# Patient Record
Sex: FEMALE | Race: WHITE | NOT HISPANIC OR LATINO | Employment: OTHER | URBAN - METROPOLITAN AREA
[De-identification: names, ages, dates, MRNs, and addresses within clinical notes are randomized per-mention and may not be internally consistent; named-entity substitution may affect disease eponyms.]

---

## 2017-06-27 ENCOUNTER — TRANSFERRED RECORDS (OUTPATIENT)
Dept: HEALTH INFORMATION MANAGEMENT | Facility: CLINIC | Age: 42
End: 2017-06-27

## 2017-08-04 ENCOUNTER — TRANSFERRED RECORDS (OUTPATIENT)
Dept: HEALTH INFORMATION MANAGEMENT | Facility: CLINIC | Age: 42
End: 2017-08-04

## 2017-08-05 ENCOUNTER — TRANSFERRED RECORDS (OUTPATIENT)
Dept: HEALTH INFORMATION MANAGEMENT | Facility: CLINIC | Age: 42
End: 2017-08-05

## 2017-08-10 ENCOUNTER — TRANSFERRED RECORDS (OUTPATIENT)
Dept: HEALTH INFORMATION MANAGEMENT | Facility: CLINIC | Age: 42
End: 2017-08-10

## 2017-08-30 ENCOUNTER — TRANSFERRED RECORDS (OUTPATIENT)
Dept: HEALTH INFORMATION MANAGEMENT | Facility: CLINIC | Age: 42
End: 2017-08-30

## 2019-02-21 ENCOUNTER — HOSPITAL ENCOUNTER (INPATIENT)
Facility: CLINIC | Age: 44
LOS: 21 days | Discharge: HOME | DRG: 885 | End: 2019-03-14
Attending: EMERGENCY MEDICINE | Admitting: PSYCHIATRY & NEUROLOGY
Payer: MEDICARE

## 2019-02-21 ENCOUNTER — TELEPHONE (OUTPATIENT)
Dept: BEHAVIORAL HEALTH | Facility: CLINIC | Age: 44
End: 2019-02-21

## 2019-02-21 DIAGNOSIS — K21.00 GASTROESOPHAGEAL REFLUX DISEASE WITH ESOPHAGITIS: ICD-10-CM

## 2019-02-21 DIAGNOSIS — R74.01 TRANSAMINITIS: ICD-10-CM

## 2019-02-21 DIAGNOSIS — E11.65 UNCONTROLLED TYPE 2 DIABETES MELLITUS WITH HYPERGLYCEMIA (H): ICD-10-CM

## 2019-02-21 DIAGNOSIS — F33.1 MODERATE EPISODE OF RECURRENT MAJOR DEPRESSIVE DISORDER (H): Primary | ICD-10-CM

## 2019-02-21 DIAGNOSIS — R45.851 SUICIDE IDEATION: ICD-10-CM

## 2019-02-21 DIAGNOSIS — F41.9 ANXIETY: ICD-10-CM

## 2019-02-21 DIAGNOSIS — I10 BENIGN ESSENTIAL HYPERTENSION: ICD-10-CM

## 2019-02-21 DIAGNOSIS — R73.9 HYPERGLYCEMIA: ICD-10-CM

## 2019-02-21 PROBLEM — R45.89 SUICIDAL BEHAVIOR: Status: ACTIVE | Noted: 2019-02-21

## 2019-02-21 LAB
ALBUMIN SERPL-MCNC: 4 G/DL (ref 3.4–5)
ALP SERPL-CCNC: 119 U/L (ref 40–150)
ALT SERPL W P-5'-P-CCNC: 194 U/L (ref 0–50)
AMPHETAMINES UR QL SCN: NEGATIVE
ANION GAP SERPL CALCULATED.3IONS-SCNC: 9 MMOL/L (ref 3–14)
AST SERPL W P-5'-P-CCNC: 96 U/L (ref 0–45)
BARBITURATES UR QL: NEGATIVE
BASOPHILS # BLD AUTO: 0 10E9/L (ref 0–0.2)
BASOPHILS NFR BLD AUTO: 0.2 %
BENZODIAZ UR QL: POSITIVE
BILIRUB SERPL-MCNC: 0.4 MG/DL (ref 0.2–1.3)
BUN SERPL-MCNC: 12 MG/DL (ref 7–30)
CALCIUM SERPL-MCNC: 10.4 MG/DL (ref 8.5–10.1)
CANNABINOIDS UR QL SCN: POSITIVE
CHLORIDE SERPL-SCNC: 101 MMOL/L (ref 94–109)
CO2 SERPL-SCNC: 26 MMOL/L (ref 20–32)
COCAINE UR QL: NEGATIVE
CREAT SERPL-MCNC: 0.66 MG/DL (ref 0.52–1.04)
DIFFERENTIAL METHOD BLD: NORMAL
EOSINOPHIL # BLD AUTO: 0.1 10E9/L (ref 0–0.7)
EOSINOPHIL NFR BLD AUTO: 1.2 %
ERYTHROCYTE [DISTWIDTH] IN BLOOD BY AUTOMATED COUNT: 14 % (ref 10–15)
ETHANOL UR QL SCN: NEGATIVE
GFR SERPL CREATININE-BSD FRML MDRD: >90 ML/MIN/{1.73_M2}
GLUCOSE SERPL-MCNC: 216 MG/DL (ref 70–99)
HCG UR QL: NEGATIVE
HCT VFR BLD AUTO: 42.4 % (ref 35–47)
HGB BLD-MCNC: 14.3 G/DL (ref 11.7–15.7)
IMM GRANULOCYTES # BLD: 0.1 10E9/L (ref 0–0.4)
IMM GRANULOCYTES NFR BLD: 0.7 %
LYMPHOCYTES # BLD AUTO: 2.9 10E9/L (ref 0.8–5.3)
LYMPHOCYTES NFR BLD AUTO: 30.4 %
MCH RBC QN AUTO: 29.9 PG (ref 26.5–33)
MCHC RBC AUTO-ENTMCNC: 33.7 G/DL (ref 31.5–36.5)
MCV RBC AUTO: 89 FL (ref 78–100)
MONOCYTES # BLD AUTO: 0.5 10E9/L (ref 0–1.3)
MONOCYTES NFR BLD AUTO: 4.8 %
NEUTROPHILS # BLD AUTO: 6 10E9/L (ref 1.6–8.3)
NEUTROPHILS NFR BLD AUTO: 62.7 %
NRBC # BLD AUTO: 0 10*3/UL
NRBC BLD AUTO-RTO: 0 /100
OPIATES UR QL SCN: NEGATIVE
PLATELET # BLD AUTO: 251 10E9/L (ref 150–450)
POTASSIUM SERPL-SCNC: 4.5 MMOL/L (ref 3.4–5.3)
PROT SERPL-MCNC: 9.1 G/DL (ref 6.8–8.8)
RBC # BLD AUTO: 4.78 10E12/L (ref 3.8–5.2)
SODIUM SERPL-SCNC: 136 MMOL/L (ref 133–144)
TSH SERPL DL<=0.005 MIU/L-ACNC: 0.82 MU/L (ref 0.4–4)
WBC # BLD AUTO: 9.6 10E9/L (ref 4–11)

## 2019-02-21 PROCEDURE — 80307 DRUG TEST PRSMV CHEM ANLYZR: CPT | Performed by: EMERGENCY MEDICINE

## 2019-02-21 PROCEDURE — 84443 ASSAY THYROID STIM HORMONE: CPT | Performed by: EMERGENCY MEDICINE

## 2019-02-21 PROCEDURE — 99285 EMERGENCY DEPT VISIT HI MDM: CPT | Mod: Z6 | Performed by: EMERGENCY MEDICINE

## 2019-02-21 PROCEDURE — 25000132 ZZH RX MED GY IP 250 OP 250 PS 637: Mod: GY | Performed by: NURSE PRACTITIONER

## 2019-02-21 PROCEDURE — 99285 EMERGENCY DEPT VISIT HI MDM: CPT | Mod: 25 | Performed by: EMERGENCY MEDICINE

## 2019-02-21 PROCEDURE — 12400001 ZZH R&B MH UMMC

## 2019-02-21 PROCEDURE — 80320 DRUG SCREEN QUANTALCOHOLS: CPT | Performed by: EMERGENCY MEDICINE

## 2019-02-21 PROCEDURE — HZ2ZZZZ DETOXIFICATION SERVICES FOR SUBSTANCE ABUSE TREATMENT: ICD-10-PCS | Performed by: PSYCHIATRY & NEUROLOGY

## 2019-02-21 PROCEDURE — 80053 COMPREHEN METABOLIC PANEL: CPT | Performed by: EMERGENCY MEDICINE

## 2019-02-21 PROCEDURE — 81025 URINE PREGNANCY TEST: CPT | Performed by: EMERGENCY MEDICINE

## 2019-02-21 PROCEDURE — 85025 COMPLETE CBC W/AUTO DIFF WBC: CPT | Performed by: EMERGENCY MEDICINE

## 2019-02-21 PROCEDURE — A9270 NON-COVERED ITEM OR SERVICE: HCPCS | Mod: GY | Performed by: NURSE PRACTITIONER

## 2019-02-21 PROCEDURE — 90791 PSYCH DIAGNOSTIC EVALUATION: CPT

## 2019-02-21 RX ORDER — ALUMINA, MAGNESIA, AND SIMETHICONE 2400; 2400; 240 MG/30ML; MG/30ML; MG/30ML
30 SUSPENSION ORAL EVERY 4 HOURS PRN
Status: DISCONTINUED | OUTPATIENT
Start: 2019-02-21 | End: 2019-03-14 | Stop reason: HOSPADM

## 2019-02-21 RX ORDER — BISACODYL 10 MG
10 SUPPOSITORY, RECTAL RECTAL DAILY PRN
Status: DISCONTINUED | OUTPATIENT
Start: 2019-02-21 | End: 2019-03-14 | Stop reason: HOSPADM

## 2019-02-21 RX ORDER — PHENOBARBITAL 32.4 MG/1
64.8 TABLET ORAL 4 TIMES DAILY
Status: DISCONTINUED | OUTPATIENT
Start: 2019-02-21 | End: 2019-02-26

## 2019-02-21 RX ORDER — METHADONE HYDROCHLORIDE 10 MG/5ML
130 SOLUTION ORAL DAILY
COMMUNITY
End: 2023-10-01

## 2019-02-21 RX ORDER — ACETAMINOPHEN 325 MG/1
650 TABLET ORAL EVERY 4 HOURS PRN
Status: DISCONTINUED | OUTPATIENT
Start: 2019-02-21 | End: 2019-03-14 | Stop reason: HOSPADM

## 2019-02-21 RX ORDER — TRAZODONE HYDROCHLORIDE 50 MG/1
50 TABLET, FILM COATED ORAL
Status: DISCONTINUED | OUTPATIENT
Start: 2019-02-21 | End: 2019-03-14 | Stop reason: HOSPADM

## 2019-02-21 RX ORDER — HYDROXYZINE HYDROCHLORIDE 25 MG/1
25 TABLET, FILM COATED ORAL EVERY 4 HOURS PRN
Status: DISCONTINUED | OUTPATIENT
Start: 2019-02-21 | End: 2019-02-28

## 2019-02-21 RX ADMIN — HYDROXYZINE HYDROCHLORIDE 25 MG: 25 TABLET, FILM COATED ORAL at 21:17

## 2019-02-21 RX ADMIN — PHENOBARBITAL 64.8 MG: 32.4 TABLET ORAL at 21:14

## 2019-02-21 RX ADMIN — TRAZODONE HYDROCHLORIDE 50 MG: 50 TABLET ORAL at 21:17

## 2019-02-21 SDOH — HEALTH STABILITY: MENTAL HEALTH: HOW OFTEN DO YOU HAVE A DRINK CONTAINING ALCOHOL?: NEVER

## 2019-02-21 ASSESSMENT — ENCOUNTER SYMPTOMS
EYE REDNESS: 0
CONFUSION: 0
SHORTNESS OF BREATH: 0
ABDOMINAL PAIN: 0
HEADACHES: 0
SLEEP DISTURBANCE: 1
ARTHRALGIAS: 0
FEVER: 0
NECK STIFFNESS: 0
DIFFICULTY URINATING: 0
DYSPHORIC MOOD: 1
COLOR CHANGE: 0

## 2019-02-21 ASSESSMENT — ACTIVITIES OF DAILY LIVING (ADL)
TOILETING: 0-->INDEPENDENT
ORAL_HYGIENE: INDEPENDENT
SWALLOWING: 0-->SWALLOWS FOODS/LIQUIDS WITHOUT DIFFICULTY
HYGIENE/GROOMING: INDEPENDENT
RETIRED_EATING: 0-->INDEPENDENT
TRANSFERRING: 0-->INDEPENDENT
AMBULATION: 0-->INDEPENDENT
DRESS: 0-->INDEPENDENT
COGNITION: 0 - NO COGNITION ISSUES REPORTED
FALL_HISTORY_WITHIN_LAST_SIX_MONTHS: NO
RETIRED_COMMUNICATION: 0-->UNDERSTANDS/COMMUNICATES WITHOUT DIFFICULTY
LAUNDRY: WITH SUPERVISION
DRESS: INDEPENDENT
BATHING: 0-->INDEPENDENT

## 2019-02-21 ASSESSMENT — MIFFLIN-ST. JEOR
SCORE: 1727.29
SCORE: 1786.26

## 2019-02-21 NOTE — ED NOTES
Pt updated on her admission status on [facility], pt was informed that she will have a roommate , unit is lock unit. Pt had no questions about her admission afterwards

## 2019-02-21 NOTE — ED NOTES
I have performed an in person assessment of the patient. Based on this assessment the patient no longer requires a one on one attendant at this point in time.    ANDERSON BAZZI MD, MD  11:20 AM  February 21, 2019         Anderson Bazzi MD  02/21/19 1129
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
## 2019-02-21 NOTE — ED PROVIDER NOTES
History     Chief Complaint   Patient presents with     Suicidal     due to recent traumatic events pt feels emotional well being is a burden to others. feels life is not worth living anymore.      Panic Attack     witnessed and revived nephew who attempted to hang himself. also witnessed same nephew attempt murder last year     HPI  Yun Coyle is a 43 year old female with a history of depression, anxiety and substance abuse, who presents to the Emergency Department for evaluation of suicdal ideations. Patient complains of worsening depression over the last couple of months along with worsening suicidal ideations for the past three months. Patient reports that she was prompted to come to the ED today after she contemplated overdosing on over the counter medications. Patient states that she currently lives with her adult daughter and her granddaughter, and reports feeling like a burden to her family as she is not contributing anything and is not currently working. Her current triggers include recent traumatic event in which she witnessed the near successful suicide of her nephew and was required to perform CPR on him. She also notes witnessing the same nephew reportedly attempt to murder his brother a month later. She endorses an addiction to benzodiazapines and is currently on treatment with methadone. She reports that she was recently caught purchasing Xanax from patient's as they were leaving the methadone clinic and was subsequently hospitalized last month following an intentional overdose where she reportedly took 30 Xanax. Patient reports that she had another attempt earlier this month. She notes that Sunday, four days ago, was her last day on methadone as she reportedly ran out. She also notes that last use of Xanax was last night.       I have reviewed the Medications, Allergies, Past Medical and Surgical History, and Social History in the [employer] system.    Past Medical History:   Diagnosis Date      "Anxiety      Depressive disorder      Diabetes (H)      Gastroesophageal reflux disease      Hypertension      Uncomplicated asthma        Past Surgical History:   Procedure Laterality Date     BACK SURGERY       CHOLECYSTECTOMY       GYN SURGERY       ORTHOPEDIC SURGERY         No family history on file.    Social History     Tobacco Use     Smoking status: Never Smoker     Smokeless tobacco: Never Used   Substance Use Topics     Alcohol use: No     Frequency: Never     No current facility-administered medications for this encounter.      Current Outpatient Medications   Medication     methadone HCl 10 MG/5ML SOLN      Not on File    Review of Systems   Constitutional: Negative for fever.   HENT: Negative for congestion.    Eyes: Negative for redness.   Respiratory: Negative for shortness of breath.    Cardiovascular: Negative for chest pain.   Gastrointestinal: Negative for abdominal pain.   Genitourinary: Negative for difficulty urinating.   Musculoskeletal: Negative for arthralgias and neck stiffness.   Skin: Negative for color change.   Neurological: Negative for headaches.   Psychiatric/Behavioral: Positive for dysphoric mood, sleep disturbance and suicidal ideas. Negative for confusion.   All other systems reviewed and are negative.      Physical Exam   BP: (!) 182/108  Pulse: 107  Heart Rate: 77  Temp: 98.3  F (36.8  C)  Resp: 20  Height: 175.3 cm (5' 9\")  Weight: 99.8 kg (220 lb)  SpO2: 97 %      Physical Exam   Constitutional: She appears well-developed and well-nourished. No distress.   HENT:   Head: Normocephalic and atraumatic.   Mouth/Throat: No oropharyngeal exudate.   Eyes: Pupils are equal, round, and reactive to light. No scleral icterus.   Neck: Normal range of motion.   Cardiovascular: Normal rate, regular rhythm, normal heart sounds and intact distal pulses.   Pulmonary/Chest: Effort normal and breath sounds normal. No respiratory distress.   Abdominal: Soft. Bowel sounds are normal. There is no " tenderness.   Musculoskeletal: Normal range of motion. She exhibits no edema or tenderness.   Neurological: She is alert. She has normal strength. Coordination normal.   Skin: Skin is warm. No rash noted. She is not diaphoretic.   Psychiatric: She exhibits a depressed mood. She expresses suicidal ideation.   Nursing note and vitals reviewed.      ED Course        Procedures            Critical Care time:    Results for orders placed or performed during the hospital encounter of 02/21/19   Drug abuse screen 6 urine (tox)   Result Value Ref Range    Amphetamine Qual Urine Negative NEG^Negative    Barbiturates Qual Urine Negative NEG^Negative    Benzodiazepine Qual Urine Positive (A) NEG^Negative    Cannabinoids Qual Urine Positive (A) NEG^Negative    Cocaine Qual Urine Negative NEG^Negative    Ethanol Qual Urine Negative NEG^Negative    Opiates Qualitative Urine Negative NEG^Negative   HCG qualitative urine   Result Value Ref Range    HCG Qual Urine Negative NEG^Negative   CBC with platelets differential   Result Value Ref Range    WBC 9.6 4.0 - 11.0 10e9/L    RBC Count 4.78 3.8 - 5.2 10e12/L    Hemoglobin 14.3 11.7 - 15.7 g/dL    Hematocrit 42.4 35.0 - 47.0 %    MCV 89 78 - 100 fl    MCH 29.9 26.5 - 33.0 pg    MCHC 33.7 31.5 - 36.5 g/dL    RDW 14.0 10.0 - 15.0 %    Platelet Count 251 150 - 450 10e9/L    Diff Method Automated Method     % Neutrophils 62.7 %    % Lymphocytes 30.4 %    % Monocytes 4.8 %    % Eosinophils 1.2 %    % Basophils 0.2 %    % Immature Granulocytes 0.7 %    Nucleated RBCs 0 0 /100    Absolute Neutrophil 6.0 1.6 - 8.3 10e9/L    Absolute Lymphocytes 2.9 0.8 - 5.3 10e9/L    Absolute Monocytes 0.5 0.0 - 1.3 10e9/L    Absolute Eosinophils 0.1 0.0 - 0.7 10e9/L    Absolute Basophils 0.0 0.0 - 0.2 10e9/L    Abs Immature Granulocytes 0.1 0 - 0.4 10e9/L    Absolute Nucleated RBC 0.0    Comprehensive metabolic panel   Result Value Ref Range    Sodium 136 133 - 144 mmol/L    Potassium 4.5 3.4 - 5.3 mmol/L     Chloride 101 94 - 109 mmol/L    Carbon Dioxide 26 20 - 32 mmol/L    Anion Gap 9 3 - 14 mmol/L    Glucose 216 (H) 70 - 99 mg/dL    Urea Nitrogen 12 7 - 30 mg/dL    Creatinine 0.66 0.52 - 1.04 mg/dL    GFR Estimate >90 >60 mL/min/[1.73_m2]    GFR Estimate If Black >90 >60 mL/min/[1.73_m2]    Calcium 10.4 (H) 8.5 - 10.1 mg/dL    Bilirubin Total 0.4 0.2 - 1.3 mg/dL    Albumin 4.0 3.4 - 5.0 g/dL    Protein Total 9.1 (H) 6.8 - 8.8 g/dL    Alkaline Phosphatase 119 40 - 150 U/L     (H) 0 - 50 U/L    AST 96 (H) 0 - 45 U/L   TSH with free T4 reflex   Result Value Ref Range    TSH 0.82 0.40 - 4.00 mU/L         Release of information sent to [facility] to confirm methadone dosing    Assessments & Plan (with Medical Decision Making)   43 year old female to the emergency department for evaluation of depression and suicidal ideation.  She has been attempting to stockpile Xanax for overdose as a means of suicide.  Patient denies daily benzodiazepine use and denies a history of withdrawal when she does not use Xanax.  The patient is also in opiate withdrawal here in the emergency department as she has not had her methadone dose in the last 4 days.  The patient's admission labs are remarkable for hyperglycemia as well as transaminitis.  She has had a degree of both in the past according to review of care everywhere.  She may benefit from medicine consultation during her psychiatric admission.  The patient does not have any lab abnormality that would preclude her from mental health unit admission. The patient denies medication use other than methadone.  She also denies over-the-counter analgesic use including ibuprofen and acetaminophen. The patient appears medically stable for psychiatric admission.    I have reviewed the nursing notes.    I have reviewed the findings, diagnosis, plan and need for follow up with the patient.       Medication List      There are no discharge medications for this visit.         Final  diagnoses:   Moderate episode of recurrent major depressive disorder (H)   Suicide ideation   Hyperglycemia   Transaminitis     IVeena, am serving as a trained medical scribe to document services personally performed by Anderson Blas MD, based on the provider psychiatric admission.  Statements to me.   Anderson PAIGE MD, was physically present and have reviewed and verified the accuracy of this note documented by Veena Stallworth.    2/21/2019   [facility], [place], EMERGENCY DEPARTMENT     Anderson Blas MD  02/21/19 [id number]

## 2019-02-21 NOTE — PHARMACY-ADMISSION MEDICATION HISTORY
Admission medication history for the February 21, 2019 admission has been completed by pharmacy.     Interview sources:  [facility] in [place] ([phone]). ENE signed.   Medication compliance: Last dose 2/17/19. Goes daily M-F for dose, with take outs for Sat and Sun. Last dose Sun 2/17. Patient stated she couldn't get to the clinic this week for her doses. Confirmed her no shows with the clinic staff.     Prior to Admission Medication List:  Prior to Admission medications    Medication Sig Last Dose Taking? Auth Provider   methadone HCl 10 MG/5ML SOLN Take 110 mg by mouth daily 2/17/2019 Yes        Time spent: 15 minutes    Medication history completed by:   Fide Veras, PharmD, [facility]  [facility]  Available daily from 1-9 PM: phone [phone], ascom *[phone], pager [phone]

## 2019-02-21 NOTE — PROGRESS NOTES
02/21/19 1700   Patient Belongings   Patient Belongings locker   Patient Belongings Put in Hospital Secure Location (Security or Locker, etc.) cell phone/electronics;shoes   Belongings Search Yes   Clothing Search Yes   Second Staff Kath WilkinsN      Locker #[id number]: grey boots, black phone, black coat, twins scarf, black bag, grey coat, phone ; glucometer    Security: None     A               Admission:  I am responsible for any personal items that are not sent to the safe or pharmacy.  [facility] is not responsible for loss, theft or damage of any property in my possession.    Signature:  _________________________________ Date: _______  Time: _____                                              Staff Signature:  ____________________________ Date: ________  Time: _____      2nd Staff person, if patient is unable/unwilling to sign:    Signature: ________________________________ Date: ________  Time: _____     Discharge:  [facility] has returned all of my personal belongings:    Signature: _________________________________ Date: ________  Time: _____                                          Staff Signature:  ____________________________ Date: ________  Time: _____

## 2019-02-21 NOTE — TELEPHONE ENCOUNTER
S: MARSHA Potts , calling regarding this 43 year old female in [facility].    B: Pt having SI on and off for past couple of months. This morning she was having strong SI with thoughts to overdose on OTC medications. Pt did try overdosing on Xanax she bought off the street on Jan 15th and again Feb 4th. Pt is not eating, or sleeping and is isolating more. Depression keeps getting worse. Feels like she's a burden to her family. Pt typically goes in M-F for her methadone dose but hasn't received her dose since Sunday due to depression. Pt is also afraid to leave the house because she will have more means to find ways to overdose (knows where to buy Xanax near her Methadone clinic). Pt receives Methadone at [facility] in [place]. Just wants to go to sleep and not wake up. Back in October 2018, she witnessed her nephew attempt suicide and had to resuscitate him. Back then started using Xanax to cope with her anxiety, [facility] found out and she had to go to tx at [facility] in November and December. Pt also smokes weed daily. Cooperative, pleasant, tearful. Ambulatory, no medical concerns. UDS positive for cannabinoids and benzos (states she took one Xanax pill that should found yesterday). HCG negative.     A: Voluntary.     R: [facility] under Dr. Altman  Unit notified of pending admission at 12:26pm.  Charge ED RN paged with disposition at 12:27pm.

## 2019-02-22 LAB
CHOLEST SERPL-MCNC: 173 MG/DL
HBA1C MFR BLD: 8.5 % (ref 0–5.6)
HDLC SERPL-MCNC: 57 MG/DL
LDLC SERPL CALC-MCNC: 99 MG/DL
NONHDLC SERPL-MCNC: 116 MG/DL
TRIGL SERPL-MCNC: 84 MG/DL

## 2019-02-22 PROCEDURE — 36415 COLL VENOUS BLD VENIPUNCTURE: CPT | Performed by: NURSE PRACTITIONER

## 2019-02-22 PROCEDURE — 25000132 ZZH RX MED GY IP 250 OP 250 PS 637: Performed by: NURSE PRACTITIONER

## 2019-02-22 PROCEDURE — 25000131 ZZH RX MED GY IP 250 OP 636 PS 637: Performed by: NURSE PRACTITIONER

## 2019-02-22 PROCEDURE — A9270 NON-COVERED ITEM OR SERVICE: HCPCS | Mod: GY | Performed by: PSYCHIATRY & NEUROLOGY

## 2019-02-22 PROCEDURE — A9270 NON-COVERED ITEM OR SERVICE: HCPCS | Mod: GY | Performed by: NURSE PRACTITIONER

## 2019-02-22 PROCEDURE — 80061 LIPID PANEL: CPT | Performed by: NURSE PRACTITIONER

## 2019-02-22 PROCEDURE — 99221 1ST HOSP IP/OBS SF/LOW 40: CPT | Mod: AI | Performed by: PSYCHIATRY & NEUROLOGY

## 2019-02-22 PROCEDURE — 25000132 ZZH RX MED GY IP 250 OP 250 PS 637: Mod: GY | Performed by: PSYCHIATRY & NEUROLOGY

## 2019-02-22 PROCEDURE — 83036 HEMOGLOBIN GLYCOSYLATED A1C: CPT | Performed by: NURSE PRACTITIONER

## 2019-02-22 PROCEDURE — 12400001 ZZH R&B MH UMMC

## 2019-02-22 RX ORDER — METHADONE HYDROCHLORIDE 5 MG/5ML
110 SOLUTION ORAL DAILY
Status: DISCONTINUED | OUTPATIENT
Start: 2019-02-23 | End: 2019-03-14 | Stop reason: HOSPADM

## 2019-02-22 RX ORDER — DULOXETIN HYDROCHLORIDE 20 MG/1
20 CAPSULE, DELAYED RELEASE ORAL DAILY
Status: DISCONTINUED | OUTPATIENT
Start: 2019-02-23 | End: 2019-02-25

## 2019-02-22 RX ORDER — ONDANSETRON 4 MG/1
4 TABLET, ORALLY DISINTEGRATING ORAL EVERY 6 HOURS PRN
Status: DISCONTINUED | OUTPATIENT
Start: 2019-02-22 | End: 2019-03-02

## 2019-02-22 RX ORDER — NALOXONE HYDROCHLORIDE 0.4 MG/ML
.1-.4 INJECTION, SOLUTION INTRAMUSCULAR; INTRAVENOUS; SUBCUTANEOUS
Status: DISCONTINUED | OUTPATIENT
Start: 2019-02-22 | End: 2019-03-14 | Stop reason: HOSPADM

## 2019-02-22 RX ORDER — METHADONE HYDROCHLORIDE 5 MG/5ML
77 SOLUTION ORAL ONCE
Status: COMPLETED | OUTPATIENT
Start: 2019-02-22 | End: 2019-02-22

## 2019-02-22 RX ADMIN — ALUMINUM HYDROXIDE, MAGNESIUM HYDROXIDE, AND DIMETHICONE 30 ML: 400; 400; 40 SUSPENSION ORAL at 13:42

## 2019-02-22 RX ADMIN — TRAZODONE HYDROCHLORIDE 50 MG: 50 TABLET ORAL at 20:06

## 2019-02-22 RX ADMIN — PHENOBARBITAL 64.8 MG: 32.4 TABLET ORAL at 20:06

## 2019-02-22 RX ADMIN — TRAZODONE HYDROCHLORIDE 50 MG: 50 TABLET ORAL at 02:16

## 2019-02-22 RX ADMIN — METHADONE HYDROCHLORIDE 77 MG: 5 SOLUTION ORAL at 16:17

## 2019-02-22 RX ADMIN — ONDANSETRON 4 MG: 4 TABLET, ORALLY DISINTEGRATING ORAL at 04:45

## 2019-02-22 RX ADMIN — PHENOBARBITAL 64.8 MG: 32.4 TABLET ORAL at 14:53

## 2019-02-22 RX ADMIN — HYDROXYZINE HYDROCHLORIDE 25 MG: 25 TABLET, FILM COATED ORAL at 02:16

## 2019-02-22 RX ADMIN — PHENOBARBITAL 64.8 MG: 32.4 TABLET ORAL at 17:47

## 2019-02-22 RX ADMIN — ONDANSETRON 4 MG: 4 TABLET, ORALLY DISINTEGRATING ORAL at 09:08

## 2019-02-22 RX ADMIN — PHENOBARBITAL 64.8 MG: 32.4 TABLET ORAL at 08:54

## 2019-02-22 RX ADMIN — ONDANSETRON 4 MG: 4 TABLET, ORALLY DISINTEGRATING ORAL at 15:51

## 2019-02-22 ASSESSMENT — ACTIVITIES OF DAILY LIVING (ADL)
DRESS: INDEPENDENT
LAUNDRY: WITH SUPERVISION
HYGIENE/GROOMING: INDEPENDENT
ORAL_HYGIENE: INDEPENDENT
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
## 2019-02-22 NOTE — PROGRESS NOTES
[facility] called stating that patient had been absent 4 days. There protocol calls for a patient to be given 70% of there methadone dose 4-6 days after returning in her case it would be 77 mg and then going forward back to 100% of her dose which is 110 mg.

## 2019-02-22 NOTE — PROGRESS NOTES
"Pt was in the milieu off/on during the shift.  Pt was waiting all shift for her methadone.  Complained of feeling \"very bad.\"  Pt declined to check in with staff each time staff attempted to check in.  "
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
## 2019-02-22 NOTE — H&P
"[facility]   Psychiatric History and Physical  Admission date: 2/21/2019        Chief Complaint:   \"I'm going through withdrawal.\"         HPI:     The patient is a 42yo female who was admitted after endorsing SI with a plan. Says that she has been stockpiling Xanax and then taking them in large amounts as suicide attempts. Took as many as 20mg in a day. Still endorsing SI but does feel safe here in the hospital. Denies HI. Denies AH or VH. Feels that her SI is due to \"past traumas I haven't dealt with.\" Not sleeping or eating well. Has been off her Methadone since Sunday. Is open to CD treatment. Has been on \"multiple things\" for depression in the past. Never tried on Cymbalta.          Past Psychiatric History:     Overdose attempt on 1/15/19. Reports 5-6 suicide attempts.   First hospitalization as an adult. Was psychiatrically hospitalized as an adolescent.   Works with Dr. Will for psychiatry.         Substance Use and History:     Benzodiazepine abuse.   Opiate use disorder on Methadone.   Cannabis use disorder.   Denies alcohol use disorder.   Nicotine use disorder        Past Medical History:   PAST MEDICAL HISTORY:   Past Medical History:   Diagnosis Date     Anxiety      Depressive disorder      Diabetes (H)      Gastroesophageal reflux disease      Hypertension      Uncomplicated asthma        PAST SURGICAL HISTORY:   Past Surgical History:   Procedure Laterality Date     BACK SURGERY       CHOLECYSTECTOMY       GYN SURGERY       ORTHOPEDIC SURGERY               Family History:   FAMILY HISTORY: No family history on file.        Social History:   Please see the full psychosocial profile from the clinical treatment coordinator.   SOCIAL HISTORY:   Social History     Tobacco Use     Smoking status: Never Smoker     Smokeless tobacco: Never Used   Substance Use Topics     Alcohol use: No     Frequency: Never            Physical ROS:   The patient endorsed fatigue. The " remainder of 10-point review of systems was negative except as noted in HPI.         PTA Medications:     Medications Prior to Admission   Medication Sig Dispense Refill Last Dose     methadone HCl 10 MG/5ML SOLN Take 110 mg by mouth daily   Past Week at Unknown time          Allergies:   No Known Allergies       Labs:     Recent Results (from the past 48 hour(s))   Drug abuse screen 6 urine (tox)    Collection Time: 02/21/19 10:53 AM   Result Value Ref Range    Amphetamine Qual Urine Negative NEG^Negative    Barbiturates Qual Urine Negative NEG^Negative    Benzodiazepine Qual Urine Positive (A) NEG^Negative    Cannabinoids Qual Urine Positive (A) NEG^Negative    Cocaine Qual Urine Negative NEG^Negative    Ethanol Qual Urine Negative NEG^Negative    Opiates Qualitative Urine Negative NEG^Negative   HCG qualitative urine    Collection Time: 02/21/19 10:53 AM   Result Value Ref Range    HCG Qual Urine Negative NEG^Negative   CBC with platelets differential    Collection Time: 02/21/19  1:54 PM   Result Value Ref Range    WBC 9.6 4.0 - 11.0 10e9/L    RBC Count 4.78 3.8 - 5.2 10e12/L    Hemoglobin 14.3 11.7 - 15.7 g/dL    Hematocrit 42.4 35.0 - 47.0 %    MCV 89 78 - 100 fl    MCH 29.9 26.5 - 33.0 pg    MCHC 33.7 31.5 - 36.5 g/dL    RDW 14.0 10.0 - 15.0 %    Platelet Count 251 150 - 450 10e9/L    Diff Method Automated Method     % Neutrophils 62.7 %    % Lymphocytes 30.4 %    % Monocytes 4.8 %    % Eosinophils 1.2 %    % Basophils 0.2 %    % Immature Granulocytes 0.7 %    Nucleated RBCs 0 0 /100    Absolute Neutrophil 6.0 1.6 - 8.3 10e9/L    Absolute Lymphocytes 2.9 0.8 - 5.3 10e9/L    Absolute Monocytes 0.5 0.0 - 1.3 10e9/L    Absolute Eosinophils 0.1 0.0 - 0.7 10e9/L    Absolute Basophils 0.0 0.0 - 0.2 10e9/L    Abs Immature Granulocytes 0.1 0 - 0.4 10e9/L    Absolute Nucleated RBC 0.0    Comprehensive metabolic panel    Collection Time: 02/21/19  1:54 PM   Result Value Ref Range    Sodium 136 133 - 144 mmol/L     "Potassium 4.5 3.4 - 5.3 mmol/L    Chloride 101 94 - 109 mmol/L    Carbon Dioxide 26 20 - 32 mmol/L    Anion Gap 9 3 - 14 mmol/L    Glucose 216 (H) 70 - 99 mg/dL    Urea Nitrogen 12 7 - 30 mg/dL    Creatinine 0.66 0.52 - 1.04 mg/dL    GFR Estimate >90 >60 mL/min/[1.73_m2]    GFR Estimate If Black >90 >60 mL/min/[1.73_m2]    Calcium 10.4 (H) 8.5 - 10.1 mg/dL    Bilirubin Total 0.4 0.2 - 1.3 mg/dL    Albumin 4.0 3.4 - 5.0 g/dL    Protein Total 9.1 (H) 6.8 - 8.8 g/dL    Alkaline Phosphatase 119 40 - 150 U/L     (H) 0 - 50 U/L    AST 96 (H) 0 - 45 U/L   TSH with free T4 reflex    Collection Time: 02/21/19  1:54 PM   Result Value Ref Range    TSH 0.82 0.40 - 4.00 mU/L   Lipid panel    Collection Time: 02/22/19  7:30 AM   Result Value Ref Range    Cholesterol 173 <200 mg/dL    Triglycerides 84 <150 mg/dL    HDL Cholesterol 57 >49 mg/dL    LDL Cholesterol Calculated 99 <100 mg/dL    Non HDL Cholesterol 116 <130 mg/dL   Hemoglobin A1c    Collection Time: 02/22/19  7:30 AM   Result Value Ref Range    Hemoglobin A1C 8.5 (H) 0 - 5.6 %          Physical and Psychiatric Examination:     BP (!) 161/116   Pulse 76   Temp 98.1  F (36.7  C) (Oral)   Resp 16   Ht 1.753 m (5' 9\")   Wt 105.7 kg (233 lb)   LMP 02/14/2019   SpO2 97%   Breastfeeding? No   BMI 34.41 kg/m    Weight is 233 lbs 0 oz  Body mass index is 34.41 kg/m .    Physical Exam:  I have reviewed the physical exam as documented by by the medical team and agree with findings and assessment and have no additional findings to add at this time.    Mental Status Exam:  Appearance: awake, alert and adequately groomed  Attitude:  cooperative  Eye Contact:  good  Mood:  depressed  Affect:  mood congruent  Speech:  clear, coherent  Language: fluent and intact in English  Psychomotor, Gait, Musculoskeletal:  no evidence of tardive dyskinesia, dystonia, or tics  Thought Process:  goal oriented  Associations:  no loose associations  Thought Content:  passive suicidal " ideation present  Insight:  fair  Judgement:  fair  Oriented to:  time, person, and place  Attention Span and Concentration:  intact  Recent and Remote Memory:  fair  Fund of Knowledge:  appropriate         Admission Diagnoses:      Moderate episode of recurrent major depressive disorder (H)  Suicide ideation  Hyperglycemia  Transaminitis  Uncontrolled type 2 diabetes mellitus with hyperglycemia (H)         Assessment & Plan:     1) Continue phenobarbital 64.8mg QID. Will start taper after the weekend.   2) Start Cymbalta 20mg Qday tomorrow.   3) Will confirm dosing with [facility] and restart her Methadone. They are requiring another ENE on the patient before providing recommendations. They did say restart at 77mg today and increase to previous dose of 110mg Qday tomorrow.     Disposition Plan   Reason for ongoing admission: poses an imminent risk to self  Discharge location: Chemical dependency treatment facility  Discharge Medications: not ordered  Follow-up Appointments: not scheduled  Legal Status: voluntary  Entered by: Rahul Altman on 2/22/2019 at 10:21 AM

## 2019-02-22 NOTE — PLAN OF CARE
BEHAVIORAL TEAM DISCUSSION    Participants:   Jess Steele RN, Jahaira MONTANA, Dr. Altman    Progress:   This 43 year old female presented to the ER with suicidal ideation and panic attacks with thoughts to overdosing on  medications.She has been attempting to stockpile Xanax for overdose as a means of suicide.    Pt is on methadone through [facility] and has not dosed since Sunday.    Continued Stay Criteria/Rationale:   The pt will be discharged when she is no longer a danger to herself.       Medical/Physical:   labs are remarkable for hyperglycemia as well as transaminitis.  She has had a degree of both in the past according to review of care everywhere.  She may benefit from medicine consultation during her psychiatric admission.     Precautions:   Behavioral Orders   Procedures     Code 1 - Restrict to Unit     Routine Programming     As clinically indicated     Status 15     Every 15 minutes.     Suicide precautions     Patients on Suicide Precautions should have a Combination Diet ordered that includes a Diet selection(s) AND a Behavioral Tray selection for Safe Tray - with utensils, or Safe Tray - NO utensils       Withdrawal precautions     Plan:   Multidisciplinary evaluation  Medication Review and adjustment  Call [facility] to coordinate methadone dosing  Encourage group programming participation     Rationale for change in precautions or plan:   Initial review

## 2019-02-23 ENCOUNTER — TRANSFERRED RECORDS (OUTPATIENT)
Dept: HEALTH INFORMATION MANAGEMENT | Facility: CLINIC | Age: 44
End: 2019-02-23

## 2019-02-23 PROCEDURE — A9270 NON-COVERED ITEM OR SERVICE: HCPCS | Mod: GY | Performed by: NURSE PRACTITIONER

## 2019-02-23 PROCEDURE — 12400001 ZZH R&B MH UMMC

## 2019-02-23 PROCEDURE — 25000132 ZZH RX MED GY IP 250 OP 250 PS 637: Mod: GY | Performed by: NURSE PRACTITIONER

## 2019-02-23 PROCEDURE — 25000132 ZZH RX MED GY IP 250 OP 250 PS 637: Mod: GY | Performed by: PSYCHIATRY & NEUROLOGY

## 2019-02-23 PROCEDURE — A9270 NON-COVERED ITEM OR SERVICE: HCPCS | Mod: GY | Performed by: PSYCHIATRY & NEUROLOGY

## 2019-02-23 RX ADMIN — PHENOBARBITAL 64.8 MG: 32.4 TABLET ORAL at 12:41

## 2019-02-23 RX ADMIN — PHENOBARBITAL 64.8 MG: 32.4 TABLET ORAL at 16:47

## 2019-02-23 RX ADMIN — PHENOBARBITAL 64.8 MG: 32.4 TABLET ORAL at 08:34

## 2019-02-23 RX ADMIN — DULOXETINE HYDROCHLORIDE 20 MG: 20 CAPSULE, DELAYED RELEASE ORAL at 08:34

## 2019-02-23 RX ADMIN — METHADONE HYDROCHLORIDE 110 MG: 5 SOLUTION ORAL at 08:34

## 2019-02-23 RX ADMIN — PHENOBARBITAL 64.8 MG: 32.4 TABLET ORAL at 22:20

## 2019-02-23 ASSESSMENT — ACTIVITIES OF DAILY LIVING (ADL)
DRESS: SCRUBS (BEHAVIORAL HEALTH);INDEPENDENT
HYGIENE/GROOMING: HANDWASHING;INDEPENDENT
ORAL_HYGIENE: INDEPENDENT
DRESS: SCRUBS (BEHAVIORAL HEALTH);INDEPENDENT
HYGIENE/GROOMING: INDEPENDENT
ORAL_HYGIENE: INDEPENDENT

## 2019-02-23 NOTE — PROGRESS NOTES
Re: Rule 25 ([facility])    Writer met with patient to complete Rule 25 assessment. Patient is assessed at a residential level. Programs discussed, she voiced interest in [facility] or [place]. Writer faxed copy of assessment and paperwork to [facility] for funding review. Assessment faxed to [facility] [place] Team. Original paperwork located on [facility] desk. [facility] to follow up with [facility] and treatment facility to coordinate admission.    [facility] Review Team  (P) [phone]  (F) [phone]    [facility]  ( [facility])  [address] [place] [address]  Local: [phone] or [phone]  Fax: [phone]      Arcelia Sauceda, JURGENC, [facility]

## 2019-02-23 NOTE — PROGRESS NOTES
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 environment/neighborhood? Ask enough follow up questions to determine safety, criminal activity, availability of alcohol and drugs, supportive or antagonistic to the person making changes.      No major concerns    9. Criminal justice history: Gather current/recent history and any significant history related to substance use--Arrests? Convictions? Circumstances? Alcohol or drug involvement? Sentences? Still on probation or parole? Expectations of the court? Current court order? Any sex offenses - lifetime? What level? (DSM)    Prescription forgery : Spent two months in retirement and 18 months in drug court program. She is not on probation.    10. What obstacles exist to participating in treatment? (Time off work, childcare, funding, transportation, pending retirement time, living situation)     None    Dimension VI Ratings   Recovery environment - The placing authority must use the criteria in Dimension VI to determine a client s recovery environment.   RISK DESCRIPTIONS - Severity ratin Client has (A) Chronically antagonistic significant other, living environment, family, peer group or long-term criminal justice involvement that is harmful to recovery or treatment progress, or (B) Client has an actively antagonistic  significant other, family, work, or living environment with immediate threat to the client's safety and well-being.    REASONS SEVERITY WAS ASSIGNED - (What support does the person have for making changes? What structure/stability does the person have in his or her daily life that will increase the likelihood that changes can be sustained? What problems exist in the person s environment that will jeopardize getting/staying clean and sober?)     Patient reports that they lacks a daily structure and meaningful activities.Patient reports that they are unemployed. Pt lacks a sober support network. Patient reports that they have legal involvement for chemical use.      Client Choice/Exceptions   Would you like services specific to language, age, gender, culture, Religion preference, race, ethnicity, sexual orientation or disability?  No    What particular treatment choices and options would you like to have? NA    Do you have a preference for a particular treatment program? NA    Criteria for Diagnosis     Criteria for Diagnosis  DSM-5 Criteria for Substance Use Disorder  Instructions: Determine whether the client currently meets the criteria for Substance Use Disorder using the diagnostic criteria in the DSM-V pp.[phone]. 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    Rule 25 Assessment Summary and Plan   's Recommendation    1)  Complete a dual diagnosis residential based program ([facility] or [facility])  2)  Take all prescribed medications as instructed by outpatient providers.  3)  Abstain from all mood-altering chemicals unless prescribed by a licensed provider.   4)  Follow all the recommendations of your treatment/medical providers.    Collateral Contacts     [facility]   Psychiatric History and Physical  Admission date: 2/21/2019         Chief Complaint:   \"I'm going through withdrawal.\"          HPI:      The patient is a 44yo female who was admitted after endorsing SI with a plan. Says that she has been stockpiling Xanax and then taking them in large amounts as suicide attempts. Took as many as 20mg in a day. Still endorsing SI but does feel safe here in the hospital. Denies HI. Denies AH or VH. Feels that her SI is due to \"past traumas I haven't dealt with.\" Not sleeping or eating well. Has been off her Methadone since Sunday. Is open to CD treatment. Has been on \"multiple things\" for depression in the past. Never tried on Cymbalta.           Past Psychiatric History:      Overdose attempt on 1/15/19. Reports 5-6 suicide attempts.   First hospitalization as an adult. Was psychiatrically hospitalized as an adolescent.   Works with Dr. Will for psychiatry.          Substance Use and History:      Benzodiazepine abuse.   Opiate use disorder on Methadone.   Cannabis use disorder.   Denies alcohol use disorder.   Nicotine use disorder         Past Medical History:   PAST MEDICAL HISTORY:         Past Medical History:   Diagnosis Date     Anxiety       Depressive disorder       Diabetes (H)       Gastroesophageal reflux disease       Hypertension       Uncomplicated asthma   "         PAST SURGICAL HISTORY:   Past Surgical History:   Procedure Laterality Date     BACK SURGERY         CHOLECYSTECTOMY         GYN SURGERY         ORTHOPEDIC SURGERY                    Family History:   FAMILY HISTORY: No family history on file.         Social History:   Please see the full psychosocial profile from the clinical treatment coordinator.   SOCIAL HISTORY:   Social History            Tobacco Use     Smoking status: Never Smoker     Smokeless tobacco: Never Used   Substance Use Topics     Alcohol use: No       Frequency: Never              Physical ROS:   The patient endorsed fatigue. The remainder of 10-point review of systems was negative except as noted in HPI.          PTA Medications:              Medications Prior to Admission   Medication Sig Dispense Refill Last Dose     methadone HCl 10 MG/5ML SOLN Take 110 mg by mouth daily     Past Week at Unknown time           Allergies:   No Known Allergies       Labs:            Recent Results (from the past 48 hour(s))   Drug abuse screen 6 urine (tox)     Collection Time: 02/21/19 10:53 AM   Result Value Ref Range     Amphetamine Qual Urine Negative NEG^Negative     Barbiturates Qual Urine Negative NEG^Negative     Benzodiazepine Qual Urine Positive (A) NEG^Negative     Cannabinoids Qual Urine Positive (A) NEG^Negative     Cocaine Qual Urine Negative NEG^Negative     Ethanol Qual Urine Negative NEG^Negative     Opiates Qualitative Urine Negative NEG^Negative   HCG qualitative urine     Collection Time: 02/21/19 10:53 AM   Result Value Ref Range     HCG Qual Urine Negative NEG^Negative   CBC with platelets differential     Collection Time: 02/21/19  1:54 PM   Result Value Ref Range     WBC 9.6 4.0 - 11.0 10e9/L     RBC Count 4.78 3.8 - 5.2 10e12/L     Hemoglobin 14.3 11.7 - 15.7 g/dL     Hematocrit 42.4 35.0 - 47.0 %     MCV 89 78 - 100 fl     MCH 29.9 26.5 - 33.0 pg     MCHC 33.7 31.5 - 36.5 g/dL     RDW 14.0 10.0 - 15.0 %     Platelet Count 251  "150 - 450 10e9/L     Diff Method Automated Method       % Neutrophils 62.7 %     % Lymphocytes 30.4 %     % Monocytes 4.8 %     % Eosinophils 1.2 %     % Basophils 0.2 %     % Immature Granulocytes 0.7 %     Nucleated RBCs 0 0 /100     Absolute Neutrophil 6.0 1.6 - 8.3 10e9/L     Absolute Lymphocytes 2.9 0.8 - 5.3 10e9/L     Absolute Monocytes 0.5 0.0 - 1.3 10e9/L     Absolute Eosinophils 0.1 0.0 - 0.7 10e9/L     Absolute Basophils 0.0 0.0 - 0.2 10e9/L     Abs Immature Granulocytes 0.1 0 - 0.4 10e9/L     Absolute Nucleated RBC 0.0     Comprehensive metabolic panel     Collection Time: 02/21/19  1:54 PM   Result Value Ref Range     Sodium 136 133 - 144 mmol/L     Potassium 4.5 3.4 - 5.3 mmol/L     Chloride 101 94 - 109 mmol/L     Carbon Dioxide 26 20 - 32 mmol/L     Anion Gap 9 3 - 14 mmol/L     Glucose 216 (H) 70 - 99 mg/dL     Urea Nitrogen 12 7 - 30 mg/dL     Creatinine 0.66 0.52 - 1.04 mg/dL     GFR Estimate >90 >60 mL/min/[1.73_m2]     GFR Estimate If Black >90 >60 mL/min/[1.73_m2]     Calcium 10.4 (H) 8.5 - 10.1 mg/dL     Bilirubin Total 0.4 0.2 - 1.3 mg/dL     Albumin 4.0 3.4 - 5.0 g/dL     Protein Total 9.1 (H) 6.8 - 8.8 g/dL     Alkaline Phosphatase 119 40 - 150 U/L      (H) 0 - 50 U/L     AST 96 (H) 0 - 45 U/L   TSH with free T4 reflex     Collection Time: 02/21/19  1:54 PM   Result Value Ref Range     TSH 0.82 0.40 - 4.00 mU/L   Lipid panel     Collection Time: 02/22/19  7:30 AM   Result Value Ref Range     Cholesterol 173 <200 mg/dL     Triglycerides 84 <150 mg/dL     HDL Cholesterol 57 >49 mg/dL     LDL Cholesterol Calculated 99 <100 mg/dL     Non HDL Cholesterol 116 <130 mg/dL   Hemoglobin A1c     Collection Time: 02/22/19  7:30 AM   Result Value Ref Range     Hemoglobin A1C 8.5 (H) 0 - 5.6 %           Physical and Psychiatric Examination:      BP (!) 161/116   Pulse 76   Temp 98.1  F (36.7  C) (Oral)   Resp 16   Ht 1.753 m (5' 9\")   Wt 105.7 kg (233 lb)   LMP 02/14/2019   SpO2 97%   " Breastfeeding? No   BMI 34.41 kg/m    Weight is 233 lbs 0 oz  Body mass index is 34.41 kg/m .     Physical Exam:  I have reviewed the physical exam as documented by by the medical team and agree with findings and assessment and have no additional findings to add at this time.     Mental Status Exam:  Appearance: awake, alert and adequately groomed  Attitude:  cooperative  Eye Contact:  good  Mood:  depressed  Affect:  mood congruent  Speech:  clear, coherent  Language: fluent and intact in English  Psychomotor, Gait, Musculoskeletal:  no evidence of tardive dyskinesia, dystonia, or tics  Thought Process:  goal oriented  Associations:  no loose associations  Thought Content:  passive suicidal ideation present  Insight:  fair  Judgement:  fair  Oriented to:  time, person, and place  Attention Span and Concentration:  intact  Recent and Remote Memory:  fair  Fund of Knowledge:  appropriate          Admission Diagnoses:      Moderate episode of recurrent major depressive disorder (H)  Suicide ideation  Hyperglycemia  Transaminitis  Uncontrolled type 2 diabetes mellitus with hyperglycemia (H)          Assessment & Plan:      1) Continue phenobarbital 64.8mg QID. Will start taper after the weekend.   2) Start Cymbalta 20mg Qday tomorrow.   3) Will confirm dosing with [facility] and restart her Methadone. They are requiring another ENE on the patient before providing recommendations. They did say restart at 77mg today and increase to previous dose of 110mg Qday tomorrow.      Disposition Plan   Reason for ongoing admission: poses an imminent risk to self  Discharge location: Chemical dependency treatment facility  Discharge Medications: not ordered  Follow-up Appointments: not scheduled  Legal Status: voluntary  Entered by: Rahul Altman on 2/22/2019 at 10:21          A problematic pattern of alcohol/drug use leading to clinically significant impairment or distress, as manifested by at least two of the following,  occurring within a 12-month period:    Alcohol/drug is often taken in larger amounts or over a longer period than was intended.  There is a persistent desire or unsuccessful efforts to cut down or control alcohol/drug use  A great deal of time is spent in activities necessary to obtain alcohol, use alcohol, or recover from its effects.  Craving, or a strong desire or urge to use alcohol/drug  Recurrent alcohol/drug use resulting in a failure to fulfill major role obligations at work, school or home.  Continued alcohol use despite having persistent or recurrent social or interpersonal problems caused or exacerbated by the effects of alcohol/drug.  Important social, occupational, or recreational activities are given up or reduced because of alcohol/drug use.  Recurrent alcohol/drug use in situations in which it is physically hazardous.  Alcohol/drug use is continued despite knowledge of having a persistent or recurrent physical or psychological problem that is likely to have been caused or exacerbated by alcohol.  Tolerance, as defined by either of the following: A need for markedly increased amounts of alcohol/drug to achieve intoxication or desired effect.  Withdrawal, as manifested by either of the following: The characteristic withdrawal syndrome for alcohol/drug (refer to Criteria A and B of the criteria set for alcohol/drug withdrawal).      Specify if: In early remission:  After full criteria for alcohol/drug use disorder were previously met, none of the criteria for alcohol/drug use disorder have been met for at least 3 months but for less than 12 months (with the exception that Criterion A4,  Craving or a strong desire or urge to use alcohol/drug  may be met).     In sustained remission:   After full criteria for alcohol use disorder were previously met, non of the criteria for alcohol/drug use disorder have been met at any time during a period of 12 months or longer (with the exception that Criterion A4,   Craving or strong desire or urge to use alcohol/drug  may be met).   Specify if:   This additional specifier is used if the individual is in an environment where access to alcohol is restricted.    Mild: Presence of 2-3 symptoms    Moderate: Presence of 4-5 symptoms    Severe: Presence of 6 or more symptoms

## 2019-02-23 NOTE — PLAN OF CARE
"Patient started off the shift feeling very ill, she was very nauseated. She hadn't dosed her Methadone for five days. Had taken multiple showers during the day shift. Per patient 'It's my coping skill\" Patient initially given Zofran for the nausea, and methadone finally obtained through pharmacy. Yun continued to feel nauseated . She took another shower and stated \"I'm starting tofeel better.\" Patient ate supper, she ate very slowly but was able to keep her food down. She watched some television and took her hs medications and ate some for snack, and went to bed. She stated \"I'm feeling much better now. She hopes to sleep well tonight as she hasn't slept well for quite some time. She has a flat, blunted affect. She denies SI/SIB/AH/VH at this time. Plan is to continue to provide safety. 15 min checks, continue to monitor and assess Mood, thoughts and behavior.  "

## 2019-02-23 NOTE — PROGRESS NOTES
Pt states that she had a good day so far. She experienced withdrawal symptoms yesterday so she is thankful that those physical feelings have passed. She rates her anxiety at a 8 out of 10 and is hoping to find a medication that works well for her to cope with the anxiety. She denies any SI, SIB, or hallucinations. Her goal was to be present and active in the milieu. She does not have a discharge plan yet.     02/23/19 1500   Behavioral Health   Hallucinations denies / not responding to hallucinations   Thinking intact   Orientation time: oriented;date: oriented;place: oriented;person: oriented   Memory baseline memory   Insight admits / accepts   Judgement impaired   Eye Contact at examiner   Affect tense   Mood mood is calm;anxious   Physical Appearance/Attire attire appropriate to age and situation;appears stated age   Hygiene neglected grooming - unclean body, hair, teeth   Suicidality other (see comments)  (denies)   1. Wish to be Dead No   2. Non-Specific Active Suicidal Thoughts  No   Self Injury other (see comment)  (denies)   Elopement (none)   Activity isolative;withdrawn   Speech coherent;clear   Medication Sensitivity no observed side effects;no stated side effects   Psychomotor / Gait balanced;steady   Activities of Daily Living   Hygiene/Grooming independent   Oral Hygiene independent   Dress scrubs (behavioral health);independent   Room Organization independent

## 2019-02-24 PROCEDURE — A9270 NON-COVERED ITEM OR SERVICE: HCPCS | Mod: GY | Performed by: NURSE PRACTITIONER

## 2019-02-24 PROCEDURE — A9270 NON-COVERED ITEM OR SERVICE: HCPCS | Mod: GY | Performed by: PSYCHIATRY & NEUROLOGY

## 2019-02-24 PROCEDURE — 25000132 ZZH RX MED GY IP 250 OP 250 PS 637: Mod: GY | Performed by: PSYCHIATRY & NEUROLOGY

## 2019-02-24 PROCEDURE — 25000132 ZZH RX MED GY IP 250 OP 250 PS 637: Mod: GY | Performed by: NURSE PRACTITIONER

## 2019-02-24 PROCEDURE — 12400001 ZZH R&B MH UMMC

## 2019-02-24 RX ADMIN — METHADONE HYDROCHLORIDE 110 MG: 5 SOLUTION ORAL at 08:50

## 2019-02-24 RX ADMIN — DULOXETINE HYDROCHLORIDE 20 MG: 20 CAPSULE, DELAYED RELEASE ORAL at 08:50

## 2019-02-24 RX ADMIN — TRAZODONE HYDROCHLORIDE 50 MG: 50 TABLET ORAL at 02:36

## 2019-02-24 RX ADMIN — PHENOBARBITAL 64.8 MG: 32.4 TABLET ORAL at 08:50

## 2019-02-24 RX ADMIN — PHENOBARBITAL 64.8 MG: 32.4 TABLET ORAL at 17:54

## 2019-02-24 RX ADMIN — TRAZODONE HYDROCHLORIDE 50 MG: 50 TABLET ORAL at 21:24

## 2019-02-24 RX ADMIN — PHENOBARBITAL 64.8 MG: 32.4 TABLET ORAL at 12:14

## 2019-02-24 RX ADMIN — PHENOBARBITAL 64.8 MG: 32.4 TABLET ORAL at 21:23

## 2019-02-24 ASSESSMENT — MIFFLIN-ST. JEOR: SCORE: 1792.61

## 2019-02-24 ASSESSMENT — ACTIVITIES OF DAILY LIVING (ADL)
DRESS: INDEPENDENT
HYGIENE/GROOMING: INDEPENDENT
LAUNDRY: WITH SUPERVISION
ORAL_HYGIENE: INDEPENDENT

## 2019-02-24 NOTE — PROGRESS NOTES
patient spent most of the shift in the lounge with peers. She reports a fantastic visit with her daughter, who normally functions as a solid base for her support system. She reports feeling much better today, largely due to seeing her daughter.       02/24/19 8056   Behavioral Health   Hallucinations denies / not responding to hallucinations   Thinking intact   Orientation person: oriented;place: oriented;date: oriented;time: oriented   Memory baseline memory   Insight insight appropriate to situation;insight appropriate to events   Judgement intact   Eye Contact at examiner   Affect full range affect   Mood mood is calm   Physical Appearance/Attire appears stated age;attire appropriate to age and situation   Hygiene well groomed   1. Wish to be Dead No   2. Non-Specific Active Suicidal Thoughts  No   Activity other (see comment)  (Social with peers)   Speech clear;coherent   Medication Sensitivity no stated side effects;no observed side effects   Psychomotor / Gait steady;balanced   Psycho Education   Type of Intervention 1:1 intervention   Response participates, initiates socially appropriate   Hours 0.5   Activities of Daily Living   Hygiene/Grooming independent   Oral Hygiene independent   Dress independent   Laundry with supervision   Room Organization independent

## 2019-02-24 NOTE — PROGRESS NOTES
"   02/23/19 2100   General Information   Art Directive other (see comments)   Task Orientation    Task orientation skills calm and focused;follows instruction   Social Interaction   Social interaction skills maintains boundaries;shares appropriately   Product/Content   Product/Content image reflects current feelings;image reflects positive aspects   AT directive was to create an inside/outside mask. Goals of directive are: emotional expression, exploring aspects of self, trauma containment.     Pt was a positive participant, focused on task for the full duration of group. Pt finished the outside portion of mask and briefly shared with group. Pt nando stars and planets on her mask and red features to represent \"wanting love in my life\" Pt was able to express feelings and emotions through artwork.  Pts mood was calm.  "

## 2019-02-24 NOTE — PROGRESS NOTES
"Yun spent the majority of the evening in the lounge either watching television or actively engaging in conversation with peers.  She attended and participated in both Community Meeting and Art Therapy groups.  She reports that her anxiety level continues to remain elevated, depression was reported as moderate but also adds that she's feeling more hopeful overall because she's feeling better today.  She's hopeful that her Rule 25 funding will be approved along with the information that's already been faxed over to [place] per her CTC.  It was also mentioned that she's interested in [facility] or [facility] [place] for CD Tx.  She currently denies SI and SIB urges/thoughts. Conversation was linear, thought processing appears intact, insight appears to be improving as she's accepting of CD Tx.      We did not have a chance to discuss in detail whether she's currently working with a therapist or has worked with one in the past. She had reported \"Past traumas\" but it's unclear as to whether she's worked with a therapist to resolve those concerns.        02/23/19 2209   Sleep/Rest/Relaxation   Day/Evening Time Hours up all shift   Behavioral Health   Hallucinations denies / not responding to hallucinations   Thinking intact   Orientation person: oriented;place: oriented   Memory baseline memory   Insight admits / accepts   Judgement impaired   Eye Contact at examiner   Affect tense   Mood depressed;anxious;mood is calm   Physical Appearance/Attire appears stated age;attire appropriate to age and situation;neat   Hygiene other (see comment)  (Hygiene appears adequate. )   Suicidality other (see comments)  (Currently denies active SI thoughts.)   1. Wish to be Dead No   2. Non-Specific Active Suicidal Thoughts  No   Self Injury other (see comment)  (Currntly denies SIB urges/thoughts. )   Elopement (No concernable statements or behaviors observed. )   Activity other (see comment)  (Moderately social with select peers. ) "   Speech clear;coherent   Medication Sensitivity no stated side effects;no observed side effects   Psychomotor / Gait balanced;steady   Substance Withdrawal Interventions   Substance Withdrawal Interventions interventions implemented as appropriate;monitor substance withdrawal process   Social and Therapeutic Interventions (Substance Withdrawal) encourage socialization with peers;encourage effective boundaries with peers;encourage participation in therapeutic groups and milieu activities   Coping/Psychosocial   Verbalized Emotional State acceptance;anxiety;depression;hopefulness;sadness   Plan of Care Reviewed With patient   Patient Agreement with Plan of Care agrees   Trust Relationship/Rapport care explained;choices provided;questions answered;questions encouraged;reassurance provided;thoughts/feelings acknowledged   Coping/Psychosocial Interventions   Supportive Measures active listening utilized;counseling provided;goal setting facilitated;verbalization of feelings encouraged   Psycho Education   Type of Intervention 1:1 intervention   Response participates, initiates socially appropriate   Hours 0.5   Treatment Detail Current MH Status.   Group Therapy Session   Group Attendance attended group session   Group Type community   Group Topic Covered structured socialization   Patient Participation/Contribution cooperative with task;discussed personal experience with topic;listened actively;organized   Activities of Daily Living   Hygiene/Grooming handwashing;independent   Oral Hygiene independent   Dress scrubs (behavioral health);independent   Room Organization independent   Activity   Activity Assistance Provided independent   Hygiene Care Assistance   Hygiene Assistance independent   Groups   Details Community Meeting & Art Therapy Group  (Attended both with active participation. )

## 2019-02-25 PROCEDURE — 99232 SBSQ HOSP IP/OBS MODERATE 35: CPT | Performed by: PSYCHIATRY & NEUROLOGY

## 2019-02-25 PROCEDURE — A9270 NON-COVERED ITEM OR SERVICE: HCPCS | Mod: GY | Performed by: NURSE PRACTITIONER

## 2019-02-25 PROCEDURE — A9270 NON-COVERED ITEM OR SERVICE: HCPCS | Mod: GY | Performed by: PSYCHIATRY & NEUROLOGY

## 2019-02-25 PROCEDURE — 25000132 ZZH RX MED GY IP 250 OP 250 PS 637: Mod: GY | Performed by: PSYCHIATRY & NEUROLOGY

## 2019-02-25 PROCEDURE — 12400001 ZZH R&B MH UMMC

## 2019-02-25 PROCEDURE — G0177 OPPS/PHP; TRAIN & EDUC SERV: HCPCS

## 2019-02-25 PROCEDURE — 25000132 ZZH RX MED GY IP 250 OP 250 PS 637: Mod: GY | Performed by: NURSE PRACTITIONER

## 2019-02-25 RX ORDER — DULOXETIN HYDROCHLORIDE 20 MG/1
40 CAPSULE, DELAYED RELEASE ORAL DAILY
Status: DISCONTINUED | OUTPATIENT
Start: 2019-02-26 | End: 2019-02-28

## 2019-02-25 RX ADMIN — METHADONE HYDROCHLORIDE 110 MG: 5 SOLUTION ORAL at 08:48

## 2019-02-25 RX ADMIN — PHENOBARBITAL 64.8 MG: 32.4 TABLET ORAL at 13:01

## 2019-02-25 RX ADMIN — PHENOBARBITAL 64.8 MG: 32.4 TABLET ORAL at 17:11

## 2019-02-25 RX ADMIN — TRAZODONE HYDROCHLORIDE 50 MG: 50 TABLET ORAL at 21:12

## 2019-02-25 RX ADMIN — HYDROXYZINE HYDROCHLORIDE 25 MG: 25 TABLET, FILM COATED ORAL at 21:12

## 2019-02-25 RX ADMIN — PHENOBARBITAL 64.8 MG: 32.4 TABLET ORAL at 08:48

## 2019-02-25 RX ADMIN — HYDROXYZINE HYDROCHLORIDE 25 MG: 25 TABLET, FILM COATED ORAL at 11:57

## 2019-02-25 RX ADMIN — DULOXETINE HYDROCHLORIDE 20 MG: 20 CAPSULE, DELAYED RELEASE ORAL at 08:48

## 2019-02-25 RX ADMIN — PHENOBARBITAL 64.8 MG: 32.4 TABLET ORAL at 21:12

## 2019-02-25 ASSESSMENT — ACTIVITIES OF DAILY LIVING (ADL)
ORAL_HYGIENE: INDEPENDENT
HYGIENE/GROOMING: INDEPENDENT
DRESS: SCRUBS (BEHAVIORAL HEALTH);INDEPENDENT

## 2019-02-25 NOTE — PLAN OF CARE
Pt reports symptoms of depression as improved since admission. C/O passive thoughts of suicide without any plans. Yun has developed a very physically close relationship with a male peer. Legs touching previously this evening. Both patients have been Instructed to be 3 feet between them. Both patients were able to agree to this redirection. Yun expresses hopefulness for the future. Denies AH and VH. Desires to discharge to chemical dependency treatment.   Status 15. Suicide precautions. PRN Vistaril available for anxiety.

## 2019-02-25 NOTE — PROGRESS NOTES
Pt was observed touching another male Pts thigh underneath the dinning room table this evening and was redirected to stop, it took more than one time to redirect for the Pt to understand that it was inappropriate.

## 2019-02-25 NOTE — PROGRESS NOTES
"[facility], [place]   Psychiatric Progress Note        Interim History:   The patient's care was discussed with the treatment team during the daily team meeting and/or staff's chart notes were reviewed.  Staff report: Observed touching another male patient's thigh underneath the dining room table.    Depression severity scale 0-10 (10=most severe):  Today:8  Continues to report depressed and anxious mood.  She attributes this to psychosocial stressors involving family related conflicts.    Tolerating medications without side effects.  Sleeping well, eating adequately, energy is low, concentration is limited.  Suicidal thoughts are passive, no active plan or intent.  She denied homicidal ideation.  No psychotic symptoms reported.    She expressed her desire for sobriety and mental health stability.  She is open to residential treatment options.           Medications:       DULoxetine  20 mg Oral Daily     methadone  110 mg Oral Daily     PHENobarbital  64.8 mg Oral 4x Daily          Allergies:   No Known Allergies       Labs:   No results found for this or any previous visit (from the past 24 hour(s)).       Psychiatric Examination:     BP (!) 148/101 (BP Location: Left arm)   Pulse 115   Temp 97.9  F (36.6  C) (Oral)   Resp 16   Ht 1.753 m (5' 9\")   Wt 106.3 kg (234 lb 6.4 oz)   LMP 02/14/2019   SpO2 100%   Breastfeeding? No   BMI 34.61 kg/m    Weight is 234 lbs 6.4 oz  Body mass index is 34.61 kg/m .  Orthostatic Vitals       Most Recent      Sitting Orthostatic /96 02/25 0854    Sitting Orthostatic Pulse (bpm) 90 02/25 0854    Standing Orthostatic /98 02/25 0854    Standing Orthostatic Pulse (bpm) 83 02/25 0854            Appearance: awake, alert  Attitude:  cooperative  Eye Contact:  fair  Mood:  anxious and depressed  Affect:  mood congruent  Speech:  clear, coherent  Psychomotor Behavior:  no evidence of tardive dyskinesia, dystonia, or tics  Throught Process:  " linear  Associations:  no loose associations  Thought Content:  no evidence of suicidal ideation or homicidal ideation and no evidence of psychotic thought  Insight:  fair  Judgement:  intact  Oriented to:  time, person, and place  Attention Span and Concentration:  intact  Recent and Remote Memory:  intact    Clinical Global Impressions  First:  Considering your total clinical experience with this particular patient population, how severe are the patient's symptoms at this time?: 7 (02/22/19 0512)  Compared to the patient's condition at the START of treatment, this patient's condition is:: 4 (02/22/19 0512)  Most recent:  Considering your total clinical experience with this particular patient population, how severe are the patient's symptoms at this time?: 7 (02/22/19 0512)  Compared to the patient's condition at the START of treatment, this patient's condition is:: 4 (02/22/19 0512)         Precautions:     Behavioral Orders   Procedures     Code 1 - Restrict to Unit     Routine Programming     As clinically indicated     Sexual precautions     Status 15     Every 15 minutes.     Suicide precautions     Patients on Suicide Precautions should have a Combination Diet ordered that includes a Diet selection(s) AND a Behavioral Tray selection for Safe Tray - with utensils, or Safe Tray - NO utensils       Withdrawal precautions          DIagnoses:     Major depressive disorder-recurrent, moderate  Generalized anxiety disorder  Opiate use disorder, severe  Sedative hypnotic use disorder, severe         Plan:     Increase Cymbalta to better address mood and anxiety symptoms.  Begin to reduce the dose of phenobarbital tomorrow morning.  Continue methadone for maintenance treatment of opiate dependence.    Psychosocial treatments to be addressed with social work consult and groups. Chemical health assessment was completed over the weekend.  Placed on sexual precautions given behaviors observed with another male  peer.    Legal status: Voluntary    Disposition: Exploring residential [place] CD treatment facilities

## 2019-02-25 NOTE — PLAN OF CARE
Patient attended approximately half of morning topic group pertaining to goal formation. She completed some of the worksheet but stopped and declined to share, working on a sudoku puzzle and ultimately left. She attended 1 OT group; health and coping with offered a structured activity to promote cognitive and social skills. She participated independently but with occasional cues from others that it was her turn, appearing somewhat distracted. She did well and appeared to enjoy the activity, affect brighter than this morning and engaging more with others around her. She kept a blanket around her shoulders all day today.

## 2019-02-25 NOTE — PROGRESS NOTES
Marina  @ [phone] from [facility] left me a vm saying that she has authorized [facility]. She reminds me that the pt does not have medical assistance.     Katerina called at 5:35 and asked some questions. When they heard she was on methadone they said she can't go to [facility] but would need to go to [facility]. I gave them Marina's phone number.

## 2019-02-26 PROCEDURE — 25000132 ZZH RX MED GY IP 250 OP 250 PS 637: Mod: GY | Performed by: PSYCHIATRY & NEUROLOGY

## 2019-02-26 PROCEDURE — 25000132 ZZH RX MED GY IP 250 OP 250 PS 637: Mod: GY | Performed by: NURSE PRACTITIONER

## 2019-02-26 PROCEDURE — A9270 NON-COVERED ITEM OR SERVICE: HCPCS | Mod: GY | Performed by: PSYCHIATRY & NEUROLOGY

## 2019-02-26 PROCEDURE — A9270 NON-COVERED ITEM OR SERVICE: HCPCS | Mod: GY | Performed by: NURSE PRACTITIONER

## 2019-02-26 PROCEDURE — 99232 SBSQ HOSP IP/OBS MODERATE 35: CPT | Performed by: PSYCHIATRY & NEUROLOGY

## 2019-02-26 PROCEDURE — G0177 OPPS/PHP; TRAIN & EDUC SERV: HCPCS

## 2019-02-26 PROCEDURE — H2032 ACTIVITY THERAPY, PER 15 MIN: HCPCS

## 2019-02-26 PROCEDURE — 12400001 ZZH R&B MH UMMC

## 2019-02-26 RX ORDER — PHENOBARBITAL 32.4 MG/1
64.8 TABLET ORAL 3 TIMES DAILY
Status: DISCONTINUED | OUTPATIENT
Start: 2019-02-26 | End: 2019-02-28

## 2019-02-26 RX ADMIN — PHENOBARBITAL 64.8 MG: 32.4 TABLET ORAL at 20:36

## 2019-02-26 RX ADMIN — HYDROXYZINE HYDROCHLORIDE 25 MG: 25 TABLET, FILM COATED ORAL at 20:37

## 2019-02-26 RX ADMIN — METHADONE HYDROCHLORIDE 110 MG: 5 SOLUTION ORAL at 08:02

## 2019-02-26 RX ADMIN — HYDROXYZINE HYDROCHLORIDE 25 MG: 25 TABLET, FILM COATED ORAL at 16:11

## 2019-02-26 RX ADMIN — DULOXETINE HYDROCHLORIDE 40 MG: 20 CAPSULE, DELAYED RELEASE ORAL at 08:01

## 2019-02-26 RX ADMIN — PHENOBARBITAL 64.8 MG: 32.4 TABLET ORAL at 16:01

## 2019-02-26 RX ADMIN — TRAZODONE HYDROCHLORIDE 50 MG: 50 TABLET ORAL at 20:37

## 2019-02-26 RX ADMIN — PHENOBARBITAL 64.8 MG: 32.4 TABLET ORAL at 08:04

## 2019-02-26 ASSESSMENT — ACTIVITIES OF DAILY LIVING (ADL)
ORAL_HYGIENE: INDEPENDENT
HYGIENE/GROOMING: INDEPENDENT
LAUNDRY: WITH SUPERVISION
DRESS: INDEPENDENT
ORAL_HYGIENE: INDEPENDENT
DRESS: INDEPENDENT
HYGIENE/GROOMING: INDEPENDENT

## 2019-02-26 NOTE — PROGRESS NOTES
patient spent most of the shift with another peer, playing games. patient reports depression as 8/10, but appears fairly positive in the lounge. patient hopes to continue progressing toward CD treatment following discharge.       02/26/19 1322   Behavioral Health   Hallucinations denies / not responding to hallucinations   Thinking poor concentration   Orientation person: oriented;place: oriented;date: oriented;time: oriented   Memory baseline memory   Insight poor   Judgement impaired   Eye Contact at examiner   Affect full range affect   Mood depressed;anxious   Physical Appearance/Attire appears stated age;attire appropriate to age and situation   Hygiene well groomed   1. Wish to be Dead No   2. Non-Specific Active Suicidal Thoughts  No   Activity other (see comment)  (Social with peers)   Speech clear;coherent   Medication Sensitivity no stated side effects;no observed side effects   Psychomotor / Gait balanced;steady   Psycho Education   Type of Intervention 1:1 intervention   Response participates, initiates socially appropriate   Hours 0.5   Activities of Daily Living   Hygiene/Grooming independent   Oral Hygiene independent   Dress independent   Laundry with supervision   Room Organization independent

## 2019-02-26 NOTE — PROGRESS NOTES
Nakia @[phone] from [place] called and asked for 3 days of progress notes. I sent these.

## 2019-02-26 NOTE — PLAN OF CARE
"48 Hour Nursing Assessment: Patient spends time out of her room playing a card game with peers, reading a book, and listening to music through headphones. She reports high anxiety at the start of the evening shift rating it at 8/10 and states that her anxiety has gotten better as the day has progressed. When she took her evening medication, she reported that her anxiety level was at a 6/10. She endorses feeling depressed and denies having suicidal ideation, plan or intent. Yun shares that she had a previous suicide attempt and states \"I could never do that again to my family.\" Additionally, she shares that her grand daughter is a protective factor.   Staff noticed that Yun was writing in a journal and handing it off to another patient on the unit. Both patients were separately addressed regarding boundaries. Nursing will continue to monitor patient's mood, thoughts, and behavior.  "

## 2019-02-26 NOTE — PROGRESS NOTES
"[facility], [place]   Psychiatric Progress Note        Interim History:   The patient's care was discussed with the treatment team during the daily team meeting and/or staff's chart notes were reviewed.  Staff report: Observed crossing legs with another male under the table.    Depression severity scale 0-10 (10=most severe):  Today:8  Continues to report depressed and anxious mood.  Continues to stem from psychosocial stressors which precipitated her admission.  This is contributing to heightened anxiety, particularly in the evening, in anticipation of nightmares reminding her of the traumatic event.    Tolerating medications without side effects.  Sleeping well, eating adequately, energy is low, concentration is limited.  Suicidal thoughts are passive, no active plan or intent.  She denied homicidal ideation.  No psychotic symptoms reported.    She expressed her desire for sobriety and mental health stability.  She is open to residential treatment options.           Medications:       DULoxetine  40 mg Oral Daily     methadone  110 mg Oral Daily     PHENobarbital  64.8 mg Oral 4x Daily          Allergies:   No Known Allergies       Labs:   No results found for this or any previous visit (from the past 24 hour(s)).       Psychiatric Examination:     BP (!) 135/97 (BP Location: Left arm)   Pulse 88   Temp 97.7  F (36.5  C) (Oral)   Resp 16   Ht 1.753 m (5' 9\")   Wt 106.3 kg (234 lb 6.4 oz)   LMP 02/14/2019   SpO2 100%   Breastfeeding? No   BMI 34.61 kg/m    Weight is 234 lbs 6.4 oz  Body mass index is 34.61 kg/m .  Orthostatic Vitals       Most Recent      Sitting Orthostatic /96 02/25 0854    Sitting Orthostatic Pulse (bpm) 90 02/25 0854    Standing Orthostatic /98 02/25 0854    Standing Orthostatic Pulse (bpm) 83 02/25 0854            Appearance: awake, alert  Attitude:  cooperative  Eye Contact:  fair  Mood:  anxious and depressed  Affect:  mood congruent  Speech: "  clear, coherent  Psychomotor Behavior:  no evidence of tardive dyskinesia, dystonia, or tics  Throught Process:  linear  Associations:  no loose associations  Thought Content:  no evidence of suicidal ideation or homicidal ideation and no evidence of psychotic thought  Insight:  fair  Judgement:  intact  Oriented to:  time, person, and place  Attention Span and Concentration:  intact  Recent and Remote Memory:  intact    Clinical Global Impressions  First:  Considering your total clinical experience with this particular patient population, how severe are the patient's symptoms at this time?: 7 (02/22/19 0512)  Compared to the patient's condition at the START of treatment, this patient's condition is:: 4 (02/22/19 0512)  Most recent:  Considering your total clinical experience with this particular patient population, how severe are the patient's symptoms at this time?: 7 (02/22/19 0512)  Compared to the patient's condition at the START of treatment, this patient's condition is:: 4 (02/22/19 0512)         Precautions:     Behavioral Orders   Procedures     Code 1 - Restrict to Unit     Routine Programming     As clinically indicated     Sexual precautions     Status 15     Every 15 minutes.     Suicide precautions     Patients on Suicide Precautions should have a Combination Diet ordered that includes a Diet selection(s) AND a Behavioral Tray selection for Safe Tray - with utensils, or Safe Tray - NO utensils       Withdrawal precautions          DIagnoses:     Major depressive disorder-recurrent, moderate  Generalized anxiety disorder  Opiate use disorder, severe  Sedative hypnotic use disorder, severe         Plan:     Continue the higher dose of Cymbalta to better address mood and anxiety symptoms.  Monitoring response and tolerability.  Add Zyprexa for augmentation of her antidepressant while aiming to reduce anxiety and insomnia.  Risks and benefits reviewed.  Consider prazosin if Zyprexa is not effective to  address targeted symptoms.  Begin to reduce the dose of phenobarbital today.  Continue methadone for maintenance treatment of opiate dependence.    Psychosocial treatments to be addressed with social work consult and groups.  Some boundary issues with another male however the other patient is being discharged today.    Legal status: Voluntary    Disposition: Exploring residential [facility] treatment facilities

## 2019-02-27 PROCEDURE — G0177 OPPS/PHP; TRAIN & EDUC SERV: HCPCS

## 2019-02-27 PROCEDURE — 25000132 ZZH RX MED GY IP 250 OP 250 PS 637: Mod: GY | Performed by: NURSE PRACTITIONER

## 2019-02-27 PROCEDURE — 12400001 ZZH R&B MH UMMC

## 2019-02-27 PROCEDURE — A9270 NON-COVERED ITEM OR SERVICE: HCPCS | Mod: GY | Performed by: PSYCHIATRY & NEUROLOGY

## 2019-02-27 PROCEDURE — 25000132 ZZH RX MED GY IP 250 OP 250 PS 637: Mod: GY | Performed by: PSYCHIATRY & NEUROLOGY

## 2019-02-27 PROCEDURE — 99232 SBSQ HOSP IP/OBS MODERATE 35: CPT | Performed by: PSYCHIATRY & NEUROLOGY

## 2019-02-27 PROCEDURE — A9270 NON-COVERED ITEM OR SERVICE: HCPCS | Mod: GY | Performed by: NURSE PRACTITIONER

## 2019-02-27 RX ORDER — OLANZAPINE 5 MG/1
5 TABLET ORAL AT BEDTIME
Status: DISCONTINUED | OUTPATIENT
Start: 2019-02-27 | End: 2019-02-28

## 2019-02-27 RX ADMIN — HYDROXYZINE HYDROCHLORIDE 25 MG: 25 TABLET, FILM COATED ORAL at 03:08

## 2019-02-27 RX ADMIN — DULOXETINE HYDROCHLORIDE 40 MG: 20 CAPSULE, DELAYED RELEASE ORAL at 08:39

## 2019-02-27 RX ADMIN — OLANZAPINE 5 MG: 5 TABLET, FILM COATED ORAL at 22:21

## 2019-02-27 RX ADMIN — METHADONE HYDROCHLORIDE 110 MG: 5 SOLUTION ORAL at 08:39

## 2019-02-27 RX ADMIN — PHENOBARBITAL 64.8 MG: 32.4 TABLET ORAL at 08:39

## 2019-02-27 RX ADMIN — PHENOBARBITAL 64.8 MG: 32.4 TABLET ORAL at 22:21

## 2019-02-27 RX ADMIN — HYDROXYZINE HYDROCHLORIDE 25 MG: 25 TABLET, FILM COATED ORAL at 22:21

## 2019-02-27 RX ADMIN — TRAZODONE HYDROCHLORIDE 50 MG: 50 TABLET ORAL at 03:08

## 2019-02-27 RX ADMIN — HYDROXYZINE HYDROCHLORIDE 25 MG: 25 TABLET, FILM COATED ORAL at 13:57

## 2019-02-27 RX ADMIN — HYDROXYZINE HYDROCHLORIDE 25 MG: 25 TABLET, FILM COATED ORAL at 08:43

## 2019-02-27 RX ADMIN — TRAZODONE HYDROCHLORIDE 50 MG: 50 TABLET ORAL at 22:21

## 2019-02-27 RX ADMIN — PHENOBARBITAL 64.8 MG: 32.4 TABLET ORAL at 13:57

## 2019-02-27 ASSESSMENT — ACTIVITIES OF DAILY LIVING (ADL)
HYGIENE/GROOMING: INDEPENDENT
ORAL_HYGIENE: INDEPENDENT
DRESS: INDEPENDENT;SCRUBS (BEHAVIORAL HEALTH)
LAUNDRY: WITH SUPERVISION

## 2019-02-27 NOTE — PROGRESS NOTES
Behavioral Health  Note   Behavioral Health  Spirituality Group Note     Unit 30    Name: Yun Coyle    YOB: 1975   MRN: [id number]    Age: 43 year old     Patient attended -led group, which included discussion of spirituality, coping with illness and building resilience.   Patient attended group for 1.0 hrs.   patient demonstrated an appreciation of topic's application for their personal circumstances.     [facility]  Staff    Page [phone]

## 2019-02-27 NOTE — PROGRESS NOTES
Participated in Music Therapy group with focus on mood elevation, validation and decreasing anxiety and improved group cohesiveness. Engaged and cooperative in music listening interventions but quietly singing along and supportive of peer's poetry and song selection.   Showed progress in session goals.

## 2019-02-27 NOTE — PLAN OF CARE
She attended groups and spent time out in the lounge. Endorsed feeling anxious and utilized hydroxyzine with relief. She endorsed feeling depressed, and rated it at 8/10. She denies having any physical pain. Cooperative with staff direction.

## 2019-02-27 NOTE — PROGRESS NOTES
1. What PRN did patient receive? Atarax/Vistaril - given at 1611, rated anxiety level at 9/10.    2. What was the patient doing that led to the PRN medication? Anxiety    3. Did they require R/S? NO    4. Side effects to PRN medication? None    5. After 1 Hour, patient appeared: Calm - rated anxiety level at 4/10, reported that medication was helpful

## 2019-02-27 NOTE — PROGRESS NOTES
"[facility], [place]   Psychiatric Progress Note        Interim History:   The patient's care was discussed with the treatment team during the daily team meeting and/or staff's chart notes were reviewed.  Staff report: Observed crossing legs with another male under the table.    Depression severity scale 0-10 (10=most severe):  Today:8  Continues to report depressed and anxious mood.  Continues to stem from psychosocial stressors which precipitated her admission.  This is contributing to heightened anxiety, particularly in the evening, in anticipation of nightmares reminding her of the traumatic event.    Tolerating medications without side effects.  Sleeping well, eating adequately, energy is low, concentration is limited.  Suicidal thoughts are passive, no active plan or intent.  She denied homicidal ideation.  No psychotic symptoms reported.    She expressed her desire for sobriety and mental health stability.  She is open to residential treatment options.           Medications:       DULoxetine  40 mg Oral Daily     methadone  110 mg Oral Daily     OLANZapine  5 mg Oral At Bedtime     PHENobarbital  64.8 mg Oral TID          Allergies:   No Known Allergies       Labs:   No results found for this or any previous visit (from the past 24 hour(s)).       Psychiatric Examination:     /81 (BP Location: Left arm)   Pulse 91   Temp 98.4  F (36.9  C) (Tympanic)   Resp 16   Ht 1.753 m (5' 9\")   Wt 106.3 kg (234 lb 6.4 oz)   LMP 02/14/2019   SpO2 98%   Breastfeeding? No   BMI 34.61 kg/m    Weight is 234 lbs 6.4 oz  Body mass index is 34.61 kg/m .  Orthostatic Vitals       Most Recent      Sitting Orthostatic /96 02/25 0854    Sitting Orthostatic Pulse (bpm) 90 02/25 0854    Standing Orthostatic /98 02/25 0854    Standing Orthostatic Pulse (bpm) 83 02/25 0854            Appearance: awake, alert  Attitude:  cooperative  Eye Contact:  fair  Mood:  anxious and " depressed  Affect:  mood congruent  Speech:  clear, coherent  Psychomotor Behavior:  no evidence of tardive dyskinesia, dystonia, or tics  Throught Process:  linear  Associations:  no loose associations  Thought Content:  no evidence of suicidal ideation or homicidal ideation and no evidence of psychotic thought  Insight:  fair  Judgement:  intact  Oriented to:  time, person, and place  Attention Span and Concentration:  intact  Recent and Remote Memory:  intact    Clinical Global Impressions  First:  Considering your total clinical experience with this particular patient population, how severe are the patient's symptoms at this time?: 7 (02/22/19 0512)  Compared to the patient's condition at the START of treatment, this patient's condition is:: 4 (02/22/19 0512)  Most recent:  Considering your total clinical experience with this particular patient population, how severe are the patient's symptoms at this time?: 7 (02/22/19 0512)  Compared to the patient's condition at the START of treatment, this patient's condition is:: 4 (02/22/19 0512)         Precautions:     Behavioral Orders   Procedures     Code 1 - Restrict to Unit     Routine Programming     As clinically indicated     Status 15     Every 15 minutes.     Suicide precautions     Patients on Suicide Precautions should have a Combination Diet ordered that includes a Diet selection(s) AND a Behavioral Tray selection for Safe Tray - with utensils, or Safe Tray - NO utensils       Withdrawal precautions          DIagnoses:     Major depressive disorder-recurrent, moderate  Generalized anxiety disorder  Opiate use disorder, severe  Sedative hypnotic use disorder, severe         Plan:     Continue the higher dose of Cymbalta to better address mood and anxiety symptoms.  Monitoring response and tolerability.  Add Zyprexa for augmentation of her antidepressant while aiming to reduce anxiety and insomnia.  Risks and benefits reviewed.  Consider prazosin if Zyprexa is  not effective to address targeted symptoms.  Reduce the dose of phenobarbital again tomorrow.   Continue methadone for maintenance treatment of opiate dependence.    Psychosocial treatments to be addressed with social work consult and groups.  Some boundary issues with another male however the other patient is being discharged today.    Legal status: Voluntary    Disposition: Exploring residential [facility] treatment facilities

## 2019-02-27 NOTE — PROGRESS NOTES
Pt appears to have slept 6.5 hrs this shift.  Pt's MSSA at 0300 was 2.  Pt received MR Trazodone and PRN Hydroxyzine at 0308.  Pt remained awake in her bed for a half hour after PRN's and has appeared asleep since.  Will continue to monitor and support plan of care.

## 2019-02-28 PROCEDURE — 12400001 ZZH R&B MH UMMC

## 2019-02-28 PROCEDURE — 99232 SBSQ HOSP IP/OBS MODERATE 35: CPT | Performed by: PSYCHIATRY & NEUROLOGY

## 2019-02-28 PROCEDURE — G0177 OPPS/PHP; TRAIN & EDUC SERV: HCPCS

## 2019-02-28 PROCEDURE — 25000132 ZZH RX MED GY IP 250 OP 250 PS 637: Mod: GY | Performed by: PSYCHIATRY & NEUROLOGY

## 2019-02-28 PROCEDURE — A9270 NON-COVERED ITEM OR SERVICE: HCPCS | Mod: GY | Performed by: PSYCHIATRY & NEUROLOGY

## 2019-02-28 PROCEDURE — H2032 ACTIVITY THERAPY, PER 15 MIN: HCPCS

## 2019-02-28 PROCEDURE — A9270 NON-COVERED ITEM OR SERVICE: HCPCS | Mod: GY | Performed by: NURSE PRACTITIONER

## 2019-02-28 PROCEDURE — 25000132 ZZH RX MED GY IP 250 OP 250 PS 637: Mod: GY | Performed by: NURSE PRACTITIONER

## 2019-02-28 RX ORDER — DULOXETIN HYDROCHLORIDE 60 MG/1
60 CAPSULE, DELAYED RELEASE ORAL DAILY
Status: DISCONTINUED | OUTPATIENT
Start: 2019-03-01 | End: 2019-03-14 | Stop reason: HOSPADM

## 2019-02-28 RX ORDER — OLANZAPINE 5 MG/1
5 TABLET ORAL AT BEDTIME
Status: DISCONTINUED | OUTPATIENT
Start: 2019-02-28 | End: 2019-03-14 | Stop reason: HOSPADM

## 2019-02-28 RX ORDER — PHENOBARBITAL 32.4 MG/1
32.4 TABLET ORAL 3 TIMES DAILY
Status: COMPLETED | OUTPATIENT
Start: 2019-03-01 | End: 2019-03-01

## 2019-02-28 RX ORDER — PHENOBARBITAL 32.4 MG/1
32.4 TABLET ORAL 4 TIMES DAILY
Status: COMPLETED | OUTPATIENT
Start: 2019-02-28 | End: 2019-02-28

## 2019-02-28 RX ORDER — HYDROXYZINE HYDROCHLORIDE 25 MG/1
25-50 TABLET, FILM COATED ORAL EVERY 4 HOURS PRN
Status: DISCONTINUED | OUTPATIENT
Start: 2019-02-28 | End: 2019-03-14 | Stop reason: HOSPADM

## 2019-02-28 RX ORDER — PHENOBARBITAL 16.2 MG/1
16.2 TABLET ORAL 3 TIMES DAILY
Status: COMPLETED | OUTPATIENT
Start: 2019-03-02 | End: 2019-03-03

## 2019-02-28 RX ADMIN — PHENOBARBITAL 32.4 MG: 32.4 TABLET ORAL at 21:00

## 2019-02-28 RX ADMIN — TRAZODONE HYDROCHLORIDE 50 MG: 50 TABLET ORAL at 20:59

## 2019-02-28 RX ADMIN — OLANZAPINE 5 MG: 5 TABLET, FILM COATED ORAL at 21:00

## 2019-02-28 RX ADMIN — HYDROXYZINE HYDROCHLORIDE 25 MG: 25 TABLET, FILM COATED ORAL at 10:19

## 2019-02-28 RX ADMIN — HYDROXYZINE HYDROCHLORIDE 25 MG: 25 TABLET, FILM COATED ORAL at 16:50

## 2019-02-28 RX ADMIN — PHENOBARBITAL 32.4 MG: 32.4 TABLET ORAL at 17:01

## 2019-02-28 RX ADMIN — METHADONE HYDROCHLORIDE 110 MG: 5 SOLUTION ORAL at 08:52

## 2019-02-28 RX ADMIN — DULOXETINE HYDROCHLORIDE 40 MG: 20 CAPSULE, DELAYED RELEASE ORAL at 08:52

## 2019-02-28 RX ADMIN — HYDROXYZINE HYDROCHLORIDE 50 MG: 25 TABLET ORAL at 20:59

## 2019-02-28 RX ADMIN — PHENOBARBITAL 32.4 MG: 32.4 TABLET ORAL at 08:52

## 2019-02-28 RX ADMIN — PHENOBARBITAL 32.4 MG: 32.4 TABLET ORAL at 12:13

## 2019-02-28 ASSESSMENT — MIFFLIN-ST. JEOR: SCORE: 1781.73

## 2019-02-28 ASSESSMENT — ACTIVITIES OF DAILY LIVING (ADL)
ORAL_HYGIENE: INDEPENDENT
DRESS: INDEPENDENT
LAUNDRY: WITH SUPERVISION
HYGIENE/GROOMING: INDEPENDENT

## 2019-02-28 NOTE — PROGRESS NOTES
02/28/19 1438   Behavioral Health   Hallucinations denies / not responding to hallucinations   Thinking poor concentration   Orientation time: oriented;date: oriented;place: oriented;person: oriented   Memory baseline memory   Insight insight appropriate to events;insight appropriate to situation;admits / accepts   Judgement impaired   Eye Contact at examiner   Affect sad;full range affect   Mood depressed;mood is calm   Physical Appearance/Attire neat;attire appropriate to age and situation;appears stated age   Hygiene well groomed   Suicidality thoughts only   1. Wish to be Dead No   2. Non-Specific Active Suicidal Thoughts  Yes   Self Injury other (see comment)  (None)   Activity other (see comment)  (coloring, headphones)   Speech clear;coherent   Medication Sensitivity no stated side effects;no observed side effects   Psychomotor / Gait steady;balanced     Pt. plans to discharge to [place]. Pt. tearful at times during check in. Pt. full-range affect at times. Pt. SI thoughts. Pt. Coloring in the lounge and listens to music at times.

## 2019-02-28 NOTE — PLAN OF CARE
Date of groups: 2/27    Patient participated in OT clinic. She was organized and listened to headphones. She worked with sustained focus, attention to detail, and care. She was polite with others but did not engage socially. She required a direct prompt to clean up but received it politely and complied.     Date of groups: 2/26    Patient participated in 2 OT groups. OT clinic she worked with sustained focus on a complex task. She demonstrated attention to detail and careful effort. She was friendly and pleasant with others but maintained focus on her task. She participated in health and coping group with a structured group activity used to promote interaction and communication skills. She was friendly and social with others, fluid in conversation and used discretion in sharing memories. She listened and commented on what others had to share and overall contributed a pleasant attitude and presence to group.

## 2019-02-28 NOTE — PROGRESS NOTES
02/27/19 2200   Behavioral Health   Hallucinations denies / not responding to hallucinations   Thinking distractable   Orientation time: oriented;date: oriented;place: oriented;person: oriented   Memory baseline memory   Insight insight appropriate to situation   Judgement impaired   Eye Contact at examiner   Affect full range affect   Mood mood is calm;depressed   Physical Appearance/Attire neat;attire appropriate to age and situation;appears stated age   Hygiene well groomed   Suicidality thoughts only   1. Wish to be Dead No   2. Non-Specific Active Suicidal Thoughts  Yes   Self Injury other (see comment)  (None)   Activity other (see comment)  (social with others, check in )   Speech clear;coherent   Medication Sensitivity no stated side effects;no observed side effects   Psychomotor / Gait steady;balanced     Pt. Hopeful about medication options presented to her. Pt. Shared has SI thoughts and feels depressed, but feels safe here in the hospital. Pt. Had no discharge planning to share with staff at this time.

## 2019-02-28 NOTE — PROGRESS NOTES
I placed a voice mail to [employer] to see if they can check on the MA secondary that seems to be stuck in the [facility] system.  They called back and said they sent in a [employer] Pop[employer] application on Monday and they will follow up.     I called [facility] : [phone] and they reported that the pt was approved for [facility]. They transferred me to [facility] in intake at [facility]. I left a vm.  Fax:  [phone]    I met with pt and informed her of the above. She is accepting of this plan and says she wants to follow recommendations and get better and stop isolating.

## 2019-02-28 NOTE — PLAN OF CARE
BEHAVIORAL TEAM DISCUSSION    Participants:   Dr. Morelos, Jahaira Donald [facility], Hardy Tesfaye RN      Progress:   This is day 7 of this admission.  The pt continues to complain of anxiety and depression. There is little improvement in her anxiety.  Pt does not have suicidal ideation.    Continued Stay Criteria/Rationale:   The pt will be transferred to a substance use placement.    Medical/Physical:   No active issues noted    Precautions:   Behavioral Orders   Procedures     Code 1 - Restrict to Unit     Routine Programming     As clinically indicated     Status 15     Every 15 minutes.     Suicide precautions     Patients on Suicide Precautions should have a Combination Diet ordered that includes a Diet selection(s) AND a Behavioral Tray selection for Safe Tray - with utensils, or Safe Tray - NO utensils       Withdrawal precautions     Plan:   Phenobarbitol  taper  Discontinue MSSA and COWS  Zyprexa added  Cymbalta increased  Waiting for confirmation pt is accepted into substance use treatment      Rationale for change in precautions or plan:   No changes

## 2019-02-28 NOTE — PROGRESS NOTES
"[facility], [place]   Psychiatric Progress Note        Interim History:   The patient's care was discussed with the treatment team during the daily team meeting and/or staff's chart notes were reviewed.  Staff report: Observed crossing legs with another male under the table.    Depression severity scale 0-10 (10=most severe):  Today:7  Her mood and anxiety are slightly better today.    Tolerating medications without side effects.  Sleeping well, eating adequately, energy is low, concentration is limited.  Suicidal thoughts are passive, no active plan or intent.  She denied homicidal ideation.  No psychotic symptoms reported.    She expressed her desire for sobriety and mental health stability.  She is open to residential treatment options.           Medications:       DULoxetine  40 mg Oral Daily     methadone  110 mg Oral Daily     OLANZapine  5 mg Oral At Bedtime     [START ON 3/2/2019] PHENobarbital  16.2 mg Oral TID     PHENobarbital  32.4 mg Oral 4x Daily     [START ON 3/1/2019] PHENobarbital  32.4 mg Oral TID          Allergies:   No Known Allergies       Labs:   No results found for this or any previous visit (from the past 24 hour(s)).       Psychiatric Examination:     BP (!) 141/99   Pulse 87   Temp 97.1  F (36.2  C) (Oral)   Resp 16   Ht 1.753 m (5' 9\")   Wt 105.2 kg (232 lb)   LMP 02/14/2019   SpO2 98%   Breastfeeding? No   BMI 34.26 kg/m    Weight is 232 lbs 0 oz  Body mass index is 34.26 kg/m .  Orthostatic Vitals       Most Recent      Sitting Orthostatic /96 02/25 0854    Sitting Orthostatic Pulse (bpm) 90 02/25 0854    Standing Orthostatic /98 02/25 0854    Standing Orthostatic Pulse (bpm) 83 02/25 0854            Appearance: awake, alert  Attitude:  cooperative  Eye Contact:  fair  Mood:  anxious and depressed  Affect:  mood congruent  Speech:  clear, coherent  Psychomotor Behavior:  no evidence of tardive dyskinesia, dystonia, or tics  Throught " Process:  linear  Associations:  no loose associations  Thought Content:  no evidence of suicidal ideation or homicidal ideation and no evidence of psychotic thought  Insight:  fair  Judgement:  intact  Oriented to:  time, person, and place  Attention Span and Concentration:  intact  Recent and Remote Memory:  intact    Clinical Global Impressions  First:  Considering your total clinical experience with this particular patient population, how severe are the patient's symptoms at this time?: 7 (02/22/19 0512)  Compared to the patient's condition at the START of treatment, this patient's condition is:: 4 (02/22/19 0512)  Most recent:  Considering your total clinical experience with this particular patient population, how severe are the patient's symptoms at this time?: 7 (02/22/19 0512)  Compared to the patient's condition at the START of treatment, this patient's condition is:: 4 (02/22/19 0512)         Precautions:     Behavioral Orders   Procedures     Code 1 - Restrict to Unit     Routine Programming     As clinically indicated     Status 15     Every 15 minutes.     Suicide precautions     Patients on Suicide Precautions should have a Combination Diet ordered that includes a Diet selection(s) AND a Behavioral Tray selection for Safe Tray - with utensils, or Safe Tray - NO utensils       Withdrawal precautions          DIagnoses:     Major depressive disorder-recurrent, moderate  Generalized anxiety disorder  Opiate use disorder, severe  Sedative hypnotic use disorder, severe         Plan:     Increase Cymbalta to 60 mg daily.  Continue Zyprexa for augmentation of her antidepressant while aiming to reduce anxiety and insomnia.  Risks and benefits reviewed.  Consider prazosin if Zyprexa is not effective to address targeted symptoms.  Continue titrating off of phenobarbital as scheduled.  Continue methadone for maintenance treatment of opiate dependence.    Psychosocial treatments to be addressed with social work  consult and groups.  Some boundary issues with another male however the other patient is being discharged today.    Legal status: Voluntary    Disposition: Exploring residential [facility] treatment facilities

## 2019-02-28 NOTE — PROGRESS NOTES
Pt actively participated in afternoon group that focused on coping with stress.  Pt was the most engaged in the group.  She appeared to provide honest answers about her negative/unhealthy coping strategies.  She was receptive to feedback from facilitator, checked in with her peers and provided great feedback and support to her peers.      02/28/19 1500   Psycho Education   Type of Intervention structured groups   Response participates, initiates socially appropriate   Hours 1   Treatment Detail IMR 8 Coping with Stress

## 2019-03-01 PROCEDURE — A9270 NON-COVERED ITEM OR SERVICE: HCPCS | Mod: GY | Performed by: PSYCHIATRY & NEUROLOGY

## 2019-03-01 PROCEDURE — 12400001 ZZH R&B MH UMMC

## 2019-03-01 PROCEDURE — 25000132 ZZH RX MED GY IP 250 OP 250 PS 637: Mod: GY | Performed by: PSYCHIATRY & NEUROLOGY

## 2019-03-01 PROCEDURE — 25000132 ZZH RX MED GY IP 250 OP 250 PS 637: Mod: GY | Performed by: NURSE PRACTITIONER

## 2019-03-01 PROCEDURE — A9270 NON-COVERED ITEM OR SERVICE: HCPCS | Mod: GY | Performed by: NURSE PRACTITIONER

## 2019-03-01 PROCEDURE — G0177 OPPS/PHP; TRAIN & EDUC SERV: HCPCS

## 2019-03-01 PROCEDURE — H2032 ACTIVITY THERAPY, PER 15 MIN: HCPCS

## 2019-03-01 RX ADMIN — PHENOBARBITAL 32.4 MG: 32.4 TABLET ORAL at 14:27

## 2019-03-01 RX ADMIN — HYDROXYZINE HYDROCHLORIDE 50 MG: 25 TABLET ORAL at 12:54

## 2019-03-01 RX ADMIN — PHENOBARBITAL 32.4 MG: 32.4 TABLET ORAL at 08:53

## 2019-03-01 RX ADMIN — OLANZAPINE 5 MG: 5 TABLET, FILM COATED ORAL at 21:18

## 2019-03-01 RX ADMIN — DULOXETINE HYDROCHLORIDE 60 MG: 60 CAPSULE, DELAYED RELEASE ORAL at 08:53

## 2019-03-01 RX ADMIN — HYDROXYZINE HYDROCHLORIDE 50 MG: 25 TABLET ORAL at 08:54

## 2019-03-01 RX ADMIN — PHENOBARBITAL 32.4 MG: 32.4 TABLET ORAL at 21:19

## 2019-03-01 RX ADMIN — TRAZODONE HYDROCHLORIDE 50 MG: 50 TABLET ORAL at 21:18

## 2019-03-01 RX ADMIN — HYDROXYZINE HYDROCHLORIDE 50 MG: 25 TABLET ORAL at 21:18

## 2019-03-01 RX ADMIN — METHADONE HYDROCHLORIDE 110 MG: 5 SOLUTION ORAL at 08:54

## 2019-03-01 ASSESSMENT — ACTIVITIES OF DAILY LIVING (ADL)
ORAL_HYGIENE: INDEPENDENT
LAUNDRY: WITH SUPERVISION
HYGIENE/GROOMING: HANDWASHING;SHOWER;INDEPENDENT
DRESS: STREET CLOTHES;INDEPENDENT

## 2019-03-01 NOTE — PLAN OF CARE
Pt out in common areas most of shift. Pt reports continued to report anxiety, negative rumination, and some passive suicidal thoughts. Pt attended most groups and participated. Pt denies any active suicidal thoughts and stated is feeling safe in hospital.

## 2019-03-01 NOTE — PROGRESS NOTES
Participated in Music Therapy group with focus on mood elevation, validation and decreasing anxiety and improved group cohesiveness. Engaged and cooperative in music listening interventions.   Showed progress in session goals.  Showed using emotional regulation skills in being in group with peer who was speaking frequently.  Engaged well with the music.

## 2019-03-01 NOTE — PROGRESS NOTES
Pt spend all evening in the milieu social with peers. She sometimes would walk around with headphone on; talking or singing . Pt said that music calm her down when she is anxious. She said that she has no support and use mariguana as a relieve for her depression. She did mention that her sleep has improved since being here.

## 2019-03-02 LAB
FLUAV+FLUBV RNA SPEC QL NAA+PROBE: NEGATIVE
FLUAV+FLUBV RNA SPEC QL NAA+PROBE: NEGATIVE
RSV RNA SPEC NAA+PROBE: NEGATIVE
SPECIMEN SOURCE: NORMAL

## 2019-03-02 PROCEDURE — A9270 NON-COVERED ITEM OR SERVICE: HCPCS | Mod: GY | Performed by: NURSE PRACTITIONER

## 2019-03-02 PROCEDURE — 87631 RESP VIRUS 3-5 TARGETS: CPT | Performed by: PSYCHIATRY & NEUROLOGY

## 2019-03-02 PROCEDURE — A9270 NON-COVERED ITEM OR SERVICE: HCPCS | Mod: GY | Performed by: PSYCHIATRY & NEUROLOGY

## 2019-03-02 PROCEDURE — 25000132 ZZH RX MED GY IP 250 OP 250 PS 637: Mod: GY | Performed by: NURSE PRACTITIONER

## 2019-03-02 PROCEDURE — 25000132 ZZH RX MED GY IP 250 OP 250 PS 637: Mod: GY | Performed by: PSYCHIATRY & NEUROLOGY

## 2019-03-02 PROCEDURE — 12400001 ZZH R&B MH UMMC

## 2019-03-02 PROCEDURE — 25000131 ZZH RX MED GY IP 250 OP 636 PS 637: Mod: GY | Performed by: NURSE PRACTITIONER

## 2019-03-02 PROCEDURE — 25000131 ZZH RX MED GY IP 250 OP 636 PS 637: Mod: GY | Performed by: PSYCHIATRY & NEUROLOGY

## 2019-03-02 RX ORDER — ONDANSETRON 4 MG/1
4-8 TABLET, ORALLY DISINTEGRATING ORAL EVERY 6 HOURS PRN
Status: DISCONTINUED | OUTPATIENT
Start: 2019-03-02 | End: 2019-03-14 | Stop reason: HOSPADM

## 2019-03-02 RX ADMIN — PHENOBARBITAL 16.2 MG: 16.2 TABLET ORAL at 19:43

## 2019-03-02 RX ADMIN — ONDANSETRON 4 MG: 4 TABLET, ORALLY DISINTEGRATING ORAL at 13:46

## 2019-03-02 RX ADMIN — ONDANSETRON 8 MG: 4 TABLET, ORALLY DISINTEGRATING ORAL at 18:52

## 2019-03-02 RX ADMIN — Medication 10 MG: at 23:02

## 2019-03-02 RX ADMIN — OLANZAPINE 5 MG: 5 TABLET, FILM COATED ORAL at 19:43

## 2019-03-02 RX ADMIN — HYDROXYZINE HYDROCHLORIDE 50 MG: 25 TABLET ORAL at 19:43

## 2019-03-02 RX ADMIN — PHENOBARBITAL 16.2 MG: 16.2 TABLET ORAL at 14:45

## 2019-03-02 RX ADMIN — ALUMINUM HYDROXIDE, MAGNESIUM HYDROXIDE, AND DIMETHICONE 30 ML: 400; 400; 40 SUSPENSION ORAL at 11:05

## 2019-03-02 RX ADMIN — ONDANSETRON 4 MG: 4 TABLET, ORALLY DISINTEGRATING ORAL at 07:54

## 2019-03-02 RX ADMIN — PHENOBARBITAL 16.2 MG: 16.2 TABLET ORAL at 08:55

## 2019-03-02 RX ADMIN — RANITIDINE 150 MG: 150 TABLET, FILM COATED ORAL at 18:16

## 2019-03-02 RX ADMIN — TRAZODONE HYDROCHLORIDE 50 MG: 50 TABLET ORAL at 19:43

## 2019-03-02 RX ADMIN — METHADONE HYDROCHLORIDE 110 MG: 5 SOLUTION ORAL at 08:18

## 2019-03-02 RX ADMIN — DULOXETINE HYDROCHLORIDE 60 MG: 60 CAPSULE, DELAYED RELEASE ORAL at 08:18

## 2019-03-02 ASSESSMENT — ACTIVITIES OF DAILY LIVING (ADL)
HYGIENE/GROOMING: INDEPENDENT;SHOWER
ORAL_HYGIENE: INDEPENDENT
DRESS: INDEPENDENT

## 2019-03-02 NOTE — PLAN OF CARE
Patient using Zofran x2 today for nausea. Observed patient retching off and on for the first few hours before noon. Patient unable to eat, states that she is hot and cold, feels like she wants to crawl out of her skin, afebrile. Sent a influenza culture. Patient asking to sit in shower as this is helpful to her. Had some sherbet at lunch.

## 2019-03-02 NOTE — PROGRESS NOTES
Pt moved with strong instincts, organization and fluidity.  She was clearly listening to her body needs and gently relaxing in mind and spirit at the same time.  Pt movements appeared to release tension and increase calm.  Pt became increasingly engaged throughout the session.     03/01/19 1015   Dance Movement Therapy   Type of Intervention structured groups   Response participates, initiates socially appropriate   Hours 1

## 2019-03-02 NOTE — PROGRESS NOTES
Pt denies SI SIB, was visible in the milieu, and attended programing. Pt appears to be accepting of her situation and states her mood is a 7/10 and she has not had thoughts of dying in the past few hours. Pt states she did have some fleeing thoughts earlier today but that was only because she feels like a burden to friends and family.      03/01/19 9187   Behavioral Health   Hallucinations denies / not responding to hallucinations   Orientation date: oriented;place: oriented;time: oriented;person: oriented   Memory baseline memory   Insight insight appropriate to situation   Judgement impaired   Eye Contact at examiner   Affect blunted, flat   Mood mood is calm   Physical Appearance/Attire appears stated age   Hygiene well groomed   Suicidality other (see comments)  (denies)   1. Wish to be Dead No   2. Non-Specific Active Suicidal Thoughts  No

## 2019-03-03 PROCEDURE — 12400001 ZZH R&B MH UMMC

## 2019-03-03 PROCEDURE — A9270 NON-COVERED ITEM OR SERVICE: HCPCS | Mod: GY | Performed by: NURSE PRACTITIONER

## 2019-03-03 PROCEDURE — A9270 NON-COVERED ITEM OR SERVICE: HCPCS | Mod: GY | Performed by: PSYCHIATRY & NEUROLOGY

## 2019-03-03 PROCEDURE — H2032 ACTIVITY THERAPY, PER 15 MIN: HCPCS

## 2019-03-03 PROCEDURE — 25000132 ZZH RX MED GY IP 250 OP 250 PS 637: Mod: GY | Performed by: NURSE PRACTITIONER

## 2019-03-03 PROCEDURE — 25000132 ZZH RX MED GY IP 250 OP 250 PS 637: Mod: GY | Performed by: PSYCHIATRY & NEUROLOGY

## 2019-03-03 PROCEDURE — 25000131 ZZH RX MED GY IP 250 OP 636 PS 637: Mod: GY | Performed by: PSYCHIATRY & NEUROLOGY

## 2019-03-03 RX ADMIN — PHENOBARBITAL 16.2 MG: 16.2 TABLET ORAL at 12:44

## 2019-03-03 RX ADMIN — RANITIDINE 150 MG: 150 TABLET, FILM COATED ORAL at 20:33

## 2019-03-03 RX ADMIN — METHADONE HYDROCHLORIDE 110 MG: 5 SOLUTION ORAL at 07:00

## 2019-03-03 RX ADMIN — ONDANSETRON 8 MG: 4 TABLET, ORALLY DISINTEGRATING ORAL at 01:59

## 2019-03-03 RX ADMIN — OLANZAPINE 5 MG: 5 TABLET, FILM COATED ORAL at 21:34

## 2019-03-03 RX ADMIN — TRAZODONE HYDROCHLORIDE 50 MG: 50 TABLET ORAL at 21:34

## 2019-03-03 RX ADMIN — HYDROXYZINE HYDROCHLORIDE 50 MG: 25 TABLET ORAL at 21:34

## 2019-03-03 RX ADMIN — RANITIDINE 150 MG: 150 TABLET, FILM COATED ORAL at 06:25

## 2019-03-03 RX ADMIN — DULOXETINE HYDROCHLORIDE 60 MG: 60 CAPSULE, DELAYED RELEASE ORAL at 12:44

## 2019-03-03 RX ADMIN — HYDROXYZINE HYDROCHLORIDE 50 MG: 25 TABLET ORAL at 16:10

## 2019-03-03 NOTE — PLAN OF CARE
Patient slept until lunch today. States that she feels much better then yesterday. Appetite has improved and feels physically better then yesterday. Visited by her daughter. Displays full range of affect and denies any suicidal ideation.

## 2019-03-03 NOTE — PROGRESS NOTES
Pt reports no emesis since increased dose of zofran. Reports zantac also relieved heartburn. Continues to feel achey and chilled. Has taken multiple showers this evening for relief of symptoms.

## 2019-03-03 NOTE — PROGRESS NOTES
Patient has continued to have an upset stomach during the overnight shift. She has been retching and has taken two showers to relieve symptoms. She requested and received zofran 8mg at 0200, which she reported was helpful.

## 2019-03-03 NOTE — PLAN OF CARE
Pt has spent the evening either in her room or in the shower due to nausea/vomiting.   Encouraged fluids and self care. Given PRNs for symptom management.   She endorses depression and anxiety, but is mostly feeling frustrated with ongoing nausea.   Patient denies SI/SIB. No A/V hallucinations and no paranoid or delusional thoughts.   Vital signs:  Temp: 98  F (36.7  C)   BP: 177/87 Pulse: 101   Resp: 16 SpO2: 100 %       Length of stay: 9     Assessment of mood, thought process, response to medications and potential side effects continues.     Patient encouraged to participate in therapeutic groups and develop self-care skills.     Appropriate precautions maintained.

## 2019-03-03 NOTE — PROGRESS NOTES
Pt continues to feel nauseated and has had several episodes of vomiting. Emesis is brown and watery. Requests zantac. MD informed of continued nausea and vomiting. Orders received.   MD also informed of negative influenza results.  Patient reports multiple ER visits for similar episodes in the past. Denies ever receiving a diagnosis.   Patient sipping on ginger ale and ice chips as able. Declined dinner.   Will continue to monitor and provide PRNs for symptom mgmt.

## 2019-03-04 LAB
ALBUMIN SERPL-MCNC: 4.3 G/DL (ref 3.4–5)
ALP SERPL-CCNC: 118 U/L (ref 40–150)
ALT SERPL W P-5'-P-CCNC: 140 U/L (ref 0–50)
ANION GAP SERPL CALCULATED.3IONS-SCNC: 10 MMOL/L (ref 3–14)
AST SERPL W P-5'-P-CCNC: 70 U/L (ref 0–45)
BILIRUB SERPL-MCNC: 0.5 MG/DL (ref 0.2–1.3)
BUN SERPL-MCNC: 17 MG/DL (ref 7–30)
CALCIUM SERPL-MCNC: 9.4 MG/DL (ref 8.5–10.1)
CHLORIDE SERPL-SCNC: 97 MMOL/L (ref 94–109)
CO2 SERPL-SCNC: 27 MMOL/L (ref 20–32)
CREAT SERPL-MCNC: 0.83 MG/DL (ref 0.52–1.04)
GFR SERPL CREATININE-BSD FRML MDRD: 88 ML/MIN/{1.73_M2}
GLUCOSE BLDC GLUCOMTR-MCNC: 244 MG/DL (ref 70–99)
GLUCOSE BLDC GLUCOMTR-MCNC: 275 MG/DL (ref 70–99)
GLUCOSE BLDC GLUCOMTR-MCNC: 281 MG/DL (ref 70–99)
GLUCOSE BLDC GLUCOMTR-MCNC: 386 MG/DL (ref 70–99)
GLUCOSE SERPL-MCNC: 349 MG/DL (ref 70–99)
MAGNESIUM SERPL-MCNC: 2.2 MG/DL (ref 1.6–2.3)
POTASSIUM SERPL-SCNC: 4 MMOL/L (ref 3.4–5.3)
PROT SERPL-MCNC: 9.3 G/DL (ref 6.8–8.8)
SODIUM SERPL-SCNC: 134 MMOL/L (ref 133–144)

## 2019-03-04 PROCEDURE — 25000132 ZZH RX MED GY IP 250 OP 250 PS 637: Mod: GY | Performed by: NURSE PRACTITIONER

## 2019-03-04 PROCEDURE — 99222 1ST HOSP IP/OBS MODERATE 55: CPT | Performed by: NURSE PRACTITIONER

## 2019-03-04 PROCEDURE — 00000146 ZZHCL STATISTIC GLUCOSE BY METER IP

## 2019-03-04 PROCEDURE — 25000131 ZZH RX MED GY IP 250 OP 636 PS 637: Mod: GY | Performed by: NURSE PRACTITIONER

## 2019-03-04 PROCEDURE — 25000132 ZZH RX MED GY IP 250 OP 250 PS 637: Mod: GY | Performed by: PSYCHIATRY & NEUROLOGY

## 2019-03-04 PROCEDURE — 83735 ASSAY OF MAGNESIUM: CPT | Performed by: NURSE PRACTITIONER

## 2019-03-04 PROCEDURE — 36415 COLL VENOUS BLD VENIPUNCTURE: CPT | Performed by: NURSE PRACTITIONER

## 2019-03-04 PROCEDURE — 99232 SBSQ HOSP IP/OBS MODERATE 35: CPT | Performed by: PSYCHIATRY & NEUROLOGY

## 2019-03-04 PROCEDURE — 25000131 ZZH RX MED GY IP 250 OP 636 PS 637: Mod: GY | Performed by: PSYCHIATRY & NEUROLOGY

## 2019-03-04 PROCEDURE — 12400001 ZZH R&B MH UMMC

## 2019-03-04 PROCEDURE — A9270 NON-COVERED ITEM OR SERVICE: HCPCS | Mod: GY | Performed by: PSYCHIATRY & NEUROLOGY

## 2019-03-04 PROCEDURE — 99207 ZZC CONSULT E&M CHANGED TO INITIAL LEVEL: CPT | Performed by: NURSE PRACTITIONER

## 2019-03-04 PROCEDURE — 80053 COMPREHEN METABOLIC PANEL: CPT | Performed by: NURSE PRACTITIONER

## 2019-03-04 PROCEDURE — A9270 NON-COVERED ITEM OR SERVICE: HCPCS | Mod: GY | Performed by: NURSE PRACTITIONER

## 2019-03-04 RX ORDER — MIRTAZAPINE 15 MG/1
15 TABLET, FILM COATED ORAL AT BEDTIME
Status: DISCONTINUED | OUTPATIENT
Start: 2019-03-04 | End: 2019-03-11

## 2019-03-04 RX ORDER — NICOTINE POLACRILEX 4 MG
15-30 LOZENGE BUCCAL
Status: DISCONTINUED | OUTPATIENT
Start: 2019-03-04 | End: 2019-03-14 | Stop reason: HOSPADM

## 2019-03-04 RX ORDER — MIRTAZAPINE 15 MG/1
15 TABLET, FILM COATED ORAL AT BEDTIME
Status: DISCONTINUED | OUTPATIENT
Start: 2019-03-04 | End: 2019-03-04

## 2019-03-04 RX ORDER — DEXTROSE MONOHYDRATE 25 G/50ML
25-50 INJECTION, SOLUTION INTRAVENOUS
Status: DISCONTINUED | OUTPATIENT
Start: 2019-03-04 | End: 2019-03-14 | Stop reason: HOSPADM

## 2019-03-04 RX ORDER — LISINOPRIL 5 MG/1
5 TABLET ORAL DAILY
Status: DISCONTINUED | OUTPATIENT
Start: 2019-03-05 | End: 2019-03-08

## 2019-03-04 RX ADMIN — ONDANSETRON 4 MG: 4 TABLET, ORALLY DISINTEGRATING ORAL at 17:40

## 2019-03-04 RX ADMIN — INSULIN ASPART 3 UNITS: 100 INJECTION, SOLUTION INTRAVENOUS; SUBCUTANEOUS at 12:46

## 2019-03-04 RX ADMIN — INSULIN ASPART 5 UNITS: 100 INJECTION, SOLUTION INTRAVENOUS; SUBCUTANEOUS at 09:50

## 2019-03-04 RX ADMIN — ONDANSETRON 4 MG: 4 TABLET, ORALLY DISINTEGRATING ORAL at 14:59

## 2019-03-04 RX ADMIN — RANITIDINE 150 MG: 150 TABLET, FILM COATED ORAL at 19:56

## 2019-03-04 RX ADMIN — ONDANSETRON 4 MG: 4 TABLET, ORALLY DISINTEGRATING ORAL at 12:05

## 2019-03-04 RX ADMIN — RANITIDINE 150 MG: 150 TABLET, FILM COATED ORAL at 14:59

## 2019-03-04 RX ADMIN — ONDANSETRON 4 MG: 4 TABLET, ORALLY DISINTEGRATING ORAL at 21:07

## 2019-03-04 RX ADMIN — ONDANSETRON 4 MG: 4 TABLET, ORALLY DISINTEGRATING ORAL at 19:58

## 2019-03-04 RX ADMIN — ONDANSETRON 8 MG: 4 TABLET, ORALLY DISINTEGRATING ORAL at 06:02

## 2019-03-04 RX ADMIN — Medication 10 MG: at 21:59

## 2019-03-04 RX ADMIN — RANITIDINE 150 MG: 150 TABLET, FILM COATED ORAL at 06:54

## 2019-03-04 RX ADMIN — MIRTAZAPINE 15 MG: 15 TABLET, FILM COATED ORAL at 22:40

## 2019-03-04 RX ADMIN — OLANZAPINE 5 MG: 5 TABLET, FILM COATED ORAL at 19:55

## 2019-03-04 RX ADMIN — METHADONE HYDROCHLORIDE 110 MG: 5 SOLUTION ORAL at 08:19

## 2019-03-04 RX ADMIN — DULOXETINE HYDROCHLORIDE 60 MG: 60 CAPSULE, DELAYED RELEASE ORAL at 17:46

## 2019-03-04 NOTE — PROGRESS NOTES
I left a 2nd voice mail for Katlyn the  at [facility].    [id number] [place] [address]  Central Intake: Phone: [phone]  Clinic Contact: [phone]  Katlyn: [phone]

## 2019-03-04 NOTE — PROGRESS NOTES
03/04/19 1500   Behavioral Health   Hallucinations denies / not responding to hallucinations   Thinking poor concentration   Orientation person: oriented;place: oriented;date: oriented;time: oriented   Memory baseline memory   Eye Contact at examiner   Affect blunted, flat   Mood mood is calm   Physical Appearance/Attire appears stated age   Hygiene well groomed   Suicidality (Denies suicidal ideation.)   1. Wish to be Dead No   2. Non-Specific Active Suicidal Thoughts  No   Self Injury other (see comment)  (Denies.)   Elopement (Does not appear to be a risk.)   Activity isolative;other (see comment)  (in lounge at times. Vomiting on several occasions.)   Speech clear;coherent   Medication Sensitivity no stated side effects;no observed side effects   Psychomotor / Gait slow

## 2019-03-04 NOTE — PROGRESS NOTES
[facility], [place]   Psychiatric Progress Note        Interim History:   The patient's care was discussed with the treatment team during the daily team meeting and/or staff's chart notes were reviewed.  Staff report: Vomiting throughout the weekend.    Depression severity scale 0-10 (10=most severe):  Today:6  Her mood and anxiety are slightly better today.    Tolerating medications without side effects.  Sleeping well, eating adequately, energy is low, concentration is limited.  Suicidal thoughts are passive, no active plan or intent.  She denied homicidal ideation.  No psychotic symptoms reported.    She explains that over the past few years she has had bouts of nausea and vomiting which have remained unexplained after extensive medical workups, which have involved imaging such as endoscopy.  Symptoms related to this have reduced moderately today.           Medications:       DULoxetine  60 mg Oral Daily     insulin aspart  1-7 Units Subcutaneous TID AC     insulin aspart  1-5 Units Subcutaneous At Bedtime     insulin glargine  8 Units Subcutaneous Daily with supper     [START ON 3/5/2019] lisinopril  5 mg Oral Daily     methadone  110 mg Oral Daily     mirtazapine  15 mg Oral At Bedtime     OLANZapine  5 mg Oral At Bedtime     ranitidine  150 mg Oral BID          Allergies:   No Known Allergies       Labs:     Recent Results (from the past 24 hour(s))   Glucose by meter    Collection Time: 03/04/19  8:21 AM   Result Value Ref Range    Glucose 386 (H) 70 - 99 mg/dL   Comprehensive metabolic panel    Collection Time: 03/04/19 11:32 AM   Result Value Ref Range    Sodium 134 133 - 144 mmol/L    Potassium 4.0 3.4 - 5.3 mmol/L    Chloride 97 94 - 109 mmol/L    Carbon Dioxide 27 20 - 32 mmol/L    Anion Gap 10 3 - 14 mmol/L    Glucose 349 (H) 70 - 99 mg/dL    Urea Nitrogen 17 7 - 30 mg/dL    Creatinine 0.83 0.52 - 1.04 mg/dL    GFR Estimate 88 >60 mL/min/[1.73_m2]    GFR Estimate If Black  ">90 >60 mL/min/[1.73_m2]    Calcium 9.4 8.5 - 10.1 mg/dL    Bilirubin Total 0.5 0.2 - 1.3 mg/dL    Albumin 4.3 3.4 - 5.0 g/dL    Protein Total 9.3 (H) 6.8 - 8.8 g/dL    Alkaline Phosphatase 118 40 - 150 U/L     (H) 0 - 50 U/L    AST 70 (H) 0 - 45 U/L   Magnesium    Collection Time: 03/04/19 11:32 AM   Result Value Ref Range    Magnesium 2.2 1.6 - 2.3 mg/dL   Glucose by meter    Collection Time: 03/04/19 12:45 PM   Result Value Ref Range    Glucose 281 (H) 70 - 99 mg/dL   Glucose by meter    Collection Time: 03/04/19  5:39 PM   Result Value Ref Range    Glucose 244 (H) 70 - 99 mg/dL          Psychiatric Examination:     /90   Pulse 98   Temp 97.4  F (36.3  C) (Oral)   Resp 16   Ht 1.753 m (5' 9\")   Wt 105.2 kg (232 lb)   LMP 02/14/2019   SpO2 100%   Breastfeeding? No   BMI 34.26 kg/m    Weight is 232 lbs 0 oz  Body mass index is 34.26 kg/m .  Orthostatic Vitals       Most Recent      Sitting Orthostatic /96 02/25 0854    Sitting Orthostatic Pulse (bpm) 90 02/25 0854    Standing Orthostatic /98 02/25 0854    Standing Orthostatic Pulse (bpm) 83 02/25 0854            Appearance: awake, alert  Attitude:  cooperative  Eye Contact:  fair  Mood:  anxious and depressed  Affect:  mood congruent  Speech:  clear, coherent  Psychomotor Behavior:  no evidence of tardive dyskinesia, dystonia, or tics  Throught Process:  linear  Associations:  no loose associations  Thought Content:  no evidence of suicidal ideation or homicidal ideation and no evidence of psychotic thought  Insight:  fair  Judgement:  intact  Oriented to:  time, person, and place  Attention Span and Concentration:  intact  Recent and Remote Memory:  intact    Clinical Global Impressions  First:  Considering your total clinical experience with this particular patient population, how severe are the patient's symptoms at this time?: 7 (02/22/19 0512)  Compared to the patient's condition at the START of treatment, this patient's " condition is:: 4 (02/22/19 0512)  Most recent:  Considering your total clinical experience with this particular patient population, how severe are the patient's symptoms at this time?: 7 (02/22/19 0512)  Compared to the patient's condition at the START of treatment, this patient's condition is:: 4 (02/22/19 0512)         Precautions:     Behavioral Orders   Procedures     Code 1 - Restrict to Unit     Routine Programming     As clinically indicated     Status 15     Every 15 minutes.     Suicide precautions     Patients on Suicide Precautions should have a Combination Diet ordered that includes a Diet selection(s) AND a Behavioral Tray selection for Safe Tray - with utensils, or Safe Tray - NO utensils       Withdrawal precautions          DIagnoses:     Major depressive disorder-recurrent, moderate  Generalized anxiety disorder  Opiate use disorder, severe  Sedative hypnotic use disorder, severe         Plan:     Continue Cymbalta to 60 mg daily.  Continue Zyprexa for augmentation of her antidepressant while aiming to reduce anxiety and insomnia.    She has completed tapering off of phenobarbital.  Vitals remained stable.  Nausea: Internal medicine consultation requested.  The dose of Zofran has been increased.  Mirtazapine will be added this evening to further augment her antidepressant while also gaining benefit from antiemetic effects.  Continue methadone for maintenance treatment of opiate dependence.    Psychosocial treatments to be addressed with social work consult and groups.  Some boundary issues with another male however the other patient is being discharged today.    Legal status: Voluntary    Disposition: Exploring residential [facility] treatment facilities

## 2019-03-04 NOTE — PROGRESS NOTES
"Pt was visible in the milieu, engaged with peers and denies SI SIB. While watching a movie a peer began talking to her uninvited,  pt stated she did not want to talk. Pt continued to talk and pt responded firmly with a harsh tone stating,\" I told you, I don't want to talk to you and you wont stop talking\"! Peer responded loudly and staff intervened at the point and the peer left the lounge.      03/03/19 2204   Behavioral Health   Hallucinations denies / not responding to hallucinations   Thinking poor concentration   Orientation person: oriented;place: oriented;date: oriented;time: oriented   Memory baseline memory   Insight poor   Judgement impaired   Eye Contact at floor   Affect blunted, flat;sad   Mood mood is calm   Physical Appearance/Attire appears stated age   Hygiene well groomed   Suicidality other (see comments)  (denies)   1. Wish to be Dead No   2. Non-Specific Active Suicidal Thoughts  No      "

## 2019-03-04 NOTE — PROGRESS NOTES
Pt presented with emesis and dry heaving overnight. Labs reviewed and showed admit labs of elevated A1c and blood sugar. Due to elevated labs 11 days ago, a hx of DMII, and days of emesis, I took her blood sugar which was 386. MD Andish and medical both notified, and patient was started on insulin.

## 2019-03-04 NOTE — CONSULTS
Internal Medicine Consult - Initial Visit       Yun Coyle MRN# [id number]   YOB: 1975 Age: 43 year old   Date of Admission: 2/21/2019  PCP: No primary care provider on file.  Date of Service: 3/4/2019    Referring Provider: Jimmy Morelos MD  Reason for Consult: Vomiting, hyperglycemia          Assessment and Recommendations:   Yun Coyle is a 43 year old female with a history of HTN, type 2 DM, polysubstance abuse, and depression admitted to station [facility] for suicidal ideation.      # Type 2 DM - Hgb A1c 8.5% this admission.   this AM.  Initially diagnosed ~ 10 years ago and started on insulin.  Reports stopping insulin regimen after losing weight, about 4-5 years ago.  Not previously on Metformin due to persistently high LFTs.  Not on any medications at time of admission.   - Start Lantus 8 units w/ evening meal   - MSSI   - BG checks ac/hs   - Hypoglycemia protocol in place   - Will need Endocrine/DM follow up as outpatient     # HTN - BPs elevated last 24 hr, with SBPs ranging 170-180s, DBP ranging 80-100s.  Reports historical dx of HTN but not currently on antihypertensive medications.  - Start Lisinopril 5mg daily given hx of type 2 DM   - Hydralazine PRN for SBPs > 160 or DBPs greater than > 100   - Monitor and record BPs per unit routine     # NAFLD  # Elevated LFTs   , AST 96 on 2/21/19.  AP, tbili wnl.  ALT and AST persistently elevated since 2009 with extensive workup and evaluation by GI/Hepatology.  Eunice to be 2/2 NAFLD.  No hx HBV or HCV.  Last seen by GI/Hepatology at [facility] in 1/2016.  Last US abdomen on 6/27/18 with no acute findings.    - Recheck CMPs this AM   - Avoid/minimize hepatotoxic agents     # Nausea, vomiting - Intermittently occurring since ~ 2015.  Possibly 2/2 hyperglycemia in setting of uncontrolled type 2 DM vs NAFLD vs cannabis hyperemesis syndrome. Given chronic nature of symptoms and known uncontrolled DM, question if possible  "component of gastroparesis.  Reports multiple episodes of bilious emesis over the last 24-36 hr.  Denies recent sick contacts.  Emely wnl.   - Zantac 150mg BID   - Start Reglan 10mg Q6H PRN   - Monitor, please notify IM if    # Hx polysubstance abuse - Reports smoking marijuana, but no tobacco use.  Utox this admission positive for cannabinoids and benzos.    - Recommend cessation of marijuana use given ongoing issues w/ nausea and vomiting     Medicine will follow along for assistance with managing hyperglycemia and BPs.  Thank you for this consult.       Niharika Shepard CNP  Hospitalist Service   Pager: [phone]             History of Present Illness:   History is obtained from the patient and medical record.     This patient is a 43 year old female with a history of HTN, type 2 DM, polysubstance abuse, and depression admitted for suicidal ideation.     Internal Medicine service was asked to see patient for vomiting and hyperglycemia.  Yun is resting in bed.  She reports ongoing issues with nausea and vomiting, reports that it happens when she \"sleeps for more than 6 hours\" and doesn't wake up to eat.  She denies constipation and reports last BM yesterday was normal.  Feels chilled today, but denies fevers, chest pain, vomiting, and dysuria.  Reports mild fatigue.      She reports being diagnosed with type 2 DM in [id number], and was started on Lantus and Novolog at that time.  She reports that she stopped her insulin regimen \"about 4 or 5 years ago\" after improvement in hemoglobin a1c with weight loss.  She reports going to ICD tx at [facility] last year (Oct-Dec 2018) and feels that she gained \"about 30 lbs\" while there. Feels that her blood glucoses have been high with gaining weight back but admits that she doesn't regularly check them.  Previously followed with DM/Endocrine, but has been lost to follow up since stopping her insulin.            Review of Systems:   A 10 point ROS was performed and negative " unless otherwise noted in HPI.           Past Medical History:   Reviewed and updated in Epic.  Past Medical History:   Diagnosis Date     Anxiety      Depressive disorder      Gastroesophageal reflux disease      Hypertension      Type 2 diabetes mellitus (H)      Uncomplicated asthma              Past Surgical History:   Reviewed and updated in Epic.  Past Surgical History:   Procedure Laterality Date     BACK SURGERY      x2     CHOLECYSTECTOMY       ORTHOPEDIC SURGERY       SALPINGO OOPHORECTOMY,R/L/ELDA Right              Social History:   Reviewed and updated in [facility].  Social History     Socioeconomic History     Marital status: Single     Spouse name: Not on file     Number of children: Not on file     Years of education: Not on file     Highest education level: Not on file   Occupational History     Not on file   Social Needs     Financial resource strain: Not on file     Food insecurity:     Worry: Not on file     Inability: Not on file     Transportation needs:     Medical: Not on file     Non-medical: Not on file   Tobacco Use     Smoking status: Never Smoker     Smokeless tobacco: Never Used   Substance and Sexual Activity     Alcohol use: No     Frequency: Never     Drug use: Yes     Types: Opiates, Benzodiazepines     Sexual activity: Not on file   Lifestyle     Physical activity:     Days per week: Not on file     Minutes per session: Not on file     Stress: Not on file   Relationships     Social connections:     Talks on phone: Not on file     Gets together: Not on file     Attends Congregation service: Not on file     Active member of club or organization: Not on file     Attends meetings of clubs or organizations: Not on file     Relationship status: Not on file     Intimate partner violence:     Fear of current or ex partner: Not on file     Emotionally abused: Not on file     Physically abused: Not on file     Forced sexual activity: Not on file   Other Topics Concern     Not on file   Social History  "Narrative     Not on file              Family History:   Reviewed and updated in Epic.  Family History   Problem Relation Age of Onset     Diabetes Mother      Cancer Mother      Cancer Maternal Grandmother      Diabetes Maternal Grandmother              Allergies:   No Known Allergies          Medications:     Current Facility-Administered Medications   Medication     acetaminophen (TYLENOL) tablet 650 mg     alum & mag hydroxide-simethicone (MYLANTA ES/MAALOX  ES) suspension 30 mL     bisacodyl (DULCOLAX) Suppository 10 mg     glucose gel 15-30 g    Or     dextrose 50 % injection 25-50 mL    Or     glucagon injection 1 mg     DULoxetine (CYMBALTA) EC capsule 60 mg     hydrOXYzine (ATARAX) tablet 25-50 mg     insulin aspart (NovoLOG) inj (RAPID ACTING)     insulin aspart (NovoLOG) inj (RAPID ACTING)     magnesium hydroxide (MILK OF MAGNESIA) suspension 30 mL     methadone (DOLPHINE) solution 110 mg     naloxone (NARCAN) injection 0.1-0.4 mg     OLANZapine (zyPREXA) tablet 5 mg     OLANZapine zydis (zyPREXA) ODT half-tab 5-10 mg     ondansetron (ZOFRAN-ODT) ODT tab 4-8 mg     ranitidine (ZANTAC) tablet 150 mg     traZODone (DESYREL) tablet 50 mg            Physical Exam:   Blood pressure 141/90, pulse 98, temperature 97.9  F (36.6  C), temperature source Oral, resp. rate 16, height 1.753 m (5' 9\"), weight 105.2 kg (232 lb), last menstrual period 02/14/2019, SpO2 100 %, not currently breastfeeding.  Body mass index is 34.26 kg/m .    GENERAL: Alert and oriented x 3. Well nourished, well developed.  Large body habitus. No acute distress.    HEENT: Normocephalic, atraumatic. Anicteric sclera. Mucous membranes moist.   CV: RRR. S1, S2. No murmurs appreciated.   RESPIRATORY: Effort normal on room air. Lungs CTAB with no wheezing, rales, or rhonchi.   GI: Abdomen soft and non distended, bowel sounds present x all 4 quadrants. No tenderness, rebound, or guarding.   NEUROLOGICAL: No focal deficits. Follows commands.  " Strength equal in upper and lower extremities.   MUSCULOSKELETAL: No joint swelling or tenderness. Moves all extremities.   EXTREMITIES: No gross deformities. No peripheral edema. Intact bilateral pedal pulses.   SKIN: Grossly warm, dry, and intact. No jaundice. No rashes.             Data:   I personally reviewed the following studies:    ROUTINE IP LABS (Last four results)  CMP   Recent Labs   Lab 03/04/19  1132      POTASSIUM 4.0   CHLORIDE 97   CO2 27   ANIONGAP 10   *   BUN 17   CR 0.83   DA 9.4   MAG 2.2   PROTTOTAL 9.3*   ALBUMIN 4.3   BILITOTAL 0.5   ALKPHOS 118   AST 70*   *     CBC No lab results found in last 7 days.  INR No lab results found in last 7 days.          Unresulted Labs Ordered in the Past 30 Days of this Admission     No orders found from 12/23/2018 to 2/22/2019.

## 2019-03-04 NOTE — PROGRESS NOTES
"Yun struggeled most of the day with emesis. She reports that it is not a \"sickness thing, more of a GI thing.\" She stated that it has happened to her in the past. Its estimated that she has thrown up 6-7x today. She did not eat breakfast or lunch but consumed moderate amounts of fluids. She had a few hour period in the afternoon where she was feeling better and was out in the lounge, smiling social. She tried to eat some food, specifically pudding, which did not agree with her and she threw it up about 30 minutes after eating it. She has requested to hold her morning Cymbalta until she can keep food in her stomach.     Of note, her blood pressure was fairly elevated when taken around 1pm. It was taken again about an hour later using a larger cuff (maroon) and it was WNL. Will continue to monitor BP  "

## 2019-03-05 LAB
GLUCOSE BLDC GLUCOMTR-MCNC: 178 MG/DL (ref 70–99)
GLUCOSE BLDC GLUCOMTR-MCNC: 347 MG/DL (ref 70–99)

## 2019-03-05 PROCEDURE — 25000132 ZZH RX MED GY IP 250 OP 250 PS 637: Mod: GY | Performed by: PSYCHIATRY & NEUROLOGY

## 2019-03-05 PROCEDURE — A9270 NON-COVERED ITEM OR SERVICE: HCPCS | Mod: GY | Performed by: PSYCHIATRY & NEUROLOGY

## 2019-03-05 PROCEDURE — 25000131 ZZH RX MED GY IP 250 OP 636 PS 637: Mod: GY | Performed by: PSYCHIATRY & NEUROLOGY

## 2019-03-05 PROCEDURE — 12400001 ZZH R&B MH UMMC

## 2019-03-05 PROCEDURE — 99232 SBSQ HOSP IP/OBS MODERATE 35: CPT | Performed by: PSYCHIATRY & NEUROLOGY

## 2019-03-05 PROCEDURE — 25000131 ZZH RX MED GY IP 250 OP 636 PS 637: Mod: GY | Performed by: NURSE PRACTITIONER

## 2019-03-05 PROCEDURE — 25000132 ZZH RX MED GY IP 250 OP 250 PS 637: Mod: GY | Performed by: NURSE PRACTITIONER

## 2019-03-05 PROCEDURE — 00000146 ZZHCL STATISTIC GLUCOSE BY METER IP

## 2019-03-05 PROCEDURE — A9270 NON-COVERED ITEM OR SERVICE: HCPCS | Mod: GY | Performed by: NURSE PRACTITIONER

## 2019-03-05 RX ADMIN — Medication 10 MG: at 09:52

## 2019-03-05 RX ADMIN — OLANZAPINE 5 MG: 5 TABLET, FILM COATED ORAL at 20:57

## 2019-03-05 RX ADMIN — ONDANSETRON 8 MG: 4 TABLET, ORALLY DISINTEGRATING ORAL at 03:49

## 2019-03-05 RX ADMIN — Medication 10 MG: at 02:17

## 2019-03-05 RX ADMIN — MIRTAZAPINE 15 MG: 15 TABLET, FILM COATED ORAL at 20:57

## 2019-03-05 RX ADMIN — DULOXETINE HYDROCHLORIDE 60 MG: 60 CAPSULE, DELAYED RELEASE ORAL at 08:22

## 2019-03-05 RX ADMIN — RANITIDINE 150 MG: 150 TABLET, FILM COATED ORAL at 08:22

## 2019-03-05 RX ADMIN — HYDROXYZINE HYDROCHLORIDE 50 MG: 25 TABLET ORAL at 09:52

## 2019-03-05 RX ADMIN — INSULIN ASPART 5 UNITS: 100 INJECTION, SOLUTION INTRAVENOUS; SUBCUTANEOUS at 08:23

## 2019-03-05 RX ADMIN — METHADONE HYDROCHLORIDE 110 MG: 5 SOLUTION ORAL at 07:06

## 2019-03-05 RX ADMIN — LISINOPRIL 5 MG: 5 TABLET ORAL at 08:22

## 2019-03-05 RX ADMIN — RANITIDINE 150 MG: 150 TABLET, FILM COATED ORAL at 20:57

## 2019-03-05 RX ADMIN — INSULIN GLARGINE 12 UNITS: 100 INJECTION, SOLUTION SUBCUTANEOUS at 18:20

## 2019-03-05 RX ADMIN — INSULIN ASPART 1 UNITS: 100 INJECTION, SOLUTION INTRAVENOUS; SUBCUTANEOUS at 13:27

## 2019-03-05 ASSESSMENT — ACTIVITIES OF DAILY LIVING (ADL)
ORAL_HYGIENE: INDEPENDENT
DRESS: STREET CLOTHES;INDEPENDENT
DRESS: STREET CLOTHES;INDEPENDENT
ORAL_HYGIENE: INDEPENDENT
HYGIENE/GROOMING: INDEPENDENT
LAUNDRY: UNABLE TO COMPLETE
HYGIENE/GROOMING: INDEPENDENT

## 2019-03-05 ASSESSMENT — MIFFLIN-ST. JEOR: SCORE: 1731.83

## 2019-03-05 NOTE — PROGRESS NOTES
Brief Internal Medicine Note, 3/5/19:    IM following for assistance w/ T2DM management.      Last 24 hr blood glucose:     Recent Labs   Lab 03/05/19  0821 03/04/19  2052 03/04/19  1739 03/04/19  1245 03/04/19  1132 03/04/19  0821   GLC  --   --   --   --  349*  --    * 275* 244* 281*  --  386*     Patient w/ variable PO intake yesterday due to cyclical nausea and vomiting.  Not on oral agents or insulin regimen prior to admission.     A/P:     # Type 2 DM - Increase Lantus to 12 units at supper.  Add CHO coverage 1u: 15g CHO.  Continue MSSI for now. Please check 0200 glucose.  Continue to monitor. Will need DM education prior to discharge.     Medicine will follow along for continued assistance with type 2 DM management.          Niharika Shepard, [facility]  Hospitalist Service   Pager: [phone]

## 2019-03-05 NOTE — PROGRESS NOTES
"[facility], [place]   Psychiatric Progress Note        Interim History:   The patient's care was discussed with the treatment team during the daily team meeting and/or staff's chart notes were reviewed.  Staff report: Vomiting throughout the weekend.    Depression severity scale 0-10 (10=most severe):  Today: 6  Her mood and anxiety are slightly better today.    Tolerating medications without side effects.  Sleeping well, eating adequately, energy is improving, concentration is improving.  She denied suicidal ideation.  She denied homicidal ideation.  No psychotic symptoms reported.    Nausea and vomiting have significantly improved today.         Medications:       DULoxetine  60 mg Oral Daily     insulin aspart   Subcutaneous TID AC     insulin aspart  1-7 Units Subcutaneous TID AC     insulin aspart  1-5 Units Subcutaneous At Bedtime     insulin glargine  12 Units Subcutaneous Daily with supper     lisinopril  5 mg Oral Daily     methadone  110 mg Oral Daily     mirtazapine  15 mg Oral At Bedtime     OLANZapine  5 mg Oral At Bedtime     ranitidine  150 mg Oral BID          Allergies:   No Known Allergies       Labs:     Recent Results (from the past 24 hour(s))   Glucose by meter    Collection Time: 03/04/19  5:39 PM   Result Value Ref Range    Glucose 244 (H) 70 - 99 mg/dL   Glucose by meter    Collection Time: 03/04/19  8:52 PM   Result Value Ref Range    Glucose 275 (H) 70 - 99 mg/dL   Glucose by meter    Collection Time: 03/05/19  8:21 AM   Result Value Ref Range    Glucose 347 (H) 70 - 99 mg/dL   Glucose by meter    Collection Time: 03/05/19  1:02 PM   Result Value Ref Range    Glucose 178 (H) 70 - 99 mg/dL          Psychiatric Examination:     /90   Pulse 98   Temp 97.3  F (36.3  C)   Resp 16   Ht 1.753 m (5' 9\")   Wt 100.2 kg (221 lb)   LMP 02/14/2019   SpO2 100%   Breastfeeding? No   BMI 32.64 kg/m    Weight is 221 lbs 0 oz  Body mass index is 32.64 " kg/m .  Orthostatic Vitals       Most Recent      Sitting Orthostatic /96 02/25 0854    Sitting Orthostatic Pulse (bpm) 90 02/25 0854    Standing Orthostatic /98 02/25 0854    Standing Orthostatic Pulse (bpm) 83 02/25 0854            Appearance: awake, alert  Attitude:  cooperative  Eye Contact:  fair  Mood:  anxious and depressed  Affect:  mood congruent  Speech:  clear, coherent  Psychomotor Behavior:  no evidence of tardive dyskinesia, dystonia, or tics  Throught Process:  linear  Associations:  no loose associations  Thought Content:  no evidence of suicidal ideation or homicidal ideation and no evidence of psychotic thought  Insight:  fair  Judgement:  intact  Oriented to:  time, person, and place  Attention Span and Concentration:  intact  Recent and Remote Memory:  intact    Clinical Global Impressions  First:  Considering your total clinical experience with this particular patient population, how severe are the patient's symptoms at this time?: 7 (02/22/19 0512)  Compared to the patient's condition at the START of treatment, this patient's condition is:: 4 (02/22/19 0512)  Most recent:  Considering your total clinical experience with this particular patient population, how severe are the patient's symptoms at this time?: 7 (02/22/19 0512)  Compared to the patient's condition at the START of treatment, this patient's condition is:: 4 (02/22/19 0512)         Precautions:     Behavioral Orders   Procedures     Code 1 - Restrict to Unit     Routine Programming     As clinically indicated     Status 15     Every 15 minutes.     Suicide precautions     Patients on Suicide Precautions should have a Combination Diet ordered that includes a Diet selection(s) AND a Behavioral Tray selection for Safe Tray - with utensils, or Safe Tray - NO utensils       Withdrawal precautions          DIagnoses:     Major depressive disorder-recurrent, moderate  Generalized anxiety disorder  Opiate use disorder,  severe  Sedative hypnotic use disorder, severe         Plan:     Continue Cymbalta to 60 mg daily.  Continue Zyprexa for augmentation.  Continue mirtazapine which was recently added and tolerated well.  Continue methadone for maintenance treatment of opiate dependence.    Psychosocial treatments to be addressed with social work consult and groups.    Legal status: Voluntary    Disposition: Exploring residential [facility] treatment facilities

## 2019-03-05 NOTE — PROGRESS NOTES
Patient slept for the first few hours of the shift. 0200 BY=562. Around this time, pt reported ongoing nausea and requested zofran. It was too early to give more zofran so zyprexa was offered and pt accepted. Zyprexa 10mg ODT given at 2:19. Shortly thereafter, pt reported having vomitted in her room and it getting on her legs. Pt took a shower and was noted to be retching. Pt had a cup with her and vomitted a small amount of green vomit while in the shower room. After the shower, pt requested and received zofran 8mg for ongoing nausea and vomitting. Pt is currently resting in bed and appears more comfortable.

## 2019-03-05 NOTE — PLAN OF CARE
"   Yun had multiple needs and issues tonight. When her BS was checked at 5:00 pm, she informed this writer she was nauseous, and did not plan to eat dinner. BS was 246. She then said; \" would you see if the doctor ordered compazine, he said he would. If he didn't, he said to go ahead and call the on call doctor to get some ordered\"    This writer saw nothing in the notes about compazine, but zofran was increased. Doctor Morelos was contacted, and he indicated there must be some kind of misunderstanding, as he had indicated to Yun they would increase zofran tonight and they would see how that worked. Then tomorrow, if it isn't helping, they would consider complazine. Yun was not pleased to hear this, but did take another 4 mg of zofran that was available.     Dr. Morelos was also asked about the insulin, since Yun did not plan to eat. He recommended calling internal med. Internal med stated we should hold the novolog, but go ahead and give her the lantus 8 units. Unfortunately, it took until 9:30 for the lantus to come up. Yun's blood glucose was re-tested, and it was 276. At 2:00 am nights will do a follow-up on BS.    Yun again was asking for the compazine. When I brought the insulin in,she stated,\" did you call the doctor about the compazine?\" I remineded her I had already told her I had called the doctor, and he wanted to try the increase in zofran first. \" What am I suppose to do then, suffer?\" Yun stated. I offered to bring in another dose of zofran, which she accepted.     Within about half an hour, Yun came up and wanted seroquel and trazadone. Trazadone was not ordered for her, and this was explained. However, she did have remeron ordered as well as trazadone. It was explained the only equvailent med was zyprexa, and she had just had a dose with her HS meds. Yun then asked to take a shower. It should be noted she was walking around naked with a large bathcoat covering her " "all night.     After her shower, Yun came to the window and asked for \" the rest of the zyprexa\". It was clarified that what is available was a prn. Yun then took an additional zypreza 10 mg.   "

## 2019-03-05 NOTE — PROGRESS NOTES
I did receive a call from Katlyn at [facility] @ [phone]. She says that the pt is on their list. I left her a return vm.

## 2019-03-05 NOTE — PROGRESS NOTES
"t was in the milieu.  Attended select groups.  Pt denies SI/SIB.  Confirms anx/dep 7 of 10 over being in the hospital + \"my life situation.\"  Pt is waiting to go hear back from [facility] or [facility].  Pt said, \"I'm trying my best to keep a positive attitude.\"  "

## 2019-03-06 LAB
ALBUMIN SERPL-MCNC: 4.3 G/DL (ref 3.4–5)
ALP SERPL-CCNC: 117 U/L (ref 40–150)
ALT SERPL W P-5'-P-CCNC: 131 U/L (ref 0–50)
ANION GAP SERPL CALCULATED.3IONS-SCNC: 9 MMOL/L (ref 3–14)
AST SERPL W P-5'-P-CCNC: 53 U/L (ref 0–45)
BILIRUB SERPL-MCNC: 0.4 MG/DL (ref 0.2–1.3)
BUN SERPL-MCNC: 19 MG/DL (ref 7–30)
CALCIUM SERPL-MCNC: 9.3 MG/DL (ref 8.5–10.1)
CHLORIDE SERPL-SCNC: 97 MMOL/L (ref 94–109)
CO2 SERPL-SCNC: 28 MMOL/L (ref 20–32)
CREAT SERPL-MCNC: 0.93 MG/DL (ref 0.52–1.04)
CRP SERPL-MCNC: <2.9 MG/L (ref 0–8)
ERYTHROCYTE [DISTWIDTH] IN BLOOD BY AUTOMATED COUNT: 14.1 % (ref 10–15)
GFR SERPL CREATININE-BSD FRML MDRD: 76 ML/MIN/{1.73_M2}
GLUCOSE BLDC GLUCOMTR-MCNC: 113 MG/DL (ref 70–99)
GLUCOSE BLDC GLUCOMTR-MCNC: 163 MG/DL (ref 70–99)
GLUCOSE BLDC GLUCOMTR-MCNC: 312 MG/DL (ref 70–99)
GLUCOSE BLDC GLUCOMTR-MCNC: 381 MG/DL (ref 70–99)
GLUCOSE SERPL-MCNC: 296 MG/DL (ref 70–99)
HCT VFR BLD AUTO: 43.9 % (ref 35–47)
HGB BLD-MCNC: 14.7 G/DL (ref 11.7–15.7)
LACTATE BLD-SCNC: 2.5 MMOL/L (ref 0.7–2)
MAGNESIUM SERPL-MCNC: 2.3 MG/DL (ref 1.6–2.3)
MCH RBC QN AUTO: 29.5 PG (ref 26.5–33)
MCHC RBC AUTO-ENTMCNC: 33.5 G/DL (ref 31.5–36.5)
MCV RBC AUTO: 88 FL (ref 78–100)
PLATELET # BLD AUTO: 277 10E9/L (ref 150–450)
POTASSIUM SERPL-SCNC: 4.4 MMOL/L (ref 3.4–5.3)
PROT SERPL-MCNC: 9.5 G/DL (ref 6.8–8.8)
RBC # BLD AUTO: 4.99 10E12/L (ref 3.8–5.2)
SODIUM SERPL-SCNC: 134 MMOL/L (ref 133–144)
WBC # BLD AUTO: 9.3 10E9/L (ref 4–11)

## 2019-03-06 PROCEDURE — 12400001 ZZH R&B MH UMMC

## 2019-03-06 PROCEDURE — 25000131 ZZH RX MED GY IP 250 OP 636 PS 637: Mod: GY | Performed by: PSYCHIATRY & NEUROLOGY

## 2019-03-06 PROCEDURE — 36415 COLL VENOUS BLD VENIPUNCTURE: CPT | Performed by: NURSE PRACTITIONER

## 2019-03-06 PROCEDURE — A9270 NON-COVERED ITEM OR SERVICE: HCPCS | Mod: GY | Performed by: PSYCHIATRY & NEUROLOGY

## 2019-03-06 PROCEDURE — 99232 SBSQ HOSP IP/OBS MODERATE 35: CPT | Performed by: PSYCHIATRY & NEUROLOGY

## 2019-03-06 PROCEDURE — G0177 OPPS/PHP; TRAIN & EDUC SERV: HCPCS

## 2019-03-06 PROCEDURE — 83735 ASSAY OF MAGNESIUM: CPT | Performed by: NURSE PRACTITIONER

## 2019-03-06 PROCEDURE — 00000146 ZZHCL STATISTIC GLUCOSE BY METER IP

## 2019-03-06 PROCEDURE — 25000132 ZZH RX MED GY IP 250 OP 250 PS 637: Mod: GY | Performed by: PSYCHIATRY & NEUROLOGY

## 2019-03-06 PROCEDURE — 80053 COMPREHEN METABOLIC PANEL: CPT | Performed by: NURSE PRACTITIONER

## 2019-03-06 PROCEDURE — 85027 COMPLETE CBC AUTOMATED: CPT | Performed by: NURSE PRACTITIONER

## 2019-03-06 PROCEDURE — 83605 ASSAY OF LACTIC ACID: CPT | Performed by: NURSE PRACTITIONER

## 2019-03-06 PROCEDURE — 86140 C-REACTIVE PROTEIN: CPT | Performed by: NURSE PRACTITIONER

## 2019-03-06 PROCEDURE — 25000132 ZZH RX MED GY IP 250 OP 250 PS 637: Mod: GY | Performed by: NURSE PRACTITIONER

## 2019-03-06 PROCEDURE — A9270 NON-COVERED ITEM OR SERVICE: HCPCS | Mod: GY | Performed by: NURSE PRACTITIONER

## 2019-03-06 RX ADMIN — MIRTAZAPINE 15 MG: 15 TABLET, FILM COATED ORAL at 22:08

## 2019-03-06 RX ADMIN — OLANZAPINE 5 MG: 5 TABLET, FILM COATED ORAL at 22:08

## 2019-03-06 RX ADMIN — INSULIN ASPART 5 UNITS: 100 INJECTION, SOLUTION INTRAVENOUS; SUBCUTANEOUS at 08:38

## 2019-03-06 RX ADMIN — HYDROXYZINE HYDROCHLORIDE 50 MG: 25 TABLET ORAL at 08:36

## 2019-03-06 RX ADMIN — METHADONE HYDROCHLORIDE 110 MG: 5 SOLUTION ORAL at 07:19

## 2019-03-06 RX ADMIN — ONDANSETRON 4 MG: 4 TABLET, ORALLY DISINTEGRATING ORAL at 07:19

## 2019-03-06 RX ADMIN — RANITIDINE 150 MG: 150 TABLET, FILM COATED ORAL at 08:17

## 2019-03-06 RX ADMIN — LISINOPRIL 5 MG: 5 TABLET ORAL at 08:34

## 2019-03-06 RX ADMIN — INSULIN ASPART 4 UNITS: 100 INJECTION, SOLUTION INTRAVENOUS; SUBCUTANEOUS at 12:12

## 2019-03-06 RX ADMIN — Medication 12.5 MG: at 08:36

## 2019-03-06 RX ADMIN — RANITIDINE 150 MG: 150 TABLET, FILM COATED ORAL at 22:08

## 2019-03-06 RX ADMIN — DULOXETINE HYDROCHLORIDE 60 MG: 60 CAPSULE, DELAYED RELEASE ORAL at 10:30

## 2019-03-06 ASSESSMENT — ACTIVITIES OF DAILY LIVING (ADL)
ORAL_HYGIENE: INDEPENDENT
LAUNDRY: UNABLE TO COMPLETE
DRESS: INDEPENDENT;SCRUBS (BEHAVIORAL HEALTH)
HYGIENE/GROOMING: HANDWASHING;SHOWER;INDEPENDENT
HYGIENE/GROOMING: INDEPENDENT
DRESS: STREET CLOTHES;SCRUBS (BEHAVIORAL HEALTH);INDEPENDENT

## 2019-03-06 NOTE — PLAN OF CARE
Pt reporting acute epigastric discomfort this morning. B/P manual sitting 170/118 P= 127 standing 158/118 P=  131. Pt is in acute distress.  Given Maalox 30 ml. Apresoline 12.5 mg with her scheduled lisinopril. Writer also gave her vistaril 50 mg for anxiety. Pt declined Cymbalta due to her nausea. Pt had small emesis in room at 0850. At 0910 pt reported GI discomfort improved but still feeling nausea. Bg this morning = 381. Pt given sliding scale dose.  Pt declining breakfast, so held carb coverage. Will reassess vs and epigastric discomfort.

## 2019-03-06 NOTE — PROGRESS NOTES
Pt reports being in a good mood this evening. She spent the first half of the evening resting in bed and the second half out in the dining room/lounge socializing and playing games with peers. She states that she was feeling much better today compared to the last few days, and that she thinks the medications she started have helped contribute to her improved physical state. She ate about half of her dinner. She denies any mental health concerns. Her goal for the evening was to keep feeling well, eat some food, and to stay positive, which she did.     03/05/19 2200   Behavioral Health   Hallucinations denies / not responding to hallucinations   Thinking intact   Orientation time: oriented;date: oriented;place: oriented;person: oriented   Memory baseline memory   Insight insight appropriate to situation;insight appropriate to events   Judgement intact   Eye Contact at examiner   Affect full range affect   Mood mood is calm   Physical Appearance/Attire attire appropriate to age and situation;appears stated age   Hygiene other (see comment)  (adequate)   Suicidality other (see comments)  (denies)   1. Wish to be Dead No   2. Non-Specific Active Suicidal Thoughts  No   Self Injury other (see comment)  (denies)   Elopement (none)   Activity other (see comment)  (visible in milieu, social with peers)   Speech clear;coherent   Medication Sensitivity no stated side effects;no observed side effects   Psychomotor / Gait balanced;steady   Activities of Daily Living   Hygiene/Grooming independent   Oral Hygiene independent   Dress street clothes;independent   Room Organization independent

## 2019-03-06 NOTE — PROGRESS NOTES
Pt attended the group on Self Compassion. She actively participated in the group exercises, read out from the handout and took much away from the video.  Pt stated she felt like this was a topic that she needed to work on in her life and she has not had prior exposure to this.       03/06/19 1223   Psycho Education   Type of Intervention structured groups   Response participates, initiates socially appropriate   Hours 1   Treatment Detail Self Compassion

## 2019-03-06 NOTE — PLAN OF CARE
Pt was acute epigastric discomfort resolved by late morning.  Pt tolerated lunch  (ate 3/4 ) B/P went down to 134/89 at lunch. Pt reporting feeling much better this afternoon. Writer reviewed new diabetic orders. Pt reports having passive suicide thoughts with increased physical discomfort, bu states has no plan or intent of any kind. Feeling frustrated with ongoing sx's. Pt took 2 showers this morning to help cope with physical discomfort. Pt attended only afternoon group due to physical discomfort.

## 2019-03-07 LAB
ALBUMIN SERPL-MCNC: 4.2 G/DL (ref 3.4–5)
ALP SERPL-CCNC: 116 U/L (ref 40–150)
ALT SERPL W P-5'-P-CCNC: 109 U/L (ref 0–50)
ANION GAP SERPL CALCULATED.3IONS-SCNC: 11 MMOL/L (ref 3–14)
AST SERPL W P-5'-P-CCNC: 46 U/L (ref 0–45)
BILIRUB SERPL-MCNC: 0.4 MG/DL (ref 0.2–1.3)
BUN SERPL-MCNC: 16 MG/DL (ref 7–30)
CALCIUM SERPL-MCNC: 9.2 MG/DL (ref 8.5–10.1)
CHLORIDE SERPL-SCNC: 99 MMOL/L (ref 94–109)
CO2 SERPL-SCNC: 26 MMOL/L (ref 20–32)
CREAT SERPL-MCNC: 0.79 MG/DL (ref 0.52–1.04)
GFR SERPL CREATININE-BSD FRML MDRD: >90 ML/MIN/{1.73_M2}
GLUCOSE BLDC GLUCOMTR-MCNC: 108 MG/DL (ref 70–99)
GLUCOSE BLDC GLUCOMTR-MCNC: 201 MG/DL (ref 70–99)
GLUCOSE BLDC GLUCOMTR-MCNC: 212 MG/DL (ref 70–99)
GLUCOSE BLDC GLUCOMTR-MCNC: 214 MG/DL (ref 70–99)
GLUCOSE BLDC GLUCOMTR-MCNC: 332 MG/DL (ref 70–99)
GLUCOSE SERPL-MCNC: 347 MG/DL (ref 70–99)
LACTATE BLD-SCNC: 1.9 MMOL/L (ref 0.7–2)
POTASSIUM SERPL-SCNC: 4.4 MMOL/L (ref 3.4–5.3)
PROT SERPL-MCNC: 8.9 G/DL (ref 6.8–8.8)
SODIUM SERPL-SCNC: 136 MMOL/L (ref 133–144)

## 2019-03-07 PROCEDURE — 25000131 ZZH RX MED GY IP 250 OP 636 PS 637: Mod: GY | Performed by: PSYCHIATRY & NEUROLOGY

## 2019-03-07 PROCEDURE — 00000146 ZZHCL STATISTIC GLUCOSE BY METER IP

## 2019-03-07 PROCEDURE — A9270 NON-COVERED ITEM OR SERVICE: HCPCS | Mod: GY | Performed by: PSYCHIATRY & NEUROLOGY

## 2019-03-07 PROCEDURE — 25000132 ZZH RX MED GY IP 250 OP 250 PS 637: Mod: GY | Performed by: NURSE PRACTITIONER

## 2019-03-07 PROCEDURE — G0177 OPPS/PHP; TRAIN & EDUC SERV: HCPCS

## 2019-03-07 PROCEDURE — 83605 ASSAY OF LACTIC ACID: CPT | Performed by: PSYCHIATRY & NEUROLOGY

## 2019-03-07 PROCEDURE — 80053 COMPREHEN METABOLIC PANEL: CPT | Performed by: NURSE PRACTITIONER

## 2019-03-07 PROCEDURE — 12400001 ZZH R&B MH UMMC

## 2019-03-07 PROCEDURE — 25000132 ZZH RX MED GY IP 250 OP 250 PS 637: Mod: GY | Performed by: PSYCHIATRY & NEUROLOGY

## 2019-03-07 PROCEDURE — 99232 SBSQ HOSP IP/OBS MODERATE 35: CPT | Performed by: PSYCHIATRY & NEUROLOGY

## 2019-03-07 PROCEDURE — 36415 COLL VENOUS BLD VENIPUNCTURE: CPT | Performed by: PSYCHIATRY & NEUROLOGY

## 2019-03-07 PROCEDURE — A9270 NON-COVERED ITEM OR SERVICE: HCPCS | Mod: GY | Performed by: NURSE PRACTITIONER

## 2019-03-07 RX ADMIN — OLANZAPINE 5 MG: 5 TABLET, FILM COATED ORAL at 20:41

## 2019-03-07 RX ADMIN — HYDROXYZINE HYDROCHLORIDE 50 MG: 25 TABLET ORAL at 20:56

## 2019-03-07 RX ADMIN — LISINOPRIL 5 MG: 5 TABLET ORAL at 08:52

## 2019-03-07 RX ADMIN — MIRTAZAPINE 15 MG: 15 TABLET, FILM COATED ORAL at 20:42

## 2019-03-07 RX ADMIN — RANITIDINE 150 MG: 150 TABLET, FILM COATED ORAL at 20:41

## 2019-03-07 RX ADMIN — Medication 12.5 MG: at 09:00

## 2019-03-07 RX ADMIN — HYDROXYZINE HYDROCHLORIDE 50 MG: 25 TABLET ORAL at 09:58

## 2019-03-07 RX ADMIN — METHADONE HYDROCHLORIDE 110 MG: 5 SOLUTION ORAL at 07:03

## 2019-03-07 RX ADMIN — DULOXETINE HYDROCHLORIDE 60 MG: 60 CAPSULE, DELAYED RELEASE ORAL at 08:52

## 2019-03-07 RX ADMIN — ONDANSETRON 4 MG: 4 TABLET, ORALLY DISINTEGRATING ORAL at 08:43

## 2019-03-07 RX ADMIN — RANITIDINE 150 MG: 150 TABLET, FILM COATED ORAL at 08:52

## 2019-03-07 RX ADMIN — ONDANSETRON 4 MG: 4 TABLET, ORALLY DISINTEGRATING ORAL at 18:34

## 2019-03-07 RX ADMIN — ONDANSETRON 4 MG: 4 TABLET, ORALLY DISINTEGRATING ORAL at 05:37

## 2019-03-07 RX ADMIN — HYDROXYZINE HYDROCHLORIDE 50 MG: 25 TABLET ORAL at 16:18

## 2019-03-07 ASSESSMENT — ACTIVITIES OF DAILY LIVING (ADL)
DRESS: INDEPENDENT
HYGIENE/GROOMING: INDEPENDENT
ORAL_HYGIENE: INDEPENDENT
LAUNDRY: UNABLE TO COMPLETE

## 2019-03-07 ASSESSMENT — MIFFLIN-ST. JEOR: SCORE: 1777.19

## 2019-03-07 NOTE — PROGRESS NOTES
0530-Patient approached this writer to ask for Zofran for nausea, requested only 4mg and said she would request more later if her nausea persisted. Pt began to pace halls with headphones.   0555-Pt crying in hallway and asked this writer to use the shower, noted to be making retching noises in the shower.    Loida Cao RN on 3/7/2019 at 6:05 AM

## 2019-03-07 NOTE — PROGRESS NOTES
[facility], [place]   Psychiatric Progress Note        Interim History:   The patient's care was discussed with the treatment team during the daily team meeting and/or staff's chart notes were reviewed.  Staff report: Bout of nausea and vomiting this morning.    Depression severity scale 0-10 (10=most severe):  Today: 5  Her mood and anxiety are slightly better today.    Tolerating medications without side effects.  Sleeping well, eating adequately, energy is improving, concentration is improving.  She denied suicidal ideation.  She denied homicidal ideation.  No psychotic symptoms reported.    Nausea and vomiting have significantly improved today.         Medications:       sodium chloride 0.9%  1,000 mL Intravenous Once     DULoxetine  60 mg Oral Daily     insulin aspart  1-10 Units Subcutaneous TID AC     insulin aspart  1-7 Units Subcutaneous At Bedtime     insulin aspart   Subcutaneous TID AC     insulin glargine  15 Units Subcutaneous Daily with supper     lisinopril  5 mg Oral Daily     methadone  110 mg Oral Daily     mirtazapine  15 mg Oral At Bedtime     OLANZapine  5 mg Oral At Bedtime     ranitidine  150 mg Oral BID          Allergies:   No Known Allergies       Labs:     Recent Results (from the past 24 hour(s))   Glucose by meter    Collection Time: 03/06/19  5:42 PM   Result Value Ref Range    Glucose 163 (H) 70 - 99 mg/dL   Glucose by meter    Collection Time: 03/06/19  8:32 PM   Result Value Ref Range    Glucose 113 (H) 70 - 99 mg/dL   Glucose by meter    Collection Time: 03/07/19  2:13 AM   Result Value Ref Range    Glucose 212 (H) 70 - 99 mg/dL   Comprehensive metabolic panel    Collection Time: 03/07/19  8:48 AM   Result Value Ref Range    Sodium 136 133 - 144 mmol/L    Potassium 4.4 3.4 - 5.3 mmol/L    Chloride 99 94 - 109 mmol/L    Carbon Dioxide 26 20 - 32 mmol/L    Anion Gap 11 3 - 14 mmol/L    Glucose 347 (H) 70 - 99 mg/dL    Urea Nitrogen 16 7 - 30 mg/dL     "Creatinine 0.79 0.52 - 1.04 mg/dL    GFR Estimate >90 >60 mL/min/[1.73_m2]    GFR Estimate If Black >90 >60 mL/min/[1.73_m2]    Calcium 9.2 8.5 - 10.1 mg/dL    Bilirubin Total 0.4 0.2 - 1.3 mg/dL    Albumin 4.2 3.4 - 5.0 g/dL    Protein Total 8.9 (H) 6.8 - 8.8 g/dL    Alkaline Phosphatase 116 40 - 150 U/L     (H) 0 - 50 U/L    AST 46 (H) 0 - 45 U/L   Glucose by meter    Collection Time: 03/07/19  8:50 AM   Result Value Ref Range    Glucose 332 (H) 70 - 99 mg/dL   Lactic acid whole blood    Collection Time: 03/07/19 10:56 AM   Result Value Ref Range    Lactic Acid 1.9 0.7 - 2.0 mmol/L   Glucose by meter    Collection Time: 03/07/19 11:57 AM   Result Value Ref Range    Glucose 201 (H) 70 - 99 mg/dL          Psychiatric Examination:     BP (!) 151/104 (BP Location: Right arm)   Pulse 104   Temp 97.8  F (36.6  C) (Tympanic)   Resp 16   Ht 1.753 m (5' 9\")   Wt 104.8 kg (231 lb)   LMP 02/14/2019   SpO2 100%   Breastfeeding? No   BMI 34.11 kg/m    Weight is 231 lbs 0 oz  Body mass index is 34.11 kg/m .  Orthostatic Vitals       Most Recent      Sitting Orthostatic /96 02/25 0854    Sitting Orthostatic Pulse (bpm) 90 02/25 0854    Standing Orthostatic /98 02/25 0854    Standing Orthostatic Pulse (bpm) 83 02/25 0854            Appearance: awake, alert  Attitude:  cooperative  Eye Contact:  fair  Mood:  anxious and depressed  Affect:  mood congruent  Speech:  clear, coherent  Psychomotor Behavior:  no evidence of tardive dyskinesia, dystonia, or tics  Throught Process:  linear  Associations:  no loose associations  Thought Content:  no evidence of suicidal ideation or homicidal ideation and no evidence of psychotic thought  Insight:  fair  Judgement:  intact  Oriented to:  time, person, and place  Attention Span and Concentration:  intact  Recent and Remote Memory:  intact    Clinical Global Impressions  First:  Considering your total clinical experience with this particular patient population, how " severe are the patient's symptoms at this time?: 7 (02/22/19 0512)  Compared to the patient's condition at the START of treatment, this patient's condition is:: 4 (02/22/19 0512)  Most recent:  Considering your total clinical experience with this particular patient population, how severe are the patient's symptoms at this time?: 7 (02/22/19 0512)  Compared to the patient's condition at the START of treatment, this patient's condition is:: 4 (02/22/19 0512)         Precautions:     Behavioral Orders   Procedures     Code 1 - Restrict to Unit     Routine Programming     As clinically indicated     Status 15     Every 15 minutes.     Suicide precautions     Patients on Suicide Precautions should have a Combination Diet ordered that includes a Diet selection(s) AND a Behavioral Tray selection for Safe Tray - with utensils, or Safe Tray - NO utensils       Withdrawal precautions          DIagnoses:     Major depressive disorder-recurrent, moderate  Generalized anxiety disorder  Opiate use disorder, severe  Sedative hypnotic use disorder, severe         Plan:     Continue Cymbalta to 60 mg daily.  Continue Zyprexa for augmentation.  Continue mirtazapine which was recently added and tolerated well.  Continue methadone for maintenance treatment of opiate dependence.    Psychosocial treatments to be addressed with social work consult and groups.    Legal status: Voluntary    Disposition: Exploring residential [facility] treatment facilities

## 2019-03-07 NOTE — PROGRESS NOTES
Pt given hydroxyzine 50 mg 4:21 PM  For reports of anxiety she rates a 6-7/ 10; 10 being the worst;  Pt denies anything new or significant; reports it is helpful.  Pt encouraged to drink plenty of fluids and to follow up with self-soothing skills; pt reports plan to rest; will monitor closely.

## 2019-03-07 NOTE — PROGRESS NOTES
Brief Internal Medicine Note, 3/7/19:    IM following for assistance w/ T2DM management.      Last 24 hr blood glucose:     Recent Labs   Lab 03/07/19  1157 03/07/19  0850 03/07/19  0848 03/07/19  0213 03/06/19  2032 03/06/19  1742 03/06/19  1322 03/06/19  1211  03/04/19  1132   GLC  --   --  347*  --   --   --  296*  --   --  349*   * 332*  --  212* 113* 163*  --  312*   < >  --     < > = values in this interval not displayed.     Patient w/ variable PO intake yesterday due to cyclical nausea and vomiting.  Not on oral agents or insulin regimen prior to admission.     A/P:     # Type 2 DM - Increase Lantus to 19 units at supper.  Increase CHO coverage 1u: 10g CHO.  Continue HSSI for now. Please check 0200 glucose.  Continue to monitor. Will need DM education prior to discharge.     Medicine will follow along for continued assistance with type 2 DM management.          Niharika Shepard, [facility]  Hospitalist Service   Pager: [phone]

## 2019-03-07 NOTE — PROGRESS NOTES
03/06/19 2050   Behavioral Health   Hallucinations denies / not responding to hallucinations   Thinking intact   Orientation time: oriented;date: oriented;place: oriented;person: oriented   Memory baseline memory   Insight admits / accepts   Judgement intact   Eye Contact at examiner   Affect full range affect   Mood mood is calm   Physical Appearance/Attire disheveled   Hygiene neglected grooming - unclean body, hair, teeth;body odor   Suicidality thoughts only   1. Wish to be Dead No   2. Non-Specific Active Suicidal Thoughts  Yes   Self Injury other (see comment)  (None)   Activity other (see comment)  (games with peers, visible )   Speech clear;coherent   Medication Sensitivity no stated side effects;no observed side effects   Psychomotor / Gait steady;balanced     Pt. Shared feeling depressed today. Pt. Shared SI thoughts. Pt. Has a wound on hand from IV. Pt. Has poor grooming this evening. Pt. Denies hallucinations. Pt. Very softly checking in and difficult to understand at times.

## 2019-03-07 NOTE — PROGRESS NOTES
[facility], [place]   Psychiatric Progress Note        Interim History:   The patient's care was discussed with the treatment team during the daily team meeting and/or staff's chart notes were reviewed.  Staff report: Bout of nausea and vomiting this morning.    Depression severity scale 0-10 (10=most severe):  Today: 5  Her mood and anxiety are slightly better today.    Tolerating medications without side effects.  Sleeping well, eating adequately, energy is improving, concentration is improving.  She denied suicidal ideation.  She denied homicidal ideation.  No psychotic symptoms reported.    Nausea and vomiting have significantly improved today.         Medications:       sodium chloride 0.9%  1,000 mL Intravenous Once     DULoxetine  60 mg Oral Daily     insulin aspart  1-10 Units Subcutaneous TID AC     insulin aspart  1-7 Units Subcutaneous At Bedtime     insulin aspart   Subcutaneous TID AC     insulin glargine  15 Units Subcutaneous Daily with supper     lisinopril  5 mg Oral Daily     methadone  110 mg Oral Daily     mirtazapine  15 mg Oral At Bedtime     OLANZapine  5 mg Oral At Bedtime     ranitidine  150 mg Oral BID          Allergies:   No Known Allergies       Labs:     Recent Results (from the past 24 hour(s))   Glucose by meter    Collection Time: 03/06/19  8:24 AM   Result Value Ref Range    Glucose 381 (H) 70 - 99 mg/dL   Glucose by meter    Collection Time: 03/06/19 12:11 PM   Result Value Ref Range    Glucose 312 (H) 70 - 99 mg/dL   Comprehensive metabolic panel    Collection Time: 03/06/19  1:22 PM   Result Value Ref Range    Sodium 134 133 - 144 mmol/L    Potassium 4.4 3.4 - 5.3 mmol/L    Chloride 97 94 - 109 mmol/L    Carbon Dioxide 28 20 - 32 mmol/L    Anion Gap 9 3 - 14 mmol/L    Glucose 296 (H) 70 - 99 mg/dL    Urea Nitrogen 19 7 - 30 mg/dL    Creatinine 0.93 0.52 - 1.04 mg/dL    GFR Estimate 76 >60 mL/min/[1.73_m2]    GFR Estimate If Black 88 >60  "mL/min/[1.73_m2]    Calcium 9.3 8.5 - 10.1 mg/dL    Bilirubin Total 0.4 0.2 - 1.3 mg/dL    Albumin 4.3 3.4 - 5.0 g/dL    Protein Total 9.5 (H) 6.8 - 8.8 g/dL    Alkaline Phosphatase 117 40 - 150 U/L     (H) 0 - 50 U/L    AST 53 (H) 0 - 45 U/L   CBC with platelets    Collection Time: 03/06/19  1:22 PM   Result Value Ref Range    WBC 9.3 4.0 - 11.0 10e9/L    RBC Count 4.99 3.8 - 5.2 10e12/L    Hemoglobin 14.7 11.7 - 15.7 g/dL    Hematocrit 43.9 35.0 - 47.0 %    MCV 88 78 - 100 fl    MCH 29.5 26.5 - 33.0 pg    MCHC 33.5 31.5 - 36.5 g/dL    RDW 14.1 10.0 - 15.0 %    Platelet Count 277 150 - 450 10e9/L   CRP inflammation    Collection Time: 03/06/19  1:22 PM   Result Value Ref Range    CRP Inflammation <2.9 0.0 - 8.0 mg/L   Magnesium    Collection Time: 03/06/19  1:22 PM   Result Value Ref Range    Magnesium 2.3 1.6 - 2.3 mg/dL   Lactic acid whole blood    Collection Time: 03/06/19  1:22 PM   Result Value Ref Range    Lactic Acid 2.5 (H) 0.7 - 2.0 mmol/L          Psychiatric Examination:     BP (!) 151/104 (BP Location: Right arm)   Pulse 104   Temp 97.4  F (36.3  C) (Tympanic)   Resp 16   Ht 1.753 m (5' 9\")   Wt 100.2 kg (221 lb)   LMP 02/14/2019   SpO2 100%   Breastfeeding? No   BMI 32.64 kg/m    Weight is 221 lbs 0 oz  Body mass index is 32.64 kg/m .  Orthostatic Vitals       Most Recent      Sitting Orthostatic /96 02/25 0854    Sitting Orthostatic Pulse (bpm) 90 02/25 0854    Standing Orthostatic /98 02/25 0854    Standing Orthostatic Pulse (bpm) 83 02/25 0854            Appearance: awake, alert  Attitude:  cooperative  Eye Contact:  fair  Mood:  anxious and depressed  Affect:  mood congruent  Speech:  clear, coherent  Psychomotor Behavior:  no evidence of tardive dyskinesia, dystonia, or tics  Throught Process:  linear  Associations:  no loose associations  Thought Content:  no evidence of suicidal ideation or homicidal ideation and no evidence of psychotic thought  Insight:  " fair  Judgement:  intact  Oriented to:  time, person, and place  Attention Span and Concentration:  intact  Recent and Remote Memory:  intact    Clinical Global Impressions  First:  Considering your total clinical experience with this particular patient population, how severe are the patient's symptoms at this time?: 7 (02/22/19 0512)  Compared to the patient's condition at the START of treatment, this patient's condition is:: 4 (02/22/19 0512)  Most recent:  Considering your total clinical experience with this particular patient population, how severe are the patient's symptoms at this time?: 7 (02/22/19 0512)  Compared to the patient's condition at the START of treatment, this patient's condition is:: 4 (02/22/19 0512)         Precautions:     Behavioral Orders   Procedures     Code 1 - Restrict to Unit     Routine Programming     As clinically indicated     Status 15     Every 15 minutes.     Suicide precautions     Patients on Suicide Precautions should have a Combination Diet ordered that includes a Diet selection(s) AND a Behavioral Tray selection for Safe Tray - with utensils, or Safe Tray - NO utensils       Withdrawal precautions          DIagnoses:     Major depressive disorder-recurrent, moderate  Generalized anxiety disorder  Opiate use disorder, severe  Sedative hypnotic use disorder, severe         Plan:     Continue Cymbalta to 60 mg daily.  Continue Zyprexa for augmentation.  Continue mirtazapine which was recently added and tolerated well.  Continue methadone for maintenance treatment of opiate dependence.    Psychosocial treatments to be addressed with social work consult and groups.    Legal status: Voluntary    Disposition: Exploring residential [facility] treatment facilities

## 2019-03-07 NOTE — PLAN OF CARE
BEHAVIORAL TEAM DISCUSSION    Participants:   [facility], Hardy Tesfaye RN    Progress:   This is day 14 of this admission.  Pt is no longer in withdrawal.  Pt is going to some groups but attendance has fallen over all.Pt is participating effectively.  Pt still takes a lot of showers to help with physical symptoms.  Pt is expressing depression and suicidal ideation.      Continued Stay Criteria/Rationale:   The pt is ready to transfer to [facility] substance use treatment [place].    Medical/Physical:   Pt has much emesis.  Pt  was given IV fluids.  Pt has been running high blood sugars in the high 300's all week.  Pt was seen by internal medicine for high blood pressure, diabetes, elevated LFT's and nausea.vomiting.  Pt is able to manage sliding fee scale independently.      Precautions:   Behavioral Orders   Procedures    Code 1 - Restrict to Unit    Routine Programming     As clinically indicated    Status 15     Every 15 minutes.    Suicide precautions     Patients on Suicide Precautions should have a Combination Diet ordered that includes a Diet selection(s) AND a Behavioral Tray selection for Safe Tray - with utensils, or Safe Tray - NO utensils      Withdrawal precautions     Plan:   Pt will see diabetic educator.  Waiting for bed at [facility].  Treat physical health symptoms.      Rationale for change in precautions or plan:   No change

## 2019-03-07 NOTE — PLAN OF CARE
Pt reporting epigastric discomfort this morning with nausea and vomiting.  Pt reported had had 5 small emesis during early hours of morning.  Reporting acute anxiety. B/P 169/105 BG = 332. Pt received Zofran 4 mg and prn vistaril for anxiety . Sliding scale for BG. Pt declined breakfast due to nausea pt received apresoline prn for elevated B/P. Pt reported feeling much better by 10 am. Pt in room most of morning and ate lunch in her room.

## 2019-03-08 LAB
GLUCOSE BLDC GLUCOMTR-MCNC: 130 MG/DL (ref 70–99)
GLUCOSE BLDC GLUCOMTR-MCNC: 161 MG/DL (ref 70–99)
GLUCOSE BLDC GLUCOMTR-MCNC: 217 MG/DL (ref 70–99)
GLUCOSE BLDC GLUCOMTR-MCNC: 259 MG/DL (ref 70–99)
GLUCOSE BLDC GLUCOMTR-MCNC: 274 MG/DL (ref 70–99)

## 2019-03-08 PROCEDURE — 25000132 ZZH RX MED GY IP 250 OP 250 PS 637: Mod: GY | Performed by: NURSE PRACTITIONER

## 2019-03-08 PROCEDURE — A9270 NON-COVERED ITEM OR SERVICE: HCPCS | Mod: GY | Performed by: NURSE PRACTITIONER

## 2019-03-08 PROCEDURE — G0177 OPPS/PHP; TRAIN & EDUC SERV: HCPCS

## 2019-03-08 PROCEDURE — 00000146 ZZHCL STATISTIC GLUCOSE BY METER IP

## 2019-03-08 PROCEDURE — 25000131 ZZH RX MED GY IP 250 OP 636 PS 637: Mod: GY | Performed by: PSYCHIATRY & NEUROLOGY

## 2019-03-08 PROCEDURE — A9270 NON-COVERED ITEM OR SERVICE: HCPCS | Mod: GY | Performed by: PSYCHIATRY & NEUROLOGY

## 2019-03-08 PROCEDURE — 25000132 ZZH RX MED GY IP 250 OP 250 PS 637: Mod: GY | Performed by: PSYCHIATRY & NEUROLOGY

## 2019-03-08 PROCEDURE — 93005 ELECTROCARDIOGRAM TRACING: CPT

## 2019-03-08 PROCEDURE — 12400001 ZZH R&B MH UMMC

## 2019-03-08 PROCEDURE — 93010 ELECTROCARDIOGRAM REPORT: CPT | Performed by: INTERNAL MEDICINE

## 2019-03-08 RX ORDER — LISINOPRIL 5 MG/1
10 TABLET ORAL DAILY
Status: DISCONTINUED | OUTPATIENT
Start: 2019-03-09 | End: 2019-03-10

## 2019-03-08 RX ORDER — PIOGLITAZONEHYDROCHLORIDE 15 MG/1
15 TABLET ORAL DAILY
Status: DISCONTINUED | OUTPATIENT
Start: 2019-03-08 | End: 2019-03-10

## 2019-03-08 RX ADMIN — ONDANSETRON 4 MG: 4 TABLET, ORALLY DISINTEGRATING ORAL at 05:14

## 2019-03-08 RX ADMIN — OLANZAPINE 5 MG: 5 TABLET, FILM COATED ORAL at 20:53

## 2019-03-08 RX ADMIN — DULOXETINE HYDROCHLORIDE 60 MG: 60 CAPSULE, DELAYED RELEASE ORAL at 09:54

## 2019-03-08 RX ADMIN — ONDANSETRON 4 MG: 4 TABLET, ORALLY DISINTEGRATING ORAL at 10:45

## 2019-03-08 RX ADMIN — RANITIDINE 150 MG: 150 TABLET, FILM COATED ORAL at 09:55

## 2019-03-08 RX ADMIN — PIOGLITAZONE 15 MG: 15 TABLET ORAL at 17:48

## 2019-03-08 RX ADMIN — ALUMINUM HYDROXIDE, MAGNESIUM HYDROXIDE, AND DIMETHICONE 30 ML: 400; 400; 40 SUSPENSION ORAL at 05:17

## 2019-03-08 RX ADMIN — HYDROXYZINE HYDROCHLORIDE 50 MG: 25 TABLET ORAL at 20:53

## 2019-03-08 RX ADMIN — ONDANSETRON 4 MG: 4 TABLET, ORALLY DISINTEGRATING ORAL at 17:49

## 2019-03-08 RX ADMIN — ALUMINUM HYDROXIDE, MAGNESIUM HYDROXIDE, AND DIMETHICONE 30 ML: 400; 400; 40 SUSPENSION ORAL at 10:44

## 2019-03-08 RX ADMIN — HYDROXYZINE HYDROCHLORIDE 50 MG: 25 TABLET ORAL at 05:17

## 2019-03-08 RX ADMIN — Medication 12.5 MG: at 09:53

## 2019-03-08 RX ADMIN — METHADONE HYDROCHLORIDE 110 MG: 5 SOLUTION ORAL at 08:01

## 2019-03-08 RX ADMIN — RANITIDINE 150 MG: 150 TABLET, FILM COATED ORAL at 20:53

## 2019-03-08 RX ADMIN — LISINOPRIL 5 MG: 5 TABLET ORAL at 09:54

## 2019-03-08 ASSESSMENT — ACTIVITIES OF DAILY LIVING (ADL)
LAUNDRY: UNABLE TO COMPLETE
HYGIENE/GROOMING: INDEPENDENT;HANDWASHING
HYGIENE/GROOMING: INDEPENDENT
DRESS: STREET CLOTHES;INDEPENDENT
ORAL_HYGIENE: INDEPENDENT
DRESS: STREET CLOTHES;INDEPENDENT
LAUNDRY: WITH SUPERVISION
ORAL_HYGIENE: INDEPENDENT

## 2019-03-08 NOTE — PLAN OF CARE
Yun has been out of her room for about 50% of the shift. Was resting/napping prior to dinner. She felt a little nauseated prior to dinner, but did eat dinner. She watched television with peers and retired to bed early. Pt reports symptoms of depression as improved since admission. Denies SI or thoughts of self-harm. C/o anxiety. Expresses hopefulness for the future. Denies AH and VH.       Status 15. Suicide precautions. PRN Vistaril available for anxiety.

## 2019-03-08 NOTE — PLAN OF CARE
Yun was to get 8 unit of lantus at dinner time for 80 grams but yun refused 8 units but would take 4. 4 units given per patient request. Dinner also arrived to unit very late this evening (1830)

## 2019-03-08 NOTE — PROGRESS NOTES
"Pt was in the milieu.  She attended groups.  Pt is very focussed on her medical issues.  \"I'm waiting to hear back from [facility] to see of they will accept me but I don't want to go + end up being re hospitalized because of my medical issues.  Pt has passing SI thought because of feeling physically so bad.  Pt confirms anx/dep 7 of 10 also relating to her medical concerns.  \"I need to rest today + take care of myself.\"  "

## 2019-03-08 NOTE — PROVIDER NOTIFICATION
03/08/19 0954   Vital Signs   Temp 98.9  F (37.2  C)   Temp src Oral   Resp 16   Pulse 103   BP (!) 177/115     Pt had emesis this morning.  Pt took a shower and reports feeling much better.  Pt did not eat breakfast.  No insulin given pt declined to take the sliding scale.  BS this morning was 274.  Pt's blood pressure elevated, prn hydralazine given.  Pt is currently in group in the OT room.  Will continue to assess pt's condition.

## 2019-03-08 NOTE — PROGRESS NOTES
I called [facility] and was told that they can admit her on Thursday, March 14.  They will call [facility] to coordinate the methadone services.    I informed pt. She was glad to hear this but said she was worried about going there if she was still throwing up. She feels the culprit is her blood sugar swinging. I gave her feedback that the note said she didn't take all her insulin. I didn't understand her response to this.

## 2019-03-08 NOTE — PROGRESS NOTES
Brief Internal Medicine Note, 3/8/19:    IM following for assistance w/ T2DM management.      Last 24 hr blood glucose:     Recent Labs   Lab 03/08/19  1226 03/08/19  0804 03/08/19  0210 03/07/19  2059 03/07/19  1836 03/07/19  1157  03/07/19  0848  03/06/19  1322  03/04/19  1132   GLC  --   --   --   --   --   --   --  347*  --  296*  --  349*   * 274* 161* 214* 108* 201*   < >  --    < >  --    < >  --     < > = values in this interval not displayed.     Patient w/ variable PO intake yesterday due to cyclical nausea and vomiting.  Not on oral agents or insulin regimen prior to admission.     A/P:     # Type 2 DM - Continue Lantus to 19 units at supper. Given inconsistent dosing of carb coverage due to pt refusal and carbs w/ snacks and supplements inconsistently covered, very difficult to estimate short acting insulin needs.  Discussed with Endocrine/DM PA regarding initiation of oral diabetic agent.  Will start Actos at 15mg daily given hx of suspected NAFLD (not a candidate for Metformin due to persistently elevated LFTs).  Continue HSSI for now. Please continue to check 0200 glucose.  Continue to monitor. Will need DM education prior to discharge, orders placed.     Medicine will follow along for continued assistance with type 2 DM management.          Niharika Shepard, [facility]  Hospitalist Service   Pager: [phone]

## 2019-03-09 LAB
GLUCOSE BLDC GLUCOMTR-MCNC: 120 MG/DL (ref 70–99)
GLUCOSE BLDC GLUCOMTR-MCNC: 216 MG/DL (ref 70–99)
GLUCOSE BLDC GLUCOMTR-MCNC: 217 MG/DL (ref 70–99)
GLUCOSE BLDC GLUCOMTR-MCNC: 264 MG/DL (ref 70–99)
GLUCOSE BLDC GLUCOMTR-MCNC: 401 MG/DL (ref 70–99)

## 2019-03-09 PROCEDURE — 25000132 ZZH RX MED GY IP 250 OP 250 PS 637: Mod: GY | Performed by: PSYCHIATRY & NEUROLOGY

## 2019-03-09 PROCEDURE — A9270 NON-COVERED ITEM OR SERVICE: HCPCS | Mod: GY | Performed by: PSYCHIATRY & NEUROLOGY

## 2019-03-09 PROCEDURE — A9270 NON-COVERED ITEM OR SERVICE: HCPCS | Mod: GY | Performed by: NURSE PRACTITIONER

## 2019-03-09 PROCEDURE — 12400001 ZZH R&B MH UMMC

## 2019-03-09 PROCEDURE — 25000132 ZZH RX MED GY IP 250 OP 250 PS 637: Mod: GY | Performed by: NURSE PRACTITIONER

## 2019-03-09 PROCEDURE — 25000131 ZZH RX MED GY IP 250 OP 636 PS 637: Mod: GY | Performed by: PSYCHIATRY & NEUROLOGY

## 2019-03-09 PROCEDURE — 00000146 ZZHCL STATISTIC GLUCOSE BY METER IP

## 2019-03-09 RX ADMIN — RANITIDINE 150 MG: 150 TABLET, FILM COATED ORAL at 08:31

## 2019-03-09 RX ADMIN — Medication 12.5 MG: at 05:25

## 2019-03-09 RX ADMIN — RANITIDINE 150 MG: 150 TABLET, FILM COATED ORAL at 20:49

## 2019-03-09 RX ADMIN — LISINOPRIL 10 MG: 5 TABLET ORAL at 08:31

## 2019-03-09 RX ADMIN — HYDROXYZINE HYDROCHLORIDE 50 MG: 25 TABLET ORAL at 08:35

## 2019-03-09 RX ADMIN — PIOGLITAZONE 15 MG: 15 TABLET ORAL at 08:31

## 2019-03-09 RX ADMIN — HYDROXYZINE HYDROCHLORIDE 50 MG: 25 TABLET ORAL at 20:49

## 2019-03-09 RX ADMIN — OLANZAPINE 5 MG: 5 TABLET, FILM COATED ORAL at 20:49

## 2019-03-09 RX ADMIN — MIRTAZAPINE 15 MG: 15 TABLET, FILM COATED ORAL at 20:49

## 2019-03-09 RX ADMIN — DULOXETINE HYDROCHLORIDE 60 MG: 60 CAPSULE, DELAYED RELEASE ORAL at 08:31

## 2019-03-09 RX ADMIN — ONDANSETRON 4 MG: 4 TABLET, ORALLY DISINTEGRATING ORAL at 04:57

## 2019-03-09 RX ADMIN — METHADONE HYDROCHLORIDE 110 MG: 5 SOLUTION ORAL at 07:04

## 2019-03-09 RX ADMIN — HYDROXYZINE HYDROCHLORIDE 50 MG: 25 TABLET ORAL at 13:20

## 2019-03-09 ASSESSMENT — ACTIVITIES OF DAILY LIVING (ADL)
DRESS: INDEPENDENT;STREET CLOTHES
ORAL_HYGIENE: INDEPENDENT
HYGIENE/GROOMING: INDEPENDENT

## 2019-03-09 NOTE — PROGRESS NOTES
Yun had a pleasant evening. She was calm, social with her peers, sleeping/napping at times in her room. She was visible in the milieu for snacks and dinner, playing card games, and watching TV. ADL's are independent, no nutrition concerns. She reported that her stomach was feeling much better and that she had no thrown up since day shift. No visitors. She currently denies SIB and hallucinations, however endorses thoughts only of SI. Yun contracted for safety. She continues to reports some mild depression/anxiety however both are getting better.        03/08/19 2117   Behavioral Health   Hallucinations denies / not responding to hallucinations   Thinking intact   Orientation time: oriented;date: oriented;place: oriented;person: oriented   Memory baseline memory   Insight insight appropriate to situation;insight appropriate to events   Judgement impaired   Eye Contact at examiner   Affect full range affect   Mood mood is calm   Physical Appearance/Attire neat;attire appropriate to age and situation;appears stated age   Hygiene well groomed   Suicidality thoughts only   1. Wish to be Dead No   2. Non-Specific Active Suicidal Thoughts  No   Self Injury other (see comment)  (Patient denies)   Elopement (Patient does not appear to be a risk / no indicators)   Activity other (see comment)  (Visible in milieu, playing cards)   Speech clear;coherent   Medication Sensitivity no stated side effects;no observed side effects   Psychomotor / Gait balanced;steady   Psycho Education   Type of Intervention 1:1 intervention   Response participates, initiates socially appropriate   Hours 0.5   Treatment Detail Check-In   Activities of Daily Living   Hygiene/Grooming independent;handwashing   Oral Hygiene independent   Dress street clothes;independent   Laundry with supervision   Room Organization independent

## 2019-03-10 LAB
GLUCOSE BLDC GLUCOMTR-MCNC: 136 MG/DL (ref 70–99)
GLUCOSE BLDC GLUCOMTR-MCNC: 242 MG/DL (ref 70–99)
GLUCOSE BLDC GLUCOMTR-MCNC: 272 MG/DL (ref 70–99)
GLUCOSE BLDC GLUCOMTR-MCNC: 275 MG/DL (ref 70–99)
GLUCOSE BLDC GLUCOMTR-MCNC: 310 MG/DL (ref 70–99)

## 2019-03-10 PROCEDURE — A9270 NON-COVERED ITEM OR SERVICE: HCPCS | Mod: GY | Performed by: PSYCHIATRY & NEUROLOGY

## 2019-03-10 PROCEDURE — 25000132 ZZH RX MED GY IP 250 OP 250 PS 637: Mod: GY | Performed by: NURSE PRACTITIONER

## 2019-03-10 PROCEDURE — 25000131 ZZH RX MED GY IP 250 OP 636 PS 637: Mod: GY | Performed by: PSYCHIATRY & NEUROLOGY

## 2019-03-10 PROCEDURE — 25000132 ZZH RX MED GY IP 250 OP 250 PS 637: Mod: GY | Performed by: PSYCHIATRY & NEUROLOGY

## 2019-03-10 PROCEDURE — A9270 NON-COVERED ITEM OR SERVICE: HCPCS | Mod: GY | Performed by: NURSE PRACTITIONER

## 2019-03-10 PROCEDURE — 12400001 ZZH R&B MH UMMC

## 2019-03-10 PROCEDURE — 00000146 ZZHCL STATISTIC GLUCOSE BY METER IP

## 2019-03-10 PROCEDURE — H2032 ACTIVITY THERAPY, PER 15 MIN: HCPCS

## 2019-03-10 RX ORDER — LISINOPRIL 20 MG/1
20 TABLET ORAL DAILY
Status: DISCONTINUED | OUTPATIENT
Start: 2019-03-11 | End: 2019-03-12

## 2019-03-10 RX ORDER — PIOGLITAZONEHYDROCHLORIDE 15 MG/1
30 TABLET ORAL DAILY
Status: DISCONTINUED | OUTPATIENT
Start: 2019-03-11 | End: 2019-03-14 | Stop reason: HOSPADM

## 2019-03-10 RX ADMIN — RANITIDINE 150 MG: 150 TABLET, FILM COATED ORAL at 20:03

## 2019-03-10 RX ADMIN — HYDROXYZINE HYDROCHLORIDE 50 MG: 25 TABLET ORAL at 14:06

## 2019-03-10 RX ADMIN — METHADONE HYDROCHLORIDE 110 MG: 5 SOLUTION ORAL at 08:14

## 2019-03-10 RX ADMIN — MIRTAZAPINE 15 MG: 15 TABLET, FILM COATED ORAL at 20:04

## 2019-03-10 RX ADMIN — ONDANSETRON 4 MG: 4 TABLET, ORALLY DISINTEGRATING ORAL at 07:24

## 2019-03-10 RX ADMIN — RANITIDINE 150 MG: 150 TABLET, FILM COATED ORAL at 08:14

## 2019-03-10 RX ADMIN — HYDROXYZINE HYDROCHLORIDE 50 MG: 25 TABLET ORAL at 01:16

## 2019-03-10 RX ADMIN — DULOXETINE HYDROCHLORIDE 60 MG: 60 CAPSULE, DELAYED RELEASE ORAL at 08:14

## 2019-03-10 RX ADMIN — OLANZAPINE 5 MG: 5 TABLET, FILM COATED ORAL at 20:03

## 2019-03-10 RX ADMIN — HYDROXYZINE HYDROCHLORIDE 50 MG: 25 TABLET ORAL at 08:14

## 2019-03-10 RX ADMIN — LISINOPRIL 10 MG: 5 TABLET ORAL at 08:14

## 2019-03-10 RX ADMIN — ONDANSETRON 4 MG: 4 TABLET, ORALLY DISINTEGRATING ORAL at 01:16

## 2019-03-10 RX ADMIN — PIOGLITAZONE 15 MG: 15 TABLET ORAL at 08:14

## 2019-03-10 ASSESSMENT — ACTIVITIES OF DAILY LIVING (ADL)
ORAL_HYGIENE: INDEPENDENT
DRESS: STREET CLOTHES;INDEPENDENT
HYGIENE/GROOMING: HANDWASHING;INDEPENDENT
LAUNDRY: WITH SUPERVISION

## 2019-03-10 ASSESSMENT — MIFFLIN-ST. JEOR: SCORE: 1768.12

## 2019-03-10 NOTE — PLAN OF CARE
"RN48/   Pt sitting gin [facility] area this evening, affect blunt, continues to endorse depression she rates a 7-8/10 and anxiety 7-8/10, \"I am sick and tired of being sick and tired\" she went on describing how her \"racing thoughts and feeling sick every morning has to come to an end\" pt misses her daughter and hopes to celebrate her birthday next month; states her mood is all over the place and is pretty frustrating, \"I feel like I could just choke myself sometimes\" she rambles on for about 20 minutes, then states she would never do that, denies any SI/SIB or hallucinations, but again is overwhelmed with emotions at times, \"afraid to go to sleep because I will wake up sick again\"; pt also shares some recent trauma she experienced; pt napping during day to make up for lost time at night and believes medications are helping; no aggression or behavior requiring BCS.    VS reviewed: /81   Pulse 75   Temp 97.6  F (36.4  C) (Oral)   Resp 16   Ht 1.753 m (5' 9\")   Wt 104.8 kg (231 lb)   LMP 02/14/2019   SpO2 100%   Breastfeeding? No   BMI 34.11 kg/m   . Patient denies  Pain.    Future oriented, hopeful, mood labile and/or PTSD, using coping skills this evening and reports her shower, ice pack and other distress tolerance skills she uses in morning is very helpful; will continue to monitor closely; medicine following and cooperative with staff and interventions to manage blood sugars;    Length of stay: 16    Refer to daily team meeting notes for individualized plan of care. Nursing will continue to assess.    * Scale is offered as scale of 1 to 10 with 10 being the worst.             "

## 2019-03-11 LAB
GLUCOSE BLDC GLUCOMTR-MCNC: 223 MG/DL (ref 70–99)
GLUCOSE BLDC GLUCOMTR-MCNC: 259 MG/DL (ref 70–99)
GLUCOSE BLDC GLUCOMTR-MCNC: 273 MG/DL (ref 70–99)
GLUCOSE BLDC GLUCOMTR-MCNC: 294 MG/DL (ref 70–99)
GLUCOSE BLDC GLUCOMTR-MCNC: 300 MG/DL (ref 70–99)
INTERPRETATION ECG - MUSE: NORMAL

## 2019-03-11 PROCEDURE — 25000132 ZZH RX MED GY IP 250 OP 250 PS 637: Mod: GY | Performed by: NURSE PRACTITIONER

## 2019-03-11 PROCEDURE — 99232 SBSQ HOSP IP/OBS MODERATE 35: CPT | Performed by: PSYCHIATRY & NEUROLOGY

## 2019-03-11 PROCEDURE — 25000132 ZZH RX MED GY IP 250 OP 250 PS 637: Mod: GY | Performed by: PSYCHIATRY & NEUROLOGY

## 2019-03-11 PROCEDURE — A9270 NON-COVERED ITEM OR SERVICE: HCPCS | Mod: GY | Performed by: PSYCHIATRY & NEUROLOGY

## 2019-03-11 PROCEDURE — 12400001 ZZH R&B MH UMMC

## 2019-03-11 PROCEDURE — G0177 OPPS/PHP; TRAIN & EDUC SERV: HCPCS

## 2019-03-11 PROCEDURE — A9270 NON-COVERED ITEM OR SERVICE: HCPCS | Mod: GY | Performed by: NURSE PRACTITIONER

## 2019-03-11 PROCEDURE — 25000131 ZZH RX MED GY IP 250 OP 636 PS 637: Mod: GY | Performed by: PSYCHIATRY & NEUROLOGY

## 2019-03-11 PROCEDURE — 00000146 ZZHCL STATISTIC GLUCOSE BY METER IP

## 2019-03-11 RX ORDER — MIRTAZAPINE 30 MG/1
30 TABLET, FILM COATED ORAL AT BEDTIME
Status: DISCONTINUED | OUTPATIENT
Start: 2019-03-11 | End: 2019-03-14 | Stop reason: HOSPADM

## 2019-03-11 RX ADMIN — Medication 12.5 MG: at 17:20

## 2019-03-11 RX ADMIN — DULOXETINE HYDROCHLORIDE 60 MG: 60 CAPSULE, DELAYED RELEASE ORAL at 08:41

## 2019-03-11 RX ADMIN — HYDROXYZINE HYDROCHLORIDE 50 MG: 25 TABLET ORAL at 08:41

## 2019-03-11 RX ADMIN — LISINOPRIL 20 MG: 20 TABLET ORAL at 08:41

## 2019-03-11 RX ADMIN — HYDROXYZINE HYDROCHLORIDE 50 MG: 25 TABLET ORAL at 04:31

## 2019-03-11 RX ADMIN — RANITIDINE 150 MG: 150 TABLET, FILM COATED ORAL at 21:55

## 2019-03-11 RX ADMIN — ONDANSETRON 4 MG: 4 TABLET, ORALLY DISINTEGRATING ORAL at 04:31

## 2019-03-11 RX ADMIN — HYDROXYZINE HYDROCHLORIDE 50 MG: 25 TABLET ORAL at 14:12

## 2019-03-11 RX ADMIN — MIRTAZAPINE 30 MG: 30 TABLET, FILM COATED ORAL at 21:55

## 2019-03-11 RX ADMIN — RANITIDINE 150 MG: 150 TABLET, FILM COATED ORAL at 08:41

## 2019-03-11 RX ADMIN — OLANZAPINE 5 MG: 5 TABLET, FILM COATED ORAL at 21:55

## 2019-03-11 RX ADMIN — METHADONE HYDROCHLORIDE 110 MG: 5 SOLUTION ORAL at 08:41

## 2019-03-11 RX ADMIN — PIOGLITAZONE 30 MG: 15 TABLET ORAL at 08:41

## 2019-03-11 RX ADMIN — HYDROXYZINE HYDROCHLORIDE 50 MG: 25 TABLET ORAL at 21:56

## 2019-03-11 RX ADMIN — HYDROXYZINE HYDROCHLORIDE 50 MG: 25 TABLET ORAL at 18:16

## 2019-03-11 ASSESSMENT — ACTIVITIES OF DAILY LIVING (ADL)
HYGIENE/GROOMING: HANDWASHING;SHOWER;INDEPENDENT
HYGIENE/GROOMING: INDEPENDENT
ORAL_HYGIENE: INDEPENDENT
ORAL_HYGIENE: INDEPENDENT
DRESS: STREET CLOTHES;INDEPENDENT;SCRUBS (BEHAVIORAL HEALTH)
DRESS: INDEPENDENT
LAUNDRY: WITH SUPERVISION

## 2019-03-11 NOTE — PLAN OF CARE
Pt out in common areas most of shift today.  Pt attended and participated in groups. Pt reports nausea has improved.  Pt reports eating small snack in the night has helped with the nausea. Pt reports feeling hopeful about going to [place] this Thursday. Denies any suicidal thoughts. pt states feeling more comfortable with male pt now on 1:1.

## 2019-03-11 NOTE — PROGRESS NOTES
Yun spent the beginning of the evening shift in her room resting/sleeping in bed, but later came out to join her peers for conversation just prior to attending the Art Therapy group.    She initially appeared blunted in affect, but brightens when socializing with a small group of about 5 other female patients.  She did become irritated and upset with an inappropriate male patient's behaviors/comments but didn't escalate it to the point of argument.  She remains moderately anxious and depressed, but seems to appear more relaxed when socializing with other females.  She anticipates discharge to [facility] on Thursday, March 14th.  She reports some passive SI thoughts, but states she feels safe and will seek staff if this changes.       03/10/19 2200   Sleep/Rest/Relaxation   Day/Evening Time Hours napping;resting in bed   Number of hours napping 1 hours   Number of hours resting in bed 2 hours   Behavioral Health   Hallucinations denies / not responding to hallucinations   Thinking intact   Orientation person: oriented;place: oriented   Memory baseline memory   Insight admits / accepts   Judgement impaired   Eye Contact at examiner   Affect full range affect   Mood mood is calm;depressed;anxious   Physical Appearance/Attire appears stated age;attire appropriate to age and situation;neat   Hygiene well groomed   Suicidality thoughts only   1. Wish to be Dead No   2. Non-Specific Active Suicidal Thoughts  No   Self Injury other (see comment)  (Currently denies active SIB urges/thoughts. )   Elopement (No concernable statements or behaviors observed. )   Activity isolative;withdrawn;other (see comment)  (More social as the evening progressed. )   Speech clear;coherent   Medication Sensitivity no stated side effects;no observed side effects   Psychomotor / Gait balanced;steady   Substance Withdrawal Interventions   Social and Therapeutic Interventions (Substance Withdrawal) encourage socialization with peers    Safety   Suicidality Status 15   Activities of Daily Living   Hygiene/Grooming handwashing;independent   Oral Hygiene independent   Dress street clothes;independent   Laundry with supervision   Room Organization independent   Activity   Activity Assistance Provided independent   Hygiene Care Assistance   Hygiene Assistance independent   Groups   Details Art Therapy  (Attended with participation. )

## 2019-03-11 NOTE — PLAN OF CARE
"Patient attended 2 OT groups today. Topic group emphasized goal formation with a worksheet to provide increased structure to thought processes and conversation. She was independent in task and insightful in thought. She was engaged and offered suggestions to peers. She is supportive of others and open to sharing her experience.  Clinic provided hands on, goal-directed tasks to promote engagement on occupational performance and offer therapeutic outlet for creative expression. She worked on goal-directed task with organization and focus. She reports interest in creative expression saying it is \"so therapeutic for me.\"     Date of groups 3/8    Patient participated in OT clinic only today. She demonstrated organization and focus on her task.  "

## 2019-03-11 NOTE — PROGRESS NOTES
[facility], [place]   Psychiatric Progress Note        Interim History:   The patient's care was discussed with the treatment team during the daily team meeting and/or staff's chart notes were reviewed.  Staff report: No episodes of vomiting over the weekend.    Depression severity scale 0-10 (10=most severe):  Today: 5  She notes ongoing anxiety and requested interventions.    Tolerating medications without side effects.  Sleeping well, eating adequately, energy is improving, concentration is improving.  She denied suicidal ideation.  She denied homicidal ideation.  No psychotic symptoms reported.    Nausea and vomiting have significantly improved today.         Medications:       DULoxetine  60 mg Oral Daily     insulin aspart  1-10 Units Subcutaneous TID AC     insulin aspart  1-7 Units Subcutaneous At Bedtime     insulin glargine  19 Units Subcutaneous Daily with supper     lisinopril  20 mg Oral Daily     methadone  110 mg Oral Daily     mirtazapine  30 mg Oral At Bedtime     OLANZapine  5 mg Oral At Bedtime     pioglitazone  30 mg Oral Daily     ranitidine  150 mg Oral BID          Allergies:   No Known Allergies       Labs:     Recent Results (from the past 24 hour(s))   Glucose by meter    Collection Time: 03/10/19  6:14 PM   Result Value Ref Range    Glucose 136 (H) 70 - 99 mg/dL   Glucose by meter    Collection Time: 03/10/19  8:10 PM   Result Value Ref Range    Glucose 275 (H) 70 - 99 mg/dL   Glucose by meter    Collection Time: 03/11/19  2:19 AM   Result Value Ref Range    Glucose 300 (H) 70 - 99 mg/dL   Glucose by meter    Collection Time: 03/11/19  8:35 AM   Result Value Ref Range    Glucose 259 (H) 70 - 99 mg/dL   Glucose by meter    Collection Time: 03/11/19 12:40 PM   Result Value Ref Range    Glucose 294 (H) 70 - 99 mg/dL   Glucose by meter    Collection Time: 03/11/19  5:03 PM   Result Value Ref Range    Glucose 273 (H) 70 - 99 mg/dL          Psychiatric Examination:  "    BP (!) 134/99 (BP Location: Left arm)   Pulse 98   Temp 98.3  F (36.8  C) (Oral)   Resp 16   Ht 1.753 m (5' 9\")   Wt 103.9 kg (229 lb)   LMP 02/14/2019   SpO2 99%   Breastfeeding? No   BMI 33.82 kg/m    Weight is 229 lbs 0 oz  Body mass index is 33.82 kg/m .  Orthostatic Vitals       Most Recent      Sitting Orthostatic /96 02/25 0854    Sitting Orthostatic Pulse (bpm) 90 02/25 0854    Standing Orthostatic /98 02/25 0854    Standing Orthostatic Pulse (bpm) 83 02/25 0854            Appearance: awake, alert  Attitude:  cooperative  Eye Contact:  fair  Mood:  anxious and depressed  Affect:  mood congruent  Speech:  clear, coherent  Psychomotor Behavior:  no evidence of tardive dyskinesia, dystonia, or tics  Throught Process:  linear  Associations:  no loose associations  Thought Content:  no evidence of suicidal ideation or homicidal ideation and no evidence of psychotic thought  Insight:  fair  Judgement:  intact  Oriented to:  time, person, and place  Attention Span and Concentration:  intact  Recent and Remote Memory:  intact    Clinical Global Impressions  First:  Considering your total clinical experience with this particular patient population, how severe are the patient's symptoms at this time?: 7 (02/22/19 0512)  Compared to the patient's condition at the START of treatment, this patient's condition is:: 4 (02/22/19 0512)  Most recent:  Considering your total clinical experience with this particular patient population, how severe are the patient's symptoms at this time?: 7 (02/22/19 0512)  Compared to the patient's condition at the START of treatment, this patient's condition is:: 4 (02/22/19 0512)         Precautions:     Behavioral Orders   Procedures     Code 1 - Restrict to Unit     Routine Programming     As clinically indicated     Status 15     Every 15 minutes.     Suicide precautions     Patients on Suicide Precautions should have a Combination Diet ordered that includes a Diet " selection(s) AND a Behavioral Tray selection for Safe Tray - with utensils, or Safe Tray - NO utensils       Withdrawal precautions          DIagnoses:     Major depressive disorder-recurrent, moderate  Generalized anxiety disorder  Opiate use disorder, severe  Sedative hypnotic use disorder, severe         Plan:     Continue Cymbalta to 60 mg daily.  Continue Zyprexa for augmentation.  Increase mirtazapine to better address anxiety.  Continue methadone for maintenance treatment of opiate dependence.    Psychosocial treatments to be addressed with social work consult and groups.    Legal status: Voluntary    Disposition: Exploring residential [facility] treatment facilities

## 2019-03-12 LAB
GLUCOSE BLDC GLUCOMTR-MCNC: 230 MG/DL (ref 70–99)
GLUCOSE BLDC GLUCOMTR-MCNC: 269 MG/DL (ref 70–99)
GLUCOSE BLDC GLUCOMTR-MCNC: 289 MG/DL (ref 70–99)
GLUCOSE BLDC GLUCOMTR-MCNC: 308 MG/DL (ref 70–99)

## 2019-03-12 PROCEDURE — 25000132 ZZH RX MED GY IP 250 OP 250 PS 637: Mod: GY | Performed by: PSYCHIATRY & NEUROLOGY

## 2019-03-12 PROCEDURE — 99232 SBSQ HOSP IP/OBS MODERATE 35: CPT | Performed by: PSYCHIATRY & NEUROLOGY

## 2019-03-12 PROCEDURE — 25000132 ZZH RX MED GY IP 250 OP 250 PS 637: Mod: GY | Performed by: PHYSICIAN ASSISTANT

## 2019-03-12 PROCEDURE — A9270 NON-COVERED ITEM OR SERVICE: HCPCS | Mod: GY | Performed by: NURSE PRACTITIONER

## 2019-03-12 PROCEDURE — 25000132 ZZH RX MED GY IP 250 OP 250 PS 637: Mod: GY | Performed by: NURSE PRACTITIONER

## 2019-03-12 PROCEDURE — A9270 NON-COVERED ITEM OR SERVICE: HCPCS | Mod: GY | Performed by: PSYCHIATRY & NEUROLOGY

## 2019-03-12 PROCEDURE — 00000146 ZZHCL STATISTIC GLUCOSE BY METER IP

## 2019-03-12 PROCEDURE — 12400001 ZZH R&B MH UMMC

## 2019-03-12 PROCEDURE — 25000131 ZZH RX MED GY IP 250 OP 636 PS 637: Mod: GY | Performed by: PSYCHIATRY & NEUROLOGY

## 2019-03-12 PROCEDURE — A9270 NON-COVERED ITEM OR SERVICE: HCPCS | Mod: GY | Performed by: PHYSICIAN ASSISTANT

## 2019-03-12 PROCEDURE — G0177 OPPS/PHP; TRAIN & EDUC SERV: HCPCS

## 2019-03-12 RX ORDER — LISINOPRIL 5 MG/1
10 TABLET ORAL ONCE
Status: COMPLETED | OUTPATIENT
Start: 2019-03-12 | End: 2019-03-12

## 2019-03-12 RX ADMIN — HYDROXYZINE HYDROCHLORIDE 50 MG: 25 TABLET ORAL at 13:17

## 2019-03-12 RX ADMIN — RANITIDINE 150 MG: 150 TABLET, FILM COATED ORAL at 07:50

## 2019-03-12 RX ADMIN — LISINOPRIL 10 MG: 5 TABLET ORAL at 15:29

## 2019-03-12 RX ADMIN — HYDROXYZINE HYDROCHLORIDE 50 MG: 25 TABLET ORAL at 18:23

## 2019-03-12 RX ADMIN — RANITIDINE 150 MG: 150 TABLET, FILM COATED ORAL at 21:03

## 2019-03-12 RX ADMIN — METHADONE HYDROCHLORIDE 110 MG: 5 SOLUTION ORAL at 08:17

## 2019-03-12 RX ADMIN — ONDANSETRON 8 MG: 4 TABLET, ORALLY DISINTEGRATING ORAL at 06:47

## 2019-03-12 RX ADMIN — LISINOPRIL 20 MG: 20 TABLET ORAL at 07:50

## 2019-03-12 RX ADMIN — DULOXETINE HYDROCHLORIDE 60 MG: 60 CAPSULE, DELAYED RELEASE ORAL at 07:50

## 2019-03-12 RX ADMIN — MIRTAZAPINE 30 MG: 30 TABLET, FILM COATED ORAL at 21:03

## 2019-03-12 RX ADMIN — PIOGLITAZONE 30 MG: 15 TABLET ORAL at 07:50

## 2019-03-12 RX ADMIN — HYDROXYZINE HYDROCHLORIDE 50 MG: 25 TABLET ORAL at 08:21

## 2019-03-12 RX ADMIN — OLANZAPINE 5 MG: 5 TABLET, FILM COATED ORAL at 21:03

## 2019-03-12 RX ADMIN — TRAZODONE HYDROCHLORIDE 50 MG: 50 TABLET ORAL at 21:03

## 2019-03-12 ASSESSMENT — ACTIVITIES OF DAILY LIVING (ADL)
DRESS: INDEPENDENT
HYGIENE/GROOMING: INDEPENDENT
LAUNDRY: UNABLE TO COMPLETE
HYGIENE/GROOMING: INDEPENDENT
ORAL_HYGIENE: INDEPENDENT
DRESS: INDEPENDENT
ORAL_HYGIENE: INDEPENDENT

## 2019-03-12 ASSESSMENT — MIFFLIN-ST. JEOR: SCORE: 1777.19

## 2019-03-12 NOTE — CONSULTS
Diabetes Education  Received consult request to see this 43 year old female for diabetes education.  Patient admitted  Due to suicidal ideation with a plan.  Her A1c was 8.5% on admission, with glucose of 386 mg/dL.      Met with patient.  She was diagnosed with type 2 diabetes about 10 years ago.  Initially treated with insulin, but this was discontinued after losing weight (about 4-5 years ago).  Patient states had not been monitoring blood glucoses prior to admission.  States had been in  treatment fall 2018, and gained weight.    Patient states she used insulin pens in the past, states she does not need to review insulin pen technique.  Discussed her current insulin regimen.  Discussed Actos, and its method of action.    Provided with, and instructed on, use of a blood glucose monitor ([employer]ping[employer] Contour Next). She did a return demonstration without difficulty.    Monitor kit placed in storage locker until patient discharges.    Fide Hess MS, APRN, CNS, CDE, CDTC  [phone]

## 2019-03-12 NOTE — PROGRESS NOTES
Brief Medicine Note  March 12, 2019    Medicine following peripherally for DM II and HTN.     # Type 2 DM, uncontrolled. Currently on lantus 19U with supper, actos 30mg daily, and high sliding scale insulin. Not a candidate for metformin d/t LFTs. BG continues to be persistently >200, upto 308 over past 24h.   - Increase lantus to 24U with supper  - Cont actos 30mg daily  - Cont high sliding scale insulin TID ac and at bedtime  - Cont to monitor BG QID       #HTN. BPs have been uncontrolled since admission. Started on lisinipril 5mg daily on 3/4 with dose uptitrated since, most recently to 20mg daily on 3/11. BPs have improve but SBP >150 and DBP >100.  - Increase lisinopril to 30mg daily   - Check BMP tomorrow to ensure Cr and lytes stable with new addition of ACE-I    Lou Sales PA-C

## 2019-03-12 NOTE — PHARMACY
Consulted by diabetes management team for test claims on blood glucose meter and testing supplies. Per the patient's outpatient prescription insurance, there are no preferred meter or supplies--any brand is covered.    Patient does have Medicare, which means there are a few restrictions on what would qualify for payment by insurance:    1. Patient must have an active diagnosis of diabetes  2. Prescribing physician must have a note denoting they've assessed patient's diabetes  3. Limitations on how many times patient can test per day:    Not on Insulin: Up to 1 time daily    On Insulin: Up to 3 times daily  *Testing needs greater than these require chart notes explaining need for higher utilization.*    Feel free to contact me with any other test claims, prior authorizations, or insurance questions regarding outpatient medications.     Thanks!    Bekah Zepeda  [facility] Liaison     [facility]  [facility]  [address] [place] [address] [address] [address]   [email or web address]  [email or web address]   Phone: [phone]  Pager: [phone]  Fax: [phone]

## 2019-03-12 NOTE — PROGRESS NOTES
BG for shift: 308; 355. Pt checked lunch time glucose with diabetic educator, utilizing glucometer that pt will use at home. This glucometer was placed in pt locker. Insulin given per protocol.

## 2019-03-12 NOTE — PROGRESS NOTES
I will need to coordinate pt's medical and psychiatric care. A review of Nyla shows that she has used ED's and phone calls with the last actual visit in January 2018 for primary care and last psychiatry visit in 2015.     I approached pt but she is in community meeting.    I approached pt again and she was decorating her room. We did meet and discuss her follow up care. She said that she wants her care here at [facility] as her daughter lives nearby and she wants to relocate to this neighborhood.    I made a primary care appointment at [facility].  I have a call in to Nakia Thakkar for therapy and psychiatry services in the outpatient clinic.

## 2019-03-12 NOTE — PROGRESS NOTES
Pt was visible in the milieu all shift, social with peers, attended unit programming. Pt reports feeling anxious (6/10) and depressed (5/10), but her mood as 'good'. Pt stated that 'her head seems foggy, I think it may be from my medication being increased'. Pt looks forward to discharge on Thursday.       03/12/19 [facility]   Behavioral Health   Hallucinations denies / not responding to hallucinations   Thinking intact   Orientation person: oriented;place: oriented;date: oriented;time: oriented   Memory baseline memory   Insight admits / accepts   Judgement intact   Eye Contact at examiner   Affect full range affect   Mood mood is calm;anxious;depressed   Physical Appearance/Attire attire appropriate to age and situation   Hygiene well groomed   Suicidality other (see comments)  (denies)   1. Wish to be Dead No   2. Non-Specific Active Suicidal Thoughts  No   Self Injury other (see comment)  (denies)   Elopement (none )   Activity other (see comment)  (social, visible)   Speech clear;coherent   Psychomotor / Gait balanced;steady   Psycho Education   Type of Intervention 1:1 intervention   Response participates, initiates socially appropriate   Hours 0.5   Treatment Detail check in   Activities of Daily Living   Hygiene/Grooming independent   Oral Hygiene independent   Dress independent   Laundry unable to complete   Room Organization independent

## 2019-03-12 NOTE — PLAN OF CARE
Patient attended all OT groups. Mindfulness group used vandana chi to promote relaxation techniques and a journaling activity to encourage positive identification of personal traits. She did not participate in vandana chi exercises but returned to group for journaling exercise. She followed verbal instructions with repeats. She declined to share her entry but shared the list of traits she values in others.   Clinic group offered hands  on, goal-directed tasks to promote therapeutic engagement through creative expression. She worked with sustained focus, organization and goal-direction. She embellishes creatively and problem solves. She is responsive to clean up cues.   Topic group focus on building insight and self-awareness through the use of journaling and journaling prompts. She was engaged and answered variety of journal prompts. She was open and willing to share. She colored while she waited to occupy her time.

## 2019-03-13 ENCOUNTER — TELEPHONE (OUTPATIENT)
Dept: PSYCHIATRY | Facility: CLINIC | Age: 44
End: 2019-03-13

## 2019-03-13 VITALS
DIASTOLIC BLOOD PRESSURE: 86 MMHG | OXYGEN SATURATION: 99 % | WEIGHT: 231 LBS | SYSTOLIC BLOOD PRESSURE: 120 MMHG | TEMPERATURE: 97.7 F | HEIGHT: 69 IN | HEART RATE: 82 BPM | RESPIRATION RATE: 16 BRPM | BODY MASS INDEX: 34.21 KG/M2

## 2019-03-13 LAB
ANION GAP SERPL CALCULATED.3IONS-SCNC: 9 MMOL/L (ref 3–14)
BUN SERPL-MCNC: 21 MG/DL (ref 7–30)
CALCIUM SERPL-MCNC: 8.7 MG/DL (ref 8.5–10.1)
CHLORIDE SERPL-SCNC: 100 MMOL/L (ref 94–109)
CO2 SERPL-SCNC: 26 MMOL/L (ref 20–32)
CREAT SERPL-MCNC: 0.67 MG/DL (ref 0.52–1.04)
GFR SERPL CREATININE-BSD FRML MDRD: >90 ML/MIN/{1.73_M2}
GLUCOSE BLDC GLUCOMTR-MCNC: 245 MG/DL (ref 70–99)
GLUCOSE BLDC GLUCOMTR-MCNC: 272 MG/DL (ref 70–99)
GLUCOSE BLDC GLUCOMTR-MCNC: 303 MG/DL (ref 70–99)
GLUCOSE BLDC GLUCOMTR-MCNC: 317 MG/DL (ref 70–99)
GLUCOSE BLDC GLUCOMTR-MCNC: 322 MG/DL (ref 70–99)
GLUCOSE BLDC GLUCOMTR-MCNC: 363 MG/DL (ref 70–99)
GLUCOSE SERPL-MCNC: 299 MG/DL (ref 70–99)
POTASSIUM SERPL-SCNC: 4.7 MMOL/L (ref 3.4–5.3)
SODIUM SERPL-SCNC: 135 MMOL/L (ref 133–144)

## 2019-03-13 PROCEDURE — A9270 NON-COVERED ITEM OR SERVICE: HCPCS | Mod: GY | Performed by: NURSE PRACTITIONER

## 2019-03-13 PROCEDURE — 25000132 ZZH RX MED GY IP 250 OP 250 PS 637: Mod: GY | Performed by: PSYCHIATRY & NEUROLOGY

## 2019-03-13 PROCEDURE — A9270 NON-COVERED ITEM OR SERVICE: HCPCS | Mod: GY | Performed by: PSYCHIATRY & NEUROLOGY

## 2019-03-13 PROCEDURE — 25000132 ZZH RX MED GY IP 250 OP 250 PS 637: Mod: GY | Performed by: NURSE PRACTITIONER

## 2019-03-13 PROCEDURE — 00000146 ZZHCL STATISTIC GLUCOSE BY METER IP

## 2019-03-13 PROCEDURE — 12400001 ZZH R&B MH UMMC

## 2019-03-13 PROCEDURE — 25000131 ZZH RX MED GY IP 250 OP 636 PS 637: Mod: GY | Performed by: PSYCHIATRY & NEUROLOGY

## 2019-03-13 PROCEDURE — 36415 COLL VENOUS BLD VENIPUNCTURE: CPT | Performed by: PHYSICIAN ASSISTANT

## 2019-03-13 PROCEDURE — 99232 SBSQ HOSP IP/OBS MODERATE 35: CPT | Performed by: PSYCHIATRY & NEUROLOGY

## 2019-03-13 PROCEDURE — G0177 OPPS/PHP; TRAIN & EDUC SERV: HCPCS

## 2019-03-13 PROCEDURE — 80048 BASIC METABOLIC PNL TOTAL CA: CPT | Performed by: PHYSICIAN ASSISTANT

## 2019-03-13 PROCEDURE — A9270 NON-COVERED ITEM OR SERVICE: HCPCS | Mod: GY | Performed by: PHYSICIAN ASSISTANT

## 2019-03-13 PROCEDURE — 25000132 ZZH RX MED GY IP 250 OP 250 PS 637: Mod: GY | Performed by: PHYSICIAN ASSISTANT

## 2019-03-13 RX ORDER — OLANZAPINE 5 MG/1
5 TABLET ORAL AT BEDTIME
Qty: 30 TABLET | Refills: 0 | Status: SHIPPED | OUTPATIENT
Start: 2019-03-13 | End: 2019-04-16

## 2019-03-13 RX ORDER — DULOXETIN HYDROCHLORIDE 60 MG/1
60 CAPSULE, DELAYED RELEASE ORAL DAILY
Qty: 30 CAPSULE | Refills: 0 | Status: SHIPPED | OUTPATIENT
Start: 2019-03-14 | End: 2019-04-16

## 2019-03-13 RX ORDER — ONDANSETRON 4 MG/1
4-8 TABLET, ORALLY DISINTEGRATING ORAL EVERY 6 HOURS PRN
Qty: 20 TABLET | Refills: 0 | Status: SHIPPED | OUTPATIENT
Start: 2019-03-13 | End: 2019-05-15

## 2019-03-13 RX ORDER — LISINOPRIL 30 MG/1
30 TABLET ORAL DAILY
Qty: 30 TABLET | Refills: 0 | Status: SHIPPED | OUTPATIENT
Start: 2019-03-14 | End: 2019-05-15

## 2019-03-13 RX ORDER — PIOGLITAZONEHYDROCHLORIDE 30 MG/1
30 TABLET ORAL DAILY
Qty: 30 TABLET | Refills: 0 | Status: SHIPPED | OUTPATIENT
Start: 2019-03-14 | End: 2019-04-16

## 2019-03-13 RX ORDER — MIRTAZAPINE 30 MG/1
30 TABLET, FILM COATED ORAL AT BEDTIME
Qty: 30 TABLET | Refills: 0 | Status: SHIPPED | OUTPATIENT
Start: 2019-03-13 | End: 2019-04-16

## 2019-03-13 RX ORDER — HYDROXYZINE HYDROCHLORIDE 50 MG/1
50 TABLET, FILM COATED ORAL 3 TIMES DAILY PRN
Qty: 90 TABLET | Refills: 0 | Status: SHIPPED | OUTPATIENT
Start: 2019-03-13 | End: 2019-05-15

## 2019-03-13 RX ADMIN — OLANZAPINE 5 MG: 5 TABLET, FILM COATED ORAL at 21:36

## 2019-03-13 RX ADMIN — DULOXETINE HYDROCHLORIDE 60 MG: 60 CAPSULE, DELAYED RELEASE ORAL at 08:13

## 2019-03-13 RX ADMIN — Medication 12.5 MG: at 08:13

## 2019-03-13 RX ADMIN — LISINOPRIL 30 MG: 20 TABLET ORAL at 08:12

## 2019-03-13 RX ADMIN — TRAZODONE HYDROCHLORIDE 50 MG: 50 TABLET ORAL at 21:36

## 2019-03-13 RX ADMIN — METHADONE HYDROCHLORIDE 110 MG: 5 SOLUTION ORAL at 08:13

## 2019-03-13 RX ADMIN — RANITIDINE 150 MG: 150 TABLET, FILM COATED ORAL at 08:13

## 2019-03-13 RX ADMIN — PIOGLITAZONE 30 MG: 15 TABLET ORAL at 08:12

## 2019-03-13 RX ADMIN — HYDROXYZINE HYDROCHLORIDE 50 MG: 25 TABLET ORAL at 21:36

## 2019-03-13 RX ADMIN — ONDANSETRON 4 MG: 4 TABLET, ORALLY DISINTEGRATING ORAL at 08:13

## 2019-03-13 RX ADMIN — RANITIDINE 150 MG: 150 TABLET, FILM COATED ORAL at 21:35

## 2019-03-13 RX ADMIN — HYDROXYZINE HYDROCHLORIDE 50 MG: 25 TABLET ORAL at 08:13

## 2019-03-13 RX ADMIN — HYDROXYZINE HYDROCHLORIDE 50 MG: 25 TABLET ORAL at 16:30

## 2019-03-13 RX ADMIN — MIRTAZAPINE 30 MG: 30 TABLET, FILM COATED ORAL at 21:35

## 2019-03-13 ASSESSMENT — ACTIVITIES OF DAILY LIVING (ADL)
ORAL_HYGIENE: INDEPENDENT
LAUNDRY: UNABLE TO COMPLETE
DRESS: STREET CLOTHES;INDEPENDENT
ORAL_HYGIENE: INDEPENDENT
HYGIENE/GROOMING: HANDWASHING;SHOWER;INDEPENDENT
HYGIENE/GROOMING: INDEPENDENT
DRESS: STREET CLOTHES
LAUNDRY: WITH SUPERVISION

## 2019-03-13 NOTE — PLAN OF CARE
Pt out in common areas most of shift. Pt attended and participated in most groups. Pt reports feeling both excited and anxious about discharge tomorrow. Pt reports anxiety is improving .  Pt reports nausea is improving. Pt feels hopeful and grateful for care received in the hospital. Pt denies any suicidal thoughts.  Full range of affect.

## 2019-03-13 NOTE — TELEPHONE ENCOUNTER
"Per staff message:    Jahaira from [facility] called because she does not want this pt to be seen for a CDE, she said that the patient only wants to address her depression with a med provider. She said something along the lines of having her do a CD eval would be \"bad service.\" I couldn't find any messages with scheduling instructions, so I told her I'd ask you to give her a call.    Appointment on April 10th at 8am is cancelled per Jahaira.     Scheduled the next open AGE (adult general eval) for 5/31/19 at 10am with Angel Denny.     Nakia Thakkar  "

## 2019-03-13 NOTE — PROGRESS NOTES
Brief Medicine Note  March 13, 2019    Medicine following peripherally for DM II and HTN.      # Type 2 DM, uncontrolled. Currently on lantus 24U with supper, actos 30mg daily, and high sliding scale insulin. Not a candidate for metformin d/t LFTs. BG continue to be persistently elevated.  - Increase lantus to 30U with supper  - Cont actos 30mg daily  - Cont high sliding scale insulin TID ac and at bedtime  - Cont to monitor BG QID         #HTN. BPs have been uncontrolled since admission. Started on lisinipril 5mg daily on 3/4 with dose uptitrated since, most recently to 30mg daily on 3/13. Repeat BMP today shows lytes/Cr stable. BPs improved since increasing ACE-I.  - Cont lisinopril 30mg daily    Lou Sales PA-C

## 2019-03-13 NOTE — PLAN OF CARE
"Patient attended all OT groups. Mindfulness group used seated yoga exercises to promote increased relaxation. A mindfulness meditative exercise was used to explore and apply concepts of mindfulness and exand indidivual awareness of presence of mind. Positive psychology journaling exercise was used to facilitate indvidiaul idenfitication of times in the past when they felt they possessed and emulated positive strength traits. Round table sharing was used to promote discussion and facilitate positive patient feedback. Patient was attentive and engaged in chair yoga and meditative exercise. She reported feeling \"relaxed\" after meditative practice and said it felt easy and \"I could keep going.\" She completed writing exercise and was open and willing to share. She received positive feedback and accepted compliments from others. She expressed gratitude at positive feedback. She was attentive when others were sharing and provided positive feedback to others.  Clinic group provides hands-on, structured tasks to promote creative expression and therapeutic engagement. She was organized, linear, and worked with sustained focus. She completed her project in the allotted time frame, exercising attention to detail and creative embellishment.   "

## 2019-03-13 NOTE — PROGRESS NOTES
03/12/19 2152   Behavioral Health   Hallucinations denies / not responding to hallucinations   Thinking intact   Orientation time: oriented;date: oriented;place: oriented;person: oriented   Memory baseline memory   Insight admits / accepts   Judgement intact   Eye Contact at examiner   Affect full range affect   Mood mood is calm;irritable;anxious   Physical Appearance/Attire neat;attire appropriate to age and situation;appears stated age   Hygiene well groomed   Suicidality other (see comments)  (Denies)   1. Wish to be Dead No   2. Non-Specific Active Suicidal Thoughts  No   Self Injury other (see comment)  (Denies)   Activity other (see comment)  (visible, in room, meals, check in )   Speech clear;coherent   Medication Sensitivity no stated side effects;no observed side effects   Psychomotor / Gait steady;balanced     Pt. Plans to discharge this week. Pt. Shared feeling anxious, irritable, and agitated with discharge approaching. Pt. Mostly in room this evening. Pt. Walked the halls with staff to check in.

## 2019-03-13 NOTE — DISCHARGE INSTRUCTIONS
"     Behavioral Discharge Planning and Instructions      Summary:  You were admitted on 2/21/2019  due to suicidal ideation with thoughts of overdosing on medications.  You were treated by Dr. Jimmy Morelos MD.  You had physical health problems while you were her with emesis, blood pressure and blood sugar levels. You had a substance use evaluation. You were discharged on 3/14/2019 [facility] at [facility].      Principal Diagnosis:   Major depressive disorder-recurrent, moderate  Generalized anxiety disorder  Opiate use disorder, severe  Sedative hypnotic use disorder, severe      Health Care Follow-up Appointments:     You came in with Medicare only. You signed \"The Important Message from Medicare.\"  The [facility] helped you apply for medical assistance on February 25, 2019.      You had a substance use evaluation on February 23, 2019 by Arcelia Sauceda here at [facility].  You were referred to:  [facility]  [address] [place] [address]  Central Intake: Phone: [phone]  Clinic Contact: [phone]  [place]: [phone]  The Health Unit Coordinator has faxed these discharge instructions to fax: [phone]  Fax: [phone]      You receive your methadone through [facility]. This will be coordinated with [facility] [place].  [address] [address]  Phone: [phone]  The [facility] Unit Coordinator has faxed these discharge instructions to fax [phone]    Attend this appointment on Friday, April 12, 2019 at 1PM to establish care with a primary care provider.  Apr 12, 2019  1:00 PM CDT  Office Visit with BENNY Diaz CNP  [facility] [address] [place] [id number] [phone]         You wanted to seek psychiatric medication management services here on this campus. This appointment is " rather far out but you can get your medications filled with primary care before this. You can also call and see if there is any cancellations that have resulted in a sooner appointment.  Friday, May 31, 2019 10:00 AM CDT  Adult General Eval with Angel Denny MD  Psychiatry Clinic ([facility]) [address]  [address] [place] [phone]       Attend all scheduled appointments with your outpatient providers. Call at least 24 hours in advance if you need to reschedule an appointment to ensure continued access to your outpatient providers.   Major Treatments, Procedures and Findings:  You were provided with: a psychiatric assessment, medication evaluation and/or management, group therapy, CD evaluation/assessment and milieu management    Your blood sugar was monitored multiple time a day.      Your liver functions were elevated but starting coming down throughout your stay:   Abnormally high   H  0 - 50 U/L Final 03/07/2019  8:48 AM 13   AST 46 Abnormally high   H  0 - 45 U/L Final 03/07/2019  8:48 AM        Initial lab   Abnormally high   H  0 - 50 U/L Final 02/21/2019  1:54 PM 13   AST 96 Abnormally high   H  0 - 45 U/L Final 02/21/2019  1:54 PM 13         Your drug screen was positive for:  Benzodiazepine Qual Urine Positive Abnormal   A  NEG^Negative Final 02/21/2019 10:53 AM 13   Cannabinoids Qual Urine Positive Abnormal   A  NEG^Negative Final 02/21/2019 10:53 AM 13           Symptoms to Report: mood getting worse or thoughts of suicide    Early warning signs can include: increased depression or anxiety    Safety and Wellness:  Take all medicines as directed.  Make no changes unless your doctor suggests them.      Follow treatment recommendations.  Refrain from alcohol and non-prescribed drugs.  If there is a concern for safety, call 911.    Resources:   [facility] Crisis (COPE) Response - Adult [phone]    1.  [facility]  Crisis Stabilization Program  [address] [place] [address]  Phone:[phone]    2.  [facility]  [address] [place] [address]  Phone: [phone]  This is a crisis residence where you can stay for a short time if you feel like you need to stabilize but do not need to go to the hospital.    3.  [facility]  [address] [place] [address]  Phone: [phone]      [place] [facility] ([facility])  [facility] connects people seeking recovery to resources that help foster and sustain long-term recovery.  Whether you are seeking resources for treatment, transportation, housing, job training, education, health care or other pathways to recovery, [facility] is a great place to start.  [phone]. [email or web address]        The treatment team has appreciated the opportunity to work with you.     If you have any questions or concerns our unit number is [phone]._________________

## 2019-03-13 NOTE — PROGRESS NOTES
"[facility], [place]   Psychiatric Progress Note        Interim History:   The patient's care was discussed with the treatment team during the daily team meeting and/or staff's chart notes were reviewed.  Staff report: Third day of no vomiting and minimal nausea..  Blood sugars in the low 300s today.    Depression severity scale 0-10 (10=most severe):  Today: 5  She reports her mood is better today, less anxious, and overall more optimistic.    Tolerating medications without side effects.  Sleeping well, eating adequately, energy is improving, concentration is improving.  She denied suicidal ideation.  She denied homicidal ideation.  No psychotic symptoms reported.    Nausea and vomiting have significantly improved today.         Medications:       DULoxetine  60 mg Oral Daily     insulin aspart  1-10 Units Subcutaneous TID AC     insulin aspart  1-7 Units Subcutaneous At Bedtime     insulin glargine  24 Units Subcutaneous Daily with supper     [START ON 3/13/2019] lisinopril  30 mg Oral Daily     methadone  110 mg Oral Daily     mirtazapine  30 mg Oral At Bedtime     OLANZapine  5 mg Oral At Bedtime     pioglitazone  30 mg Oral Daily     ranitidine  150 mg Oral BID          Allergies:   No Known Allergies       Labs:     Recent Results (from the past 24 hour(s))   Glucose by meter    Collection Time: 03/12/19  2:58 AM   Result Value Ref Range    Glucose 230 (H) 70 - 99 mg/dL   Glucose by meter    Collection Time: 03/12/19  7:46 AM   Result Value Ref Range    Glucose 308 (H) 70 - 99 mg/dL   Glucose by meter    Collection Time: 03/12/19  5:11 PM   Result Value Ref Range    Glucose 289 (H) 70 - 99 mg/dL   Glucose by meter    Collection Time: 03/12/19  9:01 PM   Result Value Ref Range    Glucose 269 (H) 70 - 99 mg/dL          Psychiatric Examination:     /84   Pulse 98   Temp 98.1  F (36.7  C) (Tympanic)   Resp 16   Ht 1.753 m (5' 9\")   Wt 104.8 kg (231 lb)   LMP 02/14/2019   " SpO2 99%   Breastfeeding? No   BMI 34.11 kg/m    Weight is 231 lbs 0 oz  Body mass index is 34.11 kg/m .  Orthostatic Vitals       Most Recent      Sitting Orthostatic /96 02/25 0854    Sitting Orthostatic Pulse (bpm) 90 02/25 0854    Standing Orthostatic /98 02/25 0854    Standing Orthostatic Pulse (bpm) 83 02/25 0854            Appearance: awake, alert  Attitude:  cooperative  Eye Contact:  fair  Mood:  better  Affect:  mood congruent  Speech:  clear, coherent  Psychomotor Behavior:  no evidence of tardive dyskinesia, dystonia, or tics  Throught Process:  linear  Associations:  no loose associations  Thought Content:  no evidence of suicidal ideation or homicidal ideation and no evidence of psychotic thought  Insight:  fair  Judgement:  intact  Oriented to:  time, person, and place  Attention Span and Concentration:  intact  Recent and Remote Memory:  intact    Clinical Global Impressions  First:  Considering your total clinical experience with this particular patient population, how severe are the patient's symptoms at this time?: 7 (02/22/19 0512)  Compared to the patient's condition at the START of treatment, this patient's condition is:: 4 (02/22/19 0512)  Most recent:  Considering your total clinical experience with this particular patient population, how severe are the patient's symptoms at this time?: 7 (02/22/19 0512)  Compared to the patient's condition at the START of treatment, this patient's condition is:: 4 (02/22/19 0512)         Precautions:     Behavioral Orders   Procedures     Code 1 - Restrict to Unit     Routine Programming     As clinically indicated     Status 15     Every 15 minutes.     Suicide precautions     Patients on Suicide Precautions should have a Combination Diet ordered that includes a Diet selection(s) AND a Behavioral Tray selection for Safe Tray - with utensils, or Safe Tray - NO utensils       Withdrawal precautions          DIagnoses:     Major depressive  disorder-recurrent, moderate  Generalized anxiety disorder  Opiate use disorder, severe  Sedative hypnotic use disorder, severe         Plan:     Continue Cymbalta to 60 mg daily.  Continue Zyprexa for augmentation.  Increase mirtazapine to better address anxiety.  Continue methadone for maintenance treatment of opiate dependence.  Internal medicine consultation requested to review and finalize diabetic management regime as we prepare for discharge Thursday morning.    Psychosocial treatments to be addressed with social work consult and groups.    Legal status: Voluntary    Disposition: Exploring residential [facility] treatment facilities; anticipate discharge directly into treatment on Thursday.

## 2019-03-13 NOTE — PROGRESS NOTES
"  Pt came to the IMR group on \"Getting Your Needs Met in the Mental Health System.\" PT looked disinterested, looked out the window, and left early. There was no charge.       03/13/19 3724   Psycho Education   Type of Intervention structured groups   Response observes from a distance   Hours 0.5   Treatment Detail IMR #10: Getting Your Needs Met in the Mental Health System     "

## 2019-03-13 NOTE — PROGRESS NOTES
[facility], [place]   Psychiatric Progress Note        Interim History:   The patient's care was discussed with the treatment team during the daily team meeting and/or staff's chart notes were reviewed.  Staff report: fourth day of no vomiting and minimal nausea.  No acute issues.  Attending groups.  Sleeping and eating adequately.    Depression severity scale 0-10 (10=most severe):  Today: 5  Mood remains stable and overall improved.  Anxiety is manageable and overall improved.    Tolerating medications without side effects.  Sleeping well, eating adequately, energy is improving, concentration is improving.  She denied suicidal ideation.  She denied homicidal ideation.  No psychotic symptoms reported.         Medications:       DULoxetine  60 mg Oral Daily     insulin aspart  1-10 Units Subcutaneous TID AC     insulin aspart  1-7 Units Subcutaneous At Bedtime     insulin glargine  30 Units Subcutaneous Daily with supper     lisinopril  30 mg Oral Daily     methadone  110 mg Oral Daily     mirtazapine  30 mg Oral At Bedtime     OLANZapine  5 mg Oral At Bedtime     pioglitazone  30 mg Oral Daily     ranitidine  150 mg Oral BID          Allergies:   No Known Allergies       Labs:     Recent Results (from the past 24 hour(s))   Glucose by meter    Collection Time: 03/12/19  5:11 PM   Result Value Ref Range    Glucose 289 (H) 70 - 99 mg/dL   Glucose by meter    Collection Time: 03/12/19  9:01 PM   Result Value Ref Range    Glucose 269 (H) 70 - 99 mg/dL   Glucose by meter    Collection Time: 03/13/19  2:04 AM   Result Value Ref Range    Glucose 245 (H) 70 - 99 mg/dL   Basic metabolic panel    Collection Time: 03/13/19  7:47 AM   Result Value Ref Range    Sodium 135 133 - 144 mmol/L    Potassium 4.7 3.4 - 5.3 mmol/L    Chloride 100 94 - 109 mmol/L    Carbon Dioxide 26 20 - 32 mmol/L    Anion Gap 9 3 - 14 mmol/L    Glucose 299 (H) 70 - 99 mg/dL    Urea Nitrogen 21 7 - 30 mg/dL    Creatinine  "0.67 0.52 - 1.04 mg/dL    GFR Estimate >90 >60 mL/min/[1.73_m2]    GFR Estimate If Black >90 >60 mL/min/[1.73_m2]    Calcium 8.7 8.5 - 10.1 mg/dL   Glucose by meter    Collection Time: 03/13/19  8:01 AM   Result Value Ref Range    Glucose 317 (H) 70 - 99 mg/dL   Glucose by meter    Collection Time: 03/13/19  1:09 PM   Result Value Ref Range    Glucose 322 (H) 70 - 99 mg/dL          Psychiatric Examination:     /84   Pulse 95   Temp 97.9  F (36.6  C) (Tympanic)   Resp 16   Ht 1.753 m (5' 9\")   Wt 104.8 kg (231 lb)   LMP 02/14/2019   SpO2 99%   Breastfeeding? No   BMI 34.11 kg/m    Weight is 231 lbs 0 oz  Body mass index is 34.11 kg/m .  Orthostatic Vitals       Most Recent      Sitting Orthostatic /96 02/25 0854    Sitting Orthostatic Pulse (bpm) 90 02/25 0854    Standing Orthostatic /98 02/25 0854    Standing Orthostatic Pulse (bpm) 83 02/25 0854            Appearance: awake, alert  Attitude:  cooperative  Eye Contact:  fair  Mood:  better  Affect:  mood congruent  Speech:  clear, coherent  Psychomotor Behavior:  no evidence of tardive dyskinesia, dystonia, or tics  Throught Process:  linear  Associations:  no loose associations  Thought Content:  no evidence of suicidal ideation or homicidal ideation and no evidence of psychotic thought  Insight:  fair  Judgement:  intact  Oriented to:  time, person, and place  Attention Span and Concentration:  intact  Recent and Remote Memory:  intact    Clinical Global Impressions  First:  Considering your total clinical experience with this particular patient population, how severe are the patient's symptoms at this time?: 7 (02/22/19 0512)  Compared to the patient's condition at the START of treatment, this patient's condition is:: 4 (02/22/19 0512)  Most recent:  Considering your total clinical experience with this particular patient population, how severe are the patient's symptoms at this time?: 7 (02/22/19 0512)  Compared to the patient's " condition at the START of treatment, this patient's condition is:: 4 (02/22/19 0512)         Precautions:     Behavioral Orders   Procedures     Code 1 - Restrict to Unit     Routine Programming     As clinically indicated     Status 15     Every 15 minutes.     Suicide precautions     Patients on Suicide Precautions should have a Combination Diet ordered that includes a Diet selection(s) AND a Behavioral Tray selection for Safe Tray - with utensils, or Safe Tray - NO utensils       Withdrawal precautions          DIagnoses:     Major depressive disorder-recurrent, moderate  Generalized anxiety disorder  Opiate use disorder, severe  Sedative hypnotic use disorder, severe  Diabetes Mellitus type 2   HTN  GERD         Plan:     Continue Cymbalta to 60 mg daily.  Continue Zyprexa for augmentation.  Increase mirtazapine to better address anxiety.  Continue methadone for maintenance treatment of opiate dependence.  Internal medicine consultation requested to review and finalize diabetic management regime as we prepare for discharge Thursday morning.    Diabetes mellitus:  -Diabetic diet  -Continue Actos at the current dose  -Continue sliding-scale insulin  -Lantus is being increased  -Appreciate internal medicine's input and review of medications with recent adjustments as we prepare for discharge tomorrow.  -Diabetes educator consult complete and appreciated.    Psychosocial treatments to be addressed with social work consult and groups.    Legal status: Voluntary    Disposition: anticipate discharge to a residential treatment program tomorrow

## 2019-03-13 NOTE — TELEPHONE ENCOUNTER
PSYCHIATRY CLINIC PHONE INTAKE     SERVICES REQUESTED / INTERESTED IN          Med Management    Presenting Problem and Brief History                              What would you like to be seen for? (brief description):  Patient is currently inpatient and the CTC, Jahaira, is calling to get her outpatient services. Patient is discharging to [place] in the next couple of days. Patient gets methadone through [facility]. She also has depression.     Have you received a mental health diagnosis? Yes   Which one (s): Depression  Is there any history of developmental delay?  No   Are you currently seeing a mental health provider?  No            Who / month last seen:    Do you have mental health records elsewhere?  Yes  Will you sign a release so we can obtain them?      Have you ever been hospitalized for psychiatric reasons?  Yes  Describe:  At [facility] for SI with plan    Do you have current thoughts of self-harm?  No    Do you currently have thoughts of harming others?  No       Substance Use History     Do you have any history of alcohol / illicit drug use?  Yes  Describe:  She has a history of opiate use disorder, cannabis use disorder, and benzodiazapine abuse  Have you ever received treatment for this?  Yes    Describe:  Is currently at [facility] and is discharging to [facility]     Social History     Does the patient have a guardian?  No    Name / number:   Have you had an ACT team in last 12 months?  No  Describe:      Medical/ Surgical History                                   Patient Active Problem List   Diagnosis     Suicidal behavior       Diabetes     Medications             No current outpatient medications on file.     Methadone- let Jahaira know that we do not prescribe methadone.     DISPOSITION      Scheduled with [facility]. Asked CTC if pt should schedule with a provider who can work with her mental health and substance use concerns and was told yes. Schedule for a CDE with Dr Reny Guadarrama on April 10th  at 8am.    Nakia Thakkar

## 2019-03-14 LAB
GLUCOSE BLDC GLUCOMTR-MCNC: 269 MG/DL (ref 70–99)
GLUCOSE BLDC GLUCOMTR-MCNC: 271 MG/DL (ref 70–99)
GLUCOSE BLDC GLUCOMTR-MCNC: 384 MG/DL (ref 70–99)

## 2019-03-14 PROCEDURE — A9270 NON-COVERED ITEM OR SERVICE: HCPCS | Mod: GY | Performed by: PHYSICIAN ASSISTANT

## 2019-03-14 PROCEDURE — A9270 NON-COVERED ITEM OR SERVICE: HCPCS | Mod: GY | Performed by: PSYCHIATRY & NEUROLOGY

## 2019-03-14 PROCEDURE — 25000132 ZZH RX MED GY IP 250 OP 250 PS 637: Mod: GY | Performed by: PHYSICIAN ASSISTANT

## 2019-03-14 PROCEDURE — 00000146 ZZHCL STATISTIC GLUCOSE BY METER IP

## 2019-03-14 PROCEDURE — A9270 NON-COVERED ITEM OR SERVICE: HCPCS | Mod: GY | Performed by: NURSE PRACTITIONER

## 2019-03-14 PROCEDURE — 25000132 ZZH RX MED GY IP 250 OP 250 PS 637: Mod: GY | Performed by: PSYCHIATRY & NEUROLOGY

## 2019-03-14 PROCEDURE — 99239 HOSP IP/OBS DSCHRG MGMT >30: CPT | Performed by: PSYCHIATRY & NEUROLOGY

## 2019-03-14 PROCEDURE — 25000132 ZZH RX MED GY IP 250 OP 250 PS 637: Mod: GY | Performed by: NURSE PRACTITIONER

## 2019-03-14 RX ADMIN — HYDROXYZINE HYDROCHLORIDE 50 MG: 25 TABLET ORAL at 08:16

## 2019-03-14 RX ADMIN — METHADONE HYDROCHLORIDE 110 MG: 5 SOLUTION ORAL at 08:17

## 2019-03-14 RX ADMIN — DULOXETINE HYDROCHLORIDE 60 MG: 60 CAPSULE, DELAYED RELEASE ORAL at 08:17

## 2019-03-14 RX ADMIN — LISINOPRIL 30 MG: 20 TABLET ORAL at 08:16

## 2019-03-14 RX ADMIN — RANITIDINE 150 MG: 150 TABLET, FILM COATED ORAL at 08:17

## 2019-03-14 RX ADMIN — PIOGLITAZONE 30 MG: 15 TABLET ORAL at 08:16

## 2019-03-14 ASSESSMENT — ACTIVITIES OF DAILY LIVING (ADL)
LAUNDRY: UNABLE TO COMPLETE
DRESS: STREET CLOTHES;INDEPENDENT
ORAL_HYGIENE: INDEPENDENT
HYGIENE/GROOMING: HANDWASHING;SHOWER;INDEPENDENT

## 2019-03-14 NOTE — PROGRESS NOTES
Patient awake and visible throughout the evening. Pleasant and cooperative. Full range affect. Compliant with BG checks and medications. Denies SI/SIB. Looking forward to discharging to [facility] treatment in [place] tomorrow. Per discharge pharmacy, her medications will be delivered to the unit in the morning. They are not able to fill until the day of discharge per her insurance.  Patient QID BG. Dinner check =303, and HS check=363 (stated she had just had a snack). Writer administered sliding scale insulin per scale and rechecked BG was 272.

## 2019-03-14 NOTE — PROGRESS NOTES
"       Behavioral Discharge Planning and Instructions      Summary:  You were admitted on 2/21/2019  due to suicidal ideation with thoughts of overdosing on medications.  You were treated by Dr. Jimmy Morelos MD.  You had physical health problems while you were her with emesis, blood pressure and blood sugar levels. You had a substance use evaluation. You were discharged on 3/14/2019 from [facility] to [facility] Treatment Program at [place].      Principal Diagnosis:   Major depressive disorder-recurrent, moderate  Generalized anxiety disorder  Opiate use disorder, severe  Sedative hypnotic use disorder, severe      Health Care Follow-up Appointments:     You came in with Medicare only. You signed \"The Important Message from Medicare.\"  The [employer] helped you apply for medical assistance on February 25, 2019.      You had a substance use evaluation on February 23, 2019 by Arcelia Sauceda here at [facility].  You were referred to:  [facility]  [address] [place] [address]  Central Intake: Phone: [phone]  Clinic Contact: [phone]  Katlyn: [phone]  The Health Unit Coordinator has faxed these discharge instructions to fax: [phone]  Fax: [phone]      You receive your methadone through [facility]. This will be coordinated with [facility].  [address] [address]  Phone: [phone]  The [facility] Unit Coordinator has faxed these discharge instructions to fax [phone]    Attend this appointment on Friday, April 12, 2019 at 1PM to establish care with a primary care provider.  Apr 12, 2019  1:00 PM CDT  Office Visit with BENNY Diaz CNP  [facility] ([facility]) [address] [place] [id number] [phone]         You wanted to seek psychiatric medication management services here on this campus. This appointment " is rather far out but you can get your medications filled with primary care before this. You can also call and see if there is any cancellations that have resulted in a sooner appointment.  Friday, May 31, 2019 10:00 AM CDT  Adult General Eval with Angel Denny MD  Psychiatry Clinic ([facility]) [address]  [address] [place] [phone]       Attend all scheduled appointments with your outpatient providers. Call at least 24 hours in advance if you need to reschedule an appointment to ensure continued access to your outpatient providers.   Major Treatments, Procedures and Findings:  You were provided with: a psychiatric assessment, medication evaluation and/or management, group therapy, CD evaluation/assessment and milieu management    Your blood sugar was monitored multiple time a day.      Your liver functions were elevated but starting coming down throughout your stay:   Abnormally high   H  0 - 50 U/L Final 03/07/2019  8:48 AM 13   AST 46 Abnormally high   H  0 - 45 U/L Final 03/07/2019  8:48 AM        Initial lab   Abnormally high   H  0 - 50 U/L Final 02/21/2019  1:54 PM 13   AST 96 Abnormally high   H  0 - 45 U/L Final 02/21/2019  1:54 PM 13         Your drug screen was positive for:  Benzodiazepine Qual Urine Positive Abnormal   A  NEG^Negative Final 02/21/2019 10:53 AM 13   Cannabinoids Qual Urine Positive Abnormal   A  NEG^Negative Final 02/21/2019 10:53 AM 13           Symptoms to Report: mood getting worse or thoughts of suicide    Early warning signs can include: increased depression or anxiety    Safety and Wellness:  Take all medicines as directed.  Make no changes unless your doctor suggests them.      Follow treatment recommendations.  Refrain from alcohol and non-prescribed drugs.  If there is a concern for safety, call 911.    Resources:   [facility] Crisis (COPE) Response - Adult [phone]    1.  [facility]  [address] [place] [address]  Phone:[phone]    2.  [facility]  [address] [place] [address]  Phone: [phone]  This is a crisis residence where you can stay for a short time if you feel like you need to stabilize but do not need to go to the hospital.    3.  [facility]  [address] [place] [address]  Phone: [phone]      [place] [facility] ([facility])  [facility] connects people seeking recovery to resources that help foster and sustain long-term recovery.  Whether you are seeking resources for treatment, transportation, housing, job training, education, health care or other pathways to recovery, [facility] is a great place to start.  [phone]. [email or web address]        The treatment team has appreciated the opportunity to work with you.     If you have any questions or concerns our unit number is [phone]._________________

## 2019-03-14 NOTE — PROGRESS NOTES
Brief Medicine Note  March 14, 2019    Medicine following peripherally for DM II and HTN.      # Type 2 DM, uncontrolled. Currently on lantus 30U with supper, actos 30mg daily, and high sliding scale insulin. Not a candidate for metformin d/t LFTs. BG continue to be persistently elevated, despite recent increase in lantus.  - Increase lantus to 35U with supper - can discharge on this dose  - Cont actos 30mg daily  - Cont high sliding scale insulin TID ac and at bedtime  - Cont to monitor BG QID   - Scripts were sent for diabetes supplies, diabetes education completed 3/13        #HTN. BPs have been uncontrolled since admission. Started on lisinipril 5mg daily on 3/4 with dose uptitrated since, most recently to 30mg daily on 3/13. Repeat BMP showed stable lytes/Cr 3/13. BPs stable on current regimen.  - Cont lisinopril 30mg daily  - Follow up with PCP for outpatient monitoring      Lou Sales PA-C

## 2019-03-14 NOTE — PLAN OF CARE
Pt discharged today @ 1210 with 30 day supply of medications and all belongings. Pt denies any suicidal thoughts. Pt states is very excited,  but anxious about going to [facility], but feels safe for discharge. Pt reports absence of nausea today.  States had good sleep last night. Pt escorted to meet her [employer] program transportation. Writer reviewed all discharge instructions and medication instructions.

## 2019-03-15 ENCOUNTER — APPOINTMENT (OUTPATIENT)
Dept: FAMILY MEDICINE | Facility: CLINIC | Age: 44
End: 2019-03-15

## 2019-03-15 PROCEDURE — 99499 UNLISTED E&M SERVICE: CPT | Performed by: NURSE PRACTITIONER

## 2019-03-28 NOTE — DISCHARGE SUMMARY
[facility]  Department of Psychiatry    DATE OF ADMISSION:  2/21/2019    DATE OF DISCHARGE:  3/14/2019    DISCHARGE DIAGNOSES:   Major depressive disorder-recurrent, moderate  Generalized anxiety disorder  Opiate use disorder, severe  Sedative hypnotic use disorder, severe  Diabetes Mellitus type 2   HTN  GERD    HOSPITAL COURSE: (Refer to H&P, progress notes, and consult notes for details)    The patient was admitted to our Behavioral Health Unit under voluntary status for depressed mood and suicidal ideation.  She was initially evaluated by Dr. Altman who noted benzodiazepine abuse for which a phenobarbital taper was initiated.  Cymbalta was started targeting depressive symptoms.  Methadone was continued for maintenance treatment of opiate dependence.  The patient's care was later transferred to me.  Zyprexa was added to further augment Cymbalta noting significant symptoms of anxiety and insomnia which were secondarily targeted with the medication.  Mirtazapine was also added to reduce insomnia and further augment Cymbalta.  Targeted symptoms gradually improved and the patient no longer reported experiencing suicidal ideation.  She worked with our  on obtaining a chemical health assessment and initiating referrals for residential MI CD treatment.  Once a bed became available to her at a residential treatment facility, her care was transitioned directly there.    Other interventions received during his hospitalization included:   Psychosocial treatments were addressed with groups, social work consult, and supportive milieu provided by staff.  Internal medicine consultation was obtained for evaluation of intermittent episodes of nausea and vomiting.  Additional guidance was obtained from the endocrinology service to assist in managing her diabetes care and insulin regime.    CONDITION AT DISCHARGE:  Improved.  The patients acute suicide risk is low due to the  following factors:  improved mood/anxiety symptoms.  Denies suicidal ideations. Denies psychotic symptoms.  Not actively intoxicated and plans to abstain from illicit substances and alcohol.  Denies access to guns.  Denies feeling hopeless or helpless. At the time of discharge Yun Coyle was determined to not be an immediate danger to herself or others. The patient's acute risk will be higher if noncompliant with treatment plan, medications, follow-up or using illicit substances or alcohol.  These findings along with the risks of noncompliance with medications and treatment plan, which could potentially cause decompensation and increase the risk for suicide, were discussed with the patient.  The patients chronic suicide risk is moderate given the following factors: past suicide attempts; diagnosis of MDD, unemployed; history of chemical dependency; Denied a family history of suicide.  Preventative factors include: social supports     MENTAL STATUS EXAMINATION AT TIME OF DISCHARGE:  The patient is 43 year old White female who appears their stated age and is appropriately dressed with good hygiene.  Calm and cooperative with the interview questions.  No psychomotor abnormalities are noted. Eye contact is appropriate. Speech has normal rate, tone, latency and volume and is not pushed or pressured. Mood is euthymic and affect is full and appropriate.  The patient does not seem overtly depressed, anxious, manic or irritable.  Thought process is linear, logical and future oriented.  Thought process is not tangential, circumstantial or disorganized.  Thought content is not significant for apperant paranoia, delusions, ideas of reference or grandiosity.  The patient denies suicidal and homicidal ideations as well as auditory and visual hallucinations.  Insight and judgment are fair.  Cognition appears intact to interviewing including orientation, recent and remote memory, fund of knowledge, use of language,  attention span and concentration.  Muscle strength, tone and gait appear normal on visual inspection.      DISPOSITION:  The patient is discharged to the [facility] for residential MI CD treatment.    FOLLOWUP APPOINTMENTS:  ( per social workers notes and after visit summary)  You receive your methadone through [facility]. This will be coordinated with [facility].  Felicia Holt  [address] [place] [address]  Phone: [phone]    The Health Unit Coordinator has faxed these discharge instructions to fax [phone]       Attend this appointment on Friday, April 12, 2019 at 1PM to establish care with a primary care provider.  Apr 12, 2019  1:00 PM CDT  Office Visit with BENNY Diaz [facility] [place] [place] [address] [place] [id number] [phone]            You wanted to seek psychiatric medication management services here on this campus. This appointment is rather far out but you can get your medications filled with primary care before this. You can also call and see if there is any cancellations that have resulted in a sooner appointment.  Friday, May 31, 2019 10:00 AM CDT  Adult General Eval with Angel Denny MD  Psychiatry Clinic ([facility])         DISCHARGE MEDICATIONS:   Discharge Medication List as of 3/14/2019 11:17 AM      START taking these medications    Details   blood glucose (NO BRAND SPECIFIED) lancets standard Use to test blood sugar 4 times daily or as directed.Disp-100 each, [id number]      blood glucose (NO BRAND SPECIFIED) test strip Use to test blood sugar 4 times daily or as directed., Disp-100 each, R-0, E-Prescribe      !! blood glucose monitoring (NO BRAND SPECIFIED) meter device kit Use to test blood sugar 4 times daily or as directed.Disp-1 kit, [id number] dispense a meter with supplies to last at least 30 days.      !! blood glucose monitoring (NO  BRAND SPECIFIED) meter device kit Use to test blood sugar 4 times daily or as directed.Disp-1 kit, [id number]      DULoxetine (CYMBALTA) 60 MG capsule Take 1 capsule (60 mg) by mouth daily, Disp-30 capsule, R-0, E-Prescribe      hydrOXYzine (ATARAX) 50 MG tablet Take 1 tablet (50 mg) by mouth 3 times daily as needed for anxiety, Disp-90 tablet, R-0, E-Prescribe      !! insulin aspart (NOVOLOG PEN) 100 UNIT/ML pen Inject 1-7 Units Subcutaneous At Bedtime HIGH INSULIN RESISTANCE DOSING    Do Not give Bedtime Correction Insulin if BG less than 200.   For  - 224 give 1 units.   For  - 249 give 2 units.   For  - 274 give 3 units.   For  - 299 g pili 4 units.   For  - 324 give 5 units.   For  - 349 give 6 units.   For BG greater than or equal to 350 give 7 units., Disp-6 mL, R-0, Local Print      !! insulin aspart (NOVOLOG PEN) 100 UNIT/ML pen Inject 1-10 Units Subcutaneous 3 times daily (before meals) Pre-Meal  - 164 give 1 unit.   Pre-Meal  - 189 give 2 units.   Pre-Meal  - 214 give 3 units.   Pre-Meal  - 239 give 4 units.   Pre-Meal  - 264 give 5 units.   Pre-M eal  - 289 give 6 units.   Pre-Meal  - 314 give 7 units.   Pre-Meal  - 339 give 8 units.   Pre-Meal  - 364 give 9 units.  Pre-Meal BG > or equal to 365 give 10 units, Disp-12 mL, R-0, Local Print      lisinopril (PRINIVIL/ZESTRIL) 30 MG tablet Take 1 tablet (30 mg) by mouth daily, Disp-30 tablet, R-0, E-Prescribe      mirtazapine (REMERON) 30 MG tablet Take 1 tablet (30 mg) by mouth At Bedtime, Disp-30 tablet, R-0, E-Prescribe      OLANZapine (ZYPREXA) 5 MG tablet Take 1 tablet (5 mg) by mouth At Bedtime, Disp-30 tablet, R-0, E-Prescribe      ondansetron (ZOFRAN-ODT) 4 MG ODT tab Take 1-2 tablets (4-8 mg) by mouth every 6 hours as needed for nausea or vomiting, Disp-20 tablet, R-0, E-Prescribe      pioglitazone (ACTOS) 30 MG tablet Take 1 tablet (30 mg) by mouth daily,  Disp-30 tablet, R-0, E-Prescribe      ranitidine (ZANTAC) 150 MG tablet Take 1 tablet (150 mg) by mouth 2 times daily, Disp-60 tablet, R-0, E-Prescribe       !! - Potential duplicate medications found. Please discuss with provider.      CONTINUE these medications which have CHANGED    Details   insulin glargine (LANTUS SOLOSTAR PEN) 100 UNIT/ML pen Inject 35 Units Subcutaneous daily (with dinner), Disp-12 mL, R-0, E-Prescribe         CONTINUE these medications which have NOT CHANGED    Details   methadone HCl 10 MG/5ML SOLN Take 110 mg by mouth daily, Historical              LABORATORY RESULTS: (past 14 days)  Recent Results (from the past 336 hour(s))   Glucose by meter    Collection Time: 03/14/19  8:07 AM   Result Value Ref Range    Glucose 269 (H) 70 - 99 mg/dL   Glucose by meter    Collection Time: 03/14/19 12:00 PM   Result Value Ref Range    Glucose 384 (H) 70 - 99 mg/dL       >30 minutes was spent on this discharge to allow for reviewing the patient's response to treatment, reviewing plan of care, education on medications and diagnosis, and conducting a risk assessment.

## 2019-04-12 PROBLEM — R73.03 BORDERLINE DIABETES MELLITUS: Status: ACTIVE | Noted: 2017-06-28

## 2019-04-12 PROBLEM — E11.9 TYPE 2 DIABETES MELLITUS WITHOUT COMPLICATION (H): Status: ACTIVE | Noted: 2017-06-29

## 2019-04-12 PROBLEM — F13.20 SEVERE BENZODIAZEPINE USE DISORDER (H): Status: ACTIVE | Noted: 2019-01-15

## 2019-04-12 PROBLEM — N83.201 CYST OF RIGHT OVARY: Status: ACTIVE | Noted: 2017-11-16

## 2019-04-12 PROBLEM — K85.90 ACUTE PANCREATITIS: Status: ACTIVE | Noted: 2017-06-29

## 2019-04-12 PROBLEM — F13.129: Status: ACTIVE | Noted: 2019-01-15

## 2019-04-12 RX ORDER — GABAPENTIN 300 MG/1
CAPSULE ORAL
Refills: 99 | COMMUNITY
Start: 2018-12-15 | End: 2019-04-16

## 2019-04-12 RX ORDER — SUCRALFATE 1 G/1
1 TABLET ORAL
COMMUNITY
Start: 2017-08-30 | End: 2019-04-16

## 2019-04-12 RX ORDER — HYDROXYZINE HYDROCHLORIDE 25 MG/1
TABLET, FILM COATED ORAL
Refills: 3 | COMMUNITY
Start: 2019-03-20 | End: 2019-04-16

## 2019-04-12 RX ORDER — PRAZOSIN HYDROCHLORIDE 1 MG/1
CAPSULE ORAL
Refills: 99 | COMMUNITY
Start: 2018-12-15 | End: 2019-05-15

## 2019-04-12 RX ORDER — PROPRANOLOL HYDROCHLORIDE 60 MG/1
TABLET ORAL
Refills: 3 | COMMUNITY
Start: 2018-11-18 | End: 2019-04-16

## 2019-04-12 RX ORDER — METOCLOPRAMIDE 5 MG/1
5 TABLET ORAL
COMMUNITY
Start: 2018-06-27 | End: 2019-05-15

## 2019-04-12 RX ORDER — QUETIAPINE FUMARATE 25 MG/1
TABLET, FILM COATED ORAL
Refills: 99 | COMMUNITY
Start: 2018-12-16 | End: 2019-04-16

## 2019-04-12 RX ORDER — BUPROPION HYDROCHLORIDE 150 MG/1
TABLET, EXTENDED RELEASE ORAL
Refills: 0 | COMMUNITY
Start: 2018-10-31 | End: 2019-04-16

## 2019-04-12 NOTE — PROGRESS NOTES
"SUBJECTIVE:  Yun Coyle, a 43 year old female, here today for an appointment to establish care and to discuss the following issues:    1. ***  2. ***  3. ***    HPI:    Where was your previous physician's office?: ***  Physician's Name: ***  Last physical: ***  Pt permission to access these records? (Care Everywhere or ENE): ***     If patient has been seen at a [facility] before, please disregard the question below.  {ABSTRACT COMPLETE HM:[id number]}     Past Medical History:   Diagnosis Date     Anxiety      Depressive disorder      Gastroesophageal reflux disease      Hypertension      Type 2 diabetes mellitus (H)      Uncomplicated asthma        Past Surgical History:   Procedure Laterality Date     BACK SURGERY      x2     CHOLECYSTECTOMY       ORTHOPEDIC SURGERY       SALPINGO OOPHORECTOMY,R/L/ELDA Right        Family History   Problem Relation Age of Onset     Diabetes Mother      Cancer Mother      Cancer Maternal Grandmother      Diabetes Maternal Grandmother        Social History     Tobacco Use     Smoking status: Never Smoker     Smokeless tobacco: Never Used   Substance Use Topics     Alcohol use: No     Frequency: Never       ROS:  {ROS NORMAL:182044::\"C: NEGATIVE for fever, chills\",\"E: NEGATIVE for vision changes \",\"R: NEGATIVE for significant cough or SOB\",\"M: NEGATIVE for significant arthralgias or myalgia\",\"N: NEGATIVE for weakness, dizziness or paresthesias or headache\",\"CV: NEGATIVE for chest pain, palpitations \",\"GI: NEGATIVE for nausea, abdominal pain, heartburn, or change in bowel habits\",\": NEGATIVE for frequency, dysuria, or hematuria\"}    OBJECTIVE:    There were no vitals taken for this visit.    EXAM:  {EXAM NORMAL:919675::\"GENERAL APPEARANCE: healthy, alert and no distress\",\"EYES: EOMI,  PERRL\",\"HENT: ear canals and TM's normal and nose and mouth without ulcers or lesions\",\"RESP: lungs clear to auscultation - no rales, rhonchi or wheezes\",\"CV: regular rates and rhythm, normal S1 S2, " "no S3 or S4 and no murmur, click or rub -\",\"ABDOMEN:  soft, nontender, no HSM or masses and bowel sounds normal\"}    ASSESSMENT/PLAN:    No diagnosis found.    "

## 2019-04-16 ENCOUNTER — TELEPHONE (OUTPATIENT)
Dept: FAMILY MEDICINE | Facility: CLINIC | Age: 44
End: 2019-04-16

## 2019-04-16 ENCOUNTER — OFFICE VISIT (OUTPATIENT)
Dept: FAMILY MEDICINE | Facility: CLINIC | Age: 44
End: 2019-04-16
Payer: MEDICARE

## 2019-04-16 VITALS
OXYGEN SATURATION: 98 % | DIASTOLIC BLOOD PRESSURE: 67 MMHG | BODY MASS INDEX: 37.75 KG/M2 | HEIGHT: 69 IN | RESPIRATION RATE: 18 BRPM | WEIGHT: 254.9 LBS | HEART RATE: 88 BPM | TEMPERATURE: 98.8 F | SYSTOLIC BLOOD PRESSURE: 104 MMHG

## 2019-04-16 DIAGNOSIS — E11.65 UNCONTROLLED TYPE 2 DIABETES MELLITUS WITH HYPERGLYCEMIA (H): ICD-10-CM

## 2019-04-16 DIAGNOSIS — E66.01 MORBID OBESITY (H): ICD-10-CM

## 2019-04-16 DIAGNOSIS — E11.9 TYPE 2 DIABETES MELLITUS WITHOUT COMPLICATION, WITH LONG-TERM CURRENT USE OF INSULIN (H): Primary | ICD-10-CM

## 2019-04-16 DIAGNOSIS — F41.1 GAD (GENERALIZED ANXIETY DISORDER): ICD-10-CM

## 2019-04-16 DIAGNOSIS — F33.1 MODERATE EPISODE OF RECURRENT MAJOR DEPRESSIVE DISORDER (H): ICD-10-CM

## 2019-04-16 DIAGNOSIS — Z79.4 TYPE 2 DIABETES MELLITUS WITHOUT COMPLICATION, WITH LONG-TERM CURRENT USE OF INSULIN (H): Primary | ICD-10-CM

## 2019-04-16 DIAGNOSIS — E11.65 UNCONTROLLED TYPE 2 DIABETES MELLITUS WITH HYPERGLYCEMIA (H): Primary | ICD-10-CM

## 2019-04-16 PROCEDURE — 99205 OFFICE O/P NEW HI 60 MIN: CPT | Performed by: NURSE PRACTITIONER

## 2019-04-16 RX ORDER — MIRTAZAPINE 30 MG/1
30 TABLET, FILM COATED ORAL AT BEDTIME
Qty: 30 TABLET | Refills: 0 | Status: SHIPPED | OUTPATIENT
Start: 2019-04-16 | End: 2019-05-15

## 2019-04-16 RX ORDER — PIOGLITAZONEHYDROCHLORIDE 30 MG/1
30 TABLET ORAL DAILY
Qty: 30 TABLET | Refills: 0 | Status: SHIPPED | OUTPATIENT
Start: 2019-04-16 | End: 2019-04-16

## 2019-04-16 RX ORDER — INSULIN GLARGINE 100 [IU]/ML
35 INJECTION, SOLUTION SUBCUTANEOUS DAILY
Qty: 12 ML | Refills: 0 | Status: SHIPPED | OUTPATIENT
Start: 2019-04-16 | End: 2019-05-15

## 2019-04-16 RX ORDER — OLANZAPINE 5 MG/1
5 TABLET ORAL AT BEDTIME
Qty: 30 TABLET | Refills: 0 | Status: SHIPPED | OUTPATIENT
Start: 2019-04-16 | End: 2019-04-16

## 2019-04-16 RX ORDER — PIOGLITAZONEHYDROCHLORIDE 30 MG/1
30 TABLET ORAL DAILY
Qty: 30 TABLET | Refills: 0 | Status: SHIPPED | OUTPATIENT
Start: 2019-04-16 | End: 2019-05-15

## 2019-04-16 RX ORDER — GABAPENTIN 300 MG/1
CAPSULE ORAL
Qty: 90 CAPSULE | Refills: 1 | Status: SHIPPED | OUTPATIENT
Start: 2019-04-16 | End: 2019-05-15

## 2019-04-16 RX ORDER — HYDROXYZINE HYDROCHLORIDE 25 MG/1
TABLET, FILM COATED ORAL
Qty: 180 TABLET | Refills: 1 | Status: SHIPPED | OUTPATIENT
Start: 2019-04-16 | End: 2019-05-15

## 2019-04-16 RX ORDER — HYDROXYZINE HYDROCHLORIDE 25 MG/1
TABLET, FILM COATED ORAL
Qty: 180 TABLET | Refills: 1 | Status: SHIPPED | OUTPATIENT
Start: 2019-04-16 | End: 2019-04-16

## 2019-04-16 RX ORDER — OLANZAPINE 5 MG/1
5 TABLET ORAL AT BEDTIME
Qty: 30 TABLET | Refills: 0 | Status: SHIPPED | OUTPATIENT
Start: 2019-04-16 | End: 2019-05-15

## 2019-04-16 RX ORDER — GABAPENTIN 300 MG/1
CAPSULE ORAL
Qty: 90 CAPSULE | Refills: 1 | Status: SHIPPED | OUTPATIENT
Start: 2019-04-16 | End: 2019-04-16

## 2019-04-16 ASSESSMENT — MIFFLIN-ST. JEOR: SCORE: 1885.6

## 2019-04-16 ASSESSMENT — ANXIETY QUESTIONNAIRES
5. BEING SO RESTLESS THAT IT IS HARD TO SIT STILL: NOT AT ALL
GAD7 TOTAL SCORE: 16
1. FEELING NERVOUS, ANXIOUS, OR ON EDGE: NEARLY EVERY DAY
3. WORRYING TOO MUCH ABOUT DIFFERENT THINGS: NEARLY EVERY DAY
7. FEELING AFRAID AS IF SOMETHING AWFUL MIGHT HAPPEN: NEARLY EVERY DAY
2. NOT BEING ABLE TO STOP OR CONTROL WORRYING: NEARLY EVERY DAY
6. BECOMING EASILY ANNOYED OR IRRITABLE: SEVERAL DAYS

## 2019-04-16 ASSESSMENT — PATIENT HEALTH QUESTIONNAIRE - PHQ9
5. POOR APPETITE OR OVEREATING: NEARLY EVERY DAY
SUM OF ALL RESPONSES TO PHQ QUESTIONS 1-9: 23

## 2019-04-16 NOTE — LETTER
My Depression Action Plan  Name: Yun Coyle   Date of Birth 1975  Date: 4/16/2019    My doctor: No Ref-Primary, Physician   My clinic: [address] [address] [phone]          GREEN    ZONE   Good Control    What it looks like:     Things are going generally well. You have normal up s and down s. You may even feel depressed from time to time, but bad moods usually last less than a day.   What you need to do:  1. Continue to care for yourself (see self care plan)  2. Check your depression survival kit and update it as needed  3. Follow your physician s recommendations including any medication.  4. Do not stop taking medication unless you consult with your physician first.           YELLOW         ZONE Getting Worse    What it looks like:     Depression is starting to interfere with your life.     It may be hard to get out of bed; you may be starting to isolate yourself from others.    Symptoms of depression are starting to last most all day and this has happened for several days.     You may have suicidal thoughts but they are not constant.   What you need to do:     1. Call your care team, your response to treatment will improve if you keep your care team informed of your progress. Yellow periods are signs an adjustment may need to be made.     2. Continue your self-care, even if you have to fake it!    3. Talk to someone in your support network    4. Open up your depression survival kit           RED    ZONE Medical Alert - Get Help    What it looks like:     Depression is seriously interfering with your life.     You may experience these or other symptoms: You can t get out of bed most days, can t work or engage in other necessary activities, you have trouble taking care of basic hygiene, or basic responsibilities, thoughts of suicide or death that will not go away, self-injurious behavior.     What you need to  do:  1. Call your care team and request a same-day appointment. If they are not available (weekends or after hours) call your local crisis line, emergency room or 911.            Depression Self Care Plan / Survival Kit    Self-Care for Depression  Here s the deal. Your body and mind are really not as separate as most people think.  What you do and think affects how you feel and how you feel influences what you do and think. This means if you do things that people who feel good do, it will help you feel better.  Sometimes this is all it takes.  There is also a place for medication and therapy depending on how severe your depression is, so be sure to consult with your medical provider and/ or Behavioral Health Consultant if your symptoms are worsening or not improving.     In order to better manage my stress, I will:    Exercise  Get some form of exercise, every day. This will help reduce pain and release endorphins, the  feel good  chemicals in your brain. This is almost as good as taking antidepressants!  This is not the same as joining a gym and then never going! (they count on that by the way ) It can be as simple as just going for a walk or doing some gardening, anything that will get you moving.      Hygiene   Maintain good hygiene (Get out of bed in the morning, Make your bed, Brush your teeth, Take a shower, and Get dressed like you were going to work, even if you are unemployed).  If your clothes don't fit try to get ones that do.    Diet  I will strive to eat foods that are good for me, drink plenty of water, and avoid excessive sugar, caffeine, alcohol, and other mood-altering substances.  Some foods that are helpful in depression are: complex carbohydrates, B vitamins, flaxseed, fish or fish oil, fresh fruits and vegetables.    Psychotherapy  I agree to participate in Individual Therapy (if recommended).    Medication  If prescribed medications, I agree to take them.  Missing doses can result in serious  side effects.  I understand that drinking alcohol, or other illicit drug use, may cause potential side effects.  I will not stop my medication abruptly without first discussing it with my provider.    Staying Connected With Others  I will stay in touch with my friends, family members, and my primary care provider/team.    Use your imagination  Be creative.  We all have a creative side; it doesn t matter if it s oil painting, sand castles, or mud pies! This will also kick up the endorphins.    Witness Beauty  (AKA stop and smell the roses) Take a look outside, even in mid-winter. Notice colors, textures. Watch the squirrels and birds.     Service to others  Be of service to others.  There is always someone else in need.  By helping others we can  get out of ourselves  and remember the really important things.  This also provides opportunities for practicing all the other parts of the program.    Humor  Laugh and be silly!  Adjust your TV habits for less news and crime-drama and more comedy.    Control your stress  Try breathing deep, massage therapy, biofeedback, and meditation. Find time to relax each day.     My support system    Clinic Contact:  Phone number:    Contact 1:  Phone number:    Contact 2:  Phone number:    Yazidi/:  Phone number:    Therapist:  Phone number:    Local crisis center:    Phone number:    Other community support:  Phone number:

## 2019-04-16 NOTE — TELEPHONE ENCOUNTER
Lantus medication is not covered.  Basaglar is covered.  Pt has been waiting downstairs at pharmacy for a long time.

## 2019-04-16 NOTE — TELEPHONE ENCOUNTER
PCP out of clinic for [place] with provider, Dina Daigle, who gave verbal okay to switch to Basaglar.    Called pharmacy to notify. They verbalized understanding and stated that they received the prescription.    Mari De RN  [facility]

## 2019-04-16 NOTE — TELEPHONE ENCOUNTER
spoke with Rich, they did not get clear E scripts, for lantus, zyprexa, actos, gabapentin and hydroxyzine, printed off the orders and walked them down to the pharmacy  They did get the novolog and the mirtazapine    Sugar Sellers RN   [facility] [place][note 2]

## 2019-04-16 NOTE — TELEPHONE ENCOUNTER
Reason for call:  Other   Patient called regarding (reason for call): call back  Additional comments: Rich from the [facility] pharmacy called and stated that Keke Montes had sent down to them. He stated that some of them are all jumbled and don't make any sense. He is wondering if the ones that are jumbled can be resent so they make sense. He would like a call back to discuss this.     Phone number to reach patient:  Other phone number: [phone]    Best Time: Any    Can we leave a detailed message on this number?  YES

## 2019-04-16 NOTE — PROGRESS NOTES
SUBJECTIVE:   Yun Coyle is a 43 year old female who presents to clinic today for the following   health issues:    Depression and Anxiety Follow-Up    Status since last visit: No change    Other associated symptoms:None    Complicating factors:     Significant life event: No, home from hospital 3/14/19    Current substance abuse: Marijuana    PHQ 4/16/2019   PHQ-9 Total Score 23   Q9: Thoughts of better off dead/self-harm past 2 weeks More than half the days     SANDIE-7 SCORE 4/16/2019   Total Score 16     In the past two weeks have you had thoughts of suicide or self-harm?  Yes  In the past two weeks have you thought of a plan or intent to harm yourself? No.  Do you have concerns about your personal safety or the safety of others?   No  PHQ-9  English  PHQ-9   Any Language  SANDIE-7  Suicide Assessment Five-step Evaluation and Treatment (SAFE-T)    Amount of exercise or physical activity: 2-3 days/week for an average of 30-45 minutes    Problems taking medications regularly: Quit taking Cymbalta. Helped in the hospital in the controlled environment. Amplified any stimulation when she came home    Medication side effects: none    Diet: diabetic  History of opiate use, cannabis, benzo, Methadone through [place], depression, SI plan and discharged to [facility]     Left her treatment center  Looking for outpatient treatment   [facility] if isn't open will do other inpatient program  Benzo abuse most recently  Dr Morelos here prescribed these  cymbalta helped in controlled environment     May 31st will see [facility] here     Diabetic, was overdue for care    Liver enzymes were up inpatient     Major depressive disorder-recurrent, moderate  Generalized anxiety disorder  Opiate use disorder, severe  Sedative hypnotic use disorder, severe  Diabetes Mellitus type 2   HTN  GERD        Additional history: as documented    Reviewed  and updated as needed this visit by clinical staff  Allergies  Meds          Reviewed and updated as needed this visit by Provider         Patient Active Problem List   Diagnosis     Suicidal behavior     Acute pancreatitis     Benign essential HTN     Benzodiazepine intoxication     Borderline diabetes mellitus     Cannabis abuse     Cauda equina syndrome (H)     Chest pain on breathing     Cyclic vomiting syndrome     Cyst of right ovary     Elevated LFTs     Hyperlipidemia with target low density lipoprotein (LDL) cholesterol less than 100 mg/dL     Insomnia     Low back pain     Major depressive disorder, recurrent episode, severe (H)     Opioid type dependence, in remission     Mild intermittent asthma     Obesity     Panic disorder with agoraphobia     OCD (obsessive compulsive disorder)     Severe benzodiazepine use disorder (H)     S/P lumbar microdiscectomy     S/P laparoscopic cholecystectomy     Personality disorder (H)     Type 2 diabetes mellitus without complication (H)     Uncontrolled insulin dependent diabetes mellitus (H)     Obesity (BMI 35.0-39.9) with comorbidity (H)     Past Surgical History:   Procedure Laterality Date     BACK SURGERY      x2     CHOLECYSTECTOMY       ORTHOPEDIC SURGERY       SALPINGO OOPHORECTOMY,R/L/ELDA Right        Social History     Tobacco Use     Smoking status: Never Smoker     Smokeless tobacco: Never Used   Substance Use Topics     Alcohol use: No     Frequency: Never     Family History   Problem Relation Age of Onset     Diabetes Mother      Cancer Mother      Cancer Maternal Grandmother      Diabetes Maternal Grandmother          Current Outpatient Medications   Medication Sig Dispense Refill     blood glucose (NO BRAND SPECIFIED) lancets standard Use to test blood sugar 4 times daily or as directed. 100 each 0     blood glucose (NO BRAND SPECIFIED) test strip Use to test blood sugar 4 times daily or as directed. 100 each 0     blood glucose monitoring (NO BRAND SPECIFIED) meter device kit Use to test blood sugar 4 times daily or as  directed. 1 kit 0     blood glucose monitoring (NO BRAND SPECIFIED) meter device kit Use to test blood sugar 4 times daily or as directed. 1 kit 0     gabapentin (NEURONTIN) 300 MG capsule TAKE 1 CAP BY MOUTH ONCE DAILY AS NEEDED FOR ANXIETY ; TAKE 1-2 CAPS (300MG-600M G) BY MOUTH AT BEDTIME AS NEEDED FOR INSOMNIA 90 capsule 1     hydrOXYzine (ATARAX) 25 MG tablet TAKE 1-2 TABLETS BY MOUTH THREE TIMES DAILY AS NEEDED 180 tablet 1     hydrOXYzine (ATARAX) 50 MG tablet Take 1 tablet (50 mg) by mouth 3 times daily as needed for anxiety 90 tablet 0     insulin aspart (NOVOLOG PEN) 100 UNIT/ML pen Inject 1-10 Units Subcutaneous 3 times daily (before meals) Pre-Meal  - 164 give 1 unit.   Pre-Meal  - 189 give 2 units.   Pre-Meal  - 214 give 3 units.   Pre-Meal  - 239 give 4 units.   Pre-Meal  - 264 give 5 units.   Pre-Meal  - 289 give 6 units.   Pre-Meal  - 314 give 7 units.   Pre-Meal  - 339 give 8 units.   Pre-Meal  - 364 give 9 units.  Pre-Meal BG > or equal to 365 give 10 units 12 mL 0     insulin glargine (LANTUS SOLOSTAR PEN) 100 UNIT/ML pen Inject 35 Units Subcutaneous daily (with dinner) 12 mL 0     lisinopril (PRINIVIL/ZESTRIL) 30 MG tablet Take 1 tablet (30 mg) by mouth daily 30 tablet 0     methadone HCl 10 MG/5ML SOLN Take 110 mg by mouth daily       metoclopramide (REGLAN) 5 MG tablet Take 5 mg by mouth       mirtazapine (REMERON) 30 MG tablet Take 1 tablet (30 mg) by mouth At Bedtime 30 tablet 0     OLANZapine (ZYPREXA) 5 MG tablet Take 1 tablet (5 mg) by mouth At Bedtime 30 tablet 0     ondansetron (ZOFRAN-ODT) 4 MG ODT tab Take 1-2 tablets (4-8 mg) by mouth every 6 hours as needed for nausea or vomiting 20 tablet 0     pioglitazone (ACTOS) 30 MG tablet Take 1 tablet (30 mg) by mouth daily 30 tablet 0     prazosin (MINIPRESS) 1 MG capsule TAKE 1-3 CAPS (1MG-3MG) BY MOUTH AT BEDTIME AS NEEDED FOR INSOMNIA NIGHTMARES  99     ranitidine (ZANTAC) 150 MG  "tablet Take 1 tablet (150 mg) by mouth 2 times daily 60 tablet 0     insulin glargine (BASAGLAR KWIKPEN) 100 UNIT/ML pen Inject 35 Units Subcutaneous daily 12 mL 0     No Known Allergies  Recent Labs   Lab Test 03/13/19  0747 03/07/19  0848 03/06/19  1322 03/04/19  1132 02/22/19  0730 02/21/19  1354   A1C  --   --   --   --  8.5*  --    LDL  --   --   --   --  99  --    HDL  --   --   --   --  57  --    TRIG  --   --   --   --  84  --    ALT  --  109* 131* 140*  --  194*   CR 0.67 0.79 0.93 0.83  --  0.66   GFRESTIMATED >90 >90 76 88  --  >90   GFRESTBLACK >90 >90 88 >90  --  >90   POTASSIUM 4.7 4.4 4.4 4.0  --  4.5   TSH  --   --   --   --   --  0.82      BP Readings from Last 3 Encounters:   04/16/19 104/67   03/13/19 120/86    Wt Readings from Last 3 Encounters:   04/16/19 115.6 kg (254 lb 14.4 oz)   03/12/19 104.8 kg (231 lb)                  Labs reviewed in EPIC    ROS:  Constitutional, HEENT, cardiovascular, pulmonary, GI, , musculoskeletal, neuro, skin, endocrine and psych systems are negative, except as otherwise noted.    OBJECTIVE:     /67   Pulse 88   Temp 98.8  F (37.1  C) (Oral)   Resp 18   Ht 1.753 m (5' 9\")   Wt 115.6 kg (254 lb 14.4 oz)   SpO2 98%   BMI 37.64 kg/m    Body mass index is 37.64 kg/m .  GENERAL: healthy, alert and no distress  EYES: Eyes grossly normal to inspection, PERRL and conjunctivae and sclerae normal  NECK: no adenopathy, no asymmetry, masses, or scars and thyroid normal to palpation  RESP: lungs clear to auscultation - no rales, rhonchi or wheezes  CV: regular rate and rhythm, normal S1 S2, no S3 or S4, no murmur, click or rub, no peripheral edema and peripheral pulses strong  ABDOMEN: soft, nontender, no hepatosplenomegaly, no masses and bowel sounds normal exam limited due to obesity   MS: no gross musculoskeletal defects noted, no edema  SKIN: no suspicious lesions or rashes  NEURO: Normal strength and tone, mentation intact and speech normal  PSYCH: mentation " appears normal, tangential, affect normal/bright, anxious, judgement and insight intact and appearance well groomed, SI and contracts for safety    Diagnostic Test Results:  none     ASSESSMENT/PLAN:         ICD-10-CM    1. Type 2 diabetes mellitus without complication, with long-term current use of insulin (H) E11.9     Z79.4    2. Uncontrolled type 2 diabetes mellitus with hyperglycemia (H) E11.65 insulin aspart (NOVOLOG PEN) 100 UNIT/ML pen     pioglitazone (ACTOS) 30 MG tablet     insulin glargine (LANTUS SOLOSTAR PEN) 100 UNIT/ML pen     DISCONTINUED: pioglitazone (ACTOS) 30 MG tablet     DISCONTINUED: insulin glargine (LANTUS SOLOSTAR PEN) 100 UNIT/ML pen   3. Moderate episode of recurrent major depressive disorder (H) F33.1 mirtazapine (REMERON) 30 MG tablet     OLANZapine (ZYPREXA) 5 MG tablet     DISCONTINUED: OLANZapine (ZYPREXA) 5 MG tablet   4. Morbid obesity (H) E66.01    5. SANDIE (generalized anxiety disorder) F41.1 gabapentin (NEURONTIN) 300 MG capsule     hydrOXYzine (ATARAX) 25 MG tablet     DISCONTINUED: hydrOXYzine (ATARAX) 25 MG tablet     DISCONTINUED: gabapentin (NEURONTIN) 300 MG capsule   New pt with complex psych and medical needs, SI she does contract for safety, I do not think she needs to be hospitalized at this time, she has hotline numbers and knows ER if any concerns. She says this is nothing new for her and denies any plan.   This is her first visit to establish care and discussed can take a couple visits to gather all info  Pt recently hospitalized and now left her treatment program, wanting outpatient program, she has a team of people noted above and would consider Care Coordination here and is a candidate for IPC, can do another visit with me to determine best Primary Care Provider plan    needing thorough DM visit with fasting lab work, consider MTM for med review, carb counting vs sliding scale, need to clean up med list.   Pt very anxious about running out of diabetes and psych  meds that is her top priority today, I will do short term bridge, has appointment to establish with psych and discussed I will not do any further refills as she does need Psych oversight, went off past recommendations from discharge note from 3/14/19  Elevated liver enzymes among other issues needing follow up appointments, will recheck lab work, schedule within 1 week      There are no Patient Instructions on file for this visit.    BENNY Diaz [facility]

## 2019-04-17 ASSESSMENT — ANXIETY QUESTIONNAIRES: GAD7 TOTAL SCORE: 16

## 2019-05-15 ENCOUNTER — OFFICE VISIT (OUTPATIENT)
Dept: FAMILY MEDICINE | Facility: CLINIC | Age: 44
End: 2019-05-15
Payer: MEDICARE

## 2019-05-15 ENCOUNTER — TELEPHONE (OUTPATIENT)
Dept: FAMILY MEDICINE | Facility: CLINIC | Age: 44
End: 2019-05-15

## 2019-05-15 VITALS
HEART RATE: 84 BPM | TEMPERATURE: 97.6 F | WEIGHT: 244.6 LBS | RESPIRATION RATE: 18 BRPM | BODY MASS INDEX: 36.12 KG/M2 | SYSTOLIC BLOOD PRESSURE: 118 MMHG | OXYGEN SATURATION: 100 % | DIASTOLIC BLOOD PRESSURE: 87 MMHG

## 2019-05-15 DIAGNOSIS — E11.65 UNCONTROLLED TYPE 2 DIABETES MELLITUS WITH HYPERGLYCEMIA (H): ICD-10-CM

## 2019-05-15 DIAGNOSIS — F41.1 GAD (GENERALIZED ANXIETY DISORDER): Primary | ICD-10-CM

## 2019-05-15 DIAGNOSIS — K21.00 GASTROESOPHAGEAL REFLUX DISEASE WITH ESOPHAGITIS: ICD-10-CM

## 2019-05-15 DIAGNOSIS — F33.1 MODERATE EPISODE OF RECURRENT MAJOR DEPRESSIVE DISORDER (H): ICD-10-CM

## 2019-05-15 DIAGNOSIS — F19.10 SUBSTANCE ABUSE (H): ICD-10-CM

## 2019-05-15 DIAGNOSIS — I10 BENIGN ESSENTIAL HYPERTENSION: ICD-10-CM

## 2019-05-15 PROCEDURE — 99214 OFFICE O/P EST MOD 30 MIN: CPT | Performed by: NURSE PRACTITIONER

## 2019-05-15 RX ORDER — INSULIN GLARGINE 100 [IU]/ML
35 INJECTION, SOLUTION SUBCUTANEOUS DAILY
Qty: 12 ML | Refills: 0 | Status: ON HOLD | OUTPATIENT
Start: 2019-05-15 | End: 2019-09-07

## 2019-05-15 RX ORDER — MIRTAZAPINE 30 MG/1
30 TABLET, FILM COATED ORAL AT BEDTIME
Qty: 30 TABLET | Refills: 0 | Status: SHIPPED | OUTPATIENT
Start: 2019-05-15 | End: 2019-05-31

## 2019-05-15 RX ORDER — ONDANSETRON 4 MG/1
4-8 TABLET, ORALLY DISINTEGRATING ORAL EVERY 6 HOURS PRN
Qty: 20 TABLET | Refills: 0 | Status: SHIPPED | OUTPATIENT
Start: 2019-05-15 | End: 2020-12-03

## 2019-05-15 RX ORDER — OLANZAPINE 5 MG/1
5 TABLET ORAL AT BEDTIME
Qty: 30 TABLET | Refills: 0 | Status: SHIPPED | OUTPATIENT
Start: 2019-05-15 | End: 2019-05-31

## 2019-05-15 RX ORDER — GABAPENTIN 300 MG/1
CAPSULE ORAL
Qty: 90 CAPSULE | Refills: 1 | Status: SHIPPED | OUTPATIENT
Start: 2019-05-15 | End: 2019-05-31

## 2019-05-15 RX ORDER — HYDROXYZINE HYDROCHLORIDE 25 MG/1
TABLET, FILM COATED ORAL
Qty: 180 TABLET | Refills: 1 | Status: SHIPPED | OUTPATIENT
Start: 2019-05-15 | End: 2019-05-31

## 2019-05-15 RX ORDER — LISINOPRIL 30 MG/1
30 TABLET ORAL DAILY
Qty: 30 TABLET | Refills: 0 | Status: SHIPPED | OUTPATIENT
Start: 2019-05-15 | End: 2019-06-18

## 2019-05-15 RX ORDER — PIOGLITAZONEHYDROCHLORIDE 30 MG/1
30 TABLET ORAL DAILY
Qty: 30 TABLET | Refills: 0 | Status: ON HOLD | OUTPATIENT
Start: 2019-05-15 | End: 2019-09-07

## 2019-05-15 NOTE — TELEPHONE ENCOUNTER
Keke    See message from pharmacy  Please add to pt OV note from todayreason for 4xday  She has medicare part B    Thanks   Sugar Sellers RN   [facility]

## 2019-05-15 NOTE — PROGRESS NOTES
SUBJECTIVE:   Yun Coyle is a 43 year old female who presents to clinic today for the following   health issues:    Depression and Anxiety Follow-Up    Status since last visit: Up and down    Other associated symptoms: Trouble sleeping, stomach issues.     Complicating factors:     Significant life event: No     Current substance abuse: None    PHQ 4/16/2019   PHQ-9 Total Score 23   Q9: Thoughts of better off dead/self-harm past 2 weeks More than half the days     SANDIE-7 SCORE 4/16/2019   Total Score 16     In the past two weeks have you had thoughts of suicide or self-harm?  Yes  In the past two weeks have you thought of a plan or intent to harm yourself? No.  Do you have concerns about your personal safety or the safety of others?   No  PHQ-9  English  PHQ-9   Any Language  SANDIE-7  Suicide Assessment Five-step Evaluation and Treatment (SAFE-T)    Amount of exercise or physical activity: 2-3 days/week for an average of 30-45 minutes    Problems taking medications regularly: No    Medication side effects: none    Diet: diabetic      Sleep taking meds, not sure what makes it worse   Passive SI nothing new seems better  On waiting list for treatment center, see last note for details     stomach issues when anxious mostly  GERD zantac helps  [facility]st [facility] Psych appointment, needs refills for one more month until then,     Needing refill of DM medications, doing sliding scale novolog when sugars are 160-200 she brings them down and works well for her at this time. basaglar tolerated  No lows or concerns      Additional history: as documented    Reviewed  and updated as needed this visit by clinical staff  Tobacco  Allergies  Meds         Reviewed and updated as needed this visit by Provider         Patient Active Problem List   Diagnosis     Suicidal behavior     Acute pancreatitis     Benign essential HTN     Benzodiazepine intoxication     Borderline diabetes mellitus     Cannabis abuse     Cauda equina syndrome  (H)     Chest pain on breathing     Cyclic vomiting syndrome     Cyst of right ovary     Elevated LFTs     Hyperlipidemia with target low density lipoprotein (LDL) cholesterol less than 100 mg/dL     Insomnia     Low back pain     Major depressive disorder, recurrent episode, severe (H)     Opioid type dependence, in remission     Mild intermittent asthma     Obesity     Panic disorder with agoraphobia     OCD (obsessive compulsive disorder)     Severe benzodiazepine use disorder (H)     S/P lumbar microdiscectomy     S/P laparoscopic cholecystectomy     Personality disorder (H)     Type 2 diabetes mellitus without complication (H)     Uncontrolled insulin dependent diabetes mellitus (H)     Obesity (BMI 35.0-39.9) with comorbidity (H)     Past Surgical History:   Procedure Laterality Date     BACK SURGERY      x2     CHOLECYSTECTOMY       ORTHOPEDIC SURGERY       SALPINGO OOPHORECTOMY,R/L/ELDA Right        Social History     Tobacco Use     Smoking status: Never Smoker     Smokeless tobacco: Never Used   Substance Use Topics     Alcohol use: No     Frequency: Never     Family History   Problem Relation Age of Onset     Diabetes Mother      Cancer Mother      Cancer Maternal Grandmother      Diabetes Maternal Grandmother          Current Outpatient Medications   Medication Sig Dispense Refill     blood glucose (NO BRAND SPECIFIED) lancets standard Use to test blood sugar 4 times daily or as directed. 100 each 0     blood glucose (NO BRAND SPECIFIED) test strip Use to test blood sugar 4 times daily or as directed. 100 each 0     blood glucose monitoring (NO BRAND SPECIFIED) meter device kit Use to test blood sugar 4 times daily or as directed. 1 kit 0     blood glucose monitoring (NO BRAND SPECIFIED) meter device kit Use to test blood sugar 4 times daily or as directed. 1 kit 0     gabapentin (NEURONTIN) 300 MG capsule TAKE 1 CAP BY MOUTH ONCE DAILY AS NEEDED FOR ANXIETY ; TAKE 1-2 CAPS (300MG-600M G) BY MOUTH AT  BEDTIME AS NEEDED FOR INSOMNIA 90 capsule 1     hydrOXYzine (ATARAX) 25 MG tablet TAKE 1-2 TABLETS BY MOUTH THREE TIMES DAILY AS NEEDED 180 tablet 1     insulin aspart (NOVOLOG PEN) 100 UNIT/ML pen Inject 1-10 Units Subcutaneous 3 times daily (before meals) Pre-Meal  - 164 give 1 unit.   Pre-Meal  - 189 give 2 units.   Pre-Meal  - 214 give 3 units.   Pre-Meal  - 239 give 4 units.   Pre-Meal  - 264 give 5 units.   Pre-Meal  - 289 give 6 units.   Pre-Meal  - 314 give 7 units.   Pre-Meal  - 339 give 8 units.   Pre-Meal  - 364 give 9 units.  Pre-Meal BG > or equal to 365 give 10 units 12 mL 0     insulin glargine (BASAGLAR KWIKPEN) 100 UNIT/ML pen Inject 35 Units Subcutaneous daily 12 mL 0     insulin glargine (LANTUS SOLOSTAR PEN) 100 UNIT/ML pen Inject 35 Units Subcutaneous daily (with dinner) 12 mL 0     lisinopril (PRINIVIL/ZESTRIL) 30 MG tablet Take 1 tablet (30 mg) by mouth daily 30 tablet 0     methadone HCl 10 MG/5ML SOLN Take 110 mg by mouth daily       mirtazapine (REMERON) 30 MG tablet Take 1 tablet (30 mg) by mouth At Bedtime 30 tablet 0     OLANZapine (ZYPREXA) 5 MG tablet Take 1 tablet (5 mg) by mouth At Bedtime 30 tablet 0     ondansetron (ZOFRAN-ODT) 4 MG ODT tab Take 1-2 tablets (4-8 mg) by mouth every 6 hours as needed for nausea or vomiting 20 tablet 0     pioglitazone (ACTOS) 30 MG tablet Take 1 tablet (30 mg) by mouth daily 30 tablet 0     ranitidine (ZANTAC) 150 MG tablet Take 1 tablet (150 mg) by mouth 2 times daily 60 tablet 0     No Known Allergies  Recent Labs   Lab Test 03/13/19  0747 03/07/19  0848 03/06/19  1322 03/04/19  1132 02/22/19  0730 02/21/19  1354   A1C  --   --   --   --  8.5*  --    LDL  --   --   --   --  99  --    HDL  --   --   --   --  57  --    TRIG  --   --   --   --  84  --    ALT  --  109* 131* 140*  --  194*   CR 0.67 0.79 0.93 0.83  --  0.66   GFRESTIMATED >90 >90 76 88  --  >90   GFRESTBLACK >90 >90 88 >90  --  >90    POTASSIUM 4.7 4.4 4.4 4.0  --  4.5   TSH  --   --   --   --   --  0.82      BP Readings from Last 3 Encounters:   05/15/19 118/87   04/16/19 104/67   03/13/19 120/86    Wt Readings from Last 3 Encounters:   05/15/19 110.9 kg (244 lb 9.6 oz)   04/16/19 115.6 kg (254 lb 14.4 oz)   03/12/19 104.8 kg (231 lb)                  Labs reviewed in EPIC    ROS:  Constitutional, HEENT, cardiovascular, pulmonary, GI, , musculoskeletal, neuro, skin, endocrine and psych systems are negative, except as otherwise noted.    OBJECTIVE:     /87   Pulse 84   Temp 97.6  F (36.4  C) (Oral)   Resp 18   Wt 110.9 kg (244 lb 9.6 oz)   SpO2 100%   BMI 36.12 kg/m    Body mass index is 36.12 kg/m .  GENERAL: healthy, obese, alert and no distress  RESP: lungs clear to auscultation - no rales, rhonchi or wheezes  CV: regular rate and rhythm, normal S1 S2, no S3 or S4, no murmur, click or rub, no peripheral edema and peripheral pulses strong  ABDOMEN: soft, nontender, no hepatosplenomegaly, no masses and bowel sounds normal  MS: no gross musculoskeletal defects noted, no edema  SKIN: no suspicious lesions or rashes  NEURO: Normal strength and tone, mentation intact and speech normal  PSYCH: MENTAL STATUS EXAM  Appearance: appropriate, smells of cigarettes  Attitude: cooperative  Behavior: normal  Eye Contact: normal  Speech: normal  Orientation: oriented to person , place, time and situation  Mood: states his mood has been stable/ good   Affect: anxious and pleasant, agreeable   Thought Process: clear     Diagnostic Test Results:  none     ASSESSMENT/PLAN:         ICD-10-CM    1. SANDIE (generalized anxiety disorder) F41.1 gabapentin (NEURONTIN) 300 MG capsule     hydrOXYzine (ATARAX) 25 MG tablet   2. Substance abuse (H) F19.10    3. Moderate episode of recurrent major depressive disorder (H) F33.1 mirtazapine (REMERON) 30 MG tablet     OLANZapine (ZYPREXA) 5 MG tablet   4. Uncontrolled type 2 diabetes mellitus with hyperglycemia (H)  E11.65 insulin glargine (BASAGLAR KWIKPEN) 100 UNIT/ML pen     insulin aspart (NOVOLOG PEN) 100 UNIT/ML pen     pioglitazone (ACTOS) 30 MG tablet     blood glucose (NO BRAND SPECIFIED) test strip   5. Benign essential hypertension I10 lisinopril (PRINIVIL/ZESTRIL) 30 MG tablet   6. Gastroesophageal reflux disease with esophagitis K21.0 ondansetron (ZOFRAN-ODT) 4 MG ODT tab     ranitidine (ZANTAC) 150 MG tablet   here for follow up and waiting to get into different treatment program, has psych appointment set up, refilled meds to bridge until then  Sleep ongoing issue, substance abuse likely contributing, working on better self care, trial of hydroxyzine  mg at night for sleep, follow up with Psych for plan  DM will need work, pt reports control is improving addressed very briefly today and did refils for next month, would consider MTM, pt declines at this time. Schedule DM visit with me within the next month  also needs health maintenance exam with pap, recommended she schedule and she agrees to do once she knows the plan for treatment     Patient Instructions   Work on relaxation, the Soft belly meditation to relax all your stomach muscles and work on belly breathing when feeling anxious    The medications were all refilled, please return within a month for a complete physical with pap and Diabetes       BENNY Diaz [facility]

## 2019-05-15 NOTE — TELEPHONE ENCOUNTER
[facility] is requesting clarification on test strip, pt has medicare partB, will need change in notes or reason for testing 4 times a day    Please call [phone]

## 2019-05-15 NOTE — PATIENT INSTRUCTIONS
Work on relaxation, the Soft belly meditation to relax all your stomach muscles and work on belly breathing when feeling anxious    The medications were all refilled, please return within a month for a complete physical with pap and Diabetes

## 2019-05-16 ASSESSMENT — ASTHMA QUESTIONNAIRES: ACT_TOTALSCORE: 25

## 2019-05-31 ENCOUNTER — OFFICE VISIT (OUTPATIENT)
Dept: PSYCHIATRY | Facility: CLINIC | Age: 44
End: 2019-05-31
Attending: PSYCHIATRY & NEUROLOGY
Payer: MEDICARE

## 2019-05-31 VITALS
SYSTOLIC BLOOD PRESSURE: 116 MMHG | DIASTOLIC BLOOD PRESSURE: 75 MMHG | BODY MASS INDEX: 36.77 KG/M2 | HEART RATE: 75 BPM | WEIGHT: 249 LBS

## 2019-05-31 DIAGNOSIS — F33.1 MODERATE EPISODE OF RECURRENT MAJOR DEPRESSIVE DISORDER (H): ICD-10-CM

## 2019-05-31 DIAGNOSIS — F41.1 GAD (GENERALIZED ANXIETY DISORDER): Primary | ICD-10-CM

## 2019-05-31 PROCEDURE — G0463 HOSPITAL OUTPT CLINIC VISIT: HCPCS | Mod: ZF

## 2019-05-31 RX ORDER — OLANZAPINE 5 MG/1
TABLET ORAL
Qty: 60 TABLET | Refills: 1 | Status: SHIPPED | OUTPATIENT
Start: 2019-05-31 | End: 2019-08-14

## 2019-05-31 RX ORDER — GABAPENTIN 300 MG/1
CAPSULE ORAL
Qty: 90 CAPSULE | Refills: 1 | Status: SHIPPED | OUTPATIENT
Start: 2019-05-31 | End: 2019-08-14

## 2019-05-31 RX ORDER — HYDROXYZINE HYDROCHLORIDE 25 MG/1
TABLET, FILM COATED ORAL
Qty: 180 TABLET | Refills: 1 | Status: SHIPPED | OUTPATIENT
Start: 2019-05-31 | End: 2019-08-14

## 2019-05-31 RX ORDER — MIRTAZAPINE 30 MG/1
30 TABLET, FILM COATED ORAL AT BEDTIME
Qty: 30 TABLET | Refills: 1 | Status: SHIPPED | OUTPATIENT
Start: 2019-05-31 | End: 2019-08-14

## 2019-05-31 RX ORDER — ESCITALOPRAM OXALATE 10 MG/1
TABLET ORAL
Qty: 30 TABLET | Refills: 1 | Status: SHIPPED | OUTPATIENT
Start: 2019-05-31 | End: 2019-07-29

## 2019-05-31 ASSESSMENT — PAIN SCALES - GENERAL: PAINLEVEL: NO PAIN (0)
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
## 2019-05-31 NOTE — PATIENT INSTRUCTIONS
Lexapro (escitalopram) - take half tab daily x 4 days, then take one tab daily    Zyprexa - take 1.5 tabs daily at bedtime, and also may take half daily as needed for anxiety    Kathryn Jay is the  who will be in touch    Please contact [facility] for an appointment.      Thank you for coming to the PSYCHIATRY CLINIC.    Lab Testing:  If you had lab testing today and your results are reassuring or normal they will be mailed to you or sent through [employer] within 7 days.   If the lab tests need quick action we will call you with the results.  The phone number we will call with results is # [phone] (home) . If this is not the best number please call our clinic and change the number.    Medication Refills:  If you need any refills please call your pharmacy and they will contact us. Our fax number for refills is [phone]. Please allow three business for refill processing.   If you need to  your refill at a new pharmacy, please contact the new pharmacy directly. The new pharmacy will help you get your medications transferred.     Scheduling:  If you have any concerns about today's visit or wish to schedule another appointment please call our office during normal business hours [phone] (8-5:00 M-F)    Contact Us:  Please call [phone] during business hours (8-5:00 M-F).  If after clinic hours, or on the weekend, please call  [phone].    Financial Assistance [phone]  [facility] Billing [phone]  [facility] Billing [phone]  Medical Records [phone]      MENTAL HEALTH CRISIS NUMBERS:  [facility]:   [facility] - [phone]   Crisis Residence [facility] [place] - [facility] - [phone]   Walk-In Counseling [facility] - [phone]   [facility]E [facility] [place] Mobile Team for Adults - [[phone]]; Child - [[phone]]        [facility]:   [facility] - [phone]   Walk-in counseling [facility]  [place] - [phone]   Walk-in counseling [facility] - [phone]   Crisis Residence [facility] - [phone]   Urgent Care Adult Mental Health:   --Drop-in, 24/7 crisis line, and [facility] Mobile Team [[phone]]    CRISIS TEXT LINE: Text [phone] from anywhere, anytime, any crisis 24/7;    OR SEE [email or web address]     Poison Control Center - [phone]    CHILD: [facility] needs assessment team - [phone]     [facility] - [phone]; or [employer] [facility] - [phone]    If you have a medical emergency please call 911or go to the nearest ER.                    _____________________________________________    Again thank you for choosing PSYCHIATRY CLINIC and please let us know how we can best partner with you to improve you and your family's health.  You may be receiving a survey in the mail regarding this appointment. We would love to have your feedback, both positive and negative, so please fill out the survey and return it using the provided envelope. The survey is done by an external company, so your answers are anonymous.

## 2019-06-03 ENCOUNTER — TELEPHONE (OUTPATIENT)
Dept: PSYCHIATRY | Facility: CLINIC | Age: 44
End: 2019-06-03

## 2019-06-03 NOTE — TELEPHONE ENCOUNTER
Social Work   Incoming/Outgoing Call  [facility]    Outgoing Call To: Yun Leonides    Reason for Call:  Referral from Dr. Denny for housing resources.    Response/Plan:  Patient open to resource ideas. She is currently staying at her daughters, which is a safe place for her. Prior to this, she stayed with her brother which was not as good for her needs. She receives social security and needs to find an apartment that is subsidized.    SW provided information on Housing Link ([employer]), including information that 3 HRAs in the areas will have open lotteries for voucher waitlists June 12-18. SW also reviewed how to look for subsidized buildings with waitlists via the website.    SW also provided information on [employer] and [employer]. Patient has access to a computer and feels she can start process. SW provided direct contact information if patient encounters difficulties in housing search.      Will route to patient's current psychiatric provider(s) as an FYI.   Please call or EPIC message with any questions or concerns.    YANG Boyle, [facility]  [phone]          ----- Message from Angel Denny MD sent at 5/31/2019 11:50 AM CDT -----  This patient was recently inpatient and just had intake at outpatient clinic.  She is homeless and staying with her adult daughter.  Can you help her with resources for living arrangements?    Thank you.    Angel Denny MD

## 2019-06-03 NOTE — TELEPHONE ENCOUNTER
On 6/3/2019, 58 pages of Records recvd from [facility]. The original copies were sent to scanning and copies were put in dr. Denny's folder for review.Patricia Murry LPN

## 2019-06-04 ASSESSMENT — PATIENT HEALTH QUESTIONNAIRE - PHQ9: SUM OF ALL RESPONSES TO PHQ QUESTIONS 1-9: 25

## 2019-06-06 ENCOUNTER — TELEPHONE (OUTPATIENT)
Dept: PSYCHIATRY | Facility: CLINIC | Age: 44
End: 2019-06-06

## 2019-06-18 ENCOUNTER — OFFICE VISIT (OUTPATIENT)
Dept: FAMILY MEDICINE | Facility: CLINIC | Age: 44
End: 2019-06-18
Payer: MEDICARE

## 2019-06-18 VITALS
RESPIRATION RATE: 14 BRPM | TEMPERATURE: 97.6 F | OXYGEN SATURATION: 96 % | BODY MASS INDEX: 37.24 KG/M2 | DIASTOLIC BLOOD PRESSURE: 82 MMHG | HEART RATE: 118 BPM | WEIGHT: 252.2 LBS | SYSTOLIC BLOOD PRESSURE: 122 MMHG

## 2019-06-18 DIAGNOSIS — Z12.4 SCREENING FOR MALIGNANT NEOPLASM OF CERVIX: ICD-10-CM

## 2019-06-18 DIAGNOSIS — F11.21 OPIOID TYPE DEPENDENCE IN REMISSION (H): ICD-10-CM

## 2019-06-18 DIAGNOSIS — F33.2 SEVERE EPISODE OF RECURRENT MAJOR DEPRESSIVE DISORDER, WITHOUT PSYCHOTIC FEATURES (H): ICD-10-CM

## 2019-06-18 DIAGNOSIS — Z00.00 ROUTINE GENERAL MEDICAL EXAMINATION AT A HEALTH CARE FACILITY: Primary | ICD-10-CM

## 2019-06-18 DIAGNOSIS — E11.65 UNCONTROLLED TYPE 2 DIABETES MELLITUS WITH HYPERGLYCEMIA (H): ICD-10-CM

## 2019-06-18 DIAGNOSIS — I10 BENIGN ESSENTIAL HYPERTENSION: ICD-10-CM

## 2019-06-18 DIAGNOSIS — E66.01 MORBID OBESITY (H): ICD-10-CM

## 2019-06-18 DIAGNOSIS — J45.20 MILD INTERMITTENT ASTHMA WITHOUT COMPLICATION: ICD-10-CM

## 2019-06-18 LAB — HBA1C MFR BLD: 10.4 % (ref 0–5.6)

## 2019-06-18 PROCEDURE — G0145 SCR C/V CYTO,THINLAYER,RESCR: HCPCS | Performed by: NURSE PRACTITIONER

## 2019-06-18 PROCEDURE — 82043 UR ALBUMIN QUANTITATIVE: CPT | Performed by: NURSE PRACTITIONER

## 2019-06-18 PROCEDURE — 99396 PREV VISIT EST AGE 40-64: CPT | Performed by: NURSE PRACTITIONER

## 2019-06-18 PROCEDURE — 87624 HPV HI-RISK TYP POOLED RSLT: CPT | Performed by: NURSE PRACTITIONER

## 2019-06-18 PROCEDURE — 83036 HEMOGLOBIN GLYCOSYLATED A1C: CPT | Performed by: NURSE PRACTITIONER

## 2019-06-18 PROCEDURE — 80053 COMPREHEN METABOLIC PANEL: CPT | Performed by: NURSE PRACTITIONER

## 2019-06-18 PROCEDURE — 36415 COLL VENOUS BLD VENIPUNCTURE: CPT | Performed by: NURSE PRACTITIONER

## 2019-06-18 PROCEDURE — 99214 OFFICE O/P EST MOD 30 MIN: CPT | Mod: 25 | Performed by: NURSE PRACTITIONER

## 2019-06-18 RX ORDER — LISINOPRIL 30 MG/1
30 TABLET ORAL DAILY
Qty: 90 TABLET | Refills: 0 | Status: SHIPPED | OUTPATIENT
Start: 2019-06-18 | End: 2019-09-11

## 2019-06-18 NOTE — LETTER
June 26, 2019    Yun Coyle  [address] [address]    Dear ,  This letter is regarding your recent Pap smear (cervical cancer screening) and Human Papillomavirus (HPV) test.  We are happy to inform you that your Pap smear result is normal. Cervical cancer is closely linked with certain types of HPV. Your results showed no evidence of high-risk HPV.  We recommend you have your next PAP smear and HPV test in 5 years.  You will still need to return to the clinic every year for an annual exam and other preventive tests.  If you have additional questions regarding this result, please call our registered nurse, Roxanne at [phone].  Sincerely,    BENNY Diaz CNP /esh

## 2019-06-18 NOTE — PROGRESS NOTES
SUBJECTIVE:   CC: Yun Coyle is an 43 year old woman who presents for preventive health visit.     Healthy Habits:    Do you get at least three servings of calcium containing foods daily (dairy, green leafy vegetables, etc.)? yes    Amount of exercise or daily activities, outside of work: 3 day(s) per week    Problems taking medications regularly No    Medication side effects: No    Have you had an eye exam in the past two years? no    Do you see a dentist twice per year? no    Do you have sleep apnea, excessive snoring or daytime drowsiness?no    Diabetes Follow-up      How often are you checking your blood sugar? Two times daily    What time of day are you checking your blood sugars (select all that apply)?  Before meals    Have you had any blood sugars above 200?  Yes     Have you had any blood sugars below 70?  No    What symptoms do you notice when your blood sugar is low?  None    What concerns do you have today about your diabetes? None     Do you have any of these symptoms? (Select all that apply)  Blurry vision     Have you had a diabetic eye exam in the last 12 months? No  Has been on sliding scale but was long overdue for care, now in treatment, adjusts her sliding scale based on her own methods she had been doing but feels most comfortable with this    History of severe mental health history noted below and opioid abuse, obesity, asthma and recently started getting back to caring for self and starting to feel better, reports no side effects or issues taking her medications    BP Readings from Last 2 Encounters:   06/18/19 122/82   05/15/19 118/87     Hemoglobin A1C (%)   Date Value   02/22/2019 8.5 (H)     LDL Cholesterol Calculated (mg/dL)   Date Value   02/22/2019 99       Diabetes Management Resources    Today's PHQ-2 Score:   PHQ-2 ( 1999 Pf[employer]) 6/18/2019   Q1: Little interest or pleasure in doing things 3   Q2: Feeling down, depressed or hopeless 3   PHQ-2 Score 6       Abuse: Current or  Past(Physical, Sexual or Emotional)- No  Do you feel safe in your environment? Yes    Social History     Tobacco Use     Smoking status: Never Smoker     Smokeless tobacco: Never Used   Substance Use Topics     Alcohol use: No     Frequency: Never     If you drink alcohol do you typically have >3 drinks per day or >7 drinks per week? No                     Reviewed orders with patient.  Reviewed health maintenance and updated orders accordingly - Yes  Lab work is in process  Labs reviewed in EPIC             Patient Active Problem List   Diagnosis     Suicidal behavior     Acute pancreatitis     Benign essential HTN     Benzodiazepine intoxication     Borderline diabetes mellitus     Cannabis abuse     Cauda equina syndrome (H)     Chest pain on breathing     Cyclic vomiting syndrome     Cyst of right ovary     Elevated LFTs     Hyperlipidemia with target low density lipoprotein (LDL) cholesterol less than 100 mg/dL     Insomnia     Low back pain     Major depressive disorder, recurrent episode, severe (H)     Opioid type dependence, in remission     Mild intermittent asthma     Obesity     Panic disorder with agoraphobia     OCD (obsessive compulsive disorder)     Severe benzodiazepine use disorder (H)     S/P lumbar microdiscectomy     S/P laparoscopic cholecystectomy     Personality disorder (H)     Type 2 diabetes mellitus without complication (H)     Uncontrolled insulin dependent diabetes mellitus (H)     Obesity (BMI 35.0-39.9) with comorbidity (H)     Past Surgical History:   Procedure Laterality Date     BACK SURGERY      x2     CHOLECYSTECTOMY       ORTHOPEDIC SURGERY       SALPINGO OOPHORECTOMY,R/L/ELDA Right        Social History     Tobacco Use     Smoking status: Never Smoker     Smokeless tobacco: Never Used   Substance Use Topics     Alcohol use: No     Frequency: Never     Family History   Problem Relation Age of Onset     Diabetes Mother      Cancer Mother      Cancer Maternal Grandmother       Diabetes Maternal Grandmother          Current Outpatient Medications   Medication Sig Dispense Refill     blood glucose (NO BRAND SPECIFIED) lancets standard Use to test blood sugar 4 times daily or as directed. 100 each 0     blood glucose (NO BRAND SPECIFIED) test strip Use to test blood sugar 3 times daily or as directed. 100 each 0     blood glucose monitoring (NO BRAND SPECIFIED) meter device kit Use to test blood sugar 4 times daily or as directed. 1 kit 0     blood glucose monitoring (NO BRAND SPECIFIED) meter device kit Use to test blood sugar 4 times daily or as directed. 1 kit 0     escitalopram (LEXAPRO) 10 MG tablet Half tab po daily x 4 days then 1 tab po daily 30 tablet 1     gabapentin (NEURONTIN) 300 MG capsule TAKE 1 CAP BY MOUTH ONCE DAILY AS NEEDED FOR ANXIETY ; TAKE 1-2 CAPS (300MG-600M G) BY MOUTH AT BEDTIME AS NEEDED FOR INSOMNIA 90 capsule 1     hydrOXYzine (ATARAX) 25 MG tablet TAKE 1-2 TABLETS BY MOUTH THREE TIMES DAILY AS NEEDED 180 tablet 1     insulin aspart (NOVOLOG PEN) 100 UNIT/ML pen Inject 1-10 Units Subcutaneous 3 times daily (before meals) Pre-Meal  - 164 give 1 unit.   Pre-Meal  - 189 give 2 units.   Pre-Meal  - 214 give 3 units.   Pre-Meal  - 239 give 4 units.   Pre-Meal  - 264 give 5 units.   Pre-Meal  - 289 give 6 units.   Pre-Meal  - 314 give 7 units.   Pre-Meal  - 339 give 8 units.   Pre-Meal  - 364 give 9 units.  Pre-Meal BG > or equal to 365 give 10 units 12 mL 0     insulin glargine (BASAGLAR KWIKPEN) 100 UNIT/ML pen Inject 35 Units Subcutaneous daily 12 mL 0     insulin pen needle (30G X 8 MM) 30G X 8 MM miscellaneous Use 3 pen needles daily or as directed. 100 each 1     lisinopril (PRINIVIL/ZESTRIL) 30 MG tablet Take 1 tablet (30 mg) by mouth daily 90 tablet 0     metFORMIN (GLUCOPHAGE) 500 MG tablet Take 1 tablet (500 mg) by mouth daily (with dinner) After a week increase to twice a day with meals 90 tablet 0      methadone HCl 10 MG/5ML SOLN Take 110 mg by mouth daily       mirtazapine (REMERON) 30 MG tablet Take 1 tablet (30 mg) by mouth At Bedtime 30 tablet 1     OLANZapine (ZYPREXA) 5 MG tablet 1.5 tabs scheduled po at bedtime and half tab po daily prn anxiety 60 tablet 1     ondansetron (ZOFRAN-ODT) 4 MG ODT tab Take 1-2 tablets (4-8 mg) by mouth every 6 hours as needed for nausea or vomiting 20 tablet 0     pioglitazone (ACTOS) 30 MG tablet Take 1 tablet (30 mg) by mouth daily 30 tablet 0     ranitidine (ZANTAC) 150 MG tablet Take 1 tablet (150 mg) by mouth 2 times daily 60 tablet 0     No Known Allergies  Recent Labs   Lab Test 06/18/19  1354 03/13/19  0747 03/07/19  0848 03/06/19  1322  02/22/19  0730 02/21/19  1354   A1C 10.4*  --   --   --   --  8.5*  --    LDL  --   --   --   --   --  99  --    HDL  --   --   --   --   --  57  --    TRIG  --   --   --   --   --  84  --    *  --  109* 131*   < >  --  194*   CR 0.94 0.67 0.79 0.93   < >  --  0.66   GFRESTIMATED 75 >90 >90 76   < >  --  >90   GFRESTBLACK 87 >90 >90 88   < >  --  >90   POTASSIUM 4.7 4.7 4.4 4.4   < >  --  4.5   TSH  --   --   --   --   --   --  0.82    < > = values in this interval not displayed.        Mammo discussed, not appropriate for or declined by this patient.    Pertinent mammograms are reviewed under the imaging tab.  History of abnormal Pap smear: NO - age 30-65 PAP every 5 years with negative HPV co-testing recommended   .pap  Reviewed and updated as needed this visit by clinical staff  Tobacco  Allergies  Meds         Reviewed and updated as needed this visit by Provider        Past Medical History:   Diagnosis Date     Anxiety      Depressive disorder      Gastroesophageal reflux disease      Hypertension      Type 2 diabetes mellitus (H)      Uncomplicated asthma       Past Surgical History:   Procedure Laterality Date     BACK SURGERY      x2     CHOLECYSTECTOMY       ORTHOPEDIC SURGERY       SALPINGO OOPHORECTOMY,R/L/ELDA  Right      OB History   No data available       ROS:  Constitutional, eye, ENT, skin, respiratory, cardiac, GI, , MSK, neuro, Psych, and allergy are normal except as otherwise noted.     OBJECTIVE:   /82   Pulse 118   Temp 97.6  F (36.4  C) (Oral)   Resp 14   Wt 114.4 kg (252 lb 3.2 oz)   SpO2 96%   BMI 37.24 kg/m    EXAM:  GENERAL: healthy, obese, alert and no distress  EYES: Eyes grossly normal to inspection, PERRL and conjunctivae and sclerae normal  HENT: ear canals and TM's normal, nose and mouth without ulcers or lesions  NECK: no adenopathy, no asymmetry, masses, or scars and thyroid normal to palpation  RESP: lungs clear to auscultation - no rales, rhonchi or wheezes  BREAST: normal without masses, tenderness or nipple discharge and no palpable axillary masses or adenopathy  CV: regular rate and rhythm, normal S1 S2, no S3 or S4, no murmur, click or rub, no peripheral edema and peripheral pulses strong  ABDOMEN: soft, nontender, no hepatosplenomegaly, no masses and bowel sounds normal limited due to obesity   (female): normal female external genitalia, normal urethral meatus, vaginal mucosa pink, moist, well rugated, and normal cervix/adnexa/uterus without masses or discharge, pap and HPV collected   MS: no gross musculoskeletal defects noted, no edema  SKIN: no suspicious lesions or rashes  NEURO: Normal strength and tone, mentation intact and speech normal  PSYCH: mentation appears normal, affect flat/agreeable and polite, well groomed     Diagnostic Test Results:  Labs reviewed in Epic  Results for orders placed or performed in visit on 06/18/19   Albumin Random Urine Quantitative with Creat Ratio   Result Value Ref Range    Creatinine Urine 68 mg/dL    Albumin Urine mg/L <5 mg/L    Albumin Urine mg/g Cr Unable to calculate due to low value 0 - 25 mg/g Cr   Hemoglobin A1c   Result Value Ref Range    Hemoglobin A1C 10.4 (H) 0 - 5.6 %   Pap imaged thin layer screen with HPV - recommended age  30 - 65 years (select HPV order below)   Result Value Ref Range    PAP NIL     Copath Report         Patient Name: MELINDA PAYNE  MR#: [id number]  Specimen #: [id number]  Collected: 6/18/2019  Received: 6/19/2019  Reported: 6/20/2019 09:53  Ordering Phy(s): ROLY VAZ    For improved result formatting, select 'View Enhanced Report Format' under   Linked Documents section.    SPECIMEN/STAIN PROCESS:  Pap imaged thin layer prep screening (Surepath, FocalPoint with guided   screening)       Pap-Cyto x 1, HPV ordered x 1    SOURCE: Cervical, endocervical  ----------------------------------------------------------------   Pap imaged thin layer prep screening (Surepath, FocalPoint with guided   screening)  SPECIMEN ADEQUACY:  Satisfactory for evaluation.  -Transformation zone component present.    CYTOLOGIC INTERPRETATION:    Negative for intraepithelial lesion or malignancy    Electronically signed out by:  SUHA James (ASCP)    CLINICAL HISTORY:    Papanicolaou Test Limitations:  Cervical cytology is a screening test with   limited sensitivity; regular  screening is criti shraddha for cancer prevention; Pap tests are primarily   effective for the diagnosis/prevention of  squamous cell carcinoma, not adenocarcinomas or other cancers.    COLLECTION SITE:  Client:  [facility]  Location: [facility] ()    The technical component of this testing was completed at the [facility], with the professional component performed   at the [facility], [address] [place] [phone] ([phone])       HPV High Risk Types DNA Cervical   Result Value Ref Range    HPV Source SurePath     HPV 16 DNA Negative NEG^Negative    HPV 18 DNA Negative NEG^Negative    Other HR HPV Negative NEG^Negative    Final Diagnosis This patient's sample is negative for HPV DNA.      Specimen Description Cervical Cells    Comprehensive metabolic panel   Result Value Ref Range    Sodium 135 133 - 144 mmol/L    Potassium 4.7 3.4 - 5.3 mmol/L    Chloride 101 94 - 109 mmol/L    Carbon Dioxide 25 20 - 32 mmol/L    Anion Gap 9 3 - 14 mmol/L    Glucose 465 (H) 70 - 99 mg/dL    Urea Nitrogen 14 7 - 30 mg/dL    Creatinine 0.94 0.52 - 1.04 mg/dL    GFR Estimate 75 >60 mL/min/[1.73_m2]    GFR Estimate If Black 87 >60 mL/min/[1.73_m2]    Calcium 8.9 8.5 - 10.1 mg/dL    Bilirubin Total 0.3 0.2 - 1.3 mg/dL    Albumin 3.9 3.4 - 5.0 g/dL    Protein Total 8.2 6.8 - 8.8 g/dL    Alkaline Phosphatase 119 40 - 150 U/L     (H) 0 - 50 U/L     (H) 0 - 45 U/L       ASSESSMENT/PLAN:       ICD-10-CM    1. Routine general medical examination at a health care facility Z00.00 Albumin Random Urine Quantitative with Creat Ratio   2. Screening for malignant neoplasm of cervix Z12.4 Pap imaged thin layer screen with HPV - recommended age 30 - 65 years (select HPV order below)     HPV High Risk Types DNA Cervical   3. Uncontrolled type 2 diabetes mellitus with hyperglycemia (H) E11.65 OPHTHALMOLOGY ADULT REFERRAL     blood glucose (NO BRAND SPECIFIED) test strip     metFORMIN (GLUCOPHAGE) 500 MG tablet     Comprehensive metabolic panel     insulin pen needle (30G X 8 MM) 30G X 8 MM miscellaneous     ENDOCRINOLOGY ADULT REFERRAL     atorvastatin (LIPITOR) 20 MG tablet     OFFICE/OUTPT VISIT,EST,LEVL IV   4. Benign essential hypertension I10 lisinopril (PRINIVIL/ZESTRIL) 30 MG tablet     Comprehensive metabolic panel     OFFICE/OUTPT VISIT,EST,LEVL IV   5. Obesity (BMI 35.0-39.9) with comorbidity (H) E66.01 OFFICE/OUTPT VISIT,EST,LEVL IV   6. Mild intermittent asthma without complication J45.20 OFFICE/OUTPT VISIT,EST,LEVL IV   7. Opioid type dependence, in remission F11.21 OFFICE/OUTPT VISIT,EST,LEVL IV   8. Severe episode of recurrent major depressive disorder, without psychotic features (H) F33.2 OFFICE/OUTPT  "VISIT,EST,LEVL IV   health maintenance exam today with complex history noted above, in treatment for opioid abuse and was overdue for care.     DM needing work, refilled supplies, will refer to specialist, recommended DM Ed RN and she is not sure she can get there due to living in treatment center, a bit unclear how much help she has there but has been good at making it to her appointments with me. Also referred for MTM to help follow for polypharmacy and DM visits in between, of note, they were not able to reach her via phone so will refer to Endocrinology and have our RNs continue to reach out and update any contact info if able, also noted not on a statin, will start and monitor for any side effects     Psych-complex mental health and substance abuse in new remission, reports is doing well, keep up the good work in treatment. Follow up with Psych, referral already in place. Good self care and exercise discussed    Asthma visit every 6 months, stable at this time but due for focused visit we did not do today    COUNSELING:   Reviewed preventive health counseling, as reflected in patient instructions       Regular exercise       Healthy diet/nutrition       Vision screening       Osteoporosis Prevention/Bone Health       Safe sex practices/STD prevention    Estimated body mass index is 37.24 kg/m  as calculated from the following:    Height as of 4/16/19: 1.753 m (5' 9\").    Weight as of this encounter: 114.4 kg (252 lb 3.2 oz).    Weight management plan: Patient referred to endocrine and/or weight management specialty     reports that she has never smoked. She has never used smokeless tobacco.      Counseling Resources:  ATP IV Guidelines  Pooled Cohorts Equation Calculator  Breast Cancer Risk Calculator  FRAX Risk Assessment  ICSI Preventive Guidelines  Dietary Guidelines for Americans, 2010  USDA's MyPlate  ASA Prophylaxis  Lung CA Screening    BENNY Diaz CNP  [facility] [place]  "

## 2019-06-18 NOTE — LETTER
June 27, 2019      Yun Payne  [address] [facility] [address]        Dear ,    We are writing to inform you of your test results. Keke Montes has reviewed your lab results. She states your glucose was indeed elevated, to a critical high level. The sliding scale is not working well enough. You need to come in to see Keke or [facility] pharmacist ASAP to adjust your insulin.  Your liver enzymes are also very high, so she would like to recheck these within the next couple weeks. The clinic number and MTM are listed below. Please contact the clinic as soon as you are able to follow up on these results.     - [facility] Pharmacist: [phone]  - [facility] - [phone]  Resulted Orders   Albumin Random Urine Quantitative with Creat Ratio   Result Value Ref Range    Creatinine Urine 68 mg/dL    Albumin Urine mg/L <5 mg/L    Albumin Urine mg/g Cr Unable to calculate due to low value 0 - 25 mg/g Cr   Hemoglobin A1c   Result Value Ref Range    Hemoglobin A1C 10.4 (H) 0 - 5.6 %      Comment:      Normal <5.7% Prediabetes 5.7-6.4%  Diabetes 6.5% or higher - adopted from ADA   consensus guidelines.     Pap imaged thin layer screen with HPV - recommended age 30 - 65 years (select HPV order below)   Result Value Ref Range    PAP NIL     Copath Report         Patient Name: YUN PAYNE  MR#: [id number]  Specimen #: [id number]  Collected: 6/18/2019  Received: 6/19/2019  Reported: 6/20/2019 09:53  Ordering Phy(s): KEKE MONTES    For improved result formatting, select 'View Enhanced Report Format' under   Linked Documents section.    SPECIMEN/STAIN PROCESS:  Pap imaged thin layer prep screening (Surepath, FocalPoint with guided   screening)       Pap-Cyto x 1, HPV ordered x 1    SOURCE: Cervical, endocervical  ----------------------------------------------------------------   Pap imaged thin layer prep screening (Surepath, FocalPoint with guided   screening)  SPECIMEN ADEQUACY:  Satisfactory  for evaluation.  -Transformation zone component present.    CYTOLOGIC INTERPRETATION:    Negative for intraepithelial lesion or malignancy    Electronically signed out by:  SUHA James (ASCP)    CLINICAL HISTORY:    Papanicolaou Test Limitations:  Cervical cytology is a screening test with   limited sensitivity; regular  screening is criti shraddha for cancer prevention; Pap tests are primarily   effective for the diagnosis/prevention of  squamous cell carcinoma, not adenocarcinomas or other cancers.    COLLECTION SITE:  Client:  [facility]  Location: [facility] ([facility])    The technical component of this testing was completed at the [facility], with the professional component performed   at the [facility], [address] [place] [phone] ([phone])       HPV High Risk Types DNA Cervical   Result Value Ref Range    HPV Source SurePath     HPV 16 DNA Negative NEG^Negative    HPV 18 DNA Negative NEG^Negative    Other HR HPV Negative NEG^Negative    Final Diagnosis This patient's sample is negative for HPV DNA.       Comment:      This test was developed and its performance characteristics determined by the   [facility]. It   has not been cleared or approved by the FDA. The laboratory is regulated under   CLIA as qualified to perform high-complexity testing. This test is used for   clinical purposes. It should not be regarded as investigational or for   research.  (Note)  METHODOLOGY:  The Roche joel 4800 system uses automated extraction,   simultaneous amplification of HPV (L1 region) and beta-globin,    followed by  real time detection of fluorescent labeled HPV and beta   globin using specific oligonucleotide probes . The test specifically   identifies types HPV 16 DNA and HPV 18 DNA while  concurrently   detecting the rest of the high risk types (31, 33, 35, 39, 45, 51,   52, 56, 58, 59, 66 or 68).  COMMENTS:  This test is not intended for use as a screening device   for women under age 30 with normal cervical cytology.  Results should   be correl  ated with cytologic and histologic findings. Close clinical   followup is recommended.      Specimen Description Cervical Cells       Comment:      [id number]   Comprehensive metabolic panel   Result Value Ref Range    Sodium 135 133 - 144 mmol/L    Potassium 4.7 3.4 - 5.3 mmol/L    Chloride 101 94 - 109 mmol/L    Carbon Dioxide 25 20 - 32 mmol/L    Anion Gap 9 3 - 14 mmol/L    Glucose 465 (H) 70 - 99 mg/dL      Comment:      Results confirmed by repeat test  Critical Value called to and read back by  [facility] ON 06.19.19 BY KM 0914      Urea Nitrogen 14 7 - 30 mg/dL    Creatinine 0.94 0.52 - 1.04 mg/dL    GFR Estimate 75 >60 mL/min/[1.73_m2]      Comment:      Non  GFR Calc  Starting 12/18/2018, serum creatinine based estimated GFR (eGFR) will be   calculated using the Chronic Kidney Disease Epidemiology Collaboration   (CKD-EPI) equation.      GFR Estimate If Black 87 >60 mL/min/[1.73_m2]      Comment:       GFR Calc  Starting 12/18/2018, serum creatinine based estimated GFR (eGFR) will be   calculated using the Chronic Kidney Disease Epidemiology Collaboration   (CKD-EPI) equation.      Calcium 8.9 8.5 - 10.1 mg/dL    Bilirubin Total 0.3 0.2 - 1.3 mg/dL    Albumin 3.9 3.4 - 5.0 g/dL    Protein Total 8.2 6.8 - 8.8 g/dL    Alkaline Phosphatase 119 40 - 150 U/L     (H) 0 - 50 U/L     (H) 0 - 45 U/L       If you have any questions or concerns, please call the clinic at the number listed above.       Sincerely,  BENNY Diaz CNP

## 2019-06-19 LAB
ALBUMIN SERPL-MCNC: 3.9 G/DL (ref 3.4–5)
ALP SERPL-CCNC: 119 U/L (ref 40–150)
ALT SERPL W P-5'-P-CCNC: 207 U/L (ref 0–50)
ANION GAP SERPL CALCULATED.3IONS-SCNC: 9 MMOL/L (ref 3–14)
AST SERPL W P-5'-P-CCNC: 133 U/L (ref 0–45)
BILIRUB SERPL-MCNC: 0.3 MG/DL (ref 0.2–1.3)
BUN SERPL-MCNC: 14 MG/DL (ref 7–30)
CALCIUM SERPL-MCNC: 8.9 MG/DL (ref 8.5–10.1)
CHLORIDE SERPL-SCNC: 101 MMOL/L (ref 94–109)
CO2 SERPL-SCNC: 25 MMOL/L (ref 20–32)
CREAT SERPL-MCNC: 0.94 MG/DL (ref 0.52–1.04)
CREAT UR-MCNC: 68 MG/DL
GFR SERPL CREATININE-BSD FRML MDRD: 75 ML/MIN/{1.73_M2}
GLUCOSE SERPL-MCNC: 465 MG/DL (ref 70–99)
MICROALBUMIN UR-MCNC: <5 MG/L
MICROALBUMIN/CREAT UR: NORMAL MG/G CR (ref 0–25)
POTASSIUM SERPL-SCNC: 4.7 MMOL/L (ref 3.4–5.3)
PROT SERPL-MCNC: 8.2 G/DL (ref 6.8–8.8)
SODIUM SERPL-SCNC: 135 MMOL/L (ref 133–144)

## 2019-06-20 ENCOUNTER — TELEPHONE (OUTPATIENT)
Dept: FAMILY MEDICINE | Facility: CLINIC | Age: 44
End: 2019-06-20

## 2019-06-20 LAB
COPATH REPORT: NORMAL
PAP: NORMAL

## 2019-06-20 NOTE — TELEPHONE ENCOUNTER
MTM referral from: [facility] visit (referral by provider)    MTM referral outreach attempt #2 on June 20, 2019 at 4:31 PM      Outcome: Patient not reachable after several attempts, will route to MTM Pharmacist/Provider as an FYI. Thank you for the referral.    Dana Edmondson, MTM Coordinator

## 2019-06-21 LAB
FINAL DIAGNOSIS: NORMAL
HPV HR 12 DNA CVX QL NAA+PROBE: NEGATIVE
HPV16 DNA SPEC QL NAA+PROBE: NEGATIVE
HPV18 DNA SPEC QL NAA+PROBE: NEGATIVE
SPECIMEN DESCRIPTION: NORMAL
SPECIMEN SOURCE CVX/VAG CYTO: NORMAL

## 2019-06-27 NOTE — TELEPHONE ENCOUNTER
Olanzapine 5mg    PRECERT REQUIRED [phone]  MAXIMUM DAILY DOSE OF 1.0000      
Please forward to staff working with Angel Denny at [facility]    thanks  
Writer called [facility] to explore status of pt's olanzapine. Rachel reported that it was transferred to [place]. [phone] (José Luis Ashton).    Marybel at [facility] identified that on 6/6 they filled olanzapine 2.5 mg (1 daily) and 7.5 mg (1 at bedtime) with no PA needed for Medicare Part D.    Routed to provider.    No further action needed.    
Parent(s)

## 2019-07-03 ENCOUNTER — OFFICE VISIT (OUTPATIENT)
Dept: PSYCHIATRY | Facility: CLINIC | Age: 44
End: 2019-07-03
Attending: PSYCHIATRY & NEUROLOGY
Payer: MEDICARE

## 2019-07-03 VITALS
BODY MASS INDEX: 37.21 KG/M2 | HEART RATE: 65 BPM | SYSTOLIC BLOOD PRESSURE: 130 MMHG | WEIGHT: 252 LBS | DIASTOLIC BLOOD PRESSURE: 92 MMHG

## 2019-07-03 DIAGNOSIS — F33.1 MODERATE EPISODE OF RECURRENT MAJOR DEPRESSIVE DISORDER (H): Primary | ICD-10-CM

## 2019-07-03 PROCEDURE — 93010 ELECTROCARDIOGRAM REPORT: CPT | Performed by: INTERNAL MEDICINE

## 2019-07-03 PROCEDURE — G0463 HOSPITAL OUTPT CLINIC VISIT: HCPCS | Mod: ZF

## 2019-07-03 ASSESSMENT — PAIN SCALES - GENERAL: PAINLEVEL: NO PAIN (0)

## 2019-07-03 NOTE — NURSING NOTE
Chief Complaint   Patient presents with     Recheck Medication     OCD (obsessive compulsive disorder)
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
## 2019-07-03 NOTE — PATIENT INSTRUCTIONS
Thank you for coming to the PSYCHIATRY CLINIC.    Lab Testing:  If you had lab testing today and your results are reassuring or normal they will be mailed to you or sent through [facility] within 7 days.   If the lab tests need quick action we will call you with the results.  The phone number we will call with results is # [phone] (home) . If this is not the best number please call our clinic and change the number.    Medication Refills:  If you need any refills please call your pharmacy and they will contact us. Our fax number for refills is [phone]. Please allow three business for refill processing.   If you need to  your refill at a new pharmacy, please contact the new pharmacy directly. The new pharmacy will help you get your medications transferred.     Scheduling:  If you have any concerns about today's visit or wish to schedule another appointment please call our office during normal business hours [phone] (8-5:00 M-F)    Contact Us:  Please call [phone] during business hours (8-5:00 M-F).  If after clinic hours, or on the weekend, please call  [phone].    Financial Assistance [phone]  [facility] Billing [phone]  Central Billing Office, [facility]: [phone]  [facility] Billing [phone]  Medical Records [phone]      MENTAL HEALTH CRISIS NUMBERS:  [facility]:   [facility] - [phone]   Crisis Residence [facility] - [phone]   Walk-In Counseling [facility] [phone]   [facility] 24/7 [place] Mobile Team for Adults - [[phone]]; Child - [[phone]]        [facility]:   [facility] - [phone]   Walk-in counseling [facility] - [phone]   Walk-in counseling [facility] - [phone]   Crisis Residence [facility] - [phone]   Urgent Care Adult Mental Health:   --Drop-in, 24/7 crisis line, and [place] Mobile Team  [[phone]]    CRISIS TEXT LINE: Text [phone] from anywhere, anytime, any crisis 24/7;    OR SEE [email or web address]     Poison Control Center - [phone]    CHILD: [facility] needs assessment team - [phone]     [facility] - [phone]; or [facility] - [phone]    If you have a medical emergency please call 911or go to the nearest ER.                    _____________________________________________    Again thank you for choosing PSYCHIATRY CLINIC and please let us know how we can best partner with you to improve you and your family's health.  You may be receiving a survey in the mail regarding this appointment. We would love to have your feedback, both positive and negative, so please fill out the survey and return it using the provided envelope. The survey is done by an external company, so your answers are anonymous.

## 2019-07-05 ASSESSMENT — PATIENT HEALTH QUESTIONNAIRE - PHQ9: SUM OF ALL RESPONSES TO PHQ QUESTIONS 1-9: 20

## 2019-07-06 RX ORDER — ATORVASTATIN CALCIUM 20 MG/1
20 TABLET, FILM COATED ORAL DAILY
Qty: 90 TABLET | Refills: 1 | Status: ON HOLD | OUTPATIENT
Start: 2019-07-06 | End: 2022-02-14

## 2019-07-10 DIAGNOSIS — F33.1 MODERATE EPISODE OF RECURRENT MAJOR DEPRESSIVE DISORDER (H): ICD-10-CM

## 2019-07-12 ENCOUNTER — TELEPHONE (OUTPATIENT)
Dept: FAMILY MEDICINE | Facility: CLINIC | Age: 44
End: 2019-07-12

## 2019-07-12 RX ORDER — ESCITALOPRAM OXALATE 10 MG/1
TABLET ORAL
Qty: 30 TABLET | Refills: 1 | OUTPATIENT
Start: 2019-07-12

## 2019-07-12 NOTE — TELEPHONE ENCOUNTER
Panel Management Review      Patient has the following on her problem list:     Depression / Dysthymia review    Measure:  Needs PHQ-9 score of 4 or less during index window.  Administer PHQ-9 and if score is 5 or more, send encounter to provider for next steps.    5 - 7 month window range: No    PHQ-9 SCORE 4/16/2019   PHQ-9 Total Score 23   Some encounter information is confidential and restricted. Go to Review Flowsheets activity to see all data.       If PHQ-9 recheck is 5 or more, route to provider for next steps.    Patient is due for:  None    Asthma review     ACT Total Scores 5/15/2019   ACT TOTAL SCORE (Goal Greater than or Equal to 20) 25   In the past 12 months, how many times did you visit the emergency room for your asthma without being admitted to the hospital? 0   In the past 12 months, how many times were you hospitalized overnight because of your asthma? 0      1. Is Asthma diagnosis on the Problem List? Yes    2. Is Asthma listed on Health Maintenance? Yes    3. Patient is due for:  AAP    Diabetes    ASA: Not Required     Last A1C  Lab Results   Component Value Date    A1C 10.4 06/18/2019    A1C 8.5 02/22/2019     A1C tested: FAILED    Last LDL:    Lab Results   Component Value Date    CHOL 173 02/22/2019     Lab Results   Component Value Date    HDL 57 02/22/2019     Lab Results   Component Value Date    LDL 99 02/22/2019     Lab Results   Component Value Date    TRIG 84 02/22/2019     No results found for: CHOLHDLRATIO  Lab Results   Component Value Date    NHDL 116 02/22/2019       Is the patient on a Statin? YES             Is the patient on Aspirin? NO    Medications     HMG CoA Reductase Inhibitors     atorvastatin (LIPITOR) 20 MG tablet             Last three blood pressure readings:  BP Readings from Last 3 Encounters:   06/18/19 122/82   05/15/19 118/87   04/16/19 104/67       Date of last diabetes office visit: 06/18/2019     Tobacco History:     History   Smoking Status     Never  Smoker   Smokeless Tobacco     Never Used           Composite cancer screening  Chart review shows that this patient is due/due soon for the following None  Summary:    Patient is due/failing the following:   EYE EXAM and AAP    Action needed:   Patient has order for Eye exam. Following up to see if she has had I done or need to schedule.    Type of outreach:    Phone, left message for patient to call back.     Questions for provider review:    None                                                                                                                                    Matthew Bonilla CMA.       Chart routed to miguel.

## 2019-07-29 DIAGNOSIS — F33.1 MODERATE EPISODE OF RECURRENT MAJOR DEPRESSIVE DISORDER (H): ICD-10-CM

## 2019-07-30 RX ORDER — OLANZAPINE 7.5 MG/1
7.5 TABLET, FILM COATED ORAL AT BEDTIME
Qty: 30 TABLET | Refills: 0 | Status: SHIPPED | OUTPATIENT
Start: 2019-07-30 | End: 2019-08-14

## 2019-07-30 RX ORDER — ESCITALOPRAM OXALATE 10 MG/1
10 TABLET ORAL DAILY
Qty: 30 TABLET | Refills: 0 | Status: SHIPPED | OUTPATIENT
Start: 2019-07-30 | End: 2019-08-14

## 2019-07-30 NOTE — TELEPHONE ENCOUNTER
Medication requested:   LEXAPRO 10 MG TAB  ZYPREXA 7.5 MG TAB  Last refilled: 7/5/19  Qty: 30      Last seen: 7/3/19  RTC: 5-6 WEEKS  Cancel: 0  No-show: 0  Next appt: 8/14/19    Refill decision:   Refilled for 30 days per protocol.

## 2019-08-14 ENCOUNTER — OFFICE VISIT (OUTPATIENT)
Dept: PSYCHIATRY | Facility: CLINIC | Age: 44
End: 2019-08-14
Attending: PSYCHIATRY & NEUROLOGY
Payer: MEDICARE

## 2019-08-14 VITALS
HEART RATE: 90 BPM | WEIGHT: 255 LBS | SYSTOLIC BLOOD PRESSURE: 119 MMHG | BODY MASS INDEX: 37.66 KG/M2 | DIASTOLIC BLOOD PRESSURE: 88 MMHG

## 2019-08-14 DIAGNOSIS — F41.1 GAD (GENERALIZED ANXIETY DISORDER): ICD-10-CM

## 2019-08-14 DIAGNOSIS — F33.1 MODERATE EPISODE OF RECURRENT MAJOR DEPRESSIVE DISORDER (H): Primary | ICD-10-CM

## 2019-08-14 PROCEDURE — G0463 HOSPITAL OUTPT CLINIC VISIT: HCPCS | Mod: ZF

## 2019-08-14 RX ORDER — ESCITALOPRAM OXALATE 20 MG/1
TABLET ORAL
Qty: 30 TABLET | Refills: 1 | Status: ON HOLD | OUTPATIENT
Start: 2019-08-14 | End: 2022-02-14

## 2019-08-14 RX ORDER — HYDROXYZINE HYDROCHLORIDE 25 MG/1
TABLET, FILM COATED ORAL
Qty: 180 TABLET | Refills: 1 | Status: ON HOLD | OUTPATIENT
Start: 2019-08-14 | End: 2022-02-14

## 2019-08-14 RX ORDER — GABAPENTIN 300 MG/1
CAPSULE ORAL
Qty: 90 CAPSULE | Refills: 1 | Status: ON HOLD | OUTPATIENT
Start: 2019-08-14 | End: 2022-02-14

## 2019-08-14 RX ORDER — OLANZAPINE 5 MG/1
TABLET ORAL
Qty: 45 TABLET | Refills: 1 | Status: ON HOLD | OUTPATIENT
Start: 2019-08-14 | End: 2022-02-14

## 2019-08-14 RX ORDER — MIRTAZAPINE 45 MG/1
TABLET, FILM COATED ORAL
Qty: 30 TABLET | Refills: 1 | Status: ON HOLD | OUTPATIENT
Start: 2019-08-14 | End: 2022-02-14

## 2019-08-14 ASSESSMENT — PAIN SCALES - GENERAL: PAINLEVEL: NO PAIN (0)
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
## 2019-09-04 ENCOUNTER — HOSPITAL ENCOUNTER (INPATIENT)
Facility: CLINIC | Age: 44
LOS: 3 days | Discharge: HOME | DRG: 637 | End: 2019-09-07
Attending: INTERNAL MEDICINE | Admitting: INTERNAL MEDICINE
Payer: MEDICARE

## 2019-09-04 DIAGNOSIS — R79.89 ELEVATED BLOOD KETONE BODY LEVEL: ICD-10-CM

## 2019-09-04 DIAGNOSIS — R73.9 HYPERGLYCEMIA: ICD-10-CM

## 2019-09-04 DIAGNOSIS — R11.2 NAUSEA AND VOMITING: ICD-10-CM

## 2019-09-04 DIAGNOSIS — E11.65 UNCONTROLLED TYPE 2 DIABETES MELLITUS WITH HYPERGLYCEMIA (H): ICD-10-CM

## 2019-09-04 DIAGNOSIS — R74.8 ELEVATED LIVER ENZYMES: ICD-10-CM

## 2019-09-04 DIAGNOSIS — K21.00 GASTROESOPHAGEAL REFLUX DISEASE WITH ESOPHAGITIS: ICD-10-CM

## 2019-09-04 DIAGNOSIS — K29.50 CHRONIC GASTRITIS WITHOUT BLEEDING, UNSPECIFIED GASTRITIS TYPE: Primary | ICD-10-CM

## 2019-09-04 DIAGNOSIS — E86.0 DEHYDRATION: ICD-10-CM

## 2019-09-04 LAB
ALBUMIN SERPL-MCNC: 3.8 G/DL (ref 3.4–5)
ALBUMIN UR-MCNC: NEGATIVE MG/DL
ALP SERPL-CCNC: 243 U/L (ref 40–150)
ALT SERPL W P-5'-P-CCNC: 132 U/L (ref 0–50)
ANION GAP SERPL CALCULATED.3IONS-SCNC: 14 MMOL/L (ref 3–14)
ANION GAP SERPL CALCULATED.3IONS-SCNC: 15 MMOL/L (ref 3–14)
APPEARANCE UR: CLEAR
AST SERPL W P-5'-P-CCNC: 68 U/L (ref 0–45)
BASOPHILS # BLD AUTO: 0 10E9/L (ref 0–0.2)
BASOPHILS NFR BLD AUTO: 0.1 %
BILIRUB SERPL-MCNC: 0.6 MG/DL (ref 0.2–1.3)
BILIRUB UR QL STRIP: NEGATIVE
BUN SERPL-MCNC: 48 MG/DL (ref 7–30)
BUN SERPL-MCNC: 53 MG/DL (ref 7–30)
CALCIUM SERPL-MCNC: 10.1 MG/DL (ref 8.5–10.1)
CALCIUM SERPL-MCNC: 9.8 MG/DL (ref 8.5–10.1)
CHLORIDE SERPL-SCNC: 108 MMOL/L (ref 94–109)
CHLORIDE SERPL-SCNC: 99 MMOL/L (ref 94–109)
CO2 BLDCOV-SCNC: 24 MMOL/L (ref 21–28)
CO2 SERPL-SCNC: 21 MMOL/L (ref 20–32)
CO2 SERPL-SCNC: 22 MMOL/L (ref 20–32)
COLOR UR AUTO: ABNORMAL
CREAT SERPL-MCNC: 0.93 MG/DL (ref 0.52–1.04)
CREAT SERPL-MCNC: 1.03 MG/DL (ref 0.52–1.04)
DIFFERENTIAL METHOD BLD: ABNORMAL
EOSINOPHIL # BLD AUTO: 0 10E9/L (ref 0–0.7)
EOSINOPHIL NFR BLD AUTO: 0.1 %
ERYTHROCYTE [DISTWIDTH] IN BLOOD BY AUTOMATED COUNT: 14 % (ref 10–15)
GFR SERPL CREATININE-BSD FRML MDRD: 67 ML/MIN/{1.73_M2}
GFR SERPL CREATININE-BSD FRML MDRD: 76 ML/MIN/{1.73_M2}
GLUCOSE BLDC GLUCOMTR-MCNC: 340 MG/DL (ref 70–99)
GLUCOSE BLDC GLUCOMTR-MCNC: 477 MG/DL (ref 70–99)
GLUCOSE BLDC GLUCOMTR-MCNC: 481 MG/DL (ref 70–99)
GLUCOSE BLDC GLUCOMTR-MCNC: 514 MG/DL (ref 70–99)
GLUCOSE BLDC GLUCOMTR-MCNC: 521 MG/DL (ref 70–99)
GLUCOSE BLDC GLUCOMTR-MCNC: 535 MG/DL (ref 70–99)
GLUCOSE SERPL-MCNC: 609 MG/DL (ref 70–99)
GLUCOSE SERPL-MCNC: 870 MG/DL (ref 70–99)
GLUCOSE UR STRIP-MCNC: >1000 MG/DL
HBA1C MFR BLD: 11.9 % (ref 0–5.6)
HCG UR QL: NEGATIVE
HCT VFR BLD AUTO: 45.9 % (ref 35–47)
HGB BLD-MCNC: 15.1 G/DL (ref 11.7–15.7)
HGB UR QL STRIP: NEGATIVE
IMM GRANULOCYTES # BLD: 0.2 10E9/L (ref 0–0.4)
IMM GRANULOCYTES NFR BLD: 0.9 %
INTERPRETATION ECG - MUSE: NORMAL
KETONES BLD-SCNC: 5.5 MMOL/L (ref 0–0.6)
KETONES UR STRIP-MCNC: 40 MG/DL
LACTATE BLD-SCNC: 2.7 MMOL/L (ref 0.7–2.1)
LEUKOCYTE ESTERASE UR QL STRIP: NEGATIVE
LIPASE SERPL-CCNC: 235 U/L (ref 73–393)
LYMPHOCYTES # BLD AUTO: 2.8 10E9/L (ref 0.8–5.3)
LYMPHOCYTES NFR BLD AUTO: 14.9 %
MCH RBC QN AUTO: 28.9 PG (ref 26.5–33)
MCHC RBC AUTO-ENTMCNC: 32.9 G/DL (ref 31.5–36.5)
MCV RBC AUTO: 88 FL (ref 78–100)
MONOCYTES # BLD AUTO: 1.1 10E9/L (ref 0–1.3)
MONOCYTES NFR BLD AUTO: 5.7 %
MUCOUS THREADS #/AREA URNS LPF: PRESENT /LPF
NEUTROPHILS # BLD AUTO: 14.8 10E9/L (ref 1.6–8.3)
NEUTROPHILS NFR BLD AUTO: 78.3 %
NITRATE UR QL: NEGATIVE
NRBC # BLD AUTO: 0 10*3/UL
NRBC BLD AUTO-RTO: 0 /100
PCO2 BLDV: 42 MM HG (ref 40–50)
PH BLDV: 7.36 PH (ref 7.32–7.43)
PH UR STRIP: 5 PH (ref 5–7)
PLATELET # BLD AUTO: 300 10E9/L (ref 150–450)
PLATELET # BLD AUTO: 329 10E9/L (ref 150–450)
PO2 BLDV: 41 MM HG (ref 25–47)
POTASSIUM SERPL-SCNC: 5.1 MMOL/L (ref 3.4–5.3)
POTASSIUM SERPL-SCNC: 5.5 MMOL/L (ref 3.4–5.3)
PROT SERPL-MCNC: 9.4 G/DL (ref 6.8–8.8)
RBC # BLD AUTO: 5.22 10E12/L (ref 3.8–5.2)
RBC #/AREA URNS AUTO: 0 /HPF (ref 0–2)
SAO2 % BLDV FROM PO2: 74 %
SODIUM SERPL-SCNC: 135 MMOL/L (ref 133–144)
SODIUM SERPL-SCNC: 144 MMOL/L (ref 133–144)
SOURCE: ABNORMAL
SP GR UR STRIP: 1 (ref 1–1.03)
SQUAMOUS #/AREA URNS AUTO: <1 /HPF (ref 0–1)
UROBILINOGEN UR STRIP-MCNC: NORMAL MG/DL (ref 0–2)
WBC # BLD AUTO: 18.9 10E9/L (ref 4–11)
WBC #/AREA URNS AUTO: 1 /HPF (ref 0–5)

## 2019-09-04 PROCEDURE — 25000131 ZZH RX MED GY IP 250 OP 636 PS 637: Mod: GY | Performed by: INTERNAL MEDICINE

## 2019-09-04 PROCEDURE — 83036 HEMOGLOBIN GLYCOSYLATED A1C: CPT | Performed by: INTERNAL MEDICINE

## 2019-09-04 PROCEDURE — 25800029 ZZH RX IP 258 OP 250: Performed by: INTERNAL MEDICINE

## 2019-09-04 PROCEDURE — 25800030 ZZH RX IP 258 OP 636: Performed by: PHYSICIAN ASSISTANT

## 2019-09-04 PROCEDURE — 93005 ELECTROCARDIOGRAM TRACING: CPT

## 2019-09-04 PROCEDURE — 96361 HYDRATE IV INFUSION ADD-ON: CPT

## 2019-09-04 PROCEDURE — 81025 URINE PREGNANCY TEST: CPT | Performed by: PHYSICIAN ASSISTANT

## 2019-09-04 PROCEDURE — 82010 KETONE BODYS QUAN: CPT | Performed by: PHYSICIAN ASSISTANT

## 2019-09-04 PROCEDURE — 83690 ASSAY OF LIPASE: CPT | Performed by: PHYSICIAN ASSISTANT

## 2019-09-04 PROCEDURE — 99285 EMERGENCY DEPT VISIT HI MDM: CPT | Mod: 25

## 2019-09-04 PROCEDURE — 25800030 ZZH RX IP 258 OP 636: Performed by: INTERNAL MEDICINE

## 2019-09-04 PROCEDURE — 96365 THER/PROPH/DIAG IV INF INIT: CPT

## 2019-09-04 PROCEDURE — 25000132 ZZH RX MED GY IP 250 OP 250 PS 637: Mod: GY | Performed by: PHYSICIAN ASSISTANT

## 2019-09-04 PROCEDURE — 25000125 ZZHC RX 250: Performed by: PHYSICIAN ASSISTANT

## 2019-09-04 PROCEDURE — 25000128 H RX IP 250 OP 636: Performed by: INTERNAL MEDICINE

## 2019-09-04 PROCEDURE — 83605 ASSAY OF LACTIC ACID: CPT

## 2019-09-04 PROCEDURE — 80048 BASIC METABOLIC PNL TOTAL CA: CPT | Performed by: INTERNAL MEDICINE

## 2019-09-04 PROCEDURE — 36415 COLL VENOUS BLD VENIPUNCTURE: CPT | Performed by: INTERNAL MEDICINE

## 2019-09-04 PROCEDURE — 99223 1ST HOSP IP/OBS HIGH 75: CPT | Mod: AI | Performed by: INTERNAL MEDICINE

## 2019-09-04 PROCEDURE — 25000132 ZZH RX MED GY IP 250 OP 250 PS 637: Mod: GY | Performed by: INTERNAL MEDICINE

## 2019-09-04 PROCEDURE — 93010 ELECTROCARDIOGRAM REPORT: CPT | Performed by: INTERNAL MEDICINE

## 2019-09-04 PROCEDURE — 12000000 ZZH R&B MED SURG/OB

## 2019-09-04 PROCEDURE — 82803 BLOOD GASES ANY COMBINATION: CPT

## 2019-09-04 PROCEDURE — 85049 AUTOMATED PLATELET COUNT: CPT | Performed by: INTERNAL MEDICINE

## 2019-09-04 PROCEDURE — 00000146 ZZHCL STATISTIC GLUCOSE BY METER IP

## 2019-09-04 PROCEDURE — 25000128 H RX IP 250 OP 636: Performed by: PHYSICIAN ASSISTANT

## 2019-09-04 PROCEDURE — 80053 COMPREHEN METABOLIC PANEL: CPT | Performed by: PHYSICIAN ASSISTANT

## 2019-09-04 PROCEDURE — 25000131 ZZH RX MED GY IP 250 OP 636 PS 637: Mod: GY | Performed by: PHYSICIAN ASSISTANT

## 2019-09-04 PROCEDURE — 85025 COMPLETE CBC W/AUTO DIFF WBC: CPT | Performed by: PHYSICIAN ASSISTANT

## 2019-09-04 PROCEDURE — 81001 URINALYSIS AUTO W/SCOPE: CPT | Performed by: PHYSICIAN ASSISTANT

## 2019-09-04 RX ORDER — GABAPENTIN 600 MG/1
600 TABLET ORAL EVERY EVENING
Status: DISCONTINUED | OUTPATIENT
Start: 2019-09-04 | End: 2019-09-07 | Stop reason: HOSPADM

## 2019-09-04 RX ORDER — METHADONE HYDROCHLORIDE 10 MG/ML
110 CONCENTRATE ORAL EVERY EVENING
Status: DISCONTINUED | OUTPATIENT
Start: 2019-09-04 | End: 2019-09-07 | Stop reason: HOSPADM

## 2019-09-04 RX ORDER — LABETALOL HYDROCHLORIDE 5 MG/ML
10 INJECTION, SOLUTION INTRAVENOUS
Status: DISCONTINUED | OUTPATIENT
Start: 2019-09-04 | End: 2019-09-05

## 2019-09-04 RX ORDER — NALOXONE HYDROCHLORIDE 0.4 MG/ML
.1-.4 INJECTION, SOLUTION INTRAMUSCULAR; INTRAVENOUS; SUBCUTANEOUS
Status: DISCONTINUED | OUTPATIENT
Start: 2019-09-04 | End: 2019-09-07 | Stop reason: HOSPADM

## 2019-09-04 RX ORDER — GABAPENTIN 300 MG/1
300 CAPSULE ORAL
Status: DISCONTINUED | OUTPATIENT
Start: 2019-09-05 | End: 2019-09-07 | Stop reason: HOSPADM

## 2019-09-04 RX ORDER — MIRTAZAPINE 15 MG/1
45 TABLET, FILM COATED ORAL AT BEDTIME
Status: DISCONTINUED | OUTPATIENT
Start: 2019-09-04 | End: 2019-09-07 | Stop reason: HOSPADM

## 2019-09-04 RX ORDER — SODIUM CHLORIDE 450 MG/100ML
1000 INJECTION, SOLUTION INTRAVENOUS CONTINUOUS
Status: DISCONTINUED | OUTPATIENT
Start: 2019-09-04 | End: 2019-09-04

## 2019-09-04 RX ORDER — ATORVASTATIN CALCIUM 10 MG/1
20 TABLET, FILM COATED ORAL DAILY
Status: DISCONTINUED | OUTPATIENT
Start: 2019-09-04 | End: 2019-09-07 | Stop reason: HOSPADM

## 2019-09-04 RX ORDER — ONDANSETRON 2 MG/ML
4 INJECTION INTRAMUSCULAR; INTRAVENOUS EVERY 30 MIN PRN
Status: DISCONTINUED | OUTPATIENT
Start: 2019-09-04 | End: 2019-09-04

## 2019-09-04 RX ORDER — ESCITALOPRAM OXALATE 10 MG/1
20 TABLET ORAL DAILY
Status: DISCONTINUED | OUTPATIENT
Start: 2019-09-04 | End: 2019-09-07 | Stop reason: HOSPADM

## 2019-09-04 RX ORDER — OLANZAPINE 5 MG/1
5 TABLET ORAL AT BEDTIME
Status: DISCONTINUED | OUTPATIENT
Start: 2019-09-04 | End: 2019-09-07 | Stop reason: HOSPADM

## 2019-09-04 RX ORDER — LIDOCAINE 40 MG/G
CREAM TOPICAL
Status: DISCONTINUED | OUTPATIENT
Start: 2019-09-04 | End: 2019-09-07 | Stop reason: HOSPADM

## 2019-09-04 RX ORDER — HYDROXYZINE HYDROCHLORIDE 25 MG/1
25 TABLET, FILM COATED ORAL EVERY 6 HOURS PRN
Status: DISCONTINUED | OUTPATIENT
Start: 2019-09-04 | End: 2019-09-07 | Stop reason: HOSPADM

## 2019-09-04 RX ORDER — DEXTROSE MONOHYDRATE 25 G/50ML
25-50 INJECTION, SOLUTION INTRAVENOUS
Status: DISCONTINUED | OUTPATIENT
Start: 2019-09-04 | End: 2019-09-04

## 2019-09-04 RX ORDER — NICOTINE POLACRILEX 4 MG
15-30 LOZENGE BUCCAL
Status: DISCONTINUED | OUTPATIENT
Start: 2019-09-04 | End: 2019-09-07 | Stop reason: HOSPADM

## 2019-09-04 RX ORDER — DEXTROSE MONOHYDRATE 25 G/50ML
25-50 INJECTION, SOLUTION INTRAVENOUS
Status: DISCONTINUED | OUTPATIENT
Start: 2019-09-04 | End: 2019-09-07 | Stop reason: HOSPADM

## 2019-09-04 RX ORDER — GABAPENTIN 300 MG/1
300 CAPSULE ORAL SEE ADMIN INSTRUCTIONS
Status: DISCONTINUED | OUTPATIENT
Start: 2019-09-04 | End: 2019-09-04

## 2019-09-04 RX ORDER — SODIUM CHLORIDE 450 MG/100ML
INJECTION, SOLUTION INTRAVENOUS CONTINUOUS
Status: DISCONTINUED | OUTPATIENT
Start: 2019-09-04 | End: 2019-09-06

## 2019-09-04 RX ADMIN — ESCITALOPRAM OXALATE 20 MG: 10 TABLET ORAL at 18:49

## 2019-09-04 RX ADMIN — SODIUM CHLORIDE 1000 ML: 9 INJECTION, SOLUTION INTRAVENOUS at 18:47

## 2019-09-04 RX ADMIN — LIDOCAINE HYDROCHLORIDE 30 ML: 20 SOLUTION ORAL; TOPICAL at 16:47

## 2019-09-04 RX ADMIN — SODIUM CHLORIDE: 4.5 INJECTION, SOLUTION INTRAVENOUS at 19:49

## 2019-09-04 RX ADMIN — OLANZAPINE 5 MG: 5 TABLET, FILM COATED ORAL at 21:22

## 2019-09-04 RX ADMIN — SODIUM CHLORIDE: 9 INJECTION, SOLUTION INTRAVENOUS at 23:17

## 2019-09-04 RX ADMIN — MIRTAZAPINE 45 MG: 15 TABLET, FILM COATED ORAL at 21:17

## 2019-09-04 RX ADMIN — SODIUM CHLORIDE 1000 ML: 9 INJECTION, SOLUTION INTRAVENOUS at 16:49

## 2019-09-04 RX ADMIN — SODIUM CHLORIDE: 9 INJECTION, SOLUTION INTRAVENOUS at 21:16

## 2019-09-04 RX ADMIN — SODIUM CHLORIDE 1000 ML: 9 INJECTION, SOLUTION INTRAVENOUS at 15:38

## 2019-09-04 RX ADMIN — ATORVASTATIN CALCIUM 20 MG: 10 TABLET, FILM COATED ORAL at 18:49

## 2019-09-04 RX ADMIN — ENOXAPARIN SODIUM 40 MG: 40 INJECTION SUBCUTANEOUS at 19:16

## 2019-09-04 RX ADMIN — METHADONE HYDROCHLORIDE 110 MG: 10 CONCENTRATE ORAL at 19:15

## 2019-09-04 RX ADMIN — SODIUM CHLORIDE 5.5 UNITS/HR: 9 INJECTION, SOLUTION INTRAVENOUS at 16:47

## 2019-09-04 RX ADMIN — SODIUM CHLORIDE 1000 ML: 9 INJECTION, SOLUTION INTRAVENOUS at 18:02

## 2019-09-04 RX ADMIN — GABAPENTIN 600 MG: 600 TABLET, FILM COATED ORAL at 21:17

## 2019-09-04 ASSESSMENT — ENCOUNTER SYMPTOMS
FEVER: 0
NAUSEA: 1
BLOOD IN STOOL: 0
DIARRHEA: 0
VOMITING: 1
CONSTIPATION: 0

## 2019-09-04 ASSESSMENT — ACTIVITIES OF DAILY LIVING (ADL): ADLS_ACUITY_SCORE: 10

## 2019-09-04 ASSESSMENT — MIFFLIN-ST. JEOR: SCORE: 1914.62

## 2019-09-04 NOTE — H&P
[facility]    History and Physical - Hospitalist Service       Date of Admission:  9/4/2019    Assessment & Plan   Yun Coyle is a 43 year old female with a history of non-compliant DM type II, opioid dependence on Methadone, HTN and GERD who presented to the ED on 9/4/2019 with N/V and diffuse abdominal pain and found to be profoundly hyperglycemic     Hyperglycemia  DM type II, non-compliant  On Friday the patient stopped taking her Metformin and Insulin as she stated she was in the process of moving and not taking care of herself.  She then developed N/V and diffuse abdominal pain.  On arrival here the patient was found to be profoundly hyperglycemic at 870 with a slight gap of 15 but normal bicarb and pH.    Last HgbA1c from 6/18/19 was 10.4.  Patient is on Metformin, Actos, Basaglar and SSI normally   - Admission to medical bed on IMC status   - Insulin drip and protocol in place  - Continue IVF per protocol   - Q6h BMP   - Diabetes educator ordered     H/o Opioid dependence on Methadone   - PTA Methadone   - PTA Neurontin     HTN   - Holding PTA Lisinopril for now     GERD   No issues currently  - PTA Zantac        Diet: Moderate CHO diet   DVT Prophylaxis: Enoxaparin (Lovenox) SQ  Blanc Catheter: not present  Code Status: DNR/DNI     Disposition Plan   Expected discharge: 2 - 3 days, recommended to prior living arrangement once adequate pain management/ tolerating PO medications and blood sugars controlled.  Entered: Riccrado Worley DO 09/04/2019, 5:08 PM     The patient's care was discussed with the Patient and ED provider.    Riccardo Worley DO  [facility]    ______________________________________________________________________    Chief Complaint   Abdominal pain, N/V    History is obtained from the patient    History of Present Illness   Yun Coyle is a 43 year old female with a history of DM type II who presented to the ED for evaluation of N/V and  "abdominal pain.  Patient states she first began to have nausea on Friday followed by 2-3 episodes of vomiting daily.  She then began to have diffuse abdominal pain that she describes as an achy pain.  On arrival to the ED the patient was profoundly hyperglycemic in the 800s and she admits to not taking her insulin or diabetes medications since Friday as she was in the process of moving and wasn't \"focusing on herself.\"  She also reports chills recently but no fevers.  She denies any chest pain, SOB, diarrhea, constipation, bloody stools, difficulties with urination, leg pain, leg swelling or headaches.    Review of Systems    The 10 point Review of Systems is negative other than noted in the HPI    Past Medical History    I have reviewed this patient's medical history and updated it with pertinent information if needed.   Past Medical History:   Diagnosis Date     Anxiety      Depressive disorder      Gastroesophageal reflux disease      Hypertension      Type 2 diabetes mellitus (H)      Uncomplicated asthma        Past Surgical History   I have reviewed this patient's surgical history and updated it with pertinent information if needed.  Past Surgical History:   Procedure Laterality Date     BACK SURGERY      x2     CHOLECYSTECTOMY       ORTHOPEDIC SURGERY       SALPINGO OOPHORECTOMY,R/L/ELDA Right        Social History   I have reviewed this patient's social history and updated it with pertinent information if needed.  Social History     Tobacco Use     Smoking status: Never Smoker     Smokeless tobacco: Never Used   Substance Use Topics     Alcohol use: No     Frequency: Never     Drug use: Yes     Types: Marijuana       Family History   I have reviewed this patient's family history and updated it with pertinent information if needed.   Family History   Problem Relation Age of Onset     Diabetes Mother      Cancer Mother      Cancer Maternal Grandmother      Diabetes Maternal Grandmother      Prior to Admission " Medications   Prior to Admission Medications   Prescriptions Last Dose Informant Patient Reported? Taking?   OLANZapine (ZYPREXA) 5 MG tablet   No No   Si tab scheduled po at bedtime and half tab po daily prn anxiety   atorvastatin (LIPITOR) 20 MG tablet   No No   Sig: Take 1 tablet (20 mg) by mouth daily   Patient not taking: Reported on 2019   blood glucose (NO BRAND SPECIFIED) lancets standard   No No   Sig: Use to test blood sugar 4 times daily or as directed.   blood glucose (NO BRAND SPECIFIED) test strip   No No   Sig: Use to test blood sugar 3 times daily or as directed.   blood glucose monitoring (NO BRAND SPECIFIED) meter device kit   No No   Sig: Use to test blood sugar 4 times daily or as directed.   blood glucose monitoring (NO BRAND SPECIFIED) meter device kit   No No   Sig: Use to test blood sugar 4 times daily or as directed.   escitalopram (LEXAPRO) 20 MG tablet   No No   Si tab po daily   gabapentin (NEURONTIN) 300 MG capsule   No No   Sig: TAKE 1 CAP BY MOUTH ONCE DAILY AS NEEDED FOR ANXIETY ; TAKE 1-2 CAPS (300MG-600M G) BY MOUTH AT BEDTIME AS NEEDED FOR INSOMNIA   hydrOXYzine (ATARAX) 25 MG tablet   No No   Sig: TAKE 1-2 TABLETS BY MOUTH THREE TIMES DAILY AS NEEDED   insulin aspart (NOVOLOG PEN) 100 UNIT/ML pen   No No   Sig: Inject 1-10 Units Subcutaneous 3 times daily (before meals) Pre-Meal  - 164 give 1 unit.   Pre-Meal  - 189 give 2 units.   Pre-Meal  - 214 give 3 units.   Pre-Meal  - 239 give 4 units.   Pre-Meal  - 264 give 5 units.   Pre-Meal  - 289 give 6 units.   Pre-Meal  - 314 give 7 units.   Pre-Meal  - 339 give 8 units.   Pre-Meal  - 364 give 9 units.  Pre-Meal BG > or equal to 365 give 10 units   insulin glargine (BASAGLAR KWIKPEN) 100 UNIT/ML pen   No No   Sig: Inject 35 Units Subcutaneous daily   insulin pen needle (30G X 8 MM) 30G X 8 MM miscellaneous   No No   Sig: Use 3 pen needles daily or as directed.    lisinopril (PRINIVIL/ZESTRIL) 30 MG tablet   No No   Sig: Take 1 tablet (30 mg) by mouth daily   metFORMIN (GLUCOPHAGE) 500 MG tablet   No No   Sig: Take 1 tablet (500 mg) by mouth daily (with dinner) After a week increase to twice a day with meals   methadone HCl 10 MG/5ML SOLN  Other Yes No   Sig: Take 110 mg by mouth daily   mirtazapine (REMERON) 45 MG tablet   No No   Si tab po qHS   ondansetron (ZOFRAN-ODT) 4 MG ODT tab   No No   Sig: Take 1-2 tablets (4-8 mg) by mouth every 6 hours as needed for nausea or vomiting   pioglitazone (ACTOS) 30 MG tablet   No No   Sig: Take 1 tablet (30 mg) by mouth daily   Patient not taking: Reported on 2019   ranitidine (ZANTAC) 150 MG tablet   No No   Sig: Take 1 tablet (150 mg) by mouth 2 times daily      Facility-Administered Medications: None     Allergies   No Known Allergies    Physical Exam   Vital Signs: Temp: 98.7  F (37.1  C) Temp src: Oral BP: (!) 142/100 Pulse: 122   Resp: 16 SpO2: 99 % O2 Device: None (Room air)    Weight: 255 lbs 0 oz    General Appearance: Resting comfortably.  NAD   Eyes: EOMI.  Normal conjunctiva  HEENT: NC/AT.  Dry mucous membranes  Respiratory: Clear to auscultation.  No respiratory distress on RA   Cardiovascular: Tachycardia.  No murmurs  GI: Bowel sounds present.  Non-tender  Skin: No rashes.  No cyanosis  Musculoskeletal: No edema.  No calf tenderness  Neurologic: No focal deficits.  CN intact  Psychiatric: Pleasant.  Normal affect.  Alert and oriented     Data   Data reviewed today: I reviewed all medications, new labs and imaging results over the last 24 hours. I personally reviewed no images or EKG's today.    Recent Labs   Lab 19  1536   WBC 18.9*   HGB 15.1   MCV 88         POTASSIUM 5.5*   CHLORIDE 99   CO2 21   BUN 53*   CR 1.03   ANIONGAP 15*   DA 10.1   *   ALBUMIN 3.8   PROTTOTAL 9.4*   BILITOTAL 0.6   ALKPHOS 243*   *   AST 68*   LIPASE 235

## 2019-09-04 NOTE — PHARMACY-ADMISSION MEDICATION HISTORY
Admission medication history interview status for the 9/4/2019  admission is complete. See EPIC admission navigator for prior to admission medications     Medication history source reliability:Good    Actions taken by pharmacist (provider contacted, etc): Called  [facility] to verify methadone dose.     Additional medication history information not noted on PTA med list : Takes Gabapentin scheduled.    Medication reconciliation/reorder completed by provider prior to medication history? No    Time spent in this activity: 20 min    Prior to Admission medications    Medication Sig Last Dose Taking? Auth Provider   atorvastatin (LIPITOR) 20 MG tablet Take 1 tablet (20 mg) by mouth daily 9/3/2019 at Unknown time Yes Keke Montes APRN CNP   escitalopram (LEXAPRO) 20 MG tablet 1 tab po daily 9/3/2019 at Unknown time Yes Andra Saxena MD   gabapentin (NEURONTIN) 300 MG capsule TAKE 1 CAP BY MOUTH ONCE DAILY AS NEEDED FOR ANXIETY ; TAKE 1-2 CAPS (300MG-600M G) BY MOUTH AT BEDTIME AS NEEDED FOR INSOMNIA  Patient taking differently: Take 300 mg by mouth See Admin Instructions Has been taking 300mg in the afternoon and 600mg at bedtime scheduled 9/3/2019 at Unknown time Yes Andra Saxena MD   hydrOXYzine (ATARAX) 25 MG tablet TAKE 1-2 TABLETS BY MOUTH THREE TIMES DAILY AS NEEDED  Yes Andra Saxena MD   insulin aspart (NOVOLOG PEN) 100 UNIT/ML pen Inject 1-10 Units Subcutaneous 3 times daily (before meals) Pre-Meal  - 164 give 1 unit.   Pre-Meal  - 189 give 2 units.   Pre-Meal  - 214 give 3 units.   Pre-Meal  - 239 give 4 units.   Pre-Meal  - 264 give 5 units.   Pre-Meal  - 289 give 6 units.   Pre-Meal  - 314 give 7 units.   Pre-Meal  - 339 give 8 units.   Pre-Meal  - 364 give 9 units.  Pre-Meal BG > or equal to 365 give 10 units  Yes Keke Montes APRN CNP   insulin glargine (BASAGLAR KWIKPEN) 100 UNIT/ML pen Inject 35 Units Subcutaneous daily  9/3/2019 at am Yes Keke Montes APRN CNP   lisinopril (PRINIVIL/ZESTRIL) 30 MG tablet Take 1 tablet (30 mg) by mouth daily 9/3/2019 at Unknown time Yes Keke Montes APRN CNP   metFORMIN (GLUCOPHAGE) 500 MG tablet Take 1 tablet (500 mg) by mouth daily (with dinner) After a week increase to twice a day with meals  Patient taking differently: Take 500 mg by mouth 2 times daily (with meals)  9/3/2019 at Unknown time Yes Keke Montes APRN CNP   methadone HCl 10 MG/5ML SOLN Take 110 mg by mouth daily [phone] 9/2/2019 at am Yes Reported, Patient   mirtazapine (REMERON) 45 MG tablet 1 tab po qHS 9/3/2019 at Unknown time Yes Andra Saxena MD   OLANZapine (ZYPREXA) 5 MG tablet 1 tab scheduled po at bedtime and half tab po daily prn anxiety 9/3/2019 at Unknown time Yes Andra Saxena MD   ondansetron (ZOFRAN-ODT) 4 MG ODT tab Take 1-2 tablets (4-8 mg) by mouth every 6 hours as needed for nausea or vomiting  Yes Keke Montes APRN CNP   pioglitazone (ACTOS) 30 MG tablet Take 1 tablet (30 mg) by mouth daily 9/3/2019 at Unknown time Yes Keke Montes APRN CNP   ranitidine (ZANTAC) 150 MG tablet Take 1 tablet (150 mg) by mouth 2 times daily 9/3/2019 at Unknown time Yes Keke Montes APRN CNP   blood glucose (NO BRAND SPECIFIED) lancets standard Use to test blood sugar 4 times daily or as directed.   Lou Sales PA-C   blood glucose (NO BRAND SPECIFIED) test strip Use to test blood sugar 3 times daily or as directed.   Keke Montes APRN CNP   blood glucose monitoring (NO BRAND SPECIFIED) meter device kit Use to test blood sugar 4 times daily or as directed.   Jimmy Morelos MD   blood glucose monitoring (NO BRAND SPECIFIED) meter device kit Use to test blood sugar 4 times daily or as directed.   Lou Sales PA-C   insulin pen needle (30G X 8 MM) 30G X 8 MM miscellaneous Use 3 pen needles daily or as directed.    Keke Montes APRN CNP

## 2019-09-04 NOTE — ED PROVIDER NOTES
"Emergency Department Attending Supervision Note  9/4/2019  4:05 PM    I evaluated this patient in conjunction with Anat Hill PA-C    Briefly, the patient, a type II diabetic, presented with four days of decreased appetite, nausea, vomiting, and difficulty sleeping that resolved yesterday and was followed by abdominal pain typical of her reflux, frequent urination, and new onset weakness today. She notes that he had been able to keep milk and sprite down, though nothing beyond this over the last five days. The patient has not taken her prescribed medications, including insulin, metformin, and methadone, since onset of symptoms. She endorses occasional ibuprofen use but denies any alcohol use. She also denies diarrhea. Of note, the patient states she is out of her ranitidine.     On my exam:  BP (!) 147/104   Pulse 125   Temp 98.9  F (37.2  C) (Oral)   Resp 16   Ht 1.798 m (5' 10.8\")   Wt 115.7 kg (255 lb)   SpO2 97%   BMI 35.77 kg/m    General: Alert and cooperative with exam. Patient in mild distress. Normal mentation.  Head:  Scalp is NC/AT  Eyes:  No scleral icterus, PERRL  ENT:  The external nose and ears are normal. The oropharynx is normal and without erythema; mucus membranes are dry. Uvula midline, no evidence of deep space infection.  Neck:  Normal range of motion without rigidity.  CV:  Tachycardic rate, regular rhythm  Resp:  Breath sounds are clear bilaterally    Non-labored, no retractions or accessory muscle use  GI:  Abdomen is soft, no distension, mild epigastric tenderness. No peritoneal signs  MS:  No lower extremity edema   Skin:  Warm and dry, No rash or lesions noted.  Neuro: Oriented x 3. No gross motor deficits.    Results:    ECG:  ECG taken at 1635, ECG read at 1637  Sinus tachycardia  Nonspecific ST abnormality  Abnormal ECG  Rate 117 bpm. WV interval 128 ms. QRS duration 82 ms. QT/QTc 364/507 ms. P-R-T axes 67 -23 20.    Labs Ordered and Resulted from Time of ED Arrival Up " to the Time of Departure from the ED   CBC WITH PLATELETS DIFFERENTIAL - Abnormal; Notable for the following components:       Result Value    WBC 18.9 (*)     RBC Count 5.22 (*)     Absolute Neutrophil 14.8 (*)     All other components within normal limits   COMPREHENSIVE METABOLIC PANEL - Abnormal; Notable for the following components:    Potassium 5.5 (*)     Anion Gap 15 (*)     Glucose 870 (*)     Urea Nitrogen 53 (*)     Protein Total 9.4 (*)     Alkaline Phosphatase 243 (*)      (*)     AST 68 (*)     All other components within normal limits   KETONE BETA-HYDROXYBUTYRATE QUANTITATIVE - Abnormal; Notable for the following components:    Ketone Quantitative 5.5 (*)     All other components within normal limits   ROUTINE UA WITH MICROSCOPIC - Abnormal; Notable for the following components:    Glucose Urine >1000 (*)     Ketones Urine 40 (*)     Mucous Urine Present (*)     All other components within normal limits   ISTAT  GASES LACTATE ELVIS POCT - Abnormal; Notable for the following components:    Lactic Acid 2.7 (*)     All other components within normal limits   LIPASE   HCG QUALITATIVE URINE   GLUCOSE MONITOR NURSING POCT   PERIPHERAL IV CATHETER   CARDIAC CONTINUOUS MONITORING   NOTIFY PHYSICIAN   PERIPHERAL IV CATHETER   IV ACCESS   ISTAT  GASES LACTATE ELVIS POCT   GLUCOSE BY METER POCT   GLUCOSE BY METER POCT     My impression:  Patient is a 43-year-old female presents with 4-day history of vomiting (now resolved); type II diabetic, insulin-dependent and medication noncompliant for the last 4 days.  Patient's medical history and records were reviewed.  On evaluation patient was noted to be very dry and tachycardic.  She did note mild abdominal pain and mild epigastric tenderness to palpation; similar to previous GERD; improved with GI cocktail.  Labs notable for elevated white count (18.9; likely demargination reaction from vomiting), hyperkalemia (5.5), significantly elevated glucose (870), mild  elevations of LFTs as noted above, elevated ketones (5.5), and UA demonstrating ketones and glucose but no evidence of infection.  EKG shows sinus tachycardia without other significant findings.  Patient was provided multiple liters of IV fluid for significant dehydration secondary to hyperglycemia.  VBG shows no evidence of acidosis though lactic acid elevated; likely secondary to dehydration.  Patient was initiated on insulin infusion and will be admitted to [facility] with the hospitalist service for further evaluation and care.  Patient was mentating normally at time of admission.  Presentation consistent with severe hyperglycemia secondary to medication noncompliance with associated significant dehydration.    Critical Care Time for this patient, exclusive of procedures, is 35 minutes.     Diagnosis    ICD-10-CM    1. Hyperglycemia R73.9 Basic metabolic panel     Hemoglobin A1c     Platelet count     Basic metabolic panel     Glucose by meter     Glucose by meter     Glucose by meter     Glucose by meter     Glucose by meter     Glucose by meter     Glucose by meter     Glucose by meter     Glucose by meter     Glucose by meter     Glucose by meter     Glucose by meter     Basic metabolic panel     Glucose by meter     Glucose by meter     Glucose by meter     Glucose by meter     Glucose by meter     Glucose by meter     Glucose by meter     Glucose by meter     Glucose by meter     Glucose by meter     Glucose by meter     Glucose by meter     Glucose by meter     Glucose by meter     Glucose by meter     Glucose by meter     Glucose by meter     Glucose by meter     Glucose by meter     Glucose by meter     Glucose by meter     Glucose by meter     Lactic acid level STAT for sepsis protocol     Glucose by meter     Glucose by meter     Glucose by meter     Glucose by meter     CANCELED: Creatinine     CANCELED: Basic metabolic panel   2. Elevated blood ketone body level R79.89    3. Nausea and vomiting R11.2    4.  Elevated liver enzymes R74.8    5. Dehydration E86.0      Scribe Disclosure:  I, Rosalinda Mónica, am serving as a scribe at 4:05 PM on 9/4/2019 to document services personally performed by Asif Loving DO based on my observations and the provider's statements to me.           Asif Loving DO  09/05/19 1141

## 2019-09-04 NOTE — ED NOTES
Bed: ED23  Expected date:   Expected time:   Means of arrival:   Comments:  [facility] [place] - [id number] F vomiting eta [id number]

## 2019-09-04 NOTE — ED PROVIDER NOTES
"  History     Chief Complaint:  Vomiting    HPI   Yun Coyle is a 43 year old diabetic female who presents with nausea and vomiting beginning five days ago. She didn't vomit today or yesterday but she is \"afraid to eat\" because of the persistent nausea. She hasn't eaten much since her symptoms started. Patient was weak and dropping things today and became scared and called EMS. Zofran improved her symptoms on ambulance. Glucose was over 500 on the ambulance.    She has not taken her medications including insulin, last taken four days ago, and methadone, last taken two days ago, because of the nausea. She was also \"not focused on herself\" this weekend because she was getting ready to move. Patient reports she recently has used marijuana. She denies recent international travel or swimming in lakes. Patient denies fever, loose stools, constipation and blood in stools or emesis.     Allergies:  No known drug allergies.    Medications:    Actos  Insulin aspart  Insulin glargine  Lexapro  Lipitor   Lisinopril   Metformin   Methadone   Remeron   Zantac  Zyprexa     Past Medical History:    Anxiety   Cauda equina  Cyclic vomiting  Depression  DM type II  GERD  HLD  HTN  Insomnia  Opioid type dependence  Pancreatitis   Personality disorder    Past Surgical History:    Back surgery x 2  Cholecystectomy   Orthopedic surgery   Salpingo oophorectomy     Family History:    Cancer   Diabetes    Social History:  Presents to the ED via EMS.   Tobacco Use: Never  Alcohol Use: No  Marijuana user  PCP: Keke Montes  Marital Status:  Single [1]     Review of Systems   Constitutional: Negative for fever.   Gastrointestinal: Positive for nausea and vomiting. Negative for blood in stool, constipation and diarrhea.   All other systems reviewed and are negative.    Physical Exam     Patient Vitals for the past 24 hrs:   BP Temp Temp src Pulse Resp SpO2 Height Weight   09/04/19 1754 -- 98.9  F (37.2  C) Oral -- -- -- -- -- " "  09/04/19 1735 (!) 147/104 -- -- 125 -- -- -- --   09/04/19 1600 (!) 116/110 -- -- 120 -- -- -- --   09/04/19 1500 (!) 138/117 -- -- 124 -- 97 % -- --   09/04/19 1459 (!) 142/100 98.7  F (37.1  C) Oral 122 16 99 % 1.798 m (5' 10.8\") 115.7 kg (255 lb)     Physical Exam  General: Resting comfortably on the bed.  Skin: Good turgor, no rash, no unusual bruising or prominent lesions.  HEENT: Head: Normocephalic, atraumatic, no visible masses.   Eyes: Conjunctiva clear.  Ears: EACs clear, TMs translucent.   Nose: No external lesions, mucosa non-inflamed, septum and turbinates normal.   Throat/pharynx: Dry mucous membranes.  Neck: Supple, without lymphadenopathy.  Cardiac: Normal rate and regular rhythm, no murmur or gallop.   Lungs: Clear to auscultation.  Abdomen: Mild epigastric tenderness with palpation. No rebound tenderness of guarding.  No CVA tenderness bilaterally.  Musculoskeletal: Normal gait and station. No calf tenderness or swelling.   Neurologic: Oriented x 3. GCS: 15.  Psychiatric: Intact recent and remote memory, judgment and insight.    Emergency Department Course   ECG:  @ 1635  Indication: Hyperkalemia   Vent. Rate 117 bpm. UT interval 128 ms. QRS duration 82 ms. QT/QTc 364/507 ms. P-R-T axis 67 -23 20.   Sinus tachycardia. Nonspecific ST abnormality. Abnormal ECG.   Read @ 1637 by Dr. Lovnig.    Laboratory:  ISTAT gases lactate steven POCT: pH 7.36, pCO2 42, pO2 41, HCO3 24, Lactate 2.7 (H)  CBC:  WBC 18.9 (H), HGB 15.1,   CMP: Glucose 870 (HH), potassium 5.5 (H), anion gap 15 (H), BUN 53 (H), protein 9.4 (H), alkphos 243 (H),  (H), AST 68 (H), otherwise WNL (Creatinine 1.03)  Lipase: 235  Ketones: 5.5 (HH)  UA: Clear light yellow urine, glucose > 1000, ketones 40, mucous present, otherwise WNL  HCG urine: Negative    Interventions:  1538: Normal Saline, 1 liter, IV bolus   1647: Novolin- DKA algorithm, 5.5 units/hr, IV injection  1647: GI cocktail, 30 mL, oral   1649: Normal Saline, 1 " liter, IV bolus   Ordered: Zofran, 4 mg, IV injection   Ordered: Additional Normal Saline, 1 liter, IV bolus     Emergency Department Course:  The patient arrived in triage where vitals were measured and recorded.   The patient was then escorted back to the emergency department.   The patient's medical records were reviewed.  Nursing notes and vitals were reviewed.  1515: I performed an exam of the patient as documented above.  The above workup was undertaken.  1552: I spoke with lab regarding a critical value. Ketones 5.5.   Shared service with Dr. Loving.   1611: I spoke with lab regarding a critical value. Glucose 870.    Findings and plan explained to the patient who consents to admission.   1652: I discussed the patient with Dr. Worley of the hospitalist service, who will admit the patient to an [facility] bed for further monitoring, evaluation, and treatment.    1702: I rechecked the patient and discussed results.     Impression & Plan      CMS Diagnoses: The Lactic acid level is elevated due to diabetic ketoacidosis, at this time there is no sign of severe sepsis or septic shock.    Medical Decision Making:  Yun Coyle is a 43 year old female who presented the ED today for evaluation of persistent nausea and vomiting and elevated sugars.  Details the patient's history can be noted in the HPI.  Upon my exam, patient appeared dry and volume depleted.  Laboratory analysis as noted above was completed.  This returned showing multiple critical labs including elevated lactate at 2.7, hyperkalemia at 5.5, leukocytosis at 18.5, glucose 870.  Patient was not acidotic, normal pH and bicarb but there was mild anion gap of 15.  Interventions here included 3 L of fluids and initiation of a insulin drip.  With the hyperkalemia, EKG completed.  This returned showing tachycardia but no other signs of arrhythmia.  Due to her significant hyperglycemia, elevated lactate, etc. she needs hospitalization for further IV fluid  and insulin management.  I discussed the patient's case with Dr. Worley who agreed to admit the patient the hospital for further management and care to an [facility] bed. She is feeling significant improvement with her symptoms here in the ED.  GI cocktail also given for reflux symptoms, she has not been taking her ranitidine.  All questions answered.  She is in agreement with admission.  She remained tachycardic, but stable prior to admission.    Critical care time: 10 minutes excluding procedures.     Diagnosis:    ICD-10-CM    1. Hyperglycemia R73.9    2. Elevated blood ketone body level R79.89    3. Nausea and vomiting R11.2    4. Elevated liver enzymes R74.8        Disposition:  Admitted to an [facility] bed.       I, Mely Mcgowan, am serving as a scribe on 9/4/2019 at 3:15 PM to personally document services performed by Anat Hill PA-C, based on my observations and the provider's statements to me.    EMERGENCY DEPARTMENT      This was created at least in part with a voice recognition software. Mistakes/typos may be present.        Anat Hill PA  09/04/19 1804

## 2019-09-04 NOTE — PHARMACY-PHARMACOTHERAPY NOTE
Methadone Clinic Information Note    Clinic Name: Dose verified with [facility]    Clinic Location ([facility]): [place], [place]  Phone Number: ([phone]).  Prescriber's Name: Dr. Raya.

## 2019-09-05 LAB
ANION GAP SERPL CALCULATED.3IONS-SCNC: 3 MMOL/L (ref 3–14)
ANION GAP SERPL CALCULATED.3IONS-SCNC: 4 MMOL/L (ref 3–14)
BUN SERPL-MCNC: 33 MG/DL (ref 7–30)
BUN SERPL-MCNC: 38 MG/DL (ref 7–30)
CALCIUM SERPL-MCNC: 8.5 MG/DL (ref 8.5–10.1)
CALCIUM SERPL-MCNC: 8.7 MG/DL (ref 8.5–10.1)
CHLORIDE SERPL-SCNC: 111 MMOL/L (ref 94–109)
CHLORIDE SERPL-SCNC: 113 MMOL/L (ref 94–109)
CO2 SERPL-SCNC: 27 MMOL/L (ref 20–32)
CO2 SERPL-SCNC: 30 MMOL/L (ref 20–32)
CREAT SERPL-MCNC: 0.95 MG/DL (ref 0.52–1.04)
CREAT SERPL-MCNC: 1.03 MG/DL (ref 0.52–1.04)
GFR SERPL CREATININE-BSD FRML MDRD: 67 ML/MIN/{1.73_M2}
GFR SERPL CREATININE-BSD FRML MDRD: 74 ML/MIN/{1.73_M2}
GLUCOSE BLDC GLUCOMTR-MCNC: 126 MG/DL (ref 70–99)
GLUCOSE BLDC GLUCOMTR-MCNC: 128 MG/DL (ref 70–99)
GLUCOSE BLDC GLUCOMTR-MCNC: 129 MG/DL (ref 70–99)
GLUCOSE BLDC GLUCOMTR-MCNC: 133 MG/DL (ref 70–99)
GLUCOSE BLDC GLUCOMTR-MCNC: 145 MG/DL (ref 70–99)
GLUCOSE BLDC GLUCOMTR-MCNC: 147 MG/DL (ref 70–99)
GLUCOSE BLDC GLUCOMTR-MCNC: 160 MG/DL (ref 70–99)
GLUCOSE BLDC GLUCOMTR-MCNC: 168 MG/DL (ref 70–99)
GLUCOSE BLDC GLUCOMTR-MCNC: 168 MG/DL (ref 70–99)
GLUCOSE BLDC GLUCOMTR-MCNC: 171 MG/DL (ref 70–99)
GLUCOSE BLDC GLUCOMTR-MCNC: 177 MG/DL (ref 70–99)
GLUCOSE BLDC GLUCOMTR-MCNC: 180 MG/DL (ref 70–99)
GLUCOSE BLDC GLUCOMTR-MCNC: 181 MG/DL (ref 70–99)
GLUCOSE BLDC GLUCOMTR-MCNC: 186 MG/DL (ref 70–99)
GLUCOSE BLDC GLUCOMTR-MCNC: 199 MG/DL (ref 70–99)
GLUCOSE BLDC GLUCOMTR-MCNC: 205 MG/DL (ref 70–99)
GLUCOSE BLDC GLUCOMTR-MCNC: 205 MG/DL (ref 70–99)
GLUCOSE BLDC GLUCOMTR-MCNC: 212 MG/DL (ref 70–99)
GLUCOSE BLDC GLUCOMTR-MCNC: 232 MG/DL (ref 70–99)
GLUCOSE BLDC GLUCOMTR-MCNC: 236 MG/DL (ref 70–99)
GLUCOSE BLDC GLUCOMTR-MCNC: 259 MG/DL (ref 70–99)
GLUCOSE BLDC GLUCOMTR-MCNC: 271 MG/DL (ref 70–99)
GLUCOSE BLDC GLUCOMTR-MCNC: 289 MG/DL (ref 70–99)
GLUCOSE BLDC GLUCOMTR-MCNC: 328 MG/DL (ref 70–99)
GLUCOSE SERPL-MCNC: 169 MG/DL (ref 70–99)
GLUCOSE SERPL-MCNC: 187 MG/DL (ref 70–99)
INTERPRETATION ECG - MUSE: NORMAL
LACTATE BLD-SCNC: 1.9 MMOL/L (ref 0.7–2)
POTASSIUM SERPL-SCNC: 4.3 MMOL/L (ref 3.4–5.3)
POTASSIUM SERPL-SCNC: 4.8 MMOL/L (ref 3.4–5.3)
SODIUM SERPL-SCNC: 142 MMOL/L (ref 133–144)
SODIUM SERPL-SCNC: 146 MMOL/L (ref 133–144)

## 2019-09-05 PROCEDURE — 25000131 ZZH RX MED GY IP 250 OP 636 PS 637: Mod: GY | Performed by: INTERNAL MEDICINE

## 2019-09-05 PROCEDURE — 00000146 ZZHCL STATISTIC GLUCOSE BY METER IP

## 2019-09-05 PROCEDURE — 25000128 H RX IP 250 OP 636: Performed by: INTERNAL MEDICINE

## 2019-09-05 PROCEDURE — 36415 COLL VENOUS BLD VENIPUNCTURE: CPT | Performed by: INTERNAL MEDICINE

## 2019-09-05 PROCEDURE — 83605 ASSAY OF LACTIC ACID: CPT | Performed by: INTERNAL MEDICINE

## 2019-09-05 PROCEDURE — 25000132 ZZH RX MED GY IP 250 OP 250 PS 637: Mod: GY | Performed by: INTERNAL MEDICINE

## 2019-09-05 PROCEDURE — 99233 SBSQ HOSP IP/OBS HIGH 50: CPT | Performed by: INTERNAL MEDICINE

## 2019-09-05 PROCEDURE — 25800030 ZZH RX IP 258 OP 636: Performed by: INTERNAL MEDICINE

## 2019-09-05 PROCEDURE — 80048 BASIC METABOLIC PNL TOTAL CA: CPT | Performed by: INTERNAL MEDICINE

## 2019-09-05 PROCEDURE — 99207 ZZC CDG-MDM COMPONENT: MEETS MODERATE - UP CODED: CPT | Performed by: INTERNAL MEDICINE

## 2019-09-05 PROCEDURE — 25800029 ZZH RX IP 258 OP 250: Performed by: INTERNAL MEDICINE

## 2019-09-05 PROCEDURE — 12000000 ZZH R&B MED SURG/OB

## 2019-09-05 PROCEDURE — 25800025 ZZH RX 258: Performed by: INTERNAL MEDICINE

## 2019-09-05 RX ORDER — LABETALOL HYDROCHLORIDE 5 MG/ML
10 INJECTION, SOLUTION INTRAVENOUS
Status: DISCONTINUED | OUTPATIENT
Start: 2019-09-05 | End: 2019-09-07 | Stop reason: HOSPADM

## 2019-09-05 RX ORDER — ONDANSETRON 4 MG/1
4 TABLET, ORALLY DISINTEGRATING ORAL EVERY 6 HOURS PRN
Status: DISCONTINUED | OUTPATIENT
Start: 2019-09-05 | End: 2019-09-07 | Stop reason: HOSPADM

## 2019-09-05 RX ORDER — CALCIUM CARBONATE 500 MG/1
1000 TABLET, CHEWABLE ORAL EVERY 4 HOURS PRN
Status: DISCONTINUED | OUTPATIENT
Start: 2019-09-05 | End: 2019-09-07 | Stop reason: HOSPADM

## 2019-09-05 RX ORDER — PROCHLORPERAZINE MALEATE 5 MG
10 TABLET ORAL EVERY 6 HOURS PRN
Status: DISCONTINUED | OUTPATIENT
Start: 2019-09-05 | End: 2019-09-07 | Stop reason: HOSPADM

## 2019-09-05 RX ORDER — ONDANSETRON 2 MG/ML
4 INJECTION INTRAMUSCULAR; INTRAVENOUS EVERY 6 HOURS PRN
Status: DISCONTINUED | OUTPATIENT
Start: 2019-09-05 | End: 2019-09-07 | Stop reason: HOSPADM

## 2019-09-05 RX ORDER — PROCHLORPERAZINE 25 MG
25 SUPPOSITORY, RECTAL RECTAL EVERY 12 HOURS PRN
Status: DISCONTINUED | OUTPATIENT
Start: 2019-09-05 | End: 2019-09-07 | Stop reason: HOSPADM

## 2019-09-05 RX ADMIN — DEXTROSE AND SODIUM CHLORIDE: 5; 450 INJECTION, SOLUTION INTRAVENOUS at 00:17

## 2019-09-05 RX ADMIN — GABAPENTIN 600 MG: 600 TABLET, FILM COATED ORAL at 20:22

## 2019-09-05 RX ADMIN — ENOXAPARIN SODIUM 40 MG: 40 INJECTION SUBCUTANEOUS at 20:22

## 2019-09-05 RX ADMIN — ONDANSETRON 4 MG: 4 TABLET, ORALLY DISINTEGRATING ORAL at 19:13

## 2019-09-05 RX ADMIN — SODIUM CHLORIDE: 9 INJECTION, SOLUTION INTRAVENOUS at 15:16

## 2019-09-05 RX ADMIN — DEXTROSE AND SODIUM CHLORIDE: 5; 450 INJECTION, SOLUTION INTRAVENOUS at 15:18

## 2019-09-05 RX ADMIN — SODIUM CHLORIDE: 9 INJECTION, SOLUTION INTRAVENOUS at 08:20

## 2019-09-05 RX ADMIN — SODIUM CHLORIDE 14 UNITS/HR: 9 INJECTION, SOLUTION INTRAVENOUS at 21:43

## 2019-09-05 RX ADMIN — METHADONE HYDROCHLORIDE 110 MG: 10 CONCENTRATE ORAL at 20:28

## 2019-09-05 RX ADMIN — MIRTAZAPINE 45 MG: 15 TABLET, FILM COATED ORAL at 22:21

## 2019-09-05 RX ADMIN — SODIUM CHLORIDE 8 UNITS/HR: 9 INJECTION, SOLUTION INTRAVENOUS at 17:51

## 2019-09-05 RX ADMIN — CALCIUM CARBONATE (ANTACID) CHEW TAB 500 MG 1000 MG: 500 CHEW TAB at 22:21

## 2019-09-05 RX ADMIN — ESCITALOPRAM OXALATE 20 MG: 10 TABLET ORAL at 08:25

## 2019-09-05 RX ADMIN — OLANZAPINE 5 MG: 5 TABLET, FILM COATED ORAL at 22:21

## 2019-09-05 RX ADMIN — SODIUM CHLORIDE: 9 INJECTION, SOLUTION INTRAVENOUS at 10:24

## 2019-09-05 RX ADMIN — ATORVASTATIN CALCIUM 20 MG: 10 TABLET, FILM COATED ORAL at 08:25

## 2019-09-05 RX ADMIN — DEXTROSE AND SODIUM CHLORIDE: 5; 450 INJECTION, SOLUTION INTRAVENOUS at 07:03

## 2019-09-05 RX ADMIN — GABAPENTIN 300 MG: 300 CAPSULE ORAL at 12:06

## 2019-09-05 RX ADMIN — SODIUM CHLORIDE: 9 INJECTION, SOLUTION INTRAVENOUS at 03:53

## 2019-09-05 RX ADMIN — DEXTROSE AND SODIUM CHLORIDE: 5; 450 INJECTION, SOLUTION INTRAVENOUS at 22:35

## 2019-09-05 ASSESSMENT — ACTIVITIES OF DAILY LIVING (ADL)
ADLS_ACUITY_SCORE: 10
ADLS_ACUITY_SCORE: 12
ADLS_ACUITY_SCORE: 10
ADLS_ACUITY_SCORE: 12

## 2019-09-05 ASSESSMENT — MIFFLIN-ST. JEOR: SCORE: 1816.95

## 2019-09-05 NOTE — CONSULTS
"CLINICAL NUTRITION SERVICES  -  ASSESSMENT NOTE      Recommendations Ordered by Registered Dietitian (RD): Boost Glucose Control BID between meals    Malnutrition:   % Weight Loss:  > 2% in 1 week (severe malnutrition)  % Intake:  </= 50% for >/= 5 days (severe malnutrition)  Subcutaneous Fat Loss:  None observed  Muscle Loss:  None observed  Fluid Retention:  None noted    Malnutrition Diagnosis: Severe malnutrition  In Context of:  Acute illness or injury  Environmental or social circumstances        REASON FOR ASSESSMENT  Yun Coyle is a 43 year old female seen by Registered Dietitian for Admission Nutrition Risk Screen for unintentional loss of 10# or more in the past two months, reduced oral intake over the last month and new/uncontrolled diabetes      NUTRITION HISTORY  - Information obtained from patient although somewhat abbreviated as I woke her up from a nap.    - She has issues with on and off nausea and vomiting and has not really been able to keep anything down since 8/31.  \"I'm having more of an issue with solids\".  Patient reports very recent weight loss of 20# with this illness and states that she has been using any oral nutritional supplements at home (Boost/Ensure).    - Noted that she has recently been moving and has neglected her health as a results (and not taking her DM meds).      CURRENT NUTRITION ORDERS  Diet Order:     Moderate Consistent Carbohydrate     Current Intake/Tolerance:  Patient took bites of lunch - c/o nausea and poor appetite  This morning was really only able to take a bowl of Cream of Wheat       NUTRITION FOCUSED PHYSICAL ASSESSMENT FOR DIAGNOSING MALNUTRITION)  Completed:  Yes Visual assessment only         Observed:    No nutrition-related physical findings observed (however may be masked by obesity)    Obtained from Chart/Interdisciplinary Team:  None     ANTHROPOMETRICS  Height: 5'11\"  Weight: 105.9 kg (233#)(9/5)  Body mass index is 32.75 kg/m   Weight Status:  " Obesity Grade I BMI 30-34.9  IBW: 70.5 kg   % IBW: 150%  Weight History: Usual weight is 252# and patient reports that she has lost this 20# over the last week (8%)    LABS  Hgb A1C 11.9 (H) - Noted patient has been non-compliant with medications     MEDICATIONS  Medications reviewed      ASSESSED NUTRITION NEEDS PER APPROVED PRACTICE GUIDELINES:    Dosing Weight 79 kg (adjusted)  Estimated Energy Needs: 1354-2495 kcals (25-30 Kcal/Kg)  Justification: obese  Estimated Protein Needs:  grams protein (1.2-1.5 g pro/Kg)  Justification: hypercatabolism with acute illness  Estimated Fluid Needs: 8556-4964 mL (1 mL/Kcal)  Justification: maintenance    MALNUTRITION:  % Weight Loss:  > 2% in 1 week (severe malnutrition)  % Intake:  </= 50% for >/= 5 days (severe malnutrition)  Subcutaneous Fat Loss:  None observed  Muscle Loss:  None observed  Fluid Retention:  None noted    Malnutrition Diagnosis: Severe malnutrition  In Context of:  Acute illness or injury  Environmental or social circumstances    NUTRITION DIAGNOSIS:  Inadequate oral intake related to ongoing nausea, vomiting, and poor appetite as evidenced by took bites of lunch, hot cereal only for breakfast       NUTRITION INTERVENTIONS  Recommendations / Nutrition Prescription  Continue Moderate CHO diet as tolerated   Boost Glucose Control BID between meals     Implementation  Nutrition education: Per Provider order if indicated (issues seem to be more medication related)  Medical Food Supplement:  Ordered as above     Nutrition Goals  Patient will consume 25-50% of meals TID and 50-75% of supplements BID     MONITORING AND EVALUATION:  Progress towards goals will be monitored and evaluated per protocol and Practice Guidelines      Zulema Chery RD, LD, CNSC   Clinical Dietitian - [facility]

## 2019-09-05 NOTE — PROGRESS NOTES
[facility]    Medicine Progress Note - Hospitalist Service       Date of Admission:  9/4/2019  Assessment & Plan      Yun Coyle is a 43 year old female with a history of non-compliant DM type II, opioid dependence on Methadone, HTN and GERD who presented to the ED on 9/4/2019 with N/V and diffuse abdominal pain and found to be profoundly hyperglycemic     Hyperglycemia  DM type II, non-compliant  On Friday the patient stopped taking her Metformin and Insulin as she stated she was in the process of moving and not taking care of herself.  She then developed N/V and diffuse abdominal pain.  On arrival here the patient was found to be profoundly hyperglycemic at 870 with a slight gap of 15 but normal bicarb and pH.    Last HgbA1c from 6/18/19 was 10.4.  Patient is on Metformin, Actos, Basaglar and SSI normally.    Blood sugars down to the 300s today   - Insulin drip and protocol in place.  Continue for now   - Continue IVF per protocol  - Decreased BMP timing from q6 to daily   - Diabetes educator ordered     H/o Opioid dependence on Methadone   - PTA Methadone   - PTA Neurontin     HTN   - Holding PTA Lisinopril for now     GERD   No issues currently  - PTA Zantac        Diet: Moderate Consistent CHO Diet    DVT Prophylaxis: Enoxaparin (Lovenox) SQ  Blanc Catheter: not present  Code Status: DNR/DNI    Disposition Plan   Expected discharge: 1-2 days, recommended to prior living arrangement once blood sugars controlled.  Entered: Riccardo Worley DO 09/05/2019, 1:40 PM       The patient's care was discussed with the Patient.    Riccardo Worley DO  Hospitalist Service  [facility]    ______________________________________________________________________    Interval History   Patient seen and examined.  No acute events over night.  No fevers or chills.  No pain at this time.  Nausea and vomiting has resolved.      Data reviewed today: I reviewed all medications, new labs and  imaging results over the last 24 hours. I personally reviewed no images or EKG's today.    Physical Exam   Vital Signs: Temp: 98.6  F (37  C) Temp src: Oral BP: 112/66 Pulse: 85 Heart Rate: 91 Resp: 15 SpO2: 99 % O2 Device: None (Room air)    Weight: 233 lbs 7.47 oz  General Appearance: Sleeping but arousable.  NAD  Respiratory: Clear to auscultation.  No respiratory distress  Cardiovascular: RRR.  No murmurs  GI: Bowel sounds present.  Non-tender  Skin: No rashes.  No cyanosis  Other: No edema.  No calf tenderness      Data   Recent Labs   Lab 09/05/19  0552 09/05/19  0045 09/04/19  1805 09/04/19  1536   WBC  --   --   --  18.9*   HGB  --   --   --  15.1   MCV  --   --   --  88   PLT  --   --  300 329    146* 144 135   POTASSIUM 4.8 4.3 5.1 5.5*   CHLORIDE 111* 113* 108 99   CO2 27 30 22 21   BUN 33* 38* 48* 53*   CR 0.95 1.03 0.93 1.03   ANIONGAP 4 3 14 15*   DA 8.5 8.7 9.8 10.1   * 187* 609* 870*   ALBUMIN  --   --   --  3.8   PROTTOTAL  --   --   --  9.4*   BILITOTAL  --   --   --  0.6   ALKPHOS  --   --   --  243*   ALT  --   --   --  132*   AST  --   --   --  68*   LIPASE  --   --   --  235     No results found for this or any previous visit (from the past 24 hour(s)).

## 2019-09-05 NOTE — PROVIDER NOTIFICATION
1835:Hospitalist paged for patients hypertension (173/113) only PRN available for SBP>180  Pt also c/o of nausea.    1840: Dr. Irizarry updating labetalol parameters and placing antiemetic orders.

## 2019-09-05 NOTE — PLAN OF CARE
A&O x4, elevated BP at the change of shift last evening, asymptomatic, rechecked BP and subsequent monitoring showed improvement. Did not give Labetalol, available for SBP>180. Tele SR. Continuous iv insulin per algorithm/protocol. BGM ranged from 514 down to 145, now 168. Ambulate to bathroom with SBA. +flatus, no BM yet, last BM on 8/31, reported unable to keep food down since 8/31. Denies N/V. Will continue to monitor.

## 2019-09-05 NOTE — PLAN OF CARE
Pt arrived from ED at 1745. Alert and oriented x4. Vital signs stable, except HTN and tachycardia, on room air. New orders for PRN labetalol received - given by oncoming RN. Standby assist to manage equipment. Tolerating mod carb diet. Lung sounds clear. Bowel sounds active, + flatus, no BM since Saturday 9/31. Adequate urine output. Skin WDL. Denies pain and nausea. Tele sinus tachycardic. Insulin drip infusing on algorithm 1.

## 2019-09-05 NOTE — PROVIDER NOTIFICATION
MANDY Worley @ 18:43 on 9/4/19--Okay to change Gabapentin PTA med to 300 mg at 12:00 noon and 600 mg at 20:00 daily as patient states she takes at home.  Nikia Thibodeaux [facility]  .

## 2019-09-06 LAB
ANION GAP SERPL CALCULATED.3IONS-SCNC: 5 MMOL/L (ref 3–14)
BUN SERPL-MCNC: 18 MG/DL (ref 7–30)
CALCIUM SERPL-MCNC: 8 MG/DL (ref 8.5–10.1)
CHLORIDE SERPL-SCNC: 107 MMOL/L (ref 94–109)
CO2 SERPL-SCNC: 29 MMOL/L (ref 20–32)
CREAT SERPL-MCNC: 0.74 MG/DL (ref 0.52–1.04)
GFR SERPL CREATININE-BSD FRML MDRD: >90 ML/MIN/{1.73_M2}
GLUCOSE BLDC GLUCOMTR-MCNC: 137 MG/DL (ref 70–99)
GLUCOSE BLDC GLUCOMTR-MCNC: 144 MG/DL (ref 70–99)
GLUCOSE BLDC GLUCOMTR-MCNC: 152 MG/DL (ref 70–99)
GLUCOSE BLDC GLUCOMTR-MCNC: 156 MG/DL (ref 70–99)
GLUCOSE BLDC GLUCOMTR-MCNC: 157 MG/DL (ref 70–99)
GLUCOSE BLDC GLUCOMTR-MCNC: 165 MG/DL (ref 70–99)
GLUCOSE BLDC GLUCOMTR-MCNC: 170 MG/DL (ref 70–99)
GLUCOSE BLDC GLUCOMTR-MCNC: 171 MG/DL (ref 70–99)
GLUCOSE BLDC GLUCOMTR-MCNC: 208 MG/DL (ref 70–99)
GLUCOSE BLDC GLUCOMTR-MCNC: 237 MG/DL (ref 70–99)
GLUCOSE BLDC GLUCOMTR-MCNC: 258 MG/DL (ref 70–99)
GLUCOSE BLDC GLUCOMTR-MCNC: 273 MG/DL (ref 70–99)
GLUCOSE BLDC GLUCOMTR-MCNC: 276 MG/DL (ref 70–99)
GLUCOSE BLDC GLUCOMTR-MCNC: 279 MG/DL (ref 70–99)
GLUCOSE BLDC GLUCOMTR-MCNC: 312 MG/DL (ref 70–99)
GLUCOSE BLDC GLUCOMTR-MCNC: 335 MG/DL (ref 70–99)
GLUCOSE BLDC GLUCOMTR-MCNC: 348 MG/DL (ref 70–99)
GLUCOSE SERPL-MCNC: 153 MG/DL (ref 70–99)
POTASSIUM SERPL-SCNC: 3.6 MMOL/L (ref 3.4–5.3)
SODIUM SERPL-SCNC: 141 MMOL/L (ref 133–144)

## 2019-09-06 PROCEDURE — 25800030 ZZH RX IP 258 OP 636: Performed by: INTERNAL MEDICINE

## 2019-09-06 PROCEDURE — 25000132 ZZH RX MED GY IP 250 OP 250 PS 637: Mod: GY | Performed by: INTERNAL MEDICINE

## 2019-09-06 PROCEDURE — 36415 COLL VENOUS BLD VENIPUNCTURE: CPT | Performed by: INTERNAL MEDICINE

## 2019-09-06 PROCEDURE — 25000131 ZZH RX MED GY IP 250 OP 636 PS 637: Mod: GY | Performed by: INTERNAL MEDICINE

## 2019-09-06 PROCEDURE — 12000000 ZZH R&B MED SURG/OB

## 2019-09-06 PROCEDURE — 25800025 ZZH RX 258: Performed by: INTERNAL MEDICINE

## 2019-09-06 PROCEDURE — 99233 SBSQ HOSP IP/OBS HIGH 50: CPT | Performed by: INTERNAL MEDICINE

## 2019-09-06 PROCEDURE — 00000146 ZZHCL STATISTIC GLUCOSE BY METER IP

## 2019-09-06 PROCEDURE — 80048 BASIC METABOLIC PNL TOTAL CA: CPT | Performed by: INTERNAL MEDICINE

## 2019-09-06 PROCEDURE — 25000128 H RX IP 250 OP 636: Performed by: INTERNAL MEDICINE

## 2019-09-06 PROCEDURE — 99207 ZZC MOONLIGHTING INDICATOR: CPT | Performed by: INTERNAL MEDICINE

## 2019-09-06 PROCEDURE — 99207 ZZC CDG-MDM COMPONENT: MEETS MODERATE - UP CODED: CPT | Performed by: INTERNAL MEDICINE

## 2019-09-06 RX ORDER — NICOTINE POLACRILEX 4 MG
15-30 LOZENGE BUCCAL
Status: DISCONTINUED | OUTPATIENT
Start: 2019-09-06 | End: 2019-09-07 | Stop reason: HOSPADM

## 2019-09-06 RX ORDER — PANTOPRAZOLE SODIUM 40 MG/1
40 TABLET, DELAYED RELEASE ORAL
Status: DISCONTINUED | OUTPATIENT
Start: 2019-09-06 | End: 2019-09-07 | Stop reason: HOSPADM

## 2019-09-06 RX ORDER — DEXTROSE MONOHYDRATE 25 G/50ML
25-50 INJECTION, SOLUTION INTRAVENOUS
Status: DISCONTINUED | OUTPATIENT
Start: 2019-09-06 | End: 2019-09-07 | Stop reason: HOSPADM

## 2019-09-06 RX ADMIN — INSULIN GLARGINE 40 UNITS: 100 INJECTION, SOLUTION SUBCUTANEOUS at 12:08

## 2019-09-06 RX ADMIN — SODIUM CHLORIDE: 9 INJECTION, SOLUTION INTRAVENOUS at 05:41

## 2019-09-06 RX ADMIN — GABAPENTIN 600 MG: 600 TABLET, FILM COATED ORAL at 21:51

## 2019-09-06 RX ADMIN — ESCITALOPRAM OXALATE 20 MG: 10 TABLET ORAL at 08:10

## 2019-09-06 RX ADMIN — LISINOPRIL 30 MG: 20 TABLET ORAL at 16:56

## 2019-09-06 RX ADMIN — MIRTAZAPINE 45 MG: 15 TABLET, FILM COATED ORAL at 21:51

## 2019-09-06 RX ADMIN — INSULIN ASPART 5 UNITS: 100 INJECTION, SOLUTION INTRAVENOUS; SUBCUTANEOUS at 17:54

## 2019-09-06 RX ADMIN — METHADONE HYDROCHLORIDE 110 MG: 10 CONCENTRATE ORAL at 21:51

## 2019-09-06 RX ADMIN — DEXTROSE AND SODIUM CHLORIDE: 5; 450 INJECTION, SOLUTION INTRAVENOUS at 06:46

## 2019-09-06 RX ADMIN — OLANZAPINE 5 MG: 5 TABLET, FILM COATED ORAL at 21:51

## 2019-09-06 RX ADMIN — ATORVASTATIN CALCIUM 20 MG: 10 TABLET, FILM COATED ORAL at 08:10

## 2019-09-06 RX ADMIN — ENOXAPARIN SODIUM 40 MG: 40 INJECTION SUBCUTANEOUS at 21:51

## 2019-09-06 RX ADMIN — SODIUM CHLORIDE: 9 INJECTION, SOLUTION INTRAVENOUS at 09:50

## 2019-09-06 RX ADMIN — GABAPENTIN 300 MG: 300 CAPSULE ORAL at 11:27

## 2019-09-06 RX ADMIN — SODIUM CHLORIDE: 9 INJECTION, SOLUTION INTRAVENOUS at 01:34

## 2019-09-06 RX ADMIN — INSULIN ASPART 6 UNITS: 100 INJECTION, SOLUTION INTRAVENOUS; SUBCUTANEOUS at 12:06

## 2019-09-06 RX ADMIN — PANTOPRAZOLE SODIUM 40 MG: 40 TABLET, DELAYED RELEASE ORAL at 11:27

## 2019-09-06 ASSESSMENT — ACTIVITIES OF DAILY LIVING (ADL)
ADLS_ACUITY_SCORE: 10

## 2019-09-06 NOTE — PROGRESS NOTES
[facility]  Hospitalist Progress Note for 9/6/2019:          Assessment and Plan:   Yun Coyle is a 43 year old female with a history of non-compliant DM type II, opioid dependence on Methadone, HTN and GERD who presented to the ED on 9/4/2019 with N/V and diffuse abdominal pain and found to be profoundly hyperglycemic      Hyperglycemia  DM type II, non-compliant  On Friday the patient stopped taking her Metformin and Insulin as she stated she was in the process of moving and not taking care of herself.  She then developed N/V and diffuse abdominal pain.  On arrival here the patient was found to be profoundly hyperglycemic at 870 with a slight gap of 15 but normal bicarb and pH.    Last HgbA1c from 6/18/19 was 10.4.  Patient is on Metformin, Actos, Basaglar and SSI normally.    Blood sugars down to the 300s today   - has been on Insulin drip and protocol   - this morning tolerating po diet, BS improved  - resume PTA Lantus & once off Insulin drip start basal Novolg 10 U AC & continue PTA ISS    - discontinue IVF when off IV insulin  - Diabetes educator ordered      H/o Opioid dependence on Methadone   - continue PTA Methadone & PTA Neurontin      HTN   - Holding PTA Lisinopril for now on admission  - BP creeping up, resume PTA Lisinopril & stop IVF when off IV insulin.     GERD   Continued symptoms while on Zantac for yrs  - start protonix, encouraged appr diet /restrictions to help with reflux  - consider f/u with PCP /consider EGD if not better on PPI x 8-12 weeks     Diet: Moderate Consistent CHO Diet    DVT Prophylaxis: Enoxaparin (Lovenox) SQ  Blanc Catheter: not present  Code Status: DNR/DNI   disposition: discontinue IMC status when off IV Insulin. Possible discharge home in AM if continues to tolerate a po diet.                Interval History:    c/o HA & continue epigastric pain she has had in the past- intermittent - worse after eating certain foods. Ate all her breakfast this  "morning- no N/V.              Medications:       atorvastatin  20 mg Oral Daily     enoxaparin  40 mg Subcutaneous Q24H     escitalopram  20 mg Oral Daily     gabapentin  300 mg Oral Daily with lunch     gabapentin  600 mg Oral QPM     insulin aspart  1-10 Units Subcutaneous TID AC     insulin aspart  1-7 Units Subcutaneous At Bedtime     insulin glargine  40 Units Subcutaneous QAM AC     methadone  110 mg Oral QPM     mirtazapine  45 mg Oral At Bedtime     OLANZapine  5 mg Oral At Bedtime     pantoprazole  40 mg Oral QAM AC     sodium chloride (PF)  3 mL Intracatheter Q8H     calcium carbonate, glucose **OR** dextrose **OR** glucagon, glucose **OR** dextrose **OR** glucagon, hydrOXYzine, labetalol, lidocaine 4%, lidocaine (buffered or not buffered), naloxone, ondansetron **OR** ondansetron, prochlorperazine **OR** prochlorperazine **OR** prochlorperazine, sodium chloride (PF)               Physical Exam:   Blood pressure (!) 150/88, pulse 90, temperature 98  F (36.7  C), temperature source Oral, resp. rate 13, height 1.798 m (5' 10.8\"), weight 105.9 kg (233 lb 7.5 oz), SpO2 96 %, not currently breastfeeding.  Wt Readings from Last 4 Encounters:   19 105.9 kg (233 lb 7.5 oz)   19 115.7 kg (255 lb)   19 114.3 kg (252 lb)   19 114.4 kg (252 lb 3.2 oz)         Vital Sign Ranges  Temperature Temp  Av.5  F (36.9  C)  Min: 97.8  F (36.6  C)  Max: 99.6  F (37.6  C)   Blood pressure Systolic (24hrs), Av , Min:116 , Max:195        Diastolic (24hrs), Av, Min:62, Max:139      Pulse Pulse  Av.7  Min: 65  Max: 99   Respirations Resp  Av.1  Min: 0  Max: 21   Pulse oximetry SpO2  Av %  Min: 94 %  Max: 97 %         Intake/Output Summary (Last 24 hours) at 2019 1530  Last data filed at 2019 1319  Gross per 24 hour   Intake 4016.33 ml   Output --   Net 4016.33 ml       Constitutional: Obese, awake, alert, cooperative, no apparent distress   Lungs: Clear to auscultation " bilaterally, no crackles or wheezing   Cardiovascular: Regular rate and rhythm, normal S1 and S2, and no murmur noted   Abdomen:  soft, obese, +ve epigastric tenderness w/o guarding/rebound. Normal bowel sounds,   Skin: No rashes, no cyanosis, no edema   Neuro:                Data:   All laboratory data reviewed

## 2019-09-06 NOTE — PROVIDER NOTIFICATION
Text page to hospitalist:    Orders for ISTAT gasses lactate that do not look correct, ordered by JOSE ALFREDO Hill. Pt is lab collect. Do you want a stat lactic or some other lab? Please clarify.

## 2019-09-06 NOTE — PROVIDER NOTIFICATION
"Txt pg hospitalist, Dr. Metzger: \"Lantus and rapid given @1204. Confirming that you want insulin drip discontinued @1404, in addition to IMC being d/c? No official order in.\"    Result: Dr. Metzger placed discontinuation orders of IMC, insulin gtt.  "

## 2019-09-06 NOTE — PLAN OF CARE
IMC status discontinued, along w/ insulin gtt @ 1300. Elevated BP. A/O x 4. Lungs: clear throughout, RA. BS present, passing flatus. Void: WDL but w/ urine occurrences x 3. Activity: Up ind. Denies pain.     Plan: Pt now transitioned to sub-q insulin w/ sliding scale, BS checks for meals/2200/0200.

## 2019-09-06 NOTE — PLAN OF CARE
[id number]: VSS ex hypertensive- order parameters for prn labetolol adjusted by MD. Insulin gtt infusing on algorithm 4, rate adjusted per protocol. Pt complained of nausea, PO zofran effective.

## 2019-09-06 NOTE — PLAN OF CARE
Patient is alert and orientated X 4, up with stand by assist, vital signs stable except BP elevated. Insulin gtt infusing,  algorithm 4 BG at  06:30 170. Tele: SR.

## 2019-09-07 VITALS
TEMPERATURE: 99 F | HEIGHT: 71 IN | HEART RATE: 92 BPM | RESPIRATION RATE: 16 BRPM | SYSTOLIC BLOOD PRESSURE: 135 MMHG | OXYGEN SATURATION: 96 % | WEIGHT: 233.47 LBS | DIASTOLIC BLOOD PRESSURE: 88 MMHG | BODY MASS INDEX: 32.69 KG/M2

## 2019-09-07 LAB
ANION GAP SERPL CALCULATED.3IONS-SCNC: 1 MMOL/L (ref 3–14)
BUN SERPL-MCNC: 16 MG/DL (ref 7–30)
CALCIUM SERPL-MCNC: 8.7 MG/DL (ref 8.5–10.1)
CHLORIDE SERPL-SCNC: 107 MMOL/L (ref 94–109)
CO2 SERPL-SCNC: 32 MMOL/L (ref 20–32)
CREAT SERPL-MCNC: 0.63 MG/DL (ref 0.52–1.04)
GFR SERPL CREATININE-BSD FRML MDRD: >90 ML/MIN/{1.73_M2}
GLUCOSE BLDC GLUCOMTR-MCNC: 249 MG/DL (ref 70–99)
GLUCOSE BLDC GLUCOMTR-MCNC: 260 MG/DL (ref 70–99)
GLUCOSE BLDC GLUCOMTR-MCNC: 327 MG/DL (ref 70–99)
GLUCOSE SERPL-MCNC: 286 MG/DL (ref 70–99)
PLATELET # BLD AUTO: 188 10E9/L (ref 150–450)
POTASSIUM SERPL-SCNC: 4.3 MMOL/L (ref 3.4–5.3)
SODIUM SERPL-SCNC: 140 MMOL/L (ref 133–144)

## 2019-09-07 PROCEDURE — 99207 ZZC MOONLIGHTING INDICATOR: CPT | Performed by: INTERNAL MEDICINE

## 2019-09-07 PROCEDURE — 00000146 ZZHCL STATISTIC GLUCOSE BY METER IP

## 2019-09-07 PROCEDURE — 85049 AUTOMATED PLATELET COUNT: CPT | Performed by: INTERNAL MEDICINE

## 2019-09-07 PROCEDURE — 36415 COLL VENOUS BLD VENIPUNCTURE: CPT | Performed by: INTERNAL MEDICINE

## 2019-09-07 PROCEDURE — 25000132 ZZH RX MED GY IP 250 OP 250 PS 637: Mod: GY | Performed by: INTERNAL MEDICINE

## 2019-09-07 PROCEDURE — 25000131 ZZH RX MED GY IP 250 OP 636 PS 637: Mod: GY | Performed by: INTERNAL MEDICINE

## 2019-09-07 PROCEDURE — 80048 BASIC METABOLIC PNL TOTAL CA: CPT | Performed by: INTERNAL MEDICINE

## 2019-09-07 PROCEDURE — 99239 HOSP IP/OBS DSCHRG MGMT >30: CPT | Performed by: INTERNAL MEDICINE

## 2019-09-07 RX ORDER — PANTOPRAZOLE SODIUM 40 MG/1
40 TABLET, DELAYED RELEASE ORAL
Qty: 30 TABLET | Refills: 0 | Status: ON HOLD | OUTPATIENT
Start: 2019-09-07 | End: 2022-02-14

## 2019-09-07 RX ORDER — INSULIN GLARGINE 100 [IU]/ML
45 INJECTION, SOLUTION SUBCUTANEOUS DAILY
Qty: 12 ML | Refills: 0 | Status: SHIPPED | OUTPATIENT
Start: 2019-09-07 | End: 2020-06-01

## 2019-09-07 RX ADMIN — PANTOPRAZOLE SODIUM 40 MG: 40 TABLET, DELAYED RELEASE ORAL at 06:13

## 2019-09-07 RX ADMIN — ESCITALOPRAM OXALATE 20 MG: 10 TABLET ORAL at 08:37

## 2019-09-07 RX ADMIN — GABAPENTIN 300 MG: 300 CAPSULE ORAL at 12:30

## 2019-09-07 RX ADMIN — INSULIN ASPART 5 UNITS: 100 INJECTION, SOLUTION INTRAVENOUS; SUBCUTANEOUS at 08:37

## 2019-09-07 RX ADMIN — INSULIN ASPART 8 UNITS: 100 INJECTION, SOLUTION INTRAVENOUS; SUBCUTANEOUS at 12:30

## 2019-09-07 RX ADMIN — ATORVASTATIN CALCIUM 20 MG: 10 TABLET, FILM COATED ORAL at 08:37

## 2019-09-07 RX ADMIN — LISINOPRIL 30 MG: 20 TABLET ORAL at 08:36

## 2019-09-07 RX ADMIN — INSULIN GLARGINE 45 UNITS: 100 INJECTION, SOLUTION SUBCUTANEOUS at 09:35

## 2019-09-07 ASSESSMENT — ACTIVITIES OF DAILY LIVING (ADL)
ADLS_ACUITY_SCORE: 10

## 2019-09-07 NOTE — CONSULTS
SW: Pt unable to affrod cab home and does not have alternative transport, discharge orders in. NORY arranged for [employer] pickup, Door 2 per RN request at 1300. Pt completed waiver, sent to patient relations. Complete assessment not necessary, no other needs, pt returning home independently. Consult completed.    YANG Carrasquillo, Cary Medical CenterSW  Daytime (8:00am-4:30pm): [phone]  After-Hours  Pager (4:30pm-11:30pm): [phone]

## 2019-09-07 NOTE — PROGRESS NOTES
Vital signs stable on RA. A/Ox 4. LS clear. BS active. passing gas. Tolerating mod cho diet, educated on PUD diet per MD. Denies n/v. Voiding. Denies pain, declines medication. Up independently.    BGs 260 and 327, informed MD, patient still approved to discharge. Patient self administered novolog doses with proper technique. Verbalized understanding of dose changes. AVS given, all questions answered. discharge medications as prescribed. Patient confirmed she has working accucheck machine at home. Gave refills of test strips, needles, lancets, novolog and basaglar as ordered. Patient packed belongings. Wheeled out by volunteer, SW arranged cab transport to home address.

## 2019-09-07 NOTE — PLAN OF CARE
A&O. BP trends are up 149/94, 142/84. Temp appears to be increasing slightly throughout the day with most recent at 99.8  LS clear, Bs+. Tolerating regular diet. Up independently.    Bg:  @1714: 258  @2050: 348 @2158: 335

## 2019-09-11 DIAGNOSIS — I10 BENIGN ESSENTIAL HYPERTENSION: ICD-10-CM

## 2019-09-11 DIAGNOSIS — E11.65 UNCONTROLLED TYPE 2 DIABETES MELLITUS WITH HYPERGLYCEMIA (H): ICD-10-CM

## 2019-09-11 RX ORDER — LISINOPRIL 30 MG/1
30 TABLET ORAL DAILY
Qty: 90 TABLET | Refills: 0 | Status: CANCELLED | OUTPATIENT
Start: 2019-09-11

## 2019-09-11 RX ORDER — PIOGLITAZONEHYDROCHLORIDE 30 MG/1
30 TABLET ORAL DAILY
Qty: 30 TABLET | Refills: 0 | Status: CANCELLED | OUTPATIENT
Start: 2019-09-11

## 2019-09-11 RX ORDER — LISINOPRIL 30 MG/1
30 TABLET ORAL DAILY
Qty: 90 TABLET | Refills: 1 | Status: ON HOLD | OUTPATIENT
Start: 2019-09-11 | End: 2022-02-14

## 2019-09-11 NOTE — TELEPHONE ENCOUNTER
"Requested Prescriptions   Pending Prescriptions Disp Refills     lisinopril (PRINIVIL/ZESTRIL) 30 MG tablet 90 tablet 0     Sig: Take 1 tablet (30 mg) by mouth daily  Last Written Prescription Date:  06/18/2019  Last Fill Quantity: 90,  # refills: 0   Last office visit: 6/18/2019 with prescribing provider:  06/18/2019   Future Office Visit:   Next 5 appointments (look out 90 days)    Sep 13, 2019  9:40 AM CDT  Office Visit with BENNY Diaz CNP  [facility] ([facility]) [address] [place] [id number] [phone]              ACE Inhibitors (Including Combos) Protocol Passed - 9/11/2019 11:52 AM        Passed - Blood pressure under 140/90 in past 12 months     BP Readings from Last 3 Encounters:   09/07/19 135/88   06/18/19 122/82   05/15/19 118/87                 Passed - Recent (12 mo) or future (30 days) visit within the authorizing provider's specialty     Patient had office visit in the last 12 months or has a visit in the next 30 days with authorizing provider or within the authorizing provider's specialty.  See \"Patient Info\" tab in inbasket, or \"Choose Columns\" in Meds & Orders section of the refill encounter.              Passed - Medication is active on med list        Passed - Patient is age 18 or older        Passed - No active pregnancy on record        Passed - Normal serum creatinine on file in past 12 months     Recent Labs   Lab Test 09/07/19  0836   CR 0.63             Passed - Normal serum potassium on file in past 12 months     Recent Labs   Lab Test 09/07/19  0836   POTASSIUM 4.3             Passed - No positive pregnancy test within past 12 months          pioglitazone (ACTOS) 30 MG tablet       Last Written Prescription Date:  05/15/2019-09/07/2019  Last Fill Quantity: 30,   # refills: 0  Last Office Visit: 06/18/2019  Future Office visit:    Next 5 appointments (look out 90 days)    Sep 13, 2019  9:40 AM CDT  Office Visit " with BENNY Diaz CNP  [facility] ([facility]) [address] [place] [id number] [phone]           Routing refill request to provider for review/approval because:  Drug not active on patient's medication list

## 2019-09-11 NOTE — TELEPHONE ENCOUNTER
Pt needs Hosp follow up visit with soonest avail Provider, [facility] can sometimes do phone visits for med rec after hospital changes, so if needed can refer her there.     Thanks  Keke ZAPATA CNP

## 2019-09-11 NOTE — TELEPHONE ENCOUNTER
Prescription approved per [facility] Refill Protocol.    Sugar Sellers RN   [facility] [place]

## 2019-09-11 NOTE — TELEPHONE ENCOUNTER
Keke    pioglitazone 30 mg tablets was listed as stopped at discharge  Pt IP 9/4 to 9/7    Sugar Sellers RN   [facility] [place][note 2]

## 2019-09-19 ENCOUNTER — TELEPHONE (OUTPATIENT)
Dept: FAMILY MEDICINE | Facility: CLINIC | Age: 44
End: 2019-09-19

## 2019-09-19 DIAGNOSIS — E11.65 UNCONTROLLED TYPE 2 DIABETES MELLITUS WITH HYPERGLYCEMIA (H): Primary | ICD-10-CM

## 2019-09-19 NOTE — TELEPHONE ENCOUNTER
"Statin therapy is recommended for patients 40-75 years of age with a diagnosis of diabetes and low-dose aspirin therapy is recommended in those with diabetes and a history of atherosclerotic cardiovascular disease.  Does patient have a diagnosis of diabetes? Yes - Patient has a diagnosis of diabetes  Is patient prescribed a statin? Yes - Patient is prescribed a statin  Statin Assessment: Statin Indicated  The 10-year ASCVD risk score (Reilly GAN Jr., et al., 2013) is: 1.2%    Values used to calculate the score:      Age: 43 years      Sex: Female      Is Non- : No      Diabetic: Yes      Tobacco smoker: No      Systolic Blood Pressure: 135 mmHg      Is BP treated: Yes      HDL Cholesterol: 57 mg/dL      Total Cholesterol: 173 mg/dL   Statin Recommendation: Continue statin - no change in therapy  Reason for Recommendation: Indicated and prescribed, however patient has not picked up from pharmacy after multiple attempts via phone call reminders.  Aspirin Assessment:  Aspirin intentionally NOT prescribed  Aspirin Recomendation: Add \"Aspirin intentionally not prescribed\" to medication list    Reason for Recommendation: Not indicated based on age  Is provider follow-up required? Yes - Provider follow-up is required  Is pharmacist follow-up required? Yes - Pharmacist follow-up is required  Follow-Up Plan: Provider to follow up with patient to discuss adherence/initiation of statin therapy. If patient still on target list, pharmacist will check back next month.  Patient outreach completed by:     Loida Lincoln, PharmD  PGY1 Medication Therapy Management Resident      "

## 2020-06-01 ENCOUNTER — HOSPITAL ENCOUNTER (EMERGENCY)
Facility: CLINIC | Age: 45
Discharge: HOME | End: 2020-06-01
Attending: EMERGENCY MEDICINE | Admitting: EMERGENCY MEDICINE
Payer: MEDICARE

## 2020-06-01 VITALS
OXYGEN SATURATION: 98 % | DIASTOLIC BLOOD PRESSURE: 89 MMHG | HEART RATE: 68 BPM | SYSTOLIC BLOOD PRESSURE: 145 MMHG | RESPIRATION RATE: 18 BRPM | TEMPERATURE: 98.1 F

## 2020-06-01 DIAGNOSIS — R74.01 TRANSAMINITIS: ICD-10-CM

## 2020-06-01 DIAGNOSIS — R82.4 KETONURIA: ICD-10-CM

## 2020-06-01 DIAGNOSIS — E11.65 UNCONTROLLED TYPE 2 DIABETES MELLITUS WITH HYPERGLYCEMIA (H): ICD-10-CM

## 2020-06-01 DIAGNOSIS — R73.9 HYPERGLYCEMIA: ICD-10-CM

## 2020-06-01 DIAGNOSIS — R81 GLUCOSURIA: ICD-10-CM

## 2020-06-01 LAB
ALBUMIN SERPL-MCNC: 3.3 G/DL (ref 3.4–5)
ALBUMIN UR-MCNC: NEGATIVE MG/DL
ALP SERPL-CCNC: 149 U/L (ref 40–150)
ALT SERPL W P-5'-P-CCNC: 240 U/L (ref 0–50)
ANION GAP SERPL CALCULATED.3IONS-SCNC: 7 MMOL/L (ref 3–14)
APPEARANCE UR: CLEAR
AST SERPL W P-5'-P-CCNC: 262 U/L (ref 0–45)
BASOPHILS # BLD AUTO: 0 10E9/L (ref 0–0.2)
BASOPHILS NFR BLD AUTO: 0.3 %
BILIRUB SERPL-MCNC: 0.4 MG/DL (ref 0.2–1.3)
BILIRUB UR QL STRIP: NEGATIVE
BUN SERPL-MCNC: 10 MG/DL (ref 7–30)
CALCIUM SERPL-MCNC: 8.7 MG/DL (ref 8.5–10.1)
CHLORIDE SERPL-SCNC: 99 MMOL/L (ref 94–109)
CO2 SERPL-SCNC: 25 MMOL/L (ref 20–32)
COLOR UR AUTO: ABNORMAL
CREAT SERPL-MCNC: 0.57 MG/DL (ref 0.52–1.04)
DIFFERENTIAL METHOD BLD: ABNORMAL
EOSINOPHIL # BLD AUTO: 0.1 10E9/L (ref 0–0.7)
EOSINOPHIL NFR BLD AUTO: 1.6 %
ERYTHROCYTE [DISTWIDTH] IN BLOOD BY AUTOMATED COUNT: 15.1 % (ref 10–15)
GFR SERPL CREATININE-BSD FRML MDRD: >90 ML/MIN/{1.73_M2}
GLUCOSE BLDC GLUCOMTR-MCNC: 343 MG/DL (ref 70–99)
GLUCOSE BLDC GLUCOMTR-MCNC: 508 MG/DL (ref 70–99)
GLUCOSE SERPL-MCNC: 488 MG/DL (ref 70–99)
GLUCOSE UR STRIP-MCNC: >1000 MG/DL
HCT VFR BLD AUTO: 38.5 % (ref 35–47)
HGB BLD-MCNC: 12.8 G/DL (ref 11.7–15.7)
HGB UR QL STRIP: NEGATIVE
IMM GRANULOCYTES # BLD: 0.1 10E9/L (ref 0–0.4)
IMM GRANULOCYTES NFR BLD: 1 %
KETONES BLD-SCNC: 0.2 MMOL/L (ref 0–0.6)
KETONES UR STRIP-MCNC: 10 MG/DL
LEUKOCYTE ESTERASE UR QL STRIP: NEGATIVE
LIPASE SERPL-CCNC: 108 U/L (ref 73–393)
LYMPHOCYTES # BLD AUTO: 1.3 10E9/L (ref 0.8–5.3)
LYMPHOCYTES NFR BLD AUTO: 19.4 %
MCH RBC QN AUTO: 28.4 PG (ref 26.5–33)
MCHC RBC AUTO-ENTMCNC: 33.2 G/DL (ref 31.5–36.5)
MCV RBC AUTO: 86 FL (ref 78–100)
MONOCYTES # BLD AUTO: 0.3 10E9/L (ref 0–1.3)
MONOCYTES NFR BLD AUTO: 4.1 %
NEUTROPHILS # BLD AUTO: 5 10E9/L (ref 1.6–8.3)
NEUTROPHILS NFR BLD AUTO: 73.6 %
NITRATE UR QL: NEGATIVE
NRBC # BLD AUTO: 0 10*3/UL
NRBC BLD AUTO-RTO: 0 /100
OSMOLALITY SERPL: 297 MMOL/KG (ref 275–295)
PH UR STRIP: 7 PH (ref 5–7)
PLATELET # BLD AUTO: 240 10E9/L (ref 150–450)
POTASSIUM SERPL-SCNC: 4.6 MMOL/L (ref 3.4–5.3)
PROT SERPL-MCNC: 7.8 G/DL (ref 6.8–8.8)
RBC # BLD AUTO: 4.5 10E12/L (ref 3.8–5.2)
SODIUM SERPL-SCNC: 131 MMOL/L (ref 133–144)
SOURCE: ABNORMAL
SP GR UR STRIP: 1.03 (ref 1–1.03)
UROBILINOGEN UR STRIP-MCNC: NORMAL MG/DL (ref 0–2)
WBC # BLD AUTO: 6.8 10E9/L (ref 4–11)

## 2020-06-01 PROCEDURE — 00000146 ZZHCL STATISTIC GLUCOSE BY METER IP

## 2020-06-01 PROCEDURE — 25000125 ZZHC RX 250: Performed by: EMERGENCY MEDICINE

## 2020-06-01 PROCEDURE — 83930 ASSAY OF BLOOD OSMOLALITY: CPT | Performed by: EMERGENCY MEDICINE

## 2020-06-01 PROCEDURE — 25800030 ZZH RX IP 258 OP 636: Performed by: EMERGENCY MEDICINE

## 2020-06-01 PROCEDURE — 96375 TX/PRO/DX INJ NEW DRUG ADDON: CPT | Performed by: EMERGENCY MEDICINE

## 2020-06-01 PROCEDURE — 81003 URINALYSIS AUTO W/O SCOPE: CPT | Performed by: EMERGENCY MEDICINE

## 2020-06-01 PROCEDURE — 25000128 H RX IP 250 OP 636: Performed by: EMERGENCY MEDICINE

## 2020-06-01 PROCEDURE — 96361 HYDRATE IV INFUSION ADD-ON: CPT | Performed by: EMERGENCY MEDICINE

## 2020-06-01 PROCEDURE — 80053 COMPREHEN METABOLIC PANEL: CPT | Performed by: EMERGENCY MEDICINE

## 2020-06-01 PROCEDURE — 25000132 ZZH RX MED GY IP 250 OP 250 PS 637: Mod: GY | Performed by: EMERGENCY MEDICINE

## 2020-06-01 PROCEDURE — 82010 KETONE BODYS QUAN: CPT | Performed by: EMERGENCY MEDICINE

## 2020-06-01 PROCEDURE — 99284 EMERGENCY DEPT VISIT MOD MDM: CPT | Mod: Z6 | Performed by: EMERGENCY MEDICINE

## 2020-06-01 PROCEDURE — 96376 TX/PRO/DX INJ SAME DRUG ADON: CPT | Performed by: EMERGENCY MEDICINE

## 2020-06-01 PROCEDURE — 96374 THER/PROPH/DIAG INJ IV PUSH: CPT | Performed by: EMERGENCY MEDICINE

## 2020-06-01 PROCEDURE — 85025 COMPLETE CBC W/AUTO DIFF WBC: CPT | Performed by: EMERGENCY MEDICINE

## 2020-06-01 PROCEDURE — 83690 ASSAY OF LIPASE: CPT | Performed by: EMERGENCY MEDICINE

## 2020-06-01 PROCEDURE — 99284 EMERGENCY DEPT VISIT MOD MDM: CPT | Mod: 25 | Performed by: EMERGENCY MEDICINE

## 2020-06-01 RX ORDER — INSULIN GLARGINE 100 [IU]/ML
45 INJECTION, SOLUTION SUBCUTANEOUS DAILY
Qty: 12 ML | Refills: 0 | Status: ON HOLD | OUTPATIENT
Start: 2020-06-01 | End: 2022-02-14

## 2020-06-01 RX ORDER — ONDANSETRON 4 MG/1
4 TABLET, FILM COATED ORAL EVERY 8 HOURS PRN
Qty: 15 TABLET | Refills: 0 | Status: ON HOLD | OUTPATIENT
Start: 2020-06-01 | End: 2022-02-14

## 2020-06-01 RX ORDER — METOCLOPRAMIDE HYDROCHLORIDE 5 MG/ML
10 INJECTION INTRAMUSCULAR; INTRAVENOUS EVERY 6 HOURS
Status: DISCONTINUED | OUTPATIENT
Start: 2020-06-01 | End: 2020-06-01 | Stop reason: HOSPADM

## 2020-06-01 RX ORDER — SODIUM CHLORIDE 9 MG/ML
1000 INJECTION, SOLUTION INTRAVENOUS CONTINUOUS
Status: DISCONTINUED | OUTPATIENT
Start: 2020-06-01 | End: 2020-06-01 | Stop reason: HOSPADM

## 2020-06-01 RX ORDER — DIPHENHYDRAMINE HYDROCHLORIDE 50 MG/ML
25 INJECTION INTRAMUSCULAR; INTRAVENOUS ONCE
Status: COMPLETED | OUTPATIENT
Start: 2020-06-01 | End: 2020-06-01

## 2020-06-01 RX ORDER — ONDANSETRON 2 MG/ML
4 INJECTION INTRAMUSCULAR; INTRAVENOUS EVERY 30 MIN PRN
Status: DISCONTINUED | OUTPATIENT
Start: 2020-06-01 | End: 2020-06-01 | Stop reason: HOSPADM

## 2020-06-01 RX ADMIN — LIDOCAINE HYDROCHLORIDE 30 ML: 20 SOLUTION ORAL; TOPICAL at 19:00

## 2020-06-01 RX ADMIN — SODIUM CHLORIDE 1000 ML: 9 INJECTION, SOLUTION INTRAVENOUS at 16:14

## 2020-06-01 RX ADMIN — ONDANSETRON 4 MG: 2 INJECTION INTRAMUSCULAR; INTRAVENOUS at 17:50

## 2020-06-01 RX ADMIN — SODIUM CHLORIDE 1000 ML: 9 INJECTION, SOLUTION INTRAVENOUS at 17:38

## 2020-06-01 RX ADMIN — ONDANSETRON 4 MG: 2 INJECTION INTRAMUSCULAR; INTRAVENOUS at 16:19

## 2020-06-01 RX ADMIN — SODIUM CHLORIDE 500 ML: 9 INJECTION, SOLUTION INTRAVENOUS at 17:51

## 2020-06-01 RX ADMIN — DIPHENHYDRAMINE HYDROCHLORIDE 25 MG: 50 INJECTION, SOLUTION INTRAMUSCULAR; INTRAVENOUS at 19:02

## 2020-06-01 RX ADMIN — METOCLOPRAMIDE 10 MG: 5 INJECTION, SOLUTION INTRAMUSCULAR; INTRAVENOUS at 19:02

## 2020-06-01 NOTE — DISCHARGE INSTRUCTIONS
TODAY'S VISIT:  You were seen today for high blood sugar  -   - If you had any labs or imaging/radiology tests performed today, you should also discuss these tests with your usual provider.     FOLLOW-UP:  Please make an appointment to follow up with:  - Your Primary Care Provider. If you do not have a PCP, please call the Primary Care Center (phone: [phone] for an appointment  - make sure to follow up with your PCP regarding your abnormal liver labs  -   Please make an appointment to follow up with GI Clinic (phone: [phone])       - Have your provider review the results from today's visit with you again to make sure no further follow-up or additional testing is needed based on those results.     PRESCRIPTIONS / MEDICATIONS:  - insulin     RETURN TO THE EMERGENCY DEPARTMENT  Return to the Emergency Department at any time for any new or worsening symptoms or any concerns.

## 2020-06-01 NOTE — ED AVS SNAPSHOT
[facility], [place], Emergency Department  [address]  [facility] [address]  Phone:  [phone]  Fax:  [phone]                                    Yun Coyle   MRN: [id number]    Department:  [facility], Emergency Department   Date of Visit:  6/1/2020           After Visit Summary Signature Page    I have received my discharge instructions, and my questions have been answered. I have discussed any challenges I see with this plan with the nurse or doctor.    ..........................................................................................................................................  Patient/Patient Representative Signature      ..........................................................................................................................................  Patient Representative Print Name and Relationship to Patient    ..................................................               ................................................  Date                                   Time    ..........................................................................................................................................  Reviewed by Signature/Title    ...................................................              ..............................................  Date                                               Time          22EPIC Rev 08/18

## 2020-06-01 NOTE — ED TRIAGE NOTES
Pt is out of her insulin for the past month and has been having N/V and know her BS is up.  Pt BS is 528 while en route and was given a 500cc bolus.

## 2020-06-01 NOTE — ED PROVIDER NOTES
History     Chief Complaint   Patient presents with     Hyperglycemia          HPI  Yun Coyle is a 44 year old female with a past medical history of anxiety, depression, GERD, hypertension, type 2 diabetes, asthma who presents to the emergency department with a chief complaint of hyperglycemiaglycemia.  Patient ran out of her insulin approximately 1 month ago and has been having associated nausea and vomiting.  She feels as if her blood sugar is elevated.  Blood sugar 528 for EMS.  Patient was given a 500 cc bolus prior to arrival.  The patient has a history of back surgery, cholecystectomy, salpingo-oophorectomy.  The patient states that she ran out of her insulin because she was afraid to go to the pharmacy because of the COVID-19 pandemic.  The patient has been taking Prilosec for chronic stomach problems. Has not seen GI in awhile.     I have reviewed the Medications, Allergies, Past Medical and Surgical History, and Social History in the [employer] Count system.    Past Medical History:   Diagnosis Date     Anxiety      Depressive disorder      Gastroesophageal reflux disease      Hypertension      Type 2 diabetes mellitus (H)      Uncomplicated asthma      Past Surgical History:   Procedure Laterality Date     BACK SURGERY      x2     CHOLECYSTECTOMY       ORTHOPEDIC SURGERY       SALPINGO OOPHORECTOMY,R/L/ELDA Right      No current facility-administered medications for this encounter.      Current Outpatient Medications   Medication     ASPIRIN NOT PRESCRIBED (INTENTIONAL)     atorvastatin (LIPITOR) 20 MG tablet     blood glucose (NO BRAND SPECIFIED) lancets standard     blood glucose (NO BRAND SPECIFIED) test strip     blood glucose monitoring (NO BRAND SPECIFIED) meter device kit     blood glucose monitoring (NO BRAND SPECIFIED) meter device kit     escitalopram (LEXAPRO) 20 MG tablet     gabapentin (NEURONTIN) 300 MG capsule     hydrOXYzine (ATARAX) 25 MG tablet     insulin aspart (NOVOLOG PEN) 100  UNIT/ML pen     insulin aspart (NOVOLOG PEN) 100 UNIT/ML pen     insulin glargine (BASAGLAR KWIKPEN) 100 UNIT/ML pen     insulin pen needle (30G X 8 MM) 30G X 8 MM miscellaneous     lisinopril (PRINIVIL/ZESTRIL) 30 MG tablet     metFORMIN (GLUCOPHAGE) 500 MG tablet     methadone HCl 10 MG/5ML SOLN     mirtazapine (REMERON) 45 MG tablet     OLANZapine (ZYPREXA) 5 MG tablet     ondansetron (ZOFRAN-ODT) 4 MG ODT tab     pantoprazole (PROTONIX) 40 MG EC tablet     ranitidine (ZANTAC) 150 MG tablet     No Known Allergies  Past medical history, past surgical history, medications, and allergies were reviewed with the patient. Additional pertinent items: None    Social History     Socioeconomic History     Marital status: Single     Spouse name: Not on file     Number of children: Not on file     Years of education: Not on file     Highest education level: Not on file   Occupational History     Not on file   Social Needs     Financial resource strain: Not on file     Food insecurity     Worry: Not on file     Inability: Not on file     Transportation needs     Medical: Not on file     Non-medical: Not on file   Tobacco Use     Smoking status: Never Smoker     Smokeless tobacco: Never Used   Substance and Sexual Activity     Alcohol use: No     Frequency: Never     Drug use: Yes     Types: Marijuana     Sexual activity: Not on file   Lifestyle     Physical activity     Days per week: Not on file     Minutes per session: Not on file     Stress: Not on file   Relationships     Social connections     Talks on phone: Not on file     Gets together: Not on file     Attends Mandaeism service: Not on file     Active member of club or organization: Not on file     Attends meetings of clubs or organizations: Not on file     Relationship status: Not on file     Intimate partner violence     Fear of current or ex partner: Not on file     Emotionally abused: Not on file     Physically abused: Not on file     Forced sexual activity: Not  on file   Other Topics Concern     Not on file   Social History Narrative     Not on file     Social history was reviewed with the patient. Additional pertinent items: None    Review of Systems  General: No fevers or chills  Skin: No rash or diaphoresis  Eyes: No eye redness or discharge  Ears/Nose/Throat: No rhinorrhea or nasal congestion  Respiratory: No cough or SOB  Cardiovascular: No chest pain or palpitations  Gastrointestinal: Positive for nausea and vomiting  Genitourinary: No urinary frequency, hematuria, or dysuria  Musculoskeletal: No arthralgias or myalgias  Neurologic: No numbness or weakness  Psychiatric: Positive for history of anxiety and depression  Hematologic/Lymphatic/Immunologic: No leg swelling, no easy bruising/bleeding  Endocrine: See HPI    A complete review of systems was performed with pertinent positives and negatives noted in the HPI, and all other systems negative.    Physical Exam   BP: (!) 162/79  Pulse: 73  Temp: 98.7  F (37.1  C)  SpO2: 96 %      General: Well nourished, well developed, NAD  HEENT: EOMI, anicteric. NCAT, MMM  Neck: no jugular venous distension, supple, nl ROM  Cardiac: Regular rate and rhythm. No murmurs, rubs, or gallops. Normal S1, S2.  Intact peripheral pulses  Pulm: CTAB, no stridor, wheezes, rales, rhonchi  Abd: Soft, diffusely tender to palpation without rebound or guarding, nondistended.  No masses palpated.    Skin: Warm and dry to the touch.  No rash  Extremities: No LE edema, no cyanosis, w/w/p  Neuro: A&Ox3, no gross focal deficits    ED Course        Procedures                           Labs Ordered and Resulted from Time of ED Arrival Up to the Time of Departure from the ED   GLUCOSE BY METER - Abnormal; Notable for the following components:       Result Value    Glucose 508 (*)     All other components within normal limits   CBC WITH PLATELETS DIFFERENTIAL - Abnormal; Notable for the following components:    RDW 15.1 (*)     All other components within  normal limits   COMPREHENSIVE METABOLIC PANEL - Abnormal; Notable for the following components:    Sodium 131 (*)     Glucose 488 (*)     Albumin 3.3 (*)      (*)      (*)     All other components within normal limits   UA MACROSCOPIC WITH REFLEX TO MICRO AND CULTURE - Abnormal; Notable for the following components:    Glucose Urine >1000 (*)     Ketones Urine 10 (*)     All other components within normal limits   OSMOLALITY - Abnormal; Notable for the following components:    Osmolality 297 (*)     All other components within normal limits   GLUCOSE BY METER - Abnormal; Notable for the following components:    Glucose 343 (*)     All other components within normal limits   LIPASE   KETONE BETA-HYDROXYBUTYRATE QUANTITATIVE   GLUCOSE MONITOR NURSING POCT   ENCOURAGE FLUIDS            Results for orders placed or performed during the hospital encounter of 06/01/20 (from the past 24 hour(s))   UA reflex to Microscopic and Culture    Specimen: Urine clean catch; Unspecified Urine   Result Value Ref Range    Color Urine Light Yellow     Appearance Urine Clear     Glucose Urine >1000 (A) NEG^Negative mg/dL    Bilirubin Urine Negative NEG^Negative    Ketones Urine 10 (A) NEG^Negative mg/dL    Specific Gravity Urine 1.033 1.003 - 1.035    Blood Urine Negative NEG^Negative    pH Urine 7.0 5.0 - 7.0 pH    Protein Albumin Urine Negative NEG^Negative mg/dL    Urobilinogen mg/dL Normal 0.0 - 2.0 mg/dL    Nitrite Urine Negative NEG^Negative    Leukocyte Esterase Urine Negative NEG^Negative    Source Unspecified Urine    Glucose by meter   Result Value Ref Range    Glucose 508 (HH) 70 - 99 mg/dL   CBC with platelets differential   Result Value Ref Range    WBC 6.8 4.0 - 11.0 10e9/L    RBC Count 4.50 3.8 - 5.2 10e12/L    Hemoglobin 12.8 11.7 - 15.7 g/dL    Hematocrit 38.5 35.0 - 47.0 %    MCV 86 78 - 100 fl    MCH 28.4 26.5 - 33.0 pg    MCHC 33.2 31.5 - 36.5 g/dL    RDW 15.1 (H) 10.0 - 15.0 %    Platelet Count 240 150  - 450 10e9/L    Diff Method Automated Method     % Neutrophils 73.6 %    % Lymphocytes 19.4 %    % Monocytes 4.1 %    % Eosinophils 1.6 %    % Basophils 0.3 %    % Immature Granulocytes 1.0 %    Nucleated RBCs 0 0 /100    Absolute Neutrophil 5.0 1.6 - 8.3 10e9/L    Absolute Lymphocytes 1.3 0.8 - 5.3 10e9/L    Absolute Monocytes 0.3 0.0 - 1.3 10e9/L    Absolute Eosinophils 0.1 0.0 - 0.7 10e9/L    Absolute Basophils 0.0 0.0 - 0.2 10e9/L    Abs Immature Granulocytes 0.1 0 - 0.4 10e9/L    Absolute Nucleated RBC 0.0    Comprehensive metabolic panel   Result Value Ref Range    Sodium 131 (L) 133 - 144 mmol/L    Potassium 4.6 3.4 - 5.3 mmol/L    Chloride 99 94 - 109 mmol/L    Carbon Dioxide 25 20 - 32 mmol/L    Anion Gap 7 3 - 14 mmol/L    Glucose 488 (H) 70 - 99 mg/dL    Urea Nitrogen 10 7 - 30 mg/dL    Creatinine 0.57 0.52 - 1.04 mg/dL    GFR Estimate >90 >60 mL/min/[1.73_m2]    GFR Estimate If Black >90 >60 mL/min/[1.73_m2]    Calcium 8.7 8.5 - 10.1 mg/dL    Bilirubin Total 0.4 0.2 - 1.3 mg/dL    Albumin 3.3 (L) 3.4 - 5.0 g/dL    Protein Total 7.8 6.8 - 8.8 g/dL    Alkaline Phosphatase 149 40 - 150 U/L     (H) 0 - 50 U/L     (H) 0 - 45 U/L   Lipase   Result Value Ref Range    Lipase 108 73 - 393 U/L   Ketone Beta-Hydroxybutyrate Quantitative   Result Value Ref Range    Ketone Quantitative 0.2 0.0 - 0.6 mmol/L   Osmolality   Result Value Ref Range    Osmolality 297 (H) 275 - 295 mmol/kg   Glucose by meter   Result Value Ref Range    Glucose 343 (H) 70 - 99 mg/dL       Labs, vital signs, and imaging studies were reviewed by me.    Medications   0.9% sodium chloride BOLUS (0 mLs Intravenous Stopped 6/1/20 9011)     Followed by   sodium chloride 0.9% infusion (1,000 mLs Intravenous New Bag 6/1/20 1738)   ondansetron (ZOFRAN) injection 4 mg (4 mg Intravenous Given 6/1/20 1750)   0.9% sodium chloride BOLUS (500 mLs Intravenous New Bag 6/1/20 1751)       Assessments & Plan (with Medical Decision Making)   Yun  YUSUF Coyle is a 44 year old female who presents with hyperglycemia, nausea, and vomiting.  Fluids given prior to arrival.  Labs ordered to rule out DKA, HH NK.  Vital signs are within normal limits on arrival to the emergency department.  Blood glucose 508 on arrival.    Laboratory work-up is remarkable for hyperglycemia, transaminitis.  Bilirubin is within normal limits.  The patient reports that she has had elevated AST/ALT in the past.  Patient was advised to follow-up with her PCP/GI regarding this.  Patient advised to continue taking Prilosec.    I have reviewed the nursing notes.    I have reviewed the findings, diagnosis, plan and need for follow up with the patient.    Patient was able to tolerate p.o. in the emergency department prior to discharge.    Patient to be discharged home. Advised to follow up with PCP within 1 week. To return to ER immediately with any new/worsening symptoms. Plan of care discussed with patient who expresses understanding and agrees with plan of care.    Prescription for insulin given.  Prescription was also given for Zofran for symptomatic relief at home.     New Prescriptions    ONDANSETRON (ZOFRAN) 4 MG TABLET    Take 1 tablet (4 mg) by mouth every 8 hours as needed       Final diagnoses:   Hyperglycemia   Glucosuria   Ketonuria   Transaminitis       6/1/2020   [facility], [place], EMERGENCY DEPARTMENT     Katlyn Gale MD  06/02/20 1843       Katlyn Gale MD  06/02/20 2016

## 2020-06-01 NOTE — ED NOTES
Pt reported an increas in abdominal discomfort after consuming approximately 4 oz of water, MD notified

## 2020-06-02 ENCOUNTER — HOSPITAL ENCOUNTER (EMERGENCY)
Facility: CLINIC | Age: 45
Discharge: HOME | End: 2020-06-02
Attending: EMERGENCY MEDICINE | Admitting: EMERGENCY MEDICINE
Payer: MEDICARE

## 2020-06-02 VITALS
SYSTOLIC BLOOD PRESSURE: 151 MMHG | HEART RATE: 71 BPM | DIASTOLIC BLOOD PRESSURE: 99 MMHG | BODY MASS INDEX: 31.14 KG/M2 | WEIGHT: 222 LBS | RESPIRATION RATE: 18 BRPM | OXYGEN SATURATION: 99 % | TEMPERATURE: 98.8 F

## 2020-06-02 DIAGNOSIS — R82.4 KETONURIA: ICD-10-CM

## 2020-06-02 DIAGNOSIS — R73.9 HYPERGLYCEMIA: ICD-10-CM

## 2020-06-02 DIAGNOSIS — R10.13 ABDOMINAL PAIN, EPIGASTRIC: ICD-10-CM

## 2020-06-02 DIAGNOSIS — R81 GLUCOSURIA: ICD-10-CM

## 2020-06-02 DIAGNOSIS — Z86.39 HISTORY OF DIABETES MELLITUS: ICD-10-CM

## 2020-06-02 DIAGNOSIS — R11.2 NAUSEA AND VOMITING, INTRACTABILITY OF VOMITING NOT SPECIFIED, UNSPECIFIED VOMITING TYPE: ICD-10-CM

## 2020-06-02 LAB
ALBUMIN SERPL-MCNC: 3.3 G/DL (ref 3.4–5)
ALBUMIN UR-MCNC: NEGATIVE MG/DL
ALP SERPL-CCNC: 146 U/L (ref 40–150)
ALT SERPL W P-5'-P-CCNC: 225 U/L (ref 0–50)
ANION GAP SERPL CALCULATED.3IONS-SCNC: 7 MMOL/L (ref 3–14)
APPEARANCE UR: CLEAR
AST SERPL W P-5'-P-CCNC: 189 U/L (ref 0–45)
BASOPHILS # BLD AUTO: 0 10E9/L (ref 0–0.2)
BASOPHILS NFR BLD AUTO: 0.2 %
BILIRUB SERPL-MCNC: 0.3 MG/DL (ref 0.2–1.3)
BILIRUB UR QL STRIP: NEGATIVE
BUN SERPL-MCNC: 6 MG/DL (ref 7–30)
CALCIUM SERPL-MCNC: 9 MG/DL (ref 8.5–10.1)
CHLORIDE SERPL-SCNC: 97 MMOL/L (ref 94–109)
CO2 SERPL-SCNC: 27 MMOL/L (ref 20–32)
COLOR UR AUTO: ABNORMAL
CREAT SERPL-MCNC: 0.55 MG/DL (ref 0.52–1.04)
DIFFERENTIAL METHOD BLD: NORMAL
EOSINOPHIL # BLD AUTO: 0.1 10E9/L (ref 0–0.7)
EOSINOPHIL NFR BLD AUTO: 0.6 %
ERYTHROCYTE [DISTWIDTH] IN BLOOD BY AUTOMATED COUNT: 14.8 % (ref 10–15)
GFR SERPL CREATININE-BSD FRML MDRD: >90 ML/MIN/{1.73_M2}
GLUCOSE SERPL-MCNC: 290 MG/DL (ref 70–99)
GLUCOSE UR STRIP-MCNC: >1000 MG/DL
HCT VFR BLD AUTO: 38.4 % (ref 35–47)
HGB BLD-MCNC: 13.3 G/DL (ref 11.7–15.7)
HGB UR QL STRIP: NEGATIVE
IMM GRANULOCYTES # BLD: 0.1 10E9/L (ref 0–0.4)
IMM GRANULOCYTES NFR BLD: 1.6 %
KETONES UR STRIP-MCNC: 40 MG/DL
LEUKOCYTE ESTERASE UR QL STRIP: ABNORMAL
LIPASE SERPL-CCNC: 107 U/L (ref 73–393)
LYMPHOCYTES # BLD AUTO: 2.6 10E9/L (ref 0.8–5.3)
LYMPHOCYTES NFR BLD AUTO: 30.4 %
MCH RBC QN AUTO: 29.3 PG (ref 26.5–33)
MCHC RBC AUTO-ENTMCNC: 34.6 G/DL (ref 31.5–36.5)
MCV RBC AUTO: 85 FL (ref 78–100)
MONOCYTES # BLD AUTO: 0.5 10E9/L (ref 0–1.3)
MONOCYTES NFR BLD AUTO: 5.3 %
MUCOUS THREADS #/AREA URNS LPF: PRESENT /LPF
NEUTROPHILS # BLD AUTO: 5.4 10E9/L (ref 1.6–8.3)
NEUTROPHILS NFR BLD AUTO: 61.9 %
NITRATE UR QL: NEGATIVE
NRBC # BLD AUTO: 0 10*3/UL
NRBC BLD AUTO-RTO: 0 /100
PH UR STRIP: 7 PH (ref 5–7)
PLATELET # BLD AUTO: 256 10E9/L (ref 150–450)
POTASSIUM SERPL-SCNC: 4.1 MMOL/L (ref 3.4–5.3)
PROT SERPL-MCNC: 8.3 G/DL (ref 6.8–8.8)
RBC # BLD AUTO: 4.54 10E12/L (ref 3.8–5.2)
RBC #/AREA URNS AUTO: 2 /HPF (ref 0–2)
SODIUM SERPL-SCNC: 131 MMOL/L (ref 133–144)
SOURCE: ABNORMAL
SP GR UR STRIP: 1.03 (ref 1–1.03)
SQUAMOUS #/AREA URNS AUTO: 2 /HPF (ref 0–1)
UROBILINOGEN UR STRIP-MCNC: NORMAL MG/DL (ref 0–2)
WBC # BLD AUTO: 8.7 10E9/L (ref 4–11)
WBC #/AREA URNS AUTO: 2 /HPF (ref 0–5)

## 2020-06-02 PROCEDURE — 83690 ASSAY OF LIPASE: CPT | Performed by: EMERGENCY MEDICINE

## 2020-06-02 PROCEDURE — 80053 COMPREHEN METABOLIC PANEL: CPT | Performed by: EMERGENCY MEDICINE

## 2020-06-02 PROCEDURE — 85025 COMPLETE CBC W/AUTO DIFF WBC: CPT | Performed by: EMERGENCY MEDICINE

## 2020-06-02 PROCEDURE — 99284 EMERGENCY DEPT VISIT MOD MDM: CPT | Mod: 25 | Performed by: EMERGENCY MEDICINE

## 2020-06-02 PROCEDURE — 96361 HYDRATE IV INFUSION ADD-ON: CPT | Performed by: EMERGENCY MEDICINE

## 2020-06-02 PROCEDURE — 25000125 ZZHC RX 250: Performed by: EMERGENCY MEDICINE

## 2020-06-02 PROCEDURE — 81001 URINALYSIS AUTO W/SCOPE: CPT | Performed by: EMERGENCY MEDICINE

## 2020-06-02 PROCEDURE — 25000132 ZZH RX MED GY IP 250 OP 250 PS 637: Mod: GY | Performed by: EMERGENCY MEDICINE

## 2020-06-02 PROCEDURE — 96375 TX/PRO/DX INJ NEW DRUG ADDON: CPT | Performed by: EMERGENCY MEDICINE

## 2020-06-02 PROCEDURE — 25800030 ZZH RX IP 258 OP 636: Performed by: EMERGENCY MEDICINE

## 2020-06-02 PROCEDURE — 96374 THER/PROPH/DIAG INJ IV PUSH: CPT | Performed by: EMERGENCY MEDICINE

## 2020-06-02 PROCEDURE — 99284 EMERGENCY DEPT VISIT MOD MDM: CPT | Mod: Z6 | Performed by: EMERGENCY MEDICINE

## 2020-06-02 PROCEDURE — 25000128 H RX IP 250 OP 636: Performed by: EMERGENCY MEDICINE

## 2020-06-02 RX ORDER — METOCLOPRAMIDE 10 MG/1
10 TABLET ORAL 4 TIMES DAILY PRN
Qty: 15 TABLET | Refills: 0 | Status: ON HOLD | OUTPATIENT
Start: 2020-06-02 | End: 2022-02-14

## 2020-06-02 RX ORDER — METOCLOPRAMIDE HYDROCHLORIDE 5 MG/ML
10 INJECTION INTRAMUSCULAR; INTRAVENOUS EVERY 6 HOURS
Status: DISCONTINUED | OUTPATIENT
Start: 2020-06-02 | End: 2020-06-03 | Stop reason: HOSPADM

## 2020-06-02 RX ORDER — DIPHENHYDRAMINE HYDROCHLORIDE 50 MG/ML
25 INJECTION INTRAMUSCULAR; INTRAVENOUS ONCE
Status: COMPLETED | OUTPATIENT
Start: 2020-06-02 | End: 2020-06-02

## 2020-06-02 RX ORDER — ONDANSETRON 2 MG/ML
4 INJECTION INTRAMUSCULAR; INTRAVENOUS EVERY 30 MIN PRN
Status: DISCONTINUED | OUTPATIENT
Start: 2020-06-02 | End: 2020-06-03 | Stop reason: HOSPADM

## 2020-06-02 RX ORDER — SODIUM CHLORIDE 9 MG/ML
1000 INJECTION, SOLUTION INTRAVENOUS CONTINUOUS
Status: DISCONTINUED | OUTPATIENT
Start: 2020-06-02 | End: 2020-06-03 | Stop reason: HOSPADM

## 2020-06-02 RX ORDER — SUCRALFATE ORAL 1 G/10ML
1 SUSPENSION ORAL 4 TIMES DAILY PRN
Qty: 200 ML | Refills: 0 | Status: ON HOLD | OUTPATIENT
Start: 2020-06-02 | End: 2022-02-14

## 2020-06-02 RX ADMIN — LIDOCAINE HYDROCHLORIDE 30 ML: 20 SOLUTION ORAL; TOPICAL at 20:47

## 2020-06-02 RX ADMIN — ONDANSETRON 4 MG: 2 INJECTION INTRAMUSCULAR; INTRAVENOUS at 19:39

## 2020-06-02 RX ADMIN — SODIUM CHLORIDE 1000 ML: 9 INJECTION, SOLUTION INTRAVENOUS at 19:37

## 2020-06-02 RX ADMIN — SODIUM CHLORIDE 1000 ML: 9 INJECTION, SOLUTION INTRAVENOUS at 20:51

## 2020-06-02 RX ADMIN — METOCLOPRAMIDE 10 MG: 5 INJECTION, SOLUTION INTRAMUSCULAR; INTRAVENOUS at 20:46

## 2020-06-02 RX ADMIN — DIPHENHYDRAMINE HYDROCHLORIDE 25 MG: 50 INJECTION, SOLUTION INTRAMUSCULAR; INTRAVENOUS at 20:50

## 2020-06-02 NOTE — DISCHARGE INSTRUCTIONS
TODAY'S VISIT:  You were seen today for nausea and vomiting   -   - If you had any labs or imaging/radiology tests performed today, you should also discuss these tests with your usual provider.     FOLLOW-UP:  Please make an appointment to follow up with:  - Your Primary Care Provider. If you do not have a PCP, please call the Primary Care Center (phone: [phone] for an appointment  - GI Clinic (phone: [phone]) as soon as possible     - Have your provider review the results from today's visit with you again to make sure no further follow-up or additional testing is needed based on those results.     PRESCRIPTIONS / MEDICATIONS:  - take your insulin as directed  - continue zofran    OTHER INSTRUCTIONS:  - make sure to drink plenty of fluids    RETURN TO THE EMERGENCY DEPARTMENT  Return to the Emergency Department at any time for any new or worsening symptoms or any concerns.

## 2020-06-02 NOTE — ED PROVIDER NOTES
History     Chief Complaint   Patient presents with     Nausea, Vomiting, & Diarrhea     Hyperglycemia     HPI  Yun Coyle is a 44 year old female with a past medical history of anxiety, depression, GERD, hypertension, type 2 diabetes, asthma who presents to the emergency department with a chief complaint of hyperglycemia.  It is associated with nausea, vomiting, and diarrhea.  The patient was seen in the emergency department yesterday for the same complaint.  At that time, she had reported that she ran out of her insulin 1 month ago (due to being afraid to go to the pharmacy due to the COVID-19 pandemic) and has been having nausea and vomiting recently.  Blood sugar was initially in the 500s, resolved to 300s prior to discharge after patient received IV fluids.  The patient was provided with a new prescription for insulin as well as a prescription for Zofran for symptomatic relief at that time.  The patient was directed to follow-up with her PCP within 1 week and GI as well for possible upper endoscopy to further evaluate her symptoms.  The patient has been taking Prilosec for her stomach discomfort and was directed to continue taking this.  The patient states that she has been doing so, but her symptoms have not improved.  Nausea is not controlled with Zofran.  No other changes to her symptoms.  The patient states she has not yet been able to start taking her insulin, but her daughter was able to fill this prescription for her at the pharmacy and so she does have it available to her at home now.    The patient has a history of back surgery, cholecystectomy, salpingo-oophorectomy.     I have reviewed the Medications, Allergies, Past Medical and Surgical History, and Social History in the [employer] system.    Past Medical History:   Diagnosis Date     Anxiety      Depressive disorder      Gastroesophageal reflux disease      Hypertension      Type 2 diabetes mellitus (H)      Uncomplicated asthma      Past  Surgical History:   Procedure Laterality Date     BACK SURGERY      x2     CHOLECYSTECTOMY       ORTHOPEDIC SURGERY       SALPINGO OOPHORECTOMY,R/L/ELDA Right      No current facility-administered medications for this encounter.      Current Outpatient Medications   Medication     ASPIRIN NOT PRESCRIBED (INTENTIONAL)     atorvastatin (LIPITOR) 20 MG tablet     blood glucose (NO BRAND SPECIFIED) lancets standard     blood glucose (NO BRAND SPECIFIED) test strip     blood glucose monitoring (NO BRAND SPECIFIED) meter device kit     blood glucose monitoring (NO BRAND SPECIFIED) meter device kit     escitalopram (LEXAPRO) 20 MG tablet     gabapentin (NEURONTIN) 300 MG capsule     hydrOXYzine (ATARAX) 25 MG tablet     insulin aspart (NOVOLOG PEN) 100 UNIT/ML pen     insulin aspart (NOVOLOG PEN) 100 UNIT/ML pen     insulin glargine (BASAGLAR KWIKPEN) 100 UNIT/ML pen     insulin pen needle (30G X 8 MM) 30G X 8 MM miscellaneous     lisinopril (PRINIVIL/ZESTRIL) 30 MG tablet     metFORMIN (GLUCOPHAGE) 500 MG tablet     methadone HCl 10 MG/5ML SOLN     mirtazapine (REMERON) 45 MG tablet     OLANZapine (ZYPREXA) 5 MG tablet     ondansetron (ZOFRAN) 4 MG tablet     ondansetron (ZOFRAN-ODT) 4 MG ODT tab     pantoprazole (PROTONIX) 40 MG EC tablet     ranitidine (ZANTAC) 150 MG tablet     No Known Allergies  Past medical history, past surgical history, medications, and allergies were reviewed with the patient. Additional pertinent items: None    Social History     Socioeconomic History     Marital status: Single     Spouse name: Not on file     Number of children: Not on file     Years of education: Not on file     Highest education level: Not on file   Occupational History     Not on file   Social Needs     Financial resource strain: Not on file     Food insecurity     Worry: Not on file     Inability: Not on file     Transportation needs     Medical: Not on file     Non-medical: Not on file   Tobacco Use     Smoking status:  Never Smoker     Smokeless tobacco: Never Used   Substance and Sexual Activity     Alcohol use: No     Frequency: Never     Drug use: Yes     Types: Marijuana     Sexual activity: Not on file   Lifestyle     Physical activity     Days per week: Not on file     Minutes per session: Not on file     Stress: Not on file   Relationships     Social connections     Talks on phone: Not on file     Gets together: Not on file     Attends Orthodoxy service: Not on file     Active member of club or organization: Not on file     Attends meetings of clubs or organizations: Not on file     Relationship status: Not on file     Intimate partner violence     Fear of current or ex partner: Not on file     Emotionally abused: Not on file     Physically abused: Not on file     Forced sexual activity: Not on file   Other Topics Concern     Not on file   Social History Narrative     Not on file     Social history was reviewed with the patient. Additional pertinent items: None    Review of Systems  General: No fevers or chills  Skin: No rash or diaphoresis  Eyes: No eye redness or discharge  Ears/Nose/Throat: No rhinorrhea or nasal congestion  Respiratory: No cough or SOB  Cardiovascular: No chest pain or palpitations  Gastrointestinal: Positive for nausea, vomiting, diarrhea  Genitourinary: No urinary frequency, hematuria, or dysuria  Musculoskeletal: No arthralgias or myalgias  Neurologic: No numbness or weakness  Hematologic/Lymphatic/Immunologic: No leg swelling, no easy bruising/bleeding  Endocrine: Positive for hyperglycemia    A complete review of systems was performed with pertinent positives and negatives noted in the HPI, and all other systems negative.    Physical Exam   BP: (!) 135/103  Pulse: 103  Temp: 98.8  F (37.1  C)  Resp: 14  Weight: 100.7 kg (222 lb)  SpO2: 100 %      General: Well nourished, well developed, NAD  HEENT: EOMI, anicteric. NCAT, MMM  Neck: no jugular venous distension, supple, nl ROM  Cardiac: Regular rate  and rhythm. No murmurs, rubs, or gallops. Normal S1, S2.  Intact peripheral pulses  Pulm: CTAB, no stridor, wheezes, rales, rhonchi  Abd: Soft, diffusely tender to palpation without rebound or guarding, obese, nondistended.  No masses palpated.    Skin: Warm and dry to the touch.  No rash  Extremities: No LE edema, no cyanosis, w/w/p  Neuro: A&Ox3, no gross focal deficits    ED Course        Procedures                           Labs Ordered and Resulted from Time of ED Arrival Up to the Time of Departure from the ED   COMPREHENSIVE METABOLIC PANEL - Abnormal; Notable for the following components:       Result Value    Sodium 131 (*)     Glucose 290 (*)     Urea Nitrogen 6 (*)     Albumin 3.3 (*)      (*)      (*)     All other components within normal limits   UA MACROSCOPIC WITH REFLEX TO MICRO AND CULTURE - Abnormal; Notable for the following components:    Glucose Urine >1000 (*)     Ketones Urine 40 (*)     Leukocyte Esterase Urine Small (*)     Squamous Epithelial /HPF Urine 2 (*)     Mucous Urine Present (*)     All other components within normal limits   CBC WITH PLATELETS DIFFERENTIAL   LIPASE   PATIENT CARE ORDER            Results for orders placed or performed during the hospital encounter of 06/02/20 (from the past 24 hour(s))   UA reflex to Microscopic and Culture    Specimen: Urine clean catch; Midstream Urine   Result Value Ref Range    Color Urine Light Yellow     Appearance Urine Clear     Glucose Urine >1000 (A) NEG^Negative mg/dL    Bilirubin Urine Negative NEG^Negative    Ketones Urine 40 (A) NEG^Negative mg/dL    Specific Gravity Urine 1.033 1.003 - 1.035    Blood Urine Negative NEG^Negative    pH Urine 7.0 5.0 - 7.0 pH    Protein Albumin Urine Negative NEG^Negative mg/dL    Urobilinogen mg/dL Normal 0.0 - 2.0 mg/dL    Nitrite Urine Negative NEG^Negative    Leukocyte Esterase Urine Small (A) NEG^Negative    Source Midstream Urine     RBC Urine 2 0 - 2 /HPF    WBC Urine 2 0 - 5 /HPF     Squamous Epithelial /HPF Urine 2 (H) 0 - 1 /HPF    Mucous Urine Present (A) NEG^Negative /LPF   CBC with platelets differential   Result Value Ref Range    WBC 8.7 4.0 - 11.0 10e9/L    RBC Count 4.54 3.8 - 5.2 10e12/L    Hemoglobin 13.3 11.7 - 15.7 g/dL    Hematocrit 38.4 35.0 - 47.0 %    MCV 85 78 - 100 fl    MCH 29.3 26.5 - 33.0 pg    MCHC 34.6 31.5 - 36.5 g/dL    RDW 14.8 10.0 - 15.0 %    Platelet Count 256 150 - 450 10e9/L    Diff Method Automated Method     % Neutrophils 61.9 %    % Lymphocytes 30.4 %    % Monocytes 5.3 %    % Eosinophils 0.6 %    % Basophils 0.2 %    % Immature Granulocytes 1.6 %    Nucleated RBCs 0 0 /100    Absolute Neutrophil 5.4 1.6 - 8.3 10e9/L    Absolute Lymphocytes 2.6 0.8 - 5.3 10e9/L    Absolute Monocytes 0.5 0.0 - 1.3 10e9/L    Absolute Eosinophils 0.1 0.0 - 0.7 10e9/L    Absolute Basophils 0.0 0.0 - 0.2 10e9/L    Abs Immature Granulocytes 0.1 0 - 0.4 10e9/L    Absolute Nucleated RBC 0.0    Comprehensive metabolic panel   Result Value Ref Range    Sodium 131 (L) 133 - 144 mmol/L    Potassium 4.1 3.4 - 5.3 mmol/L    Chloride 97 94 - 109 mmol/L    Carbon Dioxide 27 20 - 32 mmol/L    Anion Gap 7 3 - 14 mmol/L    Glucose 290 (H) 70 - 99 mg/dL    Urea Nitrogen 6 (L) 7 - 30 mg/dL    Creatinine 0.55 0.52 - 1.04 mg/dL    GFR Estimate >90 >60 mL/min/[1.73_m2]    GFR Estimate If Black >90 >60 mL/min/[1.73_m2]    Calcium 9.0 8.5 - 10.1 mg/dL    Bilirubin Total 0.3 0.2 - 1.3 mg/dL    Albumin 3.3 (L) 3.4 - 5.0 g/dL    Protein Total 8.3 6.8 - 8.8 g/dL    Alkaline Phosphatase 146 40 - 150 U/L     (H) 0 - 50 U/L     (H) 0 - 45 U/L   Lipase   Result Value Ref Range    Lipase 107 73 - 393 U/L       Labs, vital signs, and imaging studies were reviewed by me.    Medications   0.9% sodium chloride BOLUS (1,000 mLs Intravenous New Bag 6/2/20 1937)     Followed by   sodium chloride 0.9% infusion (has no administration in time range)   ondansetron (ZOFRAN) injection 4 mg (4 mg Intravenous  Given 6/2/20 1939)   metoclopramide (REGLAN) injection 10 mg (has no administration in time range)   diphenhydrAMINE (BENADRYL) injection 25 mg (has no administration in time range)   lidocaine (XYLOCAINE) 2 % 15 mL, alum & mag hydroxide-simethicone (MAALOX  ES) 15 mL GI Cocktail (has no administration in time range)       Assessments & Plan (with Medical Decision Making)   Yun Coyle is a 44 year old female who presents the emergency department with nausea, vomiting, diarrhea and hyperglycemia.  Labs ordered to further evaluate patient.  IV fluids and Zofran were given for symptomatic relief in the emergency department.    Laboratory work-up is remarkable for mildly improved AST/ALT. Glucose 290. Pt again has glucosuria, ketonuria (c/w dehydration). UA not c/w UTI. Lipase wnl. IVF and zofran given.     Patient states she has had minimal improvement with Zofran.  She states she did have significant improvement with GI cocktail given yesterday.  She also complains of ongoing nausea, Reglan and Benadryl ordered for nausea, GI cocktail ordered for abdominal pain.    I have reviewed the nursing notes.    I have reviewed the findings, diagnosis, plan and need for follow up with the patient.    2122 patient reports feeling much better after GI cocktail was given.  Patient divided with water and crackers for p.o. challenge in the emergency department.    2200 Patient was able to tolerate p.o. in the emergency department.    Patient to be discharged home. Advised to follow up with PCP within 1 week. To return to ER immediately with any new/worsening symptoms. Plan of care discussed with patient who expresses understanding and agrees with plan of care.  Prescriptions for Reglan and Carafate given for symptomatic relief at home.      New Prescriptions    METOCLOPRAMIDE (REGLAN) 10 MG TABLET    Take 1 tablet (10 mg) by mouth 4 times daily as needed (nausea)    SUCRALFATE (CARAFATE) 1 GM/10ML SUSPENSION    Take 10 mLs  (1 g) by mouth 4 times daily as needed for nausea (Abdominal pain)       Final diagnoses:   History of diabetes mellitus   Nausea and vomiting, intractability of vomiting not specified, unspecified vomiting type   Glucosuria   Ketonuria   Hyperglycemia   Abdominal pain, epigastric       6/2/2020   [facility], [place], EMERGENCY DEPARTMENT     Katlyn Gale MD  06/02/20 0640

## 2020-06-02 NOTE — ED AVS SNAPSHOT
[facility], [place], Emergency Department  [address]  [facility] [address]  Phone:  [phone]  Fax:  [phone]                                    Yun Coyle   MRN: [id number]    Department:  [facility], Emergency Department   Date of Visit:  6/2/2020           After Visit Summary Signature Page    I have received my discharge instructions, and my questions have been answered. I have discussed any challenges I see with this plan with the nurse or doctor.    ..........................................................................................................................................  Patient/Patient Representative Signature      ..........................................................................................................................................  Patient Representative Print Name and Relationship to Patient    ..................................................               ................................................  Date                                   Time    ..........................................................................................................................................  Reviewed by Signature/Title    ...................................................              ..............................................  Date                                               Time          22EPIC Rev 08/18

## 2020-06-03 NOTE — ED NOTES
Pt c/o 5 days of N/V and abdominal pain.  Pt states she is not currently nauseous but is afraid to eat due to severe abdominal pain when attempting to drink water yesterday.  Reports she has not eaten in 4-5 days.  Reports recently losing 25-30 lbs.

## 2020-06-03 NOTE — ED NOTES
Reports relief of abdominal pain.  Tolerating crackers and diet soda without issue.  VSS.  Respirations easy, regular, non-labored.  Skin pwd.

## 2020-06-04 ENCOUNTER — PATIENT OUTREACH (OUTPATIENT)
Dept: CARE COORDINATION | Facility: CLINIC | Age: 45
End: 2020-06-04

## 2020-06-04 NOTE — PROGRESS NOTES
Clinic Care Coordination Contact  [facility]       Clinical Data: Care Coordinator Outreach  Outreach attempted x 1.  Left message with pt's daughter with call back information and requested return call.  Plan: Care Coordinator will follow up again in 1-2 business days.

## 2020-06-05 NOTE — PROGRESS NOTES
Community Health Worker called and left a message for the patient.  If the patient is returning my call, please transfer the patient to [facility] at ext. [phone].   Patient has been mailed a unreachable letter and was provided with CHW contact information if they are interested in accessing Clinic Care Coordination.  Order for Care Management has been closed, no further outreach will be done at this time and patient can be re-referred.

## 2020-06-14 ENCOUNTER — TELEPHONE (OUTPATIENT)
Dept: FAMILY MEDICINE | Facility: CLINIC | Age: 45
End: 2020-06-14

## 2020-06-14 NOTE — TELEPHONE ENCOUNTER
Please have her schedule follow up appointment asap (will need to be with another provider since I'm out all week)  Should come in person so labs and BP can be checked.     Thanks  Keke ZAPATA CNP

## 2020-07-23 ENCOUNTER — HOSPITAL ENCOUNTER (EMERGENCY)
Facility: CLINIC | Age: 45
Discharge: HOME | End: 2020-07-24
Attending: EMERGENCY MEDICINE | Admitting: EMERGENCY MEDICINE
Payer: MEDICARE

## 2020-07-23 ENCOUNTER — APPOINTMENT (OUTPATIENT)
Dept: CT IMAGING | Facility: CLINIC | Age: 45
End: 2020-07-23
Attending: EMERGENCY MEDICINE
Payer: MEDICARE

## 2020-07-23 ENCOUNTER — APPOINTMENT (OUTPATIENT)
Dept: GENERAL RADIOLOGY | Facility: CLINIC | Age: 45
End: 2020-07-23
Attending: EMERGENCY MEDICINE
Payer: MEDICARE

## 2020-07-23 DIAGNOSIS — R11.2 NAUSEA WITH VOMITING: ICD-10-CM

## 2020-07-23 DIAGNOSIS — F12.10 CANNABIS ABUSE, CONTINUOUS: ICD-10-CM

## 2020-07-23 DIAGNOSIS — R11.2 CANNABINOID HYPEREMESIS SYNDROME: ICD-10-CM

## 2020-07-23 DIAGNOSIS — F12.90 CANNABINOID HYPEREMESIS SYNDROME: ICD-10-CM

## 2020-07-23 LAB
ALBUMIN SERPL-MCNC: 3.8 G/DL (ref 3.4–5)
ALBUMIN UR-MCNC: NEGATIVE MG/DL
ALP SERPL-CCNC: 110 U/L (ref 40–150)
ALT SERPL W P-5'-P-CCNC: 64 U/L (ref 0–50)
AMPHETAMINES UR QL SCN: NEGATIVE
ANION GAP SERPL CALCULATED.3IONS-SCNC: 6 MMOL/L (ref 3–14)
APPEARANCE UR: CLEAR
AST SERPL W P-5'-P-CCNC: 40 U/L (ref 0–45)
BARBITURATES UR QL: NEGATIVE
BASOPHILS # BLD AUTO: 0 10E9/L (ref 0–0.2)
BASOPHILS NFR BLD AUTO: 0.4 %
BENZODIAZ UR QL: NEGATIVE
BILIRUB SERPL-MCNC: 0.5 MG/DL (ref 0.2–1.3)
BILIRUB UR QL STRIP: NEGATIVE
BUN SERPL-MCNC: 16 MG/DL (ref 7–30)
CALCIUM SERPL-MCNC: 9 MG/DL (ref 8.5–10.1)
CANNABINOIDS UR QL SCN: POSITIVE
CHLORIDE SERPL-SCNC: 101 MMOL/L (ref 94–109)
CO2 SERPL-SCNC: 27 MMOL/L (ref 20–32)
COCAINE UR QL: NEGATIVE
COLOR UR AUTO: ABNORMAL
CREAT SERPL-MCNC: 0.72 MG/DL (ref 0.52–1.04)
DIFFERENTIAL METHOD BLD: NORMAL
EOSINOPHIL # BLD AUTO: 0.1 10E9/L (ref 0–0.7)
EOSINOPHIL NFR BLD AUTO: 0.6 %
ERYTHROCYTE [DISTWIDTH] IN BLOOD BY AUTOMATED COUNT: 14.8 % (ref 10–15)
ETHANOL UR QL SCN: NEGATIVE
GFR SERPL CREATININE-BSD FRML MDRD: >90 ML/MIN/{1.73_M2}
GLUCOSE BLDC GLUCOMTR-MCNC: 186 MG/DL (ref 70–99)
GLUCOSE SERPL-MCNC: 258 MG/DL (ref 70–99)
GLUCOSE UR STRIP-MCNC: >1000 MG/DL
HCG UR QL: NEGATIVE
HCT VFR BLD AUTO: 42.2 % (ref 35–47)
HGB BLD-MCNC: 14.4 G/DL (ref 11.7–15.7)
HGB UR QL STRIP: NEGATIVE
IMM GRANULOCYTES # BLD: 0.1 10E9/L (ref 0–0.4)
IMM GRANULOCYTES NFR BLD: 0.8 %
KETONES UR STRIP-MCNC: 10 MG/DL
LEUKOCYTE ESTERASE UR QL STRIP: NEGATIVE
LYMPHOCYTES # BLD AUTO: 2.3 10E9/L (ref 0.8–5.3)
LYMPHOCYTES NFR BLD AUTO: 23.3 %
MCH RBC QN AUTO: 28.8 PG (ref 26.5–33)
MCHC RBC AUTO-ENTMCNC: 34.1 G/DL (ref 31.5–36.5)
MCV RBC AUTO: 84 FL (ref 78–100)
MONOCYTES # BLD AUTO: 0.5 10E9/L (ref 0–1.3)
MONOCYTES NFR BLD AUTO: 5.1 %
NEUTROPHILS # BLD AUTO: 6.8 10E9/L (ref 1.6–8.3)
NEUTROPHILS NFR BLD AUTO: 69.8 %
NITRATE UR QL: NEGATIVE
NRBC # BLD AUTO: 0 10*3/UL
NRBC BLD AUTO-RTO: 0 /100
OPIATES UR QL SCN: NEGATIVE
PH UR STRIP: 8 PH (ref 5–7)
PLATELET # BLD AUTO: 204 10E9/L (ref 150–450)
POTASSIUM SERPL-SCNC: 3.8 MMOL/L (ref 3.4–5.3)
PROT SERPL-MCNC: 8.8 G/DL (ref 6.8–8.8)
RBC # BLD AUTO: 5 10E12/L (ref 3.8–5.2)
RBC #/AREA URNS AUTO: <1 /HPF (ref 0–2)
SODIUM SERPL-SCNC: 134 MMOL/L (ref 133–144)
SOURCE: ABNORMAL
SP GR UR STRIP: 1.02 (ref 1–1.03)
SQUAMOUS #/AREA URNS AUTO: 2 /HPF (ref 0–1)
UROBILINOGEN UR STRIP-MCNC: NORMAL MG/DL (ref 0–2)
WBC # BLD AUTO: 9.7 10E9/L (ref 4–11)
WBC #/AREA URNS AUTO: 2 /HPF (ref 0–5)

## 2020-07-23 PROCEDURE — 81025 URINE PREGNANCY TEST: CPT | Performed by: EMERGENCY MEDICINE

## 2020-07-23 PROCEDURE — 87086 URINE CULTURE/COLONY COUNT: CPT | Performed by: EMERGENCY MEDICINE

## 2020-07-23 PROCEDURE — 80307 DRUG TEST PRSMV CHEM ANLYZR: CPT | Performed by: EMERGENCY MEDICINE

## 2020-07-23 PROCEDURE — 25800030 ZZH RX IP 258 OP 636: Performed by: EMERGENCY MEDICINE

## 2020-07-23 PROCEDURE — 81001 URINALYSIS AUTO W/SCOPE: CPT | Mod: XU | Performed by: EMERGENCY MEDICINE

## 2020-07-23 PROCEDURE — 85025 COMPLETE CBC W/AUTO DIFF WBC: CPT | Performed by: EMERGENCY MEDICINE

## 2020-07-23 PROCEDURE — 80053 COMPREHEN METABOLIC PANEL: CPT | Performed by: EMERGENCY MEDICINE

## 2020-07-23 PROCEDURE — 25000128 H RX IP 250 OP 636: Performed by: EMERGENCY MEDICINE

## 2020-07-23 PROCEDURE — 99285 EMERGENCY DEPT VISIT HI MDM: CPT | Mod: Z6 | Performed by: EMERGENCY MEDICINE

## 2020-07-23 PROCEDURE — 96374 THER/PROPH/DIAG INJ IV PUSH: CPT | Mod: 59

## 2020-07-23 PROCEDURE — 25000132 ZZH RX MED GY IP 250 OP 250 PS 637: Mod: GY | Performed by: EMERGENCY MEDICINE

## 2020-07-23 PROCEDURE — 25000125 ZZHC RX 250: Performed by: EMERGENCY MEDICINE

## 2020-07-23 PROCEDURE — 00000146 ZZHCL STATISTIC GLUCOSE BY METER IP

## 2020-07-23 PROCEDURE — 74019 RADEX ABDOMEN 2 VIEWS: CPT

## 2020-07-23 PROCEDURE — 74177 CT ABD & PELVIS W/CONTRAST: CPT

## 2020-07-23 PROCEDURE — 80320 DRUG SCREEN QUANTALCOHOLS: CPT | Performed by: EMERGENCY MEDICINE

## 2020-07-23 PROCEDURE — 96361 HYDRATE IV INFUSION ADD-ON: CPT

## 2020-07-23 PROCEDURE — 99285 EMERGENCY DEPT VISIT HI MDM: CPT | Mod: 25

## 2020-07-23 RX ORDER — IOPAMIDOL 755 MG/ML
100 INJECTION, SOLUTION INTRAVASCULAR ONCE
Status: COMPLETED | OUTPATIENT
Start: 2020-07-23 | End: 2020-07-23

## 2020-07-23 RX ORDER — OLANZAPINE 5 MG/1
5 TABLET, ORALLY DISINTEGRATING ORAL ONCE
Status: COMPLETED | OUTPATIENT
Start: 2020-07-23 | End: 2020-07-23

## 2020-07-23 RX ORDER — KETOROLAC TROMETHAMINE 30 MG/ML
30 INJECTION, SOLUTION INTRAMUSCULAR; INTRAVENOUS ONCE
Status: COMPLETED | OUTPATIENT
Start: 2020-07-23 | End: 2020-07-23

## 2020-07-23 RX ORDER — CAPSAICIN 0.025 %
CREAM (GRAM) TOPICAL ONCE
Status: COMPLETED | OUTPATIENT
Start: 2020-07-23 | End: 2020-07-23

## 2020-07-23 RX ORDER — OLANZAPINE 10 MG/1
10 TABLET, ORALLY DISINTEGRATING ORAL ONCE
Status: COMPLETED | OUTPATIENT
Start: 2020-07-23 | End: 2020-07-23

## 2020-07-23 RX ADMIN — CAPSAICIN: 0.25 CREAM TOPICAL at 22:37

## 2020-07-23 RX ADMIN — KETOROLAC TROMETHAMINE 30 MG: 30 INJECTION, SOLUTION INTRAMUSCULAR at 19:56

## 2020-07-23 RX ADMIN — IOPAMIDOL 109 ML: 755 INJECTION, SOLUTION INTRAVENOUS at 23:33

## 2020-07-23 RX ADMIN — SODIUM CHLORIDE 67 ML: 9 INJECTION, SOLUTION INTRAVENOUS at 23:33

## 2020-07-23 RX ADMIN — OLANZAPINE 10 MG: 10 TABLET, ORALLY DISINTEGRATING ORAL at 22:36

## 2020-07-23 RX ADMIN — SODIUM CHLORIDE, POTASSIUM CHLORIDE, SODIUM LACTATE AND CALCIUM CHLORIDE 1000 ML: 600; 310; 30; 20 INJECTION, SOLUTION INTRAVENOUS at 17:09

## 2020-07-23 RX ADMIN — LIDOCAINE HYDROCHLORIDE 30 ML: 20 SOLUTION ORAL; TOPICAL at 19:13

## 2020-07-23 RX ADMIN — OLANZAPINE 5 MG: 5 TABLET, ORALLY DISINTEGRATING ORAL at 17:18

## 2020-07-23 NOTE — ED NOTES
Bed: ED05  Expected date: 7/23/20  Expected time: 3:45 PM  Means of arrival: Ambulance  Comments:  [facility] [place] [facility] 45yo female, nausea and vomiting

## 2020-07-23 NOTE — ED PROVIDER NOTES
[facility] [place]    7/23/20        History     Chief Complaint   Patient presents with     Nausea & Vomiting     Pt has had N/V for two days     The history is provided by the patient and medical records.     Yun Coyle is a 44 year old female with a past medical history significant for DM 2, HTN, asthma, GERD, anxiety, and depression who presents here to the Emergency Department via EMS due to nausea and vomiting.  Patient reports that she has had nausea and vomiting over the past 2 days. Pt states this is similar to her other similar N/V episodes that have brought her to the ED. Today she is also having left flank pain. She denies fevers, chest pain, shortness of breath, bloody stool and vomit.    I have reviewed the Medications, Allergies, Past Medical and Surgical History, and Social History in the [employer] system.  PAST MEDICAL HISTORY:   Past Medical History:   Diagnosis Date     Anxiety      Depressive disorder      Gastroesophageal reflux disease      Hypertension      Type 2 diabetes mellitus (H)      Uncomplicated asthma        PAST SURGICAL HISTORY:   Past Surgical History:   Procedure Laterality Date     BACK SURGERY      x2     CHOLECYSTECTOMY       ORTHOPEDIC SURGERY       SALPINGO OOPHORECTOMY,R/L/ELDA Right        Past medical history, past surgical history, medications, and allergies were reviewed with the patient. Additional pertinent items: None    FAMILY HISTORY:   Family History   Problem Relation Age of Onset     Diabetes Mother      Cancer Mother      Cancer Maternal Grandmother      Diabetes Maternal Grandmother        SOCIAL HISTORY:   Social History     Tobacco Use     Smoking status: Never Smoker     Smokeless tobacco: Never Used   Substance Use Topics     Alcohol use: No     Frequency: Never     Social history was reviewed with the patient. Additional pertinent items: None      Patient's Medications   New Prescriptions    No medications on file    Previous Medications    ASPIRIN NOT PRESCRIBED (INTENTIONAL)    Please choose reason not prescribed, below    ATORVASTATIN (LIPITOR) 20 MG TABLET    Take 1 tablet (20 mg) by mouth daily    BLOOD GLUCOSE (NO BRAND SPECIFIED) LANCETS STANDARD    Use to test blood sugar 4 times daily or as directed.    BLOOD GLUCOSE (NO BRAND SPECIFIED) TEST STRIP    Use to test blood sugar 3 times daily or as directed.    BLOOD GLUCOSE MONITORING (NO BRAND SPECIFIED) METER DEVICE KIT    Use to test blood sugar 4 times daily or as directed.    BLOOD GLUCOSE MONITORING (NO BRAND SPECIFIED) METER DEVICE KIT    Use to test blood sugar 4 times daily or as directed.    ESCITALOPRAM (LEXAPRO) 20 MG TABLET    1 tab po daily    GABAPENTIN (NEURONTIN) 300 MG CAPSULE    TAKE 1 CAP BY MOUTH ONCE DAILY AS NEEDED FOR ANXIETY ; TAKE 1-2 CAPS (300MG-600M G) BY MOUTH AT BEDTIME AS NEEDED FOR INSOMNIA    HYDROXYZINE (ATARAX) 25 MG TABLET    TAKE 1-2 TABLETS BY MOUTH THREE TIMES DAILY AS NEEDED    INSULIN ASPART (NOVOLOG PEN) 100 UNIT/ML PEN    Inject 1-10 Units Subcutaneous 3 times daily (before meals) Pre-Meal  - 164 give 1 unit.   Pre-Meal  - 189 give 2 units.   Pre-Meal  - 214 give 3 units.   Pre-Meal  - 239 give 4 units.   Pre-Meal  - 264 give 5 units.   Pre-Meal  - 289 give 6 units.   Pre-Meal  - 314 give 7 units.   Pre-Meal  - 339 give 8 units.   Pre-Meal  - 364 give 9 units.  Pre-Meal BG > or equal to 365 give 10 units    INSULIN ASPART (NOVOLOG PEN) 100 UNIT/ML PEN    Inject 15 Units Subcutaneous 3 times daily (with meals)    INSULIN GLARGINE (BASAGLAR KWIKPEN) 100 UNIT/ML PEN    Inject 45 Units Subcutaneous daily    INSULIN PEN NEEDLE (30G X 8 MM) 30G X 8 MM MISCELLANEOUS    Use 3 pen needles daily or as directed.    LISINOPRIL (PRINIVIL/ZESTRIL) 30 MG TABLET    Take 1 tablet (30 mg) by mouth daily    METFORMIN (GLUCOPHAGE) 500 MG TABLET    Take 1 tablet (500 mg) by mouth daily (with  dinner) After a week increase to twice a day with meals    METHADONE HCL 10 MG/5ML SOLN    Take 110 mg by mouth daily Dose Verified with [facility] [phone]    METOCLOPRAMIDE (REGLAN) 10 MG TABLET    Take 1 tablet (10 mg) by mouth 4 times daily as needed (nausea)    MIRTAZAPINE (REMERON) 45 MG TABLET    1 tab po qHS    OLANZAPINE (ZYPREXA) 5 MG TABLET    1 tab scheduled po at bedtime and half tab po daily prn anxiety    ONDANSETRON (ZOFRAN) 4 MG TABLET    Take 1 tablet (4 mg) by mouth every 8 hours as needed    ONDANSETRON (ZOFRAN-ODT) 4 MG ODT TAB    Take 1-2 tablets (4-8 mg) by mouth every 6 hours as needed for nausea or vomiting    PANTOPRAZOLE (PROTONIX) 40 MG EC TABLET    Take 1 tablet (40 mg) by mouth every morning (before breakfast)    RANITIDINE (ZANTAC) 150 MG TABLET    Take 1 tablet (150 mg) by mouth nightly as needed for heartburn    SUCRALFATE (CARAFATE) 1 GM/10ML SUSPENSION    Take 10 mLs (1 g) by mouth 4 times daily as needed for nausea (Abdominal pain)   Modified Medications    No medications on file   Discontinued Medications    No medications on file        No Known Allergies     Review of Systems  A complete review of systems was performed with pertinent positives and negatives noted in the HPI, and all other systems negative.    Physical Exam   Pulse: 75  Temp: 98.3  F (36.8  C)  SpO2: 100 %      Physical Exam  Vitals signs and nursing note reviewed.   Constitutional:       General: She is not in acute distress.     Appearance: She is well-developed. She is not ill-appearing, toxic-appearing or diaphoretic.   HENT:      Head: Normocephalic and atraumatic.      Mouth/Throat:      Pharynx: No oropharyngeal exudate.   Eyes:      General: No scleral icterus.     Pupils: Pupils are equal, round, and reactive to light.   Neck:      Musculoskeletal: Normal range of motion and neck supple.   Pulmonary:      Effort: Pulmonary effort is normal. No respiratory distress.   Abdominal:      Palpations:  Abdomen is soft.      Tenderness: There is abdominal tenderness in the epigastric area.   Musculoskeletal:         General: No tenderness.   Skin:     General: Skin is warm and dry.      Coloration: Skin is not jaundiced or pale.      Findings: No erythema or rash.   Neurological:      Mental Status: She is alert and oriented to person, place, and time.         ED Course        Procedures                           Results for orders placed or performed during the hospital encounter of 07/23/20 (from the past 24 hour(s))   CBC with platelets differential   Result Value Ref Range    WBC 9.7 4.0 - 11.0 10e9/L    RBC Count 5.00 3.8 - 5.2 10e12/L    Hemoglobin 14.4 11.7 - 15.7 g/dL    Hematocrit 42.2 35.0 - 47.0 %    MCV 84 78 - 100 fl    MCH 28.8 26.5 - 33.0 pg    MCHC 34.1 31.5 - 36.5 g/dL    RDW 14.8 10.0 - 15.0 %    Platelet Count 204 150 - 450 10e9/L    Diff Method Automated Method     % Neutrophils 69.8 %    % Lymphocytes 23.3 %    % Monocytes 5.1 %    % Eosinophils 0.6 %    % Basophils 0.4 %    % Immature Granulocytes 0.8 %    Nucleated RBCs 0 0 /100    Absolute Neutrophil 6.8 1.6 - 8.3 10e9/L    Absolute Lymphocytes 2.3 0.8 - 5.3 10e9/L    Absolute Monocytes 0.5 0.0 - 1.3 10e9/L    Absolute Eosinophils 0.1 0.0 - 0.7 10e9/L    Absolute Basophils 0.0 0.0 - 0.2 10e9/L    Abs Immature Granulocytes 0.1 0 - 0.4 10e9/L    Absolute Nucleated RBC 0.0    Comprehensive metabolic panel   Result Value Ref Range    Sodium 134 133 - 144 mmol/L    Potassium 3.8 3.4 - 5.3 mmol/L    Chloride 101 94 - 109 mmol/L    Carbon Dioxide 27 20 - 32 mmol/L    Anion Gap 6 3 - 14 mmol/L    Glucose 258 (H) 70 - 99 mg/dL    Urea Nitrogen 16 7 - 30 mg/dL    Creatinine 0.72 0.52 - 1.04 mg/dL    GFR Estimate >90 >60 mL/min/[1.73_m2]    GFR Estimate If Black >90 >60 mL/min/[1.73_m2]    Calcium 9.0 8.5 - 10.1 mg/dL    Bilirubin Total 0.5 0.2 - 1.3 mg/dL    Albumin 3.8 3.4 - 5.0 g/dL    Protein Total 8.8 6.8 - 8.8 g/dL    Alkaline Phosphatase 110 40  - 150 U/L    ALT 64 (H) 0 - 50 U/L    AST 40 0 - 45 U/L   UA with Microscopic   Result Value Ref Range    Color Urine Light Yellow     Appearance Urine Clear     Glucose Urine >1000 (A) NEG^Negative mg/dL    Bilirubin Urine Negative NEG^Negative    Ketones Urine 10 (A) NEG^Negative mg/dL    Specific Gravity Urine 1.022 1.003 - 1.035    Blood Urine Negative NEG^Negative    pH Urine 8.0 (H) 5.0 - 7.0 pH    Protein Albumin Urine Negative NEG^Negative mg/dL    Urobilinogen mg/dL Normal 0.0 - 2.0 mg/dL    Nitrite Urine Negative NEG^Negative    Leukocyte Esterase Urine Negative NEG^Negative    Source Midstream Urine     WBC Urine 2 0 - 5 /HPF    RBC Urine <1 0 - 2 /HPF    Squamous Epithelial /HPF Urine 2 (H) 0 - 1 /HPF   Urine Culture    Specimen: Urine clean catch; Midstream Urine   Result Value Ref Range    Specimen Description Midstream Urine     Special Requests Specimen received in preservative     Culture Micro PENDING    HCG qualitative urine (UPT)   Result Value Ref Range    HCG Qual Urine Negative NEG^Negative   Drug abuse screen 6 urine (tox)   Result Value Ref Range    Amphetamine Qual Urine Negative NEG^Negative    Barbiturates Qual Urine Negative NEG^Negative    Benzodiazepine Qual Urine Negative NEG^Negative    Cannabinoids Qual Urine Positive (A) NEG^Negative    Cocaine Qual Urine Negative NEG^Negative    Ethanol Qual Urine Negative NEG^Negative    Opiates Qualitative Urine Negative NEG^Negative   Abdomen XR, 2 vw, flat and upright    Narrative    ABDOMEN TWO VIEWS 7/23/2020 8:08 PM     HISTORY: Left lower quadrant pain.    COMPARISON: None.      Impression    IMPRESSION: Surgical clips in the right upper quadrant from  cholecystectomy. Nonobstructive gas pattern. No evidence of free air  or abnormal calculus. Normal stool volume.    DAMEON PERRY MD   Glucose by meter   Result Value Ref Range    Glucose 186 (H) 70 - 99 mg/dL   CT Abdomen Pelvis w Contrast    Narrative    EXAM: CT ABDOMEN AND PELVIS WITH  CONTRAST  LOCATION: [facility]  DATE/TIME: 7/23/2020 11:13 PM    INDICATION: Severe left lower quadrant pain.    COMPARISON: None.    TECHNIQUE: CT scan of the abdomen and pelvis was performed following injection of IV contrast. Multiplanar reformats were obtained. Dose reduction techniques were used.    CONTRAST: 109 mL Isovue-370.    FINDINGS:  LOWER CHEST: Unremarkable.    HEPATOBILIARY: Prior cholecystectomy.    SPLEEN: Unremarkable.    PANCREAS: Unremarkable.    ADRENAL GLANDS: Unremarkable.    KIDNEYS/BLADDER: Unremarkable.    BOWEL: No dilatation of the small or large bowel. Normal appendix. No visualized bowel wall thickening, pneumatosis or free intraperitoneal gas.    LYMPH NODES: Unremarkable.    VASCULATURE: Atherosclerotic calcification in the abdominal aorta.      Impression    IMPRESSION: No cause of acute pain identified in the abdomen or pelvis.       Medications   lactated ringers BOLUS 1,000 mL (0 mLs Intravenous Stopped 7/23/20 1809)   OLANZapine zydis (zyPREXA) ODT tab 5 mg (5 mg Oral Given 7/23/20 1718)   lidocaine (XYLOCAINE) 2 % 15 mL, alum & mag hydroxide-simethicone (MAALOX  ES) 15 mL GI Cocktail (30 mLs Oral Given 7/23/20 1913)   ketorolac (TORADOL) injection 30 mg (30 mg Intravenous Given 7/23/20 1956)   OLANZapine zydis (zyPREXA) ODT tab 10 mg (10 mg Oral Given 7/23/20 2236)   capsaicin (ZOSTRIX) cream ( Topical Given 7/23/20 2237)   iopamidol (ISOVUE-370) solution 100 mL (109 mLs Intravenous Given 7/23/20 2333)   sodium chloride 0.9 % bag 500mL for CT scan flush use (67 mLs Intravenous Given 7/23/20 2333)             Assessments & Plan (with Medical Decision Making)   This is a 45 y/o female coming to the ED with N/V x 2 days with left flank pain. Vitals and labs reviewed. Previous encounters are read. On exam the patient appears nauseous and uncomfortable. She is vitally stable and has diffuse abd pain. Labs are unremarkable. Imaging is also unremarkable. PT was provided  "zyprexa, toradol. GI cocktail, IVF and capsaicin cream with complete resolution of symptoms on reassessment.  AT this time she is tolerating PO and can be safely discharged with aftercare instructions.  PT has hx of similar symptoms in the past. She admits to smoke cannabis for several years and has several episodes previous to this. Today her urine is positive for cannabinoids. Her symptoms resolved as expected for cannabinoid hyperemesis.   Pt was discharged home/self-care.  PT was provided written discharge instructions. Additionally verbal instructions were given and discussed with patient.  PT was asked to return to the ED immediately for any new or concerning symptoms.  Pt was in agreement, endorsed understanding, and questions were answered.     I have reviewed the nursing notes.    I have reviewed the findings, diagnosis, plan and need for follow up with the patient.    New Prescriptions    No medications on file       Final diagnoses:   Cannabinoid hyperemesis syndrome     IJonnie, am serving as a trained medical scribe to document services personally performed by Wilfrid Corrigan MD, based on the provider's statements to me.   IWilfrid MD, was physically present and have reviewed and verified the accuracy of this note documented by Jonnie Ybarra.    \"This dictation was performed with the assistance of voice recognition software and may contain inadvertant transcription  errors,  omissions and/or  inadvertent word substitution.\" --Wilfrid Corrigan MD     7/23/2020   [facility] EMERGENCY DEPARTMENT     Wilfrid Corrigan MD  07/24/20 0027       Wilfrid Corrigan MD  07/24/20 0032    "

## 2020-07-23 NOTE — ED AVS SNAPSHOT
[facility], [place], Emergency Department  [address]  [facility] [address]  Phone:  [phone]  Fax:  [phone]                                    Yun Coyle   MRN: [id number]    Department:  [facility], Emergency Department   Date of Visit:  7/23/2020           After Visit Summary Signature Page    I have received my discharge instructions, and my questions have been answered. I have discussed any challenges I see with this plan with the nurse or doctor.    ..........................................................................................................................................  Patient/Patient Representative Signature      ..........................................................................................................................................  Patient Representative Print Name and Relationship to Patient    ..................................................               ................................................  Date                                   Time    ..........................................................................................................................................  Reviewed by Signature/Title    ...................................................              ..............................................  Date                                               Time          22EPIC Rev 08/18

## 2020-07-24 ENCOUNTER — PATIENT OUTREACH (OUTPATIENT)
Dept: CARE COORDINATION | Facility: CLINIC | Age: 45
End: 2020-07-24

## 2020-07-24 VITALS
RESPIRATION RATE: 18 BRPM | DIASTOLIC BLOOD PRESSURE: 97 MMHG | TEMPERATURE: 98.3 F | OXYGEN SATURATION: 100 % | SYSTOLIC BLOOD PRESSURE: 130 MMHG | HEART RATE: 80 BPM

## 2020-07-24 LAB
BACTERIA SPEC CULT: NO GROWTH
Lab: NORMAL
SPECIMEN SOURCE: NORMAL

## 2020-07-24 NOTE — DISCHARGE INSTRUCTIONS
What is cannabinoid hyperemesis syndrome?  Cannabinoid hyperemesis syndrome (CHS) is a condition that leads to repeated and severe bouts of vomiting. It is rare and only occurs in daily long-term users of marijuana.    Marijuana has several active substances. These include THC and related chemicals. These substances bind to molecules found in the brain. That causes the drug  high  and other effects that users feel.    Your digestive tract also has a number of molecules that bind to THC and related substances. So marijuana also affects the digestive tract. For example, the drug can change the time it takes the stomach to empty. It also affects the esophageal sphincter. That s the tight band of muscle that opens and closes to let food from the esophagus into the stomach. Long-term marijuana use can change the way the affected molecules respond and lead to the symptoms of CHS.    Marijuana is the most widely used illegal drug in the [place] Young adults are the most frequent users. A small number of these people develop CHS. It often only happens in people who have regularly used marijuana for several years. Often CHS affects those who use the drug at least once a day.    What causes cannabinoid hyperemesis syndrome?  Marijuana has very complex effects on the body. Experts are still trying to learn exactly how it causes CHS in some people.    In the brain, marijuana often has the opposite effect of CHS. It helps prevent nausea and vomiting. The drug is also good at stopping such symptoms in people having chemotherapy.    But in the digestive tract, marijuana seems to have the opposite effect. It actually makes you more likely to have nausea and vomiting. With the first use of marijuana, the signals from the brain may be more important. That may lead to anti-nausea effects at first. But with repeated use of marijuana, certain receptors in the brain may stop responding to the drug in the same way. That may cause the  repeated bouts of vomiting found in people with CHS.    It still isn t clear why some heavy marijuana users get the syndrome, but others don't.    What are the symptoms of cannabinoid hyperemesis syndrome?  People with CHS suffer from repeated bouts of vomiting. In between these episodes are times without any symptoms. Healthcare providers often divide these symptoms into 3 stages: the prodromal phase, the hyperemetic phase, and the recovery phase.    Prodromal phase. During this phase, the main symptoms are often early morning nausea and belly (abdominal) pain. Some people also develop a fear of vomiting. Most people keep normal eating patterns during this time. Some people use more marijuana because they think it will help stop the nausea. This phase may last for months or years.    Hyperemetic phase. Symptoms during this time may include:    Ongoing nausea  Repeated episodes of vomiting  Belly pain  Decreased food intake and weight loss  Symptoms of fluid loss (dehydration)  During this phase, vomiting is often intense and overwhelming. Many people take a lot of hot showers during the day. They find that doing so eases their nausea. (That may be because of how the hot temperature affects a part of the brain called the hypothalamus. This part of the brain effects both temperature regulation and vomiting.) People often first seek medical care during this phase.    The hyperemetic phase may continue until the person completely stops using marijuana. Then the recovery phrase starts.    Recovery phase. During this time, symptoms go away. Normal eating is possible again. This phase can last days or months. Symptoms often come back if the person tries marijuana again.    How is cannabinoid hyperemesis syndrome diagnosed?  Many health problems can cause repeated vomiting. To make a diagnosis, your healthcare provider will ask you about your symptoms and your past health. He or she will also do a physical exam, including  an exam of your belly.    Your healthcare provider may also need more tests to rule out other causes of the vomiting. That s especially the case for ones that may signal a health emergency. Based on your other symptoms, these tests might include:    Blood tests for anemia and infection  Tests for electrolytes  Tests for pancreas and liver enzymes, to check these organs  Pregnancy test  Urine analysis, to test for infection or other urinary causes  Drug screen, to test for drug-related causes of vomiting  X-rays of the belly, to check for things such as a blockage  Upper endoscopy, to view the stomach and esophagus for possible causes of vomiting  Head CT scan, if a nervous system cause of vomiting seems likely  Abdominal CT scan, to check for health problems that might need surgery  CHS was only recently discovered. So some healthcare providers may not know about it. As a result, they may not spot it for many years. They often confuse CHS with cyclical vomiting disorder. That is a health problem that causes similar symptoms. A specialist trained in diseases of the digestive tract (gastroenterologist) might make the diagnosis.    You may have CHS if you have all of these:    Long-term weekly and daily marijuana use    Belly pain  Severe, repeated nausea and vomiting    You feel better after taking a hot shower    There is no single test that confirms this diagnosis. Only improvement after quitting marijuana confirms the diagnosis.    How is cannabinoid hyperemesis syndrome treated?  If you have had severe vomiting, you might need to stay in the hospital for a short time. During the hyperemesis phase, you might need these treatments:    IV (intravenous) fluid replacement for dehydration  Medicines to help decrease vomiting  Pain medicine  Proton-pump inhibitors, to treat stomach inflammation  Frequent hot showers  In a small sample of people with CHS, rubbing capsaicin cream on the belly helped decrease pain and  nausea. The chemicals in the cream have the same effect as a hot shower  Symptoms often ease after a day or 2 unless marijuana is used before this time.    To fully get better, you need to stop using marijuana all together. Some people may get help from drug rehab programs to help them quit. Cognitive behavioral therapy or family therapy can also help. If you stop using marijuana, your symptoms should not come back.    What are possible complications of cannabinoid hyperemesis syndrome?  Very severe, prolonged vomiting may lead to dehydration. It may also lead to electrolyte problems in your blood. If untreated, these can cause rare complications such as:    Muscle spasms or weakness  Seizures  Kidney failure  Heart rhythm abnormalities  Shock  In very rare cases, brain swelling (cerebral edema)  Your healthcare team will quickly work to fix any dehydration or electrolyte problems. Doing so can help prevent these problems.    What can I do to prevent cannabinoid hyperemesis syndrome?  You can prevent CHS by not using marijuana in any form. You may not want to believe that marijuana may be the underlying cause of your symptoms. That may be because you have used it for many years without having any problems. The syndrome may take several years to develop. The drug may help prevent nausea in new users who don t use it often. But people with CHS need to completely stop using it. If they don t, their symptoms will likely come back.    Quitting marijuana may lead to other health benefits, such as:    Better lung function  Improved memory and thinking skills  Better sleep  Decreased risk for depression and anxiety  When should I call my healthcare provider?  Call your healthcare provider if you have had severe vomiting for a day or more.    Key points about cannabinoid hyperemesis syndrome  CHS is a condition that leads to repeated and severe bouts of vomiting. It results from long-term use of marijuana.  Most people  self-treat using hot showers to help reduce their symptoms.  Some people with CHS may not be diagnosed for several years. Admitting to your healthcare provider that you use marijuana daily can speed up the diagnosis.  You might need to stay in the hospital to treat dehydration from CHS.  Symptoms start to go away within a day or 2 after stopping marijuana use.  Symptoms almost always come back if you use marijuana again.  Next steps  Tips to help you get the most from a visit to your healthcare provider:    Know the reason for your visit and what you want to happen.  Before your visit, write down questions you want answered.  Bring someone with you to help you ask questions and remember what your provider tells you.  At the visit, write down the name of a new diagnosis, and any new medicines, treatments, or tests. Also write down any new instructions your provider gives you.  Know why a new medicine or treatment is prescribed, and how it will help you. Also know what the side effects are.  Ask if your condition can be treated in other ways.  Know why a test or procedure is recommended and what the results could mean.  Know what to expect if you do not take the medicine or have the test or procedure.  If you have a follow-up appointment, write down the date, time, and purpose for that visit.  Know how you can contact your provider if you have questions.

## 2020-07-24 NOTE — PROGRESS NOTES
Clinic Care Coordination Contact  [facility]/Voicemail    Clinical Data: Care Coordinator Outreach  Outreach attempted x 1.  Left message on patient's voicemail with call back information and requested return call.  Plan: Care Coordinator will try to reach patient again in 1-2 business days.

## 2020-07-24 NOTE — LETTER
July 28, 2020    Dear Yun,                                                                         You were recently referred to the [facility]'s Clinic Care Coordination service.  This is a service offered through your Primary Care Clinic which can help you access resources and services in regard to your health and well-being goals. The clinic Community Health Worker has placed two calls to you to discuss the nature of this service and to offer enrollment.  If you are interested in learning more about Clinic Care Coordination, please call your Primary Care Clinic's Community Health Worker, Anamaria, at [phone].                                                    Sincerely,                                                                              DAI Conrad                                                                                          Clinic Care Coordination                                                  [facility]

## 2020-07-28 NOTE — PROGRESS NOTES
Community Health Worker called and left a message for the patient.  If the patient is returning my call, please transfer the patient to [facility] at ext. [phone].   Patient has been mailed a unreachable letter and was provided with CHW contact information if they are interested in accessing Clinic Care Coordination.  Order for Care Management has been closed, no further outreach will be done at this time and patient can be re-referred.

## 2020-12-03 ENCOUNTER — HOSPITAL ENCOUNTER (EMERGENCY)
Facility: CLINIC | Age: 45
Discharge: HOME | End: 2020-12-03
Attending: EMERGENCY MEDICINE | Admitting: EMERGENCY MEDICINE
Payer: MEDICARE

## 2020-12-03 ENCOUNTER — APPOINTMENT (OUTPATIENT)
Dept: CT IMAGING | Facility: CLINIC | Age: 45
End: 2020-12-03
Attending: EMERGENCY MEDICINE
Payer: MEDICARE

## 2020-12-03 VITALS
SYSTOLIC BLOOD PRESSURE: 163 MMHG | OXYGEN SATURATION: 77 % | RESPIRATION RATE: 34 BRPM | BODY MASS INDEX: 30.86 KG/M2 | WEIGHT: 220 LBS | DIASTOLIC BLOOD PRESSURE: 92 MMHG | HEART RATE: 67 BPM | TEMPERATURE: 98.4 F

## 2020-12-03 DIAGNOSIS — R11.2 INTRACTABLE VOMITING WITH NAUSEA, UNSPECIFIED VOMITING TYPE: ICD-10-CM

## 2020-12-03 DIAGNOSIS — R10.84 DIFFUSE ABDOMINAL PAIN: ICD-10-CM

## 2020-12-03 LAB
ALBUMIN SERPL-MCNC: 3.6 G/DL (ref 3.4–5)
ALP SERPL-CCNC: 150 U/L (ref 40–150)
ALT SERPL W P-5'-P-CCNC: 273 U/L (ref 0–50)
ANION GAP SERPL CALCULATED.3IONS-SCNC: 6 MMOL/L (ref 3–14)
AST SERPL W P-5'-P-CCNC: 243 U/L (ref 0–45)
BASOPHILS # BLD AUTO: 0.1 10E9/L (ref 0–0.2)
BASOPHILS NFR BLD AUTO: 0.5 %
BILIRUB SERPL-MCNC: 0.3 MG/DL (ref 0.2–1.3)
BUN SERPL-MCNC: 11 MG/DL (ref 7–30)
CALCIUM SERPL-MCNC: 9.2 MG/DL (ref 8.5–10.1)
CHLORIDE SERPL-SCNC: 101 MMOL/L (ref 94–109)
CO2 SERPL-SCNC: 28 MMOL/L (ref 20–32)
CREAT SERPL-MCNC: 0.72 MG/DL (ref 0.52–1.04)
DIFFERENTIAL METHOD BLD: ABNORMAL
EOSINOPHIL # BLD AUTO: 0.7 10E9/L (ref 0–0.7)
EOSINOPHIL NFR BLD AUTO: 6.1 %
ERYTHROCYTE [DISTWIDTH] IN BLOOD BY AUTOMATED COUNT: 12.5 % (ref 10–15)
GFR SERPL CREATININE-BSD FRML MDRD: >90 ML/MIN/{1.73_M2}
GLUCOSE SERPL-MCNC: 254 MG/DL (ref 70–99)
HCG SERPL QL: NEGATIVE
HCT VFR BLD AUTO: 43.5 % (ref 35–47)
HGB BLD-MCNC: 14.7 G/DL (ref 11.7–15.7)
IMM GRANULOCYTES # BLD: 0.1 10E9/L (ref 0–0.4)
IMM GRANULOCYTES NFR BLD: 0.5 %
LIPASE SERPL-CCNC: 292 U/L (ref 73–393)
LYMPHOCYTES # BLD AUTO: 4.1 10E9/L (ref 0.8–5.3)
LYMPHOCYTES NFR BLD AUTO: 37 %
MCH RBC QN AUTO: 28.8 PG (ref 26.5–33)
MCHC RBC AUTO-ENTMCNC: 33.8 G/DL (ref 31.5–36.5)
MCV RBC AUTO: 85 FL (ref 78–100)
MONOCYTES # BLD AUTO: 0.6 10E9/L (ref 0–1.3)
MONOCYTES NFR BLD AUTO: 5.4 %
NEUTROPHILS # BLD AUTO: 5.6 10E9/L (ref 1.6–8.3)
NEUTROPHILS NFR BLD AUTO: 50.5 %
NRBC # BLD AUTO: 0 10*3/UL
NRBC BLD AUTO-RTO: 0 /100
PLATELET # BLD AUTO: 305 10E9/L (ref 150–450)
POTASSIUM SERPL-SCNC: 3.5 MMOL/L (ref 3.4–5.3)
PROT SERPL-MCNC: 8.7 G/DL (ref 6.8–8.8)
RBC # BLD AUTO: 5.1 10E12/L (ref 3.8–5.2)
SODIUM SERPL-SCNC: 135 MMOL/L (ref 133–144)
WBC # BLD AUTO: 11.1 10E9/L (ref 4–11)

## 2020-12-03 PROCEDURE — 99285 EMERGENCY DEPT VISIT HI MDM: CPT | Mod: 25 | Performed by: EMERGENCY MEDICINE

## 2020-12-03 PROCEDURE — 93005 ELECTROCARDIOGRAM TRACING: CPT | Performed by: EMERGENCY MEDICINE

## 2020-12-03 PROCEDURE — 96374 THER/PROPH/DIAG INJ IV PUSH: CPT | Performed by: EMERGENCY MEDICINE

## 2020-12-03 PROCEDURE — 96361 HYDRATE IV INFUSION ADD-ON: CPT | Performed by: EMERGENCY MEDICINE

## 2020-12-03 PROCEDURE — 84703 CHORIONIC GONADOTROPIN ASSAY: CPT | Performed by: EMERGENCY MEDICINE

## 2020-12-03 PROCEDURE — 85025 COMPLETE CBC W/AUTO DIFF WBC: CPT | Performed by: EMERGENCY MEDICINE

## 2020-12-03 PROCEDURE — 250N000011 HC RX IP 250 OP 636: Performed by: EMERGENCY MEDICINE

## 2020-12-03 PROCEDURE — 93010 ELECTROCARDIOGRAM REPORT: CPT | Performed by: EMERGENCY MEDICINE

## 2020-12-03 PROCEDURE — 258N000003 HC RX IP 258 OP 636: Performed by: EMERGENCY MEDICINE

## 2020-12-03 PROCEDURE — 80053 COMPREHEN METABOLIC PANEL: CPT | Performed by: EMERGENCY MEDICINE

## 2020-12-03 PROCEDURE — 96376 TX/PRO/DX INJ SAME DRUG ADON: CPT | Performed by: EMERGENCY MEDICINE

## 2020-12-03 PROCEDURE — 74177 CT ABD & PELVIS W/CONTRAST: CPT

## 2020-12-03 PROCEDURE — 83690 ASSAY OF LIPASE: CPT | Performed by: EMERGENCY MEDICINE

## 2020-12-03 PROCEDURE — 250N000009 HC RX 250: Performed by: EMERGENCY MEDICINE

## 2020-12-03 PROCEDURE — 96375 TX/PRO/DX INJ NEW DRUG ADDON: CPT | Performed by: EMERGENCY MEDICINE

## 2020-12-03 RX ORDER — DROPERIDOL 2.5 MG/ML
2.5 INJECTION, SOLUTION INTRAMUSCULAR; INTRAVENOUS ONCE
Status: COMPLETED | OUTPATIENT
Start: 2020-12-03 | End: 2020-12-03

## 2020-12-03 RX ORDER — ONDANSETRON 4 MG/1
4 TABLET, ORALLY DISINTEGRATING ORAL EVERY 6 HOURS PRN
Qty: 20 TABLET | Refills: 0 | Status: ON HOLD | OUTPATIENT
Start: 2020-12-03 | End: 2022-02-14

## 2020-12-03 RX ORDER — DIPHENHYDRAMINE HYDROCHLORIDE 50 MG/ML
25 INJECTION INTRAMUSCULAR; INTRAVENOUS ONCE
Status: COMPLETED | OUTPATIENT
Start: 2020-12-03 | End: 2020-12-03

## 2020-12-03 RX ORDER — ONDANSETRON 2 MG/ML
4 INJECTION INTRAMUSCULAR; INTRAVENOUS ONCE
Status: COMPLETED | OUTPATIENT
Start: 2020-12-03 | End: 2020-12-03

## 2020-12-03 RX ORDER — IOPAMIDOL 755 MG/ML
150 INJECTION, SOLUTION INTRAVASCULAR ONCE
Status: COMPLETED | OUTPATIENT
Start: 2020-12-03 | End: 2020-12-03

## 2020-12-03 RX ORDER — ONDANSETRON 2 MG/ML
4 INJECTION INTRAMUSCULAR; INTRAVENOUS
Status: COMPLETED | OUTPATIENT
Start: 2020-12-03 | End: 2020-12-03

## 2020-12-03 RX ADMIN — SODIUM CHLORIDE 1000 ML: 9 INJECTION, SOLUTION INTRAVENOUS at 19:07

## 2020-12-03 RX ADMIN — ONDANSETRON 4 MG: 2 INJECTION INTRAMUSCULAR; INTRAVENOUS at 22:34

## 2020-12-03 RX ADMIN — DIPHENHYDRAMINE HYDROCHLORIDE 25 MG: 50 INJECTION, SOLUTION INTRAMUSCULAR; INTRAVENOUS at 19:07

## 2020-12-03 RX ADMIN — IOPAMIDOL 108 ML: 755 INJECTION, SOLUTION INTRAVENOUS at 21:51

## 2020-12-03 RX ADMIN — SODIUM CHLORIDE 67 ML: 9 INJECTION, SOLUTION INTRAVENOUS at 21:52

## 2020-12-03 RX ADMIN — DROPERIDOL 2.5 MG: 2.5 INJECTION, SOLUTION INTRAMUSCULAR; INTRAVENOUS at 19:45

## 2020-12-03 RX ADMIN — ONDANSETRON 4 MG: 2 INJECTION INTRAMUSCULAR; INTRAVENOUS at 22:00

## 2020-12-03 NOTE — ED AVS SNAPSHOT
[facility] Emergency Department  [address]  [place] [address]  Phone: [phone]  Fax: [phone]                                    Yun Coyle   MRN: [id number]    Department: [facility] Emergency Department   Date of Visit: 12/3/2020           After Visit Summary Signature Page    I have received my discharge instructions, and my questions have been answered. I have discussed any challenges I see with this plan with the nurse or doctor.    ..........................................................................................................................................  Patient/Patient Representative Signature      ..........................................................................................................................................  Patient Representative Print Name and Relationship to Patient    ..................................................               ................................................  Date                                   Time    ..........................................................................................................................................  Reviewed by Signature/Title    ...................................................              ..............................................  Date                                               Time          22EPIC Rev 08/18

## 2020-12-04 LAB — INTERPRETATION ECG - MUSE: NORMAL

## 2020-12-04 NOTE — ED NOTES
Bed: ED05  Expected date: 12/3/20  Expected time: 6:32 PM  Means of arrival:   Comments:  Hen [address]  44F  Vomiting x 2 days

## 2020-12-04 NOTE — ED NOTES
Patient reviewed discharge.  Discussed printed discharge information.  Follow-up appts reviewed.   What s/s warrant return to the ER.  Prescriptions and how to take them.  Importance of social distancing, good hand hygiene, and wearing a mask when in public spaces.

## 2020-12-04 NOTE — ED PROVIDER NOTES
[facility] EMERGENCY DEPARTMENT ([facility])     December 3, 2020  History     Chief Complaint   Patient presents with     Nausea, Vomiting, & Diarrhea     HPI  Yun Coyle is a 44 year old female with PMH notable for pancreatitis, opiate use disorder with maintenance methadone, cyclic vomiting syndrome, borderline DM, and intermittent asthma who presents to the ED with vomiting and abdominal pain.  Patient reports symptom onset 5 days ago, has been worsening.  She states pain started before the vomiting.  Last marijuana use was 3 days ago.  Patient reports no missed doses of methadone.  No diarrhea.  Pain in the abdomen is primarily in the left upper quadrant, but it is somewhat diffuse.  Patient notes history of cholecystectomy.  No urinary symptoms.    Past Medical History  Past Medical History:   Diagnosis Date     Anxiety      Depressive disorder      Gastroesophageal reflux disease      Hypertension      Type 2 diabetes mellitus (H)      Uncomplicated asthma      Past Surgical History:   Procedure Laterality Date     BACK SURGERY      x2     CHOLECYSTECTOMY       ORTHOPEDIC SURGERY       SALPINGO OOPHORECTOMY,R/L/ELDA Right           ondansetron (ZOFRAN-ODT) 4 MG ODT tab       ASPIRIN NOT PRESCRIBED (INTENTIONAL)       atorvastatin (LIPITOR) 20 MG tablet       blood glucose (NO BRAND SPECIFIED) lancets standard       blood glucose (NO BRAND SPECIFIED) test strip       blood glucose monitoring (NO BRAND SPECIFIED) meter device kit       blood glucose monitoring (NO BRAND SPECIFIED) meter device kit       escitalopram (LEXAPRO) 20 MG tablet       gabapentin (NEURONTIN) 300 MG capsule       hydrOXYzine (ATARAX) 25 MG tablet       insulin aspart (NOVOLOG PEN) 100 UNIT/ML pen       insulin aspart (NOVOLOG PEN) 100 UNIT/ML pen       insulin glargine (BASAGLAR KWIKPEN) 100 UNIT/ML pen       insulin pen needle (30G X 8 MM) 30G X 8 MM miscellaneous       lisinopril (PRINIVIL/ZESTRIL) 30 MG tablet        metFORMIN (GLUCOPHAGE) 500 MG tablet       methadone HCl 10 MG/5ML SOLN       metoclopramide (REGLAN) 10 MG tablet       mirtazapine (REMERON) 45 MG tablet       OLANZapine (ZYPREXA) 5 MG tablet       ondansetron (ZOFRAN) 4 MG tablet       pantoprazole (PROTONIX) 40 MG EC tablet       ranitidine (ZANTAC) 150 MG tablet       sucralfate (CARAFATE) 1 GM/10ML suspension      No Known Allergies  Family History  Family History   Problem Relation Age of Onset     Diabetes Mother      Cancer Mother      Cancer Maternal Grandmother      Diabetes Maternal Grandmother      Social History   Social History     Tobacco Use     Smoking status: Never Smoker     Smokeless tobacco: Never Used   Substance Use Topics     Alcohol use: No     Frequency: Never     Drug use: Yes     Frequency: 7.0 times per week     Types: Marijuana     Comment: Pt last used yesterday      Past medical history, past surgical history, medications, allergies, family history, and social history were reviewed with the patient. No additional pertinent items.      Review of Systems  A complete review of systems was performed with pertinent positives and negatives noted in the HPI, and all other systems negative.    Physical Exam   BP: (!) 172/134  Pulse: 91  Temp: 98.4  F (36.9  C)  Resp: 18  Weight: 99.8 kg (220 lb)  SpO2: 100 %    Physical Exam  General: vomiting into bag. Uncomfortable appearing. Appears stated age.   HENT: dry MM, no oropharyngeal lesions  Eyes: PERRL, normal sclerae  Cardio: regular rate. Regular rhythm. Extremities well perfused  Resp: Normal work of breathing, normal respiratory rate  Chest/Back: no visual signs of trauma, no CVA tenderness  Abdomen: Diffuse abdominal pain. Diffuse tenderness with maximum tenderness in left upper quadrant, no peritoneal signs. no rebound, no guarding  Neuro: alert and fully oriented. CN II-XII grossly intact. Grossly normal strength and sensation in all extremities.   MSK: no deformities.    Integumentary/Skin: no rash visualized, normal color  Psych: normal affect, normal behavior    ED Course      Procedures             EKG Interpretation:      Interpreted by Feliz Cherry MD  Time reviewed: 1949  Symptoms at time of EKG: upper abdominal pain, evaluate QT    Rhythm: normal sinus   Rate: normal  Axis: normal  Ectopy: none  Conduction: no blocks. Qtc 475  ST Segments/ T Waves: No ST-T wave changes  Q Waves: none  Comparison to prior: normalized rate, otherwise unchanged compared to 9/4/2019    Clinical Impression: borderline Qtc, otherwise normal EKG           Labs Ordered and Resulted from Time of ED Arrival Up to the Time of Departure from the ED   CBC WITH PLATELETS DIFFERENTIAL - Abnormal; Notable for the following components:       Result Value    WBC 11.1 (*)     All other components within normal limits   COMPREHENSIVE METABOLIC PANEL - Abnormal; Notable for the following components:    Glucose 254 (*)      (*)      (*)     All other components within normal limits   LIPASE   HCG QUALITATIVE   PERIPHERAL IV CATHETER   CARDIAC CONTINUOUS MONITORING     CT Abdomen Pelvis w Contrast   Final Result   IMPRESSION:    1.  No etiology for symptoms evident. Nothing obstructive or inflammatory. Colon is mostly collapsed and empty suggesting diarrhea.   2.  Diffuse fatty infiltration of liver.             Assessments & Plan (with Medical Decision Making)   Patient presenting with abdominal pain that is maximal in left upper quadrant, vomiting in the context of prior cholecystectomy, prior history of cyclic vomiting syndrome opioid maintenance therapy with methadone. Vitals in the ED notable for elevated blood pressure 172/134, otherwise unremarkable. . Nursing notes reviewed. Initial differential diagnosis includes but not limited to cyclic vomiting syndrome, pancreatitis, small bowel obstruction, gastritis, gastroenteritis.     GB surgically absent. Chronic LFT elevations with  aminotransferases in 200s similar to previous. No lipase elevation to suggest pancreatitis. No peritoneal signs on exam to suggest peritonitis. CT demonstrated no visualized acute pathology, no evidence of SBO    In the ED, the patient's symptoms were managed with droperidol, with improvement in symptoms upon reassessment.     The complete clinical picture is most consistent with likely cyclic vomiting. After counseling on the diagnosis, work-up, and treatment plan, the patient was discharged to home. Ondansetron prescribed. The patient was advised to follow-up with primary care in a few days. The patient was advised to return to the ED if worsening symptoms, or if there are any urgent/life-threatening concerns.     Final diagnoses:   Intractable vomiting with nausea, unspecified vomiting type   Diffuse abdominal pain     Discharge Medication List as of 12/3/2020 10:43 PM      Gaston PAIGE, am serving as a trained medical scribe to document services personally performed by Feliz Cherry MD, based on the provider's statements to me.     Feliz PAIGE MD, was physically present and have reviewed and verified the accuracy of this note documented by Gaston Drew.    --  Feliz Cherry MD   Emergency Medicine   [facility] EMERGENCY DEPARTMENT  12/3/2020     Feliz Cherry MD  12/04/20 0202

## 2022-01-01 NOTE — DISCHARGE INSTRUCTIONS
Please make an appointment to follow up with Your Primary Care Provider in 2-3 days.     Return to the ED if you are having fever, worsening symptoms, or any urgent/life-threatening concerns.      No cyanosis, no pallor, no jaundice, no rash

## 2022-02-13 ENCOUNTER — HOSPITAL ENCOUNTER (INPATIENT)
Facility: CLINIC | Age: 47
LOS: 4 days | Discharge: PSYCHIATRIC OR SUBSTANCE USE FACILITY | DRG: 092 | End: 2022-02-18
Attending: FAMILY MEDICINE | Admitting: STUDENT IN AN ORGANIZED HEALTH CARE EDUCATION/TRAINING PROGRAM
Payer: COMMERCIAL

## 2022-02-13 ENCOUNTER — APPOINTMENT (OUTPATIENT)
Dept: CT IMAGING | Facility: CLINIC | Age: 47
DRG: 092 | End: 2022-02-13
Attending: FAMILY MEDICINE
Payer: COMMERCIAL

## 2022-02-13 ENCOUNTER — APPOINTMENT (OUTPATIENT)
Dept: MRI IMAGING | Facility: CLINIC | Age: 47
DRG: 092 | End: 2022-02-13
Attending: FAMILY MEDICINE
Payer: COMMERCIAL

## 2022-02-13 ENCOUNTER — APPOINTMENT (OUTPATIENT)
Dept: GENERAL RADIOLOGY | Facility: CLINIC | Age: 47
DRG: 092 | End: 2022-02-13
Attending: FAMILY MEDICINE
Payer: COMMERCIAL

## 2022-02-13 DIAGNOSIS — R29.6 FALLS FREQUENTLY: ICD-10-CM

## 2022-02-13 DIAGNOSIS — R42 VERTIGO: ICD-10-CM
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
## 2022-02-13 DIAGNOSIS — Z20.822 COVID-19 RULED OUT BY LABORATORY TESTING: ICD-10-CM

## 2022-02-13 LAB
ALBUMIN SERPL-MCNC: 3.4 G/DL (ref 3.4–5)
ALBUMIN UR-MCNC: NEGATIVE MG/DL
ALP SERPL-CCNC: 163 U/L (ref 40–150)
ALT SERPL W P-5'-P-CCNC: 143 U/L (ref 0–50)
AMPHETAMINES UR QL SCN: ABNORMAL
ANION GAP SERPL CALCULATED.3IONS-SCNC: 3 MMOL/L (ref 3–14)
APPEARANCE UR: CLEAR
AST SERPL W P-5'-P-CCNC: 85 U/L (ref 0–45)
BARBITURATES UR QL: ABNORMAL
BASOPHILS # BLD AUTO: 0 10E3/UL (ref 0–0.2)
BASOPHILS NFR BLD AUTO: 0 %
BENZODIAZ UR QL: ABNORMAL
BILIRUB SERPL-MCNC: 0.7 MG/DL (ref 0.2–1.3)
BILIRUB UR QL STRIP: NEGATIVE
BUN SERPL-MCNC: 10 MG/DL (ref 7–30)
CALCIUM SERPL-MCNC: 9.2 MG/DL (ref 8.5–10.1)
CANNABINOIDS UR QL SCN: ABNORMAL
CHLORIDE BLD-SCNC: 100 MMOL/L (ref 94–109)
CO2 SERPL-SCNC: 29 MMOL/L (ref 20–32)
COCAINE UR QL: ABNORMAL
COLOR UR AUTO: YELLOW
CREAT SERPL-MCNC: 0.66 MG/DL (ref 0.52–1.04)
EOSINOPHIL # BLD AUTO: 0.2 10E3/UL (ref 0–0.7)
EOSINOPHIL NFR BLD AUTO: 2 %
ERYTHROCYTE [DISTWIDTH] IN BLOOD BY AUTOMATED COUNT: 13.8 % (ref 10–15)
ETHANOL SERPL-MCNC: <0.01 G/DL
GFR SERPL CREATININE-BSD FRML MDRD: >90 ML/MIN/1.73M2
GLUCOSE BLD-MCNC: 230 MG/DL (ref 70–99)
GLUCOSE UR STRIP-MCNC: >=1000 MG/DL
HCT VFR BLD AUTO: 40.6 % (ref 35–47)
HGB BLD-MCNC: 13.7 G/DL (ref 11.7–15.7)
HGB UR QL STRIP: NEGATIVE
HOLD SPECIMEN: NORMAL
IMM GRANULOCYTES # BLD: 0 10E3/UL
IMM GRANULOCYTES NFR BLD: 0 %
INR PPP: 1.05 (ref 0.86–1.14)
KETONES UR STRIP-MCNC: NEGATIVE MG/DL
LEUKOCYTE ESTERASE UR QL STRIP: NEGATIVE
LYMPHOCYTES # BLD AUTO: 3.8 10E3/UL (ref 0.8–5.3)
LYMPHOCYTES NFR BLD AUTO: 41 %
MCH RBC QN AUTO: 30.2 PG (ref 26.5–33)
MCHC RBC AUTO-ENTMCNC: 33.7 G/DL (ref 31.5–36.5)
MCV RBC AUTO: 90 FL (ref 78–100)
MONOCYTES # BLD AUTO: 0.6 10E3/UL (ref 0–1.3)
MONOCYTES NFR BLD AUTO: 6 %
MUCOUS THREADS #/AREA URNS LPF: PRESENT /LPF
NEUTROPHILS # BLD AUTO: 4.6 10E3/UL (ref 1.6–8.3)
NEUTROPHILS NFR BLD AUTO: 51 %
NITRATE UR QL: NEGATIVE
NRBC # BLD AUTO: 0 10E3/UL
NRBC BLD AUTO-RTO: 0 /100
OPIATES UR QL SCN: ABNORMAL
PH UR STRIP: 7 [PH] (ref 5–7)
PLATELET # BLD AUTO: 249 10E3/UL (ref 150–450)
POTASSIUM BLD-SCNC: 4.2 MMOL/L (ref 3.4–5.3)
PROT SERPL-MCNC: 8.3 G/DL (ref 6.8–8.8)
RBC # BLD AUTO: 4.53 10E6/UL (ref 3.8–5.2)
RBC URINE: 1 /HPF
SODIUM SERPL-SCNC: 132 MMOL/L (ref 133–144)
SP GR UR STRIP: 1.03 (ref 1–1.03)
SQUAMOUS EPITHELIAL: 31 /HPF
UROBILINOGEN UR STRIP-MCNC: NORMAL MG/DL
WBC # BLD AUTO: 9.2 10E3/UL (ref 4–11)
WBC URINE: 2 /HPF

## 2022-02-13 PROCEDURE — 93010 ELECTROCARDIOGRAM REPORT: CPT | Performed by: FAMILY MEDICINE

## 2022-02-13 PROCEDURE — 82607 VITAMIN B-12: CPT | Performed by: STUDENT IN AN ORGANIZED HEALTH CARE EDUCATION/TRAINING PROGRAM

## 2022-02-13 PROCEDURE — 258N000003 HC RX IP 258 OP 636: Performed by: FAMILY MEDICINE

## 2022-02-13 PROCEDURE — 85610 PROTHROMBIN TIME: CPT | Performed by: FAMILY MEDICINE

## 2022-02-13 PROCEDURE — 80307 DRUG TEST PRSMV CHEM ANLYZR: CPT | Performed by: FAMILY MEDICINE

## 2022-02-13 PROCEDURE — 99285 EMERGENCY DEPT VISIT HI MDM: CPT | Mod: 25 | Performed by: FAMILY MEDICINE

## 2022-02-13 PROCEDURE — 82550 ASSAY OF CK (CPK): CPT | Performed by: HOSPITALIST

## 2022-02-13 PROCEDURE — 93005 ELECTROCARDIOGRAM TRACING: CPT | Performed by: FAMILY MEDICINE

## 2022-02-13 PROCEDURE — 255N000002 HC RX 255 OP 636: Performed by: FAMILY MEDICINE

## 2022-02-13 PROCEDURE — 80053 COMPREHEN METABOLIC PANEL: CPT | Performed by: FAMILY MEDICINE

## 2022-02-13 PROCEDURE — 70450 CT HEAD/BRAIN W/O DYE: CPT

## 2022-02-13 PROCEDURE — 82077 ASSAY SPEC XCP UR&BREATH IA: CPT | Performed by: FAMILY MEDICINE

## 2022-02-13 PROCEDURE — 85025 COMPLETE CBC W/AUTO DIFF WBC: CPT | Performed by: FAMILY MEDICINE

## 2022-02-13 PROCEDURE — 36415 COLL VENOUS BLD VENIPUNCTURE: CPT | Performed by: FAMILY MEDICINE

## 2022-02-13 PROCEDURE — 86780 TREPONEMA PALLIDUM: CPT | Performed by: STUDENT IN AN ORGANIZED HEALTH CARE EDUCATION/TRAINING PROGRAM

## 2022-02-13 PROCEDURE — 250N000011 HC RX IP 250 OP 636: Performed by: FAMILY MEDICINE

## 2022-02-13 PROCEDURE — 70549 MR ANGIOGRAPH NECK W/O&W/DYE: CPT

## 2022-02-13 PROCEDURE — 96361 HYDRATE IV INFUSION ADD-ON: CPT | Performed by: FAMILY MEDICINE

## 2022-02-13 PROCEDURE — 70553 MRI BRAIN STEM W/O & W/DYE: CPT

## 2022-02-13 PROCEDURE — 84443 ASSAY THYROID STIM HORMONE: CPT | Performed by: STUDENT IN AN ORGANIZED HEALTH CARE EDUCATION/TRAINING PROGRAM

## 2022-02-13 PROCEDURE — 71101 X-RAY EXAM UNILAT RIBS/CHEST: CPT | Mod: LT

## 2022-02-13 PROCEDURE — 82977 ASSAY OF GGT: CPT | Performed by: STUDENT IN AN ORGANIZED HEALTH CARE EDUCATION/TRAINING PROGRAM

## 2022-02-13 PROCEDURE — 83036 HEMOGLOBIN GLYCOSYLATED A1C: CPT

## 2022-02-13 PROCEDURE — 81001 URINALYSIS AUTO W/SCOPE: CPT | Performed by: FAMILY MEDICINE

## 2022-02-13 PROCEDURE — A9585 GADOBUTROL INJECTION: HCPCS | Performed by: FAMILY MEDICINE

## 2022-02-13 PROCEDURE — 70544 MR ANGIOGRAPHY HEAD W/O DYE: CPT

## 2022-02-13 PROCEDURE — 87389 HIV-1 AG W/HIV-1&-2 AB AG IA: CPT | Performed by: STUDENT IN AN ORGANIZED HEALTH CARE EDUCATION/TRAINING PROGRAM

## 2022-02-13 PROCEDURE — 96372 THER/PROPH/DIAG INJ SC/IM: CPT | Performed by: FAMILY MEDICINE

## 2022-02-13 RX ORDER — GADOBUTROL 604.72 MG/ML
10 INJECTION INTRAVENOUS ONCE
Status: COMPLETED | OUTPATIENT
Start: 2022-02-13 | End: 2022-02-13

## 2022-02-13 RX ORDER — SODIUM CHLORIDE 9 MG/ML
INJECTION, SOLUTION INTRAVENOUS CONTINUOUS
Status: DISCONTINUED | OUTPATIENT
Start: 2022-02-13 | End: 2022-02-14

## 2022-02-13 RX ORDER — KETOROLAC TROMETHAMINE 30 MG/ML
30 INJECTION, SOLUTION INTRAMUSCULAR; INTRAVENOUS ONCE
Status: COMPLETED | OUTPATIENT
Start: 2022-02-13 | End: 2022-02-13

## 2022-02-13 RX ADMIN — KETOROLAC TROMETHAMINE 30 MG: 30 INJECTION, SOLUTION INTRAMUSCULAR at 21:20

## 2022-02-13 RX ADMIN — SODIUM CHLORIDE 1000 ML: 9 INJECTION, SOLUTION INTRAVENOUS at 21:11

## 2022-02-13 RX ADMIN — GADOBUTROL 10 ML: 604.72 INJECTION INTRAVENOUS at 23:20

## 2022-02-14 ENCOUNTER — APPOINTMENT (OUTPATIENT)
Dept: CT IMAGING | Facility: CLINIC | Age: 47
DRG: 092 | End: 2022-02-14
Attending: STUDENT IN AN ORGANIZED HEALTH CARE EDUCATION/TRAINING PROGRAM
Payer: COMMERCIAL

## 2022-02-14 ENCOUNTER — APPOINTMENT (OUTPATIENT)
Dept: CARDIOLOGY | Facility: CLINIC | Age: 47
DRG: 092 | End: 2022-02-14
Payer: COMMERCIAL
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
## 2022-02-14 PROBLEM — Z20.822 COVID-19 RULED OUT BY LABORATORY TESTING: Status: ACTIVE | Noted: 2022-02-14

## 2022-02-14 PROBLEM — R42 VERTIGO: Status: ACTIVE | Noted: 2022-02-14

## 2022-02-14 PROBLEM — R29.6 FALLS FREQUENTLY: Status: ACTIVE | Noted: 2022-02-14

## 2022-02-14 LAB
ATRIAL RATE - MUSE: 76 BPM
CK SERPL-CCNC: 692 U/L (ref 30–225)
DIASTOLIC BLOOD PRESSURE - MUSE: NORMAL MMHG
GGT SERPL-CCNC: 331 U/L (ref 0–40)
GLUCOSE BLDC GLUCOMTR-MCNC: 180 MG/DL (ref 70–99)
GLUCOSE BLDC GLUCOMTR-MCNC: 205 MG/DL (ref 70–99)
HBA1C MFR BLD: 10.2 % (ref 0–5.6)
HIV 1+2 AB+HIV1 P24 AG SERPL QL IA: NONREACTIVE
INTERPRETATION ECG - MUSE: NORMAL
LVEF ECHO: NORMAL
P AXIS - MUSE: 72 DEGREES
PR INTERVAL - MUSE: 132 MS
QRS DURATION - MUSE: 84 MS
QT - MUSE: 412 MS
QTC - MUSE: 463 MS
R AXIS - MUSE: 11 DEGREES
SARS-COV-2 RNA RESP QL NAA+PROBE: NEGATIVE
SYSTOLIC BLOOD PRESSURE - MUSE: NORMAL MMHG
T AXIS - MUSE: 33 DEGREES
VENTRICULAR RATE- MUSE: 76 BPM

## 2022-02-14 PROCEDURE — C9803 HOPD COVID-19 SPEC COLLECT: HCPCS | Performed by: FAMILY MEDICINE

## 2022-02-14 PROCEDURE — 93306 TTE W/DOPPLER COMPLETE: CPT

## 2022-02-14 PROCEDURE — 36415 COLL VENOUS BLD VENIPUNCTURE: CPT | Performed by: STUDENT IN AN ORGANIZED HEALTH CARE EDUCATION/TRAINING PROGRAM

## 2022-02-14 PROCEDURE — 250N000011 HC RX IP 250 OP 636: Performed by: FAMILY MEDICINE

## 2022-02-14 PROCEDURE — 250N000013 HC RX MED GY IP 250 OP 250 PS 637

## 2022-02-14 PROCEDURE — 96374 THER/PROPH/DIAG INJ IV PUSH: CPT | Performed by: FAMILY MEDICINE

## 2022-02-14 PROCEDURE — 70496 CT ANGIOGRAPHY HEAD: CPT

## 2022-02-14 PROCEDURE — 120N000002 HC R&B MED SURG/OB UMMC

## 2022-02-14 PROCEDURE — 999N000128 HC STATISTIC PERIPHERAL IV START W/O US GUIDANCE

## 2022-02-14 PROCEDURE — G0378 HOSPITAL OBSERVATION PER HR: HCPCS

## 2022-02-14 PROCEDURE — 258N000003 HC RX IP 258 OP 636: Performed by: FAMILY MEDICINE

## 2022-02-14 PROCEDURE — 91305 HC RX IP 250 OP 636: CPT | Performed by: STUDENT IN AN ORGANIZED HEALTH CARE EDUCATION/TRAINING PROGRAM

## 2022-02-14 PROCEDURE — 250N000011 HC RX IP 250 OP 636: Performed by: HOSPITALIST

## 2022-02-14 PROCEDURE — 250N000011 HC RX IP 250 OP 636: Performed by: STUDENT IN AN ORGANIZED HEALTH CARE EDUCATION/TRAINING PROGRAM

## 2022-02-14 PROCEDURE — 999N000248 HC STATISTIC IV INSERT WITH US BY RN

## 2022-02-14 PROCEDURE — 70450 CT HEAD/BRAIN W/O DYE: CPT

## 2022-02-14 PROCEDURE — 82962 GLUCOSE BLOOD TEST: CPT

## 2022-02-14 PROCEDURE — 250N000012 HC RX MED GY IP 250 OP 636 PS 637

## 2022-02-14 PROCEDURE — 99222 1ST HOSP IP/OBS MODERATE 55: CPT | Mod: GC | Performed by: PSYCHIATRY & NEUROLOGY

## 2022-02-14 PROCEDURE — 70498 CT ANGIOGRAPHY NECK: CPT

## 2022-02-14 PROCEDURE — 99207 PR CDG-CODE CATEGORY CHANGED: CPT | Performed by: STUDENT IN AN ORGANIZED HEALTH CARE EDUCATION/TRAINING PROGRAM

## 2022-02-14 PROCEDURE — U0005 INFEC AGEN DETEC AMPLI PROBE: HCPCS | Performed by: FAMILY MEDICINE

## 2022-02-14 PROCEDURE — 250N000013 HC RX MED GY IP 250 OP 250 PS 637: Performed by: HOSPITALIST

## 2022-02-14 PROCEDURE — 70496 CT ANGIOGRAPHY HEAD: CPT | Mod: 26 | Performed by: RADIOLOGY

## 2022-02-14 PROCEDURE — 93306 TTE W/DOPPLER COMPLETE: CPT | Mod: 26 | Performed by: INTERNAL MEDICINE

## 2022-02-14 PROCEDURE — 99220 PR INITIAL OBSERVATION CARE,LEVEL III: CPT | Mod: GC | Performed by: STUDENT IN AN ORGANIZED HEALTH CARE EDUCATION/TRAINING PROGRAM

## 2022-02-14 PROCEDURE — 0051A HC ADMIN COVID VAC PFIZER TRS-SUCR, 1ST DOSE: CPT | Performed by: STUDENT IN AN ORGANIZED HEALTH CARE EDUCATION/TRAINING PROGRAM

## 2022-02-14 PROCEDURE — 250N000011 HC RX IP 250 OP 636

## 2022-02-14 PROCEDURE — 96372 THER/PROPH/DIAG INJ SC/IM: CPT

## 2022-02-14 PROCEDURE — 82525 ASSAY OF COPPER: CPT | Performed by: STUDENT IN AN ORGANIZED HEALTH CARE EDUCATION/TRAINING PROGRAM

## 2022-02-14 PROCEDURE — 70498 CT ANGIOGRAPHY NECK: CPT | Mod: 26 | Performed by: RADIOLOGY

## 2022-02-14 RX ORDER — NALOXONE HYDROCHLORIDE 0.4 MG/ML
0.4 INJECTION, SOLUTION INTRAMUSCULAR; INTRAVENOUS; SUBCUTANEOUS
Status: DISCONTINUED | OUTPATIENT
Start: 2022-02-14 | End: 2022-02-18 | Stop reason: HOSPADM

## 2022-02-14 RX ORDER — HYDROMORPHONE HCL IN WATER/PF 6 MG/30 ML
0.5 PATIENT CONTROLLED ANALGESIA SYRINGE INTRAVENOUS ONCE
Status: COMPLETED | OUTPATIENT
Start: 2022-02-14 | End: 2022-02-14

## 2022-02-14 RX ORDER — KETOROLAC TROMETHAMINE 15 MG/ML
15 INJECTION, SOLUTION INTRAMUSCULAR; INTRAVENOUS ONCE
Status: COMPLETED | OUTPATIENT
Start: 2022-02-14 | End: 2022-02-14

## 2022-02-14 RX ORDER — HYDROMORPHONE HCL IN WATER/PF 6 MG/30 ML
0.3 PATIENT CONTROLLED ANALGESIA SYRINGE INTRAVENOUS ONCE
Status: COMPLETED | OUTPATIENT
Start: 2022-02-14 | End: 2022-02-14

## 2022-02-14 RX ORDER — PANTOPRAZOLE SODIUM 40 MG/1
40 TABLET, DELAYED RELEASE ORAL
Status: DISCONTINUED | OUTPATIENT
Start: 2022-02-14 | End: 2022-02-18 | Stop reason: HOSPADM

## 2022-02-14 RX ORDER — NALOXONE HYDROCHLORIDE 0.4 MG/ML
0.2 INJECTION, SOLUTION INTRAMUSCULAR; INTRAVENOUS; SUBCUTANEOUS
Status: DISCONTINUED | OUTPATIENT
Start: 2022-02-14 | End: 2022-02-18 | Stop reason: HOSPADM

## 2022-02-14 RX ORDER — DIPHENHYDRAMINE HYDROCHLORIDE 50 MG/ML
50 INJECTION INTRAMUSCULAR; INTRAVENOUS
Status: DISCONTINUED | OUTPATIENT
Start: 2022-02-14 | End: 2022-02-18 | Stop reason: HOSPADM

## 2022-02-14 RX ORDER — ONDANSETRON 4 MG/1
4 TABLET, ORALLY DISINTEGRATING ORAL EVERY 6 HOURS PRN
Status: DISCONTINUED | OUTPATIENT
Start: 2022-02-14 | End: 2022-02-18 | Stop reason: HOSPADM

## 2022-02-14 RX ORDER — LIDOCAINE 4 G/G
2 PATCH TOPICAL
Status: DISCONTINUED | OUTPATIENT
Start: 2022-02-14 | End: 2022-02-18 | Stop reason: HOSPADM

## 2022-02-14 RX ORDER — LABETALOL HYDROCHLORIDE 5 MG/ML
5 INJECTION, SOLUTION INTRAVENOUS EVERY 6 HOURS PRN
Status: DISCONTINUED | OUTPATIENT
Start: 2022-02-14 | End: 2022-02-18 | Stop reason: HOSPADM

## 2022-02-14 RX ORDER — DEXTROSE MONOHYDRATE 25 G/50ML
25-50 INJECTION, SOLUTION INTRAVENOUS
Status: DISCONTINUED | OUTPATIENT
Start: 2022-02-14 | End: 2022-02-18 | Stop reason: HOSPADM

## 2022-02-14 RX ORDER — IOPAMIDOL 755 MG/ML
75 INJECTION, SOLUTION INTRAVASCULAR ONCE
Status: COMPLETED | OUTPATIENT
Start: 2022-02-14 | End: 2022-02-14

## 2022-02-14 RX ORDER — METHADONE HYDROCHLORIDE 10 MG/ML
130 CONCENTRATE ORAL DAILY
Status: DISCONTINUED | OUTPATIENT
Start: 2022-02-15 | End: 2022-02-18 | Stop reason: HOSPADM

## 2022-02-14 RX ORDER — DIPHENHYDRAMINE HCL 50 MG
50 CAPSULE ORAL
Status: DISCONTINUED | OUTPATIENT
Start: 2022-02-14 | End: 2022-02-18 | Stop reason: HOSPADM

## 2022-02-14 RX ORDER — ACETAMINOPHEN 325 MG/1
650 TABLET ORAL EVERY 8 HOURS PRN
Status: DISCONTINUED | OUTPATIENT
Start: 2022-02-14 | End: 2022-02-14

## 2022-02-14 RX ORDER — ACETAMINOPHEN 325 MG/1
975 TABLET ORAL EVERY 8 HOURS PRN
Status: DISCONTINUED | OUTPATIENT
Start: 2022-02-14 | End: 2022-02-18 | Stop reason: HOSPADM

## 2022-02-14 RX ORDER — NICOTINE POLACRILEX 4 MG
15-30 LOZENGE BUCCAL
Status: DISCONTINUED | OUTPATIENT
Start: 2022-02-14 | End: 2022-02-18 | Stop reason: HOSPADM

## 2022-02-14 RX ORDER — IBUPROFEN 800 MG/1
800 TABLET, FILM COATED ORAL EVERY 6 HOURS PRN
Status: DISCONTINUED | OUTPATIENT
Start: 2022-02-14 | End: 2022-02-18 | Stop reason: HOSPADM

## 2022-02-14 RX ADMIN — KETOROLAC TROMETHAMINE 15 MG: 15 INJECTION, SOLUTION INTRAMUSCULAR; INTRAVENOUS at 11:29

## 2022-02-14 RX ADMIN — ACETAMINOPHEN 650 MG: 325 TABLET, FILM COATED ORAL at 11:46

## 2022-02-14 RX ADMIN — IOPAMIDOL 75 ML: 755 INJECTION, SOLUTION INTRAVENOUS at 18:06

## 2022-02-14 RX ADMIN — INSULIN ASPART 1 UNITS: 100 INJECTION, SOLUTION INTRAVENOUS; SUBCUTANEOUS at 20:49

## 2022-02-14 RX ADMIN — LIDOCAINE 2 PATCH: 246 PATCH TOPICAL at 11:44

## 2022-02-14 RX ADMIN — SODIUM CHLORIDE: 9 INJECTION, SOLUTION INTRAVENOUS at 06:49

## 2022-02-14 RX ADMIN — IBUPROFEN 800 MG: 800 TABLET, FILM COATED ORAL at 17:42

## 2022-02-14 RX ADMIN — HYDROMORPHONE HYDROCHLORIDE 0.5 MG: 0.2 INJECTION, SOLUTION INTRAMUSCULAR; INTRAVENOUS; SUBCUTANEOUS at 01:55

## 2022-02-14 RX ADMIN — BNT162B2 30 MCG: 0.23 INJECTION, SUSPENSION INTRAMUSCULAR at 21:24

## 2022-02-14 RX ADMIN — HYDROMORPHONE HYDROCHLORIDE 0.3 MG: 0.2 INJECTION, SOLUTION INTRAMUSCULAR; INTRAVENOUS; SUBCUTANEOUS at 06:14

## 2022-02-14 RX ADMIN — PANTOPRAZOLE SODIUM 40 MG: 40 TABLET, DELAYED RELEASE ORAL at 06:35

## 2022-02-14 ASSESSMENT — ACTIVITIES OF DAILY LIVING (ADL)
ADLS_ACUITY_SCORE: 10
ADLS_ACUITY_SCORE: 10
ADLS_ACUITY_SCORE: 7
ADLS_ACUITY_SCORE: 7
DOING_ERRANDS_INDEPENDENTLY_DIFFICULTY: NO
WALKING_OR_CLIMBING_STAIRS_DIFFICULTY: YES
ADLS_ACUITY_SCORE: 10
DRESSING/BATHING: BATHING DIFFICULTY, ASSISTANCE 1 PERSON
ADLS_ACUITY_SCORE: 7
FALL_HISTORY_WITHIN_LAST_SIX_MONTHS: YES
ADLS_ACUITY_SCORE: 10
ADLS_ACUITY_SCORE: 7
TOILETING_ASSISTANCE: TOILETING DIFFICULTY, ASSISTANCE 1 PERSON
TOILETING_ISSUES: NO
ADLS_ACUITY_SCORE: 7
CONCENTRATING,_REMEMBERING_OR_MAKING_DECISIONS_DIFFICULTY: YES
WALKING_OR_CLIMBING_STAIRS: AMBULATION DIFFICULTY, REQUIRES EQUIPMENT
DIFFICULTY_COMMUNICATING: NO
ADLS_ACUITY_SCORE: 10
ADLS_ACUITY_SCORE: 10
DIFFICULTY_EATING/SWALLOWING: NO
ADLS_ACUITY_SCORE: 10
ADLS_ACUITY_SCORE: 10
DRESSING/BATHING_DIFFICULTY: YES
ADLS_ACUITY_SCORE: 10
ADLS_ACUITY_SCORE: 7
WHICH_OF_THE_ABOVE_FUNCTIONAL_RISKS_HAD_A_RECENT_ONSET_OR_CHANGE?: AMBULATION;TRANSFERRING;FALL HISTORY
WEAR_GLASSES_OR_BLIND: NO
ADLS_ACUITY_SCORE: 10
NUMBER_OF_TIMES_PATIENT_HAS_FALLEN_WITHIN_LAST_SIX_MONTHS: 10

## 2022-02-14 NOTE — PLAN OF CARE
Cared for pt from [id number].  Pt is following SBA restriction well, continue until lightheadness is resolved.  PO tyl and Ketoralac were effective relieving pt's pain in L chest.  Pain nearly brought her to tears early in the shift, she rated her pain 8/10.  Continue to give pain meds when available.  Ortho BP was negative, writer passed on to OCN that a second Ortho needed to be completed.  Pt has few complaints, calmly waiting on results from providers.  Continue to monitor.

## 2022-02-14 NOTE — PLAN OF CARE
A&Ox4, hypertensive, OVSS on RA. Pain 7/10 to left ribs (after fall yesterday per pt), one time dose of Dilaudid administered. Up with SBA and walker. Denies SOB and difficulty breathing, but reports breathing being painful due to rib pain. Denies issues with urination, last BM on Saturday per pt report, BS are normoactive. See skin note for skin issues. Pt reports that she has been getting dizzy and lightheaded with activity for the past month or so, and in the last week it has been getting much worse and she has been falling two or three times a day. She denies dizziness at this time. Pt was educated on call light use and oriented to room. Regular diet at this time, ordered breakfast. PIV to right hand with IVF at 125 mL/hr. Awaiting other orders. Continue POC.

## 2022-02-14 NOTE — UTILIZATION REVIEW
"[facility]    Admission Status; Secondary Review Determination     Admission Date: 2/13/2022  6:49 PM      Under the authority of the Utilization Management Committee, the utilization review process indicated a secondary review on the above patient.  The review outcome is based on review of the medical records, discussions with staff, and applying clinical experience noted on the date of the review.          (x) Observation Status Appropriate - This patient does not meet hospital inpatient criteria and is placed in observation status. If this patient's primary payer is Medicare and was admitted as an inpatient, Condition Code 44 should be used and patient status changed to \"observation\".     RATIONALE FOR DETERMINATION   46-year-old female with a history of diabetes, hypertension, psychiatric illness and substance use disorders presented with dizziness and falls.  She has mildly elevated liver tests and elevated CK enzyme.  There is concern for orthostasis or potentially neuropathy related to alcohol use.  MRI shows no evidence of stroke or other significant abnormality.  Psychiatry has been consulted for mental health purposes.  She is requiring some analgesics for generalized pain.  No significant injury was noted on imaging studies.  Neurology has been consulted as well for her falls dizziness and other unspecified neurological issues.  At the time of this review the patient does not meet medical necessity for inpatient hospitalization and observation status is recommended.    The severity of illness, intensity of service provided, expected LOS and risk for adverse outcome make the care appropriate for further observation; however, doesn't meet criteria for hospital inpatient admission.  Dr Jauregui was paged and notified of this determination.        The information on this document is developed by the utilization review team in order for the business office to ensure compliance.  This only denotes the appropriateness " of proper admission status and does not reflect the quality of care rendered.         The definitions of Inpatient Status and Observation Status used in making the determination above are those provided in the CMS Coverage Manual, Chapter 1 and Chapter 6, section 70.4.      Sincerely,     Mk Larson DO MPH   Physician Advisor  Utilization Review  [facility]

## 2022-02-14 NOTE — PHARMACY-ADMISSION MEDICATION HISTORY
Admission Medication History Completed by Pharmacy    See [facility] Admission Navigator for allergy information, preferred outpatient pharmacy, prior to admission medications and immunization status.     Medication History Sources:     Patient, Leonarda Lacey (phone [phone]), michael    Changes made to PTA medication list (reason):    Added: None    Deleted: atorvastatin, escitalopram, gabapentin, hydroxyzine, Novolog, Lantus, lisinopril, metformin, metoclopramide, mirtazapine, olanzapine, ondansetron, pantoprazole, ranitidine, sucralfate    Changed: Updated methadone dose to 130mg    Additional Information:    Per patient has not been taking any of her medications other than her methadone for the past 2 years.    Prior to Admission medications    Medication Sig Last Dose Taking? Auth Provider   methadone HCl 10 MG/5ML SOLN Take 130 mg by mouth daily Dose verified with Leonarda [facility] 2/14/22 (phone [phone]) 2/14/2022 at AM Yes Reported, Patient   ASPIRIN NOT PRESCRIBED (INTENTIONAL) Please choose reason not prescribed, below   Keke Montes APRN CNP   blood glucose (NO BRAND SPECIFIED) lancets standard Use to test blood sugar 4 times daily or as directed.   Cristina Metzger MD   blood glucose (NO BRAND SPECIFIED) test strip Use to test blood sugar 3 times daily or as directed.   Cristina Metzger MD   blood glucose monitoring (NO BRAND SPECIFIED) meter device kit Use to test blood sugar 4 times daily or as directed.   Jimmy Morelos MD   blood glucose monitoring (NO BRAND SPECIFIED) meter device kit Use to test blood sugar 4 times daily or as directed.   Lou Sales PA-C   insulin pen needle (30G X 8 MM) 30G X 8 MM miscellaneous Use 3 pen needles daily or as directed.   Katlyn Gale MD       Date completed: 02/14/22    Medication history completed by: Sam Bonilla [facility]

## 2022-02-14 NOTE — ED PROVIDER NOTES
"    [facility] EMERGENCY DEPARTMENT ([facility])       2/13/22  History     Chief Complaint   Patient presents with     Dizziness     Increasing dizziness over one month with frequent falls, \"I am not able to catch myself anymore,\" fell 2 days ago, hit side of bathtub, ongoing left rib pain     The history is provided by the patient and medical records.     Yun Coyle is a 46 year old female with a past medical history significant for type 2 diabetes, hypertension, benzodiazepine use disorder, PTSD, MDD, and SANDIE who presents to the Emergency Department for evaluation of dizziness. Patient was diagnosed with type 2 diabetes in 2008 but states she has not taken her insulin or any other medication in 2 years due to the Covid-19 pandemic. Patient also states she manages diabetes herself, but has not checked her blood glucose in the last 2 years and currently does not have a primary provider. She reports 1 month of dizziness with increased falls onto her bathtub, steps, and bar and states today she hit her left ribs on the edge of her bathtub. Patient also states she has no medications for PTSD, MDD, or SANDIE and no therapist.  Patient notes that she is having a difficult time taking care of herself because she continues to have multiple falls each day.      Past Medical History  Past Medical History:   Diagnosis Date     Anxiety      Depressive disorder      Gastroesophageal reflux disease      Hypertension      Type 2 diabetes mellitus (H)      Uncomplicated asthma      Past Surgical History:   Procedure Laterality Date     BACK SURGERY      x2     CHOLECYSTECTOMY       ORTHOPEDIC SURGERY       SALPINGO OOPHORECTOMY,R/L/ELDA Right      ASPIRIN NOT PRESCRIBED (INTENTIONAL)  atorvastatin (LIPITOR) 20 MG tablet  blood glucose (NO BRAND SPECIFIED) lancets standard  blood glucose (NO BRAND SPECIFIED) test strip  blood glucose monitoring (NO BRAND SPECIFIED) meter device kit  blood glucose monitoring (NO BRAND " SPECIFIED) meter device kit  escitalopram (LEXAPRO) 20 MG tablet  gabapentin (NEURONTIN) 300 MG capsule  hydrOXYzine (ATARAX) 25 MG tablet  insulin aspart (NOVOLOG PEN) 100 UNIT/ML pen  insulin aspart (NOVOLOG PEN) 100 UNIT/ML pen  insulin glargine (BASAGLAR KWIKPEN) 100 UNIT/ML pen  insulin pen needle (30G X 8 MM) 30G X 8 MM miscellaneous  lisinopril (PRINIVIL/ZESTRIL) 30 MG tablet  metFORMIN (GLUCOPHAGE) 500 MG tablet  methadone HCl 10 MG/5ML SOLN  metoclopramide (REGLAN) 10 MG tablet  mirtazapine (REMERON) 45 MG tablet  OLANZapine (ZYPREXA) 5 MG tablet  ondansetron (ZOFRAN) 4 MG tablet  ondansetron (ZOFRAN-ODT) 4 MG ODT tab  pantoprazole (PROTONIX) 40 MG EC tablet  ranitidine (ZANTAC) 150 MG tablet  sucralfate (CARAFATE) 1 GM/10ML suspension      No Known Allergies  Family History  Family History   Problem Relation Age of Onset     Diabetes Mother      Cancer Mother      Cancer Maternal Grandmother      Diabetes Maternal Grandmother      Social History   Social History     Tobacco Use     Smoking status: Never Smoker     Smokeless tobacco: Never Used   Substance Use Topics     Alcohol use: No     Drug use: Yes     Frequency: 7.0 times per week     Types: Marijuana     Comment: Pt last used yesterday      Past medical history, past surgical history, medications, allergies, family history, and social history were reviewed with the patient. No additional pertinent items.     I have reviewed the Medications, Allergies, Past Medical and Surgical History, and Social History in the Epic system.  Just let us know all that she has started up just let us know if if she is going to be boarding we just need to make sure the [facility] hospitalist knows about it thank you get back  So if if she ends up staying here if if there does not look like it is good to be about on the side and she has of staying here can you [facility]  Review of Systems  A complete review of systems was performed with  pertinent positives and negatives noted in the HPI, and all other systems negative.    Physical Exam   BP: (!) 158/97  Pulse: 96  Temp: 99  F (37.2  C)  Resp: 16  SpO2: 100 %      Physical Exam  Constitutional:       General: She is not in acute distress.     Appearance: She is not diaphoretic.   HENT:      Head: Atraumatic.      Mouth/Throat:      Pharynx: No oropharyngeal exudate.   Eyes:      General: No scleral icterus.     Pupils: Pupils are equal, round, and reactive to light.   Cardiovascular:      Heart sounds: Normal heart sounds.   Pulmonary:      Effort: No respiratory distress.      Breath sounds: Normal breath sounds.   Abdominal:      General: Bowel sounds are normal.      Palpations: Abdomen is soft.      Tenderness: There is no abdominal tenderness.   Musculoskeletal:         General: No tenderness.   Skin:     General: Skin is warm.      Findings: No rash.   Neurological:      Mental Status: She is oriented to person, place, and time.      Cranial Nerves: No cranial nerve deficit.      Sensory: No sensory deficit.      Coordination: Coordination abnormal.      Gait: Gait abnormal.      Comments: Patient with positive Romberg as well as ambulation listing to the left.         ED Course     At 6:58 PM the patient was seen and examined by Sean Christensen MD in Room ED04.        Procedures            EKG Interpretation:      Interpreted by Sean Christensen MD  Time reviewed: on arrival  Symptoms at time of EKG: dizzy   Rhythm: normal sinus   Rate: normal  Axis: normal  Ectopy: none  Conduction: normal  ST Segments/ T Waves: No ST-T wave changes  Q Waves: none  Comparison to prior: No old EKG available    Clinical Impression: normal EKG  ents/ T Waves: No ST-T wave changes  Q Waves: none  Comparison to prior: No old EKG available    Clinical Impression: normal EKG               Results for orders placed or performed during the hospital encounter of 02/13/22   CT Head w/o Contrast      Status: None    Narrative    EXAM: CT HEAD W/O CONTRAST  LOCATION: [facility]  DATE/TIME: 2/13/2022 8:07 PM    INDICATION: Dizziness, non-specific  COMPARISON: None available.  TECHNIQUE: Routine CT Head without IV contrast. Multiplanar reformats. Dose reduction techniques were used.    FINDINGS:  INTRACRANIAL CONTENTS: No intracranial hemorrhage, extraaxial collection, or mass effect.  No CT evidence of acute infarct. Normal parenchymal attenuation. Normal ventricles and sulci. The sella is unremarkable for technique. The cerebellar tonsils are   appropriately positioned.    VISUALIZED ORBITS/SINUSES/MASTOIDS: No intraorbital abnormality. No paranasal sinus mucosal disease. No middle ear or mastoid effusion.    BONES/SOFT TISSUES: No scalp hematoma. No skull fracture.      Impression    IMPRESSION:  1.  No evidence of an acute intracranial abnormality.   Ribs XR, unilat 3 views + PA chest,  left     Status: None    Narrative    EXAM: XR RIBS and CHEST LT 3VW  LOCATION: [facility]  DATE/TIME: 2/13/2022 9:32 PM    INDICATION: Fall, pain  COMPARISON: None.      Impression    IMPRESSION: The visualized heart and lungs are negative. No rib fractures.   MR Brain w/o & w Contrast     Status: None    Narrative    EXAM: MR BRAIN W/O and W CONTRAST, MRA BRAIN ([place] OF [place]EL) WO CONTRAST, MRA NECK (CAROTIDS) WO and W CONTRAST  LOCATION: [facility]  DATE/TIME: 2/13/2022 11:00 PM    INDICATION: Neuro deficit, acute, stroke suspected  COMPARISON: None.  CONTRAST: 10 mL Gadavist  TECHNIQUE:   1) Routine multiplanar multisequence head MRI without and with intravenous contrast.  2) 3D time-of-flight head MRA without intravenous contrast.  3) Neck MRA without and with IV contrast. Stenosis measurements made according to NASCET criteria unless otherwise specified.    FINDINGS:  HEAD  MRI:  INTRACRANIAL CONTENTS: No acute or subacute infarct. No mass, acute hemorrhage, or extra-axial fluid collections. Normal brain parenchymal signal. Normal ventricles and sulci. Normal position of the cerebellar tonsils. No pathologic contrast enhancement.    SELLA: No abnormality accounting for technique.    OSSEOUS STRUCTURES/SOFT TISSUES: Normal marrow signal. The major intracranial vascular flow voids are maintained.     ORBITS: No abnormality accounting for technique.     SINUSES/MASTOIDS: Mild mucosal thickening scattered about the paranasal sinuses. No middle ear or mastoid effusion.     HEAD MRA:   ANTERIOR CIRCULATION: No stenosis/occlusion, aneurysm, or high flow vascular malformation. Fetal origin of the right posterior cerebral artery from the anterior circulation.    POSTERIOR CIRCULATION: No stenosis/occlusion, aneurysm, or high flow vascular malformation. Congenitally small vertebrobasilar system is incompletely evaluated.     NECK MRA:   RIGHT CAROTID: No measurable stenosis or dissection.    LEFT CAROTID: No measurable stenosis or dissection.    VERTEBRAL ARTERIES: No focal stenosis or dissection. Dominant right and smaller left vertebral arteries.    AORTIC ARCH: Classic aortic arch anatomy with no significant stenosis at the origin of the great vessels.      Impression    IMPRESSION:  HEAD MRI:   1.  No acute findings.  2.  Unremarkable brain MRI.    HEAD MRA:   1.  No aneurysm, high flow AVM or significant stenosis identified.  2.  Variant [place] of [place] anatomy as above.    NECK MRA:  1.  No measurable stenosis or dissection.   MRA Brain ([place] of [place]) wo Contrast     Status: None    Narrative    EXAM: MR BRAIN W/O and W CONTRAST, MRA BRAIN ([place] OF PR[place]HAKAR) WO CONTRAST, MRA NECK (CAROTIDS) WO and W CONTRAST  LOCATION: [facility]  DATE/TIME: 2/13/2022 11:00 PM    INDICATION: Neuro deficit, acute, stroke suspected  COMPARISON:  None.  CONTRAST: 10 mL Gadavist  TECHNIQUE:   1) Routine multiplanar multisequence head MRI without and with intravenous contrast.  2) 3D time-of-flight head MRA without intravenous contrast.  3) Neck MRA without and with IV contrast. Stenosis measurements made according to NASCET criteria unless otherwise specified.    FINDINGS:  HEAD MRI:  INTRACRANIAL CONTENTS: No acute or subacute infarct. No mass, acute hemorrhage, or extra-axial fluid collections. Normal brain parenchymal signal. Normal ventricles and sulci. Normal position of the cerebellar tonsils. No pathologic contrast enhancement.    SELLA: No abnormality accounting for technique.    OSSEOUS STRUCTURES/SOFT TISSUES: Normal marrow signal. The major intracranial vascular flow voids are maintained.     ORBITS: No abnormality accounting for technique.     SINUSES/MASTOIDS: Mild mucosal thickening scattered about the paranasal sinuses. No middle ear or mastoid effusion.     HEAD MRA:   ANTERIOR CIRCULATION: No stenosis/occlusion, aneurysm, or high flow vascular malformation. Fetal origin of the right posterior cerebral artery from the anterior circulation.    POSTERIOR CIRCULATION: No stenosis/occlusion, aneurysm, or high flow vascular malformation. Congenitally small vertebrobasilar system is incompletely evaluated.     NECK MRA:   RIGHT CAROTID: No measurable stenosis or dissection.    LEFT CAROTID: No measurable stenosis or dissection.    VERTEBRAL ARTERIES: No focal stenosis or dissection. Dominant right and smaller left vertebral arteries.    AORTIC ARCH: Classic aortic arch anatomy with no significant stenosis at the origin of the great vessels.      Impression    IMPRESSION:  HEAD MRI:   1.  No acute findings.  2.  Unremarkable brain MRI.    HEAD MRA:   1.  No aneurysm, high flow AVM or significant stenosis identified.  2.  Variant [place] of [place] anatomy as above.    NECK MRA:  1.  No measurable stenosis or dissection.   MRA Neck (Carotids) wo & w  Contrast     Status: None    Narrative    EXAM: MR BRAIN W/O and W CONTRAST, MRA BRAIN ([place] OF ENGEL) WO CONTRAST, MRA NECK (CAROTIDS) WO and W CONTRAST  LOCATION: [facility]  DATE/TIME: 2/13/2022 11:00 PM    INDICATION: Neuro deficit, acute, stroke suspected  COMPARISON: None.  CONTRAST: 10 mL Gadavist  TECHNIQUE:   1) Routine multiplanar multisequence head MRI without and with intravenous contrast.  2) 3D time-of-flight head MRA without intravenous contrast.  3) Neck MRA without and with IV contrast. Stenosis measurements made according to NASCET criteria unless otherwise specified.    FINDINGS:  HEAD MRI:  INTRACRANIAL CONTENTS: No acute or subacute infarct. No mass, acute hemorrhage, or extra-axial fluid collections. Normal brain parenchymal signal. Normal ventricles and sulci. Normal position of the cerebellar tonsils. No pathologic contrast enhancement.    SELLA: No abnormality accounting for technique.    OSSEOUS STRUCTURES/SOFT TISSUES: Normal marrow signal. The major intracranial vascular flow voids are maintained.     ORBITS: No abnormality accounting for technique.     SINUSES/MASTOIDS: Mild mucosal thickening scattered about the paranasal sinuses. No middle ear or mastoid effusion.     HEAD MRA:   ANTERIOR CIRCULATION: No stenosis/occlusion, aneurysm, or high flow vascular malformation. Fetal origin of the right posterior cerebral artery from the anterior circulation.    POSTERIOR CIRCULATION: No stenosis/occlusion, aneurysm, or high flow vascular malformation. Congenitally small vertebrobasilar system is incompletely evaluated.     NECK MRA:   RIGHT CAROTID: No measurable stenosis or dissection.    LEFT CAROTID: No measurable stenosis or dissection.    VERTEBRAL ARTERIES: No focal stenosis or dissection. Dominant right and smaller left vertebral arteries.    AORTIC ARCH: Classic aortic arch anatomy with no significant stenosis at the origin of the great  vessels.      Impression    IMPRESSION:  HEAD MRI:   1.  No acute findings.  2.  Unremarkable brain MRI.    HEAD MRA:   1.  No aneurysm, high flow AVM or significant stenosis identified.  2.  Variant [place] of [place] anatomy as above.    NECK MRA:  1.  No measurable stenosis or dissection.   Extra Blue Top Tube     Status: None   Result Value Ref Range    Hold Specimen JIC    Extra Red Top Tube     Status: None   Result Value Ref Range    Hold Specimen JIC    Extra Green Top (Lithium Heparin) Tube     Status: None   Result Value Ref Range    Hold Specimen JIC    Comprehensive metabolic panel     Status: Abnormal   Result Value Ref Range    Sodium 132 (L) 133 - 144 mmol/L    Potassium 4.2 3.4 - 5.3 mmol/L    Chloride 100 94 - 109 mmol/L    Carbon Dioxide (CO2) 29 20 - 32 mmol/L    Anion Gap 3 3 - 14 mmol/L    Urea Nitrogen 10 7 - 30 mg/dL    Creatinine 0.66 0.52 - 1.04 mg/dL    Calcium 9.2 8.5 - 10.1 mg/dL    Glucose 230 (H) 70 - 99 mg/dL    Alkaline Phosphatase 163 (H) 40 - 150 U/L    AST 85 (H) 0 - 45 U/L     (H) 0 - 50 U/L    Protein Total 8.3 6.8 - 8.8 g/dL    Albumin 3.4 3.4 - 5.0 g/dL    Bilirubin Total 0.7 0.2 - 1.3 mg/dL    GFR Estimate >90 >60 mL/min/1.73m2   UA with Microscopic reflex to Culture     Status: Abnormal    Specimen: Urine, Midstream   Result Value Ref Range    Color Urine Yellow Colorless, Straw, Light Yellow, Yellow    Appearance Urine Clear Clear    Glucose Urine >=1000 (A) Negative mg/dL    Bilirubin Urine Negative Negative    Ketones Urine Negative Negative mg/dL    Specific Gravity Urine 1.032 1.003 - 1.035    Blood Urine Negative Negative    pH Urine 7.0 5.0 - 7.0    Protein Albumin Urine Negative Negative mg/dL    Urobilinogen Urine Normal Normal, 2.0 mg/dL    Nitrite Urine Negative Negative    Leukocyte Esterase Urine Negative Negative    Mucus Urine Present (A) None Seen /LPF    RBC Urine 1 <=2 /HPF    WBC Urine 2 <=5 /HPF    Squamous Epithelials Urine 31 (H) <=1 /HPF     Narrative    Urine Culture not indicated   INR     Status: Normal   Result Value Ref Range    INR 1.05 0.86 - 1.14   CBC with platelets and differential     Status: None   Result Value Ref Range    WBC Count 9.2 4.0 - 11.0 10e3/uL    RBC Count 4.53 3.80 - 5.20 10e6/uL    Hemoglobin 13.7 11.7 - 15.7 g/dL    Hematocrit 40.6 35.0 - 47.0 %    MCV 90 78 - 100 fL    MCH 30.2 26.5 - 33.0 pg    MCHC 33.7 31.5 - 36.5 g/dL    RDW 13.8 10.0 - 15.0 %    Platelet Count 249 150 - 450 10e3/uL    % Neutrophils 51 %    % Lymphocytes 41 %    % Monocytes 6 %    % Eosinophils 2 %    % Basophils 0 %    % Immature Granulocytes 0 %    NRBCs per 100 WBC 0 <1 /100    Absolute Neutrophils 4.6 1.6 - 8.3 10e3/uL    Absolute Lymphocytes 3.8 0.8 - 5.3 10e3/uL    Absolute Monocytes 0.6 0.0 - 1.3 10e3/uL    Absolute Eosinophils 0.2 0.0 - 0.7 10e3/uL    Absolute Basophils 0.0 0.0 - 0.2 10e3/uL    Absolute Immature Granulocytes 0.0 <=0.4 10e3/uL    Absolute NRBCs 0.0 10e3/uL   Ethyl Alcohol Level     Status: Normal   Result Value Ref Range    Alcohol ethyl <0.01 <=0.01 g/dL   Drug abuse screen 1 urine (ED)     Status: Abnormal   Result Value Ref Range    Amphetamines Urine Screen Negative Screen Negative    Barbiturates Urine Screen Negative Screen Negative    Benzodiazepines Urine Screen Negative Screen Negative    Cannabinoids Urine Screen Positive (A) Screen Negative    Cocaine Urine Screen Negative Screen Negative    Opiates Urine Screen Negative Screen Negative   EKG 12 lead     Status: None (Preliminary result)   Result Value Ref Range    Systolic Blood Pressure  mmHg    Diastolic Blood Pressure  mmHg    Ventricular Rate 76 BPM    Atrial Rate 76 BPM    [place] Interval 132 ms    QRS Duration 84 ms     ms    QTc 463 ms    P Axis 72 degrees    R AXIS 11 degrees    T Axis 33 degrees    Interpretation ECG Sinus rhythm  Normal ECG      [place] Draw     Status: None    Narrative    The following orders were created for panel order [place]  Draw.  Procedure                               Abnormality         Status                     ---------                               -----------         ------                     Extra Blue Top Tube[[id number]]                              Final result               Extra Red Top Tube[[id number]]                               Final result               Extra Green Top (Lithium...[[id number]]                      Final result               Extra Purple Top Tube[[id number]]                                                         Please view results for these tests on the individual orders.   CBC with platelets differential     Status: None    Narrative    The following orders were created for panel order CBC with platelets differential.  Procedure                               Abnormality         Status                     ---------                               -----------         ------                     CBC with platelets and d...[[id number]]                      Final result                 Please view results for these tests on the individual orders.   Urine Drugs of Abuse Screen     Status: Abnormal    Narrative    The following orders were created for panel order Urine Drugs of Abuse Screen.  Procedure                               Abnormality         Status                     ---------                               -----------         ------                     Drug abuse screen 1 urin...[[id number]]  Abnormal            Final result                 Please view results for these tests on the individual orders.     Medications   0.9% sodium chloride BOLUS (0 mLs Intravenous Stopped 2/13/22 2211)     Followed by   sodium chloride 0.9% infusion (has no administration in time range)   ketorolac (TORADOL) injection 30 mg (30 mg Intramuscular Given 2/13/22 2120)   gadobutrol (GADAVIST) injection 10 mL (10 mLs Intravenous Given 2/13/22 2320)   HYDROmorphone (DILAUDID) injection 0.5 mg (0.5 mg Intravenous Given 2/14/22  0155)             Assessments & Plan (with Medical Decision Making)       I have reviewed the nursing notes.    I have reviewed the findings, diagnosis, plan and need for follow up with the patient.    Patient with vertiginous episodes multiple falls at this time unclear as to the exact etiology I discussed briefly with neurology as well as medicine and imaging studies including MRI/MRA did not reveal any obvious abnormality I am however concerned about her difficulty with ambulation and her frequent falls I have discussed the possibility of there being component of alcohol abuse patient is also certainly not being treated adequately for her diabetes she is not currently taking any medications she will need stabilization evaluation with physical therapy occupational therapy and neurology consult.    Final diagnoses:   Vertigo   Fall, initial encounter       IStephanie am serving as a trained medical scribe to document services personally performed by Sean Christensen MD, based on the provider's statements to me.      I, Sean Christensen MD, was physically present and have reviewed and verified the accuracy of this note documented by Stephanie Mclain.     Sean Christensen MD  2/13/2022   [facility] EMERGENCY DEPARTMENT     Sean Christensen MD  02/14/22 0205

## 2022-02-14 NOTE — CONSULTS
"[facility]  Neurology Consultation    Patient Name:  Yun Coyle  MRN:  [id number]    :  1975  Date of Service:  2022  Primary care provider:  No Ref-Primary, Physician    History of Present Illness:   46 year old female h/o poorly controlled DM2, HTN, polysubstance use disorder (benzo, cannabis, opiates), PTSD, MDD, SANDIE who presents with multiple falls and dizziness x1 month.     Symptoms started ~1 month ago with primarily lightheadedness with standing, no syncope. Would happen intermittently but has progressively worsened and over past week has been unable to walk without support, has symptoms every time she stands up and also started having symptoms (though not the same feeling) when she moves her head/neck. Describes lightheadedness, rocking sensation, and feels like the ground isn't there when she stands up. Feels more of a brief \"brain fog\" when she moves her head/neck. Has had many falls over past few weeks, first hit the back of her head 2 weeks ago but no residual pain or injury per pt. Has significant L ribcage pain and bruises on bilateral elbows, knees, etc. No LOC, headaches, vision changes, nausea/vomiting, numbness/tingling. Feels L arm and leg are weak over last week d/t L sided pain with movement. Denies recent illness, medication changes.    Labs unremarkable. Brain MRI and brain and neck MRA negative for acute findings. Has smaller L vertebral artery without focal stenosis or dissection per radiology read.      ROS  A 10-point ROS was performed as per HPI.     PMH  Past Medical History:   Diagnosis Date     Anxiety      Depressive disorder      Gastroesophageal reflux disease      Hypertension      Type 2 diabetes mellitus (H)      Uncomplicated asthma      Past Surgical History:   Procedure Laterality Date     BACK SURGERY      x2     CHOLECYSTECTOMY       ORTHOPEDIC SURGERY       SALPINGO OOPHORECTOMY,R/L/ELDA Right  " "      Medications   I have personally reviewed the patient's medication list.     Allergies  I have personally reviewed the patient's allergy list.     Social History  I have reviewed this patient's social history.    Pt denied heavy alcohol use history but per ED provider there was concern of \"component of alcohol abuse\". Uses cannabis daily. On methadone, denies recent other drug use.    Family History    I have reviewed this patient's family history      Physical Examination   Vitals: BP (!) 159/101 (BP Location: Right arm)   Pulse 69   Temp 97.4  F (36.3  C) (Oral)   Resp 18   Wt 87.6 kg (193 lb 2 oz)   SpO2 100%   BMI 27.09 kg/m    General: Lying in bed, NAD  HEENT: NC/AT, no icterus, op pink and moist  Cardiac: RRR  Chest: non-labored on RA  Abdomen: ND  Extremities: Warm, no edema  Skin: No rash or lesion   Psych: Mood pleasant, affect congruent  Neuro:  Mental status: Awake, alert, attentive, oriented to self, time, place, and circumstance. Language is fluent and coherent with intact comprehension of complex commands, naming and repetition.  Cranial nerves: VFF, PERRL, conjugate gaze, EOMI, facial sensation intact, face symmetric, shoulder shrug strong, tongue/uvula midline (palate rises symmetrically, appears soft tissue portion of uvula deviates to R - likely congenital), no dysarthria.   Motor: Normal bulk and tone. No abnormal movements. 5/5 strength in RUE and RLE, 5-/5 strength in LUE and LLE (limited 2/2 pain on L trunk per pt). No BUE pronator drift.  Reflexes: 1-2+ reflexes and symmetric biceps, brachioradialis, triceps, patellae, and achilles. No clonus, toes down-going.  Sensory: Intact to light touch, pin, and proprioception. Vibration inconsistently reported (was 5 seconds at knees, >9 seconds at feet bilaterally, was reporting vibration when no vibrating stimulus was present)  Coordination: Romberg sign present while sitting on edge of bed with movement to L. FNF with ataxia and mild " dysmetria. Rapid alternating movements intact with eyes open but slowed with eyes closed  Gait: Pt very nervous and hesitant. Normal width, stride length, turn, and arm swing. Station normal. Tandem walk intact. Pt legs intermittently would bend but pt would catch herself and fully extend legs. Pt leaned to R at one point with gait.      Labs  BMP  Recent Labs   Lab 02/13/22 1919   *   POTASSIUM 4.2   CHLORIDE 100   CO2 29   BUN 10   CR 0.66   DA 9.2       CBC  Recent Labs   Lab 02/13/22 1919   WBC 9.2   HGB 13.7            Radiological Data  Recent Results (from the past 48 hour(s))   CT Head w/o Contrast    Narrative    EXAM: CT HEAD W/O CONTRAST  LOCATION: [facility]  DATE/TIME: 2/13/2022 8:07 PM    INDICATION: Dizziness, non-specific  COMPARISON: None available.  TECHNIQUE: Routine CT Head without IV contrast. Multiplanar reformats. Dose reduction techniques were used.    FINDINGS:  INTRACRANIAL CONTENTS: No intracranial hemorrhage, extraaxial collection, or mass effect.  No CT evidence of acute infarct. Normal parenchymal attenuation. Normal ventricles and sulci. The sella is unremarkable for technique. The cerebellar tonsils are   appropriately positioned.    VISUALIZED ORBITS/SINUSES/MASTOIDS: No intraorbital abnormality. No paranasal sinus mucosal disease. No middle ear or mastoid effusion.    BONES/SOFT TISSUES: No scalp hematoma. No skull fracture.      Impression    IMPRESSION:  1.  No evidence of an acute intracranial abnormality.   Ribs XR, unilat 3 views + PA chest,  left    Narrative    EXAM: XR RIBS and CHEST LT 3VW  LOCATION: [facility]  DATE/TIME: 2/13/2022 9:32 PM    INDICATION: Fall, pain  COMPARISON: None.      Impression    IMPRESSION: The visualized heart and lungs are negative. No rib fractures.   MR Brain w/o & w Contrast    Narrative    EXAM: MR BRAIN W/O and W CONTRAST, MRA BRAIN  ([place] OF [place]EL) WO CONTRAST, MRA NECK (CAROTIDS) WO and W CONTRAST  LOCATION: [facility]  DATE/TIME: 2/13/2022 11:00 PM    INDICATION: Neuro deficit, acute, stroke suspected  COMPARISON: None.  CONTRAST: 10 mL Gadavist  TECHNIQUE:   1) Routine multiplanar multisequence head MRI without and with intravenous contrast.  2) 3D time-of-flight head MRA without intravenous contrast.  3) Neck MRA without and with IV contrast. Stenosis measurements made according to NASCET criteria unless otherwise specified.    FINDINGS:  HEAD MRI:  INTRACRANIAL CONTENTS: No acute or subacute infarct. No mass, acute hemorrhage, or extra-axial fluid collections. Normal brain parenchymal signal. Normal ventricles and sulci. Normal position of the cerebellar tonsils. No pathologic contrast enhancement.    SELLA: No abnormality accounting for technique.    OSSEOUS STRUCTURES/SOFT TISSUES: Normal marrow signal. The major intracranial vascular flow voids are maintained.     ORBITS: No abnormality accounting for technique.     SINUSES/MASTOIDS: Mild mucosal thickening scattered about the paranasal sinuses. No middle ear or mastoid effusion.     HEAD MRA:   ANTERIOR CIRCULATION: No stenosis/occlusion, aneurysm, or high flow vascular malformation. Fetal origin of the right posterior cerebral artery from the anterior circulation.    POSTERIOR CIRCULATION: No stenosis/occlusion, aneurysm, or high flow vascular malformation. Congenitally small vertebrobasilar system is incompletely evaluated.     NECK MRA:   RIGHT CAROTID: No measurable stenosis or dissection.    LEFT CAROTID: No measurable stenosis or dissection.    VERTEBRAL ARTERIES: No focal stenosis or dissection. Dominant right and smaller left vertebral arteries.    AORTIC ARCH: Classic aortic arch anatomy with no significant stenosis at the origin of the great vessels.      Impression    IMPRESSION:  HEAD MRI:   1.  No acute findings.  2.   Unremarkable brain MRI.    HEAD MRA:   1.  No aneurysm, high flow AVM or significant stenosis identified.  2.  Variant [place] of Pr[place] anatomy as above.    NECK MRA:  1.  No measurable stenosis or dissection.   MRA Brain ([place] of Pr[place]) wo Contrast    Narrative    EXAM: MR BRAIN W/O and W CONTRAST, MRA BRAIN ([place] OF PRABHAKAR) WO CONTRAST, MRA NECK (CAROTIDS) WO and W CONTRAST  LOCATION: [facility]  DATE/TIME: 2/13/2022 11:00 PM    INDICATION: Neuro deficit, acute, stroke suspected  COMPARISON: None.  CONTRAST: 10 mL Gadavist  TECHNIQUE:   1) Routine multiplanar multisequence head MRI without and with intravenous contrast.  2) 3D time-of-flight head MRA without intravenous contrast.  3) Neck MRA without and with IV contrast. Stenosis measurements made according to NASCET criteria unless otherwise specified.    FINDINGS:  HEAD MRI:  INTRACRANIAL CONTENTS: No acute or subacute infarct. No mass, acute hemorrhage, or extra-axial fluid collections. Normal brain parenchymal signal. Normal ventricles and sulci. Normal position of the cerebellar tonsils. No pathologic contrast enhancement.    SELLA: No abnormality accounting for technique.    OSSEOUS STRUCTURES/SOFT TISSUES: Normal marrow signal. The major intracranial vascular flow voids are maintained.     ORBITS: No abnormality accounting for technique.     SINUSES/MASTOIDS: Mild mucosal thickening scattered about the paranasal sinuses. No middle ear or mastoid effusion.     HEAD MRA:   ANTERIOR CIRCULATION: No stenosis/occlusion, aneurysm, or high flow vascular malformation. Fetal origin of the right posterior cerebral artery from the anterior circulation.    POSTERIOR CIRCULATION: No stenosis/occlusion, aneurysm, or high flow vascular malformation. Congenitally small vertebrobasilar system is incompletely evaluated.     NECK MRA:   RIGHT CAROTID: No measurable stenosis or dissection.    LEFT CAROTID: No measurable  stenosis or dissection.    VERTEBRAL ARTERIES: No focal stenosis or dissection. Dominant right and smaller left vertebral arteries.    AORTIC ARCH: Classic aortic arch anatomy with no significant stenosis at the origin of the great vessels.      Impression    IMPRESSION:  HEAD MRI:   1.  No acute findings.  2.  Unremarkable brain MRI.    HEAD MRA:   1.  No aneurysm, high flow AVM or significant stenosis identified.  2.  Variant [place] of Prabhakar anatomy as above.    NECK MRA:  1.  No measurable stenosis or dissection.   MRA Neck (Carotids) wo & w Contrast    Narrative    EXAM: MR BRAIN W/O and W CONTRAST, MRA BRAIN ([place] OF PRABHAKAR) WO CONTRAST, MRA NECK (CAROTIDS) WO and W CONTRAST  LOCATION: [facility]  DATE/TIME: 2/13/2022 11:00 PM    INDICATION: Neuro deficit, acute, stroke suspected  COMPARISON: None.  CONTRAST: 10 mL Gadavist  TECHNIQUE:   1) Routine multiplanar multisequence head MRI without and with intravenous contrast.  2) 3D time-of-flight head MRA without intravenous contrast.  3) Neck MRA without and with IV contrast. Stenosis measurements made according to NASCET criteria unless otherwise specified.    FINDINGS:  HEAD MRI:  INTRACRANIAL CONTENTS: No acute or subacute infarct. No mass, acute hemorrhage, or extra-axial fluid collections. Normal brain parenchymal signal. Normal ventricles and sulci. Normal position of the cerebellar tonsils. No pathologic contrast enhancement.    SELLA: No abnormality accounting for technique.    OSSEOUS STRUCTURES/SOFT TISSUES: Normal marrow signal. The major intracranial vascular flow voids are maintained.     ORBITS: No abnormality accounting for technique.     SINUSES/MASTOIDS: Mild mucosal thickening scattered about the paranasal sinuses. No middle ear or mastoid effusion.     HEAD MRA:   ANTERIOR CIRCULATION: No stenosis/occlusion, aneurysm, or high flow vascular malformation. Fetal origin of the right posterior cerebral  artery from the anterior circulation.    POSTERIOR CIRCULATION: No stenosis/occlusion, aneurysm, or high flow vascular malformation. Congenitally small vertebrobasilar system is incompletely evaluated.     NECK MRA:   RIGHT CAROTID: No measurable stenosis or dissection.    LEFT CAROTID: No measurable stenosis or dissection.    VERTEBRAL ARTERIES: No focal stenosis or dissection. Dominant right and smaller left vertebral arteries.    AORTIC ARCH: Classic aortic arch anatomy with no significant stenosis at the origin of the great vessels.      Impression    IMPRESSION:  HEAD MRI:   1.  No acute findings.  2.  Unremarkable brain MRI.    HEAD MRA:   1.  No aneurysm, high flow AVM or significant stenosis identified.  2.  Variant [place] of [place] anatomy as above.    NECK MRA:  1.  No measurable stenosis or dissection.   Echo Complete   Result Value    LVEF  55-60%    Narrative    [id number]  [id number]  [id number]  [id number]^BRIEN^ARCHANA     [facility],[place]  Echocardiography Laboratory  [address] [place] [id number]     Name: MELINDA PAYNE  MRN: [id number]  : 1975  Study Date: 2022 10:35 AM  Age: 46 yrs  Gender: Female  Patient Location: [facility]  Reason For Study: Dizziness  Ordering Physician: ARCHANA TESFAYE  Performed By: Anastasiia Carty RDCS     BSA: 2.1 m2  Height: 70 in  Weight: 193 lb  ______________________________________________________________________________  Procedure  Complete Portable Echo Adult.  ______________________________________________________________________________  Interpretation Summary  Global and regional left ventricular function is normal with an EF of 55-60%.  Right ventricular function, chamber size, wall motion, and thickness are  normal.  Pulmonary artery systolic pressure cannot be assessed.  The inferior vena cava is normal.  No pericardial effusion is present.  There is no prior study for direct  comparison.  ______________________________________________________________________________  Left Ventricle  Global and regional left ventricular function is normal with an EF of 55-60%.  Left ventricular wall thickness is normal. Left ventricular size is normal.  Left ventricular diastolic function is normal. No regional wall motion  abnormalities are seen.     Right Ventricle  Right ventricular function, chamber size, wall motion, and thickness are  normal.     Atria  Both atria appear normal. The atrial septum is intact as assessed by color  Doppler .     Mitral Valve  The mitral valve is normal. Trace mitral insufficiency is present.     Aortic Valve  The valve leaflets are not well visualized. On Doppler interrogation, there is  no significant stenosis or regurgitation.     Tricuspid Valve  The tricuspid valve is normal. Trace tricuspid insufficiency is present.  Pulmonary artery systolic pressure cannot be assessed.     Pulmonic Valve  The pulmonic valve is normal.     Vessels  The thoracic aorta is normal. The pulmonary artery and bifurcation cannot be  assessed. The inferior vena cava is normal.     Pericardium  No pericardial effusion is present.     Compared to Previous Study  There is no prior study for direct comparison.  ______________________________________________________________________________  Report approved by: Belia Lo 02/14/2022 11:36 AM     ______________________________________________________________________________               Impression    Pt with one month of progressive dizziness that is worse with standing and intermittently positional with head movements. Exam c/w ataxia and difficult to assess for peripheral neuropathy but overall reassuring. Coordination and balance with gait were impaired but pt was also understandably hesitant with walking, she was able to tandem walk and catch herself when knees gave out showing adequate strength and coordination. Brain MRI and MRA head  and neck without acute pathology. MRA quality wasn't ideal for ruling out dissection or other pathology of narrowed L vertebral artery so CTA to further evaluate would be helpful though most likely this is congenital and not of clinical significance.     Ataxia and gait instability likely multifactorial and ddx includes polysubstance use, nutritional deficiencies in setting of recent weight loss, peripheral neuropathy 2/2 poor DM2 control, orthostatic hypotension, deconditioning, etc. Pt's anxiety also seems to be contributing to her hesitancy with walking. Imaging thus far reassuring and rules out stroke though MRA quality wasn't ideal for fully ruling out dissection or other pathology of narrowed L vertebral artery. CTA head/neck to further evaluate would be helpful (though most likely this is congenital and not of clinical significance). Ultimately, provided reassurance to pt and she could benefit from vestibular PT.    Recommendations  - CTA head and neck to better visualize vertebral arteries  - Consider checking B12, B6, copper  - May benefit from vestibular testing and potentially vestibular PT    Patient was seen and discussed with Dr. Velez.      Marlen Myrick MD  Medicine Resident PGY-2

## 2022-02-14 NOTE — PLAN OF CARE
Reason for transfer: Telemetry  Transferred from: 7C  Report received from: Stephy RN  2 RN skin assessment completed by: Eduardo RN and Arabella RN  Bed algorithm reevaluated: Yes  Was Pulsate ordered?: No  Flu shot ordered? (October-April only): Yes  Detailed Belongings: Clothing, cell phone, Shoes, purse, black handled bag.     Pt arrived to unit at 1715, alert and oriented, VS stable with some baseline HTN.  Pt oriented to unit and room.  Skin assessment completed.  Will continue to monitor and notify MD of any changes.

## 2022-02-14 NOTE — H&P
"[facility]    History and Physical - Hospitalist Service, GOLD TEAM        Date of Admission:  2/13/2022    Assessment & Plan      Yun Coyle is a 46 year old female with a history of insulin dependent type 2 diabetes, hypertension, complicated psychiatric profile (multi substance use disorder [benzodiazepenes, marijuana, opioids on maintenance methadone], PTSD, MDD, SANDIE, cyclic vomiting syndrome) who presented with dizziness and falls (over 20) in the past month, she was found to have positive Romberg, listing to left on ambulation, elevated liver enzymes with bland CT, MR, MRA. Differential includes orthostatic hypertension, nutritional deficiency, polysubstance use, peripheral sensory neuropathy secondary to uncontrolled diabetes, post infectious cerebellar ataxia, paroxysmal bradycardia, autoimmune encephalitis, neurosyphilis.    Changes Today  - repeat orthostatics  - hemoglobin A1C for potential diabetic neuropathy  - telemetry to monitor for cardiogenic presycope  - PT/OT  - TTE  - consult neuro  - psychiatry consult, has mental health issues that interfere with sleep (ambien helped in the past); also has suicidal ideation  - COVID vaccine  - DVT prophylaxis  - syphilis test pending  - TSH pending  - CTA head and neck     #Presyncope, Dizziness, and Falls  Increasing feelings of lightheadedness as well as sensation of instability starting a month ago. Acute worsening over the past week which led to over 20 falls.  -neuro consult placed   - CTA head, neck   - f/u copper, Vit B12, Vit B6  -repeat orthostatics  -telemetry workup for presyncope, falls  -PT/OT consults placed     #Depression  #Suicidal Thoughts  #Insomnia  #PTSD, SANDIE  She says that her mental health is \"not so good.\" Has a history of suicidal attempts. She feels depressed. Ambien worked in the past for sleep. Would like psych consult  - psych consult placed    #Rib pain 2/2 fall  Xray of " ribcage on admit revealed no fracture of rib. Rib pain attributed to falling and hitting bathtub.  - PRN acetaminophen   - PRN  Ibuprofen  - lidocaine path Q24 hours  - avoid opioids    #T2DM  Hx of T2DM, not currently taking any medications. a1c 10.2% on admission  - assist with PCP follow-up at discharge  - hypoglycemia protocol  - low sliding scale insulin     #HTN  Pt has hx of HTN. Has not seen a PCP in two years and has not taken medications in two years either.  - CTM  - will discuss follow-up with PCP or establishing care with PCP prior to discharge  - labetolol for SBP>180    #Hyponatremia  Na 132 on admit. Will monitor for now and complete workup if worsens  - repeat BMP tomorrow AM    #Exertional shakiness, nausea, and jitteriness  Patient reports that she has shakiness, nausea, and feel jittery when she exerts herself either through exercise or even carrying her groceries up her stairs. This sensation goes away when she rests for 5 min.  - Not actively having these symptoms in hospital today  - CTM  - PRN zofran for nausea     #Suspected NAFLD  #Chronically Elevated Liver Enzymes  CT Abdomen Pelvis w Contrast from 12/2030 ED note found fatty infiltration of liver and also noted pt has chronic LFT elevations with aminotransferases in the 200s.   - CTM LFT    #GERD  - Protonix 40 mg daily    #Opioid use disorder on maintence methadone  She said that the last time she used opioids was about 5-6 months ago. Tested negative for opioids on drug screen which is inconsistent with recent methadone use  - pharmacy consult for methadone  - cont methadone 130 mg daily         #RHM  Pt amenable to receiving covid  Vaccine  - Pfizer vaccine ordered   - flu vaccine ordered     Diet: Regular Diet Adult    DVT Prophylaxis: Low Risk/Ambulatory with no VTE prophylaxis indicated  Blanc Catheter: Not present  Fluids: None  Central Lines: None  Cardiac Monitoring: ACTIVE order. Indication: Syncope- low cardiac risk (24  hours)  Code Status:   Full code    Disposition Plan   Expected Discharge: 02/16/2022 3-4 days pending neuro workup.  [facility]       The patient's care was discussed with the Attending Physician, Dr. Ning Jauregui, Patient and Primary team.    Resident/Fellow Attestation   I, Alison Manrique, was present with the medical/CASSANDRA student who participated in the service and in the documentation of the note.  I have verified the history and personally performed the physical exam and medical decision making.  I agree with the assessment and plan of care as documented in the note.      Note is co-written, changes are incorporated    Alison Manrique MD  PGY1  Date of Service (when I saw the patient): 02/14/22    Anderson Roque, MS3    Anderson Roque  Medical Student  Hospitalist Service, [facility]  Securely message with the Vocera Web Console (learn more here)  Text page via [facility] Paging/Directory   Please see signed in provider for up to date coverage information    ______________________________________________________________________    Chief Complaint   Lightheadedness and instability    History is obtained from the patient    History of Present Illness   Yun Coyle is a 46 year old female with a history of type 2 diabetes with insulin dependence, hypertension, benzodiazepine use disorder, PTSD, MDD, and SANDIE, pancreatitis, opiate use disorder with maintenance methadone, marijuana use, cyclic vomiting syndrome, asthma who presented with dizziness and falls for one month she was found to have positive Romberg and listing to left on ambulation with bland CT, MR, MRA.     Pt reports that her feelings of lightheadedness and instability began about a month ago. The first time this happened, she was cleaning the floor and when she stood up she felt lightheaded for a few seconds. This was her only symptom at  "the time. Over the next few days, she continued to have these episodes but she started feeling like the world is rocking back and forth as if she were standing on a boat. Here feelings of rocking are worsened when she turns her body or turns her head. But does not worsen when she tilts her head. She notes that when she closes her eyes she feels like \"she can't feel anything\" and this makes her fall.     One week ago, there was a sudden drastic worsening because her body was no longer able to compensate and she started to have falls. Her first fall happened when she was standing by the bathroom and when she turned away from the mirror she fell backwards and hit her head on the floor without loss of consciousness or sustained pain, no bleeding. She does not feel like the world was spinning but rather \"rocking.\" As soon as she hit the ground she stopped feeling the rocking sensation. She rested for a bit, caught her breath, got back up and did not feel any unsteadiness or lightheadedness. At this point, the episodes were happening about 3-4 times a day and they would last for 10-15 seconds. In this past week she has fallen \"at least 20\" times. During these falls she has hit her head multiple times but never lost consciousness, no bruising or blood. Fall frequency has continued to increase.    Now, she has the sensation of instability every time she stands up. If she stays still for a minute or two after standing up, the rocking sensation completely resolves. But when she starts moving again the rocking sensation returns. She says that turning her head causes the rocking sensation to worsen. To compensate she walks along the walls for support to make sure she can stabilize herself. And when she is touching the wall the sensation of rocking is still there but slightly improved (8/10 rocking sensation compared to 10/10 if unsupported). She can sit upright without feeling the rocking sensation. She can also turn her head " "side to side will lying down without feeling the rocking sensation. This has never happened to her before.    She has a few years history of heart palpitations: feels like her heart sometimes \"skips a beat.\" In the past, this was primarily associated with episodes when she has gotten very sick. But now, whenever she exerts herself she can feel palpitations and this escalation occurred also within the past month. Half the time her palpations occur with anxiety which is not unusual for her, but what is new is that she is now having palpations even when she is not anxious.    She also reports that she gets jittery, shakey, and nauseous when she tries to exercise. These episodes come on about 10-15 minutes after she starts exercising. These sensations go away after 5 minutes of rest.    She has also experienced a 30 lb weight loss since her last visit to the ED in 12/2020 when she was 220 lbs. She has dieting more and exercising more, walking, playing with grand daughters and cleaning, uses a stretch exercise. With the weightloss she feels like she has more energy and goes out more.    She has a history of repeated ED visits for nausea vomiting 2/2 hyperglycemia from not taking insulin. During these hyperglycemic episodes, she experiences, nausea, abdominal pain so she stops eating and develops weakness.    Today she reports that her mood is \"depressed\" and also endorses suicidal thoughts. Last suicide attempt was approximately 2 years ago.    Review of Systems    Consitutional: NAD, feels like health is better this year compared to last, has not slept well for years (gets 2-6 hours a night)  HEENT: denies changes in vision, double vision, blurry vision, congestion, hearing  Skin: no rashes but has many bruises from falling  Cardiovascular: denies SOB, has chest pain on left side from fall, has heart palpitations feels like heart is skipping a beat which has gotten worse in this past month  Pulmonary: denies pain, no " dyspnea at rest or with exertion, no cough  Endocrine: has changes in appetite (a few weeks of not wanting to eat or drink because not hungry), heat/cold intolerance  GI: denies constipation, abdominal pain, no diarrhea  : no changes in urination frequency or dysuria, no urge incontinence  Musculoskeletal: no changes in muscle strength, no joint tenderness or swelling  Heme/Lymph: denies easy bruishing  Psychiatric: current mood is depressed. has suicidal ideation, has plans of suicide, has history of suicide attempts from overdose, last attempt two years ago.  Neurologic: denies changes in memory, sensation, headaches    Past Medical History    I have reviewed this patient's medical history and updated it with pertinent information if needed.   Past Medical History:   Diagnosis Date     Anxiety      Depressive disorder      Gastroesophageal reflux disease      Hypertension      Type 2 diabetes mellitus (H)      Uncomplicated asthma         Past Surgical History   I have reviewed this patient's surgical history and updated it with pertinent information if needed.  Past Surgical History:   Procedure Laterality Date     BACK SURGERY      x2     CHOLECYSTECTOMY       ORTHOPEDIC SURGERY       SALPINGO OOPHORECTOMY,R/L/ELDA Right         Social History   I have reviewed this patient's social history and updated it with pertinent information if needed. Yun Coyle  reports that she has never smoked. She has never used smokeless tobacco. She reports current drug use. Frequency: 7.00 times per week. Drug: Marijuana. She reports that she does not drink alcohol.    She is sexually active and in a monogamous relationship. Uses condoms. Lives with daughter and granddaughter in an an apartment and son visits. 5-6 months since last opioid use. Smokes marijuana before bed which helps her calm her thoughts. Does not work, stays home on disability.    Family History   I have reviewed this patient's family history and updated  it with pertinent information if needed.  Family History   Problem Relation Age of Onset     Diabetes Mother      Cancer Mother      Cancer Maternal Grandmother      Diabetes Maternal Grandmother        Prior to Admission Medications   Prior to Admission Medications   Prescriptions Last Dose Informant Patient Reported? Taking?   ASPIRIN NOT PRESCRIBED (INTENTIONAL)   Yes No   Sig: Please choose reason not prescribed, below   OLANZapine (ZYPREXA) 5 MG tablet  Self No No   Si tab scheduled po at bedtime and half tab po daily prn anxiety   atorvastatin (LIPITOR) 20 MG tablet  Self No No   Sig: Take 1 tablet (20 mg) by mouth daily   blood glucose (NO BRAND SPECIFIED) lancets standard   No No   Sig: Use to test blood sugar 4 times daily or as directed.   blood glucose (NO BRAND SPECIFIED) test strip   No No   Sig: Use to test blood sugar 3 times daily or as directed.   blood glucose monitoring (NO BRAND SPECIFIED) meter device kit  Self No No   Sig: Use to test blood sugar 4 times daily or as directed.   blood glucose monitoring (NO BRAND SPECIFIED) meter device kit  Self No No   Sig: Use to test blood sugar 4 times daily or as directed.   escitalopram (LEXAPRO) 20 MG tablet  Self No No   Si tab po daily   gabapentin (NEURONTIN) 300 MG capsule  Self No No   Sig: TAKE 1 CAP BY MOUTH ONCE DAILY AS NEEDED FOR ANXIETY ; TAKE 1-2 CAPS (300MG-600M G) BY MOUTH AT BEDTIME AS NEEDED FOR INSOMNIA   Patient taking differently: Take 300 mg by mouth See Admin Instructions Has been taking 300mg in the afternoon and 600mg at bedtime scheduled   hydrOXYzine (ATARAX) 25 MG tablet  Self No No   Sig: TAKE 1-2 TABLETS BY MOUTH THREE TIMES DAILY AS NEEDED   insulin aspart (NOVOLOG PEN) 100 UNIT/ML pen   No No   Sig: Inject 1-10 Units Subcutaneous 3 times daily (before meals) Pre-Meal  - 164 give 1 unit.   Pre-Meal  - 189 give 2 units.   Pre-Meal  - 214 give 3 units.   Pre-Meal  - 239 give 4 units.   Pre-Meal   - 264 give 5 units.   Pre-Meal  - 289 give 6 units.   Pre-Meal  - 314 give 7 units.   Pre-Meal  - 339 give 8 units.   Pre-Meal  - 364 give 9 units.  Pre-Meal BG > or equal to 365 give 10 units   insulin aspart (NOVOLOG PEN) 100 UNIT/ML pen   No No   Sig: Inject 15 Units Subcutaneous 3 times daily (with meals)   insulin glargine (BASAGLAR KWIKPEN) 100 UNIT/ML pen   No No   Sig: Inject 45 Units Subcutaneous daily   insulin pen needle (30G X 8 MM) 30G X 8 MM miscellaneous   No No   Sig: Use 3 pen needles daily or as directed.   lisinopril (PRINIVIL/ZESTRIL) 30 MG tablet   No No   Sig: Take 1 tablet (30 mg) by mouth daily   metFORMIN (GLUCOPHAGE) 500 MG tablet  Self No No   Sig: Take 1 tablet (500 mg) by mouth daily (with dinner) After a week increase to twice a day with meals   Patient taking differently: Take 500 mg by mouth 2 times daily (with meals)    methadone HCl 10 MG/5ML SOLN  Self Yes No   Sig: Take 110 mg by mouth daily Dose Verified with [facility] [phone]   metoclopramide (REGLAN) 10 MG tablet   No No   Sig: Take 1 tablet (10 mg) by mouth 4 times daily as needed (nausea)   mirtazapine (REMERON) 45 MG tablet  Self No No   Si tab po qHS   ondansetron (ZOFRAN) 4 MG tablet   No No   Sig: Take 1 tablet (4 mg) by mouth every 8 hours as needed   ondansetron (ZOFRAN-ODT) 4 MG ODT tab   No No   Sig: Take 1 tablet (4 mg) by mouth every 6 hours as needed for nausea or vomiting   pantoprazole (PROTONIX) 40 MG EC tablet   No No   Sig: Take 1 tablet (40 mg) by mouth every morning (before breakfast)   ranitidine (ZANTAC) 150 MG tablet   No No   Sig: Take 1 tablet (150 mg) by mouth nightly as needed for heartburn   sucralfate (CARAFATE) 1 GM/10ML suspension   No No   Sig: Take 10 mLs (1 g) by mouth 4 times daily as needed for nausea (Abdominal pain)      Facility-Administered Medications: None     Takes omeprazole and probiotic (primal harvest). Heal and sooth for joints.  Methadone. Has stopped all other medications.    Allergies   No Known Allergies    Physical Exam   Vital Signs: Temp: 97.4  F (36.3  C) Temp src: Oral BP: (!) 160/98 Pulse: 66   Resp: 18 SpO2: 100 % O2 Device: None (Room air)    Weight: 193 lbs 1.97 oz    General: In distressed, worried, anxious, resting in bed, conversational  HEENT: NCAT   Pulm/Resp: non-labored respirations on, lungs clear to ascultation  CV: normal rate and regular rhythm  Abdomen: nondistended  Extremities: WWP; no peripheral edema, multiple bruises (knees, elbows, arms, left ribs).    Neurological examination:    Mental status: Ms. Coyle is awake, alert, and appropriate, with fluent speech, no word finding difficulties and no difficulty in answering questions. Her memory for remote events is intact. Her memory for recent events is normal. Attention and concentration are intact.No apraxia is noted.  Cranial nerves: Pupils are dilated (she her pupil size is normal for her), equal, round and reactive to light bilaterally. Visual fields are full to confrontation with no visual extinction. Extraocular movements are intact. Facial sensation is intact to light touch. Facial strength is full bilaterally. Hearing is intact to finger rub bilaterally. The tongue protrudes in the midline with intact strength. There are no tongue fasciculations noted. The soft palate elevates symmetrically. Uvula deviation to the right. Sternocledomastoid and trapezius muscle strength is full bilaterally.  Motor examination: Bulk is normal throughout. Axial and upper and lower extremity tone is normal. No pronator drift is noted. Strength is diminished on left compared to right. No fasciculations are noted. New bilateral upper extremity tremor present.  Sensory examination: Sensation is intact to proprioception as well as temperature bilaterally.  Reflexes: Reflexes are symmetric and 2+ at biceps, triceps, and brachioradialis. Patellar reflexes are 2+ on left and 1+ on  right. Achilles reflexes are not obtainable bilaterally. Plantar response is flexor bilaterally.  Coordination: Finger-nose-finger shows some dysmetria that is worse on left side. Heel-knee-shin testing was deferred because of pain.  Rapid finger tapping was appropriate (faster on right, but patient is right handed) but pronation-supination was slowed.  Gait: She has a wide-based stance. Formal gait assessment was deferred because patient nearly fell while standing up. Later did observe patient stand up by herself, brace herself along the wall and go use the bathroom. Romberg's sign is present.    Data   Data reviewed today: I reviewed all medications, new labs and imaging results over the last 24 hours.    Recent Labs   Lab 02/13/22 1920 02/13/22  1919   WBC  --  9.2   HGB  --  13.7   MCV  --  90   PLT  --  249   INR 1.05  --    NA  --  132*   POTASSIUM  --  4.2   CHLORIDE  --  100   CO2  --  29   BUN  --  10   CR  --  0.66   ANIONGAP  --  3   DA  --  9.2   GLC  --  230*   ALBUMIN  --  3.4   PROTTOTAL  --  8.3   BILITOTAL  --  0.7   ALKPHOS  --  163*   ALT  --  143*   AST  --  85*     Recent Results (from the past 24 hour(s))   CT Head w/o Contrast    Narrative    EXAM: CT HEAD W/O CONTRAST  LOCATION: [facility]  DATE/TIME: 2/13/2022 8:07 PM    INDICATION: Dizziness, non-specific  COMPARISON: None available.  TECHNIQUE: Routine CT Head without IV contrast. Multiplanar reformats. Dose reduction techniques were used.    FINDINGS:  INTRACRANIAL CONTENTS: No intracranial hemorrhage, extraaxial collection, or mass effect.  No CT evidence of acute infarct. Normal parenchymal attenuation. Normal ventricles and sulci. The sella is unremarkable for technique. The cerebellar tonsils are   appropriately positioned.    VISUALIZED ORBITS/SINUSES/MASTOIDS: No intraorbital abnormality. No paranasal sinus mucosal disease. No middle ear or mastoid effusion.    BONES/SOFT TISSUES: No  scalp hematoma. No skull fracture.      Impression    IMPRESSION:  1.  No evidence of an acute intracranial abnormality.   Ribs XR, unilat 3 views + PA chest,  left    Narrative    EXAM: XR RIBS and CHEST LT 3VW  LOCATION: [facility]  DATE/TIME: 2/13/2022 9:32 PM    INDICATION: Fall, pain  COMPARISON: None.      Impression    IMPRESSION: The visualized heart and lungs are negative. No rib fractures.   MR Brain w/o & w Contrast    Narrative    EXAM: MR BRAIN W/O and W CONTRAST, MRA BRAIN ([place] OF [place]) WO CONTRAST, MRA NECK (CAROTIDS) WO and W CONTRAST  LOCATION: [facility]  DATE/TIME: 2/13/2022 11:00 PM    INDICATION: Neuro deficit, acute, stroke suspected  COMPARISON: None.  CONTRAST: 10 mL Gadavist  TECHNIQUE:   1) Routine multiplanar multisequence head MRI without and with intravenous contrast.  2) 3D time-of-flight head MRA without intravenous contrast.  3) Neck MRA without and with IV contrast. Stenosis measurements made according to NASCET criteria unless otherwise specified.    FINDINGS:  HEAD MRI:  INTRACRANIAL CONTENTS: No acute or subacute infarct. No mass, acute hemorrhage, or extra-axial fluid collections. Normal brain parenchymal signal. Normal ventricles and sulci. Normal position of the cerebellar tonsils. No pathologic contrast enhancement.    SELLA: No abnormality accounting for technique.    OSSEOUS STRUCTURES/SOFT TISSUES: Normal marrow signal. The major intracranial vascular flow voids are maintained.     ORBITS: No abnormality accounting for technique.     SINUSES/MASTOIDS: Mild mucosal thickening scattered about the paranasal sinuses. No middle ear or mastoid effusion.     HEAD MRA:   ANTERIOR CIRCULATION: No stenosis/occlusion, aneurysm, or high flow vascular malformation. Fetal origin of the right posterior cerebral artery from the anterior circulation.    POSTERIOR CIRCULATION: No  stenosis/occlusion, aneurysm, or high flow vascular malformation. Congenitally small vertebrobasilar system is incompletely evaluated.     NECK MRA:   RIGHT CAROTID: No measurable stenosis or dissection.    LEFT CAROTID: No measurable stenosis or dissection.    VERTEBRAL ARTERIES: No focal stenosis or dissection. Dominant right and smaller left vertebral arteries.    AORTIC ARCH: Classic aortic arch anatomy with no significant stenosis at the origin of the great vessels.      Impression    IMPRESSION:  HEAD MRI:   1.  No acute findings.  2.  Unremarkable brain MRI.    HEAD MRA:   1.  No aneurysm, high flow AVM or significant stenosis identified.  2.  Variant [place] of [place] anatomy as above.    NECK MRA:  1.  No measurable stenosis or dissection.   MRA Brain ([place] of [place]) wo Contrast    Narrative    EXAM: MR BRAIN W/O and W CONTRAST, MRA BRAIN ([place] OF [place]) WO CONTRAST, MRA NECK (CAROTIDS) WO and W CONTRAST  LOCATION: [facility]  DATE/TIME: 2/13/2022 11:00 PM    INDICATION: Neuro deficit, acute, stroke suspected  COMPARISON: None.  CONTRAST: 10 mL Gadavist  TECHNIQUE:   1) Routine multiplanar multisequence head MRI without and with intravenous contrast.  2) 3D time-of-flight head MRA without intravenous contrast.  3) Neck MRA without and with IV contrast. Stenosis measurements made according to NASCET criteria unless otherwise specified.    FINDINGS:  HEAD MRI:  INTRACRANIAL CONTENTS: No acute or subacute infarct. No mass, acute hemorrhage, or extra-axial fluid collections. Normal brain parenchymal signal. Normal ventricles and sulci. Normal position of the cerebellar tonsils. No pathologic contrast enhancement.    SELLA: No abnormality accounting for technique.    OSSEOUS STRUCTURES/SOFT TISSUES: Normal marrow signal. The major intracranial vascular flow voids are maintained.     ORBITS: No abnormality accounting for technique.     SINUSES/MASTOIDS: Mild  mucosal thickening scattered about the paranasal sinuses. No middle ear or mastoid effusion.     HEAD MRA:   ANTERIOR CIRCULATION: No stenosis/occlusion, aneurysm, or high flow vascular malformation. Fetal origin of the right posterior cerebral artery from the anterior circulation.    POSTERIOR CIRCULATION: No stenosis/occlusion, aneurysm, or high flow vascular malformation. Congenitally small vertebrobasilar system is incompletely evaluated.     NECK MRA:   RIGHT CAROTID: No measurable stenosis or dissection.    LEFT CAROTID: No measurable stenosis or dissection.    VERTEBRAL ARTERIES: No focal stenosis or dissection. Dominant right and smaller left vertebral arteries.    AORTIC ARCH: Classic aortic arch anatomy with no significant stenosis at the origin of the great vessels.      Impression    IMPRESSION:  HEAD MRI:   1.  No acute findings.  2.  Unremarkable brain MRI.    HEAD MRA:   1.  No aneurysm, high flow AVM or significant stenosis identified.  2.  Variant [place] of Prabhakar anatomy as above.    NECK MRA:  1.  No measurable stenosis or dissection.   MRA Neck (Carotids) wo & w Contrast    Narrative    EXAM: MR BRAIN W/O and W CONTRAST, MRA BRAIN ([place] OF PRABHAKAR) WO CONTRAST, MRA NECK (CAROTIDS) WO and W CONTRAST  LOCATION: [facility]  DATE/TIME: 2/13/2022 11:00 PM    INDICATION: Neuro deficit, acute, stroke suspected  COMPARISON: None.  CONTRAST: 10 mL Gadavist  TECHNIQUE:   1) Routine multiplanar multisequence head MRI without and with intravenous contrast.  2) 3D time-of-flight head MRA without intravenous contrast.  3) Neck MRA without and with IV contrast. Stenosis measurements made according to NASCET criteria unless otherwise specified.    FINDINGS:  HEAD MRI:  INTRACRANIAL CONTENTS: No acute or subacute infarct. No mass, acute hemorrhage, or extra-axial fluid collections. Normal brain parenchymal signal. Normal ventricles and sulci. Normal position of the  cerebellar tonsils. No pathologic contrast enhancement.    SELLA: No abnormality accounting for technique.    OSSEOUS STRUCTURES/SOFT TISSUES: Normal marrow signal. The major intracranial vascular flow voids are maintained.     ORBITS: No abnormality accounting for technique.     SINUSES/MASTOIDS: Mild mucosal thickening scattered about the paranasal sinuses. No middle ear or mastoid effusion.     HEAD MRA:   ANTERIOR CIRCULATION: No stenosis/occlusion, aneurysm, or high flow vascular malformation. Fetal origin of the right posterior cerebral artery from the anterior circulation.    POSTERIOR CIRCULATION: No stenosis/occlusion, aneurysm, or high flow vascular malformation. Congenitally small vertebrobasilar system is incompletely evaluated.     NECK MRA:   RIGHT CAROTID: No measurable stenosis or dissection.    LEFT CAROTID: No measurable stenosis or dissection.    VERTEBRAL ARTERIES: No focal stenosis or dissection. Dominant right and smaller left vertebral arteries.    AORTIC ARCH: Classic aortic arch anatomy with no significant stenosis at the origin of the great vessels.      Impression    IMPRESSION:  HEAD MRI:   1.  No acute findings.  2.  Unremarkable brain MRI.    HEAD MRA:   1.  No aneurysm, high flow AVM or significant stenosis identified.  2.  Variant [place] of Prabhakar anatomy as above.    NECK MRA:  1.  No measurable stenosis or dissection.

## 2022-02-14 NOTE — PROGRESS NOTES
Admitted/transferred from: [facility] ED at 0600.  2 RN full   skin assessment completed by Lacie Vieira RN and Joanna Lopez RN.  Skin assessment finding: issues found Brusing to left knee, left elbow, scattered bruising to BUE, scratches to RUE, PIV to right hand.   Interventions/actions: skin interventions standard Tito precautions     Will continue to monitor.

## 2022-02-14 NOTE — ED NOTES
"[facility]   ED Nurse to Floor Handoff     Yun Coyle is a 46 year old female who speaks English and lives with family members,  in a home  They arrived in the ED by public transportation from home    ED Chief Complaint: Dizziness (Increasing dizziness over one month with frequent falls, \"I am not able to catch myself anymore,\" fell 2 days ago, hit side of bathtub, ongoing left rib pain)    ED Dx;   Final diagnoses:   Vertigo   Fall, initial encounter         Needed?: No    Allergies: No Known Allergies.  Past Medical Hx:   Past Medical History:   Diagnosis Date     Anxiety      Depressive disorder      Gastroesophageal reflux disease      Hypertension      Type 2 diabetes mellitus (H)      Uncomplicated asthma       Baseline Mental status: WDL  Current Mental Status changes: at basesline    Infection present or suspected this encounter: no  Sepsis suspected: No  Isolation type: No active isolations  Patient tested for COVID 19 prior to admission: YES     Activity level - Baseline/Home:  Independent  Activity Level - Current:   Stand with Assist    Bariatric equipment needed?: No    In the ED these meds were given:   Medications   0.9% sodium chloride BOLUS (0 mLs Intravenous Stopped 2/13/22 2211)     Followed by   sodium chloride 0.9% infusion (has no administration in time range)   ketorolac (TORADOL) injection 30 mg (30 mg Intramuscular Given 2/13/22 2120)   gadobutrol (GADAVIST) injection 10 mL (10 mLs Intravenous Given 2/13/22 2320)   HYDROmorphone (DILAUDID) injection 0.5 mg (0.5 mg Intravenous Given 2/14/22 0155)       Drips running?  No    Home pump  No    Current LDAs  Peripheral IV 02/13/22 Right Wrist (Active)   Site Assessment WDL 02/13/22 1918   Number of days: 1       Labs results:   Labs Ordered and Resulted from Time of ED Arrival to Time of ED Departure   COMPREHENSIVE METABOLIC PANEL - Abnormal       Result Value    Sodium 132 (*)     " Potassium 4.2      Chloride 100      Carbon Dioxide (CO2) 29      Anion Gap 3      Urea Nitrogen 10      Creatinine 0.66      Calcium 9.2      Glucose 230 (*)     Alkaline Phosphatase 163 (*)     AST 85 (*)      (*)     Protein Total 8.3      Albumin 3.4      Bilirubin Total 0.7      GFR Estimate >90     ROUTINE UA WITH MICROSCOPIC REFLEX TO CULTURE - Abnormal    Color Urine Yellow      Appearance Urine Clear      Glucose Urine >=1000 (*)     Bilirubin Urine Negative      Ketones Urine Negative      Specific Gravity Urine 1.032      Blood Urine Negative      pH Urine 7.0      Protein Albumin Urine Negative      Urobilinogen Urine Normal      Nitrite Urine Negative      Leukocyte Esterase Urine Negative      Mucus Urine Present (*)     RBC Urine 1      WBC Urine 2      Squamous Epithelials Urine 31 (*)    DRUG ABUSE SCREEN 1 URINE (ED) - Abnormal    Amphetamines Urine Screen Negative      Barbiturates Urine Screen Negative      Benzodiazepines Urine Screen Negative      Cannabinoids Urine Screen Positive (*)     Cocaine Urine Screen Negative      Opiates Urine Screen Negative     INR - Normal    INR 1.05     ETHYL ALCOHOL LEVEL - Normal    Alcohol ethyl <0.01     CBC WITH PLATELETS AND DIFFERENTIAL    WBC Count 9.2      RBC Count 4.53      Hemoglobin 13.7      Hematocrit 40.6      MCV 90      MCH 30.2      MCHC 33.7      RDW 13.8      Platelet Count 249      % Neutrophils 51      % Lymphocytes 41      % Monocytes 6      % Eosinophils 2      % Basophils 0      % Immature Granulocytes 0      NRBCs per 100 WBC 0      Absolute Neutrophils 4.6      Absolute Lymphocytes 3.8      Absolute Monocytes 0.6      Absolute Eosinophils 0.2      Absolute Basophils 0.0      Absolute Immature Granulocytes 0.0      Absolute NRBCs 0.0     COVID-19 VIRUS (CORONAVIRUS) BY PCR       Imaging Studies:   Recent Results (from the past 24 hour(s))   CT Head w/o Contrast    Narrative    EXAM: CT HEAD W/O CONTRAST  LOCATION: [facility]  [facility]  DATE/TIME: 2/13/2022 8:07 PM    INDICATION: Dizziness, non-specific  COMPARISON: None available.  TECHNIQUE: Routine CT Head without IV contrast. Multiplanar reformats. Dose reduction techniques were used.    FINDINGS:  INTRACRANIAL CONTENTS: No intracranial hemorrhage, extraaxial collection, or mass effect.  No CT evidence of acute infarct. Normal parenchymal attenuation. Normal ventricles and sulci. The sella is unremarkable for technique. The cerebellar tonsils are   appropriately positioned.    VISUALIZED ORBITS/SINUSES/MASTOIDS: No intraorbital abnormality. No paranasal sinus mucosal disease. No middle ear or mastoid effusion.    BONES/SOFT TISSUES: No scalp hematoma. No skull fracture.      Impression    IMPRESSION:  1.  No evidence of an acute intracranial abnormality.   Ribs XR, unilat 3 views + PA chest,  left    Narrative    EXAM: XR RIBS and CHEST LT 3VW  LOCATION: [facility]  DATE/TIME: 2/13/2022 9:32 PM    INDICATION: Fall, pain  COMPARISON: None.      Impression    IMPRESSION: The visualized heart and lungs are negative. No rib fractures.   MR Brain w/o & w Contrast    Narrative    EXAM: MR BRAIN W/O and W CONTRAST, MRA BRAIN ([place] OF [place]) WO CONTRAST, MRA NECK (CAROTIDS) WO and W CONTRAST  LOCATION: [facility]  DATE/TIME: 2/13/2022 11:00 PM    INDICATION: Neuro deficit, acute, stroke suspected  COMPARISON: None.  CONTRAST: 10 mL Gadavist  TECHNIQUE:   1) Routine multiplanar multisequence head MRI without and with intravenous contrast.  2) 3D time-of-flight head MRA without intravenous contrast.  3) Neck MRA without and with IV contrast. Stenosis measurements made according to NASCET criteria unless otherwise specified.    FINDINGS:  HEAD MRI:  INTRACRANIAL CONTENTS: No acute or subacute infarct. No mass, acute hemorrhage, or extra-axial fluid collections.  Normal brain parenchymal signal. Normal ventricles and sulci. Normal position of the cerebellar tonsils. No pathologic contrast enhancement.    SELLA: No abnormality accounting for technique.    OSSEOUS STRUCTURES/SOFT TISSUES: Normal marrow signal. The major intracranial vascular flow voids are maintained.     ORBITS: No abnormality accounting for technique.     SINUSES/MASTOIDS: Mild mucosal thickening scattered about the paranasal sinuses. No middle ear or mastoid effusion.     HEAD MRA:   ANTERIOR CIRCULATION: No stenosis/occlusion, aneurysm, or high flow vascular malformation. Fetal origin of the right posterior cerebral artery from the anterior circulation.    POSTERIOR CIRCULATION: No stenosis/occlusion, aneurysm, or high flow vascular malformation. Congenitally small vertebrobasilar system is incompletely evaluated.     NECK MRA:   RIGHT CAROTID: No measurable stenosis or dissection.    LEFT CAROTID: No measurable stenosis or dissection.    VERTEBRAL ARTERIES: No focal stenosis or dissection. Dominant right and smaller left vertebral arteries.    AORTIC ARCH: Classic aortic arch anatomy with no significant stenosis at the origin of the great vessels.      Impression    IMPRESSION:  HEAD MRI:   1.  No acute findings.  2.  Unremarkable brain MRI.    HEAD MRA:   1.  No aneurysm, high flow AVM or significant stenosis identified.  2.  Variant [place] of Prabhakar anatomy as above.    NECK MRA:  1.  No measurable stenosis or dissection.   MRA Brain ([place] of Prabhakar) wo Contrast    Narrative    EXAM: MR BRAIN W/O and W CONTRAST, MRA BRAIN ([place] OF PRABHAKAR) WO CONTRAST, MRA NECK (CAROTIDS) WO and W CONTRAST  LOCATION: [facility]  DATE/TIME: 2/13/2022 11:00 PM    INDICATION: Neuro deficit, acute, stroke suspected  COMPARISON: None.  CONTRAST: 10 mL Gadavist  TECHNIQUE:   1) Routine multiplanar multisequence head MRI without and with intravenous contrast.  2) 3D  time-of-flight head MRA without intravenous contrast.  3) Neck MRA without and with IV contrast. Stenosis measurements made according to NASCET criteria unless otherwise specified.    FINDINGS:  HEAD MRI:  INTRACRANIAL CONTENTS: No acute or subacute infarct. No mass, acute hemorrhage, or extra-axial fluid collections. Normal brain parenchymal signal. Normal ventricles and sulci. Normal position of the cerebellar tonsils. No pathologic contrast enhancement.    SELLA: No abnormality accounting for technique.    OSSEOUS STRUCTURES/SOFT TISSUES: Normal marrow signal. The major intracranial vascular flow voids are maintained.     ORBITS: No abnormality accounting for technique.     SINUSES/MASTOIDS: Mild mucosal thickening scattered about the paranasal sinuses. No middle ear or mastoid effusion.     HEAD MRA:   ANTERIOR CIRCULATION: No stenosis/occlusion, aneurysm, or high flow vascular malformation. Fetal origin of the right posterior cerebral artery from the anterior circulation.    POSTERIOR CIRCULATION: No stenosis/occlusion, aneurysm, or high flow vascular malformation. Congenitally small vertebrobasilar system is incompletely evaluated.     NECK MRA:   RIGHT CAROTID: No measurable stenosis or dissection.    LEFT CAROTID: No measurable stenosis or dissection.    VERTEBRAL ARTERIES: No focal stenosis or dissection. Dominant right and smaller left vertebral arteries.    AORTIC ARCH: Classic aortic arch anatomy with no significant stenosis at the origin of the great vessels.      Impression    IMPRESSION:  HEAD MRI:   1.  No acute findings.  2.  Unremarkable brain MRI.    HEAD MRA:   1.  No aneurysm, high flow AVM or significant stenosis identified.  2.  Variant [place] of Prabhakar anatomy as above.    NECK MRA:  1.  No measurable stenosis or dissection.   MRA Neck (Carotids) wo & w Contrast    Narrative    EXAM: MR BRAIN W/O and W CONTRAST, MRA BRAIN ([place] OF PRABHAKAR) WO CONTRAST, MRA NECK (CAROTIDS) WO and W  CONTRAST  LOCATION: [facility]  DATE/TIME: 2/13/2022 11:00 PM    INDICATION: Neuro deficit, acute, stroke suspected  COMPARISON: None.  CONTRAST: 10 mL Gadavist  TECHNIQUE:   1) Routine multiplanar multisequence head MRI without and with intravenous contrast.  2) 3D time-of-flight head MRA without intravenous contrast.  3) Neck MRA without and with IV contrast. Stenosis measurements made according to NASCET criteria unless otherwise specified.    FINDINGS:  HEAD MRI:  INTRACRANIAL CONTENTS: No acute or subacute infarct. No mass, acute hemorrhage, or extra-axial fluid collections. Normal brain parenchymal signal. Normal ventricles and sulci. Normal position of the cerebellar tonsils. No pathologic contrast enhancement.    SELLA: No abnormality accounting for technique.    OSSEOUS STRUCTURES/SOFT TISSUES: Normal marrow signal. The major intracranial vascular flow voids are maintained.     ORBITS: No abnormality accounting for technique.     SINUSES/MASTOIDS: Mild mucosal thickening scattered about the paranasal sinuses. No middle ear or mastoid effusion.     HEAD MRA:   ANTERIOR CIRCULATION: No stenosis/occlusion, aneurysm, or high flow vascular malformation. Fetal origin of the right posterior cerebral artery from the anterior circulation.    POSTERIOR CIRCULATION: No stenosis/occlusion, aneurysm, or high flow vascular malformation. Congenitally small vertebrobasilar system is incompletely evaluated.     NECK MRA:   RIGHT CAROTID: No measurable stenosis or dissection.    LEFT CAROTID: No measurable stenosis or dissection.    VERTEBRAL ARTERIES: No focal stenosis or dissection. Dominant right and smaller left vertebral arteries.    AORTIC ARCH: Classic aortic arch anatomy with no significant stenosis at the origin of the great vessels.      Impression    IMPRESSION:  HEAD MRI:   1.  No acute findings.  2.  Unremarkable brain MRI.    HEAD MRA:   1.  No aneurysm, high flow  AVM or significant stenosis identified.  2.  Variant [place] of Prabhakar anatomy as above.    NECK MRA:  1.  No measurable stenosis or dissection.       Recent vital signs:   BP (!) 169/96   Pulse 67   Temp 98.3  F (36.8  C) (Oral)   Resp 20   SpO2 100%             Cardiac Rhythm: Normal Sinus  Pt needs tele? No  Skin/wound Issues: None (bruising)    Code Status: Full Code    Pain control: good    Nausea control: pt had none    Abnormal labs/tests/findings requiring intervention: see results    Family present during ED course? No   Family Comments/Social Situation comments:     Tasks needing completion: None    Carlota Montes, RN  [phone] [place] [facility]  [phone] [facility]

## 2022-02-14 NOTE — PHARMACY-PHARMACOTHERAPY NOTE
Methadone Clinic Information Note    Clinic Name: [facility]   Clinic Location ([facility]): [place]  Phone Number: [phone]  Prescriber's Name: Dr. Haven Jett    Receiving Methadone 130mg orally daily  Was given doses to be administered 2/11-2/16 when last at the clinic

## 2022-02-15 ENCOUNTER — APPOINTMENT (OUTPATIENT)
Dept: PHYSICAL THERAPY | Facility: CLINIC | Age: 47
DRG: 092 | End: 2022-02-15
Payer: COMMERCIAL

## 2022-02-15 LAB
ANION GAP SERPL CALCULATED.3IONS-SCNC: 5 MMOL/L (ref 3–14)
BUN SERPL-MCNC: 17 MG/DL (ref 7–30)
CALCIUM SERPL-MCNC: 9 MG/DL (ref 8.5–10.1)
CHLORIDE BLD-SCNC: 100 MMOL/L (ref 94–109)
CO2 SERPL-SCNC: 26 MMOL/L (ref 20–32)
CREAT SERPL-MCNC: 0.72 MG/DL (ref 0.52–1.04)
GFR SERPL CREATININE-BSD FRML MDRD: >90 ML/MIN/1.73M2
GLUCOSE BLD-MCNC: 282 MG/DL (ref 70–99)
GLUCOSE BLDC GLUCOMTR-MCNC: 128 MG/DL (ref 70–99)
GLUCOSE BLDC GLUCOMTR-MCNC: 154 MG/DL (ref 70–99)
GLUCOSE BLDC GLUCOMTR-MCNC: 172 MG/DL (ref 70–99)
GLUCOSE BLDC GLUCOMTR-MCNC: 179 MG/DL (ref 70–99)
GLUCOSE BLDC GLUCOMTR-MCNC: 282 MG/DL (ref 70–99)
POTASSIUM BLD-SCNC: 4.3 MMOL/L (ref 3.4–5.3)
SODIUM SERPL-SCNC: 131 MMOL/L (ref 133–144)
T PALLIDUM AB SER QL: NONREACTIVE
TSH SERPL DL<=0.005 MIU/L-ACNC: 0.7 MU/L (ref 0.4–4)
VIT B12 SERPL-MCNC: 1080 PG/ML (ref 193–986)

## 2022-02-15 PROCEDURE — 36415 COLL VENOUS BLD VENIPUNCTURE: CPT

## 2022-02-15 PROCEDURE — 99221 1ST HOSP IP/OBS SF/LOW 40: CPT | Performed by: PSYCHIATRY & NEUROLOGY

## 2022-02-15 PROCEDURE — 250N000011 HC RX IP 250 OP 636

## 2022-02-15 PROCEDURE — 97161 PT EVAL LOW COMPLEX 20 MIN: CPT | Mod: GP

## 2022-02-15 PROCEDURE — 250N000013 HC RX MED GY IP 250 OP 250 PS 637

## 2022-02-15 PROCEDURE — 97116 GAIT TRAINING THERAPY: CPT | Mod: GP

## 2022-02-15 PROCEDURE — G0378 HOSPITAL OBSERVATION PER HR: HCPCS

## 2022-02-15 PROCEDURE — 250N000013 HC RX MED GY IP 250 OP 250 PS 637: Performed by: STUDENT IN AN ORGANIZED HEALTH CARE EDUCATION/TRAINING PROGRAM

## 2022-02-15 PROCEDURE — 84207 ASSAY OF VITAMIN B-6: CPT | Performed by: STUDENT IN AN ORGANIZED HEALTH CARE EDUCATION/TRAINING PROGRAM

## 2022-02-15 PROCEDURE — 250N000013 HC RX MED GY IP 250 OP 250 PS 637: Performed by: HOSPITALIST

## 2022-02-15 PROCEDURE — 250N000011 HC RX IP 250 OP 636: Performed by: STUDENT IN AN ORGANIZED HEALTH CARE EDUCATION/TRAINING PROGRAM

## 2022-02-15 PROCEDURE — G0008 ADMIN INFLUENZA VIRUS VAC: HCPCS | Performed by: STUDENT IN AN ORGANIZED HEALTH CARE EDUCATION/TRAINING PROGRAM

## 2022-02-15 PROCEDURE — 99207 PR CDG-CODE CATEGORY CHANGED: CPT | Performed by: INTERNAL MEDICINE

## 2022-02-15 PROCEDURE — 82962 GLUCOSE BLOOD TEST: CPT

## 2022-02-15 PROCEDURE — 80048 BASIC METABOLIC PNL TOTAL CA: CPT

## 2022-02-15 PROCEDURE — 99226 PR SUBSEQUENT OBSERVATION CARE,LEVEL III: CPT | Mod: GC | Performed by: INTERNAL MEDICINE

## 2022-02-15 PROCEDURE — 90686 IIV4 VACC NO PRSV 0.5 ML IM: CPT | Performed by: STUDENT IN AN ORGANIZED HEALTH CARE EDUCATION/TRAINING PROGRAM

## 2022-02-15 PROCEDURE — 999N000111 HC STATISTIC OT IP EVAL DEFER

## 2022-02-15 PROCEDURE — 120N000002 HC R&B MED SURG/OB UMMC

## 2022-02-15 PROCEDURE — 97530 THERAPEUTIC ACTIVITIES: CPT | Mod: GP

## 2022-02-15 RX ORDER — HYDROXYZINE HYDROCHLORIDE 25 MG/1
25 TABLET, FILM COATED ORAL EVERY 6 HOURS PRN
Status: DISCONTINUED | OUTPATIENT
Start: 2022-02-15 | End: 2022-02-18 | Stop reason: HOSPADM

## 2022-02-15 RX ORDER — PRAZOSIN HYDROCHLORIDE 1 MG/1
1 CAPSULE ORAL AT BEDTIME
Status: DISCONTINUED | OUTPATIENT
Start: 2022-02-15 | End: 2022-02-16

## 2022-02-15 RX ORDER — LISINOPRIL 5 MG/1
5 TABLET ORAL DAILY
Status: DISCONTINUED | OUTPATIENT
Start: 2022-02-15 | End: 2022-02-18 | Stop reason: HOSPADM

## 2022-02-15 RX ORDER — MIRTAZAPINE 15 MG/1
15 TABLET, FILM COATED ORAL AT BEDTIME
Status: DISCONTINUED | OUTPATIENT
Start: 2022-02-15 | End: 2022-02-18 | Stop reason: HOSPADM

## 2022-02-15 RX ORDER — HYDROXYZINE HYDROCHLORIDE 25 MG/1
50 TABLET, FILM COATED ORAL EVERY 6 HOURS PRN
Status: DISCONTINUED | OUTPATIENT
Start: 2022-02-15 | End: 2022-02-18 | Stop reason: HOSPADM

## 2022-02-15 RX ADMIN — METFORMIN HYDROCHLORIDE 500 MG: 500 TABLET, FILM COATED ORAL at 17:28

## 2022-02-15 RX ADMIN — LIDOCAINE 2 PATCH: 246 PATCH TOPICAL at 09:32

## 2022-02-15 RX ADMIN — INFLUENZA A VIRUS A/VICTORIA/2570/2019 IVR-215 (H1N1) ANTIGEN (FORMALDEHYDE INACTIVATED), INFLUENZA A VIRUS A/TASMANIA/503/2020 IVR-221 (H3N2) ANTIGEN (FORMALDEHYDE INACTIVATED), INFLUENZA B VIRUS B/PHUKET/3073/2013 ANTIGEN (FORMALDEHYDE INACTIVATED), AND INFLUENZA B VIRUS B/WASHINGTON/02/2019 ANTIGEN (FORMALDEHYDE INACTIVATED) 0.5 ML: 15; 15; 15; 15 INJECTION, SUSPENSION INTRAMUSCULAR at 12:47

## 2022-02-15 RX ADMIN — METHADONE HYDROCHLORIDE 130 MG: 10 CONCENTRATE ORAL at 09:31

## 2022-02-15 RX ADMIN — INSULIN ASPART 1 UNITS: 100 INJECTION, SOLUTION INTRAVENOUS; SUBCUTANEOUS at 12:32

## 2022-02-15 RX ADMIN — ONDANSETRON 4 MG: 4 TABLET, ORALLY DISINTEGRATING ORAL at 05:30

## 2022-02-15 RX ADMIN — PRAZOSIN HYDROCHLORIDE 1 MG: 1 CAPSULE ORAL at 22:07

## 2022-02-15 RX ADMIN — HYDROXYZINE HYDROCHLORIDE 50 MG: 25 TABLET ORAL at 22:07

## 2022-02-15 RX ADMIN — INSULIN ASPART 1 UNITS: 100 INJECTION, SOLUTION INTRAVENOUS; SUBCUTANEOUS at 16:55

## 2022-02-15 RX ADMIN — ACETAMINOPHEN 975 MG: 325 TABLET, FILM COATED ORAL at 05:30

## 2022-02-15 RX ADMIN — INSULIN ASPART 2 UNITS: 100 INJECTION, SOLUTION INTRAVENOUS; SUBCUTANEOUS at 09:35

## 2022-02-15 RX ADMIN — METFORMIN HYDROCHLORIDE 500 MG: 500 TABLET, FILM COATED ORAL at 12:29

## 2022-02-15 RX ADMIN — IBUPROFEN 800 MG: 800 TABLET, FILM COATED ORAL at 22:07

## 2022-02-15 RX ADMIN — PANTOPRAZOLE SODIUM 40 MG: 40 TABLET, DELAYED RELEASE ORAL at 09:31

## 2022-02-15 RX ADMIN — MIRTAZAPINE 15 MG: 15 TABLET, FILM COATED ORAL at 22:07

## 2022-02-15 RX ADMIN — LISINOPRIL 5 MG: 5 TABLET ORAL at 14:15

## 2022-02-15 NOTE — PLAN OF CARE
VSS, except elevated BP, not orthostatic (last ortho BPs @1645). A&Ox4. Voids spontaneously, had BM x1. Pain managed with prn tylenol and ibuprofen, and lidocaine patch. Tolerating regular diet. Up with SBA with walker. PIV x2 in R forearm, saline locked. Plan to get a CT today. Continue plan of care.     @1710 Transferred to .

## 2022-02-15 NOTE — PLAN OF CARE
[facility]      OUTPATIENT PHYSICAL THERAPY EVALUATION  PLAN OF TREATMENT FOR OUTPATIENT REHABILITATION  (COMPLETE FOR INITIAL CLAIMS ONLY)  Patient's Last Name, First Name, M.I.  YOB: 1975  Yun Coyle                        Provider's Name  [facility] Medical Record No.  [id number]                               Onset Date:  02/13/22   Start of Care Date:  02/13/22      Type:     _X_PT   ___OT   ___SLP Medical Diagnosis:  impaired functional mobility                        PT Diagnosis:  impaired functiona mobility, vertigo   Visits from SOC:  1   _________________________________________________________________________________  Plan of Treatment/Functional Goals    Planned Interventions: bed mobility training,balance training,gait training,ROM (range of motion),stair training,strengthening,stretching,transfer training     Goals: See Physical Therapy Goals on Care Plan in [employer] electronic health record.    Therapy Frequency: 5x/week  Predicted Duration of Therapy Intervention: 2 weeks  _________________________________________________________________________________    I CERTIFY THE NEED FOR THESE SERVICES FURNISHED UNDER        THIS PLAN OF TREATMENT AND WHILE UNDER MY CARE     (Physician co-signature of this document indicates review and certification of the therapy plan).              Certification date from: 02/15/22, Certification date to: 02/25/22    Referring Physician: Brandon Gutierrez MD            Initial Assessment        See Physical Therapy evaluation dated 02/13/22 in Epic electronic health record.

## 2022-02-15 NOTE — CONSULTS
"          Initial Psychiatric Consult   Consult date: February 15, 2022         Reason for Consult, requesting source:    Depression and anxiety, PTSD   Requesting source: Edmundo Hylton    This note is being entered to supplement the psychiatry consultation note that was completed on February 15, 2022 by the licensed mental health professional [facility]. They have reviewed with me the pertinent clinical details related to their encounter. I am being consulted to offer additional guidance on psychiatric pharmacological interventions.         HPI:   From H and P:  \"Yun Coyle is a 46 year old female with a history of insulin dependent type 2 diabetes, hypertension, complicated psychiatric profile (multi substance use disorder [benzodiazepenes, marijuana, opioids on maintenance methadone], PTSD, MDD, SANDIE, cyclic vomiting syndrome) who presented with dizziness and falls (over 20) in the past month, she was found to have positive Romberg, listing to left on ambulation, elevated liver enzymes with bland CT, MR, MRA.\"    She has a long history of intermittent depression for which she has taken a variety of medications (see note from pharmacy) and reports in the past she has done well on Remeron along with prazosin for PTSD related nightmares and was using hydroxyzine as needed for anxiety with good effect.  She is also use trazodone during hospitalization and was quite helpful.  Recently she has had a depressed mood, loss of interest usual activities, poor appetite and sleep disturbances.  She does have nightmares almost every night related to an event when she saw someone try to kill himself.  There was a lot of blood involved and she has nightmares about this.  Also some flashbacks.  She denies being hopeless or suicidal.  She also has a long history of anxiety associated with agoraphobia.  She rarely uses alcohol but does use marijuana on a daily basis to help calm her racing thoughts.  Has been " on methadone for many years.  Was hospitalized in 2019 under the care of Dr. Morelos following an overdose of Xanax (which she obtained off the street).  She has also used Ambien for sleep.             Physical ROS:   The 10 point Review of Systems is negative other than noted in the HPI or here.           Medications:     Current Facility-Administered Medications   Medication     acetaminophen (TYLENOL) tablet 975 mg     COVID-19 mRNA Vac-Yusef (PFIZER) injection 30 mcg     glucose gel 15-30 g    Or     dextrose 50 % injection 25-50 mL    Or     glucagon injection 1 mg     diphenhydrAMINE (BENADRYL) capsule 50 mg    Or     diphenhydrAMINE (BENADRYL) injection 50 mg     EPINEPHrine (ADRENALIN) kit 0.3 mg     ibuprofen (ADVIL/MOTRIN) tablet 800 mg     influenza quadrivalent (PF) vacc (FLUZONE) injection 0.5 mL     insulin aspart (NovoLOG) injection (RAPID ACTING)     insulin aspart (NovoLOG) injection (RAPID ACTING)     labetalol (NORMODYNE/TRANDATE) injection 5 mg     Lidocaine (LIDOCARE) 4 % Patch 2 patch     lidocaine patch in PLACE     metFORMIN (GLUCOPHAGE) tablet 500 mg     methadone (DOLOPHINE-INTENSOL) 10 MG/ML (HIGH CONC) solution 130 mg     naloxone (NARCAN) injection 0.2 mg    Or     naloxone (NARCAN) injection 0.4 mg    Or     naloxone (NARCAN) injection 0.2 mg    Or     naloxone (NARCAN) injection 0.4 mg     ondansetron (ZOFRAN-ODT) ODT tab 4 mg     pantoprazole (PROTONIX) EC tablet 40 mg            Physical and Psychiatric Examination:     BP (!) 150/103 (BP Location: Right arm)   Pulse 87   Temp 97.4  F (36.3  C) (Oral)   Resp 16   Wt 91.9 kg (202 lb 9.6 oz)   SpO2 99%   BMI 28.42 kg/m    Weight is 202 lbs 9.64 oz  Body mass index is 28.42 kg/m .    Physical Exam:  I have reviewed the physical exam as documented by by the medical team and agree with findings and assessment and have no additional findings to add at this time.    Mental Status Exam:  Appearance: awake, alert  Attitude:   "cooperative  Eye Contact:  good  Mood:  depressed  Affect:  mood congruent  Speech:  clear, coherent  Psychomotor Behavior:  no evidence of tardive dyskinesia, dystonia, or tics  Throught Process:  logical  Associations:  no loose associations  Thought Content:  no evidence of suicidal ideation or homicidal ideation  Insight:  fair  Judgement:  fair  Oriented to:  time, person, and place  Attention Span and Concentration:  fair  Recent and Remote Memory:  fair  Language: able to name/identify objects without impairment  Fund of Knowledge: intact with awareness of current and past events           DSM-5 Diagnosis:   296.32 (F33.1) Major Depressive Disorder, Recurrent Episode, Moderate _  300.22 (F40.00) Agoraphobia  300.02 (F41.1) Generalized Anxiety Disorder  309.81 (F43.10) Posttraumatic Stress Disorder (includes Posttraumatic Stress Disorder for Children 6 Years and Younger)  Without dissociative symptoms   Opioid use disorder, on maintenance   Cannabis use d/o           Assessment:   In the past she has done well on Remeron to address her depression anxiety and sleep difficulties.  Was also using prazosin for PTSD related nightmares, and hydroxyzine was helpful for anxiety, agoraphobia symptoms.  She is medicating her sleep difficulties with cannabis.   Elevated LFTs of uncertain etiology. On methadone, but QTc is ok.           Summary of Recommendations:   I will start Remeron 15 mg at bedtime  Prazosin 1 mg at bedtime increasing to 2 mg as needed in a day or 2.  She may need a higher dose.  Hydroxyzine 25 to 50 mg every 6 hours as needed for anxiety.  Would avoid Ambien in her situation (she has used it before).  She will need mental health follow-up which [facility] will be setting up for her.  Also she will need a Primary care provider to help her manage her diabetes. A1C > 10.       \"This dictation was performed with voice recognition software and may contain errors,  omissions and inadvertent word " "substitution.\"             "

## 2022-02-15 NOTE — CONSULTS
"          Initial Psychiatric Consult   Consult date: February 15, 2022         Reason for Consult, requesting source:    Depression and anxiety, PTSD   Requesting source: Edmundo Hylton    This note is being entered to supplement the psychiatry consultation note that was completed on February 15, 2022 by the licensed mental health professional [facility]. They have reviewed with me the pertinent clinical details related to their encounter. I am being consulted to offer additional guidance on psychiatric pharmacological interventions.         HPI:   From H and P:  \"Yun Coyle is a 46 year old female with a history of insulin dependent type 2 diabetes, hypertension, complicated psychiatric profile (multi substance use disorder [benzodiazepenes, marijuana, opioids on maintenance methadone], PTSD, MDD, SANDIE, cyclic vomiting syndrome) who presented with dizziness and falls (over 20) in the past month, she was found to have positive Romberg, listing to left on ambulation, elevated liver enzymes with bland CT, MR, MRA.\"    She has a long history of intermittent depression for which she has taken a variety of medications (see note from pharmacy) and reports in the past she has done well on Remeron along with prazosin for PTSD related nightmares and was using hydroxyzine as needed for anxiety with good effect.  She is also use trazodone during hospitalization and was quite helpful.  Recently she has had a depressed mood, loss of interest usual activities, poor appetite and sleep disturbances.  She does have nightmares almost every night related to an event when she saw someone try to kill himself.  There was a lot of blood involved and she has nightmares about this.  Also some flashbacks.  She denies being hopeless or being activly suicidal, but would not feel safe outside the hospital. She has a long history of anxiety associated with agoraphobia.  She rarely uses alcohol but does use marijuana on a " daily basis to help calm her racing thoughts.  Has been on methadone for many years.  Was hospitalized in 2019 under the care of Dr. Morelos following an overdose of Xanax (which she obtained off the street).  She has also used Ambien for sleep.           Physical ROS:   The 10 point Review of Systems is negative other than noted in the HPI or here.           Medications:     Current Facility-Administered Medications   Medication     acetaminophen (TYLENOL) tablet 975 mg     COVID-19 mRNA Vac-Yusef (PFIZER) injection 30 mcg     glucose gel 15-30 g    Or     dextrose 50 % injection 25-50 mL    Or     glucagon injection 1 mg     diphenhydrAMINE (BENADRYL) capsule 50 mg    Or     diphenhydrAMINE (BENADRYL) injection 50 mg     EPINEPHrine (ADRENALIN) kit 0.3 mg     ibuprofen (ADVIL/MOTRIN) tablet 800 mg     influenza quadrivalent (PF) vacc (FLUZONE) injection 0.5 mL     insulin aspart (NovoLOG) injection (RAPID ACTING)     insulin aspart (NovoLOG) injection (RAPID ACTING)     labetalol (NORMODYNE/TRANDATE) injection 5 mg     Lidocaine (LIDOCARE) 4 % Patch 2 patch     lidocaine patch in PLACE     metFORMIN (GLUCOPHAGE) tablet 500 mg     methadone (DOLOPHINE-INTENSOL) 10 MG/ML (HIGH CONC) solution 130 mg     naloxone (NARCAN) injection 0.2 mg    Or     naloxone (NARCAN) injection 0.4 mg    Or     naloxone (NARCAN) injection 0.2 mg    Or     naloxone (NARCAN) injection 0.4 mg     ondansetron (ZOFRAN-ODT) ODT tab 4 mg     pantoprazole (PROTONIX) EC tablet 40 mg            Physical and Psychiatric Examination:     BP (!) 150/103 (BP Location: Right arm)   Pulse 87   Temp 97.4  F (36.3  C) (Oral)   Resp 16   Wt 91.9 kg (202 lb 9.6 oz)   SpO2 99%   BMI 28.42 kg/m    Weight is 202 lbs 9.64 oz  Body mass index is 28.42 kg/m .    Physical Exam:  I have reviewed the physical exam as documented by by the medical team and agree with findings and assessment and have no additional findings to add at this time.    Mental Status  Exam:  Appearance: awake, alert  Attitude:  cooperative  Eye Contact:  good  Mood:  depressed  Affect:  mood congruent  Speech:  clear, coherent  Psychomotor Behavior:  no evidence of tardive dyskinesia, dystonia, or tics  Throught Process:  logical  Associations:  no loose associations  Thought Content:  no evidence of suicidal ideation or homicidal ideation  Insight:  fair  Judgement:  fair  Oriented to:  time, person, and place  Attention Span and Concentration:  fair  Recent and Remote Memory:  fair  Language: able to name/identify objects without impairment  Fund of Knowledge: intact with awareness of current and past events           DSM-5 Diagnosis:   296.32 (F33.1) Major Depressive Disorder, Recurrent Episode, Moderate _  300.22 (F40.00) Agoraphobia  300.02 (F41.1) Generalized Anxiety Disorder  309.81 (F43.10) Posttraumatic Stress Disorder (includes Posttraumatic Stress Disorder for Children 6 Years and Younger)  Without dissociative symptoms   Opioid use disorder, on maintenance   Cannabis use d/o           Assessment:   In the past she has done well on Remeron to address her depression anxiety and sleep difficulties.  Was also using prazosin for PTSD related nightmares, and hydroxyzine was helpful for anxiety, agoraphobia symptoms.  She is medicating her sleep difficulties with cannabis; discussed.   Elevated LFTs of uncertain etiology. On methadone, but QTc is ok.   She does not need a sitter, but doesn't feel safe leaving the hospital at this time; hopefully after starting RXs she will be more confident about leaving. She does not quite meet criteria for IP psych admission, and I think her SI should clear by tomorrow (especially if she has psych follow up in place).           Summary of Recommendations:   Start Remeron 15 mg at bedtime  Prazosin 1 mg at bedtime increasing to 2 mg as needed in a day or 2.  She may need a higher dose.  Hydroxyzine 25 to 50 mg every 6 hours as needed for anxiety.  Would  "avoid Ambien in her situation (she has used it before).  She will need mental health follow-up which [facility]ust[facility] will be setting up for her.  Also she will need a Primary care provider to help her manage her diabetes. A1C > 10  Page me or re-consult psychiatry as needed.     Abraham Smith M.D.   [facility]   Contact information available via [facility] Paging/Directory.  If I am not available, then [facility] intake ([phone]) should know who   Is on call  .       \"This dictation was performed with voice recognition software and may contain errors,  omissions and inadvertent word substitution.\"             "

## 2022-02-15 NOTE — PROGRESS NOTES
Brief neurology note    Ms. Coyle is a 46 year old female with h/o poorly controlled DM2, HTN, polysubstance use disorder (benzo, cannabis, opiates), PTSD, MDD, SANDEI who presents with multiple falls and progressive positional dizziness, worse with standing.  Exam is unremarkable for focal neurologic finding. HINT exam negative for central etiology.  On exam was found to have hesitancy in walking due to fear of falling, but no actual deficit on gait.  Had brain MRI done with no acute intracranial abnormality that can potentially explain her symptoms.  Given the low quality MRA imaging, CTA was requested, which did not reveal any vascular abnormality either. Most likely her ataxia and gait instability is multifactorial secondary to polysubstance use, peripheral neuropathy, orthostatic hypotension and deconditioning.    -Vestibular PT  -Neurology will sign off    Patient was discussed with attending Dr. Velez.    Arnulfo Harrington MD  Neurology Resident

## 2022-02-15 NOTE — PROGRESS NOTES
"5B PT Eval    PT consulted for dizziness/vestibular eval. Patient reports ~1 month history of dizziness that is assocaited with falls. Patient notes that her dizziness feels like \"stepping in a hole\" and is less of a world spinning sensation. All vestibular testing within normal limits and unable to reproduce patient's symptoms at time of eval. Patient does present with noted static balance deficits that are present in absence of dizziness and also with significant L knee valgus positioning from old tib/fib fracture (noted weakness into L knee extension). At this time, strongly suspect that vertigo is largely psychogenic in nature and that ensuing falls stem from L knee buckling and baseline balance impairment. Discussed with patient who is largely in agreement with conclusion.     Blaise Cohen, PT, DPT  Pager #[phone]       02/15/22 1400   Quick Adds   Quick Adds Vestibular Eval;Certification   Type of Visit Initial PT Evaluation       Present no   Living Environment   People in home child(patti), adult;grandchild(patti)   Current Living Arrangements apartment   Home Accessibility stairs within home   Number of Stairs, Within Home, Primary greater than 10 stairs  (flight)   Stair Railings, Within Home, Primary railings safe and in good condition;railings on both sides of stairs   Living Environment Comments Lives with daughter and granddaughter in apt building. Patient states that her son recently moved in to help out.    Self-Care   Usual Activity Tolerance good   Current Activity Tolerance fair   Regular Exercise No   Equipment Currently Used at Home none   Activity/Exercise/Self-Care Comment Patient IND at baseline but reports that she has pain in L knee from surgery following tib/fib fracture. Patient endorses ~20 falls in past month since onset of dizziness.    Disability/Function   Hearing Difficulty or Deaf no   Wear Glasses or Blind yes   Vision Management glasses   Concentrating, " "Remembering or Making Decisions Difficulty no   Difficulty Communicating no   Difficulty Eating/Swallowing no   Walking or Climbing Stairs Difficulty no   Mobility Management IND baseline   Dressing/Bathing Difficulty no   Toileting issues no   Doing Errands Independently Difficulty (such as shopping) yes   Errands Management family assists   Fall history within last six months yes   Number of times patient has fallen within last six months 20  (approximately 20-30)   Change in Functional Status Since Onset of Current Illness/Injury yes   General Information   Onset of Illness/Injury or Date of Surgery 02/13/22   Referring Physician Brandon Gutierrez MD   Patient/Family Therapy Goals Statement (PT) To improve dizziness   Pertinent History of Current Problem (include personal factors and/or comorbidities that impact the POC) Per EMR \"46 year old female h/o poorly controlled DM2, HTN, polysubstance use disorder (benzo, cannabis, opiates), PTSD, MDD, SANDIE who presents with multiple falls and dizziness x1 month.\"   Existing Precautions/Restrictions fall   Cognition   Affect/Mental Status (Cognition) WFL;anxious   Follows Commands (Cognition) WFL   Cognitive Status Comments patient endorses anxiety and PTSD   Range of Motion (ROM)   ROM Quick Adds ROM WFL   Strength   Manual Muscle Testing Quick Adds Strength WFL   Strength Comments decreased L knee extension strength 2/2 pain    Bed Mobility   Bed Mobility sit-supine;supine-sit   Supine-Sit Cannon (Bed Mobility) independent   Sit-Supine Cannon (Bed Mobility) independent   Transfers   Transfers sit-stand transfer   Sit-Stand Transfer   Sit-Stand Cannon (Transfers) independent   Gait/Stairs (Locomotion)   Cannon Level (Gait) contact guard   Comment (Gait/Stairs) increased postural sway, L lateral lean in L stance, poor L knee stability   Balance   Balance Comments A/P postural sway in wide base stance, posterior LOB with static stance with eyes " closed   Sensory Examination   Sensory Perception WFL   Sensory Perception Comments SILT   Coordination   Coordination Comments LE grossly intact but with slow anternating tapping in UE and slightly tremulous finger/target   Oculomotor Exam   Smooth Pursuit Normal   Saccades Normal   VOR Normal   Rapid Head Thrust Normal   Convergence Testing Normal   Clinical Impression   Criteria for Skilled Therapeutic Intervention yes, treatment indicated   PT Diagnosis (PT) impaired functiona mobility, vertigo   Influenced by the following impairments impaired functional mobility    Functional limitations due to impairments gait, stairs   Clinical Presentation Stable/Uncomplicated   Clinical Presentation Rationale clinical judgement   Clinical Decision Making (Complexity) low complexity   Therapy Frequency (PT) 5x/week   Predicted Duration of Therapy Intervention (days/wks) 2 weeks   Planned Therapy Interventions (PT) bed mobility training;balance training;gait training;ROM (range of motion);stair training;strengthening;stretching;transfer training   Anticipated Equipment Needs at Discharge (PT) cane, straight   Risk & Benefits of therapy have been explained evaluation/treatment results reviewed;care plan/treatment goals reviewed;risks/benefits reviewed;participants voiced agreement with care plan;current/potential barriers reviewed;participants included;patient   PT Discharge Planning    PT Discharge Recommendation (DC Rec) home with outpatient physical therapy   PT Rationale for DC Rec Patient with impaired mobility but appropriate for return home from PT standpoint but will require SPC for safe ambulation. Patient will benefit from follow-up at OP PT for L knee pain and balance deficits.    Therapy Certification   Start of care date 02/13/22   Certification date from 02/15/22   Certification date to 02/25/22   Medical Diagnosis impaired functional mobility   Total Evaluation Time   Total Evaluation Time (Minutes) 15

## 2022-02-15 NOTE — PROGRESS NOTES
[facility]    Progress Note - Medicine Service, MAROON TEAM 3       Date of Admission:  2/13/2022    Assessment & Plan          Yun Coyle is a 46 year old female with a history of insulin dependent type 2 diabetes, hypertension, complicated psychiatric profile (multi substance use disorder [benzodiazepenes, marijuana, opioids on maintenance methadone], PTSD, MDD, SANDIE, cyclic vomiting syndrome) who presented with dizziness and falls (over 20) in the past month, she was found to have positive Romberg, listing to left on ambulation, elevated liver enzymes with bland CT, MR, MRA. Differential includes orthostatic hypertension, nutritional deficiency, polysubstance use, peripheral sensory neuropathy secondary to uncontrolled diabetes, post infectious cerebellar ataxia, paroxysmal bradycardia, autoimmune encephalitis, neurosyphilis.    Changes Today  - OT eval pending  - PT recommends discharging home with outpatient therapy  - TSH wnl  - start metformin 500mg 2x daily  - discussed with psych their recs   - start Remeron for mood, appetite   - start Prazosin for nightmares   - start Hydroxyzine for anxiety   - observe one more night  - start lisinopril 5 mg  - discharge tomorrow pending resolution of SI and PT/OT eval    #Presyncope, Dizziness, and Falls  Increasing feelings of lightheadedness as well as sensation of instability starting a month ago. Acute worsening over the past week which led to over 20 falls. Acute life-threatening conditions have been ruled out (stroke, cerebellar lesion, vertebral dissection, carotid stenosis, aortic stenosis, cardiogenic syncope). Most likely etiologies include vestibular dysfunction, peripheral neuropathy, polysubstance use, nutritional deficiencies, deconditioning, anxiety could be contributing as well.  -neuro consult placed              - CTA head, neck w/o abnormalities               - f/u copper, Vit B6. VitB12 slightly  "elevated, not pathologic   - vestibular testing and EMG as outpatient  - orthostatics (-)  - CTA (-) for dissection or congenital narrowing  -telemetry workup for presyncope, falls  -PT/OT consults placed  - Syphilis (-)  - HIV (-)     #Depression  #Suicidal Thoughts  #Insomnia  #PTSD, SANDIE  She says that her mental health is \"not so good.\" Has a history of suicidal attempts. She feels depressed. Psych reports that patient does not feel safe leaving the hospital at this time.  - psych consult placed, appreciate recs  - start Remeron 15 mg at bedtime  - start Prazosin 1 mg at bedtime increasing to 2 mg as needed in a day or 2.  She may need a higher dose.  - start Hydroxyzine 25 to 50 mg every 6 hours as needed for anxiety.  - discussed with psych whether transfer might be appropriate: recommend monitoring effectiveness of hydroxyzine, Remeron, and Prazosin for one night before discharging     #Rib pain 2/2 fall  Xray of ribcage on admit revealed no fracture of rib. Rib pain attributed to falling and hitting bathtub.  - PRN acetaminophen   - PRN  Ibuprofen  - lidocaine path Q24 hours  - avoid opioids     #T2DM  Hx of T2DM, not currently taking any medications. a1c 10.2% on admission  - assist with PCP follow-up at discharge  - hypoglycemia protocol  - low sliding scale insulin   - start metformin 500mg 2x daily     #HTN  Pt has hx of HTN. Has not seen a PCP in two years and has not taken medications in two years either.  - CTM  - will discuss follow-up with PCP or establishing care with PCP prior to discharge  - labetolol for SBP>180  - start lisinopril 5 mg     #Hyponatremia  Na 132 on admit. Will monitor for now and complete workup if worsens  - trend sodium     #Exertional shakiness, nausea, and jitteriness  Patient reports that she has shakiness, nausea, and feel jittery when she exerts herself either through exercise or even carrying her groceries up her stairs. This sensation goes away when she rests for 5 " min.  - Not actively having these symptoms in hospital today.   - CTM  - PRN zofran for nausea      #Suspected NAFLD  #Chronically Elevated Liver Enzymes  CT Abdomen Pelvis w Contrast from 12/2030 ED note found fatty infiltration of liver and also noted pt has chronic LFT elevations with aminotransferases in the 200s.   - CTM LFT     #GERD  - Protonix 40 mg daily     #Opioid use disorder on maintence methadone  She said that the last time she used opioids was about 5-6 months ago. Tested negative for opioids on drug screen which is inconsistent with recent methadone use  - pharmacy consult for methadone  - cont methadone 130 mg daily     #RHM  Pt amenable to receiving covid  Vaccine  - Pfizer vaccine ordered   - flu vaccine ordered      Diet: Regular Diet Adult    DVT Prophylaxis: Low Risk/Ambulatory with no VTE prophylaxis indicated  Blanc Catheter: Not present  Fluids: None  Central Lines: None  Cardiac Monitoring: ACTIVE order. Indication: Syncope- low cardiac risk (24 hours)  Code Status: Full Code      Disposition Plan   Expected Discharge: 02/16/2022     Anticipated discharge location:  Awaiting care coordination huddle  Delays:         The patient's care was discussed with the Attending Physician, Dr. Edmundo Hylton.    Anderson Roque, MS3    Resident/Fellow Attestation   I, Alison Manrique, was present with the medical/CASSANDRA student who participated in the service and in the documentation of the note.  I have verified the history and personally performed the physical exam and medical decision making.  I agree with the assessment and plan of care as documented in the note.      Shared decision making represented above    Alison Manrique MD  PGY1  Date of Service (when I saw the patient): 02/15/22    Anderson Roque  Medical Student  Medicine Service, [facility] TEAM 3  [facility]  Securely message with the Vocera Web Console (learn more here)  Text page via [employer]  Paging/Directory   Please see signed in provider for up to date coverage information      Clinically Significant Risk Factors Present on Admission               # Platelet Defect: home medication list includes an antiplatelet medication   # Diabetes, type II: last A1C 10.2 % (Ref range: 0.0 - 5.6 %)      ______________________________________________________________________    Interval History   NAEO.    This AM, pt says that she feels okay. Has not fallen since hsopitalization but feels unsteady when walking to bathroom    Data reviewed today: I reviewed all medications, new labs and imaging results over the last 24 hours.    Physical Exam   Vital Signs: Temp: 98.3  F (36.8  C) Temp src: Oral BP: (!) 152/92 Pulse: 58   Resp: 16 SpO2: 98 % O2 Device: None (Room air)    Weight: 202 lbs 9.64 oz    General: Depressed, worried, resting in bed, conversational  HEENT: NCAT   Pulm/Resp: non-labored respirations on, lungs clear to ascultation  CV: normal rate and regular rhythm  Abdomen: nondistended  Extremities: WWP; no peripheral edema,   Skin: multiple bruises (knees, elbows, arms, left ribs).  Neuro: follows commands      Data   Recent Labs   Lab 02/15/22  0527 02/14/22 2235 02/14/22  1903 02/13/22  1920 02/13/22  1919   WBC  --   --   --   --  9.2   HGB  --   --   --   --  13.7   MCV  --   --   --   --  90   PLT  --   --   --   --  249   INR  --   --   --  1.05  --    NA  --   --   --   --  132*   POTASSIUM  --   --   --   --  4.2   CHLORIDE  --   --   --   --  100   CO2  --   --   --   --  29   BUN  --   --   --   --  10   CR  --   --   --   --  0.66   ANIONGAP  --   --   --   --  3   DA  --   --   --   --  9.2   * 180* 205*  --  230*   ALBUMIN  --   --   --   --  3.4   PROTTOTAL  --   --   --   --  8.3   BILITOTAL  --   --   --   --  0.7   ALKPHOS  --   --   --   --  163*   ALT  --   --   --   --  143*   AST  --   --   --   --  85*     Recent Results (from the past 24 hour(s))   Echo Complete   Result Value     LVEF  55-60%    [facility]    [id number]  [id number]  [id number]  [id number]^BRIEN^ARCHANA     [facility],[place]  Echocardiography Laboratory  [address] [place] [id number]     Name: MELINDA PAYNE  MRN: [id number]  : 1975  Study Date: 2022 10:35 AM  Age: 46 yrs  Gender: Female  Patient Location: [facility] [place]  Reason For Study: Dizziness  Ordering Physician: ARCHANA TESFAYE  Performed By: Anastasiia Carty RDCS     BSA: 2.1 m2  Height: 70 in  Weight: 193 lb  ______________________________________________________________________________  Procedure  Complete Portable Echo Adult.  ______________________________________________________________________________  Interpretation Summary  Global and regional left ventricular function is normal with an EF of 55-60%.  Right ventricular function, chamber size, wall motion, and thickness are  normal.  Pulmonary artery systolic pressure cannot be assessed.  The inferior vena cava is normal.  No pericardial effusion is present.  There is no prior study for direct comparison.  ______________________________________________________________________________  Left Ventricle  Global and regional left ventricular function is normal with an EF of 55-60%.  Left ventricular wall thickness is normal. Left ventricular size is normal.  Left ventricular diastolic function is normal. No regional wall motion  abnormalities are seen.     Right Ventricle  Right ventricular function, chamber size, wall motion, and thickness are  normal.     Atria  Both atria appear normal. The atrial septum is intact as assessed by color  Doppler .     Mitral Valve  The mitral valve is normal. Trace mitral insufficiency is present.     Aortic Valve  The valve leaflets are not well visualized. On Doppler interrogation, there is  no significant stenosis or regurgitation.     Tricuspid Valve  The tricuspid valve is normal. Trace tricuspid insufficiency is  present.  Pulmonary artery systolic pressure cannot be assessed.     Pulmonic Valve  The pulmonic valve is normal.     Vessels  The thoracic aorta is normal. The pulmonary artery and bifurcation cannot be  assessed. The inferior vena cava is normal.     Pericardium  No pericardial effusion is present.     Compared to Previous Study  There is no prior study for direct comparison.  ______________________________________________________________________________  Report approved by: Belia Lo 02/14/2022 11:36 AM     ______________________________________________________________________________      CT Head w/o Contrast    Narrative    Exam: CT HEAD W/O CONTRAST  2/14/2022 6:20 PM    History: Dizziness, persistent/recurrent, cardiac or vascular cause  suspected.    Comparison:  MRI/MRA and CT from previous day.    Technique: Using multidetector thin collimation helical acquisition  technique, axial, coronal and sagittal CT images from the skull base  to the vertex were obtained without intravenous contrast.     Findings:    No definite acute intracranial hemorrhage, mass effect, or midline  shift. There are multiple punctate hyperattenuating cortical sulci are  favored to represent artifact of flash new, for example in the left  sylvian fissure (series 3 image 14) and along the right frontal  operculum (series 3 image 18). The ventricles are proportionate to the  cerebral sulci. The gray white differentiation of the cerebral  hemispheres is intact. The basal cisterns are patent.    The paranasal sinuses and the mastoid air cells are clear. The orbits  are unremarkable.      Impression    Impression: No acute intracranial pathology.    I have personally reviewed the examination and initial interpretation  and I agree with the findings.    JACOB TREJO MD         SYSTEM ID:  [id number]   CTA Head Neck with Contrast    Narrative    CTA  HEAD NECK WITH CONTRAST 2/14/2022 6:20 PM    CT angiogram of the neck   CT  angiogram of the base of the brain with contrast  Reconstruction by the Radiologist on the 3D workstation    Provided History:  Dizziness, persistent/recurrent, cardiac or  vascular cause suspected    Comparison: CT, MRI, and MRA from previous day.      Technique:  HEAD and NECK CTA: During rapid bolus intravenous injection of  nonionic contrast material, axial images were obtained using thin  collimation multidetector helical technique from the base of the upper  aortic arch through the [place] of [place]. This CT angiogram data was  reconstructed at thin intervals with mild overlap. Images were sent to  the [employer] workstation, and 3D reconstructions were obtained. The  axial source images, multiplanar reformations, 3D reconstructions in  both maximum intensity projection display and volume rendered models  were reviewed, with reconstructions performed by the technologist and  the radiologist.    Contrast: iopamidol (ISOVUE-370) solution 75 mL.    Findings:    Head CTA demonstrates no aneurysm or stenosis of the major  intracranial arteries. The anterior communicating artery is patent.  Diffusely decreased caliber of left vertebral artery with hypoplastic  V4 segment which appears to terminate in the left posterior inferior  cerebellar artery. Fetal origin of the right posterior communicating  artery.     Neck CTA demonstrates no stenosis of the major cervical arteries. The  origins of the great vessels from the aortic arch are patent. The  normal distal right internal carotid artery measures 5 mm. The normal  distal left internal carotid artery measures 5 mm.     No mass within the visualized portions of the cervical soft tissues or  lung apices. Numerous fractured teeth with periapical abscess of the  lateral left maxillary incisor. Periapical dental disease of a right  mandibular premolar.      Impression    Impression:   1. Head CTA demonstrates no aneurysm or stenosis of the major  intracranial arteries.   2.  Neck CTA demonstrates no stenosis of the major cervical arteries.  3. Periapical abscess of the lateral left maxillary incisor, with  other extensive dental caries.    I have personally reviewed the examination and initial interpretation  and I agree with the findings.    JACOB TREJO MD         SYSTEM ID:  [id number]

## 2022-02-15 NOTE — PLAN OF CARE
BP (!) 142/94 (BP Location: Right arm)   Pulse 67   Temp 98.7  F (37.1  C) (Oral)   Resp 16   Wt 91.9 kg (202 lb 9.6 oz)   SpO2 98%   BMI 28.42 kg/m      Care from: [id number]      VS & Pain: VSS ex hard BP. Patient endorsing pain but did not request any PRNs and stated that lidocaine patch was helping.    Neuro: A&Ox4.    Respiratory: Vitally stable on RA.    Cardiac: Tachycardic when ambulating.    GI/: No BM this shift. Voiding spontaneously and appropriately.    Nutrition: Regular diet. Poor appetite and intake.    Skin: No new skin concerns this shift.    Lines: R piv SL. L piv SL.    Activity: Stand by assist. Remind to call before getting up.    Events this shift: Call light within reach and able to make needs known. Flu shot administered without complication. Original plan was to discharge today but was recommended by psych to stay an additional night as patient has recently been experiencing a depressed mood and stated that she wound not feel safe outside the hospital.    Plan: Continue to follow plan of care and prepare for potential discharge tomorrow.

## 2022-02-15 NOTE — PLAN OF CARE
Reason for Admission: Vertigo    Status: Improving    RN assumed cares at 1715    Vitals: WDL with some baseline HTN  Pain: Pain in the left flank and back.   Neuro: WDL  Cardiac: WDL  Respiratory: WDL, pain with deep inhalation.   GI/: WDL  IV/Drains: PIV x2 saline locked.   Activity: Up with stand by assist 2/2 falls.   Skin: Diffuse bruising throughout, otherwise unremarkable.   Labs: WDL    Plan of Care:  RN assumed cares at 1715, Pt alert and oriented, VS stable this evening with some baseline HTN.  Pt reports pain in the left flank and back, relieved some with PRN Ibuprofen.  Pt had CT completed this evening.  Set up on telemetry per orders. Up with stand by assist for safety 2/2 falls at home.  PIVs saline locked.  Plan to discharge to prior living once vertigo resolved and pain well managed.  No acute incidents this shift.  Continue to monitor and notify MD of any changes.

## 2022-02-15 NOTE — PLAN OF CARE
OT/5B: Occupational Therapy: Orders received. Chart reviewed and discussed with care team (physical therapist) and patient.? Occupational Therapy not indicated due to SBA-Modified Independent with ADLs. Reports her adult children will be staying with her to assist with higher level ADL/IADL tasks (cooking, household chores, etc.) Patient denies ADL related concerns at this time. PT following to address balance deficits with recommendation of cane and OP PT. Patient reports agreement with this plan. Anticipate higher level ADL performance will improve with ongoing PT for balance (tolerance for community ADL, etc.).? Defer discharge recommendations to PT.? Will complete orders. Patient in agreement with this plan. Please re-consult if new concerns arise.

## 2022-02-15 NOTE — PLAN OF CARE
Neuro: A&Ox4. Calm and cooperative most of the shift.   Cardiac: Tele ST when ambulating   Respiratory: RA   GI/: DM Appetite Ok  Activity: Up independently.

## 2022-02-15 NOTE — CONSULTS
"Triage and Transition - Consult and Liaison     Yun Coyle  February 15, 2022    Session start: 10:30 am  Session end: 10:48 am  Session duration in minutes: 18 minutes  CPT utilized: 30863 - Brief diagnostic assessment (modifier 52)  Patient was seen virtually ([employer] cart or other teleconferencing device).    Reason for consult: Psychiatry consult was requested due to suicidal ideation and sleeping difficulty. Patient was seen by [facility] Consult & Liaison team.     Identifying information: Yun is 46 year old White  female   followed related to frequent falls. Patient is not  and lives alone in a home in [place]. Patient is not employed nor is she in school.     Presenting problem (including symptoms, strengths risks, resources used):  In individual therapeutic contact with patient today, patient presents with broad affect, euthymic mood.Patient reports feeling depressed and anxious. Patient reports this has been an issue for sometime. Patient states when COVID started, she quit going out and stopped all meds and doctors appointments. Patient reports appetite is not good, doesn't eat much at all. Reports continously losing weight. She isolates often. Anxious about leaving the home. Sleep is poor. She wakes up every 1-2 hours, can't go back to sleep. Best sleep she gets is 5-6 hours. She reports being on ambien and mirtazpine and it was helpful. Patient reports daily suicidal ideation, not all day long, comes and oes. She states if she keeps herself busy and mind occupied, she doesn't think about it. She reports if mind is not busy, she starts feeling worthless and hopeless. She reports she does think about acting on it \"a couple times a month\". She reports thoughts of overdose. She states she has history of overdose attempt about 1.5 years ago. Took 25 xanax, woke up next day and didn't get any help.Patient reports periods of marissa, day or two at a time, excessive spending, happens a couple times " a month. She reports she doesn't need sleep during those times.     Patient reports anxiety if staying at home, only when leaving the house. She has panic attacks if she does leave. She reports needing to find bathroom or quiet room to calm down. At baseline if at home, no anxiety. She reports relying on her adult children to get her groceries and other needs.     Patient reports trauma symptoms. She reports she witnessed her nephew try to kill himself and his brother around 2018. She states she often thinks about how much blood there was. Patient reports nightmares.  She states she is woken up by nightmares often. She also reports she has impending sense of doom.     Patient reports using marijuana daily, stating she uses at night to calm thoughts and sleep. She reports no vomiting, due to omprezole and probiotics. She goes to Methadone clinic once a week. She reports alcohol use 1-2 times a year. Patient receives methadone through [facility] in [place], 150mg orally daily. Not taking any psychiatric medications. Previously on escitalopram, gabapentin, hydroxyzine, mirtazapine, olanzapine.    Mental Status Exam   Affect: Appropriate  Appearance: Disheveled   Attention Span/Concentration: Attentive    Eye Contact: Engaged  Fund of Knowledge: Appropriate   Language /Speech Content: Fluent  Language /Speech Volume: Normal   Language /Speech Rate/Productions: Normal   Recent Memory: Intact  Remote Memory: Intact  Mood: Normal   Orientation:   Person: Yes   Place: Yes  Time of Day: Yes   Date: Yes   Situation (Do they understand why they are here?): Yes   Psychomotor Behavior: Normal   Thought Content: Clear  Thought Form: Intact    Current medications: Not currently taking any psych medications, is on Methadone.     Current Facility-Administered Medications   Medication     acetaminophen (TYLENOL) tablet 975 mg     COVID-19 mRNA Vac-Yusef (PFIZER) injection 30 mcg     glucose gel 15-30 g    Or     dextrose 50  % injection 25-50 mL    Or     glucagon injection 1 mg     diphenhydrAMINE (BENADRYL) capsule 50 mg    Or     diphenhydrAMINE (BENADRYL) injection 50 mg     EPINEPHrine (ADRENALIN) kit 0.3 mg     ibuprofen (ADVIL/MOTRIN) tablet 800 mg     influenza quadrivalent (PF) vacc (FLUZONE) injection 0.5 mL     insulin aspart (NovoLOG) injection (RAPID ACTING)     insulin aspart (NovoLOG) injection (RAPID ACTING)     labetalol (NORMODYNE/TRANDATE) injection 5 mg     Lidocaine (LIDOCARE) 4 % Patch 2 patch     lidocaine patch in PLACE     methadone (DOLOPHINE-INTENSOL) 10 MG/ML (HIGH CONC) solution 130 mg     naloxone (NARCAN) injection 0.2 mg    Or     naloxone (NARCAN) injection 0.4 mg    Or     naloxone (NARCAN) injection 0.2 mg    Or     naloxone (NARCAN) injection 0.4 mg     ondansetron (ZOFRAN-ODT) ODT tab 4 mg     pantoprazole (PROTONIX) EC tablet 40 mg     Relevant history: History of psychiatric hospitalizations, last admission was February 2019, 2 weeks for suicidal ideation at [place]. History of cannabinoid hyperemesis syndrome. History of polysubstance abuse, (benzodiazepenes, marijuana, opioids on maintenance methadone). History of medications- escitalopram, gabapentin, hydroxyzine, mirtazapine, olanzapine, and ambien.    Cultural Influences: None    Therapeutic intervention and progress:  Therapeutic intervention consisted of building therapeutic rapport, active listening and validation.     Collateral information:   Reviewed chart and coordinated with psychiatry to prescribe meds.     Diagnosis:   296.89 Bipolar II Disorder Depressed, rule out;   309.81 (F43.10) Posttraumatic Stress Disorder (includes Posttraumatic Stress Disorder for Children 6 Years and Younger)  Without dissociative symptoms, by history;   300.22 (F40.00) Agoraphobia, rule out;     Plan:     Sitter not required, at this time patient states she wishes to transfer to mental health unit upon medical clearance. I spoke with patient about  re-assessing within the next day or two to determine disposition.     Psychiatry to comment on medications.     Patient will not continue to be followed by this service. Please re-consult as needed.    Carito Agustin, [facility]  Triage and Transition - Consult and Liaison   [phone]

## 2022-02-15 NOTE — PLAN OF CARE
Problem: Adult Inpatient Plan of Care  Goal: Plan of Care Review  Outcome: No Change  Flowsheets (Taken 2/15/2022 0415)  Plan of Care Reviewed With: patient  Goal: Patient-Specific Goal (Individualized)  Outcome: No Change  Goal: Absence of Hospital-Acquired Illness or Injury  Outcome: No Change  Intervention: Identify and Manage Fall Risk  Recent Flowsheet Documentation  Taken 2/15/2022 0400 by Matt Jolly RN  Safety Promotion/Fall Prevention: activity supervised  Taken 2/14/2022 2000 by Matt Jolly RN  Safety Promotion/Fall Prevention: activity supervised  Goal: Optimal Comfort and Wellbeing  Outcome: No Change  Goal: Readiness for Transition of Care  Outcome: No Change     Problem: Pain Acute  Goal: Acceptable Pain Control and Functional Ability  Outcome: No Change     Problem: Fall Injury Risk  Goal: Absence of Fall and Fall-Related Injury  Outcome: No Change  Intervention: Promote Injury-Free Environment  Recent Flowsheet Documentation  Taken 2/15/2022 0400 by Matt Jolly RN  Safety Promotion/Fall Prevention: activity supervised  Taken 2/14/2022 2000 by Matt Jolly RN  Safety Promotion/Fall Prevention: activity supervised

## 2022-02-16 ENCOUNTER — TELEPHONE (OUTPATIENT)
Dept: BEHAVIORAL HEALTH | Facility: CLINIC | Age: 47
End: 2022-02-16
Payer: COMMERCIAL

## 2022-02-16 PROBLEM — F32.A DEPRESSION WITH SUICIDAL IDEATION: Status: ACTIVE | Noted: 2022-02-16

## 2022-02-16 PROBLEM — R45.851 DEPRESSION WITH SUICIDAL IDEATION: Status: ACTIVE | Noted: 2022-02-16

## 2022-02-16 LAB
ANION GAP SERPL CALCULATED.3IONS-SCNC: 5 MMOL/L (ref 3–14)
BUN SERPL-MCNC: 18 MG/DL (ref 7–30)
CALCIUM SERPL-MCNC: 9 MG/DL (ref 8.5–10.1)
CHLORIDE BLD-SCNC: 103 MMOL/L (ref 94–109)
CO2 SERPL-SCNC: 28 MMOL/L (ref 20–32)
CREAT SERPL-MCNC: 0.64 MG/DL (ref 0.52–1.04)
GFR SERPL CREATININE-BSD FRML MDRD: >90 ML/MIN/1.73M2
GLUCOSE BLD-MCNC: 111 MG/DL (ref 70–99)
GLUCOSE BLDC GLUCOMTR-MCNC: 104 MG/DL (ref 70–99)
GLUCOSE BLDC GLUCOMTR-MCNC: 122 MG/DL (ref 70–99)
GLUCOSE BLDC GLUCOMTR-MCNC: 141 MG/DL (ref 70–99)
GLUCOSE BLDC GLUCOMTR-MCNC: 155 MG/DL (ref 70–99)
HOLD SPECIMEN: NORMAL
POTASSIUM BLD-SCNC: 3.8 MMOL/L (ref 3.4–5.3)
SODIUM SERPL-SCNC: 136 MMOL/L (ref 133–144)

## 2022-02-16 PROCEDURE — 250N000013 HC RX MED GY IP 250 OP 250 PS 637

## 2022-02-16 PROCEDURE — G0378 HOSPITAL OBSERVATION PER HR: HCPCS

## 2022-02-16 PROCEDURE — 80048 BASIC METABOLIC PNL TOTAL CA: CPT

## 2022-02-16 PROCEDURE — 250N000013 HC RX MED GY IP 250 OP 250 PS 637: Performed by: STUDENT IN AN ORGANIZED HEALTH CARE EDUCATION/TRAINING PROGRAM

## 2022-02-16 PROCEDURE — 99232 SBSQ HOSP IP/OBS MODERATE 35: CPT | Mod: GC | Performed by: INTERNAL MEDICINE

## 2022-02-16 PROCEDURE — 84425 ASSAY OF VITAMIN B-1: CPT

## 2022-02-16 PROCEDURE — 250N000013 HC RX MED GY IP 250 OP 250 PS 637: Performed by: HOSPITALIST

## 2022-02-16 PROCEDURE — 120N000002 HC R&B MED SURG/OB UMMC

## 2022-02-16 PROCEDURE — 82962 GLUCOSE BLOOD TEST: CPT

## 2022-02-16 PROCEDURE — 36415 COLL VENOUS BLD VENIPUNCTURE: CPT

## 2022-02-16 RX ORDER — PRAZOSIN HYDROCHLORIDE 1 MG/1
1 CAPSULE ORAL AT BEDTIME
Status: COMPLETED | OUTPATIENT
Start: 2022-02-16 | End: 2022-02-16

## 2022-02-16 RX ORDER — PRAZOSIN HYDROCHLORIDE 2 MG/1
2 CAPSULE ORAL AT BEDTIME
Status: DISCONTINUED | OUTPATIENT
Start: 2022-02-17 | End: 2022-02-18 | Stop reason: HOSPADM

## 2022-02-16 RX ADMIN — METHADONE HYDROCHLORIDE 130 MG: 10 CONCENTRATE ORAL at 09:14

## 2022-02-16 RX ADMIN — LISINOPRIL 5 MG: 5 TABLET ORAL at 09:13

## 2022-02-16 RX ADMIN — METFORMIN HYDROCHLORIDE 500 MG: 500 TABLET, FILM COATED ORAL at 17:33

## 2022-02-16 RX ADMIN — PANTOPRAZOLE SODIUM 40 MG: 40 TABLET, DELAYED RELEASE ORAL at 09:13

## 2022-02-16 RX ADMIN — PRAZOSIN HYDROCHLORIDE 1 MG: 1 CAPSULE ORAL at 22:02

## 2022-02-16 RX ADMIN — HYDROXYZINE HYDROCHLORIDE 50 MG: 25 TABLET ORAL at 22:02

## 2022-02-16 RX ADMIN — IBUPROFEN 800 MG: 800 TABLET, FILM COATED ORAL at 09:13

## 2022-02-16 RX ADMIN — HYDROXYZINE HYDROCHLORIDE 50 MG: 25 TABLET ORAL at 09:13

## 2022-02-16 RX ADMIN — LIDOCAINE 2 PATCH: 246 PATCH TOPICAL at 09:13

## 2022-02-16 RX ADMIN — INSULIN ASPART 1 UNITS: 100 INJECTION, SOLUTION INTRAVENOUS; SUBCUTANEOUS at 13:12

## 2022-02-16 RX ADMIN — MIRTAZAPINE 15 MG: 15 TABLET, FILM COATED ORAL at 22:03

## 2022-02-16 RX ADMIN — METFORMIN HYDROCHLORIDE 500 MG: 500 TABLET, FILM COATED ORAL at 09:13

## 2022-02-16 RX ADMIN — IBUPROFEN 800 MG: 800 TABLET, FILM COATED ORAL at 22:03

## 2022-02-16 ASSESSMENT — ACTIVITIES OF DAILY LIVING (ADL)
ADLS_ACUITY_SCORE: 9

## 2022-02-16 NOTE — DOWNTIME EVENT NOTE
The EMR was down for 2 hours and 40 min  on 2/16/2022.    Matt Jolly  was responsible for completing the paper charting during this time period.     The following information was re-entered into the system by Matt Jolly RN: N/A    The following information will remain in the paper chart: N/A    Matt Jolly RN  2/16/2022

## 2022-02-16 NOTE — CONSULTS
"        Psychiatry Consultation; Follow up              Reason for Consult, requesting source:    Ongoing SI. She was initially seen yesterday, and is seen in collaboration with Carito Agustin [facility] again.    Requesting source: Edmundo Hylton    Labs and imaging reviewed,   This interview was conducted by telephone. Permission from the patient to conduct the exam by phone was obtained prior to proceeding.  She was also informed that insurance will be billed for this contact. Length of consultation was 15 min.                Interim history:    She continues to feel suicidal although she does feel safe in the hospital without a bedside attendant.  She is not think that she would be safe to return home; rather than being a burden to the people that she lives with she would plan to kill herself before going home.  However, her sleep was better with the Remeron and prazosin.  She is confident that the Remeron should help since it has worked in the past.  She is using hydroxyzine 50 mg as needed for anxiety and is helpful.         Current Medications:       COVID-19 mRNA vacc (PFIZER)  30 mcg Intramuscular Q21 Days     insulin aspart  1-3 Units Subcutaneous TID AC     insulin aspart  1-3 Units Subcutaneous At Bedtime     lidocaine  2 patch Transdermal Q24H     lidocaine   Transdermal Q8H     lisinopril  5 mg Oral Daily     metFORMIN  500 mg Oral BID w/meals     methadone  130 mg Oral Daily     mirtazapine  15 mg Oral At Bedtime     pantoprazole  40 mg Oral QAM AC     prazosin  1 mg Oral At Bedtime            MSE:   She is pleasant and cooperative. Speech fluent. Associations tight. Mood is \"depressed.\" Thought process logical, linear. Thought content positive for SI with to overdose, no delusions. Orientation, recent and remote memory, attention span and concentration are not formally assessed. Fund of knowledge, use of language appropriate. Insight and judgment fair.        Vital signs:  Temp: 97.5  F (36.4  C) " "Temp src: Oral BP: (!) 147/106 Pulse: 102   Resp: 16 SpO2: 100 % O2 Device: None (Room air)     Weight: 90.5 kg (199 lb 8 oz)  Estimated body mass index is 27.98 kg/m  as calculated from the following:    Height as of 9/4/19: 1.798 m (5' 10.8\").    Weight as of this encounter: 90.5 kg (199 lb 8 oz).            DSM-5 Diagnosis:   296.32 (F33.1) Major Depressive Disorder, Recurrent Episode, Moderate _  300.22 (F40.00) Agoraphobia  300.02 (F41.1) Generalized Anxiety Disorder  309.81 (F43.10) Posttraumatic Stress Disorder (includes Posttraumatic Stress Disorder for Children 6 Years and Younger)  Without dissociative symptoms   Opioid use disorder, on maintenance   Cannabis use d/o           Assessment:   The Remeron and trazodone were helpful, but it will obviously take some time for the Remeron to start working in terms of her depression.  She continues to feel suicidal and would not feel safe outside the hospital, therefore she will need to be admitted to inpatient psychiatry.  Unfortunately there is quite a wait to get in the inpatient psychiatry at this time; she will need to stay on the medical wallace for several days at least.  However, she does not require a sitter.  I think that 50 mg per dose of hydroxyzine may present a bit too much of a anticholinergic burden          Summary of Recommendations:   Continue Remeron 15 mg at bedtime, with a plan to increase to 30 mg after several more doses.  The prazosin can be increased to 2mg at 1 more dose of the 1 mg (this is for PTSD related nightmares).  Consider changing the as needed hydroxyzine to 25-50 mg PRN; she was encouraged to try the lower dose if she is not feeling terribly anxious.  When she is medically stable contact mental health intake ([phone]) to get her on the waiting list all for admission to inpatient psychiatry.  Page me or re-consult psychiatry as needed.     Abraham Smith M.D.   [facility]   Contact " "information available via [facility] Paging/Directory.  If I am not available, then [facility] intake ([phone]) should know who   Is on call      \"Much or all of the text in this note was generated through the use of Dragon Dictate voice to text software. Errors in spelling or words which appear to be out of contact are unintentional, may be present due having escaped editing\"           "

## 2022-02-16 NOTE — PLAN OF CARE
/89 (BP Location: Right arm)   Pulse 72   Temp 97.7  F (36.5  C) (Oral)   Resp 18   Wt 91.5 kg (201 lb 11.2 oz)   SpO2 99%   BMI 28.29 kg/m      Care from: [id number]      VS & Pain: VSS on room air, prn Ibuprofen x1 was given for L flank pain    Neuro: A&O x4, flat affect, sad, and prn Atarax x1 was given for anxiety    Respiratory: WDL    Cardiac: WDL    Peripheral neurovascular: WDL    GI/: adequate urine output, no BM this shift    Nutrition: fair appetite and oral intake    Skin: dry, flaky; bruised on L forearm, scratches on R upper arm    Musculoskeletal: WDL    Lines: R PIVs are saline locked    Activity: Up independently    Events this shift: BGs: 155, 122. Pt told MD that she does not have a suicidal plan in the hospital, but she has suicidal plan if she goes home, MD determined that an attendant is not needed and a psych consult was ordered and done. Call light within reach.    Plan: Continue to follow poc. Waiting for inpatient psych placement.

## 2022-02-16 NOTE — PROGRESS NOTES
[facility]    Progress Note - Medicine Service, MAROON TEAM 3       Date of Admission:  2/13/2022    Assessment & Plan          Yun Coyle is a 46 year old female with a history of insulin dependent type 2 diabetes, hypertension, complicated psychiatric profile (multi substance use disorder [benzodiazepenes, marijuana, opioids on maintenance methadone], PTSD, MDD, SANDIE, cyclic vomiting syndrome) who presented with dizziness and falls (over 20) in the past month, she was found to have positive Romberg, listing to left on ambulation, elevated liver enzymes with bland CT, MR, MRA. Differential includes orthostatic hypertension, nutritional deficiency, polysubstance use, peripheral sensory neuropathy secondary to uncontrolled diabetes, post infectious cerebellar ataxia, paroxysmal bradycardia, autoimmune encephalitis, neurosyphilis.    Changes Today  - psych recommends admission to inpatient psychiatry for SI  - pt has been placed on waiting list for [facility] inpatient psych  - tomorrow, increase Prazosin to 2 mg  - check Vit B1  - medically stable and ready for transfer   - stop daily lab checks   - stopped sliding scale insulin, kept hypoglycemia protocol     #Presyncope, Dizziness, and Falls  Increasing feelings of lightheadedness as well as sensation of instability starting a month ago. Acute worsening over the past week which led to over 20 falls. Acute life-threatening conditions have been ruled out (stroke, cerebellar lesion, vertebral dissection, carotid stenosis, aortic stenosis, cardiogenic syncope). Most likely etiologies include vestibular dysfunction, peripheral neuropathy, polysubstance use, nutritional deficiencies, deconditioning, anxiety could be contributing as well.  -neuro consult placed              - CTA head, neck w/o abnormalities               - f/u copper, Vit B6. VitB12 slightly elevated, not pathologic   - vestibular testing and EMG as  "outpatient  - orthostatics (-)  - CTA (-) for dissection or congenital narrowing  -telemetry workup for presyncope, falls  -PT/OT consults placed  - Syphilis (-)  - HIV (-)     #Depression  #Suicidal Thoughts  #Insomnia  #PTSD, SANDIE  She says that her mental health is \"not so good.\" Has a history of suicidal attempts. She feels depressed. Pt worries about being a burden on her family the way that she is. Pt says that rather than being a burden to the people that she lives with she would plan to kill herself before going home.  - psych consult placed, appreciate recs  - start Remeron 15 mg at bedtime  - start Prazosin 1 mg at bedtime increasing to 2 mg on 2/17.    - start Hydroxyzine 25 to 50 mg every 6 hours as needed for anxiety  - transfer to inpatient psych once medically stable     #Rib pain 2/2 fall, Stable  Xray of ribcage on admit revealed no fracture of rib. Rib pain attributed to falling and hitting bathtub.  - PRN acetaminophen   - PRN  Ibuprofen  - lidocaine path Q24 hours  - avoid opioids     #T2DM, Improved  Hx of T2DM, not currently taking any medications. a1c 10.2% on admission  - assist with PCP follow-up at discharge  - hypoglycemia protocol  - low sliding scale insulin   - start metformin 500mg 2x daily     #HTN  Pt has hx of HTN. Has not seen a PCP in two years and has not taken medications in two years either.  - CTM  - will discuss follow-up with PCP or establishing care with PCP prior to discharge  - labetolol for SBP>180  - continue lisinopril 5 mg     #Hyponatremia, Resolved  Na 132 on admit. Improved to 136 on 2/16. Will monitor for now and complete workup if worsens.     #Exertional shakiness, nausea, and jitteriness  Patient reports that she has shakiness, nausea, and feel jittery when she exerts herself either through exercise or even carrying her groceries up her stairs. This sensation goes away when she rests for 5 min.  - Not actively having these symptoms in hospital today.   - CTM  - " PRN zofran for nausea      #Suspected NAFLD  #Chronically Elevated Liver Enzymes  CT Abdomen Pelvis w Contrast from 12/2030 ED note found fatty infiltration of liver and also noted pt has chronic LFT elevations with aminotransferases in the 200s.   - CTM LFT     #GERD  - Protonix 40 mg daily     #Opioid use disorder on maintence methadone  She said that the last time she used opioids was about 5-6 months ago. Tested negative for opioids on drug screen which is inconsistent with recent methadone use  - pharmacy consult for methadone  - cont methadone 130 mg daily     #RHM  Pt amenable to receiving covid  Vaccine  - Pfizer COVID vaccine administered on 2/14, need second dose in two weeks.  - flu vaccine administered 2/15     Diet: Regular Diet Adult    DVT Prophylaxis: Low Risk/Ambulatory with no VTE prophylaxis indicated  Blanc Catheter: Not present  Fluids: None  Central Lines: None  Cardiac Monitoring: None  Code Status: Full Code      Disposition Plan   Expected Discharge: 02/16/2022     Anticipated discharge location:  Awaiting care coordination huddle  Delays:         The patient's care was discussed with the Attending Physician, Dr. Edmundo Hylton.    Anderson Roque, MS3    Resident/Fellow Attestation   I, Alison Manrique, was present with the medical/CASSANDRA student who participated in the service and in the documentation of the note.  I have verified the history and personally performed the physical exam and medical decision making.  I agree with the assessment and plan of care as documented in the note.      Shared decision making, findings incorporated     Alison Manrique MD  PGY1  Date of Service (when I saw the patient): 02/16/22    Anderson Roque  Medical Student  Medicine Service, [facility] TEAM [address] [place] [address]  Securely message with the Vocera Web Console (learn more here)  Text page via [facility] Paging/Directory   Please see signed in provider for up to date coverage  "information      Clinically Significant Risk Factors Present on Admission               # Platelet Defect: home medication list includes an antiplatelet medication   # Diabetes, type II: last A1C 10.2 % (Ref range: 0.0 - 5.6 %)      ______________________________________________________________________    Interval History   NAEO. Per nursing, pt reporting feeling depressed, voiced wishes to stay an additional night since she does not feel safe leaving hospital at this time. Tearful at a couple of safety checks, prn atarax administered x1 per pt request.    Pt reports that she did not have nightmares last night after starting Prazosin. Mood remains depressed and she continues to endorse suicidal ideation. She feels hopeless and worries that she will no longer be able to take care of her granddaughter because of her falls and that she will \"be a burden\" to her family now. She said that if she were to leave the hospital \"I wouldn't even go home\" - when asked to clarify, she said that she would commit suicide. She says that she does feel safe in the hospital. She also says that she thinks she needs psychiatric treatment and requests a transfer to psychiatry on the [facility].    Regarding her instability and falls. She says that things have not worsened but also have not improved. She has not fallen in the hospital and thinks that a cane is helpful but worries that she will remain impaired.    Data reviewed today: I reviewed all medications, new labs and imaging results over the last 24 hours.    Physical Exam   Vital Signs: Temp: 97  F (36.1  C) Temp src: Oral BP: 131/89 Pulse: 72   Resp: 16 SpO2: 99 % O2 Device: None (Room air)    Weight: 199 lbs 8 oz    General: Depressed, worried, resting in bed, conversational  HEENT: NCAT   Pulm/Resp: non-labored respirations on, lungs clear to ascultation  CV: normal rate and regular rhythm  Abdomen: nondistended  Extremities: WWP; no peripheral edema,   Skin: multiple bruises " (knees, elbows, arms, left ribs).  Neuro: follows commands      Data   Recent Labs   Lab 02/16/22  0614 02/16/22  0555 02/15/22  2210 02/15/22  0934 02/15/22  0849 02/14/22  1903 02/13/22  1920 02/13/22  1919   WBC  --   --   --   --   --   --   --  9.2   HGB  --   --   --   --   --   --   --  13.7   MCV  --   --   --   --   --   --   --  90   PLT  --   --   --   --   --   --   --  249   INR  --   --   --   --   --   --  1.05  --    NA  --  136  --   --  131*  --   --  132*   POTASSIUM  --  3.8  --   --  4.3  --   --  4.2   CHLORIDE  --  103  --   --  100  --   --  100   CO2  --  28  --   --  26  --   --  29   BUN  --  18  --   --  17  --   --  10   CR  --  0.64  --   --  0.72  --   --  0.66   ANIONGAP  --  5  --   --  5  --   --  3   DA  --  9.0  --   --  9.0  --   --  9.2   * 111* 128*   < > 282*   < >  --  230*   ALBUMIN  --   --   --   --   --   --   --  3.4   PROTTOTAL  --   --   --   --   --   --   --  8.3   BILITOTAL  --   --   --   --   --   --   --  0.7   ALKPHOS  --   --   --   --   --   --   --  163*   ALT  --   --   --   --   --   --   --  143*   AST  --   --   --   --   --   --   --  85*    < > = values in this interval not displayed.     No results found for this or any previous visit (from the past 24 hour(s)).

## 2022-02-16 NOTE — TELEPHONE ENCOUNTER
S: [address] [place] [address] calling to place a 46/F Pt on the [facility] waitlist. [phone]. Doc to Doc with Dr Hylton [phone] pager     B: Pt was admitted to  due to hx of dizziness and falls over the past month, Pt was found to have Romberg, a neurological issue. Pt is ambulatory on her own and the condition will be treated with OP PT. Pt stated to the Dr this morning that she would rather kill herself than return home and be a burden on her family. Pt thought content to overdose on medications in a SA. Pt hx of admission to [facility] in 2019, and has hx of suicide attempts. Pt denies current CD concerns, has hx of past polysubstance use. Pt dx Diabetes, agoraphobia, depression. Pt rx Metformin.     A: voluntary for admission, fully medically cleared, Pt is COVID negative. Utox positive for THC. Pt can ambulate mostly on her own, would use a walker on unit for ambulation. Pt prefers metro.     R: Patient cleared and ready for behavioral bed placement: Yes

## 2022-02-16 NOTE — PROGRESS NOTES
Care Management Follow Up    Length of Stay (days): 1    Expected Discharge Date: 02/16/2022     Concerns to be Addressed:     Dispo    Additional Information:  RNCC called mental health intake ([phone]). Spoke to intake and patient is on wait list. Possibly be a few days.      Carmela Campos, RN    Carmela Campos RN, BSN  5B RN Care Coordinator  [phone] phone  [phone] pager    Weekend or Holiday Care Coordinator [phone] pager  Job Code [id number]

## 2022-02-16 NOTE — PLAN OF CARE
Vitals: WNL  Pain: 0/10  Neuro: AAOX4  Cardiac:WNL  Respiratory: WNL  GI/: WNL, IV/Drains: PIV saline locked.   Activity: Up with one assist   Skin: No major skin issues noted.   Labs: WDL

## 2022-02-16 NOTE — CONSULTS
Triage and Transition - Consult and Liaison     Yun Coyle  February 16, 2022    Session start: 12:00 pm  Session end: 12:19 pm  Session duration in minutes: 19  [facility] utilized: 70233 - Psychotherapy (with patient) - 30 (16-37*) min  Patient was seen virtually ([employer] cart or other teleconferencing device).  Anticipated number of sessions or this episode of care: 1-4    Reason for consult: Yun is 46 year old White  female . Psychiatry consult was requested due to ongoing suicidal ideation. Patient was seen by [facility] Consult & Liaison team.     Presenting problem : In individual therapeutic contact with patient today, patient presents with flat affect, sad mood.. Safety concerns are present today and include ongoing thoughts of suicidal ideation. Current risk factors for suicide include history of suicide attempt(s), helplessness/hopelessness, living alone, poor interpersonal relationships, difficulty accessing medical/mental health care, perceived burden on family or others, refuses to or feels unable to engage in safety planning, chronic pain and/or chronic illness and no reason for living/no sense of purpose. Patient appears to have very little protective factors. She continues to state how much of a burden she is on her family. She indicates she does feel safe in the hospital and has no thoughts of harming herself on unit and agrees to remain safe while here. She also agrees to inform providers if this changes or if suicidal thoughts increase. Patient still feels like transfer to [facility] will be necessary at this time to keep her safe, she is aware of the wait.   Patient did report she slept a few hours consistently without waking up last night, stating it felt good to get rest. She also reports she did not have any nightmares. Patient appears to be somewhat optimistic about this.     Mental Status Exam   Affect: Flat  Appearance: Disheveled   Attention Span/Concentration: Attentive    Eye  Contact: Engaged  Fund of Knowledge: Appropriate   Language /Speech Content: Fluent  Language /Speech Volume: Normal   Language /Speech Rate/Productions: Normal   Recent Memory: Intact  Remote Memory: Intact  Mood: Depressed   Orientation:   Person: Yes   Place: Yes  Time of Day: Yes   Date: Yes   Situation (Do they understand why they are here?): Yes   Psychomotor Behavior: Normal   Thought Content: Clear  Thought Form: Intact    Current medications:   Current Facility-Administered Medications   Medication     acetaminophen (TYLENOL) tablet 975 mg     COVID-19 mRNA Vac-Yusef (PFIZER) injection 30 mcg     glucose gel 15-30 g    Or     dextrose 50 % injection 25-50 mL    Or     glucagon injection 1 mg     diphenhydrAMINE (BENADRYL) capsule 50 mg    Or     diphenhydrAMINE (BENADRYL) injection 50 mg     EPINEPHrine (ADRENALIN) kit 0.3 mg     hydrOXYzine (ATARAX) tablet 25 mg    Or     hydrOXYzine (ATARAX) tablet 50 mg     ibuprofen (ADVIL/MOTRIN) tablet 800 mg     insulin aspart (NovoLOG) injection (RAPID ACTING)     insulin aspart (NovoLOG) injection (RAPID ACTING)     labetalol (NORMODYNE/TRANDATE) injection 5 mg     Lidocaine (LIDOCARE) 4 % Patch 2 patch     lidocaine patch in PLACE     lisinopril (ZESTRIL) tablet 5 mg     metFORMIN (GLUCOPHAGE) tablet 500 mg     methadone (DOLOPHINE-INTENSOL) 10 MG/ML (HIGH CONC) solution 130 mg     mirtazapine (REMERON) tablet 15 mg     naloxone (NARCAN) injection 0.2 mg    Or     naloxone (NARCAN) injection 0.4 mg    Or     naloxone (NARCAN) injection 0.2 mg    Or     naloxone (NARCAN) injection 0.4 mg     ondansetron (ZOFRAN-ODT) ODT tab 4 mg     pantoprazole (PROTONIX) EC tablet 40 mg     prazosin (MINIPRESS) capsule 1 mg     sodium chloride (PF) 0.9% PF flush 3 mL       Therapeutic intervention and progress:  Therapeutic intervention consisted of building therapeutic rapport, active listening and validation.     Collateral information:   Reviewed chart and coordinated with   Adson    Diagnosis:   296.89 Bipolar II Disorder Depressed, rule out;   309.81 (F43.10) Posttraumatic Stress Disorder (includes Posttraumatic Stress Disorder for Children 6 Years and Younger)  Without dissociative symptoms, by history;   300.22 (F40.00) Agoraphobia, rule out;     Plan:     Patient reports ongoing suicidal thoughts with plan, feels like a burden to family. Agrees to be safe in hospital, no need for sitter or hold at this time. When patient is medically clear, charge nurse to call central intake at [phone] to give clinical and information for doc-to-doc.    Patient will not continue to be followed by this service. Please re-consult if needed.     Carito Agustin [facility]  Triage and Transition - Consult and Liaison   [phone]

## 2022-02-17 ENCOUNTER — APPOINTMENT (OUTPATIENT)
Dept: PHYSICAL THERAPY | Facility: CLINIC | Age: 47
DRG: 092 | End: 2022-02-17
Payer: COMMERCIAL

## 2022-02-17 LAB
COPPER SERPL-MCNC: 120.7 UG/DL
GLUCOSE BLDC GLUCOMTR-MCNC: 133 MG/DL (ref 70–99)
GLUCOSE BLDC GLUCOMTR-MCNC: 136 MG/DL (ref 70–99)
GLUCOSE BLDC GLUCOMTR-MCNC: 159 MG/DL (ref 70–99)
GLUCOSE BLDC GLUCOMTR-MCNC: 311 MG/DL (ref 70–99)

## 2022-02-17 PROCEDURE — 97530 THERAPEUTIC ACTIVITIES: CPT | Mod: GP

## 2022-02-17 PROCEDURE — 97116 GAIT TRAINING THERAPY: CPT | Mod: GP

## 2022-02-17 PROCEDURE — 250N000013 HC RX MED GY IP 250 OP 250 PS 637: Performed by: HOSPITALIST

## 2022-02-17 PROCEDURE — 120N000002 HC R&B MED SURG/OB UMMC

## 2022-02-17 PROCEDURE — 99232 SBSQ HOSP IP/OBS MODERATE 35: CPT | Mod: GC | Performed by: INTERNAL MEDICINE

## 2022-02-17 PROCEDURE — 250N000013 HC RX MED GY IP 250 OP 250 PS 637

## 2022-02-17 PROCEDURE — 250N000013 HC RX MED GY IP 250 OP 250 PS 637: Performed by: STUDENT IN AN ORGANIZED HEALTH CARE EDUCATION/TRAINING PROGRAM

## 2022-02-17 RX ADMIN — HYDROXYZINE HYDROCHLORIDE 50 MG: 25 TABLET ORAL at 09:05

## 2022-02-17 RX ADMIN — IBUPROFEN 800 MG: 800 TABLET, FILM COATED ORAL at 21:55

## 2022-02-17 RX ADMIN — LIDOCAINE 2 PATCH: 246 PATCH TOPICAL at 09:05

## 2022-02-17 RX ADMIN — METFORMIN HYDROCHLORIDE 500 MG: 500 TABLET, FILM COATED ORAL at 17:46

## 2022-02-17 RX ADMIN — PANTOPRAZOLE SODIUM 40 MG: 40 TABLET, DELAYED RELEASE ORAL at 09:05

## 2022-02-17 RX ADMIN — HYDROXYZINE HYDROCHLORIDE 50 MG: 25 TABLET ORAL at 21:55

## 2022-02-17 RX ADMIN — PRAZOSIN HYDROCHLORIDE 2 MG: 2 CAPSULE ORAL at 21:55

## 2022-02-17 RX ADMIN — IBUPROFEN 800 MG: 800 TABLET, FILM COATED ORAL at 09:05

## 2022-02-17 RX ADMIN — METFORMIN HYDROCHLORIDE 500 MG: 500 TABLET, FILM COATED ORAL at 09:05

## 2022-02-17 RX ADMIN — HYDROXYZINE HYDROCHLORIDE 50 MG: 25 TABLET ORAL at 14:36

## 2022-02-17 RX ADMIN — MIRTAZAPINE 15 MG: 15 TABLET, FILM COATED ORAL at 21:55

## 2022-02-17 RX ADMIN — METHADONE HYDROCHLORIDE 130 MG: 10 CONCENTRATE ORAL at 09:04

## 2022-02-17 RX ADMIN — LISINOPRIL 5 MG: 5 TABLET ORAL at 09:05

## 2022-02-17 RX ADMIN — ACETAMINOPHEN 975 MG: 325 TABLET, FILM COATED ORAL at 14:36

## 2022-02-17 ASSESSMENT — ACTIVITIES OF DAILY LIVING (ADL)
ADLS_ACUITY_SCORE: 9

## 2022-02-17 NOTE — PROGRESS NOTES
"[facility]    Progress Note - Medicine Service, SIERRA TEAM 3       Date of Admission:  2/13/2022    Assessment & Plan          Yun Coyle is a 46 year old female with a history of insulin dependent type 2 diabetes, hypertension, complicated psychiatric profile (multi substance use disorder [benzodiazepenes, marijuana, opioids on maintenance methadone], PTSD, MDD, SANDIE, cyclic vomiting syndrome) who presented with dizziness and falls (over 20) in the past month, she was found to have positive Romberg, listing to left on ambulation, elevated liver enzymes with bland CT, MR, MRA. Is it thought that her instability is primarily due to deconditioning, anxiety, and peripheral neuropathy 2/2 uncontrolled diabetes. Now medically stable and ready for transfer to inpatient psychiatry for SI.     Changes Today  - medically stable and ready for transfer   - pt on waiting list for [facility] inpatient psych  - increased Prazosin to 2 mg    #Suicidal Ideation, Plans  #Depression  #Insomnia, PTSD, SANDIE  She says that her mental health is \"not so good.\" Has a history of suicidal attempts. She feels depressed. Pt worries about being a burden on her family the way that she is. Pt says that rather than being a burden to the people that she lives with she would plan to kill herself before going home.  - psych consult placed, appreciate recs  - start Remeron 15 mg at bedtime  - increase Prazosin 2 mg at bedtime increasing to 2 mg on 2/17.    - start Hydroxyzine 25 to 50 mg every 6 hours as needed for anxiety  - medically stable - awaiting bed to become available on inpatient psychiatry    #Presyncope, Dizziness, and Falls, Stable  Increasing feelings of lightheadedness as well as sensation of instability starting a month ago. Acute worsening over the past week which led to over 20 falls. Acute life-threatening conditions have been ruled out (stroke, cerebellar lesion, vertebral " dissection, carotid stenosis, aortic stenosis, cardiogenic syncope). Most likely etiologies include vestibular dysfunction, peripheral neuropathy, polysubstance use, nutritional deficiencies, deconditioning, anxiety could be contributing as well.  -neuro consult placed              - CTA head, neck w/o abnormalities               - f/u copper, Vit B6. VitB12 slightly elevated, not pathologic   - vestibular testing and EMG as outpatient  - orthostatics (-)  - CTA (-) for dissection or congenital narrowing  -telemetry workup for presyncope, falls  -PT/OT consults placed  - Syphilis (-)  - HIV (-)     #Rib pain 2/2 fall, Stable  Xray of ribcage on admit revealed no fracture of rib. Rib pain attributed to falling and hitting bathtub.  - PRN acetaminophen   - PRN  Ibuprofen  - lidocaine path Q24 hours  - avoid opioids     #T2DM, Improved  Hx of T2DM, not currently taking any medications. a1c 10.2% on admission  - assist with PCP follow-up at discharge  - hypoglycemia protocol  - low sliding scale insulin   - start metformin 500mg 2x daily     #HTN  Pt has hx of HTN. Has not seen a PCP in two years and has not taken medications in two years either.  - CTM  - will discuss follow-up with PCP or establishing care with PCP prior to discharge  - labetolol for SBP>180  - continue lisinopril 5 mg     #Hyponatremia, Resolved  Na 132 on admit. Improved to 136 on 2/16.      #Exertional shakiness, nausea, and jitteriness  Patient reports that she has shakiness, nausea, and feel jittery when she exerts herself either through exercise or even carrying her groceries up her stairs. This sensation goes away when she rests for 5 min.  - Not actively having these symptoms in hospital today.   - CTM  - PRN zofran for nausea      #Suspected NAFLD  #Chronically Elevated Liver Enzymes  CT Abdomen Pelvis w Contrast from 12/2030 ED note found fatty infiltration of liver and also noted pt has chronic LFT elevations with aminotransferases in the  200s.   - CTM LFT     #GERD  - Protonix 40 mg daily     #Opioid use disorder on maintence methadone  She said that the last time she used opioids was about 5-6 months ago. Tested negative for opioids on drug screen which is inconsistent with recent methadone use  - pharmacy consult for methadone  - cont methadone 130 mg daily     #RHM  Pt amenable to receiving covid  Vaccine  - Pfizer COVID vaccine administered on 2/14, need second dose in two weeks.  - flu vaccine administered 2/15     Diet: Regular Diet Adult    DVT Prophylaxis: Low Risk/Ambulatory with no VTE prophylaxis indicated  Blanc Catheter: Not present  Fluids: None  Central Lines: None  Cardiac Monitoring: None  Code Status: Full Code      Disposition Plan   Expected Discharge: 02/18/2022     Anticipated discharge location:  Awaiting care coordination huddle  Delays:     Placement - Mental Health/Addiction/Geripsych       The patient's care was discussed with the Attending Physician, Dr. Edmundo Hylton.    Anderson Roque, MS3    Resident/Fellow Attestation   I, Alison Manrique, was present with the medical/CASSANDRA student who participated in the service and in the documentation of the note.  I have verified the history and personally performed the physical exam and medical decision making.  I agree with the assessment and plan of care as documented in the note.      Shared decision making, findings incorporated     Alison Manrique MD  PGY1  Date of Service (when I saw the patient): 02/16/22    Anderson Roque  Medical Student  Medicine Service, [facility] TEAM [address] [place] [address]  Securely message with the Vocera Web Console (learn more here)  Text page via [facility] Paging/Directory   Please see signed in provider for up to date coverage information      Clinically Significant Risk Factors Present on Admission              # Diabetes, type II: last A1C 10.2 % (Ref range: 0.0 - 5.6 %)       ______________________________________________________________________    Interval History   NAEO. Per nursing, 1x atarax given for anxiety.    Pt reported a second night free of nightmares. Balance remains an issue. Mood remains depressed and was tearful today. She says mental health is her main problem now and reinforced that she is worried she would be a burden on our family if she were to return home.    Data reviewed today: I reviewed all medications, new labs and imaging results over the last 24 hours.    Physical Exam   Vital Signs: Temp: 97.3  F (36.3  C) Temp src: Oral BP: 135/82 Pulse: 65   Resp: 17 SpO2: 99 % O2 Device: None (Room air)    Weight: 201 lbs 11.2 oz    General: Depressed, worried, resting in bed, conversational  HEENT: NCAT   Pulm/Resp: non-labored respirations on, lungs clear to ascultation  CV: normal rate and regular rhythm  Abdomen: nondistended  Extremities: WWP; no peripheral edema,   Skin: multiple bruises (knees, elbows, arms, left ribs).  Psych: depressed, tearful  Neuro: follows commands  Coordination: FNF ataxia present bilaterally and improved compared to two days ago. Heel-shin shows significant ataxia. Rapid alternating movements slightly slowed.  Gait: Rhomberg (+); gait width normal, trembling gait with collapse occasionally, heel-walking difficult      Data   Recent Labs   Lab 02/16/22  2144 02/16/22  1721 02/16/22  1309 02/16/22  0614 02/16/22  0555 02/15/22  0934 02/15/22  0849 02/14/22  1903 02/13/22  1920 02/13/22  1919   WBC  --   --   --   --   --   --   --   --   --  9.2   HGB  --   --   --   --   --   --   --   --   --  13.7   MCV  --   --   --   --   --   --   --   --   --  90   PLT  --   --   --   --   --   --   --   --   --  249   INR  --   --   --   --   --   --   --   --  1.05  --    NA  --   --   --   --  136  --  131*  --   --  132*   POTASSIUM  --   --   --   --  3.8  --  4.3  --   --  4.2   CHLORIDE  --   --   --   --  103  --  100  --   --  100   CO2  --    --   --   --  28  --  26  --   --  29   BUN  --   --   --   --  18  --  17  --   --  10   CR  --   --   --   --  0.64  --  0.72  --   --  0.66   ANIONGAP  --   --   --   --  5  --  5  --   --  3   DA  --   --   --   --  9.0  --  9.0  --   --  9.2   * 122* 155*   < > 111*   < > 282*   < >  --  230*   ALBUMIN  --   --   --   --   --   --   --   --   --  3.4   PROTTOTAL  --   --   --   --   --   --   --   --   --  8.3   BILITOTAL  --   --   --   --   --   --   --   --   --  0.7   ALKPHOS  --   --   --   --   --   --   --   --   --  163*   ALT  --   --   --   --   --   --   --   --   --  143*   AST  --   --   --   --   --   --   --   --   --  85*    < > = values in this interval not displayed.     No results found for this or any previous visit (from the past 24 hour(s)).

## 2022-02-17 NOTE — PLAN OF CARE
BP (!) 155/103   Pulse 65   Temp 97.3  F (36.3  C) (Oral)   Resp 17   Wt 92.6 kg (204 lb 3.2 oz)   SpO2 99%   BMI 28.64 kg/m      Care from: 4838-4843      VS & Pain: VSS on room air, prn Ibuprofen x1 and prn Tylenol x1 were given for L flank pain    Neuro: A&O x4, sad and anxious- prn Atarax x2 was given for anxiety    Respiratory: WDL    Cardiac: WDL    Peripheral neurovascular: WDL    GI/: adequate urine output, no BM this shift    Nutrition: good appetite and oral intake    Skin: dry, flaky; bruised on L forearm & knee, scratches on R upper arm    Musculoskeletal: WDL    Lines: R PIVs are saline locked    Activity: Up independently    Events this shift: BGs: 133, 311, 136. Call light within reach.    Plan: Continue to follow poc. Waiting for inpatient psych placement.

## 2022-02-17 NOTE — PLAN OF CARE
/82 (BP Location: Right arm)   Pulse 65   Temp 97.3  F (36.3  C) (Oral)   Resp 17   Wt 91.5 kg (201 lb 11.2 oz)   SpO2 99%   BMI 28.29 kg/m      Assumed Cares: 7098-2330  Neuro: A&O x 4; calm and cooperative; given atarax x 1 for anxiety; pt appearing more cheerful this shift  Respiratory: WDL  Cardiac: WDL  GI/: WDL  Skin: No significant skin issues  Lines: 2 R PIVs SL  Pain: Given ibuprofen for R sided pain with relief  Diet: Regular diet  Activity Level: Up independently  Events: No acute events noted  Plan: Continue with POC; notify provider with changes; pt awaiting inpatient psych bed

## 2022-02-18 ENCOUNTER — HOSPITAL ENCOUNTER (INPATIENT)
Facility: CLINIC | Age: 47
LOS: 6 days | Discharge: HOME | DRG: 885 | End: 2022-02-24
Attending: PSYCHIATRY & NEUROLOGY | Admitting: PSYCHIATRY & NEUROLOGY
Payer: COMMERCIAL

## 2022-02-18 VITALS
DIASTOLIC BLOOD PRESSURE: 78 MMHG | RESPIRATION RATE: 18 BRPM | SYSTOLIC BLOOD PRESSURE: 126 MMHG | TEMPERATURE: 98.5 F | OXYGEN SATURATION: 98 % | HEART RATE: 69 BPM | WEIGHT: 202.8 LBS | BODY MASS INDEX: 28.44 KG/M2

## 2022-02-18 DIAGNOSIS — I10 BENIGN ESSENTIAL HYPERTENSION: ICD-10-CM

## 2022-02-18 DIAGNOSIS — E11.9 TYPE 2 DIABETES MELLITUS WITHOUT COMPLICATION, WITHOUT LONG-TERM CURRENT USE OF INSULIN (H): ICD-10-CM

## 2022-02-18 DIAGNOSIS — F11.21 OPIOID TYPE DEPENDENCE IN REMISSION (H): ICD-10-CM

## 2022-02-18 DIAGNOSIS — R45.851 DEPRESSION WITH SUICIDAL IDEATION: Primary | ICD-10-CM

## 2022-02-18 DIAGNOSIS — R11.15 CYCLICAL VOMITING: ICD-10-CM

## 2022-02-18 DIAGNOSIS — F32.A DEPRESSION WITH SUICIDAL IDEATION: Primary | ICD-10-CM

## 2022-02-18 DIAGNOSIS — F43.10 PTSD (POST-TRAUMATIC STRESS DISORDER): ICD-10-CM

## 2022-02-18 LAB
GLUCOSE BLDC GLUCOMTR-MCNC: 164 MG/DL (ref 70–99)
GLUCOSE BLDC GLUCOMTR-MCNC: 205 MG/DL (ref 70–99)
GLUCOSE BLDC GLUCOMTR-MCNC: 207 MG/DL (ref 70–99)
PYRIDOXAL PHOS SERPL-SCNC: 90.7 NMOL/L

## 2022-02-18 PROCEDURE — 250N000013 HC RX MED GY IP 250 OP 250 PS 637: Performed by: STUDENT IN AN ORGANIZED HEALTH CARE EDUCATION/TRAINING PROGRAM

## 2022-02-18 PROCEDURE — 124N000002 HC R&B MH UMMC

## 2022-02-18 PROCEDURE — 250N000013 HC RX MED GY IP 250 OP 250 PS 637: Performed by: HOSPITALIST

## 2022-02-18 PROCEDURE — 250N000013 HC RX MED GY IP 250 OP 250 PS 637

## 2022-02-18 PROCEDURE — 99239 HOSP IP/OBS DSCHRG MGMT >30: CPT | Mod: GC | Performed by: INTERNAL MEDICINE

## 2022-02-18 RX ORDER — DEXTROSE MONOHYDRATE 25 G/50ML
25-50 INJECTION, SOLUTION INTRAVENOUS
Status: DISCONTINUED | OUTPATIENT
Start: 2022-02-18 | End: 2022-02-24 | Stop reason: HOSPADM

## 2022-02-18 RX ORDER — OLANZAPINE 10 MG/2ML
10 INJECTION, POWDER, FOR SOLUTION INTRAMUSCULAR 3 TIMES DAILY PRN
Status: DISCONTINUED | OUTPATIENT
Start: 2022-02-18 | End: 2022-02-24 | Stop reason: HOSPADM

## 2022-02-18 RX ORDER — MAGNESIUM HYDROXIDE/ALUMINUM HYDROXICE/SIMETHICONE 120; 1200; 1200 MG/30ML; MG/30ML; MG/30ML
30 SUSPENSION ORAL EVERY 4 HOURS PRN
Status: DISCONTINUED | OUTPATIENT
Start: 2022-02-18 | End: 2022-02-24 | Stop reason: HOSPADM

## 2022-02-18 RX ORDER — PRAZOSIN HYDROCHLORIDE 2 MG/1
2 CAPSULE ORAL AT BEDTIME
Status: DISCONTINUED | OUTPATIENT
Start: 2022-02-19 | End: 2022-02-19

## 2022-02-18 RX ORDER — METHADONE HYDROCHLORIDE 10 MG/ML
130 CONCENTRATE ORAL DAILY
Status: CANCELLED | OUTPATIENT
Start: 2022-02-19

## 2022-02-18 RX ORDER — TRAZODONE HYDROCHLORIDE 50 MG/1
50 TABLET, FILM COATED ORAL
Status: DISCONTINUED | OUTPATIENT
Start: 2022-02-18 | End: 2022-02-21

## 2022-02-18 RX ORDER — NICOTINE POLACRILEX 4 MG
15-30 LOZENGE BUCCAL
Status: DISCONTINUED | OUTPATIENT
Start: 2022-02-18 | End: 2022-02-24 | Stop reason: HOSPADM

## 2022-02-18 RX ORDER — LISINOPRIL 5 MG/1
5 TABLET ORAL DAILY
Status: DISCONTINUED | OUTPATIENT
Start: 2022-02-19 | End: 2022-02-24 | Stop reason: HOSPADM

## 2022-02-18 RX ORDER — PANTOPRAZOLE SODIUM 40 MG/1
40 TABLET, DELAYED RELEASE ORAL
Status: CANCELLED | OUTPATIENT
Start: 2022-02-19

## 2022-02-18 RX ORDER — OLANZAPINE 10 MG/1
10 TABLET ORAL 3 TIMES DAILY PRN
Status: DISCONTINUED | OUTPATIENT
Start: 2022-02-18 | End: 2022-02-19

## 2022-02-18 RX ORDER — METHADONE HYDROCHLORIDE 10 MG/5ML
130 SOLUTION ORAL DAILY
Status: DISCONTINUED | OUTPATIENT
Start: 2022-02-19 | End: 2022-02-19

## 2022-02-18 RX ORDER — METHADONE HYDROCHLORIDE 10 MG/ML
130 CONCENTRATE ORAL DAILY
Status: DISCONTINUED | OUTPATIENT
Start: 2022-02-19 | End: 2022-02-24 | Stop reason: HOSPADM

## 2022-02-18 RX ORDER — LISINOPRIL 5 MG/1
5 TABLET ORAL DAILY
Status: CANCELLED | OUTPATIENT
Start: 2022-02-19

## 2022-02-18 RX ORDER — MIRTAZAPINE 15 MG/1
15 TABLET, FILM COATED ORAL AT BEDTIME
Status: DISCONTINUED | OUTPATIENT
Start: 2022-02-19 | End: 2022-02-19

## 2022-02-18 RX ORDER — AMOXICILLIN 250 MG
1 CAPSULE ORAL 2 TIMES DAILY PRN
Status: DISCONTINUED | OUTPATIENT
Start: 2022-02-18 | End: 2022-02-24 | Stop reason: HOSPADM

## 2022-02-18 RX ORDER — MIRTAZAPINE 15 MG/1
15 TABLET, FILM COATED ORAL AT BEDTIME
Status: CANCELLED | OUTPATIENT
Start: 2022-02-18

## 2022-02-18 RX ORDER — PRAZOSIN HYDROCHLORIDE 2 MG/1
2 CAPSULE ORAL AT BEDTIME
Status: CANCELLED | OUTPATIENT
Start: 2022-02-18

## 2022-02-18 RX ORDER — PANTOPRAZOLE SODIUM 40 MG/1
40 TABLET, DELAYED RELEASE ORAL
Status: DISCONTINUED | OUTPATIENT
Start: 2022-02-19 | End: 2022-02-24 | Stop reason: HOSPADM

## 2022-02-18 RX ORDER — ACETAMINOPHEN 325 MG/1
650 TABLET ORAL EVERY 4 HOURS PRN
Status: DISCONTINUED | OUTPATIENT
Start: 2022-02-18 | End: 2022-02-24 | Stop reason: HOSPADM

## 2022-02-18 RX ORDER — HYDROXYZINE HYDROCHLORIDE 25 MG/1
25 TABLET, FILM COATED ORAL EVERY 4 HOURS PRN
Status: DISCONTINUED | OUTPATIENT
Start: 2022-02-18 | End: 2022-02-19

## 2022-02-18 RX ADMIN — PANTOPRAZOLE SODIUM 40 MG: 40 TABLET, DELAYED RELEASE ORAL at 08:35

## 2022-02-18 RX ADMIN — LIDOCAINE 2 PATCH: 246 PATCH TOPICAL at 08:35

## 2022-02-18 RX ADMIN — METFORMIN HYDROCHLORIDE 500 MG: 500 TABLET, FILM COATED ORAL at 20:22

## 2022-02-18 RX ADMIN — LISINOPRIL 5 MG: 5 TABLET ORAL at 08:35

## 2022-02-18 RX ADMIN — HYDROXYZINE HYDROCHLORIDE 50 MG: 25 TABLET ORAL at 20:22

## 2022-02-18 RX ADMIN — METFORMIN HYDROCHLORIDE 500 MG: 500 TABLET, FILM COATED ORAL at 08:35

## 2022-02-18 RX ADMIN — IBUPROFEN 800 MG: 800 TABLET, FILM COATED ORAL at 13:27

## 2022-02-18 RX ADMIN — ACETAMINOPHEN 975 MG: 325 TABLET, FILM COATED ORAL at 08:36

## 2022-02-18 RX ADMIN — HYDROXYZINE HYDROCHLORIDE 50 MG: 25 TABLET ORAL at 08:37

## 2022-02-18 RX ADMIN — METHADONE HYDROCHLORIDE 130 MG: 10 CONCENTRATE ORAL at 08:35

## 2022-02-18 ASSESSMENT — ACTIVITIES OF DAILY LIVING (ADL)
FALL_HISTORY_WITHIN_LAST_SIX_MONTHS: YES
TOILETING_ISSUES: NO
DOING_ERRANDS_INDEPENDENTLY_DIFFICULTY: YES
DRESSING/BATHING_DIFFICULTY: NO
ADLS_ACUITY_SCORE: 9
ADLS_ACUITY_SCORE: 9
WERE_AUXILIARY_AIDS_OFFERED?: NO
ADLS_ACUITY_SCORE: 9
DIFFICULTY_COMMUNICATING: NO
WALKING_OR_CLIMBING_STAIRS_DIFFICULTY: NO
DIFFICULTY_EATING/SWALLOWING: NO
ADLS_ACUITY_SCORE: 9
CONCENTRATING,_REMEMBERING_OR_MAKING_DECISIONS_DIFFICULTY: NO
CHANGE_IN_FUNCTIONAL_STATUS_SINCE_ONSET_OF_CURRENT_ILLNESS/INJURY: YES
ADLS_ACUITY_SCORE: 9
ADLS_ACUITY_SCORE: 9
HEARING_DIFFICULTY_OR_DEAF: NO
ADLS_ACUITY_SCORE: 9
WEAR_GLASSES_OR_BLIND: NO
ADLS_ACUITY_SCORE: 9
NUMBER_OF_TIMES_PATIENT_HAS_FALLEN_WITHIN_LAST_SIX_MONTHS: 30
ADLS_ACUITY_SCORE: 9

## 2022-02-18 NOTE — PLAN OF CARE
RN assumed cares at 0816-1834     Temp: 98  F (36.7  C) Temp src: Oral BP: 136/74 Pulse: 67   Resp: 16 SpO2: 99 % O2 Device: None (Room air)       Neuro: A&O x4, able to make needs known. Pt reporting feeling depressed today. Pt denied feeling dizzy at each assessment, however she leans slightly to the left while ambulating.   Cardiac: WDL  Respiratory: LS clear bilaterally, denying any SOB  GI/: Up to bathroom independently, denies N/V.   Skin: Bruising to forearms & L knee, scratches on R arm.   IV/Drains: R PIV SL.   Activity: Independent in room  Labs: BG checks w/ meals & bedtime.   Nutrition: Regular diet, fair appetite.   Pain: Pt reported mild pain to L flank.      Plan of Care: Pt able to rest comfortably overnight, no events. Pt medically stable & ready for discharge to inpatient psych when available. Continue to monitor & follow POC.     Goal Outcome Evaluation:    Plan of Care Reviewed With: patient

## 2022-02-18 NOTE — DISCHARGE SUMMARY
"[facility]  Discharge Summary - Medicine & Pediatrics       Date of Admission:  2/13/2022  Date of Discharge:  2/18/2022  Discharging Provider: Brenda   Discharge Service: Medicine ServiceSIERRA TEAM 3    Discharge Diagnoses   #Suicidal Ideation, Plans  #Depression  #Insomnia, PTSD, SANDIE    Follow-ups Needed After Discharge   Transferred to in psych     Discharge Disposition   Discharged to inpatient ps  Condition at discharge: Good    Hospital Course   Yun Coyle is a 46 year old female with a history of insulin dependent type 2 diabetes, hypertension, complicated psychiatric profile (multi substance use disorder [benzodiazepenes, marijuana, opioids on maintenance methadone], PTSD, MDD, SANDIE, cyclic vomiting syndrome) who presented with dizziness and falls (over 20) in the past month, she was found to have positive Romberg, listing to left on ambulation, elevated liver enzymes with bland CT, MR, MRA. Is it thought that her instability is primarily due to deconditioning, anxiety, and peripheral neuropathy 2/2 uncontrolled diabetes. Now medically stable transfered to inpatient psychiatry for SI.      #Suicidal Ideation, Plans  #Depression  #Insomnia, PTSD, SANDIE  She says that her mental health is \"not so good.\" Has a history of suicidal attempts. She feels depressed. Pt worries about being a burden on her family the way that she is. Pt says that rather than being a burden to the people that she lives with she would plan to kill herself before going home.  - psych consult placed, appreciate recs  - Remeron 15 mg at bedtime  - increase Prazosin 2 mg at bedtime increasing to 2 mg on 2/17.    - Transferred to in psyc     #Presyncope, Dizziness, and Falls, Stable  Increasing feelings of lightheadedness as well as sensation of instability starting a month ago. Acute worsening over the past week which led to over 20 falls. Acute life-threatening conditions have " been ruled out (stroke, cerebellar lesion, vertebral dissection, carotid stenosis, aortic stenosis, cardiogenic syncope). Most likely etiologies include vestibular dysfunction, peripheral neuropathy, polysubstance use, nutritional deficiencies, deconditioning, anxiety could be contributing as well.  -neuro consult placed              - CTA head, neck w/o abnormalities               - f/u copper, Vit B6. VitB12 slightly elevated, not pathologic              - vestibular testing and EMG as outpatient  - orthostatics (-)  - CTA (-) for dissection or congenital narrowing  -telemetry workup for presyncope, falls  -PT/OT consults placed  - Syphilis (-)  - HIV (-)     #Rib pain 2/2 fall, Stable  Xray of ribcage on admit revealed no fracture of rib. Rib pain attributed to falling and hitting bathtub.  - PRN acetaminophen   - PRN  Ibuprofen  - lidocaine path Q24 hours  - avoid opioids     #T2DM, Improved  Hx of T2DM, not currently taking any medications. a1c 10.2% on admission  - assist with PCP follow-up at discharge  - PTA metformin      #HTN  Pt has hx of HTN. Has not seen a PCP in two years and has not taken medications in two years either.  - continue PTA lisinopril 5 mg     #Hyponatremia, Resolved  Na 132 on admit. Improved to 136 on 2/16.       #Exertional shakiness, nausea, and jitteriness  Patient reports that she has shakiness, nausea, and feel jittery when she exerts herself either through exercise or even carrying her groceries up her stairs. This sensation goes away when she rests for 5 min.  - Not actively having these symptoms in hospital today.     #Suspected NAFLD  #Chronically Elevated Liver Enzymes  CT Abdomen Pelvis w Contrast from 12/2030 ED note found fatty infiltration of liver and also noted pt has chronic LFT elevations with aminotransferases in the 200s.      #GERD  - Protonix 40 mg daily     #Opioid use disorder on maintence methadone  She said that the last time she used opioids was about 5-6  months ago. Tested negative for opioids on drug screen which is inconsistent with recent methadone use  - pharmacy consult for methadone  - cont methadone 130 mg daily      #RHM  Pt amenable to receiving covid  Vaccine  - Pfizer COVID vaccine administered on 2/14, need second dose in two weeks.  - flu vaccine administered 2/15        Consultations This Hospital Stay   PHYSICAL THERAPY ADULT IP CONSULT  OCCUPATIONAL THERAPY ADULT IP CONSULT  VASCULAR ACCESS CARE ADULT IP CONSULT  NEUROLOGY GENERAL ADULT IP CONSULT  PSYCHIATRY IP CONSULT  VESTIBULAR PHYSICAL THERAPY IP CONSULT  VASCULAR ACCESS CARE ADULT IP CONSULT  PSYCHIATRY IP CONSULT  PSYCHIATRY IP CONSULT  DENTAL HYGIENE IP ADULT CONSULT - UU    Code Status   Full Code     The patient was discussed with Dr. Concetta Gutierrez MD  Internal Medicine Resident (PGY2)  [id number]    ______________________________________________________________________    Physical Exam   Vital Signs: Temp: 98.5  F (36.9  C) Temp src: Oral BP: 126/78 Pulse: 69   Resp: 18 SpO2: 98 % O2 Device: None (Room air)    Weight: 202 lbs 12.8 oz     Gen: NAD  CV: RRR, Ns1,S2. No JVD   Pulm: Clear B/l. No wheezing   Abd: Soft. Non-distended. Non-tender to palpation   Ext:  No Periferal edema   Neuro: Non-focal.       Primary Care Physician   Physician No Ref-Primary    Discharge Orders      PFIZER COVID-19 VACCINE 2ND DOSE APPT       Significant Results and Procedures       Discharge Medications   Current Discharge Medication List      CONTINUE these medications which have NOT CHANGED    Details   methadone HCl 10 MG/5ML SOLN Take 130 mg by mouth daily Dose verified with [facility] 2/14/22 (phone [phone])      ASPIRIN NOT PRESCRIBED (INTENTIONAL) Please choose reason not prescribed, below    Associated Diagnoses: Uncontrolled type 2 diabetes mellitus with hyperglycemia (H)      blood glucose (NO BRAND SPECIFIED) lancets standard Use to test blood sugar 4 times daily or as  directed.  Qty: 100 each, Refills: 0    Comments: accucheck machine  Associated Diagnoses: Uncontrolled type 2 diabetes mellitus with hyperglycemia (H)      blood glucose (NO BRAND SPECIFIED) test strip Use to test blood sugar 3 times daily or as directed.  Qty: 100 each, Refills: 0    Comments: accucheck machine  Associated Diagnoses: Uncontrolled type 2 diabetes mellitus with hyperglycemia (H)      !! blood glucose monitoring (NO BRAND SPECIFIED) meter device kit Use to test blood sugar 4 times daily or as directed.  Qty: 1 kit, Refills: 0    Comments: Please dispense a meter with supplies to last at least 30 days.  Associated Diagnoses: Uncontrolled type 2 diabetes mellitus with hyperglycemia (H)      !! blood glucose monitoring (NO BRAND SPECIFIED) meter device kit Use to test blood sugar 4 times daily or as directed.  Qty: 1 kit, Refills: 0    Associated Diagnoses: Uncontrolled type 2 diabetes mellitus with hyperglycemia (H)      insulin pen needle (30G X 8 MM) 30G X 8 MM miscellaneous Use 3 pen needles daily or as directed.  Qty: 100 each, Refills: 1    Associated Diagnoses: Uncontrolled type 2 diabetes mellitus with hyperglycemia (H)       !! - Potential duplicate medications found. Please discuss with provider.        Allergies   No Known Allergies

## 2022-02-18 NOTE — PROGRESS NOTES
No changes in status. States that she does not feel like harming herself here at hospital. Pain in left flank (2/2/ to prior fall), relief with tylenol, ibuprofen, lidocaine patch. VSS. Up independently. Eating well. Plan is for transfer to psych this evening. 10A to call for report when bed is available.

## 2022-02-18 NOTE — PROGRESS NOTES
Care Management Follow Up    Length of Stay (days): 4    Expected Discharge Date: 02/18/2022     Concerns to be Addressed:       Patient plan of care discussed at interdisciplinary rounds: Yes    Anticipated Discharge Disposition:  Inpatient psychiatry on [facility]      Anticipated Discharge Services:  Psychiatry  Anticipated Discharge DME:   None    Referrals Placed by CM/SW: None   Private pay costs discussed: Not applicable    Additional Information:  This writer called mental health intake ([phone]). Spoke to intake. They are reviewing and will call if bed becomes available.    Charge nurse was made aware.       Loida Leung, RN  Float RN Care Coordinator      For Weekend & Holiday on call RN Care Coordinator:  (Home discharge with needs including home care, Assisted living facility returns, Durable medical equip, IV antibiotics)   [place]    Pager [phone] or dial [phone] and enter job code [phone] at prompt  [facility] (Sunday Only) Pager [phone]    For weekend social work needs, contact information below:  (Transitional care unit, Long-term care unit, Hospice, Counseling, Domestic violence,Vulnerable adult, Health Care Directive)  4A, 4C, 4E, 5A, 5B  Pager [phone]  6A, 6B, 6C, 6D   Pager [phone]  7A, 7B, 7C, 7D, 5C  Pager [phone]  [facility] (Saturday Only) Pager [phone]    After hours for all units- (only  is available -[phone]/everyday)  Pager [phone]

## 2022-02-18 NOTE — PLAN OF CARE
Care assumed: [id number]   Vitals  Pain  BP (!) 147/100 (BP Location: Right arm)   Pulse 92   Temp 100.3  F (37.9  C)   Resp 18   Wt 92.6 kg (204 lb 3.2 oz)   SpO2 99%  RA  BMI 28.64 kg/m            Pain in upper left armpit ibuprofen given   Fever; Pt requested ibuprofen    Activity  Ind    Neuro/Mood  A&Ox 4     Denies numbness and tingling    Cardiac  No signs of acute distress    Respiratory  No signs of acute respiratory distress    GI/  Voids spontaneously      Diet/ Nutrition  Tolerating Reg diet      Skin, Wounds, LDAs Skin intact   R PIV     Plan of Care  Other notes Follow POC    Looking for inpatient psych     Fever decrease after ibuprofen

## 2022-02-19 LAB
ANION GAP SERPL CALCULATED.3IONS-SCNC: 12 MMOL/L (ref 3–14)
BUN SERPL-MCNC: 18 MG/DL (ref 7–30)
CALCIUM SERPL-MCNC: 9.8 MG/DL (ref 8.5–10.1)
CHLORIDE BLD-SCNC: 100 MMOL/L (ref 94–109)
CO2 SERPL-SCNC: 21 MMOL/L (ref 20–32)
CREAT SERPL-MCNC: 0.59 MG/DL (ref 0.52–1.04)
GFR SERPL CREATININE-BSD FRML MDRD: >90 ML/MIN/1.73M2
GLUCOSE BLD-MCNC: 329 MG/DL (ref 70–99)
GLUCOSE BLDC GLUCOMTR-MCNC: 128 MG/DL (ref 70–99)
GLUCOSE BLDC GLUCOMTR-MCNC: 189 MG/DL (ref 70–99)
GLUCOSE BLDC GLUCOMTR-MCNC: 230 MG/DL (ref 70–99)
GLUCOSE BLDC GLUCOMTR-MCNC: 254 MG/DL (ref 70–99)
GLUCOSE BLDC GLUCOMTR-MCNC: 289 MG/DL (ref 70–99)
MAGNESIUM SERPL-MCNC: 1.2 MG/DL (ref 1.6–2.3)
PHOSPHATE SERPL-MCNC: 3 MG/DL (ref 2.5–4.5)
POTASSIUM BLD-SCNC: 4 MMOL/L (ref 3.4–5.3)
SODIUM SERPL-SCNC: 133 MMOL/L (ref 133–144)

## 2022-02-19 PROCEDURE — 250N000012 HC RX MED GY IP 250 OP 636 PS 637: Performed by: PSYCHIATRY & NEUROLOGY

## 2022-02-19 PROCEDURE — 250N000011 HC RX IP 250 OP 636: Performed by: PSYCHIATRY & NEUROLOGY

## 2022-02-19 PROCEDURE — 258N000003 HC RX IP 258 OP 636: Performed by: NURSE PRACTITIONER

## 2022-02-19 PROCEDURE — 36415 COLL VENOUS BLD VENIPUNCTURE: CPT | Performed by: NURSE PRACTITIONER

## 2022-02-19 PROCEDURE — 250N000013 HC RX MED GY IP 250 OP 250 PS 637: Performed by: PSYCHIATRY & NEUROLOGY

## 2022-02-19 PROCEDURE — 83735 ASSAY OF MAGNESIUM: CPT | Performed by: NURSE PRACTITIONER

## 2022-02-19 PROCEDURE — 80048 BASIC METABOLIC PNL TOTAL CA: CPT | Performed by: NURSE PRACTITIONER

## 2022-02-19 PROCEDURE — 99223 1ST HOSP IP/OBS HIGH 75: CPT | Mod: AI | Performed by: PSYCHIATRY & NEUROLOGY

## 2022-02-19 PROCEDURE — 250N000013 HC RX MED GY IP 250 OP 250 PS 637: Performed by: NURSE PRACTITIONER

## 2022-02-19 PROCEDURE — 999N000127 HC STATISTIC PERIPHERAL IV START W US GUIDANCE

## 2022-02-19 PROCEDURE — 250N000011 HC RX IP 250 OP 636: Performed by: NURSE PRACTITIONER

## 2022-02-19 PROCEDURE — 84425 ASSAY OF VITAMIN B-1: CPT | Performed by: NURSE PRACTITIONER

## 2022-02-19 PROCEDURE — 84100 ASSAY OF PHOSPHORUS: CPT | Performed by: NURSE PRACTITIONER

## 2022-02-19 PROCEDURE — 250N000009 HC RX 250: Performed by: NURSE PRACTITIONER

## 2022-02-19 PROCEDURE — 124N000002 HC R&B MH UMMC

## 2022-02-19 RX ORDER — NALOXONE HYDROCHLORIDE 0.4 MG/ML
0.4 INJECTION, SOLUTION INTRAMUSCULAR; INTRAVENOUS; SUBCUTANEOUS
Status: DISCONTINUED | OUTPATIENT
Start: 2022-02-19 | End: 2022-02-24 | Stop reason: HOSPADM

## 2022-02-19 RX ORDER — OLANZAPINE 5 MG/1
5-10 TABLET, ORALLY DISINTEGRATING ORAL 3 TIMES DAILY PRN
Status: DISCONTINUED | OUTPATIENT
Start: 2022-02-19 | End: 2022-02-24 | Stop reason: HOSPADM

## 2022-02-19 RX ORDER — NALOXONE HYDROCHLORIDE 0.4 MG/ML
0.2 INJECTION, SOLUTION INTRAMUSCULAR; INTRAVENOUS; SUBCUTANEOUS
Status: DISCONTINUED | OUTPATIENT
Start: 2022-02-19 | End: 2022-02-24 | Stop reason: HOSPADM

## 2022-02-19 RX ORDER — MIRTAZAPINE 15 MG/1
15 TABLET, FILM COATED ORAL AT BEDTIME
Status: DISCONTINUED | OUTPATIENT
Start: 2022-02-19 | End: 2022-02-22

## 2022-02-19 RX ORDER — PROCHLORPERAZINE 25 MG
25 SUPPOSITORY, RECTAL RECTAL EVERY 12 HOURS PRN
Status: DISCONTINUED | OUTPATIENT
Start: 2022-02-19 | End: 2022-02-24 | Stop reason: HOSPADM

## 2022-02-19 RX ORDER — MAGNESIUM SULFATE HEPTAHYDRATE 40 MG/ML
4 INJECTION, SOLUTION INTRAVENOUS ONCE
Status: COMPLETED | OUTPATIENT
Start: 2022-02-19 | End: 2022-02-19

## 2022-02-19 RX ORDER — PROCHLORPERAZINE MALEATE 5 MG
5 TABLET ORAL EVERY 6 HOURS PRN
Status: DISCONTINUED | OUTPATIENT
Start: 2022-02-19 | End: 2022-02-24 | Stop reason: HOSPADM

## 2022-02-19 RX ORDER — HYDROXYZINE HYDROCHLORIDE 50 MG/1
50 TABLET, FILM COATED ORAL EVERY 4 HOURS PRN
Status: DISCONTINUED | OUTPATIENT
Start: 2022-02-19 | End: 2022-02-24 | Stop reason: HOSPADM

## 2022-02-19 RX ORDER — PRAZOSIN HYDROCHLORIDE 2 MG/1
2 CAPSULE ORAL AT BEDTIME
Status: DISCONTINUED | OUTPATIENT
Start: 2022-02-19 | End: 2022-02-24 | Stop reason: HOSPADM

## 2022-02-19 RX ORDER — ONDANSETRON 4 MG/1
4 TABLET, ORALLY DISINTEGRATING ORAL EVERY 6 HOURS PRN
Status: DISCONTINUED | OUTPATIENT
Start: 2022-02-19 | End: 2022-02-19

## 2022-02-19 RX ORDER — CLONIDINE HYDROCHLORIDE 0.1 MG/1
0.1 TABLET ORAL 2 TIMES DAILY PRN
Status: DISCONTINUED | OUTPATIENT
Start: 2022-02-19 | End: 2022-02-24 | Stop reason: HOSPADM

## 2022-02-19 RX ADMIN — LIDOCAINE HYDROCHLORIDE 30 ML: 20 SOLUTION ORAL; TOPICAL at 11:43

## 2022-02-19 RX ADMIN — SODIUM CHLORIDE, POTASSIUM CHLORIDE, SODIUM LACTATE AND CALCIUM CHLORIDE 1000 ML: 600; 310; 30; 20 INJECTION, SOLUTION INTRAVENOUS at 18:53

## 2022-02-19 RX ADMIN — MIRTAZAPINE 15 MG: 15 TABLET, FILM COATED ORAL at 00:27

## 2022-02-19 RX ADMIN — HYDROXYZINE HYDROCHLORIDE 50 MG: 50 TABLET ORAL at 21:39

## 2022-02-19 RX ADMIN — INSULIN ASPART 2 UNITS: 100 INJECTION, SOLUTION INTRAVENOUS; SUBCUTANEOUS at 17:55

## 2022-02-19 RX ADMIN — PANTOPRAZOLE SODIUM 40 MG: 40 TABLET, DELAYED RELEASE ORAL at 07:54

## 2022-02-19 RX ADMIN — INSULIN ASPART 2 UNITS: 100 INJECTION, SOLUTION INTRAVENOUS; SUBCUTANEOUS at 12:58

## 2022-02-19 RX ADMIN — PROCHLORPERAZINE EDISYLATE 5 MG: 5 INJECTION INTRAMUSCULAR; INTRAVENOUS at 13:26

## 2022-02-19 RX ADMIN — TRAZODONE HYDROCHLORIDE 50 MG: 50 TABLET ORAL at 01:38

## 2022-02-19 RX ADMIN — TRAZODONE HYDROCHLORIDE 50 MG: 50 TABLET ORAL at 21:31

## 2022-02-19 RX ADMIN — ACETAMINOPHEN 650 MG: 325 TABLET, FILM COATED ORAL at 21:31

## 2022-02-19 RX ADMIN — LISINOPRIL 5 MG: 5 TABLET ORAL at 07:55

## 2022-02-19 RX ADMIN — PRAZOSIN HYDROCHLORIDE 2 MG: 2 CAPSULE ORAL at 00:27

## 2022-02-19 RX ADMIN — MAGNESIUM SULFATE IN WATER 4 G: 40 INJECTION, SOLUTION INTRAVENOUS at 18:53

## 2022-02-19 RX ADMIN — ONDANSETRON 4 MG: 4 TABLET, ORALLY DISINTEGRATING ORAL at 08:59

## 2022-02-19 RX ADMIN — METHADONE HYDROCHLORIDE 130 MG: 10 CONCENTRATE ORAL at 08:04

## 2022-02-19 RX ADMIN — HYDROXYZINE HYDROCHLORIDE 50 MG: 50 TABLET ORAL at 07:55

## 2022-02-19 RX ADMIN — CLONIDINE HYDROCHLORIDE 0.1 MG: 0.1 TABLET ORAL at 13:16

## 2022-02-19 RX ADMIN — PROCHLORPERAZINE EDISYLATE 5 MG: 5 INJECTION INTRAMUSCULAR; INTRAVENOUS at 21:34

## 2022-02-19 RX ADMIN — MIRTAZAPINE 15 MG: 15 TABLET, FILM COATED ORAL at 21:31

## 2022-02-19 RX ADMIN — METFORMIN HYDROCHLORIDE 500 MG: 500 TABLET, FILM COATED ORAL at 09:00

## 2022-02-19 RX ADMIN — PRAZOSIN HYDROCHLORIDE 2 MG: 2 CAPSULE ORAL at 21:31

## 2022-02-19 ASSESSMENT — ACTIVITIES OF DAILY LIVING (ADL)
DRESS: INDEPENDENT
DRESS: INDEPENDENT
ORAL_HYGIENE: INDEPENDENT
LAUNDRY: WITH SUPERVISION
HYGIENE/GROOMING: INDEPENDENT
LAUNDRY: WITH SUPERVISION
LAUNDRY: WITH SUPERVISION
HYGIENE/GROOMING: INDEPENDENT
ORAL_HYGIENE: INDEPENDENT
HYGIENE/GROOMING: INDEPENDENT
DRESS: SCRUBS (BEHAVIORAL HEALTH);INDEPENDENT
ORAL_HYGIENE: INDEPENDENT

## 2022-02-19 NOTE — PLAN OF CARE
"Problem: Sleep Disturbance  Goal: Adequate Sleep/Rest  Outcome: Ongoing, Progressing  Note: Pt presented with some difficulty falling asleep, no additional concerns noted.   Intervention: Promote Sleep/Rest  Recent Flowsheet Documentation  Taken 2/19/2022 0000 by Rox Edwards RN  Sleep/Rest Enhancement: medication     NOC Shift Report    Pt awake at start of the shift, seen reading in room. Pt presented with difficulty falling asleep. Pt slept 4 hours.     Pt BP was elevated at 151/94. Pt denied sx of hypertension. Pt was given bedtime medications at the start of the shift, including their minipress. Pt presented with flat, blunted affect and calm mood. Pt was polite and cooperative with staff. When asked how pt is doing, pt responded \"I am okay.\" Pt was given walker for as-need use, PCO placed. Pt affirmed that their DM2 is managed at home with metformin, no insulin. Pt BG checked at 0030. BG was 189, no interventions required. Writer asked pt if they needed anything, pt declined. Pt encouraged to voice needs to staff, pt agreed.     Around 0130, pt requested something to help with sleep. PRN Trazodone 50 mg was given, PRN appeared to be effective. No additional pt complaints or concerns at this time. Will continue to monitor.     Precautions: Fall, Suicide            "

## 2022-02-19 NOTE — TELEPHONE ENCOUNTER
R:  8:39 PM Allison, unit 10 Charge called to update that transpor is delayed so admit probably won't be until 10:30 -11PM.    8:49 PM Updated [facility] #[phone] about delay and they already knew.

## 2022-02-19 NOTE — PROVIDER NOTIFICATION
"Pt had an emesis in the shower. Writer knocked on BR door to check on pt and pt said \" I'm ok.\"  Will continue to assess.   " Recommended observation.

## 2022-02-19 NOTE — CONSULTS
TELEPSYCHIATRY TELEPHONE ADMISSION NOTE    Discussed with nurse Ernandez.  46-year-old female with history of depression, opioid dependence on methadone and psychiatric hospitalizations, presented with suicidal ideation.  Patient has been medically cleared.  PMH significant for dizziness, falls, DM2, HTN, GERD.  NKDA.  No known serious substance withdrawal reactions.      Vital signs: AVSS. Lab findings: COVID negative. Urine drug screen: +cannabis. Alcohol level: unavailable. Pregnancy test: negative.     PLAN:    --Disposition: Admit to psychiatry under voluntary status.  Routine admission orders placed.  --Precautions: Suicide.  --Psychiatric medications: Resume confirmed psychiatric medications.    --Medical medications: Resume confirmed medical medications.  FSG/ISS.          Madhav Lynch MD  Psychiatrist

## 2022-02-19 NOTE — PLAN OF CARE
"  Problem: Behavioral Health Plan of Care  Goal: Plan of Care Review  Outcome: Ongoing, Not Progressing   Goal Outcome Evaluation:    Plan of Care Reviewed With: patient     Pt c/o nausea, had several dry heavings and emeses. Received prn Clonidine for elevated BP. Declines LR IV bolus despite prompting, stated \" later.\"  Pt denies SI/SI/AVH. Took several showers. Ate breakfast and snacks. Labs ordered. Plan: Status 15s; Build trust with pt. Continue to build on strengths. Encourage compliance and healthy coping.   Plan: Status 15s; Build trust with pt. Continue to build on strengths. Encourage compliance and healthy coping.                             "

## 2022-02-19 NOTE — CONSULTS
Patient Name: Yun Coyle MRN# [id number]   Age: 46 year old YOB: 1975     Date of Service: 2/19/2022    Circumstances of recent discharge and re-admittance noted. Please refer to recent medicine H&P in charting completed by Alison Manrique MD and dated 2/14/22, which was reviewed by this writer and is up to date.     In brief, Yun Coyle is a 46 year old female with a history of polysubstance use disorder (benzos, cannabis, opioid use disorder on methadone), PTSD, MDD, SANDIE, cyclic vomiting syndrome, HTN, IDDM, and GERD who was admitted to [facility] medicine service 2/14-2/18 for dizziness and falls x 20 over the last month, positive Romberg sign, and SI.  She was discharged from medicine and transferred to inpatient psych on 2/18 for ongoing management of SI.         Diagnoses:  # SI, MDD, PTSD  # Polysubstance use disorder  Reported ongoing SI at time of discharge.  Currently on Remeron 15mg HS and Prazosin 2mg HS.   - Management per Psychiatry     # Cyclical vomiting syndrome - Likely hyperemesis in setting of cannabis use.  Utox positive for cannabinoids on 2/13.  Continues to have vomiting and nausea on unit today.    - 1L LR bolus now   - Check BMP, Mag, Phos   - Compazine PRN   - GI cocktail TID PRN   - Can trial Zyprexa ODT 5-10mg for nausea   - Would limit use of Zofran d/t methadone and concern for long QTc     # Type 2 DM, insulin dependent - Hgb A1c 10.2% on admission 2/14.  No PTA medications but was started on Metformin during medicine admission.  Last 24 hour BGs as below:   Recent Labs   Lab 02/19/22  0741 02/19/22  0035 02/18/22  1729 02/18/22  1320 02/18/22  0914 02/17/22  2148   * 189* 164* 207* 205* 159*     - Continue Metformin 500mg BID  - LSSI for now, will increase pending trends   - Hypoglycemia protocol   - Will need follow up with PCP outpatient     # Presyncope, dizziness, falls - Presented to [facility] on 2/14 with 20+ falls over the last month and had  positive Romberg's on exam.  Inpatient work up (CTA head and neck, telemetry, MRI brain and neck) negative for stroke, cerebellar lesion, vertebral dissection, carotid stenosis, aortic stenosis, cardiogenic syncope.  Likely 2/2 vestibular dysfunction, peripheral neuropathy, polysubstance use, nutritional deficiencies, deconditioning, anxiety could be contributing as well.    - Check thiamine   - Check orthostatic vital signs every morning   - Could trial Meclizine PRN if symptoms persist        # HTN - Per chart review, documented hx of HTN but lost to clinic follow up.  Started on Lisinopril during medicine admission.  Continue Lisinopril 5mg daily w/ hold parameters, likely will need to be uptitrated.     # Suspected NAFLD  # Chronically elevated LFTs   CT Abdomen Pelvis w Contrast from 12/2030 ED note found fatty infiltration of liver and also noted pt has chronic LFT elevations with aminotransferases in the 200s.  Please contact IM if new or worsening abdominal pain, nausea, vomiting, jaundice or yellowing of eyes.          # GERD - Continue protonix 40mg daily.     # Opioid use disorder - Agree w/ continuing PTA methadone.  Please check EKG prior to adjusting psych meds or if needing Zofran if symptoms refractory to GI cocktail and/or Zyprexa.      # Routine health maintenance - Receive Pfizer COVID vaccine on 2/14, will need second dose in two weeks (around 3/1).  Flu vaccine given on 2/15.        Vitals and laboratory data were reviewed and are significant as discussed above.  Medicine will follow peripherally for assistance with lab results, monitoring blood pressure, and adjusting medications. Please call the on-call [facility] for any follow up medical concerns if they arise.     Niharika Shepard, CNP, APRN  Internal Medicine [facility]  Pager [phone]

## 2022-02-19 NOTE — PLAN OF CARE
"Pt admitted to [facility] due to suicidal ideation. Pt was previously at [address] after concerns of dizziness, previous falls (20 or more), and was found to have positive Romberg. Pt was brought to [facility] after expressing suicidal ideation with a plan of overdosing at home. Pt reports being inpatient  in 2019. Pt has a past medical history of Type 2 diabetes, hypertension, and polysubstance use.     Pt reports smoking marijuana two times daily. Denies all other substance use. UDS positive for THC.     Pt is a type 2 diabetic. Pt reports she does not take insulin at home and has been managing her diabetes with metformin. PTA medications reviewed with patient. She reports taking Remeron 15 mg at bedtime, Lisinopril 5 mg daily, metformin 500 mg BID with meals, minipress 2 mg at bedtime, methadone 130 mg daily, Protonix 40 mg every morning before breakfast, lidocaine patch 4% PRN, hydroxyzine 50 mg PRN, and Zofran 4 mg PRN. All PTA medications ordered except for lidocaine patch. To be reviewed with provider tomorrow. Due to pt's high A1C and BG levels before admission, on call provider ordered Novolog sliding scale insulin. Pt denied all allergies.     During the admission process pt was calm and cooperative. Pt stated the reason for her admission is due to \"feeling like a burden\" to her family and stated he would plan to kill herself at home. Pt reports living with her adult daughter and relies on her to help with care. She was able to contract for safety and denied any current thoughts of SI/SIB/HI. Denied hallucinations. Pt stated she feels steady on her feet. Denies dizziness. States she hasn't felt dizzy for two days. Patient care ordered for patient to have a walker. Pt monitored on fall and suicide precautions. Pt has a no roommate order due to use of walker. Will update receiving RN.                         "

## 2022-02-19 NOTE — PLAN OF CARE
Initial Psychosocial Assessment    I have reviewed the chart, met with the patient, and developed Care Plan.  Information for assessment was obtained from:     Chart review and patient.    Presenting Problem:  Patient is a 46 year old female with a history of depression, PTSD, SANDIE, chemical dependency (benzodiazpenes, marijuana, opiods), diabetes type 2, hypertension, and cyclic vomiting syndrome.   Patient was admitted and discharged from medical 2/13/22 - 2/18/22, and transferred to inpatient psych for suicidal ideation.  She states she is suicidal, but has no specific plan. Denies SIB.    Patient felt nauseous during the interview and was laying down. She had 3 showers before 12:00 p.m. and stated she wanted to shower again. Patient was cooperative during the interview, but found it difficult due to the nausea.      History of Mental Health and Chemical Dependency:  Patient reports history of chemical dependency and mental illness. Most recent admission to inpatient psych and CD treatment was in 2019. She denies chemical dependency is a current problem.    Family Description (Constellation, Family Psychiatric History):  Patient denies family history of mental illness. She lives with her daughter and grand children and stated they are supportive.    Significant Life Events (Illness, Abuse, Trauma, Death):  Chart review shows a history of PTSD, but patient reports none.    Living Situation:  Patient lives with her daughter and grand children.    Educational Background:  Patient states she finished some college.    Occupational History:  Currently patient is unemployed, prior she worked in childcare.    Financial Status:  Patient states she is on disability.    Legal Issues:  None reported    Ethnic/Cultural Issues:  None reported    Spiritual Orientation:  None reported     Service History:  None reported    Social Functioning (organization, interests):  When patient is feeling well, she enjoys spending  time with her family.    Current Treatment Providers are:  Patient states she receives methadone at [facility].  No prescribing psychiatrist.  No therapist.  No .    Social Service Assessment/Plan:  Patient to be observed, symptom reduction, stabilization, medication management, group therapy, milieu management, and CTC case management.

## 2022-02-19 NOTE — PROGRESS NOTES
Brief Medicine Progress Note   19 February 2022       Mag low at 1.2.  Ordered IV mag sulfate x 1 dose.  Repeat Lytes in AM.     Medicine will continue to follow along.       Niharika Shepard, CNP, APRN  Internal Medicine [facility]  Pager [phone]

## 2022-02-19 NOTE — PLAN OF CARE
"Pt has been in the shower for the majority of the shift due to severe nausea and vomiting. Pt has had at least 6 emeses this shift. Pt describes first 3 emesis as \"yellow and acidic\" and later emesis as \"thick and green\". Pt requested ginger ale and apple juice to try as she is having a hard time keeping anything down. IM paged at 1800. Provider advised to hold evening metformin dose which was done. Also received order for pt to receive ginger ale between meals. Pt given ginger ale and she has not had an emesis since she drank it. Pt eventually able to eat 75% of dinner tray. Pt still feeling good a few hours after eating dinner. Pt drinking apple juice slowly. Behavioral med flyer RN administered bolus of lactated ringers and magnesium sulfate via IV. IV left in place per provider request in case pt needs more IVF. PRN compazine 5 mg IV given at 2140 per pt request. Pt okay with keeping IV in place. Pt denies SI/SIB/HI/AVH. Pt contracts for safety. Pt continues to endorse feeling a bit down and frustrated about vomiting issues. Pt endorses low level anxiety. Pt given PRN tylenol at HS for left rib pain from PTA fall. Pt using hot packs throughout shift for pain as well. Pt given PRN trazodone for sleep and hydroxyzine for anxiety per her request. Pt now resting in bed reading. Affect has brightened since pt feeling better physically. Will continue to monitor pt.    **pt states that she believes her vomiting episodes are correlated with when she sleeps a long time and does not eat. Pt reports that at home she wakes up multiple times throughout the night and eats small snacks. We made a plan to have the night shift RN wake pt up once tonight to have her eat a snack. We are hoping this will help deter the vomiting tomorrow morning. Will pass this on to nights RN.      B at dinner, 2 units insulin given per order, BG at .     HTN: BP at start of shift was 167/96 (taken with adult regular cuff), however this " was taken when pt was experiencing a lot discomfort due to vomiting. Upon recheck when pt feeling better and using adult large cuff, BP is 141/91. Pt denies headache, vision changes, or other htn sx. Will continue to monitor. BP at HS was 126/84.

## 2022-02-19 NOTE — PHARMACY-ADMISSION MEDICATION HISTORY
Please see Admission Medication History and Methadone Clinic information notes completed on 2/14/22 under previous encounter at [facility] [address] [place] [address] for information regarding prior to admission medications.        Nicollette McMann, PharmD  [facility]: [phone]

## 2022-02-19 NOTE — PLAN OF CARE
02/18/22 7106   Patient Belongings   Did you bring any home meds/supplements to the hospital?  No   Patient Belongings remains with patient;locker   Patient Belongings Remaining with Patient clothing;ring   Patient Belongings Put in Hospital Secure Location (Security or Locker, etc.) cell phone/electronics;clothing;purse/wallet;plastic bag;shoes;tote;other (see comments)   Belongings Search Yes   Clothing Search Yes   Second Staff Kan REN (RN)   Goal Outcome Evaluation:                 Patient arrived to unit with the following belongings:  Clothing: tshirt, tank top, underwear, shorts, sweatpants w/ strings, zip hoodie w/ strings, shoes, socks, winter coat, cardigan, slippers, scarf, sports bra (with patient)  Electronic devices including cell phones: 2 cell phones  Jewelry: 3 gold rings (with patient)  Other: water cup, empty syringe, gait belt, toothbrush, tooth paste, [place] [facility] folder, 2 open boxes of candy, pringles, sour patch kids bag, muffin, purse, hardcase w/ cards, assorted papers, pens, flashlight, power bank, sanitary wipes, phone , [employer] insurance card      Admission:  I am responsible for any personal items that are not sent to the safe or pharmacy.  [facility] is not responsible for loss, theft or damage of any property in my possession.    Signature:  _________________________________ Date: _______  Time: _____                                              Staff Signature:  ____________________________ Date: ________  Time: _____      2nd Staff person, if patient is unable/unwilling to sign:    Signature: ________________________________ Date: ________  Time: _____     Discharge:  [facility] has returned all of my personal belongings:    Signature: _________________________________ Date: ________  Time: _____                                          Staff Signature:  ____________________________ Date: ________  Time: _____

## 2022-02-19 NOTE — H&P
Psychiatry History and Physical    Yun Coyle MRN# [id number]   Age: 46 year old YOB: 1975     Date of Admission:  2/18/2022          Assessment:   This patient is a 46 year old female with history of bipolar disorder, MDD, SANDIE, and polysubstance abuse was transferred from medical unit with suicidal ideation in context of worsening physical health. Patients symptoms of several months of depression, loss of interest, sadness, hopelessness, guilt, and suicidal thinking are consistent with historical diagnosis of MDD, however pt also reports possible hypomanic symptoms including decreased need for sleep, increased energy, recklessness, and feelings of euphoria, further assessment needed with consideration of bipolarity of mood state.      Unable to review patients family history of mental illness due to her actively vomiting during interview, but her physical health and polysubstance abuse are significant contributors to her current depressive state. She is also positive for benzodiazapines. No concerns for withdrawal at this time as patient has been monitored on medical for several days PTA.  Will plan to revisit CD concerns when patient physically feeling better.    Will continue home medications at this time. Due to patient's limited ability to participate in interview and her current uncontrolled vomiting, will defer any medication changes at this time. She has PRN medications available for symptom relief. Will address again on Monday.       Inpatient psychiatric hospitalization is warranted at this time for safety, stabilization, and possible adjustment in medications.         Diagnoses:   Unspecified depressive disorder (MDD recurrent vs bipolar affective type 2 depressed)  Suicidal ideation   Post traumatic Stress disorder  Generalized Anxiety Disorder  Agoraphobia per history   Opiate use disorder in sustained remission on maintenance assisted therapy with methadone  Cannabis use  disorder, severe  Benzodiazepine use disorder   Cyclical vomiting syndrome   Type 2 DM, insulin dependent   Presyncope/dizziness/falls  Hypertension  Suspected NAFLD, elevated LFTs  GERD         Plan:   Psychiatric treatment/inteventions:  Medications:   Continue Remeron 15 mg po for depression, consider increase to further target depressive symptoms as patients physical symptoms improve, monitoring closely for any emergence of mood instability/hypomania   Continue Prazosin 2 mg at HS for PTSD  Continue Methadone 130 mg for MAT of opiate use disorder  Will defer other medication changes at this time due to patients physical condition and uncontrolled vomiting.     PRN  Trazodone for insomnia  Hydroxyzine for anxiety  Olanzapine 5-10 mg PO ODT PRN agitation or vomiting and 10mg IM TID for agitation     Laboratory/Imaging: Pt had extensive lab and imaging work up during medical admission. Labs notable for transient hyponatremia now normalized, elevated liver enzymes, elevated glucoses, elevated A1c. Will monitor fluid status and electrolytes in context of cyclic vomiting syndrome. Defer to IM for management and appreciate assist.   Added UPT.   *Repeat K+ 4.0, mag 1.2. IM ordered IV mag and IVF though patient has declined IV placement.     Patient will be treated in therapeutic milieu with appropriate individual and group therapies as described.    Medical treatment/interventions:  Medical concerns: IM consulted and patient recently transferred from medical unit for falls. Please see note from 2-19-22 for full details. Appreciate assist.  Diagnoses:    1) Cyclical vomiting syndrome -   -Per IM likely secondary to cannabis use.   -GI Cocktail PRN  -Zyprexa zydis PRN  -Monitor fluids and electrolytes Repeat Mg 1.2, K+ 4, IVF and IV mag ordered 2-19-22  -Mgmt per IM  -Consult nutrition   2) Type 2 DM, insulin dependent   - Hgb A1c 10.2% on admission 2/14.    - Continue Metformin 500mg BID started during medical  addmission  - FSBG //0200  -sliding scale insulin   - Hypoglycemia protocol   - Will need follow up with PCP outpatient      3) Presyncope, dizziness, falls -   -Presented to [facility] on 2/14 with 20+ falls over the last month and had positive Romberg's on exam. Inpatient workup negative for life threatening/acute concerns  - Likely 2/2 vestibular dysfunction, peripheral neuropathy, polysubstance use, nutritional deficiencies, deconditioning, anxiety could be contributing as well.    - Check thiamine   - Check orthostatic vital signs every morning   - Could trial Meclizine PRN if symptoms persist      -Consult with nutrition      4) HTN -   -Continue Lisinopril 5mg daily w/ hold parameters, likely will need to be uptitrated.   -PRN clonidine     5) Suspected NAFLD  Chronically elevated LFTs   CT Abdomen Pelvis w Contrast from 12/2030 ED note found fatty infiltration of liver and also noted pt has chronic LFT elevations with aminotransferases in the 200s.  Please contact IM if new or worsening abdominal pain, nausea, vomiting, jaundice or yellowing of eyes.          # GERD -   -Continue protonix 40mg daily.      # Routine health maintenance -   -Receive Pfizer COVID vaccine on 2/14, will need second dose in two weeks (around 3/1).      Plan: As above, continue to monitor  Consults: IM, nutrition. Please see their notes for full details. Appreciate assistance.    Legal Status: Voluntary    Safety Assessment:   Behavioral Orders   Procedures     Code 1 - Restrict to Unit     Fall precautions     Routine Programming     As clinically indicated     Status 15     Every 15 minutes.     Suicide precautions     Patients on Suicide Precautions should have a Combination Diet ordered that includes a Diet selection(s) AND a Behavioral Tray selection for Safe Tray - with utensils, or Safe Tray - NO utensils         Pt has not required locked seclusion or restraints in the past 24 hours to maintain safety, please refer to RN  "documentation for further details.    The risks, benefits, alternatives and side effects have been discussed and are understood by the patient.    Disposition: Pending clinical stabilization. Will likely discharge to home with daughter when stable.    This note was created by undersigned using a Dragon dictation system. All typing errors or contextual distortion are unintentional and software inherent.     Patient has been seen and evaluated by me, Leticia Juarez DO along with NP Student Qiana Guevara.     Leticia Juarez DO  [facility] Psychiatry    I, Dr Juarez, was present with the student who participated in the service and in the documentation of the note.  I have verified the history and personally performed the evaluation and medical decision making. In this note, I have personally updated assessment, plan, interim history, and mental status exam.      Leticia Juarez DO           Chief Complaint:     \"I just don't want to be a burden to my family\"          History of Present Illness:     This patient is a 46 year old female with history of bipolar disorder, MDD, SANDIE, and polysubstance abuse was transferred from medical unit with suicidal ideation in context of worsening physical health.    Per transfer note from medical:   Yun Coyle is a 46 year old female with a history of insulin dependent type 2 diabetes, hypertension, complicated psychiatric profile (multi substance use disorder [benzodiazepenes, marijuana, opioids on maintenance methadone], PTSD, MDD, SANDIE, cyclic vomiting syndrome) who presented with dizziness and falls (over 20) in the past month, she was found to have positive Romberg, listing to left on ambulation, elevated liver enzymes with bland CT, MR, MRA. Is it thought that her instability is primarily due to deconditioning, anxiety, and peripheral neuropathy 2/2 uncontrolled diabetes. Now medically stable transfered to inpatient psychiatry for " "SI.      #Suicidal Ideation, Plans  #Depression  #Insomnia, PTSD, SANDIE  She says that her mental health is \"not so good.\" Has a history of suicidal attempts. She feels depressed. Pt worries about being a burden on her family the way that she is. Pt says that rather than being a burden to the people that she lives with she would plan to kill herself before going home.  - psych consult placed, appreciate recs  - Remeron 15 mg at bedtime  - increase Prazosin 2 mg at bedtime increasing to 2 mg on 2/17.    - Transferred to inpt psyc      Interview with patient was complicated by patient frequently showering to reduce active vomiting and active vomiting when out of shower.     She endorses long history of depression starting at age 14 when she had her first suicide attempt. She has had hypomanic episodes in the past, most recently several weeks ago that included increased spending on the Internet, euphoric mood and decreased need for sleep. She states the current episode of depression has been ongoing for months other than the few days she felt manic. She is unable to enjoy time with her granddaughter or daughter and feels she is a burden. Her last suicide attempt was 18 months ago ( she did not seek treatment) and she feels the current depression is equal to or worse than she felt prior to attempting overdose at that time.     Pt with hx of polysubstance abuse. U-tox positive for benzos, cannabis, and opiates (on methadone). Patient has history of tolerance, withdrawal, progressive use, loss of control, spending more time and more amount than intended. She denies current CD concerns but is using marijuana and has developed cyclic vomiting syndrome.     Patients goals of hospitalization are to reduce feelings of depression and worthlessness. She wants to be able to enjoy her family again.               Psychiatric Review of Systems:   Depression:   Reports: depressed mood, suicidal ideation, decreased interest, changes in " sleep, changes in appetite, guilt, hopelessness, helplessness, impaired concentration, decreased energy, irritability.   Denies:   Jessica: Reports hypomanic episode several weeks ago that last 2-3 days  Reports: sleeplessness, impulsiveness,increase in energy, euphoric mood  Denies:  increased goal-directed activities,   Psychosis:   Denies: visual hallucinations, auditory hallucinations, paranoia  Anxiety:   Reports: excessive worries that are difficult to control,   Denies:  panic attacks  PTSD:   Reports: re-experiencing past trauma, nightmares, increased arousal,   Denies:  avoidance of traumatic stimuli, impaired function.  OCD: .  Denies: obsessions, checking, symmetry, cleaning, skin picking.  ED:   Denies: restriction, binging, purging.           Medical Review of Systems:     10 point review of systems is otherwise negative unless noted above.            Psychiatric History:   Psychiatric Hospitalizations: Several, unsure of last hospitalization but first was at age 14 after SA  History of Psychosis: Denies  Prior ECT: Denies  Court Commitment: Denies  Suicide Attempts: Last 18 months ago, did not seek help. First was at age 14  Self-injurious Behavior: Denies  Violence toward others: Denies  Use of Psychotropics: Has tried wellbutrin and lexapro, both made her feel worse. Current medications helpful. Prior gabapentin and zyprexa helpful.          Substance Use History:   Alcohol: none  Cannabis: daily  Nicotine: daily  Cocaine: none  Methamphetamine: none  Opiates/Heroin: on methadone daily  Benzodiazepines: Utox positive for benzo's. Previous hx of buying off the streets. Use not reviewed at this time due to patient's inability to fully participate in interview given ongoing emesis  Hallucinogens: none  Inhalants: none      Prior Chemical Dependency treatment: none          Social History:   Upbringing: not reviewed due to vomitting  Educational History: some college  Relationships: no current  Children:  daughter  Current Living Situation:with granddaughter and daugter  Occupational History: not working   Financial Support: disability   Legal History:no current concerns   Abuse/Trauma History:PTSD from observing friends SA, denies other trauma          Family History:     H/o completed suicides in family: Unclear. Patient unable to participate in full interview at this time. Per chart review patient has reported undiagnosed mental health concerns with her mother.    Family History   Problem Relation Age of Onset     Diabetes Mother      Cancer Mother      Cancer Maternal Grandmother      Diabetes Maternal Grandmother             Past Medical History:     Past Medical History:   Diagnosis Date     Anxiety      Depressive disorder      Gastroesophageal reflux disease      Hypertension      Type 2 diabetes mellitus (H)      Uncomplicated asthma        History of seizures:Denies  History of Head Trauma and/or loss of consciousness: Denies but has had multiple falls.          Past Surgical History:     Past Surgical History:   Procedure Laterality Date     BACK SURGERY      x2     CHOLECYSTECTOMY       ORTHOPEDIC SURGERY       SALPINGO OOPHORECTOMY,R/L/ELDA Right               Allergies:    No Known Allergies           Medications:   I have reviewed this patient's current medications  Medications Prior to Admission   Medication Sig Dispense Refill Last Dose     ASPIRIN NOT PRESCRIBED (INTENTIONAL) Please choose reason not prescribed, below        blood glucose (NO BRAND SPECIFIED) lancets standard Use to test blood sugar 4 times daily or as directed. 100 each 0      blood glucose (NO BRAND SPECIFIED) test strip Use to test blood sugar 3 times daily or as directed. 100 each 0      blood glucose monitoring (NO BRAND SPECIFIED) meter device kit Use to test blood sugar 4 times daily or as directed. 1 kit 0      blood glucose monitoring (NO BRAND SPECIFIED) meter device kit Use to test blood sugar 4 times daily or as directed.  "1 kit 0      insulin pen needle (30G X 8 MM) 30G X 8 MM miscellaneous Use 3 pen needles daily or as directed. 100 each 1      methadone HCl 10 MG/5ML SOLN Take 130 mg by mouth daily Dose verified with [facility] 2/14/22 (phone [phone])                Labs:     Recent Results (from the past 24 hour(s))   Glucose by meter    Collection Time: 02/18/22  9:14 AM   Result Value Ref Range    GLUCOSE BY METER POCT 205 (H) 70 - 99 mg/dL   Glucose by meter    Collection Time: 02/18/22  1:20 PM   Result Value Ref Range    GLUCOSE BY METER POCT 207 (H) 70 - 99 mg/dL   Glucose by meter    Collection Time: 02/18/22  5:29 PM   Result Value Ref Range    GLUCOSE BY METER POCT 164 (H) 70 - 99 mg/dL   Glucose by meter    Collection Time: 02/19/22 12:35 AM   Result Value Ref Range    GLUCOSE BY METER POCT 189 (H) 70 - 99 mg/dL       BP (!) 151/94 (BP Location: Right arm, Patient Position: Sitting)   Pulse 67   Temp 98.7  F (37.1  C) (Oral)   Resp 16   Ht 1.753 m (5' 9\")   Wt 90.7 kg (200 lb)   SpO2 99%   BMI 29.53 kg/m    Weight is 200 lbs 0 oz  Body mass index is 29.53 kg/m .         Psychiatric Mental Status Examination:   Appearance: awake, alert, in distress from vomitting  Attitude: cooperative and pleasant  Eye Contact: good  Mood:  \"depressed\"  Affect: mood congruent and full range  Speech:  clear, coherent and normal prosody  Language: fluent in English  Psychomotor Behavior:  no evidence of tardive dyskinesia, dystonia, or tics  Gait/Station: normal  Thought Process:  linear, logical, goal oriented  Associations:  no loose associations  Thought Content:  Positive for passive SI, denies HI/AVH; no evidence of psychotic thinking  Insight:  fair  Judgement: fair  Oriented to:  time, person, and place  Attention Span and Concentration:  limited 2/2 feeling unwell  Recent and Remote Memory:  intact  Fund of Knowledge: appropriate    Clinical Global Impressions  First:  Considering your total clinical " experience with this particular patient population, how severe are the patient's symptoms at this time?: 7 (02/19/22 1731)  Compared to the patient's condition at the START of treatment, this patient's condition is: 5 (02/19/22 1731)  Most recent:  Considering your total clinical experience with this particular patient population, how severe are the patient's symptoms at this time?: 7 (02/19/22 1731)  Compared to the patient's condition at the START of treatment, this patient's condition is: 5 (02/19/22 1731)         Physical Exam:   Please refer to physical exam completed by hospitalist doctor SHAHRZAD Gutierrez, on 2-18-22. I agree with the findings and assessment and have no additional findings to add at this time, except patient has multiple bruises visible on arms and legs as well as complaints of L rib pain (under axilla) from fall PTA  Vital Signs: Temp: 98.5  F (36.9  C) Temp src: Oral BP: 126/78 Pulse: 69   Resp: 18 SpO2: 98 % O2 Device: None (Room air)    Weight: 202 lbs 12.8 oz      Gen: NAD  CV: RRR, Ns1,S2. No JVD   Pulm: Clear B/l. No wheezing   Abd: Soft. Non-distended. Non-tender to palpation   Ext:  No Periferal edema   Neuro: Non-focal.

## 2022-02-20 LAB
GLUCOSE BLDC GLUCOMTR-MCNC: 115 MG/DL (ref 70–99)
GLUCOSE BLDC GLUCOMTR-MCNC: 147 MG/DL (ref 70–99)
GLUCOSE BLDC GLUCOMTR-MCNC: 149 MG/DL (ref 70–99)
GLUCOSE BLDC GLUCOMTR-MCNC: 195 MG/DL (ref 70–99)
GLUCOSE BLDC GLUCOMTR-MCNC: 240 MG/DL (ref 70–99)

## 2022-02-20 PROCEDURE — H2032 ACTIVITY THERAPY, PER 15 MIN: HCPCS

## 2022-02-20 PROCEDURE — 124N000002 HC R&B MH UMMC

## 2022-02-20 PROCEDURE — 250N000013 HC RX MED GY IP 250 OP 250 PS 637: Performed by: PSYCHIATRY & NEUROLOGY

## 2022-02-20 RX ADMIN — METFORMIN HYDROCHLORIDE 500 MG: 500 TABLET, FILM COATED ORAL at 18:18

## 2022-02-20 RX ADMIN — TRAZODONE HYDROCHLORIDE 50 MG: 50 TABLET ORAL at 22:04

## 2022-02-20 RX ADMIN — INSULIN ASPART 1 UNITS: 100 INJECTION, SOLUTION INTRAVENOUS; SUBCUTANEOUS at 18:18

## 2022-02-20 RX ADMIN — PANTOPRAZOLE SODIUM 40 MG: 40 TABLET, DELAYED RELEASE ORAL at 08:26

## 2022-02-20 RX ADMIN — ACETAMINOPHEN 650 MG: 325 TABLET, FILM COATED ORAL at 18:21

## 2022-02-20 RX ADMIN — METHADONE HYDROCHLORIDE 130 MG: 10 CONCENTRATE ORAL at 09:01

## 2022-02-20 RX ADMIN — HYDROXYZINE HYDROCHLORIDE 50 MG: 50 TABLET ORAL at 08:36

## 2022-02-20 RX ADMIN — INSULIN ASPART 2 UNITS: 100 INJECTION, SOLUTION INTRAVENOUS; SUBCUTANEOUS at 08:36

## 2022-02-20 RX ADMIN — LISINOPRIL 5 MG: 5 TABLET ORAL at 08:36

## 2022-02-20 RX ADMIN — PRAZOSIN HYDROCHLORIDE 2 MG: 2 CAPSULE ORAL at 22:04

## 2022-02-20 RX ADMIN — METFORMIN HYDROCHLORIDE 500 MG: 500 TABLET, FILM COATED ORAL at 09:01

## 2022-02-20 RX ADMIN — HYDROXYZINE HYDROCHLORIDE 50 MG: 50 TABLET ORAL at 22:04

## 2022-02-20 RX ADMIN — INSULIN ASPART 1 UNITS: 100 INJECTION, SOLUTION INTRAVENOUS; SUBCUTANEOUS at 12:23

## 2022-02-20 RX ADMIN — MIRTAZAPINE 15 MG: 15 TABLET, FILM COATED ORAL at 22:03

## 2022-02-20 ASSESSMENT — ACTIVITIES OF DAILY LIVING (ADL)
HYGIENE/GROOMING: INDEPENDENT
DRESS: SCRUBS (BEHAVIORAL HEALTH);INDEPENDENT
HYGIENE/GROOMING: INDEPENDENT
DRESS: INDEPENDENT
LAUNDRY: WITH SUPERVISION
ORAL_HYGIENE: INDEPENDENT
HYGIENE/GROOMING: INDEPENDENT
DRESS: INDEPENDENT
LAUNDRY: WITH SUPERVISION
LAUNDRY: WITH SUPERVISION
ORAL_HYGIENE: INDEPENDENT
ORAL_HYGIENE: INDEPENDENT

## 2022-02-20 NOTE — PLAN OF CARE
Upon waking up, pt states she is still feeling good. She denies nausea. Pt was able to take her medications and eat breakfast and lunch without issue. Pt drinking fluids without issue. Pt continues to deny nausea and no emesis today. Pt states she thinks waking up to eat during the night was helpful. Pt denies SI/SIB/HI/AVH. Pt continues to be isolative to her room reading and reports feeling down/depressed. Pt requested PRN hydroxyzine for anxiety this morning which was given with relief. Pt also given a lavender patch.      at breakfast. 2 units of insulin given.  at lunch. 1 unit insulin given. BP this morning 167/105. Upon recheck, 156/109. Pt does not qualify for PRN clonidine. Pt denies headache, vision changes, other htn sx. Will continue to monitor. Appetite and fluid intake adequate. Pt endorses left rib pain but denies need for PRN medication or other intervention. No medication side effects reported or observed.

## 2022-02-21 LAB
ANION GAP SERPL CALCULATED.3IONS-SCNC: 9 MMOL/L (ref 3–14)
BUN SERPL-MCNC: 15 MG/DL (ref 7–30)
CALCIUM SERPL-MCNC: 9 MG/DL (ref 8.5–10.1)
CHLORIDE BLD-SCNC: 99 MMOL/L (ref 94–109)
CO2 SERPL-SCNC: 21 MMOL/L (ref 20–32)
CREAT SERPL-MCNC: 0.6 MG/DL (ref 0.52–1.04)
GFR SERPL CREATININE-BSD FRML MDRD: >90 ML/MIN/1.73M2
GLUCOSE BLD-MCNC: 388 MG/DL (ref 70–99)
GLUCOSE BLDC GLUCOMTR-MCNC: 103 MG/DL (ref 70–99)
GLUCOSE BLDC GLUCOMTR-MCNC: 120 MG/DL (ref 70–99)
GLUCOSE BLDC GLUCOMTR-MCNC: 143 MG/DL (ref 70–99)
GLUCOSE BLDC GLUCOMTR-MCNC: 306 MG/DL (ref 70–99)
GLUCOSE BLDC GLUCOMTR-MCNC: 88 MG/DL (ref 70–99)
HOLD SPECIMEN: NORMAL
MAGNESIUM SERPL-MCNC: 1.5 MG/DL (ref 1.6–2.3)
POTASSIUM BLD-SCNC: 3.9 MMOL/L (ref 3.4–5.3)
SODIUM SERPL-SCNC: 129 MMOL/L (ref 133–144)
VIT B1 PYROPHOSHATE BLD-SCNC: 116 NMOL/L

## 2022-02-21 PROCEDURE — 250N000013 HC RX MED GY IP 250 OP 250 PS 637: Performed by: NURSE PRACTITIONER

## 2022-02-21 PROCEDURE — 83735 ASSAY OF MAGNESIUM: CPT | Performed by: PHYSICIAN ASSISTANT

## 2022-02-21 PROCEDURE — 250N000013 HC RX MED GY IP 250 OP 250 PS 637: Performed by: PSYCHIATRY & NEUROLOGY

## 2022-02-21 PROCEDURE — 124N000002 HC R&B MH UMMC

## 2022-02-21 PROCEDURE — 80048 BASIC METABOLIC PNL TOTAL CA: CPT | Performed by: PHYSICIAN ASSISTANT

## 2022-02-21 PROCEDURE — 250N000011 HC RX IP 250 OP 636: Performed by: NURSE PRACTITIONER

## 2022-02-21 PROCEDURE — 99233 SBSQ HOSP IP/OBS HIGH 50: CPT | Performed by: PSYCHIATRY & NEUROLOGY

## 2022-02-21 PROCEDURE — 250N000011 HC RX IP 250 OP 636: Performed by: PHYSICIAN ASSISTANT

## 2022-02-21 PROCEDURE — 36415 COLL VENOUS BLD VENIPUNCTURE: CPT | Performed by: PHYSICIAN ASSISTANT

## 2022-02-21 PROCEDURE — 258N000003 HC RX IP 258 OP 636: Performed by: PHYSICIAN ASSISTANT

## 2022-02-21 RX ORDER — TRAZODONE HYDROCHLORIDE 100 MG/1
100 TABLET ORAL AT BEDTIME
Status: DISCONTINUED | OUTPATIENT
Start: 2022-02-21 | End: 2022-02-24 | Stop reason: HOSPADM

## 2022-02-21 RX ORDER — TRAZODONE HYDROCHLORIDE 50 MG/1
50 TABLET, FILM COATED ORAL
Status: DISCONTINUED | OUTPATIENT
Start: 2022-02-21 | End: 2022-02-24 | Stop reason: HOSPADM

## 2022-02-21 RX ORDER — MAGNESIUM SULFATE 1 G/100ML
1 INJECTION INTRAVENOUS ONCE
Status: COMPLETED | OUTPATIENT
Start: 2022-02-21 | End: 2022-02-21

## 2022-02-21 RX ADMIN — CLONIDINE HYDROCHLORIDE 0.1 MG: 0.1 TABLET ORAL at 08:51

## 2022-02-21 RX ADMIN — OLANZAPINE 10 MG: 5 TABLET, ORALLY DISINTEGRATING ORAL at 10:32

## 2022-02-21 RX ADMIN — METFORMIN HYDROCHLORIDE 500 MG: 500 TABLET, FILM COATED ORAL at 08:27

## 2022-02-21 RX ADMIN — MIRTAZAPINE 15 MG: 15 TABLET, FILM COATED ORAL at 21:09

## 2022-02-21 RX ADMIN — METHADONE HYDROCHLORIDE 130 MG: 10 CONCENTRATE ORAL at 08:33

## 2022-02-21 RX ADMIN — SODIUM CHLORIDE, POTASSIUM CHLORIDE, SODIUM LACTATE AND CALCIUM CHLORIDE 1000 ML: 600; 310; 30; 20 INJECTION, SOLUTION INTRAVENOUS at 12:02

## 2022-02-21 RX ADMIN — MAGNESIUM SULFATE HEPTAHYDRATE 1 G: 1 INJECTION, SOLUTION INTRAVENOUS at 17:01

## 2022-02-21 RX ADMIN — INSULIN ASPART 2 UNITS: 100 INJECTION, SOLUTION INTRAVENOUS; SUBCUTANEOUS at 12:39

## 2022-02-21 RX ADMIN — METFORMIN HYDROCHLORIDE 500 MG: 500 TABLET, FILM COATED ORAL at 18:10

## 2022-02-21 RX ADMIN — PROCHLORPERAZINE EDISYLATE 5 MG: 5 INJECTION INTRAMUSCULAR; INTRAVENOUS at 08:07

## 2022-02-21 RX ADMIN — HYDROXYZINE HYDROCHLORIDE 50 MG: 50 TABLET ORAL at 21:15

## 2022-02-21 RX ADMIN — PRAZOSIN HYDROCHLORIDE 2 MG: 2 CAPSULE ORAL at 21:09

## 2022-02-21 RX ADMIN — MAGNESIUM SULFATE HEPTAHYDRATE 1 G: 1 INJECTION, SOLUTION INTRAVENOUS at 11:33

## 2022-02-21 RX ADMIN — TRAZODONE HYDROCHLORIDE 100 MG: 100 TABLET ORAL at 21:09

## 2022-02-21 RX ADMIN — LISINOPRIL 5 MG: 5 TABLET ORAL at 08:27

## 2022-02-21 RX ADMIN — HYDROXYZINE HYDROCHLORIDE 50 MG: 50 TABLET ORAL at 08:28

## 2022-02-21 RX ADMIN — PANTOPRAZOLE SODIUM 40 MG: 40 TABLET, DELAYED RELEASE ORAL at 08:27

## 2022-02-21 ASSESSMENT — ACTIVITIES OF DAILY LIVING (ADL)
ORAL_HYGIENE: INDEPENDENT
HYGIENE/GROOMING: INDEPENDENT
LAUNDRY: WITH SUPERVISION
DRESS: SCRUBS (BEHAVIORAL HEALTH);INDEPENDENT

## 2022-02-21 NOTE — PROGRESS NOTES
CLINICAL NUTRITION SERVICES - ASSESSMENT NOTE     Nutrition Prescription    RECOMMENDATIONS FOR MDs/PROVIDERS TO ORDER:  -Pt has had elevated LFT's since 2019 and it is noted pt has suspected diagnosis of NAFLD - like;ly cause over nutritional factors     -Suggest medication compliance for DM2 and avoidance of abused substances for overall improved nutritional status     Malnutrition Status:    Patient does not likely meet two of the established criteria necessary for diagnosing malnutrition (no NFPA completed)     Recommendations already ordered by Registered Dietitian (RD):  Chocolate Glucerna BID at lunch and dinner meals    Future/Additional Recommendations:  Monitor meal intake and supplement acceptance   Monitor weight/lab trends      REASON FOR ASSESSMENT  Yun Coyle is a/an 46 year old female assessed by the dietitian for Provider Order - Cyclic vomiting syndrome, elevated A1c, possible malnutrition contributing to falls, and elevated liver enzymes    NUTRITION/MEDICAL HISTORY  Per chart review: Pt admits to facility in the setting of SI, bipolar disorder, MDD, SANDIE, polysubstance use disorder (benzos, cannabis, opioid use disorder on methadone), PTSD, cyclic vomiting syndrome, HTN, IDDM, and GERD    Per pt visit: RD called unit and spoke with pt over the phone, reviewed weight trends pt reports has been around 200 lbs for at least 3-6 months (no noted weight loss), pt reports having nausea/emesis this AM, but was better as the day went on, able to tolerate lunch meal. Reviewed prior RD assessment and discussed supplement options, pt agreeing to supplements as above. RD encouraged pt to continue to request medication as needed for GI symptoms and increase po intakes as tolerated, pt verbalized understanding.     CURRENT NUTRITION ORDERS  Diet: High Consistent Carbohydrate  Supplement: Please send ginger ale x 3 cans for severe hyperemesis  Intake/Tolerance: 0->50% per pt reports     LABS  Results for  "MELINDA PAYNE (MRN [id number]) as of 2/21/2022 14:27   2/13/2022 19:19 2/14/2022 16:17 2/15/2022 08:49 2/16/2022 05:55 2/16/2022 17:02 2/19/2022 14:13 2/21/2022 10:49   Sodium 132 (L)  131 (L) 136  133 129 (L)   Potassium 4.2  4.3 3.8  4.0 3.9   Urea Nitrogen 10  17 18  18 15   Creatinine 0.66  0.72 0.64  0.59 0.60   GFR Estimate >90  >90 >90  >90 >90   Magnesium      1.2 (L) 1.5 (L)   Phosphorus      3.0    Albumin 3.4         Protein Total 8.3         Bilirubin Total 0.7         Alkaline Phosphatase 163 (H)          (H)         AST 85 (H)         CK Total 692 (H)         Copper  120.7         (H)         Hemoglobin A1C 10.2 (H)         TSH 0.70         Vitamin B1 Whole Blood Level     116     Vitamin B12 1,080 (H)         Vitamin B6   90.7         MEDICATIONS  Methadone, PRN Maalox, Remeron, Low resistance correction scale insulin, Metformin, Lisinopril, Prazosin, PRN Compazine, IVF completed today (2/21), IV mag replacement given today (2/21), Protonix     ANTHROPOMETRICS  Height: 175.3 cm (5' 9\")  Most Recent Weight: 90.4 kg (199 lb 3.2 oz)    IBW: 65.9 kg  BMI: Overweight BMI 25-29.9  UBW: Pt reports has been around 200 lbs for 3-6 months - pt notes no significant weight loss   Weight History:   Wt Readings from Last 20 Encounters:   02/20/22 90.4 kg (199 lb 3.2 oz)   02/18/22 92 kg (202 lb 12.8 oz)   12/03/20 99.8 kg (220 lb)     Weight assessment: Current weight appears stable from pt reported UBW trend of around 200 lbs.     Dosing Weight: 72.3 kg - adjusted BW     ASSESSED NUTRITION NEEDS  Estimated Energy Needs: 6555-1229 kcals/day (25 - 30 kcals/kg)  Justification: Maintenance, overweight  Estimated Protein Needs: 60-70 grams protein/day (0.8 - 1 grams of pro/kg)  Justification: Maintenance  Estimated Fluid Needs: 8340-3955 mL/day (25 - 30 mL/kg)   Justification: Maintenance    MALNUTRITION  % Intake: < 75% for > 7 days (moderate)  % Weight Loss: None noted  Subcutaneous Fat Loss: Not " assessed   Muscle Loss: Not assessed   Fluid Accumulation/Edema: None noted  Malnutrition Diagnosis: Patient does not likely meet two of the established criteria necessary for diagnosing malnutrition (no NFPA completed)     NUTRITION DIAGNOSIS  Predicted inadequate nutrient intake related to altered GI function as evidenced by diagnosis (acute on chronic cyclical vomiting in the setting of substance abuse)       INTERVENTIONS  Implementation  Nutrition Education: Encouraged pt to increase oral intakes as tolerated for improved health status    Medical food supplement therapy - ordered as above      Goals  Patient to consume % of nutritionally adequate meal trays TID, or the equivalent with supplements/snacks.     Monitoring/Evaluation  Progress toward goals will be monitored and evaluated per protocol.    Ratna Newton RD, CNSC, LD  [facility] RD pager: [phone]

## 2022-02-21 NOTE — PROGRESS NOTES
[facility], [place]   Psychiatric Progress Note  Hospital Day: 3        Interim History:   The patient's care was discussed with the treatment team during the daily team meeting and/or staff's chart notes were reviewed.  Staff report patient had better day physically on Sunday with no nausea or vomiting. She attended group but otherwise tended to isolate. She continues to have passive SI, no intention on the unit. Woke with nausea and vomiting this morning. Given IV compazine and IVF. SIO when on IVF. Was unable to take morning medications including methadone because of vomiting.     Upon interview, the patient was in her room. Physically feeling better and tolerating PO fluids. Continues to feel suicidal and depressed. She agrees to spend less time in her room as her physical symptoms allow. Reports sleep in interrupted, she wakes frequently. Did take trazodone last night and found in somewhat helpful. Requests it be scheduled at 100 mg and this was done. Discussed previous medications she has been on, Cymbalta and Remeron. Will likely increase Remeron as she is better able to tolerate PO to further target depressed mood. Can consider adding Cymbalta as well as it has been previously effective.   States she did not complete treatment at [facility] in 2019, but has remained off benzodiazepines. She believes she would likely relapse on opiates if not taking methadone. She has been using Marijuana for sleep and depression. Will further  patient on abstaining from this as well when physical symptoms improve.          Medications:       insulin aspart  1-4 Units Subcutaneous TID AC     lisinopril  5 mg Oral Daily     metFORMIN  500 mg Oral BID w/meals     methadone  130 mg Oral Daily     mirtazapine  15 mg Oral At Bedtime     pantoprazole  40 mg Oral QAM AC     prazosin  2 mg Oral At Bedtime     sodium chloride (PF)  3 mL Intracatheter Q8H          Allergies:   No Known Allergies        Labs:     Recent Results (from the past 48 hour(s))   Glucose by meter    Collection Time: 02/19/22  7:41 AM   Result Value Ref Range    GLUCOSE BY METER POCT 128 (H) 70 - 99 mg/dL   Glucose by meter    Collection Time: 02/19/22 12:28 PM   Result Value Ref Range    GLUCOSE BY METER POCT 289 (H) 70 - 99 mg/dL   Basic metabolic panel    Collection Time: 02/19/22  2:13 PM   Result Value Ref Range    Sodium 133 133 - 144 mmol/L    Potassium 4.0 3.4 - 5.3 mmol/L    Chloride 100 94 - 109 mmol/L    Carbon Dioxide (CO2) 21 20 - 32 mmol/L    Anion Gap 12 3 - 14 mmol/L    Urea Nitrogen 18 7 - 30 mg/dL    Creatinine 0.59 0.52 - 1.04 mg/dL    Calcium 9.8 8.5 - 10.1 mg/dL    Glucose 329 (H) 70 - 99 mg/dL    GFR Estimate >90 >60 mL/min/1.73m2   Magnesium    Collection Time: 02/19/22  2:13 PM   Result Value Ref Range    Magnesium 1.2 (L) 1.6 - 2.3 mg/dL   Phosphorus    Collection Time: 02/19/22  2:13 PM   Result Value Ref Range    Phosphorus 3.0 2.5 - 4.5 mg/dL   Glucose by meter    Collection Time: 02/19/22  5:22 PM   Result Value Ref Range    GLUCOSE BY METER POCT 254 (H) 70 - 99 mg/dL   Glucose by meter    Collection Time: 02/19/22  9:24 PM   Result Value Ref Range    GLUCOSE BY METER POCT 230 (H) 70 - 99 mg/dL   Glucose by meter    Collection Time: 02/20/22  1:51 AM   Result Value Ref Range    GLUCOSE BY METER POCT 147 (H) 70 - 99 mg/dL   Glucose by meter    Collection Time: 02/20/22  8:18 AM   Result Value Ref Range    GLUCOSE BY METER POCT 240 (H) 70 - 99 mg/dL   Glucose by meter    Collection Time: 02/20/22 12:17 PM   Result Value Ref Range    GLUCOSE BY METER POCT 195 (H) 70 - 99 mg/dL   Glucose by meter    Collection Time: 02/20/22  5:08 PM   Result Value Ref Range    GLUCOSE BY METER POCT 149 (H) 70 - 99 mg/dL   Glucose by meter    Collection Time: 02/20/22  9:57 PM   Result Value Ref Range    GLUCOSE BY METER POCT 115 (H) 70 - 99 mg/dL   Glucose by meter    Collection Time: 02/21/22  1:52 AM   Result Value Ref Range  "   GLUCOSE BY METER POCT 120 (H) 70 - 99 mg/dL          Psychiatric Examination:     /78 (BP Location: Right arm, Patient Position: Sitting, Cuff Size: Adult Regular)   Pulse 65   Temp 98  F (36.7  C)   Resp 16   Ht 1.753 m (5' 9\")   Wt 90.4 kg (199 lb 3.2 oz)   SpO2 100%   BMI 29.42 kg/m    Weight is 199 lbs 3.2 oz  Body mass index is 29.42 kg/m .    Orthostatic Vitals  Report      Most Recent      Sitting Orthostatic /105 02/20 0828    Sitting Orthostatic Pulse (bpm) 105 02/20 0828    Standing Orthostatic /106 02/20 0828    Standing Orthostatic Pulse (bpm) 114 02/20 0828        Appearance: fatigued and mild distress from nausea  Attitude:  cooperative  Eye Contact:  good  Mood:  sad  and depressed  Affect:  mood congruent  Speech:  clear, coherent  Language: fluent and intact in English  Psychomotor, Gait, Musculoskeletal:  no evidence of tardive dyskinesia, dystonia, or tics  Thought Process:  logical, linear and goal oriented  Associations:  no loose associations  Thought Content:  passive suicidal ideation present, no auditory hallucinations present and no visual hallucinations present  Insight:  fair  Judgement:  fair  Oriented to:  time, person, and place  Attention Span and Concentration:  limited, reports difficulty reading/concentrating due to SI  Recent and Remote Memory:  intact  Fund of Knowledge:  appropriate    Clinical Global Impressions  First:  Considering your total clinical experience with this particular patient population, how severe are the patient's symptoms at this time?: 7 (02/19/22 1731)  Compared to the patient's condition at the START of treatment, this patient's condition is: 5 (02/19/22 1731)  Most recent:  Considering your total clinical experience with this particular patient population, how severe are the patient's symptoms at this time?: 7 (02/19/22 1731)  Compared to the patient's condition at the START of treatment, this patient's condition is: 5 " (02/19/22 6812)         Precautions:     Behavioral Orders   Procedures     Code 1 - Restrict to Unit     Fall precautions     Routine Programming     As clinically indicated     Status 15     Every 15 minutes.     Suicide precautions     Patients on Suicide Precautions should have a Combination Diet ordered that includes a Diet selection(s) AND a Behavioral Tray selection for Safe Tray - with utensils, or Safe Tray - NO utensils            Diagnoses:   Unspecified depressive disorder (MDD recurrent vs bipolar affective type 2 depressed)  Suicidal ideation   Post traumatic Stress disorder  Generalized Anxiety Disorder  Agoraphobia per history   Opiate use disorder in sustained remission on maintenance assisted therapy with methadone  Cannabis use disorder, severe  Benzodiazepine use disorder   Cyclical vomiting syndrome   Type 2 DM, insulin dependent   Presyncope/dizziness/falls  Hypertension  Suspected NAFLD, elevated LFTs  GERD         Assessment & Plan:   Assessment and hospital summary:  This patient is a 46 year old female with history of bipolar disorder, MDD, SANDIE, and polysubstance abuse was transferred from medical unit with suicidal ideation in context of worsening physical health. Patients symptoms of several months of depression, loss of interest, sadness, hopelessness, guilt, and suicidal thinking are consistent with historical diagnosis of MDD, however pt also reports possible hypomanic symptoms including decreased need for sleep, increased energy, recklessness, and feelings of euphoria, further assessment needed with consideration of bipolarity of mood state.      Pt also with hx of polysubstance abuse. U-tox positive for cannabis (noted to be negative of opiates and patient is on Methadone, reports good medication adherence).  Patient has history of tolerance, withdrawal, progressive use, loss of control, spending more time and more amount than intended. She denies current CD concerns but is using  marijuana and has developed cyclic vomiting syndrome.     Due to patient's cyclical vomiting, have deferred medications changes at this time other than adding scheduled trazodone. Will increase mirtazapine as patient is better able to tolerate orals and consider adding Cymbalta as this has been effective in the past.     Patients goals of hospitalization are to reduce feelings of depression and worthlessness. She wants to be able to enjoy her family again.     Psychiatric treatment/inteventions:  Medications:   Continue Remeron 15 mg po for depression, consider increase to further target depressive symptoms as patients physical symptoms improve, monitoring closely for any emergence of mood instability/hypomania   Continue Prazosin 2 mg at HS for PTSD  Continue Methadone 130 mg for MAT of opiate use disorder  Add Trazodone 100 mg HS with additional 50 mg PRN   Will defer other medication changes at this time due to patients physical condition and uncontrolled vomiting.      PRN  Trazodone for insomnia  Hydroxyzine for anxiety  Olanzapine 5-10 mg PO ODT PRN agitation or vomiting and 10mg IM TID for agitation      Patient will be treated in therapeutic milieu with appropriate individual and group therapies as described.      Laboratory/Imaging: . Will monitor fluid status and electrolytes in context of cyclic vomiting syndrome. Defer to IM for management and appreciate assist. Repeat CMP 2-21 with Na 129, Mg 1.5  . Repeat BMP 2-22-22  Added serum HCG since pt unable to provide urine sample yet.       Medical treatment/interventions:  Medical concerns: IM consulted and patient recently transferred from medical unit for falls. Please see note from 2-19-22 for full details. Appreciate assist. Nutrition has also been consulted to assess nutrition needs.     Diagnoses:  1) Cyclical vomiting syndrome -   -Per IM likely secondary to cannabis use.   -GI Cocktail PRN  -Zyprexa zydis PRN  -Monitor fluids and electrolytes  Repeat Mg 1.2, K+ 4, IVF and IV mag ordered 2-19-22, received 2nd L LR on 2-21 along with Mg 2 grams IV, labs 2-21: K 3.9. Na 129 Mg 1.5  -Mgmt per IM  -Consult nutrition     2) Type 2 DM, insulin dependent   - Hgb A1c 10.2% on admission 2/14.    - Continue Metformin 500mg BID started during medical addmission  - FSBG AC/HS/0200  -sliding scale insulin   - Hypoglycemia protocol   - Will need follow up with PCP outpatient   -Consistant CHO diet  -Consult Nutrition      3) Presyncope, dizziness, falls -   -Presented to [facility] on 2/14 with 20+ falls over the last month and had positive Romberg's on exam. Inpatient workup negative for life threatening/acute concerns  - Likely 2/2 vestibular dysfunction, peripheral neuropathy, polysubstance use, nutritional deficiencies, deconditioning, anxiety could be contributing as well.    - Check thiamine   - Check orthostatic vital signs every morning   - Could trial Meclizine PRN if symptoms persist      -Consult with nutrition      4) HTN -   -Continue Lisinopril 5mg daily w/ hold parameters, likely will need to be uptitrated.   -PRN clonidine     5) Suspected NAFLD  Chronically elevated LFTs   CT Abdomen Pelvis w Contrast from 12/2030 ED note found fatty infiltration of liver and also noted pt has chronic LFT elevations with aminotransferases in the 200s.  Please contact IM if new or worsening abdominal pain, nausea, vomiting, jaundice or yellowing of eyes.          6) GERD -   -Continue protonix 40mg daily.     7) Hyponatremia  -Na 132 on admit to medical. Improved to 136 on 2/16  - on 2-21-22. Mgmt per IM   -Recheck 2-22      8) Routine health maintenance -   -Receive Pfizer COVID vaccine on 2/14, will need second dose in two weeks (around 3/1).       Patient has not had behavior leading to a seclusion or restraint episode     This note was created by undersigned using a Dragon dictation system. All typing errors or contextual distortion are unintentional and software  inherent.     Disposition Plan   Reason for ongoing admission: poses an imminent risk to self  Discharge location: home with family  Discharge Medications: not ordered  Follow-up Appointments: not scheduled  Legal Status: voluntary     Patient has been seen and evaluated by me, Leticia Juarez DO along with NP Student Qiana Guevara.     I, Dr Juarez, was present with the student who participated in the service and in the documentation of the note.  I have verified the history and personally performed the evaluation and medical decision making. In this note, I have personally updated assessment, plan, interim history, and mental status exam.      Leticia Juarez DO      Entered by: Leticia Juarez on 2/21/2022 at 7:32 AM

## 2022-02-21 NOTE — PLAN OF CARE
02/21/22 1521   General Information   Has Not Attended OT as of: 02/21/22     Plan: OT staff will meet with pt to review the role of occupational therapy and explain the value of having them involved in their treatment plan including options to meet current needs/self-identified goals. As group attendance is established, Pt will be given self-assessment to inform OT initial assessment.

## 2022-02-21 NOTE — PROGRESS NOTES
Brief Internal Medicine Note    Contacted by nursing regarding recurrence of nausea and vomiting this AM. Patient has vomited 3 times this AM, and currently in the shower to help relieve symptoms. Please see intiail consult note on 2/19/2022 for details for cyclic vomiting syndrome. In afternoon, per nursing, patient nausea and vomiting improved after receiving zyprexa, now tolerate PO.     - LR Bolus 1000 mL x1  - IV Magnesium 2 grams    - Repeat BMP with continued hypomagnesemia Mg 1.5, and hyponatremia Na 129 in setting of hyperglycemia 388, if corrected normal Na 134. Will repeat BMP on 2/22.     Medicine will follow up on BMP on 2/22.  No further recommendations at this time. Please page the on-call CASSANDRA for any intercurrent medical issues which arise.     Sheridan Ulloa PA-C  Hospitalist Service  Contact information available via [facility] Paging/Directory

## 2022-02-21 NOTE — PLAN OF CARE
Lactated ringers bolus , Iv push compazine and prn zyprexa administered for recurrent of nausea. Pt reported three emesis which were not witnessed by staff. Skipped breakfast ,but was able to eat lunch and drink fluid. Bg 143 and 306 this shift. Denies pain. Morning vital signs improved after pt took scheduled and prn BP medication.   Has chronic passive SI thought. Contracts for safety. Denied HA/VH. Mood is depressed and affect is flat. Took multiple shower regarding feeling nauseated.

## 2022-02-21 NOTE — PLAN OF CARE
Assessment/Intervention/Current Symtoms and Care Coordination  Patient remains passively suicidal.  She has history of Romberg agoraphobia, diabetes, falls.   Uses a walker and currently being followed by medical. Patient  Lives with her daughter and grandchildren.  She goes to [facility] for her Methadone.    Discharge Plan or Goal  Patient will return home at discharge and resume care with outpatient providers.    Barriers to Discharge   Patient needs continue psychiatric medication management  and assessments.    Referral Status  [facility] for Methadone  No Psychiatrist yet    Legal Status   Voluntary

## 2022-02-21 NOTE — PLAN OF CARE
Pt was visible in the milieu this evening. She attended art therapy group. Affect remains flat but brightens upon approach. Pt is pleasant and cooperative. Pt states she is feeling much better this evening. Pt denies any nausea or vomiting. Pt endorses some generalized body soreness resulting from vomiting so much yesterday. Pt given PRN tylenol with relief. Pt continues to report feeling down/depressed and reports some passive SI thoughts but contracts for safety on the unit.  Pt denies SIB/HI/AVH. Pt endorses some anxiety and requested PRN hydroxyzine at HS which was given. Trazodone PRN also given per pt request for sleep.     BG at dinner 149, 1 unit insulin given per order. BG at .    Appetite and fluid intake adequate. VSS. No medication side effects reported or observed.    Pt still has IV access in left forearm at this time, currently saline locked. Saline flush given at 1700 per order without issue. IV site looks WDL and dressing is CDI. No signs of infiltration or phlebitis noted.

## 2022-02-21 NOTE — PLAN OF CARE
Shift Summary  Mental  Stayed in room for majority of evening shift. Came to milieu briefly for short period of time. Pleasant and cooperative. Mood is depressed and affect is flat.  Denies suicidal/homicidal ideations, self injury behavior, racing thought as well as auditory and visual hallucinations. Did not participate in any therapeutic group activity. No nausea this shift.    Prn: none  Physical  Pt is alert and oriented x 3.  Able to communicate needs.   No psychomotor/gait abnormalities are noted.   Took medication with no difficulites. No side effect reported by pt or noted by this writer.   Appetite adequate. Ate dinner.  and 88 this shift.   No problem with bowel and bladder per pt.   Denies pain, discomfort or nausea.   Magnesium low. Pt received IV mg with no difficulties via PIV.   Prn: none  Sitting /88 pulse 77  Continue to monitor pt's status Q 15 minutes and stabilize the patient's symptoms with the use of medications and therapeutic programming.

## 2022-02-21 NOTE — PLAN OF CARE
02/20/22 1800   Group Therapy Session   Group Attendance attended group session   Time Session Began 0400   Time Session Ended 0500   Total Time patient participated (minutes) 60   Total # Attendees 7-8   Group Type expressive therapy  (art therapy)   Group Topic Covered coping skills/lifestyle management;self-care activities  (strengths and goals)   Group Session Detail What do you want to hold on to (strengths) and what are you reaching for (goals)?    Patient Response/Contribution cooperative with task;did not discuss personal experience   Patient Response Detail Yun presented as calm and pleasant. She engaged in group discussion of strengths and goals, stating she wants to go back to college. She particpated in making art about what she wants to hold on to and what she is reaching for. She appeared calm and focused while drawing. She chose not to share her drawing with the group and appeared guarded. She interacted appropriately with peers.   Goal Outcome Evaluation:    Plan of Care Reviewed With: patient

## 2022-02-21 NOTE — PLAN OF CARE
Problem: Sleep Disturbance  Goal: Adequate Sleep/Rest  Outcome: Ongoing, Progressing  Note: Pt sleep appears to have improved from night prior. Pt appears to have slept more soundly.     NOC Shift Report     Pt in bed at beginning of shift, breathing quiet and unlabored. Pt sleep appears to be improving. Pt slept 6.5 hours.      Around 0200, writer went in to assess pt. Pt expressed they were feeling significantly better then the day prior. Pt presented with a flat, blunted affect and calm mood. Pt was given a yogurt, pt ate yogurt. Pt BG at this time was 120, no interventions required. No further pt complaints or concerns at this time.      Pt IV assessed, IV located in Left forearm. Pt denies any pain at site of IV. No redness, swelling or infiltration noted. IV dressing is clean, dry, intact. IV was flushed with 3 mL of NS and is currently saline locked.      No PRNs given. Will continue to monitor.      Precautions: Fall, Suicide

## 2022-02-22 LAB
ANION GAP SERPL CALCULATED.3IONS-SCNC: 7 MMOL/L (ref 3–14)
BUN SERPL-MCNC: 13 MG/DL (ref 7–30)
CALCIUM SERPL-MCNC: 9.2 MG/DL (ref 8.5–10.1)
CHLORIDE BLD-SCNC: 104 MMOL/L (ref 94–109)
CO2 SERPL-SCNC: 23 MMOL/L (ref 20–32)
CREAT SERPL-MCNC: 0.58 MG/DL (ref 0.52–1.04)
GFR SERPL CREATININE-BSD FRML MDRD: >90 ML/MIN/1.73M2
GLUCOSE BLD-MCNC: 191 MG/DL (ref 70–99)
GLUCOSE BLDC GLUCOMTR-MCNC: 123 MG/DL (ref 70–99)
GLUCOSE BLDC GLUCOMTR-MCNC: 198 MG/DL (ref 70–99)
GLUCOSE BLDC GLUCOMTR-MCNC: 271 MG/DL (ref 70–99)
GLUCOSE BLDC GLUCOMTR-MCNC: 85 MG/DL (ref 70–99)
HCG SERPL QL: NEGATIVE
MAGNESIUM SERPL-MCNC: 1.8 MG/DL (ref 1.6–2.3)
POTASSIUM BLD-SCNC: 3.9 MMOL/L (ref 3.4–5.3)
SODIUM SERPL-SCNC: 134 MMOL/L (ref 133–144)
VIT B1 SERPL-SCNC: 2 NMOL/L

## 2022-02-22 PROCEDURE — 83735 ASSAY OF MAGNESIUM: CPT | Performed by: PHYSICIAN ASSISTANT

## 2022-02-22 PROCEDURE — 124N000002 HC R&B MH UMMC

## 2022-02-22 PROCEDURE — 84703 CHORIONIC GONADOTROPIN ASSAY: CPT | Performed by: PSYCHIATRY & NEUROLOGY

## 2022-02-22 PROCEDURE — 36415 COLL VENOUS BLD VENIPUNCTURE: CPT | Performed by: PSYCHIATRY & NEUROLOGY

## 2022-02-22 PROCEDURE — 80048 BASIC METABOLIC PNL TOTAL CA: CPT | Performed by: PHYSICIAN ASSISTANT

## 2022-02-22 PROCEDURE — 99233 SBSQ HOSP IP/OBS HIGH 50: CPT | Performed by: PSYCHIATRY & NEUROLOGY

## 2022-02-22 PROCEDURE — 250N000013 HC RX MED GY IP 250 OP 250 PS 637: Performed by: PSYCHIATRY & NEUROLOGY

## 2022-02-22 PROCEDURE — 250N000011 HC RX IP 250 OP 636: Performed by: NURSE PRACTITIONER

## 2022-02-22 RX ORDER — DULOXETIN HYDROCHLORIDE 30 MG/1
30 CAPSULE, DELAYED RELEASE ORAL DAILY
Status: DISCONTINUED | OUTPATIENT
Start: 2022-02-22 | End: 2022-02-24 | Stop reason: HOSPADM

## 2022-02-22 RX ORDER — MIRTAZAPINE 30 MG/1
30 TABLET, FILM COATED ORAL AT BEDTIME
Status: DISCONTINUED | OUTPATIENT
Start: 2022-02-22 | End: 2022-02-24 | Stop reason: HOSPADM

## 2022-02-22 RX ORDER — OLANZAPINE 10 MG/1
10 TABLET, ORALLY DISINTEGRATING ORAL AT BEDTIME
Status: DISCONTINUED | OUTPATIENT
Start: 2022-02-22 | End: 2022-02-24 | Stop reason: HOSPADM

## 2022-02-22 RX ORDER — OLANZAPINE 5 MG/1
5 TABLET, ORALLY DISINTEGRATING ORAL DAILY
Status: DISCONTINUED | OUTPATIENT
Start: 2022-02-23 | End: 2022-02-24 | Stop reason: HOSPADM

## 2022-02-22 RX ADMIN — PROCHLORPERAZINE EDISYLATE 5 MG: 5 INJECTION INTRAMUSCULAR; INTRAVENOUS at 08:07

## 2022-02-22 RX ADMIN — SENNOSIDES AND DOCUSATE SODIUM 1 TABLET: 50; 8.6 TABLET ORAL at 20:27

## 2022-02-22 RX ADMIN — METHADONE HYDROCHLORIDE 130 MG: 10 CONCENTRATE ORAL at 09:17

## 2022-02-22 RX ADMIN — OLANZAPINE 10 MG: 5 TABLET, ORALLY DISINTEGRATING ORAL at 08:06

## 2022-02-22 RX ADMIN — TRAZODONE HYDROCHLORIDE 50 MG: 50 TABLET ORAL at 00:56

## 2022-02-22 RX ADMIN — PRAZOSIN HYDROCHLORIDE 2 MG: 2 CAPSULE ORAL at 22:25

## 2022-02-22 RX ADMIN — PROCHLORPERAZINE EDISYLATE 5 MG: 5 INJECTION INTRAMUSCULAR; INTRAVENOUS at 00:57

## 2022-02-22 RX ADMIN — PANTOPRAZOLE SODIUM 40 MG: 40 TABLET, DELAYED RELEASE ORAL at 09:17

## 2022-02-22 RX ADMIN — METFORMIN HYDROCHLORIDE 500 MG: 500 TABLET, FILM COATED ORAL at 09:17

## 2022-02-22 RX ADMIN — INSULIN ASPART 1 UNITS: 100 INJECTION, SOLUTION INTRAVENOUS; SUBCUTANEOUS at 09:16

## 2022-02-22 RX ADMIN — METFORMIN HYDROCHLORIDE 500 MG: 500 TABLET, FILM COATED ORAL at 18:37

## 2022-02-22 RX ADMIN — MIRTAZAPINE 30 MG: 30 TABLET, FILM COATED ORAL at 22:25

## 2022-02-22 RX ADMIN — ACETAMINOPHEN 650 MG: 325 TABLET, FILM COATED ORAL at 22:26

## 2022-02-22 RX ADMIN — TRAZODONE HYDROCHLORIDE 100 MG: 100 TABLET ORAL at 22:25

## 2022-02-22 RX ADMIN — HYDROXYZINE HYDROCHLORIDE 50 MG: 50 TABLET ORAL at 18:41

## 2022-02-22 RX ADMIN — LISINOPRIL 5 MG: 5 TABLET ORAL at 09:17

## 2022-02-22 RX ADMIN — DULOXETINE HYDROCHLORIDE 30 MG: 30 CAPSULE, DELAYED RELEASE ORAL at 16:23

## 2022-02-22 RX ADMIN — OLANZAPINE 10 MG: 10 TABLET, ORALLY DISINTEGRATING ORAL at 22:25

## 2022-02-22 RX ADMIN — INSULIN ASPART 2 UNITS: 100 INJECTION, SOLUTION INTRAVENOUS; SUBCUTANEOUS at 12:42

## 2022-02-22 ASSESSMENT — ACTIVITIES OF DAILY LIVING (ADL)
HYGIENE/GROOMING: INDEPENDENT
DRESS: SCRUBS (BEHAVIORAL HEALTH);INDEPENDENT
ORAL_HYGIENE: INDEPENDENT
DRESS: INDEPENDENT;SCRUBS (BEHAVIORAL HEALTH)
ORAL_HYGIENE: INDEPENDENT
LAUNDRY: WITH SUPERVISION
HYGIENE/GROOMING: INDEPENDENT

## 2022-02-22 NOTE — PROGRESS NOTES
[facility], [place]   Psychiatric Progress Note  Hospital Day: 4        Interim History:   The patient's care was discussed with the treatment team during the daily team meeting and/or staff's chart notes were reviewed.  Staff report patient was able to tolerate PO after lunch yesterday. Had difficulty falling asleep last night. She had complaints of nausea, given PRN compazine. Denied SI last evening. Medication adherent as able with on-going nausea/vomiting.     Upon interview, Patient was sitting in her room. She had nausea and vomiting this morning, but was feeling better by the time of interview and was able to take medications and tolerate PO. She did not feel ready to eat but planned to have some lunch. She reports anxiety was increased last night and made sleep worse even with increased dose of trazodone. Continues to feel depressed, passive SI but those thoughts are less frequent. Discussed med changes with patients including risks and benefits and patient in agreement. Will increase mirtazapine to 30 mg to target depression and anxiety. Will start Cymbalta at 30 mg daily. Adding scheduled Zyprexa Zydis 10 mg at HS and 5 mg in the morning to help reduce nausea and promote sleep. Discussed gabapentin, patient has been on this for the past for numbness/tinlging in hips/legs from back surgery but it was not helpful so this was not added. Can consider for anxiety if needed in the future. Patient is interested in a referral/appointment for psychiatry after discharge. She declined referral for therapy or day treatment options noting that there are therapy classes at her methadone clinic 3 times a week that she can attend.          Medications:       insulin aspart  1-4 Units Subcutaneous TID AC     lisinopril  5 mg Oral Daily     metFORMIN  500 mg Oral BID w/meals     methadone  130 mg Oral Daily     mirtazapine  15 mg Oral At Bedtime     pantoprazole  40 mg Oral QAM AC     prazosin   2 mg Oral At Bedtime     sodium chloride (PF)  3 mL Intracatheter Q8H     traZODone  100 mg Oral At Bedtime          Allergies:   No Known Allergies       Labs:     Recent Results (from the past 48 hour(s))   Glucose by meter    Collection Time: 02/20/22  8:18 AM   Result Value Ref Range    GLUCOSE BY METER POCT 240 (H) 70 - 99 mg/dL   Glucose by meter    Collection Time: 02/20/22 12:17 PM   Result Value Ref Range    GLUCOSE BY METER POCT 195 (H) 70 - 99 mg/dL   Glucose by meter    Collection Time: 02/20/22  5:08 PM   Result Value Ref Range    GLUCOSE BY METER POCT 149 (H) 70 - 99 mg/dL   Glucose by meter    Collection Time: 02/20/22  9:57 PM   Result Value Ref Range    GLUCOSE BY METER POCT 115 (H) 70 - 99 mg/dL   Glucose by meter    Collection Time: 02/21/22  1:52 AM   Result Value Ref Range    GLUCOSE BY METER POCT 120 (H) 70 - 99 mg/dL   Glucose by meter    Collection Time: 02/21/22  7:58 AM   Result Value Ref Range    GLUCOSE BY METER POCT 143 (H) 70 - 99 mg/dL   Basic metabolic panel    Collection Time: 02/21/22 10:49 AM   Result Value Ref Range    Sodium 129 (L) 133 - 144 mmol/L    Potassium 3.9 3.4 - 5.3 mmol/L    Chloride 99 94 - 109 mmol/L    Carbon Dioxide (CO2) 21 20 - 32 mmol/L    Anion Gap 9 3 - 14 mmol/L    Urea Nitrogen 15 7 - 30 mg/dL    Creatinine 0.60 0.52 - 1.04 mg/dL    Calcium 9.0 8.5 - 10.1 mg/dL    Glucose 388 (H) 70 - 99 mg/dL    GFR Estimate >90 >60 mL/min/1.73m2   Extra Purple Top Tube    Collection Time: 02/21/22 10:49 AM   Result Value Ref Range    Hold Specimen JIC    Magnesium    Collection Time: 02/21/22 10:49 AM   Result Value Ref Range    Magnesium 1.5 (L) 1.6 - 2.3 mg/dL   Glucose by meter    Collection Time: 02/21/22 11:38 AM   Result Value Ref Range    GLUCOSE BY METER POCT 306 (H) 70 - 99 mg/dL   Glucose by meter    Collection Time: 02/21/22  5:34 PM   Result Value Ref Range    GLUCOSE BY METER POCT 103 (H) 70 - 99 mg/dL   Glucose by meter    Collection Time: 02/21/22  9:12 PM  "  Result Value Ref Range    GLUCOSE BY METER POCT 88 70 - 99 mg/dL   Glucose by meter    Collection Time: 02/22/22  1:12 AM   Result Value Ref Range    GLUCOSE BY METER POCT 123 (H) 70 - 99 mg/dL          Psychiatric Examination:     /88 (BP Location: Right arm, Patient Position: Sitting, Cuff Size: Adult Regular)   Pulse 77   Temp 98.6  F (37  C) (Oral)   Resp 18   Ht 1.753 m (5' 9\")   Wt 90.4 kg (199 lb 3.2 oz)   SpO2 100%   BMI 29.42 kg/m    Weight is 199 lbs 3.2 oz  Body mass index is 29.42 kg/m .    Orthostatic Vitals  Report      Most Recent      Sitting Orthostatic /89 02/22 1133    Sitting Orthostatic Pulse (bpm) 85 02/22 1133    Standing Orthostatic /86 02/22 1133    Standing Orthostatic Pulse (bpm) 99 02/22 1133        Appearance: fatigued, no acute distress  Attitude:  cooperative   Eye Contact:  good  Mood:  sad  and depressed  Affect:  mood congruent  Speech:  clear, coherent  Language: fluent and intact in English  Psychomotor, Gait, Musculoskeletal:  no evidence of tardive dyskinesia, dystonia, or tics  Thought Process:  logical, linear and goal oriented  Associations:  no loose associations  Thought Content:  passive suicidal ideation present, no auditory hallucinations present and no visual hallucinations present  Insight:  fair  Judgement:  fair  Oriented to:  time, person, and place  Attention Span and Concentration:  limited, reports difficulty reading/concentrating due to SI  Recent and Remote Memory:  intact  Fund of Knowledge:  appropriate    Clinical Global Impressions  First:  Considering your total clinical experience with this particular patient population, how severe are the patient's symptoms at this time?: 7 (02/19/22 1731)  Compared to the patient's condition at the START of treatment, this patient's condition is: 5 (02/19/22 1731)  Most recent:  Considering your total clinical experience with this particular patient population, how severe are the patient's " symptoms at this time?: 7 (02/19/22 1731)  Compared to the patient's condition at the START of treatment, this patient's condition is: 5 (02/19/22 1731)         Precautions:     Behavioral Orders   Procedures     Code 1 - Restrict to Unit     Fall precautions     Routine Programming     As clinically indicated     Status 15     Every 15 minutes.     Status Individual Observation     Patient SIO status reviewed with team/RN.  Please also refer to RN/team documentation for add'l detail.    -SIO staff to monitor following which have contributed to patient being on SIO:  Pt with passive SI, requiring IV Fluids, SIO while fluids running   -Possible interventions SIO staff could use to support patient's treatment progress:  Assess for safety   -When following observed, team will review discontinuation of SIO:  Pt no longer reporting SI or no longer having IV fluids running     Order Specific Question:   CONTINUOUS 24 hours / day     Answer:   Other     Order Specific Question:   Specify distance     Answer:   10 feet     Order Specific Question:   Indications for SIO     Answer:   Medical equipment / ligature risk     Suicide precautions     Patients on Suicide Precautions should have a Combination Diet ordered that includes a Diet selection(s) AND a Behavioral Tray selection for Safe Tray - with utensils, or Safe Tray - NO utensils            Diagnoses:   Unspecified depressive disorder (MDD recurrent vs bipolar affective type 2 depressed)  Suicidal ideation   Post traumatic Stress disorder  Generalized Anxiety Disorder  Agoraphobia per history   Opiate use disorder in sustained remission on maintenance assisted therapy with methadone  Cannabis use disorder, severe  Benzodiazepine use disorder   Cyclical vomiting syndrome   Type 2 DM, insulin dependent   Presyncope/dizziness/falls  Hypertension  Suspected NAFLD, elevated LFTs  GERD         Assessment & Plan:   Assessment and hospital summary:  This patient is a 46 year  old female with history of bipolar disorder, MDD, SANDIE, and polysubstance abuse was transferred from medical unit with suicidal ideation in context of worsening physical health. Patients symptoms of several months of depression, loss of interest, sadness, hopelessness, guilt, and suicidal thinking are consistent with historical diagnosis of MDD, however pt also reports possible hypomanic symptoms including decreased need for sleep, increased energy, recklessness, and feelings of euphoria, further assessment needed with consideration of bipolarity of mood state.      Pt also with hx of polysubstance abuse. U-tox positive for cannabis (noted to be negative of opiates and patient is on Methadone, reports good medication adherence).  Patient has history of tolerance, withdrawal, progressive use, loss of control, spending more time and more amount than intended. She denies current CD concerns but is using marijuana and has developed cyclic vomiting syndrome.     Due to patient's cyclical vomiting, have deferred medications changes at this time other than adding scheduled trazodone. Will increase mirtazapine as patient is better able to tolerate orals and consider adding Cymbalta as this has been effective in the past.     -2-22-22: Mirtazapine increased to 30 mg at hs, Cymbalta 30 mg started. Added Zyprexa Zydis 10 mg at hs and 5 mg at 06 (with instructions not to wake patient) to target ongoing N/V    Patients goals of hospitalization are to reduce feelings of depression and worthlessness. She wants to be able to enjoy her family again.     Psychiatric treatment/inteventions:  Medications:   Increase Remeron to 30mg for depression/anxietywill monitor closely for any emergence of mood instability/hypomania   Continue Prazosin 2 mg at HS for PTSD  Continue Methadone 130 mg for MAT of opiate use disorder  Continue Trazodone 100 mg HS for sleep with additional 50 mg PRN   Start Cymbalta 30 mg daily for mood and pain  Start  Zyprexa zydis 5 mg in AM and 10 mg at HS for nausea and sleep      PRN  Trazodone for insomnia  Hydroxyzine for anxiety  Olanzapine 5-10 mg PO ODT PRN agitation or vomiting and 10mg IM TID for agitation      Patient will be treated in therapeutic milieu with appropriate individual and group therapies as described.      Laboratory/Imaging: . Will monitor fluid status and electrolytes in context of cyclic vomiting syndrome. Defer to IM for management and appreciate assist. Repeat BMP 2-22 with Na 134, Mg 1.8, K+ 3.9,  . Serum Hcg negative.         Medical treatment/interventions:  Medical concerns: IM consulted and patient recently transferred from medical unit for falls. Please see note from 2-19-22 for full details. Appreciate assist. Nutrition has also been consulted to assess nutrition needs.     Diagnoses:  1) Cyclical vomiting syndrome -   -Per IM likely secondary to cannabis use.   -GI Cocktail PRN  -Zyprexa zydis scheduled at 22 and 06, and PRN  -Monitor fluids and electrolytes Repeat Mg 1.2, K+ 4, IVF and IV mag ordered 2-19-22, received 2nd L LR on 2-21 along with Mg 2 grams IV, labs 2-21: K 3.9. Na 129 Mg 1.5  2-22 K 3.9, Mg, 1.8. Na 134   -Mgmt per IM  -Consult nutrition     2) Type 2 DM, insulin dependent   - Hgb A1c 10.2% on admission 2/14.    - Continue Metformin 500mg BID started during medical addmission  - FSBG AC/HS/0200  -sliding scale insulin   - Hypoglycemia protocol   - Will need follow up with PCP outpatient   -Consistant CHO diet  -Consult Nutrition      3) Presyncope, dizziness, falls -   -Presented to [facility] on 2/14 with 20+ falls over the last month and had positive Romberg's on exam. Inpatient workup negative for life threatening/acute concerns  - Likely 2/2 vestibular dysfunction, peripheral neuropathy, polysubstance use, nutritional deficiencies, deconditioning, anxiety could be contributing as well.    - Check thiamine   - Check orthostatic vital signs every morning   - Could trial  Meclizine PRN if symptoms persist      -Consult with nutrition      4) HTN -   -Continue Lisinopril 5mg daily w/ hold parameters, likely will need to be uptitrated.   -PRN clonidine     5) Suspected NAFLD  Chronically elevated LFTs   CT Abdomen Pelvis w Contrast from 12/2030 ED note found fatty infiltration of liver and also noted pt has chronic LFT elevations with aminotransferases in the 200s.  Please contact IM if new or worsening abdominal pain, nausea, vomiting, jaundice or yellowing of eyes.          6) GERD -   -Continue protonix 40mg daily.     7) Hyponatremia  -Na 132 on admit to medical. Improved to 136 on 2/16  - on 2-21-22. Mgmt per IM   -Recheck 2-22 134     8) Routine health maintenance -   -Receive Pfizer COVID vaccine on 2/14, will need second dose in two weeks (around 3/1).       Patient has not had behavior leading to a seclusion or restraint episode     This note was created by undersigned using a Dragon dictation system. All typing errors or contextual distortion are unintentional and software inherent.     Disposition Plan   Reason for ongoing admission: poses an imminent risk to self  Discharge location: home with family  Discharge Medications: not ordered  Follow-up Appointments: not scheduled  Legal Status: voluntary     Patient has been seen and evaluated by me, Leticia Juarez DO along with NP Student Qiana Guevara.     I, Dr Juarez, was present with the student who participated in the service and in the documentation of the note.  I have verified the history and personally performed the evaluation and medical decision making. In this note, I have personally updated assessment, plan, interim history, and mental status exam.      Leticia Juarez DO      Entered by: Leticia Juarez on 2/22/2022 at 6:42 AM

## 2022-02-22 NOTE — PLAN OF CARE
"Problem: Sleep Disturbance  Goal: Adequate Sleep/Rest  Outcome: Ongoing, Progressing  Note: Pt presented with some difficulty falling asleep.     NOC Shift Report    Pt awake in their room at the start of the shift. Pt had some difficulty falling asleep. Pt slept 5.75 hours.     Around 0100, pt came out of room requesting medication for sleep and nausea. PRN Trazodone 50 mg was given. Pt stated \"I feel a little nauseous. I am okay thought. I just want to stay on top of it.\" Compazine 5 mg was administered via IV. Pt was given a yofurt at this time. Pt verbalized the yogurt was consumed without difficulty or discomfort. Pt given hot packs. Pt BG was obtained around 0115. BG was 123, no interventions required at this time. Pt returned to their room to rest. PRN medications appear to have been effective. No additional pt complaints or concerns at this time. Will continue to monitor.     Pt IV assessed, IV located in Left forearm. Pt denies any pain at site of IV. No redness, swelling or infiltration noted. IV dressing is clean, dry, intact. IV was flushed with 3 mL of NS and is currently saline locked.     Precautions: Fall, Suicide     "

## 2022-02-22 NOTE — PROGRESS NOTES
Brief Internal Medicine Note    Medicine following up on repeat BMP and Magnesium which was unremarkable. Please see initial consult note on 2/19/2022 for details on cyclic vomiting. Per nursing patient improving and seems to have improvement of symptoms after Zyprexa. Please notify medicine if patient has return of symptoms with N/V, or inability to tolerate PO.     Medicine will sign off. No further recommendations at this time. Please page the on-call CASSANDRA for any intercurrent medical issues which arise.     Sheridan Ulloa PA-C  Hospitalist Service  Contact information available via [facility] Paging/Directory

## 2022-02-22 NOTE — PLAN OF CARE
Shift Summary  Mental  Visible in milieu ,but isolative and withdrawn to self. Mood is calm and affect is flat. Pleasant and cooperative.Denies suicidal/homicidal ideations, self injury behavior, racing thought as well as auditory and visual hallucinations. Insight and judgment are fair. Pt said feeling nauseated makes her both anxious and exhausted. Did not participate in any therapeutic group activity.   Prn: none  Physical  Pt is alert and oriented x 3.  Able to communicate needs.   Slept 5.75 hours last night.   Took medication with no difficulties. No side effect reported by pt or noted by this writer.   Appetite fair. Ate both breakfast and lunch after administering Compazine and Zyprexa per order.  & 271 . Still has nausea ,but it is getting less tense.   No problem with bowel and bladder per pt.   Denies pain, discomfort.   Mg 1.8 and sodium 134 today.   PIV was removed with no difficulties.   Prn: Compazine & Zyprexa  Sitting /89 pulse 85  Standing /86 pulse 99  Continue to monitor pt's status Q 15 minutes and stabilize the patient's symptoms with the use of medications and therapeutic programming.

## 2022-02-23 LAB
GLUCOSE BLDC GLUCOMTR-MCNC: 128 MG/DL (ref 70–99)
GLUCOSE BLDC GLUCOMTR-MCNC: 137 MG/DL (ref 70–99)
GLUCOSE BLDC GLUCOMTR-MCNC: 142 MG/DL (ref 70–99)
GLUCOSE BLDC GLUCOMTR-MCNC: 159 MG/DL (ref 70–99)
GLUCOSE BLDC GLUCOMTR-MCNC: 224 MG/DL (ref 70–99)
GLUCOSE BLDC GLUCOMTR-MCNC: 86 MG/DL (ref 70–99)
GLUCOSE BLDC GLUCOMTR-MCNC: 92 MG/DL (ref 70–99)

## 2022-02-23 PROCEDURE — 250N000013 HC RX MED GY IP 250 OP 250 PS 637: Performed by: PSYCHIATRY & NEUROLOGY

## 2022-02-23 PROCEDURE — 124N000002 HC R&B MH UMMC

## 2022-02-23 PROCEDURE — 250N000013 HC RX MED GY IP 250 OP 250 PS 637: Performed by: NURSE PRACTITIONER

## 2022-02-23 RX ADMIN — MIRTAZAPINE 30 MG: 30 TABLET, FILM COATED ORAL at 21:32

## 2022-02-23 RX ADMIN — HYDROXYZINE HYDROCHLORIDE 50 MG: 50 TABLET ORAL at 12:20

## 2022-02-23 RX ADMIN — HYDROXYZINE HYDROCHLORIDE 50 MG: 50 TABLET ORAL at 21:33

## 2022-02-23 RX ADMIN — INSULIN ASPART 1 UNITS: 100 INJECTION, SOLUTION INTRAVENOUS; SUBCUTANEOUS at 18:10

## 2022-02-23 RX ADMIN — TRAZODONE HYDROCHLORIDE 100 MG: 100 TABLET ORAL at 21:32

## 2022-02-23 RX ADMIN — OLANZAPINE 5 MG: 5 TABLET, ORALLY DISINTEGRATING ORAL at 07:36

## 2022-02-23 RX ADMIN — METFORMIN HYDROCHLORIDE 500 MG: 500 TABLET, FILM COATED ORAL at 09:17

## 2022-02-23 RX ADMIN — INSULIN ASPART 1 UNITS: 100 INJECTION, SOLUTION INTRAVENOUS; SUBCUTANEOUS at 12:20

## 2022-02-23 RX ADMIN — OLANZAPINE 10 MG: 10 TABLET, ORALLY DISINTEGRATING ORAL at 21:32

## 2022-02-23 RX ADMIN — PANTOPRAZOLE SODIUM 40 MG: 40 TABLET, DELAYED RELEASE ORAL at 09:17

## 2022-02-23 RX ADMIN — SENNOSIDES AND DOCUSATE SODIUM 1 TABLET: 50; 8.6 TABLET ORAL at 21:33

## 2022-02-23 RX ADMIN — METFORMIN HYDROCHLORIDE 500 MG: 500 TABLET, FILM COATED ORAL at 18:10

## 2022-02-23 RX ADMIN — DULOXETINE HYDROCHLORIDE 30 MG: 30 CAPSULE, DELAYED RELEASE ORAL at 09:17

## 2022-02-23 RX ADMIN — PRAZOSIN HYDROCHLORIDE 2 MG: 2 CAPSULE ORAL at 21:32

## 2022-02-23 RX ADMIN — LISINOPRIL 5 MG: 5 TABLET ORAL at 09:17

## 2022-02-23 RX ADMIN — PROCHLORPERAZINE MALEATE 5 MG: 5 TABLET ORAL at 07:53

## 2022-02-23 RX ADMIN — METHADONE HYDROCHLORIDE 130 MG: 10 CONCENTRATE ORAL at 07:55

## 2022-02-23 ASSESSMENT — ACTIVITIES OF DAILY LIVING (ADL)
HYGIENE/GROOMING: INDEPENDENT
ORAL_HYGIENE: INDEPENDENT
DRESS: INDEPENDENT
DRESS: SCRUBS (BEHAVIORAL HEALTH);INDEPENDENT
LAUNDRY: WITH SUPERVISION
ORAL_HYGIENE: INDEPENDENT
HYGIENE/GROOMING: INDEPENDENT
LAUNDRY: WITH SUPERVISION

## 2022-02-23 NOTE — PLAN OF CARE
Problem: Sleep Disturbance  Goal: Adequate Sleep/Rest  Outcome: Ongoing, Progressing  Note: Pt slept through majority of the night with no major concerns noted.     NOC Shift Report    Pt in bed at beginning of shift, breathing quiet and unlabored. Pt slept through majority of the shift. Pt slept 5.75 hours.     At 0200, pt BG was 86. Pt given snack and orange juice. Pt BG re-checked 15 min later, BG at 92. BG re-checked again 15 min later, BG at 142. No further concerns at this time.     No PRNs given. Pt verbalized no discomfort at site of IV removal. Scheduled Zyprexa at 0600 was not given per instructions to not wake pt for medication administration. Will pass on to incoming shift. Will continue to monitor.     Precautions: Fall, Suicide

## 2022-02-23 NOTE — PLAN OF CARE
Pt has been isolative and withdrawn to room most of the shift. Did come out for dinner and to watch a movie. Pt is very polite and keeps to self. Pt started Cymbalta this afternoon with no issue. IM signed off stating we can contact them if her medical condition changes. No nausea or vomiting this shift. Flat affect. Anxious mood. No goal for today. Rates depression and anxiety as 8/10 with 10 being high. PRN visteral given with some relief. SI with no plan and contracts for safety. States her appetite is good and pt ate her dinner in the dining room with peers. Denies pain. Denies hallucinations.  Pt requested prn senna for no bm for 3 days. No prune juice on the unit and encouraged pt to ask when the kitchen is open tomorrow.     Goal Outcome Evaluation: on going     Plan of Care Reviewed With: patient

## 2022-02-23 NOTE — PLAN OF CARE
Pt spent ~ three and half hours in shower after breakfast. Took Compazine and Zyprexa at the same time to prevent nausea and vomiting. However later pt reported three vomits while in shower which was not witnessed by any staffs. Pt ate both breakfast and lunch.  & 224 this shift. Morning BP high (see flow sheet). Unable to give prn clonidine or recheck BP because pt declined to come out from shower. BP was rechecked at 1200 and it is lower (see flow sheet). Pt denies any signs or symptoms of high BP.   Visible in milieu only for meals. Mood is depressed and affect is flat. Reported 7/10 anxiety. Prn hydroxyzine given per request. Continues with passive SI thought with no plan. Feels safe in hospital and contracts for safety. Denies AH and VH. Did not participate in any therapeutic group activity.

## 2022-02-23 NOTE — PLAN OF CARE
"Assessment/Intervention/Current Symtoms and Care Coordination  The patient is cooperative with care and medication regimen.  She has been unable to attend programming due to medical issues although she feels \"much better\" today. Discussed psychiatric referral.  She had been seeing Dr. Kris Will at [facility] but does not want to return there. This writer able to get her an appt at [facility] in [place].  Patient goes to the [facility] in [place] once a week on Thursdays to  a week's supply.  She lives just down the street at [facility] with her daughter. Patient plans to return to her daughter's home at discharge and will call an Uber for transport.    Discharge Plan or Goal  The patient will return to her daughter's home and resume care with her outpatient providers.    Barriers to Discharge   Patient needs to psychiatric assessments and medication management.    Referral Status  Methadone dosing at [facility] in [place], [place]  New psychiatric appt at [facility]    Legal Status  Voluntary             "

## 2022-02-23 NOTE — PLAN OF CARE
Pt continues to be isolative to her room resting in bed. Pt states she is feeling better this evening and was able to eat lunch this afternoon. Pt denies nausea and vomiting. Pt able to eat dinner without issue. Pt states she still has not had a BM in 4 days. Writer recommended pt to push fluid intake and try to move around as able. Writer called dietary and ordered some prune juice up to help with this. Pt agreeable to trying this. Pt also given PRN senna once this shift. Pt endorses continued anxiety and depression but states feeling slightly better today. Pt still endorses a few SI thoughts but states these have become lesser and lesser. Pt contracts for safety on the unit. Pt given PRN trazodone and hydroxyzine with HS medications per her request.     /93 this evening, will continue to monitor. /88 at HS.      before dinner, 1 unit Novolog administered per order. BG at

## 2022-02-24 VITALS
RESPIRATION RATE: 16 BRPM | OXYGEN SATURATION: 99 % | BODY MASS INDEX: 29.51 KG/M2 | WEIGHT: 199.2 LBS | HEART RATE: 125 BPM | HEIGHT: 69 IN | SYSTOLIC BLOOD PRESSURE: 168 MMHG | TEMPERATURE: 99.1 F | DIASTOLIC BLOOD PRESSURE: 114 MMHG

## 2022-02-24 LAB — GLUCOSE BLDC GLUCOMTR-MCNC: 119 MG/DL (ref 70–99)

## 2022-02-24 PROCEDURE — 99239 HOSP IP/OBS DSCHRG MGMT >30: CPT | Performed by: PSYCHIATRY & NEUROLOGY

## 2022-02-24 PROCEDURE — 250N000013 HC RX MED GY IP 250 OP 250 PS 637: Performed by: PSYCHIATRY & NEUROLOGY

## 2022-02-24 PROCEDURE — 250N000013 HC RX MED GY IP 250 OP 250 PS 637: Performed by: NURSE PRACTITIONER

## 2022-02-24 RX ORDER — PANTOPRAZOLE SODIUM 40 MG/1
40 TABLET, DELAYED RELEASE ORAL
Qty: 30 TABLET | Refills: 0 | Status: SHIPPED | OUTPATIENT
Start: 2022-02-25 | End: 2023-10-01

## 2022-02-24 RX ORDER — DULOXETIN HYDROCHLORIDE 30 MG/1
30 CAPSULE, DELAYED RELEASE ORAL DAILY
Qty: 30 CAPSULE | Refills: 0 | Status: SHIPPED | OUTPATIENT
Start: 2022-02-24 | End: 2023-10-01

## 2022-02-24 RX ORDER — AMOXICILLIN 250 MG
1 CAPSULE ORAL 2 TIMES DAILY PRN
Qty: 60 TABLET | Refills: 0 | Status: SHIPPED | OUTPATIENT
Start: 2022-02-24 | End: 2023-10-01

## 2022-02-24 RX ORDER — MIRTAZAPINE 30 MG/1
30 TABLET, FILM COATED ORAL AT BEDTIME
Qty: 30 TABLET | Refills: 0 | Status: SHIPPED | OUTPATIENT
Start: 2022-02-24 | End: 2023-10-01

## 2022-02-24 RX ORDER — PROCHLORPERAZINE MALEATE 5 MG
5 TABLET ORAL EVERY 6 HOURS PRN
Qty: 90 TABLET | Refills: 0 | Status: SHIPPED | OUTPATIENT
Start: 2022-02-24 | End: 2023-10-01

## 2022-02-24 RX ORDER — OLANZAPINE 5 MG/1
5-10 TABLET, ORALLY DISINTEGRATING ORAL 3 TIMES DAILY PRN
Qty: 90 TABLET | Refills: 0 | Status: SHIPPED | OUTPATIENT
Start: 2022-02-24 | End: 2023-10-01

## 2022-02-24 RX ORDER — TRAZODONE HYDROCHLORIDE 100 MG/1
100 TABLET ORAL AT BEDTIME
Qty: 30 TABLET | Refills: 0 | Status: SHIPPED | OUTPATIENT
Start: 2022-02-24 | End: 2023-10-01

## 2022-02-24 RX ORDER — PRAZOSIN HYDROCHLORIDE 2 MG/1
2 CAPSULE ORAL AT BEDTIME
Qty: 30 CAPSULE | Refills: 0 | Status: SHIPPED | OUTPATIENT
Start: 2022-02-24 | End: 2023-10-01

## 2022-02-24 RX ORDER — HYDROXYZINE HYDROCHLORIDE 50 MG/1
50 TABLET, FILM COATED ORAL EVERY 4 HOURS PRN
Qty: 60 TABLET | Refills: 0 | Status: SHIPPED | OUTPATIENT
Start: 2022-02-24 | End: 2023-10-01

## 2022-02-24 RX ORDER — LISINOPRIL 5 MG/1
5 TABLET ORAL DAILY
Qty: 30 TABLET | Refills: 0 | Status: SHIPPED | OUTPATIENT
Start: 2022-02-25 | End: 2023-10-01

## 2022-02-24 RX ADMIN — PANTOPRAZOLE SODIUM 40 MG: 40 TABLET, DELAYED RELEASE ORAL at 12:08

## 2022-02-24 RX ADMIN — OLANZAPINE 5 MG: 5 TABLET, ORALLY DISINTEGRATING ORAL at 06:27

## 2022-02-24 RX ADMIN — METHADONE HYDROCHLORIDE 130 MG: 10 CONCENTRATE ORAL at 11:18

## 2022-02-24 RX ADMIN — PROCHLORPERAZINE MALEATE 5 MG: 5 TABLET ORAL at 12:08

## 2022-02-24 RX ADMIN — CLONIDINE HYDROCHLORIDE 0.1 MG: 0.1 TABLET ORAL at 12:08

## 2022-02-24 RX ADMIN — DULOXETINE HYDROCHLORIDE 30 MG: 30 CAPSULE, DELAYED RELEASE ORAL at 12:08

## 2022-02-24 RX ADMIN — LISINOPRIL 5 MG: 5 TABLET ORAL at 12:08

## 2022-02-24 ASSESSMENT — ACTIVITIES OF DAILY LIVING (ADL)
HYGIENE/GROOMING: INDEPENDENT
DRESS: SCRUBS (BEHAVIORAL HEALTH);INDEPENDENT
LAUNDRY: WITH SUPERVISION
ORAL_HYGIENE: INDEPENDENT

## 2022-02-24 NOTE — PLAN OF CARE
Assessment/Intervention/Current Symtoms and Care Coordination  Patient still vomiting in the shower for hours each day.  She requested discharge feeling more comfortable  at home managing her symptoms.      Discharge Plan or Goal  Patient to discharge back to her daughter's apartment and resume her outpatient services.    Barriers to Discharge   Patient continues to need psychiatric assessments and medication management.    Referral Status  Done    Legal Status  Voluntary

## 2022-02-24 NOTE — PROGRESS NOTES
[facility], [place]   Psychiatric Progress Note  Hospital Day: 6        Interim History:   The patient's care was discussed with the treatment team during the daily team meeting and/or staff's chart notes were reviewed.  Staff report patient continues to have morning nausea and vomiting. She spent 3 1/2 hours in the shower yesterday morning but was able to eat lunch and dinner. States she has not had a BM in  4 days. PRNs given with no results yet. Medication adherent.     Upon interview, ***         Medications:       DULoxetine  30 mg Oral Daily     insulin aspart  1-4 Units Subcutaneous TID AC     lisinopril  5 mg Oral Daily     metFORMIN  500 mg Oral BID w/meals     methadone  130 mg Oral Daily     mirtazapine  30 mg Oral At Bedtime     OLANZapine zydis  10 mg Oral At Bedtime     OLANZapine zydis  5 mg Oral Daily     pantoprazole  40 mg Oral QAM AC     prazosin  2 mg Oral At Bedtime     traZODone  100 mg Oral At Bedtime          Allergies:   No Known Allergies       Labs:     Recent Results (from the past 48 hour(s))   Basic metabolic panel    Collection Time: 02/22/22  7:59 AM   Result Value Ref Range    Sodium 134 133 - 144 mmol/L    Potassium 3.9 3.4 - 5.3 mmol/L    Chloride 104 94 - 109 mmol/L    Carbon Dioxide (CO2) 23 20 - 32 mmol/L    Anion Gap 7 3 - 14 mmol/L    Urea Nitrogen 13 7 - 30 mg/dL    Creatinine 0.58 0.52 - 1.04 mg/dL    Calcium 9.2 8.5 - 10.1 mg/dL    Glucose 191 (H) 70 - 99 mg/dL    GFR Estimate >90 >60 mL/min/1.73m2   Magnesium    Collection Time: 02/22/22  7:59 AM   Result Value Ref Range    Magnesium 1.8 1.6 - 2.3 mg/dL   HCG qualitative Blood    Collection Time: 02/22/22  7:59 AM   Result Value Ref Range    hCG Serum Qualitative Negative Negative   Glucose by meter    Collection Time: 02/22/22  8:04 AM   Result Value Ref Range    GLUCOSE BY METER POCT 198 (H) 70 - 99 mg/dL   Glucose by meter    Collection Time: 02/22/22 12:07 PM   Result Value Ref Range     "GLUCOSE BY METER POCT 271 (H) 70 - 99 mg/dL   Glucose by meter    Collection Time: 02/22/22  5:21 PM   Result Value Ref Range    GLUCOSE BY METER POCT 85 70 - 99 mg/dL   Glucose by meter    Collection Time: 02/23/22  2:08 AM   Result Value Ref Range    GLUCOSE BY METER POCT 86 70 - 99 mg/dL   Glucose by meter    Collection Time: 02/23/22  2:38 AM   Result Value Ref Range    GLUCOSE BY METER POCT 92 70 - 99 mg/dL   Glucose by meter    Collection Time: 02/23/22  3:02 AM   Result Value Ref Range    GLUCOSE BY METER POCT 142 (H) 70 - 99 mg/dL   Glucose by meter    Collection Time: 02/23/22  7:24 AM   Result Value Ref Range    GLUCOSE BY METER POCT 128 (H) 70 - 99 mg/dL   Glucose by meter    Collection Time: 02/23/22 12:18 PM   Result Value Ref Range    GLUCOSE BY METER POCT 224 (H) 70 - 99 mg/dL   Glucose by meter    Collection Time: 02/23/22  5:43 PM   Result Value Ref Range    GLUCOSE BY METER POCT 159 (H) 70 - 99 mg/dL   Glucose by meter    Collection Time: 02/23/22 10:43 PM   Result Value Ref Range    GLUCOSE BY METER POCT 137 (H) 70 - 99 mg/dL   Glucose by meter    Collection Time: 02/24/22  2:12 AM   Result Value Ref Range    GLUCOSE BY METER POCT 119 (H) 70 - 99 mg/dL          Psychiatric Examination:     BP (!) 141/88   Pulse 75   Temp 98.3  F (36.8  C) (Oral)   Resp 16   Ht 1.753 m (5' 9\")   Wt 90.4 kg (199 lb 3.2 oz)   SpO2 99%   BMI 29.42 kg/m    Weight is 199 lbs 3.2 oz  Body mass index is 29.42 kg/m .    Orthostatic Vitals  Report      Most Recent      Sitting Orthostatic /89 02/22 1133    Sitting Orthostatic Pulse (bpm) 85 02/22 1133    Standing Orthostatic /86 02/22 1133    Standing Orthostatic Pulse (bpm) 99 02/22 1133        Appearance: fatigued, no acute distress  Attitude:  cooperative ***  Eye Contact:  good  Mood:  sad  and depressed  Affect:  mood congruent  Speech:  clear, coherent  Language: fluent and intact in English  Psychomotor, Gait, Musculoskeletal:  no evidence of " tardive dyskinesia, dystonia, or tics  Thought Process:  logical, linear and goal oriented  Associations:  no loose associations  Thought Content:  passive suicidal ideation present, no auditory hallucinations present and no visual hallucinations present  Insight:  fair  Judgement:  fair  Oriented to:  time, person, and place  Attention Span and Concentration:  limited, reports difficulty reading/concentrating due to SI  Recent and Remote Memory:  intact  Fund of Knowledge:  appropriate    Clinical Global Impressions  First:  Considering your total clinical experience with this particular patient population, how severe are the patient's symptoms at this time?: 7 (02/19/22 1731)  Compared to the patient's condition at the START of treatment, this patient's condition is: 5 (02/19/22 1731)  Most recent:  Considering your total clinical experience with this particular patient population, how severe are the patient's symptoms at this time?: 7 (02/19/22 1731)  Compared to the patient's condition at the START of treatment, this patient's condition is: 5 (02/19/22 1731)         Precautions:     Behavioral Orders   Procedures     Code 1 - Restrict to Unit     Fall precautions     Routine Programming     As clinically indicated     Status 15     Every 15 minutes.     Suicide precautions     Patients on Suicide Precautions should have a Combination Diet ordered that includes a Diet selection(s) AND a Behavioral Tray selection for Safe Tray - with utensils, or Safe Tray - NO utensils            Diagnoses:   Unspecified depressive disorder (MDD recurrent vs bipolar affective type 2 depressed)  Suicidal ideation   Post traumatic Stress disorder  Generalized Anxiety Disorder  Agoraphobia per history   Opiate use disorder in sustained remission on maintenance assisted therapy with methadone  Cannabis use disorder, severe  Benzodiazepine use disorder   Cyclical vomiting syndrome   Type 2 DM, insulin dependent    Presyncope/dizziness/falls  Hypertension  Suspected NAFLD, elevated LFTs  GERD         Assessment & Plan:   Assessment and hospital summary:  This patient is a 46 year old female with history of bipolar disorder, MDD, SANDIE, and polysubstance abuse was transferred from medical unit with suicidal ideation in context of worsening physical health. Patients symptoms of several months of depression, loss of interest, sadness, hopelessness, guilt, and suicidal thinking are consistent with historical diagnosis of MDD, however pt also reports possible hypomanic symptoms including decreased need for sleep, increased energy, recklessness, and feelings of euphoria, further assessment needed with consideration of bipolarity of mood state.      Pt also with hx of polysubstance abuse. U-tox positive for cannabis (noted to be negative of opiates and patient is on Methadone, reports good medication adherence).  Patient has history of tolerance, withdrawal, progressive use, loss of control, spending more time and more amount than intended. She denies current CD concerns but is using marijuana and has developed cyclic vomiting syndrome.     Due to patient's cyclical vomiting, have deferred medications changes at this time other than adding scheduled trazodone. Will increase mirtazapine as patient is better able to tolerate orals and consider adding Cymbalta as this has been effective in the past.     -2-22-22: Mirtazapine increased to 30 mg at hs, Cymbalta 30 mg started. Added Zyprexa Zydis 10 mg at hs and 5 mg at 06 (with instructions not to wake patient) to target ongoing N/V    Patients goals of hospitalization are to reduce feelings of depression and worthlessness. She wants to be able to enjoy her family again.     Psychiatric treatment/inteventions:  Medications:   Increase Remeron to 30mg*** for depression/anxietywill monitor closely for any emergence of mood instability/hypomania   Continue Prazosin 2 mg at HS for  PTSD  Continue Methadone 130 mg for MAT of opiate use disorder  Continue Trazodone 100 mg HS for sleep with additional 50 mg PRN   Start Cymbalta 30 mg daily for mood and pain  Start Zyprexa zydis 5 mg in AM and 10 mg at HS for nausea and sleep      PRN  Trazodone for insomnia  Hydroxyzine for anxiety  Olanzapine 5-10 mg PO ODT PRN agitation or vomiting and 10mg IM TID for agitation      Patient will be treated in therapeutic milieu with appropriate individual and group therapies as described.      Laboratory/Imaging: . Will monitor fluid status and electrolytes in context of cyclic vomiting syndrome. Defer to IM for management and appreciate assist. FSBG reviewed, last 24 hours have remained below 250. No other lab values.       Medical treatment/interventions:  Medical concerns: IM consulted and patient recently transferred from medical unit for falls. Please see note from 2-19-22 for full details. Appreciate assist. Nutrition has also been consulted to assess nutrition needs.     Diagnoses:  1) Cyclical vomiting syndrome -   -Per IM likely secondary to cannabis use.   -GI Cocktail PRN  -Zyprexa zydis scheduled at 22 and 06, and PRN  -Monitor fluids and electrolytes Repeat Mg 1.2, K+ 4, IVF and IV mag ordered 2-19-22, received 2nd L LR on 2-21 along with Mg 2 grams IV, labs 2-21: K 3.9. Na 129 Mg 1.5  2-22 K 3.9, Mg, 1.8. Na 134   -Mgmt per IM  -Consult nutrition     2) Type 2 DM, insulin dependent   - Hgb A1c 10.2% on admission 2/14.    - Continue Metformin 500mg BID started during medical addmission  - FSBG AC/HS/0200  -sliding scale insulin   - Hypoglycemia protocol   - Will need follow up with PCP outpatient   -Consistant CHO diet  -Consult Nutrition      3) Presyncope, dizziness, falls -   -Presented to [facility] on 2/14 with 20+ falls over the last month and had positive Romberg's on exam. Inpatient workup negative for life threatening/acute concerns  - Likely 2/2 vestibular dysfunction, peripheral neuropathy,  polysubstance use, nutritional deficiencies, deconditioning, anxiety could be contributing as well.    - Check thiamine   - Check orthostatic vital signs every morning   - Could trial Meclizine PRN if symptoms persist      -Consult with nutrition      4) HTN -   -Continue Lisinopril 5mg daily w/ hold parameters, likely will need to be uptitrated.   -PRN clonidine     5) Suspected NAFLD  Chronically elevated LFTs   CT Abdomen Pelvis w Contrast from 12/2030 ED note found fatty infiltration of liver and also noted pt has chronic LFT elevations with aminotransferases in the 200s.  Please contact IM if new or worsening abdominal pain, nausea, vomiting, jaundice or yellowing of eyes.          6) GERD -   -Continue protonix 40mg daily.     7) Hyponatremia  -Na 132 on admit to medical. Improved to 136 on 2/16  - on 2-21-22. Mgmt per IM   -Recheck 2-22 134     8) Routine health maintenance -   -Receive Pfizer COVID vaccine on 2/14, will need second dose in two weeks (around 3/1).       Patient has not had behavior leading to a seclusion or restraint episode     This note was created by undersigned using a Dragon dictation system. All typing errors or contextual distortion are unintentional and software inherent.     Disposition Plan   Reason for ongoing admission: poses an imminent risk to self  Discharge location: home with family***  Discharge Medications: not ordered  Follow-up Appointments: not scheduled  Legal Status: voluntary     Patient has been seen and evaluated by me, Leticia Juarez DO along with NP Student Qiana Guevara.     Entered by: Leticia Juarez on 2/24/2022 at 7:03 AM

## 2022-02-24 NOTE — DISCHARGE INSTRUCTIONS
Behavioral Discharge Planning and Instructions    Summary: You were admitted on 2/18/2022  due to Depression and Anxiety.  You were treated by Dr. Leticia Juarez and discharged on 2/23 from [facility] to your daughter's home located at: [address] [place] [address], [place] [address].  For transport, you plan to call [employer].    Main Diagnosis:   Major Depressive DIsorder, Recurrent, Severe v. Bipolar II  PTSD  Generalized Anxiety disorder  Agorphobia by Hx  Opiate Use Disorder in Remission  Benzodiazepine Use  DIsorder    Health Care Follow-up:   Psychiatric PA [facility] - NEW in-person appointment on Thursday, March 24th at 9:15am (paperwork) ane then see provider at 9:35am for  ongoing psychiatric medication management.   *If you prefer an online visit, please call the clinic to let them know and they will change it.   [facility] [place]  [address]  [phone]  FAX: [phone] ([facility]UC Please send AVS & Discharge Summary if available)    Methadone Dosing at [facility] in [place] every Thursday to  a one week supply.      Attend all scheduled appointments with your outpatient providers. Call at least 24 hours in advance if you need to reschedule an appointment to ensure continued access to your outpatient providers.     Major Treatments, Procedures and Findings:  You were provided with: a psychiatric assessment, assessed for medical stability, medication evaluation and/or management and group therapy    Symptoms to Report: losing more sleep, mood getting worse or thoughts of suicide    Early warning signs can include: increased depression or anxiety sleep disturbances increased thoughts or behaviors of suicide or self-harm     Safety and Wellness:  Take all medicines as directed.  Make no changes unless your doctor suggests them.      Follow treatment recommendations.  Refrain from alcohol and non-prescribed drugs.  If there is a concern for safety, call 911.    Resources:    Crisis Intervention: 231.160.5676 or 015-802-9530 (TTY: 456.934.6855).  Call anytime for help.  National [place] on Mental Illness (www.[place].org): 507.853.1534 or 504-773-9498.    General Medication Instructions:   See your medication sheet(s) for instructions.   Take all medicines as directed.  Make no changes unless your doctor suggests them.   Go to all your doctor visits.  Be sure to have all your required lab tests. This way, your medicines can be refilled on time.  Do not use any drugs not prescribed by your doctor.  Avoid alcohol.    Advance Directives:   Scanned document on file with [employer]? No scanned doc  Is document scanned? Pt states no documents  Honoring Choices Your Rights Handout: Informed and given  Was more information offered? Pt declined    The Treatment team has appreciated the opportunity to work with you. If you have any questions or concerns about your recent admission, you can contact the unit which can receive your call 24 hours a day, 7 days a week. They will be able to get in touch with a Provider if needed. The unit number is [phone] .

## 2022-02-24 NOTE — PLAN OF CARE
"Pt has been refusing nursing care since morning after being reminded at least three times. At 0740 this writer went to pt's room and told pt that Bg and Bp needs to be check in few minutes and she needs to take her Methadone. Pt agreed ,but when this writer went back, pt was in shower. Again around ~ 0840 pt came out briefly for apple juice. This writer reminded pt again ,but pt declined to have eye contact or respond to this writer and kept going to shower while calling this writer a \"Bitch\". At 0900, 1000 and 1100 this writer knotted at bathroom room and asked pt to come out for care ,but pt declined again.   At 1200, pt came out from shower and requested her Methadone. Pt was encouraged to take other scheduled medication as well, which she did. Administered prn clonidine for high BP. See flow sheet. Pt is discharging this evening.   Mood is depressed and affect is tense. Denies being SI, SIB thought or urges. Feels safe to go home.                     "

## 2022-02-24 NOTE — DISCHARGE SUMMARY
Patient discharged at 16:25.  Patient called [employer], staff saw patient to her Uber ride.    Home medication and other belonging given back to patient; belonging sheet signed by patient to acknowledge receipt.    After visit summary copy given to patient, follow-up appointments explained to patient; patient verbalized understanding and has no further questions at this time.     Patient encouraged to call the unit in case of any questions related to admission.

## 2022-02-24 NOTE — PLAN OF CARE
Problem: Behavioral Health Plan of Care  Goal: Plan of Care Review  Outcome: Met  Goal: Patient-Specific Goal (Individualization)  Outcome: Met  Goal: Adheres to Safety Considerations for Self and Others  Outcome: Met  Goal: Absence of New-Onset Illness or Injury  Outcome: Met  Goal: Optimal Comfort and Wellbeing  Outcome: Met  Goal: Optimized Coping Skills in Response to Life Stressors  Outcome: Met  Goal: Develops/Participates in Therapeutic [place] to Support Successful Transition  Outcome: Met  Goal: Team Discussion  Outcome: Met     Problem: Suicide Risk  Goal: Absence of Self-Harm  Outcome: Met     Problem: Suicidal Behavior  Goal: Suicidal Behavior is Absent or Managed  Outcome: Met     Problem: Sleep Disturbance  Goal: Adequate Sleep/Rest  Outcome: Met   Goal Outcome Evaluation:    Plan of Care Reviewed With: patient

## 2022-02-24 NOTE — PROGRESS NOTES
Brief Medicine Note    Contacted by psychiatry about medical medications on discharge including diabetes and blood pressure medications. Please see initial consult for details.     Patient on metformin 500 mg BID and sliding scale insulin Novolog with well controlled blood sugars. Would not continue novolog on discharge, but would increase metformin to 850 mg BID as patient has been requiring some correctional sliding scale insulin and hemoglobin A1c was quite high at 10.2 on admission on 2/13/22. Recommend follow up with PCP in 1 week for continued diabetes management.     Recent Labs   Lab 02/24/22  0212 02/23/22  2243 02/23/22  1743 02/23/22  1218 02/23/22  0724 02/23/22  0302   * 137* 159* 224* 128* 142*     Blood pressure was pretty well controlled until today. Per staff patient is very anxious and wanting to leave. Would continue current regimen of lisinopril 5 mg daily and follow up with PCP in 1-2 week.     Medicine will sign off. No further recommendations at this time. Please page the on-call [facility] for any intercurrent medical issues which arise.     Sheridan Ulloa PA-C  Hospitalist Service  Contact information available via [facility] Paging/Directory

## 2022-02-24 NOTE — PLAN OF CARE
Problem: Sleep Disturbance  Goal: Adequate Sleep/Rest  Outcome: Ongoing, Progressing  Note: Pt continues to sleep through majority of the night with no concerns noted.     NOC Shift Report    Pt in bed at beginning of shift, breathing quiet and unlabored. Pt slept through majority of the shift. Pt slept 5.75 hours.     Pt BG at 0200 was 119, no interventions required. Pt given snack, no nausea reported after snack (yogurt) was consumed. No pt complaints or concerns at this time.     No PRNs given. Will continue to monitor.     Precautions: Fall, Suicide

## 2022-02-24 NOTE — DISCHARGE SUMMARY
"Psychiatric Discharge Summary    Yun Coyle MRN# [id number]   Age: 46 year old YOB: 1975     Date of Admission:  2/18/2022  Date of Discharge:  2/24/2022  4:32 PM  Admitting Physician:  Leticia Juarez DO  Discharge Physician:  Leticia Juarez DO         Event Leading to Hospitalization:      This patient is a 46 year old female with history of bipolar disorder, MDD, SANDIE, and polysubstance abuse was transferred from medical unit with suicidal ideation in context of worsening physical health.     Per transfer note from medical:   Yun Coyle is a 46 year old female with a history of insulin dependent type 2 diabetes, hypertension, complicated psychiatric profile (multi substance use disorder [benzodiazepenes, marijuana, opioids on maintenance methadone], PTSD, MDD, SANDIE, cyclic vomiting syndrome) who presented with dizziness and falls (over 20) in the past month, she was found to have positive Romberg, listing to left on ambulation, elevated liver enzymes with bland CT, MR, MRA. Is it thought that her instability is primarily due to deconditioning, anxiety, and peripheral neuropathy 2/2 uncontrolled diabetes. Now medically stable transfered to inpatient psychiatry for SI.      #Suicidal Ideation, Plans  #Depression  #Insomnia, PTSD, SANDIE  She says that her mental health is \"not so good.\" Has a history of suicidal attempts. She feels depressed. Pt worries about being a burden on her family the way that she is. Pt says that rather than being a burden to the people that she lives with she would plan to kill herself before going home.  - psych consult placed, appreciate recs  - Remeron 15 mg at bedtime  - increase Prazosin 2 mg at bedtime increasing to 2 mg on 2/17.    - Transferred to inpt psyc        Interview with patient was complicated by patient frequently showering to reduce active vomiting and active vomiting when out of shower.      She endorses long history of depression " starting at age 14 when she had her first suicide attempt. She has had hypomanic episodes in the past, most recently several weeks ago that included increased spending on the Internet, euphoric mood and decreased need for sleep. She states the current episode of depression has been ongoing for months other than the few days she felt manic. She is unable to enjoy time with her granddaughter or daughter and feels she is a burden. Her last suicide attempt was 18 months ago ( she did not seek treatment) and she feels the current depression is equal to or worse than she felt prior to attempting overdose at that time.      Pt with hx of polysubstance abuse. U-tox positive for benzos, cannabis, and opiates (on methadone). Patient has history of tolerance, withdrawal, progressive use, loss of control, spending more time and more amount than intended. She denies current CD concerns but is using marijuana and has developed cyclic vomiting syndrome.      Patients goals of hospitalization are to reduce feelings of depression and worthlessness. She wants to be able to enjoy her family again.        See Admission note by Leticia Juarez DO on 2/19/22 for additional details.          Diagnoses:     Unspecified depressive disorder (MDD recurrent vs bipolar affective type 2 depressed)  Suicidal ideation, resolved  Post traumatic Stress disorder  Generalized Anxiety Disorder  Agoraphobia per history   Opiate use disorder in sustained remission on maintenance assisted therapy with methadone  Cannabis use disorder, severe  Benzodiazepine use disorder   Cyclical vomiting syndrome   Type 2 DM, insulin dependent   Presyncope/dizziness/falls  Hypertension  Suspected NAFLD, elevated LFTs  GERD         Labs:     Recent Results (from the past 336 hour(s))   EKG 12 lead    Collection Time: 02/13/22  4:11 PM   Result Value Ref Range    Systolic Blood Pressure  mmHg    Diastolic Blood Pressure  mmHg    Ventricular Rate 76 BPM    Atrial Rate  76 BPM    OR Interval 132 ms    QRS Duration 84 ms     ms    QTc 463 ms    P Axis 72 degrees    R AXIS 11 degrees    T Axis 33 degrees    Interpretation ECG       Sinus rhythm  Normal ECG  Unconfirmed report - interpretation of this ECG is computer generated - see medical record for final interpretation  Confirmed by - EMERGENCY ROOM, PHYSICIAN ([id number]),  SHAD DEVINE ([id number]) on 2/14/2022 7:26:50 AM     Extra Red Top Tube    Collection Time: 02/13/22  7:19 PM   Result Value Ref Range    Hold Specimen JIC    Extra Green Top (Lithium Heparin) Tube    Collection Time: 02/13/22  7:19 PM   Result Value Ref Range    Hold Specimen JIC    Comprehensive metabolic panel    Collection Time: 02/13/22  7:19 PM   Result Value Ref Range    Sodium 132 (L) 133 - 144 mmol/L    Potassium 4.2 3.4 - 5.3 mmol/L    Chloride 100 94 - 109 mmol/L    Carbon Dioxide (CO2) 29 20 - 32 mmol/L    Anion Gap 3 3 - 14 mmol/L    Urea Nitrogen 10 7 - 30 mg/dL    Creatinine 0.66 0.52 - 1.04 mg/dL    Calcium 9.2 8.5 - 10.1 mg/dL    Glucose 230 (H) 70 - 99 mg/dL    Alkaline Phosphatase 163 (H) 40 - 150 U/L    AST 85 (H) 0 - 45 U/L     (H) 0 - 50 U/L    Protein Total 8.3 6.8 - 8.8 g/dL    Albumin 3.4 3.4 - 5.0 g/dL    Bilirubin Total 0.7 0.2 - 1.3 mg/dL    GFR Estimate >90 >60 mL/min/1.73m2   CBC with platelets and differential    Collection Time: 02/13/22  7:19 PM   Result Value Ref Range    WBC Count 9.2 4.0 - 11.0 10e3/uL    RBC Count 4.53 3.80 - 5.20 10e6/uL    Hemoglobin 13.7 11.7 - 15.7 g/dL    Hematocrit 40.6 35.0 - 47.0 %    MCV 90 78 - 100 fL    MCH 30.2 26.5 - 33.0 pg    MCHC 33.7 31.5 - 36.5 g/dL    RDW 13.8 10.0 - 15.0 %    Platelet Count 249 150 - 450 10e3/uL    % Neutrophils 51 %    % Lymphocytes 41 %    % Monocytes 6 %    % Eosinophils 2 %    % Basophils 0 %    % Immature Granulocytes 0 %    NRBCs per 100 WBC 0 <1 /100    Absolute Neutrophils 4.6 1.6 - 8.3 10e3/uL    Absolute Lymphocytes 3.8 0.8 - 5.3 10e3/uL    Absolute  Monocytes 0.6 0.0 - 1.3 10e3/uL    Absolute Eosinophils 0.2 0.0 - 0.7 10e3/uL    Absolute Basophils 0.0 0.0 - 0.2 10e3/uL    Absolute Immature Granulocytes 0.0 <=0.4 10e3/uL    Absolute NRBCs 0.0 10e3/uL   Ethyl Alcohol Level    Collection Time: 02/13/22  7:19 PM   Result Value Ref Range    Alcohol ethyl <0.01 <=0.01 g/dL   CK total    Collection Time: 02/13/22  7:19 PM   Result Value Ref Range     (H) 30 - 225 U/L   GGT    Collection Time: 02/13/22  7:19 PM   Result Value Ref Range     (H) 0 - 40 U/L   Hemoglobin A1c    Collection Time: 02/13/22  7:19 PM   Result Value Ref Range    Hemoglobin A1C 10.2 (H) 0.0 - 5.6 %   Treponema Abs w Reflex to RPR and Titer    Collection Time: 02/13/22  7:19 PM   Result Value Ref Range    Treponema Antibody Total Nonreactive Nonreactive   HIV Antigen Antibody Combo    Collection Time: 02/13/22  7:19 PM   Result Value Ref Range    HIV Antigen Antibody Combo Nonreactive Nonreactive   TSH with free T4 reflex    Collection Time: 02/13/22  7:19 PM   Result Value Ref Range    TSH 0.70 0.40 - 4.00 mU/L   Vitamin B12    Collection Time: 02/13/22  7:19 PM   Result Value Ref Range    Vitamin B12 1,080 (H) 193 - 986 pg/mL   Extra Blue Top Tube    Collection Time: 02/13/22  7:20 PM   Result Value Ref Range    Hold Specimen JIC    INR    Collection Time: 02/13/22  7:20 PM   Result Value Ref Range    INR 1.05 0.86 - 1.14   UA with Microscopic reflex to Culture    Collection Time: 02/13/22  8:52 PM    Specimen: Urine, Midstream   Result Value Ref Range    Color Urine Yellow Colorless, Straw, Light Yellow, Yellow    Appearance Urine Clear Clear    Glucose Urine >=1000 (A) Negative mg/dL    Bilirubin Urine Negative Negative    Ketones Urine Negative Negative mg/dL    Specific Gravity Urine 1.032 1.003 - 1.035    Blood Urine Negative Negative    pH Urine 7.0 5.0 - 7.0    Protein Albumin Urine Negative Negative mg/dL    Urobilinogen Urine Normal Normal, 2.0 mg/dL    Nitrite Urine  Negative Negative    Leukocyte Esterase Urine Negative Negative    Mucus Urine Present (A) None Seen /LPF    RBC Urine 1 <=2 /HPF    WBC Urine 2 <=5 /HPF    Squamous Epithelials Urine 31 (H) <=1 /HPF   Drug abuse screen 1 urine (ED)    Collection Time: 02/13/22  8:52 PM   Result Value Ref Range    Amphetamines Urine Screen Negative Screen Negative    Barbiturates Urine Screen Negative Screen Negative    Benzodiazepines Urine Screen Negative Screen Negative    Cannabinoids Urine Screen Positive (A) Screen Negative    Cocaine Urine Screen Negative Screen Negative    Opiates Urine Screen Negative Screen Negative   Asymptomatic COVID-19 Virus (Coronavirus) by PCR Nasopharyngeal    Collection Time: 02/14/22  1:59 AM    Specimen: Nasopharyngeal; Swab   Result Value Ref Range    SARS CoV2 PCR Negative Negative   Echo Complete    Collection Time: 02/14/22 11:15 AM   Result Value Ref Range    LVEF  55-60%    Copper level    Collection Time: 02/14/22  4:17 PM   Result Value Ref Range    Copper 120.7 80.0 - 155.0 ug/dL   Glucose by meter    Collection Time: 02/14/22  7:03 PM   Result Value Ref Range    GLUCOSE BY METER POCT 205 (H) 70 - 99 mg/dL   Glucose by meter    Collection Time: 02/14/22 10:35 PM   Result Value Ref Range    GLUCOSE BY METER POCT 180 (H) 70 - 99 mg/dL   Glucose by meter    Collection Time: 02/15/22  5:27 AM   Result Value Ref Range    GLUCOSE BY METER POCT 172 (H) 70 - 99 mg/dL   Basic metabolic panel    Collection Time: 02/15/22  8:49 AM   Result Value Ref Range    Sodium 131 (L) 133 - 144 mmol/L    Potassium 4.3 3.4 - 5.3 mmol/L    Chloride 100 94 - 109 mmol/L    Carbon Dioxide (CO2) 26 20 - 32 mmol/L    Anion Gap 5 3 - 14 mmol/L    Urea Nitrogen 17 7 - 30 mg/dL    Creatinine 0.72 0.52 - 1.04 mg/dL    Calcium 9.0 8.5 - 10.1 mg/dL    Glucose 282 (H) 70 - 99 mg/dL    GFR Estimate >90 >60 mL/min/1.73m2   Vitamin B6    Collection Time: 02/15/22  8:49 AM   Result Value Ref Range    Vitamin B6 90.7 20.0 -  125.0 nmol/L   Glucose by meter    Collection Time: 02/15/22  9:34 AM   Result Value Ref Range    GLUCOSE BY METER POCT 282 (H) 70 - 99 mg/dL   Glucose by meter    Collection Time: 02/15/22 12:29 PM   Result Value Ref Range    GLUCOSE BY METER POCT 179 (H) 70 - 99 mg/dL   Glucose by meter    Collection Time: 02/15/22  4:54 PM   Result Value Ref Range    GLUCOSE BY METER POCT 154 (H) 70 - 99 mg/dL   Glucose by meter    Collection Time: 02/15/22 10:10 PM   Result Value Ref Range    GLUCOSE BY METER POCT 128 (H) 70 - 99 mg/dL   Basic metabolic panel    Collection Time: 02/16/22  5:55 AM   Result Value Ref Range    Sodium 136 133 - 144 mmol/L    Potassium 3.8 3.4 - 5.3 mmol/L    Chloride 103 94 - 109 mmol/L    Carbon Dioxide (CO2) 28 20 - 32 mmol/L    Anion Gap 5 3 - 14 mmol/L    Urea Nitrogen 18 7 - 30 mg/dL    Creatinine 0.64 0.52 - 1.04 mg/dL    Calcium 9.0 8.5 - 10.1 mg/dL    Glucose 111 (H) 70 - 99 mg/dL    GFR Estimate >90 >60 mL/min/1.73m2   Extra Purple Top Tube    Collection Time: 02/16/22  5:55 AM   Result Value Ref Range    Hold Specimen JI    Glucose by meter    Collection Time: 02/16/22  6:14 AM   Result Value Ref Range    GLUCOSE BY METER POCT 104 (H) 70 - 99 mg/dL   Glucose by meter    Collection Time: 02/16/22  1:09 PM   Result Value Ref Range    GLUCOSE BY METER POCT 155 (H) 70 - 99 mg/dL   Vitamin B1 whole blood    Collection Time: 02/16/22  5:02 PM   Result Value Ref Range    Vitamin B1 Whole Blood Level 116 70 - 180 nmol/L   Glucose by meter    Collection Time: 02/16/22  5:21 PM   Result Value Ref Range    GLUCOSE BY METER POCT 122 (H) 70 - 99 mg/dL   Glucose by meter    Collection Time: 02/16/22  9:44 PM   Result Value Ref Range    GLUCOSE BY METER POCT 141 (H) 70 - 99 mg/dL   Glucose by meter    Collection Time: 02/17/22  9:10 AM   Result Value Ref Range    GLUCOSE BY METER POCT 133 (H) 70 - 99 mg/dL   Glucose by meter    Collection Time: 02/17/22 12:01 PM   Result Value Ref Range    GLUCOSE BY  METER POCT 311 (H) 70 - 99 mg/dL   Glucose by meter    Collection Time: 02/17/22  5:43 PM   Result Value Ref Range    GLUCOSE BY METER POCT 136 (H) 70 - 99 mg/dL   Glucose by meter    Collection Time: 02/17/22  9:48 PM   Result Value Ref Range    GLUCOSE BY METER POCT 159 (H) 70 - 99 mg/dL   Glucose by meter    Collection Time: 02/18/22  9:14 AM   Result Value Ref Range    GLUCOSE BY METER POCT 205 (H) 70 - 99 mg/dL   Glucose by meter    Collection Time: 02/18/22  1:20 PM   Result Value Ref Range    GLUCOSE BY METER POCT 207 (H) 70 - 99 mg/dL   Glucose by meter    Collection Time: 02/18/22  5:29 PM   Result Value Ref Range    GLUCOSE BY METER POCT 164 (H) 70 - 99 mg/dL   Glucose by meter    Collection Time: 02/19/22 12:35 AM   Result Value Ref Range    GLUCOSE BY METER POCT 189 (H) 70 - 99 mg/dL   Glucose by meter    Collection Time: 02/19/22  7:41 AM   Result Value Ref Range    GLUCOSE BY METER POCT 128 (H) 70 - 99 mg/dL   Glucose by meter    Collection Time: 02/19/22 12:28 PM   Result Value Ref Range    GLUCOSE BY METER POCT 289 (H) 70 - 99 mg/dL   Basic metabolic panel    Collection Time: 02/19/22  2:13 PM   Result Value Ref Range    Sodium 133 133 - 144 mmol/L    Potassium 4.0 3.4 - 5.3 mmol/L    Chloride 100 94 - 109 mmol/L    Carbon Dioxide (CO2) 21 20 - 32 mmol/L    Anion Gap 12 3 - 14 mmol/L    Urea Nitrogen 18 7 - 30 mg/dL    Creatinine 0.59 0.52 - 1.04 mg/dL    Calcium 9.8 8.5 - 10.1 mg/dL    Glucose 329 (H) 70 - 99 mg/dL    GFR Estimate >90 >60 mL/min/1.73m2   Magnesium    Collection Time: 02/19/22  2:13 PM   Result Value Ref Range    Magnesium 1.2 (L) 1.6 - 2.3 mg/dL   Phosphorus    Collection Time: 02/19/22  2:13 PM   Result Value Ref Range    Phosphorus 3.0 2.5 - 4.5 mg/dL   Vitamin B1 plasma    Collection Time: 02/19/22  2:13 PM   Result Value Ref Range    Vitamin B1 2 (L) 4 - 15 nmol/L   Glucose by meter    Collection Time: 02/19/22  5:22 PM   Result Value Ref Range    GLUCOSE BY METER POCT 254 (H)  70 - 99 mg/dL   Glucose by meter    Collection Time: 02/19/22  9:24 PM   Result Value Ref Range    GLUCOSE BY METER POCT 230 (H) 70 - 99 mg/dL   Glucose by meter    Collection Time: 02/20/22  1:51 AM   Result Value Ref Range    GLUCOSE BY METER POCT 147 (H) 70 - 99 mg/dL   Glucose by meter    Collection Time: 02/20/22  8:18 AM   Result Value Ref Range    GLUCOSE BY METER POCT 240 (H) 70 - 99 mg/dL   Glucose by meter    Collection Time: 02/20/22 12:17 PM   Result Value Ref Range    GLUCOSE BY METER POCT 195 (H) 70 - 99 mg/dL   Glucose by meter    Collection Time: 02/20/22  5:08 PM   Result Value Ref Range    GLUCOSE BY METER POCT 149 (H) 70 - 99 mg/dL   Glucose by meter    Collection Time: 02/20/22  9:57 PM   Result Value Ref Range    GLUCOSE BY METER POCT 115 (H) 70 - 99 mg/dL   Glucose by meter    Collection Time: 02/21/22  1:52 AM   Result Value Ref Range    GLUCOSE BY METER POCT 120 (H) 70 - 99 mg/dL   Glucose by meter    Collection Time: 02/21/22  7:58 AM   Result Value Ref Range    GLUCOSE BY METER POCT 143 (H) 70 - 99 mg/dL   Basic metabolic panel    Collection Time: 02/21/22 10:49 AM   Result Value Ref Range    Sodium 129 (L) 133 - 144 mmol/L    Potassium 3.9 3.4 - 5.3 mmol/L    Chloride 99 94 - 109 mmol/L    Carbon Dioxide (CO2) 21 20 - 32 mmol/L    Anion Gap 9 3 - 14 mmol/L    Urea Nitrogen 15 7 - 30 mg/dL    Creatinine 0.60 0.52 - 1.04 mg/dL    Calcium 9.0 8.5 - 10.1 mg/dL    Glucose 388 (H) 70 - 99 mg/dL    GFR Estimate >90 >60 mL/min/1.73m2   Extra Purple Top Tube    Collection Time: 02/21/22 10:49 AM   Result Value Ref Range    Hold Specimen JIC    Magnesium    Collection Time: 02/21/22 10:49 AM   Result Value Ref Range    Magnesium 1.5 (L) 1.6 - 2.3 mg/dL   Glucose by meter    Collection Time: 02/21/22 11:38 AM   Result Value Ref Range    GLUCOSE BY METER POCT 306 (H) 70 - 99 mg/dL   Glucose by meter    Collection Time: 02/21/22  5:34 PM   Result Value Ref Range    GLUCOSE BY METER POCT 103 (H) 70 - 99  mg/dL   Glucose by meter    Collection Time: 02/21/22  9:12 PM   Result Value Ref Range    GLUCOSE BY METER POCT 88 70 - 99 mg/dL   Glucose by meter    Collection Time: 02/22/22  1:12 AM   Result Value Ref Range    GLUCOSE BY METER POCT 123 (H) 70 - 99 mg/dL   Basic metabolic panel    Collection Time: 02/22/22  7:59 AM   Result Value Ref Range    Sodium 134 133 - 144 mmol/L    Potassium 3.9 3.4 - 5.3 mmol/L    Chloride 104 94 - 109 mmol/L    Carbon Dioxide (CO2) 23 20 - 32 mmol/L    Anion Gap 7 3 - 14 mmol/L    Urea Nitrogen 13 7 - 30 mg/dL    Creatinine 0.58 0.52 - 1.04 mg/dL    Calcium 9.2 8.5 - 10.1 mg/dL    Glucose 191 (H) 70 - 99 mg/dL    GFR Estimate >90 >60 mL/min/1.73m2   Magnesium    Collection Time: 02/22/22  7:59 AM   Result Value Ref Range    Magnesium 1.8 1.6 - 2.3 mg/dL   HCG qualitative Blood    Collection Time: 02/22/22  7:59 AM   Result Value Ref Range    hCG Serum Qualitative Negative Negative   Glucose by meter    Collection Time: 02/22/22  8:04 AM   Result Value Ref Range    GLUCOSE BY METER POCT 198 (H) 70 - 99 mg/dL   Glucose by meter    Collection Time: 02/22/22 12:07 PM   Result Value Ref Range    GLUCOSE BY METER POCT 271 (H) 70 - 99 mg/dL   Glucose by meter    Collection Time: 02/22/22  5:21 PM   Result Value Ref Range    GLUCOSE BY METER POCT 85 70 - 99 mg/dL   Glucose by meter    Collection Time: 02/23/22  2:08 AM   Result Value Ref Range    GLUCOSE BY METER POCT 86 70 - 99 mg/dL   Glucose by meter    Collection Time: 02/23/22  2:38 AM   Result Value Ref Range    GLUCOSE BY METER POCT 92 70 - 99 mg/dL   Glucose by meter    Collection Time: 02/23/22  3:02 AM   Result Value Ref Range    GLUCOSE BY METER POCT 142 (H) 70 - 99 mg/dL   Glucose by meter    Collection Time: 02/23/22  7:24 AM   Result Value Ref Range    GLUCOSE BY METER POCT 128 (H) 70 - 99 mg/dL   Glucose by meter    Collection Time: 02/23/22 12:18 PM   Result Value Ref Range    GLUCOSE BY METER POCT 224 (H) 70 - 99 mg/dL    Glucose by meter    Collection Time: 02/23/22  5:43 PM   Result Value Ref Range    GLUCOSE BY METER POCT 159 (H) 70 - 99 mg/dL   Glucose by meter    Collection Time: 02/23/22 10:43 PM   Result Value Ref Range    GLUCOSE BY METER POCT 137 (H) 70 - 99 mg/dL   Glucose by meter    Collection Time: 02/24/22  2:12 AM   Result Value Ref Range    GLUCOSE BY METER POCT 119 (H) 70 - 99 mg/dL              Consults:     Patient Name: Yun Coyle MRN# [id number]   Age: 46 year old YOB: 1975      Date of Service: 2/19/2022     Circumstances of recent discharge and re-admittance noted. Please refer to recent medicine H&P in charting completed by Alison Manrique MD and dated 2/14/22, which was reviewed by this writer and is up to date.      In brief, Yun Coyle is a 46 year old female with a history of polysubstance use disorder (benzos, cannabis, opioid use disorder on methadone), PTSD, MDD, SANDIE, cyclic vomiting syndrome, HTN, IDDM, and GERD who was admitted to [facility] medicine service 2/14-2/18 for dizziness and falls x 20 over the last month, positive Romberg sign, and SI.  She was discharged from medicine and transferred to inpatient psych on 2/18 for ongoing management of SI.           Diagnoses:  # SI, MDD, PTSD  # Polysubstance use disorder  Reported ongoing SI at time of discharge.  Currently on Remeron 15mg HS and Prazosin 2mg HS.   - Management per Psychiatry      # Cyclical vomiting syndrome - Likely hyperemesis in setting of cannabis use.  Utox positive for cannabinoids on 2/13.  Continues to have vomiting and nausea on unit today.    - 1L LR bolus now   - Check BMP, Mag, Phos   - Compazine PRN   - GI cocktail TID PRN   - Can trial Zyprexa ODT 5-10mg for nausea   - Would limit use of Zofran d/t methadone and concern for long QTc     # Type 2 DM, insulin dependent - Hgb A1c 10.2% on admission 2/14.  No PTA medications but was started on Metformin during medicine admission.  Last 24 hour BGs as  below:            Recent Labs   Lab 02/19/22  0741 02/19/22  0035 02/18/22  1729 02/18/22  1320 02/18/22  0914 02/17/22  2148   * 189* 164* 207* 205* 159*      - Continue Metformin 500mg BID  - LSSI for now, will increase pending trends   - Hypoglycemia protocol   - Will need follow up with PCP outpatient      # Presyncope, dizziness, falls - Presented to [facility] with 20+ falls over the last month and had positive Romberg's on exam.  Inpatient work up (CTA head and neck, telemetry, MRI brain and neck) negative for stroke, cerebellar lesion, vertebral dissection, carotid stenosis, aortic stenosis, cardiogenic syncope.  Likely 2/2 vestibular dysfunction, peripheral neuropathy, polysubstance use, nutritional deficiencies, deconditioning, anxiety could be contributing as well.    - Check thiamine   - Check orthostatic vital signs every morning   - Could trial Meclizine PRN if symptoms persist         # HTN - Per chart review, documented hx of HTN but lost to clinic follow up.  Started on Lisinopril during medicine admission.  Continue Lisinopril 5mg daily w/ hold parameters, likely will need to be uptitrated.      # Suspected NAFLD  # Chronically elevated LFTs   CT Abdomen Pelvis w Contrast from 12/2030 ED note found fatty infiltration of liver and also noted pt has chronic LFT elevations with aminotransferases in the 200s.  Please contact IM if new or worsening abdominal pain, nausea, vomiting, jaundice or yellowing of eyes.          # GERD - Continue protonix 40mg daily.      # Opioid use disorder - Agree w/ continuing PTA methadone.  Please check EKG prior to adjusting psych meds or if needing Zofran if symptoms refractory to GI cocktail and/or Zyprexa.       # Routine health maintenance - Receive Pfizer COVID vaccine on 2/14, will need second dose in two weeks (around 3/1).  Flu vaccine given on 2/15.          Vitals and laboratory data were reviewed and are significant as discussed above.  Medicine will  follow peripherally for assistance with lab results, monitoring blood pressure, and adjusting medications. Please call the on-call [facility] for any follow up medical concerns if they arise.      Niharika Shepard, CNP, APRN  Internal Medicine [facility]  [facility] Course:   Yun Coyle is a 46 year old female with history of bipolar disorder, MDD, SANDIE, and polysubstance abuse was transferred from medical unit with suicidal ideation in context of worsening physical health. Medications continued from medical unit, including methadone.  Patient denied current CD concerns but is using marijuana and has developed cyclic vomiting syndrome. Due to patient's cyclical vomiting,deferred medications changes at time of admission other than adding scheduled trazodone. Increased mirtazapine as patient better able to tolerate oral medications and added duloxetine as this was effective in the past. Olanzapine zydis also added as there is evidence this helps with patients GI distress.     Yun Coyle did not participate in groups and was minimally visible in the milieu due to need to take multiple showers to decrease N/V. As she improved in physical health symptoms was more visible in milieu.     The patient's symptoms of suicidal ideation improved. She requested to discharge home on 2/24/22 as she would feel more comfortable at home with ongoing nausea/vomiting.     Today Yun Coyle reports having no thoughts of harming self or otehrs. In addition, she has notable risk factors for self-harm, including age, single status, anxiety, substance abuse and previous suicide attempts. However, risk is mitigated by commitment to family, pt has been future oriented and ability to volunteer a safety plan. Therefore, based on all available evidence including the factors cited above, she does not appear to be at imminent risk for self-harm, does not meet criteria for a 72-hr hold, and therefore  remains appropriate for ongoing outpatient level of care.     Yun Coyle was discharged to home. At the time of discharge Yun Coyle was determined to not be a danger to herself or others.          Discharge Medications:     Current Discharge Medication List      START taking these medications    Details   DULoxetine (CYMBALTA) 30 MG capsule Take 1 capsule (30 mg) by mouth daily  Qty: 30 capsule, Refills: 0    Associated Diagnoses: Depression with suicidal ideation      hydrOXYzine (ATARAX) 50 MG tablet Take 1 tablet (50 mg) by mouth every 4 hours as needed for anxiety  Qty: 60 tablet, Refills: 0    Associated Diagnoses: Depression with suicidal ideation      lisinopril (ZESTRIL) 5 MG tablet Take 1 tablet (5 mg) by mouth daily  Qty: 30 tablet, Refills: 0    Associated Diagnoses: Benign essential hypertension      metFORMIN (GLUCOPHAGE) 850 MG tablet Take 1 tablet (850 mg) by mouth 2 times daily (with meals)  Qty: 60 tablet, Refills: 0    Associated Diagnoses: Type 2 diabetes mellitus without complication, without long-term current use of insulin (H)      mirtazapine (REMERON) 30 MG tablet Take 1 tablet (30 mg) by mouth At Bedtime  Qty: 30 tablet, Refills: 0    Associated Diagnoses: Depression with suicidal ideation      OLANZapine zydis (ZYPREXA) 5 MG ODT Take 1-2 tablets (5-10 mg) by mouth 3 times daily as needed for other (vomiting)  Qty: 90 tablet, Refills: 0    Associated Diagnoses: Cyclical vomiting      pantoprazole (PROTONIX) 40 MG EC tablet Take 1 tablet (40 mg) by mouth every morning (before breakfast)  Qty: 30 tablet, Refills: 0    Associated Diagnoses: Cyclical vomiting      prazosin (MINIPRESS) 2 MG capsule Take 1 capsule (2 mg) by mouth At Bedtime  Qty: 30 capsule, Refills: 0    Associated Diagnoses: PTSD (post-traumatic stress disorder)      prochlorperazine (COMPAZINE) 5 MG tablet Take 1 tablet (5 mg) by mouth every 6 hours as needed for nausea or vomiting  Qty: 90 tablet, Refills: 0     Associated Diagnoses: Cyclical vomiting      senna-docusate (SENOKOT-S/PERICOLACE) 8.6-50 MG tablet Take 1 tablet by mouth 2 times daily as needed for constipation  Qty: 60 tablet, Refills: 0    Associated Diagnoses: Opioid type dependence in remission (H)      traZODone (DESYREL) 100 MG tablet Take 1 tablet (100 mg) by mouth At Bedtime  Qty: 30 tablet, Refills: 0    Associated Diagnoses: Depression with suicidal ideation         CONTINUE these medications which have NOT CHANGED    Details   ASPIRIN NOT PRESCRIBED (INTENTIONAL) Please choose reason not prescribed, below    Associated Diagnoses: Uncontrolled type 2 diabetes mellitus with hyperglycemia (H)      blood glucose (NO BRAND SPECIFIED) lancets standard Use to test blood sugar 4 times daily or as directed.  Qty: 100 each, Refills: 0    Comments: accucheck machine  Associated Diagnoses: Uncontrolled type 2 diabetes mellitus with hyperglycemia (H)      blood glucose (NO BRAND SPECIFIED) test strip Use to test blood sugar 3 times daily or as directed.  Qty: 100 each, Refills: 0    Comments: accucheck machine  Associated Diagnoses: Uncontrolled type 2 diabetes mellitus with hyperglycemia (H)      !! blood glucose monitoring (NO BRAND SPECIFIED) meter device kit Use to test blood sugar 4 times daily or as directed.  Qty: 1 kit, Refills: 0    Comments: Please dispense a meter with supplies to last at least 30 days.  Associated Diagnoses: Uncontrolled type 2 diabetes mellitus with hyperglycemia (H)      !! blood glucose monitoring (NO BRAND SPECIFIED) meter device kit Use to test blood sugar 4 times daily or as directed.  Qty: 1 kit, Refills: 0    Associated Diagnoses: Uncontrolled type 2 diabetes mellitus with hyperglycemia (H)      insulin pen needle (30G X 8 MM) 30G X 8 MM miscellaneous Use 3 pen needles daily or as directed.  Qty: 100 each, Refills: 1    Associated Diagnoses: Uncontrolled type 2 diabetes mellitus with hyperglycemia (H)      methadone HCl 10  "MG/5ML SOLN Take 130 mg by mouth daily Dose verified with [facility] 2/14/22 (phone [phone])       !! - Potential duplicate medications found. Please discuss with provider.               Psychiatric Examination:   Appearance:  awake, alert and adequately groomed  Attitude:  cooperative  Eye Contact:  good  Mood:  \"better\"  Affect:  mood congruent and intensity is blunted  Speech:  clear, coherent and normal prosody  Psychomotor Behavior:  no evidence of tardive dyskinesia, dystonia, or tics  Thought Process:  logical, linear and goal oriented  Associations:  no loose associations  Thought Content:  no evidence of suicidal ideation or homicidal ideation and no evidence of psychotic thought  Insight:  partial  Judgment:  fair, adequate for safety   Oriented to:  time, person, and place  Attention Span and Concentration:  intact  Recent and Remote Memory:  intact  Language: English, fluent  Fund of Knowledge: appropriate  Muscle Strength and Tone: normal  Gait and Station: Normal         Discharge Plan:   Health Care Follow-up:   [facility] - NEW in-person appointment on Thursday, March 24th at 9:15am (paperwork) ane then see provider at 9:35am for  ongoing psychiatric medication management.   *If you prefer an online visit, please call the clinic to let them know and they will change it.   [facility]  [address] [place] [address]  [phone]  FAX: [phone] ([facility]UC Please send AVS & Discharge Summary if available)     Methadone Dosing at [facility] in [place] every Thursday to  a one week supply.        Attend all scheduled appointments with your outpatient providers. Call at least 24 hours in advance if you need to reschedule an appointment to ensure continued access to your outpatient providers.        Attestation:  Patient has been seen and evaluated by me, Leticia Juarez DO on day of discharge. 35 minutes were spent in coordination of " discharge planning.

## 2022-02-25 ENCOUNTER — PATIENT OUTREACH (OUTPATIENT)
Dept: CARE COORDINATION | Facility: CLINIC | Age: 47
End: 2022-02-25
Payer: COMMERCIAL

## 2022-02-25 DIAGNOSIS — Z71.89 OTHER SPECIFIED COUNSELING: ICD-10-CM

## 2022-02-25 NOTE — PROGRESS NOTES
Clinic Care Coordination Contact  [facility]/Voicemail       Clinical Data: Care Coordinator Outreach  Outreach attempted x 1. Unable to leave  message on patient's voicemail with call back information.  Plan: Care Coordinator will try to reach patient again in 1-2 business days.    DA Dowell  [phone]  [facility]

## 2022-02-26 NOTE — PROGRESS NOTES
Clinic Care Coordination Contact  [facility]/Voicemail       Clinical Data: Care Coordinator Outreach  Outreach attempted x 2.  Left message on patient's voicemail with call back information and requested return call.    Plan: Care Coordinator will do no further outreaches at this time.    DA Dowell  [phone]  [facility]

## 2022-02-28 ENCOUNTER — TELEPHONE (OUTPATIENT)
Dept: PHARMACY | Facility: OTHER | Age: 47
End: 2022-02-28
Payer: COMMERCIAL

## 2022-02-28 NOTE — TELEPHONE ENCOUNTER
MTM referral from: Transitions of Care (recent hospital discharge or ED visit)    MTM referral outreach attempt #2 on February 28, 2022 at 2:46 PM      Outcome: Patient not reachable after several attempts, will route to MTM Pharmacist/Provider as an FYI.  [facility] scheduling number is [phone].  Thank you for the referral.    Refugio Ayala, MT coordinator

## 2022-05-15 ENCOUNTER — HOSPITAL ENCOUNTER (EMERGENCY)
Facility: CLINIC | Age: 47
Discharge: HOME | End: 2022-05-15
Attending: EMERGENCY MEDICINE | Admitting: EMERGENCY MEDICINE
Payer: COMMERCIAL

## 2022-05-15 VITALS
DIASTOLIC BLOOD PRESSURE: 80 MMHG | RESPIRATION RATE: 16 BRPM | SYSTOLIC BLOOD PRESSURE: 120 MMHG | HEART RATE: 94 BPM | TEMPERATURE: 98 F | OXYGEN SATURATION: 96 %

## 2022-05-15 DIAGNOSIS — R73.9 HYPERGLYCEMIA: ICD-10-CM

## 2022-05-15 LAB
ALBUMIN SERPL-MCNC: 3.8 G/DL (ref 3.4–5)
ALBUMIN UR-MCNC: NEGATIVE MG/DL
ALP SERPL-CCNC: 144 U/L (ref 40–150)
ALT SERPL W P-5'-P-CCNC: 182 U/L (ref 0–50)
ANION GAP SERPL CALCULATED.3IONS-SCNC: 9 MMOL/L (ref 3–14)
APPEARANCE UR: CLEAR
AST SERPL W P-5'-P-CCNC: ABNORMAL U/L
BASOPHILS # BLD AUTO: 0.1 10E3/UL (ref 0–0.2)
BASOPHILS NFR BLD AUTO: 1 %
BILIRUB SERPL-MCNC: 0.6 MG/DL (ref 0.2–1.3)
BILIRUB UR QL STRIP: NEGATIVE
BUN SERPL-MCNC: 29 MG/DL (ref 7–30)
CALCIUM SERPL-MCNC: 9.9 MG/DL (ref 8.5–10.1)
CHLORIDE BLD-SCNC: 92 MMOL/L (ref 94–109)
CO2 SERPL-SCNC: 26 MMOL/L (ref 20–32)
COLOR UR AUTO: ABNORMAL
CREAT SERPL-MCNC: 0.68 MG/DL (ref 0.52–1.04)
EOSINOPHIL # BLD AUTO: 0.4 10E3/UL (ref 0–0.7)
EOSINOPHIL NFR BLD AUTO: 5 %
ERYTHROCYTE [DISTWIDTH] IN BLOOD BY AUTOMATED COUNT: 11.9 % (ref 10–15)
GFR SERPL CREATININE-BSD FRML MDRD: >90 ML/MIN/1.73M2
GLUCOSE BLD-MCNC: 553 MG/DL (ref 70–99)
GLUCOSE BLDC GLUCOMTR-MCNC: 409 MG/DL (ref 70–99)
GLUCOSE UR STRIP-MCNC: >=1000 MG/DL
HCG UR QL: NEGATIVE
HCT VFR BLD AUTO: 43.1 % (ref 35–47)
HGB BLD-MCNC: 15.1 G/DL (ref 11.7–15.7)
HGB UR QL STRIP: NEGATIVE
HOLD SPECIMEN: 0
HOLD SPECIMEN: NORMAL
HOLD SPECIMEN: NORMAL
IMM GRANULOCYTES # BLD: 0 10E3/UL
IMM GRANULOCYTES NFR BLD: 0 %
KETONES UR STRIP-MCNC: 20 MG/DL
LACTATE SERPL-SCNC: 2.2 MMOL/L (ref 0.7–2)
LEUKOCYTE ESTERASE UR QL STRIP: ABNORMAL
LYMPHOCYTES # BLD AUTO: 2.5 10E3/UL (ref 0.8–5.3)
LYMPHOCYTES NFR BLD AUTO: 33 %
MAGNESIUM SERPL-MCNC: 1.9 MG/DL (ref 1.6–2.3)
MCH RBC QN AUTO: 29.4 PG (ref 26.5–33)
MCHC RBC AUTO-ENTMCNC: 35 G/DL (ref 31.5–36.5)
MCV RBC AUTO: 84 FL (ref 78–100)
MONOCYTES # BLD AUTO: 0.4 10E3/UL (ref 0–1.3)
MONOCYTES NFR BLD AUTO: 6 %
NEUTROPHILS # BLD AUTO: 4.4 10E3/UL (ref 1.6–8.3)
NEUTROPHILS NFR BLD AUTO: 55 %
NITRATE UR QL: NEGATIVE
NRBC # BLD AUTO: 0 10E3/UL
NRBC BLD AUTO-RTO: 0 /100
PH UR STRIP: 6 [PH] (ref 5–7)
PLATELET # BLD AUTO: 240 10E3/UL (ref 150–450)
POTASSIUM BLD-SCNC: 4 MMOL/L (ref 3.4–5.3)
POTASSIUM BLD-SCNC: 6.7 MMOL/L (ref 3.4–5.3)
PROT SERPL-MCNC: 9.4 G/DL (ref 6.8–8.8)
RBC # BLD AUTO: 5.14 10E6/UL (ref 3.8–5.2)
RBC URINE: 5 /HPF
SARS-COV-2 RNA RESP QL NAA+PROBE: NEGATIVE
SODIUM SERPL-SCNC: 127 MMOL/L (ref 133–144)
SP GR UR STRIP: 1.04 (ref 1–1.03)
SQUAMOUS EPITHELIAL: 10 /HPF
UROBILINOGEN UR STRIP-MCNC: NORMAL MG/DL
WBC # BLD AUTO: 7.7 10E3/UL (ref 4–11)
WBC URINE: 16 /HPF

## 2022-05-15 PROCEDURE — 36415 COLL VENOUS BLD VENIPUNCTURE: CPT | Performed by: EMERGENCY MEDICINE

## 2022-05-15 PROCEDURE — 250N000012 HC RX MED GY IP 250 OP 636 PS 637: Performed by: EMERGENCY MEDICINE

## 2022-05-15 PROCEDURE — C9803 HOPD COVID-19 SPEC COLLECT: HCPCS | Performed by: EMERGENCY MEDICINE

## 2022-05-15 PROCEDURE — 81025 URINE PREGNANCY TEST: CPT | Performed by: EMERGENCY MEDICINE

## 2022-05-15 PROCEDURE — 84132 ASSAY OF SERUM POTASSIUM: CPT | Performed by: EMERGENCY MEDICINE

## 2022-05-15 PROCEDURE — 258N000003 HC RX IP 258 OP 636: Performed by: EMERGENCY MEDICINE

## 2022-05-15 PROCEDURE — 83605 ASSAY OF LACTIC ACID: CPT | Performed by: EMERGENCY MEDICINE

## 2022-05-15 PROCEDURE — 96372 THER/PROPH/DIAG INJ SC/IM: CPT | Performed by: EMERGENCY MEDICINE

## 2022-05-15 PROCEDURE — 96376 TX/PRO/DX INJ SAME DRUG ADON: CPT | Mod: 59 | Performed by: EMERGENCY MEDICINE

## 2022-05-15 PROCEDURE — 99285 EMERGENCY DEPT VISIT HI MDM: CPT | Mod: 25 | Performed by: EMERGENCY MEDICINE

## 2022-05-15 PROCEDURE — 96365 THER/PROPH/DIAG IV INF INIT: CPT | Performed by: EMERGENCY MEDICINE

## 2022-05-15 PROCEDURE — 93005 ELECTROCARDIOGRAM TRACING: CPT | Performed by: EMERGENCY MEDICINE

## 2022-05-15 PROCEDURE — 250N000011 HC RX IP 250 OP 636: Performed by: EMERGENCY MEDICINE

## 2022-05-15 PROCEDURE — 85025 COMPLETE CBC W/AUTO DIFF WBC: CPT | Performed by: EMERGENCY MEDICINE

## 2022-05-15 PROCEDURE — 96375 TX/PRO/DX INJ NEW DRUG ADDON: CPT | Performed by: EMERGENCY MEDICINE

## 2022-05-15 PROCEDURE — 84155 ASSAY OF PROTEIN SERUM: CPT | Performed by: EMERGENCY MEDICINE

## 2022-05-15 PROCEDURE — 81001 URINALYSIS AUTO W/SCOPE: CPT | Performed by: EMERGENCY MEDICINE

## 2022-05-15 PROCEDURE — 87086 URINE CULTURE/COLONY COUNT: CPT | Performed by: EMERGENCY MEDICINE

## 2022-05-15 PROCEDURE — 87635 SARS-COV-2 COVID-19 AMP PRB: CPT | Performed by: EMERGENCY MEDICINE

## 2022-05-15 PROCEDURE — 96361 HYDRATE IV INFUSION ADD-ON: CPT | Performed by: EMERGENCY MEDICINE

## 2022-05-15 PROCEDURE — 93010 ELECTROCARDIOGRAM REPORT: CPT | Performed by: EMERGENCY MEDICINE

## 2022-05-15 PROCEDURE — 250N000013 HC RX MED GY IP 250 OP 250 PS 637: Performed by: EMERGENCY MEDICINE

## 2022-05-15 PROCEDURE — 83735 ASSAY OF MAGNESIUM: CPT | Performed by: EMERGENCY MEDICINE

## 2022-05-15 RX ORDER — MAGNESIUM SULFATE HEPTAHYDRATE 40 MG/ML
2 INJECTION, SOLUTION INTRAVENOUS ONCE
Status: COMPLETED | OUTPATIENT
Start: 2022-05-15 | End: 2022-05-15

## 2022-05-15 RX ORDER — MAGNESIUM HYDROXIDE/ALUMINUM HYDROXICE/SIMETHICONE 120; 1200; 1200 MG/30ML; MG/30ML; MG/30ML
15 SUSPENSION ORAL ONCE
Status: DISCONTINUED | OUTPATIENT
Start: 2022-05-15 | End: 2022-05-15

## 2022-05-15 RX ORDER — SODIUM CHLORIDE 9 MG/ML
INJECTION, SOLUTION INTRAVENOUS CONTINUOUS
Status: DISCONTINUED | OUTPATIENT
Start: 2022-05-15 | End: 2022-05-15 | Stop reason: HOSPADM

## 2022-05-15 RX ORDER — LORAZEPAM 2 MG/ML
1 INJECTION INTRAMUSCULAR ONCE
Status: COMPLETED | OUTPATIENT
Start: 2022-05-15 | End: 2022-05-15

## 2022-05-15 RX ORDER — OLANZAPINE 5 MG/1
5 TABLET, ORALLY DISINTEGRATING ORAL 3 TIMES DAILY PRN
Qty: 6 TABLET | Refills: 0 | Status: SHIPPED | OUTPATIENT
Start: 2022-05-15 | End: 2023-10-01

## 2022-05-15 RX ORDER — LISINOPRIL 10 MG/1
10 TABLET ORAL DAILY
Status: DISCONTINUED | OUTPATIENT
Start: 2022-05-15 | End: 2022-05-15 | Stop reason: HOSPADM

## 2022-05-15 RX ORDER — MAGNESIUM HYDROXIDE/ALUMINUM HYDROXICE/SIMETHICONE 120; 1200; 1200 MG/30ML; MG/30ML; MG/30ML
15 SUSPENSION ORAL ONCE
Status: COMPLETED | OUTPATIENT
Start: 2022-05-15 | End: 2022-05-15

## 2022-05-15 RX ORDER — LIDOCAINE HYDROCHLORIDE 20 MG/ML
15 SOLUTION OROPHARYNGEAL ONCE
Status: DISCONTINUED | OUTPATIENT
Start: 2022-05-15 | End: 2022-05-15

## 2022-05-15 RX ORDER — LORAZEPAM 2 MG/ML
0.5 INJECTION INTRAMUSCULAR ONCE
Status: COMPLETED | OUTPATIENT
Start: 2022-05-15 | End: 2022-05-15

## 2022-05-15 RX ADMIN — SODIUM CHLORIDE 1000 ML: 9 INJECTION, SOLUTION INTRAVENOUS at 10:38

## 2022-05-15 RX ADMIN — SODIUM CHLORIDE: 9 INJECTION, SOLUTION INTRAVENOUS at 12:03

## 2022-05-15 RX ADMIN — LORAZEPAM 0.5 MG: 2 INJECTION INTRAMUSCULAR; INTRAVENOUS at 13:09

## 2022-05-15 RX ADMIN — MAGNESIUM SULFATE 2 G: 2 INJECTION INTRAVENOUS at 10:19

## 2022-05-15 RX ADMIN — INSULIN ASPART 4 UNITS: 100 INJECTION, SOLUTION INTRAVENOUS; SUBCUTANEOUS at 14:38

## 2022-05-15 RX ADMIN — SODIUM CHLORIDE 1000 ML: 9 INJECTION, SOLUTION INTRAVENOUS at 09:30

## 2022-05-15 RX ADMIN — FAMOTIDINE 20 MG: 10 INJECTION, SOLUTION INTRAVENOUS at 11:57

## 2022-05-15 RX ADMIN — LISINOPRIL 10 MG: 10 TABLET ORAL at 13:08

## 2022-05-15 RX ADMIN — LORAZEPAM 1 MG: 2 INJECTION INTRAMUSCULAR; INTRAVENOUS at 09:43

## 2022-05-15 RX ADMIN — ALUMINUM HYDROXIDE, MAGNESIUM HYDROXIDE, AND SIMETHICONE 15 ML: 200; 200; 20 SUSPENSION ORAL at 11:57

## 2022-05-15 ASSESSMENT — ENCOUNTER SYMPTOMS
VOMITING: 1
NAUSEA: 1

## 2022-05-15 NOTE — ED NOTES
Pt approached the writer in the hallway requesting a shower. The writer explained that we do not have a shower on the unit and that we need to monitor her health at the moment. Pt understanding, but insisting a shower would help her symptoms. Writer suggested that taking a shower at home once she discharges. Pt agreed and returned to room.

## 2022-05-15 NOTE — ED NOTES
Writer observed the pt gagging herself. Pt reported that when she is feeling nauseous she gags herself to help her throw up. The writer explained that we can attempt other nausea medications, but that she needs to stop forcing herself to throw up bc she recently received PO medications. Pt voiced understanding, but continued to drink water and dry heave. MD notified.

## 2022-05-15 NOTE — ED PROVIDER NOTES
[facility] EMERGENCY DEPARTMENT ([facility])    5/15/22     ED 4   1:33 PM   History     Chief Complaint   Patient presents with     Hyperglycemia     Pt has been vomiting for 3-4 days and can not take her meds.       The history is provided by the patient and medical records.     Yun Coyle is a 46 year old female with a past medical history significant for insulin dependent DM 2, bipolar disorder, HTN, asthma, GERD, polysubstance use disorder (benzodiazepines, marijuana, opioids on maintenance methadone), cyclic vomiting syndrome, anxiety, and depression who presents with multiple episodes of nausea, vomiting, and inability to keep medications down, including her metformin since Thursday (5/12/2022, 3 days ago).  Patient states that she was last able to keep metformin down Wednesday, 4 days ago. She states the abdominal pain, nausea and vomiting all started around the same time.  She was given some medications prior to interview given that she arrived with acute symptoms.  She was able to keep down most of her Maalox as well as her hypertension medication.  She is still nauseated, doesn't feel able to eat.  This episode does feel similar to prior episodes of nausea and vomiting. She also has generalized weakness. No diarrhea. Unsure if she has had any fevers. She states she stopped using marijuana 2.5 weeks ago but continues to have recurrent nausea and vomiting. She notes quitting marijuana for a year while on probation and but still having issues with nausea and vomiting, states that her diagnosis has been changed from cannabis hyperemesis to cyclical vomiting syndrome.  She does not feel that marijuana is a component to her nausea and vomiting.  Feels well enough to go home and is requesting discharge. Does have access with hot shower at home. Doesn't need refills on her hypertension medications. She has had had insulin in the past.     Epic records reviewed.  She had ED visit on 7/23/20  for multiple episodes of nausea and vomiting. Per MD note she was given zyprexa, Toradol, GI cocktail, IVF and capsaicin cream with complete resolution of symptoms on reassessment.      Past Medical History  Past Medical History:   Diagnosis Date     Anxiety      Depressive disorder      Gastroesophageal reflux disease      Hypertension      Type 2 diabetes mellitus (H)      Uncomplicated asthma      Past Surgical History:   Procedure Laterality Date     BACK SURGERY      x2     CHOLECYSTECTOMY       ORTHOPEDIC SURGERY       SALPINGO OOPHORECTOMY,R/L/ELDA Right      OLANZapine zydis (ZYPREXA) 5 MG ODT  ASPIRIN NOT PRESCRIBED (INTENTIONAL)  blood glucose (NO BRAND SPECIFIED) lancets standard  blood glucose (NO BRAND SPECIFIED) test strip  blood glucose monitoring (NO BRAND SPECIFIED) meter device kit  blood glucose monitoring (NO BRAND SPECIFIED) meter device kit  DULoxetine (CYMBALTA) 30 MG capsule  hydrOXYzine (ATARAX) 50 MG tablet  insulin pen needle (30G X 8 MM) 30G X 8 MM miscellaneous  lisinopril (ZESTRIL) 5 MG tablet  metFORMIN (GLUCOPHAGE) 850 MG tablet  methadone HCl 10 MG/5ML SOLN  mirtazapine (REMERON) 30 MG tablet  OLANZapine zydis (ZYPREXA) 5 MG ODT  pantoprazole (PROTONIX) 40 MG EC tablet  prazosin (MINIPRESS) 2 MG capsule  prochlorperazine (COMPAZINE) 5 MG tablet  senna-docusate (SENOKOT-S/PERICOLACE) 8.6-50 MG tablet  traZODone (DESYREL) 100 MG tablet      No Known Allergies  Family History  Family History   Problem Relation Age of Onset     Diabetes Mother      Cancer Mother      Cancer Maternal Grandmother      Diabetes Maternal Grandmother      Social History   Social History     Tobacco Use     Smoking status: Never Smoker     Smokeless tobacco: Never Used   Substance Use Topics     Alcohol use: No     Drug use: Yes     Frequency: 7.0 times per week     Types: Marijuana     Comment: last had 2 weeks ago      Past medical history, past surgical history, medications, allergies, family history, and  social history were reviewed with the patient. No additional pertinent items.       Review of Systems   Gastrointestinal: Positive for nausea and vomiting.   Neurological: Weakness: Generalized.     A complete review of systems was performed with pertinent positives and negatives noted in the HPI, and all other systems negative.    Physical Exam      Physical Exam  Vitals and nursing note reviewed.   Constitutional:       General: She is not in acute distress.     Appearance: Normal appearance. She is not diaphoretic.   HENT:      Head: Atraumatic.      Mouth/Throat:      Pharynx: No oropharyngeal exudate.   Eyes:      General: No scleral icterus.     Pupils: Pupils are equal, round, and reactive to light.   Cardiovascular:      Rate and Rhythm: Normal rate and regular rhythm.      Heart sounds: Normal heart sounds.   Pulmonary:      Effort: No respiratory distress.      Breath sounds: Normal breath sounds.   Abdominal:      General: Bowel sounds are normal.      Palpations: Abdomen is soft.      Tenderness: There is no abdominal tenderness.   Musculoskeletal:         General: No tenderness.   Skin:     General: Skin is warm.      Findings: No rash.   Neurological:      General: No focal deficit present.      Mental Status: She is alert and oriented to person, place, and time.           ED Course      Procedures            EKG Interpretation:      Interpreted by Jacey Gonzalez MD  Time reviewed: per Epic  Symptoms at time of EKG: none   Rhythm: normal sinus   Rate: normal  Axis: normal  Ectopy: none  Conduction: normal  ST Segments/ T Waves: No ST-T wave changes  Q Waves: none  Comparison to prior: No old EKG available    Clinical Impression: normal EKG                    No results found for any visits on 05/15/22.  Medications - No data to display     Assessments & Plan (with Medical Decision Making)     46 year old female with a past medical history significant for insulin dependent DM 2, bipolar disorder, HTN,  asthma, GERD, polysubstance use disorder (benzodiazepines, marijuana, opioids on maintenance methadone), cyclic vomiting syndrome, anxiety, and depression who presents with multiple episodes of nausea, vomiting, and inability to keep medications down, including her metformin since Thursday (5/12/2022, 3 days ago).  Patient with elevated blood pressure in triage at 141/20, tachycardic to 115 otherwise stable vital signs with pulse ox normal at 99% on room air.  Respirations normal at 16, afebrile at 97.9  F.  EKG obtained which reveals normal sinus rhythm without acute ischemic changes.  IV established, labs drawn sent reviewed document epic remarkable for serum glucose of 553 otherwise normal CBC electrolytes.  UA with glucosuria and mild ketones.    Patient initially given Ativan 1 mg IV followed by liters normal saline and magnesium 2 g IV piggyback.  Patient then given Pepcid 20 mg IV and Maalox 15 cc p.o.  Repeat fingerstick after second liter IV fluids was 409.  Patient given NovoLog 4 units and thereafter requested discharge home with plan to follow-up with primary care provider ASAP for further evaluation and care.  Patient expressed verbal understanding of anticipatory guidance return precautions emergency room.    I have reviewed the nursing notes. I have reviewed the findings, diagnosis, plan and need for follow up with the patient.    New Prescriptions    No medications on file       Final diagnoses:   Hyperglycemia     I, Riri No, am serving as a trained medical scribe to document services personally performed by Jacey Gonzalez MD based on the provider's statements to me on May 15, 2022.  This document has been checked and approved by the attending provider.    I, Jacey Gonzalez MD, was physically present and have reviewed and verified the accuracy of this note documented by Riri oN, medical scribe.      Jacey Gonzalez MD       [facility] EMERGENCY DEPARTMENT  5/15/2022      Lisa, Jacey MARS MD  05/17/22 3089

## 2022-05-15 NOTE — ED NOTES
Bed: ED04  Expected date: 5/15/22  Expected time: 8:56 AM  Means of arrival: Ambulance  Comments:  [facility] 45yo F n/v BG > 500

## 2022-05-16 LAB
ATRIAL RATE - MUSE: 93 BPM
BACTERIA UR CULT: NORMAL
DIASTOLIC BLOOD PRESSURE - MUSE: NORMAL MMHG
INTERPRETATION ECG - MUSE: NORMAL
P AXIS - MUSE: 146 DEGREES
PR INTERVAL - MUSE: 142 MS
QRS DURATION - MUSE: 90 MS
QT - MUSE: 392 MS
QTC - MUSE: 487 MS
R AXIS - MUSE: -24 DEGREES
SYSTOLIC BLOOD PRESSURE - MUSE: NORMAL MMHG
T AXIS - MUSE: 115 DEGREES
VENTRICULAR RATE- MUSE: 93 BPM

## 2022-08-20 ENCOUNTER — HOSPITAL ENCOUNTER (EMERGENCY)
Facility: CLINIC | Age: 47
Discharge: HOME | End: 2022-08-20
Attending: FAMILY MEDICINE | Admitting: FAMILY MEDICINE
Payer: COMMERCIAL

## 2022-08-20 VITALS
TEMPERATURE: 98.3 F | RESPIRATION RATE: 18 BRPM | DIASTOLIC BLOOD PRESSURE: 81 MMHG | HEART RATE: 75 BPM | SYSTOLIC BLOOD PRESSURE: 111 MMHG | OXYGEN SATURATION: 100 %

## 2022-08-20 DIAGNOSIS — L02.419 AXILLARY ABSCESS: ICD-10-CM

## 2022-08-20 DIAGNOSIS — L02.412 ABSCESS OF LEFT AXILLA: ICD-10-CM

## 2022-08-20 PROCEDURE — 10060 I&D ABSCESS SIMPLE/SINGLE: CPT

## 2022-08-20 PROCEDURE — 10060 I&D ABSCESS SIMPLE/SINGLE: CPT | Performed by: FAMILY MEDICINE

## 2022-08-20 PROCEDURE — 250N000009 HC RX 250: Performed by: FAMILY MEDICINE

## 2022-08-20 PROCEDURE — 250N000011 HC RX IP 250 OP 636: Performed by: FAMILY MEDICINE

## 2022-08-20 PROCEDURE — 99283 EMERGENCY DEPT VISIT LOW MDM: CPT | Mod: 25

## 2022-08-20 PROCEDURE — 99284 EMERGENCY DEPT VISIT MOD MDM: CPT | Mod: 25 | Performed by: FAMILY MEDICINE

## 2022-08-20 RX ORDER — ONDANSETRON 8 MG/1
8 TABLET, ORALLY DISINTEGRATING ORAL ONCE
Status: COMPLETED | OUTPATIENT
Start: 2022-08-20 | End: 2022-08-20

## 2022-08-20 RX ORDER — LIDOCAINE HYDROCHLORIDE AND EPINEPHRINE 10; 10 MG/ML; UG/ML
1 INJECTION, SOLUTION INFILTRATION; PERINEURAL ONCE
Status: COMPLETED | OUTPATIENT
Start: 2022-08-20 | End: 2022-08-20

## 2022-08-20 RX ADMIN — ONDANSETRON 8 MG: 8 TABLET, ORALLY DISINTEGRATING ORAL at 13:32

## 2022-08-20 RX ADMIN — LIDOCAINE HYDROCHLORIDE,EPINEPHRINE BITARTRATE 1 ML: 10; .01 INJECTION, SOLUTION INFILTRATION; PERINEURAL at 13:32

## 2022-08-20 NOTE — DISCHARGE INSTRUCTIONS
Thank you for choosing [facility].     Please closely monitor for further symptoms. Return to the Emergency Department if you develop any new or worsening signs or symptoms.    If you received any opiate pain medications or sedatives during your visit, please do not drive for at least 8 hours.     Labs, cultures or final xray interpretations may still need to be reviewed.  We will call you if your plan of care needs to be changed.    Please follow up with your primary care physician or clinic 5 days for re-check.

## 2022-08-20 NOTE — ED TRIAGE NOTES
PT has and abscess in her left axilla region for the past few days.     Triage Assessment     Row Name 08/20/22 1208       Respiratory WDL    Respiratory WDL WDL       Skin Circulation/Temperature WDL    Skin Circulation/Temperature WDL WDL       Cardiac WDL    Cardiac WDL WDL       Peripheral/Neurovascular WDL    Peripheral Neurovascular WDL WDL       Cognitive/Neuro/Behavioral WDL    Cognitive/Neuro/Behavioral WDL WDL

## 2022-08-20 NOTE — ED PROVIDER NOTES
[facility] EMERGENCY DEPARTMENT ([facility])     August 20, 2022    History   No chief complaint on file.    HPI  Yun Coyle is a 46 year old female with a past medical history including DM2, cauda equina syndrome, cyclic vomiting syndrome, personality disorder, severe benzodiazepine use disorder who presents to the Emergency Department for evaluation of an abscess. Patient reports that for the last 3 days she has had a painful abscess in the left axilla region. Endorses nausea and dizziness. States that she has not taken any medications for the nausea.  She reports she has nausea most days and is using probiotics which she she researched online and have helped her substantially.  Multiple trips to , treatment with omeprazole was not as effective.  She prefers that I not give her any antibiotics.    Past Medical History  Past Medical History:   Diagnosis Date     Anxiety      Depressive disorder      Gastroesophageal reflux disease      Hypertension      Type 2 diabetes mellitus (H)      Uncomplicated asthma      Past Surgical History:   Procedure Laterality Date     BACK SURGERY      x2     CHOLECYSTECTOMY       ORTHOPEDIC SURGERY       SALPINGO OOPHORECTOMY,R/L/ELDA Right      ASPIRIN NOT PRESCRIBED (INTENTIONAL)  blood glucose (NO BRAND SPECIFIED) lancets standard  blood glucose (NO BRAND SPECIFIED) test strip  blood glucose monitoring (NO BRAND SPECIFIED) meter device kit  blood glucose monitoring (NO BRAND SPECIFIED) meter device kit  DULoxetine (CYMBALTA) 30 MG capsule  hydrOXYzine (ATARAX) 50 MG tablet  insulin pen needle (30G X 8 MM) 30G X 8 MM miscellaneous  lisinopril (ZESTRIL) 5 MG tablet  metFORMIN (GLUCOPHAGE) 850 MG tablet  methadone HCl 10 MG/5ML SOLN  mirtazapine (REMERON) 30 MG tablet  OLANZapine zydis (ZYPREXA) 5 MG ODT  OLANZapine zydis (ZYPREXA) 5 MG ODT  pantoprazole (PROTONIX) 40 MG EC tablet  prazosin (MINIPRESS) 2 MG capsule  prochlorperazine (COMPAZINE) 5 MG  tablet  senna-docusate (SENOKOT-S/PERICOLACE) 8.6-50 MG tablet  traZODone (DESYREL) 100 MG tablet      No Known Allergies  Family History  Family History   Problem Relation Age of Onset     Diabetes Mother      Cancer Mother      Cancer Maternal Grandmother      Diabetes Maternal Grandmother      Social History   Social History     Tobacco Use     Smoking status: Never Smoker     Smokeless tobacco: Never Used   Substance Use Topics     Alcohol use: No     Drug use: Yes     Frequency: 7.0 times per week     Types: Marijuana     Comment: last had 2 weeks ago      Past medical history, past surgical history, medications, allergies, family history, and social history were reviewed with the patient. No additional pertinent items.       Review of Systems  A complete review of systems was performed with pertinent positives and negatives noted in the HPI, and all other systems negative.    Physical Exam      Physical Exam  Vitals and nursing note reviewed.   Constitutional:       General: She is not in acute distress.     Appearance: She is not diaphoretic.   HENT:      Head: Atraumatic.      Mouth/Throat:      Pharynx: No oropharyngeal exudate.   Eyes:      General: No scleral icterus.     Pupils: Pupils are equal, round, and reactive to light.   Cardiovascular:      Rate and Rhythm: Normal rate and regular rhythm.      Heart sounds: Normal heart sounds.   Pulmonary:      Effort: No respiratory distress.      Breath sounds: Normal breath sounds.   Chest:      Chest wall: Tenderness present.       Abdominal:      General: Bowel sounds are normal.      Palpations: Abdomen is soft.      Tenderness: There is no abdominal tenderness.   Musculoskeletal:         General: No tenderness.   Skin:     General: Skin is warm.      Findings: No rash.         ED Course     1:23 PM  The patient was seen and examined by Abraham Joy MD in Room [address]    PROCEDURE:  -Incision/Drainage    Date/Time: 8/20/2022 2:03 PM  Performed by: Abraham Joy MD  Authorized by: Abraham Joy MD     Risks, benefits and alternatives discussed.      LOCATION:      Type:  Abscess    Size:  3 cm    Location:  Trunk    Trunk location:  Chest (Base of left axillary fold)    PRE-PROCEDURE DETAILS:     Skin preparation:  Betadine    PROCEDURE TYPE:     Complexity:  Simple    ANESTHESIA (see MAR for exact dosages):     Anesthesia method:  Local infiltration    Local anesthetic:  Lidocaine 1% WITH epi    PROCEDURE DETAILS:     Needle aspiration: no      Incision types:  Single straight    Incision depth:  Dermal    Scalpel blade:  11    Wound management:  Probed and deloculated    Drainage:  Purulent    Drainage amount:  Moderate    Wound treatment:  Wound left open    PROCEDURE    Patient Tolerance:  Patient tolerated the procedure well with no immediate complications         The medical record was reviewed and interpreted.  Managed outpatient prescription medications.              No results found for any visits on 08/20/22.  Medications - No data to display     Assessments & Plan (with Medical Decision Making)   A 46-year-old woman with multiple medical problems outlined above who presents with a small fluctuant swelling at the base left axilla.  She has some systemic symptoms including feeling nauseated, lightheaded but does not appear febrile or toxic or septic.  She has chronic issues with nausea and lightheadedness and feels that she is at baseline in this regard when questioned further.  She agreed to incision and drainage procedure which was performed successfully as as outlined above.  A dressing was placed.  I prescribed Augmentin, however patient is unlikely to take the Augmentin that she feels it may exacerbate her chronic underlying GI issues.  She will follow-up to have the area rechecked in 5 days and return if there is any signs of acute worsening.    I have reviewed the nursing  notes. I have reviewed the findings, diagnosis, plan and need for follow up with the patient.    New Prescriptions    No medications on file       Final diagnoses:   None     I, Maeve Joy, am serving as a trained medical scribe to document services personally performed by Abraham Joy MD, based on the provider's statements to me.   IAbraham MD, was physically present and have reviewed and verified the accuracy of this note documented by Maeve Joy.   --  Abraham Joy MD  [facility] EMERGENCY DEPARTMENT  8/20/2022     Abraham Joy MD  08/20/22 5655

## 2023-10-01 ENCOUNTER — HOSPITAL ENCOUNTER (OUTPATIENT)
Facility: CLINIC | Age: 48
Setting detail: OBSERVATION
Discharge: HOME | End: 2023-10-04
Attending: EMERGENCY MEDICINE | Admitting: EMERGENCY MEDICINE
Payer: COMMERCIAL

## 2023-10-01 DIAGNOSIS — E87.1 HYPONATREMIA: ICD-10-CM

## 2023-10-01 DIAGNOSIS — E11.65 TYPE 2 DIABETES MELLITUS WITH HYPERGLYCEMIA, UNSPECIFIED WHETHER LONG TERM INSULIN USE (H): ICD-10-CM

## 2023-10-01 DIAGNOSIS — E11.9 TYPE 2 DIABETES MELLITUS WITHOUT COMPLICATION, UNSPECIFIED WHETHER LONG TERM INSULIN USE (H): ICD-10-CM

## 2023-10-01 DIAGNOSIS — I15.9 SECONDARY HYPERTENSION: ICD-10-CM

## 2023-10-01 DIAGNOSIS — R11.2 NAUSEA AND VOMITING, UNSPECIFIED VOMITING TYPE: ICD-10-CM

## 2023-10-01 DIAGNOSIS — Z20.822 LAB TEST NEGATIVE FOR COVID-19 VIRUS: Primary | ICD-10-CM

## 2023-10-01 LAB
ALBUMIN SERPL BCG-MCNC: 4.1 G/DL (ref 3.5–5.2)
ALBUMIN UR-MCNC: 10 MG/DL
ALP SERPL-CCNC: 162 U/L (ref 35–104)
ALT SERPL W P-5'-P-CCNC: 118 U/L (ref 0–50)
ANION GAP SERPL CALCULATED.3IONS-SCNC: 12 MMOL/L (ref 7–15)
APPEARANCE UR: CLEAR
AST SERPL W P-5'-P-CCNC: ABNORMAL U/L
ATRIAL RATE - MUSE: 95 BPM
BASOPHILS # BLD AUTO: 0 10E3/UL (ref 0–0.2)
BASOPHILS NFR BLD AUTO: 0 %
BILIRUB SERPL-MCNC: 0.6 MG/DL
BILIRUB UR QL STRIP: NEGATIVE
BUN SERPL-MCNC: 10.3 MG/DL (ref 6–20)
CALCIUM SERPL-MCNC: 9.5 MG/DL (ref 8.6–10)
CHLORIDE SERPL-SCNC: 86 MMOL/L (ref 98–107)
COLOR UR AUTO: ABNORMAL
CREAT SERPL-MCNC: 0.53 MG/DL (ref 0.51–0.95)
DEPRECATED HCO3 PLAS-SCNC: 30 MMOL/L (ref 22–29)
DIASTOLIC BLOOD PRESSURE - MUSE: NORMAL MMHG
EGFRCR SERPLBLD CKD-EPI 2021: >90 ML/MIN/1.73M2
EOSINOPHIL # BLD AUTO: 0 10E3/UL (ref 0–0.7)
EOSINOPHIL NFR BLD AUTO: 0 %
ERYTHROCYTE [DISTWIDTH] IN BLOOD BY AUTOMATED COUNT: 12.4 % (ref 10–15)
GLUCOSE BLDC GLUCOMTR-MCNC: 196 MG/DL (ref 70–99)
GLUCOSE BLDC GLUCOMTR-MCNC: 233 MG/DL (ref 70–99)
GLUCOSE BLDC GLUCOMTR-MCNC: 358 MG/DL (ref 70–99)
GLUCOSE SERPL-MCNC: 360 MG/DL (ref 70–99)
GLUCOSE UR STRIP-MCNC: >=1000 MG/DL
HBA1C MFR BLD: 12.2 %
HCG SERPL QL: NEGATIVE
HCT VFR BLD AUTO: 39.3 % (ref 35–47)
HGB BLD-MCNC: 13.8 G/DL (ref 11.7–15.7)
HGB UR QL STRIP: NEGATIVE
IMM GRANULOCYTES # BLD: 0.1 10E3/UL
IMM GRANULOCYTES NFR BLD: 1 %
INTERPRETATION ECG - MUSE: NORMAL
KETONES UR STRIP-MCNC: 10 MG/DL
LEUKOCYTE ESTERASE UR QL STRIP: NEGATIVE
LIPASE SERPL-CCNC: 78 U/L (ref 13–60)
LYMPHOCYTES # BLD AUTO: 2.9 10E3/UL (ref 0.8–5.3)
LYMPHOCYTES NFR BLD AUTO: 24 %
MCH RBC QN AUTO: 29.7 PG (ref 26.5–33)
MCHC RBC AUTO-ENTMCNC: 35.1 G/DL (ref 31.5–36.5)
MCV RBC AUTO: 85 FL (ref 78–100)
MONOCYTES # BLD AUTO: 0.8 10E3/UL (ref 0–1.3)
MONOCYTES NFR BLD AUTO: 7 %
MUCOUS THREADS #/AREA URNS LPF: PRESENT /LPF
NEUTROPHILS # BLD AUTO: 8.2 10E3/UL (ref 1.6–8.3)
NEUTROPHILS NFR BLD AUTO: 68 %
NITRATE UR QL: NEGATIVE
NRBC # BLD AUTO: 0 10E3/UL
NRBC BLD AUTO-RTO: 0 /100
P AXIS - MUSE: 52 DEGREES
PH UR STRIP: 7.5 [PH] (ref 5–7)
PLATELET # BLD AUTO: 215 10E3/UL (ref 150–450)
POTASSIUM SERPL-SCNC: 3.7 MMOL/L (ref 3.4–5.3)
PR INTERVAL - MUSE: 180 MS
PROT SERPL-MCNC: 8.5 G/DL (ref 6.4–8.3)
QRS DURATION - MUSE: 94 MS
QT - MUSE: 368 MS
QTC - MUSE: 462 MS
R AXIS - MUSE: -11 DEGREES
RBC # BLD AUTO: 4.64 10E6/UL (ref 3.8–5.2)
RBC URINE: 2 /HPF
SODIUM SERPL-SCNC: 128 MMOL/L (ref 135–145)
SP GR UR STRIP: 1.04 (ref 1–1.03)
SQUAMOUS EPITHELIAL: 3 /HPF
SYSTOLIC BLOOD PRESSURE - MUSE: NORMAL MMHG
T AXIS - MUSE: 1 DEGREES
UROBILINOGEN UR STRIP-MCNC: NORMAL MG/DL
VENTRICULAR RATE- MUSE: 95 BPM
WBC # BLD AUTO: 12 10E3/UL (ref 4–11)
WBC URINE: 1 /HPF

## 2023-10-01 PROCEDURE — 93010 ELECTROCARDIOGRAM REPORT: CPT | Performed by: EMERGENCY MEDICINE

## 2023-10-01 PROCEDURE — 83690 ASSAY OF LIPASE: CPT | Performed by: EMERGENCY MEDICINE

## 2023-10-01 PROCEDURE — 84155 ASSAY OF PROTEIN SERUM: CPT | Performed by: EMERGENCY MEDICINE

## 2023-10-01 PROCEDURE — 96361 HYDRATE IV INFUSION ADD-ON: CPT

## 2023-10-01 PROCEDURE — 36415 COLL VENOUS BLD VENIPUNCTURE: CPT | Performed by: EMERGENCY MEDICINE

## 2023-10-01 PROCEDURE — 96361 HYDRATE IV INFUSION ADD-ON: CPT | Performed by: EMERGENCY MEDICINE

## 2023-10-01 PROCEDURE — 81001 URINALYSIS AUTO W/SCOPE: CPT | Performed by: EMERGENCY MEDICINE

## 2023-10-01 PROCEDURE — 258N000003 HC RX IP 258 OP 636: Performed by: EMERGENCY MEDICINE

## 2023-10-01 PROCEDURE — G0378 HOSPITAL OBSERVATION PER HR: HCPCS

## 2023-10-01 PROCEDURE — 96374 THER/PROPH/DIAG INJ IV PUSH: CPT | Performed by: EMERGENCY MEDICINE

## 2023-10-01 PROCEDURE — 258N000003 HC RX IP 258 OP 636: Performed by: INTERNAL MEDICINE

## 2023-10-01 PROCEDURE — 99285 EMERGENCY DEPT VISIT HI MDM: CPT | Mod: 25 | Performed by: EMERGENCY MEDICINE

## 2023-10-01 PROCEDURE — 86709 HEPATITIS A IGM ANTIBODY: CPT | Performed by: INTERNAL MEDICINE

## 2023-10-01 PROCEDURE — 93005 ELECTROCARDIOGRAM TRACING: CPT | Performed by: EMERGENCY MEDICINE

## 2023-10-01 PROCEDURE — 250N000013 HC RX MED GY IP 250 OP 250 PS 637: Performed by: INTERNAL MEDICINE

## 2023-10-01 PROCEDURE — 36415 COLL VENOUS BLD VENIPUNCTURE: CPT | Performed by: INTERNAL MEDICINE

## 2023-10-01 PROCEDURE — 84703 CHORIONIC GONADOTROPIN ASSAY: CPT | Performed by: EMERGENCY MEDICINE

## 2023-10-01 PROCEDURE — 99222 1ST HOSP IP/OBS MODERATE 55: CPT | Performed by: INTERNAL MEDICINE

## 2023-10-01 PROCEDURE — 83036 HEMOGLOBIN GLYCOSYLATED A1C: CPT | Performed by: INTERNAL MEDICINE

## 2023-10-01 PROCEDURE — 85004 AUTOMATED DIFF WBC COUNT: CPT | Performed by: EMERGENCY MEDICINE

## 2023-10-01 PROCEDURE — 250N000012 HC RX MED GY IP 250 OP 636 PS 637: Performed by: INTERNAL MEDICINE

## 2023-10-01 PROCEDURE — 250N000011 HC RX IP 250 OP 636: Mod: JZ | Performed by: EMERGENCY MEDICINE

## 2023-10-01 PROCEDURE — 96376 TX/PRO/DX INJ SAME DRUG ADON: CPT

## 2023-10-01 PROCEDURE — 82962 GLUCOSE BLOOD TEST: CPT

## 2023-10-01 RX ORDER — ONDANSETRON 2 MG/ML
4 INJECTION INTRAMUSCULAR; INTRAVENOUS EVERY 30 MIN PRN
Status: COMPLETED | OUTPATIENT
Start: 2023-10-01 | End: 2023-10-03

## 2023-10-01 RX ORDER — PROCHLORPERAZINE 25 MG
25 SUPPOSITORY, RECTAL RECTAL EVERY 12 HOURS PRN
Status: DISCONTINUED | OUTPATIENT
Start: 2023-10-01 | End: 2023-10-04 | Stop reason: HOSPADM

## 2023-10-01 RX ORDER — POLYETHYLENE GLYCOL 3350 17 G/17G
17 POWDER, FOR SOLUTION ORAL DAILY
Status: DISCONTINUED | OUTPATIENT
Start: 2023-10-01 | End: 2023-10-04 | Stop reason: HOSPADM

## 2023-10-01 RX ORDER — DEXTROSE MONOHYDRATE 25 G/50ML
25-50 INJECTION, SOLUTION INTRAVENOUS
Status: DISCONTINUED | OUTPATIENT
Start: 2023-10-01 | End: 2023-10-04 | Stop reason: HOSPADM

## 2023-10-01 RX ORDER — NICOTINE POLACRILEX 4 MG
15-30 LOZENGE BUCCAL
Status: DISCONTINUED | OUTPATIENT
Start: 2023-10-01 | End: 2023-10-04 | Stop reason: HOSPADM

## 2023-10-01 RX ORDER — ACETAMINOPHEN 325 MG/1
325 TABLET ORAL EVERY 4 HOURS PRN
Status: DISCONTINUED | OUTPATIENT
Start: 2023-10-01 | End: 2023-10-04 | Stop reason: HOSPADM

## 2023-10-01 RX ORDER — PROCHLORPERAZINE MALEATE 10 MG
10 TABLET ORAL EVERY 6 HOURS PRN
Status: DISCONTINUED | OUTPATIENT
Start: 2023-10-01 | End: 2023-10-04 | Stop reason: HOSPADM

## 2023-10-01 RX ORDER — ONDANSETRON 4 MG/1
4 TABLET, ORALLY DISINTEGRATING ORAL EVERY 6 HOURS PRN
Status: DISCONTINUED | OUTPATIENT
Start: 2023-10-01 | End: 2023-10-04 | Stop reason: HOSPADM

## 2023-10-01 RX ORDER — ONDANSETRON 2 MG/ML
4 INJECTION INTRAMUSCULAR; INTRAVENOUS EVERY 6 HOURS PRN
Status: DISCONTINUED | OUTPATIENT
Start: 2023-10-01 | End: 2023-10-04 | Stop reason: HOSPADM

## 2023-10-01 RX ORDER — MAGNESIUM HYDROXIDE/ALUMINUM HYDROXICE/SIMETHICONE 120; 1200; 1200 MG/30ML; MG/30ML; MG/30ML
30 SUSPENSION ORAL EVERY 4 HOURS PRN
Status: DISCONTINUED | OUTPATIENT
Start: 2023-10-01 | End: 2023-10-04 | Stop reason: HOSPADM

## 2023-10-01 RX ORDER — DIPHENHYDRAMINE HYDROCHLORIDE AND LIDOCAINE HYDROCHLORIDE AND ALUMINUM HYDROXIDE AND MAGNESIUM HYDRO
10 KIT EVERY 6 HOURS PRN
Status: DISCONTINUED | OUTPATIENT
Start: 2023-10-01 | End: 2023-10-04 | Stop reason: HOSPADM

## 2023-10-01 RX ORDER — AMOXICILLIN 250 MG
1 CAPSULE ORAL 2 TIMES DAILY
Status: DISCONTINUED | OUTPATIENT
Start: 2023-10-01 | End: 2023-10-04 | Stop reason: HOSPADM

## 2023-10-01 RX ORDER — SODIUM CHLORIDE, SODIUM LACTATE, POTASSIUM CHLORIDE, CALCIUM CHLORIDE 600; 310; 30; 20 MG/100ML; MG/100ML; MG/100ML; MG/100ML
INJECTION, SOLUTION INTRAVENOUS CONTINUOUS
Status: DISPENSED | OUTPATIENT
Start: 2023-10-01 | End: 2023-10-02

## 2023-10-01 RX ADMIN — ONDANSETRON 4 MG: 2 INJECTION INTRAMUSCULAR; INTRAVENOUS at 18:19

## 2023-10-01 RX ADMIN — ACETAMINOPHEN 325 MG: 325 TABLET, FILM COATED ORAL at 22:51

## 2023-10-01 RX ADMIN — INSULIN ASPART 2 UNITS: 100 INJECTION, SOLUTION INTRAVENOUS; SUBCUTANEOUS at 17:40

## 2023-10-01 RX ADMIN — SODIUM CHLORIDE, POTASSIUM CHLORIDE, SODIUM LACTATE AND CALCIUM CHLORIDE: 600; 310; 30; 20 INJECTION, SOLUTION INTRAVENOUS at 14:56

## 2023-10-01 RX ADMIN — SODIUM CHLORIDE 1000 ML: 9 INJECTION, SOLUTION INTRAVENOUS at 12:18

## 2023-10-01 RX ADMIN — POLYETHYLENE GLYCOL 3350 17 G: 17 POWDER, FOR SOLUTION ORAL at 20:11

## 2023-10-01 RX ADMIN — SENNOSIDES AND DOCUSATE SODIUM 1 TABLET: 50; 8.6 TABLET ORAL at 20:11

## 2023-10-01 RX ADMIN — ONDANSETRON 4 MG: 2 INJECTION INTRAMUSCULAR; INTRAVENOUS at 13:13

## 2023-10-01 ASSESSMENT — ACTIVITIES OF DAILY LIVING (ADL)
ADLS_ACUITY_SCORE: 18
ADLS_ACUITY_SCORE: 35
ADLS_ACUITY_SCORE: 35
ADLS_ACUITY_SCORE: 18

## 2023-10-01 NOTE — PLAN OF CARE
Goal Outcome Evaluation:    6MS ADMISSION    D: Patient admitted/transferred from  ED via tranpsort for NV.     I: Upon arrival to the unit patient was oriented to room, unit, and call light. Patient s height, weight, and vital signs were obtained. Allergies reviewed and allergy band applied. Provider notified of patient s arrival on the unit. Adult AVS completed. Head to toe assessment completed. Education assessment completed. Care plan initiated.    A: Vital signs stable upon admission. Patient rates pain at 0. Two RN skin assessment completed with Raz REN No Significant Skin Findings.     P: Continue to monitor patient s nausea and intervene as needed. Continue with plan of care. Notify provider with any concerns or changes in patient status.

## 2023-10-01 NOTE — ED NOTES
Bed: [facility]  Expected date: 10/1/23  Expected time: 10:59 AM  Means of arrival:   Comments:  Hen 89  47 F  Diabetic    Emesis, chest pain  Yellow

## 2023-10-01 NOTE — ED PROVIDER NOTES
[facility] EMERGENCY DEPARTMENT ([facility])    10/01/23      ED PROVIDER NOTE   Micheleway K     History     Chief Complaint   Patient presents with    Nausea & Vomiting     Pt is  type 2 diabetic     The history is provided by the patient and medical records.     Yun Coyle is a 47 year old female with past medical history of type 2 diabetes, bipolar disorder, MDD, SANDIE, polysubstance use disorder including cannabinoids and opiates, cyclical vomiting syndrome, cannabinoid hyperemesis syndrome, and prior ED presentations for nausea and vomiting.    Today patient presents to the emergency department today via EMS with 24 hours of nonbloody, nonbilious vomiting, however, this morning she does state that she perhaps noticed some blood-tinged emesis.  She called 911.  They gave her IV Zofran and 500 cc bolus of IV saline and her symptoms have significantly improved.  She states she has had similar symptoms in the past diagnosed as cyclical vomiting syndrome.  She does use marijuana on a daily basis.  She also used fentanyl approximately 4 days ago.  She denies any SI or HI.  She is complaining of mild, upper, burning, nonradiating, constant abdominal pain.  No chest pain, cough, or shortness of breath.  No diarrhea.  No other concerns or complaints.      Past Medical History  Past Medical History:   Diagnosis Date    Anxiety     Depressive disorder     Gastroesophageal reflux disease     Hypertension     Type 2 diabetes mellitus (H)     Uncomplicated asthma      Past Surgical History:   Procedure Laterality Date    BACK SURGERY      x2    CHOLECYSTECTOMY      ORTHOPEDIC SURGERY      SALPINGO OOPHORECTOMY,R/L/ELDA Right      No current outpatient medications on file.    No Known Allergies  Family History  Family History   Problem Relation Age of Onset    Diabetes Mother     Cancer Mother     Cancer Maternal Grandmother     Diabetes Maternal Grandmother      Social History   Social History     Tobacco Use     Smoking status: Never    Smokeless tobacco: Never   Substance Use Topics    Alcohol use: No    Drug use: Yes     Types: Fentanyl     Comment: 4 days ago         A medically appropriate review of systems was performed with pertinent positives and negatives noted in the HPI, and all other systems negative.    Physical Exam   BP: (!) 142/90  Pulse: 94  Temp: 99.5  F (37.5  C)  Resp: 18  SpO2: 99 %  Physical Exam  Vitals and nursing note reviewed.   Constitutional:       General: She is not in acute distress.     Appearance: She is not ill-appearing, toxic-appearing or diaphoretic.   HENT:      Head: Normocephalic and atraumatic.   Eyes:      Extraocular Movements: Extraocular movements intact.      Conjunctiva/sclera: Conjunctivae normal.   Cardiovascular:      Rate and Rhythm: Normal rate.      Heart sounds: Normal heart sounds.   Pulmonary:      Effort: Pulmonary effort is normal. No respiratory distress.      Breath sounds: Normal breath sounds.   Abdominal:      General: There is no distension.      Palpations: Abdomen is soft.      Tenderness: There is no abdominal tenderness. There is no right CVA tenderness, left CVA tenderness, guarding or rebound.   Musculoskeletal:         General: No tenderness. Normal range of motion.      Cervical back: Normal range of motion and neck supple.   Skin:     General: Skin is warm.      Findings: No rash.   Neurological:      General: No focal deficit present.      Mental Status: She is alert and oriented to person, place, and time.      Cranial Nerves: No cranial nerve deficit.      Coordination: Coordination normal.   Psychiatric:         Mood and Affect: Mood normal.         Behavior: Behavior normal.         Thought Content: Thought content normal.         Judgment: Judgment normal.           ED Course, Procedures, & Data      Procedures            EKG Interpretation:      Interpreted by Hua Smith MD  Time reviewed: 11:29 AM  Symptoms at time of EKG:  Nausea/vomiting  Rhythm: normal sinus   Rate: normal  Axis: normal  Ectopy: none  Conduction: normal  ST Segments/ T Waves: No ST-T wave changes  Q Waves: none  Comparison to prior: Improved in comparison to the old EKG done on May 15, 2022    Clinical Impression: NSR with no acute ischemia          Results for orders placed or performed during the hospital encounter of 10/01/23   Comprehensive metabolic panel     Status: Abnormal   Result Value Ref Range    Sodium 128 (L) 135 - 145 mmol/L    Potassium 3.7 3.4 - 5.3 mmol/L    Carbon Dioxide (CO2) 30 (H) 22 - 29 mmol/L    Anion Gap 12 7 - 15 mmol/L    Urea Nitrogen 10.3 6.0 - 20.0 mg/dL    Creatinine 0.53 0.51 - 0.95 mg/dL    GFR Estimate >90 >60 mL/min/1.73m2    Calcium 9.5 8.6 - 10.0 mg/dL    Chloride 86 (L) 98 - 107 mmol/L    Glucose 360 (H) 70 - 99 mg/dL    Alkaline Phosphatase 162 (H) 35 - 104 U/L    AST       (H) 0 - 50 U/L    Protein Total 8.5 (H) 6.4 - 8.3 g/dL    Albumin 4.1 3.5 - 5.2 g/dL    Bilirubin Total 0.6 <=1.2 mg/dL   Lipase     Status: Abnormal   Result Value Ref Range    Lipase 78 (H) 13 - 60 U/L   UA with Microscopic reflex to Culture     Status: Abnormal    Specimen: Urine, Midstream   Result Value Ref Range    Color Urine Light Yellow Colorless, Straw, Light Yellow, Yellow    Appearance Urine Clear Clear    Glucose Urine >=1000 (A) Negative mg/dL    Bilirubin Urine Negative Negative    Ketones Urine 10 (A) Negative mg/dL    Specific Gravity Urine 1.040 (H) 1.003 - 1.035    Blood Urine Negative Negative    pH Urine 7.5 (H) 5.0 - 7.0    Protein Albumin Urine 10 (A) Negative mg/dL    Urobilinogen Urine Normal Normal, 2.0 mg/dL    Nitrite Urine Negative Negative    Leukocyte Esterase Urine Negative Negative    Mucus Urine Present (A) None Seen /LPF    RBC Urine 2 <=2 /HPF    WBC Urine 1 <=5 /HPF    Squamous Epithelials Urine 3 (H) <=1 /HPF    Narrative    Urine Culture not indicated   CBC with platelets and differential     Status: Abnormal    Result Value Ref Range    WBC Count 12.0 (H) 4.0 - 11.0 10e3/uL    RBC Count 4.64 3.80 - 5.20 10e6/uL    Hemoglobin 13.8 11.7 - 15.7 g/dL    Hematocrit 39.3 35.0 - 47.0 %    MCV 85 78 - 100 fL    MCH 29.7 26.5 - 33.0 pg    MCHC 35.1 31.5 - 36.5 g/dL    RDW 12.4 10.0 - 15.0 %    Platelet Count 215 150 - 450 10e3/uL    % Neutrophils 68 %    % Lymphocytes 24 %    % Monocytes 7 %    % Eosinophils 0 %    % Basophils 0 %    % Immature Granulocytes 1 %    NRBCs per 100 WBC 0 <1 /100    Absolute Neutrophils 8.2 1.6 - 8.3 10e3/uL    Absolute Lymphocytes 2.9 0.8 - 5.3 10e3/uL    Absolute Monocytes 0.8 0.0 - 1.3 10e3/uL    Absolute Eosinophils 0.0 0.0 - 0.7 10e3/uL    Absolute Basophils 0.0 0.0 - 0.2 10e3/uL    Absolute Immature Granulocytes 0.1 <=0.4 10e3/uL    Absolute NRBCs 0.0 10e3/uL   HCG qualitative Blood     Status: Normal   Result Value Ref Range    hCG Serum Qualitative Negative Negative   Hemoglobin A1c     Status: Abnormal   Result Value Ref Range    Hemoglobin A1C 12.2 (H) <5.7 %   EKG 12 lead     Status: None   Result Value Ref Range    Systolic Blood Pressure  mmHg    Diastolic Blood Pressure  mmHg    Ventricular Rate 95 BPM    Atrial Rate 95 BPM    WA Interval 180 ms    QRS Duration 94 ms     ms    QTc 462 ms    P Axis 52 degrees    R AXIS -11 degrees    T Axis 1 degrees    Interpretation ECG       Sinus rhythm  Normal ECG  Unconfirmed report - interpretation of this ECG is computer generated - see medical record for final interpretation  Confirmed by - EMERGENCY ROOM, PHYSICIAN ([id number]),  CAMDEN BRODY ([id number]) on 10/1/2023 3:08:32 PM     CBC with platelets differential     Status: Abnormal    Narrative    The following orders were created for panel order CBC with platelets differential.  Procedure                               Abnormality         Status                     ---------                               -----------         ------                     CBC with platelets and d...[[id number]]   Abnormal            Final result                 Please view results for these tests on the individual orders.     Medications   ondansetron (ZOFRAN) injection 4 mg (4 mg Intravenous $Given 10/1/23 1313)   melatonin tablet 1 mg (has no administration in time range)   melatonin tablet 1 mg (has no administration in time range)   ondansetron (ZOFRAN ODT) ODT tab 4 mg (has no administration in time range)     Or   ondansetron (ZOFRAN) injection 4 mg (has no administration in time range)   lactated ringers infusion ( Intravenous $New Bag 10/1/23 5531)   ondansetron (ZOFRAN ODT) ODT tab 4 mg (has no administration in time range)     Or   ondansetron (ZOFRAN) injection 4 mg (has no administration in time range)   prochlorperazine (COMPAZINE) injection 10 mg (has no administration in time range)     Or   prochlorperazine (COMPAZINE) tablet 10 mg (has no administration in time range)     Or   prochlorperazine (COMPAZINE) suppository 25 mg (has no administration in time range)   alum & mag hydroxide-simethicone (MAALOX) suspension 30 mL (has no administration in time range)   magic mouthwash suspension (diphenhydramine, lidocaine, aluminum-magnesium & simethicone) (has no administration in time range)   acetaminophen (TYLENOL) tablet 325 mg (has no administration in time range)   polyethylene glycol (MIRALAX) Packet 17 g (17 g Oral Not Given 10/1/23 3347)   senna-docusate (SENOKOT-S/PERICOLACE) 8.6-50 MG per tablet 1 tablet (has no administration in time range)   glucose gel 15-30 g (has no administration in time range)     Or   dextrose 50 % injection 25-50 mL (has no administration in time range)     Or   glucagon injection 1 mg (has no administration in time range)   insulin aspart (NovoLOG) injection (RAPID ACTING) (has no administration in time range)   insulin aspart (NovoLOG) injection (RAPID ACTING) (has no administration in time range)   sodium chloride 0.9% BOLUS 1,000 mL (0 mLs Intravenous Stopped 10/1/23 1175)      Labs Ordered and Resulted from Time of ED Arrival to Time of ED Departure   COMPREHENSIVE METABOLIC PANEL - Abnormal       Result Value    Sodium 128 (*)     Potassium 3.7      Carbon Dioxide (CO2) 30 (*)     Anion Gap 12      Urea Nitrogen 10.3      Creatinine 0.53      GFR Estimate >90      Calcium 9.5      Chloride 86 (*)     Glucose 360 (*)     Alkaline Phosphatase 162 (*)     AST         (*)     Protein Total 8.5 (*)     Albumin 4.1      Bilirubin Total 0.6     LIPASE - Abnormal    Lipase 78 (*)    ROUTINE UA WITH MICROSCOPIC REFLEX TO CULTURE - Abnormal    Color Urine Light Yellow      Appearance Urine Clear      Glucose Urine >=1000 (*)     Bilirubin Urine Negative      Ketones Urine 10 (*)     Specific Gravity Urine 1.040 (*)     Blood Urine Negative      pH Urine 7.5 (*)     Protein Albumin Urine 10 (*)     Urobilinogen Urine Normal      Nitrite Urine Negative      Leukocyte Esterase Urine Negative      Mucus Urine Present (*)     RBC Urine 2      WBC Urine 1      Squamous Epithelials Urine 3 (*)    CBC WITH PLATELETS AND DIFFERENTIAL - Abnormal    WBC Count 12.0 (*)     RBC Count 4.64      Hemoglobin 13.8      Hematocrit 39.3      MCV 85      MCH 29.7      MCHC 35.1      RDW 12.4      Platelet Count 215      % Neutrophils 68      % Lymphocytes 24      % Monocytes 7      % Eosinophils 0      % Basophils 0      % Immature Granulocytes 1      NRBCs per 100 WBC 0      Absolute Neutrophils 8.2      Absolute Lymphocytes 2.9      Absolute Monocytes 0.8      Absolute Eosinophils 0.0      Absolute Basophils 0.0      Absolute Immature Granulocytes 0.1      Absolute NRBCs 0.0     HEMOGLOBIN A1C - Abnormal    Hemoglobin A1C 12.2 (*)    HCG QUALITATIVE PREGNANCY - Normal    hCG Serum Qualitative Negative     GLUCOSE MONITOR NURSING POCT   AST   ACUTE HEPATITIS PANEL   GLUCOSE MONITOR NURSING POCT   GLUCOSE MONITOR NURSING POCT   GLUCOSE MONITOR NURSING POCT     No orders to display          Critical care was  not performed.     Medical Decision Making  The patient's presentation was of high complexity (an acute health issue posing potential threat to life or bodily function).    The patient's evaluation involved:  review of external note(s) from 1 sources (see separate area of note for details)  ordering and/or review of 3+ test(s) in this encounter (see separate area of note for details)  discussion of management or test interpretation with another health professional (see separate area of note for details)    The patient's management necessitated high risk (a decision regarding hospitalization).    Assessment & Plan      47-year-old woman presenting with nausea/vomiting.  Differential diagnosis: Acute pancreatitis, gastritis, dehydration, electrolyte abnormalities, unlikely SBO or volvulus, pregnancy, cannabinoid hyperemesis syndrome.    After thorough history and physical exam patient appears to be in no acute distress.  There is no surgical abdomen on examination whatsoever.  I will hydrate her with the bolus of intravenous normal saline and obtain laboratory studies for further diagnostic evaluation.  She agrees with the plan.     Laboratory studies returned with negative pregnancy test.  There is mild leukocytosis of 12,000.  There is no anemia, hemoglobin is normal at 13.8.  LFTs show slightly elevated ALT and alkaline phosphatase.  Patient does not have a gallbladder.  Lipase is minimally elevated at 78.  There is hyponatremia with sodium at 128.  Patient did have episodes of nonbloody, nonbilious emesis in the emergency department despite the treatment.  She will be admitted to the observation unit for further evaluation and management.  She agrees with the plan.        This part of the document was transcribed by Riri No, Medical Scribe.      I have reviewed the nursing notes. I have reviewed the findings, diagnosis, plan and need for follow up with the patient.    New Prescriptions    No medications on  file       Final diagnoses:   Hyponatremia   Nausea and vomiting, unspecified vomiting type     I, Riri No, am serving as a trained medical scribe to document services personally performed by Hua Smith MD, based on the provider's statements to me.  This document has been checked and approved by the attending provider.     I, Hua Smith MD, was physically present and have reviewed and verified the accuracy of this note documented by Riri No, medical scribe.     Hua Smith MD   [facility] EMERGENCY DEPARTMENT  10/1/2023     Hua Smith MD  10/01/23 1429

## 2023-10-01 NOTE — PHARMACY-ADMISSION MEDICATION HISTORY
Pharmacy Intern Admission Medication History    Admission medication history is complete. The information provided in this note is only as accurate as the sources available at the time of the update.    Medication reconciliation/reorder completed by provider prior to medication history? No    Information Source(s): Patient and CareEverywhere/SureScripts via in-person    Pertinent Information:   - Per patient, she reports that she is not taking any prescription or OTC medications, topicals, eye drops, or herbal supplements    Changes made to PTA medication list:  Added: None  Deleted: Per patient, dispense report, and care everywhere  All PTA medications   Changed: None      Allergies reviewed with patient and updates made in EHR: yes    Medication History Completed By: Chava Dumotn 10/1/2023 3:02 PM    Prior to Admission medications    Not on File

## 2023-10-01 NOTE — H&P
[facility]    History and Physical - Hospitalist Service, GOLD TEAM 17       Date of Admission:  10/1/2023    Assessment & Plan      Yun Coyle is a 47 year old female admitted on 10/1/2023. She has a past medical history of polysubstance abuse, opioid use disorder (previously on methadone), depression, generalized anxiety disorder, bipolar disorder, type 2 diabetes.  Patient is currently not on any medication at home.  She presents with acute onsets of nausea and vomiting with associated suprapubic pain in the setting of increased use of marijuana.  Presentation likely represents cannabinoid hyperemesis syndrome.    #Abdominal pain  #Nausea and vomiting  -Reports this started 1 day prior to presenting to the ED  -She has been unable to tolerate anything by mouth so far  -Mentioned she used more marijuana than usual.  -She has received Zofran in the ED with good effect, she also received bolus with LR.  Advance diet as tolerated  Continue LR at 125 mL/h  As needed antiemetic with Zofran first-line, Compazine second line  As needed Magic mouthwash for epigastric pain.    #History of substance use  -Patient reported snorting fentanyl about 4 days ago.  -She typically does not have withdrawal symptoms from opiate  -She is willing to discuss with our addiction medicine team, though she is not interested in initiating either Suboxone or methadone during this admission.    #Hyperglycemia  #History of diabetes, likely type II  -A1c about a year ago was 10.2%  -Blood glucose on   -Urinalysis with > 1000 glucose  Obtain A1c  Start ISS, POCT     #Hyponatremia  -Sodium on labs is 128  -Corrected sodium given hyperglycemia is 132  -Hence she has mild hyponatremia, anticipate improvement with IV fluids  Continue to trend BMP.    #Hepatocellular pattern of LFT elevation  -Etiology unclear at this time  Obtain acute hepatitis panel  Trend LFTs, if uptrending, okay to  obtain imaging with ultrasound.    #Constipation  Bowel regimen with MiraLAX and Zakia-Colace.         Diet: Advance Diet as Tolerated: Clear Liquid Diet    DVT Prophylaxis: Pneumatic Compression Devices  Blanc Catheter: Not present  Lines: None     Cardiac Monitoring: None  Code Status: Full Code      Clinically Significant Risk Factors Present on Admission         # Hyponatremia: Lowest Na = 128 mmol/L in last 2 days, will monitor as appropriate          # Hypertension: Noted on problem list          # Asthma: noted on problem list        Disposition Plan anticipate discharge on 10/2/2023.     Expected Discharge Date: 10/02/2023                  YENI WAN MD  Hospitalist Service, [address]  Securely message with [employer] (more info)  Text page via [facility] Paging/Directory   See signed in provider for up to date coverage information    ______________________________________________________________________    Chief Complaint   Abdominal pain    History is obtained from the patient    History of Present Illness   Yun Coyle is a 47 year old female who has a past medical history of polysubstance abuse, opioid use disorder (previously on methadone), depression, generalized anxiety disorder, bipolar disorder, type 2 diabetes.  She presents to the ED given acute onset of abdominal pain, nausea, vomiting.  Onset was about a day ago in the morning after she woke up.  The night prior, she had used Fleet enema given constipation and also reported smoking more than usual marijuana.  She has been able to tolerate p.o. intake and has had multiple bouts of vomiting.  She now complains of abdominal pain as well.  The abdominal pain is crampy sensation.  No headache, fever, difficulty breathing, or dysuria.  At this time, patient does not take any medications.  The last time she took any medication was over a year ago.  She does not drink alcohol, or smoke  cigarettes.  She affirms to using fentanyl about 4 days ago which she snorted.      Past Medical History    Past Medical History:   Diagnosis Date    Anxiety     Depressive disorder     Gastroesophageal reflux disease     Hypertension     Type 2 diabetes mellitus (H)     Uncomplicated asthma        Past Surgical History   Past Surgical History:   Procedure Laterality Date    BACK SURGERY      x2    CHOLECYSTECTOMY      ORTHOPEDIC SURGERY      SALPINGO OOPHORECTOMY,R/L/ELDA Right        Prior to Admission Medications   Prior to Admission Medications   Prescriptions Last Dose Informant Patient Reported? Taking?   ASPIRIN NOT PRESCRIBED (INTENTIONAL) Unknown  Yes Yes   Sig: Please choose reason not prescribed, below   DULoxetine (CYMBALTA) 30 MG capsule Unknown  No Yes   Sig: Take 1 capsule (30 mg) by mouth daily   OLANZapine zydis (ZYPREXA) 5 MG ODT Unknown  No Yes   Sig: Take 1-2 tablets (5-10 mg) by mouth 3 times daily as needed for other (vomiting)   OLANZapine zydis (ZYPREXA) 5 MG ODT Unknown  No Yes   Sig: Take 1 tablet (5 mg) by mouth 3 times daily as needed for other (vomitting)   blood glucose (NO BRAND SPECIFIED) lancets standard   No No   Sig: Use to test blood sugar 4 times daily or as directed.   blood glucose (NO BRAND SPECIFIED) test strip   No No   Sig: Use to test blood sugar 3 times daily or as directed.   blood glucose monitoring (NO BRAND SPECIFIED) meter device kit  Self No No   Sig: Use to test blood sugar 4 times daily or as directed.   blood glucose monitoring (NO BRAND SPECIFIED) meter device kit  Self No No   Sig: Use to test blood sugar 4 times daily or as directed.   hydrOXYzine (ATARAX) 50 MG tablet Unknown  No Yes   Sig: Take 1 tablet (50 mg) by mouth every 4 hours as needed for anxiety   insulin pen needle (30G X 8 MM) 30G X 8 MM miscellaneous Unknown  No Yes   Sig: Use 3 pen needles daily or as directed.   lisinopril (ZESTRIL) 5 MG tablet Unknown  No Yes   Sig: Take 1 tablet (5 mg) by  mouth daily   metFORMIN (GLUCOPHAGE) 850 MG tablet Unknown  No Yes   Sig: Take 1 tablet (850 mg) by mouth 2 times daily (with meals)   methadone HCl 10 MG/5ML SOLN Unknown Self Yes Yes   Sig: Take 130 mg by mouth daily Dose verified with [facility] 2/14/22 (phone [phone])   mirtazapine (REMERON) 30 MG tablet Unknown  No Yes   Sig: Take 1 tablet (30 mg) by mouth At Bedtime   pantoprazole (PROTONIX) 40 MG EC tablet Unknown  No Yes   Sig: Take 1 tablet (40 mg) by mouth every morning (before breakfast)   prazosin (MINIPRESS) 2 MG capsule Unknown  No Yes   Sig: Take 1 capsule (2 mg) by mouth At Bedtime   prochlorperazine (COMPAZINE) 5 MG tablet Unknown  No Yes   Sig: Take 1 tablet (5 mg) by mouth every 6 hours as needed for nausea or vomiting   senna-docusate (SENOKOT-S/PERICOLACE) 8.6-50 MG tablet Unknown  No Yes   Sig: Take 1 tablet by mouth 2 times daily as needed for constipation   traZODone (DESYREL) 100 MG tablet Unknown  No Yes   Sig: Take 1 tablet (100 mg) by mouth At Bedtime      Facility-Administered Medications: None           Physical Exam   Vital Signs: Temp: 99.5  F (37.5  C) Temp src: Oral BP: (!) 142/90 Pulse: 94   Resp: 18 SpO2: 99 % O2 Device: None (Room air)    Weight: 0 lbs 0 oz    General Appearance: Lying comfortably in bed, on room air, in no acute distress or discomfort.  HEENT: PERRL: EOMI; moist mucous membrane w/o lesions  Neck: No JVD  Pulmonary: Clear to auscultation bilaterally, no wheezes or crackles  CVS: Regular rhythm, no murmurs, rubs or gallops  GI: Normoactive bowel sounds, soft, tender to palpation in epigastric region.  Extremities: No LE edema.   Skin: No rashes or lesions  Neurologic: A&O x3      Medical Decision Making       60 MINUTES SPENT BY ME on the date of service doing chart review, history, exam, documentation & further activities per the note.      Data   ------------------------- PAST 24 HR DATA REVIEWED  -----------------------------------------------    I have personally reviewed the following data over the past 24 hrs:    12.0 (H)  \   13.8   / 215     128 (L) 86 (L) 10.3 /  360 (H)   3.7 30 (H) 0.53 \     ALT: 118 (H) AST: N/A AP: 162 (H) TBILI: 0.6   ALB: 4.1 TOT PROTEIN: 8.5 (H) LIPASE: 78 (H)       Imaging results reviewed over the past 24 hrs:   No results found for this or any previous visit (from the past 24 hour(s)).

## 2023-10-02 ENCOUNTER — APPOINTMENT (OUTPATIENT)
Dept: ULTRASOUND IMAGING | Facility: CLINIC | Age: 48
End: 2023-10-02
Attending: INTERNAL MEDICINE
Payer: COMMERCIAL

## 2023-10-02 LAB
ALBUMIN SERPL BCG-MCNC: 4.2 G/DL (ref 3.5–5.2)
ALP SERPL-CCNC: 172 U/L (ref 35–104)
ALT SERPL W P-5'-P-CCNC: 146 U/L (ref 0–50)
ANION GAP SERPL CALCULATED.3IONS-SCNC: 11 MMOL/L (ref 7–15)
AST SERPL W P-5'-P-CCNC: 131 U/L (ref 0–45)
BASOPHILS # BLD AUTO: 0 10E3/UL (ref 0–0.2)
BASOPHILS NFR BLD AUTO: 0 %
BILIRUB SERPL-MCNC: 0.8 MG/DL
BUN SERPL-MCNC: 8.6 MG/DL (ref 6–20)
CALCIUM SERPL-MCNC: 9.2 MG/DL (ref 8.6–10)
CHLORIDE SERPL-SCNC: 94 MMOL/L (ref 98–107)
CREAT SERPL-MCNC: 0.51 MG/DL (ref 0.51–0.95)
DEPRECATED HCO3 PLAS-SCNC: 28 MMOL/L (ref 22–29)
EGFRCR SERPLBLD CKD-EPI 2021: >90 ML/MIN/1.73M2
EOSINOPHIL # BLD AUTO: 0 10E3/UL (ref 0–0.7)
EOSINOPHIL NFR BLD AUTO: 0 %
ERYTHROCYTE [DISTWIDTH] IN BLOOD BY AUTOMATED COUNT: 12.4 % (ref 10–15)
GLUCOSE BLDC GLUCOMTR-MCNC: 206 MG/DL (ref 70–99)
GLUCOSE BLDC GLUCOMTR-MCNC: 228 MG/DL (ref 70–99)
GLUCOSE BLDC GLUCOMTR-MCNC: 233 MG/DL (ref 70–99)
GLUCOSE BLDC GLUCOMTR-MCNC: 242 MG/DL (ref 70–99)
GLUCOSE BLDC GLUCOMTR-MCNC: 284 MG/DL (ref 70–99)
GLUCOSE SERPL-MCNC: 282 MG/DL (ref 70–99)
HAV IGM SERPL QL IA: NONREACTIVE
HBV CORE IGM SERPL QL IA: NONREACTIVE
HBV SURFACE AG SERPL QL IA: NONREACTIVE
HCT VFR BLD AUTO: 42 % (ref 35–47)
HCV AB SERPL QL IA: REACTIVE
HGB BLD-MCNC: 14.7 G/DL (ref 11.7–15.7)
IMM GRANULOCYTES # BLD: 0 10E3/UL
IMM GRANULOCYTES NFR BLD: 1 %
LYMPHOCYTES # BLD AUTO: 2.4 10E3/UL (ref 0.8–5.3)
LYMPHOCYTES NFR BLD AUTO: 29 %
MCH RBC QN AUTO: 29.9 PG (ref 26.5–33)
MCHC RBC AUTO-ENTMCNC: 35 G/DL (ref 31.5–36.5)
MCV RBC AUTO: 86 FL (ref 78–100)
MONOCYTES # BLD AUTO: 0.4 10E3/UL (ref 0–1.3)
MONOCYTES NFR BLD AUTO: 5 %
NEUTROPHILS # BLD AUTO: 5.3 10E3/UL (ref 1.6–8.3)
NEUTROPHILS NFR BLD AUTO: 65 %
NRBC # BLD AUTO: 0 10E3/UL
NRBC BLD AUTO-RTO: 0 /100
PLATELET # BLD AUTO: 204 10E3/UL (ref 150–450)
POTASSIUM SERPL-SCNC: 3.5 MMOL/L (ref 3.4–5.3)
PROT SERPL-MCNC: 8.5 G/DL (ref 6.4–8.3)
RBC # BLD AUTO: 4.91 10E6/UL (ref 3.8–5.2)
SODIUM SERPL-SCNC: 133 MMOL/L (ref 135–145)
WBC # BLD AUTO: 8.3 10E3/UL (ref 4–11)

## 2023-10-02 PROCEDURE — 250N000013 HC RX MED GY IP 250 OP 250 PS 637: Performed by: PEDIATRICS

## 2023-10-02 PROCEDURE — 96372 THER/PROPH/DIAG INJ SC/IM: CPT | Performed by: INTERNAL MEDICINE

## 2023-10-02 PROCEDURE — 76705 ECHO EXAM OF ABDOMEN: CPT

## 2023-10-02 PROCEDURE — 82962 GLUCOSE BLOOD TEST: CPT

## 2023-10-02 PROCEDURE — 99232 SBSQ HOSP IP/OBS MODERATE 35: CPT | Performed by: INTERNAL MEDICINE

## 2023-10-02 PROCEDURE — 85025 COMPLETE CBC W/AUTO DIFF WBC: CPT | Performed by: INTERNAL MEDICINE

## 2023-10-02 PROCEDURE — 250N000012 HC RX MED GY IP 250 OP 636 PS 637: Performed by: INTERNAL MEDICINE

## 2023-10-02 PROCEDURE — G0378 HOSPITAL OBSERVATION PER HR: HCPCS

## 2023-10-02 PROCEDURE — 76705 ECHO EXAM OF ABDOMEN: CPT | Mod: 26 | Performed by: RADIOLOGY

## 2023-10-02 PROCEDURE — 87522 HEPATITIS C REVRS TRNSCRPJ: CPT | Performed by: INTERNAL MEDICINE

## 2023-10-02 PROCEDURE — 96361 HYDRATE IV INFUSION ADD-ON: CPT

## 2023-10-02 PROCEDURE — 80053 COMPREHEN METABOLIC PANEL: CPT | Performed by: INTERNAL MEDICINE

## 2023-10-02 PROCEDURE — 250N000013 HC RX MED GY IP 250 OP 250 PS 637: Performed by: INTERNAL MEDICINE

## 2023-10-02 PROCEDURE — 36415 COLL VENOUS BLD VENIPUNCTURE: CPT | Performed by: INTERNAL MEDICINE

## 2023-10-02 PROCEDURE — 96375 TX/PRO/DX INJ NEW DRUG ADDON: CPT

## 2023-10-02 PROCEDURE — 250N000011 HC RX IP 250 OP 636: Mod: JZ | Performed by: INTERNAL MEDICINE

## 2023-10-02 PROCEDURE — 96376 TX/PRO/DX INJ SAME DRUG ADON: CPT

## 2023-10-02 RX ORDER — HYDRALAZINE HYDROCHLORIDE 20 MG/ML
10 INJECTION INTRAMUSCULAR; INTRAVENOUS EVERY 6 HOURS PRN
Status: DISCONTINUED | OUTPATIENT
Start: 2023-10-02 | End: 2023-10-04 | Stop reason: HOSPADM

## 2023-10-02 RX ORDER — OLANZAPINE 5 MG/1
5 TABLET, ORALLY DISINTEGRATING ORAL ONCE
Status: COMPLETED | OUTPATIENT
Start: 2023-10-02 | End: 2023-10-02

## 2023-10-02 RX ORDER — OLANZAPINE 10 MG/2ML
5 INJECTION, POWDER, FOR SOLUTION INTRAMUSCULAR DAILY PRN
Status: DISCONTINUED | OUTPATIENT
Start: 2023-10-02 | End: 2023-10-04 | Stop reason: HOSPADM

## 2023-10-02 RX ADMIN — PROCHLORPERAZINE EDISYLATE 10 MG: 5 INJECTION INTRAMUSCULAR; INTRAVENOUS at 02:53

## 2023-10-02 RX ADMIN — PROCHLORPERAZINE EDISYLATE 10 MG: 5 INJECTION INTRAMUSCULAR; INTRAVENOUS at 13:52

## 2023-10-02 RX ADMIN — OLANZAPINE 5 MG: 10 INJECTION, POWDER, FOR SOLUTION INTRAMUSCULAR at 17:22

## 2023-10-02 RX ADMIN — INSULIN ASPART 2 UNITS: 100 INJECTION, SOLUTION INTRAVENOUS; SUBCUTANEOUS at 17:00

## 2023-10-02 RX ADMIN — HYDRALAZINE HYDROCHLORIDE 10 MG: 20 INJECTION INTRAMUSCULAR; INTRAVENOUS at 20:58

## 2023-10-02 RX ADMIN — ONDANSETRON 4 MG: 4 TABLET, ORALLY DISINTEGRATING ORAL at 20:58

## 2023-10-02 RX ADMIN — POLYETHYLENE GLYCOL 3350 17 G: 17 POWDER, FOR SOLUTION ORAL at 20:46

## 2023-10-02 RX ADMIN — OLANZAPINE 5 MG: 5 TABLET, ORALLY DISINTEGRATING ORAL at 04:19

## 2023-10-02 RX ADMIN — INSULIN GLARGINE 10 UNITS: 100 INJECTION, SOLUTION SUBCUTANEOUS at 11:46

## 2023-10-02 RX ADMIN — SENNOSIDES AND DOCUSATE SODIUM 1 TABLET: 50; 8.6 TABLET ORAL at 20:46

## 2023-10-02 RX ADMIN — ONDANSETRON 4 MG: 2 INJECTION INTRAMUSCULAR; INTRAVENOUS at 09:06

## 2023-10-02 RX ADMIN — ACETAMINOPHEN 325 MG: 325 TABLET, FILM COATED ORAL at 16:59

## 2023-10-02 RX ADMIN — ONDANSETRON 4 MG: 2 INJECTION INTRAMUSCULAR; INTRAVENOUS at 02:11

## 2023-10-02 RX ADMIN — INSULIN ASPART 3 UNITS: 100 INJECTION, SOLUTION INTRAVENOUS; SUBCUTANEOUS at 08:49

## 2023-10-02 RX ADMIN — ACETAMINOPHEN 325 MG: 325 TABLET, FILM COATED ORAL at 20:57

## 2023-10-02 RX ADMIN — INSULIN ASPART 2 UNITS: 100 INJECTION, SOLUTION INTRAVENOUS; SUBCUTANEOUS at 13:55

## 2023-10-02 ASSESSMENT — ACTIVITIES OF DAILY LIVING (ADL)
ADLS_ACUITY_SCORE: 18

## 2023-10-02 NOTE — PLAN OF CARE
Goal Outcome Evaluation:    Pt A/oX4. Still feeling nauseous.PRN Zofran, Compazine IV given. Refused tylenol for a pain of 3/10. Pt sleeping in room. Cont LR running at 125 ml/hr. Refused to eat meals. Elevated bps. Provider aware. Hydralazine PRN with parameters ordered. Continue with POC.

## 2023-10-02 NOTE — PLAN OF CARE
Goal Outcome Evaluation:      Plan of Care Reviewed With: patient          Outcome Evaluation: Patient states she is feeling less nauseous, but still preferred the zyprexa IM because it works better for her nausea. Patient ate a bowl of soup and drank some milk and she is tolerating that well. She has not vomited this shift. Vital signs are stable.

## 2023-10-02 NOTE — PROGRESS NOTES
Brief Cross-Cover Note    Notified that patient was still having nausea after zofran and compazine. She requested zyprexa, as this has been helpful with her nausea in the past. QTC is normal. One-time 5 mg dose of zyprexa ordered.       Flor Prabhakar MD, MPH  Internal Medicine-[facility]

## 2023-10-02 NOTE — PROGRESS NOTES
[facility]    Medicine Progress Note - Hospitalist Service, GOLD TEAM 17    Date of Admission:  10/1/2023    Assessment & Plan      Yun Coyle is a 47 year old female admitted on 10/1/2023. She has a past medical history of polysubstance abuse, opioid use disorder (previously on methadone), depression, generalized anxiety disorder, bipolar disorder, type 2 diabetes.  Patient is currently not on any medication at home.  She presents with acute onsets of nausea and vomiting with associated suprapubic pain in the setting of increased use of marijuana.  Presentation likely represents cannabinoid hyperemesis syndrome.    #Abdominal pain  #Nausea and vomiting  -Reports this started 1 day prior to presenting to the ED  -She has been unable to tolerate anything by mouth so far  -Mentioned she used more marijuana than usual.  -She has received Zofran in the ED with good effect, she also received bolus with LR.  Advance diet as tolerated  Continue LR at 125 mL/h  As needed antiemetic with Zofran first-line, Compazine second line  As needed Magic mouthwash for epigastric pain.    #History of substance use  -Patient reported snorting fentanyl about 4 days ago.  -She typically does not have withdrawal symptoms from opiate  -She is willing to discuss with our addiction medicine team, though she is not interested in initiating either Suboxone or methadone during this admission.    #Hyperglycemia  #History of diabetes, likely type II  -A1c about a year ago was 10.2%; this admission 12.2%  -Blood glucose on   -Urinalysis with > 1000 glucose  Start lantus 10 units every day  Holding metformin for now given ongoing nausea  Patient will need endocrinology consult at discharge   Start ISS, POCT     #Hyponatremia - improving   -Sodium on labs is 128 on admission; corrected for hyperglycemia is 132  -Improving with IVF   Continue to trend BMP.    #Hepatocellular pattern of LFT  "elevation  -Etiology unclear at this time  -Hepatitis panel largerly unrevealing though Hep C antibody positive  Check Hep C PCR  Obtain RUQ U/S given hepatitis     #Constipation  Bowel regimen with MiraLAX and Zakia-Colace.         Diet: Advance Diet as Tolerated: Regular Diet Adult    DVT Prophylaxis: Pneumatic Compression Devices  Blanc Catheter: Not present  Lines: None     Cardiac Monitoring: None  Code Status: Full Code      Clinically Significant Risk Factors Present on Admission         # Hyponatremia: Lowest Na = 128 mmol/L in last 2 days, will monitor as appropriate          # Hypertension: Noted on problem list     # DMII: A1C = 12.2 % (Ref range: <5.7 %) within past 6 months    # Overweight: Estimated body mass index is 27.01 kg/m  as calculated from the following:    Height as of this encounter: 1.753 m (5' 9\").    Weight as of this encounter: 83 kg (182 lb 14.4 oz).       # Asthma: noted on problem list        Disposition Plan  Anticipate discharge tomorrow    Expected Discharge Date: 10/02/2023                  YENI WAN MD  Hospitalist Service, GOLD TEAM [address] [place] [address]  Securely message with [employer] (more info)  Text page via [email or web address] Paging/Directory   See signed in provider for up to date coverage information  ______________________________________________________________________    Interval History   - continues to report abdominal pain, nausea   - received Zyprexa overnight, which helped with nausea    Physical Exam   Vital Signs: Temp: 98.5  F (36.9  C) Temp src: Oral BP: (!) 173/92 Pulse: 73   Resp: 16 SpO2: 98 % O2 Device: None (Room air)    Weight: 182 lbs 14.4 oz    General Appearance: Lying in bed, on room air, in no acute distress or discomfort.  HEENT: PERRL: EOMI; moist mucous membrane w/o lesions  Neck: No JVD  Pulmonary: Clear to auscultation bilaterally, no wheezes or crackles  CVS: Regular rhythm, no murmurs, rubs or gallops  GI: " Normoactive bowel sounds, soft, tender to palpation in epigastric region.  Extremities: No LE edema.   Skin: No rashes or lesions  Neurologic: A&O x3    Medical Decision Making       40 MINUTES SPENT BY ME on the date of service doing chart review, history, exam, documentation & further activities per the note.      Data   ------------------------- PAST 24 HR DATA REVIEWED -----------------------------------------------    I have personally reviewed the following data over the past 24 hrs:    8.3  \   14.7   / 204     133 (L) 94 (L) 8.6 /  228 (H)   3.5 28 0.51 \     ALT: 146 (H) AST: 131 (H) AP: 172 (H) TBILI: 0.8   ALB: 4.2 TOT PROTEIN: 8.5 (H) LIPASE: N/A       Imaging results reviewed over the past 24 hrs:   No results found for this or any previous visit (from the past 24 hour(s)).

## 2023-10-02 NOTE — PROGRESS NOTES
"  VS: BP (!) 158/98 (BP Location: Left arm)   Pulse 76   Temp 99.2  F (37.3  C) (Oral)   Resp 18   Ht 1.753 m (5' 9\")   Wt 83 kg (182 lb 14.4 oz)   SpO2 97%   BMI 27.01 kg/m      O2: Alert and oriented x 4, on room air, denies sob, chest pain and N/T. Nausea with mild vomiting.   Output: Continent of bowel and bladder   Last BM: 9/30/23 per pt   Activity: Independent in the room   Skin: WDL   Pain: Mild headache PRN tylenol given   CMS: Denies N/T, no edema noted   Diet: Regular diet   LDA: Left forearm PIV   Plan: Continue plan of care   Additional Info: One time zyprexa requested from provider for nausea after zofran and compazine        "

## 2023-10-03 LAB
ALBUMIN SERPL BCG-MCNC: 4.2 G/DL (ref 3.5–5.2)
ALP SERPL-CCNC: 171 U/L (ref 35–104)
ALT SERPL W P-5'-P-CCNC: 139 U/L (ref 0–50)
ANION GAP SERPL CALCULATED.3IONS-SCNC: 15 MMOL/L (ref 7–15)
AST SERPL W P-5'-P-CCNC: 83 U/L (ref 0–45)
BILIRUB SERPL-MCNC: 0.9 MG/DL
BUN SERPL-MCNC: 13.6 MG/DL (ref 6–20)
CALCIUM SERPL-MCNC: 9.5 MG/DL (ref 8.6–10)
CHLORIDE SERPL-SCNC: 96 MMOL/L (ref 98–107)
CREAT SERPL-MCNC: 0.56 MG/DL (ref 0.51–0.95)
DEPRECATED HCO3 PLAS-SCNC: 23 MMOL/L (ref 22–29)
EGFRCR SERPLBLD CKD-EPI 2021: >90 ML/MIN/1.73M2
GLUCOSE BLDC GLUCOMTR-MCNC: 213 MG/DL (ref 70–99)
GLUCOSE BLDC GLUCOMTR-MCNC: 218 MG/DL (ref 70–99)
GLUCOSE BLDC GLUCOMTR-MCNC: 295 MG/DL (ref 70–99)
GLUCOSE BLDC GLUCOMTR-MCNC: 328 MG/DL (ref 70–99)
GLUCOSE BLDC GLUCOMTR-MCNC: 406 MG/DL (ref 70–99)
GLUCOSE SERPL-MCNC: 282 MG/DL (ref 70–99)
HCV RNA SERPL NAA+PROBE-ACNC: ABNORMAL IU/ML
HCV RNA SERPL NAA+PROBE-LOG IU: 5.2 {LOG_IU}/ML
POTASSIUM SERPL-SCNC: 3.8 MMOL/L (ref 3.4–5.3)
PROT SERPL-MCNC: 8.5 G/DL (ref 6.4–8.3)
SODIUM SERPL-SCNC: 134 MMOL/L (ref 135–145)

## 2023-10-03 PROCEDURE — 99233 SBSQ HOSP IP/OBS HIGH 50: CPT | Performed by: INTERNAL MEDICINE

## 2023-10-03 PROCEDURE — 258N000003 HC RX IP 258 OP 636: Performed by: INTERNAL MEDICINE

## 2023-10-03 PROCEDURE — 82962 GLUCOSE BLOOD TEST: CPT

## 2023-10-03 PROCEDURE — G0378 HOSPITAL OBSERVATION PER HR: HCPCS

## 2023-10-03 PROCEDURE — 96376 TX/PRO/DX INJ SAME DRUG ADON: CPT

## 2023-10-03 PROCEDURE — H0001 ALCOHOL AND/OR DRUG ASSESS: HCPCS

## 2023-10-03 PROCEDURE — 36415 COLL VENOUS BLD VENIPUNCTURE: CPT | Performed by: INTERNAL MEDICINE

## 2023-10-03 PROCEDURE — 250N000011 HC RX IP 250 OP 636: Performed by: INTERNAL MEDICINE

## 2023-10-03 PROCEDURE — 250N000013 HC RX MED GY IP 250 OP 250 PS 637: Performed by: INTERNAL MEDICINE

## 2023-10-03 PROCEDURE — 96372 THER/PROPH/DIAG INJ SC/IM: CPT | Performed by: INTERNAL MEDICINE

## 2023-10-03 PROCEDURE — 80053 COMPREHEN METABOLIC PANEL: CPT | Performed by: INTERNAL MEDICINE

## 2023-10-03 RX ORDER — SODIUM CHLORIDE 9 MG/ML
INJECTION, SOLUTION INTRAVENOUS CONTINUOUS
Status: DISCONTINUED | OUTPATIENT
Start: 2023-10-03 | End: 2023-10-04 | Stop reason: HOSPADM

## 2023-10-03 RX ORDER — CLONIDINE HYDROCHLORIDE 0.1 MG/1
0.1 TABLET ORAL 2 TIMES DAILY
Status: DISCONTINUED | OUTPATIENT
Start: 2023-10-03 | End: 2023-10-04 | Stop reason: HOSPADM

## 2023-10-03 RX ADMIN — ONDANSETRON 4 MG: 2 INJECTION INTRAMUSCULAR; INTRAVENOUS at 11:21

## 2023-10-03 RX ADMIN — ALUMINUM HYDROXIDE, MAGNESIUM HYDROXIDE, AND SIMETHICONE 30 ML: 200; 200; 20 SUSPENSION ORAL at 06:11

## 2023-10-03 RX ADMIN — ACETAMINOPHEN 325 MG: 325 TABLET, FILM COATED ORAL at 21:48

## 2023-10-03 RX ADMIN — SENNOSIDES AND DOCUSATE SODIUM 1 TABLET: 50; 8.6 TABLET ORAL at 21:48

## 2023-10-03 RX ADMIN — DIPHENHYDRAMINE HYDROCHLORIDE AND LIDOCAINE HYDROCHLORIDE AND ALUMINUM HYDROXIDE AND MAGNESIUM HYDRO 10 ML: KIT at 01:00

## 2023-10-03 RX ADMIN — ONDANSETRON 4 MG: 4 TABLET, ORALLY DISINTEGRATING ORAL at 21:47

## 2023-10-03 RX ADMIN — HYDRALAZINE HYDROCHLORIDE 10 MG: 20 INJECTION INTRAMUSCULAR; INTRAVENOUS at 09:44

## 2023-10-03 RX ADMIN — CLONIDINE HYDROCHLORIDE 0.1 MG: 0.1 TABLET ORAL at 21:48

## 2023-10-03 RX ADMIN — INSULIN GLARGINE 10 UNITS: 100 INJECTION, SOLUTION SUBCUTANEOUS at 07:58

## 2023-10-03 RX ADMIN — SODIUM CHLORIDE: 9 INJECTION, SOLUTION INTRAVENOUS at 14:30

## 2023-10-03 RX ADMIN — INSULIN ASPART 4 UNITS: 100 INJECTION, SOLUTION INTRAVENOUS; SUBCUTANEOUS at 07:56

## 2023-10-03 RX ADMIN — OLANZAPINE 5 MG: 10 INJECTION, POWDER, FOR SOLUTION INTRAMUSCULAR at 00:52

## 2023-10-03 RX ADMIN — SENNOSIDES AND DOCUSATE SODIUM 1 TABLET: 50; 8.6 TABLET ORAL at 07:58

## 2023-10-03 RX ADMIN — ALUMINUM HYDROXIDE, MAGNESIUM HYDROXIDE, AND SIMETHICONE 30 ML: 200; 200; 20 SUSPENSION ORAL at 16:34

## 2023-10-03 RX ADMIN — INSULIN ASPART 6 UNITS: 100 INJECTION, SOLUTION INTRAVENOUS; SUBCUTANEOUS at 12:03

## 2023-10-03 RX ADMIN — POLYETHYLENE GLYCOL 3350 17 G: 17 POWDER, FOR SOLUTION ORAL at 21:50

## 2023-10-03 RX ADMIN — INSULIN ASPART 2 UNITS: 100 INJECTION, SOLUTION INTRAVENOUS; SUBCUTANEOUS at 17:54

## 2023-10-03 RX ADMIN — ONDANSETRON 4 MG: 2 INJECTION INTRAMUSCULAR; INTRAVENOUS at 06:11

## 2023-10-03 ASSESSMENT — ACTIVITIES OF DAILY LIVING (ADL)
ADLS_ACUITY_SCORE: 18

## 2023-10-03 NOTE — PLAN OF CARE
VS:  37.1 16 116 154/98   O2: SpO2 > 100 and stable on RA. LS clear and equal bilaterally. Denies chest pain and SOB.    Output: Voids spontaneously without difficulty to bathroom    Last BM: 10/01/2023   Activity: independent   Skin: WDL    Pain: Pain managed with magic mouthwash   CMS: Intact, AOx4   Dressing: none   Diet: Regular diet. with nausea    LDA: R PIV SL   Equipment: IV pole, personal belongings, monitor   Plan: standard precautions maintained / Continue with plan of care. Call light within reach, pt able to make needs known.   Plan: Chem dep referral    Additional Info:  Standard precautions
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
## 2023-10-03 NOTE — PROGRESS NOTES
Pt AO x4, on RA, denies chest pain or SOB, IND in room and able to make needs known. Elevated BP this AM, hydralazine given and Provider notified, and orders obtained. Constipation and indigestion  reported this PM, and Maalox given. Otherwise, no acute event noted or reported this shift. Continue to monitor and follow the POC.

## 2023-10-03 NOTE — PROGRESS NOTES
[facility]    Medicine Progress Note - Hospitalist Service, GOLD TEAM 17    Date of Admission:  10/1/2023    Assessment & Plan   Yun Coyle is a 47 year old female admitted on 10/1/2023. She has a past medical history of polysubstance abuse, opioid use disorder (previously on methadone), depression, generalized anxiety disorder, bipolar disorder, type 2 diabetes.  Patient is currently not on any medication at home.  She presents with acute onsets of nausea and vomiting with associated suprapubic pain in the setting of increased use of marijuana.  Presentation likely represents cannabinoid hyperemesis syndrome.    #Abdominal pain  #Nausea and vomiting  - Reports this started 1 day prior to presenting to the ED  - She has been unable to tolerate anything by mouth so far  - Mentioned she used more marijuana than usual.  - She has received Zofran in the ED with good effect, she also received bolus with LR.  Advance diet as tolerated  Normal saline at 100 mL/h  As needed antiemetic with Zofran first-line, Compazine second line  As needed Magic mouthwash for epigastric pain.    #Hx substance use  - Patient reported snorting fentanyl about 4 days ago.  - She typically does not have withdrawal symptoms from opiate  - She is willing to discuss with our addiction medicine team, though she is not interested in initiating either Suboxone or methadone during this admission.    #Hyperglycemia  #History of diabetes, likely type II  - A1c about a year ago was 10.2%; this admission 12.2%  - Blood glucose on   - Urinalysis with > 1000 glucose  Serum glucose suboptimally controlled  Increase Lantus to 15 units subcutaneous at bedtime  Holding metformin for now given ongoing nausea  Patient will need endocrinology consult at discharge   Start ISS, POCT     #Hyponatremia (improving)   - Sodium on labs is 128 on admission; corrected for hyperglycemia is 132  - Improving with  "IVF   Continue to trend BMP.    #Hepatocellular pattern of LFT elevation  - Etiology unclear at this time  - Hepatitis panel largerly unrevealing though Hep C antibody positive  Check Hep C PCR  Obtain RUQ U/S given hepatitis     #Constipation  Bowel regimen with MiraLAX and Zakia-Colace.    #Hypertension  - Blood pressure elevated  - Patient reports clonidine has been helpful in the past  - Add clonidine 0.1 mg p.o. twice daily  - Continue closely monitor blood pressure  - Make further medication adjustments as necessary       Diet: Moderate Consistent Carb (60 g CHO per Meal) Diet    DVT Prophylaxis: Pneumatic Compression Devices  Blanc Catheter: Not present  Lines: None     Cardiac Monitoring: None  Code Status: Full Code      Clinically Significant Risk Factors Present on Admission                  # Hypertension: Noted on problem list     # DMII: A1C = 12.2 % (Ref range: <5.7 %) within past 6 months    # Overweight: Estimated body mass index is 27.01 kg/m  as calculated from the following:    Height as of this encounter: 1.753 m (5' 9\").    Weight as of this encounter: 83 kg (182 lb 14.4 oz).       # Asthma: noted on problem list        Disposition Plan           Angel Sandoval DO, S  Hospitalist Service, GOLD TEAM 17  [facility]  Securely message with [employer] (more info)  Text page via [employer] Paging/Directory   See signed in provider for up to date coverage information  ______________________________________________________________________    Interval History   Patient reports still not feeling well today.  Patient reports subjective fever.  Patient reports general malaise.  Likely a component of drug withdrawal.    Physical Exam   Vital Signs: Temp: 98.5  F (36.9  C) Temp src: Oral BP: (!) 185/105 Pulse: (!) 124   Resp: 20 SpO2: 99 % O2 Device: None (Room air)    Weight: 182 lbs 14.4 oz    GENERAL: Alert and oriented x 3; no acute distress; well-nourished.  HEENT: " Normocephalic; atraumatic; PERRLA; MMM.  CV: RRR; normal S1, S2; no rubs, murmurs, or gallops.  RESP: Lung fields clear to aucultation B/L; no wheezing or crepitations.  GI: Abdomen is soft, nontender, nondistended; no organomegaly; normal bowel sounds.  : Deferred genital examination.   MSK: No clubbing, cyanosis, or edema.  DERM: Skin is intact; no rash, lesions, or skin breakdown.  NEURO: No focal deficits appreciated; strength & sensorium are grossly intact.  PSYCH: No active hallucinations; affect, insight appear within normal limits.    Medical Decision Making       55 MINUTES SPENT BY ME on the date of service doing chart review, history, exam, documentation & further activities per the note.      Data     I have personally reviewed the following data over the past 24 hrs:    N/A  \   N/A   / N/A     134 (L) 96 (L) 13.6 /  406 (H)   3.8 23 0.56 \     ALT: 139 (H) AST: 83 (H) AP: 171 (H) TBILI: 0.9   ALB: 4.2 TOT PROTEIN: 8.5 (H) LIPASE: N/A       Imaging results reviewed over the past 24 hrs:   Recent Results (from the past 24 hour(s))   US Abdomen Limited ([facility] only)    Narrative    EXAMINATION: US ABDOMEN LIMITED  10/2/2023 4:16 PM      CLINICAL HISTORY: Elevated LFTs, r/u steatohepatitis Vs cirrhosis    COMPARISON: 2/3/2020        FINDINGS:  The liver is mildly increased in echogenicity. Status post  cholecystectomy with intra and extrahepatic biliary ductal dilatation.  Central intrahepatic bile ducts measure 4 mm in diameter and the  common bile duct measures 1.2 cm in diameter.    Prominent pancreatic duct measuring 2 mm in diameter    The right kidney measures 10.9 cm in length and is normal in  echogenicity without hydronephrosis.       Impression    IMPRESSION:   1. Mildly increased hepatic echogenicity which can be seen in the  setting of steatosis.  2. Status post cholecystectomy, with an increase in intra and  extrahepatic biliary ductal dilatation from CT of 12/3/2020. The  pancreatic duct  is also prominent. Consider further evaluation with  MRCP.    BARBY TILLEY MD         SYSTEM ID:  [id number]

## 2023-10-03 NOTE — CONSULTS
Met with patient to discuss possible treatment options and to possibly complete an assessment. Assessment has been completed at this time and patient is being recommended Residential Treatment at [facility] and [facility]. Referrals are being made at this time.  Patient is aware of the referrals and is in agreement. Patient has been given information to follow up with the program.      Referrals/ Alternatives:  [facility]  [address] [place] [address]  Phone: [phone]  Fax: [phone]     [facility] [place]  [address] [place] [address]  Main: [phone]  Referral/Intake Line: [phone]  Referral Fax: [phone]     [facility]  [address] [facility], [address]  Main line: [phone]  Referral/Intake Line: [phone]  Referral Fax: [phone]        CYNTHIA consult completed by: AKIN Oliva.  Phone Number: NA  E-mail Address: [email or web address] Mental Health and Addiction Services Evaluation Department  [address] [place] [id number]    AKIN Oliva on 10/3/2023 at 12:53 PM

## 2023-10-03 NOTE — DISCHARGE INSTRUCTIONS
Patient has been approved for [facility] now just awaiting bed availability to schedule intake. Likely today/tomorrow.   Blas Aceved[facility] on 10/4/2023 at 8:56 AM            Met with patient to discuss possible treatment options and to possibly complete an assessment. Assessment has been completed at this time and patient is being recommended Residential Treatment at [facility] and [facility]. Referrals are being made at this time.  Patient is aware of the referrals and is in agreement. Patient has been given information to follow up with the program.       Referrals/ Alternatives:  [facility] [address] [place] [address]  Phone: [phone]  Fax: [phone]     [facility] [place] [facility]  [address] [place] [address]  Main: [phone]  Referral/Intake Line: [phone]  Referral Fax: [phone]     [facility]  [address] [place] [address], [address]  Main line: [phone]  Referral/Intake Line: [phone]  Referral Fax: [phone]

## 2023-10-04 VITALS
TEMPERATURE: 99.1 F | SYSTOLIC BLOOD PRESSURE: 155 MMHG | BODY MASS INDEX: 27.09 KG/M2 | WEIGHT: 182.9 LBS | HEIGHT: 69 IN | RESPIRATION RATE: 18 BRPM | HEART RATE: 90 BPM | OXYGEN SATURATION: 98 % | DIASTOLIC BLOOD PRESSURE: 95 MMHG

## 2023-10-04 LAB
ALBUMIN SERPL BCG-MCNC: 3.4 G/DL (ref 3.5–5.2)
ALBUMIN SERPL BCG-MCNC: 3.6 G/DL (ref 3.5–5.2)
ALP SERPL-CCNC: 144 U/L (ref 35–104)
ALP SERPL-CCNC: 150 U/L (ref 35–104)
ALT SERPL W P-5'-P-CCNC: 125 U/L (ref 0–50)
ALT SERPL W P-5'-P-CCNC: 129 U/L (ref 0–50)
ANION GAP SERPL CALCULATED.3IONS-SCNC: 12 MMOL/L (ref 7–15)
ANION GAP SERPL CALCULATED.3IONS-SCNC: 13 MMOL/L (ref 7–15)
AST SERPL W P-5'-P-CCNC: 81 U/L (ref 0–45)
AST SERPL W P-5'-P-CCNC: 82 U/L (ref 0–45)
BASO+EOS+MONOS # BLD AUTO: NORMAL 10*3/UL
BASO+EOS+MONOS # BLD AUTO: NORMAL 10*3/UL
BASO+EOS+MONOS NFR BLD AUTO: NORMAL %
BASO+EOS+MONOS NFR BLD AUTO: NORMAL %
BASOPHILS # BLD AUTO: 0.1 10E3/UL (ref 0–0.2)
BASOPHILS # BLD AUTO: 0.1 10E3/UL (ref 0–0.2)
BASOPHILS NFR BLD AUTO: 1 %
BASOPHILS NFR BLD AUTO: 1 %
BILIRUB SERPL-MCNC: 0.6 MG/DL
BILIRUB SERPL-MCNC: 0.7 MG/DL
BUN SERPL-MCNC: 14.4 MG/DL (ref 6–20)
BUN SERPL-MCNC: 16.3 MG/DL (ref 6–20)
CALCIUM SERPL-MCNC: 8.5 MG/DL (ref 8.6–10)
CALCIUM SERPL-MCNC: 8.7 MG/DL (ref 8.6–10)
CHLORIDE SERPL-SCNC: 97 MMOL/L (ref 98–107)
CHLORIDE SERPL-SCNC: 98 MMOL/L (ref 98–107)
CREAT SERPL-MCNC: 0.63 MG/DL (ref 0.51–0.95)
CREAT SERPL-MCNC: 0.63 MG/DL (ref 0.51–0.95)
DEPRECATED HCO3 PLAS-SCNC: 22 MMOL/L (ref 22–29)
DEPRECATED HCO3 PLAS-SCNC: 23 MMOL/L (ref 22–29)
EGFRCR SERPLBLD CKD-EPI 2021: >90 ML/MIN/1.73M2
EGFRCR SERPLBLD CKD-EPI 2021: >90 ML/MIN/1.73M2
EOSINOPHIL # BLD AUTO: 0.2 10E3/UL (ref 0–0.7)
EOSINOPHIL # BLD AUTO: 0.2 10E3/UL (ref 0–0.7)
EOSINOPHIL NFR BLD AUTO: 2 %
EOSINOPHIL NFR BLD AUTO: 2 %
ERYTHROCYTE [DISTWIDTH] IN BLOOD BY AUTOMATED COUNT: 12.6 % (ref 10–15)
ERYTHROCYTE [DISTWIDTH] IN BLOOD BY AUTOMATED COUNT: 12.7 % (ref 10–15)
GLUCOSE BLDC GLUCOMTR-MCNC: 202 MG/DL (ref 70–99)
GLUCOSE BLDC GLUCOMTR-MCNC: 209 MG/DL (ref 70–99)
GLUCOSE BLDC GLUCOMTR-MCNC: 233 MG/DL (ref 70–99)
GLUCOSE BLDC GLUCOMTR-MCNC: 370 MG/DL (ref 70–99)
GLUCOSE SERPL-MCNC: 237 MG/DL (ref 70–99)
GLUCOSE SERPL-MCNC: 334 MG/DL (ref 70–99)
HCT VFR BLD AUTO: 42 % (ref 35–47)
HCT VFR BLD AUTO: 42 % (ref 35–47)
HGB BLD-MCNC: 14.2 G/DL (ref 11.7–15.7)
HGB BLD-MCNC: 14.4 G/DL (ref 11.7–15.7)
IMM GRANULOCYTES # BLD: 0.1 10E3/UL
IMM GRANULOCYTES # BLD: 0.1 10E3/UL
IMM GRANULOCYTES NFR BLD: 1 %
IMM GRANULOCYTES NFR BLD: 1 %
LYMPHOCYTES # BLD AUTO: 2.9 10E3/UL (ref 0.8–5.3)
LYMPHOCYTES # BLD AUTO: 2.9 10E3/UL (ref 0.8–5.3)
LYMPHOCYTES NFR BLD AUTO: 31 %
LYMPHOCYTES NFR BLD AUTO: 32 %
MCH RBC QN AUTO: 29.8 PG (ref 26.5–33)
MCH RBC QN AUTO: 30.2 PG (ref 26.5–33)
MCHC RBC AUTO-ENTMCNC: 33.8 G/DL (ref 31.5–36.5)
MCHC RBC AUTO-ENTMCNC: 34.3 G/DL (ref 31.5–36.5)
MCV RBC AUTO: 88 FL (ref 78–100)
MCV RBC AUTO: 88 FL (ref 78–100)
MONOCYTES # BLD AUTO: 0.5 10E3/UL (ref 0–1.3)
MONOCYTES # BLD AUTO: 0.6 10E3/UL (ref 0–1.3)
MONOCYTES NFR BLD AUTO: 6 %
MONOCYTES NFR BLD AUTO: 7 %
NEUTROPHILS # BLD AUTO: 5.3 10E3/UL (ref 1.6–8.3)
NEUTROPHILS # BLD AUTO: 5.6 10E3/UL (ref 1.6–8.3)
NEUTROPHILS NFR BLD AUTO: 57 %
NEUTROPHILS NFR BLD AUTO: 59 %
NRBC # BLD AUTO: 0 10E3/UL
NRBC # BLD AUTO: 0 10E3/UL
NRBC BLD AUTO-RTO: 0 /100
NRBC BLD AUTO-RTO: 0 /100
PLATELET # BLD AUTO: 197 10E3/UL (ref 150–450)
PLATELET # BLD AUTO: 215 10E3/UL (ref 150–450)
POTASSIUM SERPL-SCNC: 3.8 MMOL/L (ref 3.4–5.3)
POTASSIUM SERPL-SCNC: 3.9 MMOL/L (ref 3.4–5.3)
PROT SERPL-MCNC: 7.2 G/DL (ref 6.4–8.3)
PROT SERPL-MCNC: 7.3 G/DL (ref 6.4–8.3)
RBC # BLD AUTO: 4.76 10E6/UL (ref 3.8–5.2)
RBC # BLD AUTO: 4.77 10E6/UL (ref 3.8–5.2)
SODIUM SERPL-SCNC: 132 MMOL/L (ref 135–145)
SODIUM SERPL-SCNC: 133 MMOL/L (ref 135–145)
WBC # BLD AUTO: 9.1 10E3/UL (ref 4–11)
WBC # BLD AUTO: 9.4 10E3/UL (ref 4–11)

## 2023-10-04 PROCEDURE — 96372 THER/PROPH/DIAG INJ SC/IM: CPT | Performed by: INTERNAL MEDICINE

## 2023-10-04 PROCEDURE — 36415 COLL VENOUS BLD VENIPUNCTURE: CPT | Performed by: INTERNAL MEDICINE

## 2023-10-04 PROCEDURE — 84155 ASSAY OF PROTEIN SERUM: CPT | Mod: 91 | Performed by: INTERNAL MEDICINE

## 2023-10-04 PROCEDURE — 85025 COMPLETE CBC W/AUTO DIFF WBC: CPT | Performed by: INTERNAL MEDICINE

## 2023-10-04 PROCEDURE — 250N000011 HC RX IP 250 OP 636: Performed by: INTERNAL MEDICINE

## 2023-10-04 PROCEDURE — 250N000013 HC RX MED GY IP 250 OP 250 PS 637: Performed by: INTERNAL MEDICINE

## 2023-10-04 PROCEDURE — G0378 HOSPITAL OBSERVATION PER HR: HCPCS

## 2023-10-04 PROCEDURE — 82962 GLUCOSE BLOOD TEST: CPT

## 2023-10-04 PROCEDURE — 99239 HOSP IP/OBS DSCHRG MGMT >30: CPT | Performed by: INTERNAL MEDICINE

## 2023-10-04 PROCEDURE — 80053 COMPREHEN METABOLIC PANEL: CPT | Performed by: INTERNAL MEDICINE

## 2023-10-04 RX ORDER — BLOOD PRESSURE TEST KIT
KIT MISCELLANEOUS
Qty: 100 EACH | Refills: 0 | Status: SHIPPED | OUTPATIENT
Start: 2023-10-04 | End: 2023-11-08

## 2023-10-04 RX ORDER — LOSARTAN POTASSIUM 25 MG/1
25 TABLET ORAL DAILY
Qty: 30 TABLET | Refills: 0 | Status: SHIPPED | OUTPATIENT
Start: 2023-10-04 | End: 2023-11-08

## 2023-10-04 RX ORDER — CONTAINER,EMPTY
EACH MISCELLANEOUS
Qty: 1 EACH | Refills: 0 | Status: SHIPPED | OUTPATIENT
Start: 2023-10-04 | End: 2024-08-22

## 2023-10-04 RX ORDER — CLONIDINE HYDROCHLORIDE 0.1 MG/1
0.1 TABLET ORAL 2 TIMES DAILY
Qty: 60 TABLET | Refills: 0 | Status: SHIPPED | OUTPATIENT
Start: 2023-10-04 | End: 2023-11-08

## 2023-10-04 RX ORDER — NICOTINE POLACRILEX 4 MG
LOZENGE BUCCAL
Qty: 112.5 G | Refills: 0 | Status: SHIPPED | OUTPATIENT
Start: 2023-10-04

## 2023-10-04 RX ADMIN — SENNOSIDES AND DOCUSATE SODIUM 1 TABLET: 50; 8.6 TABLET ORAL at 07:53

## 2023-10-04 RX ADMIN — PROCHLORPERAZINE MALEATE 10 MG: 10 TABLET ORAL at 05:48

## 2023-10-04 RX ADMIN — INSULIN ASPART 2 UNITS: 100 INJECTION, SOLUTION INTRAVENOUS; SUBCUTANEOUS at 11:10

## 2023-10-04 RX ADMIN — INSULIN ASPART 5 UNITS: 100 INJECTION, SOLUTION INTRAVENOUS; SUBCUTANEOUS at 07:52

## 2023-10-04 RX ADMIN — CLONIDINE HYDROCHLORIDE 0.1 MG: 0.1 TABLET ORAL at 07:53

## 2023-10-04 RX ADMIN — OLANZAPINE 5 MG: 10 INJECTION, POWDER, FOR SOLUTION INTRAMUSCULAR at 07:53

## 2023-10-04 RX ADMIN — ONDANSETRON 4 MG: 4 TABLET, ORALLY DISINTEGRATING ORAL at 03:59

## 2023-10-04 ASSESSMENT — ACTIVITIES OF DAILY LIVING (ADL)
ADLS_ACUITY_SCORE: 18

## 2023-10-04 NOTE — PROGRESS NOTES
6MS DISCHARGE    D: Patient discharged to home at 1145.    I: Discharge prescriptions given to patient. All discharge medications and instructions reviewed with patient. Patient instructed to seek care if experiencing worsening symptoms. Other phone numbers to call with questions or concerns after discharge reviewed. Education completed.    A: Patient verbalized understanding of discharge medications and instructions. Prescribed home medications given to patient.  Belongings returned to patient.    P: Patient to follow-up with primary care at patient's earliest convenience.

## 2023-10-04 NOTE — PROGRESS NOTES
"  [id number]      VS: BP (!) 140/100 (BP Location: Left arm)   Pulse 90   Temp 99.1  F (37.3  C) (Oral)   Resp 18   Ht 1.753 m (5' 9\")   Wt 83 kg (182 lb 14.4 oz)   SpO2 98%   BMI 27.01 kg/m      O2: Alert and oriented x 4, on room air, denies sob, chest pain and N/T. Nausea with no vomiting    Output: Continent of bowel and bladder   Last BM: 9/30/23 per pt   Activity: Independent in the room   Skin: WDL   Pain: Mild headache PRN tylenol given   CMS: Denies N/T, no edema noted   Diet: Regular diet   LDA: Left forearm PIV infusing NS @ 100 ml/hr   Plan: Continue plan of care   Additional Info: Zofran and compazine given for nausea and tylenol.  Pt PIV infiltrated while infusing Normal saline with mild swelling, slight tenderness. IV removed, ice pack applied and wrist elevated.                 "

## 2023-10-04 NOTE — DISCHARGE SUMMARY
"[facility]  Hospitalist Discharge Summary      Date of Admission:  10/1/2023  Date of Discharge:  10/4/2023  Discharging Provider: Angel Sandoval DO  Discharge Service: Hospitalist Service, GOLD TEAM 17    Discharge Diagnoses   Abdominal pain  Nausea and vomiting  History of substance abuse  Hyperglycemia  Type 2 diabetes mellitus  Hyponatremia  Hepatocellular pattern of LFT elevation  Likely chronic hepatitis C viral infection  Constipation  Hypertension    Clinically Significant Risk Factors     # DMII: A1C = 12.2 % (Ref range: <5.7 %) within past 6 months  # Overweight: Estimated body mass index is 27.01 kg/m  as calculated from the following:    Height as of this encounter: 1.753 m (5' 9\").    Weight as of this encounter: 83 kg (182 lb 14.4 oz).       Follow-ups Needed After Discharge   Follow-up Appointments     Adult [facility] Follow-up and recommended labs and tests      Please follow-up with primary care provider at patient's earliest   convenience.  Specifically, address high blood pressure and diabetes.    Appointments on [place] and/or [facility] (with [facility] or [facility]   provider or service). Call [phone] if you haven't heard regarding   these appointments within 7 days of discharge.        Please consider direct acting antiviral therapy for chronic hepatitis C viral infection.    Unresulted Labs Ordered in the Past 30 Days of this Admission       Date and Time Order Name Status Description    10/4/2023 12:02 AM Comprehensive metabolic panel In process         These results will be followed up by primary care provider.    Discharge Disposition   Discharged to home  Condition at discharge: Stable    Hospital Course   Yun Coyle is a 47 year old female admitted on 10/1/2023. She has a past medical history of polysubstance abuse, opioid use disorder (previously on methadone), depression, generalized anxiety disorder, bipolar disorder, type 2 " diabetes.  Patient is currently not on any medication at home.  She presents with acute onsets of nausea and vomiting with associated suprapubic pain in the setting of increased use of marijuana.  Presentation likely represents cannabinoid hyperemesis syndrome.    #Abdominal pain  #Nausea and vomiting  - Reports this started 1 day prior to presenting to the ED  - She has been unable to tolerate anything by mouth so far  - Mentioned she used more marijuana than usual.  - She has received Zofran in the ED with good effect, she also received bolus with LR.  Advance diet as tolerated  Normal saline at 100 mL/h  As needed antiemetic with Zofran first-line, Compazine second line  As needed Magic mouthwash for epigastric pain.    #Hx substance use  - Patient reported snorting fentanyl about 4 days ago.  - She typically does not have withdrawal symptoms from opiate  - She is willing to discuss with our addiction medicine team, though she is not interested in initiating either Suboxone or methadone during this admission.    #Hyperglycemia  #History of diabetes, likely type II  - A1c about a year ago was 10.2%; this admission 12.2%  - Blood glucose on   - Urinalysis with > 1000 glucose  Serum glucose suboptimally controlled  Increase Lantus to 15 units subcutaneous at bedtime  Holding metformin for now given ongoing nausea  Patient will need endocrinology consult at discharge   Start ISS, POCT     #Hyponatremia (improving)   - Sodium on labs is 128 on admission; corrected for hyperglycemia is 132  - Improving with IVF   Continue to trend BMP.    #Hepatocellular pattern of LFT elevation  - Etiology unclear at this time  - Hepatitis panel largerly unrevealing though Hep C antibody positive  Check Hep C PCR  Obtain RUQ U/S given hepatitis     #Constipation  Bowel regimen with MiraLAX and Zakia-Colace.    #Hypertension  - Blood pressure elevated  - Patient reports clonidine has been helpful in the past  - Add clonidine 0.1  mg p.o. twice daily  - Continue closely monitor blood pressure  - Make further medication adjustments as necessary    Consultations This Hospital Stay   CHEMICAL DEPENDENCY IP CONSULT    Code Status   Full Code    Time Spent on this Encounter   I, Angel Sandoval DO, personally saw the patient today and spent greater than 30 minutes discharging this patient.       Angel Sandoval DO  [facility]  [facility] [address]  Phone: [phone]  Fax: [phone]  ______________________________________________________________________       Primary Care Physician   Physician No Ref-Primary    Discharge Orders      Reason for your hospital stay    Patient was admitted to the hospital for abdominal pain, nausea, vomiting.  Presumed cannabinoid hyperemesis syndrome.     Activity    Your activity upon discharge: activity as tolerated     Adult [facility] Follow-up and recommended labs and tests    Please follow-up with primary care provider at patient's earliest convenience.  Specifically, address high blood pressure and diabetes.    Appointments on [place] and/or [facility] (with [facility] or [facility] provider or service). Call [phone] if you haven't heard regarding these appointments within 7 days of discharge.     Diet    Follow this diet upon discharge: Orders Placed This Encounter      Moderate Consistent Carb (60 g CHO per Meal) Diet       Significant Results and Procedures   Results for orders placed or performed during the hospital encounter of 10/01/23   US Abdomen Limited ([facility] only)    Narrative    EXAMINATION: US ABDOMEN LIMITED  10/2/2023 4:16 PM      CLINICAL HISTORY: Elevated LFTs, r/u steatohepatitis Vs cirrhosis    COMPARISON: 2/3/2020        FINDINGS:  The liver is mildly increased in echogenicity. Status post  cholecystectomy with intra and extrahepatic biliary ductal dilatation.  Central intrahepatic bile ducts measure 4 mm in diameter and the  common bile duct  measures 1.2 cm in diameter.    Prominent pancreatic duct measuring 2 mm in diameter    The right kidney measures 10.9 cm in length and is normal in  echogenicity without hydronephrosis.       Impression    IMPRESSION:   1. Mildly increased hepatic echogenicity which can be seen in the  setting of steatosis.  2. Status post cholecystectomy, with an increase in intra and  extrahepatic biliary ductal dilatation from CT of 12/3/2020. The  pancreatic duct is also prominent. Consider further evaluation with  MRCP.    BARBY TILLEY MD         SYSTEM ID:  [id number]       Discharge Medications   Current Discharge Medication List        START taking these medications    Details   Alcohol Swabs PADS Use to swab the area of the injection or marla as directed Per insurance coverage  Qty: 100 each, Refills: 0    Associated Diagnoses: Type 2 diabetes mellitus without complication, unspecified whether long term insulin use (H)      blood glucose (NO BRAND SPECIFIED) lancets standard To use to test glucose level in the blood Use to test blood sugar once daily as directed. To accompany glucose monitor brands per insurance coverage.  Qty: 100 each, Refills: 0    Associated Diagnoses: Type 2 diabetes mellitus without complication, unspecified whether long term insulin use (H)      blood glucose (NO BRAND SPECIFIED) test strip To use to test glucose level in the blood Use to test blood sugar Once daily as directed. To accompany glucose monitor brands per insurance coverage.  Qty: 30 strip, Refills: 0    Associated Diagnoses: Type 2 diabetes mellitus without complication, unspecified whether long term insulin use (H)      blood glucose calibration (NO BRAND SPECIFIED) solution Used to calibrate the blood glucose monitor as needed and as directed.  To accompany  blood glucose brands per insurance coverage  Qty: 1 each, Refills: 0    Associated Diagnoses: Type 2 diabetes mellitus without complication, unspecified whether long term insulin  use (H)      blood glucose monitoring (NO BRAND SPECIFIED) meter device kit Use as directed Per insurance coverage  Qty: 1 kit, Refills: 0    Associated Diagnoses: Type 2 diabetes mellitus without complication, unspecified whether long term insulin use (H)      cloNIDine (CATAPRES) 0.1 MG tablet Take 1 tablet (0.1 mg) by mouth 2 times daily for 30 days  Qty: 60 tablet, Refills: 0    Associated Diagnoses: Secondary hypertension      glucagon 1 MG kit Inject 1 mg Subcutaneous once as needed for low blood sugar (Use as directed by provider)  Qty: 1 each, Refills: 0    Associated Diagnoses: Type 2 diabetes mellitus without complication, unspecified whether long term insulin use (H)      glucose (BD GLUCOSE) 4 g chewable tablet Take 4 tablets by mouth every 15 minutes as needed for low blood sugar  Qty: 50 tablet, Refills: 0    Associated Diagnoses: Type 2 diabetes mellitus without complication, unspecified whether long term insulin use (H)      glucose 40 % (400 mg/mL) gel 15 g every 15 minutes by mouth as needed for low blood sugar.  Oral gel is preferable for conscious and able to swallow patient.  Qty: 112.5 g, Refills: 0    Associated Diagnoses: Type 2 diabetes mellitus without complication, unspecified whether long term insulin use (H)      insulin glargine (LANTUS PEN) 100 UNIT/ML pen Inject 15 Units Subcutaneous every morning (before breakfast) for 30 days  Qty: 4.5 mL, Refills: 0    Comments: If Lantus is not covered by insurance, may substitute Basaglar or Semglee or other insulin glargine product per insurance preference at same dose and frequency.    Associated Diagnoses: Type 2 diabetes mellitus with hyperglycemia, unspecified whether long term insulin use (H)      losartan (COZAAR) 25 MG tablet Take 1 tablet (25 mg) by mouth daily for 30 days  Qty: 30 tablet, Refills: 0    Associated Diagnoses: Secondary hypertension      metFORMIN (GLUCOPHAGE) 500 MG tablet Take 1 tablet (500 mg) by mouth 2 times daily  (with meals) for 30 days  Qty: 60 tablet, Refills: 0    Associated Diagnoses: Type 2 diabetes mellitus without complication, unspecified whether long term insulin use (H)      Sharps Container MISC Use as directed to dispose of needles, lancets and other sharps  Qty: 1 each, Refills: 0    Associated Diagnoses: Type 2 diabetes mellitus without complication, unspecified whether long term insulin use (H)           Allergies   No Known Allergies

## 2023-10-04 NOTE — PROGRESS NOTES
Care Management Discharge Note    Discharge Date: 10/04/2023     Discharge Disposition: Home (with admission to IP CD [place] tomorrow)     Discharge Services: CD TX     Discharge DME: None      Discharge Transportation: Family to provide transportation to home (IP CD [place] to provide transport to facility tomorrow)    Private pay costs discussed: Not applicable    Does the patient's insurance plan have a 3 day qualifying hospital stay waiver?  No    PAS Confirmation Code: N/A    Patient/family educated on Medicare website which has current facility and service quality ratings: N/A     Education Provided on the Discharge Plan: Yes      Persons Notified of Discharge Plans: Charge Nurse, Bedside Nurse, MD, Patient, CD TX Facility    Patient/Family in Agreement with the Plan: Yes      Handoff Referral Completed: Yes    Additional Information:    Writer to discharge home today via family transport. No IMM needed. No hand off to patient's primary care physician completed as there is no PCP listed. Patient to admit to [facility] IP CD [facility] tomorrow. Writer confirmed accepted with the facility and they will call patient to schedule transportation and admission time.     [facility]  [address] [place] [address]  Phone: [phone]  Fax: [phone]    NAN Church, MSW  6 Med Surg and 10 ICU Rooms 1037 and 1039  Phone [phone]  Pager [phone]

## 2023-10-05 ENCOUNTER — PATIENT OUTREACH (OUTPATIENT)
Dept: CARE COORDINATION | Facility: CLINIC | Age: 48
End: 2023-10-05
Payer: COMMERCIAL

## 2023-10-05 NOTE — PROGRESS NOTES
[facility]    Background: Transitional Care Management program identified per system criteria and reviewed by [facility] team for possible outreach.    Assessment: Upon chart review, [facility] Team member will not proceed with patient outreach related to this episode of Transitional Care Management program due to reason below:    Patient is in CD treatment facility    Plan: Transitional Care Management episode addressed appropriately per reason noted above.      Meenakshi Best, DA  [facility], [facility]    *Connected Care Resource Team does NOT follow patient ongoing. Referrals are identified based on internal discharge reports and the outreach is to ensure patient has an understanding of their discharge instructions.

## 2023-10-09 ENCOUNTER — HOSPITAL ENCOUNTER (EMERGENCY)
Facility: CLINIC | Age: 48
Discharge: PSYCHIATRIC OR SUBSTANCE USE FACILITY | End: 2023-10-09
Attending: INTERNAL MEDICINE | Admitting: INTERNAL MEDICINE
Payer: COMMERCIAL

## 2023-10-09 ENCOUNTER — APPOINTMENT (OUTPATIENT)
Dept: CT IMAGING | Facility: CLINIC | Age: 48
End: 2023-10-09
Attending: INTERNAL MEDICINE
Payer: COMMERCIAL

## 2023-10-09 VITALS
OXYGEN SATURATION: 98 % | RESPIRATION RATE: 20 BRPM | TEMPERATURE: 98.5 F | SYSTOLIC BLOOD PRESSURE: 131 MMHG | DIASTOLIC BLOOD PRESSURE: 94 MMHG | HEART RATE: 75 BPM

## 2023-10-09 DIAGNOSIS — R11.2 NAUSEA AND VOMITING, UNSPECIFIED VOMITING TYPE: ICD-10-CM

## 2023-10-09 DIAGNOSIS — F12.90 MARIJUANA USE: ICD-10-CM

## 2023-10-09 LAB
ALBUMIN SERPL BCG-MCNC: 3.8 G/DL (ref 3.5–5.2)
ALBUMIN UR-MCNC: NEGATIVE MG/DL
ALP SERPL-CCNC: 172 U/L (ref 35–104)
ALT SERPL W P-5'-P-CCNC: 173 U/L (ref 0–50)
AMPHETAMINES UR QL SCN: ABNORMAL
ANION GAP SERPL CALCULATED.3IONS-SCNC: 13 MMOL/L (ref 7–15)
APPEARANCE UR: CLEAR
AST SERPL W P-5'-P-CCNC: 179 U/L (ref 0–45)
ATRIAL RATE - MUSE: 83 BPM
BARBITURATES UR QL SCN: ABNORMAL
BASO+EOS+MONOS # BLD AUTO: NORMAL 10*3/UL
BASO+EOS+MONOS NFR BLD AUTO: NORMAL %
BASOPHILS # BLD AUTO: 0 10E3/UL (ref 0–0.2)
BASOPHILS NFR BLD AUTO: 1 %
BENZODIAZ UR QL SCN: ABNORMAL
BILIRUB SERPL-MCNC: 0.5 MG/DL
BILIRUB UR QL STRIP: NEGATIVE
BUN SERPL-MCNC: 11.6 MG/DL (ref 6–20)
BZE UR QL SCN: ABNORMAL
CALCIUM SERPL-MCNC: 9.5 MG/DL (ref 8.6–10)
CANNABINOIDS UR QL SCN: ABNORMAL
CHLORIDE SERPL-SCNC: 95 MMOL/L (ref 98–107)
COLOR UR AUTO: ABNORMAL
CREAT SERPL-MCNC: 0.54 MG/DL (ref 0.51–0.95)
CRP SERPL-MCNC: <3 MG/L
DEPRECATED HCO3 PLAS-SCNC: 24 MMOL/L (ref 22–29)
DIASTOLIC BLOOD PRESSURE - MUSE: NORMAL MMHG
EGFRCR SERPLBLD CKD-EPI 2021: >90 ML/MIN/1.73M2
EOSINOPHIL # BLD AUTO: 0.2 10E3/UL (ref 0–0.7)
EOSINOPHIL NFR BLD AUTO: 3 %
ERYTHROCYTE [DISTWIDTH] IN BLOOD BY AUTOMATED COUNT: 13.2 % (ref 10–15)
ERYTHROCYTE [SEDIMENTATION RATE] IN BLOOD BY WESTERGREN METHOD: 21 MM/HR (ref 0–20)
FENTANYL UR QL: ABNORMAL
GLUCOSE SERPL-MCNC: 345 MG/DL (ref 70–99)
GLUCOSE UR STRIP-MCNC: >=1000 MG/DL
HCG SERPL QL: NEGATIVE
HCO3 BLDV-SCNC: 29 MMOL/L (ref 21–28)
HCT VFR BLD AUTO: 38.6 % (ref 35–47)
HGB BLD-MCNC: 13.4 G/DL (ref 11.7–15.7)
HGB UR QL STRIP: NEGATIVE
HOLD SPECIMEN: NORMAL
IMM GRANULOCYTES # BLD: 0.1 10E3/UL
IMM GRANULOCYTES NFR BLD: 1 %
INR PPP: 0.96 (ref 0.85–1.15)
INTERPRETATION ECG - MUSE: NORMAL
KETONES UR STRIP-MCNC: 10 MG/DL
LACTATE BLD-SCNC: 1.2 MMOL/L
LEUKOCYTE ESTERASE UR QL STRIP: NEGATIVE
LIPASE SERPL-CCNC: 46 U/L (ref 13–60)
LYMPHOCYTES # BLD AUTO: 1.8 10E3/UL (ref 0.8–5.3)
LYMPHOCYTES NFR BLD AUTO: 27 %
MCH RBC QN AUTO: 30 PG (ref 26.5–33)
MCHC RBC AUTO-ENTMCNC: 34.7 G/DL (ref 31.5–36.5)
MCV RBC AUTO: 86 FL (ref 78–100)
MONOCYTES # BLD AUTO: 0.3 10E3/UL (ref 0–1.3)
MONOCYTES NFR BLD AUTO: 5 %
NEUTROPHILS # BLD AUTO: 4.2 10E3/UL (ref 1.6–8.3)
NEUTROPHILS NFR BLD AUTO: 63 %
NITRATE UR QL: NEGATIVE
NRBC # BLD AUTO: 0 10E3/UL
NRBC BLD AUTO-RTO: 0 /100
OPIATES UR QL SCN: ABNORMAL
P AXIS - MUSE: 66 DEGREES
PCO2 BLDV: 46 MM HG (ref 40–50)
PCP QUAL URINE (ROCHE): ABNORMAL
PH BLDV: 7.41 [PH] (ref 7.32–7.43)
PH UR STRIP: 7.5 [PH] (ref 5–7)
PLATELET # BLD AUTO: 229 10E3/UL (ref 150–450)
PO2 BLDV: 48 MM HG (ref 25–47)
POTASSIUM SERPL-SCNC: 4.4 MMOL/L (ref 3.4–5.3)
PR INTERVAL - MUSE: 120 MS
PROT SERPL-MCNC: 7.7 G/DL (ref 6.4–8.3)
QRS DURATION - MUSE: 82 MS
QT - MUSE: 390 MS
QTC - MUSE: 458 MS
R AXIS - MUSE: -10 DEGREES
RBC # BLD AUTO: 4.47 10E6/UL (ref 3.8–5.2)
RBC URINE: <1 /HPF
SAO2 % BLDV: 83 % (ref 94–100)
SODIUM SERPL-SCNC: 132 MMOL/L (ref 135–145)
SP GR UR STRIP: 1.01 (ref 1–1.03)
SQUAMOUS EPITHELIAL: 1 /HPF
SYSTOLIC BLOOD PRESSURE - MUSE: NORMAL MMHG
T AXIS - MUSE: 36 DEGREES
TROPONIN T SERPL HS-MCNC: 8 NG/L
UROBILINOGEN UR STRIP-MCNC: NORMAL MG/DL
VENTRICULAR RATE- MUSE: 83 BPM
WBC # BLD AUTO: 6.6 10E3/UL (ref 4–11)
WBC URINE: <1 /HPF

## 2023-10-09 PROCEDURE — 93005 ELECTROCARDIOGRAM TRACING: CPT | Performed by: INTERNAL MEDICINE

## 2023-10-09 PROCEDURE — 250N000011 HC RX IP 250 OP 636: Mod: JZ | Performed by: INTERNAL MEDICINE

## 2023-10-09 PROCEDURE — 93010 ELECTROCARDIOGRAM REPORT: CPT | Performed by: INTERNAL MEDICINE

## 2023-10-09 PROCEDURE — 82803 BLOOD GASES ANY COMBINATION: CPT

## 2023-10-09 PROCEDURE — 36415 COLL VENOUS BLD VENIPUNCTURE: CPT | Performed by: INTERNAL MEDICINE

## 2023-10-09 PROCEDURE — 84703 CHORIONIC GONADOTROPIN ASSAY: CPT | Performed by: INTERNAL MEDICINE

## 2023-10-09 PROCEDURE — 99284 EMERGENCY DEPT VISIT MOD MDM: CPT | Mod: 25 | Performed by: INTERNAL MEDICINE

## 2023-10-09 PROCEDURE — 80307 DRUG TEST PRSMV CHEM ANLYZR: CPT | Performed by: INTERNAL MEDICINE

## 2023-10-09 PROCEDURE — 81001 URINALYSIS AUTO W/SCOPE: CPT | Mod: XU | Performed by: INTERNAL MEDICINE

## 2023-10-09 PROCEDURE — 96360 HYDRATION IV INFUSION INIT: CPT | Mod: 59 | Performed by: INTERNAL MEDICINE

## 2023-10-09 PROCEDURE — 74177 CT ABD & PELVIS W/CONTRAST: CPT | Mod: 26 | Performed by: STUDENT IN AN ORGANIZED HEALTH CARE EDUCATION/TRAINING PROGRAM

## 2023-10-09 PROCEDURE — 96372 THER/PROPH/DIAG INJ SC/IM: CPT | Performed by: INTERNAL MEDICINE

## 2023-10-09 PROCEDURE — 84484 ASSAY OF TROPONIN QUANT: CPT | Performed by: INTERNAL MEDICINE

## 2023-10-09 PROCEDURE — 250N000013 HC RX MED GY IP 250 OP 250 PS 637: Performed by: INTERNAL MEDICINE

## 2023-10-09 PROCEDURE — 250N000011 HC RX IP 250 OP 636: Performed by: INTERNAL MEDICINE

## 2023-10-09 PROCEDURE — 80053 COMPREHEN METABOLIC PANEL: CPT | Performed by: INTERNAL MEDICINE

## 2023-10-09 PROCEDURE — 74177 CT ABD & PELVIS W/CONTRAST: CPT

## 2023-10-09 PROCEDURE — 99285 EMERGENCY DEPT VISIT HI MDM: CPT | Mod: 25 | Performed by: INTERNAL MEDICINE

## 2023-10-09 PROCEDURE — 83690 ASSAY OF LIPASE: CPT | Performed by: INTERNAL MEDICINE

## 2023-10-09 PROCEDURE — 85610 PROTHROMBIN TIME: CPT | Performed by: INTERNAL MEDICINE

## 2023-10-09 PROCEDURE — 85025 COMPLETE CBC W/AUTO DIFF WBC: CPT | Performed by: INTERNAL MEDICINE

## 2023-10-09 PROCEDURE — 83605 ASSAY OF LACTIC ACID: CPT

## 2023-10-09 PROCEDURE — 86140 C-REACTIVE PROTEIN: CPT | Performed by: INTERNAL MEDICINE

## 2023-10-09 PROCEDURE — 258N000003 HC RX IP 258 OP 636: Performed by: INTERNAL MEDICINE

## 2023-10-09 PROCEDURE — 85652 RBC SED RATE AUTOMATED: CPT | Performed by: INTERNAL MEDICINE

## 2023-10-09 RX ORDER — MAGNESIUM HYDROXIDE/ALUMINUM HYDROXICE/SIMETHICONE 120; 1200; 1200 MG/30ML; MG/30ML; MG/30ML
30 SUSPENSION ORAL ONCE
Status: COMPLETED | OUTPATIENT
Start: 2023-10-09 | End: 2023-10-09

## 2023-10-09 RX ORDER — METOCLOPRAMIDE 5 MG/1
5 TABLET ORAL 4 TIMES DAILY PRN
Qty: 10 TABLET | Refills: 0 | Status: SHIPPED | OUTPATIENT
Start: 2023-10-09 | End: 2023-11-08

## 2023-10-09 RX ORDER — OLANZAPINE 10 MG/2ML
10 INJECTION, POWDER, FOR SOLUTION INTRAMUSCULAR ONCE
Status: COMPLETED | OUTPATIENT
Start: 2023-10-09 | End: 2023-10-09

## 2023-10-09 RX ORDER — IOPAMIDOL 755 MG/ML
112 INJECTION, SOLUTION INTRAVASCULAR ONCE
Status: COMPLETED | OUTPATIENT
Start: 2023-10-09 | End: 2023-10-09

## 2023-10-09 RX ADMIN — SODIUM CHLORIDE 1000 ML: 9 INJECTION, SOLUTION INTRAVENOUS at 09:38

## 2023-10-09 RX ADMIN — IOPAMIDOL 112 ML: 755 INJECTION, SOLUTION INTRAVENOUS at 11:50

## 2023-10-09 RX ADMIN — OLANZAPINE 10 MG: 10 INJECTION, POWDER, FOR SOLUTION INTRAMUSCULAR at 09:38

## 2023-10-09 RX ADMIN — ALUMINUM HYDROXIDE, MAGNESIUM HYDROXIDE, AND SIMETHICONE 30 ML: 200; 200; 20 SUSPENSION ORAL at 10:45

## 2023-10-09 ASSESSMENT — ACTIVITIES OF DAILY LIVING (ADL)
ADLS_ACUITY_SCORE: 35

## 2023-10-09 NOTE — ED TRIAGE NOTES
BIBA with generalized weakness, headache, elevated BP, and N/V. Patient coming from treatment center [facility]. Per patient started at 4am. Took clonidine for BP at 7am. A&Ox4     Triage Assessment       Row Name 10/09/23 0832       Triage Assessment (Adult)    Airway WDL WDL       Respiratory WDL    Respiratory WDL WDL       Skin Circulation/Temperature WDL    Skin Circulation/Temperature WDL WDL       Cardiac WDL    Cardiac WDL X;rhythm    Pulse Rate & Regularity tachycardic       Peripheral/Neurovascular WDL    Peripheral Neurovascular WDL WDL       Cognitive/Neuro/Behavioral WDL    Cognitive/Neuro/Behavioral WDL WDL

## 2023-10-09 NOTE — ED PROVIDER NOTES
[place] EMERGENCY DEPARTMENT ([facility])    10/09/23       ED PROVIDER NOTE  ED 05      History     Chief Complaint   Patient presents with    Nausea, Vomiting, & Diarrhea    Generalized Weakness     HPI  Yun Coyle is a 47 year old female with a past medical history significant for polysubstance abuse, DM2, cauda equina syndrome, asthma, HTN, HLD, anxiety and depression who presents to the Emergency Department for evaluation of nausea, vomiting and generalized weakness. Per chart review, patient was recently admitted to [facility] 10/1-10/4 due to nausea and vomiting in the setting of cannabinoid hyperemesis syndrome. Today patient states she feels very sick and is having left sided upper abdominal pain. She states this has been ongoing since 4 am today. She reports having this issue often and usually comes to the ED for fluids. She states she has cyclical vomiting syndrome. She denies any fever.       Past Medical History  Past Medical History:   Diagnosis Date    Anxiety     Depressive disorder     Gastroesophageal reflux disease     Hypertension     Type 2 diabetes mellitus (H)     Uncomplicated asthma      Past Surgical History:   Procedure Laterality Date    BACK SURGERY      x2    CHOLECYSTECTOMY      ORTHOPEDIC SURGERY      SALPINGO OOPHORECTOMY,R/L/ELDA Right      metoclopramide (REGLAN) 5 MG tablet  Alcohol Swabs PADS  blood glucose (NO BRAND SPECIFIED) lancets standard  blood glucose (NO BRAND SPECIFIED) test strip  blood glucose calibration (NO BRAND SPECIFIED) solution  blood glucose monitoring (NO BRAND SPECIFIED) meter device kit  cloNIDine (CATAPRES) 0.1 MG tablet  glucagon 1 MG kit  glucose (BD GLUCOSE) 4 g chewable tablet  glucose 40 % (400 mg/mL) gel  insulin glargine (LANTUS PEN) 100 UNIT/ML pen  losartan (COZAAR) 25 MG tablet  metFORMIN (GLUCOPHAGE) 500 MG tablet  [employer] MISC      No Known Allergies  Family History  Family History   Problem Relation Age of Onset     Diabetes Mother     Cancer Mother     Cancer Maternal Grandmother     Diabetes Maternal Grandmother      Social History   Social History     Tobacco Use    Smoking status: Never    Smokeless tobacco: Never   Substance Use Topics    Alcohol use: No    Drug use: Yes     Types: Fentanyl     Comment: 4 days ago      Past medical history, past surgical history, medications, allergies, family history, and social history were reviewed with the patient. No additional pertinent items.      A medically appropriate review of systems was performed with pertinent positives and negatives noted in the HPI, and all other systems negative.    Physical Exam   BP: (!) 201/118  Pulse: 86  Temp: 98.5  F (36.9  C)  Resp: 20  SpO2: 100 %  Physical Exam  Vitals and nursing note reviewed.   Constitutional:       General: She is not in acute distress.     Appearance: Normal appearance. She is not diaphoretic.   HENT:      Head: Normocephalic and atraumatic.      Mouth/Throat:      Mouth: Mucous membranes are dry.   Eyes:      General: No scleral icterus.     Extraocular Movements: Extraocular movements intact.      Conjunctiva/sclera: Conjunctivae normal.   Cardiovascular:      Rate and Rhythm: Normal rate and regular rhythm.      Heart sounds: Normal heart sounds. No murmur heard.     No friction rub. No gallop.   Pulmonary:      Effort: Pulmonary effort is normal. No respiratory distress.      Breath sounds: Normal breath sounds. No stridor. No wheezing, rhonchi or rales.   Chest:      Chest wall: No tenderness.   Abdominal:      General: Abdomen is flat. Bowel sounds are normal. There is no distension.      Palpations: Abdomen is soft. There is no mass.      Tenderness: There is no abdominal tenderness. There is no right CVA tenderness, left CVA tenderness, guarding or rebound.      Hernia: No hernia is present.   Musculoskeletal:         General: No tenderness. Normal range of motion.      Cervical back: Normal range of motion and neck  supple.   Skin:     General: Skin is warm.      Findings: No rash.   Neurological:      General: No focal deficit present.      Mental Status: She is alert.      Cranial Nerves: No cranial nerve deficit.           ED Course, Procedures, & Data      Procedures            EKG Interpretation:      Interpreted by Todd Green MD, MD  Time reviewed: onarrival  Symptoms at time of EKG: N/V   Rhythm: normal sinus   Rate: normal  Axis: normal  Ectopy: none  Conduction: normal  ST Segments/ T Waves: No ST-T wave changes  Q Waves: none  Comparison to prior: Unchanged from 10/1/2023    Clinical Impression: normal EKG                 Results for orders placed or performed during the hospital encounter of 10/09/23   CT Abdomen Pelvis w Contrast     Status: None    Narrative    EXAMINATION: CT ABDOMEN PELVIS W CONTRAST, 10/9/2023 12:03 PM    TECHNIQUE:  Helical CT images from the lung bases through the  symphysis pubis were obtained with IV contrast. Contrast dose: 112 cc  Isovue-370 IV    COMPARISON: Ultrasound 10/2/2023. CT abdomen 12/3/2020    HISTORY: pain N/V ?biliary dilation noted recent US    FINDINGS:    Liver: Diffusely hyperattenuating liver. No focal hepatic mass..  Biliary system: Cholecystectomy. Prominent common bile duct measuring  up to 8 mm near the pancreatic head, likely reservoir effect. No  intrahepatic biliary dilatation..  Pancreas: No focal mass or dilation of the main pancreatic duct.  Stomach: Within normal limits.  Spleen: Within normal limits.  Adrenal glands: Within normal limits.  Kidneys: No focal mass, hydronephrosis, or stone.  Bladder: Within normal limits.  Reproductive organs: Within normal limits.  Bowel: Normal caliber large and small bowel. Appendix unremarkable.  Lymph nodes: No intra-abdominal or pelvic lymphadenopathy.  Vasculature: Abdominal aorta is normal in caliber. Major branches are  patent. The main portal vein, superior mesenteric vein, and splenic  vein are  patent.  Peritoneum: No intraabdominal free fluid or air.     Chest: Heart size normal. No pericardial effusion. The distal thoracic  esophagus and aorta are unremarkable. Lung bases are clear.    Bones: No suspicious osseous lesion. Degenerative changes of the  spine. L4-5 fusion.    Soft tissues: Small fat-containing umbilical hernia.      Impression    IMPRESSION:   1. No acute findings to explain patient's symptoms.  2. Cholecystectomy with common bile duct measuring 8 mm without  evidence of obstruction, likely reservoir effect as described on  prior's.  3. Hepatic steatosis.    I have personally reviewed the examination and initial interpretation  and I agree with the findings.    CAMERON MORA DO         SYSTEM ID:  [id number]   [place] Draw     Status: None    Narrative    The following orders were created for panel order [place] Draw.  Procedure                               Abnormality         Status                     ---------                               -----------         ------                     Extra Blue Top Tube[[id number]]                              Final result               Extra Red Top Tube[[id number]]                               Final result               Extra Green Top (Lithium...[[id number]]                      Final result               Extra Purple Top Tube[[id number]]                            Final result                 Please view results for these tests on the individual orders.   Extra Blue Top Tube     Status: None   Result Value Ref Range    Hold Specimen JIC    Extra Red Top Tube     Status: None   Result Value Ref Range    Hold Specimen JIC    Extra Green Top (Lithium Heparin) Tube     Status: None   Result Value Ref Range    Hold Specimen JIC    Extra Purple Top Tube     Status: None   Result Value Ref Range    Hold Specimen JIC    INR     Status: Normal   Result Value Ref Range    INR 0.96 0.85 - 1.15   Comprehensive metabolic panel     Status: Abnormal   Result Value Ref  Range    Sodium 132 (L) 135 - 145 mmol/L    Potassium 4.4 3.4 - 5.3 mmol/L    Carbon Dioxide (CO2) 24 22 - 29 mmol/L    Anion Gap 13 7 - 15 mmol/L    Urea Nitrogen 11.6 6.0 - 20.0 mg/dL    Creatinine 0.54 0.51 - 0.95 mg/dL    GFR Estimate >90 >60 mL/min/1.73m2    Calcium 9.5 8.6 - 10.0 mg/dL    Chloride 95 (L) 98 - 107 mmol/L    Glucose 345 (H) 70 - 99 mg/dL    Alkaline Phosphatase 172 (H) 35 - 104 U/L     (H) 0 - 45 U/L     (H) 0 - 50 U/L    Protein Total 7.7 6.4 - 8.3 g/dL    Albumin 3.8 3.5 - 5.2 g/dL    Bilirubin Total 0.5 <=1.2 mg/dL   Lipase     Status: Normal   Result Value Ref Range    Lipase 46 13 - 60 U/L   Troponin T, High Sensitivity     Status: Normal   Result Value Ref Range    Troponin T, High Sensitivity 8 <=14 ng/L   CRP inflammation     Status: Normal   Result Value Ref Range    CRP Inflammation <3.00 <5.00 mg/L   Erythrocyte sedimentation rate auto     Status: Abnormal   Result Value Ref Range    Erythrocyte Sedimentation Rate 21 (H) 0 - 20 mm/hr   HCG qualitative Blood     Status: Normal   Result Value Ref Range    hCG Serum Qualitative Negative Negative   UA with Microscopic reflex to Culture     Status: Abnormal    Specimen: Urine, Clean Catch   Result Value Ref Range    Color Urine Straw Colorless, Straw, Light Yellow, Yellow    Appearance Urine Clear Clear    Glucose Urine >=1000 (A) Negative mg/dL    Bilirubin Urine Negative Negative    Ketones Urine 10 (A) Negative mg/dL    Specific Gravity Urine 1.013 1.003 - 1.035    Blood Urine Negative Negative    pH Urine 7.5 (H) 5.0 - 7.0    Protein Albumin Urine Negative Negative mg/dL    Urobilinogen Urine Normal Normal, 2.0 mg/dL    Nitrite Urine Negative Negative    Leukocyte Esterase Urine Negative Negative    RBC Urine <1 <=2 /HPF    WBC Urine <1 <=5 /HPF    Squamous Epithelials Urine 1 <=1 /HPF    Narrative    Urine Culture not indicated   CBC with platelets and differential     Status: None   Result Value Ref Range    WBC Count  6.6 4.0 - 11.0 10e3/uL    RBC Count 4.47 3.80 - 5.20 10e6/uL    Hemoglobin 13.4 11.7 - 15.7 g/dL    Hematocrit 38.6 35.0 - 47.0 %    MCV 86 78 - 100 fL    MCH 30.0 26.5 - 33.0 pg    MCHC 34.7 31.5 - 36.5 g/dL    RDW 13.2 10.0 - 15.0 %    Platelet Count 229 150 - 450 10e3/uL    % Neutrophils 63 %    % Lymphocytes 27 %    % Monocytes 5 %    Mids % (Monos, Eos, Basos)      % Eosinophils 3 %    % Basophils 1 %    % Immature Granulocytes 1 %    NRBCs per 100 WBC 0 <1 /100    Absolute Neutrophils 4.2 1.6 - 8.3 10e3/uL    Absolute Lymphocytes 1.8 0.8 - 5.3 10e3/uL    Absolute Monocytes 0.3 0.0 - 1.3 10e3/uL    Mids Abs (Monos, Eos, Basos)      Absolute Eosinophils 0.2 0.0 - 0.7 10e3/uL    Absolute Basophils 0.0 0.0 - 0.2 10e3/uL    Absolute Immature Granulocytes 0.1 <=0.4 10e3/uL    Absolute NRBCs 0.0 10e3/uL   iStat Gases (lactate) venous, POCT     Status: Abnormal   Result Value Ref Range    Lactic Acid POCT 1.2 <=2.0 mmol/L    Bicarbonate Venous POCT 29 (H) 21 - 28 mmol/L    O2 Sat, Venous POCT 83 (L) 94 - 100 %    pCO2 Venous POCT 46 40 - 50 mm Hg    pH Venous POCT 7.41 7.32 - 7.43    pO2 Venous POCT 48 (H) 25 - 47 mm Hg   Drug Abuse Screen Qual Urine     Status: Abnormal   Result Value Ref Range    Amphetamines Urine Screen Negative Screen Negative    Barbituates Urine Screen Negative Screen Negative    Benzodiazepine Urine Screen Negative Screen Negative    Cannabinoids Urine Screen Positive (A) Screen Negative    Cocaine Urine Screen Negative Screen Negative    Fentanyl Qual Urine Screen Positive (A) Screen Negative    Opiates Urine Screen Negative Screen Negative    PCP Urine Screen Negative Screen Negative   EKG 12 lead     Status: None   Result Value Ref Range    Systolic Blood Pressure  mmHg    Diastolic Blood Pressure  mmHg    Ventricular Rate 83 BPM    Atrial Rate 83 BPM    AZ Interval 120 ms    QRS Duration 82 ms     ms    QTc 458 ms    P Axis 66 degrees    R AXIS -10 degrees    T Axis 36 degrees     Interpretation ECG       Sinus rhythm  Normal ECG  Unconfirmed report - interpretation of this ECG is computer generated - see medical record for final interpretation  Confirmed by - EMERGENCY ROOM, PHYSICIAN ([id number]),  CAMDEN BRODY ([id number]) on 10/9/2023 2:38:21 PM     CBC with platelets differential     Status: None    Narrative    The following orders were created for panel order CBC with platelets differential.  Procedure                               Abnormality         Status                     ---------                               -----------         ------                     CBC with platelets and d...[[id number]]                      Final result                 Please view results for these tests on the individual orders.   Urine Drugs of Abuse Screen     Status: Abnormal    Narrative    The following orders were created for panel order Urine Drugs of Abuse Screen.  Procedure                               Abnormality         Status                     ---------                               -----------         ------                     Drug Abuse Screen Qual U...[[id number]]  Abnormal            Final result                 Please view results for these tests on the individual orders.     Medications   sodium chloride 0.9% BOLUS 1,000 mL (0 mLs Intravenous Stopped 10/9/23 1159)   OLANZapine (zyPREXA) injection 10 mg (10 mg Intramuscular $Given 10/9/23 0938)   alum & mag hydroxide-simethicone (MAALOX) suspension 30 mL (30 mLs Oral $Given 10/9/23 1045)   iopamidol (ISOVUE-370) solution 112 mL (112 mLs Intravenous $Given 10/9/23 1150)   sodium chloride (PF) 0.9% PF flush 78 mL (78 mLs Intracatheter $Given 10/9/23 1150)     Labs Ordered and Resulted from Time of ED Arrival to Time of ED Departure   COMPREHENSIVE METABOLIC PANEL - Abnormal       Result Value    Sodium 132 (*)     Potassium 4.4      Carbon Dioxide (CO2) 24      Anion Gap 13      Urea Nitrogen 11.6      Creatinine 0.54      GFR Estimate  >90      Calcium 9.5      Chloride 95 (*)     Glucose 345 (*)     Alkaline Phosphatase 172 (*)      (*)      (*)     Protein Total 7.7      Albumin 3.8      Bilirubin Total 0.5     ERYTHROCYTE SEDIMENTATION RATE AUTO - Abnormal    Erythrocyte Sedimentation Rate 21 (*)    ROUTINE UA WITH MICROSCOPIC REFLEX TO CULTURE - Abnormal    Color Urine Straw      Appearance Urine Clear      Glucose Urine >=1000 (*)     Bilirubin Urine Negative      Ketones Urine 10 (*)     Specific Gravity Urine 1.013      Blood Urine Negative      pH Urine 7.5 (*)     Protein Albumin Urine Negative      Urobilinogen Urine Normal      Nitrite Urine Negative      Leukocyte Esterase Urine Negative      RBC Urine <1      WBC Urine <1      Squamous Epithelials Urine 1     ISTAT GASES LACTATE VENOUS POCT - Abnormal    Lactic Acid POCT 1.2      Bicarbonate Venous POCT 29 (*)     O2 Sat, Venous POCT 83 (*)     pCO2 Venous POCT 46      pH Venous POCT 7.41      pO2 Venous POCT 48 (*)    DRUG ABUSE SCREEN QUAL URINE - Abnormal    Amphetamines Urine Screen Negative      Barbituates Urine Screen Negative      Benzodiazepine Urine Screen Negative      Cannabinoids Urine Screen Positive (*)     Cocaine Urine Screen Negative      Fentanyl Qual Urine Screen Positive (*)     Opiates Urine Screen Negative      PCP Urine Screen Negative     INR - Normal    INR 0.96     LIPASE - Normal    Lipase 46     TROPONIN T, HIGH SENSITIVITY - Normal    Troponin T, High Sensitivity 8     CRP INFLAMMATION - Normal    CRP Inflammation <3.00     HCG QUALITATIVE PREGNANCY - Normal    hCG Serum Qualitative Negative     CBC WITH PLATELETS AND DIFFERENTIAL    WBC Count 6.6      RBC Count 4.47      Hemoglobin 13.4      Hematocrit 38.6      MCV 86      MCH 30.0      MCHC 34.7      RDW 13.2      Platelet Count 229      % Neutrophils 63      % Lymphocytes 27      % Monocytes 5      Mids % (Monos, Eos, Basos)        % Eosinophils 3      % Basophils 1      % Immature  Granulocytes 1      NRBCs per 100 WBC 0      Absolute Neutrophils 4.2      Absolute Lymphocytes 1.8      Absolute Monocytes 0.3      Mids Abs (Monos, Eos, Basos)        Absolute Eosinophils 0.2      Absolute Basophils 0.0      Absolute Immature Granulocytes 0.1      Absolute NRBCs 0.0       CT Abdomen Pelvis w Contrast   Final Result   IMPRESSION:    1. No acute findings to explain patient's symptoms.   2. Cholecystectomy with common bile duct measuring 8 mm without   evidence of obstruction, likely reservoir effect as described on   prior's.   3. Hepatic steatosis.      I have personally reviewed the examination and initial interpretation   and I agree with the findings.      CAMERON MORA DO            SYSTEM ID:  [id number]             Critical care was not performed.     Medical Decision Making  The patient's presentation was of moderate complexity (an acute illness with systemic symptoms).    The patient's evaluation involved:  ordering and/or review of 3+ test(s) in this encounter (see separate area of note for details)    The patient's management necessitated moderate risk (prescription drug management including medications given in the ED).    Assessment & Plan    Recurrent N/V in the pt with h/o cyclic vomiting, probable cannabis hyperemesis, zyprexa and IVF with sleep and tolerating clear liquids, will discharge with reglan prn, no cannabis, follow up with her PMD.    I have reviewed the nursing notes. I have reviewed the findings, diagnosis, plan and need for follow up with the patient.    New Prescriptions    METOCLOPRAMIDE (REGLAN) 5 MG TABLET    Take 1 tablet (5 mg) by mouth 4 times daily as needed (nausea)       Final diagnoses:   Nausea and vomiting, unspecified vomiting type   Marijuana use   I, MARY ROJAS, am serving as a trained medical scribe to document services personally performed by Todd Green MD, based on the provider's statements to me.      I, Todd Green MD, was physically present  and have reviewed and verified the accuracy of this note documented by MAYR ROJAS.     Todd Green MD  [facility] EMERGENCY DEPARTMENT  10/9/2023     Todd Green MD  10/09/23 [id number]

## 2023-10-09 NOTE — ED NOTES
Bed: ED05  Expected date:   Expected time:   Means of arrival:   Comments:  SPF 8 47F N/V BP's 220/130

## 2023-11-01 ENCOUNTER — HOSPITAL ENCOUNTER (EMERGENCY)
Facility: CLINIC | Age: 48
Discharge: HOME | End: 2023-11-01
Attending: FAMILY MEDICINE | Admitting: FAMILY MEDICINE
Payer: COMMERCIAL

## 2023-11-01 ENCOUNTER — APPOINTMENT (OUTPATIENT)
Dept: GENERAL RADIOLOGY | Facility: CLINIC | Age: 48
End: 2023-11-01
Attending: FAMILY MEDICINE
Payer: COMMERCIAL

## 2023-11-01 VITALS
RESPIRATION RATE: 16 BRPM | WEIGHT: 201.94 LBS | OXYGEN SATURATION: 97 % | TEMPERATURE: 98.4 F | DIASTOLIC BLOOD PRESSURE: 106 MMHG | BODY MASS INDEX: 28.91 KG/M2 | HEART RATE: 101 BPM | SYSTOLIC BLOOD PRESSURE: 148 MMHG | HEIGHT: 70 IN

## 2023-11-01 DIAGNOSIS — R11.15 CYCLIC VOMITING SYNDROME: ICD-10-CM

## 2023-11-01 LAB
ALBUMIN SERPL BCG-MCNC: 3.6 G/DL (ref 3.5–5.2)
ALP SERPL-CCNC: 155 U/L (ref 35–104)
ALT SERPL W P-5'-P-CCNC: 81 U/L (ref 0–50)
ANION GAP SERPL CALCULATED.3IONS-SCNC: 9 MMOL/L (ref 7–15)
AST SERPL W P-5'-P-CCNC: 47 U/L (ref 0–45)
BASOPHILS # BLD AUTO: 0 10E3/UL (ref 0–0.2)
BASOPHILS NFR BLD AUTO: 1 %
BILIRUB SERPL-MCNC: <0.2 MG/DL
BUN SERPL-MCNC: 16 MG/DL (ref 6–20)
CALCIUM SERPL-MCNC: 8.9 MG/DL (ref 8.6–10)
CHLORIDE SERPL-SCNC: 101 MMOL/L (ref 98–107)
CREAT SERPL-MCNC: 0.71 MG/DL (ref 0.51–0.95)
DEPRECATED HCO3 PLAS-SCNC: 27 MMOL/L (ref 22–29)
EGFRCR SERPLBLD CKD-EPI 2021: >90 ML/MIN/1.73M2
EOSINOPHIL # BLD AUTO: 0.5 10E3/UL (ref 0–0.7)
EOSINOPHIL NFR BLD AUTO: 6 %
ERYTHROCYTE [DISTWIDTH] IN BLOOD BY AUTOMATED COUNT: 13.7 % (ref 10–15)
GLUCOSE SERPL-MCNC: 139 MG/DL (ref 70–99)
HCT VFR BLD AUTO: 39.3 % (ref 35–47)
HGB BLD-MCNC: 13.4 G/DL (ref 11.7–15.7)
IMM GRANULOCYTES # BLD: 0.1 10E3/UL
IMM GRANULOCYTES NFR BLD: 1 %
LYMPHOCYTES # BLD AUTO: 3.6 10E3/UL (ref 0.8–5.3)
LYMPHOCYTES NFR BLD AUTO: 42 %
MCH RBC QN AUTO: 30.1 PG (ref 26.5–33)
MCHC RBC AUTO-ENTMCNC: 34.1 G/DL (ref 31.5–36.5)
MCV RBC AUTO: 88 FL (ref 78–100)
MONOCYTES # BLD AUTO: 0.7 10E3/UL (ref 0–1.3)
MONOCYTES NFR BLD AUTO: 8 %
NEUTROPHILS # BLD AUTO: 3.8 10E3/UL (ref 1.6–8.3)
NEUTROPHILS NFR BLD AUTO: 42 %
NRBC # BLD AUTO: 0 10E3/UL
NRBC BLD AUTO-RTO: 0 /100
PLATELET # BLD AUTO: 256 10E3/UL (ref 150–450)
POTASSIUM SERPL-SCNC: 3.8 MMOL/L (ref 3.4–5.3)
PROT SERPL-MCNC: 7.1 G/DL (ref 6.4–8.3)
RBC # BLD AUTO: 4.45 10E6/UL (ref 3.8–5.2)
SODIUM SERPL-SCNC: 137 MMOL/L (ref 135–145)
TROPONIN T SERPL HS-MCNC: 7 NG/L
WBC # BLD AUTO: 8.7 10E3/UL (ref 4–11)

## 2023-11-01 PROCEDURE — 258N000003 HC RX IP 258 OP 636: Performed by: FAMILY MEDICINE

## 2023-11-01 PROCEDURE — 85025 COMPLETE CBC W/AUTO DIFF WBC: CPT | Performed by: FAMILY MEDICINE

## 2023-11-01 PROCEDURE — 96374 THER/PROPH/DIAG INJ IV PUSH: CPT

## 2023-11-01 PROCEDURE — 250N000013 HC RX MED GY IP 250 OP 250 PS 637: Performed by: FAMILY MEDICINE

## 2023-11-01 PROCEDURE — 250N000011 HC RX IP 250 OP 636: Performed by: FAMILY MEDICINE

## 2023-11-01 PROCEDURE — 36415 COLL VENOUS BLD VENIPUNCTURE: CPT | Performed by: FAMILY MEDICINE

## 2023-11-01 PROCEDURE — 96361 HYDRATE IV INFUSION ADD-ON: CPT

## 2023-11-01 PROCEDURE — 99285 EMERGENCY DEPT VISIT HI MDM: CPT | Mod: 25

## 2023-11-01 PROCEDURE — 71046 X-RAY EXAM CHEST 2 VIEWS: CPT

## 2023-11-01 PROCEDURE — 99284 EMERGENCY DEPT VISIT MOD MDM: CPT | Performed by: FAMILY MEDICINE

## 2023-11-01 PROCEDURE — 84484 ASSAY OF TROPONIN QUANT: CPT | Performed by: FAMILY MEDICINE

## 2023-11-01 PROCEDURE — 93005 ELECTROCARDIOGRAM TRACING: CPT

## 2023-11-01 PROCEDURE — 80053 COMPREHEN METABOLIC PANEL: CPT | Performed by: FAMILY MEDICINE

## 2023-11-01 RX ORDER — OLANZAPINE 10 MG/1
10 TABLET, ORALLY DISINTEGRATING ORAL DAILY PRN
Qty: 10 TABLET | Refills: 0 | Status: SHIPPED | OUTPATIENT
Start: 2023-11-01 | End: 2024-01-10

## 2023-11-01 RX ORDER — OLANZAPINE 10 MG/1
10 TABLET ORAL DAILY
COMMUNITY
End: 2023-11-08

## 2023-11-01 RX ORDER — OLANZAPINE 10 MG/1
10 TABLET, ORALLY DISINTEGRATING ORAL ONCE
Status: COMPLETED | OUTPATIENT
Start: 2023-11-01 | End: 2023-11-01

## 2023-11-01 RX ADMIN — HYDROMORPHONE HYDROCHLORIDE 1 MG: 1 INJECTION, SOLUTION INTRAMUSCULAR; INTRAVENOUS; SUBCUTANEOUS at 20:59

## 2023-11-01 RX ADMIN — SODIUM CHLORIDE 1000 ML: 9 INJECTION, SOLUTION INTRAVENOUS at 20:06

## 2023-11-01 RX ADMIN — OLANZAPINE 10 MG: 10 TABLET, ORALLY DISINTEGRATING ORAL at 19:54

## 2023-11-01 ASSESSMENT — ACTIVITIES OF DAILY LIVING (ADL): ADLS_ACUITY_SCORE: 35

## 2023-11-02 LAB
ATRIAL RATE - MUSE: 86 BPM
DIASTOLIC BLOOD PRESSURE - MUSE: NORMAL MMHG
INTERPRETATION ECG - MUSE: NORMAL
P AXIS - MUSE: 58 DEGREES
PR INTERVAL - MUSE: 140 MS
QRS DURATION - MUSE: 78 MS
QT - MUSE: 378 MS
QTC - MUSE: 452 MS
R AXIS - MUSE: -23 DEGREES
SYSTOLIC BLOOD PRESSURE - MUSE: NORMAL MMHG
T AXIS - MUSE: 21 DEGREES
VENTRICULAR RATE- MUSE: 86 BPM

## 2023-11-02 NOTE — ED PROVIDER NOTES
ED Provider Note  [facility]      History     Chief Complaint   Patient presents with    Nausea & Vomiting     Nausea/vomiting since this AM. Oral zofran in rig. 324 ASA. .    Chest Pain     Symptoms started 0600. Emesis x 10.     HPI  Yun Coyle is a 47 year old female with a history of polysubstance abuse, DM2, cauda equina syndrome, asthma, HTN, HLD, anxiety and depression who presents to the ED today BIBA for nausea and vomiting since 0600 today. She also reports L sided chest pain, worse with vomiting. At least 10 episodes of emesis today. She was given Zofran PTA.     Per chart review, patient was recently admitted to [facility] 10/1-10/4 due to nausea and vomiting in the setting of cannabinoid hyperemesis syndrome.      Past Medical History  Past Medical History:   Diagnosis Date    Anxiety     Depressive disorder     Gastroesophageal reflux disease     Hypertension     Type 2 diabetes mellitus (H)     Uncomplicated asthma      Past Surgical History:   Procedure Laterality Date    BACK SURGERY      x2    CHOLECYSTECTOMY      ORTHOPEDIC SURGERY      SALPINGO OOPHORECTOMY,R/L/ELDA Right      cloNIDine (CATAPRES) 0.1 MG tablet  insulin glargine (LANTUS PEN) 100 UNIT/ML pen  losartan (COZAAR) 25 MG tablet  metFORMIN (GLUCOPHAGE) 500 MG tablet  OLANZapine (ZYPREXA) 10 MG tablet  OLANZapine zydis (ZYPREXA) 10 MG ODT  Alcohol Swabs PADS  blood glucose (NO BRAND SPECIFIED) lancets standard  blood glucose (NO BRAND SPECIFIED) test strip  blood glucose calibration (NO BRAND SPECIFIED) solution  blood glucose monitoring (NO BRAND SPECIFIED) meter device kit  glucagon 1 MG kit  glucose (BD GLUCOSE) 4 g chewable tablet  glucose 40 % (400 mg/mL) gel  metoclopramide (REGLAN) 5 MG tablet  [employer] MISC      No Known Allergies  Family History  Family History   Problem Relation Age of Onset    Diabetes Mother     Cancer Mother     Cancer Maternal Grandmother     Diabetes Maternal  "Grandmother      Social History   Social History     Tobacco Use    Smoking status: Never    Smokeless tobacco: Never   Substance Use Topics    Alcohol use: No    Drug use: Yes     Types: Marijuana     Comment: 4 days ago      Past medical history, past surgical history, medications, allergies, family history, and social history were reviewed with the patient. No additional pertinent items.      A complete review of systems was performed with pertinent positives and negatives noted in the HPI, and all other systems negative.    Physical Exam   BP: (!) 148/106  Pulse: 101  Temp: 98.4  F (36.9  C)  Resp: 16  Height: 177.8 cm (5' 10\")  Weight: 91.6 kg (201 lb 15.1 oz)  SpO2: 97 %  Physical Exam  Constitutional:       General: She is not in acute distress.     Appearance: Normal appearance. She is not diaphoretic.   HENT:      Head: Atraumatic.      Mouth/Throat:      Mouth: Mucous membranes are moist.   Eyes:      General: No scleral icterus.     Conjunctiva/sclera: Conjunctivae normal.   Cardiovascular:      Rate and Rhythm: Normal rate.      Heart sounds: Normal heart sounds.   Pulmonary:      Effort: No respiratory distress.      Breath sounds: Normal breath sounds.   Abdominal:      General: Abdomen is flat.      Palpations: Abdomen is soft.      Tenderness: There is abdominal tenderness. There is no guarding or rebound.   Musculoskeletal:      Cervical back: Neck supple.   Skin:     General: Skin is warm.      Findings: No rash.   Neurological:      Mental Status: She is alert.           ED Course, Procedures, & Data      Procedures       Results for orders placed or performed during the hospital encounter of 11/01/23   XR Chest 2 Views     Status: None    Narrative    EXAM: XR CHEST 2 VIEWS  LOCATION: [facility]  DATE: 11/1/2023    INDICATION: pain with vomiting  COMPARISON: None      Impression    IMPRESSION: Heart size and pulmonary vessels are normal. Partial " obscuration of left cardiac apex likely relates to a small apical fat pad. No definite focal pneumonia.   Comprehensive metabolic panel     Status: Abnormal   Result Value Ref Range    Sodium 137 135 - 145 mmol/L    Potassium 3.8 3.4 - 5.3 mmol/L    Carbon Dioxide (CO2) 27 22 - 29 mmol/L    Anion Gap 9 7 - 15 mmol/L    Urea Nitrogen 16.0 6.0 - 20.0 mg/dL    Creatinine 0.71 0.51 - 0.95 mg/dL    GFR Estimate >90 >60 mL/min/1.73m2    Calcium 8.9 8.6 - 10.0 mg/dL    Chloride 101 98 - 107 mmol/L    Glucose 139 (H) 70 - 99 mg/dL    Alkaline Phosphatase 155 (H) 35 - 104 U/L    AST 47 (H) 0 - 45 U/L    ALT 81 (H) 0 - 50 U/L    Protein Total 7.1 6.4 - 8.3 g/dL    Albumin 3.6 3.5 - 5.2 g/dL    Bilirubin Total <0.2 <=1.2 mg/dL   Troponin T, High Sensitivity     Status: Normal   Result Value Ref Range    Troponin T, High Sensitivity 7 <=14 ng/L   CBC with platelets and differential     Status: None   Result Value Ref Range    WBC Count 8.7 4.0 - 11.0 10e3/uL    RBC Count 4.45 3.80 - 5.20 10e6/uL    Hemoglobin 13.4 11.7 - 15.7 g/dL    Hematocrit 39.3 35.0 - 47.0 %    MCV 88 78 - 100 fL    MCH 30.1 26.5 - 33.0 pg    MCHC 34.1 31.5 - 36.5 g/dL    RDW 13.7 10.0 - 15.0 %    Platelet Count 256 150 - 450 10e3/uL    % Neutrophils 42 %    % Lymphocytes 42 %    % Monocytes 8 %    % Eosinophils 6 %    % Basophils 1 %    % Immature Granulocytes 1 %    NRBCs per 100 WBC 0 <1 /100    Absolute Neutrophils 3.8 1.6 - 8.3 10e3/uL    Absolute Lymphocytes 3.6 0.8 - 5.3 10e3/uL    Absolute Monocytes 0.7 0.0 - 1.3 10e3/uL    Absolute Eosinophils 0.5 0.0 - 0.7 10e3/uL    Absolute Basophils 0.0 0.0 - 0.2 10e3/uL    Absolute Immature Granulocytes 0.1 <=0.4 10e3/uL    Absolute NRBCs 0.0 10e3/uL   EKG 12 lead     Status: None (Preliminary result)   Result Value Ref Range    Systolic Blood Pressure  mmHg    Diastolic Blood Pressure  mmHg    Ventricular Rate 86 BPM    Atrial Rate 86 BPM    TX Interval 140 ms    QRS Duration 78 ms     ms    QTc 452 ms     P Axis 58 degrees    R AXIS -23 degrees    T Axis 21 degrees    Interpretation ECG Sinus rhythm  Normal ECG      CBC with platelets differential     Status: None    Narrative    The following orders were created for panel order CBC with platelets differential.  Procedure                               Abnormality         Status                     ---------                               -----------         ------                     CBC with platelets and d...[[id number]]                      Final result                 Please view results for these tests on the individual orders.     Medications   sodium chloride 0.9% BOLUS 1,000 mL (0 mLs Intravenous Stopped 11/1/23 2215)   OLANZapine zydis (zyPREXA) ODT tab 10 mg (10 mg Oral $Given 11/1/23 1954)   HYDROmorphone (DILAUDID) injection 1 mg (1 mg Intravenous $Given 11/1/23 2059)     Labs Ordered and Resulted from Time of ED Arrival to Time of ED Departure   COMPREHENSIVE METABOLIC PANEL - Abnormal       Result Value    Sodium 137      Potassium 3.8      Carbon Dioxide (CO2) 27      Anion Gap 9      Urea Nitrogen 16.0      Creatinine 0.71      GFR Estimate >90      Calcium 8.9      Chloride 101      Glucose 139 (*)     Alkaline Phosphatase 155 (*)     AST 47 (*)     ALT 81 (*)     Protein Total 7.1      Albumin 3.6      Bilirubin Total <0.2     TROPONIN T, HIGH SENSITIVITY - Normal    Troponin T, High Sensitivity 7     CBC WITH PLATELETS AND DIFFERENTIAL    WBC Count 8.7      RBC Count 4.45      Hemoglobin 13.4      Hematocrit 39.3      MCV 88      MCH 30.1      MCHC 34.1      RDW 13.7      Platelet Count 256      % Neutrophils 42      % Lymphocytes 42      % Monocytes 8      % Eosinophils 6      % Basophils 1      % Immature Granulocytes 1      NRBCs per 100 WBC 0      Absolute Neutrophils 3.8      Absolute Lymphocytes 3.6      Absolute Monocytes 0.7      Absolute Eosinophils 0.5      Absolute Basophils 0.0      Absolute Immature Granulocytes 0.1      Absolute  NRBCs 0.0       XR Chest 2 Views   Final Result   IMPRESSION: Heart size and pulmonary vessels are normal. Partial obscuration of left cardiac apex likely relates to a small apical fat pad. No definite focal pneumonia.             Critical care was not performed.     Medical Decision Making  The patient's presentation was of moderate complexity (a chronic illness mild to moderate exacerbation, progression, or side effect of treatment).    The patient's evaluation involved:  ordering and/or review of 3+ test(s) in this encounter (see separate area of note for details)    The patient's management necessitated moderate risk (prescription drug management including medications given in the ED).    Assessment & Plan        I have reviewed the nursing notes. I have reviewed the findings, diagnosis, plan and need for follow up with the patient.    Discharge Medication List as of 11/1/2023 10:54 PM        START taking these medications    Details   OLANZapine zydis (ZYPREXA) 10 MG ODT Take 1 tablet (10 mg) by mouth daily as needed (nausea), Disp-10 tablet, R-0, Local Print             Final diagnoses:   Cyclic vomiting syndrome         [facility] EMERGENCY DEPARTMENT  11/1/2023     Sean Christensen MD  11/02/23 4704

## 2023-11-02 NOTE — ED TRIAGE NOTES
Triage Assessment (Adult)       Row Name 11/01/23 1935          Triage Assessment    Airway WDL WDL        Respiratory WDL    Respiratory WDL WDL

## 2023-11-02 NOTE — DISCHARGE INSTRUCTIONS
Discharge to home with plans to hold off on using any marijuana and at this time use Zyprexa for nausea

## 2023-11-08 ENCOUNTER — OFFICE VISIT (OUTPATIENT)
Dept: FAMILY MEDICINE | Facility: CLINIC | Age: 48
End: 2023-11-08
Payer: COMMERCIAL

## 2023-11-08 VITALS
TEMPERATURE: 98 F | SYSTOLIC BLOOD PRESSURE: 112 MMHG | DIASTOLIC BLOOD PRESSURE: 68 MMHG | OXYGEN SATURATION: 98 % | WEIGHT: 200 LBS | HEART RATE: 63 BPM | BODY MASS INDEX: 28.63 KG/M2 | HEIGHT: 70 IN | RESPIRATION RATE: 16 BRPM

## 2023-11-08 DIAGNOSIS — Z79.4 TYPE 2 DIABETES MELLITUS WITH HYPERGLYCEMIA, WITH LONG-TERM CURRENT USE OF INSULIN (H): ICD-10-CM

## 2023-11-08 DIAGNOSIS — Z12.11 SCREEN FOR COLON CANCER: ICD-10-CM

## 2023-11-08 DIAGNOSIS — F11.21 OPIOID USE DISORDER, MODERATE, IN SUSTAINED REMISSION (H): ICD-10-CM

## 2023-11-08 DIAGNOSIS — B18.2 CHRONIC HEPATITIS C WITHOUT HEPATIC COMA (H): ICD-10-CM

## 2023-11-08 DIAGNOSIS — R11.15 CYCLIC VOMITING SYNDROME: ICD-10-CM

## 2023-11-08 DIAGNOSIS — F33.2 SEVERE EPISODE OF RECURRENT MAJOR DEPRESSIVE DISORDER, WITHOUT PSYCHOTIC FEATURES (H): ICD-10-CM

## 2023-11-08 DIAGNOSIS — E11.65 TYPE 2 DIABETES MELLITUS WITH HYPERGLYCEMIA, WITH LONG-TERM CURRENT USE OF INSULIN (H): ICD-10-CM

## 2023-11-08 DIAGNOSIS — Z23 NEED FOR PROPHYLACTIC VACCINATION AGAINST HEPATITIS B VIRUS: ICD-10-CM

## 2023-11-08 DIAGNOSIS — Z23 NEED FOR PROPHYLACTIC VACCINATION AGAINST HEPATITIS A: Primary | ICD-10-CM

## 2023-11-08 DIAGNOSIS — I15.9 SECONDARY HYPERTENSION: ICD-10-CM

## 2023-11-08 DIAGNOSIS — E11.65 TYPE 2 DIABETES MELLITUS WITH HYPERGLYCEMIA, UNSPECIFIED WHETHER LONG TERM INSULIN USE (H): ICD-10-CM

## 2023-11-08 DIAGNOSIS — Z12.31 VISIT FOR SCREENING MAMMOGRAM: ICD-10-CM

## 2023-11-08 DIAGNOSIS — E11.9 TYPE 2 DIABETES MELLITUS WITHOUT COMPLICATION, UNSPECIFIED WHETHER LONG TERM INSULIN USE (H): ICD-10-CM

## 2023-11-08 DIAGNOSIS — Z00.00 ENCOUNTER FOR MEDICARE ANNUAL WELLNESS EXAM: ICD-10-CM

## 2023-11-08 PROBLEM — R73.03 BORDERLINE DIABETES MELLITUS: Status: RESOLVED | Noted: 2017-06-28 | Resolved: 2023-11-08

## 2023-11-08 PROBLEM — E87.1 HYPONATREMIA: Status: RESOLVED | Noted: 2023-10-01 | Resolved: 2023-11-08

## 2023-11-08 PROBLEM — K85.90 ACUTE PANCREATITIS: Status: RESOLVED | Noted: 2017-06-29 | Resolved: 2023-11-08

## 2023-11-08 PROBLEM — Z20.822 COVID-19 RULED OUT BY LABORATORY TESTING: Status: RESOLVED | Noted: 2022-02-14 | Resolved: 2023-11-08
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
## 2023-11-08 PROBLEM — R45.851 DEPRESSION WITH SUICIDAL IDEATION: Status: RESOLVED | Noted: 2022-02-16 | Resolved: 2023-11-08

## 2023-11-08 PROBLEM — F32.A DEPRESSION WITH SUICIDAL IDEATION: Status: RESOLVED | Noted: 2022-02-16 | Resolved: 2023-11-08

## 2023-11-08 PROBLEM — F32.A DEPRESSIVE DISORDER: Status: RESOLVED | Noted: 2022-02-18 | Resolved: 2023-11-08

## 2023-11-08 PROBLEM — R11.2 NAUSEA AND VOMITING, UNSPECIFIED VOMITING TYPE: Status: RESOLVED | Noted: 2023-10-01 | Resolved: 2023-11-08

## 2023-11-08 PROBLEM — R42 VERTIGO: Status: RESOLVED | Noted: 2022-02-14 | Resolved: 2023-11-08

## 2023-11-08 PROBLEM — F13.129: Status: RESOLVED | Noted: 2019-01-15 | Resolved: 2023-11-08

## 2023-11-08 PROBLEM — R73.9 HYPERGLYCEMIA: Status: RESOLVED | Noted: 2019-09-04 | Resolved: 2023-11-08

## 2023-11-08 PROBLEM — N83.201 CYST OF RIGHT OVARY: Status: RESOLVED | Noted: 2017-11-16 | Resolved: 2023-11-08

## 2023-11-08 PROCEDURE — 99214 OFFICE O/P EST MOD 30 MIN: CPT | Mod: 25 | Performed by: FAMILY MEDICINE

## 2023-11-08 PROCEDURE — 99386 PREV VISIT NEW AGE 40-64: CPT | Performed by: FAMILY MEDICINE

## 2023-11-08 RX ORDER — OLANZAPINE 10 MG/1
10 TABLET ORAL AT BEDTIME
Qty: 90 TABLET | Refills: 1 | Status: SHIPPED | OUTPATIENT
Start: 2023-11-08 | End: 2024-07-15

## 2023-11-08 RX ORDER — LOSARTAN POTASSIUM 25 MG/1
25 TABLET ORAL DAILY
Qty: 90 TABLET | Refills: 3 | Status: SHIPPED | OUTPATIENT
Start: 2023-11-08

## 2023-11-08 RX ORDER — METFORMIN HCL 500 MG
1500 TABLET, EXTENDED RELEASE 24 HR ORAL
Qty: 270 TABLET | Refills: 3 | Status: SHIPPED | OUTPATIENT
Start: 2023-11-08 | End: 2024-01-09

## 2023-11-08 RX ORDER — BLOOD PRESSURE TEST KIT
KIT MISCELLANEOUS
Qty: 100 EACH | Refills: 11 | Status: SHIPPED | OUTPATIENT
Start: 2023-11-08 | End: 2024-08-22

## 2023-11-08 RX ORDER — CLONIDINE HYDROCHLORIDE 0.1 MG/1
0.1 TABLET ORAL 2 TIMES DAILY
Qty: 180 TABLET | Refills: 3 | Status: SHIPPED | OUTPATIENT
Start: 2023-11-08 | End: 2024-10-03

## 2023-11-08 RX ORDER — METOCLOPRAMIDE 5 MG/1
5 TABLET ORAL 4 TIMES DAILY PRN
Qty: 20 TABLET | Refills: 0 | Status: SHIPPED | OUTPATIENT
Start: 2023-11-08 | End: 2024-01-10

## 2023-11-08 ASSESSMENT — ENCOUNTER SYMPTOMS
DIARRHEA: 0
DIZZINESS: 0
HEARTBURN: 1
SHORTNESS OF BREATH: 1
ABDOMINAL PAIN: 1
NAUSEA: 1
PARESTHESIAS: 0
HEMATOCHEZIA: 0
CONSTIPATION: 1
JOINT SWELLING: 0
NERVOUS/ANXIOUS: 1
DYSURIA: 0
PALPITATIONS: 0
FREQUENCY: 0
FEVER: 0
HEADACHES: 1
CHILLS: 0
HEMATURIA: 0
ARTHRALGIAS: 0
COUGH: 1
SORE THROAT: 0
BREAST MASS: 0
WEAKNESS: 0
EYE PAIN: 0
MYALGIAS: 0

## 2023-11-08 ASSESSMENT — ACTIVITIES OF DAILY LIVING (ADL): CURRENT_FUNCTION: NO ASSISTANCE NEEDED

## 2023-11-08 ASSESSMENT — ASTHMA QUESTIONNAIRES: ACT_TOTALSCORE: 12

## 2023-11-08 ASSESSMENT — PATIENT HEALTH QUESTIONNAIRE - PHQ9
10. IF YOU CHECKED OFF ANY PROBLEMS, HOW DIFFICULT HAVE THESE PROBLEMS MADE IT FOR YOU TO DO YOUR WORK, TAKE CARE OF THINGS AT HOME, OR GET ALONG WITH OTHER PEOPLE: EXTREMELY DIFFICULT
SUM OF ALL RESPONSES TO PHQ QUESTIONS 1-9: 19
SUM OF ALL RESPONSES TO PHQ QUESTIONS 1-9: 19

## 2023-11-08 ASSESSMENT — PAIN SCALES - GENERAL: PAINLEVEL: NO PAIN (0)

## 2023-11-08 NOTE — PROGRESS NOTES
"   SUBJECTIVE:   CC: Yun is an 47 year old who presents for preventive health visit.       11/8/2023     1:29 PM   Additional Questions   Roomed by Korey PENNINGTON       Healthy Habits:     In general, how would you rate your overall health?  Fair    Frequency of exercise:  2-3 days/week    Duration of exercise:  15-30 minutes    Do you usually eat at least 4 servings of fruit and vegetables a day, include whole grains    & fiber and avoid regularly eating high fat or \"junk\" foods?  Yes    Taking medications regularly:  No    Barriers to taking medications:  None    Medication side effects:  None    Ability to successfully perform activities of daily living:  No assistance needed    Home Safety:  No safety concerns identified    Hearing Impairment:  No hearing concerns    In the past 6 months, have you been bothered by leaking of urine? Yes    In general, how would you rate your overall mental or emotional health?  Fair    Additional concerns today:  No    Mental health is stable   On disability  Has medicare     Dm2  Restarted insulin    Me to manage   120 to 170 in morning   Notes having higher in mthe morning after carbs  300s with meals     Hep c  Diagnosed in October  Needs treatment           Today's PHQ-9 Score:       11/8/2023     1:03 PM   PHQ-9 SCORE   PHQ-9 Total Score MyChart 19 (Moderately severe depression)   PHQ-9 Total Score 19         Social History     Tobacco Use     Smoking status: Never     Smokeless tobacco: Never   Substance Use Topics     Alcohol use: No           11/8/2023     1:08 PM   Alcohol Use   Prescreen: >3 drinks/day or >7 drinks/week? Not Applicable     Reviewed orders with patient.  Reviewed health maintenance and updated orders accordingly - Yes    Breast Cancer Screening:  Any new diagnosis of family breast, ovarian, or bowel cancer?     FHS-7:       11/8/2023     1:29 PM 11/15/2023     1:39 PM   Breast CA Risk Assessment (FHS-7)   Did any of your first-degree relatives have breast " or ovarian cancer? Yes Yes   Did any of your relatives have bilateral breast cancer? Yes No   Did any man in your family have breast cancer? No No   Did any woman in your family have breast and ovarian cancer? No No   Did any woman in your family have breast cancer before age 50 y? Yes Yes   Do you have 2 or more relatives with breast and/or ovarian cancer? Yes Yes   Do you have 2 or more relatives with breast and/or bowel cancer? Yes No     Pertinent mammograms are reviewed under the imaging tab.    History of abnormal Pap smear: NO - age 30-65 PAP every 5 years with negative HPV co-testing recommended      Latest Ref Rng & Units 6/18/2019     4:31 PM 6/18/2019     2:28 PM   PAP / HPV   PAP (Historical)   NIL    HPV 16 DNA NEG^Negative Negative     HPV 18 DNA NEG^Negative Negative     Other HR HPV NEG^Negative Negative       Reviewed and updated as needed this visit by clinical staff   Tobacco   Meds  Problems             Reviewed and updated as needed this visit by Provider      Problems              Review of Systems   Constitutional:  Negative for chills and fever.   HENT:  Negative for congestion, ear pain, hearing loss and sore throat.    Eyes:  Negative for pain and visual disturbance.   Respiratory:  Positive for cough and shortness of breath.    Cardiovascular:  Negative for chest pain, palpitations and peripheral edema.   Gastrointestinal:  Positive for abdominal pain, constipation, heartburn and nausea. Negative for diarrhea and hematochezia.   Breasts:  Negative for tenderness, breast mass and discharge.   Genitourinary:  Negative for dysuria, frequency, genital sores, hematuria, pelvic pain, urgency, vaginal bleeding and vaginal discharge.   Musculoskeletal:  Negative for arthralgias, joint swelling and myalgias.   Skin:  Negative for rash.   Neurological:  Positive for headaches. Negative for dizziness, weakness and paresthesias.   Psychiatric/Behavioral:  Negative for mood changes. The patient is  "nervous/anxious.        OBJECTIVE:   /68 (BP Location: Left arm, Patient Position: Sitting, Cuff Size: Adult Regular)   Pulse 63   Temp 98  F (36.7  C) (Temporal)   Resp 16   Ht 1.778 m (5' 10\")   Wt 90.7 kg (200 lb)   LMP  (LMP Unknown)   SpO2 98%   BMI 28.70 kg/m      Physical Exam  Constitutional:       General: She is not in acute distress.  HENT:      Head: Normocephalic.      Right Ear: Tympanic membrane normal.      Left Ear: Tympanic membrane normal.      Mouth/Throat:      Mouth: Mucous membranes are moist.      Pharynx: Oropharynx is clear.   Eyes:      General: No scleral icterus.  Cardiovascular:      Rate and Rhythm: Normal rate and regular rhythm.      Heart sounds: Normal heart sounds.   Pulmonary:      Effort: No respiratory distress.      Breath sounds: Normal breath sounds.   Abdominal:      General: Abdomen is flat. Bowel sounds are normal.      Palpations: Abdomen is soft.   Musculoskeletal:      Cervical back: No tenderness.      Right lower leg: No edema.      Left lower leg: No edema.   Lymphadenopathy:      Cervical: No cervical adenopathy.   Skin:     General: Skin is warm.   Neurological:      General: No focal deficit present.      Mental Status: She is alert.   Psychiatric:         Mood and Affect: Mood normal.         Behavior: Behavior normal.           ASSESSMENT/PLAN:       ICD-10-CM    1. Need for prophylactic vaccination against hepatitis A  Z23       2. Need for prophylactic vaccination against hepatitis B virus  Z23       3. Visit for screening mammogram  Z12.31 MA SCREENING DIGITAL BILAT - Future  (s+30)      4. Screen for colon cancer  Z12.11       5. Opioid use disorder, moderate, in sustained remission (H)  F11.21       6. Severe episode of recurrent major depressive disorder, without psychotic features (H)  F33.2 OLANZapine (ZYPREXA) 10 MG tablet      7. Cyclic vomiting syndrome  R11.15 OLANZapine (ZYPREXA) 10 MG tablet     metoclopramide (REGLAN) 5 MG tablet    " "  8. Chronic hepatitis C without hepatic coma (H)  B18.2 Hepatitis B Surface Antibody     Hepatitis B surface antigen     Hepatitis Antibody A IgG      9. Type 2 diabetes mellitus with hyperglycemia, with long-term current use of insulin (H)  E11.65 REVIEW OF HEALTH MAINTENANCE PROTOCOL ORDERS    Z79.4 Adult Eye  Referral     Lipid panel reflex to direct LDL Non-fasting     Albumin Random Urine Quantitative with Creat Ratio     Adult GI  Referral - Consult Only     Continuous Blood Gluc Sensor (FREESTYLE MCKENZIE 2 SENSOR) MISC     Continuous Blood Gluc  (FREESTYLE MCKENZIE 2 READER) KESHIA     metFORMIN (GLUCOPHAGE XR) 500 MG 24 hr tablet      10. Secondary hypertension  I15.9 losartan (COZAAR) 25 MG tablet     cloNIDine (CATAPRES) 0.1 MG tablet      11. Type 2 diabetes mellitus with hyperglycemia, unspecified whether long term insulin use (H)  E11.65 insulin glargine (LANTUS PEN) 100 UNIT/ML pen      12. Type 2 diabetes mellitus without complication, unspecified whether long term insulin use (H)  E11.9 Alcohol Swabs PADS          Patient has been advised of split billing requirements and indicates understanding: Yes      COUNSELING:  Reviewed preventive health counseling, as reflected in patient instructions      BMI:   Estimated body mass index is 28.7 kg/m  as calculated from the following:    Height as of this encounter: 1.778 m (5' 10\").    Weight as of this encounter: 90.7 kg (200 lb).   Weight management plan: Discussed healthy diet and exercise guidelines      She reports that she has never smoked. She has never used smokeless tobacco.      Gamaliel Granados, DO  "

## 2023-11-15 ENCOUNTER — ANCILLARY PROCEDURE (OUTPATIENT)
Dept: MAMMOGRAPHY | Facility: CLINIC | Age: 48
End: 2023-11-15
Attending: FAMILY MEDICINE
Payer: COMMERCIAL

## 2023-11-15 DIAGNOSIS — Z12.31 VISIT FOR SCREENING MAMMOGRAM: ICD-10-CM

## 2023-11-15 PROCEDURE — 77067 SCR MAMMO BI INCL CAD: CPT

## 2023-11-15 PROCEDURE — 77067 SCR MAMMO BI INCL CAD: CPT | Mod: 26 | Performed by: RADIOLOGY

## 2023-11-20 NOTE — PATIENT INSTRUCTIONS
Patient Education   Personalized Prevention Plan  You are due for the preventive services outlined below.  Your care team is available to assist you in scheduling these services.  If you have already completed any of these items, please share that information with your care team to update in your medical record.  Health Maintenance Due   Topic Date Due     Diabetic Foot Exam  Never done     Asthma Action Plan - yearly  Never done     Eye Exam  Never done     Pneumococcal Vaccine (1 - PCV) Never done     Colorectal Cancer Screening  Never done     Hepatitis A Vaccine (2 of 3 - Hep A Twinrix risk 3-dose series) 06/24/2015     Hepatitis B Vaccine (3 of 3 - 19+ 3-dose series) 07/22/2015     Cholesterol Lab  02/22/2020     Kidney Microalbumin Urine Test  06/18/2020     Flu Vaccine (1) 09/01/2023     COVID-19 Vaccine (2 - 2023-24 season) 09/01/2023

## 2023-11-24 ENCOUNTER — HOSPITAL ENCOUNTER (EMERGENCY)
Facility: CLINIC | Age: 48
Discharge: HOME | End: 2023-11-24
Attending: EMERGENCY MEDICINE | Admitting: PHYSICIAN ASSISTANT
Payer: COMMERCIAL

## 2023-11-24 VITALS
OXYGEN SATURATION: 99 % | HEART RATE: 80 BPM | DIASTOLIC BLOOD PRESSURE: 108 MMHG | HEIGHT: 69 IN | SYSTOLIC BLOOD PRESSURE: 151 MMHG | TEMPERATURE: 98.2 F | WEIGHT: 200 LBS | RESPIRATION RATE: 16 BRPM | BODY MASS INDEX: 29.62 KG/M2

## 2023-11-24 DIAGNOSIS — R79.89 ELEVATED LFTS: ICD-10-CM

## 2023-11-24 DIAGNOSIS — R11.2 NAUSEA AND VOMITING, UNSPECIFIED VOMITING TYPE: ICD-10-CM

## 2023-11-24 DIAGNOSIS — E11.9 DIABETES MELLITUS WITHOUT COMPLICATION (H): ICD-10-CM

## 2023-11-24 LAB
ALBUMIN SERPL BCG-MCNC: 4.3 G/DL (ref 3.5–5.2)
ALBUMIN UR-MCNC: NEGATIVE MG/DL
ALP SERPL-CCNC: 196 U/L (ref 40–150)
ALT SERPL W P-5'-P-CCNC: 183 U/L (ref 0–50)
ANION GAP SERPL CALCULATED.3IONS-SCNC: 10 MMOL/L (ref 7–15)
APPEARANCE UR: CLEAR
AST SERPL W P-5'-P-CCNC: 166 U/L (ref 0–45)
BASOPHILS # BLD AUTO: 0 10E3/UL (ref 0–0.2)
BASOPHILS NFR BLD AUTO: 0 %
BILIRUB SERPL-MCNC: 0.4 MG/DL
BILIRUB UR QL STRIP: NEGATIVE
BUN SERPL-MCNC: 14.2 MG/DL (ref 6–20)
CALCIUM SERPL-MCNC: 9.9 MG/DL (ref 8.6–10)
CHLORIDE SERPL-SCNC: 100 MMOL/L (ref 98–107)
COLOR UR AUTO: YELLOW
CREAT SERPL-MCNC: 0.69 MG/DL (ref 0.51–0.95)
DEPRECATED HCO3 PLAS-SCNC: 27 MMOL/L (ref 22–29)
EGFRCR SERPLBLD CKD-EPI 2021: >90 ML/MIN/1.73M2
EOSINOPHIL # BLD AUTO: 0.5 10E3/UL (ref 0–0.7)
EOSINOPHIL NFR BLD AUTO: 5 %
ERYTHROCYTE [DISTWIDTH] IN BLOOD BY AUTOMATED COUNT: 13.2 % (ref 10–15)
GLUCOSE SERPL-MCNC: 106 MG/DL (ref 70–99)
GLUCOSE UR STRIP-MCNC: NEGATIVE MG/DL
HCG UR QL: NEGATIVE
HCT VFR BLD AUTO: 44.5 % (ref 35–47)
HGB BLD-MCNC: 15.6 G/DL (ref 11.7–15.7)
HGB UR QL STRIP: NEGATIVE
IMM GRANULOCYTES # BLD: 0.1 10E3/UL
IMM GRANULOCYTES NFR BLD: 1 %
KETONES UR STRIP-MCNC: NEGATIVE MG/DL
LEUKOCYTE ESTERASE UR QL STRIP: NEGATIVE
LIPASE SERPL-CCNC: 22 U/L (ref 13–60)
LYMPHOCYTES # BLD AUTO: 4.2 10E3/UL (ref 0.8–5.3)
LYMPHOCYTES NFR BLD AUTO: 42 %
MAGNESIUM SERPL-MCNC: 1.8 MG/DL (ref 1.7–2.3)
MCH RBC QN AUTO: 30.8 PG (ref 26.5–33)
MCHC RBC AUTO-ENTMCNC: 35.1 G/DL (ref 31.5–36.5)
MCV RBC AUTO: 88 FL (ref 78–100)
MONOCYTES # BLD AUTO: 0.5 10E3/UL (ref 0–1.3)
MONOCYTES NFR BLD AUTO: 5 %
MUCOUS THREADS #/AREA URNS LPF: PRESENT /LPF
NEUTROPHILS # BLD AUTO: 4.7 10E3/UL (ref 1.6–8.3)
NEUTROPHILS NFR BLD AUTO: 47 %
NITRATE UR QL: NEGATIVE
NRBC # BLD AUTO: 0 10E3/UL
NRBC BLD AUTO-RTO: 0 /100
PH UR STRIP: 6 [PH] (ref 5–7)
PLATELET # BLD AUTO: 299 10E3/UL (ref 150–450)
POTASSIUM SERPL-SCNC: 4.1 MMOL/L (ref 3.4–5.3)
PROT SERPL-MCNC: 8.8 G/DL (ref 6.4–8.3)
RBC # BLD AUTO: 5.06 10E6/UL (ref 3.8–5.2)
RBC URINE: 1 /HPF
SODIUM SERPL-SCNC: 137 MMOL/L (ref 135–145)
SP GR UR STRIP: 1.02 (ref 1–1.03)
SQUAMOUS EPITHELIAL: 6 /HPF
TRANSITIONAL EPI: <1 /HPF
UROBILINOGEN UR STRIP-MCNC: NORMAL MG/DL
WBC # BLD AUTO: 10 10E3/UL (ref 4–11)
WBC URINE: 1 /HPF

## 2023-11-24 PROCEDURE — 99284 EMERGENCY DEPT VISIT MOD MDM: CPT | Performed by: PHYSICIAN ASSISTANT

## 2023-11-24 PROCEDURE — 99283 EMERGENCY DEPT VISIT LOW MDM: CPT | Mod: 25 | Performed by: PHYSICIAN ASSISTANT

## 2023-11-24 PROCEDURE — 258N000003 HC RX IP 258 OP 636: Performed by: PHYSICIAN ASSISTANT

## 2023-11-24 PROCEDURE — 81001 URINALYSIS AUTO W/SCOPE: CPT | Performed by: PHYSICIAN ASSISTANT

## 2023-11-24 PROCEDURE — 83690 ASSAY OF LIPASE: CPT | Performed by: PHYSICIAN ASSISTANT

## 2023-11-24 PROCEDURE — 83735 ASSAY OF MAGNESIUM: CPT | Performed by: EMERGENCY MEDICINE

## 2023-11-24 PROCEDURE — 80053 COMPREHEN METABOLIC PANEL: CPT | Performed by: PHYSICIAN ASSISTANT

## 2023-11-24 PROCEDURE — 96360 HYDRATION IV INFUSION INIT: CPT | Performed by: PHYSICIAN ASSISTANT

## 2023-11-24 PROCEDURE — 81025 URINE PREGNANCY TEST: CPT | Performed by: EMERGENCY MEDICINE

## 2023-11-24 PROCEDURE — 250N000013 HC RX MED GY IP 250 OP 250 PS 637: Performed by: PHYSICIAN ASSISTANT

## 2023-11-24 PROCEDURE — 36415 COLL VENOUS BLD VENIPUNCTURE: CPT | Performed by: PHYSICIAN ASSISTANT

## 2023-11-24 PROCEDURE — 85014 HEMATOCRIT: CPT | Performed by: PHYSICIAN ASSISTANT

## 2023-11-24 RX ORDER — OLANZAPINE 2.5 MG/1
2.5 TABLET, FILM COATED ORAL ONCE
Status: COMPLETED | OUTPATIENT
Start: 2023-11-24 | End: 2023-11-24

## 2023-11-24 RX ADMIN — SODIUM CHLORIDE 1000 ML: 9 INJECTION, SOLUTION INTRAVENOUS at 16:31

## 2023-11-24 RX ADMIN — OLANZAPINE 2.5 MG: 2.5 TABLET, FILM COATED ORAL at 16:10

## 2023-11-24 ASSESSMENT — ACTIVITIES OF DAILY LIVING (ADL): ADLS_ACUITY_SCORE: 35

## 2023-11-24 NOTE — ED PROVIDER NOTES
ED Provider Note  [facility]      History     Chief Complaint   Patient presents with    cyclic vomiting     Cyclic vomiting. x 1 day     HPI  Yun Coyle is a 47 year old female with a history of polysubstance abuse (marijuana, opioids, benzodiazepine), cyclic vomiting syndrome, HTN, DMII, and cauda equina syndrome presents to the ED for n/v x last night.  Reports ~10-minute episodes of vomiting, without blood.  Associated with left-sided abdominal pain.  Denies fever, chills, diarrhea (last BM yesterday normal), urinary symptoms, vaginal discharge or bleeding, chest pain, cough, shortness of breath.  Patient reports current symptoms are consistent with prior episodes of cyclical vomiting.  She states that she has not used any marijuana in many weeks.Pshx cholecystectomy and left salpingo-oophorectomy    Past Medical History  Past Medical History:   Diagnosis Date    Acute pancreatitis 06/29/2017    Anxiety     Cauda equina syndrome (H) 03/29/2013    Cyst of right ovary 11/16/2017    Depressive disorder     Gastroesophageal reflux disease     Hypertension     Hyponatremia 10/01/2023    Low back pain 08/20/2012    Overview:   History of cauda equina and surgery.      Type 2 diabetes mellitus (H)     Uncomplicated asthma      Past Surgical History:   Procedure Laterality Date    BACK SURGERY      x2    CHOLECYSTECTOMY      ORTHOPEDIC SURGERY      SALPINGO OOPHORECTOMY,R/L/ELDA Right      Alcohol Swabs PADS  blood glucose (NO BRAND SPECIFIED) lancets standard  blood glucose (NO BRAND SPECIFIED) test strip  blood glucose calibration (NO BRAND SPECIFIED) solution  blood glucose monitoring (NO BRAND SPECIFIED) meter device kit  cloNIDine (CATAPRES) 0.1 MG tablet  Continuous Blood Gluc  (FREESTYLE MCKENZIE 2 READER) KESHIA  Continuous Blood Gluc Sensor (FREESTYLE MCKENZIE 2 SENSOR) MISC  glucagon 1 MG kit  glucose (BD GLUCOSE) 4 g chewable tablet  glucose 40 % (400 mg/mL) gel  insulin  "glargine (LANTUS PEN) 100 UNIT/ML pen  losartan (COZAAR) 25 MG tablet  metFORMIN (GLUCOPHAGE XR) 500 MG 24 hr tablet  metoclopramide (REGLAN) 5 MG tablet  OLANZapine (ZYPREXA) 10 MG tablet  OLANZapine zydis (ZYPREXA) 10 MG ODT  [employer] Container MISC      No Known Allergies  Family History  Family History   Problem Relation Age of Onset    Breast Cancer Mother         in her 50s.    Diabetes Mother     Breast Cancer Maternal Grandmother         in her 40s.    Diabetes Maternal Grandmother      Social History   Social History     Tobacco Use    Smoking status: Never    Smokeless tobacco: Never   Vaping Use    Vaping Use: Never used   Substance Use Topics    Alcohol use: No    Drug use: Yes     Types: Marijuana     Comment: 4 days ago         A medically appropriate review of systems was performed with pertinent positives and negatives noted in the HPI, and all other systems negative.    Physical Exam   BP: (!) 151/104  Pulse: 99  Temp: 98.5  F (36.9  C)  Resp: 18  Height: 175.3 cm (5' 9\")  Weight: 90.7 kg (200 lb)  SpO2: 96 % BP (!) 151/104   Pulse 99   Temp 98.5  F (36.9  C) (Oral)   Resp 18   Ht 1.753 m (5' 9\")   Wt 90.7 kg (200 lb)   LMP  (LMP Unknown)   SpO2 96%   BMI 29.53 kg/m      Physical Exam  Constitutional:       General: She is not in acute distress.     Appearance: Normal appearance. She is well-developed.   HENT:      Head: Normocephalic and atraumatic.   Eyes:      Conjunctiva/sclera: Conjunctivae normal.   Cardiovascular:      Rate and Rhythm: Normal rate.   Pulmonary:      Effort: Pulmonary effort is normal.   Abdominal:      General: Abdomen is flat.      Palpations: Abdomen is soft.      Tenderness: There is no abdominal tenderness.   Musculoskeletal:      Cervical back: Normal range of motion and neck supple.   Skin:     General: Skin is warm and dry.   Neurological:      Mental Status: She is alert and oriented to person, place, and time.         ED Course, Procedures, & Data        "   Results for orders placed or performed during the hospital encounter of 11/24/23   Comprehensive metabolic panel     Status: Abnormal   Result Value Ref Range    Sodium 137 135 - 145 mmol/L    Potassium 4.1 3.4 - 5.3 mmol/L    Carbon Dioxide (CO2) 27 22 - 29 mmol/L    Anion Gap 10 7 - 15 mmol/L    Urea Nitrogen 14.2 6.0 - 20.0 mg/dL    Creatinine 0.69 0.51 - 0.95 mg/dL    GFR Estimate >90 >60 mL/min/1.73m2    Calcium 9.9 8.6 - 10.0 mg/dL    Chloride 100 98 - 107 mmol/L    Glucose 106 (H) 70 - 99 mg/dL    Alkaline Phosphatase 196 (H) 40 - 150 U/L     (H) 0 - 45 U/L     (H) 0 - 50 U/L    Protein Total 8.8 (H) 6.4 - 8.3 g/dL    Albumin 4.3 3.5 - 5.2 g/dL    Bilirubin Total 0.4 <=1.2 mg/dL   Lipase     Status: Normal   Result Value Ref Range    Lipase 22 13 - 60 U/L   UA with Microscopic reflex to Culture     Status: Abnormal    Specimen: Urine, Midstream   Result Value Ref Range    Color Urine Yellow Colorless, Straw, Light Yellow, Yellow    Appearance Urine Clear Clear    Glucose Urine Negative Negative mg/dL    Bilirubin Urine Negative Negative    Ketones Urine Negative Negative mg/dL    Specific Gravity Urine 1.024 1.003 - 1.035    Blood Urine Negative Negative    pH Urine 6.0 5.0 - 7.0    Protein Albumin Urine Negative Negative mg/dL    Urobilinogen Urine Normal Normal, 2.0 mg/dL    Nitrite Urine Negative Negative    Leukocyte Esterase Urine Negative Negative    Mucus Urine Present (A) None Seen /LPF    RBC Urine 1 <=2 /HPF    WBC Urine 1 <=5 /HPF    Squamous Epithelials Urine 6 (H) <=1 /HPF    Transitional Epithelials Urine <1 <=1 /HPF    Narrative    Urine Culture not indicated   Magnesium     Status: Normal   Result Value Ref Range    Magnesium 1.8 1.7 - 2.3 mg/dL   CBC with platelets and differential     Status: None   Result Value Ref Range    WBC Count 10.0 4.0 - 11.0 10e3/uL    RBC Count 5.06 3.80 - 5.20 10e6/uL    Hemoglobin 15.6 11.7 - 15.7 g/dL    Hematocrit 44.5 35.0 - 47.0 %    MCV 88 78  - 100 fL    MCH 30.8 26.5 - 33.0 pg    MCHC 35.1 31.5 - 36.5 g/dL    RDW 13.2 10.0 - 15.0 %    Platelet Count 299 150 - 450 10e3/uL    % Neutrophils 47 %    % Lymphocytes 42 %    % Monocytes 5 %    % Eosinophils 5 %    % Basophils 0 %    % Immature Granulocytes 1 %    NRBCs per 100 WBC 0 <1 /100    Absolute Neutrophils 4.7 1.6 - 8.3 10e3/uL    Absolute Lymphocytes 4.2 0.8 - 5.3 10e3/uL    Absolute Monocytes 0.5 0.0 - 1.3 10e3/uL    Absolute Eosinophils 0.5 0.0 - 0.7 10e3/uL    Absolute Basophils 0.0 0.0 - 0.2 10e3/uL    Absolute Immature Granulocytes 0.1 <=0.4 10e3/uL    Absolute NRBCs 0.0 10e3/uL   HCG qualitative urine     Status: Normal   Result Value Ref Range    hCG Urine Qualitative Negative Negative   CBC with platelets differential     Status: None    Narrative    The following orders were created for panel order CBC with platelets differential.  Procedure                               Abnormality         Status                     ---------                               -----------         ------                     CBC with platelets and d...[[id number]]                      Final result                 Please view results for these tests on the individual orders.     Medications   sodium chloride 0.9% BOLUS 1,000 mL (1,000 mLs Intravenous $New Bag 11/24/23 1631)   OLANZapine (zyPREXA) tablet 2.5 mg (2.5 mg Oral $Given 11/24/23 1610)     Labs Ordered and Resulted from Time of ED Arrival to Time of ED Departure   COMPREHENSIVE METABOLIC PANEL - Abnormal       Result Value    Sodium 137      Potassium 4.1      Carbon Dioxide (CO2) 27      Anion Gap 10      Urea Nitrogen 14.2      Creatinine 0.69      GFR Estimate >90      Calcium 9.9      Chloride 100      Glucose 106 (*)     Alkaline Phosphatase 196 (*)      (*)      (*)     Protein Total 8.8 (*)     Albumin 4.3      Bilirubin Total 0.4     ROUTINE UA WITH MICROSCOPIC REFLEX TO CULTURE - Abnormal    Color Urine Yellow      Appearance Urine  Clear      Glucose Urine Negative      Bilirubin Urine Negative      Ketones Urine Negative      Specific Gravity Urine 1.024      Blood Urine Negative      pH Urine 6.0      Protein Albumin Urine Negative      Urobilinogen Urine Normal      Nitrite Urine Negative      Leukocyte Esterase Urine Negative      Mucus Urine Present (*)     RBC Urine 1      WBC Urine 1      Squamous Epithelials Urine 6 (*)     Transitional Epithelials Urine <1     LIPASE - Normal    Lipase 22     MAGNESIUM - Normal    Magnesium 1.8     HCG QUALITATIVE URINE - Normal    hCG Urine Qualitative Negative     CBC WITH PLATELETS AND DIFFERENTIAL    WBC Count 10.0      RBC Count 5.06      Hemoglobin 15.6      Hematocrit 44.5      MCV 88      MCH 30.8      MCHC 35.1      RDW 13.2      Platelet Count 299      % Neutrophils 47      % Lymphocytes 42      % Monocytes 5      % Eosinophils 5      % Basophils 0      % Immature Granulocytes 1      NRBCs per 100 WBC 0      Absolute Neutrophils 4.7      Absolute Lymphocytes 4.2      Absolute Monocytes 0.5      Absolute Eosinophils 0.5      Absolute Basophils 0.0      Absolute Immature Granulocytes 0.1      Absolute NRBCs 0.0       No orders to display          Critical care was not performed.     Medical Decision Making  The patient's presentation was of moderate complexity (a chronic illness mild to moderate exacerbation, progression, or side effect of treatment).    The patient's evaluation involved:  review of external note(s) from 1 sources (clinic)  review of 1 test result(s) ordered prior to this encounter (labs)  ordering and/or review of 1 test(s) in this encounter (labs)    The patient's management necessitated moderate risk (prescription drug management including medications given in the ED).    Assessment & Plan    Yun Coyle is a 47 year old female with a history of polysubstance abuse (marijuana, opioids, benzodiazepine), cyclic vomiting syndrome, HTN, DMII, and cauda equina syndrome  presents to the ED for n/v x last night.  In ED, HDS/AF, NAD, well-appearing.  Abdomen benign.  Symptoms likely cyclical vomiting.  However, given patient does not use marijuana in many weeks, raises suspicion for potential diabetic related gastroparesis.  Lesser suspicion SBO/acute surgical abdomen.  Plan fo CBC rule out cytosis, CMP assess electrolytes and hepatobiliary function, lipase rule out pancreatitis, UA rule out infection, pregnancy rule out ectopic.  Will give IV fluids and olanzapine for nausea.    I have reviewed the nursing notes. I have reviewed the findings, diagnosis, plan and need for follow up with the patient.    ED Course as of 11/24/23 1754 Fri Nov 24, 2023   1724 HCG Qual Urine: Negative   1724 Magnesium: 1.8   1724 Lipase: 22   1724 UA with Microscopic reflex to Culture(!)  Negative.   1724 CBC with platelets differential  WNL.   1724 BMP unremarkable   1741 Alkaline Phosphatase(!): 196  Previously elevated   1741 AST(!): 166  Is elevated   1741 ALT(!): 183  Previously elevated   1751 Patient feeling improved.  Passed p.o. challenge.  Discussed patient's LFTs with her,, and also noted that that shows that she was previously hep C positive, but has not received treatment.  Encouraged her to follow-up further with her PCP.  Workup otherwise unremarkable.  Patient with benign abdomen.  Afebrile.  Well-appearing.  Feel she is safe for discharge home tonight.  Discharged with return precautions for worsening symptoms and PCP follow-up.         New Prescriptions    No medications on file       Final diagnoses:   Nausea and vomiting, unspecified vomiting type   Elevated LFTs   Diabetes mellitus without complication (H)         Agustin Triplett PA-C  [facility] EMERGENCY DEPARTMENT  11/24/2023     Agustin Triplett PA-C  11/24/23 1755    --    ED Attending Physician Attestation    I Danny Centeno MD, cared for this patient with the Advanced Practice Provider (CASSANDRA). I have  performed a history and physical examination of the patient independent of the CASSANDRA. I reviewed the CASSANDRA's documentation above and agree with the documented findings and plan of care. I personally provided a substantive portion of the care for this patient, including the complete Medical Decision Making. Please see the CASSANDRA's documentation for full details.    Summary of HPI, PE, ED Course   Patient is a 47 year old female evaluated in the emergency department for n/v/d. Exam and ED course notable for nontoxic patient with hypertension initially but otherwise vitals within normal limits.  After the completion of care in the emergency department, the patient was discharged.    Critical Care & Procedures  Not applicable.        Medical Decision Making  The patient's presentation was of moderate complexity (an acute complicated injury).    The patient's evaluation involved:  review of external note(s) from 1 sources (see separate area of note for details)    The patient's management necessitated only low risk treatment.      Of note, patient appears to have elevated LFTs for a while now, and had a positive Hepatitis C study in October without clear follow up noted in my chart review. I sent a message to patient's PCP for follow up.         Danny Centeno MD  Emergency Medicine        Danny Centeno MD  11/27/23 0064

## 2023-11-24 NOTE — ED TRIAGE NOTES
Cyclic vomiting. x 1 day     Triage Assessment (Adult)       Row Name 11/24/23 1518          Triage Assessment    Airway WDL WDL        Respiratory WDL    Respiratory WDL WDL        Skin Circulation/Temperature WDL    Skin Circulation/Temperature WDL WDL        Cardiac WDL    Cardiac WDL WDL        Peripheral/Neurovascular WDL    Peripheral Neurovascular WDL WDL        Cognitive/Neuro/Behavioral WDL    Cognitive/Neuro/Behavioral WDL WDL

## 2023-11-26 ENCOUNTER — TELEPHONE (OUTPATIENT)
Dept: FAMILY MEDICINE | Facility: CLINIC | Age: 48
End: 2023-11-26
Payer: COMMERCIAL

## 2023-11-26 NOTE — TELEPHONE ENCOUNTER
----- Message from Danny Centeno MD sent at 11/24/2023  5:57 PM CST -----  Regarding: Follow up from ED visit  Hi Dr. Granados,    I saw this patient in the Emergency Department on 11/24/23 for concerns about cyclic vomiting. She improved with usual anti-emetics and IV fluids and was discharged after reassuring workup and evaluation. One thing we noted is that the patient has chronically elevated LFTs, and on further chart review it looks like the patient had a positive Hepatitis C RNA level and antibody on 10/1/23, which doesn't appear to have been addressed during that hospitalization at least based on the notes. I just wanted to check to make sure that didn't slip through the cracks.  Thanks for your time.    Zeyad Centeno MD  [facility] Emergency Department

## 2023-12-06 ENCOUNTER — HOSPITAL ENCOUNTER (EMERGENCY)
Facility: CLINIC | Age: 48
Discharge: HOME | End: 2023-12-06
Attending: EMERGENCY MEDICINE | Admitting: EMERGENCY MEDICINE
Payer: COMMERCIAL

## 2023-12-06 ENCOUNTER — APPOINTMENT (OUTPATIENT)
Dept: GENERAL RADIOLOGY | Facility: CLINIC | Age: 48
End: 2023-12-06
Attending: EMERGENCY MEDICINE
Payer: COMMERCIAL

## 2023-12-06 VITALS
HEART RATE: 80 BPM | DIASTOLIC BLOOD PRESSURE: 85 MMHG | RESPIRATION RATE: 17 BRPM | TEMPERATURE: 97.6 F | SYSTOLIC BLOOD PRESSURE: 127 MMHG | OXYGEN SATURATION: 98 %

## 2023-12-06 DIAGNOSIS — J45.21 MILD INTERMITTENT ASTHMA WITH EXACERBATION: ICD-10-CM

## 2023-12-06 DIAGNOSIS — J06.9 UPPER RESPIRATORY TRACT INFECTION, UNSPECIFIED TYPE: ICD-10-CM

## 2023-12-06 LAB
ALBUMIN SERPL BCG-MCNC: 4 G/DL (ref 3.5–5.2)
ALBUMIN UR-MCNC: NEGATIVE MG/DL
ALP SERPL-CCNC: 142 U/L (ref 40–150)
ALT SERPL W P-5'-P-CCNC: 77 U/L (ref 0–50)
ANION GAP SERPL CALCULATED.3IONS-SCNC: 7 MMOL/L (ref 7–15)
APPEARANCE UR: CLEAR
AST SERPL W P-5'-P-CCNC: 59 U/L (ref 0–45)
BASOPHILS # BLD AUTO: 0 10E3/UL (ref 0–0.2)
BASOPHILS NFR BLD AUTO: 1 %
BILIRUB SERPL-MCNC: 0.3 MG/DL
BILIRUB UR QL STRIP: NEGATIVE
BUN SERPL-MCNC: 11.6 MG/DL (ref 6–20)
CALCIUM SERPL-MCNC: 9.6 MG/DL (ref 8.6–10)
CHLORIDE SERPL-SCNC: 98 MMOL/L (ref 98–107)
COLOR UR AUTO: ABNORMAL
CREAT SERPL-MCNC: 0.69 MG/DL (ref 0.51–0.95)
DEPRECATED HCO3 PLAS-SCNC: 31 MMOL/L (ref 22–29)
EGFRCR SERPLBLD CKD-EPI 2021: >90 ML/MIN/1.73M2
EOSINOPHIL # BLD AUTO: 0.6 10E3/UL (ref 0–0.7)
EOSINOPHIL NFR BLD AUTO: 7 %
ERYTHROCYTE [DISTWIDTH] IN BLOOD BY AUTOMATED COUNT: 12.8 % (ref 10–15)
FLUAV RNA SPEC QL NAA+PROBE: NEGATIVE
FLUBV RNA RESP QL NAA+PROBE: NEGATIVE
GLUCOSE SERPL-MCNC: 140 MG/DL (ref 70–99)
GLUCOSE UR STRIP-MCNC: NEGATIVE MG/DL
HCT VFR BLD AUTO: 44.2 % (ref 35–47)
HGB BLD-MCNC: 15 G/DL (ref 11.7–15.7)
HGB UR QL STRIP: NEGATIVE
IMM GRANULOCYTES # BLD: 0 10E3/UL
IMM GRANULOCYTES NFR BLD: 0 %
KETONES UR STRIP-MCNC: NEGATIVE MG/DL
LEUKOCYTE ESTERASE UR QL STRIP: ABNORMAL
LIPASE SERPL-CCNC: 16 U/L (ref 13–60)
LYMPHOCYTES # BLD AUTO: 3 10E3/UL (ref 0.8–5.3)
LYMPHOCYTES NFR BLD AUTO: 35 %
MCH RBC QN AUTO: 30.1 PG (ref 26.5–33)
MCHC RBC AUTO-ENTMCNC: 33.9 G/DL (ref 31.5–36.5)
MCV RBC AUTO: 89 FL (ref 78–100)
MONOCYTES # BLD AUTO: 0.4 10E3/UL (ref 0–1.3)
MONOCYTES NFR BLD AUTO: 5 %
MUCOUS THREADS #/AREA URNS LPF: PRESENT /LPF
NEUTROPHILS # BLD AUTO: 4.5 10E3/UL (ref 1.6–8.3)
NEUTROPHILS NFR BLD AUTO: 52 %
NITRATE UR QL: NEGATIVE
NRBC # BLD AUTO: 0 10E3/UL
NRBC BLD AUTO-RTO: 0 /100
PH UR STRIP: 6.5 [PH] (ref 5–7)
PLATELET # BLD AUTO: 217 10E3/UL (ref 150–450)
POTASSIUM SERPL-SCNC: 4.8 MMOL/L (ref 3.4–5.3)
PROT SERPL-MCNC: 8.4 G/DL (ref 6.4–8.3)
RBC # BLD AUTO: 4.98 10E6/UL (ref 3.8–5.2)
RBC URINE: <1 /HPF
RSV RNA SPEC NAA+PROBE: NEGATIVE
SARS-COV-2 RNA RESP QL NAA+PROBE: NEGATIVE
SODIUM SERPL-SCNC: 136 MMOL/L (ref 135–145)
SP GR UR STRIP: 1.02 (ref 1–1.03)
SQUAMOUS EPITHELIAL: 2 /HPF
UROBILINOGEN UR STRIP-MCNC: NORMAL MG/DL
WBC # BLD AUTO: 8.5 10E3/UL (ref 4–11)
WBC URINE: 1 /HPF

## 2023-12-06 PROCEDURE — 250N000009 HC RX 250: Performed by: EMERGENCY MEDICINE

## 2023-12-06 PROCEDURE — 96361 HYDRATE IV INFUSION ADD-ON: CPT

## 2023-12-06 PROCEDURE — 258N000003 HC RX IP 258 OP 636: Performed by: EMERGENCY MEDICINE

## 2023-12-06 PROCEDURE — 94640 AIRWAY INHALATION TREATMENT: CPT

## 2023-12-06 PROCEDURE — 82310 ASSAY OF CALCIUM: CPT | Performed by: EMERGENCY MEDICINE

## 2023-12-06 PROCEDURE — 99284 EMERGENCY DEPT VISIT MOD MDM: CPT | Mod: 25

## 2023-12-06 PROCEDURE — 96374 THER/PROPH/DIAG INJ IV PUSH: CPT

## 2023-12-06 PROCEDURE — 99284 EMERGENCY DEPT VISIT MOD MDM: CPT | Performed by: EMERGENCY MEDICINE

## 2023-12-06 PROCEDURE — 85025 COMPLETE CBC W/AUTO DIFF WBC: CPT | Performed by: EMERGENCY MEDICINE

## 2023-12-06 PROCEDURE — 71046 X-RAY EXAM CHEST 2 VIEWS: CPT

## 2023-12-06 PROCEDURE — 250N000011 HC RX IP 250 OP 636: Mod: JZ | Performed by: EMERGENCY MEDICINE

## 2023-12-06 PROCEDURE — 36415 COLL VENOUS BLD VENIPUNCTURE: CPT | Performed by: EMERGENCY MEDICINE

## 2023-12-06 PROCEDURE — 81003 URINALYSIS AUTO W/O SCOPE: CPT | Performed by: EMERGENCY MEDICINE

## 2023-12-06 PROCEDURE — 83690 ASSAY OF LIPASE: CPT | Performed by: EMERGENCY MEDICINE

## 2023-12-06 PROCEDURE — 96375 TX/PRO/DX INJ NEW DRUG ADDON: CPT

## 2023-12-06 PROCEDURE — 87637 SARSCOV2&INF A&B&RSV AMP PRB: CPT | Performed by: EMERGENCY MEDICINE

## 2023-12-06 RX ORDER — METHYLPREDNISOLONE SODIUM SUCCINATE 125 MG/2ML
125 INJECTION, POWDER, LYOPHILIZED, FOR SOLUTION INTRAMUSCULAR; INTRAVENOUS ONCE
Status: COMPLETED | OUTPATIENT
Start: 2023-12-06 | End: 2023-12-06

## 2023-12-06 RX ORDER — PREDNISONE 20 MG/1
TABLET ORAL
Qty: 10 TABLET | Refills: 0 | Status: SHIPPED | OUTPATIENT
Start: 2023-12-06 | End: 2024-01-09

## 2023-12-06 RX ORDER — ONDANSETRON 2 MG/ML
4 INJECTION INTRAMUSCULAR; INTRAVENOUS ONCE
Status: COMPLETED | OUTPATIENT
Start: 2023-12-06 | End: 2023-12-06

## 2023-12-06 RX ORDER — IPRATROPIUM BROMIDE AND ALBUTEROL SULFATE 2.5; .5 MG/3ML; MG/3ML
3 SOLUTION RESPIRATORY (INHALATION) ONCE
Status: COMPLETED | OUTPATIENT
Start: 2023-12-06 | End: 2023-12-06

## 2023-12-06 RX ADMIN — METHYLPREDNISOLONE SODIUM SUCCINATE 125 MG: 125 INJECTION, POWDER, FOR SOLUTION INTRAMUSCULAR; INTRAVENOUS at 13:29

## 2023-12-06 RX ADMIN — SODIUM CHLORIDE 1000 ML: 9 INJECTION, SOLUTION INTRAVENOUS at 13:18

## 2023-12-06 RX ADMIN — IPRATROPIUM BROMIDE AND ALBUTEROL SULFATE 3 ML: .5; 3 SOLUTION RESPIRATORY (INHALATION) at 13:32

## 2023-12-06 RX ADMIN — ONDANSETRON 4 MG: 2 INJECTION INTRAMUSCULAR; INTRAVENOUS at 13:18

## 2023-12-06 ASSESSMENT — ACTIVITIES OF DAILY LIVING (ADL)
ADLS_ACUITY_SCORE: 33
ADLS_ACUITY_SCORE: 35

## 2023-12-06 ASSESSMENT — ENCOUNTER SYMPTOMS
SHORTNESS OF BREATH: 1
WHEEZING: 1
NAUSEA: 1

## 2023-12-06 NOTE — DISCHARGE INSTRUCTIONS
Take complete course of prednisone.  Use your inhaler as directed.    Follow-up with your primary care clinic in 1 week.    Return if fever, worse, or other concerns.

## 2023-12-06 NOTE — ED TRIAGE NOTES
Pt presented w/ c/o SOB/wheezing  since yesterday. Pt states she had been requiring her rescue inhaler about every 5 minutes w/o relief. Pt is also having nausea.     Triage Assessment (Adult)       Row Name 12/06/23 1122          Triage Assessment    Airway WDL WDL        Respiratory WDL    Respiratory WDL all;cough     Rhythm/Pattern, Respiratory shortness of breath

## 2023-12-06 NOTE — ED PROVIDER NOTES
ED Provider Note  [facility]      History     Chief Complaint   Patient presents with    Shortness of Breath    Wheezing    Nausea     The history is provided by the patient and medical records.   Shortness of Breath  Associated symptoms: wheezing    Allergies  Associated symptoms: shortness of breath    Nausea  Associated symptoms include shortness of breath.     Yun Coyle is a 47 year old female with a history of polysubstance abuse (marijuana, opioids, benzodiazepine), cyclic vomiting syndrome, HTN, DMII, and cauda equina syndrome presents to the ED for shortness of breath, wheezing, and nausea.  Patient states she has been requiring her rescue inhaler approximately every 15 minutes without relief over the past day.  She reports wheezing and cough productive of clear sputum.  She denies any chest pain.  Patient reports some mild upper abdominal cramping with associated nausea and vomiting of gastric contents.  She denies any diarrhea or constipation.  She denies any dysuria, urgency, or frequency.  She states that she is postmenopausal.  She reports that she is not COVID nor influenza vaccinated.    Past Medical History  Past Medical History:   Diagnosis Date    Acute pancreatitis 06/29/2017    Anxiety     Cauda equina syndrome (H) 03/29/2013    Cyst of right ovary 11/16/2017    Depressive disorder     Gastroesophageal reflux disease     Hypertension     Hyponatremia 10/01/2023    Low back pain 08/20/2012    Overview:   History of cauda equina and surgery.      Type 2 diabetes mellitus (H)     Uncomplicated asthma      Past Surgical History:   Procedure Laterality Date    BACK SURGERY      x2    CHOLECYSTECTOMY      ORTHOPEDIC SURGERY      SALPINGO OOPHORECTOMY,R/L/ELDA Right      predniSONE (DELTASONE) 20 MG tablet  Alcohol Swabs PADS  blood glucose (NO BRAND SPECIFIED) lancets standard  blood glucose (NO BRAND SPECIFIED) test strip  blood glucose calibration (NO BRAND  SPECIFIED) solution  blood glucose monitoring (NO BRAND SPECIFIED) meter device kit  cloNIDine (CATAPRES) 0.1 MG tablet  Continuous Blood Gluc  (FREESTYLE MCKENZIE 2 READER) KESHIA  Continuous Blood Gluc Sensor (FREESTYLE MCKENZIE 2 SENSOR) MISC  glucagon 1 MG kit  glucose (BD GLUCOSE) 4 g chewable tablet  glucose 40 % (400 mg/mL) gel  insulin glargine (LANTUS PEN) 100 UNIT/ML pen  losartan (COZAAR) 25 MG tablet  metFORMIN (GLUCOPHAGE XR) 500 MG 24 hr tablet  metoclopramide (REGLAN) 5 MG tablet  OLANZapine (ZYPREXA) 10 MG tablet  OLANZapine zydis (ZYPREXA) 10 MG ODT  [employer] Container MISC      No Known Allergies  Family History  Family History   Problem Relation Age of Onset    Breast Cancer Mother         in her 50s.    Diabetes Mother     Breast Cancer Maternal Grandmother         in her 40s.    Diabetes Maternal Grandmother      Social History   Social History     Tobacco Use    Smoking status: Never    Smokeless tobacco: Never   Vaping Use    Vaping Use: Never used   Substance Use Topics    Alcohol use: No    Drug use: Yes     Types: Marijuana     Comment: 4 days ago         A medically appropriate review of systems was performed with pertinent positives and negatives noted in the HPI, and all other systems negative.    Physical Exam   BP: (!) 147/89  Pulse: 86  Temp: 97.6  F (36.4  C)  Resp: 20  SpO2: 94 %  Physical Exam  Vitals and nursing note reviewed.   Constitutional:       General: She is not in acute distress.     Appearance: She is well-developed. She is not diaphoretic.   HENT:      Head: Normocephalic and atraumatic.   Eyes:      Extraocular Movements: Extraocular movements intact.      Conjunctiva/sclera: Conjunctivae normal.   Cardiovascular:      Rate and Rhythm: Normal rate.      Heart sounds: Normal heart sounds.   Pulmonary:      Effort: Pulmonary effort is normal. No respiratory distress.      Breath sounds: Wheezing present.   Abdominal:      Palpations: Abdomen is soft.      Tenderness: There  is no abdominal tenderness.   Musculoskeletal:         General: No tenderness. Normal range of motion.      Cervical back: Normal range of motion and neck supple.   Skin:     General: Skin is warm and dry.      Findings: No rash.   Neurological:      General: No focal deficit present.      Mental Status: She is alert.           ED Course, Procedures, & Data      Procedures             Results for orders placed or performed during the hospital encounter of 12/06/23   XR Chest 2 Views     Status: None    Narrative    XR CHEST 2 VIEWS 12/6/2023 2:36 PM    HISTORY: Cough    COMPARISON: 11/1/2023      Impression    IMPRESSION: Heart is normal in size. Lungs are clear.    BARRIE COCHRAN MD         SYSTEM ID:  [id number]   Comprehensive metabolic panel     Status: Abnormal   Result Value Ref Range    Sodium 136 135 - 145 mmol/L    Potassium 4.8 3.4 - 5.3 mmol/L    Carbon Dioxide (CO2) 31 (H) 22 - 29 mmol/L    Anion Gap 7 7 - 15 mmol/L    Urea Nitrogen 11.6 6.0 - 20.0 mg/dL    Creatinine 0.69 0.51 - 0.95 mg/dL    GFR Estimate >90 >60 mL/min/1.73m2    Calcium 9.6 8.6 - 10.0 mg/dL    Chloride 98 98 - 107 mmol/L    Glucose 140 (H) 70 - 99 mg/dL    Alkaline Phosphatase 142 40 - 150 U/L    AST 59 (H) 0 - 45 U/L    ALT 77 (H) 0 - 50 U/L    Protein Total 8.4 (H) 6.4 - 8.3 g/dL    Albumin 4.0 3.5 - 5.2 g/dL    Bilirubin Total 0.3 <=1.2 mg/dL   Lipase     Status: Normal   Result Value Ref Range    Lipase 16 13 - 60 U/L   UA with Microscopic reflex to Culture     Status: Abnormal    Specimen: Urine, Midstream   Result Value Ref Range    Color Urine Light Yellow Colorless, Straw, Light Yellow, Yellow    Appearance Urine Clear Clear    Glucose Urine Negative Negative mg/dL    Bilirubin Urine Negative Negative    Ketones Urine Negative Negative mg/dL    Specific Gravity Urine 1.021 1.003 - 1.035    Blood Urine Negative Negative    pH Urine 6.5 5.0 - 7.0    Protein Albumin Urine Negative Negative mg/dL    Urobilinogen Urine Normal Normal,  2.0 mg/dL    Nitrite Urine Negative Negative    Leukocyte Esterase Urine Trace (A) Negative    Mucus Urine Present (A) None Seen /LPF    RBC Urine <1 <=2 /HPF    WBC Urine 1 <=5 /HPF    Squamous Epithelials Urine 2 (H) <=1 /HPF    Narrative    Urine Culture not indicated   Symptomatic Influenza A/B, RSV, & SARS-CoV2 PCR (COVID-19) Nasopharyngeal     Status: Normal    Specimen: Nasopharyngeal; Swab   Result Value Ref Range    Influenza A PCR Negative Negative    Influenza B PCR Negative Negative    RSV PCR Negative Negative    SARS CoV2 PCR Negative Negative    Narrative    Testing was performed using the Xpert Xpress CoV2/Flu/RSV Assay on the Cepheid GeneXpert Instrument. This test should be ordered for the detection of SARS-CoV-2, influenza, and RSV viruses in individuals who meet clinical and/or epidemiological criteria. Test performance is unknown in asymptomatic patients. This test is for in vitro diagnostic use under the FDA EUA for laboratories certified under CLIA to perform high or moderate complexity testing. This test has not been FDA cleared or approved. A negative result does not rule out the presence of PCR inhibitors in the specimen or target RNA in concentration below the limit of detection for the assay. If only one viral target is positive but coinfection with multiple targets is suspected, the sample should be re-tested with another FDA cleared, approved, or authorized test, if coinfection would change clinical management. This test was validated by the [facility]. These laboratories are certified under the Clinical Laboratory Improvement Amendments of 1988 (CLIA-88) as qualified to perform high complexity laboratory testing.   CBC with platelets and differential     Status: None   Result Value Ref Range    WBC Count 8.5 4.0 - 11.0 10e3/uL    RBC Count 4.98 3.80 - 5.20 10e6/uL    Hemoglobin 15.0 11.7 - 15.7 g/dL    Hematocrit 44.2 35.0 - 47.0 %    MCV 89 78 - 100 fL    MCH 30.1  26.5 - 33.0 pg    MCHC 33.9 31.5 - 36.5 g/dL    RDW 12.8 10.0 - 15.0 %    Platelet Count 217 150 - 450 10e3/uL    % Neutrophils 52 %    % Lymphocytes 35 %    % Monocytes 5 %    % Eosinophils 7 %    % Basophils 1 %    % Immature Granulocytes 0 %    NRBCs per 100 WBC 0 <1 /100    Absolute Neutrophils 4.5 1.6 - 8.3 10e3/uL    Absolute Lymphocytes 3.0 0.8 - 5.3 10e3/uL    Absolute Monocytes 0.4 0.0 - 1.3 10e3/uL    Absolute Eosinophils 0.6 0.0 - 0.7 10e3/uL    Absolute Basophils 0.0 0.0 - 0.2 10e3/uL    Absolute Immature Granulocytes 0.0 <=0.4 10e3/uL    Absolute NRBCs 0.0 10e3/uL   CBC with platelets differential     Status: None    Narrative    The following orders were created for panel order CBC with platelets differential.  Procedure                               Abnormality         Status                     ---------                               -----------         ------                     CBC with platelets and d...[[id number]]                      Final result                 Please view results for these tests on the individual orders.               Results for orders placed or performed during the hospital encounter of 12/06/23   XR Chest 2 Views     Status: None    Narrative    XR CHEST 2 VIEWS 12/6/2023 2:36 PM    HISTORY: Cough    COMPARISON: 11/1/2023      Impression    IMPRESSION: Heart is normal in size. Lungs are clear.    BARRIE COCHRAN MD         SYSTEM ID:  [id number]   Comprehensive metabolic panel     Status: Abnormal   Result Value Ref Range    Sodium 136 135 - 145 mmol/L    Potassium 4.8 3.4 - 5.3 mmol/L    Carbon Dioxide (CO2) 31 (H) 22 - 29 mmol/L    Anion Gap 7 7 - 15 mmol/L    Urea Nitrogen 11.6 6.0 - 20.0 mg/dL    Creatinine 0.69 0.51 - 0.95 mg/dL    GFR Estimate >90 >60 mL/min/1.73m2    Calcium 9.6 8.6 - 10.0 mg/dL    Chloride 98 98 - 107 mmol/L    Glucose 140 (H) 70 - 99 mg/dL    Alkaline Phosphatase 142 40 - 150 U/L    AST 59 (H) 0 - 45 U/L    ALT 77 (H) 0 - 50 U/L    Protein Total 8.4  (H) 6.4 - 8.3 g/dL    Albumin 4.0 3.5 - 5.2 g/dL    Bilirubin Total 0.3 <=1.2 mg/dL   Lipase     Status: Normal   Result Value Ref Range    Lipase 16 13 - 60 U/L   UA with Microscopic reflex to Culture     Status: Abnormal    Specimen: Urine, Midstream   Result Value Ref Range    Color Urine Light Yellow Colorless, Straw, Light Yellow, Yellow    Appearance Urine Clear Clear    Glucose Urine Negative Negative mg/dL    Bilirubin Urine Negative Negative    Ketones Urine Negative Negative mg/dL    Specific Gravity Urine 1.021 1.003 - 1.035    Blood Urine Negative Negative    pH Urine 6.5 5.0 - 7.0    Protein Albumin Urine Negative Negative mg/dL    Urobilinogen Urine Normal Normal, 2.0 mg/dL    Nitrite Urine Negative Negative    Leukocyte Esterase Urine Trace (A) Negative    Mucus Urine Present (A) None Seen /LPF    RBC Urine <1 <=2 /HPF    WBC Urine 1 <=5 /HPF    Squamous Epithelials Urine 2 (H) <=1 /HPF    Narrative    Urine Culture not indicated   Symptomatic Influenza A/B, RSV, & SARS-CoV2 PCR (COVID-19) Nasopharyngeal     Status: Normal    Specimen: Nasopharyngeal; Swab   Result Value Ref Range    Influenza A PCR Negative Negative    Influenza B PCR Negative Negative    RSV PCR Negative Negative    SARS CoV2 PCR Negative Negative    Narrative    Testing was performed using the Xpert Xpress CoV2/Flu/RSV Assay on the Cepheid GeneXpert Instrument. This test should be ordered for the detection of SARS-CoV-2, influenza, and RSV viruses in individuals who meet clinical and/or epidemiological criteria. Test performance is unknown in asymptomatic patients. This test is for in vitro diagnostic use under the FDA EUA for laboratories certified under CLIA to perform high or moderate complexity testing. This test has not been FDA cleared or approved. A negative result does not rule out the presence of PCR inhibitors in the specimen or target RNA in concentration below the limit of detection for the assay. If only one viral  target is positive but coinfection with multiple targets is suspected, the sample should be re-tested with another FDA cleared, approved, or authorized test, if coinfection would change clinical management. This test was validated by the [facility]. These laboratories are certified under the Clinical Laboratory Improvement Amendments of 1988 (CLIA-88) as qualified to perform high complexity laboratory testing.   CBC with platelets and differential     Status: None   Result Value Ref Range    WBC Count 8.5 4.0 - 11.0 10e3/uL    RBC Count 4.98 3.80 - 5.20 10e6/uL    Hemoglobin 15.0 11.7 - 15.7 g/dL    Hematocrit 44.2 35.0 - 47.0 %    MCV 89 78 - 100 fL    MCH 30.1 26.5 - 33.0 pg    MCHC 33.9 31.5 - 36.5 g/dL    RDW 12.8 10.0 - 15.0 %    Platelet Count 217 150 - 450 10e3/uL    % Neutrophils 52 %    % Lymphocytes 35 %    % Monocytes 5 %    % Eosinophils 7 %    % Basophils 1 %    % Immature Granulocytes 0 %    NRBCs per 100 WBC 0 <1 /100    Absolute Neutrophils 4.5 1.6 - 8.3 10e3/uL    Absolute Lymphocytes 3.0 0.8 - 5.3 10e3/uL    Absolute Monocytes 0.4 0.0 - 1.3 10e3/uL    Absolute Eosinophils 0.6 0.0 - 0.7 10e3/uL    Absolute Basophils 0.0 0.0 - 0.2 10e3/uL    Absolute Immature Granulocytes 0.0 <=0.4 10e3/uL    Absolute NRBCs 0.0 10e3/uL   CBC with platelets differential     Status: None    Narrative    The following orders were created for panel order CBC with platelets differential.  Procedure                               Abnormality         Status                     ---------                               -----------         ------                     CBC with platelets and d...[[id number]]                      Final result                 Please view results for these tests on the individual orders.     Medications   sodium chloride 0.9% BOLUS 1,000 mL (1,000 mLs Intravenous $New Bag 12/6/23 6582)   ipratropium - albuterol 0.5 mg/2.5 mg/3 mL (DUONEB) neb solution 3 mL (3 mLs Nebulization $Given  12/6/23 1332)   ondansetron (ZOFRAN) injection 4 mg (4 mg Intravenous $Given 12/6/23 1318)   methylPREDNISolone sodium succinate (solu-MEDROL) injection 125 mg (125 mg Intravenous $Given 12/6/23 1329)     Labs Ordered and Resulted from Time of ED Arrival to Time of ED Departure   COMPREHENSIVE METABOLIC PANEL - Abnormal       Result Value    Sodium 136      Potassium 4.8      Carbon Dioxide (CO2) 31 (*)     Anion Gap 7      Urea Nitrogen 11.6      Creatinine 0.69      GFR Estimate >90      Calcium 9.6      Chloride 98      Glucose 140 (*)     Alkaline Phosphatase 142      AST 59 (*)     ALT 77 (*)     Protein Total 8.4 (*)     Albumin 4.0      Bilirubin Total 0.3     ROUTINE UA WITH MICROSCOPIC REFLEX TO CULTURE - Abnormal    Color Urine Light Yellow      Appearance Urine Clear      Glucose Urine Negative      Bilirubin Urine Negative      Ketones Urine Negative      Specific Gravity Urine 1.021      Blood Urine Negative      pH Urine 6.5      Protein Albumin Urine Negative      Urobilinogen Urine Normal      Nitrite Urine Negative      Leukocyte Esterase Urine Trace (*)     Mucus Urine Present (*)     RBC Urine <1      WBC Urine 1      Squamous Epithelials Urine 2 (*)    LIPASE - Normal    Lipase 16     INFLUENZA A/B, RSV, & SARS-COV2 PCR - Normal    Influenza A PCR Negative      Influenza B PCR Negative      RSV PCR Negative      SARS CoV2 PCR Negative     CBC WITH PLATELETS AND DIFFERENTIAL    WBC Count 8.5      RBC Count 4.98      Hemoglobin 15.0      Hematocrit 44.2      MCV 89      MCH 30.1      MCHC 33.9      RDW 12.8      Platelet Count 217      % Neutrophils 52      % Lymphocytes 35      % Monocytes 5      % Eosinophils 7      % Basophils 1      % Immature Granulocytes 0      NRBCs per 100 WBC 0      Absolute Neutrophils 4.5      Absolute Lymphocytes 3.0      Absolute Monocytes 0.4      Absolute Eosinophils 0.6      Absolute Basophils 0.0      Absolute Immature Granulocytes 0.0      Absolute NRBCs 0.0        XR Chest 2 Views   Final Result   IMPRESSION: Heart is normal in size. Lungs are clear.      BARRIE COCHRAN MD            SYSTEM ID:  [id number]         Reviewed emergency department visit from 11/24/2023 for hyperemesis  Reviewed previous labs including CBC, comprehensive metabolic panel, lipase, urinalysis    Critical care was not performed.     Medical Decision Making  The patient's presentation was of moderate complexity (an undiagnosed new problem with uncertain diagnosis).    The patient's evaluation involved:  review of external note(s) from 1 sources (see separate area of note for details)  review of 3+ test result(s) ordered prior to this encounter (see separate area of note for details)  ordering and/or review of 3+ test(s) in this encounter (see separate area of note for details)  independent interpretation of testing performed by another health professional (chest radiograph interpreted by me-no infiltrate.  Confirmed by radiology)    The patient's management necessitated moderate risk (prescription drug management including medications given in the ED).    Assessment & Plan    47 year old female with history of asthma and hyperemesis syndrome to the emergency department with wheezing and cough productive of clear sputum in the setting of URI symptoms.  Differential includes COVID, influenza, pneumonia, asthma exacerbation from viral URI.  She also has some upper abdominal cramping with nausea and vomiting.  She has a history of similar symptoms in the past.  Differential includes hyperemesis syndrome, acute liver pathology, acute pancreatitis, gastritis/peptic ulcer disease.  The patient's abdominal examination is normal.  Laboratory evaluation reveals negative COVID influenza testing.  Chest radiograph without infiltrate do not suspect pneumonia.  No evidence for pneumothorax.  Labs are baseline with mild transaminitis that is actually improving.  Remainder of labs also normal.  Urinalysis does not  appear infected.  Patient markedly improved after IV fluids, Zofran, nebulizer treatment, and Solu-Medrol.  Patient states that she has not had previous difficulty with steroid treatment of her asthma and her blood sugar.  Will discharge to home on prednisone burst.  Primary care follow-up recommended.  Return precautions provided.    I have reviewed the nursing notes. I have reviewed the findings, diagnosis, plan and need for follow up with the patient.    New Prescriptions    PREDNISONE (DELTASONE) 20 MG TABLET    Take two tablets (= 40mg) each day for 5 (five) days       Final diagnoses:   Upper respiratory tract infection, unspecified type   Mild intermittent asthma with exacerbation     Chart documentation was completed with Dragon voice-recognition software. Even though reviewed, this chart may still contain some grammatical, spelling, and word errors.       [facility] EMERGENCY DEPARTMENT  12/6/2023     Anderson Blas MD  12/06/23 4807

## 2024-01-03 ENCOUNTER — TELEPHONE (OUTPATIENT)
Dept: OPHTHALMOLOGY | Facility: CLINIC | Age: 49
End: 2024-01-03
Payer: COMMERCIAL

## 2024-01-09 ENCOUNTER — OFFICE VISIT (OUTPATIENT)
Dept: FAMILY MEDICINE | Facility: CLINIC | Age: 49
End: 2024-01-09
Payer: COMMERCIAL

## 2024-01-09 ENCOUNTER — HOSPITAL ENCOUNTER (INPATIENT)
Facility: CLINIC | Age: 49
LOS: 2 days | Discharge: HOME | DRG: 394 | End: 2024-01-11
Attending: EMERGENCY MEDICINE | Admitting: STUDENT IN AN ORGANIZED HEALTH CARE EDUCATION/TRAINING PROGRAM
Payer: COMMERCIAL

## 2024-01-09 ENCOUNTER — APPOINTMENT (OUTPATIENT)
Dept: GENERAL RADIOLOGY | Facility: CLINIC | Age: 49
DRG: 394 | End: 2024-01-09
Attending: EMERGENCY MEDICINE
Payer: COMMERCIAL

## 2024-01-09 VITALS
SYSTOLIC BLOOD PRESSURE: 156 MMHG | BODY MASS INDEX: 29.92 KG/M2 | OXYGEN SATURATION: 87 % | DIASTOLIC BLOOD PRESSURE: 102 MMHG | RESPIRATION RATE: 24 BRPM | HEIGHT: 69 IN | WEIGHT: 202 LBS | HEART RATE: 94 BPM

## 2024-01-09 DIAGNOSIS — J45.41 MODERATE PERSISTENT ASTHMA WITH EXACERBATION: ICD-10-CM

## 2024-01-09 DIAGNOSIS — R11.15 CYCLICAL VOMITING: Primary | ICD-10-CM

## 2024-01-09 DIAGNOSIS — E11.65 TYPE 2 DIABETES MELLITUS WITH HYPERGLYCEMIA, WITH LONG-TERM CURRENT USE OF INSULIN (H): Primary | ICD-10-CM

## 2024-01-09 DIAGNOSIS — Z79.4 TYPE 2 DIABETES MELLITUS WITH HYPERGLYCEMIA, WITH LONG-TERM CURRENT USE OF INSULIN (H): Primary | ICD-10-CM

## 2024-01-09 DIAGNOSIS — Z12.4 CERVICAL CANCER SCREENING: ICD-10-CM

## 2024-01-09 DIAGNOSIS — B18.2 CHRONIC HEPATITIS C WITHOUT HEPATIC COMA (H): ICD-10-CM

## 2024-01-09 DIAGNOSIS — Z12.11 SCREEN FOR COLON CANCER: ICD-10-CM

## 2024-01-09 LAB
ALBUMIN SERPL BCG-MCNC: 4.3 G/DL (ref 3.5–5.2)
ALP SERPL-CCNC: 125 U/L (ref 40–150)
ALT SERPL W P-5'-P-CCNC: 66 U/L (ref 0–50)
ANION GAP SERPL CALCULATED.3IONS-SCNC: 19 MMOL/L (ref 7–15)
AST SERPL W P-5'-P-CCNC: 44 U/L (ref 0–45)
ATRIAL RATE - MUSE: 100 BPM
B-OH-BUTYR SERPL-SCNC: 0.5 MMOL/L
BASE EXCESS BLDV CALC-SCNC: 1.3 MMOL/L (ref -7.7–1.9)
BASOPHILS # BLD AUTO: 0.1 10E3/UL (ref 0–0.2)
BASOPHILS NFR BLD AUTO: 0 %
BILIRUB DIRECT SERPL-MCNC: <0.2 MG/DL (ref 0–0.3)
BILIRUB SERPL-MCNC: 0.3 MG/DL
BUN SERPL-MCNC: 15.4 MG/DL (ref 6–20)
CALCIUM SERPL-MCNC: 10.6 MG/DL (ref 8.6–10)
CHLORIDE SERPL-SCNC: 97 MMOL/L (ref 98–107)
CREAT SERPL-MCNC: 0.83 MG/DL (ref 0.51–0.95)
DEPRECATED HCO3 PLAS-SCNC: 23 MMOL/L (ref 22–29)
DIASTOLIC BLOOD PRESSURE - MUSE: NORMAL MMHG
EGFRCR SERPLBLD CKD-EPI 2021: 88 ML/MIN/1.73M2
EOSINOPHIL # BLD AUTO: 0.1 10E3/UL (ref 0–0.7)
EOSINOPHIL NFR BLD AUTO: 1 %
ERYTHROCYTE [DISTWIDTH] IN BLOOD BY AUTOMATED COUNT: 12.2 % (ref 10–15)
FLUAV RNA SPEC QL NAA+PROBE: NEGATIVE
FLUBV RNA RESP QL NAA+PROBE: NEGATIVE
GLUCOSE BLDC GLUCOMTR-MCNC: 229 MG/DL (ref 70–99)
GLUCOSE SERPL-MCNC: 217 MG/DL (ref 70–99)
HCG SERPL QL: NEGATIVE
HCO3 BLDV-SCNC: 26 MMOL/L (ref 21–28)
HCT VFR BLD AUTO: 43.5 % (ref 35–47)
HGB BLD-MCNC: 15.2 G/DL (ref 11.7–15.7)
HOLD SPECIMEN: NORMAL
HOLD SPECIMEN: NORMAL
IMM GRANULOCYTES # BLD: 0.1 10E3/UL
IMM GRANULOCYTES NFR BLD: 1 %
INTERPRETATION ECG - MUSE: NORMAL
LACTATE SERPL-SCNC: 4.8 MMOL/L (ref 0.7–2)
LACTATE SERPL-SCNC: 4.8 MMOL/L (ref 0.7–2)
LIPASE SERPL-CCNC: 27 U/L (ref 13–60)
LYMPHOCYTES # BLD AUTO: 2.7 10E3/UL (ref 0.8–5.3)
LYMPHOCYTES NFR BLD AUTO: 20 %
MAGNESIUM SERPL-MCNC: 1.4 MG/DL (ref 1.7–2.3)
MCH RBC QN AUTO: 29.9 PG (ref 26.5–33)
MCHC RBC AUTO-ENTMCNC: 34.9 G/DL (ref 31.5–36.5)
MCV RBC AUTO: 86 FL (ref 78–100)
MONOCYTES # BLD AUTO: 0.7 10E3/UL (ref 0–1.3)
MONOCYTES NFR BLD AUTO: 5 %
NEUTROPHILS # BLD AUTO: 9.9 10E3/UL (ref 1.6–8.3)
NEUTROPHILS NFR BLD AUTO: 73 %
NRBC # BLD AUTO: 0 10E3/UL
NRBC BLD AUTO-RTO: 0 /100
O2/TOTAL GAS SETTING VFR VENT: 21 %
P AXIS - MUSE: 75 DEGREES
PCO2 BLDV: 41 MM HG (ref 40–50)
PH BLDV: 7.41 [PH] (ref 7.32–7.43)
PHOSPHATE SERPL-MCNC: 3.4 MG/DL (ref 2.5–4.5)
PLATELET # BLD AUTO: 282 10E3/UL (ref 150–450)
PO2 BLDV: 34 MM HG (ref 25–47)
POTASSIUM SERPL-SCNC: 3.6 MMOL/L (ref 3.4–5.3)
PR INTERVAL - MUSE: 136 MS
PROT SERPL-MCNC: 8.9 G/DL (ref 6.4–8.3)
QRS DURATION - MUSE: 88 MS
QT - MUSE: 366 MS
QTC - MUSE: 472 MS
R AXIS - MUSE: -34 DEGREES
RBC # BLD AUTO: 5.09 10E6/UL (ref 3.8–5.2)
RSV RNA SPEC NAA+PROBE: NEGATIVE
SARS-COV-2 RNA RESP QL NAA+PROBE: NEGATIVE
SODIUM SERPL-SCNC: 139 MMOL/L (ref 135–145)
SYSTOLIC BLOOD PRESSURE - MUSE: NORMAL MMHG
T AXIS - MUSE: 63 DEGREES
TROPONIN T SERPL HS-MCNC: 6 NG/L
TSH SERPL DL<=0.005 MIU/L-ACNC: 0.84 UIU/ML (ref 0.3–4.2)
VENTRICULAR RATE- MUSE: 100 BPM
WBC # BLD AUTO: 13.6 10E3/UL (ref 4–11)

## 2024-01-09 PROCEDURE — 84100 ASSAY OF PHOSPHORUS: CPT | Performed by: EMERGENCY MEDICINE

## 2024-01-09 PROCEDURE — 84703 CHORIONIC GONADOTROPIN ASSAY: CPT | Performed by: EMERGENCY MEDICINE

## 2024-01-09 PROCEDURE — 93010 ELECTROCARDIOGRAM REPORT: CPT | Performed by: EMERGENCY MEDICINE

## 2024-01-09 PROCEDURE — 85025 COMPLETE CBC W/AUTO DIFF WBC: CPT | Performed by: EMERGENCY MEDICINE

## 2024-01-09 PROCEDURE — 258N000003 HC RX IP 258 OP 636: Performed by: EMERGENCY MEDICINE

## 2024-01-09 PROCEDURE — 250N000009 HC RX 250

## 2024-01-09 PROCEDURE — 84145 PROCALCITONIN (PCT): CPT | Performed by: EMERGENCY MEDICINE

## 2024-01-09 PROCEDURE — 80053 COMPREHEN METABOLIC PANEL: CPT | Performed by: EMERGENCY MEDICINE

## 2024-01-09 PROCEDURE — 99207 PR INPT ADMISSION FROM CLINIC: CPT | Performed by: FAMILY MEDICINE

## 2024-01-09 PROCEDURE — 94640 AIRWAY INHALATION TREATMENT: CPT | Performed by: EMERGENCY MEDICINE

## 2024-01-09 PROCEDURE — 83605 ASSAY OF LACTIC ACID: CPT | Performed by: EMERGENCY MEDICINE

## 2024-01-09 PROCEDURE — 83690 ASSAY OF LIPASE: CPT | Performed by: EMERGENCY MEDICINE

## 2024-01-09 PROCEDURE — 99285 EMERGENCY DEPT VISIT HI MDM: CPT | Mod: 25 | Performed by: EMERGENCY MEDICINE

## 2024-01-09 PROCEDURE — 120N000002 HC R&B MED SURG/OB UMMC

## 2024-01-09 PROCEDURE — 250N000013 HC RX MED GY IP 250 OP 250 PS 637: Performed by: EMERGENCY MEDICINE

## 2024-01-09 PROCEDURE — 82248 BILIRUBIN DIRECT: CPT | Performed by: EMERGENCY MEDICINE

## 2024-01-09 PROCEDURE — 250N000011 HC RX IP 250 OP 636: Performed by: EMERGENCY MEDICINE

## 2024-01-09 PROCEDURE — 96375 TX/PRO/DX INJ NEW DRUG ADDON: CPT | Performed by: EMERGENCY MEDICINE

## 2024-01-09 PROCEDURE — 96372 THER/PROPH/DIAG INJ SC/IM: CPT | Performed by: EMERGENCY MEDICINE

## 2024-01-09 PROCEDURE — 83735 ASSAY OF MAGNESIUM: CPT | Performed by: EMERGENCY MEDICINE

## 2024-01-09 PROCEDURE — 82962 GLUCOSE BLOOD TEST: CPT

## 2024-01-09 PROCEDURE — 96365 THER/PROPH/DIAG IV INF INIT: CPT | Mod: 59 | Performed by: EMERGENCY MEDICINE

## 2024-01-09 PROCEDURE — 82010 KETONE BODYS QUAN: CPT | Performed by: EMERGENCY MEDICINE

## 2024-01-09 PROCEDURE — 94640 AIRWAY INHALATION TREATMENT: CPT | Performed by: FAMILY MEDICINE

## 2024-01-09 PROCEDURE — 82803 BLOOD GASES ANY COMBINATION: CPT | Performed by: EMERGENCY MEDICINE

## 2024-01-09 PROCEDURE — 71045 X-RAY EXAM CHEST 1 VIEW: CPT

## 2024-01-09 PROCEDURE — 84443 ASSAY THYROID STIM HORMONE: CPT | Performed by: EMERGENCY MEDICINE

## 2024-01-09 PROCEDURE — 93005 ELECTROCARDIOGRAM TRACING: CPT | Performed by: EMERGENCY MEDICINE

## 2024-01-09 PROCEDURE — 36415 COLL VENOUS BLD VENIPUNCTURE: CPT | Performed by: EMERGENCY MEDICINE

## 2024-01-09 PROCEDURE — 250N000009 HC RX 250: Performed by: EMERGENCY MEDICINE

## 2024-01-09 PROCEDURE — 96361 HYDRATE IV INFUSION ADD-ON: CPT | Performed by: EMERGENCY MEDICINE

## 2024-01-09 PROCEDURE — 84484 ASSAY OF TROPONIN QUANT: CPT | Performed by: EMERGENCY MEDICINE

## 2024-01-09 PROCEDURE — 85004 AUTOMATED DIFF WBC COUNT: CPT | Performed by: EMERGENCY MEDICINE

## 2024-01-09 PROCEDURE — 87637 SARSCOV2&INF A&B&RSV AMP PRB: CPT | Performed by: EMERGENCY MEDICINE

## 2024-01-09 PROCEDURE — 96366 THER/PROPH/DIAG IV INF ADDON: CPT | Performed by: EMERGENCY MEDICINE

## 2024-01-09 RX ORDER — PREDNISONE 20 MG/1
TABLET ORAL
Qty: 10 TABLET | Refills: 0 | Status: SHIPPED | OUTPATIENT
Start: 2024-01-09 | End: 2024-01-10

## 2024-01-09 RX ORDER — IPRATROPIUM BROMIDE AND ALBUTEROL SULFATE 2.5; .5 MG/3ML; MG/3ML
3 SOLUTION RESPIRATORY (INHALATION)
Status: COMPLETED | OUTPATIENT
Start: 2024-01-09 | End: 2024-01-09

## 2024-01-09 RX ORDER — BUDESONIDE AND FORMOTEROL FUMARATE DIHYDRATE 160; 4.5 UG/1; UG/1
2 AEROSOL RESPIRATORY (INHALATION) 2 TIMES DAILY
Qty: 10.2 G | Refills: 11 | Status: SHIPPED | OUTPATIENT
Start: 2024-01-09 | End: 2024-08-22

## 2024-01-09 RX ORDER — OLANZAPINE 10 MG/2ML
10 INJECTION, POWDER, FOR SOLUTION INTRAMUSCULAR ONCE
Status: COMPLETED | OUTPATIENT
Start: 2024-01-09 | End: 2024-01-09

## 2024-01-09 RX ORDER — LIDOCAINE HYDROCHLORIDE 20 MG/ML
10 SOLUTION OROPHARYNGEAL ONCE
Status: COMPLETED | OUTPATIENT
Start: 2024-01-09 | End: 2024-01-09

## 2024-01-09 RX ORDER — ALBUTEROL SULFATE 0.83 MG/ML
2.5 SOLUTION RESPIRATORY (INHALATION) ONCE
Status: DISCONTINUED | OUTPATIENT
Start: 2024-01-09 | End: 2024-01-09

## 2024-01-09 RX ORDER — IPRATROPIUM BROMIDE AND ALBUTEROL SULFATE 2.5; .5 MG/3ML; MG/3ML
SOLUTION RESPIRATORY (INHALATION)
Status: COMPLETED
Start: 2024-01-09 | End: 2024-01-09

## 2024-01-09 RX ORDER — OLANZAPINE 10 MG/2ML
5 INJECTION, POWDER, FOR SOLUTION INTRAMUSCULAR ONCE
Status: COMPLETED | OUTPATIENT
Start: 2024-01-09 | End: 2024-01-10

## 2024-01-09 RX ORDER — MAGNESIUM SULFATE HEPTAHYDRATE 40 MG/ML
2 INJECTION, SOLUTION INTRAVENOUS ONCE
Status: COMPLETED | OUTPATIENT
Start: 2024-01-09 | End: 2024-01-09

## 2024-01-09 RX ORDER — ALBUTEROL SULFATE 90 UG/1
2 AEROSOL, METERED RESPIRATORY (INHALATION) EVERY 4 HOURS PRN
COMMUNITY
Start: 2023-10-07 | End: 2024-01-09

## 2024-01-09 RX ORDER — ALBUTEROL SULFATE 90 UG/1
2 AEROSOL, METERED RESPIRATORY (INHALATION) EVERY 4 HOURS PRN
Qty: 18 G | Refills: 11 | Status: SHIPPED | OUTPATIENT
Start: 2024-01-09 | End: 2024-10-03

## 2024-01-09 RX ORDER — PREDNISONE 20 MG/1
TABLET ORAL
Qty: 10 TABLET | Refills: 0 | Status: SHIPPED | OUTPATIENT
Start: 2024-01-09 | End: 2024-01-09

## 2024-01-09 RX ORDER — ONDANSETRON 2 MG/ML
4 INJECTION INTRAMUSCULAR; INTRAVENOUS ONCE
Status: COMPLETED | OUTPATIENT
Start: 2024-01-09 | End: 2024-01-09

## 2024-01-09 RX ORDER — MAGNESIUM HYDROXIDE/ALUMINUM HYDROXICE/SIMETHICONE 120; 1200; 1200 MG/30ML; MG/30ML; MG/30ML
15 SUSPENSION ORAL ONCE
Status: COMPLETED | OUTPATIENT
Start: 2024-01-09 | End: 2024-01-09

## 2024-01-09 RX ORDER — OLANZAPINE 5 MG/1
5 TABLET, ORALLY DISINTEGRATING ORAL ONCE
Status: DISCONTINUED | OUTPATIENT
Start: 2024-01-09 | End: 2024-01-09

## 2024-01-09 RX ADMIN — ALBUTEROL SULFATE 2.5 MG: 0.83 SOLUTION RESPIRATORY (INHALATION) at 14:20

## 2024-01-09 RX ADMIN — LIDOCAINE HYDROCHLORIDE 10 ML: 20 SOLUTION ORAL at 23:18

## 2024-01-09 RX ADMIN — ONDANSETRON 4 MG: 2 INJECTION INTRAMUSCULAR; INTRAVENOUS at 20:54

## 2024-01-09 RX ADMIN — OLANZAPINE 10 MG: 10 INJECTION, POWDER, FOR SOLUTION INTRAMUSCULAR at 18:24

## 2024-01-09 RX ADMIN — IPRATROPIUM BROMIDE AND ALBUTEROL SULFATE 3 ML: .5; 3 SOLUTION RESPIRATORY (INHALATION) at 16:24

## 2024-01-09 RX ADMIN — SODIUM CHLORIDE 1000 ML: 9 INJECTION, SOLUTION INTRAVENOUS at 17:47

## 2024-01-09 RX ADMIN — SODIUM CHLORIDE 1000 ML: 9 INJECTION, SOLUTION INTRAVENOUS at 17:16

## 2024-01-09 RX ADMIN — IPRATROPIUM BROMIDE AND ALBUTEROL SULFATE 3 ML: 2.5; .5 SOLUTION RESPIRATORY (INHALATION) at 16:24

## 2024-01-09 RX ADMIN — MAGNESIUM SULFATE HEPTAHYDRATE 2 G: 40 INJECTION, SOLUTION INTRAVENOUS at 20:57

## 2024-01-09 RX ADMIN — ALUMINUM HYDROXIDE, MAGNESIUM HYDROXIDE, AND SIMETHICONE 15 ML: 200; 200; 20 SUSPENSION ORAL at 23:18

## 2024-01-09 RX ADMIN — SODIUM CHLORIDE, POTASSIUM CHLORIDE, SODIUM LACTATE AND CALCIUM CHLORIDE 1000 ML: 600; 310; 30; 20 INJECTION, SOLUTION INTRAVENOUS at 20:54

## 2024-01-09 ASSESSMENT — ANXIETY QUESTIONNAIRES
3. WORRYING TOO MUCH ABOUT DIFFERENT THINGS: NEARLY EVERY DAY
6. BECOMING EASILY ANNOYED OR IRRITABLE: SEVERAL DAYS
GAD7 TOTAL SCORE: 17
5. BEING SO RESTLESS THAT IT IS HARD TO SIT STILL: MORE THAN HALF THE DAYS
IF YOU CHECKED OFF ANY PROBLEMS ON THIS QUESTIONNAIRE, HOW DIFFICULT HAVE THESE PROBLEMS MADE IT FOR YOU TO DO YOUR WORK, TAKE CARE OF THINGS AT HOME, OR GET ALONG WITH OTHER PEOPLE: EXTREMELY DIFFICULT
8. IF YOU CHECKED OFF ANY PROBLEMS, HOW DIFFICULT HAVE THESE MADE IT FOR YOU TO DO YOUR WORK, TAKE CARE OF THINGS AT HOME, OR GET ALONG WITH OTHER PEOPLE?: EXTREMELY DIFFICULT
7. FEELING AFRAID AS IF SOMETHING AWFUL MIGHT HAPPEN: NEARLY EVERY DAY
GAD7 TOTAL SCORE: 17
2. NOT BEING ABLE TO STOP OR CONTROL WORRYING: NEARLY EVERY DAY
1. FEELING NERVOUS, ANXIOUS, OR ON EDGE: MORE THAN HALF THE DAYS
4. TROUBLE RELAXING: NEARLY EVERY DAY
7. FEELING AFRAID AS IF SOMETHING AWFUL MIGHT HAPPEN: NEARLY EVERY DAY
GAD7 TOTAL SCORE: 17

## 2024-01-09 ASSESSMENT — ACTIVITIES OF DAILY LIVING (ADL)
ADLS_ACUITY_SCORE: 35

## 2024-01-09 ASSESSMENT — ASTHMA QUESTIONNAIRES
QUESTION_2 LAST FOUR WEEKS HOW OFTEN HAVE YOU HAD SHORTNESS OF BREATH: MORE THAN ONCE A DAY
QUESTION_3 LAST FOUR WEEKS HOW OFTEN DID YOUR ASTHMA SYMPTOMS (WHEEZING, COUGHING, SHORTNESS OF BREATH, CHEST TIGHTNESS OR PAIN) WAKE YOU UP AT NIGHT OR EARLIER THAN USUAL IN THE MORNING: FOUR OR MORE NIGHTS A WEEK
QUESTION_1 LAST FOUR WEEKS HOW MUCH OF THE TIME DID YOUR ASTHMA KEEP YOU FROM GETTING AS MUCH DONE AT WORK, SCHOOL OR AT HOME: ALL OF THE TIME
ACT_TOTALSCORE: 5
EMERGENCY_ROOM_LAST_YEAR_TOTAL: ONE
QUESTION_5 LAST FOUR WEEKS HOW WOULD YOU RATE YOUR ASTHMA CONTROL: NOT CONTROLLED AT ALL
QUESTION_4 LAST FOUR WEEKS HOW OFTEN HAVE YOU USED YOUR RESCUE INHALER OR NEBULIZER MEDICATION (SUCH AS ALBUTEROL): THREE OR MORE TIMES PER DAY
ACT_TOTALSCORE: 5

## 2024-01-09 ASSESSMENT — PATIENT HEALTH QUESTIONNAIRE - PHQ9
SUM OF ALL RESPONSES TO PHQ QUESTIONS 1-9: 13
SUM OF ALL RESPONSES TO PHQ QUESTIONS 1-9: 13
10. IF YOU CHECKED OFF ANY PROBLEMS, HOW DIFFICULT HAVE THESE PROBLEMS MADE IT FOR YOU TO DO YOUR WORK, TAKE CARE OF THINGS AT HOME, OR GET ALONG WITH OTHER PEOPLE: VERY DIFFICULT

## 2024-01-09 NOTE — ED PROVIDER NOTES
[facility] EMERGENCY DEPARTMENT ([facility])    1/09/24      ED PROVIDER NOTE   ED 1  History     Chief Complaint   Patient presents with    Shortness of Breath     Has been vomiting since this morning and since then has been SOB. Hx: Asthma, DM 2, cyclical vomiting    Chest Pain     HPI  Yun Coyle is a 48 year old female with history of type 2 diabetes, asthma, polysubstance abuse (marijuana, opioids, benzodiazepine), cyclic vomiting syndrome, prior cholecystectomy, hepatic steatosis who presents with nausea, vomiting and shortness of breath.  She was seen in [facility] clinic today by Dr. Gamaliel Granados reporting shortness of breath.  She was found to have low O2 sats.  She was given a neb in clinic with improvement.  She was also out of albuterol at home.  She was given prescription for albuterol inhaler, albuterol neb solution, Symbicort and a prednisone burst.  She did not get a COVID swab during this visit.  She now presents with nausea, vomiting.     Patient has presented previously with episodes of recurrent nausea and vomiting. I personally last saw her on 11/24/23 at which time she had a similar presentation of nausea and vomiting that was ultimately attributed to cyclic vomiting.     Patient denies fevers, chest pain.  Her shortness of breath is improved from earlier and no longer a major concern. Denies cough.     Past Medical History  Past Medical History:   Diagnosis Date    Acute pancreatitis 06/29/2017    Anxiety     Cauda equina syndrome (H) 03/29/2013    Cyst of right ovary 11/16/2017    Depressive disorder     Gastroesophageal reflux disease     Hypertension     Hyponatremia 10/01/2023    Low back pain 08/20/2012    Overview:   History of cauda equina and surgery.      Type 2 diabetes mellitus (H)     Uncomplicated asthma      Past Surgical History:   Procedure Laterality Date    BACK SURGERY      x2    CHOLECYSTECTOMY      ORTHOPEDIC SURGERY      SALPINGO  "OOPHORECTOMY,R/L/ELDA Right      albuterol (PROAIR HFA/PROVENTIL HFA/VENTOLIN HFA) 108 (90 Base) MCG/ACT inhaler  budesonide-formoterol (SYMBICORT) 160-4.5 MCG/ACT Inhaler  cloNIDine (CATAPRES) 0.1 MG tablet  insulin glargine (LANTUS PEN) 100 UNIT/ML pen  losartan (COZAAR) 25 MG tablet  metFORMIN (GLUCOPHAGE) 500 MG tablet  methadone (DOLOPHINE-INTENSOL) 10 MG/ML (HIGH CONC) solution  OLANZapine (ZYPREXA) 10 MG tablet  predniSONE (DELTASONE) 20 MG tablet  Alcohol Swabs PADS  blood glucose (NO BRAND SPECIFIED) lancets standard  blood glucose (NO BRAND SPECIFIED) test strip  blood glucose calibration (NO BRAND SPECIFIED) solution  blood glucose monitoring (NO BRAND SPECIFIED) meter device kit  Continuous Blood Gluc  (FREESTYLE MCKENZIE 2 READER) KESHIA  Continuous Blood Gluc Sensor (FREESTYLE MCKENZIE 2 SENSOR) MISC  glucagon 1 MG kit  glucose (BD GLUCOSE) 4 g chewable tablet  glucose 40 % (400 mg/mL) gel  Sharps Container MISC      No Known Allergies  Family History  Family History   Problem Relation Age of Onset    Breast Cancer Mother         in her 50s.    Diabetes Mother     Breast Cancer Maternal Grandmother         in her 40s.    Diabetes Maternal Grandmother      Social History   Social History     Tobacco Use    Smoking status: Never    Smokeless tobacco: Never   Vaping Use    Vaping Use: Never used   Substance Use Topics    Alcohol use: No    Drug use: Yes     Types: Marijuana     Comment: 4 days ago      Past medical history, past surgical history, medications, allergies, family history, and social history were reviewed with the patient. No additional pertinent items.      A complete review of systems was performed with pertinent positives and negatives noted in the HPI, and all other systems negative.    Physical Exam   BP: (!) 162/117  Pulse: (!) 126  Temp: 97.7  F (36.5  C)  Resp: 22  Height: 175.3 cm (5' 9\")  Weight: 90.7 kg (200 lb)  SpO2: 95 %  Physical Exam  Vitals and nursing note reviewed. "   Constitutional:       General: She is in acute distress.      Appearance: She is well-developed. She is ill-appearing and diaphoretic.   HENT:      Head: Normocephalic and atraumatic.      Mouth/Throat:      Pharynx: Oropharynx is clear.   Eyes:      Extraocular Movements: Extraocular movements intact.      Pupils: Pupils are equal, round, and reactive to light.   Cardiovascular:      Rate and Rhythm: Regular rhythm. Tachycardia present.   Pulmonary:      Effort: Pulmonary effort is normal. Tachypnea present. No accessory muscle usage or respiratory distress.      Breath sounds: Normal breath sounds. No stridor.   Abdominal:      Palpations: Abdomen is soft.      Tenderness: There is abdominal tenderness. There is no guarding or rebound.   Musculoskeletal:      Cervical back: Normal range of motion and neck supple.      Right lower leg: No edema.      Left lower leg: No edema.   Skin:     General: Skin is warm.   Neurological:      General: No focal deficit present.      Mental Status: She is alert and oriented to person, place, and time.   Psychiatric:         Mood and Affect: Mood normal.         Behavior: Behavior normal.           ED Course, Procedures, & Data      Procedures            EKG Interpretation:      Interpreted by Danny Centeno MD  Time reviewed: 16:08  Symptoms at time of EKG: nausea, possible anginal equivalent   Rhythm: normal sinus   Rate: tachycardic (rate 100)  Axis: leftward  Ectopy: none  Conduction: normal  ST Segments/ T Waves: No ST-T wave changes  Q Waves: none  Comparison to prior: essentially unchanged from 11/1/23 (possible mild left axis deviation today but may be waveform variability)    Clinical Impression: normal EKG     Mental Health Risk Assessment        PSS-3      Date and Time Over the past 2 weeks have you felt down, depressed, or hopeless? Over the past 2 weeks have you had thoughts of killing yourself? Have you ever attempted to kill yourself? When did this last  happen? User   01/09/24 1558 no no yes more than 6 months ago IT                Suicide assessment completed by mental health (D.E.C., LCSW, etc.)       Results for orders placed or performed during the hospital encounter of 01/09/24   XR Chest Port 1 View     Status: None    Narrative    EXAM: XR CHEST PORT 1 VIEW  LOCATION: [facility]  DATE: 1/9/2024    INDICATION: dyspnea  COMPARISON: 12/06/2023      Impression    IMPRESSION: Negative chest.   CT Abdomen Pelvis w Contrast     Status: None    Narrative    EXAM: CT ABDOMEN PELVIS W CONTRAST  LOCATION: [facility]  DATE: 1/10/2024    INDICATION: Abdominal pain, elevated WBC, elevated lactate, vomiting.  COMPARISON: CT abdomen and pelvis on 10/09/2023.  TECHNIQUE: CT scan of the abdomen and pelvis was performed after the injection of 97 mL Isovue 370 intravenously. Multiplanar reformats were obtained. Dose reduction techniques were used.    FINDINGS:   LOWER CHEST: Small patchy pulmonary opacity in the right middle lobe (series 5 image 5), new as compared to 10/09/2023 exam, indeterminate, could be infectious or atelectatic. Small hiatal hernia.    ABDOMEN/PELVIS:    HEPATOBILIARY: No suspicious focal hepatic lesion. The gallbladder is surgically absent.    PANCREAS: No main pancreatic ductal dilatation or definite solid pancreatic mass.    SPLEEN: No splenomegaly.    ADRENAL GLANDS: No adrenal nodules.    KIDNEYS/BLADDER: No radiodense kidney/ureteral stones or hydronephrosis in either kidney. Persistent fetal lobulation.    BOWEL: No abnormally dilated bowel loops. The appendix is visualized and appears normal.    PERITONEUM: No evidence of free fluid in the abdomen and pelvis. No free peritoneal or portal venous gas.    PELVIC ORGANS: Unremarkable.    VASCULATURE: Moderate atherosclerotic vascular calcification of the abdominal aorta and iliac vessels.    LYMPH NODES: No  significant abdominopelvic lymphadenopathy.    MUSCULOSKELETAL: Degenerative changes of the spine most prominent at L4-L5 disc height loss and endplate changes.      Impression    IMPRESSION:   1. No evidence of acute pathology in the abdomen and pelvis.  2. Small hiatal hernia.  3. Small patchy pulmonary opacity in the right middle lobe, could be infectious or atelectatic.   Acra Draw     Status: None    Narrative    The following orders were created for panel order Acra Draw.  Procedure                               Abnormality         Status                     ---------                               -----------         ------                     Extra Blue Top Tube[[id number]]                              Final result               Extra Red Top Tube[[id number]]                               Final result               Extra Green Top (Lithium...[[id number]]                                                 Extra Purple Top Tube[[id number]]                                                         Please view results for these tests on the individual orders.   Troponin T, High Sensitivity     Status: Normal   Result Value Ref Range    Troponin T, High Sensitivity 6 <=14 ng/L   Basic metabolic panel     Status: Abnormal   Result Value Ref Range    Sodium 139 135 - 145 mmol/L    Potassium 3.6 3.4 - 5.3 mmol/L    Chloride 97 (L) 98 - 107 mmol/L    Carbon Dioxide (CO2) 23 22 - 29 mmol/L    Anion Gap 19 (H) 7 - 15 mmol/L    Urea Nitrogen 15.4 6.0 - 20.0 mg/dL    Creatinine 0.83 0.51 - 0.95 mg/dL    GFR Estimate 88 >60 mL/min/1.73m2    Calcium 10.6 (H) 8.6 - 10.0 mg/dL    Glucose 217 (H) 70 - 99 mg/dL   Symptomatic Influenza A/B, RSV, & SARS-CoV2 PCR (COVID-19) Nasopharyngeal     Status: Normal    Specimen: Nasopharyngeal; Swab   Result Value Ref Range    Influenza A PCR Negative Negative    Influenza B PCR Negative Negative    RSV PCR Negative Negative    SARS CoV2 PCR Negative Negative    Narrative    Testing was  performed using the Xpert Xpress CoV2/Flu/RSV Assay on the [employer] GeneXpert Instrument. This test should be ordered for the detection of SARS-CoV-2, influenza, and RSV viruses in individuals who meet clinical and/or epidemiological criteria. Test performance is unknown in asymptomatic patients. This test is for in vitro diagnostic use under the FDA EUA for laboratories certified under CLIA to perform high or moderate complexity testing. This test has not been FDA cleared or approved. A negative result does not rule out the presence of PCR inhibitors in the specimen or target RNA in concentration below the limit of detection for the assay. If only one viral target is positive but coinfection with multiple targets is suspected, the sample should be re-tested with another FDA cleared, approved, or authorized test, if coinfection would change clinical management. This test was validated by the [facility]. These laboratories are certified under the Clinical Laboratory Improvement Amendments of 1988 (CLIA-88) as qualified to perform high complexity laboratory testing.   Lactic Acid STAT     Status: Abnormal   Result Value Ref Range    Lactic Acid 4.8 (HH) 0.7 - 2.0 mmol/L   Glucose by meter     Status: Abnormal   Result Value Ref Range    GLUCOSE BY METER POCT 229 (H) 70 - 99 mg/dL   Extra Blue Top Tube     Status: None   Result Value Ref Range    Hold Specimen JIC    Extra Red Top Tube     Status: None   Result Value Ref Range    Hold Specimen JIC    CBC with platelets and differential     Status: Abnormal   Result Value Ref Range    WBC Count 13.6 (H) 4.0 - 11.0 10e3/uL    RBC Count 5.09 3.80 - 5.20 10e6/uL    Hemoglobin 15.2 11.7 - 15.7 g/dL    Hematocrit 43.5 35.0 - 47.0 %    MCV 86 78 - 100 fL    MCH 29.9 26.5 - 33.0 pg    MCHC 34.9 31.5 - 36.5 g/dL    RDW 12.2 10.0 - 15.0 %    Platelet Count 282 150 - 450 10e3/uL    % Neutrophils 73 %    % Lymphocytes 20 %    % Monocytes 5 %    % Eosinophils 1 %     % Basophils 0 %    % Immature Granulocytes 1 %    NRBCs per 100 WBC 0 <1 /100    Absolute Neutrophils 9.9 (H) 1.6 - 8.3 10e3/uL    Absolute Lymphocytes 2.7 0.8 - 5.3 10e3/uL    Absolute Monocytes 0.7 0.0 - 1.3 10e3/uL    Absolute Eosinophils 0.1 0.0 - 0.7 10e3/uL    Absolute Basophils 0.1 0.0 - 0.2 10e3/uL    Absolute Immature Granulocytes 0.1 <=0.4 10e3/uL    Absolute NRBCs 0.0 10e3/uL   Ketone Beta-Hydroxybutyrate Quantitative     Status: Abnormal   Result Value Ref Range    Ketone (Beta-Hydroxybutyrate) Quantitative 0.50 (H) <=0.30 mmol/L   Blood gas venous     Status: None   Result Value Ref Range    pH Venous 7.41 7.32 - 7.43    pCO2 Venous 41 40 - 50 mm Hg    pO2 Venous 34 25 - 47 mm Hg    Bicarbonate Venous 26 21 - 28 mmol/L    Base Excess/Deficit 1.3 -7.7 - 1.9 mmol/L    FIO2 21    Magnesium     Status: Abnormal   Result Value Ref Range    Magnesium 1.4 (L) 1.7 - 2.3 mg/dL   Phosphorus     Status: Normal   Result Value Ref Range    Phosphorus 3.4 2.5 - 4.5 mg/dL   TSH with free T4 reflex     Status: Normal   Result Value Ref Range    TSH 0.84 0.30 - 4.20 uIU/mL   HCG qualitative pregnancy (blood)     Status: Normal   Result Value Ref Range    hCG Serum Qualitative Negative Negative   Hepatic panel     Status: Abnormal   Result Value Ref Range    Protein Total 8.9 (H) 6.4 - 8.3 g/dL    Albumin 4.3 3.5 - 5.2 g/dL    Bilirubin Total 0.3 <=1.2 mg/dL    Alkaline Phosphatase 125 40 - 150 U/L    AST 44 0 - 45 U/L    ALT 66 (H) 0 - 50 U/L    Bilirubin Direct <0.20 0.00 - 0.30 mg/dL   Lipase     Status: Normal   Result Value Ref Range    Lipase 27 13 - 60 U/L   Lactic acid whole blood     Status: Abnormal   Result Value Ref Range    Lactic Acid 4.8 (HH) 0.7 - 2.0 mmol/L   Lactic acid whole blood     Status: Abnormal   Result Value Ref Range    Lactic Acid 3.8 (H) 0.7 - 2.0 mmol/L   Procalcitonin     Status: Normal   Result Value Ref Range    Procalcitonin 0.03 <0.50 ng/mL   Hemoglobin A1c     Status: Abnormal    Result Value Ref Range    Hemoglobin A1C 8.8 (H) <5.7 %   Glucose by meter     Status: Abnormal   Result Value Ref Range    GLUCOSE BY METER POCT 193 (H) 70 - 99 mg/dL   Basic metabolic panel     Status: Abnormal   Result Value Ref Range    Sodium 140 135 - 145 mmol/L    Potassium 3.3 (L) 3.4 - 5.3 mmol/L    Chloride 99 98 - 107 mmol/L    Carbon Dioxide (CO2) 34 (H) 22 - 29 mmol/L    Anion Gap 7 7 - 15 mmol/L    Urea Nitrogen 9.9 6.0 - 20.0 mg/dL    Creatinine 0.71 0.51 - 0.95 mg/dL    GFR Estimate >90 >60 mL/min/1.73m2    Calcium 9.5 8.6 - 10.0 mg/dL    Glucose 187 (H) 70 - 99 mg/dL   Magnesium     Status: Normal   Result Value Ref Range    Magnesium 1.9 1.7 - 2.3 mg/dL   Phosphorus     Status: Normal   Result Value Ref Range    Phosphorus 3.3 2.5 - 4.5 mg/dL   CBC with platelets     Status: Normal   Result Value Ref Range    WBC Count 9.3 4.0 - 11.0 10e3/uL    RBC Count 4.42 3.80 - 5.20 10e6/uL    Hemoglobin 13.3 11.7 - 15.7 g/dL    Hematocrit 37.8 35.0 - 47.0 %    MCV 86 78 - 100 fL    MCH 30.1 26.5 - 33.0 pg    MCHC 35.2 31.5 - 36.5 g/dL    RDW 12.5 10.0 - 15.0 %    Platelet Count 208 150 - 450 10e3/uL   Lactic acid whole blood     Status: Normal   Result Value Ref Range    Lactic Acid 1.6 0.7 - 2.0 mmol/L   Ketone Beta-Hydroxybutyrate Quantitative     Status: Normal   Result Value Ref Range    Ketone (Beta-Hydroxybutyrate) Quantitative 0.20 <=0.30 mmol/L   Glucose by meter     Status: Abnormal   Result Value Ref Range    GLUCOSE BY METER POCT 148 (H) 70 - 99 mg/dL   HIV Antigen Antibody Combo Cascade     Status: Normal   Result Value Ref Range    HIV Antigen Antibody Combo Nonreactive Nonreactive   Hepatitis B surface antigen     Status: Normal   Result Value Ref Range    Hepatitis B Surface Antigen Nonreactive Nonreactive   Hepatitis B core antibody     Status: Normal   Result Value Ref Range    Hepatitis B Core Antibody Total Nonreactive Nonreactive   Hepatitis B Surface Antibody     Status: None   Result  Value Ref Range    Hepatitis B Surface Antibody Nonreactive     Hepatitis B Surface Antibody Instrument Value <3.50 <8.5 m[IU]/mL   INR     Status: Normal   Result Value Ref Range    INR 1.09 0.85 - 1.15   Partial thromboplastin time     Status: Normal   Result Value Ref Range    aPTT 29 22 - 38 Seconds   Glucose by meter     Status: Abnormal   Result Value Ref Range    GLUCOSE BY METER POCT 130 (H) 70 - 99 mg/dL   Extra Tube     Status: None    Narrative    The following orders were created for panel order Extra Tube.  Procedure                               Abnormality         Status                     ---------                               -----------         ------                     Extra Green Top (Lithium...[[id number]]                      Final result               Extra Purple Top Tube[[id number]]                            Final result                 Please view results for these tests on the individual orders.   Extra Green Top (Lithium Heparin) Tube     Status: None   Result Value Ref Range    Hold Specimen JIC    Extra Purple Top Tube     Status: None   Result Value Ref Range    Hold Specimen JIC    Glucose by meter     Status: Abnormal   Result Value Ref Range    GLUCOSE BY METER POCT 100 (H) 70 - 99 mg/dL   Potassium     Status: Normal   Result Value Ref Range    Potassium 3.4 3.4 - 5.3 mmol/L   Hepatitis A Antibody Total     Status: None   Result Value Ref Range    Hepatitis A Antibody Total Reactive     Narrative    HAV antibody testing interpretation chart:      HAV Total Antibody - NONREACTIVE  HAV IgM - NOT TESTED  Comments: No evidence of vaccination or previous infection; Susceptible to Hepatitis A infection     HAV Total Antibody - REACTIVE   HAV IgM - NOT TESTED  Comments:Consistent with recent or remote Hepatitis A infection or antibody response to HAV vaccination    HAV Total Antibody - REACTIVE  HAV IgM - NONREACTIVE  Comments:Consistent with resolved Hepatitis A infection or antibody  response to HAV vaccination    HAV Total Antibody - REACTIVE  HAV IgM - REACTIVE  Comments:Consistent with active Hepatitis A infection   Glucose by meter     Status: Abnormal   Result Value Ref Range    GLUCOSE BY METER POCT 119 (H) 70 - 99 mg/dL   EKG 12 lead     Status: None   Result Value Ref Range    Systolic Blood Pressure  mmHg    Diastolic Blood Pressure  mmHg    Ventricular Rate 100 BPM    Atrial Rate 100 BPM    ME Interval 136 ms    QRS Duration 88 ms     ms    QTc 472 ms    P Axis 75 degrees    R AXIS -34 degrees    T Axis 63 degrees    Interpretation ECG       Sinus rhythm  Left axis deviation  Abnormal ECG  Unconfirmed report - interpretation of this ECG is computer generated - see medical record for final interpretation  Confirmed by - EMERGENCY ROOM, PHYSICIAN ([id number]),  CAMDEN BRODY ([id number]) on 1/9/2024 4:47:47 PM     EKG 12-lead, complete     Status: None   Result Value Ref Range    Systolic Blood Pressure  mmHg    Diastolic Blood Pressure  mmHg    Ventricular Rate 76 BPM    Atrial Rate 76 BPM    ME Interval 150 ms    QRS Duration 94 ms     ms    QTc 366 ms    P Axis 60 degrees    R AXIS -13 degrees    T Axis -2 degrees    Interpretation ECG       Sinus rhythm  Nonspecific T wave abnormality  Abnormal ECG  Unconfirmed report - interpretation of this ECG is computer generated - see medical record for final interpretation    Confirmed by - EMERGENCY ROOM, PHYSICIAN ([id number]),  KANU REN ([id number]) on 1/10/2024 9:27:46 AM     CBC with Platelets & Differential     Status: Abnormal    Narrative    The following orders were created for panel order CBC with Platelets & Differential.  Procedure                               Abnormality         Status                     ---------                               -----------         ------                     CBC with platelets and d...[[id number]]  Abnormal            Final result                 Please view results for these tests on the  individual orders.     Medications   fluticasone-vilanterol (BREO ELLIPTA) 200-25 MCG/ACT inhaler 1 puff (1 puff Inhalation $Given 1/10/24 3257)   cloNIDine (CATAPRES) tablet 0.1 mg (0.1 mg Oral $Given 1/10/24 1945)   losartan (COZAAR) tablet 25 mg (25 mg Oral $Given 1/10/24 0749)   OLANZapine (zyPREXA) tablet 10 mg (10 mg Oral $Given 1/10/24 0412)   senna-docusate (SENOKOT-S/PERICOLACE) 8.6-50 MG per tablet 1 tablet (has no administration in time range)     Or   senna-docusate (SENOKOT-S/PERICOLACE) 8.6-50 MG per tablet 2 tablet (has no administration in time range)   ondansetron (ZOFRAN ODT) ODT tab 4 mg ( Oral See Alternative 1/10/24 0412)     Or   ondansetron (ZOFRAN) injection 4 mg (4 mg Intravenous $Given 1/10/24 0412)   glucose gel 15-30 g (has no administration in time range)     Or   dextrose 50 % injection 25-50 mL (has no administration in time range)     Or   glucagon injection 1 mg (has no administration in time range)   acetaminophen (TYLENOL) tablet 650 mg (has no administration in time range)     Or   acetaminophen (TYLENOL) Suppository 650 mg (has no administration in time range)   prochlorperazine (COMPAZINE) injection 10 mg (10 mg Intravenous $Given 1/10/24 0446)     Or   prochlorperazine (COMPAZINE) tablet 10 mg ( Oral See Alternative 1/10/24 0446)     Or   prochlorperazine (COMPAZINE) suppository 25 mg ( Rectal See Alternative 1/10/24 0446)   insulin glargine (LANTUS PEN) injection 18 Units (18 Units Subcutaneous $Given 1/10/24 9348)   insulin aspart (NovoLOG) injection (RAPID ACTING) ( Subcutaneous Not Given 1/10/24 1043)   insulin aspart (NovoLOG) injection (RAPID ACTING) ( Subcutaneous Not Given 1/10/24 0413)   loperamide (IMODIUM) capsule 2 mg (has no administration in time range)   methocarbamol (ROBAXIN) tablet 500 mg (has no administration in time range)   hydrOXYzine HCl (ATARAX) tablet 25 mg (has no administration in time range)   dicyclomine (BENTYL) capsule 20 mg (has no  administration in time range)   capsaicin (ZOSTRIX) 0.025 % cream ( Topical Not Given 1/10/24 1944)   alum & mag hydroxide-simethicone (MAALOX) suspension 15 mL (has no administration in time range)   methadone (DOLOPHINE-INTENSOL) 10 MG/ML (HIGH CONC) solution 130 mg (130 mg Oral $Given 1/10/24 1128)   potassium chloride 10 mEq in 100 mL sterile water infusion (10 mEq Intravenous $New Bag 1/10/24 1938)   ipratropium - albuterol 0.5 mg/2.5 mg/3 mL (DUONEB) neb solution 3 mL (3 mLs Nebulization $Given 1/9/24 1624)   sodium chloride 0.9% BOLUS 1,000 mL (0 mLs Intravenous Stopped 1/9/24 1928)   sodium chloride 0.9% BOLUS 1,000 mL (0 mLs Intravenous Stopped 1/9/24 1744)   OLANZapine (zyPREXA) injection 10 mg (10 mg Intramuscular $Given 1/9/24 1824)   magnesium sulfate 2 g in 50 mL sterile water intermittent infusion (0 g Intravenous Stopped 1/9/24 2318)   ondansetron (ZOFRAN) injection 4 mg (4 mg Intravenous $Given 1/9/24 2054)   lactated ringers BOLUS 1,000 mL (0 mLs Intravenous Stopped 1/9/24 2318)   alum & mag hydroxide-simethicone (MAALOX) suspension 15 mL (15 mLs Oral $Given 1/9/24 2318)   lidocaine (viscous) (XYLOCAINE) 2 % solution 10 mL (10 mLs Mouth/Throat $Given 1/9/24 2318)   OLANZapine (zyPREXA) injection 5 mg (5 mg Intramuscular $Given 1/10/24 0010)   lactated ringers BOLUS 1,000 mL (0 mLs Intravenous Stopped 1/10/24 0134)   thiamine (B-1) injection 100 mg (100 mg Intravenous $Given 1/10/24 0135)   iopamidol (ISOVUE-370) solution 100 mL (97 mLs Intravenous $Given 1/10/24 0117)   sodium chloride 0.9 % bag 500 mL for CT scan flush use (65 mLs Intravenous $Given 1/10/24 0117)   cloNIDine (CATAPRES) tablet 0.1 mg (0.1 mg Oral $Given 1/10/24 1781)   potassium chloride 10 mEq in 100 mL sterile water infusion (0 mEq Intravenous Stopped 1/10/24 9097)     Labs Ordered and Resulted from Time of ED Arrival to Time of ED Departure   BASIC METABOLIC PANEL - Abnormal       Result Value    Sodium 139      Potassium 3.6       Chloride 97 (*)     Carbon Dioxide (CO2) 23      Anion Gap 19 (*)     Urea Nitrogen 15.4      Creatinine 0.83      GFR Estimate 88      Calcium 10.6 (*)     Glucose 217 (*)    LACTIC ACID WHOLE BLOOD - Abnormal    Lactic Acid 4.8 (*)    GLUCOSE BY METER - Abnormal    GLUCOSE BY METER POCT 229 (*)    CBC WITH PLATELETS AND DIFFERENTIAL - Abnormal    WBC Count 13.6 (*)     RBC Count 5.09      Hemoglobin 15.2      Hematocrit 43.5      MCV 86      MCH 29.9      MCHC 34.9      RDW 12.2      Platelet Count 282      % Neutrophils 73      % Lymphocytes 20      % Monocytes 5      % Eosinophils 1      % Basophils 0      % Immature Granulocytes 1      NRBCs per 100 WBC 0      Absolute Neutrophils 9.9 (*)     Absolute Lymphocytes 2.7      Absolute Monocytes 0.7      Absolute Eosinophils 0.1      Absolute Basophils 0.1      Absolute Immature Granulocytes 0.1      Absolute NRBCs 0.0     KETONE BETA-HYDROXYBUTYRATE QUANTITATIVE, RAPID - Abnormal    Ketone (Beta-Hydroxybutyrate) Quantitative 0.50 (*)    MAGNESIUM - Abnormal    Magnesium 1.4 (*)    HEPATIC FUNCTION PANEL - Abnormal    Protein Total 8.9 (*)     Albumin 4.3      Bilirubin Total 0.3      Alkaline Phosphatase 125      AST 44      ALT 66 (*)     Bilirubin Direct <0.20     LACTIC ACID WHOLE BLOOD - Abnormal    Lactic Acid 4.8 (*)    LACTIC ACID WHOLE BLOOD - Abnormal    Lactic Acid 3.8 (*)    HEMOGLOBIN A1C - Abnormal    Hemoglobin A1C 8.8 (*)    GLUCOSE BY METER - Abnormal    GLUCOSE BY METER POCT 193 (*)    BASIC METABOLIC PANEL - Abnormal    Sodium 140      Potassium 3.3 (*)     Chloride 99      Carbon Dioxide (CO2) 34 (*)     Anion Gap 7      Urea Nitrogen 9.9      Creatinine 0.71      GFR Estimate >90      Calcium 9.5      Glucose 187 (*)    GLUCOSE BY METER - Abnormal    GLUCOSE BY METER POCT 148 (*)    GLUCOSE BY METER - Abnormal    GLUCOSE BY METER POCT 130 (*)    GLUCOSE BY METER - Abnormal    GLUCOSE BY METER POCT 100 (*)    GLUCOSE BY METER - Abnormal     GLUCOSE BY METER POCT 119 (*)    TROPONIN T, HIGH SENSITIVITY - Normal    Troponin T, High Sensitivity 6     INFLUENZA A/B, RSV, & SARS-COV2 PCR - Normal    Influenza A PCR Negative      Influenza B PCR Negative      RSV PCR Negative      SARS CoV2 PCR Negative     PHOSPHORUS - Normal    Phosphorus 3.4     TSH WITH FREE T4 REFLEX - Normal    TSH 0.84     HCG QUALITATIVE PREGNANCY - Normal    hCG Serum Qualitative Negative     LIPASE - Normal    Lipase 27     PROCALCITONIN - Normal    Procalcitonin 0.03     MAGNESIUM - Normal    Magnesium 1.9     PHOSPHORUS - Normal    Phosphorus 3.3     CBC WITH PLATELETS - Normal    WBC Count 9.3      RBC Count 4.42      Hemoglobin 13.3      Hematocrit 37.8      MCV 86      MCH 30.1      MCHC 35.2      RDW 12.5      Platelet Count 208     LACTIC ACID WHOLE BLOOD - Normal    Lactic Acid 1.6     KETONE BETA-HYDROXYBUTYRATE QUANTITATIVE, RAPID - Normal    Ketone (Beta-Hydroxybutyrate) Quantitative 0.20     HIV ANTIGEN ANTIBODY COMBO - Normal    HIV Antigen Antibody Combo Nonreactive     HEPATITIS B SURFACE ANTIGEN - Normal    Hepatitis B Surface Antigen Nonreactive     HEPATITIS B CORE ANTIBODY - Normal    Hepatitis B Core Antibody Total Nonreactive     INR - Normal    INR 1.09     PARTIAL THROMBOPLASTIN TIME - Normal    aPTT 29     POTASSIUM - Normal    Potassium 3.4     BLOOD GAS VENOUS    pH Venous 7.41      pCO2 Venous 41      pO2 Venous 34      Bicarbonate Venous 26      Base Excess/Deficit 1.3      FIO2 21     HEPATITIS B SURFACE ANTIBODY    Hepatitis B Surface Antibody Nonreactive      Hepatitis B Surface Antibody Instrument Value <3.50     HEPATITIS A ANTIBODY TOTAL    Hepatitis A Antibody Total Reactive     GLUCOSE MONITOR NURSING POCT   GLUCOSE MONITOR NURSING POCT   GLUCOSE MONITOR NURSING POCT   GLUCOSE MONITOR NURSING POCT   GLUCOSE MONITOR NURSING POCT   HEPATITIS C RNA, QUANTITATIVE BY PCR   TREPONEMA ABS W REFLEX TO RPR AND TITER     CT Abdomen Pelvis w Contrast    Final Result   IMPRESSION:    1. No evidence of acute pathology in the abdomen and pelvis.   2. Small hiatal hernia.   3. Small patchy pulmonary opacity in the right middle lobe, could be infectious or atelectatic.      XR Chest Port 1 View   Final Result   IMPRESSION: Negative chest.             Critical care was not performed.     Medical Decision Making  The patient's presentation was of moderate complexity (an undiagnosed new problem with uncertain diagnosis).    The patient's evaluation involved:  review of external note(s) from 1 sources (see separate area of note for details)  review of 3+ test result(s) ordered prior to this encounter (see separate area of note for details)  ordering and/or review of 3+ test(s) in this encounter (see separate area of note for details)    The patient's management necessitated high risk (a parenteral controlled substance).    Assessment & Plan      Yun Coyle is a 48 year old female with history of type 2 diabetes, asthma, polysubstance abuse (marijuana, opioids, benzodiazepine), cyclic vomiting syndrome, prior cholecystectomy, hepatic steatosis who presents with nausea, vomiting and shortness of breath.  Patient was previously evaluated today elsewhere for SOB and was given albuterol for wheezing. She is tachycardic, hypertensive and diaphoretic on arrival here today, and vomiting in the room repeatedly. Initial labs concerning for elevated lactate to 4.8 and elevated WBC to 13.6.  Patient given IV fluid bolus x 3 over the course of her stay here (1L NS, and 2L lactated ringers). She has some generalized tenderness but no rebound or guarding. I suspect lactate is related to vomiting.     Patient given zofran, GI cocktail, and zyprexa for symptoms which have worked in the past for her. She improved on multiple occasions and was motivated to go home however she then had subsequent episodes of vomiting each time she wanted to go. She will likely require additional time  and monitoring overnight and will plan to admit for observation overnight.     Because of patient's ongoing symptoms and persistently elevated lactate, CT abd/pelv ordered to evaluate for obstruction or other acute pathology contributing to symptoms today. This came back negative for acute pathology or obstruction. Patient admitted to medicine for further management.     Patient's magnesium was low here and repleted.     I have reviewed the nursing notes. I have reviewed the findings, diagnosis, plan and need for follow up with the patient.    Current Discharge Medication List          Final diagnoses:   Cyclical vomiting       Danny Centeno MD  [facility] EMERGENCY DEPARTMENT  1/9/2024     Danny Centeno MD  01/10/24 2002

## 2024-01-09 NOTE — PROGRESS NOTES
Assessment & Plan     Type 2 diabetes mellitus with hyperglycemia, with long-term current use of insulin (H)  Will check in a month   Switch back to metformin IR since XR upset stomach  Try to increase to 2000mg daily   - Adult Eye  Referral; Future  - Hemoglobin A1c; Future  - metFORMIN (GLUCOPHAGE) 500 MG tablet; Take 2 tablets (1,000 mg) by mouth 2 times daily (with meals)    Moderate persistent asthma with exacerbation  Low sats  Improved after neb  Out of albuterol at home   Will treat with symbicort all through winter, albuterol prn  Prednisone for acute exacerbation   - albuterol (PROAIR HFA/PROVENTIL HFA/VENTOLIN HFA) 108 (90 Base) MCG/ACT inhaler; Inhale 2 puffs into the lungs every 4 hours as needed for shortness of breath or wheezing  - albuterol (PROVENTIL) neb solution 2.5 mg  - budesonide-formoterol (SYMBICORT) 160-4.5 MCG/ACT Inhaler; Inhale 2 puffs into the lungs 2 times daily  - predniSONE (DELTASONE) 20 MG tablet; Take two tablets (= 40mg) each day for 5 (five) days    Gamaliel Granados DO  [facility]    Manju Malcolm is a 48 year old, presenting for the following health issues:  Diabetes (Follow up) and Asthma (Having a real hard time right now-using inhaler frequently even though empty (put in refill), feeling short of breath )      1/9/2024     2:07 PM   Additional Questions   Roomed by Crystal       History of Present Illness     Asthma:  She presents for follow up of asthma.  She has some cough, some wheezing, and some shortness of breath.  She is using a relief medication every 4 hours. She does not have a controller medication. Patient is aware of the following triggers: cold air, exercise or sports, smoke, strong odors and fumes and upper respiratory infections. The patient has had a visit to the Emergency Room, Urgent Care or Hospital due to asthma since the last clinic visit. She has been to the Emergency Room or Urgent Care 1 time.She has had a  Hospitalization 0 times.    Mental Health Follow-up:  Patient presents to follow-up on Depression & Anxiety.Patient's depression since last visit has been:  No change  The patient is not having other symptoms associated with depression.  Patient's anxiety since last visit has been:  No change  The patient is not having other symptoms associated with anxiety.  Any significant life events: No  Patient is feeling anxious or having panic attacks.  Patient has no concerns about alcohol or drug use.    Diabetes:   She presents for follow up of diabetes.   She is checking home blood glucose with a continuous glucose monitor.   She checks blood glucose before and after meals and at bedtime.  Blood glucose is sometimes over 200 and sometimes under 70. She is aware of hypoglycemia symptoms including shakiness, dizziness and other.    She has no concerns regarding her diabetes at this time.  She is having weight loss.  The patient has not had a diabetic eye exam in the last 12 months.          Hypertension: She presents for follow up of hypertension.  She does not check blood pressure  regularly outside of the clinic. Outside blood pressures have been over 140/90. She does not follow a low salt diet.     She eats 2-3 servings of fruits and vegetables daily.She consumes 2 sweetened beverage(s) daily.She exercises with enough effort to increase her heart rate 9 or less minutes per day.  She exercises with enough effort to increase her heart rate 3 or less days per week. She is missing 1 dose(s) of medications per week.  She is not taking prescribed medications regularly due to remembering to take.       Cough  Pulse ox to  87 in clinic  today   Did neb.  Moving air  okay afterward   Around  90 after nebulizer   No fever.     Has DM2   130-170 fasting   Up to  240     [id number]  daily     Metformin  xr was throwing it up.  Now on regular and not throwing up        Objective    BP (!) 156/102 (BP Location: Left arm, Patient  "Position: Sitting, Cuff Size: Adult Regular)   Pulse 94   Resp 24   Ht 1.753 m (5' 9\")   Wt 91.6 kg (202 lb)   SpO2 90%   BMI 29.83 kg/m    Body mass index is 29.83 kg/m .    Physical Exam  Constitutional:       General: She is not in acute distress.  Eyes:      General: No scleral icterus.  Cardiovascular:      Rate and Rhythm: Regular rhythm. Tachycardia present.      Heart sounds: Normal heart sounds.   Pulmonary:      Effort: No respiratory distress.      Breath sounds: Wheezing, rhonchi and rales present.      Comments: Equal lung sounds   Neurological:      Mental Status: She is alert.   Psychiatric:         Mood and Affect: Mood normal.         Behavior: Behavior normal.                        "

## 2024-01-09 NOTE — COMMUNITY RESOURCES LIST (ENGLISH)
01/09/2024   [facility]  N/A  For questions about this resource list or additional care needs, please contact your primary care clinic or care manager.  Phone: [phone]   Email: N/A   Address: [address] [place] [id number]   Hours: N/A        Transportation       Free or low-cost transportation  1  [facility] Distance: 1.18 miles      In-Person   [address] [place] [address]  Language: English  Hours: Mon - Wed 9:30 AM - 12:00 PM , Mon - Wed 1:00 PM - 2:00 PM Appt. Only  Fees: Free   Phone: (758) 401-2735 Email: info@[facility].org Website: http://www.[facility].org/     2  [facility] Distance: 1.73 miles      In-Person   [address] [place] [address]  Language: English  Hours: Mon - Fri 8:00 AM - 3:00 PM  Fees: Free   Phone: (336) 976-5069 Ext.14 Email: [email or web address] Website: [email or web address]     Transportation to medical appointments  3  [facility] - Non-Emergency Medical Transportation Distance: 7.09 miles      In-Person   [address] [place] [address]  Language: English  Hours: Mon - Fri 8:00 AM - 4:00 PM Appt. Only  Fees: Self Pay   Phone: (450) 760-9845 Website: [email or web address]-[email or web address]-emergency-[email or web address]     4  [employer] Distance: 7.46 miles      In-Person   [address] [address]  Language: English  Hours: Mon - Thu 6:00 AM - 6:00 PM , Fri 6:00 AM - 5:00 PM  Fees: Insurance, Self Pay   Phone: (194) 826-1030 Email: office@[employer] Website: https://www.[employer]/          Important Numbers & Websites       Emergency Services   911  City Services   311  Poison Control   (937) 174-8917  Suicide Prevention Lifeline   (679) 186-6045 (TALK)  Child Abuse Hotline   (463) 999-3876 (4-A-Child)  Sexual Assault Hotline   (950) 386-3532 (HOPE)  National Runaway Safeline   (966)  854-1549 (RUNAWAY)  [employer] Talkline   (511) 115-4114  Substance Abuse Referral   (595) 654-8489 (HELP)

## 2024-01-09 NOTE — ED TRIAGE NOTES
Has been vomiting since this morning and since then has been SOB. Hx: Asthma, DM 2, cyclical vomiting     Triage Assessment (Adult)       Row Name 01/09/24 2753          Triage Assessment    Airway WDL WDL        Respiratory WDL    Respiratory WDL X        Skin Circulation/Temperature WDL    Skin Circulation/Temperature WDL WDL        Cardiac WDL    Cardiac WDL WDL        Peripheral/Neurovascular WDL    Peripheral Neurovascular WDL WDL        Cognitive/Neuro/Behavioral WDL    Cognitive/Neuro/Behavioral WDL WDL

## 2024-01-09 NOTE — LETTER
My Asthma Action Plan    Name: Yun Coyle   YOB: 1975  Date: 1/9/2024   My doctor: Gamaliel Granados, DO   My clinic: [facility]        My Control Medicine: Budesonide + formoterol (Symbicort HFA) -  160/4.5 mcg 2 puffs twice a day   My Rescue Medicine: Albuterol (Proair/Ventolin/Proventil HFA) 2-4 puffs EVERY 4 HOURS as needed. Use a spacer if recommended by your provider.  My Oral Steroid Medicine: prednisone My Asthma Severity:   Moderate Persistent  Know your asthma triggers: smoke, upper respiratory infections, strong odors and fumes, exercise or sports, and cold air               GREEN ZONE   Good Control  I feel good  No cough or wheeze  Can work, sleep and play without asthma symptoms       Take your asthma control medicine every day.     If exercise triggers your asthma, take your rescue medication  15 minutes before exercise or sports, and  During exercise if you have asthma symptoms  Spacer to use with inhaler: If you have a spacer, make sure to use it with your inhaler             YELLOW ZONE Getting Worse  I have ANY of these:  I do not feel good  Cough or wheeze  Chest feels tight  Wake up at night   Keep taking your Green Zone medications  Start taking your rescue medicine:  every 20 minutes for up to 1 hour. Then every 4 hours for 24-48 hours.  If you stay in the Yellow Zone for more than 12-24 hours, contact your doctor.  If you do not return to the Green Zone in 12-24 hours or you get worse, start taking your oral steroid medicine if prescribed by your provider.           RED ZONE Medical Alert - Get Help  I have ANY of these:  I feel awful  Medicine is not helping  Breathing getting harder  Trouble walking or talking  Nose opens wide to breathe       Take your rescue medicine NOW  If your provider has prescribed an oral steroid medicine, start taking it NOW  Call your doctor NOW  If you are still in the Red Zone after 20 minutes and you have not reached  your doctor:  Take your rescue medicine again and  Call 911 or go to the emergency room right away    See your regular doctor within 2 weeks of an Emergency Room or Urgent Care visit for follow-up treatment.          Annual Reminders:  Meet with Asthma Educator,  Flu Shot in the Fall, consider Pneumonia Vaccination for patients with asthma (aged 19 and older).    Pharmacy:    [place], [place] - [address] [facility] [address] - [place], [place] - [address]    Electronically signed by Gamaliel Granados,    Date: 01/09/24                      Asthma Triggers  How To Control Things That Make Your Asthma Worse    Triggers are things that make your asthma worse.  Look at the list below to help you find your triggers and what you can do about them.  You can help prevent asthma flare-ups by staying away from your triggers.      Trigger                                                          What you can do   Cigarette Smoke  Tobacco smoke can make asthma worse. Do not allow smoking in your home, car or around you.  Be sure no one smokes at a child s day care or school.  If you smoke, ask your health care provider for ways to help you quit.  Ask family members to quit too.  Ask your health care provider for a referral to Quit Plan to help you quit smoking, or call 7-782-003-PLAN.     Colds, Flu, Bronchitis  These are common triggers of asthma. Wash your hands often.  Don t touch your eyes, nose or mouth.  Get a flu shot every year.     Dust Mites  These are tiny bugs that live in cloth or carpet. They are too small to see. Wash sheets and blankets in hot water every week.   Encase pillows and mattress in dust mite proof covers.  Avoid having carpet if you can. If you have carpet, vacuum weekly.   Use a dust mask and HEPA vacuum.   Pollen and Outdoor Mold  Some people are allergic to trees, grass, or weed pollen, or molds. Try to keep your windows closed.  Limit time out doors  when pollen count is high.   Ask you health care provider about taking medicine during allergy season.     Animal Dander  Some people are allergic to skin flakes, urine or saliva from pets with fur or feathers. Keep pets with fur or feathers out of your home.    If you can t keep the pet outdoors, then keep the pet out of your bedroom.  Keep the bedroom door closed.  Keep pets off cloth furniture and away from stuffed toys.     Mice, Rats, and Cockroaches   Some people are allergic to the waste from these pests.   Cover food and garbage.  Clean up spills and food crumbs.  Store grease in the refrigerator.   Keep food out of the bedroom.   Indoor Mold  This can be a trigger if your home has high moisture. Fix leaking faucets, pipes, or other sources of water.   Clean moldy surfaces.  Dehumidify basement if it is damp and smelly.   Smoke, Strong Odors, and Sprays  These can reduce air quality. Stay away from strong odors and sprays, such as perfume, powder, hair spray, paints, smoke incense, paint, cleaning products, candles and new carpet.   Exercise or Sports  Some people with asthma have this trigger. Be active!  Ask your doctor about taking medicine before sports or exercise to prevent symptoms.    Warm up for 5-10 minutes before and after sports or exercise.     Other Triggers of Asthma  Cold air:  Cover your nose and mouth with a scarf.  Sometimes laughing or crying can be a trigger.  Some medicines and food can trigger asthma.

## 2024-01-10 ENCOUNTER — APPOINTMENT (OUTPATIENT)
Dept: CT IMAGING | Facility: CLINIC | Age: 49
DRG: 394 | End: 2024-01-10
Attending: EMERGENCY MEDICINE
Payer: COMMERCIAL

## 2024-01-10 PROBLEM — B18.2 CHRONIC HEPATITIS C WITHOUT HEPATIC COMA (H): Status: ACTIVE | Noted: 2023-11-08

## 2024-01-10 LAB
ANION GAP SERPL CALCULATED.3IONS-SCNC: 7 MMOL/L (ref 7–15)
APTT PPP: 29 SECONDS (ref 22–38)
ATRIAL RATE - MUSE: 76 BPM
B-OH-BUTYR SERPL-SCNC: 0.2 MMOL/L
BUN SERPL-MCNC: 9.9 MG/DL (ref 6–20)
CALCIUM SERPL-MCNC: 9.5 MG/DL (ref 8.6–10)
CHLORIDE SERPL-SCNC: 99 MMOL/L (ref 98–107)
CREAT SERPL-MCNC: 0.71 MG/DL (ref 0.51–0.95)
DEPRECATED HCO3 PLAS-SCNC: 34 MMOL/L (ref 22–29)
DIASTOLIC BLOOD PRESSURE - MUSE: NORMAL MMHG
EGFRCR SERPLBLD CKD-EPI 2021: >90 ML/MIN/1.73M2
ERYTHROCYTE [DISTWIDTH] IN BLOOD BY AUTOMATED COUNT: 12.5 % (ref 10–15)
GLUCOSE BLDC GLUCOMTR-MCNC: 100 MG/DL (ref 70–99)
GLUCOSE BLDC GLUCOMTR-MCNC: 119 MG/DL (ref 70–99)
GLUCOSE BLDC GLUCOMTR-MCNC: 130 MG/DL (ref 70–99)
GLUCOSE BLDC GLUCOMTR-MCNC: 148 MG/DL (ref 70–99)
GLUCOSE BLDC GLUCOMTR-MCNC: 178 MG/DL (ref 70–99)
GLUCOSE BLDC GLUCOMTR-MCNC: 193 MG/DL (ref 70–99)
GLUCOSE SERPL-MCNC: 187 MG/DL (ref 70–99)
HAV AB SER QL IA: REACTIVE
HBA1C MFR BLD: 8.8 %
HBV CORE AB SERPL QL IA: NONREACTIVE
HBV SURFACE AB SERPL IA-ACNC: <3.5 M[IU]/ML
HBV SURFACE AB SERPL IA-ACNC: NONREACTIVE M[IU]/ML
HBV SURFACE AG SERPL QL IA: NONREACTIVE
HCT VFR BLD AUTO: 37.8 % (ref 35–47)
HGB BLD-MCNC: 13.3 G/DL (ref 11.7–15.7)
HIV 1+2 AB+HIV1 P24 AG SERPL QL IA: NONREACTIVE
HOLD SPECIMEN: NORMAL
HOLD SPECIMEN: NORMAL
INR PPP: 1.09 (ref 0.85–1.15)
INTERPRETATION ECG - MUSE: NORMAL
LACTATE SERPL-SCNC: 1.6 MMOL/L (ref 0.7–2)
LACTATE SERPL-SCNC: 3.8 MMOL/L (ref 0.7–2)
MAGNESIUM SERPL-MCNC: 1.9 MG/DL (ref 1.7–2.3)
MCH RBC QN AUTO: 30.1 PG (ref 26.5–33)
MCHC RBC AUTO-ENTMCNC: 35.2 G/DL (ref 31.5–36.5)
MCV RBC AUTO: 86 FL (ref 78–100)
P AXIS - MUSE: 60 DEGREES
PHOSPHATE SERPL-MCNC: 3.3 MG/DL (ref 2.5–4.5)
PLATELET # BLD AUTO: 208 10E3/UL (ref 150–450)
POTASSIUM SERPL-SCNC: 3.3 MMOL/L (ref 3.4–5.3)
POTASSIUM SERPL-SCNC: 3.4 MMOL/L (ref 3.4–5.3)
PR INTERVAL - MUSE: 150 MS
PROCALCITONIN SERPL IA-MCNC: 0.03 NG/ML
QRS DURATION - MUSE: 94 MS
QT - MUSE: 326 MS
QTC - MUSE: 366 MS
R AXIS - MUSE: -13 DEGREES
RBC # BLD AUTO: 4.42 10E6/UL (ref 3.8–5.2)
SODIUM SERPL-SCNC: 140 MMOL/L (ref 135–145)
SYSTOLIC BLOOD PRESSURE - MUSE: NORMAL MMHG
T AXIS - MUSE: -2 DEGREES
VENTRICULAR RATE- MUSE: 76 BPM
WBC # BLD AUTO: 9.3 10E3/UL (ref 4–11)

## 2024-01-10 PROCEDURE — 36415 COLL VENOUS BLD VENIPUNCTURE: CPT

## 2024-01-10 PROCEDURE — 250N000011 HC RX IP 250 OP 636: Performed by: EMERGENCY MEDICINE

## 2024-01-10 PROCEDURE — 87389 HIV-1 AG W/HIV-1&-2 AB AG IA: CPT

## 2024-01-10 PROCEDURE — 83605 ASSAY OF LACTIC ACID: CPT

## 2024-01-10 PROCEDURE — 84100 ASSAY OF PHOSPHORUS: CPT

## 2024-01-10 PROCEDURE — 250N000013 HC RX MED GY IP 250 OP 250 PS 637

## 2024-01-10 PROCEDURE — 36415 COLL VENOUS BLD VENIPUNCTURE: CPT | Performed by: STUDENT IN AN ORGANIZED HEALTH CARE EDUCATION/TRAINING PROGRAM

## 2024-01-10 PROCEDURE — 250N000011 HC RX IP 250 OP 636: Performed by: STUDENT IN AN ORGANIZED HEALTH CARE EDUCATION/TRAINING PROGRAM

## 2024-01-10 PROCEDURE — 85610 PROTHROMBIN TIME: CPT

## 2024-01-10 PROCEDURE — 86708 HEPATITIS A ANTIBODY: CPT

## 2024-01-10 PROCEDURE — 85027 COMPLETE CBC AUTOMATED: CPT

## 2024-01-10 PROCEDURE — 83605 ASSAY OF LACTIC ACID: CPT | Performed by: EMERGENCY MEDICINE

## 2024-01-10 PROCEDURE — 86780 TREPONEMA PALLIDUM: CPT

## 2024-01-10 PROCEDURE — 250N000013 HC RX MED GY IP 250 OP 250 PS 637: Performed by: EMERGENCY MEDICINE

## 2024-01-10 PROCEDURE — 96376 TX/PRO/DX INJ SAME DRUG ADON: CPT

## 2024-01-10 PROCEDURE — 36415 COLL VENOUS BLD VENIPUNCTURE: CPT | Performed by: EMERGENCY MEDICINE

## 2024-01-10 PROCEDURE — 250N000012 HC RX MED GY IP 250 OP 636 PS 637

## 2024-01-10 PROCEDURE — 83735 ASSAY OF MAGNESIUM: CPT

## 2024-01-10 PROCEDURE — 96366 THER/PROPH/DIAG IV INF ADDON: CPT

## 2024-01-10 PROCEDURE — 80048 BASIC METABOLIC PNL TOTAL CA: CPT

## 2024-01-10 PROCEDURE — 83036 HEMOGLOBIN GLYCOSYLATED A1C: CPT

## 2024-01-10 PROCEDURE — 96361 HYDRATE IV INFUSION ADD-ON: CPT

## 2024-01-10 PROCEDURE — 86704 HEP B CORE ANTIBODY TOTAL: CPT

## 2024-01-10 PROCEDURE — 96375 TX/PRO/DX INJ NEW DRUG ADDON: CPT

## 2024-01-10 PROCEDURE — 120N000002 HC R&B MED SURG/OB UMMC

## 2024-01-10 PROCEDURE — 87522 HEPATITIS C REVRS TRNSCRPJ: CPT

## 2024-01-10 PROCEDURE — 258N000003 HC RX IP 258 OP 636: Performed by: EMERGENCY MEDICINE

## 2024-01-10 PROCEDURE — 74177 CT ABD & PELVIS W/CONTRAST: CPT

## 2024-01-10 PROCEDURE — 84132 ASSAY OF SERUM POTASSIUM: CPT | Mod: 91 | Performed by: STUDENT IN AN ORGANIZED HEALTH CARE EDUCATION/TRAINING PROGRAM

## 2024-01-10 PROCEDURE — 85730 THROMBOPLASTIN TIME PARTIAL: CPT

## 2024-01-10 PROCEDURE — 96372 THER/PROPH/DIAG INJ SC/IM: CPT

## 2024-01-10 PROCEDURE — 82010 KETONE BODYS QUAN: CPT

## 2024-01-10 PROCEDURE — 250N000011 HC RX IP 250 OP 636

## 2024-01-10 PROCEDURE — 250N000009 HC RX 250: Performed by: EMERGENCY MEDICINE

## 2024-01-10 PROCEDURE — 99222 1ST HOSP IP/OBS MODERATE 55: CPT | Mod: AI

## 2024-01-10 PROCEDURE — 82962 GLUCOSE BLOOD TEST: CPT

## 2024-01-10 PROCEDURE — 96367 TX/PROPH/DG ADDL SEQ IV INF: CPT

## 2024-01-10 PROCEDURE — 87340 HEPATITIS B SURFACE AG IA: CPT

## 2024-01-10 PROCEDURE — G0378 HOSPITAL OBSERVATION PER HR: HCPCS

## 2024-01-10 PROCEDURE — 86706 HEP B SURFACE ANTIBODY: CPT

## 2024-01-10 RX ORDER — THIAMINE HYDROCHLORIDE 100 MG/ML
100 INJECTION, SOLUTION INTRAMUSCULAR; INTRAVENOUS ONCE
Status: COMPLETED | OUTPATIENT
Start: 2024-01-10 | End: 2024-01-10

## 2024-01-10 RX ORDER — HYDROXYZINE HYDROCHLORIDE 25 MG/1
25 TABLET, FILM COATED ORAL 4 TIMES DAILY PRN
Status: DISCONTINUED | OUTPATIENT
Start: 2024-01-10 | End: 2024-01-11 | Stop reason: HOSPADM

## 2024-01-10 RX ORDER — ACETAMINOPHEN 650 MG/1
650 SUPPOSITORY RECTAL EVERY 4 HOURS PRN
Status: DISCONTINUED | OUTPATIENT
Start: 2024-01-10 | End: 2024-01-11 | Stop reason: HOSPADM

## 2024-01-10 RX ORDER — METHOCARBAMOL 500 MG/1
500 TABLET, FILM COATED ORAL 3 TIMES DAILY PRN
Status: DISCONTINUED | OUTPATIENT
Start: 2024-01-10 | End: 2024-01-11 | Stop reason: HOSPADM

## 2024-01-10 RX ORDER — MAGNESIUM HYDROXIDE/ALUMINUM HYDROXICE/SIMETHICONE 120; 1200; 1200 MG/30ML; MG/30ML; MG/30ML
15 SUSPENSION ORAL 3 TIMES DAILY PRN
Status: DISCONTINUED | OUTPATIENT
Start: 2024-01-10 | End: 2024-01-11 | Stop reason: HOSPADM

## 2024-01-10 RX ORDER — PREDNISONE 20 MG/1
TABLET ORAL
COMMUNITY
Start: 2024-01-10

## 2024-01-10 RX ORDER — LOSARTAN POTASSIUM 25 MG/1
25 TABLET ORAL DAILY
Status: DISCONTINUED | OUTPATIENT
Start: 2024-01-10 | End: 2024-01-11 | Stop reason: HOSPADM

## 2024-01-10 RX ORDER — ONDANSETRON 2 MG/ML
4 INJECTION INTRAMUSCULAR; INTRAVENOUS EVERY 6 HOURS PRN
Status: DISCONTINUED | OUTPATIENT
Start: 2024-01-10 | End: 2024-01-11 | Stop reason: HOSPADM

## 2024-01-10 RX ORDER — ONDANSETRON 4 MG/1
4 TABLET, ORALLY DISINTEGRATING ORAL EVERY 6 HOURS PRN
Status: DISCONTINUED | OUTPATIENT
Start: 2024-01-10 | End: 2024-01-11 | Stop reason: HOSPADM

## 2024-01-10 RX ORDER — AMOXICILLIN 250 MG
1 CAPSULE ORAL 2 TIMES DAILY PRN
Status: DISCONTINUED | OUTPATIENT
Start: 2024-01-10 | End: 2024-01-11 | Stop reason: HOSPADM

## 2024-01-10 RX ORDER — ACETAMINOPHEN 325 MG/1
650 TABLET ORAL EVERY 4 HOURS PRN
Status: DISCONTINUED | OUTPATIENT
Start: 2024-01-10 | End: 2024-01-11 | Stop reason: HOSPADM

## 2024-01-10 RX ORDER — LOPERAMIDE HCL 2 MG
2 CAPSULE ORAL EVERY 4 HOURS PRN
Status: DISCONTINUED | OUTPATIENT
Start: 2024-01-10 | End: 2024-01-11 | Stop reason: HOSPADM

## 2024-01-10 RX ORDER — IOPAMIDOL 755 MG/ML
100 INJECTION, SOLUTION INTRAVASCULAR ONCE
Status: COMPLETED | OUTPATIENT
Start: 2024-01-10 | End: 2024-01-10

## 2024-01-10 RX ORDER — AMOXICILLIN 250 MG
2 CAPSULE ORAL 2 TIMES DAILY PRN
Status: DISCONTINUED | OUTPATIENT
Start: 2024-01-10 | End: 2024-01-11 | Stop reason: HOSPADM

## 2024-01-10 RX ORDER — FLUTICASONE FUROATE AND VILANTEROL 200; 25 UG/1; UG/1
1 POWDER RESPIRATORY (INHALATION) DAILY
Status: DISCONTINUED | OUTPATIENT
Start: 2024-01-10 | End: 2024-01-11 | Stop reason: HOSPADM

## 2024-01-10 RX ORDER — DEXTROSE MONOHYDRATE 25 G/50ML
25-50 INJECTION, SOLUTION INTRAVENOUS
Status: DISCONTINUED | OUTPATIENT
Start: 2024-01-10 | End: 2024-01-11 | Stop reason: HOSPADM

## 2024-01-10 RX ORDER — CLONIDINE HYDROCHLORIDE 0.1 MG/1
0.1 TABLET ORAL 2 TIMES DAILY
Status: DISCONTINUED | OUTPATIENT
Start: 2024-01-10 | End: 2024-01-11 | Stop reason: HOSPADM

## 2024-01-10 RX ORDER — METHADONE HYDROCHLORIDE 10 MG/ML
130 CONCENTRATE ORAL DAILY
Status: DISCONTINUED | OUTPATIENT
Start: 2024-01-10 | End: 2024-01-11 | Stop reason: HOSPADM

## 2024-01-10 RX ORDER — POTASSIUM CHLORIDE 7.45 MG/ML
10 INJECTION INTRAVENOUS
Status: COMPLETED | OUTPATIENT
Start: 2024-01-10 | End: 2024-01-10

## 2024-01-10 RX ORDER — CAPSAICIN 0.025 %
CREAM (GRAM) TOPICAL 3 TIMES DAILY
Status: DISCONTINUED | OUTPATIENT
Start: 2024-01-10 | End: 2024-01-11 | Stop reason: HOSPADM

## 2024-01-10 RX ORDER — PROCHLORPERAZINE MALEATE 10 MG
10 TABLET ORAL EVERY 6 HOURS PRN
Status: DISCONTINUED | OUTPATIENT
Start: 2024-01-10 | End: 2024-01-11 | Stop reason: HOSPADM

## 2024-01-10 RX ORDER — METHADONE HYDROCHLORIDE 10 MG/ML
130 CONCENTRATE ORAL DAILY
Status: DISCONTINUED | OUTPATIENT
Start: 2024-01-10 | End: 2024-01-10

## 2024-01-10 RX ORDER — METHADONE HYDROCHLORIDE 10 MG/ML
140 CONCENTRATE ORAL DAILY
COMMUNITY

## 2024-01-10 RX ORDER — CLONIDINE HYDROCHLORIDE 0.1 MG/1
0.1 TABLET ORAL ONCE
Status: COMPLETED | OUTPATIENT
Start: 2024-01-10 | End: 2024-01-10

## 2024-01-10 RX ORDER — NICOTINE POLACRILEX 4 MG
15-30 LOZENGE BUCCAL
Status: DISCONTINUED | OUTPATIENT
Start: 2024-01-10 | End: 2024-01-11 | Stop reason: HOSPADM

## 2024-01-10 RX ORDER — DICYCLOMINE HYDROCHLORIDE 10 MG/1
20 CAPSULE ORAL 3 TIMES DAILY PRN
Status: DISCONTINUED | OUTPATIENT
Start: 2024-01-10 | End: 2024-01-11 | Stop reason: HOSPADM

## 2024-01-10 RX ORDER — PROCHLORPERAZINE 25 MG
25 SUPPOSITORY, RECTAL RECTAL EVERY 12 HOURS PRN
Status: DISCONTINUED | OUTPATIENT
Start: 2024-01-10 | End: 2024-01-11 | Stop reason: HOSPADM

## 2024-01-10 RX ORDER — OLANZAPINE 5 MG/1
10 TABLET ORAL AT BEDTIME
Status: DISCONTINUED | OUTPATIENT
Start: 2024-01-10 | End: 2024-01-11 | Stop reason: HOSPADM

## 2024-01-10 RX ADMIN — POTASSIUM CHLORIDE 10 MEQ: 7.46 INJECTION, SOLUTION INTRAVENOUS at 11:28

## 2024-01-10 RX ADMIN — ONDANSETRON 4 MG: 2 INJECTION INTRAMUSCULAR; INTRAVENOUS at 04:12

## 2024-01-10 RX ADMIN — THIAMINE HYDROCHLORIDE 100 MG: 100 INJECTION, SOLUTION INTRAMUSCULAR; INTRAVENOUS at 01:35

## 2024-01-10 RX ADMIN — POTASSIUM CHLORIDE 10 MEQ: 7.46 INJECTION, SOLUTION INTRAVENOUS at 08:51

## 2024-01-10 RX ADMIN — POTASSIUM CHLORIDE 10 MEQ: 7.46 INJECTION, SOLUTION INTRAVENOUS at 17:26

## 2024-01-10 RX ADMIN — SODIUM CHLORIDE, POTASSIUM CHLORIDE, SODIUM LACTATE AND CALCIUM CHLORIDE 1000 ML: 600; 310; 30; 20 INJECTION, SOLUTION INTRAVENOUS at 00:08

## 2024-01-10 RX ADMIN — OLANZAPINE 5 MG: 10 INJECTION, POWDER, FOR SOLUTION INTRAMUSCULAR at 00:10

## 2024-01-10 RX ADMIN — CLONIDINE HYDROCHLORIDE 0.1 MG: 0.1 TABLET ORAL at 19:45

## 2024-01-10 RX ADMIN — POTASSIUM CHLORIDE 10 MEQ: 7.46 INJECTION, SOLUTION INTRAVENOUS at 20:56

## 2024-01-10 RX ADMIN — POTASSIUM CHLORIDE 10 MEQ: 7.46 INJECTION, SOLUTION INTRAVENOUS at 07:48

## 2024-01-10 RX ADMIN — OLANZAPINE 10 MG: 10 TABLET, FILM COATED ORAL at 04:12

## 2024-01-10 RX ADMIN — POTASSIUM CHLORIDE 10 MEQ: 7.46 INJECTION, SOLUTION INTRAVENOUS at 18:40

## 2024-01-10 RX ADMIN — PROCHLORPERAZINE EDISYLATE 10 MG: 5 INJECTION INTRAMUSCULAR; INTRAVENOUS at 04:46

## 2024-01-10 RX ADMIN — FLUTICASONE FUROATE AND VILANTEROL TRIFENATATE 1 PUFF: 200; 25 POWDER RESPIRATORY (INHALATION) at 07:53

## 2024-01-10 RX ADMIN — IOPAMIDOL 97 ML: 755 INJECTION, SOLUTION INTRAVENOUS at 01:17

## 2024-01-10 RX ADMIN — POTASSIUM CHLORIDE 10 MEQ: 7.46 INJECTION, SOLUTION INTRAVENOUS at 10:09

## 2024-01-10 RX ADMIN — CLONIDINE HYDROCHLORIDE 0.1 MG: 0.1 TABLET ORAL at 07:49

## 2024-01-10 RX ADMIN — SODIUM CHLORIDE 65 ML: 9 INJECTION, SOLUTION INTRAVENOUS at 01:17

## 2024-01-10 RX ADMIN — CLONIDINE HYDROCHLORIDE 0.1 MG: 0.1 TABLET ORAL at 02:20

## 2024-01-10 RX ADMIN — POTASSIUM CHLORIDE 10 MEQ: 7.46 INJECTION, SOLUTION INTRAVENOUS at 19:38

## 2024-01-10 RX ADMIN — INSULIN GLARGINE 18 UNITS: 100 INJECTION, SOLUTION SUBCUTANEOUS at 07:49

## 2024-01-10 RX ADMIN — OLANZAPINE 10 MG: 10 TABLET, FILM COATED ORAL at 22:37

## 2024-01-10 RX ADMIN — METHADONE HYDROCHLORIDE 130 MG: 10 CONCENTRATE ORAL at 11:28

## 2024-01-10 RX ADMIN — LOSARTAN POTASSIUM 25 MG: 25 TABLET, FILM COATED ORAL at 07:49

## 2024-01-10 ASSESSMENT — ACTIVITIES OF DAILY LIVING (ADL)
ADLS_ACUITY_SCORE: 35

## 2024-01-10 NOTE — PROGRESS NOTES
[facility]    Progress Note - [facility]       Date of Admission:  1/9/2024    Main plans for today  - Continue optimizing anti-emetics  - K replacement protocol, RN managed  - Mg and phos replacement protocol, RN managed   - Trial PO fluids      Assessment & Plan   Yun Coyle is a 48-year-old female PMH cyclic vomiting of unknown etiology, T2DM on insulin (A1C 8.8), asthma, HTN, polysubstance use (cannabis, fentanyl) on methadone, chronic Hepatitis C never treated, prior cholecystectomy, depression, and anxiety admitted for observation 1/9/2024 for intractable vomiting suspected secondary to cyclic vomiting.      # Intractable vomiting   # Epigastric abdominal pain   # Cyclic vomiting syndrome   Presents with 1-day history of intractable vomiting with associated epigastric abdominal pain. Afebrile, hypertensive, otherwise reassuring vitals. Exam with epigastric tenderness without rebound or guarding. Labs with mild leukocytosis. Normal lipase. CT A/P without acute intraabdominal pathology. Most consistent with flare of cyclic vomiting at this time. Unknown trigger for cyclic vomiting. Patient endorses cannabis use, but flares continued despite cessation for two years and do not improve with heat. No correlation to menses. Will treat with gut rest and symptomatic cares as below. Initially meeting sepsis criteria based on WBC, tachy, and LA however suspect all 2/2 emesis given resolved without abx. Less likely DKA without adequate hyperglycemic; ACS with negative troponin and reassuring EKG; pregnancy with negative bHCG; viral gastroenteritis without sick contacts; intracranial pathology with normal neurologic exam and no headache.   Fluids/diet:  - Trial PO today. S/P 3L IVF in ED (last ECHO with EF 55-60% 2/2022)   - Clear liquid diet, advance as tolerated  Antiemetics:  - 1.) Zofran IV/ODT 4 mg q6hr PRN   - 2.) Compazine PO/IV 10 mg  q6hr PRN   - PTA Zyprexa 10 mg QHS  Pain:  - Tylenol q6hr PRN   - Bentyl 20 mg TID PRN   - Capsaicin cream PRN   - GI cocktail TID PRN;  Monitoring:  - Daily CBC, CMP, Mg, Phos   - QTC 1/10/2024: 366     # Lactic acidosis, suspected type B   # Elevated anion gap   # Leukocytosis, mild, resolved  Lactate significantly elevated to 4.8 on admission, resolved to 1.6 after fluids. Elevated anion gap (19) and mild serum ketones (0.50) and normal serum pH (7.41), as well as mild leukocytosis (13.6), again all resolved w/ fluids and anti-emetics. Patient received treatment with multiple beta-agonists in clinic prior to testing. Suspect Type B lactic acidosis causing elevated anion gap. Ketosis in absence of acidosis is unconcerning, likely physiologic reaction to decreased PO. Low concern for infectious process at this time as patient afebrile and generally well appearing with reassuring labs and imaging, including negative procalcitonin.   - Consider antibiotics if clinical picture becomes more consistent with infection      # Asthma currently in mild exacerbation  # Dyspnea  # Wheeze  # Dry cough   Mild tachypnea, wheezing, SPO2 92% on RA. Patient reports 1-day history of SOB with dry cough in setting of running out of rescue inhaler at home, which she was using every 45 minutes without relief. Evaluated in clinic PTA and noted to be hypoxic (87%) with wheezing responsive to Duonebs. Candidate for daily low dose ICS-LABA controller, will prescribe at discharge after test claim. This exacerbation could be managed outpatient, not the reason for her hospitalization. Given improvement w/ nebs alone, defer steroid burst at this time.   - Symbicort 1 puff daily   - Duonebs q4hr PRN   - Test claim for: Preferred: Symbicort (budesonide/formoterol) 100/6 mcg OD or BID. Second option: Advair (salmeterol/fluticasone) 50/100mcg for discus, 25/125mcg for canister, BID      # Hypomagnesemia - resolved  # Prolonged QTc - resolved  Mg  1.4 on admission. EKG NSR with prolonged QTc 472. Suspect related to vomiting. Low concern for refeeding with advancement of diet given was eating normally one day prior to admission. Both resolved 1/10/2024  - RN Mg replacement, standard   - Daily CBC, CMP, Mg, Phos      # T2DM   A1c with interval improvement 12.2% 10/1/2023 -> 8.8 on 1/10/2024.  on admission.   - Lantus 18U AM (PTA 25U)   - MDSSI   - Hypoglycemia protocol in place   - Glucose checks TID AC; cannot use CGM for glucose checks per protocol   - Hold PTA Metformin 1000 mg BID due to admission LA     # HTN  # Asymptomatic severe HTN   Significantly hypertensive on admission. No symptoms concerning for hypertensive emergency. Troponin negative. EKG NSR without ST segment changes. Suspect asymptomatic severe HTN in setting of cyclic vomiting. Acute treatment of asymptomatic severe HTN associated with poorer outcomes.   - PTA Clonidine 0.1 mg BID   - PTA Losartan 25 mg daily   - Avoid acute treatment unless symptomatic or concerned for hypertensive emergency      # Polysubstance use   Endorses smoking cannabis last five days ago as well as snorting Fentanyl three days ago. Denies current opioid withdrawal symptoms. On methadone daily at [facility], verified dose of 130mg PO liquid daily, has missed some doses but last was 1/6. Restarted inpatient 1/10. Patient endorses that she has adequate sobriety support and she plans on returning to methadone clinic.   - COWS withdrawal protocol   - PTA Clonidine 0.1 mg BID   - Zofran IV/ODT 4 mg q6hr PRN  - Loperamide 2 mg q4hr PRN   - Bentyl 20 mg TID PRN   - Robaxin 50 mg TID PRN   - Hydroxyzine 25 mg QID PRN      # Hepatitis C, chronic untreated,  FIB-4 = 1.2   Hepatitis C antibody incidentally reactive during last admission 10/2023. Viral load at that time elevated to 174k with RUQ US showing steatosis, no evidence of cirrhosis. Current labs with mild AST elevation, nL ALT. No RUQ tenderness, no  "jaundice, nL bilirubin. Synthetic function intact (normal albumin, plt, INR). FIB-4 = 1.2 on 1/10/24 (AST 44, ALT 66, plt 208). Based on FIB-4 <1.45, plt >150k, and normal albumin no add'l liver imaging is needed.   - Repeat Hepatitis C RNA quant and genotype in process  - Add'l screenoing: HepB, HepA, HIV, syphilis   - Hepatology referral on discharge vs PCP management  - Repeat RUQ US if symptomatic or worsening labs        # Depression   # Anxiety   - PTA Zyprexa 10 mg QHS        Diet: Advance Diet as Tolerated: Clear Liquid Diet    DVT Prophylaxis: Pneumatic Compression Devices  Blanc Catheter: Not present  Fluids: PO  Lines: None     Cardiac Monitoring: None  Code Status: Full Code      Clinically Significant Risk Factors Present on Admission           # Hypercalcemia: Highest Ca = 10.6 mg/dL in last 2 days, will monitor as appropriate  # Hypomagnesemia: Lowest Mg = 1.4 mg/dL in last 2 days, will replace as needed  # Anion Gap Metabolic Acidosis: Highest Anion Gap = 19 mmol/L in last 2 days, will monitor and treat as appropriate      # Hypertension: Noted on problem list     # DMII: A1C = N/A within past 6 months    # Overweight: Estimated body mass index is 29.53 kg/m  as calculated from the following:    Height as of this encounter: 1.753 m (5' 9\").    Weight as of this encounter: 90.7 kg (200 lb).       # Asthma: noted on problem list        Disposition Plan      Expected Discharge Date: 01/10/2024                The patient's care was discussed with the Attending Physician, Dr. Guzman, Chief Resident/Fellow, and Patient.    [facility]  [facility]  Securely message with [email or web address] (more info)  Text page via [facility] Paging/Directory   See signed in provider for up to date coverage information  ______________________________________________________________________    Interval History   Reports nausea is much better, no emesis since " 4am    Physical Exam   Vital Signs: Temp: 98.1  F (36.7  C) Temp src: Oral BP: (!) 166/89 Pulse: 86   Resp: 18 SpO2: 95 % O2 Device: None (Room air)    Weight: 200 lbs 0 oz  Physical Exam  Constitutional:       General: She is not in acute distress.     Appearance: Normal appearance. She is not ill-appearing, toxic-appearing or diaphoretic.   Cardiovascular:      Rate and Rhythm: Normal rate and regular rhythm.   Pulmonary:      Effort: Pulmonary effort is normal.      Breath sounds: Wheezing present.   Abdominal:      Palpations: Abdomen is soft.      Tenderness: There is abdominal tenderness.      Comments: LLQ tenderness  No ascites   Skin:     Capillary Refill: Capillary refill takes less than 2 seconds.      Coloration: Skin is not jaundiced.   Neurological:      Mental Status: She is alert and oriented to person, place, and time.

## 2024-01-10 NOTE — CONSULTS
Consulted by Maria Del Rosario Dinh to run a test claim for Symbicort, Advair Diskus, & Advair HFA.    Patient has primary pharmacy benefits through [employer] and secondary pharmacy benefits through [employer]. Per insurance, the following are covered and preferred under the patient's plans:     Symbicort - $1.55  Fluticasone-Salmeterol diskus - $1.55  Advair diskus - $4.60  Advair HFA - $4.60     The following are not covered:  Fluticasone-Salmeterol HFA      Please feel free to contact me with any other test claims, prior authorizations, or insurance questions regarding outpatient medications.     Thanks!      Bekah Zepeda [facility]  Discharge Pharmacy Liaison  [facility]  Pronouns: She/Her/Hers    Securely message with [employer], [employer] Secure Chat, or [employer]  Phone: [phone]  Fax: [phone]  [email or web address]

## 2024-01-10 NOTE — MEDICATION SCRIBE - ADMISSION MEDICATION HISTORY
Medication Scribe Admission Medication History    Admission medication history is complete. The information provided in this note is only as accurate as the sources available at the time of the update.    Information Source(s): Patient and CareEverywhere/SureScripts via in-person    Pertinent Information: Pt last took insulin yesterday, Lantus 25 units. Last took meds yesterday. Has not started prednisone yet.     Changes made to PTA medication list:  Added: None  Deleted:   Reglan   Zyprexa duplicate   Changed: None    Medication Affordability:       Allergies reviewed with patient and updates made in EHR: yes    Medication History Completed By: Evelyn San 1/10/2024 8:53 AM    Current Facility-Administered Medications for the 1/9/24 encounter (Hospital Encounter)   Medication    [COMPLETED] albuterol (PROVENTIL) neb solution 2.5 mg     PTA Med List   Medication Sig Last Dose    albuterol (PROAIR HFA/PROVENTIL HFA/VENTOLIN HFA) 108 (90 Base) MCG/ACT inhaler Inhale 2 puffs into the lungs every 4 hours as needed for shortness of breath or wheezing 1/9/2024    budesonide-formoterol (SYMBICORT) 160-4.5 MCG/ACT Inhaler Inhale 2 puffs into the lungs 2 times daily 1/9/2024    cloNIDine (CATAPRES) 0.1 MG tablet Take 1 tablet (0.1 mg) by mouth 2 times daily 1/9/2024    insulin glargine (LANTUS PEN) 100 UNIT/ML pen Inject 25 Units Subcutaneous every morning (before breakfast) for 30 days 1/9/2024    losartan (COZAAR) 25 MG tablet Take 1 tablet (25 mg) by mouth daily 1/9/2024    metFORMIN (GLUCOPHAGE) 500 MG tablet Take 2 tablets (1,000 mg) by mouth 2 times daily (with meals) 1/9/2024    OLANZapine (ZYPREXA) 10 MG tablet Take 1 tablet (10 mg) by mouth at bedtime 1/9/2024

## 2024-01-10 NOTE — H&P
[facility]    History and Physical - [facility]       Date of Admission:  1/9/2024    Assessment & Plan      Yun Coyle is a 48-year-old female PMH cyclic vomiting of unknown etiology, T2DM, asthma, HTN, polysubstance use (marijuana, fentanyl), chronic Hepatitis C, prior cholecystectomy, depression, and anxiety admitted for observation 1/9/2024 for intractable vomiting suspected secondary to cyclic vomiting.     # Intractable vomiting   # Epigastric abdominal pain   # Cyclic vomiting syndrome   # Marijuana use   Presents with 1-day history of intractable vomiting with associated epigastric abdominal pain. Afebrile, hypertensive, otherwise reassuring vitals. Exam with epigastric tenderness without rebound or guarding. Labs with mild leukocytosis. Normal lipase. CT A/P without acute intraabdominal pathology. Most consistent with flare of cyclic vomiting at this time. Unknown trigger for cyclic vomiting. Patient endorses marijuana use, but flares continued despite cessation for two years and do not improve with heat. No correlation to menses. Will treat with gut rest and symptomatic cares as below. Less concern for infectious etiology without fever, benign abdominal exam, and reassuring imaging- suspect lactic acidosis related to beta agonist use as below. Less likely DKA without adequate hyperglycemic; ACS with negative troponin and reassuring EKG; pregnancy with negative bHCG; viral gastroenteritis without sick contacts; intracranial pathology with normal neurologic exam and no headache.   Fluids/diet:  - S/P 3L IVF in ED (last ECHO with EF 55-60% 2/2022)   - PO fluids currently  - Clear liquid diet, advance as tolerated  Antiemetics:  - 1.) Zofran IV/ODT 4 mg q6hr PRN   - 2.) Compazine PO/IV 10 mg q6hr PRN   - Patient reports Zyprexa helpful in the past, but has not been effective in ED. Will trial alternate agents and adjust regimen as  necessary.   - PTA Zyprexa 10 mg QHS  Pain:  - Tylenol q6hr PRN   - Bentyl 20 mg TID PRN   - Capsaicin cream PRN   - GI cocktail TID PRN; patient reports helpful in past     Monitoring:  - Daily CBC, CMP, Mg, Phos   - Repeat EKG for QTc in AM given several QT prolonging agents      # Lactic acidosis, suspected type B   # Elevated anion gap   # Leukocytosis, mild   # Recent beta-agonist use   Lactate significantly elevated to 4.8 on admission, improved to 1.6 on recheck. Elevated anion gap (19) and mild serum ketones (0.50) and normal serum pH (7.41), as well as mild leukocytosis (13.6). Patient received treatment with multiple beta-agonists in clinic prior to testing. Suspect Type B lactic acidosis causing elevated anion gap. Ketones may be related to starvation ketosis given inability to tolerate PO. Low concern for infectious process at this time as patient afebrile and generally well appearing with reassuring labs and imaging, including negative procalcitonin. Less likely DKA/HHS without adequate hyperglycemia or uremia with normal creatinine. Denies alcohol and salicylate ingestion.   - S/P 3L IVF in ED; PO fluids currently    - Trend lactate and serum ketones with AM labs   - Consider antibiotics if clinical picture becomes more consistent with infection     # Asthma without current exacerbation   # Shortness of breath, resolved   # Dry cough   Patient reports 1-day history of SOB with associated dry cough in setting of running out of rescue inhaler at home. No other URI symptoms. Evaluated in clinic PTA and noted to be hypoxic (87%) with wheezing responsive to Duonebs. Currently saturating well on room air. Lungs CTAB. Labs with mild leukocytosis, but negative procalcitonin. CXR negative, but CT A/P notes opacity vs. atelectasis in RML. Patient initially suspected to be having asthma exacerbation in clinic- but would not expect such rapid onset and turnaround with true exacerbation. Suspect symptoms secondary  to not having home inhaler. Low concern for PNA with negative procalcitonin and acuity of symptoms- imaging findings may be atelectasis or aspiration pneumonitis in setting of emesis; COVID/flu/RSV with negative viral testing; CHF without swelling and normal EF 2022; ACS with negative troponin and reassuring EKG.   - Continue Symbicort 1 puff daily   - Duonebs q4hr PRN   - Defer steroid burst at this time; consider if persistent wheezing, SOB, or hypoxia     # Hypomagnesemia   # Prolonged QTc   Mg 1.4 on admission. EKG NSR with prolonged QTc 472. Suspect related to vomiting. Low concern for refeeding with advancement of diet given was eating normally one day prior to admission.   - RN Mg replacement, standard   - Daily CBC, CMP, Mg, Phos   - Repeat EKG for QTc in AM given several QT prolonging agents     # T2DM   Last A1c 12.2% 10/1/2023. Repeat pending. Hyperglycemic to 217 on admission. Elevated anion gap (19) and mild serum ketones (0.50) with normal serum pH (7.41). Degree of hyperglycemic does not meet criteria for DKA/HHS. Suspect vomiting due to cyclic vomiting and anion gap due to lactic acidosis as discussed above.   - Repeat A1c pending   - Lantus 18U AM (PTA 25U)   - MDSSI   - Hypoglycemia protocol in place   - Glucose checks TID AC; cannot use CGM for glucose checks per protocol   - Hold PTA Metformin 1000 mg BID     # HTN  # Asymptomatic severe HTN   Significantly hypertensive on admission. No symptoms concerning for hypertensive emergency. Troponin negative. EKG NSR without ST segment changes. Suspect asymptomatic severe HTN in setting of cyclic vomiting. Acute treatment of asymptomatic severe HTN associated with poorer outcomes.   - PTA Clonidine 0.1 mg BID   - PTA Losartan 25 mg daily   - Avoid acute treatment unless symptomatic or concerned for hypertensive emergency     # Polysubstance use   Endorses smoking marijuana last five days ago as well as snorting Fentanyl three days ago. Denies current  "opioid withdrawal symptoms. Per chart review, patient previously on Suboxone in 2011- unclear reasons for discontinuation.   - COWS withdrawal protocol   - PTA Clonidine 0.1 mg BID   - Zofran IV/ODT 4 mg q6hr PRN  - Loperamide 2 mg q4hr PRN   - Bentyl 20 mg TID PRN   - Robaxin 50 mg TID PRN   - Hydroxyzine 25 mg QID PRN     # Hepatitis C, chronic    Hepatitis C antibody incidentally reactive during last admission 10/2023. Viral load at that time elevated to 174k with RUQ US showing steatosis. Current labs with mild AST elevation, otherwise normal liver enzymes. No RUQ tenderness to palpation. Does not appear to have established with Hepatology as outpatient- but would meet criteria for treatment if viral load remains elevated.   - Repeat Hepatitis C RNA level   - Offer patient additional screening if interested (Hep A/B, HIV)  - Consider Hepatology referral on discharge   - Consider repeat RUQ US if symptomatic or worsening labs     # Depression   # Anxiety   - PTA Zyprexa 10 mg QHS          Diet: Advance Diet as Tolerated: Clear Liquid Diet  DVT Prophylaxis: Pneumatic Compression Devices  Blanc Catheter: Not present  Fluids: PO  Lines: None     Cardiac Monitoring: None  Code Status: Full Code; confirmed with patient on admission.     Clinically Significant Risk Factors Present on Admission           # Hypercalcemia: Highest Ca = 10.6 mg/dL in last 2 days, will monitor as appropriate  # Hypomagnesemia: Lowest Mg = 1.4 mg/dL in last 2 days, will replace as needed  # Anion Gap Metabolic Acidosis: Highest Anion Gap = 19 mmol/L in last 2 days, will monitor and treat as appropriate      # Hypertension: Noted on problem list     # DMII: A1C = N/A within past 6 months    # Overweight: Estimated body mass index is 29.53 kg/m  as calculated from the following:    Height as of this encounter: 1.753 m (5' 9\").    Weight as of this encounter: 90.7 kg (200 lb).       # Asthma: noted on problem list        Disposition Plan    "   Expected Discharge Date: 01/10/2024                The patient's care was discussed with the Attending Physician, Dr. Hinson .      Yun Garcia MD  [facility]  [facility]  Securely message with Vocera (more info)  Text page via [facility] Paging/Directory   See signed in provider for up to date coverage information  ______________________________________________________________________    Chief Complaint   Vomiting     History is obtained from the patient and chart review     History of Present Illness   Yun Coyle is a 48-year-old female PMH cyclic vomiting of unknown etiology, T2DM, asthma, HTN, polysubstance use (marijuana, fentanyl), prior cholecystectomy, chronic Hepatitis C, depression, and anxiety admitted on 1/9/2024 for intractable vomiting suspected secondary to cyclic vomiting.     Regarding her vomiting, the patient awoke this morning with vomiting that has progressively worsened throughout the day. She is unable to count the number of episodes of emesis. She has not been able to keep down food or fluids. The patient is unable to identify triggers for her vomiting. Her current symptoms feel similar to previous flares of her cyclic vomiting. She usually has flares 3-4x monthly. The patient generally manages her symptoms at home or receives Zyprexa in the ED. However, her current flare persisted despite typical measures- prompting admission to the hospital. The patient endorses smoking marijuana, last 4-5 days ago. She does not feel that her vomiting is related to cannabis hyperemesis as she continued to have flares despite being abstinent for two years. She does not feel relief with hot showers. Her vomiting episodes correlated with her menstrual cycle. No recent travel or sick contacts. No new foods. After onset of vomiting this morning, the patient developed epigastric abdominal pain. Her pain is currently constant, aching,  7/10 in severity, and worsens after emesis. The patient denies fevers, hematemesis, and polyuria. Her blood sugars have been fairly well-controlled at home with average CGM reading 156. She denies tobacco and alcohol use. She last snorted Fentanyl three days ago. She denies frequent NSAID or salicylate use.     Regarding her shortness of breath, the patient reports noticing shortness of breath with associated dry cough this morning. She was evaluated in clinic and noted to be hypoxic to 87% on room air. Exam with remarkable for wheezing that improved with nebulizer treatment. The patient was suspected to have asthma exacerbation. She was prescribed a controller inhaler and steroid burst. She has not been able to start those medications as she presented to the ED shortly afterwards for vomiting. Currently, the patient reports that her breathing feels well controlled. Her shortness of breath this morning was consistent with previous asthma flares. She generally has flares in the winter. She has been out of her rescue inhaler at home. The patient reports that she was feeling well prior to today. She denies fever, sore throat, chest pain, productive cough, urinary symptoms, vaginal discharge, constipation, and bloody stools. Last BM was today and normal. She has been adherent with antihypertensive medications at home. She does not typically check her blood pressures at home.     ED Course:     Vitals: Afebrile, hypertensive (170-180's/110's), tachycardic (126 --> 93), tachypneic (22 --> 16), normal O2 on room air.     Labs:   CBC: WBC 13.6 (neutrophil predominance), HGB 15.2,    BMP: Cr 0.83 (baseline 0.7), anion gap 19 (H), glucose 217  M.4 (L)  Phos: 3.4 (WNL)  LFT: ALT 66 (H)  Lipase: 27 (WNL)  Lactic acid: 4.8 --> 3.8 (H) --> 1.6   Serum ketones: 0.50   VBG: pH 7.41/CO2 41/bicarbonate 26  Procalcitonin: 0.03    Troponin: 6 (WNL)  TSH: 0.84 (WNL)    UA: Pending collection   bHCG: Negative      COVID/flu/RSV: Negative      Imaging:    CXR: Negative     CT A/P (with contrast):   1. No evidence of acute pathology in the abdomen and pelvis.  2. Small hiatal hernia.  3. Small patchy pulmonary opacity in the right middle lobe, could be infectious or atelectatic.    EKG: NSR, QTc 472, no ST segment changes, large T waves V2-V3, similar to 11/2023     Interventions:  - Albuterol nebulizer 1420 (clinic)   - Duoneb 1624   - NS 1L   - LR 1L x2  - GI cocktail 2318  - Zyprexa x2   - Zofran   - Magnesium 2 g    - Clonidine 0220   - Thiamine       Past Medical History    Past Medical History:   Diagnosis Date    Acute pancreatitis 06/29/2017    Anxiety     Cauda equina syndrome (H) 03/29/2013    Cyst of right ovary 11/16/2017    Depressive disorder     Gastroesophageal reflux disease     Hypertension     Hyponatremia 10/01/2023    Low back pain 08/20/2012    Overview:   History of cauda equina and surgery.      Type 2 diabetes mellitus (H)     Uncomplicated asthma        Past Surgical History   Past Surgical History:   Procedure Laterality Date    BACK SURGERY      x2    CHOLECYSTECTOMY      ORTHOPEDIC SURGERY      SALPINGO OOPHORECTOMY,R/L/ELDA Right        Prior to Admission Medications   Prior to Admission Medications   Prescriptions Last Dose Informant Patient Reported? Taking?   Alcohol Swabs PADS   No No   Sig: Use to swab the area of the injection or marla as directed Per insurance coverage   Continuous Blood Gluc  (FREESTYLE MCKENZIE 2 READER) KESHIA   No No   Sig: Use to read blood sugars as per 's instructions.   Continuous Blood Gluc Sensor (FREESTYLE MCKENZIE 2 SENSOR) MISC   No No   Sig: Change every 14 days.   OLANZapine (ZYPREXA) 10 MG tablet   No No   Sig: Take 1 tablet (10 mg) by mouth at bedtime   OLANZapine zydis (ZYPREXA) 10 MG ODT   No No   Sig: Take 1 tablet (10 mg) by mouth daily as needed (nausea)   Sharps Container MISC   No No   Sig: Use as directed to dispose of needles, lancets  and other sharps   albuterol (PROAIR HFA/PROVENTIL HFA/VENTOLIN HFA) 108 (90 Base) MCG/ACT inhaler   No No   Sig: Inhale 2 puffs into the lungs every 4 hours as needed for shortness of breath or wheezing   blood glucose (NO BRAND SPECIFIED) lancets standard   No No   Sig: To use to test glucose level in the blood Use to test blood sugar once daily as directed. To accompany glucose monitor brands per insurance coverage.   blood glucose (NO BRAND SPECIFIED) test strip   No No   Sig: To use to test glucose level in the blood Use to test blood sugar Once daily as directed. To accompany glucose monitor brands per insurance coverage.   blood glucose calibration (NO BRAND SPECIFIED) solution   No No   Sig: Used to calibrate the blood glucose monitor as needed and as directed.  To accompany  blood glucose brands per insurance coverage   blood glucose monitoring (NO BRAND SPECIFIED) meter device kit   No No   Sig: Use as directed Per insurance coverage   budesonide-formoterol (SYMBICORT) 160-4.5 MCG/ACT Inhaler   No No   Sig: Inhale 2 puffs into the lungs 2 times daily   cloNIDine (CATAPRES) 0.1 MG tablet   No No   Sig: Take 1 tablet (0.1 mg) by mouth 2 times daily   glucagon 1 MG kit   No No   Sig: Inject 1 mg Subcutaneous once as needed for low blood sugar (Use as directed by provider)   glucose (BD GLUCOSE) 4 g chewable tablet   No No   Sig: Take 4 tablets by mouth every 15 minutes as needed for low blood sugar   glucose 40 % (400 mg/mL) gel   No No   Sig: 15 g every 15 minutes by mouth as needed for low blood sugar.  Oral gel is preferable for conscious and able to swallow patient.   insulin glargine (LANTUS PEN) 100 UNIT/ML pen   No No   Sig: Inject 25 Units Subcutaneous every morning (before breakfast) for 30 days   losartan (COZAAR) 25 MG tablet   No No   Sig: Take 1 tablet (25 mg) by mouth daily   metFORMIN (GLUCOPHAGE) 500 MG tablet   No No   Sig: Take 2 tablets (1,000 mg) by mouth 2 times daily (with meals)    metoclopramide (REGLAN) 5 MG tablet   No No   Sig: Take 1 tablet (5 mg) by mouth 4 times daily as needed (nausea)   predniSONE (DELTASONE) 20 MG tablet   No No   Sig: Take two tablets (= 40mg) each day for 5 (five) days      Facility-Administered Medications Last Administration Doses Remaining   albuterol (PROVENTIL) neb solution 2.5 mg 1/9/2024  2:20 PM 0       2       Physical Exam   Vital Signs: Temp: 97.7  F (36.5  C) Temp src: Oral BP: (!) 177/125 Pulse: 93   Resp: 16 SpO2: 97 % O2 Device: None (Room air)    Weight: 200 lbs 0 oz    Constitutional: Awake, alert, cooperative, and appears stated age. Intermittent episodes of dry heaving during evaluation. Able to move around on bed without difficulty.   Eyes: Lids and lashes normal, pupils equal, round and reactive to light, extra ocular muscles intact, sclera clear, conjunctiva normal  ENT: Normocephalic, without obvious abnormality, atraumatic, external ears without lesions, oral pharynx with moist mucous membranes, tonsils without erythema or exudates, gums normal and good dentition.  Respiratory: No increased work of breathing, good air exchange, clear to auscultation bilaterally, no crackles or wheezing  Cardiovascular: Normal apical impulse, regular rate and rhythm, normal S1 and S2, no S3 or S4, and no murmur noted. No LE edema   GI: Normal bowel sounds, soft, non-distended, epigastric tenderness to palpation without rebound or guarding, no masses palpated, no hepatosplenomegally  Skin: No bruising or bleeding and normal skin color, texture, turgor  Musculoskeletal: There is no redness, warmth, or swelling of the joints.    Neurologic: Awake, alert, oriented to name, place and time.  Cranial nerves II-XII are grossly intact.      Medical Decision Making   Please see A&P for additional details of medical decision making.      Data   ------------------------- PAST 24 HR DATA REVIEWED -----------------------------------------------    I have personally  reviewed the following data over the past 24 hrs:    13.6 (H)  \   15.2   / 282     139 97 (L) 15.4 /  217 (H)   3.6 23 0.83 \     ALT: 66 (H) AST: 44 AP: 125 TBILI: 0.3   ALB: 4.3 TOT PROTEIN: 8.9 (H) LIPASE: 27     Trop: 6 BNP: N/A     TSH: 0.84 T4: N/A A1C: N/A     Procal: 0.03 CRP: N/A Lactic Acid: 3.8 (H)         Imaging results reviewed over the past 24 hrs:   Recent Results (from the past 24 hour(s))   XR Chest Port 1 View    Narrative    EXAM: XR CHEST PORT 1 VIEW  LOCATION: [facility]  DATE: 1/9/2024    INDICATION: dyspnea  COMPARISON: 12/06/2023      Impression    IMPRESSION: Negative chest.   CT Abdomen Pelvis w Contrast    Narrative    EXAM: CT ABDOMEN PELVIS W CONTRAST  LOCATION: [facility]  DATE: 1/10/2024    INDICATION: Abdominal pain, elevated WBC, elevated lactate, vomiting.  COMPARISON: CT abdomen and pelvis on 10/09/2023.  TECHNIQUE: CT scan of the abdomen and pelvis was performed after the injection of 97 mL Isovue 370 intravenously. Multiplanar reformats were obtained. Dose reduction techniques were used.    FINDINGS:   LOWER CHEST: Small patchy pulmonary opacity in the right middle lobe (series 5 image 5), new as compared to 10/09/2023 exam, indeterminate, could be infectious or atelectatic. Small hiatal hernia.    ABDOMEN/PELVIS:    HEPATOBILIARY: No suspicious focal hepatic lesion. The gallbladder is surgically absent.    PANCREAS: No main pancreatic ductal dilatation or definite solid pancreatic mass.    SPLEEN: No splenomegaly.    ADRENAL GLANDS: No adrenal nodules.    KIDNEYS/BLADDER: No radiodense kidney/ureteral stones or hydronephrosis in either kidney. Persistent fetal lobulation.    BOWEL: No abnormally dilated bowel loops. The appendix is visualized and appears normal.    PERITONEUM: No evidence of free fluid in the abdomen and pelvis. No free peritoneal or portal venous gas.    PELVIC ORGANS:  Unremarkable.    VASCULATURE: Moderate atherosclerotic vascular calcification of the abdominal aorta and iliac vessels.    LYMPH NODES: No significant abdominopelvic lymphadenopathy.    MUSCULOSKELETAL: Degenerative changes of the spine most prominent at L4-L5 disc height loss and endplate changes.      Impression    IMPRESSION:   1. No evidence of acute pathology in the abdomen and pelvis.  2. Small hiatal hernia.  3. Small patchy pulmonary opacity in the right middle lobe, could be infectious or atelectatic.

## 2024-01-10 NOTE — ED NOTES
Hospitalist called back regarding the elevated BP, just continue to monitor for now, has scheduled Clonidine for AM.  Notified of recent Lactic level is now 1.6.

## 2024-01-10 NOTE — DISCHARGE SUMMARY
"[facility]  Discharge Summary - Medicine & Pediatrics       Date of Admission:  1/9/2024  Date of Discharge:  1/11/2024 10:53 AM  Discharging Provider: Dr. Lou Guzman  Discharge Service: [facility] Medicine Service    Discharge Diagnoses   # Cyclic vomiting syndrome   # Moderate persistent asthma currently in mild exacerbation  # Dyspnea  # Wheeze  # Dry cough  # T2DM    # HTN   # Polysubstance use    # Hepatitis C, chronic untreated,  FIB-4 = 1.2   # Depression   # Anxiety     Resolved:  # Intractable vomiting   # Epigastric abdominal pain   # Lactic acidosis, suspected type B   # Elevated anion gap   # Leukocytosis, mild  # Hypomagnesemia   # Hypokalemia    Clinically Significant Risk Factors     # DMII: A1C = 8.8 % (Ref range: <5.7 %) within past 6 months  # Overweight: Estimated body mass index is 29.53 kg/m  as calculated from the following:    Height as of this encounter: 1.753 m (5' 9\").    Weight as of this encounter: 90.7 kg (200 lb).       Follow-ups Needed After Discharge   - Check that she is adherent with daily controller (Symbicort)  - Ensure follow up for chronic Hep C  - Follow up on: Hepatitis C RNA quant and genotype, HepB, HepA, HIV, syphilis    Unresulted Labs Ordered in the Past 30 Days of this Admission       Date and Time Order Name Status Description    1/10/2024  3:21 PM Hepatitis A Antibody Total In process     1/10/2024  8:49 AM Treponema Abs w Reflex to RPR and Titer In process     1/10/2024  8:47 AM Hepatitis C RNA, Quantitative by PCR In process     1/10/2024  8:47 AM Hepatitis B Surface Antibody In process     1/10/2024  8:47 AM Hepatitis B core antibody In process     1/10/2024  8:47 AM Hepatitis B surface antigen In process     1/10/2024  8:47 AM HIV Antigen Antibody Combo Cascade In process         These results will be followed up by PCP Dr. Ybarra    Discharge Disposition   Discharged to home  Condition at discharge: " Stable    Hospital Course   Yun Coyle was admitted on 1/9/2024 for intractable vomiting resulting in multiple lab abnormalities, no cause identified.  She was also in asthma exacerbation 2/2 missing medications, but this was not the reason for hospitalization. At time of discharge, her electrolyte abnormalities and vomiting are resolved, tolerating PO.The following problems were addressed during her hospitalization:    # Intractable vomiting   # Epigastric abdominal pain   # Cyclic vomiting syndrome   # Risk of prolonged QTC 1/10/2024: 366   # HypoMg - resolved  # HypoK - resolved  # Leukocytosis, mild, resolved   # Tachycardia, resolved  # Elevated lactate and anion gap w/o acidosis - resolved  Presented w/ 1 day of symptoms refractory to home treatment. Most consistent with flare of cyclic vomiting: CT A/P without acute intraabdominal pathology, labs did not identify cause. As a result of vomiting: elevated LA (4.8), AG (19), leukocytosis (13.6), hypoMg (1.4), mild ketosis without acidosis. Abnormalities resolved with IV NS boluses alone. Initially meeting sepsis criteria based on WBC, tachy, and LA however suspect all 2/2 emesis given resolved without abx. Potassium was repleted and weaned off IV anti-emetics.   - Due to risk of prolonged Qtc w/ methadone, will not discharge on new anti-emetics such as PRN zofran / compazine  - Antiemetics helpful:  - Zofran IV 4 mg q6hr PRN   - Compazine IV 10 mg q6hr PRN   - PTA Zyprexa 10 mg QHS  - Discharge on: PTA Zyprexa 10 mg QHS     # Hepatitis C, chronic untreated,  FIB-4 = 1.2   Hepatitis C antibody incidentally reactive during last admission 10/2023. Viral load at that time elevated to 174k with RUQ US showing steatosis, no evidence of cirrhosis. No RUQ tenderness, no jaundice, nL bilirubin. Synthetic function intact (normal albumin, plt, INR). FIB-4 = 1.2 on 1/10/24 (AST 44, ALT 66, plt 208). Her primary clinic does not do hepatitis C treatment, so will refer  to GI at discharge.   - GI referral at discharge  - HepB surface Agn and core Marilee: nonreactive  - HepA ab reactive  - HIV, syphilis negative  - Repeat Hepatitis C RNA quant and genotype in process    # Asthma exacerbation, mild  # Dyspnea  # Wheeze  # Dry cough   On admission: 1-day of SOB with dry cough in setting of running out of rescue inhaler at home, which she was using every 45 minutes without relief. Evaluated in clinic and noted to be hypoxic (87%) with wheezing responsive to Duonebs. Given improvement w/ nebs alone, did not do steroid burst but will discharge her with this for future use PRN. This exacerbation could be managed outpatient, not the reason for her hospitalization, likely 2/2 not having medications. Will send home w/ daily ICS-LABA (Symbicort).    - Results of  1/10 Retail Pharmacy Test Claims (covered):   +Symbicort - $1.55   +Fluticasone-Salmeterol diskus - $1.55   +Advair diskus - $4.60   +Advair HFA - $4.60   **NOT COVERED**: Fluticasone-Salmeterol HFA   - Discharge on: Symbicort (budesonide/formoterol) 100/6 mcg BID, PTA albuterol PRN, steroid burst to use PRN for asthma exacerbation   - Follow up with outpatient provider to ensure using daily Symbicort    # T2DM   A1c with interval improvement 12.2% 10/1/2023 -> 8.8 on 1/10/2024.  on admission. BG kept in goal on Lantus 18U daily, likely because she was not taking much PO.   - Discharge on home regimen: Lantus 25u daily, Metformin 1000 mg BID     # HTN  # Asymptomatic severe HTN   Significantly hypertensive on admission. No symptoms concerning for hypertensive emergency. Troponin negative. EKG NSR without ST segment changes. Suspect asymptomatic severe HTN in setting of cyclic vomiting. Acute treatment of asymptomatic severe HTN associated with poorer outcomes. Resolved w/ anti-emetics.   - Discharge on: PTA Clonidine 0.1 mg BID, Losartan 25 mg daily     # Polysubstance use   # On methadone, QTC 1/10/2024: 366   Endorses smoking  cannabis last five days ago as well as snorting Fentanyl three days ago. Denies current opioid withdrawal symptoms. On methadone daily at [facility], verified dose of 130mg PO liquid daily, has missed some doses but last was 1/6. Restarted at 130mg oral while admitted 1/10. Tolerated restart. Patient endorses that she has adequate sobriety support and she plans on returning to methadone clinic.   - Plan to return to methadone clinic after discharge      Consultations This Hospital Stay   PHARMACY LIAISON FOR MEDICATION COVERAGE CONSULT    Code Status   Full Code       The patient was discussed with Dr. Guzman, patient, and PCP    Maria Del Rosario Dinh MD  [id number]  [facility]  [facility] EMERGENCY DEPARTMENT  [address] [place] [address]  Phone: [phone]  Fax: [phone]  ______________________________________________________________________    Physical Exam   Vital Signs: Temp: 98.3  F (36.8  C) Temp src: Oral BP: 118/73 Pulse: 65   Resp: 16 SpO2: 91 % O2 Device: None (Room air)    Weight: 200 lbs 0 oz  Constitutional:       General: She is not in acute distress.     Appearance: Normal appearance. She is not ill-appearing, toxic-appearing or diaphoretic.     Cardiovascular:      Rate and Rhythm: Normal rate and regular rhythm.   Pulmonary:      Effort: Pulmonary effort is normal.      Breath sounds: WNL.   Abdominal:      Palpations: Abdomen is soft. No ascites     Tenderness: There is mild RUQ abdominal tenderness   Skin:     Capillary Refill: Capillary refill takes less than 2 seconds.      Coloration: Skin is not jaundiced.   Neurological:      Mental Status: She is alert and oriented       Primary Care Physician   Gamaliel Granados    Discharge Orders   No discharge procedures on file.    Significant Results and Procedures   Results for orders placed or performed during the hospital encounter of 01/09/24   XR Chest Port 1 View    Narrative    EXAM: XR CHEST PORT 1  VIEW  LOCATION: [facility]  DATE: 1/9/2024    INDICATION: dyspnea  COMPARISON: 12/06/2023      Impression    IMPRESSION: Negative chest.   CT Abdomen Pelvis w Contrast    Narrative    EXAM: CT ABDOMEN PELVIS W CONTRAST  LOCATION: [facility]  DATE: 1/10/2024    INDICATION: Abdominal pain, elevated WBC, elevated lactate, vomiting.  COMPARISON: CT abdomen and pelvis on 10/09/2023.  TECHNIQUE: CT scan of the abdomen and pelvis was performed after the injection of 97 mL Isovue 370 intravenously. Multiplanar reformats were obtained. Dose reduction techniques were used.    FINDINGS:   LOWER CHEST: Small patchy pulmonary opacity in the right middle lobe (series 5 image 5), new as compared to 10/09/2023 exam, indeterminate, could be infectious or atelectatic. Small hiatal hernia.    ABDOMEN/PELVIS:    HEPATOBILIARY: No suspicious focal hepatic lesion. The gallbladder is surgically absent.    PANCREAS: No main pancreatic ductal dilatation or definite solid pancreatic mass.    SPLEEN: No splenomegaly.    ADRENAL GLANDS: No adrenal nodules.    KIDNEYS/BLADDER: No radiodense kidney/ureteral stones or hydronephrosis in either kidney. Persistent fetal lobulation.    BOWEL: No abnormally dilated bowel loops. The appendix is visualized and appears normal.    PERITONEUM: No evidence of free fluid in the abdomen and pelvis. No free peritoneal or portal venous gas.    PELVIC ORGANS: Unremarkable.    VASCULATURE: Moderate atherosclerotic vascular calcification of the abdominal aorta and iliac vessels.    LYMPH NODES: No significant abdominopelvic lymphadenopathy.    MUSCULOSKELETAL: Degenerative changes of the spine most prominent at L4-L5 disc height loss and endplate changes.      Impression    IMPRESSION:   1. No evidence of acute pathology in the abdomen and pelvis.  2. Small hiatal hernia.  3. Small patchy pulmonary opacity in the right  middle lobe, could be infectious or atelectatic.       Discharge Medications   Current Discharge Medication List        CONTINUE these medications which have CHANGED    Details   predniSONE (DELTASONE) 20 MG tablet Take two tablets (= 40mg) each day for 5 (five) days if in asthma exacerbation.    Associated Diagnoses: Moderate persistent asthma with exacerbation           CONTINUE these medications which have NOT CHANGED    Details   albuterol (PROAIR HFA/PROVENTIL HFA/VENTOLIN HFA) 108 (90 Base) MCG/ACT inhaler Inhale 2 puffs into the lungs every 4 hours as needed for shortness of breath or wheezing  Qty: 18 g, Refills: 11    Comments: Pharmacy may dispense brand covered by insurance (Proair, or proventil or ventolin or generic albuterol inhaler)  Associated Diagnoses: Moderate persistent asthma with exacerbation      budesonide-formoterol (SYMBICORT) 160-4.5 MCG/ACT Inhaler Inhale 2 puffs into the lungs 2 times daily  Qty: 10.2 g, Refills: 11    Associated Diagnoses: Moderate persistent asthma with exacerbation      cloNIDine (CATAPRES) 0.1 MG tablet Take 1 tablet (0.1 mg) by mouth 2 times daily  Qty: 180 tablet, Refills: 3    Associated Diagnoses: Secondary hypertension      insulin glargine (LANTUS PEN) 100 UNIT/ML pen Inject 25 Units Subcutaneous every morning (before breakfast) for 30 days  Qty: 7.5 mL, Refills: 11    Comments: If Lantus is not covered by insurance, may substitute Basaglar or Semglee or other insulin glargine product per insurance preference at same dose and frequency.    Associated Diagnoses: Type 2 diabetes mellitus with hyperglycemia, unspecified whether long term insulin use (H)      losartan (COZAAR) 25 MG tablet Take 1 tablet (25 mg) by mouth daily  Qty: 90 tablet, Refills: 3    Associated Diagnoses: Secondary hypertension      metFORMIN (GLUCOPHAGE) 500 MG tablet Take 2 tablets (1,000 mg) by mouth 2 times daily (with meals)  Qty: 360 tablet, Refills: 3    Associated Diagnoses: Type 2  diabetes mellitus with hyperglycemia, with long-term current use of insulin (H)      methadone (DOLOPHINE-INTENSOL) 10 MG/ML (HIGH CONC) solution Take 130 mg by mouth daily      OLANZapine (ZYPREXA) 10 MG tablet Take 1 tablet (10 mg) by mouth at bedtime  Qty: 90 tablet, Refills: 1    Associated Diagnoses: Severe episode of recurrent major depressive disorder, without psychotic features (H); Cyclic vomiting syndrome      Alcohol Swabs PADS Use to swab the area of the injection or marla as directed Per insurance coverage  Qty: 100 each, Refills: 11    Associated Diagnoses: Type 2 diabetes mellitus without complication, unspecified whether long term insulin use (H)      blood glucose (NO BRAND SPECIFIED) lancets standard To use to test glucose level in the blood Use to test blood sugar once daily as directed. To accompany glucose monitor brands per insurance coverage.  Qty: 100 each, Refills: 0    Associated Diagnoses: Type 2 diabetes mellitus without complication, unspecified whether long term insulin use (H)      blood glucose (NO BRAND SPECIFIED) test strip To use to test glucose level in the blood Use to test blood sugar Once daily as directed. To accompany glucose monitor brands per insurance coverage.  Qty: 100 strip, Refills: 0    Associated Diagnoses: Type 2 diabetes mellitus without complication, unspecified whether long term insulin use (H)      blood glucose calibration (NO BRAND SPECIFIED) solution Used to calibrate the blood glucose monitor as needed and as directed.  To accompany  blood glucose brands per insurance coverage  Qty: 1 each, Refills: 0    Associated Diagnoses: Type 2 diabetes mellitus without complication, unspecified whether long term insulin use (H)      blood glucose monitoring (NO BRAND SPECIFIED) meter device kit Use as directed Per insurance coverage  Qty: 1 kit, Refills: 0    Associated Diagnoses: Type 2 diabetes mellitus without complication, unspecified whether long term insulin use  (H)      Continuous Blood Gluc  (FREESTYLE MCKENZIE 2 READER) KESHIA Use to read blood sugars as per 's instructions.  Qty: 1 each, Refills: 0    Associated Diagnoses: Type 2 diabetes mellitus with hyperglycemia, with long-term current use of insulin (H)      Continuous Blood Gluc Sensor (FREESTYLE MCKENZIE 2 SENSOR) MISC Change every 14 days.  Qty: 2 each, Refills: 5    Associated Diagnoses: Type 2 diabetes mellitus with hyperglycemia, with long-term current use of insulin (H)      glucagon 1 MG kit Inject 1 mg Subcutaneous once as needed for low blood sugar (Use as directed by provider)  Qty: 1 each, Refills: 0    Associated Diagnoses: Type 2 diabetes mellitus without complication, unspecified whether long term insulin use (H)      glucose (BD GLUCOSE) 4 g chewable tablet Take 4 tablets by mouth every 15 minutes as needed for low blood sugar  Qty: 50 tablet, Refills: 0    Associated Diagnoses: Type 2 diabetes mellitus without complication, unspecified whether long term insulin use (H)      glucose 40 % (400 mg/mL) gel 15 g every 15 minutes by mouth as needed for low blood sugar.  Oral gel is preferable for conscious and able to swallow patient.  Qty: 112.5 g, Refills: 0    Associated Diagnoses: Type 2 diabetes mellitus without complication, unspecified whether long term insulin use (H)      Sharps Container MISC Use as directed to dispose of needles, lancets and other sharps  Qty: 1 each, Refills: 0    Associated Diagnoses: Type 2 diabetes mellitus without complication, unspecified whether long term insulin use (H)           Allergies   No Known Allergies

## 2024-01-11 VITALS
RESPIRATION RATE: 16 BRPM | OXYGEN SATURATION: 94 % | HEART RATE: 60 BPM | TEMPERATURE: 97.8 F | SYSTOLIC BLOOD PRESSURE: 149 MMHG | BODY MASS INDEX: 29.62 KG/M2 | DIASTOLIC BLOOD PRESSURE: 94 MMHG | WEIGHT: 200 LBS | HEIGHT: 69 IN

## 2024-01-11 PROBLEM — F11.21: Status: ACTIVE | Noted: 2023-11-08

## 2024-01-11 PROBLEM — R11.15 CYCLICAL VOMITING: Status: RESOLVED | Noted: 2024-01-09 | Resolved: 2024-01-11

## 2024-01-11 LAB
ERYTHROCYTE [DISTWIDTH] IN BLOOD BY AUTOMATED COUNT: 13 % (ref 10–15)
GLUCOSE BLDC GLUCOMTR-MCNC: 146 MG/DL (ref 70–99)
GLUCOSE BLDC GLUCOMTR-MCNC: 148 MG/DL (ref 70–99)
GLUCOSE BLDC GLUCOMTR-MCNC: 183 MG/DL (ref 70–99)
HCT VFR BLD AUTO: 41.6 % (ref 35–47)
HCV RNA SERPL NAA+PROBE-ACNC: ABNORMAL IU/ML
HCV RNA SERPL NAA+PROBE-LOG IU: 5.4 {LOG_IU}/ML
HGB BLD-MCNC: 13.8 G/DL (ref 11.7–15.7)
MCH RBC QN AUTO: 29.7 PG (ref 26.5–33)
MCHC RBC AUTO-ENTMCNC: 33.2 G/DL (ref 31.5–36.5)
MCV RBC AUTO: 90 FL (ref 78–100)
PLATELET # BLD AUTO: 196 10E3/UL (ref 150–450)
POTASSIUM SERPL-SCNC: 4 MMOL/L (ref 3.4–5.3)
RBC # BLD AUTO: 4.65 10E6/UL (ref 3.8–5.2)
T PALLIDUM AB SER QL: NONREACTIVE
WBC # BLD AUTO: 9.6 10E3/UL (ref 4–11)

## 2024-01-11 PROCEDURE — 36415 COLL VENOUS BLD VENIPUNCTURE: CPT

## 2024-01-11 PROCEDURE — 85027 COMPLETE CBC AUTOMATED: CPT

## 2024-01-11 PROCEDURE — G0378 HOSPITAL OBSERVATION PER HR: HCPCS

## 2024-01-11 PROCEDURE — 250N000013 HC RX MED GY IP 250 OP 250 PS 637

## 2024-01-11 PROCEDURE — 99238 HOSP IP/OBS DSCHRG MGMT 30/<: CPT | Mod: GC

## 2024-01-11 PROCEDURE — 36415 COLL VENOUS BLD VENIPUNCTURE: CPT | Performed by: STUDENT IN AN ORGANIZED HEALTH CARE EDUCATION/TRAINING PROGRAM

## 2024-01-11 PROCEDURE — 84132 ASSAY OF SERUM POTASSIUM: CPT | Performed by: STUDENT IN AN ORGANIZED HEALTH CARE EDUCATION/TRAINING PROGRAM

## 2024-01-11 RX ADMIN — METHADONE HYDROCHLORIDE 130 MG: 10 CONCENTRATE ORAL at 09:17

## 2024-01-11 RX ADMIN — CLONIDINE HYDROCHLORIDE 0.1 MG: 0.1 TABLET ORAL at 09:17

## 2024-01-11 RX ADMIN — LOSARTAN POTASSIUM 25 MG: 25 TABLET, FILM COATED ORAL at 09:17

## 2024-01-11 ASSESSMENT — ACTIVITIES OF DAILY LIVING (ADL)
ADLS_ACUITY_SCORE: 20

## 2024-01-11 NOTE — PLAN OF CARE
Goal Outcome Evaluation:  Pt arrived on unit around 0030. Pt denies N/V and asked for a sandwich and milk. Pt tolerated well. Pt has not vomited during shift or previous shift.     Orientation: Aox4  Bowel: Continent  Bladder:Continent  Ambulation/Transfers: Ind  Diet/ Liquids:Regular   Tubes/ Lines/ Drains: R PIV SL  Tube Feeding:n/a  Oxygen:RA  Skin: Visible skin intact

## 2024-01-11 NOTE — PROGRESS NOTES
6MS DISCHARGE    D: Patient discharged to Home  at 1053. Patient accompanied by self.    I: Discharge prescriptions given to patient. All discharge medications and instructions reviewed with patient. Patient instructed to seek care if experiencing worsening symptoms, such as increased SOB. Other phone numbers to call with questions or concerns after discharge reviewed. IV removed. Education completed.    A: Patient verbalized understanding of discharge medications and instructions. Prescribed home medications given to patient.  Belongings returned to patient.    P: Patient to follow-up with primary provider.

## 2024-01-11 NOTE — DISCHARGE INSTRUCTIONS
Start SYMBICORT for your asthma (studies have shown that adding this medicine better controls asthma in the long term). We've included instructions on how to use this. It might also be a good idea to look up a youtube video on how to use it. You'll do 2 puffs TWICE daily.

## 2024-01-12 ENCOUNTER — TELEPHONE (OUTPATIENT)
Dept: OPHTHALMOLOGY | Facility: CLINIC | Age: 49
End: 2024-01-12
Payer: COMMERCIAL

## 2024-01-12 ENCOUNTER — PATIENT OUTREACH (OUTPATIENT)
Dept: CARE COORDINATION | Facility: CLINIC | Age: 49
End: 2024-01-12
Payer: COMMERCIAL

## 2024-01-12 NOTE — PROGRESS NOTES
Clinic Care Coordination Contact  [facility]: Post-Discharge Note  SITUATION                                                      Admission:    Admission Date: 01/09/24   Reason for Admission: Vomiting  Discharge:   Discharge Date: 01/11/24  Discharge Diagnosis: Cyclical vomiting    BACKGROUND                                                      Per hospital discharge summary and inpatient provider notes:  Yun Coyle is a 48-year-old female PMH cyclic vomiting of unknown etiology, T2DM, asthma, HTN, polysubstance use (marijuana, fentanyl), prior cholecystectomy, chronic Hepatitis C, depression, and anxiety admitted on 1/9/2024 for intractable vomiting suspected secondary to cyclic vomiting.      Regarding her vomiting, the patient awoke this morning with vomiting that has progressively worsened throughout the day. She is unable to count the number of episodes of emesis. She has not been able to keep down food or fluids. The patient is unable to identify triggers for her vomiting. Her current symptoms feel similar to previous flares of her cyclic vomiting. She usually has flares 3-4x monthly. The patient generally manages her symptoms at home or receives Zyprexa in the ED. However, her current flare persisted despite typical measures- prompting admission to the hospital. The patient endorses smoking marijuana, last 4-5 days ago. She does not feel that her vomiting is related to cannabis hyperemesis as she continued to have flares despite being abstinent for two years. She does not feel relief with hot showers. Her vomiting episodes correlated with her menstrual cycle. No recent travel or sick contacts. No new foods. After onset of vomiting this morning, the patient developed epigastric abdominal pain. Her pain is currently constant, aching, 7/10 in severity, and worsens after emesis. The patient denies fevers, hematemesis, and polyuria. Her blood sugars have been fairly well-controlled at home with  average CGM reading 156. She denies tobacco and alcohol use. She last snorted Fentanyl three days ago. She denies frequent NSAID or salicylate use.      Regarding her shortness of breath, the patient reports noticing shortness of breath with associated dry cough this morning. She was evaluated in clinic and noted to be hypoxic to 87% on room air. Exam with remarkable for wheezing that improved with nebulizer treatment. The patient was suspected to have asthma exacerbation. She was prescribed a controller inhaler and steroid burst. She has not been able to start those medications as she presented to the ED shortly afterwards for vomiting. Currently, the patient reports that her breathing feels well controlled. Her shortness of breath this morning was consistent with previous asthma flares. She generally has flares in the winter. She has been out of her rescue inhaler at home. The patient reports that she was feeling well prior to today. She denies fever, sore throat, chest pain, productive cough, urinary symptoms, vaginal discharge, constipation, and bloody stools. Last BM was today and normal. She has been adherent with antihypertensive medications at home. She does not typically check her blood pressures at home.        ASSESSMENT           Discharge Assessment  How are you doing now that you are home?: getting better  How are your symptoms? (Red Flag symptoms escalate to triage hotline per guidelines): Improved  Do you feel your condition is stable enough to be safe at home until your provider visit?: Yes  Does the patient have their discharge instructions? : Yes  Does the patient have questions regarding their discharge instructions? : No  Were you started on any new medications or were there changes to any of your previous medications? : Yes  Does the patient have all of their medications?: Yes  Do you have questions regarding any of your medications? : No  Do you have all of your needed medical supplies or  equipment (DME)?  (i.e. oxygen tank, CPAP, cane, etc.): Yes  Discharge follow-up appointment scheduled within 14 calendar days? : Yes                  PLAN                                                      Outpatient Plan:  - Check that she is adherent with daily controller (Symbicort)  - Ensure follow up for chronic Hep C  - Follow up on: Hepatitis C RNA quant and genotype, HepB, HepA, HIV, syphilis    Future Appointments   Date Time Provider Department Center   2/9/2024  2:40 PM Ezequiel Shah OD [facility]         For any urgent concerns, please contact our 24 hour nurse triage line: [phone] ([phone])         DAI Simpson

## 2024-02-27 ENCOUNTER — HOSPITAL ENCOUNTER (EMERGENCY)
Facility: CLINIC | Age: 49
Discharge: HOME | End: 2024-02-27
Attending: EMERGENCY MEDICINE | Admitting: EMERGENCY MEDICINE
Payer: COMMERCIAL

## 2024-02-27 VITALS
TEMPERATURE: 98.8 F | RESPIRATION RATE: 12 BRPM | HEART RATE: 72 BPM | WEIGHT: 200 LBS | HEIGHT: 69 IN | BODY MASS INDEX: 29.62 KG/M2 | OXYGEN SATURATION: 94 % | SYSTOLIC BLOOD PRESSURE: 157 MMHG | DIASTOLIC BLOOD PRESSURE: 101 MMHG

## 2024-02-27 DIAGNOSIS — R11.2 NAUSEA AND VOMITING, UNSPECIFIED VOMITING TYPE: ICD-10-CM

## 2024-02-27 DIAGNOSIS — R73.9 HYPERGLYCEMIA: ICD-10-CM

## 2024-02-27 LAB
ALBUMIN SERPL BCG-MCNC: 4.4 G/DL (ref 3.5–5.2)
ALBUMIN UR-MCNC: NEGATIVE MG/DL
ALP SERPL-CCNC: 114 U/L (ref 40–150)
ALT SERPL W P-5'-P-CCNC: 63 U/L (ref 0–50)
ANION GAP SERPL CALCULATED.3IONS-SCNC: 15 MMOL/L (ref 7–15)
APPEARANCE UR: CLEAR
AST SERPL W P-5'-P-CCNC: 39 U/L (ref 0–45)
ATRIAL RATE - MUSE: 88 BPM
BASOPHILS # BLD AUTO: 0 10E3/UL (ref 0–0.2)
BASOPHILS NFR BLD AUTO: 0 %
BILIRUB SERPL-MCNC: 0.5 MG/DL
BILIRUB UR QL STRIP: NEGATIVE
BUN SERPL-MCNC: 15.8 MG/DL (ref 6–20)
CALCIUM SERPL-MCNC: 9.9 MG/DL (ref 8.6–10)
CHLORIDE SERPL-SCNC: 91 MMOL/L (ref 98–107)
COLOR UR AUTO: ABNORMAL
CREAT SERPL-MCNC: 0.83 MG/DL (ref 0.51–0.95)
DEPRECATED HCO3 PLAS-SCNC: 29 MMOL/L (ref 22–29)
DIASTOLIC BLOOD PRESSURE - MUSE: NORMAL MMHG
EGFRCR SERPLBLD CKD-EPI 2021: 88 ML/MIN/1.73M2
EOSINOPHIL # BLD AUTO: 0 10E3/UL (ref 0–0.7)
EOSINOPHIL NFR BLD AUTO: 0 %
ERYTHROCYTE [DISTWIDTH] IN BLOOD BY AUTOMATED COUNT: 13.8 % (ref 10–15)
GLUCOSE BLDC GLUCOMTR-MCNC: 161 MG/DL (ref 70–99)
GLUCOSE BLDC GLUCOMTR-MCNC: 190 MG/DL (ref 70–99)
GLUCOSE BLDC GLUCOMTR-MCNC: 246 MG/DL (ref 70–99)
GLUCOSE BLDC GLUCOMTR-MCNC: 265 MG/DL (ref 70–99)
GLUCOSE BLDC GLUCOMTR-MCNC: 449 MG/DL (ref 70–99)
GLUCOSE SERPL-MCNC: 426 MG/DL (ref 70–99)
GLUCOSE UR STRIP-MCNC: >=1000 MG/DL
HCG INTACT+B SERPL-ACNC: 5 MIU/ML
HCT VFR BLD AUTO: 38.7 % (ref 35–47)
HGB BLD-MCNC: 13.6 G/DL (ref 11.7–15.7)
HGB UR QL STRIP: NEGATIVE
IMM GRANULOCYTES # BLD: 0.1 10E3/UL
IMM GRANULOCYTES NFR BLD: 1 %
INTERPRETATION ECG - MUSE: NORMAL
KETONES UR STRIP-MCNC: 10 MG/DL
LEUKOCYTE ESTERASE UR QL STRIP: NEGATIVE
LIPASE SERPL-CCNC: 56 U/L (ref 13–60)
LYMPHOCYTES # BLD AUTO: 1.8 10E3/UL (ref 0.8–5.3)
LYMPHOCYTES NFR BLD AUTO: 17 %
MAGNESIUM SERPL-MCNC: 1.6 MG/DL (ref 1.7–2.3)
MCH RBC QN AUTO: 30.2 PG (ref 26.5–33)
MCHC RBC AUTO-ENTMCNC: 35.1 G/DL (ref 31.5–36.5)
MCV RBC AUTO: 86 FL (ref 78–100)
MONOCYTES # BLD AUTO: 0.5 10E3/UL (ref 0–1.3)
MONOCYTES NFR BLD AUTO: 4 %
NEUTROPHILS # BLD AUTO: 8.3 10E3/UL (ref 1.6–8.3)
NEUTROPHILS NFR BLD AUTO: 78 %
NITRATE UR QL: NEGATIVE
NRBC # BLD AUTO: 0 10E3/UL
NRBC BLD AUTO-RTO: 0 /100
P AXIS - MUSE: 62 DEGREES
PH UR STRIP: 7.5 [PH] (ref 5–7)
PLATELET # BLD AUTO: 247 10E3/UL (ref 150–450)
POTASSIUM SERPL-SCNC: 3.9 MMOL/L (ref 3.4–5.3)
PR INTERVAL - MUSE: 144 MS
PROT SERPL-MCNC: 8.7 G/DL (ref 6.4–8.3)
QRS DURATION - MUSE: 90 MS
QT - MUSE: 322 MS
QTC - MUSE: 389 MS
R AXIS - MUSE: -26 DEGREES
RBC # BLD AUTO: 4.5 10E6/UL (ref 3.8–5.2)
RBC URINE: 0 /HPF
SODIUM SERPL-SCNC: 135 MMOL/L (ref 135–145)
SP GR UR STRIP: 1.05 (ref 1–1.03)
SQUAMOUS EPITHELIAL: 3 /HPF
SYSTOLIC BLOOD PRESSURE - MUSE: NORMAL MMHG
T AXIS - MUSE: -31 DEGREES
TRANSITIONAL EPI: <1 /HPF
UROBILINOGEN UR STRIP-MCNC: NORMAL MG/DL
VENTRICULAR RATE- MUSE: 88 BPM
WBC # BLD AUTO: 10.6 10E3/UL (ref 4–11)
WBC URINE: 1 /HPF

## 2024-02-27 PROCEDURE — 82962 GLUCOSE BLOOD TEST: CPT

## 2024-02-27 PROCEDURE — 96361 HYDRATE IV INFUSION ADD-ON: CPT | Performed by: EMERGENCY MEDICINE

## 2024-02-27 PROCEDURE — 96365 THER/PROPH/DIAG IV INF INIT: CPT | Performed by: EMERGENCY MEDICINE

## 2024-02-27 PROCEDURE — 250N000013 HC RX MED GY IP 250 OP 250 PS 637: Performed by: EMERGENCY MEDICINE

## 2024-02-27 PROCEDURE — 99284 EMERGENCY DEPT VISIT MOD MDM: CPT | Mod: 25 | Performed by: EMERGENCY MEDICINE

## 2024-02-27 PROCEDURE — 93005 ELECTROCARDIOGRAM TRACING: CPT | Performed by: EMERGENCY MEDICINE

## 2024-02-27 PROCEDURE — 81001 URINALYSIS AUTO W/SCOPE: CPT | Performed by: EMERGENCY MEDICINE

## 2024-02-27 PROCEDURE — 36415 COLL VENOUS BLD VENIPUNCTURE: CPT | Performed by: EMERGENCY MEDICINE

## 2024-02-27 PROCEDURE — 93010 ELECTROCARDIOGRAM REPORT: CPT | Performed by: EMERGENCY MEDICINE

## 2024-02-27 PROCEDURE — 258N000003 HC RX IP 258 OP 636: Performed by: EMERGENCY MEDICINE

## 2024-02-27 PROCEDURE — 83690 ASSAY OF LIPASE: CPT | Performed by: EMERGENCY MEDICINE

## 2024-02-27 PROCEDURE — 250N000011 HC RX IP 250 OP 636: Performed by: EMERGENCY MEDICINE

## 2024-02-27 PROCEDURE — 83735 ASSAY OF MAGNESIUM: CPT | Performed by: EMERGENCY MEDICINE

## 2024-02-27 PROCEDURE — 80053 COMPREHEN METABOLIC PANEL: CPT | Performed by: EMERGENCY MEDICINE

## 2024-02-27 PROCEDURE — 85004 AUTOMATED DIFF WBC COUNT: CPT | Performed by: EMERGENCY MEDICINE

## 2024-02-27 PROCEDURE — 84702 CHORIONIC GONADOTROPIN TEST: CPT | Performed by: EMERGENCY MEDICINE

## 2024-02-27 RX ORDER — MAGNESIUM SULFATE HEPTAHYDRATE 40 MG/ML
2 INJECTION, SOLUTION INTRAVENOUS ONCE
Status: COMPLETED | OUTPATIENT
Start: 2024-02-27 | End: 2024-02-27

## 2024-02-27 RX ORDER — OLANZAPINE 5 MG/1
5 TABLET, ORALLY DISINTEGRATING ORAL ONCE
Status: COMPLETED | OUTPATIENT
Start: 2024-02-27 | End: 2024-02-27

## 2024-02-27 RX ADMIN — SODIUM CHLORIDE 1000 ML: 9 INJECTION, SOLUTION INTRAVENOUS at 16:17

## 2024-02-27 RX ADMIN — MAGNESIUM SULFATE HEPTAHYDRATE 2 G: 40 INJECTION, SOLUTION INTRAVENOUS at 17:23

## 2024-02-27 RX ADMIN — OLANZAPINE 5 MG: 5 TABLET, ORALLY DISINTEGRATING ORAL at 19:11

## 2024-02-27 RX ADMIN — SODIUM CHLORIDE 1000 ML: 9 INJECTION, SOLUTION INTRAVENOUS at 19:07

## 2024-02-27 NOTE — ED NOTES
Bed: [facility]-  Expected date: 2/27/24  Expected time: 3:11 PM  Means of arrival:   Comments:  Cody 433: TO TRIAGE 49 y/o, F, Pancreatitis

## 2024-02-27 NOTE — ED TRIAGE NOTES
Patient arrives by EMS from home. N/V x 1 day, unable to tolerate any PO intake. Hx pancreatitis, feels similar. Usually zofran, zyprexa, and GI cocktail provides relief.    Hypertensive 170/110 per EMS, tachycardic, . Hx T2 DM. Takes Lantus, none today r/t no PO intake.    Received 300mL NS, 8mg zofran, 5mg droperidol PTA. PIV 18g to R upper arm. Symptom relief after med admin.    A+O x4, ambulatory with steady gait. Some sleepiness on arrival r/t droperidol admin.

## 2024-02-27 NOTE — ED PROVIDER NOTES
ED Provider Note  [facility]      History     Chief Complaint   Patient presents with    Nausea & Vomiting    Hyperglycemia     HPI  Yun Coyle is a 48 year old female history of cyclic vomiting, insulin requiring diabetes, cholecystectomy who presents to the emergency department with nausea, vomiting, and vomiting.  Patient states she has had symptoms for the past day.  Patient called EMS today and received ondansetron and droperidol as well as IV fluids in the prehospital phase.  Patient reports improved symptoms.  She denies any abdominal pain.  Reports she had a bowel movement earlier today.  She denies any dysuria, urgency, or frequency.  She did not take any insulin today due to vomiting.  Blood sugar 410 by EMS.        Past Medical History  Past Medical History:   Diagnosis Date    Acute pancreatitis 06/29/2017    Anxiety     Cauda equina syndrome (H) 03/29/2013    Cyst of right ovary 11/16/2017    Depressive disorder     Gastroesophageal reflux disease     Hypertension     Hyponatremia 10/01/2023    Low back pain 08/20/2012    Overview:   History of cauda equina and surgery.      Type 2 diabetes mellitus (H)     Uncomplicated asthma      Past Surgical History:   Procedure Laterality Date    BACK SURGERY      x2    CHOLECYSTECTOMY      ORTHOPEDIC SURGERY      SALPINGO OOPHORECTOMY,R/L/ELDA Right      albuterol (PROAIR HFA/PROVENTIL HFA/VENTOLIN HFA) 108 (90 Base) MCG/ACT inhaler  Alcohol Swabs PADS  blood glucose (NO BRAND SPECIFIED) lancets standard  blood glucose (NO BRAND SPECIFIED) test strip  blood glucose calibration (NO BRAND SPECIFIED) solution  blood glucose monitoring (NO BRAND SPECIFIED) meter device kit  budesonide-formoterol (SYMBICORT) 160-4.5 MCG/ACT Inhaler  cloNIDine (CATAPRES) 0.1 MG tablet  Continuous Blood Gluc  (FREESTYLE MCKENZIE 2 READER) KESHIA  Continuous Blood Gluc Sensor (FREESTYLE MCKENZIE 2 SENSOR) MISC  glucagon 1 MG kit  glucose (BD GLUCOSE) 4  "g chewable tablet  glucose 40 % (400 mg/mL) gel  insulin glargine (LANTUS PEN) 100 UNIT/ML pen  losartan (COZAAR) 25 MG tablet  metFORMIN (GLUCOPHAGE) 500 MG tablet  methadone (DOLOPHINE-INTENSOL) 10 MG/ML (HIGH CONC) solution  OLANZapine (ZYPREXA) 10 MG tablet  predniSONE (DELTASONE) 20 MG tablet  Sharps Container MISC      No Known Allergies  Family History  Family History   Problem Relation Age of Onset    Breast Cancer Mother         in her 50s.    Diabetes Mother     Breast Cancer Maternal Grandmother         in her 40s.    Diabetes Maternal Grandmother      Social History   Social History     Tobacco Use    Smoking status: Never    Smokeless tobacco: Never   Vaping Use    Vaping Use: Never used   Substance Use Topics    Alcohol use: No    Drug use: Yes     Types: Marijuana     Comment: 4 days ago         A medically appropriate review of systems was performed with pertinent positives and negatives noted in the HPI, and all other systems negative.    Physical Exam   BP: (!) 143/92  Pulse: 107  Temp: 98.8  F (37.1  C)  Resp: 20  Height: 175.3 cm (5' 9\")  Weight: 90.7 kg (200 lb)  SpO2: 93 %  Physical Exam  Vitals and nursing note reviewed.   Constitutional:       General: She is not in acute distress.     Appearance: Normal appearance. She is not diaphoretic.   HENT:      Head: Normocephalic and atraumatic.   Eyes:      Extraocular Movements: Extraocular movements intact.      Conjunctiva/sclera: Conjunctivae normal.   Cardiovascular:      Rate and Rhythm: Normal rate.      Pulses: Normal pulses.      Heart sounds: Normal heart sounds.   Pulmonary:      Effort: Pulmonary effort is normal. No respiratory distress.      Breath sounds: Normal breath sounds.   Abdominal:      Palpations: Abdomen is soft.      Tenderness: There is no abdominal tenderness.   Musculoskeletal:         General: No tenderness. Normal range of motion.      Cervical back: Normal range of motion and neck supple.   Skin:     General: Skin is " warm.      Findings: No rash.   Neurological:      General: No focal deficit present.      Mental Status: She is alert.      Motor: No weakness.      Coordination: Coordination normal.   Psychiatric:         Mood and Affect: Mood normal.           ED Course, Procedures, & Data      Procedures            EKG Interpretation:      Interpreted by BERNICE BAZZI MD, MD  Time reviewed: 1630  Symptoms at time of EKG: Nausea/vomiting-check QT  Rhythm: normal sinus   Rate: 88  Axis: Normal  Ectopy: none  Conduction: normal  ST Segments/ T Waves: Non-specific ST-T wave changes  Q Waves: none  Comparison to prior: Unchanged    Clinical Impression: no acute changes         Results for orders placed or performed during the hospital encounter of 02/27/24   Glucose by meter     Status: Abnormal   Result Value Ref Range    GLUCOSE BY METER POCT 449 (H) 70 - 99 mg/dL   Comprehensive metabolic panel     Status: Abnormal   Result Value Ref Range    Sodium 135 135 - 145 mmol/L    Potassium 3.9 3.4 - 5.3 mmol/L    Carbon Dioxide (CO2) 29 22 - 29 mmol/L    Anion Gap 15 7 - 15 mmol/L    Urea Nitrogen 15.8 6.0 - 20.0 mg/dL    Creatinine 0.83 0.51 - 0.95 mg/dL    GFR Estimate 88 >60 mL/min/1.73m2    Calcium 9.9 8.6 - 10.0 mg/dL    Chloride 91 (L) 98 - 107 mmol/L    Glucose 426 (H) 70 - 99 mg/dL    Alkaline Phosphatase 114 40 - 150 U/L    AST 39 0 - 45 U/L    ALT 63 (H) 0 - 50 U/L    Protein Total 8.7 (H) 6.4 - 8.3 g/dL    Albumin 4.4 3.5 - 5.2 g/dL    Bilirubin Total 0.5 <=1.2 mg/dL   Lipase     Status: Normal   Result Value Ref Range    Lipase 56 13 - 60 U/L   HCG quantitative pregnancy     Status: Abnormal   Result Value Ref Range    hCG Quantitative 5 (H) <5 mIU/mL   Magnesium     Status: Abnormal   Result Value Ref Range    Magnesium 1.6 (L) 1.7 - 2.3 mg/dL   UA with Microscopic reflex to Culture     Status: Abnormal    Specimen: Urine, Midstream   Result Value Ref Range    Color Urine Straw Colorless, Straw, Light Yellow, Yellow     Appearance Urine Clear Clear    Glucose Urine >=1000 (A) Negative mg/dL    Bilirubin Urine Negative Negative    Ketones Urine 10 (A) Negative mg/dL    Specific Gravity Urine 1.050 (H) 1.003 - 1.035    Blood Urine Negative Negative    pH Urine 7.5 (H) 5.0 - 7.0    Protein Albumin Urine Negative Negative mg/dL    Urobilinogen Urine Normal Normal, 2.0 mg/dL    Nitrite Urine Negative Negative    Leukocyte Esterase Urine Negative Negative    RBC Urine 0 <=2 /HPF    WBC Urine 1 <=5 /HPF    Squamous Epithelials Urine 3 (H) <=1 /HPF    Transitional Epithelials Urine <1 <=1 /HPF    Narrative    Urine Culture not indicated   CBC with platelets and differential     Status: None   Result Value Ref Range    WBC Count 10.6 4.0 - 11.0 10e3/uL    RBC Count 4.50 3.80 - 5.20 10e6/uL    Hemoglobin 13.6 11.7 - 15.7 g/dL    Hematocrit 38.7 35.0 - 47.0 %    MCV 86 78 - 100 fL    MCH 30.2 26.5 - 33.0 pg    MCHC 35.1 31.5 - 36.5 g/dL    RDW 13.8 10.0 - 15.0 %    Platelet Count 247 150 - 450 10e3/uL    % Neutrophils 78 %    % Lymphocytes 17 %    % Monocytes 4 %    % Eosinophils 0 %    % Basophils 0 %    % Immature Granulocytes 1 %    NRBCs per 100 WBC 0 <1 /100    Absolute Neutrophils 8.3 1.6 - 8.3 10e3/uL    Absolute Lymphocytes 1.8 0.8 - 5.3 10e3/uL    Absolute Monocytes 0.5 0.0 - 1.3 10e3/uL    Absolute Eosinophils 0.0 0.0 - 0.7 10e3/uL    Absolute Basophils 0.0 0.0 - 0.2 10e3/uL    Absolute Immature Granulocytes 0.1 <=0.4 10e3/uL    Absolute NRBCs 0.0 10e3/uL   Glucose by meter     Status: Abnormal   Result Value Ref Range    GLUCOSE BY METER POCT 265 (H) 70 - 99 mg/dL   Glucose by meter     Status: Abnormal   Result Value Ref Range    GLUCOSE BY METER POCT 246 (H) 70 - 99 mg/dL   Glucose by meter     Status: Abnormal   Result Value Ref Range    GLUCOSE BY METER POCT 190 (H) 70 - 99 mg/dL   Glucose by meter     Status: Abnormal   Result Value Ref Range    GLUCOSE BY METER POCT 161 (H) 70 - 99 mg/dL   EKG 12-lead, tracing only      Status: None   Result Value Ref Range    Systolic Blood Pressure  mmHg    Diastolic Blood Pressure  mmHg    Ventricular Rate 88 BPM    Atrial Rate 88 BPM    MA Interval 144 ms    QRS Duration 90 ms     ms    QTc 389 ms    P Axis 62 degrees    R AXIS -26 degrees    T Axis -31 degrees    Interpretation ECG       Sinus rhythm  Nonspecific ST and T wave abnormality  Abnormal ECG    Unconfirmed report - interpretation of this ECG is computer generated - see medical record for final interpretation  Confirmed by - EMERGENCY ROOM, PHYSICIAN ([id number]),  VELIA VILLATORO ([id number]) on 2/27/2024 7:06:47 PM     CBC with platelets differential     Status: None    Narrative    The following orders were created for panel order CBC with platelets differential.  Procedure                               Abnormality         Status                     ---------                               -----------         ------                     CBC with platelets and d...[[id number]]                      Final result                 Please view results for these tests on the individual orders.     Medications   sodium chloride 0.9% BOLUS 1,000 mL (0 mLs Intravenous Stopped 2/27/24 1844)   sodium chloride 0.9% BOLUS 1,000 mL (0 mLs Intravenous Stopped 2/27/24 1844)   magnesium sulfate 2 g in 50 mL sterile water intermittent infusion (0 g Intravenous Stopped 2/27/24 1844)   sodium chloride 0.9% BOLUS 1,000 mL (0 mLs Intravenous Stopped 2/27/24 2135)   OLANZapine zydis (zyPREXA) ODT tab 5 mg (5 mg Oral $Given 2/27/24 1911)     Labs Ordered and Resulted from Time of ED Arrival to Time of ED Departure   GLUCOSE BY METER - Abnormal       Result Value    GLUCOSE BY METER POCT 449 (*)    COMPREHENSIVE METABOLIC PANEL - Abnormal    Sodium 135      Potassium 3.9      Carbon Dioxide (CO2) 29      Anion Gap 15      Urea Nitrogen 15.8      Creatinine 0.83      GFR Estimate 88      Calcium 9.9      Chloride 91 (*)     Glucose 426 (*)     Alkaline  Phosphatase 114      AST 39      ALT 63 (*)     Protein Total 8.7 (*)     Albumin 4.4      Bilirubin Total 0.5     HCG QUANTITATIVE PREGNANCY - Abnormal    hCG Quantitative 5 (*)    MAGNESIUM - Abnormal    Magnesium 1.6 (*)    ROUTINE UA WITH MICROSCOPIC REFLEX TO CULTURE - Abnormal    Color Urine Straw      Appearance Urine Clear      Glucose Urine >=1000 (*)     Bilirubin Urine Negative      Ketones Urine 10 (*)     Specific Gravity Urine 1.050 (*)     Blood Urine Negative      pH Urine 7.5 (*)     Protein Albumin Urine Negative      Urobilinogen Urine Normal      Nitrite Urine Negative      Leukocyte Esterase Urine Negative      RBC Urine 0      WBC Urine 1      Squamous Epithelials Urine 3 (*)     Transitional Epithelials Urine <1     GLUCOSE BY METER - Abnormal    GLUCOSE BY METER POCT 265 (*)    GLUCOSE BY METER - Abnormal    GLUCOSE BY METER POCT 246 (*)    GLUCOSE BY METER - Abnormal    GLUCOSE BY METER POCT 190 (*)    GLUCOSE BY METER - Abnormal    GLUCOSE BY METER POCT 161 (*)    LIPASE - Normal    Lipase 56     CBC WITH PLATELETS AND DIFFERENTIAL    WBC Count 10.6      RBC Count 4.50      Hemoglobin 13.6      Hematocrit 38.7      MCV 86      MCH 30.2      MCHC 35.1      RDW 13.8      Platelet Count 247      % Neutrophils 78      % Lymphocytes 17      % Monocytes 4      % Eosinophils 0      % Basophils 0      % Immature Granulocytes 1      NRBCs per 100 WBC 0      Absolute Neutrophils 8.3      Absolute Lymphocytes 1.8      Absolute Monocytes 0.5      Absolute Eosinophils 0.0      Absolute Basophils 0.0      Absolute Immature Granulocytes 0.1      Absolute NRBCs 0.0     GLUCOSE MONITOR NURSING POCT     No orders to display     Medications   sodium chloride 0.9% BOLUS 1,000 mL (0 mLs Intravenous Stopped 2/27/24 1844)   sodium chloride 0.9% BOLUS 1,000 mL (0 mLs Intravenous Stopped 2/27/24 1844)   magnesium sulfate 2 g in 50 mL sterile water intermittent infusion (0 g Intravenous Stopped 2/27/24 1844)    sodium chloride 0.9% BOLUS 1,000 mL (0 mLs Intravenous Stopped 2/27/24 2135)   OLANZapine zydis (zyPREXA) ODT tab 5 mg (5 mg Oral $Given 2/27/24 1911)         Reviewed [facility] 1/26/2020 for generalized abdominal pain-reviewed labs including CBC, comprehensive metabolic panel, lipase, VBG.  Reviewed emergency department encounter and subsequent admission on 1/19/2024 for high anion gap acidosis.  CBC, comprehensive panel, lactate, procalcitonin, ketones reviewed from visit.  Reviewed emergency department encounter from 11/24/2023 for cyclic vomiting.  Reviewed CBC, comprehensive metabolic panel, lipase, urinalysis associated with this visit.    10:56 PM Feeling better.    Critical care was not performed.     Medical Decision Making  The patient's presentation was of high complexity (a chronic illness severe exacerbation, progression, or side effect of treatment).    The patient's evaluation involved:  review of external note(s) from 2 sources (see separate area of note for details)  review of 3+ test result(s) ordered prior to this encounter (see separate area of note for details)  ordering and/or review of 3+ test(s) in this encounter (see separate area of note for details)  independent interpretation of testing performed by another health professional (EKG interpreted by me-see above)    The patient's management necessitated high risk (drug therapy requiring intensive monitoring (see separate area of note for details)).    Assessment & Plan    48 year old female with history of insulin requiring diabetes, cyclic vomiting to the emergency department with vomiting and hyperglycemia.  Patient's blood sugar in the prehospital phase was over 400.  She had an IV established by EMS and was given Zofran and droperidol.  She was initially sedate but did have recurrent symptoms here in the emergency department.  Her abdomen is soft and nontender.  Diagnostic evaluation markable for  normal CBC.  Initial blood sugar elevated to 426.  She has mild ALT elevation but otherwise normal metabolic panel.  Lipase is normal.  Do not suspect acute liver pathology or acute pancreatitis.  Her urinalysis does not appear infected.  Her pregnancy test was negative.  Patient's blood sugar treated with IV fluids.  After 3 L of normal saline, her blood sugar has normalized to 161.  She is mildly hypomagnesemic and was given IV magnesium replacement.  EKG without evidence for QT prolongation or acute ischemic change.  Patient did receive a dose of Zyprexa ODT for recurrent symptoms.  She subsequently endorsed resolution of symptoms and felt markedly improved.  She will be discharged home.  Clear liquid diet with advancement as tolerated recommended.  Primary care follow-up recommended.    I have reviewed the nursing notes. I have reviewed the findings, diagnosis, plan and need for follow up with the patient.    New Prescriptions    No medications on file       Final diagnoses:   Hyperglycemia   Nausea and vomiting, unspecified vomiting type     Chart documentation was completed with Dragon voice-recognition software. Even though reviewed, this chart may still contain some grammatical, spelling, and word errors.     Anderson Blas Md    [facility] EMERGENCY DEPARTMENT  2/27/2024     Anderson Blas MD  02/27/24 [id number]

## 2024-02-28 NOTE — DISCHARGE INSTRUCTIONS
Take your medications as directed.  Drink plenty of fluids.    Follow-up with your primary care clinic.    Emergency Department if fever, abdominal pain, worse, or other concerns.

## 2024-02-28 NOTE — ED NOTES
Report to Aurelia MANE RN. All questions and concerns addressed. Transferring care at this time.

## 2024-02-28 NOTE — ED NOTES
Patient sleeping, wakes to voice. BG recheck 161. Up to bathroom independently with steady gait. Endorsing some abdominal pain, returns to sleep easily.

## 2024-03-26 ENCOUNTER — HOSPITAL ENCOUNTER (EMERGENCY)
Facility: CLINIC | Age: 49
Discharge: HOME | End: 2024-03-27
Attending: EMERGENCY MEDICINE | Admitting: EMERGENCY MEDICINE
Payer: COMMERCIAL

## 2024-03-26 ENCOUNTER — APPOINTMENT (OUTPATIENT)
Dept: CT IMAGING | Facility: CLINIC | Age: 49
End: 2024-03-26
Attending: EMERGENCY MEDICINE
Payer: COMMERCIAL

## 2024-03-26 DIAGNOSIS — R11.2 NAUSEA AND VOMITING, UNSPECIFIED VOMITING TYPE: ICD-10-CM

## 2024-03-26 LAB
ACANTHOCYTES BLD QL SMEAR: NORMAL
ALBUMIN SERPL BCG-MCNC: 4.6 G/DL (ref 3.5–5.2)
ALBUMIN UR-MCNC: 70 MG/DL
ALP SERPL-CCNC: 106 U/L (ref 40–150)
ALT SERPL W P-5'-P-CCNC: 79 U/L (ref 0–50)
ANION GAP SERPL CALCULATED.3IONS-SCNC: 17 MMOL/L (ref 7–15)
APPEARANCE UR: CLEAR
AST SERPL W P-5'-P-CCNC: 49 U/L (ref 0–45)
AUER BODIES BLD QL SMEAR: NORMAL
BASO STIPL BLD QL SMEAR: NORMAL
BASOPHILS # BLD AUTO: 0 10E3/UL (ref 0–0.2)
BASOPHILS NFR BLD AUTO: 0 %
BILIRUB SERPL-MCNC: 0.7 MG/DL
BILIRUB UR QL STRIP: NEGATIVE
BITE CELLS BLD QL SMEAR: NORMAL
BLISTER CELLS BLD QL SMEAR: NORMAL
BUN SERPL-MCNC: 14.1 MG/DL (ref 6–20)
BURR CELLS BLD QL SMEAR: NORMAL
CALCIUM SERPL-MCNC: 10.1 MG/DL (ref 8.6–10)
CHLORIDE SERPL-SCNC: 89 MMOL/L (ref 98–107)
COLOR UR AUTO: YELLOW
CREAT SERPL-MCNC: 0.85 MG/DL (ref 0.51–0.95)
DACRYOCYTES BLD QL SMEAR: NORMAL
DEPRECATED HCO3 PLAS-SCNC: 26 MMOL/L (ref 22–29)
EGFRCR SERPLBLD CKD-EPI 2021: 85 ML/MIN/1.73M2
ELLIPTOCYTES BLD QL SMEAR: NORMAL
EOSINOPHIL # BLD AUTO: 0 10E3/UL (ref 0–0.7)
EOSINOPHIL NFR BLD AUTO: 0 %
ERYTHROCYTE [DISTWIDTH] IN BLOOD BY AUTOMATED COUNT: 13.2 % (ref 10–15)
FRAGMENTS BLD QL SMEAR: NORMAL
GLUCOSE SERPL-MCNC: 267 MG/DL (ref 70–99)
GLUCOSE UR STRIP-MCNC: >=1000 MG/DL
HCG UR QL: NEGATIVE
HCT VFR BLD AUTO: 41.5 % (ref 35–47)
HGB BLD-MCNC: 14.6 G/DL (ref 11.7–15.7)
HGB C CRYSTALS: NORMAL
HGB UR QL STRIP: NEGATIVE
HOWELL-JOLLY BOD BLD QL SMEAR: NORMAL
HYALINE CASTS: 13 /LPF
IMM GRANULOCYTES # BLD: 0.1 10E3/UL
IMM GRANULOCYTES NFR BLD: 1 %
KETONES UR STRIP-MCNC: 10 MG/DL
LEUKOCYTE ESTERASE UR QL STRIP: NEGATIVE
LIPASE SERPL-CCNC: 45 U/L (ref 13–60)
LYMPHOCYTES # BLD AUTO: 5.2 10E3/UL (ref 0.8–5.3)
LYMPHOCYTES NFR BLD AUTO: 38 %
MCH RBC QN AUTO: 30.3 PG (ref 26.5–33)
MCHC RBC AUTO-ENTMCNC: 35.2 G/DL (ref 31.5–36.5)
MCV RBC AUTO: 86 FL (ref 78–100)
MONOCYTES # BLD AUTO: 1.1 10E3/UL (ref 0–1.3)
MONOCYTES NFR BLD AUTO: 8 %
MUCOUS THREADS #/AREA URNS LPF: PRESENT /LPF
NEUTROPHILS # BLD AUTO: 7.4 10E3/UL (ref 1.6–8.3)
NEUTROPHILS NFR BLD AUTO: 53 %
NEUTS HYPERSEG BLD QL SMEAR: NORMAL
NITRATE UR QL: NEGATIVE
NRBC # BLD AUTO: 0 10E3/UL
NRBC BLD AUTO-RTO: 0 /100
PH UR STRIP: 5.5 [PH] (ref 5–7)
PLAT MORPH BLD: NORMAL
PLATELET # BLD AUTO: 250 10E3/UL (ref 150–450)
POLYCHROMASIA BLD QL SMEAR: NORMAL
POTASSIUM SERPL-SCNC: 3.7 MMOL/L (ref 3.4–5.3)
PROT SERPL-MCNC: 9.1 G/DL (ref 6.4–8.3)
RBC # BLD AUTO: 4.82 10E6/UL (ref 3.8–5.2)
RBC AGGLUT BLD QL: NORMAL
RBC MORPH BLD: NORMAL
RBC URINE: 1 /HPF
ROULEAUX BLD QL SMEAR: NORMAL
SICKLE CELLS BLD QL SMEAR: NORMAL
SMUDGE CELLS BLD QL SMEAR: NORMAL
SODIUM SERPL-SCNC: 132 MMOL/L (ref 135–145)
SP GR UR STRIP: 1.03 (ref 1–1.03)
SPHEROCYTES BLD QL SMEAR: NORMAL
SQUAMOUS EPITHELIAL: 2 /HPF
STOMATOCYTES BLD QL SMEAR: NORMAL
TARGETS BLD QL SMEAR: NORMAL
TOXIC GRANULES BLD QL SMEAR: NORMAL
TRANSITIONAL EPI: <1 /HPF
UROBILINOGEN UR STRIP-MCNC: NORMAL MG/DL
VARIANT LYMPHS BLD QL SMEAR: NORMAL
WBC # BLD AUTO: 13.8 10E3/UL (ref 4–11)
WBC URINE: 1 /HPF

## 2024-03-26 PROCEDURE — 85025 COMPLETE CBC W/AUTO DIFF WBC: CPT | Performed by: EMERGENCY MEDICINE

## 2024-03-26 PROCEDURE — 999N000127 HC STATISTIC PERIPHERAL IV START W US GUIDANCE

## 2024-03-26 PROCEDURE — 99285 EMERGENCY DEPT VISIT HI MDM: CPT | Mod: 25 | Performed by: EMERGENCY MEDICINE

## 2024-03-26 PROCEDURE — 36415 COLL VENOUS BLD VENIPUNCTURE: CPT | Performed by: EMERGENCY MEDICINE

## 2024-03-26 PROCEDURE — 250N000011 HC RX IP 250 OP 636: Performed by: EMERGENCY MEDICINE

## 2024-03-26 PROCEDURE — 81001 URINALYSIS AUTO W/SCOPE: CPT | Performed by: EMERGENCY MEDICINE

## 2024-03-26 PROCEDURE — 80053 COMPREHEN METABOLIC PANEL: CPT | Performed by: EMERGENCY MEDICINE

## 2024-03-26 PROCEDURE — 96375 TX/PRO/DX INJ NEW DRUG ADDON: CPT | Performed by: EMERGENCY MEDICINE

## 2024-03-26 PROCEDURE — 93010 ELECTROCARDIOGRAM REPORT: CPT | Performed by: EMERGENCY MEDICINE

## 2024-03-26 PROCEDURE — 74177 CT ABD & PELVIS W/CONTRAST: CPT

## 2024-03-26 PROCEDURE — 99284 EMERGENCY DEPT VISIT MOD MDM: CPT | Performed by: EMERGENCY MEDICINE

## 2024-03-26 PROCEDURE — 999N000040 HC STATISTIC CONSULT NO CHARGE VASC ACCESS

## 2024-03-26 PROCEDURE — 999N000285 HC STATISTIC VASC ACCESS LAB DRAW WITH PIV START

## 2024-03-26 PROCEDURE — 250N000009 HC RX 250: Performed by: EMERGENCY MEDICINE

## 2024-03-26 PROCEDURE — 81025 URINE PREGNANCY TEST: CPT | Performed by: EMERGENCY MEDICINE

## 2024-03-26 PROCEDURE — 258N000003 HC RX IP 258 OP 636: Performed by: EMERGENCY MEDICINE

## 2024-03-26 PROCEDURE — 96374 THER/PROPH/DIAG INJ IV PUSH: CPT | Performed by: EMERGENCY MEDICINE

## 2024-03-26 PROCEDURE — 250N000013 HC RX MED GY IP 250 OP 250 PS 637: Performed by: EMERGENCY MEDICINE

## 2024-03-26 PROCEDURE — C9113 INJ PANTOPRAZOLE SODIUM, VIA: HCPCS | Performed by: EMERGENCY MEDICINE

## 2024-03-26 PROCEDURE — 83690 ASSAY OF LIPASE: CPT | Performed by: EMERGENCY MEDICINE

## 2024-03-26 PROCEDURE — 96361 HYDRATE IV INFUSION ADD-ON: CPT | Performed by: EMERGENCY MEDICINE

## 2024-03-26 PROCEDURE — 93005 ELECTROCARDIOGRAM TRACING: CPT | Performed by: EMERGENCY MEDICINE

## 2024-03-26 RX ORDER — IOPAMIDOL 755 MG/ML
100 INJECTION, SOLUTION INTRAVASCULAR ONCE
Status: COMPLETED | OUTPATIENT
Start: 2024-03-26 | End: 2024-03-26

## 2024-03-26 RX ORDER — ONDANSETRON 2 MG/ML
4 INJECTION INTRAMUSCULAR; INTRAVENOUS ONCE
Status: COMPLETED | OUTPATIENT
Start: 2024-03-26 | End: 2024-03-26

## 2024-03-26 RX ORDER — OLANZAPINE 10 MG/1
10 TABLET, ORALLY DISINTEGRATING ORAL ONCE
Status: COMPLETED | OUTPATIENT
Start: 2024-03-26 | End: 2024-03-26

## 2024-03-26 RX ADMIN — PANTOPRAZOLE SODIUM 40 MG: 40 INJECTION, POWDER, FOR SOLUTION INTRAVENOUS at 19:21

## 2024-03-26 RX ADMIN — IOPAMIDOL 98 ML: 755 INJECTION, SOLUTION INTRAVENOUS at 21:50

## 2024-03-26 RX ADMIN — OLANZAPINE 10 MG: 10 TABLET, ORALLY DISINTEGRATING ORAL at 20:06

## 2024-03-26 RX ADMIN — SODIUM CHLORIDE 50 ML: 9 INJECTION, SOLUTION INTRAVENOUS at 21:49

## 2024-03-26 RX ADMIN — SODIUM CHLORIDE 1000 ML: 9 INJECTION, SOLUTION INTRAVENOUS at 19:20

## 2024-03-26 RX ADMIN — ONDANSETRON 4 MG: 2 INJECTION INTRAMUSCULAR; INTRAVENOUS at 19:23

## 2024-03-26 RX ADMIN — SODIUM CHLORIDE 1000 ML: 9 INJECTION, SOLUTION INTRAVENOUS at 23:06

## 2024-03-26 ASSESSMENT — ACTIVITIES OF DAILY LIVING (ADL)
ADLS_ACUITY_SCORE: 35
ADLS_ACUITY_SCORE: 37
ADLS_ACUITY_SCORE: 37
ADLS_ACUITY_SCORE: 35
ADLS_ACUITY_SCORE: 37
ADLS_ACUITY_SCORE: 35
ADLS_ACUITY_SCORE: 37
ADLS_ACUITY_SCORE: 37

## 2024-03-26 ASSESSMENT — COLUMBIA-SUICIDE SEVERITY RATING SCALE - C-SSRS
6. HAVE YOU EVER DONE ANYTHING, STARTED TO DO ANYTHING, OR PREPARED TO DO ANYTHING TO END YOUR LIFE?: YES
1. IN THE PAST MONTH, HAVE YOU WISHED YOU WERE DEAD OR WISHED YOU COULD GO TO SLEEP AND NOT WAKE UP?: NO
2. HAVE YOU ACTUALLY HAD ANY THOUGHTS OF KILLING YOURSELF IN THE PAST MONTH?: NO

## 2024-03-26 NOTE — ED TRIAGE NOTES
Triage Assessment (Adult)       Row Name 03/26/24 1450 03/26/24 1447       Triage Assessment    Airway WDL WDL WDL       Respiratory WDL    Respiratory WDL WDL WDL       Skin Circulation/Temperature WDL    Skin Circulation/Temperature WDL WDL WDL       Cardiac WDL    Cardiac WDL -- X    Cardiac Rhythm ST ST       Peripheral/Neurovascular WDL    Peripheral Neurovascular WDL -- WDL       Cognitive/Neuro/Behavioral WDL    Cognitive/Neuro/Behavioral WDL WDL --

## 2024-03-26 NOTE — ED TRIAGE NOTES
Reports onset of vomiting 2 days ago denies contacts with similar sx. Admits to marijuana use last use a week ago. With onset of epigastric pain pain starting yesterday 6/10 burning. Unable to tolerate PO.  Last BM was 2 days ago and normal for her denies urinary issues.        Triage Assessment (Adult)       Row Name 03/26/24 1447          Triage Assessment    Airway WDL WDL        Respiratory WDL    Respiratory WDL WDL        Skin Circulation/Temperature WDL    Skin Circulation/Temperature WDL WDL        Cardiac WDL    Cardiac WDL X     Cardiac Rhythm ST        Peripheral/Neurovascular WDL    Peripheral Neurovascular WDL WDL

## 2024-03-26 NOTE — ED PROVIDER NOTES
[facility] EMERGENCY DEPARTMENT ([facility])  3/26/24        History     Chief Complaint   Patient presents with    Abdominal Pain    Nausea, Vomiting, & Diarrhea     HPI  Yun Coyle is a 48 year old female with a past medical history significant for cyclic vomiting syndrome, chronic hep C, DM2, cannabis use disorder, opioid use disorder, benzodiazepine use disorder, personality disorder and HTN who presents to the Emergency Department for evaluation of 2-day history of nausea and vomiting.  Patient states she has been unable to tolerate anything by mouth due to nausea and vomiting.  She states that she began to have some burning sensation in her chest today during episodes of vomiting but otherwise denies any chest pain.  No dyspnea.  No cough.  No fever.  Her last bowel movement was 2 days ago and was normal.      Past Medical History  Past Medical History:   Diagnosis Date    Acute pancreatitis 06/29/2017    Anxiety     Cauda equina syndrome (H) 03/29/2013    Cyst of right ovary 11/16/2017    Depressive disorder     Gastroesophageal reflux disease     Hypertension     Hyponatremia 10/01/2023    Low back pain 08/20/2012    Overview:   History of cauda equina and surgery.      Type 2 diabetes mellitus (H)     Uncomplicated asthma      Past Surgical History:   Procedure Laterality Date    BACK SURGERY      x2    CHOLECYSTECTOMY      ORTHOPEDIC SURGERY      SALPINGO OOPHORECTOMY,R/L/ELDA Right      albuterol (PROAIR HFA/PROVENTIL HFA/VENTOLIN HFA) 108 (90 Base) MCG/ACT inhaler  Alcohol Swabs PADS  blood glucose (NO BRAND SPECIFIED) lancets standard  blood glucose (NO BRAND SPECIFIED) test strip  blood glucose calibration (NO BRAND SPECIFIED) solution  blood glucose monitoring (NO BRAND SPECIFIED) meter device kit  budesonide-formoterol (SYMBICORT) 160-4.5 MCG/ACT Inhaler  cloNIDine (CATAPRES) 0.1 MG tablet  Continuous Blood Gluc  (FREESTYLE MCKENZIE 2 READER) KESHIA  Continuous Blood Gluc Sensor  "(FREESTYLE MCKENZIE 2 SENSOR) MISC  glucagon 1 MG kit  glucose (BD GLUCOSE) 4 g chewable tablet  glucose 40 % (400 mg/mL) gel  insulin glargine (LANTUS PEN) 100 UNIT/ML pen  losartan (COZAAR) 25 MG tablet  metFORMIN (GLUCOPHAGE) 500 MG tablet  methadone (DOLOPHINE-INTENSOL) 10 MG/ML (HIGH CONC) solution  OLANZapine (ZYPREXA) 10 MG tablet  predniSONE (DELTASONE) 20 MG tablet  Sharps Container MISC      No Known Allergies  Family History  Family History   Problem Relation Age of Onset    Breast Cancer Mother         in her 50s.    Diabetes Mother     Breast Cancer Maternal Grandmother         in her 40s.    Diabetes Maternal Grandmother      Social History   Social History     Tobacco Use    Smoking status: Never    Smokeless tobacco: Never   Vaping Use    Vaping Use: Never used   Substance Use Topics    Alcohol use: No    Drug use: Yes     Types: Marijuana     Comment: 4 days ago         A medically appropriate review of systems was performed with pertinent positives and negatives noted in the HPI, and all other systems negative.    Physical Exam   BP: (!) 158/96  Pulse: 118  Temp: 98.3  F (36.8  C)  Resp: 16  Height: 175.3 cm (5' 9\")  Weight: 90.7 kg (200 lb)  SpO2: 99 %  Physical Exam  Vitals and nursing note reviewed.   Constitutional:       General: She is not in acute distress.     Appearance: She is well-developed. She is not diaphoretic.   HENT:      Head: Normocephalic and atraumatic.   Eyes:      Extraocular Movements: Extraocular movements intact.      Conjunctiva/sclera: Conjunctivae normal.   Cardiovascular:      Rate and Rhythm: Normal rate.      Heart sounds: Normal heart sounds.   Pulmonary:      Effort: Pulmonary effort is normal. No respiratory distress.      Breath sounds: Normal breath sounds.   Abdominal:      Palpations: Abdomen is soft.      Tenderness: There is no abdominal tenderness.   Musculoskeletal:         General: No tenderness. Normal range of motion.      Cervical back: Normal range of " motion and neck supple.   Skin:     General: Skin is warm and dry.      Findings: No rash.   Neurological:      General: No focal deficit present.      Mental Status: She is alert.           ED Course, Procedures, & Data      Reviewed emergency department encounter on 2/27/2024 for hyperglycemia with associated nausea and vomiting.  Patient discharged after treatment in the emergency department.  Reviewed emergency department encounter on 1/9/2024 and subsequent hospital admission for cyclic vomiting.  Reviewed associated labs including CBC, comprehensive panel, lipase, urine.    Procedures            EKG Interpretation:      Interpreted by BERNICE BAZZI MD, MD  Time reviewed: 1951  Symptoms at time of EKG: Vomiting  Rhythm: normal sinus   Rate: 86  Axis: Normal  Ectopy: none  Conduction: normal  ST Segments/ T Waves: Non-specific ST wave changes  Q Waves: none  Comparison to prior: Unchanged    Clinical Impression: no acute changes                Results for orders placed or performed during the hospital encounter of 03/26/24   CT Abdomen Pelvis w Contrast     Status: None    Narrative    EXAM: CT ABDOMEN PELVIS W CONTRAST  LOCATION: [facility]  DATE: 3/26/2024    INDICATION: Abdominal pain, vomiting, leukocytosis  COMPARISON: 1/10/2024  TECHNIQUE: CT scan of the abdomen and pelvis was performed following injection of IV contrast. Multiplanar reformats were obtained. Dose reduction techniques were used.  CONTRAST: 98mL Isovue 370    FINDINGS:   LOWER CHEST: Normal.    HEPATOBILIARY: No significant mass or bile duct dilatation. Cholecystectomy.     PANCREAS: Normal.    SPLEEN: Normal.    ADRENAL GLANDS: Normal.    KIDNEYS/BLADDER: Normal.    BOWEL: No obstruction or inflammatory change. Normal appendix. Small hiatal hernia. Small amount of retained stool in colon.    LYMPH NODES: Normal.    VASCULATURE: Normal.    PELVIC ORGANS: Normal.    MUSCULOSKELETAL: Tiny  fat-containing paraumbilical hernia. Degenerative changes in the spine.      Impression    IMPRESSION:   No acute findings or other explanation for symptoms.   UA with Microscopic reflex to Culture     Status: Abnormal    Specimen: Urine, NOS   Result Value Ref Range    Color Urine Yellow Colorless, Straw, Light Yellow, Yellow    Appearance Urine Clear Clear    Glucose Urine >=1000 (A) Negative mg/dL    Bilirubin Urine Negative Negative    Ketones Urine 10 (A) Negative mg/dL    Specific Gravity Urine 1.030 1.003 - 1.035    Blood Urine Negative Negative    pH Urine 5.5 5.0 - 7.0    Protein Albumin Urine 70 (A) Negative mg/dL    Urobilinogen Urine Normal Normal, 2.0 mg/dL    Nitrite Urine Negative Negative    Leukocyte Esterase Urine Negative Negative    Mucus Urine Present (A) None Seen /LPF    RBC Urine 1 <=2 /HPF    WBC Urine 1 <=5 /HPF    Squamous Epithelials Urine 2 (H) <=1 /HPF    Transitional Epithelials Urine <1 <=1 /HPF    Hyaline Casts Urine 13 (H) <=2 /LPF    Narrative    Urine Culture not indicated   HCG qualitative urine     Status: Normal   Result Value Ref Range    hCG Urine Qualitative Negative Negative   Comprehensive metabolic panel     Status: Abnormal   Result Value Ref Range    Sodium 132 (L) 135 - 145 mmol/L    Potassium 3.7 3.4 - 5.3 mmol/L    Carbon Dioxide (CO2) 26 22 - 29 mmol/L    Anion Gap 17 (H) 7 - 15 mmol/L    Urea Nitrogen 14.1 6.0 - 20.0 mg/dL    Creatinine 0.85 0.51 - 0.95 mg/dL    GFR Estimate 85 >60 mL/min/1.73m2    Calcium 10.1 (H) 8.6 - 10.0 mg/dL    Chloride 89 (L) 98 - 107 mmol/L    Glucose 267 (H) 70 - 99 mg/dL    Alkaline Phosphatase 106 40 - 150 U/L    AST 49 (H) 0 - 45 U/L    ALT 79 (H) 0 - 50 U/L    Protein Total 9.1 (H) 6.4 - 8.3 g/dL    Albumin 4.6 3.5 - 5.2 g/dL    Bilirubin Total 0.7 <=1.2 mg/dL   Lipase     Status: Normal   Result Value Ref Range    Lipase 45 13 - 60 U/L   CBC with platelets and differential     Status: Abnormal   Result Value Ref Range    WBC Count  13.8 (H) 4.0 - 11.0 10e3/uL    RBC Count 4.82 3.80 - 5.20 10e6/uL    Hemoglobin 14.6 11.7 - 15.7 g/dL    Hematocrit 41.5 35.0 - 47.0 %    MCV 86 78 - 100 fL    MCH 30.3 26.5 - 33.0 pg    MCHC 35.2 31.5 - 36.5 g/dL    RDW 13.2 10.0 - 15.0 %    Platelet Count 250 150 - 450 10e3/uL    % Neutrophils 53 %    % Lymphocytes 38 %    % Monocytes 8 %    % Eosinophils 0 %    % Basophils 0 %    % Immature Granulocytes 1 %    NRBCs per 100 WBC 0 <1 /100    Absolute Neutrophils 7.4 1.6 - 8.3 10e3/uL    Absolute Lymphocytes 5.2 0.8 - 5.3 10e3/uL    Absolute Monocytes 1.1 0.0 - 1.3 10e3/uL    Absolute Eosinophils 0.0 0.0 - 0.7 10e3/uL    Absolute Basophils 0.0 0.0 - 0.2 10e3/uL    Absolute Immature Granulocytes 0.1 <=0.4 10e3/uL    Absolute NRBCs 0.0 10e3/uL   RBC and Platelet Morphology     Status: None   Result Value Ref Range    Platelet Assessment  Automated Count Confirmed. Platelet morphology is normal.     Automated Count Confirmed. Platelet morphology is normal.    Acanthocytes      Steven Rods      Basophilic Stippling      Bite Cells      Blister Cells      Rajesh Cells      Elliptocytes      Hgb C Crystals      Ho-Jolly Bodies      Hypersegmented Neutrophils      Polychromasia      RBC agglutination      RBC Fragments      Reactive Lymphocytes      Rouleaux      Sickle Cells      Smudge Cells      Spherocytes      Stomatocytes      Target Cells      Teardrop Cells      Toxic Neutrophils      RBC Morphology Confirmed RBC Indices    EKG 12-lead, tracing only     Status: None (Preliminary result)   Result Value Ref Range    Systolic Blood Pressure  mmHg    Diastolic Blood Pressure  mmHg    Ventricular Rate 86 BPM    Atrial Rate 86 BPM    WY Interval 144 ms    QRS Duration 90 ms     ms    QTc 399 ms    P Axis 52 degrees    R AXIS -24 degrees    T Axis -12 degrees    Interpretation ECG       Sinus rhythm  Nonspecific ST abnormality  Abnormal ECG     CBC with platelets differential     Status: Abnormal    Narrative     The following orders were created for panel order CBC with platelets differential.  Procedure                               Abnormality         Status                     ---------                               -----------         ------                     CBC with platelets and d...[[id number]]  Abnormal            Final result               RBC and Platelet Morphology[[id number]]                      Final result                 Please view results for these tests on the individual orders.     Medications   pantoprazole (PROTONIX) IV push injection 40 mg (40 mg Intravenous $Given 3/26/24 1921)   ondansetron (ZOFRAN) injection 4 mg (4 mg Intravenous $Given 3/26/24 1923)   sodium chloride 0.9% BOLUS 1,000 mL (0 mLs Intravenous Stopped 3/26/24 2306)   sodium chloride 0.9% BOLUS 1,000 mL (0 mLs Intravenous Stopped 3/27/24 0010)   OLANZapine zydis (zyPREXA) ODT tab 10 mg (10 mg Oral $Given 3/26/24 2006)   iopamidol (ISOVUE-370) solution 100 mL (98 mLs Intravenous $Given 3/26/24 2150)   sodium chloride 0.9 % bag 100mL (50 mLs Intravenous $Given 3/26/24 2149)     Labs Ordered and Resulted from Time of ED Arrival to Time of ED Departure   ROUTINE UA WITH MICROSCOPIC REFLEX TO CULTURE - Abnormal       Result Value    Color Urine Yellow      Appearance Urine Clear      Glucose Urine >=1000 (*)     Bilirubin Urine Negative      Ketones Urine 10 (*)     Specific Gravity Urine 1.030      Blood Urine Negative      pH Urine 5.5      Protein Albumin Urine 70 (*)     Urobilinogen Urine Normal      Nitrite Urine Negative      Leukocyte Esterase Urine Negative      Mucus Urine Present (*)     RBC Urine 1      WBC Urine 1      Squamous Epithelials Urine 2 (*)     Transitional Epithelials Urine <1      Hyaline Casts Urine 13 (*)    COMPREHENSIVE METABOLIC PANEL - Abnormal    Sodium 132 (*)     Potassium 3.7      Carbon Dioxide (CO2) 26      Anion Gap 17 (*)     Urea Nitrogen 14.1      Creatinine 0.85      GFR Estimate 85      Calcium  10.1 (*)     Chloride 89 (*)     Glucose 267 (*)     Alkaline Phosphatase 106      AST 49 (*)     ALT 79 (*)     Protein Total 9.1 (*)     Albumin 4.6      Bilirubin Total 0.7     CBC WITH PLATELETS AND DIFFERENTIAL - Abnormal    WBC Count 13.8 (*)     RBC Count 4.82      Hemoglobin 14.6      Hematocrit 41.5      MCV 86      MCH 30.3      MCHC 35.2      RDW 13.2      Platelet Count 250      % Neutrophils 53      % Lymphocytes 38      % Monocytes 8      % Eosinophils 0      % Basophils 0      % Immature Granulocytes 1      NRBCs per 100 WBC 0      Absolute Neutrophils 7.4      Absolute Lymphocytes 5.2      Absolute Monocytes 1.1      Absolute Eosinophils 0.0      Absolute Basophils 0.0      Absolute Immature Granulocytes 0.1      Absolute NRBCs 0.0     HCG QUALITATIVE URINE - Normal    hCG Urine Qualitative Negative     LIPASE - Normal    Lipase 45     RBC AND PLATELET MORPHOLOGY    Platelet Assessment        Value: Automated Count Confirmed. Platelet morphology is normal.    Acanthocytes        Steven Rods        Basophilic Stippling        Bite Cells        Blister Cells        Rajesh Cells        Elliptocytes        Hgb C Crystals        Ho-Jolly Bodies        Hypersegmented Neutrophils        Polychromasia        RBC agglutination        RBC Fragments        Reactive Lymphocytes        Rouleaux        Sickle Cells        Smudge Cells        Spherocytes        Stomatocytes        Target Cells        Teardrop Cells        Toxic Neutrophils        RBC Morphology Confirmed RBC Indices     GLUCOSE MONITOR NURSING POCT     CT Abdomen Pelvis w Contrast   Final Result   IMPRESSION:    No acute findings or other explanation for symptoms.             Critical care was not performed.     Medical Decision Making  The patient's presentation was of moderate complexity (a chronic illness mild to moderate exacerbation, progression, or side effect of treatment).    The patient's evaluation involved:  review of external note(s)  from 1 sources (see separate area of note for details)  review of 3+ test result(s) ordered prior to this encounter (see separate area of note for details)  ordering and/or review of 3+ test(s) in this encounter (see separate area of note for details)  Review test performed by another healthcare provider (EKG interpreted by me with above results).    The patient's management necessitated moderate risk (prescription drug management including medications given in the ED).    Assessment & Plan    48 year old female with history of diabetes and cyclical vomiting syndrome to the emergency department with abdominal pain with associated nausea and vomiting.  Patient is mildly tachycardic but otherwise has normal vital signs.  Her abdomen does not have any focal tenderness.  She does have a leukocytosis today with a white count of 13.8.  Remainder of her labs are remarkable for glycemia with a blood sugar of 267.  He has a mild persistent transaminitis consistent with previous results but a normal lipase level.  Urinalysis does not appear infected.  Patient treated with IV fluids, IV Protonix, Zofran, and Zyprexa with good control of her nausea and vomiting.  Blood sugar was 104 on recheck.  Patient will be discharged home with plan for outpatient follow-up.    I have reviewed the nursing notes. I have reviewed the findings, diagnosis, plan and need for follow up with the patient.    New Prescriptions    No medications on file       Final diagnoses:   Nausea and vomiting, unspecified vomiting type     Chart documentation was completed with Dragon voice-recognition software. Even though reviewed, this chart may still contain some grammatical, spelling, and word errors.       [facility] EMERGENCY DEPARTMENT  3/26/2024     Anderson Blas MD  03/27/24 0017       Anderson Blas MD  03/27/24 0027

## 2024-03-27 VITALS
WEIGHT: 200 LBS | DIASTOLIC BLOOD PRESSURE: 84 MMHG | BODY MASS INDEX: 29.62 KG/M2 | TEMPERATURE: 98.3 F | SYSTOLIC BLOOD PRESSURE: 133 MMHG | HEART RATE: 81 BPM | RESPIRATION RATE: 17 BRPM | HEIGHT: 69 IN | OXYGEN SATURATION: 99 %

## 2024-03-27 LAB
ATRIAL RATE - MUSE: 86 BPM
DIASTOLIC BLOOD PRESSURE - MUSE: NORMAL MMHG
GLUCOSE BLDC GLUCOMTR-MCNC: 104 MG/DL (ref 70–99)
INTERPRETATION ECG - MUSE: NORMAL
P AXIS - MUSE: 52 DEGREES
PR INTERVAL - MUSE: 144 MS
QRS DURATION - MUSE: 90 MS
QT - MUSE: 334 MS
QTC - MUSE: 399 MS
R AXIS - MUSE: -24 DEGREES
SYSTOLIC BLOOD PRESSURE - MUSE: NORMAL MMHG
T AXIS - MUSE: -12 DEGREES
VENTRICULAR RATE- MUSE: 86 BPM

## 2024-03-27 PROCEDURE — 82962 GLUCOSE BLOOD TEST: CPT

## 2024-03-27 NOTE — DISCHARGE INSTRUCTIONS
Take your medications as directed.  Drink plenty of fluids.    Follow-up with your primary care clinic.    Return to the emergency department if fever, worse, or other concerns.

## 2024-03-28 ENCOUNTER — HOSPITAL ENCOUNTER (EMERGENCY)
Facility: CLINIC | Age: 49
Discharge: HOME | End: 2024-03-28
Attending: EMERGENCY MEDICINE | Admitting: EMERGENCY MEDICINE
Payer: COMMERCIAL

## 2024-03-28 VITALS
SYSTOLIC BLOOD PRESSURE: 165 MMHG | DIASTOLIC BLOOD PRESSURE: 115 MMHG | OXYGEN SATURATION: 99 % | HEART RATE: 110 BPM | TEMPERATURE: 98.1 F | RESPIRATION RATE: 16 BRPM

## 2024-03-28 DIAGNOSIS — R11.2 CANNABINOID HYPEREMESIS SYNDROME: ICD-10-CM

## 2024-03-28 DIAGNOSIS — F12.90 CANNABINOID HYPEREMESIS SYNDROME: ICD-10-CM

## 2024-03-28 LAB
ALBUMIN SERPL BCG-MCNC: 3.5 G/DL (ref 3.5–5.2)
ALP SERPL-CCNC: 82 U/L (ref 40–150)
ALT SERPL W P-5'-P-CCNC: ABNORMAL U/L
AMPHETAMINES UR QL SCN: ABNORMAL
ANION GAP SERPL CALCULATED.3IONS-SCNC: 12 MMOL/L (ref 7–15)
AST SERPL W P-5'-P-CCNC: ABNORMAL U/L
ATRIAL RATE - MUSE: 85 BPM
BARBITURATES UR QL SCN: ABNORMAL
BASOPHILS # BLD AUTO: 0.1 10E3/UL (ref 0–0.2)
BASOPHILS NFR BLD AUTO: 1 %
BENZODIAZ UR QL SCN: ABNORMAL
BILIRUB SERPL-MCNC: 0.3 MG/DL
BUN SERPL-MCNC: 13.6 MG/DL (ref 6–20)
BZE UR QL SCN: ABNORMAL
CALCIUM SERPL-MCNC: 7.6 MG/DL (ref 8.6–10)
CANNABINOIDS UR QL SCN: ABNORMAL
CHLORIDE SERPL-SCNC: 101 MMOL/L (ref 98–107)
CREAT SERPL-MCNC: 0.63 MG/DL (ref 0.51–0.95)
DEPRECATED HCO3 PLAS-SCNC: 22 MMOL/L (ref 22–29)
DIASTOLIC BLOOD PRESSURE - MUSE: NORMAL MMHG
EGFRCR SERPLBLD CKD-EPI 2021: >90 ML/MIN/1.73M2
EOSINOPHIL # BLD AUTO: 0.3 10E3/UL (ref 0–0.7)
EOSINOPHIL NFR BLD AUTO: 3 %
ERYTHROCYTE [DISTWIDTH] IN BLOOD BY AUTOMATED COUNT: 13.3 % (ref 10–15)
FENTANYL UR QL: ABNORMAL
GLUCOSE BLDC GLUCOMTR-MCNC: 174 MG/DL (ref 70–99)
GLUCOSE SERPL-MCNC: 171 MG/DL (ref 70–99)
HCT VFR BLD AUTO: 39.2 % (ref 35–47)
HGB BLD-MCNC: 13.5 G/DL (ref 11.7–15.7)
HOLD SPECIMEN: NORMAL
IMM GRANULOCYTES # BLD: 0.1 10E3/UL
IMM GRANULOCYTES NFR BLD: 1 %
INTERPRETATION ECG - MUSE: NORMAL
LIPASE SERPL-CCNC: 30 U/L (ref 13–60)
LYMPHOCYTES # BLD AUTO: 1.9 10E3/UL (ref 0.8–5.3)
LYMPHOCYTES NFR BLD AUTO: 20 %
MCH RBC QN AUTO: 30.6 PG (ref 26.5–33)
MCHC RBC AUTO-ENTMCNC: 34.4 G/DL (ref 31.5–36.5)
MCV RBC AUTO: 89 FL (ref 78–100)
MONOCYTES # BLD AUTO: 0.6 10E3/UL (ref 0–1.3)
MONOCYTES NFR BLD AUTO: 7 %
NEUTROPHILS # BLD AUTO: 6.5 10E3/UL (ref 1.6–8.3)
NEUTROPHILS NFR BLD AUTO: 68 %
NRBC # BLD AUTO: 0 10E3/UL
NRBC BLD AUTO-RTO: 0 /100
OPIATES UR QL SCN: ABNORMAL
P AXIS - MUSE: 81 DEGREES
PCP QUAL URINE (ROCHE): ABNORMAL
PLATELET # BLD AUTO: 171 10E3/UL (ref 150–450)
POTASSIUM SERPL-SCNC: 5 MMOL/L (ref 3.4–5.3)
PR INTERVAL - MUSE: 134 MS
PROT SERPL-MCNC: 6.8 G/DL (ref 6.4–8.3)
QRS DURATION - MUSE: 78 MS
QT - MUSE: 348 MS
QTC - MUSE: 414 MS
R AXIS - MUSE: -35 DEGREES
RBC # BLD AUTO: 4.41 10E6/UL (ref 3.8–5.2)
SODIUM SERPL-SCNC: 135 MMOL/L (ref 135–145)
SYSTOLIC BLOOD PRESSURE - MUSE: NORMAL MMHG
T AXIS - MUSE: 27 DEGREES
TROPONIN T SERPL HS-MCNC: 7 NG/L
VENTRICULAR RATE- MUSE: 85 BPM
WBC # BLD AUTO: 9.5 10E3/UL (ref 4–11)

## 2024-03-28 PROCEDURE — 96374 THER/PROPH/DIAG INJ IV PUSH: CPT | Performed by: EMERGENCY MEDICINE

## 2024-03-28 PROCEDURE — 84484 ASSAY OF TROPONIN QUANT: CPT | Performed by: EMERGENCY MEDICINE

## 2024-03-28 PROCEDURE — 250N000011 HC RX IP 250 OP 636: Performed by: EMERGENCY MEDICINE

## 2024-03-28 PROCEDURE — 93010 ELECTROCARDIOGRAM REPORT: CPT | Performed by: EMERGENCY MEDICINE

## 2024-03-28 PROCEDURE — 250N000013 HC RX MED GY IP 250 OP 250 PS 637: Performed by: EMERGENCY MEDICINE

## 2024-03-28 PROCEDURE — 96375 TX/PRO/DX INJ NEW DRUG ADDON: CPT | Performed by: EMERGENCY MEDICINE

## 2024-03-28 PROCEDURE — 250N000009 HC RX 250: Performed by: EMERGENCY MEDICINE

## 2024-03-28 PROCEDURE — 258N000003 HC RX IP 258 OP 636: Performed by: EMERGENCY MEDICINE

## 2024-03-28 PROCEDURE — 85025 COMPLETE CBC W/AUTO DIFF WBC: CPT | Performed by: EMERGENCY MEDICINE

## 2024-03-28 PROCEDURE — 84155 ASSAY OF PROTEIN SERUM: CPT | Performed by: EMERGENCY MEDICINE

## 2024-03-28 PROCEDURE — 93005 ELECTROCARDIOGRAM TRACING: CPT | Performed by: EMERGENCY MEDICINE

## 2024-03-28 PROCEDURE — 80307 DRUG TEST PRSMV CHEM ANLYZR: CPT | Performed by: EMERGENCY MEDICINE

## 2024-03-28 PROCEDURE — 36415 COLL VENOUS BLD VENIPUNCTURE: CPT | Performed by: EMERGENCY MEDICINE

## 2024-03-28 PROCEDURE — 99284 EMERGENCY DEPT VISIT MOD MDM: CPT | Mod: 25 | Performed by: EMERGENCY MEDICINE

## 2024-03-28 PROCEDURE — 93005 ELECTROCARDIOGRAM TRACING: CPT | Mod: RTG

## 2024-03-28 PROCEDURE — 96361 HYDRATE IV INFUSION ADD-ON: CPT | Performed by: EMERGENCY MEDICINE

## 2024-03-28 PROCEDURE — 82040 ASSAY OF SERUM ALBUMIN: CPT | Performed by: EMERGENCY MEDICINE

## 2024-03-28 PROCEDURE — 82962 GLUCOSE BLOOD TEST: CPT

## 2024-03-28 PROCEDURE — 83690 ASSAY OF LIPASE: CPT | Performed by: EMERGENCY MEDICINE

## 2024-03-28 RX ORDER — OLANZAPINE 10 MG/1
5 INJECTION, POWDER, LYOPHILIZED, FOR SOLUTION INTRAMUSCULAR ONCE
Status: COMPLETED | OUTPATIENT
Start: 2024-03-28 | End: 2024-03-28

## 2024-03-28 RX ORDER — MAGNESIUM HYDROXIDE/ALUMINUM HYDROXICE/SIMETHICONE 120; 1200; 1200 MG/30ML; MG/30ML; MG/30ML
15 SUSPENSION ORAL ONCE
Status: COMPLETED | OUTPATIENT
Start: 2024-03-28 | End: 2024-03-28

## 2024-03-28 RX ORDER — PROCHLORPERAZINE MALEATE 10 MG
10 TABLET ORAL EVERY 6 HOURS PRN
Qty: 12 TABLET | Refills: 0 | Status: ON HOLD | OUTPATIENT
Start: 2024-03-28 | End: 2024-08-23

## 2024-03-28 RX ORDER — LIDOCAINE HYDROCHLORIDE 20 MG/ML
10 SOLUTION OROPHARYNGEAL ONCE
Status: COMPLETED | OUTPATIENT
Start: 2024-03-28 | End: 2024-03-28

## 2024-03-28 RX ADMIN — ALUMINUM HYDROXIDE, MAGNESIUM HYDROXIDE, AND DIMETHICONE 15 ML: 200; 20; 200 SUSPENSION ORAL at 07:20

## 2024-03-28 RX ADMIN — PROCHLORPERAZINE EDISYLATE 10 MG: 5 INJECTION INTRAMUSCULAR; INTRAVENOUS at 06:33

## 2024-03-28 RX ADMIN — SODIUM CHLORIDE 1000 ML: 9 INJECTION, SOLUTION INTRAVENOUS at 04:28

## 2024-03-28 RX ADMIN — OLANZAPINE 5 MG: 10 INJECTION, POWDER, FOR SOLUTION INTRAMUSCULAR at 04:28

## 2024-03-28 RX ADMIN — LIDOCAINE HYDROCHLORIDE 10 ML: 20 SOLUTION ORAL at 07:20

## 2024-03-28 ASSESSMENT — ACTIVITIES OF DAILY LIVING (ADL)
ADLS_ACUITY_SCORE: 37

## 2024-03-28 ASSESSMENT — COLUMBIA-SUICIDE SEVERITY RATING SCALE - C-SSRS
1. IN THE PAST MONTH, HAVE YOU WISHED YOU WERE DEAD OR WISHED YOU COULD GO TO SLEEP AND NOT WAKE UP?: NO
2. HAVE YOU ACTUALLY HAD ANY THOUGHTS OF KILLING YOURSELF IN THE PAST MONTH?: NO
6. HAVE YOU EVER DONE ANYTHING, STARTED TO DO ANYTHING, OR PREPARED TO DO ANYTHING TO END YOUR LIFE?: NO

## 2024-03-28 NOTE — ED NOTES
Bed: ED06  Expected date:   Expected time:   Means of arrival:   Comments:  [id number]   Chest pain/SOB  /113 after 2 sprays of nitro

## 2024-03-28 NOTE — ED PROVIDER NOTES
ED Provider Note  [facility]      History     Chief Complaint   Patient presents with    Chest Pain     HPI  Yun Coyle is a 48 year old female who presents to us with chief complaint of nausea vomiting and chest pain.  The chest pain has been going on for the last few hours.  She has had vomiting for the last several days.  Patient has a history of recurrent episodes of cyclical vomiting and cannabinoid hyperemesis.  She feels the same things going on today.  She did use her home Zofran which did not help.  She called EMS who gave her droperidol on the way here.    Past Medical History  Past Medical History:   Diagnosis Date    Acute pancreatitis 06/29/2017    Anxiety     Cauda equina syndrome (H) 03/29/2013    Cyst of right ovary 11/16/2017    Depressive disorder     Gastroesophageal reflux disease     Hypertension     Hyponatremia 10/01/2023    Low back pain 08/20/2012    Overview:   History of cauda equina and surgery.      Type 2 diabetes mellitus (H)     Uncomplicated asthma      Past Surgical History:   Procedure Laterality Date    BACK SURGERY      x2    CHOLECYSTECTOMY      ORTHOPEDIC SURGERY      SALPINGO OOPHORECTOMY,R/L/ELDA Right      albuterol (PROAIR HFA/PROVENTIL HFA/VENTOLIN HFA) 108 (90 Base) MCG/ACT inhaler  Alcohol Swabs PADS  blood glucose (NO BRAND SPECIFIED) lancets standard  blood glucose (NO BRAND SPECIFIED) test strip  blood glucose calibration (NO BRAND SPECIFIED) solution  blood glucose monitoring (NO BRAND SPECIFIED) meter device kit  budesonide-formoterol (SYMBICORT) 160-4.5 MCG/ACT Inhaler  cloNIDine (CATAPRES) 0.1 MG tablet  Continuous Blood Gluc  (FREESTYLE MCKENZIE 2 READER) KESHIA  Continuous Blood Gluc Sensor (FREESTYLE MCKENZIE 2 SENSOR) MISC  glucagon 1 MG kit  glucose (BD GLUCOSE) 4 g chewable tablet  glucose 40 % (400 mg/mL) gel  insulin glargine (LANTUS PEN) 100 UNIT/ML pen  losartan (COZAAR) 25 MG tablet  metFORMIN (GLUCOPHAGE) 500 MG  tablet  methadone (DOLOPHINE-INTENSOL) 10 MG/ML (HIGH CONC) solution  OLANZapine (ZYPREXA) 10 MG tablet  predniSONE (DELTASONE) 20 MG tablet  Sharps Container MISC      No Known Allergies  Family History  Family History   Problem Relation Age of Onset    Breast Cancer Mother         in her 50s.    Diabetes Mother     Breast Cancer Maternal Grandmother         in her 40s.    Diabetes Maternal Grandmother      Social History   Social History     Tobacco Use    Smoking status: Never    Smokeless tobacco: Never   Vaping Use    Vaping Use: Never used   Substance Use Topics    Alcohol use: No    Drug use: Yes     Types: Marijuana     Comment: 4 days ago         A medically appropriate review of systems was performed with pertinent positives and negatives noted in the HPI, and all other systems negative.    Physical Exam   BP: (!) 143/75  Pulse: 82  Temp: 98.1  F (36.7  C)  Resp: 16  SpO2: 100 %  Physical Exam  Constitutional:       General: She is not in acute distress.     Appearance: She is ill-appearing. She is not diaphoretic.   HENT:      Head: Normocephalic and atraumatic.      Right Ear: External ear normal.      Left Ear: External ear normal.      Nose: Nose normal.   Eyes:      Extraocular Movements: Extraocular movements intact.      Conjunctiva/sclera: Conjunctivae normal.   Cardiovascular:      Rate and Rhythm: Normal rate and regular rhythm.      Heart sounds: Normal heart sounds.   Pulmonary:      Effort: Pulmonary effort is normal. No respiratory distress.      Breath sounds: Normal breath sounds.   Abdominal:      General: There is no distension.      Palpations: Abdomen is soft.      Tenderness: There is abdominal tenderness.   Musculoskeletal:         General: No swelling or deformity.      Cervical back: Normal range of motion and neck supple.   Skin:     General: Skin is warm and dry.   Neurological:      Mental Status: Mental status is at baseline.      Comments: Alert, oriented   Psychiatric:          Mood and Affect: Mood normal.         Behavior: Behavior normal.           ED Course, Procedures, & Data      Procedures            EKG Interpretation:      Interpreted by Alcides Garza DO  Time reviewed:412  Symptoms at time of EKG: Chest pain  Rhythm: normal sinus   Rate: normal  Axis: NORMAL  Ectopy: none  Conduction: normal  ST Segments/ T Waves: No ST-T wave changes  Q Waves: none  Comparison to prior: Unchanged    Clinical Impression: normal EKG      Results for orders placed or performed during the hospital encounter of 03/28/24   Glucose by meter     Status: Abnormal   Result Value Ref Range    GLUCOSE BY METER POCT 174 (H) 70 - 99 mg/dL     Medications   sodium chloride 0.9% BOLUS 1,000 mL (1,000 mLs Intravenous $New Bag 3/28/24 0428)   OLANZapine (zyPREXA) IV injection 5 mg (5 mg Intravenous $Given 3/28/24 0428)     Labs Ordered and Resulted from Time of ED Arrival to Time of ED Departure   GLUCOSE BY METER - Abnormal       Result Value    GLUCOSE BY METER POCT 174 (*)    COMPREHENSIVE METABOLIC PANEL   LIPASE   TROPONIN T, HIGH SENSITIVITY     No orders to display          Medical Decision Making  The patient's presentation is strongly suggestive of moderate complexity (a chronic illness mild to moderate exacerbation, progression, or side effect of treatment).    The patient's evaluation involved:  review of external note(s) from 3+ sources (Most recent H&P in addition to clinic and ED note)  review of 2 test result(s) ordered prior to this encounter (Most recent BMP and CBC)    The patient's management involved moderate risk (prescription drug management including medications given in the ED).   Assessment & Plan    48-year-old female presents to us with chief complaint nausea vomiting as well as chest pain.  EKG today was unremarkable.  Troponin and labs were reassuring as well.  Patient is requesting Zyprexa IV as this is what usually resolves her symptoms.  She was given this and symptoms did  improve.  She is feeling better at this point will be discharged.    I have reviewed the nursing notes. I have reviewed the findings, diagnosis, plan and need for follow up with the patient.    New Prescriptions    No medications on file       Final diagnoses:   Cannabinoid hyperemesis syndrome       Alcides Garza  [facility] EMERGENCY DEPARTMENT  3/28/2024     Alcides Garza,   03/29/24 0003

## 2024-03-28 NOTE — DISCHARGE INSTRUCTIONS
Follow-up with your primary care provider.  Return to the emergency department as needed for any new or worsening symptoms.  Compazine for nausea.  Go to methadone clinic today also as planned.

## 2024-03-28 NOTE — ED TRIAGE NOTES
"Pt report feeling nauseous and started \"pucking\" pt report  shortly after she notice left shoulder and chest pain that radiates down arm.     Triage Assessment (Adult)       Row Name 03/28/24 0416          Triage Assessment    Airway WDL WDL        Respiratory WDL    Respiratory WDL WDL        Skin Circulation/Temperature WDL    Skin Circulation/Temperature WDL WDL        Cardiac WDL    Cardiac WDL X;chest pain        Chest Pain Assessment    Chest Pain Location arm, left;shoulder, left     Character squeezing        Peripheral/Neurovascular WDL    Peripheral Neurovascular WDL WDL        Cognitive/Neuro/Behavioral WDL    Cognitive/Neuro/Behavioral WDL WDL                     "

## 2024-03-28 NOTE — ED PROVIDER NOTES
Patient seen by Dr. Garza.  Cyclical vomiting now improved.  Patient would like to be discharged to make to methadone clinic.  Zofran rx given.  Labs reviewed.    Results for orders placed or performed during the hospital encounter of 03/28/24   Comprehensive metabolic panel     Status: Abnormal   Result Value Ref Range    Sodium 135 135 - 145 mmol/L    Potassium 5.0 3.4 - 5.3 mmol/L    Carbon Dioxide (CO2) 22 22 - 29 mmol/L    Anion Gap 12 7 - 15 mmol/L    Urea Nitrogen 13.6 6.0 - 20.0 mg/dL    Creatinine 0.63 0.51 - 0.95 mg/dL    GFR Estimate >90 >60 mL/min/1.73m2    Calcium 7.6 (L) 8.6 - 10.0 mg/dL    Chloride 101 98 - 107 mmol/L    Glucose 171 (H) 70 - 99 mg/dL    Alkaline Phosphatase 82 40 - 150 U/L    AST      ALT      Protein Total 6.8 6.4 - 8.3 g/dL    Albumin 3.5 3.5 - 5.2 g/dL    Bilirubin Total 0.3 <=1.2 mg/dL   Lipase     Status: Normal   Result Value Ref Range    Lipase 30 13 - 60 U/L   Troponin T, High Sensitivity     Status: Normal   Result Value Ref Range    Troponin T, High Sensitivity 7 <=14 ng/L   Glucose by meter     Status: Abnormal   Result Value Ref Range    GLUCOSE BY METER POCT 174 (H) 70 - 99 mg/dL   CBC with platelets and differential     Status: None   Result Value Ref Range    WBC Count 9.5 4.0 - 11.0 10e3/uL    RBC Count 4.41 3.80 - 5.20 10e6/uL    Hemoglobin 13.5 11.7 - 15.7 g/dL    Hematocrit 39.2 35.0 - 47.0 %    MCV 89 78 - 100 fL    MCH 30.6 26.5 - 33.0 pg    MCHC 34.4 31.5 - 36.5 g/dL    RDW 13.3 10.0 - 15.0 %    Platelet Count 171 150 - 450 10e3/uL    % Neutrophils 68 %    % Lymphocytes 20 %    % Monocytes 7 %    % Eosinophils 3 %    % Basophils 1 %    % Immature Granulocytes 1 %    NRBCs per 100 WBC 0 <1 /100    Absolute Neutrophils 6.5 1.6 - 8.3 10e3/uL    Absolute Lymphocytes 1.9 0.8 - 5.3 10e3/uL    Absolute Monocytes 0.6 0.0 - 1.3 10e3/uL    Absolute Eosinophils 0.3 0.0 - 0.7 10e3/uL    Absolute Basophils 0.1 0.0 - 0.2 10e3/uL    Absolute Immature Granulocytes 0.1 <=0.4  10e3/uL    Absolute NRBCs 0.0 10e3/uL   Urine Drug Screen Panel     Status: Abnormal   Result Value Ref Range    Amphetamines Urine Screen Negative Screen Negative    Barbituates Urine Screen Negative Screen Negative    Benzodiazepine Urine Screen Negative Screen Negative    Cannabinoids Urine Screen Positive (A) Screen Negative    Cocaine Urine Screen Negative Screen Negative    Fentanyl Qual Urine Screen Positive (A) Screen Negative    Opiates Urine Screen Negative Screen Negative    PCP Urine Screen Negative Screen Negative   Extra Tube     Status: None    Narrative    The following orders were created for panel order Extra Tube.  Procedure                               Abnormality         Status                     ---------                               -----------         ------                     Extra Purple Top Tube[[id number]]                            Final result                 Please view results for these tests on the individual orders.   Extra Purple Top Tube     Status: None   Result Value Ref Range    Hold Specimen [facility]    EKG 12-lead, tracing only     Status: None   Result Value Ref Range    Systolic Blood Pressure  mmHg    Diastolic Blood Pressure  mmHg    Ventricular Rate 85 BPM    Atrial Rate 85 BPM    MI Interval 134 ms    QRS Duration 78 ms     ms    QTc 414 ms    P Axis 81 degrees    R AXIS -35 degrees    T Axis 27 degrees    Interpretation ECG       Sinus rhythm  Left axis deviation  Abnormal ECG  Unconfirmed report - interpretation of this ECG is computer generated - see medical record for final interpretation  Confirmed by - EMERGENCY ROOM, PHYSICIAN ([id number]),  CALE VILLATORO ([id number]) on 3/28/2024 7:48:41 AM     CBC with platelets differential     Status: None    Narrative    The following orders were created for panel order CBC with platelets differential.  Procedure                               Abnormality         Status                     ---------                                -----------         ------                     CBC with platelets and d...[[id number]]                      Final result                 Please view results for these tests on the individual orders.   Urine Drug Screen     Status: Abnormal    Narrative    The following orders were created for panel order Urine Drug Screen.  Procedure                               Abnormality         Status                     ---------                               -----------         ------                     Urine Drug Screen Panel[[id number]]      Abnormal            Final result                 Please view results for these tests on the individual orders.     Hcg neg 2 days ago.      This note was created at least in part by the use of dragon voice dictation system. Inadvertent typographical errors may still exist.  Mendoza Campos MD.  Patient evaluated in the emergency department during the COVID-19 pandemic period. Careful attention to patients safety was addressed throughout the evaluation. Evaluation and treatment management was initiated with disposition made efficiently and appropriate as possible to minimize any risk of potential exposure to patient during this evaluation.       Mendoza Campos MD  03/28/24 2056

## 2024-04-13 ENCOUNTER — HOSPITAL ENCOUNTER (EMERGENCY)
Facility: CLINIC | Age: 49
Discharge: HOME | End: 2024-04-13
Attending: EMERGENCY MEDICINE | Admitting: EMERGENCY MEDICINE
Payer: COMMERCIAL

## 2024-04-13 ENCOUNTER — APPOINTMENT (OUTPATIENT)
Dept: ULTRASOUND IMAGING | Facility: CLINIC | Age: 49
End: 2024-04-13
Attending: EMERGENCY MEDICINE
Payer: COMMERCIAL

## 2024-04-13 VITALS
HEART RATE: 107 BPM | RESPIRATION RATE: 18 BRPM | DIASTOLIC BLOOD PRESSURE: 88 MMHG | OXYGEN SATURATION: 99 % | TEMPERATURE: 98.2 F | SYSTOLIC BLOOD PRESSURE: 135 MMHG

## 2024-04-13 DIAGNOSIS — R11.15 CYCLIC VOMITING SYNDROME: ICD-10-CM

## 2024-04-13 DIAGNOSIS — G89.29 CHRONIC ABDOMINAL PAIN: ICD-10-CM

## 2024-04-13 DIAGNOSIS — R10.9 CHRONIC ABDOMINAL PAIN: ICD-10-CM

## 2024-04-13 LAB
ALBUMIN SERPL BCG-MCNC: 4.2 G/DL (ref 3.5–5.2)
ALP SERPL-CCNC: 150 U/L (ref 40–150)
ALT SERPL W P-5'-P-CCNC: 155 U/L (ref 0–50)
ANION GAP SERPL CALCULATED.3IONS-SCNC: 19 MMOL/L (ref 7–15)
AST SERPL W P-5'-P-CCNC: 115 U/L (ref 0–45)
ATRIAL RATE - MUSE: 100 BPM
BASOPHILS # BLD AUTO: 0 10E3/UL (ref 0–0.2)
BASOPHILS NFR BLD AUTO: 0 %
BILIRUB SERPL-MCNC: 0.7 MG/DL
BUN SERPL-MCNC: 13.7 MG/DL (ref 6–20)
CALCIUM SERPL-MCNC: 9.7 MG/DL (ref 8.6–10)
CHLORIDE SERPL-SCNC: 92 MMOL/L (ref 98–107)
CREAT SERPL-MCNC: 0.9 MG/DL (ref 0.51–0.95)
DEPRECATED HCO3 PLAS-SCNC: 23 MMOL/L (ref 22–29)
DIASTOLIC BLOOD PRESSURE - MUSE: NORMAL MMHG
EGFRCR SERPLBLD CKD-EPI 2021: 79 ML/MIN/1.73M2
EOSINOPHIL # BLD AUTO: 0.3 10E3/UL (ref 0–0.7)
EOSINOPHIL NFR BLD AUTO: 4 %
ERYTHROCYTE [DISTWIDTH] IN BLOOD BY AUTOMATED COUNT: 12.9 % (ref 10–15)
GLUCOSE BLDC GLUCOMTR-MCNC: 304 MG/DL (ref 70–99)
GLUCOSE BLDC GLUCOMTR-MCNC: 307 MG/DL (ref 70–99)
GLUCOSE SERPL-MCNC: 332 MG/DL (ref 70–99)
HCG SERPL QL: NEGATIVE
HCT VFR BLD AUTO: 42.8 % (ref 35–47)
HGB BLD-MCNC: 14.8 G/DL (ref 11.7–15.7)
IMM GRANULOCYTES # BLD: 0.1 10E3/UL
IMM GRANULOCYTES NFR BLD: 1 %
INTERPRETATION ECG - MUSE: NORMAL
LIPASE SERPL-CCNC: 38 U/L (ref 13–60)
LYMPHOCYTES # BLD AUTO: 3.3 10E3/UL (ref 0.8–5.3)
LYMPHOCYTES NFR BLD AUTO: 36 %
MCH RBC QN AUTO: 30 PG (ref 26.5–33)
MCHC RBC AUTO-ENTMCNC: 34.6 G/DL (ref 31.5–36.5)
MCV RBC AUTO: 87 FL (ref 78–100)
MONOCYTES # BLD AUTO: 0.4 10E3/UL (ref 0–1.3)
MONOCYTES NFR BLD AUTO: 5 %
NEUTROPHILS # BLD AUTO: 5 10E3/UL (ref 1.6–8.3)
NEUTROPHILS NFR BLD AUTO: 54 %
NRBC # BLD AUTO: 0 10E3/UL
NRBC BLD AUTO-RTO: 0 /100
P AXIS - MUSE: 76 DEGREES
PLATELET # BLD AUTO: 281 10E3/UL (ref 150–450)
POTASSIUM SERPL-SCNC: 3.9 MMOL/L (ref 3.4–5.3)
PR INTERVAL - MUSE: 130 MS
PROT SERPL-MCNC: 8.6 G/DL (ref 6.4–8.3)
QRS DURATION - MUSE: 88 MS
QT - MUSE: 338 MS
QTC - MUSE: 436 MS
R AXIS - MUSE: -32 DEGREES
RBC # BLD AUTO: 4.93 10E6/UL (ref 3.8–5.2)
SODIUM SERPL-SCNC: 134 MMOL/L (ref 135–145)
SYSTOLIC BLOOD PRESSURE - MUSE: NORMAL MMHG
T AXIS - MUSE: 41 DEGREES
VENTRICULAR RATE- MUSE: 100 BPM
WBC # BLD AUTO: 9.2 10E3/UL (ref 4–11)

## 2024-04-13 PROCEDURE — 80053 COMPREHEN METABOLIC PANEL: CPT | Performed by: EMERGENCY MEDICINE

## 2024-04-13 PROCEDURE — 93005 ELECTROCARDIOGRAM TRACING: CPT | Performed by: EMERGENCY MEDICINE

## 2024-04-13 PROCEDURE — 83690 ASSAY OF LIPASE: CPT | Performed by: EMERGENCY MEDICINE

## 2024-04-13 PROCEDURE — 99285 EMERGENCY DEPT VISIT HI MDM: CPT | Mod: 25 | Performed by: EMERGENCY MEDICINE

## 2024-04-13 PROCEDURE — 96374 THER/PROPH/DIAG INJ IV PUSH: CPT | Performed by: EMERGENCY MEDICINE

## 2024-04-13 PROCEDURE — 96361 HYDRATE IV INFUSION ADD-ON: CPT | Performed by: EMERGENCY MEDICINE

## 2024-04-13 PROCEDURE — 93010 ELECTROCARDIOGRAM REPORT: CPT | Performed by: EMERGENCY MEDICINE

## 2024-04-13 PROCEDURE — 250N000011 HC RX IP 250 OP 636: Performed by: EMERGENCY MEDICINE

## 2024-04-13 PROCEDURE — 258N000003 HC RX IP 258 OP 636: Performed by: EMERGENCY MEDICINE

## 2024-04-13 PROCEDURE — 99284 EMERGENCY DEPT VISIT MOD MDM: CPT | Mod: 25 | Performed by: EMERGENCY MEDICINE

## 2024-04-13 PROCEDURE — 82962 GLUCOSE BLOOD TEST: CPT

## 2024-04-13 PROCEDURE — 84703 CHORIONIC GONADOTROPIN ASSAY: CPT | Performed by: EMERGENCY MEDICINE

## 2024-04-13 PROCEDURE — 93005 ELECTROCARDIOGRAM TRACING: CPT | Mod: RTG

## 2024-04-13 PROCEDURE — 85004 AUTOMATED DIFF WBC COUNT: CPT | Performed by: EMERGENCY MEDICINE

## 2024-04-13 PROCEDURE — 36415 COLL VENOUS BLD VENIPUNCTURE: CPT | Performed by: EMERGENCY MEDICINE

## 2024-04-13 PROCEDURE — 76705 ECHO EXAM OF ABDOMEN: CPT

## 2024-04-13 RX ORDER — OLANZAPINE 10 MG/1
5 INJECTION, POWDER, LYOPHILIZED, FOR SOLUTION INTRAMUSCULAR ONCE
Status: COMPLETED | OUTPATIENT
Start: 2024-04-13 | End: 2024-04-13

## 2024-04-13 RX ADMIN — OLANZAPINE 5 MG: 10 INJECTION, POWDER, FOR SOLUTION INTRAMUSCULAR at 12:49

## 2024-04-13 RX ADMIN — SODIUM CHLORIDE 1000 ML: 9 INJECTION, SOLUTION INTRAVENOUS at 12:49

## 2024-04-13 RX ADMIN — SODIUM CHLORIDE 1000 ML: 9 INJECTION, SOLUTION INTRAVENOUS at 15:30

## 2024-04-13 ASSESSMENT — ACTIVITIES OF DAILY LIVING (ADL)
ADLS_ACUITY_SCORE: 37

## 2024-04-13 NOTE — DISCHARGE INSTRUCTIONS
TODAY'S VISIT:  You were seen today for nausea and vomiting  -   - If you had any labs or imaging/radiology tests performed today, you should also discuss these tests with your usual provider.     FOLLOW-UP:  Please make an appointment to follow up with:  - Your Primary Care Provider. If you do not have a PCP, please call the Primary Care Center (phone: [phone] for an appointment    - Have your provider review the results from today's visit with you again to make sure no further follow-up or additional testing is needed based on those results.     RETURN TO THE EMERGENCY DEPARTMENT  Return to the Emergency Department at any time for any new or worsening symptoms or any concerns.

## 2024-04-13 NOTE — ED PROVIDER NOTES
History     Chief Complaint   Patient presents with    Abdominal Pain     Abdominal Pain, Shortness of breath     HPI  Yun Coyle is a 48 year old female with a past medical history of pancreatitis, depression, cauda equina, anxiety, ovarian cyst, hypertension, hyponatremia, type 2 diabetes, asthma, cyclic vomiting syndrome, cannabis abuse, hyperlipidemia, OCD, opiate use disorder (on methadone), hepatitis C, obesity, frequent falls who presents to the emergency department with a chief complaint of abdominal pain.  Patient states symptoms started last night were fairly severe, but they have lessened somewhat by this morning.  She did take Zyprexa at home, which she thinks helped.  She does think that IV Zyprexa has helped her more so in the past.  The patient states her abdominal pain is located in the epigastric area and feels like a constant burning.  Her symptoms did start shortly after she had some spaghetti and meatballs at the [employer].    I have reviewed the Medications, Allergies, Past Medical and Surgical History, and Social History in the [employer] system.    Past Medical History:   Diagnosis Date    Acute pancreatitis 06/29/2017    Anxiety     Cauda equina syndrome (H) 03/29/2013    Cyst of right ovary 11/16/2017    Depressive disorder     Gastroesophageal reflux disease     Hypertension     Hyponatremia 10/01/2023    Low back pain 08/20/2012    Overview:   History of cauda equina and surgery.      Type 2 diabetes mellitus (H)     Uncomplicated asthma      Past Surgical History:   Procedure Laterality Date    BACK SURGERY      x2    CHOLECYSTECTOMY      ORTHOPEDIC SURGERY      SALPINGO OOPHORECTOMY,R/L/ELDA Right      No current facility-administered medications for this encounter.     Current Outpatient Medications   Medication Sig Dispense Refill    albuterol (PROAIR HFA/PROVENTIL HFA/VENTOLIN HFA) 108 (90 Base) MCG/ACT inhaler Inhale 2 puffs into the lungs every 4 hours as needed for  shortness of breath or wheezing 18 g 11    Alcohol Swabs PADS Use to swab the area of the injection or marla as directed Per insurance coverage 100 each 11    blood glucose (NO BRAND SPECIFIED) lancets standard To use to test glucose level in the blood Use to test blood sugar once daily as directed. To accompany glucose monitor brands per insurance coverage. 100 each 0    blood glucose (NO BRAND SPECIFIED) test strip To use to test glucose level in the blood Use to test blood sugar Once daily as directed. To accompany glucose monitor brands per insurance coverage. 100 strip 0    blood glucose calibration (NO BRAND SPECIFIED) solution Used to calibrate the blood glucose monitor as needed and as directed.  To accompany  blood glucose brands per insurance coverage 1 each 0    blood glucose monitoring (NO BRAND SPECIFIED) meter device kit Use as directed Per insurance coverage 1 kit 0    budesonide-formoterol (SYMBICORT) 160-4.5 MCG/ACT Inhaler Inhale 2 puffs into the lungs 2 times daily 10.2 g 11    cloNIDine (CATAPRES) 0.1 MG tablet Take 1 tablet (0.1 mg) by mouth 2 times daily 180 tablet 3    Continuous Blood Gluc  (FREESTYLE MCKENZIE 2 READER) KESHIA Use to read blood sugars as per 's instructions. 1 each 0    Continuous Blood Gluc Sensor (FREESTYLE MCKENZIE 2 SENSOR) MISC Change every 14 days. 2 each 5    glucagon 1 MG kit Inject 1 mg Subcutaneous once as needed for low blood sugar (Use as directed by provider) 1 each 0    glucose (BD GLUCOSE) 4 g chewable tablet Take 4 tablets by mouth every 15 minutes as needed for low blood sugar 50 tablet 0    glucose 40 % (400 mg/mL) gel 15 g every 15 minutes by mouth as needed for low blood sugar.  Oral gel is preferable for conscious and able to swallow patient. 112.5 g 0    insulin glargine (LANTUS PEN) 100 UNIT/ML pen Inject 25 Units Subcutaneous every morning (before breakfast) for 30 days 7.5 mL 11    losartan (COZAAR) 25 MG tablet Take 1 tablet (25 mg) by  mouth daily 90 tablet 3    metFORMIN (GLUCOPHAGE) 500 MG tablet Take 2 tablets (1,000 mg) by mouth 2 times daily (with meals) 360 tablet 3    methadone (DOLOPHINE-INTENSOL) 10 MG/ML (HIGH CONC) solution Take 130 mg by mouth daily      OLANZapine (ZYPREXA) 10 MG tablet Take 1 tablet (10 mg) by mouth at bedtime 90 tablet 1    predniSONE (DELTASONE) 20 MG tablet Take two tablets (= 40mg) each day for 5 (five) days if in asthma exacerbation.      prochlorperazine (COMPAZINE) 10 MG tablet Take 1 tablet (10 mg) by mouth every 6 hours as needed for nausea or vomiting 12 tablet 0    Sharps Container MISC Use as directed to dispose of needles, lancets and other sharps 1 each 0     No Known Allergies  Past medical history, past surgical history, medications, and allergies were reviewed with the patient. Additional pertinent items: None    Social History     Socioeconomic History    Marital status: Single     Spouse name: Not on file    Number of children: Not on file    Years of education: Not on file    Highest education level: Not on file   Occupational History    Not on file   Tobacco Use    Smoking status: Never    Smokeless tobacco: Never   Vaping Use    Vaping status: Never Used   Substance and Sexual Activity    Alcohol use: No    Drug use: Yes     Types: Marijuana     Comment: 4 days ago    Sexual activity: Not Currently   Other Topics Concern    Not on file   Social History Narrative    Not on file     Social Determinants of Health     Financial Resource Strain: Low Risk  (1/9/2024)    Financial Resource Strain     Within the past 12 months, have you or your family members you live with been unable to get utilities (heat, electricity) when it was really needed?: No   Food Insecurity: Low Risk  (1/9/2024)    Food Insecurity     Within the past 12 months, did you worry that your food would run out before you got money to buy more?: No     Within the past 12 months, did the food you bought just not last and you didn t  have money to get more?: No   Transportation Needs: High Risk (1/9/2024)    Transportation Needs     Within the past 12 months, has lack of transportation kept you from medical appointments, getting your medicines, non-medical meetings or appointments, work, or from getting things that you need?: Yes   Physical Activity: Not on file   Stress: Not on file   Social Connections: Unknown (4/24/2022)    [facility]    Social Connections     Frequency of Communication with Friends and Family: Not on file   Interpersonal Safety: Low Risk  (11/8/2023)    Interpersonal Safety     Do you feel physically and emotionally safe where you currently live?: Yes     Within the past 12 months, have you been hit, slapped, kicked or otherwise physically hurt by someone?: No     Within the past 12 months, have you been humiliated or emotionally abused in other ways by your partner or ex-partner?: No   Housing Stability: Low Risk  (1/9/2024)    Housing Stability     Do you have housing? : Yes     Are you worried about losing your housing?: No     Social history was reviewed with the patient. Additional pertinent items: None    Review of Systems  A medically appropriate review of systems was performed with pertinent positives and negatives noted in the HPI, and all other systems negative.    Physical Exam          General: Well nourished, well developed, NAD  HEENT: EOMI, anicteric. NCAT, MMM  Neck: no jugular venous distension, supple, nl ROM  Cardiac: Regular rate and rhythm. No murmurs, rubs, or gallops. Normal S1, S2.  Intact peripheral pulses  Pulm: CTAB, no stridor, wheezes, rales, rhonchi  Abd: Soft, tenderness to palpation epigastric area without rebound or guarding, nondistended.  No masses palpated.    Skin: Warm and dry to the touch.  No rash  Extremities: No LE edema, no cyanosis, w/w/p  Neuro: A&Ox3, no gross focal deficits    ED Course        Procedures                EKG  Interpretation:      Interpreted by Katlyn Gale MD  Time reviewed: 1204  Symptoms at time of EKG: Abdominal pain  Rhythm: normal sinus   Rate: normal, 100 beats per minutes  Axis: Left  Ectopy: none  Conduction: normal  ST Segments/ T Waves: No ST-T wave changes  Q Waves: none  Comparison to prior: Unchanged from EKG dated March 28, 2024    Clinical Impression: normal EKG               Labs Ordered and Resulted from Time of ED Arrival to Time of ED Departure   COMPREHENSIVE METABOLIC PANEL - Abnormal       Result Value    Sodium 134 (*)     Potassium 3.9      Carbon Dioxide (CO2) 23      Anion Gap 19 (*)     Urea Nitrogen 13.7      Creatinine 0.90      GFR Estimate 79      Calcium 9.7      Chloride 92 (*)     Glucose 332 (*)     Alkaline Phosphatase 150       (*)      (*)     Protein Total 8.6 (*)     Albumin 4.2      Bilirubin Total 0.7     LIPASE - Normal    Lipase 38     HCG QUALITATIVE PREGNANCY - Normal    hCG Serum Qualitative Negative     CBC WITH PLATELETS AND DIFFERENTIAL    WBC Count 9.2      RBC Count 4.93      Hemoglobin 14.8      Hematocrit 42.8      MCV 87      MCH 30.0      MCHC 34.6      RDW 12.9      Platelet Count 281      % Neutrophils 54      % Lymphocytes 36      % Monocytes 5      % Eosinophils 4      % Basophils 0      % Immature Granulocytes 1      NRBCs per 100 WBC 0      Absolute Neutrophils 5.0      Absolute Lymphocytes 3.3      Absolute Monocytes 0.4      Absolute Eosinophils 0.3      Absolute Basophils 0.0      Absolute Immature Granulocytes 0.1      Absolute NRBCs 0.0     ROUTINE UA WITH MICROSCOPIC REFLEX TO CULTURE            Results for orders placed or performed during the hospital encounter of 04/13/24 (from the past 24 hour(s))   EKG 12-lead, tracing only   Result Value Ref Range    Systolic Blood Pressure  mmHg    Diastolic Blood Pressure  mmHg    Ventricular Rate 100 BPM    Atrial Rate 100 BPM    IA Interval 130 ms    QRS Duration 88 ms     ms    QTc  436 ms    P Axis 76 degrees    R AXIS -32 degrees    T Axis 41 degrees    Interpretation ECG       Sinus rhythm  Biatrial enlargement  Left axis deviation  Nonspecific ST abnormality  Abnormal ECG     CBC with platelets differential    Narrative    The following orders were created for panel order CBC with platelets differential.  Procedure                               Abnormality         Status                     ---------                               -----------         ------                     CBC with platelets and d...[[id number]]                      Final result                 Please view results for these tests on the individual orders.   Comprehensive metabolic panel   Result Value Ref Range    Sodium 134 (L) 135 - 145 mmol/L    Potassium 3.9 3.4 - 5.3 mmol/L    Carbon Dioxide (CO2) 23 22 - 29 mmol/L    Anion Gap 19 (H) 7 - 15 mmol/L    Urea Nitrogen 13.7 6.0 - 20.0 mg/dL    Creatinine 0.90 0.51 - 0.95 mg/dL    GFR Estimate 79 >60 mL/min/1.73m2    Calcium 9.7 8.6 - 10.0 mg/dL    Chloride 92 (L) 98 - 107 mmol/L    Glucose 332 (H) 70 - 99 mg/dL    Alkaline Phosphatase 150 40 - 150 U/L     (H) 0 - 45 U/L     (H) 0 - 50 U/L    Protein Total 8.6 (H) 6.4 - 8.3 g/dL    Albumin 4.2 3.5 - 5.2 g/dL    Bilirubin Total 0.7 <=1.2 mg/dL   Lipase   Result Value Ref Range    Lipase 38 13 - 60 U/L   HCG qualitative Blood   Result Value Ref Range    hCG Serum Qualitative Negative Negative   CBC with platelets and differential   Result Value Ref Range    WBC Count 9.2 4.0 - 11.0 10e3/uL    RBC Count 4.93 3.80 - 5.20 10e6/uL    Hemoglobin 14.8 11.7 - 15.7 g/dL    Hematocrit 42.8 35.0 - 47.0 %    MCV 87 78 - 100 fL    MCH 30.0 26.5 - 33.0 pg    MCHC 34.6 31.5 - 36.5 g/dL    RDW 12.9 10.0 - 15.0 %    Platelet Count 281 150 - 450 10e3/uL    % Neutrophils 54 %    % Lymphocytes 36 %    % Monocytes 5 %    % Eosinophils 4 %    % Basophils 0 %    % Immature Granulocytes 1 %    NRBCs per 100 WBC 0 <1 /100    Absolute  Neutrophils 5.0 1.6 - 8.3 10e3/uL    Absolute Lymphocytes 3.3 0.8 - 5.3 10e3/uL    Absolute Monocytes 0.4 0.0 - 1.3 10e3/uL    Absolute Eosinophils 0.3 0.0 - 0.7 10e3/uL    Absolute Basophils 0.0 0.0 - 0.2 10e3/uL    Absolute Immature Granulocytes 0.1 <=0.4 10e3/uL    Absolute NRBCs 0.0 10e3/uL   US Abdomen Limited    Narrative    EXAM: ULTRASOUND ABDOMEN LIMITED  LOCATION: [facility]  DATE: 04/13/2024    INDICATION: Abdominal pain.  COMPARISON: None.  TECHNIQUE: Limited abdominal ultrasound.    FINDINGS:    GALLBLADDER: Surgically absent.    BILE DUCTS: No biliary dilatation. The common duct measures 7 mm.    LIVER: Normal parenchyma with smooth contour. No focal mass.    RIGHT KIDNEY: No hydronephrosis.    PANCREAS: The visualized portions are normal.    No ascites.      Impression    IMPRESSION:  1.  Negative limited abdominal ultrasound.         Labs, vital signs, and imaging studies were reviewed by me.    Medications   sodium chloride 0.9% BOLUS 1,000 mL (1,000 mLs Intravenous $New Bag 4/13/24 1530)   OLANZapine (zyPREXA) IV injection 5 mg (5 mg Intravenous $Given 4/13/24 1249)   sodium chloride 0.9% BOLUS 1,000 mL (0 mLs Intravenous Stopped 4/13/24 1448)       Assessments & Plan (with Medical Decision Making)   Yun Coyle is a 48 year old female who presents with abdominal pain.  Differential diagnosis includes exacerbation of cyclic vomiting/cannabinoid hyperemesis, pancreatitis, urinary tract infection, gastritis/gastroenteritis, pregnancy, ovarian cyst.  Labs ordered to further evaluate the patient to the emergency department.  Medications ordered for symptomatic relief.  IV fluids also given.    EKG shows normal sinus rhythm    Laboratory workup is remarkable for normal white blood cell count, normal hemoglobin, negative pregnancy testing, normal lipase, increased anion gap, likely secondary to dehydration, the patient's glucose is also elevated, so  DKA would be on the differential as well.  Will recheck glucose after IV fluids given.  AST/ALT are slightly elevated at 115 and 155, respectively.  These have been slightly elevated in the past (these were 49 and 79 when they were checked 2 weeks ago), however, given this worsening, will obtain right upper quadrant ultrasound to ensure no acute abnormality exists.    Right upper quadrant ultrasound is normal    1540 patient was p.o. challenged in the emergency department and was able to tolerate p.o. without difficulty    Critical care was not performed.     Medical Decision Making  The patient's presentation was of high complexity (a chronic illness severe exacerbation, progression, or side effect of treatment).    The patient's evaluation involved:  ordering and/or review of 3+ test(s) in this encounter (see separate area of note for details)    The patient's management necessitated moderate risk (prescription drug management including medications given in the ED).    I have reviewed the nursing notes.    I have reviewed the findings, diagnosis, plan and need for follow up with the patient.    Patient to be discharged home. Advised to follow up with PCP within 1 week. To return to ER immediately with any new/worsening symptoms. Plan of care discussed with patient who expresses understanding and agrees with plan of care.  Patient states she has plenty of olanzapine at home to manage her symptoms there.    New Prescriptions    No medications on file       Final diagnoses:   Chronic abdominal pain   Cyclic vomiting syndrome       MARCELA GALE MD  4/13/2024   [facility] EMERGENCY DEPARTMENT       Marcela Gale MD  04/13/24 1683

## 2024-04-13 NOTE — ED TRIAGE NOTES
Patient reports severe left upper quadrant Pain, beginning yesterday pm. SOB, no relief from inhaler.

## 2024-04-17 NOTE — PROGRESS NOTES
Patient has been approved for [facility] now just awaiting bed availability to schedule intake. Likely today/tomorrow.   Blsa Acevedo [facility] on 10/4/2023 at 8:56 AM     3 times/day

## 2024-04-20 ENCOUNTER — HOSPITAL ENCOUNTER (OUTPATIENT)
Facility: CLINIC | Age: 49
Setting detail: OBSERVATION
Discharge: HOME | End: 2024-04-21
Attending: FAMILY MEDICINE | Admitting: STUDENT IN AN ORGANIZED HEALTH CARE EDUCATION/TRAINING PROGRAM
Payer: COMMERCIAL

## 2024-04-20 DIAGNOSIS — E11.65 TYPE 2 DIABETES MELLITUS WITH HYPERGLYCEMIA, WITH LONG-TERM CURRENT USE OF INSULIN (H): ICD-10-CM

## 2024-04-20 DIAGNOSIS — R94.31 PROLONGED Q-T INTERVAL ON ECG: ICD-10-CM

## 2024-04-20 DIAGNOSIS — R94.31 PROLONGED QT INTERVAL: ICD-10-CM

## 2024-04-20 DIAGNOSIS — R73.9 HYPERGLYCEMIA: ICD-10-CM

## 2024-04-20 DIAGNOSIS — Z91.148 NONCOMPLIANCE WITH MEDICATION REGIMEN: ICD-10-CM

## 2024-04-20 DIAGNOSIS — Z79.4 TYPE 2 DIABETES MELLITUS WITH HYPERGLYCEMIA, WITH LONG-TERM CURRENT USE OF INSULIN (H): ICD-10-CM

## 2024-04-20 DIAGNOSIS — R11.15 CYCLIC VOMITING SYNDROME: ICD-10-CM

## 2024-04-20 DIAGNOSIS — E11.65 TYPE 2 DIABETES MELLITUS WITH HYPERGLYCEMIA, WITHOUT LONG-TERM CURRENT USE OF INSULIN (H): ICD-10-CM

## 2024-04-20 DIAGNOSIS — T38.3X6A: ICD-10-CM

## 2024-04-20 DIAGNOSIS — B18.2 CHRONIC HEPATITIS C WITHOUT HEPATIC COMA (H): Primary | ICD-10-CM

## 2024-04-20 LAB
ALBUMIN SERPL BCG-MCNC: 4.2 G/DL (ref 3.5–5.2)
ALBUMIN UR-MCNC: NEGATIVE MG/DL
ALP SERPL-CCNC: 148 U/L (ref 40–150)
ALT SERPL W P-5'-P-CCNC: 117 U/L (ref 0–50)
ANION GAP SERPL CALCULATED.3IONS-SCNC: 21 MMOL/L (ref 7–15)
APPEARANCE UR: CLEAR
AST SERPL W P-5'-P-CCNC: 69 U/L (ref 0–45)
B-OH-BUTYR SERPL-SCNC: 1.2 MMOL/L
BASE EXCESS BLDV CALC-SCNC: 3 MMOL/L (ref -3–3)
BASE EXCESS BLDV CALC-SCNC: 4 MMOL/L (ref -3–3)
BASOPHILS # BLD AUTO: 0 10E3/UL (ref 0–0.2)
BASOPHILS NFR BLD AUTO: 0 %
BILIRUB SERPL-MCNC: 0.6 MG/DL
BILIRUB UR QL STRIP: NEGATIVE
BUN SERPL-MCNC: 15 MG/DL (ref 6–20)
CA-I BLD-MCNC: 4.5 MG/DL (ref 4.4–5.2)
CALCIUM SERPL-MCNC: 9.6 MG/DL (ref 8.6–10)
CHLORIDE SERPL-SCNC: 90 MMOL/L (ref 98–107)
COLOR UR AUTO: ABNORMAL
CPB POCT: NO
CREAT SERPL-MCNC: 0.73 MG/DL (ref 0.51–0.95)
DEPRECATED HCO3 PLAS-SCNC: 22 MMOL/L (ref 22–29)
EGFRCR SERPLBLD CKD-EPI 2021: >90 ML/MIN/1.73M2
EOSINOPHIL # BLD AUTO: 0 10E3/UL (ref 0–0.7)
EOSINOPHIL NFR BLD AUTO: 0 %
ERYTHROCYTE [DISTWIDTH] IN BLOOD BY AUTOMATED COUNT: 12.8 % (ref 10–15)
ETHANOL SERPL-MCNC: <0.01 G/DL
GLUCOSE BLD-MCNC: 457 MG/DL (ref 70–99)
GLUCOSE BLDC GLUCOMTR-MCNC: 241 MG/DL (ref 70–99)
GLUCOSE BLDC GLUCOMTR-MCNC: 298 MG/DL (ref 70–99)
GLUCOSE BLDC GLUCOMTR-MCNC: 314 MG/DL (ref 70–99)
GLUCOSE BLDC GLUCOMTR-MCNC: 346 MG/DL (ref 70–99)
GLUCOSE BLDC GLUCOMTR-MCNC: 379 MG/DL (ref 70–99)
GLUCOSE SERPL-MCNC: 425 MG/DL (ref 70–99)
GLUCOSE UR STRIP-MCNC: >=1000 MG/DL
HBA1C MFR BLD: 9.4 %
HCG SERPL QL: NEGATIVE
HCO3 BLDV-SCNC: 25 MMOL/L (ref 21–28)
HCO3 BLDV-SCNC: 28 MMOL/L (ref 21–28)
HCT VFR BLD AUTO: 42.2 % (ref 35–47)
HCT VFR BLD CALC: 46 % (ref 35–47)
HGB BLD-MCNC: 14.9 G/DL (ref 11.7–15.7)
HGB BLD-MCNC: 15.6 G/DL (ref 11.7–15.7)
HGB UR QL STRIP: NEGATIVE
HOLD SPECIMEN: NORMAL
IMM GRANULOCYTES # BLD: 0.1 10E3/UL
IMM GRANULOCYTES NFR BLD: 1 %
KETONES UR STRIP-MCNC: 60 MG/DL
LACTATE BLD-SCNC: 6.1 MMOL/L
LACTATE SERPL-SCNC: 2.7 MMOL/L (ref 0.7–2)
LACTATE SERPL-SCNC: 5.4 MMOL/L (ref 0.7–2)
LACTATE SERPL-SCNC: 6 MMOL/L (ref 0.7–2)
LEUKOCYTE ESTERASE UR QL STRIP: NEGATIVE
LIPASE SERPL-CCNC: 37 U/L (ref 13–60)
LYMPHOCYTES # BLD AUTO: 1 10E3/UL (ref 0.8–5.3)
LYMPHOCYTES NFR BLD AUTO: 10 %
MAGNESIUM SERPL-MCNC: 1.4 MG/DL (ref 1.7–2.3)
MCH RBC QN AUTO: 30.5 PG (ref 26.5–33)
MCHC RBC AUTO-ENTMCNC: 35.3 G/DL (ref 31.5–36.5)
MCV RBC AUTO: 87 FL (ref 78–100)
MONOCYTES # BLD AUTO: 0.4 10E3/UL (ref 0–1.3)
MONOCYTES NFR BLD AUTO: 3 %
NEUTROPHILS # BLD AUTO: 9.2 10E3/UL (ref 1.6–8.3)
NEUTROPHILS NFR BLD AUTO: 86 %
NITRATE UR QL: NEGATIVE
NRBC # BLD AUTO: 0 10E3/UL
NRBC BLD AUTO-RTO: 0 /100
PCO2 BLDV: 32 MM HG (ref 40–50)
PCO2 BLDV: 41 MM HG (ref 40–50)
PH BLDV: 7.45 [PH] (ref 7.32–7.43)
PH BLDV: 7.51 [PH] (ref 7.32–7.43)
PH UR STRIP: 7.5 [PH] (ref 5–7)
PHOSPHATE SERPL-MCNC: 3.1 MG/DL (ref 2.5–4.5)
PLATELET # BLD AUTO: 249 10E3/UL (ref 150–450)
PO2 BLDV: 43 MM HG (ref 25–47)
PO2 BLDV: 56 MM HG (ref 25–47)
POTASSIUM BLD-SCNC: 4.9 MMOL/L (ref 3.4–5.3)
POTASSIUM SERPL-SCNC: 4.3 MMOL/L (ref 3.4–5.3)
PROT SERPL-MCNC: 8.5 G/DL (ref 6.4–8.3)
RBC # BLD AUTO: 4.88 10E6/UL (ref 3.8–5.2)
RBC URINE: <1 /HPF
SAO2 % BLDV: 81 % (ref 70–75)
SAO2 % BLDV: 92 % (ref 70–75)
SODIUM BLD-SCNC: 135 MMOL/L (ref 135–145)
SODIUM SERPL-SCNC: 133 MMOL/L (ref 135–145)
SP GR UR STRIP: 1.03 (ref 1–1.03)
SQUAMOUS EPITHELIAL: 1 /HPF
UROBILINOGEN UR STRIP-MCNC: NORMAL MG/DL
WBC # BLD AUTO: 10.7 10E3/UL (ref 4–11)
WBC URINE: 0 /HPF

## 2024-04-20 PROCEDURE — 82803 BLOOD GASES ANY COMBINATION: CPT

## 2024-04-20 PROCEDURE — 96376 TX/PRO/DX INJ SAME DRUG ADON: CPT

## 2024-04-20 PROCEDURE — 82330 ASSAY OF CALCIUM: CPT

## 2024-04-20 PROCEDURE — 96365 THER/PROPH/DIAG IV INF INIT: CPT | Performed by: FAMILY MEDICINE

## 2024-04-20 PROCEDURE — 36415 COLL VENOUS BLD VENIPUNCTURE: CPT | Performed by: FAMILY MEDICINE

## 2024-04-20 PROCEDURE — 85025 COMPLETE CBC W/AUTO DIFF WBC: CPT | Performed by: FAMILY MEDICINE

## 2024-04-20 PROCEDURE — 250N000013 HC RX MED GY IP 250 OP 250 PS 637: Performed by: FAMILY MEDICINE

## 2024-04-20 PROCEDURE — 96366 THER/PROPH/DIAG IV INF ADDON: CPT | Performed by: FAMILY MEDICINE

## 2024-04-20 PROCEDURE — 82962 GLUCOSE BLOOD TEST: CPT

## 2024-04-20 PROCEDURE — 99285 EMERGENCY DEPT VISIT HI MDM: CPT | Mod: 25 | Performed by: FAMILY MEDICINE

## 2024-04-20 PROCEDURE — 250N000011 HC RX IP 250 OP 636: Performed by: FAMILY MEDICINE

## 2024-04-20 PROCEDURE — G0378 HOSPITAL OBSERVATION PER HR: HCPCS

## 2024-04-20 PROCEDURE — 250N000012 HC RX MED GY IP 250 OP 636 PS 637: Performed by: FAMILY MEDICINE

## 2024-04-20 PROCEDURE — 99222 1ST HOSP IP/OBS MODERATE 55: CPT | Mod: AI

## 2024-04-20 PROCEDURE — 83690 ASSAY OF LIPASE: CPT

## 2024-04-20 PROCEDURE — 84703 CHORIONIC GONADOTROPIN ASSAY: CPT | Performed by: FAMILY MEDICINE

## 2024-04-20 PROCEDURE — 93010 ELECTROCARDIOGRAM REPORT: CPT | Performed by: FAMILY MEDICINE

## 2024-04-20 PROCEDURE — 83735 ASSAY OF MAGNESIUM: CPT | Performed by: FAMILY MEDICINE

## 2024-04-20 PROCEDURE — 93005 ELECTROCARDIOGRAM TRACING: CPT | Performed by: FAMILY MEDICINE

## 2024-04-20 PROCEDURE — 82077 ASSAY SPEC XCP UR&BREATH IA: CPT | Performed by: FAMILY MEDICINE

## 2024-04-20 PROCEDURE — 82565 ASSAY OF CREATININE: CPT | Performed by: FAMILY MEDICINE

## 2024-04-20 PROCEDURE — 83605 ASSAY OF LACTIC ACID: CPT | Performed by: FAMILY MEDICINE

## 2024-04-20 PROCEDURE — 250N000013 HC RX MED GY IP 250 OP 250 PS 637

## 2024-04-20 PROCEDURE — 36415 COLL VENOUS BLD VENIPUNCTURE: CPT

## 2024-04-20 PROCEDURE — 96361 HYDRATE IV INFUSION ADD-ON: CPT | Performed by: FAMILY MEDICINE

## 2024-04-20 PROCEDURE — 96375 TX/PRO/DX INJ NEW DRUG ADDON: CPT | Performed by: FAMILY MEDICINE

## 2024-04-20 PROCEDURE — 81001 URINALYSIS AUTO W/SCOPE: CPT | Performed by: FAMILY MEDICINE

## 2024-04-20 PROCEDURE — 84100 ASSAY OF PHOSPHORUS: CPT

## 2024-04-20 PROCEDURE — 96361 HYDRATE IV INFUSION ADD-ON: CPT

## 2024-04-20 PROCEDURE — 82010 KETONE BODYS QUAN: CPT | Performed by: FAMILY MEDICINE

## 2024-04-20 PROCEDURE — 258N000003 HC RX IP 258 OP 636: Performed by: FAMILY MEDICINE

## 2024-04-20 PROCEDURE — 83036 HEMOGLOBIN GLYCOSYLATED A1C: CPT | Performed by: FAMILY MEDICINE

## 2024-04-20 PROCEDURE — 83605 ASSAY OF LACTIC ACID: CPT | Mod: 91

## 2024-04-20 PROCEDURE — 250N000011 HC RX IP 250 OP 636: Performed by: INTERNAL MEDICINE

## 2024-04-20 RX ORDER — CLONIDINE HYDROCHLORIDE 0.1 MG/1
0.1 TABLET ORAL ONCE
Status: COMPLETED | OUTPATIENT
Start: 2024-04-20 | End: 2024-04-20

## 2024-04-20 RX ORDER — SODIUM CHLORIDE 9 MG/ML
INJECTION, SOLUTION INTRAVENOUS CONTINUOUS
Status: DISCONTINUED | OUTPATIENT
Start: 2024-04-20 | End: 2024-04-20

## 2024-04-20 RX ORDER — METHADONE HYDROCHLORIDE 10 MG/ML
140 CONCENTRATE ORAL DAILY
Status: DISCONTINUED | OUTPATIENT
Start: 2024-04-21 | End: 2024-04-21 | Stop reason: HOSPADM

## 2024-04-20 RX ORDER — OLANZAPINE 5 MG/1
10 TABLET ORAL AT BEDTIME
Status: DISCONTINUED | OUTPATIENT
Start: 2024-04-20 | End: 2024-04-21 | Stop reason: HOSPADM

## 2024-04-20 RX ORDER — ONDANSETRON 4 MG/1
4 TABLET, ORALLY DISINTEGRATING ORAL EVERY 6 HOURS PRN
Status: DISCONTINUED | OUTPATIENT
Start: 2024-04-20 | End: 2024-04-21 | Stop reason: HOSPADM

## 2024-04-20 RX ORDER — ONDANSETRON 2 MG/ML
4 INJECTION INTRAMUSCULAR; INTRAVENOUS EVERY 6 HOURS PRN
Status: DISCONTINUED | OUTPATIENT
Start: 2024-04-20 | End: 2024-04-21 | Stop reason: HOSPADM

## 2024-04-20 RX ORDER — MAGNESIUM SULFATE 1 G/100ML
1 INJECTION INTRAVENOUS ONCE
Status: COMPLETED | OUTPATIENT
Start: 2024-04-20 | End: 2024-04-20

## 2024-04-20 RX ORDER — NICOTINE POLACRILEX 4 MG
15-30 LOZENGE BUCCAL
Status: DISCONTINUED | OUTPATIENT
Start: 2024-04-20 | End: 2024-04-21 | Stop reason: HOSPADM

## 2024-04-20 RX ORDER — FLUTICASONE FUROATE AND VILANTEROL 200; 25 UG/1; UG/1
1 POWDER RESPIRATORY (INHALATION) DAILY
Status: DISCONTINUED | OUTPATIENT
Start: 2024-04-21 | End: 2024-04-21 | Stop reason: HOSPADM

## 2024-04-20 RX ORDER — ACETAMINOPHEN 650 MG/1
650 SUPPOSITORY RECTAL EVERY 4 HOURS PRN
Status: DISCONTINUED | OUTPATIENT
Start: 2024-04-20 | End: 2024-04-21 | Stop reason: HOSPADM

## 2024-04-20 RX ORDER — AMOXICILLIN 250 MG
2 CAPSULE ORAL 2 TIMES DAILY PRN
Status: DISCONTINUED | OUTPATIENT
Start: 2024-04-20 | End: 2024-04-21 | Stop reason: HOSPADM

## 2024-04-20 RX ORDER — DEXTROSE MONOHYDRATE 25 G/50ML
25-50 INJECTION, SOLUTION INTRAVENOUS
Status: DISCONTINUED | OUTPATIENT
Start: 2024-04-20 | End: 2024-04-21 | Stop reason: HOSPADM

## 2024-04-20 RX ORDER — PROCHLORPERAZINE MALEATE 5 MG
10 TABLET ORAL EVERY 6 HOURS PRN
Status: DISCONTINUED | OUTPATIENT
Start: 2024-04-20 | End: 2024-04-21 | Stop reason: HOSPADM

## 2024-04-20 RX ORDER — ALBUTEROL SULFATE 90 UG/1
2 AEROSOL, METERED RESPIRATORY (INHALATION) EVERY 4 HOURS PRN
Status: DISCONTINUED | OUTPATIENT
Start: 2024-04-20 | End: 2024-04-21 | Stop reason: HOSPADM

## 2024-04-20 RX ORDER — CLONIDINE HYDROCHLORIDE 0.1 MG/1
0.1 TABLET ORAL 2 TIMES DAILY
Status: DISCONTINUED | OUTPATIENT
Start: 2024-04-20 | End: 2024-04-21 | Stop reason: HOSPADM

## 2024-04-20 RX ORDER — LORAZEPAM 2 MG/ML
1 INJECTION INTRAMUSCULAR ONCE
Status: COMPLETED | OUTPATIENT
Start: 2024-04-20 | End: 2024-04-20

## 2024-04-20 RX ORDER — DEXTROSE MONOHYDRATE 25 G/50ML
25-50 INJECTION, SOLUTION INTRAVENOUS
Status: DISCONTINUED | OUTPATIENT
Start: 2024-04-20 | End: 2024-04-20

## 2024-04-20 RX ORDER — AMOXICILLIN 250 MG
1 CAPSULE ORAL 2 TIMES DAILY PRN
Status: DISCONTINUED | OUTPATIENT
Start: 2024-04-20 | End: 2024-04-21 | Stop reason: HOSPADM

## 2024-04-20 RX ORDER — ACETAMINOPHEN 325 MG/1
650 TABLET ORAL EVERY 4 HOURS PRN
Status: DISCONTINUED | OUTPATIENT
Start: 2024-04-20 | End: 2024-04-21 | Stop reason: HOSPADM

## 2024-04-20 RX ORDER — MAGNESIUM SULFATE HEPTAHYDRATE 40 MG/ML
4 INJECTION, SOLUTION INTRAVENOUS ONCE
Status: COMPLETED | OUTPATIENT
Start: 2024-04-20 | End: 2024-04-21

## 2024-04-20 RX ORDER — SODIUM CHLORIDE 9 MG/ML
INJECTION, SOLUTION INTRAVENOUS CONTINUOUS
Status: ACTIVE | OUTPATIENT
Start: 2024-04-20 | End: 2024-04-21

## 2024-04-20 RX ORDER — PROCHLORPERAZINE 25 MG
25 SUPPOSITORY, RECTAL RECTAL EVERY 12 HOURS PRN
Status: DISCONTINUED | OUTPATIENT
Start: 2024-04-20 | End: 2024-04-21 | Stop reason: HOSPADM

## 2024-04-20 RX ORDER — NICOTINE POLACRILEX 4 MG
15-30 LOZENGE BUCCAL
Status: DISCONTINUED | OUTPATIENT
Start: 2024-04-20 | End: 2024-04-20

## 2024-04-20 RX ADMIN — CLONIDINE HYDROCHLORIDE 0.1 MG: 0.1 TABLET ORAL at 22:12

## 2024-04-20 RX ADMIN — SODIUM CHLORIDE: 9 INJECTION, SOLUTION INTRAVENOUS at 17:02

## 2024-04-20 RX ADMIN — FAMOTIDINE 20 MG: 10 INJECTION, SOLUTION INTRAVENOUS at 16:20

## 2024-04-20 RX ADMIN — CLONIDINE HYDROCHLORIDE 0.1 MG: 0.1 TABLET ORAL at 17:30

## 2024-04-20 RX ADMIN — MAGNESIUM SULFATE HEPTAHYDRATE 4 G: 40 INJECTION, SOLUTION INTRAVENOUS at 22:14

## 2024-04-20 RX ADMIN — SODIUM CHLORIDE 1000 ML: 9 INJECTION, SOLUTION INTRAVENOUS at 12:36

## 2024-04-20 RX ADMIN — MAGNESIUM SULFATE HEPTAHYDRATE 1 G: 1 INJECTION, SOLUTION INTRAVENOUS at 12:18

## 2024-04-20 RX ADMIN — LOSARTAN POTASSIUM 25 MG: 25 TABLET, FILM COATED ORAL at 18:28

## 2024-04-20 RX ADMIN — SODIUM CHLORIDE 1000 ML: 9 INJECTION, SOLUTION INTRAVENOUS at 11:00

## 2024-04-20 RX ADMIN — OLANZAPINE 10 MG: 5 TABLET, FILM COATED ORAL at 22:12

## 2024-04-20 RX ADMIN — INSULIN ASPART 8 UNITS: 100 INJECTION, SOLUTION INTRAVENOUS; SUBCUTANEOUS at 12:28

## 2024-04-20 RX ADMIN — LORAZEPAM 1 MG: 2 INJECTION INTRAMUSCULAR; INTRAVENOUS at 17:30

## 2024-04-20 ASSESSMENT — COLUMBIA-SUICIDE SEVERITY RATING SCALE - C-SSRS
2. HAVE YOU ACTUALLY HAD ANY THOUGHTS OF KILLING YOURSELF IN THE PAST MONTH?: NO
1. IN THE PAST MONTH, HAVE YOU WISHED YOU WERE DEAD OR WISHED YOU COULD GO TO SLEEP AND NOT WAKE UP?: NO
6. HAVE YOU EVER DONE ANYTHING, STARTED TO DO ANYTHING, OR PREPARED TO DO ANYTHING TO END YOUR LIFE?: NO

## 2024-04-20 ASSESSMENT — ACTIVITIES OF DAILY LIVING (ADL)
ADLS_ACUITY_SCORE: 37

## 2024-04-20 NOTE — ED PROVIDER NOTES
[facility] EMERGENCY DEPARTMENT ([facility])    4/20/24      ED PROVIDER NOTE       History     Chief Complaint   Patient presents with    Abdominal Pain     Pt vomiting x 1.5 days. Pt non compliant with medications for the past 2 days. .     The history is provided by the patient and medical records.       Yun Coyle is a 48 year old female with a past medical history of pancreatitis, depression, cauda equina, anxiety, ovarian cyst, hypertension, hyponatremia, type 2 diabetes, asthma, cyclic vomiting syndrome, cannabis abuse, hyperlipidemia, OCD, opiate use disorder (on methadone), hepatitis C, and frequent falls who presents to the emergency department with concerns of 5 days of vomiting.  EMS point-of-care blood glucose was 464.  She was given Zofran and 2 mg Droperidol in-route.  Patient has been noncompliant with her insulin (20 units Lantus daily) for the past 2 days.  She did not take this because of her nausea, as well as the fact that she has not been eating.  She currently has left-sided abdominal pain which is typical of her cyclic vomiting.  She states that nothing in particular brought this on, such as eating a strange food.  She still feels nauseous in the ED after the zofran.  She states that Zyprexa has helped her in the past.  She has some tenderness to palpation of her upper left abdomen.  She reports occasional cannabis use, but states that the vomiting is not associated with this.  She denies alcohol use.  She denies urinary concerns and or diarrhea.  She denies other viral symptoms.  She denies any recent viral contacts.    Past Medical History  Past Medical History:   Diagnosis Date    Acute pancreatitis 06/29/2017    Anxiety     Cauda equina syndrome (H) 03/29/2013    Cyst of right ovary 11/16/2017    Depressive disorder     Gastroesophageal reflux disease     Hypertension     Hyponatremia 10/01/2023    Low back pain 08/20/2012    Overview:   History of cauda equina and  surgery.      Type 2 diabetes mellitus (H)     Uncomplicated asthma      Past Surgical History:   Procedure Laterality Date    BACK SURGERY      x2    CHOLECYSTECTOMY      ORTHOPEDIC SURGERY      SALPINGO OOPHORECTOMY,R/L/ELDA Right      albuterol (PROAIR HFA/PROVENTIL HFA/VENTOLIN HFA) 108 (90 Base) MCG/ACT inhaler  Alcohol Swabs PADS  blood glucose (NO BRAND SPECIFIED) lancets standard  blood glucose (NO BRAND SPECIFIED) test strip  blood glucose calibration (NO BRAND SPECIFIED) solution  blood glucose monitoring (NO BRAND SPECIFIED) meter device kit  budesonide-formoterol (SYMBICORT) 160-4.5 MCG/ACT Inhaler  cloNIDine (CATAPRES) 0.1 MG tablet  Continuous Blood Gluc  (FREESTYLE MCKENZIE 2 READER) KESHIA  Continuous Blood Gluc Sensor (FREESTYLE MCKENZIE 2 SENSOR) MISC  glucagon 1 MG kit  glucose (BD GLUCOSE) 4 g chewable tablet  glucose 40 % (400 mg/mL) gel  insulin glargine (LANTUS PEN) 100 UNIT/ML pen  losartan (COZAAR) 25 MG tablet  metFORMIN (GLUCOPHAGE) 500 MG tablet  methadone (DOLOPHINE-INTENSOL) 10 MG/ML (HIGH CONC) solution  OLANZapine (ZYPREXA) 10 MG tablet  predniSONE (DELTASONE) 20 MG tablet  prochlorperazine (COMPAZINE) 10 MG tablet  Sharps Container MISC      No Known Allergies  Family History  Family History   Problem Relation Age of Onset    Breast Cancer Mother         in her 50s.    Diabetes Mother     Breast Cancer Maternal Grandmother         in her 40s.    Diabetes Maternal Grandmother      Social History   Social History     Tobacco Use    Smoking status: Never    Smokeless tobacco: Never   Vaping Use    Vaping status: Never Used   Substance Use Topics    Alcohol use: No    Drug use: Yes     Types: Marijuana     Comment: 4 days ago         A complete review of systems was performed with pertinent positives and negatives noted in the HPI, and all other systems negative.    Physical Exam   BP: (!) 179/112  Pulse: 118  Temp: 98.8  F (37.1  C)  Resp: 18  SpO2: 98 %  Physical Exam  Vitals and  nursing note reviewed.   Constitutional:       General: She is not in acute distress.     Appearance: Normal appearance. She is not diaphoretic.   HENT:      Head: Atraumatic.      Mouth/Throat:      Mouth: Mucous membranes are dry.   Eyes:      General: No scleral icterus.     Conjunctiva/sclera: Conjunctivae normal.   Cardiovascular:      Rate and Rhythm: Normal rate.      Heart sounds: Normal heart sounds.   Pulmonary:      Effort: No respiratory distress.      Breath sounds: Normal breath sounds.   Abdominal:      General: Abdomen is flat.      Tenderness: There is abdominal tenderness in the epigastric area and left upper quadrant. There is no guarding or rebound.   Musculoskeletal:      Cervical back: Neck supple.   Skin:     General: Skin is warm.      Findings: No rash.   Neurological:      Mental Status: She is alert.           ED Course, Procedures, & Data      Procedures            EKG Interpretation:      Interpreted by Abraham Joy MD  Time reviewed: 1055  Symptoms at time of EKG: Nausea, received IV droperidol  Rhythm: sinus tachycardia  Rate: 100-110  Axis: Left Axis Deviation  Ectopy: none  Conduction: Prolonged QT interval  ST Segments/ T Waves: No ST-T wave changes and No acute ischemic changes  Q Waves: nonspecific  Comparison to prior: QTc more prolonged than previous EKG    Clinical Impression: Sinus tachycardia with prolonged QT interval                Results for orders placed or performed during the hospital encounter of 04/20/24   Comprehensive metabolic panel     Status: Abnormal   Result Value Ref Range    Sodium 133 (L) 135 - 145 mmol/L    Potassium 4.3 3.4 - 5.3 mmol/L    Carbon Dioxide (CO2) 22 22 - 29 mmol/L    Anion Gap 21 (H) 7 - 15 mmol/L    Urea Nitrogen 15.0 6.0 - 20.0 mg/dL    Creatinine 0.73 0.51 - 0.95 mg/dL    GFR Estimate >90 >60 mL/min/1.73m2    Calcium 9.6 8.6 - 10.0 mg/dL    Chloride 90 (L) 98 - 107 mmol/L    Glucose 425 (H) 70 - 99 mg/dL    Alkaline Phosphatase 148 40 -  150 U/L    AST 69 (H) 0 - 45 U/L     (H) 0 - 50 U/L    Protein Total 8.5 (H) 6.4 - 8.3 g/dL    Albumin 4.2 3.5 - 5.2 g/dL    Bilirubin Total 0.6 <=1.2 mg/dL   HCG qualitative Blood     Status: Normal   Result Value Ref Range    hCG Serum Qualitative Negative Negative   UA with Microscopic reflex to Culture     Status: Abnormal    Specimen: Urine, Midstream   Result Value Ref Range    Color Urine Straw Colorless, Straw, Light Yellow, Yellow    Appearance Urine Clear Clear    Glucose Urine >=1000 (A) Negative mg/dL    Bilirubin Urine Negative Negative    Ketones Urine 60 (A) Negative mg/dL    Specific Gravity Urine 1.035 1.003 - 1.035    Blood Urine Negative Negative    pH Urine 7.5 (H) 5.0 - 7.0    Protein Albumin Urine Negative Negative mg/dL    Urobilinogen Urine Normal Normal, 2.0 mg/dL    Nitrite Urine Negative Negative    Leukocyte Esterase Urine Negative Negative    RBC Urine <1 <=2 /HPF    WBC Urine 0 <=5 /HPF    Squamous Epithelials Urine 1 <=1 /HPF    Narrative    Urine Culture not indicated   Magnesium     Status: Abnormal   Result Value Ref Range    Magnesium 1.4 (L) 1.7 - 2.3 mg/dL   Ketone Beta-Hydroxybutyrate Quantitative     Status: Abnormal   Result Value Ref Range    Ketone (Beta-Hydroxybutyrate) Quantitative 1.20 (H) <=0.30 mmol/L   Ethyl Alcohol Level     Status: Normal   Result Value Ref Range    Alcohol ethyl <0.01 <=0.01 g/dL   CBC with platelets and differential     Status: Abnormal   Result Value Ref Range    WBC Count 10.7 4.0 - 11.0 10e3/uL    RBC Count 4.88 3.80 - 5.20 10e6/uL    Hemoglobin 14.9 11.7 - 15.7 g/dL    Hematocrit 42.2 35.0 - 47.0 %    MCV 87 78 - 100 fL    MCH 30.5 26.5 - 33.0 pg    MCHC 35.3 31.5 - 36.5 g/dL    RDW 12.8 10.0 - 15.0 %    Platelet Count 249 150 - 450 10e3/uL    % Neutrophils 86 %    % Lymphocytes 10 %    % Monocytes 3 %    % Eosinophils 0 %    % Basophils 0 %    % Immature Granulocytes 1 %    NRBCs per 100 WBC 0 <1 /100    Absolute Neutrophils 9.2 (H) 1.6  - 8.3 10e3/uL    Absolute Lymphocytes 1.0 0.8 - 5.3 10e3/uL    Absolute Monocytes 0.4 0.0 - 1.3 10e3/uL    Absolute Eosinophils 0.0 0.0 - 0.7 10e3/uL    Absolute Basophils 0.0 0.0 - 0.2 10e3/uL    Absolute Immature Granulocytes 0.1 <=0.4 10e3/uL    Absolute NRBCs 0.0 10e3/uL   Extra Tube     Status: None    Narrative    The following orders were created for panel order Extra Tube.  Procedure                               Abnormality         Status                     ---------                               -----------         ------                     Extra Green Top (Lithium...[[id number]]                      Final result                 Please view results for these tests on the individual orders.   Extra Green Top (Lithium Heparin) ON ICE     Status: None   Result Value Ref Range    Hold Specimen JIC    Lactic acid whole blood     Status: Abnormal   Result Value Ref Range    Lactic Acid 6.0 (HH) 0.7 - 2.0 mmol/L   iStat Gases Electrolytes ICA Glucose Venous, POCT     Status: Abnormal   Result Value Ref Range    CPB Applied No     Hematocrit POCT 46 35 - 47 %    Calcium, Ionized Whole Blood POCT 4.5 4.4 - 5.2 mg/dL    Glucose Whole Blood POCT 457 (H) 70 - 99 mg/dL    Bicarbonate Venous POCT 28 21 - 28 mmol/L    Hemoglobin POCT 15.6 11.7 - 15.7 g/dL    Potassium POCT 4.9 3.4 - 5.3 mmol/L    Sodium POCT 135 135 - 145 mmol/L    pCO2 Venous POCT 41 40 - 50 mm Hg    pO2 Venous POCT 43 25 - 47 mm Hg    pH Venous POCT 7.45 (H) 7.32 - 7.43    O2 Sat, Venous POCT 81 (H) 70 - 75 %    Base Excess/Deficit (+/-) POCT 4.0 (H) -3.0 - 3.0 mmol/L   iStat Gases (lactate) venous, POCT     Status: Abnormal   Result Value Ref Range    Lactic Acid POCT 6.1 (HH) <=2.0 mmol/L    Bicarbonate Venous POCT 25 21 - 28 mmol/L    O2 Sat, Venous POCT 92 (H) 70 - 75 %    pCO2 Venous POCT 32 (L) 40 - 50 mm Hg    pH Venous POCT 7.51 (H) 7.32 - 7.43    pO2 Venous POCT 56 (H) 25 - 47 mm Hg    Base Excess/Deficit (+/-) POCT 3.0 -3.0 - 3.0 mmol/L    Hemoglobin A1c     Status: Abnormal   Result Value Ref Range    Hemoglobin A1C 9.4 (H) <5.7 %   Glucose by meter     Status: Abnormal   Result Value Ref Range    GLUCOSE BY METER POCT 346 (H) 70 - 99 mg/dL   Lactic acid whole blood     Status: Abnormal   Result Value Ref Range    Lactic Acid 5.4 (HH) 0.7 - 2.0 mmol/L   Glucose by meter     Status: Abnormal   Result Value Ref Range    GLUCOSE BY METER POCT 379 (H) 70 - 99 mg/dL   EKG 12-lead, tracing only     Status: None (Preliminary result)   Result Value Ref Range    Systolic Blood Pressure  mmHg    Diastolic Blood Pressure  mmHg    Ventricular Rate 102 BPM    Atrial Rate 102 BPM    DE Interval 146 ms    QRS Duration 84 ms     ms    QTc 531 ms    P Axis 70 degrees    R AXIS -41 degrees    T Axis 45 degrees    Interpretation ECG       Sinus tachycardia  Left axis deviation  Prolonged QT  Abnormal ECG     CBC with platelets differential     Status: Abnormal    Narrative    The following orders were created for panel order CBC with platelets differential.  Procedure                               Abnormality         Status                     ---------                               -----------         ------                     CBC with platelets and d...[[id number]]  Abnormal            Final result                 Please view results for these tests on the individual orders.     Medications   glucose gel 15-30 g (has no administration in time range)     Or   dextrose 50 % injection 25-50 mL (has no administration in time range)     Or   glucagon injection 1 mg (has no administration in time range)   sodium chloride 0.9% BOLUS 1,000 mL (0 mLs Intravenous Stopped 4/20/24 1218)   sodium chloride 0.9% BOLUS 1,000 mL (0 mLs Intravenous Stopped 4/20/24 1531)   famotidine (PEPCID) injection 20 mg (20 mg Intravenous $Given 4/20/24 1620)   insulin aspart (NovoLOG) injection (RAPID ACTING) (8 Units Subcutaneous $Given 4/20/24 1228)   magnesium sulfate 1 g in 100 mL D5W  intermittent infusion (0 g Intravenous Stopped 4/20/24 1523)   insulin aspart (NovoLOG) injection (RAPID ACTING) (8 Units Subcutaneous $Given 4/20/24 1620)     Labs Ordered and Resulted from Time of ED Arrival to Time of ED Departure   COMPREHENSIVE METABOLIC PANEL - Abnormal       Result Value    Sodium 133 (*)     Potassium 4.3      Carbon Dioxide (CO2) 22      Anion Gap 21 (*)     Urea Nitrogen 15.0      Creatinine 0.73      GFR Estimate >90      Calcium 9.6      Chloride 90 (*)     Glucose 425 (*)     Alkaline Phosphatase 148      AST 69 (*)      (*)     Protein Total 8.5 (*)     Albumin 4.2      Bilirubin Total 0.6     ROUTINE UA WITH MICROSCOPIC REFLEX TO CULTURE - Abnormal    Color Urine Straw      Appearance Urine Clear      Glucose Urine >=1000 (*)     Bilirubin Urine Negative      Ketones Urine 60 (*)     Specific Gravity Urine 1.035      Blood Urine Negative      pH Urine 7.5 (*)     Protein Albumin Urine Negative      Urobilinogen Urine Normal      Nitrite Urine Negative      Leukocyte Esterase Urine Negative      RBC Urine <1      WBC Urine 0      Squamous Epithelials Urine 1     MAGNESIUM - Abnormal    Magnesium 1.4 (*)    KETONE BETA-HYDROXYBUTYRATE QUANTITATIVE, RAPID - Abnormal    Ketone (Beta-Hydroxybutyrate) Quantitative 1.20 (*)    CBC WITH PLATELETS AND DIFFERENTIAL - Abnormal    WBC Count 10.7      RBC Count 4.88      Hemoglobin 14.9      Hematocrit 42.2      MCV 87      MCH 30.5      MCHC 35.3      RDW 12.8      Platelet Count 249      % Neutrophils 86      % Lymphocytes 10      % Monocytes 3      % Eosinophils 0      % Basophils 0      % Immature Granulocytes 1      NRBCs per 100 WBC 0      Absolute Neutrophils 9.2 (*)     Absolute Lymphocytes 1.0      Absolute Monocytes 0.4      Absolute Eosinophils 0.0      Absolute Basophils 0.0      Absolute Immature Granulocytes 0.1      Absolute NRBCs 0.0     LACTIC ACID WHOLE BLOOD - Abnormal    Lactic Acid 6.0 (*)    ISTAT GASES ELECTROLYTES  ICA GLUCOSE VENOUS POCT - Abnormal    CPB Applied No      Hematocrit POCT 46      Calcium, Ionized Whole Blood POCT 4.5      Glucose Whole Blood POCT 457 (*)     Bicarbonate Venous POCT 28      Hemoglobin POCT 15.6      Potassium POCT 4.9      Sodium POCT 135      pCO2 Venous POCT 41      pO2 Venous POCT 43      pH Venous POCT 7.45 (*)     O2 Sat, Venous POCT 81 (*)     Base Excess/Deficit (+/-) POCT 4.0 (*)    ISTAT GASES LACTATE VENOUS POCT - Abnormal    Lactic Acid POCT 6.1 (*)     Bicarbonate Venous POCT 25      O2 Sat, Venous POCT 92 (*)     pCO2 Venous POCT 32 (*)     pH Venous POCT 7.51 (*)     pO2 Venous POCT 56 (*)     Base Excess/Deficit (+/-) POCT 3.0     HEMOGLOBIN A1C - Abnormal    Hemoglobin A1C 9.4 (*)    GLUCOSE BY METER - Abnormal    GLUCOSE BY METER POCT 346 (*)    LACTIC ACID WHOLE BLOOD - Abnormal    Lactic Acid 5.4 (*)    GLUCOSE BY METER - Abnormal    GLUCOSE BY METER POCT 379 (*)    HCG QUALITATIVE PREGNANCY - Normal    hCG Serum Qualitative Negative     ETHYL ALCOHOL LEVEL - Normal    Alcohol ethyl <0.01     GLUCOSE MONITOR NURSING POCT   GLUCOSE MONITOR NURSING POCT   GLUCOSE MONITOR NURSING POCT     No orders to display          Critical care was not performed.     Medical Decision Making  The patient's presentation was of high complexity (a chronic illness severe exacerbation, progression, or side effect of treatment).    The patient's evaluation involved:  review of external note(s) from 3+ sources (see separate area of note for details)  review of 3+ test result(s) ordered prior to this encounter (see separate area of note for details)  strong consideration of a test (see separate area of note for details) that was ultimately deferred  ordering and/or review of 3+ test(s) in this encounter (see separate area of note for details)  discussion of management or test interpretation with another health professional (admitting service)    The patient's management necessitated moderate risk  (prescription drug management including medications given in the ED), high risk (drug therapy requiring intensive monitoring (IV magnesium, IV droperidol)), and high risk (a decision regarding hospitalization).    Assessment & Plan    48-year-old patient with multiple medical problems including cyclic vomiting syndrome and chronic pancreatitis with chronic abdominal pain.  Today presenting with 2-day history of nausea and vomiting, noncompliance with insulin due to concerns about poor p.o. intake.  Ischial differential diagnosis includes exacerbation of chronic cyclic vomiting/chronic pancreatitis/chronic abdominal pain, HHN or DKA, pyelonephritis, gastritis, gastric outlet obstruction, gastroparesis, duodenitis, small bowel obstruction, pyelonephritis.  On exam the patient is slightly hypertensive otherwise normal vital signs.  She does not appear in acute distress, her mental status is normal.  Her mucous membranes are dry.  Her neck is supple.  Her cardiovascular exam regular tachycardia at a rate of 100 otherwise normal.  She has epigastric and left upper quadrant tenderness without peritoneal signs.  The remainder of her physical exam is unremarkable.  Patient received droperidol and Zofran and route, EKG does show prolonged QT interval without ischemic changes.  Was placed on a cardiac monitor.  Labs significant for a markedly elevated lactate, positive ketonemia, pH however is within normal range.  Her magnesium is low (replaced with IV magnesium), her blood glucose is 457, normal anion gap.  She received 2 L of IV fluid, IV famotidine, subcutaneous insulin.  Lactate is improved but still above normal.  Glucose despite insulin therapy and fluids has remained persistently elevated, her ketones were somewhat elevated, however she does not have evidence of acidosis on her gas.  Her magnesium has been replaced.  He has improved is taking some sips but still unable to eat.  Serial abdominal exam is benign.  Her  nausea is somewhat better and QTc is quite prolonged making further antiemetics somewhat more tenuous and that she is monitored.  She did not take her blood pressure medications today, over the course of her time here became slightly more hypertensive.  I ordered her home medications.  Did ultimately need Ativan for nausea and anxiety as well.  Limited options due to prolonged QT interval after receiving her other meds.  Given the patient's persistently elevated glucose and lactate, still not taking full p.o., presumably due to cyclic vomiting syndrome, would like to admit to observation for continued hydration, management of blood sugars, careful advancement of diet.  Will discuss with hospitalist.    I have reviewed the nursing notes. I have reviewed the findings, diagnosis, plan and need for follow up with the patient.    New Prescriptions    No medications on file       Final diagnoses:   Cyclic vomiting syndrome   Hyperglycemia       Abraham Joy  I, Robles Carrero, am serving as a trained medical scribe to document services personally performed by Abraham Joy MD based on the provider's statements to me on April 20, 2024.  This document has been checked and approved by the attending provider.    I, Abraham Joy MD, was physically present and have reviewed and verified the accuracy of this note documented by Robles Carrero medical scribe.      Abraham Joy MD   [facility] EMERGENCY DEPARTMENT  4/20/2024     Abraham Joy MD  04/20/24 3145       Abraham Joy MD  04/20/24 0275

## 2024-04-20 NOTE — CONSULTS
"Consult received for Vascular access care.  See LDA for details.  For additional needs place \"Nursing to Consult for Vascular Access\" [id number] order in EPIC.  "

## 2024-04-20 NOTE — ED TRIAGE NOTES
Pt vomiting x 1.5 days. Pt non compliant with medications for the past 2 days. .     Triage Assessment (Adult)       Row Name 04/20/24 1033          Triage Assessment    Airway WDL WDL        Respiratory WDL    Respiratory WDL WDL        Skin Circulation/Temperature WDL    Skin Circulation/Temperature WDL WDL        Peripheral/Neurovascular WDL    Peripheral Neurovascular WDL WDL        Cognitive/Neuro/Behavioral WDL    Cognitive/Neuro/Behavioral WDL WDL

## 2024-04-20 NOTE — H&P
[facility]    History and Physical - [facility] Service       Date of Admission:  4/20/2024    Assessment & Plan      Yun Coyle is a 48 year old female admitted on 4/20/2024. She has a past medical history of pancreatitis, depression, cauda equina, anxiety, ovarian cyst, hypertension, hyponatremia, type 2 diabetes, asthma, cyclic vomiting syndrome, cannabis abuse, hyperlipidemia, OCD, opiate use disorder (on methadone), hepatitis C, and frequent falls who who is being admitted for lactic acidosis and electrolyte abnormalities secondary to cyclical vomiting syndrome.    # Cyclic vomiting syndrome   # Lactic acidosis  # At risk for refeeding syndrome  # Hypomagnesemia  # Transaminitis  # History of pancreatitis  Currently tolerating PO. LA on admission was 6.1. Improved to 5.4 with fluids. Lactic acidosis, transaminitis (AST 69, ), and hypomagnesemia (Mg 1.4) likely secondary to cyclical vomiting syndrome. Unclear etiology. Stopped cannabis for 2 years, but the episodes kept recurring. Physical examination was only positive for epigastric and LLQ tenderness to palpation. Previous imaging of abdomen has consistently been negative. In the setting of her DMII, consider gastroparesis.    Workup:  - CBC, CMP, Mg, Phos daily  - Lactic acid @ 2100  - Lipase, pending  Management:  - s/p 1 L of NS x2  - NS continuous infusion @ 125 mL/hr for 8 hours  - Zofran 4 mg ODT/IV q6hr PRN  - Compazine 10 mg PO/IV or 25 mg suppository q6hr PRN  - PTA Olanzapine 10 mg at bedtime  - Magnesium STANDARD Replacement- RN managed  Considerations:  - ED Care plan if there isn't one already  - For adding ED Care Plans, call Riccardo Villanueva at [phone]    # T2DM    BG on admission was 464. Improved to 314 with a total of 16 units of insulin aspart.  - PTA insulin glargine 16 units daily (80% of PTA dose of 20 units daily)  - HOLD PTA Metformin 1,000 mg BID in the  Well developed, well nourished, in no acute distress, ambulating without difficulty, normal communication ability "setting of lactic acidosis and chronic hepatitis C  - Medium insulin resistance dosing  - Hypoglycemia protocol    # Moderate persistent asthma currently in mild exacerbation  - PTA albuterol 2 puffs q4hr PRN  - PTA budesonide-formoterol 160-4.5 MCG/ACT 1 puff BID    # HTN   - PTA Losartan 25 mg daily   - PTA Clonidine 0.1 mg BID    # Polysubstance use (opiates)  # Depression   # Anxiety    - PTA Methadone 140 mg daily  - PTA Olanzapine 10 mg at bedtime  - PTA Clonidine 0.1 mg BID    # Frequent falls  # History of cauda equina syndrome (2013)  - Fall precautions    # Hepatitis C, chronic untreated,  FIB-4 = 1.2   GI (hepatology) referral placed at discharge on 1/11/2024, but Yun was lost to follow up.  - CTM     Observation Goals: -diagnostic tests and consults completed and resulted, -vital signs normal or at patient baseline, -tolerating oral intake to maintain hydration, -returns to baseline functional status, Nurse to notify provider when observation goals have been met and patient is ready for discharge.  Diet: Regular Diet Adult  DVT Prophylaxis: Low Risk/Ambulatory with no VTE prophylaxis indicated  Blanc Catheter: Not present  Fluids: s/p 1 L of NS x2, NS continuous infusion @ 125 mL/hr for 8 hours  Lines: None     Cardiac Monitoring: None  Code Status: Full Code    Clinically Significant Risk Factors Present on Admission            # Hypomagnesemia: Lowest Mg = 1.4 mg/dL in last 2 days, will replace as needed  # Anion Gap Metabolic Acidosis: Highest Anion Gap = 21 mmol/L in last 2 days, will monitor and treat as appropriate      # Hypertension: Noted on problem list     # DMII: A1C = 9.4 % (Ref range: <5.7 %) within past 6 months    # Severe Obesity: Estimated body mass index is 26,166.87 kg/m  as calculated from the following:    Height as of this encounter: 0.059 m (2.32\").    Weight as of this encounter: 91.1 kg (200 lb 13.4 oz).       # Asthma: noted on problem list        Disposition Plan    "   Expected Discharge Date: 04/21/2024                The patient's care was discussed with the Attending Physician, Dr. Gaurav MD .    Nichole Cordero DO, MPH  [facility]  [facility]  Securely message with [employer] (more info)  Text page via [facility] Paging/Directory   See signed in provider for up to date coverage information  ______________________________________________________________________    Chief Complaint   Intractable nausea and vomiting, hyperglycemia    History is obtained from the patient and EMR    History of Present Illness   Yun Coyle is a 48 year old female with a past medical history of pancreatitis, depression, cauda equina, anxiety, ovarian cyst, hypertension, hyponatremia, type 2 diabetes, asthma, cyclic vomiting syndrome, cannabis abuse, hyperlipidemia, OCD, opiate use disorder (on methadone), hepatitis C, and frequent falls who presents to the emergency department with concerns of 1 day of vomiting that began yesterday morning.      Patient has not been taking her insulin (20 units Lantus daily) for the past 2 days because of her nausea and poor oral intake. She currently has left-sided abdominal pain, which is typical of her cyclic vomiting. She states that Zyprexa has helped her in the past. She reports occasional cannabis use, last use was last week. She denies alcohol use, diarrhea, dysuria, sick contact, chest pain, shortness of breath. Last bowel movement was today, and she reports that it was harder than usual.    Yun states that the episodes of cyclical vomiting began 10 years ago and have become more frequent over the past 2 years. She reports that she has never been this thin and is struggling to gain weight. Six years ago, she quit using cannabis for a total of 2 years, but her episodes continued. She reports that she started smoking cannabis again because it helps with her nausea and appetite.     Chart  review:  Frequent ED visits for abdominal pain and hyperemesis. This is her 7th visit since January. On 1/9/2024, she was admitted to the [place] team for cyclical vomiting. US abdomen on 4/13 was negative. On 03/26/2024, CT abdomen/pelvis with contrast was negative.    ED Course:  Labs:  CBC within normal limits, except absolute neutrophils 9.2  CMP: Na 133, Chloride 90, K 4.3, Glucose 425, Anion gap 21, AST 69, , Protein total 8.5  Ketones 1.2  hCG negative  Mg 1.4,   HbA1c 9.4  pH 7.45, O2 81, Base excess 4  LA 6, repeat LA 5.4    Management:  1 L of NS x2  NS continuous infusion @ 125 mL/hr started at 1705  Famotidine 20 mg  Insulin aspart 8 units x2  Magnesium sulfate 1 g    Past Medical History    Past Medical History:   Diagnosis Date    Acute pancreatitis 06/29/2017    Anxiety     Cauda equina syndrome (H) 03/29/2013    Cyst of right ovary 11/16/2017    Depressive disorder     Gastroesophageal reflux disease     Hypertension     Hyponatremia 10/01/2023    Low back pain 08/20/2012    Overview:   History of cauda equina and surgery.      Type 2 diabetes mellitus (H)     Uncomplicated asthma        Past Surgical History   Past Surgical History:   Procedure Laterality Date    BACK SURGERY      x2    CHOLECYSTECTOMY      ORTHOPEDIC SURGERY      SALPINGO OOPHORECTOMY,R/L/ELDA Right        Prior to Admission Medications   Prior to Admission Medications   Prescriptions Last Dose Informant Patient Reported? Taking?   Alcohol Swabs PADS DME at [facility]  No No   Sig: Use to swab the area of the injection or marla as directed Per insurance coverage   Continuous Blood Gluc  (FREESTYLE MCKENZIE 2 READER) KESHIA DME at [facility]  No No   Sig: Use to read blood sugars as per 's instructions.   Continuous Blood Gluc Sensor (FREESTYLE MCKENZIE 2 SENSOR) [facility] [place]ME at [facility]  No No   Sig: Change every 14 days.   OLANZapine (ZYPREXA) 10 MG tablet 4/18/2024 at PM  No Yes   Sig: Take 1 tablet (10 mg) by mouth at bedtime    Sharps Container MISC [place] [facility]  No No   Sig: Use as directed to dispose of needles, lancets and other sharps   albuterol (PROAIR HFA/PROVENTIL HFA/VENTOLIN HFA) 108 (90 Base) MCG/ACT inhaler PRN at PRN  No Yes   Sig: Inhale 2 puffs into the lungs every 4 hours as needed for shortness of breath or wheezing   blood glucose (NO BRAND SPECIFIED) lancets standard D[place] [facility]  No No   Sig: To use to test glucose level in the blood Use to test blood sugar once daily as directed. To accompany glucose monitor brands per insurance coverage.   blood glucose (NO BRAND SPECIFIED) test strip [place] [facility]  No No   Sig: To use to test glucose level in the blood Use to test blood sugar Once daily as directed. To accompany glucose monitor brands per insurance coverage.   blood glucose calibration (NO BRAND SPECIFIED) solution DME [place] [facility]  No No   Sig: Used to calibrate the blood glucose monitor as needed and as directed.  To accompany  blood glucose brands per insurance coverage   blood glucose monitoring (NO BRAND SPECIFIED) meter device kit [place] [facility]  No No   Sig: Use as directed Per insurance coverage   budesonide-formoterol (SYMBICORT) 160-4.5 MCG/ACT Inhaler 4/19/2024 at PM  No Yes   Sig: Inhale 2 puffs into the lungs 2 times daily   Patient taking differently: Inhale 1 puff into the lungs 2 times daily   cloNIDine (CATAPRES) 0.1 MG tablet 4/18/2024 at PM  No Yes   Sig: Take 1 tablet (0.1 mg) by mouth 2 times daily   glucagon 1 MG kit PRN at PRN  No No   Sig: Inject 1 mg Subcutaneous once as needed for low blood sugar (Use as directed by provider)   glucose (BD GLUCOSE) 4 g chewable tablet PRN at PRN  No No   Sig: Take 4 tablets by mouth every 15 minutes as needed for low blood sugar   glucose 40 % (400 mg/mL) gel PRN at PRN  No No   Sig: 15 g every 15 minutes by mouth as needed for low blood sugar.  Oral gel is preferable for conscious and able to swallow patient.   insulin glargine (LANTUS PEN) 100 UNIT/ML pen 4/18/2024 at AM   No Yes   Sig: Inject 25 Units Subcutaneous every morning (before breakfast) for 30 days   Patient taking differently: Inject 20 Units Subcutaneous every morning (before breakfast)   losartan (COZAAR) 25 MG tablet 4/18/2024 at AM  No Yes   Sig: Take 1 tablet (25 mg) by mouth daily   metFORMIN (GLUCOPHAGE) 500 MG tablet 4/18/2024 at PM  No Yes   Sig: Take 2 tablets (1,000 mg) by mouth 2 times daily (with meals)   methadone (DOLOPHINE-INTENSOL) 10 MG/ML (HIGH CONC) solution 4/19/2024 at AM  Yes Yes   Sig: Take 140 mg by mouth daily   predniSONE (DELTASONE) 20 MG tablet PRN at PRN  Yes No   Sig: Take two tablets (= 40mg) each day for 5 (five) days if in asthma exacerbation.   prochlorperazine (COMPAZINE) 10 MG tablet PRN at PRN  No Yes   Sig: Take 1 tablet (10 mg) by mouth every 6 hours as needed for nausea or vomiting      Facility-Administered Medications: None         Physical Exam   Vital Signs: Temp: 98.5  F (36.9  C) Temp src: Oral BP: 125/89 Pulse: 83   Resp: 20 SpO2: 99 % O2 Device: None (Room air)    Weight: 200 lbs 13.42 oz    Physical Exam  Vitals reviewed.   Constitutional:       Appearance: Normal appearance.   HENT:      Head: Normocephalic and atraumatic.   Eyes:      Extraocular Movements: Extraocular movements intact.      Conjunctiva/sclera: Conjunctivae normal.   Cardiovascular:      Rate and Rhythm: Normal rate and regular rhythm.      Heart sounds: Normal heart sounds.   Pulmonary:      Effort: Pulmonary effort is normal. No respiratory distress.      Breath sounds: Normal breath sounds. No wheezing, rhonchi or rales.   Abdominal:      General: There is no distension.      Palpations: Abdomen is soft.      Tenderness: There is abdominal tenderness (epigastric, LLQ).   Musculoskeletal:         General: Normal range of motion.      Cervical back: Normal range of motion and neck supple.      Right lower leg: No edema.      Left lower leg: No edema.   Skin:     General: Skin is warm and dry.    Neurological:      General: No focal deficit present.      Mental Status: She is alert and oriented to person, place, and time.   Psychiatric:         Mood and Affect: Mood normal.         Behavior: Behavior normal.         Thought Content: Thought content normal.         Judgment: Judgment normal.       Medical Decision Making   Please see A&P for additional details of medical decision making.      Data     I have personally reviewed the following data over the past 24 hrs:    10.7  \   15.6   / 249     135 90 (L) 15.0 /  298 (H)   4.9 22 0.73 \     ALT: 117 (H) AST: 69 (H) AP: 148 TBILI: 0.6   ALB: 4.2 TOT PROTEIN: 8.5 (H) LIPASE: N/A     TSH: N/A T4: N/A A1C: 9.4 (H)     Procal: N/A CRP: N/A Lactic Acid: 5.4 (HH)         Imaging results reviewed over the past 24 hrs:   No results found for this or any previous visit (from the past 24 hour(s)).

## 2024-04-20 NOTE — ED NOTES
Bed: ED03  Expected date: 4/20/24  Expected time:   Means of arrival:   Comments:  Nabeel 437   47 y/o F nausea vomiting   Hypoglycemia   HTN

## 2024-04-20 NOTE — PHARMACY-ADMISSION MEDICATION HISTORY
Pharmacist Admission Medication History    Admission medication history is complete. The information provided in this note is only as accurate as the sources available at the time of the update.    Information Source(s): Patient and CareEverywhere/SureScripts via in-person    Pertinent Information:   Spoke with patient who reports she has not taken most of her medications for a couple days now due to nausea.     Changes made to PTA medication list:  Added: None  Deleted: None  Changed: methadone 130 mg -> 140 mg (per patient); Symbicort 2 puffs -> 1 puff (per patient); Lantus 25 units -> 20 units in the morning (per patient)    Allergies reviewed with patient and updates made in EHR: unable to assess    Medication History Completed By: Wesley Montes PharmD 4/20/2024 5:29 PM    PTA Med List   Medication Sig Last Dose    albuterol (PROAIR HFA/PROVENTIL HFA/VENTOLIN HFA) 108 (90 Base) MCG/ACT inhaler Inhale 2 puffs into the lungs every 4 hours as needed for shortness of breath or wheezing PRN at PRN    budesonide-formoterol (SYMBICORT) 160-4.5 MCG/ACT Inhaler Inhale 2 puffs into the lungs 2 times daily (Patient taking differently: Inhale 1 puff into the lungs 2 times daily) 4/19/2024 at PM    cloNIDine (CATAPRES) 0.1 MG tablet Take 1 tablet (0.1 mg) by mouth 2 times daily 4/18/2024 at PM    insulin glargine (LANTUS PEN) 100 UNIT/ML pen Inject 25 Units Subcutaneous every morning (before breakfast) for 30 days (Patient taking differently: Inject 20 Units Subcutaneous every morning (before breakfast)) 4/18/2024 at AM    losartan (COZAAR) 25 MG tablet Take 1 tablet (25 mg) by mouth daily 4/18/2024 at AM    metFORMIN (GLUCOPHAGE) 500 MG tablet Take 2 tablets (1,000 mg) by mouth 2 times daily (with meals) 4/18/2024 at PM    methadone (DOLOPHINE-INTENSOL) 10 MG/ML (HIGH CONC) solution Take 140 mg by mouth daily 4/19/2024 at AM    OLANZapine (ZYPREXA) 10 MG tablet Take 1 tablet (10 mg) by mouth at bedtime 4/18/2024 at PM     prochlorperazine (COMPAZINE) 10 MG tablet Take 1 tablet (10 mg) by mouth every 6 hours as needed for nausea or vomiting PRN at PRN

## 2024-04-21 VITALS
WEIGHT: 200.84 LBS | TEMPERATURE: 98.4 F | BODY MASS INDEX: 46.48 KG/M2 | OXYGEN SATURATION: 97 % | RESPIRATION RATE: 16 BRPM | HEIGHT: 55 IN | HEART RATE: 80 BPM | SYSTOLIC BLOOD PRESSURE: 152 MMHG | DIASTOLIC BLOOD PRESSURE: 106 MMHG

## 2024-04-21 LAB
ALBUMIN SERPL BCG-MCNC: 3.3 G/DL (ref 3.5–5.2)
ALP SERPL-CCNC: 102 U/L (ref 40–150)
ALT SERPL W P-5'-P-CCNC: 79 U/L (ref 0–50)
ANION GAP SERPL CALCULATED.3IONS-SCNC: 11 MMOL/L (ref 7–15)
AST SERPL W P-5'-P-CCNC: 56 U/L (ref 0–45)
ATRIAL RATE - MUSE: 102 BPM
BASOPHILS # BLD AUTO: 0 10E3/UL (ref 0–0.2)
BASOPHILS NFR BLD AUTO: 0 %
BILIRUB SERPL-MCNC: 0.6 MG/DL
BUN SERPL-MCNC: 14.3 MG/DL (ref 6–20)
CALCIUM SERPL-MCNC: 8.4 MG/DL (ref 8.6–10)
CHLORIDE SERPL-SCNC: 100 MMOL/L (ref 98–107)
CREAT SERPL-MCNC: 0.71 MG/DL (ref 0.51–0.95)
DEPRECATED HCO3 PLAS-SCNC: 25 MMOL/L (ref 22–29)
DIASTOLIC BLOOD PRESSURE - MUSE: NORMAL MMHG
EGFRCR SERPLBLD CKD-EPI 2021: >90 ML/MIN/1.73M2
EOSINOPHIL # BLD AUTO: 0.4 10E3/UL (ref 0–0.7)
EOSINOPHIL NFR BLD AUTO: 5 %
ERYTHROCYTE [DISTWIDTH] IN BLOOD BY AUTOMATED COUNT: 13.2 % (ref 10–15)
GLUCOSE BLDC GLUCOMTR-MCNC: 189 MG/DL (ref 70–99)
GLUCOSE BLDC GLUCOMTR-MCNC: 333 MG/DL (ref 70–99)
GLUCOSE SERPL-MCNC: 198 MG/DL (ref 70–99)
HCT VFR BLD AUTO: 35.2 % (ref 35–47)
HGB BLD-MCNC: 12 G/DL (ref 11.7–15.7)
IMM GRANULOCYTES # BLD: 0.1 10E3/UL
IMM GRANULOCYTES NFR BLD: 1 %
INTERPRETATION ECG - MUSE: NORMAL
LACTATE SERPL-SCNC: 1.5 MMOL/L (ref 0.7–2)
LYMPHOCYTES # BLD AUTO: 4 10E3/UL (ref 0.8–5.3)
LYMPHOCYTES NFR BLD AUTO: 46 %
MAGNESIUM SERPL-MCNC: 2.5 MG/DL (ref 1.7–2.3)
MCH RBC QN AUTO: 30.7 PG (ref 26.5–33)
MCHC RBC AUTO-ENTMCNC: 34.1 G/DL (ref 31.5–36.5)
MCV RBC AUTO: 90 FL (ref 78–100)
MONOCYTES # BLD AUTO: 0.9 10E3/UL (ref 0–1.3)
MONOCYTES NFR BLD AUTO: 11 %
NEUTROPHILS # BLD AUTO: 3.1 10E3/UL (ref 1.6–8.3)
NEUTROPHILS NFR BLD AUTO: 37 %
NRBC # BLD AUTO: 0 10E3/UL
NRBC BLD AUTO-RTO: 0 /100
P AXIS - MUSE: 70 DEGREES
PHOSPHATE SERPL-MCNC: 2.9 MG/DL (ref 2.5–4.5)
PLATELET # BLD AUTO: 210 10E3/UL (ref 150–450)
POTASSIUM SERPL-SCNC: 4 MMOL/L (ref 3.4–5.3)
PR INTERVAL - MUSE: 146 MS
PROT SERPL-MCNC: 6.5 G/DL (ref 6.4–8.3)
QRS DURATION - MUSE: 84 MS
QT - MUSE: 408 MS
QTC - MUSE: 531 MS
R AXIS - MUSE: -41 DEGREES
RBC # BLD AUTO: 3.91 10E6/UL (ref 3.8–5.2)
SODIUM SERPL-SCNC: 136 MMOL/L (ref 135–145)
SYSTOLIC BLOOD PRESSURE - MUSE: NORMAL MMHG
T AXIS - MUSE: 45 DEGREES
VENTRICULAR RATE- MUSE: 102 BPM
WBC # BLD AUTO: 8.5 10E3/UL (ref 4–11)

## 2024-04-21 PROCEDURE — 99239 HOSP IP/OBS DSCHRG MGMT >30: CPT | Mod: GC | Performed by: STUDENT IN AN ORGANIZED HEALTH CARE EDUCATION/TRAINING PROGRAM

## 2024-04-21 PROCEDURE — 82962 GLUCOSE BLOOD TEST: CPT

## 2024-04-21 PROCEDURE — 83735 ASSAY OF MAGNESIUM: CPT | Performed by: INTERNAL MEDICINE

## 2024-04-21 PROCEDURE — 250N000013 HC RX MED GY IP 250 OP 250 PS 637

## 2024-04-21 PROCEDURE — 36415 COLL VENOUS BLD VENIPUNCTURE: CPT

## 2024-04-21 PROCEDURE — 96361 HYDRATE IV INFUSION ADD-ON: CPT

## 2024-04-21 PROCEDURE — 83605 ASSAY OF LACTIC ACID: CPT

## 2024-04-21 PROCEDURE — 84100 ASSAY OF PHOSPHORUS: CPT

## 2024-04-21 PROCEDURE — 250N000012 HC RX MED GY IP 250 OP 636 PS 637

## 2024-04-21 PROCEDURE — 80053 COMPREHEN METABOLIC PANEL: CPT

## 2024-04-21 PROCEDURE — 258N000003 HC RX IP 258 OP 636

## 2024-04-21 PROCEDURE — 85025 COMPLETE CBC W/AUTO DIFF WBC: CPT

## 2024-04-21 PROCEDURE — G0378 HOSPITAL OBSERVATION PER HR: HCPCS

## 2024-04-21 RX ORDER — NALOXONE HYDROCHLORIDE 0.4 MG/ML
0.4 INJECTION, SOLUTION INTRAMUSCULAR; INTRAVENOUS; SUBCUTANEOUS
Status: DISCONTINUED | OUTPATIENT
Start: 2024-04-21 | End: 2024-04-21 | Stop reason: HOSPADM

## 2024-04-21 RX ORDER — PROCHLORPERAZINE 25 MG
25 SUPPOSITORY, RECTAL RECTAL EVERY 12 HOURS PRN
Qty: 24 SUPPOSITORY | Refills: 0 | Status: SHIPPED | OUTPATIENT
Start: 2024-04-21

## 2024-04-21 RX ORDER — NALOXONE HYDROCHLORIDE 0.4 MG/ML
0.2 INJECTION, SOLUTION INTRAMUSCULAR; INTRAVENOUS; SUBCUTANEOUS
Status: DISCONTINUED | OUTPATIENT
Start: 2024-04-21 | End: 2024-04-21 | Stop reason: HOSPADM

## 2024-04-21 RX ADMIN — METHADONE HYDROCHLORIDE 140 MG: 10 CONCENTRATE ORAL at 08:29

## 2024-04-21 RX ADMIN — FLUTICASONE FUROATE AND VILANTEROL TRIFENATATE 1 PUFF: 200; 25 POWDER RESPIRATORY (INHALATION) at 09:09

## 2024-04-21 RX ADMIN — SODIUM CHLORIDE: 9 INJECTION, SOLUTION INTRAVENOUS at 03:50

## 2024-04-21 RX ADMIN — INSULIN GLARGINE 16 UNITS: 100 INJECTION, SOLUTION SUBCUTANEOUS at 09:06

## 2024-04-21 RX ADMIN — LOSARTAN POTASSIUM 25 MG: 25 TABLET, FILM COATED ORAL at 09:11

## 2024-04-21 RX ADMIN — CLONIDINE HYDROCHLORIDE 0.1 MG: 0.1 TABLET ORAL at 09:12

## 2024-04-21 ASSESSMENT — ACTIVITIES OF DAILY LIVING (ADL)
ADLS_ACUITY_SCORE: 23
ADLS_ACUITY_SCORE: 38
ADLS_ACUITY_SCORE: 37
ADLS_ACUITY_SCORE: 34
ADLS_ACUITY_SCORE: 23
ADLS_ACUITY_SCORE: 38
ADLS_ACUITY_SCORE: 34
ADLS_ACUITY_SCORE: 23
ADLS_ACUITY_SCORE: 34

## 2024-04-21 NOTE — PLAN OF CARE
"Admitted/transferred from the ED for observation   2 RN full   skin assessment completed by Shira De Oliveira RN and Samantha MORROW RN. Skin assessment finding: issues found ; small Bruising on pt L   forearm. Skin is dry, feet flaky. Bgl on arrival 298    PT A&Ox4. Able to make needs known. Denied Nausae, SOB or CP.  Pt Tolerating PO. Rated pain 7/10.  RPIV SL. No BM during this shift. Magnesium replaced. Pt independent in room.  Plan of care continues.     Blood pressure (!) 146/96, pulse 83, temperature 98.5  F (36.9  C), temperature source Oral, resp. rate 18, height (!) 0.059 m (2.32\"), weight 91.1 kg (200 lb 13.4 oz), SpO2 99%, not currently breastfeeding.   "

## 2024-04-21 NOTE — PROGRESS NOTES
Pt. discharged at *** via *** to ***.  Pt. was accompanied by ***, and left with personal belongings.  Prior to discharge, PIV was removed.  Pt. received complete discharge paperwork and *** medications as filled by discharge pharmacy.  Pt. was given times of last dose for all discharge medications in writing on discharge medication sheets.  Discharge teaching included *** medication, pain management, activity restrictions, dressing changes, and signs and symptoms of infection.  Pt. to follow up with *** in ***.  Pt. had no further questions at the time of discharge and no unmet needs were identified.

## 2024-04-21 NOTE — PHARMACY-CONSULT NOTE
Opioid Treatment Program (Methadone Clinic) Information Note      Treatment program name: [facility]   Location ([facility] [place], [place]    Phone number: [phone]              Spoke with: ADITI Fulton      Patient's current methadone dose verified as: 140 mg PO daily   Last dose was administered on: 4/19   Take-home dose information (if applicable): N/A      Note(s): Treatment programs in [place] dispense methadone using the 10 mg/ml oral concentrate.      Wesley Montes PharmD on 4/20/2024 at 7:43 PM

## 2024-04-21 NOTE — ED NOTES
Report given to ADITI Silva on 5MS. Pt called and reported IV was accidentally pulled out, new IV to be placed prior to transport upstairs.

## 2024-04-21 NOTE — DISCHARGE SUMMARY
"[facility]  Discharge Summary - Medicine & Pediatrics       Date of Admission:  4/20/2024  Date of Discharge:  4/21/2024  Discharging Provider: Dr. Bay Nolasco  Discharge Service: [facility] Medicine Service    Discharge Diagnoses      Cyclic vomiting syndrome  Hyperglycemia  Chronic hepatitis C without hepatic coma (H)      Clinically Significant Risk Factors     # DMII: A1C = 9.4 % (Ref range: <5.7 %) within past 6 months    # Severe Obesity: Estimated body mass index is 26,166.87 kg/m  as calculated from the following:    Height as of this encounter: 0.059 m (2.32\").    Weight as of this encounter: 91.1 kg (200 lb 13.4 oz).       Follow-ups Needed After Discharge   Follow-up Appointments     Adult [facility] Follow-up and recommended labs and tests      Follow up with primary care provider, Gamaliel Granados, within 7 days for   hospital follow- up.  No follow up labs or test are needed. Recommend   discussing the option for infusion plan for future episodes.      Appointments on [place] and/or [facility] (with [facility] or [facility]   provider or service). Call [phone] if you haven't heard regarding   these appointments within 7 days of discharge.          Consider repeat LFTs    GI referral placed on discharge x 2   - Cyclic vomiting, ? Gastroparesis compounding given poorly controlled diabetes  - Chronic Hep C     Unresulted Labs Ordered in the Past 30 Days of this Admission       No orders found for last 31 day(s).        These results will be followed up by N/A    Discharge Disposition   Discharged to home  Condition at discharge: Stable    Hospital Course   Yun Coyle is a 48 year old female admitted on 4/20/2024. She has a past medical history of pancreatitis, depression, cauda equina, anxiety, ovarian cyst, hypertension, hyponatremia, type 2 diabetes, asthma, cyclic vomiting syndrome, cannabis abuse, hyperlipidemia, OCD, opiate use disorder (on " methadone), hepatitis C, and frequent falls who who is being admitted for lactic acidosis and electrolyte abnormalities secondary to cyclical vomiting syndrome.  The following problems were addressed during her hospitalization:    # Cyclic vomiting syndrome   # Lactic acidosis, resolved  # Hypomagnesemia, resolved  # Transaminitis, improving  # History of pancreatitis  Lactic acidosis, transaminitis (AST 69, ), and hypomagnesemia (Mg 1.4) likely secondary to cyclical vomiting syndrome. Waits to present until 24 hours of vomiting. LA on admission was 6.1. Resolved with fluids. Hypomagnesemia improved with supplementation. LFTs improving. Unclear etiology. Stopped cannabis for 2 years, but the episodes kept recurring. Has since restarted with more frequent episodes. In the setting of her poorly controlled DMII, consider gastroparesis and outpatient GI referral sent. Patient feels that symptoms improved with IVF fluids. Recommend discussing with PCP option of an infusion plan for IVF in the future. Has Zofran and Zyprexa at home. Will send with MD compazine as well.    # Hepatitis C, chronic untreated,  FIB-4 = 1.2   GI (hepatology) referral placed at discharge on 1/11/2024, but Yun was lost to follow up.  - Repeat referral place on discharge today      Chronic/Stable with no changes  # T2DM  - Restarted PTA metformin and insulin on discharge.   # Moderate persistent asthma currently in mild exacerbation - PTA albuterol 2 puffs q4hr PRN; budesonide-formoterol 160-4.5 MCG/ACT 1 puff BID  # HTN - PTA Losartan 25 mg daily, Clonidine 0.1 mg BID  # Polysubstance use (opiates), Depression, Anxiety - PTA Methadone 140 mg daily, Olanzapine 10 mg at bedtime, Clonidine 0.1 mg BID      Consultations This Hospital Stay   NURSING TO CONSULT FOR VASCULAR ACCESS CARE IP CONSULT    Code Status   Full Code       The patient was discussed with Dr. Gaurav Way MD  [facility]  SURG  [facility] [address]  Phone: [phone]  Fax: [phone]  ______________________________________________________________________    Physical Exam   Vital Signs: Temp: 98.6  F (37  C) Temp src: Oral BP: (!) 152/106 (given with clonidine) Pulse: 82   Resp: 16 SpO2: 95 % O2 Device: None (Room air)    Weight: 200 lbs 13.42 oz  Constitutional: awake, alert, cooperative, no apparent distress, and appears stated age  Respiratory: No increased work of breathing, good air exchange, clear to auscultation bilaterally, no crackles or wheezing  Cardiovascular: normal S1 and S2, no murmur noted, and no edema  GI: normal bowel sounds, soft, non-distended, and non-tender  Skin: normal skin color, texture, turgor  Neuropsychiatric: General: normal, calm, and normal eye contact  Level of consciousness: alert / normal  Affect: normal, pleasant, and full  Memory and insight: normal, memory for past and recent events intact, and thought process normal      Primary Care Physician   Gamaliel Granados    Discharge Orders      Adult GI  Referral - Consult Only      Adult GI  Referral - Consult Only      Reason for your hospital stay    You were hospitalized for your nausea, vomiting, and dehydration. You received some IV fluids and nausea medications and were able to tolerate food prior to going home.     Activity    Your activity upon discharge: activity as tolerated     When to contact your care team    Call your primary doctor if you have any of the following: temperature greater than 100.4, increased shortness of breath, persistent vomiting, inability to tolerate anything by mouth     Adult [facility] Follow-up and recommended labs and tests    Follow up with primary care provider, Gamaliel Granados, within 7 days for hospital follow- up.  No follow up labs or test are needed. Recommend discussing the option for infusion plan for future episodes.      Appointments on [facility] and/or  [facility] (with [facility] or [facility] provider or service). Call [phone] if you haven't heard regarding these appointments within 7 days of discharge.     Diet    Follow this diet upon discharge: Orders Placed This Encounter      Regular Diet Adult       Significant Results and Procedures   Results for orders placed or performed during the hospital encounter of 04/13/24   US Abdomen Limited    Narrative    EXAM: ULTRASOUND ABDOMEN LIMITED  LOCATION: [facility]  DATE: 04/13/2024    INDICATION: Abdominal pain.  COMPARISON: None.  TECHNIQUE: Limited abdominal ultrasound.    FINDINGS:    GALLBLADDER: Surgically absent.    BILE DUCTS: No biliary dilatation. The common duct measures 7 mm.    LIVER: Normal parenchyma with smooth contour. No focal mass.    RIGHT KIDNEY: No hydronephrosis.    PANCREAS: The visualized portions are normal.    No ascites.      Impression    IMPRESSION:  1.  Negative limited abdominal ultrasound.         Discharge Medications   Current Discharge Medication List        START taking these medications    Details   prochlorperazine (COMPAZINE) 25 MG suppository Place 1 suppository (25 mg) rectally every 12 hours as needed for nausea or vomiting  Qty: 24 suppository, Refills: 0    Associated Diagnoses: Cyclic vomiting syndrome           CONTINUE these medications which have NOT CHANGED    Details   albuterol (PROAIR HFA/PROVENTIL HFA/VENTOLIN HFA) 108 (90 Base) MCG/ACT inhaler Inhale 2 puffs into the lungs every 4 hours as needed for shortness of breath or wheezing  Qty: 18 g, Refills: 11    Comments: Pharmacy may dispense brand covered by insurance (Proair, or proventil or ventolin or generic albuterol inhaler)  Associated Diagnoses: Moderate persistent asthma with exacerbation      budesonide-formoterol (SYMBICORT) 160-4.5 MCG/ACT Inhaler Inhale 2 puffs into the lungs 2 times daily  Qty: 10.2 g, Refills: 11    Associated Diagnoses: Moderate persistent  asthma with exacerbation      cloNIDine (CATAPRES) 0.1 MG tablet Take 1 tablet (0.1 mg) by mouth 2 times daily  Qty: 180 tablet, Refills: 3    Associated Diagnoses: Secondary hypertension      insulin glargine (LANTUS PEN) 100 UNIT/ML pen Inject 25 Units Subcutaneous every morning (before breakfast) for 30 days  Qty: 7.5 mL, Refills: 11    Comments: If Lantus is not covered by insurance, may substitute Basaglar or Semglee or other insulin glargine product per insurance preference at same dose and frequency.    Associated Diagnoses: Type 2 diabetes mellitus with hyperglycemia, unspecified whether long term insulin use (H)      losartan (COZAAR) 25 MG tablet Take 1 tablet (25 mg) by mouth daily  Qty: 90 tablet, Refills: 3    Associated Diagnoses: Secondary hypertension      metFORMIN (GLUCOPHAGE) 500 MG tablet Take 2 tablets (1,000 mg) by mouth 2 times daily (with meals)  Qty: 360 tablet, Refills: 3    Associated Diagnoses: Type 2 diabetes mellitus with hyperglycemia, with long-term current use of insulin (H)      methadone (DOLOPHINE-INTENSOL) 10 MG/ML (HIGH CONC) solution Take 140 mg by mouth daily      OLANZapine (ZYPREXA) 10 MG tablet Take 1 tablet (10 mg) by mouth at bedtime  Qty: 90 tablet, Refills: 1    Associated Diagnoses: Severe episode of recurrent major depressive disorder, without psychotic features (H); Cyclic vomiting syndrome      prochlorperazine (COMPAZINE) 10 MG tablet Take 1 tablet (10 mg) by mouth every 6 hours as needed for nausea or vomiting  Qty: 12 tablet, Refills: 0      Alcohol Swabs PADS Use to swab the area of the injection or marla as directed Per insurance coverage  Qty: 100 each, Refills: 11    Associated Diagnoses: Type 2 diabetes mellitus without complication, unspecified whether long term insulin use (H)      blood glucose (NO BRAND SPECIFIED) lancets standard To use to test glucose level in the blood Use to test blood sugar once daily as directed. To accompany glucose monitor brands  per insurance coverage.  Qty: 100 each, Refills: 0    Associated Diagnoses: Type 2 diabetes mellitus without complication, unspecified whether long term insulin use (H)      blood glucose (NO BRAND SPECIFIED) test strip To use to test glucose level in the blood Use to test blood sugar Once daily as directed. To accompany glucose monitor brands per insurance coverage.  Qty: 100 strip, Refills: 0    Associated Diagnoses: Type 2 diabetes mellitus without complication, unspecified whether long term insulin use (H)      blood glucose calibration (NO BRAND SPECIFIED) solution Used to calibrate the blood glucose monitor as needed and as directed.  To accompany  blood glucose brands per insurance coverage  Qty: 1 each, Refills: 0    Associated Diagnoses: Type 2 diabetes mellitus without complication, unspecified whether long term insulin use (H)      blood glucose monitoring (NO BRAND SPECIFIED) meter device kit Use as directed Per insurance coverage  Qty: 1 kit, Refills: 0    Associated Diagnoses: Type 2 diabetes mellitus without complication, unspecified whether long term insulin use (H)      Continuous Blood Gluc  (FREESTYLE MCKENZIE 2 READER) KESHIA Use to read blood sugars as per 's instructions.  Qty: 1 each, Refills: 0    Associated Diagnoses: Type 2 diabetes mellitus with hyperglycemia, with long-term current use of insulin (H)      Continuous Blood Gluc Sensor (FREESTYLE MCKENZIE 2 SENSOR) MISC Change every 14 days.  Qty: 2 each, Refills: 5    Associated Diagnoses: Type 2 diabetes mellitus with hyperglycemia, with long-term current use of insulin (H)      glucagon 1 MG kit Inject 1 mg Subcutaneous once as needed for low blood sugar (Use as directed by provider)  Qty: 1 each, Refills: 0    Associated Diagnoses: Type 2 diabetes mellitus without complication, unspecified whether long term insulin use (H)      glucose (BD GLUCOSE) 4 g chewable tablet Take 4 tablets by mouth every 15 minutes as needed for  low blood sugar  Qty: 50 tablet, Refills: 0    Associated Diagnoses: Type 2 diabetes mellitus without complication, unspecified whether long term insulin use (H)      glucose 40 % (400 mg/mL) gel 15 g every 15 minutes by mouth as needed for low blood sugar.  Oral gel is preferable for conscious and able to swallow patient.  Qty: 112.5 g, Refills: 0    Associated Diagnoses: Type 2 diabetes mellitus without complication, unspecified whether long term insulin use (H)      predniSONE (DELTASONE) 20 MG tablet Take two tablets (= 40mg) each day for 5 (five) days if in asthma exacerbation.    Associated Diagnoses: Moderate persistent asthma with exacerbation      Sharps Container MISC Use as directed to dispose of needles, lancets and other sharps  Qty: 1 each, Refills: 0    Associated Diagnoses: Type 2 diabetes mellitus without complication, unspecified whether long term insulin use (H)           Allergies   No Known Allergies

## 2024-04-23 ENCOUNTER — PATIENT OUTREACH (OUTPATIENT)
Dept: CARE COORDINATION | Facility: CLINIC | Age: 49
End: 2024-04-23
Payer: COMMERCIAL

## 2024-04-23 NOTE — PROGRESS NOTES
Connected Care Resource Center Contact  [facility]/Voicemail     Clinical Data: Post-Discharge Outreach     Outreach attempted x 2.  Left message on patient's voicemail, providing [facility]'s central phone number of [phone] ([phone]) for questions/concerns and/or to schedule an appt with an [facility] provider, if they do not have a PCP.      Plan:  [facility] will do no further outreaches at this time.       DA Rainey  Connected [facility] Resource Center, [facility]    *Connected Care Resource Team does NOT follow patient ongoing. Referrals are identified based on internal discharge reports and the outreach is to ensure patient has an understanding of their discharge instructions.

## 2024-05-18 ENCOUNTER — HOSPITAL ENCOUNTER (EMERGENCY)
Facility: CLINIC | Age: 49
Discharge: HOME | End: 2024-05-19
Attending: EMERGENCY MEDICINE | Admitting: EMERGENCY MEDICINE
Payer: COMMERCIAL

## 2024-05-18 VITALS
TEMPERATURE: 98.3 F | RESPIRATION RATE: 16 BRPM | SYSTOLIC BLOOD PRESSURE: 143 MMHG | OXYGEN SATURATION: 100 % | HEART RATE: 94 BPM | DIASTOLIC BLOOD PRESSURE: 97 MMHG

## 2024-05-18 DIAGNOSIS — R11.15 CYCLICAL VOMITING: ICD-10-CM

## 2024-05-18 PROCEDURE — 99283 EMERGENCY DEPT VISIT LOW MDM: CPT | Mod: 25 | Performed by: EMERGENCY MEDICINE

## 2024-05-18 PROCEDURE — 99284 EMERGENCY DEPT VISIT MOD MDM: CPT | Performed by: EMERGENCY MEDICINE

## 2024-05-18 ASSESSMENT — COLUMBIA-SUICIDE SEVERITY RATING SCALE - C-SSRS
6. HAVE YOU EVER DONE ANYTHING, STARTED TO DO ANYTHING, OR PREPARED TO DO ANYTHING TO END YOUR LIFE?: NO
2. HAVE YOU ACTUALLY HAD ANY THOUGHTS OF KILLING YOURSELF IN THE PAST MONTH?: NO
1. IN THE PAST MONTH, HAVE YOU WISHED YOU WERE DEAD OR WISHED YOU COULD GO TO SLEEP AND NOT WAKE UP?: NO

## 2024-05-19 LAB
ALBUMIN SERPL BCG-MCNC: 3.6 G/DL (ref 3.5–5.2)
ALBUMIN UR-MCNC: NEGATIVE MG/DL
ALP SERPL-CCNC: 113 U/L (ref 40–150)
ALT SERPL W P-5'-P-CCNC: 149 U/L (ref 0–50)
ANION GAP SERPL CALCULATED.3IONS-SCNC: 10 MMOL/L (ref 7–15)
APPEARANCE UR: ABNORMAL
AST SERPL W P-5'-P-CCNC: 120 U/L (ref 0–45)
BACTERIA #/AREA URNS HPF: ABNORMAL /HPF
BASOPHILS # BLD AUTO: 0 10E3/UL (ref 0–0.2)
BASOPHILS NFR BLD AUTO: 0 %
BILIRUB SERPL-MCNC: 0.3 MG/DL
BILIRUB UR QL STRIP: NEGATIVE
BUN SERPL-MCNC: 12.8 MG/DL (ref 6–20)
CALCIUM SERPL-MCNC: 9 MG/DL (ref 8.6–10)
CHLORIDE SERPL-SCNC: 100 MMOL/L (ref 98–107)
COLOR UR AUTO: ABNORMAL
CREAT SERPL-MCNC: 0.76 MG/DL (ref 0.51–0.95)
DEPRECATED HCO3 PLAS-SCNC: 26 MMOL/L (ref 22–29)
EGFRCR SERPLBLD CKD-EPI 2021: >90 ML/MIN/1.73M2
EOSINOPHIL # BLD AUTO: 0.6 10E3/UL (ref 0–0.7)
EOSINOPHIL NFR BLD AUTO: 9 %
ERYTHROCYTE [DISTWIDTH] IN BLOOD BY AUTOMATED COUNT: 12.3 % (ref 10–15)
GLUCOSE SERPL-MCNC: 205 MG/DL (ref 70–99)
GLUCOSE UR STRIP-MCNC: NEGATIVE MG/DL
HCT VFR BLD AUTO: 39.5 % (ref 35–47)
HGB BLD-MCNC: 13.7 G/DL (ref 11.7–15.7)
HGB UR QL STRIP: NEGATIVE
HYALINE CASTS: 8 /LPF
IMM GRANULOCYTES # BLD: 0 10E3/UL
IMM GRANULOCYTES NFR BLD: 1 %
KETONES UR STRIP-MCNC: NEGATIVE MG/DL
LEUKOCYTE ESTERASE UR QL STRIP: ABNORMAL
LIPASE SERPL-CCNC: 23 U/L (ref 13–60)
LYMPHOCYTES # BLD AUTO: 2.6 10E3/UL (ref 0.8–5.3)
LYMPHOCYTES NFR BLD AUTO: 35 %
MCH RBC QN AUTO: 30.6 PG (ref 26.5–33)
MCHC RBC AUTO-ENTMCNC: 34.7 G/DL (ref 31.5–36.5)
MCV RBC AUTO: 88 FL (ref 78–100)
MONOCYTES # BLD AUTO: 0.4 10E3/UL (ref 0–1.3)
MONOCYTES NFR BLD AUTO: 6 %
MUCOUS THREADS #/AREA URNS LPF: PRESENT /LPF
NEUTROPHILS # BLD AUTO: 3.7 10E3/UL (ref 1.6–8.3)
NEUTROPHILS NFR BLD AUTO: 49 %
NITRATE UR QL: NEGATIVE
NRBC # BLD AUTO: 0 10E3/UL
NRBC BLD AUTO-RTO: 0 /100
PH UR STRIP: 6 [PH] (ref 5–7)
PLATELET # BLD AUTO: 181 10E3/UL (ref 150–450)
POTASSIUM SERPL-SCNC: 4.3 MMOL/L (ref 3.4–5.3)
PROT SERPL-MCNC: 7.2 G/DL (ref 6.4–8.3)
RBC # BLD AUTO: 4.48 10E6/UL (ref 3.8–5.2)
RBC URINE: 0 /HPF
SODIUM SERPL-SCNC: 136 MMOL/L (ref 135–145)
SP GR UR STRIP: 1.02 (ref 1–1.03)
SQUAMOUS EPITHELIAL: 16 /HPF
UROBILINOGEN UR STRIP-MCNC: NORMAL MG/DL
WBC # BLD AUTO: 7.3 10E3/UL (ref 4–11)
WBC URINE: 5 /HPF

## 2024-05-19 PROCEDURE — 36415 COLL VENOUS BLD VENIPUNCTURE: CPT | Performed by: EMERGENCY MEDICINE

## 2024-05-19 PROCEDURE — 80053 COMPREHEN METABOLIC PANEL: CPT | Performed by: EMERGENCY MEDICINE

## 2024-05-19 PROCEDURE — 81001 URINALYSIS AUTO W/SCOPE: CPT | Performed by: EMERGENCY MEDICINE

## 2024-05-19 PROCEDURE — 96360 HYDRATION IV INFUSION INIT: CPT | Performed by: EMERGENCY MEDICINE

## 2024-05-19 PROCEDURE — 258N000003 HC RX IP 258 OP 636: Performed by: EMERGENCY MEDICINE

## 2024-05-19 PROCEDURE — 83690 ASSAY OF LIPASE: CPT | Performed by: EMERGENCY MEDICINE

## 2024-05-19 PROCEDURE — 85025 COMPLETE CBC W/AUTO DIFF WBC: CPT | Performed by: EMERGENCY MEDICINE

## 2024-05-19 PROCEDURE — 96361 HYDRATE IV INFUSION ADD-ON: CPT | Performed by: EMERGENCY MEDICINE

## 2024-05-19 PROCEDURE — 250N000013 HC RX MED GY IP 250 OP 250 PS 637: Performed by: EMERGENCY MEDICINE

## 2024-05-19 RX ORDER — OLANZAPINE 5 MG/1
5 TABLET, ORALLY DISINTEGRATING ORAL ONCE
Status: COMPLETED | OUTPATIENT
Start: 2024-05-19 | End: 2024-05-19

## 2024-05-19 RX ADMIN — OLANZAPINE 5 MG: 5 TABLET, ORALLY DISINTEGRATING ORAL at 01:12

## 2024-05-19 RX ADMIN — SODIUM CHLORIDE 1000 ML: 9 INJECTION, SOLUTION INTRAVENOUS at 01:12

## 2024-05-19 ASSESSMENT — ACTIVITIES OF DAILY LIVING (ADL)
ADLS_ACUITY_SCORE: 35
ADLS_ACUITY_SCORE: 37
ADLS_ACUITY_SCORE: 37

## 2024-05-19 NOTE — ED NOTES
Patient was accepted at shift change signout pending labs, discharge home if feeling better and can tolerate p.o.  CBC was checked and unremarkable, lipase normal.  CMP revealed elevated transaminases with an AST of 120, ALT of 149.  This is a little up from most recent with an AST of 56, ALT of 79-though she has had transaminases in the 100s within the last couple of months.  Her gallbladder is surgically absent.  On repeat evaluation she reports feeling much improved.  She was able to tolerate p.o. challenge.  She states she feels ready to discharge.  She is encouraged to follow-up with her outpatient provider, return with any new or worsening symptoms, any other concerns.  She verbalizes understanding and is agreeable to the plan.    Dictation Disclaimer: Some of this Note has been completed with voice-recognition dictation software. Although errors are generally corrected real-time, there is the potential for a rare error to be present in the completed chart.       Addis Julio MD  05/19/24 [id number]

## 2024-05-19 NOTE — DISCHARGE INSTRUCTIONS
Please make an appointment to [facility] with [facility] (phone: [phone]) in 3-4 days even if entirely better.

## 2024-05-19 NOTE — ED NOTES
Bed: ED15  Expected date:   Expected time:   Means of arrival:   Comments:  H441 -To triage  48 F Nausea eta 1

## 2024-05-19 NOTE — ED PROVIDER NOTES
ED Provider Note  [facility]      History   No chief complaint on file.    The history is provided by the patient and medical records.     Yun Coyle is a 48 year old female with a past medical history of pancreatitis, depression, cauda equina, anxiety, ovarian cyst, hypertension, hyponatremia, type 2 diabetes, asthma, cyclic vomiting syndrome, cannabis abuse, hyperlipidemia, OCD, opiate use disorder (on methadone), hepatitis C, obesity, frequent falls presents to the emergency department due to nausea and vomiting.    Patient has been having nausea and vomiting for the last 24 hours, cannot count how many times she has vomited.  Patient states this feels like her chronic pancreatitis.  Patient states that droperidol has helped a little bit with her symptoms.  Patient denies pain at this time, fever, any recent alcohol or drug use.  Patient had a cholecystectomy and right salpingo oophorectomy in the past.    Past Medical History  Past Medical History:   Diagnosis Date    Acute pancreatitis 06/29/2017    Anxiety     Cauda equina syndrome (H) 03/29/2013    Cyst of right ovary 11/16/2017    Depressive disorder     Gastroesophageal reflux disease     Hypertension     Hyponatremia 10/01/2023    Low back pain 08/20/2012    Overview:   History of cauda equina and surgery.      Type 2 diabetes mellitus (H)     Uncomplicated asthma      Past Surgical History:   Procedure Laterality Date    BACK SURGERY      x2    CHOLECYSTECTOMY      ORTHOPEDIC SURGERY      SALPINGO OOPHORECTOMY,R/L/ELDA Right      albuterol (PROAIR HFA/PROVENTIL HFA/VENTOLIN HFA) 108 (90 Base) MCG/ACT inhaler  Alcohol Swabs PADS  blood glucose (NO BRAND SPECIFIED) lancets standard  blood glucose (NO BRAND SPECIFIED) test strip  blood glucose calibration (NO BRAND SPECIFIED) solution  blood glucose monitoring (NO BRAND SPECIFIED) meter device kit  budesonide-formoterol (SYMBICORT) 160-4.5 MCG/ACT Inhaler  cloNIDine  (CATAPRES) 0.1 MG tablet  Continuous Blood Gluc  (FREESTYLE MCKENZIE 2 READER) KESHIA  Continuous Blood Gluc Sensor (FREESTYLE MCKENZIE 2 SENSOR) MISC  glucagon 1 MG kit  glucose (BD GLUCOSE) 4 g chewable tablet  glucose 40 % (400 mg/mL) gel  insulin glargine (LANTUS PEN) 100 UNIT/ML pen  losartan (COZAAR) 25 MG tablet  metFORMIN (GLUCOPHAGE) 500 MG tablet  methadone (DOLOPHINE-INTENSOL) 10 MG/ML (HIGH CONC) solution  OLANZapine (ZYPREXA) 10 MG tablet  predniSONE (DELTASONE) 20 MG tablet  prochlorperazine (COMPAZINE) 10 MG tablet  prochlorperazine (COMPAZINE) 25 MG suppository  Sharps Container MISC      No Known Allergies  Family History  Family History   Problem Relation Age of Onset    Breast Cancer Mother         in her 50s.    Diabetes Mother     Breast Cancer Maternal Grandmother         in her 40s.    Diabetes Maternal Grandmother      Social History   Social History     Tobacco Use    Smoking status: Never    Smokeless tobacco: Never   Vaping Use    Vaping status: Never Used   Substance Use Topics    Alcohol use: No    Drug use: Yes     Types: Marijuana     Comment: 4 days ago         A medically appropriate review of systems was performed with pertinent positives and negatives noted in the HPI, and all other systems negative.    Physical Exam      Physical Exam  Constitutional:       Appearance: She is well-developed.   HENT:      Head: Normocephalic and atraumatic.   Cardiovascular:      Rate and Rhythm: Normal rate and regular rhythm.      Heart sounds: Normal heart sounds.   Pulmonary:      Effort: Pulmonary effort is normal. No respiratory distress.      Breath sounds: Normal breath sounds.   Abdominal:      General: There is no distension.      Palpations: Abdomen is soft.      Tenderness: There is no abdominal tenderness. There is no rebound.   Musculoskeletal:         General: No tenderness.      Cervical back: Normal range of motion.   Skin:     General: Skin is warm and dry.   Neurological:       Mental Status: She is alert and oriented to person, place, and time.   Psychiatric:         Behavior: Behavior normal.         Thought Content: Thought content normal.      Comments: Flat affect           ED Course, Procedures, & Data      Procedures       Results for orders placed or performed during the hospital encounter of 05/18/24   CBC with platelets and differential     Status: None   Result Value Ref Range    WBC Count 7.3 4.0 - 11.0 10e3/uL    RBC Count 4.48 3.80 - 5.20 10e6/uL    Hemoglobin 13.7 11.7 - 15.7 g/dL    Hematocrit 39.5 35.0 - 47.0 %    MCV 88 78 - 100 fL    MCH 30.6 26.5 - 33.0 pg    MCHC 34.7 31.5 - 36.5 g/dL    RDW 12.3 10.0 - 15.0 %    Platelet Count 181 150 - 450 10e3/uL    % Neutrophils 49 %    % Lymphocytes 35 %    % Monocytes 6 %    % Eosinophils 9 %    % Basophils 0 %    % Immature Granulocytes 1 %    NRBCs per 100 WBC 0 <1 /100    Absolute Neutrophils 3.7 1.6 - 8.3 10e3/uL    Absolute Lymphocytes 2.6 0.8 - 5.3 10e3/uL    Absolute Monocytes 0.4 0.0 - 1.3 10e3/uL    Absolute Eosinophils 0.6 0.0 - 0.7 10e3/uL    Absolute Basophils 0.0 0.0 - 0.2 10e3/uL    Absolute Immature Granulocytes 0.0 <=0.4 10e3/uL    Absolute NRBCs 0.0 10e3/uL   CBC with platelets differential     Status: None    Narrative    The following orders were created for panel order CBC with platelets differential.  Procedure                               Abnormality         Status                     ---------                               -----------         ------                     CBC with platelets and d...[[id number]]                      Final result                 Please view results for these tests on the individual orders.     Medications   sodium chloride 0.9% BOLUS 1,000 mL (1,000 mLs Intravenous $New Bag 5/19/24 0112)   OLANZapine zydis (zyPREXA) ODT tab 5 mg (5 mg Oral $Given 5/19/24 0112)             No results found for any visits on 05/18/24.  Medications - No data to display  Labs Ordered and Resulted  from Time of ED Arrival to Time of ED Departure - No data to display  No orders to display          Critical care was not performed.     Medical Decision Making  The patient's presentation was of moderate complexity (a chronic illness mild to moderate exacerbation, progression, or side effect of treatment).    The patient's evaluation involved:  review of external note(s) from 2 sources (prior notes)  discussion of management or test interpretation with another health professional (Transferred to Dr Julio)    The patient's management necessitated further care after sign-out to Nor[facility] (see their note for further management).    Assessment & Plan    Patient is a 48-year-old female with a known history of cyclical vomiting syndrome as well as chronic anxiety presents to the ER due to 24 hours of worsening nausea and vomiting.  Patient received droperidol and route and her symptoms improved by the time she arrived in the ER.  She unfortunately was a difficult IV start were unable to get labs in a timely manner.  Patient symptoms started to mildly increased and she was asked for Zyprexa.  She will be given an oral dose of Zyprexa.  Plan is to discharge her home if her symptoms are still controlled and her labs are stable.    I have reviewed the nursing notes. I have reviewed the findings, diagnosis, plan and need for follow up with the patient.    New Prescriptions    No medications on file       Final diagnoses:   Cyclical vomiting   I, Job Fowler, am serving as a trained medical scribe to document services personally performed by Adelaida Cates MD, based to the provider's statements to me.     IAdelaida MD, was physically present and have reviewed and verified the accuracy of this note documented by Job Fowler.       Adelaida Cates MD  [facility] EMERGENCY DEPARTMENT  5/18/2024     Adelaida Cates MD  05/19/24 0138

## 2024-05-25 ENCOUNTER — HOSPITAL ENCOUNTER (EMERGENCY)
Facility: CLINIC | Age: 49
Discharge: HOME | End: 2024-05-25
Attending: FAMILY MEDICINE | Admitting: FAMILY MEDICINE
Payer: COMMERCIAL

## 2024-05-25 VITALS
WEIGHT: 200 LBS | HEIGHT: 69 IN | OXYGEN SATURATION: 99 % | SYSTOLIC BLOOD PRESSURE: 171 MMHG | TEMPERATURE: 97.8 F | RESPIRATION RATE: 16 BRPM | BODY MASS INDEX: 29.62 KG/M2 | HEART RATE: 67 BPM | DIASTOLIC BLOOD PRESSURE: 109 MMHG

## 2024-05-25 DIAGNOSIS — R11.2 NAUSEA AND VOMITING, UNSPECIFIED VOMITING TYPE: ICD-10-CM

## 2024-05-25 DIAGNOSIS — R51.9 NONINTRACTABLE HEADACHE, UNSPECIFIED CHRONICITY PATTERN, UNSPECIFIED HEADACHE TYPE: ICD-10-CM

## 2024-05-25 LAB
ALBUMIN SERPL BCG-MCNC: 4.2 G/DL (ref 3.5–5.2)
ALP SERPL-CCNC: 134 U/L (ref 40–150)
ALT SERPL W P-5'-P-CCNC: 120 U/L (ref 0–50)
ANION GAP SERPL CALCULATED.3IONS-SCNC: 13 MMOL/L (ref 7–15)
AST SERPL W P-5'-P-CCNC: 69 U/L (ref 0–45)
BASOPHILS # BLD AUTO: 0 10E3/UL (ref 0–0.2)
BASOPHILS NFR BLD AUTO: 0 %
BILIRUB SERPL-MCNC: 0.3 MG/DL
BUN SERPL-MCNC: 16.1 MG/DL (ref 6–20)
CALCIUM SERPL-MCNC: 9.5 MG/DL (ref 8.6–10)
CHLORIDE SERPL-SCNC: 101 MMOL/L (ref 98–107)
CREAT SERPL-MCNC: 0.83 MG/DL (ref 0.51–0.95)
DEPRECATED HCO3 PLAS-SCNC: 25 MMOL/L (ref 22–29)
EGFRCR SERPLBLD CKD-EPI 2021: 88 ML/MIN/1.73M2
EOSINOPHIL # BLD AUTO: 0.7 10E3/UL (ref 0–0.7)
EOSINOPHIL NFR BLD AUTO: 9 %
ERYTHROCYTE [DISTWIDTH] IN BLOOD BY AUTOMATED COUNT: 12.3 % (ref 10–15)
GLUCOSE SERPL-MCNC: 249 MG/DL (ref 70–99)
HCT VFR BLD AUTO: 41.6 % (ref 35–47)
HGB BLD-MCNC: 14.6 G/DL (ref 11.7–15.7)
IMM GRANULOCYTES # BLD: 0 10E3/UL
IMM GRANULOCYTES NFR BLD: 1 %
LYMPHOCYTES # BLD AUTO: 3 10E3/UL (ref 0.8–5.3)
LYMPHOCYTES NFR BLD AUTO: 44 %
MCH RBC QN AUTO: 31.2 PG (ref 26.5–33)
MCHC RBC AUTO-ENTMCNC: 35.1 G/DL (ref 31.5–36.5)
MCV RBC AUTO: 89 FL (ref 78–100)
MONOCYTES # BLD AUTO: 0.4 10E3/UL (ref 0–1.3)
MONOCYTES NFR BLD AUTO: 5 %
NEUTROPHILS # BLD AUTO: 2.8 10E3/UL (ref 1.6–8.3)
NEUTROPHILS NFR BLD AUTO: 41 %
NRBC # BLD AUTO: 0 10E3/UL
NRBC BLD AUTO-RTO: 0 /100
PLATELET # BLD AUTO: 220 10E3/UL (ref 150–450)
POTASSIUM SERPL-SCNC: 4.2 MMOL/L (ref 3.4–5.3)
PROT SERPL-MCNC: 8.4 G/DL (ref 6.4–8.3)
RBC # BLD AUTO: 4.68 10E6/UL (ref 3.8–5.2)
SODIUM SERPL-SCNC: 139 MMOL/L (ref 135–145)
WBC # BLD AUTO: 6.9 10E3/UL (ref 4–11)

## 2024-05-25 PROCEDURE — 36415 COLL VENOUS BLD VENIPUNCTURE: CPT | Performed by: FAMILY MEDICINE

## 2024-05-25 PROCEDURE — 258N000003 HC RX IP 258 OP 636: Performed by: FAMILY MEDICINE

## 2024-05-25 PROCEDURE — 99284 EMERGENCY DEPT VISIT MOD MDM: CPT | Mod: 25 | Performed by: FAMILY MEDICINE

## 2024-05-25 PROCEDURE — 99284 EMERGENCY DEPT VISIT MOD MDM: CPT | Performed by: FAMILY MEDICINE

## 2024-05-25 PROCEDURE — 96361 HYDRATE IV INFUSION ADD-ON: CPT | Performed by: FAMILY MEDICINE

## 2024-05-25 PROCEDURE — 96375 TX/PRO/DX INJ NEW DRUG ADDON: CPT | Performed by: FAMILY MEDICINE

## 2024-05-25 PROCEDURE — 82040 ASSAY OF SERUM ALBUMIN: CPT | Performed by: FAMILY MEDICINE

## 2024-05-25 PROCEDURE — 250N000011 HC RX IP 250 OP 636: Performed by: FAMILY MEDICINE

## 2024-05-25 PROCEDURE — 96374 THER/PROPH/DIAG INJ IV PUSH: CPT | Performed by: FAMILY MEDICINE

## 2024-05-25 PROCEDURE — 85004 AUTOMATED DIFF WBC COUNT: CPT | Performed by: FAMILY MEDICINE

## 2024-05-25 RX ORDER — HYDROMORPHONE HYDROCHLORIDE 1 MG/ML
0.5 INJECTION, SOLUTION INTRAMUSCULAR; INTRAVENOUS; SUBCUTANEOUS ONCE
Status: COMPLETED | OUTPATIENT
Start: 2024-05-25 | End: 2024-05-25

## 2024-05-25 RX ORDER — KETOROLAC TROMETHAMINE 30 MG/ML
30 INJECTION, SOLUTION INTRAMUSCULAR; INTRAVENOUS ONCE
Status: COMPLETED | OUTPATIENT
Start: 2024-05-25 | End: 2024-05-25

## 2024-05-25 RX ADMIN — SODIUM CHLORIDE 2000 ML: 9 INJECTION, SOLUTION INTRAVENOUS at 11:46

## 2024-05-25 RX ADMIN — HYDROMORPHONE HYDROCHLORIDE 0.5 MG: 1 INJECTION, SOLUTION INTRAMUSCULAR; INTRAVENOUS; SUBCUTANEOUS at 11:54

## 2024-05-25 RX ADMIN — KETOROLAC TROMETHAMINE 30 MG: 30 INJECTION, SOLUTION INTRAMUSCULAR at 11:58

## 2024-05-25 RX ADMIN — PROCHLORPERAZINE EDISYLATE 10 MG: 5 INJECTION INTRAMUSCULAR; INTRAVENOUS at 11:48

## 2024-05-25 ASSESSMENT — ACTIVITIES OF DAILY LIVING (ADL)
ADLS_ACUITY_SCORE: 37

## 2024-05-25 NOTE — DISCHARGE INSTRUCTIONS
Discharge to home continue with current medications follow-up with your outpatient provider next week.

## 2024-05-25 NOTE — ED PROVIDER NOTES
[facility] EMERGENCY DEPARTMENT ([facility])    5/25/24      ED PROVIDER NOTE       History     Chief Complaint   Patient presents with    Nausea & Vomiting     Patient reports nausea, vomiting, and migraine that started 24 hours ago.      HPI  Yun Coyle is a 48 year old female with a past medical history of pancreatitis, depression, cauda equina, anxiety, ovarian cyst, hypertension, hyponatremia, type 2 diabetes, asthma, cyclic vomiting syndrome, cannabis abuse, hyperlipidemia, OCD, opiate use disorder (on methadone), hepatitis C, obesity, and frequent falls who presents to the ED for evaluation of nausea and vomiting.     Patient reports nausea, vomiting, and migraines for the past 24 hours. Patient endorses marijuana use. Denies alcohol or other substance use. Denies diarrhea.     Past Medical History  Past Medical History:   Diagnosis Date    Acute pancreatitis 06/29/2017    Anxiety     Cauda equina syndrome (H) 03/29/2013    Cyst of right ovary 11/16/2017    Depressive disorder     Gastroesophageal reflux disease     Hypertension     Hyponatremia 10/01/2023    Low back pain 08/20/2012    Overview:   History of cauda equina and surgery.      Type 2 diabetes mellitus (H)     Uncomplicated asthma      Past Surgical History:   Procedure Laterality Date    BACK SURGERY      x2    CHOLECYSTECTOMY      ORTHOPEDIC SURGERY      SALPINGO OOPHORECTOMY,R/L/ELDA Right      albuterol (PROAIR HFA/PROVENTIL HFA/VENTOLIN HFA) 108 (90 Base) MCG/ACT inhaler  Alcohol Swabs PADS  blood glucose (NO BRAND SPECIFIED) lancets standard  blood glucose (NO BRAND SPECIFIED) test strip  blood glucose calibration (NO BRAND SPECIFIED) solution  blood glucose monitoring (NO BRAND SPECIFIED) meter device kit  budesonide-formoterol (SYMBICORT) 160-4.5 MCG/ACT Inhaler  cloNIDine (CATAPRES) 0.1 MG tablet  Continuous Blood Gluc  (FREESTYLE MCKENZIE 2 READER) KESHIA  Continuous Blood Gluc Sensor (FREESTYLE MCKENZIE 2 SENSOR)  "MISC  glucagon 1 MG kit  glucose (BD GLUCOSE) 4 g chewable tablet  glucose 40 % (400 mg/mL) gel  insulin glargine (LANTUS PEN) 100 UNIT/ML pen  losartan (COZAAR) 25 MG tablet  metFORMIN (GLUCOPHAGE) 500 MG tablet  methadone (DOLOPHINE-INTENSOL) 10 MG/ML (HIGH CONC) solution  OLANZapine (ZYPREXA) 10 MG tablet  predniSONE (DELTASONE) 20 MG tablet  prochlorperazine (COMPAZINE) 10 MG tablet  prochlorperazine (COMPAZINE) 25 MG suppository  Sharps Container MISC      No Known Allergies  Family History  Family History   Problem Relation Age of Onset    Breast Cancer Mother         in her 50s.    Diabetes Mother     Breast Cancer Maternal Grandmother         in her 40s.    Diabetes Maternal Grandmother      Social History   Social History     Tobacco Use    Smoking status: Never    Smokeless tobacco: Never   Vaping Use    Vaping status: Never Used   Substance Use Topics    Alcohol use: No    Drug use: Yes     Types: Marijuana      Past medical history, past surgical history, medications, allergies, family history, and social history were reviewed with the patient. No additional pertinent items.   A complete review of systems was performed with pertinent positives and negatives noted in the HPI, and all other systems negative.    Physical Exam   BP: (!) 130/93  Pulse: 94  Temp: 98.7  F (37.1  C)  Resp: 16  Height: 175.3 cm (5' 9\")  Weight: 90.7 kg (200 lb)  SpO2: 98 %  Physical Exam  Vitals and nursing note reviewed.   Constitutional:       General: She is not in acute distress.     Appearance: Normal appearance. She is not diaphoretic.   HENT:      Head: Atraumatic.      Mouth/Throat:      Mouth: Mucous membranes are moist.   Eyes:      General: No scleral icterus.     Conjunctiva/sclera: Conjunctivae normal.   Cardiovascular:      Rate and Rhythm: Normal rate.      Heart sounds: Normal heart sounds.   Pulmonary:      Effort: No respiratory distress.      Breath sounds: Normal breath sounds.   Abdominal:      General: " Abdomen is flat.   Musculoskeletal:      Cervical back: Neck supple.   Skin:     General: Skin is warm.      Findings: No rash.   Neurological:      General: No focal deficit present.      Mental Status: She is alert and oriented to person, place, and time.      Sensory: No sensory deficit.      Motor: No weakness.      Coordination: Coordination normal.   Psychiatric:         Mood and Affect: Mood is anxious.         Speech: Speech normal.         Behavior: Behavior is cooperative.         Thought Content: Thought content does not include homicidal or suicidal ideation.           ED Course, Procedures, & Data      Procedures       Results for orders placed or performed during the hospital encounter of 05/25/24   Comprehensive metabolic panel     Status: Abnormal   Result Value Ref Range    Sodium 139 135 - 145 mmol/L    Potassium 4.2 3.4 - 5.3 mmol/L    Carbon Dioxide (CO2) 25 22 - 29 mmol/L    Anion Gap 13 7 - 15 mmol/L    Urea Nitrogen 16.1 6.0 - 20.0 mg/dL    Creatinine 0.83 0.51 - 0.95 mg/dL    GFR Estimate 88 >60 mL/min/1.73m2    Calcium 9.5 8.6 - 10.0 mg/dL    Chloride 101 98 - 107 mmol/L    Glucose 249 (H) 70 - 99 mg/dL    Alkaline Phosphatase 134 40 - 150 U/L    AST 69 (H) 0 - 45 U/L     (H) 0 - 50 U/L    Protein Total 8.4 (H) 6.4 - 8.3 g/dL    Albumin 4.2 3.5 - 5.2 g/dL    Bilirubin Total 0.3 <=1.2 mg/dL   CBC with platelets and differential     Status: None   Result Value Ref Range    WBC Count 6.9 4.0 - 11.0 10e3/uL    RBC Count 4.68 3.80 - 5.20 10e6/uL    Hemoglobin 14.6 11.7 - 15.7 g/dL    Hematocrit 41.6 35.0 - 47.0 %    MCV 89 78 - 100 fL    MCH 31.2 26.5 - 33.0 pg    MCHC 35.1 31.5 - 36.5 g/dL    RDW 12.3 10.0 - 15.0 %    Platelet Count 220 150 - 450 10e3/uL    % Neutrophils 41 %    % Lymphocytes 44 %    % Monocytes 5 %    % Eosinophils 9 %    % Basophils 0 %    % Immature Granulocytes 1 %    NRBCs per 100 WBC 0 <1 /100    Absolute Neutrophils 2.8 1.6 - 8.3 10e3/uL    Absolute Lymphocytes 3.0  0.8 - 5.3 10e3/uL    Absolute Monocytes 0.4 0.0 - 1.3 10e3/uL    Absolute Eosinophils 0.7 0.0 - 0.7 10e3/uL    Absolute Basophils 0.0 0.0 - 0.2 10e3/uL    Absolute Immature Granulocytes 0.0 <=0.4 10e3/uL    Absolute NRBCs 0.0 10e3/uL   CBC with platelets differential     Status: None    Narrative    The following orders were created for panel order CBC with platelets differential.  Procedure                               Abnormality         Status                     ---------                               -----------         ------                     CBC with platelets and d...[[id number]]                      Final result                 Please view results for these tests on the individual orders.     Medications   sodium chloride 0.9% BOLUS 2,000 mL (0 mLs Intravenous Stopped 5/25/24 1430)   prochlorperazine (COMPAZINE) injection 10 mg (10 mg Intravenous $Given 5/25/24 1148)   ketorolac (TORADOL) injection 30 mg (30 mg Intravenous $Given 5/25/24 1158)   HYDROmorphone (PF) (DILAUDID) injection 0.5 mg (0.5 mg Intravenous $Given 5/25/24 1154)     Labs Ordered and Resulted from Time of ED Arrival to Time of ED Departure   COMPREHENSIVE METABOLIC PANEL - Abnormal       Result Value    Sodium 139      Potassium 4.2      Carbon Dioxide (CO2) 25      Anion Gap 13      Urea Nitrogen 16.1      Creatinine 0.83      GFR Estimate 88      Calcium 9.5      Chloride 101      Glucose 249 (*)     Alkaline Phosphatase 134      AST 69 (*)      (*)     Protein Total 8.4 (*)     Albumin 4.2      Bilirubin Total 0.3     CBC WITH PLATELETS AND DIFFERENTIAL    WBC Count 6.9      RBC Count 4.68      Hemoglobin 14.6      Hematocrit 41.6      MCV 89      MCH 31.2      MCHC 35.1      RDW 12.3      Platelet Count 220      % Neutrophils 41      % Lymphocytes 44      % Monocytes 5      % Eosinophils 9      % Basophils 0      % Immature Granulocytes 1      NRBCs per 100 WBC 0      Absolute Neutrophils 2.8      Absolute Lymphocytes 3.0       Absolute Monocytes 0.4      Absolute Eosinophils 0.7      Absolute Basophils 0.0      Absolute Immature Granulocytes 0.0      Absolute NRBCs 0.0       No orders to display          Critical care was not performed.     Medical Decision Making  The patient's presentation was of moderate complexity (a chronic illness mild to moderate exacerbation, progression, or side effect of treatment).    The patient's evaluation involved:  ordering and/or review of 3+ test(s) in this encounter (see separate area of note for details)    The patient's management necessitated moderate risk (prescription drug management including medications given in the ED).    Assessment & Plan        I have reviewed the nursing notes. I have reviewed the findings, diagnosis, plan and need for follow up with the patient.        Final diagnoses:   Nonintractable headache, unspecified chronicity pattern, unspecified headache type   Nausea and vomiting, unspecified vomiting type       Fermin Blanchard MD  [facility] EMERGENCY DEPARTMENT  5/25/2024     Sean Christensen MD  05/27/24 1535

## 2024-06-16 ENCOUNTER — HOSPITAL ENCOUNTER (EMERGENCY)
Facility: CLINIC | Age: 49
Discharge: HOME | End: 2024-06-16
Attending: EMERGENCY MEDICINE | Admitting: EMERGENCY MEDICINE
Payer: COMMERCIAL

## 2024-06-16 VITALS
BODY MASS INDEX: 28.63 KG/M2 | RESPIRATION RATE: 18 BRPM | OXYGEN SATURATION: 98 % | SYSTOLIC BLOOD PRESSURE: 133 MMHG | TEMPERATURE: 97.5 F | WEIGHT: 200 LBS | HEIGHT: 70 IN | DIASTOLIC BLOOD PRESSURE: 89 MMHG | HEART RATE: 56 BPM

## 2024-06-16 DIAGNOSIS — R11.15 CYCLIC VOMITING SYNDROME: ICD-10-CM

## 2024-06-16 DIAGNOSIS — F12.91 HISTORY OF MARIJUANA USE: ICD-10-CM

## 2024-06-16 LAB
ALBUMIN SERPL BCG-MCNC: 3.9 G/DL (ref 3.5–5.2)
ALBUMIN UR-MCNC: NEGATIVE MG/DL
ALP SERPL-CCNC: 116 U/L (ref 40–150)
ALT SERPL W P-5'-P-CCNC: 99 U/L (ref 0–50)
ANION GAP SERPL CALCULATED.3IONS-SCNC: 11 MMOL/L (ref 7–15)
APPEARANCE UR: CLEAR
AST SERPL W P-5'-P-CCNC: 56 U/L (ref 0–45)
BASOPHILS # BLD AUTO: 0 10E3/UL (ref 0–0.2)
BASOPHILS NFR BLD AUTO: 1 %
BILIRUB SERPL-MCNC: 0.3 MG/DL
BILIRUB UR QL STRIP: NEGATIVE
BUN SERPL-MCNC: 23.5 MG/DL (ref 6–20)
CALCIUM SERPL-MCNC: 9.5 MG/DL (ref 8.6–10)
CHLORIDE SERPL-SCNC: 97 MMOL/L (ref 98–107)
COLOR UR AUTO: YELLOW
CREAT SERPL-MCNC: 1 MG/DL (ref 0.51–0.95)
DEPRECATED HCO3 PLAS-SCNC: 27 MMOL/L (ref 22–29)
EGFRCR SERPLBLD CKD-EPI 2021: 70 ML/MIN/1.73M2
EOSINOPHIL # BLD AUTO: 0.5 10E3/UL (ref 0–0.7)
EOSINOPHIL NFR BLD AUTO: 7 %
ERYTHROCYTE [DISTWIDTH] IN BLOOD BY AUTOMATED COUNT: 12.3 % (ref 10–15)
GLUCOSE SERPL-MCNC: 284 MG/DL (ref 70–99)
GLUCOSE UR STRIP-MCNC: >=1000 MG/DL
HCG SERPL QL: NEGATIVE
HCT VFR BLD AUTO: 37.2 % (ref 35–47)
HGB BLD-MCNC: 13.2 G/DL (ref 11.7–15.7)
HGB UR QL STRIP: NEGATIVE
IMM GRANULOCYTES # BLD: 0 10E3/UL
IMM GRANULOCYTES NFR BLD: 0 %
KETONES UR STRIP-MCNC: NEGATIVE MG/DL
LEUKOCYTE ESTERASE UR QL STRIP: NEGATIVE
LIPASE SERPL-CCNC: 24 U/L (ref 13–60)
LYMPHOCYTES # BLD AUTO: 3.4 10E3/UL (ref 0.8–5.3)
LYMPHOCYTES NFR BLD AUTO: 47 %
MAGNESIUM SERPL-MCNC: 1.8 MG/DL (ref 1.7–2.3)
MCH RBC QN AUTO: 31.2 PG (ref 26.5–33)
MCHC RBC AUTO-ENTMCNC: 35.5 G/DL (ref 31.5–36.5)
MCV RBC AUTO: 88 FL (ref 78–100)
MONOCYTES # BLD AUTO: 0.4 10E3/UL (ref 0–1.3)
MONOCYTES NFR BLD AUTO: 6 %
MUCOUS THREADS #/AREA URNS LPF: PRESENT /LPF
NEUTROPHILS # BLD AUTO: 2.8 10E3/UL (ref 1.6–8.3)
NEUTROPHILS NFR BLD AUTO: 39 %
NITRATE UR QL: NEGATIVE
NRBC # BLD AUTO: 0 10E3/UL
NRBC BLD AUTO-RTO: 0 /100
PH UR STRIP: 6 [PH] (ref 5–7)
PLATELET # BLD AUTO: 178 10E3/UL (ref 150–450)
POTASSIUM SERPL-SCNC: 4.7 MMOL/L (ref 3.4–5.3)
PROT SERPL-MCNC: 7.7 G/DL (ref 6.4–8.3)
RBC # BLD AUTO: 4.23 10E6/UL (ref 3.8–5.2)
RBC URINE: <1 /HPF
SODIUM SERPL-SCNC: 135 MMOL/L (ref 135–145)
SP GR UR STRIP: 1.03 (ref 1–1.03)
SQUAMOUS EPITHELIAL: 5 /HPF
UROBILINOGEN UR STRIP-MCNC: NORMAL MG/DL
WBC # BLD AUTO: 7.2 10E3/UL (ref 4–11)
WBC URINE: 3 /HPF

## 2024-06-16 PROCEDURE — 250N000011 HC RX IP 250 OP 636: Performed by: PHYSICIAN ASSISTANT

## 2024-06-16 PROCEDURE — 258N000003 HC RX IP 258 OP 636: Mod: JZ | Performed by: EMERGENCY MEDICINE

## 2024-06-16 PROCEDURE — 80053 COMPREHEN METABOLIC PANEL: CPT | Performed by: PHYSICIAN ASSISTANT

## 2024-06-16 PROCEDURE — 250N000013 HC RX MED GY IP 250 OP 250 PS 637: Performed by: PHYSICIAN ASSISTANT

## 2024-06-16 PROCEDURE — 96374 THER/PROPH/DIAG INJ IV PUSH: CPT | Performed by: EMERGENCY MEDICINE

## 2024-06-16 PROCEDURE — 85025 COMPLETE CBC W/AUTO DIFF WBC: CPT | Performed by: PHYSICIAN ASSISTANT

## 2024-06-16 PROCEDURE — 93010 ELECTROCARDIOGRAM REPORT: CPT | Performed by: EMERGENCY MEDICINE

## 2024-06-16 PROCEDURE — 99284 EMERGENCY DEPT VISIT MOD MDM: CPT | Mod: FS | Performed by: EMERGENCY MEDICINE

## 2024-06-16 PROCEDURE — 83690 ASSAY OF LIPASE: CPT | Performed by: PHYSICIAN ASSISTANT

## 2024-06-16 PROCEDURE — 83735 ASSAY OF MAGNESIUM: CPT | Performed by: PHYSICIAN ASSISTANT

## 2024-06-16 PROCEDURE — 96361 HYDRATE IV INFUSION ADD-ON: CPT | Performed by: EMERGENCY MEDICINE

## 2024-06-16 PROCEDURE — 84703 CHORIONIC GONADOTROPIN ASSAY: CPT | Performed by: PHYSICIAN ASSISTANT

## 2024-06-16 PROCEDURE — 81001 URINALYSIS AUTO W/SCOPE: CPT | Performed by: PHYSICIAN ASSISTANT

## 2024-06-16 PROCEDURE — 99284 EMERGENCY DEPT VISIT MOD MDM: CPT | Mod: 25 | Performed by: EMERGENCY MEDICINE

## 2024-06-16 PROCEDURE — 36415 COLL VENOUS BLD VENIPUNCTURE: CPT | Performed by: PHYSICIAN ASSISTANT

## 2024-06-16 PROCEDURE — 258N000003 HC RX IP 258 OP 636: Mod: JZ | Performed by: PHYSICIAN ASSISTANT

## 2024-06-16 PROCEDURE — 93005 ELECTROCARDIOGRAM TRACING: CPT | Performed by: EMERGENCY MEDICINE

## 2024-06-16 RX ORDER — OLANZAPINE 5 MG/1
5 TABLET, ORALLY DISINTEGRATING ORAL ONCE
Status: COMPLETED | OUTPATIENT
Start: 2024-06-16 | End: 2024-06-16

## 2024-06-16 RX ORDER — PROCHLORPERAZINE MALEATE 10 MG
10 TABLET ORAL EVERY 6 HOURS PRN
Qty: 8 TABLET | Refills: 0 | Status: SHIPPED | OUTPATIENT
Start: 2024-06-16 | End: 2024-06-18

## 2024-06-16 RX ORDER — ONDANSETRON 2 MG/ML
4 INJECTION INTRAMUSCULAR; INTRAVENOUS ONCE
Status: DISCONTINUED | OUTPATIENT
Start: 2024-06-16 | End: 2024-06-16

## 2024-06-16 RX ADMIN — OLANZAPINE 5 MG: 5 TABLET, ORALLY DISINTEGRATING ORAL at 15:34

## 2024-06-16 RX ADMIN — PROCHLORPERAZINE EDISYLATE 10 MG: 5 INJECTION INTRAMUSCULAR; INTRAVENOUS at 16:34

## 2024-06-16 RX ADMIN — SODIUM CHLORIDE 1000 ML: 9 INJECTION, SOLUTION INTRAVENOUS at 15:31

## 2024-06-16 RX ADMIN — SODIUM CHLORIDE 1000 ML: 9 INJECTION, SOLUTION INTRAVENOUS at 17:26

## 2024-06-16 ASSESSMENT — ACTIVITIES OF DAILY LIVING (ADL)
ADLS_ACUITY_SCORE: 37

## 2024-06-16 NOTE — DISCHARGE INSTRUCTIONS
Here in the emergency room you have reassuring evaluation.  We discussed your kidney function is slightly worse than your normal likely due to dehydration you were given fluids to help with this in addition to antinausea medications and you are now able to eat and drink without vomiting.  We discussed the importance of stopping marijuana use as this is likely a trigger for your symptoms.  I did give you a short supply of Compazine to be used as needed at home for nausea.  We discussed bland foods for the next few days and gradually progressing her diet as tolerated.    If you develop any new or worsening symptoms, is important to return right away to the emergency department for further evaluation and management.

## 2024-06-16 NOTE — ED PROVIDER NOTES
ED Provider Note  [facility]      History     Chief Complaint   Patient presents with    Nausea & Vomiting    Headache     HPI  Yun Coyle is a 48 year old female with past medical history significant for opioid use disorder on methadone, history of type 2 diabetes obesity cyclic vomiting syndrome who presents to the emergency department with concerns for cyclic vomiting.  She states that starting yesterday she start developing nausea and vomiting which has been ongoing.  She has been able to eat or drink anything due to symptoms.  This is associated with some mild epigastric discomfort.  No fevers no cough dyspnea chest pain with her symptoms.  No diarrhea constipation or urinary symptoms.  She does admit to using marijuana and has not been drinking alcohol.  Her last methadone dose was yesterday she feels she is not withdrawing at this time.  She notes that typically Zyprexa seems to help with her cyclic vomiting.  No concern for pregnancy.  She has been checking her blood sugars and notes that her sugars been under 200 send symptoms started yesterday.  No coffee-ground emesis or hematemesis.    Past Medical History  Past Medical History:   Diagnosis Date    Acute pancreatitis 06/29/2017    Anxiety     Cauda equina syndrome (H) 03/29/2013    Cyst of right ovary 11/16/2017    Depressive disorder     Gastroesophageal reflux disease     Hypertension     Hyponatremia 10/01/2023    Low back pain 08/20/2012    Overview:   History of cauda equina and surgery.      Type 2 diabetes mellitus (H)     Uncomplicated asthma      Past Surgical History:   Procedure Laterality Date    BACK SURGERY      x2    CHOLECYSTECTOMY      ORTHOPEDIC SURGERY      SALPINGO OOPHORECTOMY,R/L/ELDA Right      albuterol (PROAIR HFA/PROVENTIL HFA/VENTOLIN HFA) 108 (90 Base) MCG/ACT inhaler  Alcohol Swabs PADS  blood glucose (NO BRAND SPECIFIED) lancets standard  blood glucose (NO BRAND SPECIFIED) test strip  blood  "glucose calibration (NO BRAND SPECIFIED) solution  blood glucose monitoring (NO BRAND SPECIFIED) meter device kit  budesonide-formoterol (SYMBICORT) 160-4.5 MCG/ACT Inhaler  cloNIDine (CATAPRES) 0.1 MG tablet  losartan (COZAAR) 25 MG tablet  prochlorperazine (COMPAZINE) 10 MG tablet  Continuous Blood Gluc  (FREESTYLE MCKENZIE 2 READER) KESHIA  Continuous Blood Gluc Sensor (FREESTYLE MCKENZIE 2 SENSOR) MISC  glucagon 1 MG kit  glucose (BD GLUCOSE) 4 g chewable tablet  glucose 40 % (400 mg/mL) gel  insulin glargine (LANTUS PEN) 100 UNIT/ML pen  metFORMIN (GLUCOPHAGE) 500 MG tablet  methadone (DOLOPHINE-INTENSOL) 10 MG/ML (HIGH CONC) solution  OLANZapine (ZYPREXA) 10 MG tablet  predniSONE (DELTASONE) 20 MG tablet  prochlorperazine (COMPAZINE) 10 MG tablet  prochlorperazine (COMPAZINE) 25 MG suppository  Sharps Container MISC      No Known Allergies  Family History  Family History   Problem Relation Age of Onset    Breast Cancer Mother         in her 50s.    Diabetes Mother     Breast Cancer Maternal Grandmother         in her 40s.    Diabetes Maternal Grandmother      Social History   Social History     Tobacco Use    Smoking status: Never    Smokeless tobacco: Never   Vaping Use    Vaping status: Never Used   Substance Use Topics    Alcohol use: No    Drug use: Yes     Types: Marijuana      A medically appropriate review of systems was performed with pertinent positives and negatives noted in the HPI, and all other systems negative.    Physical Exam   BP: 127/83  Pulse: 84  Temp: 98.1  F (36.7  C)  Resp: 16  Height: 177.8 cm (5' 10\")  Weight: 90.7 kg (200 lb)  SpO2: 98 %  Physical Exam    GENERAL APPEARANCE: The patient is well developed, well appearing, and in no acute distress.  HEAD:  Normocephalic and atraumatic.   EENT: Voice normal.  NECK: Trachea is midline.  LUNGS: Breath sounds are equal and clear bilaterally. No wheezes, rhonchi, or rales.  HEART: Regular rate and normal rhythm.  Radial pulses 2+ " bilaterally.  ABDOMEN: Soft, flat, and benign. No mass, tenderness, guarding, or rebound.  EXTREMITIES: No cyanosis, clubbing, or edema.  NEUROLOGIC: No focal sensory or motor deficits are noted.  PSYCHIATRIC: The patient is awake, alert.  Appropriate mood and affect.  SKIN: Warm, dry, and well perfused. Good turgor.      ED Course, Procedures, & Data      EKG 6/16/2024: Sinus bradycardia, ventricular rate 53 QTc 409.  When interpreted by myself the rhythm is bradycardic.  There is a P wave before the QRS complex making this a sinus rhythm.  No visible ST elevation or depression to suggest ischemia.  EKG was compared to prior from April 20, 2024, prolonged QT interval no longer present.     Results for orders placed or performed during the hospital encounter of 06/16/24   Comprehensive metabolic panel     Status: Abnormal   Result Value Ref Range    Sodium 135 135 - 145 mmol/L    Potassium 4.7 3.4 - 5.3 mmol/L    Carbon Dioxide (CO2) 27 22 - 29 mmol/L    Anion Gap 11 7 - 15 mmol/L    Urea Nitrogen 23.5 (H) 6.0 - 20.0 mg/dL    Creatinine 1.00 (H) 0.51 - 0.95 mg/dL    GFR Estimate 70 >60 mL/min/1.73m2    Calcium 9.5 8.6 - 10.0 mg/dL    Chloride 97 (L) 98 - 107 mmol/L    Glucose 284 (H) 70 - 99 mg/dL    Alkaline Phosphatase 116 40 - 150 U/L    AST 56 (H) 0 - 45 U/L    ALT 99 (H) 0 - 50 U/L    Protein Total 7.7 6.4 - 8.3 g/dL    Albumin 3.9 3.5 - 5.2 g/dL    Bilirubin Total 0.3 <=1.2 mg/dL   Magnesium     Status: Normal   Result Value Ref Range    Magnesium 1.8 1.7 - 2.3 mg/dL   Lipase     Status: Normal   Result Value Ref Range    Lipase 24 13 - 60 U/L   HCG qualitative pregnancy (blood)     Status: Normal   Result Value Ref Range    hCG Serum Qualitative Negative Negative   UA with Microscopic reflex to Culture     Status: Abnormal    Specimen: Urine, Clean Catch   Result Value Ref Range    Color Urine Yellow Colorless, Straw, Light Yellow, Yellow    Appearance Urine Clear Clear    Glucose Urine >=1000 (A) Negative  mg/dL    Bilirubin Urine Negative Negative    Ketones Urine Negative Negative mg/dL    Specific Gravity Urine 1.029 1.003 - 1.035    Blood Urine Negative Negative    pH Urine 6.0 5.0 - 7.0    Protein Albumin Urine Negative Negative mg/dL    Urobilinogen Urine Normal Normal, 2.0 mg/dL    Nitrite Urine Negative Negative    Leukocyte Esterase Urine Negative Negative    Mucus Urine Present (A) None Seen /LPF    RBC Urine <1 <=2 /HPF    WBC Urine 3 <=5 /HPF    Squamous Epithelials Urine 5 (H) <=1 /HPF    Narrative    Urine Culture not indicated   CBC with platelets and differential     Status: None   Result Value Ref Range    WBC Count 7.2 4.0 - 11.0 10e3/uL    RBC Count 4.23 3.80 - 5.20 10e6/uL    Hemoglobin 13.2 11.7 - 15.7 g/dL    Hematocrit 37.2 35.0 - 47.0 %    MCV 88 78 - 100 fL    MCH 31.2 26.5 - 33.0 pg    MCHC 35.5 31.5 - 36.5 g/dL    RDW 12.3 10.0 - 15.0 %    Platelet Count 178 150 - 450 10e3/uL    % Neutrophils 39 %    % Lymphocytes 47 %    % Monocytes 6 %    % Eosinophils 7 %    % Basophils 1 %    % Immature Granulocytes 0 %    NRBCs per 100 WBC 0 <1 /100    Absolute Neutrophils 2.8 1.6 - 8.3 10e3/uL    Absolute Lymphocytes 3.4 0.8 - 5.3 10e3/uL    Absolute Monocytes 0.4 0.0 - 1.3 10e3/uL    Absolute Eosinophils 0.5 0.0 - 0.7 10e3/uL    Absolute Basophils 0.0 0.0 - 0.2 10e3/uL    Absolute Immature Granulocytes 0.0 <=0.4 10e3/uL    Absolute NRBCs 0.0 10e3/uL   EKG 12 lead     Status: None (Preliminary result)   Result Value Ref Range    Systolic Blood Pressure  mmHg    Diastolic Blood Pressure  mmHg    Ventricular Rate 56 BPM    Atrial Rate 56 BPM    AR Interval 154 ms    QRS Duration 90 ms     ms    QTc 391 ms    P Axis 51 degrees    R AXIS -16 degrees    T Axis 0 degrees    Interpretation ECG       Sinus bradycardia  Nonspecific T wave abnormality  Abnormal ECG     CBC with platelets differential     Status: None    Narrative    The following orders were created for panel order CBC with platelets  differential.  Procedure                               Abnormality         Status                     ---------                               -----------         ------                     CBC with platelets and d...[[id number]]                      Final result                 Please view results for these tests on the individual orders.     Medications   sodium chloride 0.9% BOLUS 1,000 mL (0 mLs Intravenous Stopped 6/16/24 1710)   OLANZapine zydis (zyPREXA) ODT tab 5 mg (5 mg Oral $Given 6/16/24 1534)   prochlorperazine (COMPAZINE) injection 10 mg (10 mg Intravenous $Given 6/16/24 1634)   sodium chloride 0.9% BOLUS 1,000 mL (0 mLs Intravenous Stopped 6/16/24 1849)     Labs Ordered and Resulted from Time of ED Arrival to Time of ED Departure   COMPREHENSIVE METABOLIC PANEL - Abnormal       Result Value    Sodium 135      Potassium 4.7      Carbon Dioxide (CO2) 27      Anion Gap 11      Urea Nitrogen 23.5 (*)     Creatinine 1.00 (*)     GFR Estimate 70      Calcium 9.5      Chloride 97 (*)     Glucose 284 (*)     Alkaline Phosphatase 116      AST 56 (*)     ALT 99 (*)     Protein Total 7.7      Albumin 3.9      Bilirubin Total 0.3     ROUTINE UA WITH MICROSCOPIC REFLEX TO CULTURE - Abnormal    Color Urine Yellow      Appearance Urine Clear      Glucose Urine >=1000 (*)     Bilirubin Urine Negative      Ketones Urine Negative      Specific Gravity Urine 1.029      Blood Urine Negative      pH Urine 6.0      Protein Albumin Urine Negative      Urobilinogen Urine Normal      Nitrite Urine Negative      Leukocyte Esterase Urine Negative      Mucus Urine Present (*)     RBC Urine <1      WBC Urine 3      Squamous Epithelials Urine 5 (*)    MAGNESIUM - Normal    Magnesium 1.8     LIPASE - Normal    Lipase 24     HCG QUALITATIVE PREGNANCY - Normal    hCG Serum Qualitative Negative     CBC WITH PLATELETS AND DIFFERENTIAL    WBC Count 7.2      RBC Count 4.23      Hemoglobin 13.2      Hematocrit 37.2      MCV 88      MCH 31.2       MCHC 35.5      RDW 12.3      Platelet Count 178      % Neutrophils 39      % Lymphocytes 47      % Monocytes 6      % Eosinophils 7      % Basophils 1      % Immature Granulocytes 0      NRBCs per 100 WBC 0      Absolute Neutrophils 2.8      Absolute Lymphocytes 3.4      Absolute Monocytes 0.4      Absolute Eosinophils 0.5      Absolute Basophils 0.0      Absolute Immature Granulocytes 0.0      Absolute NRBCs 0.0       No orders to display          Critical care was not performed.     Medical Decision Making  The patient's presentation was of high complexity (a chronic illness severe exacerbation, progression, or side effect of treatment).    The patient's evaluation involved:  ordering and/or review of 3+ test(s) in this encounter (see separate area of note for details)    The patient's management necessitated moderate risk (prescription drug management including medications given in the ED).    Assessment & Plan    This is a 48-year-old female with history of cannabis use and hyperemesis presenting with concerns for hyperemesis in the absence of abdominal pain, infectious symptoms.  On presentation to the department vitals within reference range patient is not tachycardic initial blood pressure 127/83.  She is not actively vomiting on my exam she has a soft abdomen.  Broad differential considered including electrolyte imbalance, hyperemesis, gastritis, amongst others.  Low clinical suspicion of acute intra-abdominal process in the absence of any abdominal pain.  Patient does have underlying type 2 diabetes consider possibility of DKA of lower suspicion as patient's sugars have been under 200 at home.  Will initiate EKG screening for QT interval, as well as screening laboratory tests CBC chemistry lipase magnesium and pregnancy.  Will also obtain UA as well to evaluate possibility of UTI.  Patient given fluids, Zyprexa as she states this has been helpful for her in the past.  EKG without prolonged QT interval  shows no findings for ischemia or other arrhythmias other than sinus bradycardia.    Labs reviewed.  CBC without leukocytosis or anemia.  Chemistry shows creatinine of 1.0 in reviewing prior lab values patient's baseline is around 0.75-0.8 suspect the mild elevation is from dehydration.  Remainder of electrolytes within reference range.  LFTs mildly elevated, similar to prior.  Patient not having any abdominal tenderness on exam to suggest acute upper abdominal etiology to indicate imaging.  Pregnancy negative.  Lipase negative suggest against pancreatitis.    I did reevaluate the patient after fluids were initiated, she reports continuing to have some vomiting with Zyprexa notes that Compazine in the past has been beneficial for her.  This was ordered and on reevaluation patient feels nauseous still.  She has benefited with Compazine as well and was given dose of Compazine and additional liter of fluids and reevaluation she feels improved.  She is able to tolerate p.o. without vomiting.  She feels safe going home.  Suspect this is a reoccurrence of her cyclic vomiting likely cannabinoid induced.  Discussed with her importance of avoiding cannabinoids in the future.  We discussed return precautions.  Patient has no other questions or concerns at this time.  Red flag signs were addressed, and they were in agreement with the patient care plan provided.    Patient seen and discussed with attending physician , who agrees with my plan of care.    I have reviewed the nursing notes. I have reviewed the findings, diagnosis, plan and need for follow up with the patient.    Discharge Medication List as of 6/16/2024  6:50 PM        START taking these medications    Details   !! prochlorperazine (COMPAZINE) 10 MG tablet Take 1 tablet (10 mg) by mouth every 6 hours as needed for nausea or vomiting, Disp-8 tablet, R-0, Local Print       !! - Potential duplicate medications found. Please discuss with provider.           Final diagnoses:   Cyclic vomiting syndrome   History of marijuana use       LIV Jarvis  [facility] EMERGENCY DEPARTMENT  6/16/2024

## 2024-06-16 NOTE — ED TRIAGE NOTES
Reports she has been throwing up all night. History of cyclical vomiting. Has not taken anything at home for this    Also reporting headache,

## 2024-06-17 LAB
ATRIAL RATE - MUSE: 56 BPM
DIASTOLIC BLOOD PRESSURE - MUSE: NORMAL MMHG
INTERPRETATION ECG - MUSE: NORMAL
P AXIS - MUSE: 51 DEGREES
PR INTERVAL - MUSE: 154 MS
QRS DURATION - MUSE: 90 MS
QT - MUSE: 406 MS
QTC - MUSE: 391 MS
R AXIS - MUSE: -16 DEGREES
SYSTOLIC BLOOD PRESSURE - MUSE: NORMAL MMHG
T AXIS - MUSE: 0 DEGREES
VENTRICULAR RATE- MUSE: 56 BPM

## 2024-06-18 NOTE — PLAN OF CARE
"  Problem: Adult Inpatient Plan of Care  Goal: Plan of Care Review  Description: The Plan of Care Review/Shift note should be completed every shift.  The Outcome Evaluation is a brief statement about your assessment that the patient is improving, declining, or no change.  This information will be displayed automatically on your shift  note.  Outcome: Progressing  Flowsheets (Taken 1/11/2024 0935)  Outcome Evaluation: Pt is able to eat and drink no c/o n/v and plan is to D/C this am to home  Plan of Care Reviewed With: patient  Overall Patient Progress: improving     Problem: Adult Inpatient Plan of Care  Goal: Absence of Hospital-Acquired Illness or Injury  Intervention: Identify and Manage Fall Risk  Recent Flowsheet Documentation  Taken 1/11/2024 0900 by Janell Childs, RN  Safety Promotion/Fall Prevention:   clutter free environment maintained   nonskid shoes/slippers when out of bed     Problem: Adult Inpatient Plan of Care  Goal: Absence of Hospital-Acquired Illness or Injury  Intervention: Prevent Skin Injury  Recent Flowsheet Documentation  Taken 1/11/2024 0900 by Janell Childs, RN  Body Position: position changed independently   Goal Outcome Evaluation:      Plan of Care Reviewed With: patient    Overall Patient Progress: improvingOverall Patient Progress: improving    Outcome Evaluation: Pt is able to eat and drink no c/o n/v and plan is to D/C this am to home VS WDL for this patient. Insulin refused stated \"ill take it when I get home no need to waste the pen\" provider notified.      " Post-Care Instructions: I reviewed with the patient in detail post-care instructions. Patient is to wear sunprotection, and avoid picking at any of the treated lesions. Pt may apply Vaseline to crusted or scabbing areas. Duration Of Freeze Thaw-Cycle (Seconds): 2 Render Post-Care Instructions In Note?: yes Number Of Freeze-Thaw Cycles: 2 freeze-thaw cycles Render Note In Bullet Format When Appropriate: No Detail Level: Detailed Consent: The patient's consent was obtained including but not limited to risks of crusting, scabbing, blistering, scarring, darker or lighter pigmentary change, recurrence, incomplete removal and infection.

## 2024-06-25 ENCOUNTER — HOSPITAL ENCOUNTER (EMERGENCY)
Facility: CLINIC | Age: 49
Discharge: HOME | End: 2024-06-25
Attending: EMERGENCY MEDICINE | Admitting: EMERGENCY MEDICINE
Payer: COMMERCIAL

## 2024-06-25 ENCOUNTER — APPOINTMENT (OUTPATIENT)
Dept: GENERAL RADIOLOGY | Facility: CLINIC | Age: 49
End: 2024-06-25
Attending: EMERGENCY MEDICINE
Payer: COMMERCIAL

## 2024-06-25 VITALS
HEART RATE: 62 BPM | SYSTOLIC BLOOD PRESSURE: 139 MMHG | RESPIRATION RATE: 16 BRPM | DIASTOLIC BLOOD PRESSURE: 89 MMHG | OXYGEN SATURATION: 98 % | TEMPERATURE: 98.1 F

## 2024-06-25 DIAGNOSIS — E83.42 HYPOMAGNESEMIA: ICD-10-CM

## 2024-06-25 DIAGNOSIS — R11.2 NAUSEA AND VOMITING, UNSPECIFIED VOMITING TYPE: ICD-10-CM

## 2024-06-25 DIAGNOSIS — E86.0 DEHYDRATION: ICD-10-CM

## 2024-06-25 LAB
ALBUMIN SERPL BCG-MCNC: 3.7 G/DL (ref 3.5–5.2)
ALP SERPL-CCNC: 141 U/L (ref 40–150)
ALT SERPL W P-5'-P-CCNC: 177 U/L (ref 0–50)
ANION GAP SERPL CALCULATED.3IONS-SCNC: 11 MMOL/L (ref 7–15)
AST SERPL W P-5'-P-CCNC: 177 U/L (ref 0–45)
ATRIAL RATE - MUSE: 72 BPM
BASOPHILS # BLD AUTO: 0 10E3/UL (ref 0–0.2)
BASOPHILS NFR BLD AUTO: 1 %
BILIRUB SERPL-MCNC: 0.4 MG/DL
BUN SERPL-MCNC: 15.6 MG/DL (ref 6–20)
CALCIUM SERPL-MCNC: 8.9 MG/DL (ref 8.6–10)
CHLORIDE SERPL-SCNC: 103 MMOL/L (ref 98–107)
CREAT SERPL-MCNC: 0.84 MG/DL (ref 0.51–0.95)
DEPRECATED HCO3 PLAS-SCNC: 24 MMOL/L (ref 22–29)
DIASTOLIC BLOOD PRESSURE - MUSE: NORMAL MMHG
EGFRCR SERPLBLD CKD-EPI 2021: 86 ML/MIN/1.73M2
EOSINOPHIL # BLD AUTO: 0.6 10E3/UL (ref 0–0.7)
EOSINOPHIL NFR BLD AUTO: 9 %
ERYTHROCYTE [DISTWIDTH] IN BLOOD BY AUTOMATED COUNT: 12.8 % (ref 10–15)
GLUCOSE SERPL-MCNC: 244 MG/DL (ref 70–99)
HCG SERPL QL: NEGATIVE
HCT VFR BLD AUTO: 37.7 % (ref 35–47)
HGB BLD-MCNC: 12.9 G/DL (ref 11.7–15.7)
IMM GRANULOCYTES # BLD: 0 10E3/UL
IMM GRANULOCYTES NFR BLD: 1 %
INTERPRETATION ECG - MUSE: NORMAL
LIPASE SERPL-CCNC: 27 U/L (ref 13–60)
LYMPHOCYTES # BLD AUTO: 2.2 10E3/UL (ref 0.8–5.3)
LYMPHOCYTES NFR BLD AUTO: 34 %
MAGNESIUM SERPL-MCNC: 1.5 MG/DL (ref 1.7–2.3)
MCH RBC QN AUTO: 30.7 PG (ref 26.5–33)
MCHC RBC AUTO-ENTMCNC: 34.2 G/DL (ref 31.5–36.5)
MCV RBC AUTO: 90 FL (ref 78–100)
MONOCYTES # BLD AUTO: 0.4 10E3/UL (ref 0–1.3)
MONOCYTES NFR BLD AUTO: 6 %
NEUTROPHILS # BLD AUTO: 3.4 10E3/UL (ref 1.6–8.3)
NEUTROPHILS NFR BLD AUTO: 49 %
NRBC # BLD AUTO: 0 10E3/UL
NRBC BLD AUTO-RTO: 0 /100
P AXIS - MUSE: 58 DEGREES
PLATELET # BLD AUTO: 155 10E3/UL (ref 150–450)
POTASSIUM SERPL-SCNC: 4.1 MMOL/L (ref 3.4–5.3)
PR INTERVAL - MUSE: 144 MS
PROT SERPL-MCNC: 7.2 G/DL (ref 6.4–8.3)
QRS DURATION - MUSE: 88 MS
QT - MUSE: 386 MS
QTC - MUSE: 422 MS
R AXIS - MUSE: -21 DEGREES
RBC # BLD AUTO: 4.2 10E6/UL (ref 3.8–5.2)
SODIUM SERPL-SCNC: 138 MMOL/L (ref 135–145)
SYSTOLIC BLOOD PRESSURE - MUSE: NORMAL MMHG
T AXIS - MUSE: -10 DEGREES
VENTRICULAR RATE- MUSE: 72 BPM
WBC # BLD AUTO: 6.6 10E3/UL (ref 4–11)

## 2024-06-25 PROCEDURE — 258N000003 HC RX IP 258 OP 636: Mod: JZ | Performed by: EMERGENCY MEDICINE

## 2024-06-25 PROCEDURE — 83735 ASSAY OF MAGNESIUM: CPT | Performed by: EMERGENCY MEDICINE

## 2024-06-25 PROCEDURE — 83690 ASSAY OF LIPASE: CPT | Performed by: EMERGENCY MEDICINE

## 2024-06-25 PROCEDURE — 93005 ELECTROCARDIOGRAM TRACING: CPT | Performed by: EMERGENCY MEDICINE

## 2024-06-25 PROCEDURE — 99285 EMERGENCY DEPT VISIT HI MDM: CPT | Mod: 25 | Performed by: EMERGENCY MEDICINE

## 2024-06-25 PROCEDURE — 96365 THER/PROPH/DIAG IV INF INIT: CPT | Performed by: EMERGENCY MEDICINE

## 2024-06-25 PROCEDURE — 96361 HYDRATE IV INFUSION ADD-ON: CPT | Performed by: EMERGENCY MEDICINE

## 2024-06-25 PROCEDURE — 71046 X-RAY EXAM CHEST 2 VIEWS: CPT

## 2024-06-25 PROCEDURE — 85025 COMPLETE CBC W/AUTO DIFF WBC: CPT | Performed by: EMERGENCY MEDICINE

## 2024-06-25 PROCEDURE — 99284 EMERGENCY DEPT VISIT MOD MDM: CPT | Performed by: EMERGENCY MEDICINE

## 2024-06-25 PROCEDURE — 84703 CHORIONIC GONADOTROPIN ASSAY: CPT | Performed by: EMERGENCY MEDICINE

## 2024-06-25 PROCEDURE — 93010 ELECTROCARDIOGRAM REPORT: CPT | Performed by: EMERGENCY MEDICINE

## 2024-06-25 PROCEDURE — 36415 COLL VENOUS BLD VENIPUNCTURE: CPT | Performed by: EMERGENCY MEDICINE

## 2024-06-25 PROCEDURE — 80053 COMPREHEN METABOLIC PANEL: CPT | Performed by: EMERGENCY MEDICINE

## 2024-06-25 PROCEDURE — 250N000011 HC RX IP 250 OP 636: Mod: JZ | Performed by: EMERGENCY MEDICINE

## 2024-06-25 RX ORDER — MAGNESIUM SULFATE 1 G/100ML
1 INJECTION INTRAVENOUS ONCE
Status: COMPLETED | OUTPATIENT
Start: 2024-06-25 | End: 2024-06-25

## 2024-06-25 RX ORDER — MAGNESIUM SULFATE HEPTAHYDRATE 40 MG/ML
INJECTION, SOLUTION INTRAVENOUS
Status: DISCONTINUED
Start: 2024-06-25 | End: 2024-06-25 | Stop reason: HOSPADM

## 2024-06-25 RX ORDER — ONDANSETRON 8 MG/1
8 TABLET, ORALLY DISINTEGRATING ORAL EVERY 8 HOURS PRN
Qty: 10 TABLET | Refills: 0 | Status: SHIPPED | OUTPATIENT
Start: 2024-06-25 | End: 2024-06-28

## 2024-06-25 RX ADMIN — MAGNESIUM SULFATE HEPTAHYDRATE 1 G: 1 INJECTION, SOLUTION INTRAVENOUS at 06:18

## 2024-06-25 RX ADMIN — SODIUM CHLORIDE 1000 ML: 9 INJECTION, SOLUTION INTRAVENOUS at 03:54

## 2024-06-25 ASSESSMENT — COLUMBIA-SUICIDE SEVERITY RATING SCALE - C-SSRS
2. HAVE YOU ACTUALLY HAD ANY THOUGHTS OF KILLING YOURSELF IN THE PAST MONTH?: NO
6. HAVE YOU EVER DONE ANYTHING, STARTED TO DO ANYTHING, OR PREPARED TO DO ANYTHING TO END YOUR LIFE?: NO
1. IN THE PAST MONTH, HAVE YOU WISHED YOU WERE DEAD OR WISHED YOU COULD GO TO SLEEP AND NOT WAKE UP?: NO

## 2024-06-25 ASSESSMENT — ACTIVITIES OF DAILY LIVING (ADL)
ADLS_ACUITY_SCORE: 37

## 2024-06-25 NOTE — ED PROVIDER NOTES
ED Provider Note  [facility]      History     Chief Complaint   Patient presents with    Nausea & Vomiting     HPI  Yun Coyle is a 48 year old female with past medical history significant for opioid use disorder on methadone, history of type 2 diabetes obesity cyclic vomiting syndrome resents to the ED with complaint of nausea and vomiting.  She states she feels this is similar to previous episodes of cyclical vomiting, she believes that the cause of her symptoms today.  She states symptoms been ongoing for about 24 hours at this point.  She is vomited roughly 10 times, nonbloody emesis.  No diarrhea.  No fever, cough, shortness of breath.  She states over the last couple of hours she has noticed that when she vomits she gets some left-sided chest pain that goes away after the vomiting episode.  She states she is out of antiemetics at home.  She states that the last use of marijuana was a few weeks ago.  She reports previous cholecystectomy and fallopian tube removal.  She denies any current abdominal pain, states she just feels as though her stomach is sour.  She does report feeling improved after droperidol was given in the ambulance.    Past Medical History  Past Medical History:   Diagnosis Date    Acute pancreatitis 06/29/2017    Anxiety     Cauda equina syndrome (H) 03/29/2013    Cyst of right ovary 11/16/2017    Depressive disorder     Gastroesophageal reflux disease     Hypertension     Hyponatremia 10/01/2023    Low back pain 08/20/2012    Overview:   History of cauda equina and surgery.      Type 2 diabetes mellitus (H)     Uncomplicated asthma      Past Surgical History:   Procedure Laterality Date    BACK SURGERY      x2    CHOLECYSTECTOMY      ORTHOPEDIC SURGERY      SALPINGO OOPHORECTOMY,R/L/ELDA Right      ondansetron (ZOFRAN ODT) 8 MG ODT tab  albuterol (PROAIR HFA/PROVENTIL HFA/VENTOLIN HFA) 108 (90 Base) MCG/ACT inhaler  Alcohol Swabs PADS  blood glucose (NO  BRAND SPECIFIED) lancets standard  blood glucose (NO BRAND SPECIFIED) test strip  blood glucose calibration (NO BRAND SPECIFIED) solution  blood glucose monitoring (NO BRAND SPECIFIED) meter device kit  budesonide-formoterol (SYMBICORT) 160-4.5 MCG/ACT Inhaler  cloNIDine (CATAPRES) 0.1 MG tablet  Continuous Blood Gluc  (FREESTYLE MCKENZIE 2 READER) KESHIA  Continuous Blood Gluc Sensor (FREESTYLE MCKENZIE 2 SENSOR) MISC  glucagon 1 MG kit  glucose (BD GLUCOSE) 4 g chewable tablet  glucose 40 % (400 mg/mL) gel  insulin glargine (LANTUS PEN) 100 UNIT/ML pen  losartan (COZAAR) 25 MG tablet  metFORMIN (GLUCOPHAGE) 500 MG tablet  methadone (DOLOPHINE-INTENSOL) 10 MG/ML (HIGH CONC) solution  OLANZapine (ZYPREXA) 10 MG tablet  predniSONE (DELTASONE) 20 MG tablet  prochlorperazine (COMPAZINE) 10 MG tablet  prochlorperazine (COMPAZINE) 25 MG suppository  Sharps Container MISC      No Known Allergies  Family History  Family History   Problem Relation Age of Onset    Breast Cancer Mother         in her 50s.    Diabetes Mother     Breast Cancer Maternal Grandmother         in her 40s.    Diabetes Maternal Grandmother      Social History   Social History     Tobacco Use    Smoking status: Never    Smokeless tobacco: Never   Vaping Use    Vaping status: Never Used   Substance Use Topics    Alcohol use: No    Drug use: Yes     Types: Marijuana      A medically appropriate review of systems was performed with pertinent positives and negatives noted in the HPI, and all other systems negative.    Physical Exam   BP: 132/86  Pulse: 85  Temp: 98.1  F (36.7  C)  Resp: 18  SpO2: 99 %  Physical Exam  Constitutional:       General: She is not in acute distress.     Appearance: Normal appearance. She is not toxic-appearing.   HENT:      Head: Atraumatic.   Eyes:      General: No scleral icterus.     Conjunctiva/sclera: Conjunctivae normal.   Cardiovascular:      Rate and Rhythm: Normal rate.      Heart sounds: Normal heart sounds.    Pulmonary:      Effort: No respiratory distress.      Breath sounds: Normal breath sounds.   Abdominal:      Palpations: Abdomen is soft.      Tenderness: There is no abdominal tenderness.   Musculoskeletal:         General: No deformity.      Cervical back: Neck supple.   Skin:     General: Skin is warm.      Findings: No rash.   Neurological:      Mental Status: She is alert.           ED Course, Procedures, & Data      Procedures            EKG Interpretation:      Interpreted by Addis Julio MD  Time reviewed:0350   Symptoms at time of EKG: nausea   Rhythm: Normal sinus   Rate: Normal  Axis: Normal  Ectopy: None  Conduction: Normal  ST Segments/ T Waves: Non-specific T wave changes  Q Waves: None  Comparison to prior: Unchanged    Clinical Impression: stable EKG         Results for orders placed or performed during the hospital encounter of 06/25/24   Chest XR,  PA & LAT     Status: None    Narrative    EXAM: XR CHEST 2 VIEWS  LOCATION: [facility]  DATE: 6/25/2024    INDICATION: Chest pain with vomiting.  COMPARISON:   1. Portable chest single view (2 films) 1/9/2024 at 1727 hours.  2. Chest x-ray 2 views 12/6/2023 at 1434 hours.      Impression    IMPRESSION: Both lungs remain clear. No adenopathy or effusion. Normal cardiac size and pulmonary vascularity. Minor degenerative changes both shoulders and the spine. No significant interval change.   Comprehensive metabolic panel     Status: Abnormal   Result Value Ref Range    Sodium 138 135 - 145 mmol/L    Potassium 4.1 3.4 - 5.3 mmol/L    Carbon Dioxide (CO2) 24 22 - 29 mmol/L    Anion Gap 11 7 - 15 mmol/L    Urea Nitrogen 15.6 6.0 - 20.0 mg/dL    Creatinine 0.84 0.51 - 0.95 mg/dL    GFR Estimate 86 >60 mL/min/1.73m2    Calcium 8.9 8.6 - 10.0 mg/dL    Chloride 103 98 - 107 mmol/L    Glucose 244 (H) 70 - 99 mg/dL    Alkaline Phosphatase 141 40 - 150 U/L     (H) 0 - 45 U/L     (H) 0 - 50 U/L     Protein Total 7.2 6.4 - 8.3 g/dL    Albumin 3.7 3.5 - 5.2 g/dL    Bilirubin Total 0.4 <=1.2 mg/dL   Lipase     Status: Normal   Result Value Ref Range    Lipase 27 13 - 60 U/L   HCG qualitative pregnancy (blood)     Status: Normal   Result Value Ref Range    hCG Serum Qualitative Negative Negative   CBC with platelets and differential     Status: None   Result Value Ref Range    WBC Count 6.6 4.0 - 11.0 10e3/uL    RBC Count 4.20 3.80 - 5.20 10e6/uL    Hemoglobin 12.9 11.7 - 15.7 g/dL    Hematocrit 37.7 35.0 - 47.0 %    MCV 90 78 - 100 fL    MCH 30.7 26.5 - 33.0 pg    MCHC 34.2 31.5 - 36.5 g/dL    RDW 12.8 10.0 - 15.0 %    Platelet Count 155 150 - 450 10e3/uL    % Neutrophils 49 %    % Lymphocytes 34 %    % Monocytes 6 %    % Eosinophils 9 %    % Basophils 1 %    % Immature Granulocytes 1 %    NRBCs per 100 WBC 0 <1 /100    Absolute Neutrophils 3.4 1.6 - 8.3 10e3/uL    Absolute Lymphocytes 2.2 0.8 - 5.3 10e3/uL    Absolute Monocytes 0.4 0.0 - 1.3 10e3/uL    Absolute Eosinophils 0.6 0.0 - 0.7 10e3/uL    Absolute Basophils 0.0 0.0 - 0.2 10e3/uL    Absolute Immature Granulocytes 0.0 <=0.4 10e3/uL    Absolute NRBCs 0.0 10e3/uL   Magnesium     Status: Abnormal   Result Value Ref Range    Magnesium 1.5 (L) 1.7 - 2.3 mg/dL   EKG 12 lead     Status: None (Preliminary result)   Result Value Ref Range    Systolic Blood Pressure  mmHg    Diastolic Blood Pressure  mmHg    Ventricular Rate 72 BPM    Atrial Rate 72 BPM    ID Interval 144 ms    QRS Duration 88 ms     ms    QTc 422 ms    P Axis 58 degrees    R AXIS -21 degrees    T Axis -10 degrees    Interpretation ECG       Sinus rhythm  Nonspecific T wave abnormality  Abnormal ECG     CBC with platelets differential     Status: None    Narrative    The following orders were created for panel order CBC with platelets differential.  Procedure                               Abnormality         Status                     ---------                               -----------          ------                     CBC with platelets and d...[[id number]]                      Final result                 Please view results for these tests on the individual orders.     Medications   magnesium sulfate 2 GM/50ML in 50 mL sterile water intermittent infusion (  Canceled Entry 6/25/24 0619)   sodium chloride 0.9% BOLUS 1,000 mL (0 mLs Intravenous Stopped 6/25/24 0600)   magnesium sulfate 1 g in 100 mL D5W intermittent infusion (0 g Intravenous Stopped 6/25/24 0648)     Labs Ordered and Resulted from Time of ED Arrival to Time of ED Departure   COMPREHENSIVE METABOLIC PANEL - Abnormal       Result Value    Sodium 138      Potassium 4.1      Carbon Dioxide (CO2) 24      Anion Gap 11      Urea Nitrogen 15.6      Creatinine 0.84      GFR Estimate 86      Calcium 8.9      Chloride 103      Glucose 244 (*)     Alkaline Phosphatase 141       (*)      (*)     Protein Total 7.2      Albumin 3.7      Bilirubin Total 0.4     MAGNESIUM - Abnormal    Magnesium 1.5 (*)    LIPASE - Normal    Lipase 27     HCG QUALITATIVE PREGNANCY - Normal    hCG Serum Qualitative Negative     CBC WITH PLATELETS AND DIFFERENTIAL    WBC Count 6.6      RBC Count 4.20      Hemoglobin 12.9      Hematocrit 37.7      MCV 90      MCH 30.7      MCHC 34.2      RDW 12.8      Platelet Count 155      % Neutrophils 49      % Lymphocytes 34      % Monocytes 6      % Eosinophils 9      % Basophils 1      % Immature Granulocytes 1      NRBCs per 100 WBC 0      Absolute Neutrophils 3.4      Absolute Lymphocytes 2.2      Absolute Monocytes 0.4      Absolute Eosinophils 0.6      Absolute Basophils 0.0      Absolute Immature Granulocytes 0.0      Absolute NRBCs 0.0       Chest XR,  PA & LAT   Final Result   IMPRESSION: Both lungs remain clear. No adenopathy or effusion. Normal cardiac size and pulmonary vascularity. Minor degenerative changes both shoulders and the spine. No significant interval change.             Critical care was not  performed.     Medical Decision Making  The patient's presentation was of moderate complexity (a chronic illness mild to moderate exacerbation, progression, or side effect of treatment).    The patient's evaluation involved:  review of external note(s) from 1 sources (previous note)  review of 3+ test result(s) ordered prior to this encounter (previous results)  ordering and/or review of 3+ test(s) in this encounter (see separate area of note for details)    The patient's management necessitated moderate risk (prescription drug management including medications given in the ED).    Assessment & Plan    She was given IV fluids here.  She did have some improvement with the droperidol given from the paramedics.  Basic labs were checked, magnesium slightly low, this was replaced.  EKG was obtained which showed some nonspecific T wave abnormality, otherwise no evidence to suggest acute ischemic change.  I do not think that she has acute cardiac etiology.  Her chest pain was only bothering her during the episodes of vomiting.  Chest x-ray was obtained to evaluate for any evidence to suggest esophageal rupture, such as mediastinal air, this was not remarkable.  No infectious signs or symptoms.  No abdominal pain or tenderness.  She was monitored in the ER for several hours, continued to feel all right.  She was able to pass a p.o. challenge here.  I will give a prescription for Zofran.  She is encouraged to continue to abstain from marijuana.  Hard to say if that is related or not, but I think in light of her ongoing issues, abstinence is strongly encouraged.  She is encouraged to follow-up with primary care, return with any new or worsening symptoms, any other concerns.  She verbalizes understanding and is agreeable to the plan.    Dictation Disclaimer: Some of this Note has been completed with voice-recognition dictation software. Although errors are generally corrected real-time, there is the potential for a rare error  to be present in the completed chart.      I have reviewed the nursing notes. I have reviewed the findings, diagnosis, plan and need for follow up with the patient.    New Prescriptions    ONDANSETRON (ZOFRAN ODT) 8 MG ODT TAB    Take 1 tablet (8 mg) by mouth every 8 hours as needed for nausea       Final diagnoses:   Nausea and vomiting, unspecified vomiting type   Hypomagnesemia   Dehydration       Addis LEAL [facility] EMERGENCY DEPARTMENT  6/25/2024     Addis Julio MD  06/25/24 0700

## 2024-06-25 NOTE — DISCHARGE INSTRUCTIONS
Drink small amounts of fluids frequently. Use the nausea medication as needed. Return with any worsening or concerns. Follow up with your clinic doctor within the next week.

## 2024-06-25 NOTE — ED TRIAGE NOTES
48 y.o female presents to the ER via ambulance. Pt reports that she started having chest pain over 2 hours ago while vomiting. Pt reports that she has been having N/V (vomited at least 10 times) the past 24 hours and is not able to keep any food or liquids down.   Medics report that they started an IV and administered Droperidol 2.5 mg and gave a 500 ml bolus of NS. GCS 15.

## 2024-06-27 ENCOUNTER — PATIENT OUTREACH (OUTPATIENT)
Dept: CARE COORDINATION | Facility: CLINIC | Age: 49
End: 2024-06-27
Payer: COMMERCIAL

## 2024-06-27 NOTE — PROGRESS NOTES
[facility] Care Resource Center Contact  [facility]/Voicemail     Clinical Data: Care Coordination ED-sourced Outreach-     Outreach attempted x 2.  Left message on patient's voicemail, providing [facility]'s 24/7 scheduling and nurse triage phone number [phone] ([phone]) for questions/concerns and/or to schedule an appt with an [facility] provider.      [facility] unable to send Care Coordination introduction letter as patient does not have an active MyChart account. Clinic Care Coordination services remain available via referral if needed.    Plan: [facility] will do no further outreaches at this time.       DAI Lara  [facility] Care Resource [place], [facility]    *Connected Care Resource Team does NOT follow patient ongoing. Referrals are identified based on internal discharge reports and the outreach is to ensure patient has an understanding of their discharge instructions.

## 2024-06-28 ENCOUNTER — PATIENT OUTREACH (OUTPATIENT)
Dept: CARE COORDINATION | Facility: CLINIC | Age: 49
End: 2024-06-28
Payer: COMMERCIAL

## 2024-06-28 NOTE — PROGRESS NOTES
Clinical Product Navigator reviewed chart.   CPN Initial Information Gathering  Referral Source: ED Follow-Up  Referrals Places: Care Coordination    Patient identified internally due to high utilization with recent discharge for care management support with high probability risk of admission.      [facility] discharge report identified patient having had 11 ED admissions and 1 hospital admissions in the last 6 months. Per chart review, patient has high risk score for hospital admission or ED visit of 94.6%. Per chart review, please consider referral for Medication Therapy Management (MTM).    Patient would benefit from care management support for chronic disease management.    Met referral criteria for Care Coordinator; referral to be sent.    DA Kirby  Social Work Care Coordinator   Clinical Product Navigation

## 2024-06-30 ENCOUNTER — HOSPITAL ENCOUNTER (EMERGENCY)
Facility: CLINIC | Age: 49
Discharge: HOME | End: 2024-07-01
Attending: EMERGENCY MEDICINE | Admitting: EMERGENCY MEDICINE
Payer: COMMERCIAL

## 2024-06-30 DIAGNOSIS — R11.15 CYCLICAL VOMITING: ICD-10-CM

## 2024-06-30 PROCEDURE — 93005 ELECTROCARDIOGRAM TRACING: CPT | Mod: RTG

## 2024-06-30 PROCEDURE — 99285 EMERGENCY DEPT VISIT HI MDM: CPT | Mod: 25 | Performed by: EMERGENCY MEDICINE

## 2024-06-30 PROCEDURE — 99284 EMERGENCY DEPT VISIT MOD MDM: CPT | Performed by: EMERGENCY MEDICINE

## 2024-06-30 ASSESSMENT — ACTIVITIES OF DAILY LIVING (ADL): ADLS_ACUITY_SCORE: 35

## 2024-07-01 ENCOUNTER — PATIENT OUTREACH (OUTPATIENT)
Dept: CARE COORDINATION | Facility: CLINIC | Age: 49
End: 2024-07-01

## 2024-07-01 ENCOUNTER — APPOINTMENT (OUTPATIENT)
Dept: GENERAL RADIOLOGY | Facility: CLINIC | Age: 49
End: 2024-07-01
Attending: EMERGENCY MEDICINE
Payer: COMMERCIAL

## 2024-07-01 VITALS
HEART RATE: 62 BPM | DIASTOLIC BLOOD PRESSURE: 103 MMHG | SYSTOLIC BLOOD PRESSURE: 164 MMHG | TEMPERATURE: 98.5 F | OXYGEN SATURATION: 100 % | RESPIRATION RATE: 16 BRPM | BODY MASS INDEX: 29.62 KG/M2 | HEIGHT: 69 IN | WEIGHT: 200 LBS

## 2024-07-01 LAB
ALBUMIN SERPL BCG-MCNC: 4.7 G/DL (ref 3.5–5.2)
ALP SERPL-CCNC: 157 U/L (ref 40–150)
ALT SERPL W P-5'-P-CCNC: 120 U/L (ref 0–50)
ANION GAP SERPL CALCULATED.3IONS-SCNC: 15 MMOL/L (ref 7–15)
AST SERPL W P-5'-P-CCNC: 56 U/L (ref 0–45)
ATRIAL RATE - MUSE: 84 BPM
BASOPHILS # BLD AUTO: 0.1 10E3/UL (ref 0–0.2)
BASOPHILS NFR BLD AUTO: 1 %
BILIRUB SERPL-MCNC: 0.3 MG/DL
BUN SERPL-MCNC: 20.2 MG/DL (ref 6–20)
CALCIUM SERPL-MCNC: 10.3 MG/DL (ref 8.6–10)
CHLORIDE SERPL-SCNC: 95 MMOL/L (ref 98–107)
CREAT SERPL-MCNC: 0.97 MG/DL (ref 0.51–0.95)
DEPRECATED HCO3 PLAS-SCNC: 27 MMOL/L (ref 22–29)
DIASTOLIC BLOOD PRESSURE - MUSE: NORMAL MMHG
EGFRCR SERPLBLD CKD-EPI 2021: 73 ML/MIN/1.73M2
EOSINOPHIL # BLD AUTO: 0.8 10E3/UL (ref 0–0.7)
EOSINOPHIL NFR BLD AUTO: 8 %
ERYTHROCYTE [DISTWIDTH] IN BLOOD BY AUTOMATED COUNT: 12.9 % (ref 10–15)
GLUCOSE SERPL-MCNC: 306 MG/DL (ref 70–99)
HCG SERPL QL: NEGATIVE
HCT VFR BLD AUTO: 42.1 % (ref 35–47)
HGB BLD-MCNC: 15 G/DL (ref 11.7–15.7)
IMM GRANULOCYTES # BLD: 0.1 10E3/UL
IMM GRANULOCYTES NFR BLD: 1 %
INTERPRETATION ECG - MUSE: NORMAL
LIPASE SERPL-CCNC: 39 U/L (ref 13–60)
LYMPHOCYTES # BLD AUTO: 4.8 10E3/UL (ref 0.8–5.3)
LYMPHOCYTES NFR BLD AUTO: 53 %
MCH RBC QN AUTO: 31.6 PG (ref 26.5–33)
MCHC RBC AUTO-ENTMCNC: 35.6 G/DL (ref 31.5–36.5)
MCV RBC AUTO: 89 FL (ref 78–100)
MONOCYTES # BLD AUTO: 0.4 10E3/UL (ref 0–1.3)
MONOCYTES NFR BLD AUTO: 5 %
NEUTROPHILS # BLD AUTO: 2.9 10E3/UL (ref 1.6–8.3)
NEUTROPHILS NFR BLD AUTO: 32 %
NRBC # BLD AUTO: 0 10E3/UL
NRBC BLD AUTO-RTO: 0 /100
P AXIS - MUSE: 58 DEGREES
PLATELET # BLD AUTO: 220 10E3/UL (ref 150–450)
POTASSIUM SERPL-SCNC: 4 MMOL/L (ref 3.4–5.3)
PR INTERVAL - MUSE: 138 MS
PROT SERPL-MCNC: 9.4 G/DL (ref 6.4–8.3)
QRS DURATION - MUSE: 72 MS
QT - MUSE: 386 MS
QTC - MUSE: 456 MS
R AXIS - MUSE: -14 DEGREES
RBC # BLD AUTO: 4.74 10E6/UL (ref 3.8–5.2)
SODIUM SERPL-SCNC: 137 MMOL/L (ref 135–145)
SYSTOLIC BLOOD PRESSURE - MUSE: NORMAL MMHG
T AXIS - MUSE: 8 DEGREES
TROPONIN T SERPL HS-MCNC: 7 NG/L
TROPONIN T SERPL HS-MCNC: 8 NG/L
VENTRICULAR RATE- MUSE: 84 BPM
WBC # BLD AUTO: 9 10E3/UL (ref 4–11)

## 2024-07-01 PROCEDURE — 85025 COMPLETE CBC W/AUTO DIFF WBC: CPT | Performed by: EMERGENCY MEDICINE

## 2024-07-01 PROCEDURE — 250N000011 HC RX IP 250 OP 636: Performed by: EMERGENCY MEDICINE

## 2024-07-01 PROCEDURE — 71046 X-RAY EXAM CHEST 2 VIEWS: CPT

## 2024-07-01 PROCEDURE — 96374 THER/PROPH/DIAG INJ IV PUSH: CPT | Performed by: EMERGENCY MEDICINE

## 2024-07-01 PROCEDURE — 80053 COMPREHEN METABOLIC PANEL: CPT | Performed by: EMERGENCY MEDICINE

## 2024-07-01 PROCEDURE — 36415 COLL VENOUS BLD VENIPUNCTURE: CPT | Performed by: EMERGENCY MEDICINE

## 2024-07-01 PROCEDURE — 258N000003 HC RX IP 258 OP 636: Performed by: EMERGENCY MEDICINE

## 2024-07-01 PROCEDURE — 84484 ASSAY OF TROPONIN QUANT: CPT | Performed by: EMERGENCY MEDICINE

## 2024-07-01 PROCEDURE — 93010 ELECTROCARDIOGRAM REPORT: CPT | Performed by: EMERGENCY MEDICINE

## 2024-07-01 PROCEDURE — 96361 HYDRATE IV INFUSION ADD-ON: CPT | Performed by: EMERGENCY MEDICINE

## 2024-07-01 PROCEDURE — 84703 CHORIONIC GONADOTROPIN ASSAY: CPT | Performed by: EMERGENCY MEDICINE

## 2024-07-01 PROCEDURE — 250N000013 HC RX MED GY IP 250 OP 250 PS 637: Performed by: EMERGENCY MEDICINE

## 2024-07-01 PROCEDURE — 93005 ELECTROCARDIOGRAM TRACING: CPT | Performed by: EMERGENCY MEDICINE

## 2024-07-01 PROCEDURE — 83690 ASSAY OF LIPASE: CPT | Performed by: EMERGENCY MEDICINE

## 2024-07-01 RX ORDER — OLANZAPINE 5 MG/1
5 TABLET, ORALLY DISINTEGRATING ORAL ONCE
Status: COMPLETED | OUTPATIENT
Start: 2024-07-01 | End: 2024-07-01

## 2024-07-01 RX ADMIN — SODIUM CHLORIDE 1000 ML: 9 INJECTION, SOLUTION INTRAVENOUS at 01:05

## 2024-07-01 RX ADMIN — PROCHLORPERAZINE EDISYLATE 5 MG: 5 INJECTION INTRAMUSCULAR; INTRAVENOUS at 01:00

## 2024-07-01 RX ADMIN — OLANZAPINE 5 MG: 5 TABLET, ORALLY DISINTEGRATING ORAL at 00:30

## 2024-07-01 ASSESSMENT — ACTIVITIES OF DAILY LIVING (ADL)
ADLS_ACUITY_SCORE: 37

## 2024-07-01 NOTE — DISCHARGE INSTRUCTIONS
Your workup in the emergency department including labs, EKG, chest x-ray, was overall reassuring.  The chest discomfort that you are experiencing is most likely related to your recent vomiting.  However, pay close attention to your symptoms and return to the emergency department immediately if you develop any worsening chest pain, shortness of breath, or other symptoms that are concerning to you.  Otherwise follow-up with your primary care doctor.

## 2024-07-01 NOTE — ED TRIAGE NOTES
48 y.o female arrives to the ER with c/o N/V for the past 24 hours. Pt also reports that she started having chest pain with an emesis at 7:30 PM tonight, rating pain at a 6/10, no radiation to extremity or shoulder. Pt states that she took her Clonidine and Losartan at the time.   Triage Assessment (Adult)       Row Name 06/30/24 2300          Triage Assessment    Airway WDL WDL        Respiratory WDL    Respiratory WDL WDL        Skin Circulation/Temperature WDL    Skin Circulation/Temperature WDL WDL        Cardiac WDL    Cardiac WDL WDL        Peripheral/Neurovascular WDL    Peripheral Neurovascular WDL WDL        Cognitive/Neuro/Behavioral WDL    Cognitive/Neuro/Behavioral WDL WDL

## 2024-07-01 NOTE — PROGRESS NOTES
Clinic Care Coordination Contact  [facility]/Voicemail    6/28/24:  Clinical Product Navigator reviewed chart.   CPN Initial Information Gathering  Referral Source: ED Follow-Up  Referrals Places: Care Coordination     Patient identified internally due to high utilization with recent discharge for care management support with high probability risk of admission.       [facility] discharge report identified patient having had 11 ED admissions and 1 hospital admissions in the last 6 months. Per chart review, patient has high risk score for hospital admission or ED visit of 94.6%. Per chart review, please consider referral for Medication Therapy Management (MTM).     Patient would benefit from care management support for chronic disease management.      Clinical Data: Care Coordinator Outreach    Outreach Documentation Number of Outreach Attempt   7/1/2024  10:47 AM 1       Phone range twice then call ended.     Plan: Care Coordinator will try to reach patient again in 1-2 business days.    Catherine Davis  Community Health Worker  [facility]  [phone]

## 2024-07-01 NOTE — ED PROVIDER NOTES
"    [facility] EMERGENCY DEPARTMENT ([facility])    6/30/24       ED PROVIDER NOTE    History     Chief Complaint   Patient presents with    Chest Pain    Nausea & Vomiting     HPI  Yun Coyle is a 48 year old female with a past medical history of opiod use disorder on methadone, type 2 diabetes, cyclic vomiting syndrome, who presents to the ED for evaluation of nausea and chest pain.  She states that she has been vomiting for the past 24 hours, unable to keep anything down.  She states that is consistent with her history of cyclical vomiting syndrome.  She is less concerned about that more concerned about the chest pain that has subsequently developed.  She states it is worse when she vomits.  Describes as a sharp pressure in the left upper side of her chest.  She states she had this constellation of symptoms previously and was told it was due to dehydration\".  Denies any fever/chills.  Has some generalized abdominal pain which is typical with her cyclical vomiting exacerbation patients.  Has been voiding stooling without issue.  No gynecological complaints.  No recent travel or sick contacts.  No cold/flu symptoms.    She was seen on 6/25/24 for nausea and vomiting. It was similar to her history of cyclical vomiting. She was given IV fluids here and she did have improvement with droperidol from EMS. She was given a prescription of Zofran on discharge.      Past Medical History  Past Medical History:   Diagnosis Date    Acute pancreatitis 06/29/2017    Anxiety     Cauda equina syndrome (H) 03/29/2013    Cyst of right ovary 11/16/2017    Depressive disorder     Gastroesophageal reflux disease     Hypertension     Hyponatremia 10/01/2023    Low back pain 08/20/2012    Overview:   History of cauda equina and surgery.      Type 2 diabetes mellitus (H)     Uncomplicated asthma      Past Surgical History:   Procedure Laterality Date    BACK SURGERY      x2    CHOLECYSTECTOMY      ORTHOPEDIC SURGERY      " "SALPINGO OOPHORECTOMY,R/L/ELDA Right      albuterol (PROAIR HFA/PROVENTIL HFA/VENTOLIN HFA) 108 (90 Base) MCG/ACT inhaler  Alcohol Swabs PADS  blood glucose (NO BRAND SPECIFIED) lancets standard  blood glucose (NO BRAND SPECIFIED) test strip  blood glucose calibration (NO BRAND SPECIFIED) solution  blood glucose monitoring (NO BRAND SPECIFIED) meter device kit  budesonide-formoterol (SYMBICORT) 160-4.5 MCG/ACT Inhaler  cloNIDine (CATAPRES) 0.1 MG tablet  Continuous Blood Gluc  (FREESTYLE MCKENZIE 2 READER) KESHIA  Continuous Blood Gluc Sensor (FREESTYLE MCKENZIE 2 SENSOR) MISC  glucagon 1 MG kit  glucose (BD GLUCOSE) 4 g chewable tablet  glucose 40 % (400 mg/mL) gel  insulin glargine (LANTUS PEN) 100 UNIT/ML pen  losartan (COZAAR) 25 MG tablet  metFORMIN (GLUCOPHAGE) 500 MG tablet  methadone (DOLOPHINE-INTENSOL) 10 MG/ML (HIGH CONC) solution  OLANZapine (ZYPREXA) 10 MG tablet  predniSONE (DELTASONE) 20 MG tablet  prochlorperazine (COMPAZINE) 10 MG tablet  prochlorperazine (COMPAZINE) 25 MG suppository  [employer]arps Container MISC      No Known Allergies  Family History  Family History   Problem Relation Age of Onset    Breast Cancer Mother         in her 50s.    Diabetes Mother     Breast Cancer Maternal Grandmother         in her 40s.    Diabetes Maternal Grandmother      Social History   Social History     Tobacco Use    Smoking status: Never    Smokeless tobacco: Never   Vaping Use    Vaping status: Never Used   Substance Use Topics    Alcohol use: No    Drug use: Yes     Types: Marijuana     Comment: pt was taking Oxycontin \"years ago.\" pt is now on Methadone.      A medically appropriate review of systems was performed with pertinent positives and negatives noted in the HPI, and all other systems negative.    Physical Exam   BP: (!) 132/101  Pulse: 94  Temp: 97.8  F (36.6  C)  Resp: 16  Height: 175.3 cm (5' 9\")  Weight: 90.7 kg (200 lb)  SpO2: 99 %  Physical Exam  Vitals and nursing note reviewed.   Constitutional:  "      General: She is not in acute distress.     Appearance: Normal appearance.   HENT:      Head: Normocephalic.      Nose: Nose normal.   Eyes:      Pupils: Pupils are equal, round, and reactive to light.   Cardiovascular:      Rate and Rhythm: Normal rate and regular rhythm.   Pulmonary:      Effort: Pulmonary effort is normal.   Chest:          Comments: Area of reported pain with no associated tenderness to palpation on exam.  No overlying skin changes.  No crepitus, flail chest, ecchymosis, or other deformities.  No respiratory distress.  Abdominal:      General: There is no distension.      Palpations: Abdomen is soft.      Tenderness: There is no abdominal tenderness.   Musculoskeletal:         General: No deformity. Normal range of motion.      Cervical back: Normal range of motion.   Skin:     General: Skin is warm.   Neurological:      Mental Status: She is alert and oriented to person, place, and time.   Psychiatric:         Mood and Affect: Mood normal.         ED Course, Procedures, & Data      Procedures            EKG Interpretation:      Interpreted by Bryan Kruse DO  Time reviewed: 0130  Symptoms at time of EKG: chest pain   Rhythm: normal sinus   Rate: normal  Axis: normal  Ectopy: none  Conduction: normal  ST Segments/ T Waves: No ST-T wave changes  Q Waves: none  Comparison to prior: No old EKG available    Clinical Impression: normal EKG           Results for orders placed or performed during the hospital encounter of 06/30/24   XR Chest 2 Views     Status: None    Narrative    EXAM: XR CHEST 2 VIEWS  LOCATION: [facility]  DATE: 7/1/2024    INDICATION: chest pain  COMPARISON: 1/9/2024      Impression    IMPRESSION: No acute cardiopulmonary findings. Stable chest. Mild to moderate hypertrophic changes of the spine.   Comprehensive metabolic panel     Status: Abnormal   Result Value Ref Range    Sodium 137 135 - 145 mmol/L    Potassium 4.0 3.4 -  5.3 mmol/L    Carbon Dioxide (CO2) 27 22 - 29 mmol/L    Anion Gap 15 7 - 15 mmol/L    Urea Nitrogen 20.2 (H) 6.0 - 20.0 mg/dL    Creatinine 0.97 (H) 0.51 - 0.95 mg/dL    GFR Estimate 73 >60 mL/min/1.73m2    Calcium 10.3 (H) 8.6 - 10.0 mg/dL    Chloride 95 (L) 98 - 107 mmol/L    Glucose 306 (H) 70 - 99 mg/dL    Alkaline Phosphatase 157 (H) 40 - 150 U/L    AST 56 (H) 0 - 45 U/L     (H) 0 - 50 U/L    Protein Total 9.4 (H) 6.4 - 8.3 g/dL    Albumin 4.7 3.5 - 5.2 g/dL    Bilirubin Total 0.3 <=1.2 mg/dL   Lipase     Status: Normal   Result Value Ref Range    Lipase 39 13 - 60 U/L   HCG qualitative Blood     Status: Normal   Result Value Ref Range    hCG Serum Qualitative Negative Negative   Troponin T, High Sensitivity     Status: Normal   Result Value Ref Range    Troponin T, High Sensitivity 8 <=14 ng/L   CBC with platelets and differential     Status: Abnormal   Result Value Ref Range    WBC Count 9.0 4.0 - 11.0 10e3/uL    RBC Count 4.74 3.80 - 5.20 10e6/uL    Hemoglobin 15.0 11.7 - 15.7 g/dL    Hematocrit 42.1 35.0 - 47.0 %    MCV 89 78 - 100 fL    MCH 31.6 26.5 - 33.0 pg    MCHC 35.6 31.5 - 36.5 g/dL    RDW 12.9 10.0 - 15.0 %    Platelet Count 220 150 - 450 10e3/uL    % Neutrophils 32 %    % Lymphocytes 53 %    % Monocytes 5 %    % Eosinophils 8 %    % Basophils 1 %    % Immature Granulocytes 1 %    NRBCs per 100 WBC 0 <1 /100    Absolute Neutrophils 2.9 1.6 - 8.3 10e3/uL    Absolute Lymphocytes 4.8 0.8 - 5.3 10e3/uL    Absolute Monocytes 0.4 0.0 - 1.3 10e3/uL    Absolute Eosinophils 0.8 (H) 0.0 - 0.7 10e3/uL    Absolute Basophils 0.1 0.0 - 0.2 10e3/uL    Absolute Immature Granulocytes 0.1 <=0.4 10e3/uL    Absolute NRBCs 0.0 10e3/uL   Troponin T, High Sensitivity     Status: Normal   Result Value Ref Range    Troponin T, High Sensitivity 7 <=14 ng/L   EKG 12 lead     Status: None   Result Value Ref Range    Systolic Blood Pressure  mmHg    Diastolic Blood Pressure  mmHg    Ventricular Rate 84 BPM    Atrial  Rate 84 BPM    MD Interval 138 ms    QRS Duration 72 ms     ms    QTc 456 ms    P Axis 58 degrees    R AXIS -14 degrees    T Axis 8 degrees    Interpretation ECG       Sinus rhythm  Possible Left atrial enlargement  Nonspecific ST abnormality  Abnormal ECG  Unconfirmed report - interpretation of this ECG is computer generated - see medical record for final interpretation    Confirmed by - EMERGENCY ROOM, PHYSICIAN ([id number]),  SHAD DEVINE ([id number]) on 7/1/2024 8:24:37 AM     CBC with platelets differential     Status: Abnormal    Narrative    The following orders were created for panel order CBC with platelets differential.  Procedure                               Abnormality         Status                     ---------                               -----------         ------                     CBC with platelets and d...[[id number]]  Abnormal            Final result                 Please view results for these tests on the individual orders.     Medications   sodium chloride 0.9% BOLUS 1,000 mL (0 mLs Intravenous Stopped 7/1/24 0311)   prochlorperazine (COMPAZINE) injection 5 mg (5 mg Intravenous $Given 7/1/24 0100)   OLANZapine zydis (zyPREXA) ODT tab 5 mg (5 mg Oral $Given 7/1/24 0030)     Labs Ordered and Resulted from Time of ED Arrival to Time of ED Departure   COMPREHENSIVE METABOLIC PANEL - Abnormal       Result Value    Sodium 137      Potassium 4.0      Carbon Dioxide (CO2) 27      Anion Gap 15      Urea Nitrogen 20.2 (*)     Creatinine 0.97 (*)     GFR Estimate 73      Calcium 10.3 (*)     Chloride 95 (*)     Glucose 306 (*)     Alkaline Phosphatase 157 (*)     AST 56 (*)      (*)     Protein Total 9.4 (*)     Albumin 4.7      Bilirubin Total 0.3     CBC WITH PLATELETS AND DIFFERENTIAL - Abnormal    WBC Count 9.0      RBC Count 4.74      Hemoglobin 15.0      Hematocrit 42.1      MCV 89      MCH 31.6      MCHC 35.6      RDW 12.9      Platelet Count 220      % Neutrophils 32      % Lymphocytes  53      % Monocytes 5      % Eosinophils 8      % Basophils 1      % Immature Granulocytes 1      NRBCs per 100 WBC 0      Absolute Neutrophils 2.9      Absolute Lymphocytes 4.8      Absolute Monocytes 0.4      Absolute Eosinophils 0.8 (*)     Absolute Basophils 0.1      Absolute Immature Granulocytes 0.1      Absolute NRBCs 0.0     LIPASE - Normal    Lipase 39     HCG QUALITATIVE PREGNANCY - Normal    hCG Serum Qualitative Negative     TROPONIN T, HIGH SENSITIVITY - Normal    Troponin T, High Sensitivity 8     TROPONIN T, HIGH SENSITIVITY - Normal    Troponin T, High Sensitivity 7       XR Chest 2 Views   Final Result   IMPRESSION: No acute cardiopulmonary findings. Stable chest. Mild to moderate hypertrophic changes of the spine.             Critical care was not performed.     Medical Decision Making  The patient's presentation was of moderate complexity (a chronic illness mild to moderate exacerbation, progression, or side effect of treatment).    The patient's evaluation involved:  ordering and/or review of 3+ test(s) in this encounter (see separate area of note for details)    The patient's management necessitated moderate risk (prescription drug management including medications given in the ED).    Assessment & Plan    Patient resents the ED for evaluation of chest pain in setting of cyclical vomiting exacerbation.  On arrival, she is slightly hypertensive, otherwise has normal vital signs.  She is nauseated, requiring antiemetics.  Her abdomen is soft without any focal tenderness.  She has no respiratory distress.    High sensitive troponin is negative x 2.  EKG without signs of acute ischemia or cardiac dysrhythmia.  Unlikely ACS.  Exam/history not consistent with DVT, PE, aortic dissection, pericardial effusion/tamponade.  Chest x-ray without evidence of free air to suggest esophageal perforation.  No pneumothorax or pneumonia.  Radiology interpretation in agreement as well.  Metabolic panel shows normal  electrolytes normal renal function.  Mild transaminitis is actually improved from 6/25/2024.  hCG is negative    Symptoms were treated with oral olanzapine and IV Compazine.  On reassessment, she feels much improved.  Symptoms likely consistent with esophageal irritation in the setting of cyclical vomiting exacerbation.  She does not need any refills of her home medications.  Will follow-up with PCP.  Educated reasons to return to the ED.          I have reviewed the nursing notes. I have reviewed the findings, diagnosis, plan and need for follow up with the patient.    Discharge Medication List as of 7/1/2024  5:13 AM          Final diagnoses:   Cyclical vomiting       Bryan Kruse DO  [facility] EMERGENCY DEPARTMENT  6/30/2024     Bryan Kruse DO  07/02/24 0114

## 2024-07-02 NOTE — PROGRESS NOTES
Clinic Care Coordination Contact  [facility]/Voicemail      6/28/24:  Clinical Product Navigator reviewed chart.   CPN Initial Information Gathering  Referral Source: ED Follow-Up  Referrals Places: Care Coordination     Patient identified internally due to high utilization with recent discharge for care management support with high probability risk of admission.       [facility] discharge report identified patient having had 11 ED admissions and 1 hospital admissions in the last 6 months. Per chart review, patient has high risk score for hospital admission or ED visit of 94.6%. Per chart review, please consider referral for Medication Therapy Management (MTM).     Patient would benefit from care management support for chronic disease management.      Clinical Data: Care Coordinator Outreach    Outreach Documentation Number of Outreach Attempt   7/1/2024  10:47 AM 1   7/2/2024  10:21 AM 2       Left message on patient's voicemail with call back information and requested return call.    Plan: Care Coordinator will send care coordination introduction letter with care coordinator contact information and explanation of care coordination services via mail. Care Coordinator will do no further outreaches at this time.    Catherine Davis  Community Health Worker  [facility]  [phone]

## 2024-07-12 ENCOUNTER — HOSPITAL ENCOUNTER (EMERGENCY)
Facility: CLINIC | Age: 49
Discharge: HOME | End: 2024-07-13
Attending: EMERGENCY MEDICINE | Admitting: EMERGENCY MEDICINE
Payer: COMMERCIAL

## 2024-07-12 DIAGNOSIS — R11.15 CYCLIC VOMITING SYNDROME: ICD-10-CM

## 2024-07-12 DIAGNOSIS — R10.10 UPPER ABDOMINAL PAIN: ICD-10-CM

## 2024-07-12 LAB — GLUCOSE BLDC GLUCOMTR-MCNC: 413 MG/DL (ref 70–99)

## 2024-07-12 PROCEDURE — 99284 EMERGENCY DEPT VISIT MOD MDM: CPT | Performed by: EMERGENCY MEDICINE

## 2024-07-12 PROCEDURE — 96374 THER/PROPH/DIAG INJ IV PUSH: CPT | Performed by: EMERGENCY MEDICINE

## 2024-07-12 PROCEDURE — 96375 TX/PRO/DX INJ NEW DRUG ADDON: CPT | Performed by: EMERGENCY MEDICINE

## 2024-07-12 PROCEDURE — 96361 HYDRATE IV INFUSION ADD-ON: CPT | Performed by: EMERGENCY MEDICINE

## 2024-07-12 PROCEDURE — 99284 EMERGENCY DEPT VISIT MOD MDM: CPT | Mod: 25 | Performed by: EMERGENCY MEDICINE

## 2024-07-12 PROCEDURE — 82962 GLUCOSE BLOOD TEST: CPT

## 2024-07-13 VITALS
TEMPERATURE: 97.8 F | HEIGHT: 71 IN | DIASTOLIC BLOOD PRESSURE: 107 MMHG | BODY MASS INDEX: 28 KG/M2 | HEART RATE: 85 BPM | RESPIRATION RATE: 18 BRPM | WEIGHT: 200 LBS | OXYGEN SATURATION: 99 % | SYSTOLIC BLOOD PRESSURE: 156 MMHG

## 2024-07-13 LAB
ABO/RH(D): NORMAL
ALBUMIN SERPL BCG-MCNC: 4 G/DL (ref 3.5–5.2)
ALP SERPL-CCNC: 114 U/L (ref 40–150)
ALT SERPL W P-5'-P-CCNC: 114 U/L (ref 0–50)
ANION GAP SERPL CALCULATED.3IONS-SCNC: 14 MMOL/L (ref 7–15)
ANTIBODY SCREEN: NEGATIVE
APTT PPP: 26 SECONDS (ref 22–38)
AST SERPL W P-5'-P-CCNC: 62 U/L (ref 0–45)
BASE EXCESS BLDV CALC-SCNC: 6.8 MMOL/L (ref -3–3)
BASOPHILS # BLD AUTO: 0 10E3/UL (ref 0–0.2)
BASOPHILS NFR BLD AUTO: 0 %
BILIRUB SERPL-MCNC: 0.4 MG/DL
BUN SERPL-MCNC: 18.5 MG/DL (ref 6–20)
CALCIUM SERPL-MCNC: 8.6 MG/DL (ref 8.6–10)
CHLORIDE SERPL-SCNC: 95 MMOL/L (ref 98–107)
CREAT SERPL-MCNC: 0.84 MG/DL (ref 0.51–0.95)
DEPRECATED HCO3 PLAS-SCNC: 27 MMOL/L (ref 22–29)
EGFRCR SERPLBLD CKD-EPI 2021: 86 ML/MIN/1.73M2
EOSINOPHIL # BLD AUTO: 0 10E3/UL (ref 0–0.7)
EOSINOPHIL NFR BLD AUTO: 0 %
ERYTHROCYTE [DISTWIDTH] IN BLOOD BY AUTOMATED COUNT: 13.2 % (ref 10–15)
GLUCOSE SERPL-MCNC: 388 MG/DL (ref 70–99)
HCG SERPL QL: NEGATIVE
HCO3 BLDV-SCNC: 32 MMOL/L (ref 21–28)
HCT VFR BLD AUTO: 34 % (ref 35–47)
HGB BLD-MCNC: 12 G/DL (ref 11.7–15.7)
IMM GRANULOCYTES # BLD: 0.1 10E3/UL
IMM GRANULOCYTES NFR BLD: 1 %
INR PPP: 1.12 (ref 0.85–1.15)
LIPASE SERPL-CCNC: 29 U/L (ref 13–60)
LYMPHOCYTES # BLD AUTO: 2 10E3/UL (ref 0.8–5.3)
LYMPHOCYTES NFR BLD AUTO: 22 %
MCH RBC QN AUTO: 30.8 PG (ref 26.5–33)
MCHC RBC AUTO-ENTMCNC: 35.3 G/DL (ref 31.5–36.5)
MCV RBC AUTO: 87 FL (ref 78–100)
MONOCYTES # BLD AUTO: 0.4 10E3/UL (ref 0–1.3)
MONOCYTES NFR BLD AUTO: 5 %
NEUTROPHILS # BLD AUTO: 6.4 10E3/UL (ref 1.6–8.3)
NEUTROPHILS NFR BLD AUTO: 72 %
NRBC # BLD AUTO: 0 10E3/UL
NRBC BLD AUTO-RTO: 0 /100
O2/TOTAL GAS SETTING VFR VENT: 21 %
OXYHGB MFR BLDV: 78 % (ref 70–75)
PCO2 BLDV: 46 MM HG (ref 40–50)
PH BLDV: 7.45 [PH] (ref 7.32–7.43)
PLATELET # BLD AUTO: 203 10E3/UL (ref 150–450)
PO2 BLDV: 42 MM HG (ref 25–47)
POTASSIUM SERPL-SCNC: 3.9 MMOL/L (ref 3.4–5.3)
PROT SERPL-MCNC: 7.3 G/DL (ref 6.4–8.3)
RBC # BLD AUTO: 3.9 10E6/UL (ref 3.8–5.2)
SAO2 % BLDV: 79.3 % (ref 70–75)
SODIUM SERPL-SCNC: 136 MMOL/L (ref 135–145)
SPECIMEN EXPIRATION DATE: NORMAL
WBC # BLD AUTO: 8.9 10E3/UL (ref 4–11)

## 2024-07-13 PROCEDURE — 36415 COLL VENOUS BLD VENIPUNCTURE: CPT | Performed by: EMERGENCY MEDICINE

## 2024-07-13 PROCEDURE — 250N000013 HC RX MED GY IP 250 OP 250 PS 637: Performed by: EMERGENCY MEDICINE

## 2024-07-13 PROCEDURE — 85730 THROMBOPLASTIN TIME PARTIAL: CPT | Performed by: EMERGENCY MEDICINE

## 2024-07-13 PROCEDURE — 85610 PROTHROMBIN TIME: CPT | Performed by: EMERGENCY MEDICINE

## 2024-07-13 PROCEDURE — 84703 CHORIONIC GONADOTROPIN ASSAY: CPT | Performed by: EMERGENCY MEDICINE

## 2024-07-13 PROCEDURE — 80053 COMPREHEN METABOLIC PANEL: CPT | Performed by: EMERGENCY MEDICINE

## 2024-07-13 PROCEDURE — 86900 BLOOD TYPING SEROLOGIC ABO: CPT | Performed by: EMERGENCY MEDICINE

## 2024-07-13 PROCEDURE — 83690 ASSAY OF LIPASE: CPT | Performed by: EMERGENCY MEDICINE

## 2024-07-13 PROCEDURE — 250N000011 HC RX IP 250 OP 636: Performed by: EMERGENCY MEDICINE

## 2024-07-13 PROCEDURE — 85025 COMPLETE CBC W/AUTO DIFF WBC: CPT | Performed by: EMERGENCY MEDICINE

## 2024-07-13 PROCEDURE — 258N000003 HC RX IP 258 OP 636: Performed by: EMERGENCY MEDICINE

## 2024-07-13 PROCEDURE — 82805 BLOOD GASES W/O2 SATURATION: CPT | Performed by: EMERGENCY MEDICINE

## 2024-07-13 RX ORDER — ONDANSETRON 2 MG/ML
4 INJECTION INTRAMUSCULAR; INTRAVENOUS ONCE
Status: COMPLETED | OUTPATIENT
Start: 2024-07-13 | End: 2024-07-13

## 2024-07-13 RX ORDER — METOCLOPRAMIDE HYDROCHLORIDE 5 MG/ML
5 INJECTION INTRAMUSCULAR; INTRAVENOUS ONCE
Status: COMPLETED | OUTPATIENT
Start: 2024-07-13 | End: 2024-07-13

## 2024-07-13 RX ORDER — MAGNESIUM HYDROXIDE/ALUMINUM HYDROXICE/SIMETHICONE 120; 1200; 1200 MG/30ML; MG/30ML; MG/30ML
30 SUSPENSION ORAL ONCE
Status: COMPLETED | OUTPATIENT
Start: 2024-07-13 | End: 2024-07-13

## 2024-07-13 RX ADMIN — ALUMINUM HYDROXIDE, MAGNESIUM HYDROXIDE, AND SIMETHICONE 30 ML: 200; 200; 20 SUSPENSION ORAL at 02:03

## 2024-07-13 RX ADMIN — ONDANSETRON 4 MG: 2 INJECTION INTRAMUSCULAR; INTRAVENOUS at 02:03

## 2024-07-13 RX ADMIN — METOCLOPRAMIDE HYDROCHLORIDE 5 MG: 5 INJECTION INTRAMUSCULAR; INTRAVENOUS at 06:18

## 2024-07-13 RX ADMIN — PANTOPRAZOLE SODIUM 40 MG: 40 INJECTION, POWDER, FOR SOLUTION INTRAVENOUS at 00:43

## 2024-07-13 RX ADMIN — SODIUM CHLORIDE 1000 ML: 9 INJECTION, SOLUTION INTRAVENOUS at 00:43

## 2024-07-13 ASSESSMENT — ACTIVITIES OF DAILY LIVING (ADL)
ADLS_ACUITY_SCORE: 37

## 2024-07-13 NOTE — ED NOTES
Bed: ED01  Expected date:   Expected time:   Means of arrival:   Comments:  Cody 89  48y F  Vomiting, possible GI bleed

## 2024-07-13 NOTE — DISCHARGE INSTRUCTIONS
Instructions from your doctor today:  Emergency Department (ED) testing is focused on the potential causes of your symptoms that are the most dangerous possibilities, and cannot cover every possibility. Based on the evaluation, it was deemed sufficiently safe to discharge and continue management through the clinics. Thus, follow-up is very important to assess for improvement/worsening, potential further testing, and potential treatment adjustments. If you were given opioid pain medications or other medications that can make you drowsy while in the ED, you should not drive for at least several hours and not until you feel completely back to normal.     Please make an appointment to follow up with:  - Your Primary Care Provider in 3-5 days  - If you do not have a primary care provider, you can be seen in follow-up and establish care by calling any of the clinics below:     - Primary [facility] Center (phone: [phone])     - Primary Care / [facility] (phone: [phone])   - Have your clinic provider review the results from today's visit with you again, including any potential follow-up or additional testing that may be needed based on the results. Occasionally, incidental findings are found on later review by radiologists that may need follow-up.     Return to the Emergency Department immediately if you have worsening symptoms, or any other urgent health concerns.

## 2024-07-13 NOTE — ED PROVIDER NOTES
"  History     Chief Complaint   Patient presents with    Nausea & Vomiting     Pt brought to hospital due to cyclic vomiting and possible GI bleed. Pt stated vomit looks like dark brown/coffee ground and this has been going for two days. Pt is Diabetic; type 2 and has Hx of HTN. Pt has not been able to take her meds due to N/V. In roue GW=742. 20 G on R for arm. 1.25 mg of droperidol and 50 ml of NS in route.      HPI  Yun Coyle is a 48 year old female with PMH notable for DM2, pancreatitis, cannabinoid cyclic vomiting syndrome, OCD, asthma  who presents to the ED with vomiting.  Symptoms started 1 to 2 days ago.  Patient reports symptoms feel similar to past cyclic vomiting episodes.  Greater than 10 episodes of vomiting thus far.  Patient has used olanzapine without symptom control.  Coffee-ground appearance of the emesis started about an hour ago.  No diarrhea, has been having normal BMs, no fever.  Associated upper middle abdominal pain.  No urinary symptoms.  Symptoms have improved after droperidol from EMS.    Physical Exam   BP: (!) 152/89  Pulse: 85  Temp: 97.8  F (36.6  C)  Resp: 18  Height: 179.8 cm (5' 10.8\")  Weight: 90.7 kg (200 lb)  SpO2: 98 %    Physical Exam  General: Appears somewhat uncomfortable. Appears stated age.   HENT: MMM, no oropharyngeal lesions  Eyes: PERRL, normal sclerae   Cardio: Regular rate. Regular rhythm. Extremities well perfused  Resp: Normal work of breathing, Normal respiratory rate.   Abdomen: Upper abdominal midline and LUQ only tenderness, non-distended, no rebound, no guarding  Neuro: alert without signs of confusion. CN II-XII grossly intact. Grossly normal strength and sensation in all extremities.   Psych: normal affect, normal behavior      ED Course      Procedures              Labs Ordered and Resulted from Time of ED Arrival to Time of ED Departure   GLUCOSE BY METER - Abnormal       Result Value    GLUCOSE BY METER POCT 413 (*)    COMPREHENSIVE METABOLIC " I called pt to confirm her appointment with Dr. Gallegos for tomorrow at the [facility] but pt states that she will need to reschedule her appointment and would like a call back. She was informed Dr. Gallegos has nothing available until next year but I would send a message for her to be scheduled when schedule becomes available. Pt gave verbal understanding.    PANEL - Abnormal    Sodium 136      Potassium 3.9      Carbon Dioxide (CO2) 27      Anion Gap 14      Urea Nitrogen 18.5      Creatinine 0.84      GFR Estimate 86      Calcium 8.6      Chloride 95 (*)     Glucose 388 (*)     Alkaline Phosphatase 114      AST 62 (*)      (*)     Protein Total 7.3      Albumin 4.0      Bilirubin Total 0.4     BLOOD GAS VENOUS - Abnormal    pH Venous 7.45 (*)     pCO2 Venous 46      pO2 Venous 42      Bicarbonate Venous 32 (*)     Base Excess/Deficit Venous 6.8 (*)     FIO2 21      Oxyhemoglobin Venous 78 (*)     O2 Sat, Venous 79.3 (*)    CBC WITH PLATELETS AND DIFFERENTIAL - Abnormal    WBC Count 8.9      RBC Count 3.90      Hemoglobin 12.0      Hematocrit 34.0 (*)     MCV 87      MCH 30.8      MCHC 35.3      RDW 13.2      Platelet Count 203      % Neutrophils 72      % Lymphocytes 22      % Monocytes 5      % Eosinophils 0      % Basophils 0      % Immature Granulocytes 1      NRBCs per 100 WBC 0      Absolute Neutrophils 6.4      Absolute Lymphocytes 2.0      Absolute Monocytes 0.4      Absolute Eosinophils 0.0      Absolute Basophils 0.0      Absolute Immature Granulocytes 0.1      Absolute NRBCs 0.0     LIPASE - Normal    Lipase 29     HCG QUALITATIVE PREGNANCY - Normal    hCG Serum Qualitative Negative     INR - Normal    INR 1.12     PARTIAL THROMBOPLASTIN TIME - Normal    aPTT 26     TYPE AND SCREEN, ADULT    ABO/RH(D) B POS      Antibody Screen Negative      SPECIMEN EXPIRATION DATE [id number]     ABO/RH TYPE AND SCREEN     No orders to display        Medical Decision Making  The patient's presentation was of high complexity (a chronic illness severe exacerbation, progression, or side effect of treatment).    The patient's evaluation involved:  ordering and/or review of 3+ test(s) in this encounter (see separate area of note for details)    The patient's management necessitated moderate risk (prescription drug management including medications given in the  ED).      Assessments & Plan   Patient presenting with many episodes of vomiting over the past day, now with start of coffee-ground emesis, with PMH notable for cyclic vomiting syndrome. Vitals in the ED unremarkable. Nursing notes reviewed. Initial differential diagnosis includes but not limited to cyclic vomiting, gastritis, pancreatitis, cholecystitis.     No localizing RUQ pain nor significant LFT elevations to suggest hepatobiliary pathology. No lipase elevation to suggest pancreatitis. No peritoneal signs on exam to suggest peritonitis. Low clinical suspicion for bowel obstruction.     In the ED, the patient's symptoms were managed with ondansetron, Maalox, and medical provide, with improvement in symptoms upon reassessment.  Small-moderate coffee-ground emesis consistent with Destiny-Wei tear.  Pantoprazole given.  Hemoglobin 12.0.    Following medications, patient felt symptomatic improvement and requested discharge home.    The complete clinical picture is most consistent with cyclic vomiting syndrome. After counseling on the diagnosis, work-up, and treatment plan, the patient was discharged to home. The patient was advised to follow-up with primary care in a few days. The patient was advised to return to the ED if worsening symptoms, or any urgent health concerns.     Final diagnoses:   Cyclic vomiting syndrome   Upper abdominal pain     New Prescriptions    No medications on file     --  Feliz Cherry MD   Emergency Medicine   [facility] EMERGENCY DEPARTMENT  7/12/2024       Feliz Cherry MD  07/13/24 0803

## 2024-07-13 NOTE — ED TRIAGE NOTES
Pt brought to hospital bu EMS due to cyclic vomiting and possible GI bleed. Pt stated vomit looks like dark brown/coffee ground and this has been going on for two days. Pt is Diabetic; type 2 and has hx of HTN. Pt has not been able to take her meds due to N/V. In roue ZK=601. 20 G on R for arm. 1.25 mg of droperidol and 50 ml of NS in route.      Triage Assessment (Adult)       Row Name 07/12/24 3006          Triage Assessment    Airway WDL WDL        Respiratory WDL    Respiratory WDL WDL        Cardiac WDL    Cardiac WDL WDL        Peripheral/Neurovascular WDL    Peripheral Neurovascular WDL WDL        Cognitive/Neuro/Behavioral WDL    Cognitive/Neuro/Behavioral WDL WDL

## 2024-07-13 NOTE — ED NOTES
Handoff report given to ADITI Kent.    RN briefly informed of ED course thus far and plan of care.

## 2024-07-15 ENCOUNTER — TELEPHONE (OUTPATIENT)
Dept: FAMILY MEDICINE | Facility: CLINIC | Age: 49
End: 2024-07-15
Payer: COMMERCIAL

## 2024-07-15 DIAGNOSIS — R11.15 CYCLIC VOMITING SYNDROME: ICD-10-CM

## 2024-07-15 DIAGNOSIS — F33.2 SEVERE EPISODE OF RECURRENT MAJOR DEPRESSIVE DISORDER, WITHOUT PSYCHOTIC FEATURES (H): ICD-10-CM

## 2024-07-15 NOTE — TELEPHONE ENCOUNTER
Pending Prescriptions:                       Disp   Refills    OLANZapine (ZYPREXA) 10 MG tablet         90 tab*1            Sig: Take 1 tablet (10 mg) by mouth at bedtime

## 2024-07-16 ENCOUNTER — HOSPITAL ENCOUNTER (EMERGENCY)
Facility: CLINIC | Age: 49
Discharge: HOME | End: 2024-07-16
Attending: EMERGENCY MEDICINE | Admitting: EMERGENCY MEDICINE
Payer: COMMERCIAL

## 2024-07-16 VITALS
RESPIRATION RATE: 18 BRPM | SYSTOLIC BLOOD PRESSURE: 191 MMHG | BODY MASS INDEX: 29.53 KG/M2 | DIASTOLIC BLOOD PRESSURE: 116 MMHG | HEIGHT: 69 IN | OXYGEN SATURATION: 99 % | TEMPERATURE: 98.6 F | HEART RATE: 85 BPM

## 2024-07-16 DIAGNOSIS — R11.15 CYCLIC VOMITING SYNDROME: ICD-10-CM

## 2024-07-16 DIAGNOSIS — R11.2 NAUSEA AND VOMITING, UNSPECIFIED VOMITING TYPE: ICD-10-CM

## 2024-07-16 LAB
ALBUMIN SERPL BCG-MCNC: 4.5 G/DL (ref 3.5–5.2)
ALP SERPL-CCNC: 123 U/L (ref 40–150)
ALT SERPL W P-5'-P-CCNC: 121 U/L (ref 0–50)
ANION GAP SERPL CALCULATED.3IONS-SCNC: 17 MMOL/L (ref 7–15)
AST SERPL W P-5'-P-CCNC: 75 U/L (ref 0–45)
BASOPHILS # BLD AUTO: 0 10E3/UL (ref 0–0.2)
BASOPHILS NFR BLD AUTO: 0 %
BILIRUB SERPL-MCNC: 0.4 MG/DL
BUN SERPL-MCNC: 19.5 MG/DL (ref 6–20)
CALCIUM SERPL-MCNC: 10 MG/DL (ref 8.8–10.4)
CHLORIDE SERPL-SCNC: 94 MMOL/L (ref 98–107)
CREAT SERPL-MCNC: 0.8 MG/DL (ref 0.51–0.95)
EGFRCR SERPLBLD CKD-EPI 2021: >90 ML/MIN/1.73M2
EOSINOPHIL # BLD AUTO: 0 10E3/UL (ref 0–0.7)
EOSINOPHIL NFR BLD AUTO: 0 %
ERYTHROCYTE [DISTWIDTH] IN BLOOD BY AUTOMATED COUNT: 13.2 % (ref 10–15)
GLUCOSE SERPL-MCNC: 345 MG/DL (ref 70–99)
HCO3 SERPL-SCNC: 24 MMOL/L (ref 22–29)
HCT VFR BLD AUTO: 37.8 % (ref 35–47)
HGB BLD-MCNC: 13.4 G/DL (ref 11.7–15.7)
IMM GRANULOCYTES # BLD: 0.1 10E3/UL
IMM GRANULOCYTES NFR BLD: 1 %
LIPASE SERPL-CCNC: 32 U/L (ref 13–60)
LYMPHOCYTES # BLD AUTO: 1.6 10E3/UL (ref 0.8–5.3)
LYMPHOCYTES NFR BLD AUTO: 15 %
MCH RBC QN AUTO: 31.2 PG (ref 26.5–33)
MCHC RBC AUTO-ENTMCNC: 35.4 G/DL (ref 31.5–36.5)
MCV RBC AUTO: 88 FL (ref 78–100)
MONOCYTES # BLD AUTO: 0.3 10E3/UL (ref 0–1.3)
MONOCYTES NFR BLD AUTO: 3 %
NEUTROPHILS # BLD AUTO: 8.5 10E3/UL (ref 1.6–8.3)
NEUTROPHILS NFR BLD AUTO: 81 %
NRBC # BLD AUTO: 0 10E3/UL
NRBC BLD AUTO-RTO: 0 /100
PLATELET # BLD AUTO: 218 10E3/UL (ref 150–450)
POTASSIUM SERPL-SCNC: 4.1 MMOL/L (ref 3.4–5.3)
PROT SERPL-MCNC: 8.7 G/DL (ref 6.4–8.3)
RBC # BLD AUTO: 4.3 10E6/UL (ref 3.8–5.2)
SODIUM SERPL-SCNC: 135 MMOL/L (ref 135–145)
WBC # BLD AUTO: 10.5 10E3/UL (ref 4–11)

## 2024-07-16 PROCEDURE — 96375 TX/PRO/DX INJ NEW DRUG ADDON: CPT | Performed by: EMERGENCY MEDICINE

## 2024-07-16 PROCEDURE — 999N000040 HC STATISTIC CONSULT NO CHARGE VASC ACCESS

## 2024-07-16 PROCEDURE — 999N000127 HC STATISTIC PERIPHERAL IV START W US GUIDANCE

## 2024-07-16 PROCEDURE — 83690 ASSAY OF LIPASE: CPT | Performed by: EMERGENCY MEDICINE

## 2024-07-16 PROCEDURE — 258N000003 HC RX IP 258 OP 636: Performed by: EMERGENCY MEDICINE

## 2024-07-16 PROCEDURE — 96372 THER/PROPH/DIAG INJ SC/IM: CPT | Performed by: EMERGENCY MEDICINE

## 2024-07-16 PROCEDURE — 36415 COLL VENOUS BLD VENIPUNCTURE: CPT | Performed by: EMERGENCY MEDICINE

## 2024-07-16 PROCEDURE — 99285 EMERGENCY DEPT VISIT HI MDM: CPT | Performed by: EMERGENCY MEDICINE

## 2024-07-16 PROCEDURE — 96361 HYDRATE IV INFUSION ADD-ON: CPT | Performed by: EMERGENCY MEDICINE

## 2024-07-16 PROCEDURE — 999N000285 HC STATISTIC VASC ACCESS LAB DRAW WITH PIV START

## 2024-07-16 PROCEDURE — 99285 EMERGENCY DEPT VISIT HI MDM: CPT | Mod: 25 | Performed by: EMERGENCY MEDICINE

## 2024-07-16 PROCEDURE — 80053 COMPREHEN METABOLIC PANEL: CPT | Performed by: EMERGENCY MEDICINE

## 2024-07-16 PROCEDURE — 250N000013 HC RX MED GY IP 250 OP 250 PS 637: Performed by: EMERGENCY MEDICINE

## 2024-07-16 PROCEDURE — 250N000011 HC RX IP 250 OP 636: Performed by: EMERGENCY MEDICINE

## 2024-07-16 PROCEDURE — 96374 THER/PROPH/DIAG INJ IV PUSH: CPT | Performed by: EMERGENCY MEDICINE

## 2024-07-16 PROCEDURE — 85048 AUTOMATED LEUKOCYTE COUNT: CPT | Performed by: EMERGENCY MEDICINE

## 2024-07-16 RX ORDER — ONDANSETRON 2 MG/ML
4 INJECTION INTRAMUSCULAR; INTRAVENOUS EVERY 30 MIN PRN
Status: DISCONTINUED | OUTPATIENT
Start: 2024-07-16 | End: 2024-07-16 | Stop reason: HOSPADM

## 2024-07-16 RX ORDER — METHADONE HYDROCHLORIDE 10 MG/ML
140 CONCENTRATE ORAL ONCE
Status: COMPLETED | OUTPATIENT
Start: 2024-07-16 | End: 2024-07-16

## 2024-07-16 RX ORDER — OLANZAPINE 10 MG/1
10 TABLET ORAL AT BEDTIME
Qty: 90 TABLET | Refills: 1 | Status: SHIPPED | OUTPATIENT
Start: 2024-07-16

## 2024-07-16 RX ORDER — OLANZAPINE 10 MG/2ML
10 INJECTION, POWDER, FOR SOLUTION INTRAMUSCULAR ONCE
Status: COMPLETED | OUTPATIENT
Start: 2024-07-16 | End: 2024-07-16

## 2024-07-16 RX ADMIN — FAMOTIDINE 20 MG: 10 INJECTION, SOLUTION INTRAVENOUS at 11:27

## 2024-07-16 RX ADMIN — OLANZAPINE 10 MG: 10 INJECTION, POWDER, FOR SOLUTION INTRAMUSCULAR at 11:28

## 2024-07-16 RX ADMIN — METHADONE HYDROCHLORIDE 140 MG: 10 CONCENTRATE ORAL at 17:18

## 2024-07-16 RX ADMIN — SODIUM CHLORIDE 1000 ML: 9 INJECTION, SOLUTION INTRAVENOUS at 11:28

## 2024-07-16 ASSESSMENT — ACTIVITIES OF DAILY LIVING (ADL)
ADLS_ACUITY_SCORE: 37

## 2024-07-16 ASSESSMENT — COLUMBIA-SUICIDE SEVERITY RATING SCALE - C-SSRS
2. HAVE YOU ACTUALLY HAD ANY THOUGHTS OF KILLING YOURSELF IN THE PAST MONTH?: NO
1. IN THE PAST MONTH, HAVE YOU WISHED YOU WERE DEAD OR WISHED YOU COULD GO TO SLEEP AND NOT WAKE UP?: NO
6. HAVE YOU EVER DONE ANYTHING, STARTED TO DO ANYTHING, OR PREPARED TO DO ANYTHING TO END YOUR LIFE?: YES

## 2024-07-16 NOTE — PHARMACY-CONSULT NOTE
Opioid Treatment Program ([facility]one [facility]) Information Note      [facility]    Location ([facility]): [place], [place]    Phone number: [phone]              Spoke with: Kirstin (after hours)       Patient's current methadone dose verified as: 140 mg PO daily   Last dose was administered on: July 13th   Take-home dose information (if applicable): one take home dose, however patient reports she has not taken her methadone since Saturday       Note(s): Treatment programs in [place] dispense methadone using the 10 mg/ml oral concentrate.      Karla Rao, PharmD  *[phone]

## 2024-07-16 NOTE — TELEPHONE ENCOUNTER
Please call estela.  See if we can get her in sometime soon for follow up and make sure she has all she needs to try to stay out of the hospital.  Consider outpatient infusion orders for prn basis.      Gamaliel Granados, DO

## 2024-07-16 NOTE — CONSULTS
"Consult received for Vascular access care.  See LDA for details.  For additional needs place \"Nursing to Consult for Vascular Access\" [id number] order in EPIC.    "

## 2024-07-16 NOTE — ED TRIAGE NOTES
Pt states she has been off her methadone since Saturday.      Triage Assessment (Adult)       Row Name 07/16/24 1050          Triage Assessment    Airway WDL WDL        Respiratory WDL    Respiratory WDL WDL        Skin Circulation/Temperature WDL    Skin Circulation/Temperature WDL WDL        Cardiac WDL    Cardiac WDL WDL        Peripheral/Neurovascular WDL    Peripheral Neurovascular WDL WDL        Cognitive/Neuro/Behavioral WDL    Cognitive/Neuro/Behavioral WDL WDL

## 2024-07-16 NOTE — DISCHARGE INSTRUCTIONS
Your blood work is stable.     Follow-up with your methadone clinic.     Return to the ER if any other problems/concerns.

## 2024-07-16 NOTE — ED PROVIDER NOTES
[facility] EMERGENCY DEPARTMENT ([facility])  7/16/24  ED 02      History     Chief Complaint   Patient presents with    Nausea, Vomiting, & Diarrhea    Withdrawal     Methadone withdrawl     HPI  Yun Coyle is a 48 year old female with a past medical history significant for DM2, pancreatitis, cannabinoid cyclic vomiting syndrome, OCD, asthma and opioid use disorder on methadone who presents to the Emergency Department BIBA for evaluation of nausea, vomiting, diarrhea and methadone withdrawal. Patient states since Saturday she has been feeling ill with constant nausea, vomiting and diarrhea. She has been unable to keep food, fluids or medications down. She has been unable to keep her methadone down since Saturday and now feels as if she is in withdrawal. She states she has experienced this type of vomiting before however it now feels more intense. She has had some diarrhea this morning. She reports history of cholecystectomy. She was given droperidol on route to ED with no relief of nausea. She states Zyprexa and IV fluids has helped her in the past     Past Medical History  Past Medical History:   Diagnosis Date    Acute pancreatitis 06/29/2017    Anxiety     Cauda equina syndrome (H) 03/29/2013    Cyst of right ovary 11/16/2017    Depressive disorder     Gastroesophageal reflux disease     Hypertension     Hyponatremia 10/01/2023    Low back pain 08/20/2012    Overview:   History of cauda equina and surgery.      Type 2 diabetes mellitus (H)     Uncomplicated asthma      Past Surgical History:   Procedure Laterality Date    BACK SURGERY      x2    CHOLECYSTECTOMY      ORTHOPEDIC SURGERY      SALPINGO OOPHORECTOMY,R/L/ELDA Right      albuterol (PROAIR HFA/PROVENTIL HFA/VENTOLIN HFA) 108 (90 Base) MCG/ACT inhaler  Alcohol Swabs PADS  blood glucose (NO BRAND SPECIFIED) lancets standard  blood glucose (NO BRAND SPECIFIED) test strip  blood glucose calibration (NO BRAND SPECIFIED) solution  blood  "glucose monitoring (NO BRAND SPECIFIED) meter device kit  budesonide-formoterol (SYMBICORT) 160-4.5 MCG/ACT Inhaler  cloNIDine (CATAPRES) 0.1 MG tablet  Continuous Blood Gluc  (FREESTYLE MCKENZIE 2 READER) KESHIA  Continuous Blood Gluc Sensor (FREESTYLE MCKENZIE 2 SENSOR) MISC  glucagon 1 MG kit  glucose (BD GLUCOSE) 4 g chewable tablet  glucose 40 % (400 mg/mL) gel  insulin glargine (LANTUS PEN) 100 UNIT/ML pen  losartan (COZAAR) 25 MG tablet  metFORMIN (GLUCOPHAGE) 500 MG tablet  methadone (DOLOPHINE-INTENSOL) 10 MG/ML (HIGH CONC) solution  OLANZapine (ZYPREXA) 10 MG tablet  predniSONE (DELTASONE) 20 MG tablet  prochlorperazine (COMPAZINE) 10 MG tablet  prochlorperazine (COMPAZINE) 25 MG suppository  [employer]ar[employer] Container MISC      No Known Allergies  Family History  Family History   Problem Relation Age of Onset    Breast Cancer Mother         in her 50s.    Diabetes Mother     Breast Cancer Maternal Grandmother         in her 40s.    Diabetes Maternal Grandmother      Social History   Social History     Tobacco Use    Smoking status: Never    Smokeless tobacco: Never   Vaping Use    Vaping status: Never Used   Substance Use Topics    Alcohol use: No    Drug use: Yes     Types: Marijuana     Comment: pt was taking Oxycontin \"years ago.\" pt is now on Methadone.      Past medical history, past surgical history, medications, allergies, family history, and social history were reviewed with the patient. No additional pertinent items.   A medically appropriate review of systems was performed with pertinent positives and negatives noted in the HPI, and all other systems negative.    Physical Exam      Physical Exam  Constitutional:       General: She is not in acute distress.     Appearance: She is well-developed. She is not ill-appearing, toxic-appearing or diaphoretic.   HENT:      Head: Normocephalic and atraumatic.   Cardiovascular:      Rate and Rhythm: Normal rate and regular rhythm.      Heart sounds: Normal heart " sounds.   Pulmonary:      Effort: Pulmonary effort is normal. No respiratory distress.      Breath sounds: Normal breath sounds.   Abdominal:      General: There is no distension.      Palpations: Abdomen is soft.      Tenderness: There is abdominal tenderness (mild diffuse tenderness). There is no guarding or rebound.   Musculoskeletal:         General: No tenderness.      Cervical back: Normal range of motion.   Skin:     General: Skin is warm and dry.   Neurological:      Mental Status: She is alert and oriented to person, place, and time.   Psychiatric:         Behavior: Behavior normal.         Thought Content: Thought content normal.           ED Course, Procedures, & Data      Procedures         Results for orders placed or performed during the hospital encounter of 07/16/24   Comprehensive metabolic panel     Status: Abnormal   Result Value Ref Range    Sodium 135 135 - 145 mmol/L    Potassium 4.1 3.4 - 5.3 mmol/L    Carbon Dioxide (CO2) 24 22 - 29 mmol/L    Anion Gap 17 (H) 7 - 15 mmol/L    Urea Nitrogen 19.5 6.0 - 20.0 mg/dL    Creatinine 0.80 0.51 - 0.95 mg/dL    GFR Estimate >90 >60 mL/min/1.73m2    Calcium 10.0 8.8 - 10.4 mg/dL    Chloride 94 (L) 98 - 107 mmol/L    Glucose 345 (H) 70 - 99 mg/dL    Alkaline Phosphatase 123 40 - 150 U/L    AST 75 (H) 0 - 45 U/L     (H) 0 - 50 U/L    Protein Total 8.7 (H) 6.4 - 8.3 g/dL    Albumin 4.5 3.5 - 5.2 g/dL    Bilirubin Total 0.4 <=1.2 mg/dL   Lipase     Status: Normal   Result Value Ref Range    Lipase 32 13 - 60 U/L   CBC with platelets and differential     Status: Abnormal   Result Value Ref Range    WBC Count 10.5 4.0 - 11.0 10e3/uL    RBC Count 4.30 3.80 - 5.20 10e6/uL    Hemoglobin 13.4 11.7 - 15.7 g/dL    Hematocrit 37.8 35.0 - 47.0 %    MCV 88 78 - 100 fL    MCH 31.2 26.5 - 33.0 pg    MCHC 35.4 31.5 - 36.5 g/dL    RDW 13.2 10.0 - 15.0 %    Platelet Count 218 150 - 450 10e3/uL    % Neutrophils 81 %    % Lymphocytes 15 %    % Monocytes 3 %    %  Eosinophils 0 %    % Basophils 0 %    % Immature Granulocytes 1 %    NRBCs per 100 WBC 0 <1 /100    Absolute Neutrophils 8.5 (H) 1.6 - 8.3 10e3/uL    Absolute Lymphocytes 1.6 0.8 - 5.3 10e3/uL    Absolute Monocytes 0.3 0.0 - 1.3 10e3/uL    Absolute Eosinophils 0.0 0.0 - 0.7 10e3/uL    Absolute Basophils 0.0 0.0 - 0.2 10e3/uL    Absolute Immature Granulocytes 0.1 <=0.4 10e3/uL    Absolute NRBCs 0.0 10e3/uL   CBC with platelets differential     Status: Abnormal    Narrative    The following orders were created for panel order CBC with platelets differential.  Procedure                               Abnormality         Status                     ---------                               -----------         ------                     CBC with platelets and d...[[id number]]  Abnormal            Final result                 Please view results for these tests on the individual orders.     Medications   ondansetron (ZOFRAN) injection 4 mg (has no administration in time range)   methadone (DOLOPHINE-INTENSOL) 10 MG/ML (HIGH CONC) solution 140 mg (has no administration in time range)   sodium chloride 0.9% BOLUS 1,000 mL (1,000 mLs Intravenous $New Bag 7/16/24 1128)   famotidine (PEPCID) injection 20 mg (20 mg Intravenous $Given 7/16/24 1127)   OLANZapine (zyPREXA) injection 10 mg (10 mg Intramuscular $Given 7/16/24 1128)            No results found for any visits on 07/16/24.  Medications - No data to display  Labs Ordered and Resulted from Time of ED Arrival to Time of ED Departure - No data to display  No orders to display          Critical care was not performed.     Medical Decision Making  The patient's presentation was of high complexity (a chronic illness severe exacerbation, progression, or side effect of treatment).    The patient's evaluation involved:  review of external note(s) from 1 sources (see separate area of note for details)  ordering and/or review of 3+ test(s) in this encounter (see separate area of note  for details)    The patient's management necessitated moderate risk (prescription drug management including medications given in the ED), moderate risk (limitations due to social determinants of health (substance use)), and high risk (a parenteral controlled substance).    Assessment & Plan    Is a 48-year-old female with a known history of cyclical vomiting syndrome who is on chronic methadone who presented to the ER due to increased abdominal pain.  Patient says that this started 2 days ago and has been unable to take down her methadone.  Patient said this is causing her symptoms to be worse.  Patient received some Zyprexa here in the ER and her nausea has improved.  Patient's labs are stable.  Patient is hoping that she can get her methadone dose here in the ER to make sure she can tolerate it prior to discharge.  Her methadone dose has been ordered.  Waiting for pharmacy to verify and for her to receive the medication.  When she receives the medication if she has no further vomiting patient can be discharged home.  This plan of care will be signed out to the evening doctor for completion of care.    I have reviewed the nursing notes. I have reviewed the findings, diagnosis, plan and need for follow up with the patient.    New Prescriptions    No medications on file       Final diagnoses:   Nausea and vomiting, unspecified vomiting type   Cyclic vomiting syndrome   IMARY, am serving as a trained medical scribe to document services personally performed by Adelaida Ctaes MD, based on the provider's statements to me.      Adelaida PAIGE MD, was physically present and have reviewed and verified the accuracy of this note documented by MARY ROJAS.     Adelaida Cates MD  [facility] EMERGENCY DEPARTMENT  7/16/2024           Adelaida Cates MD  07/16/24 1797

## 2024-07-16 NOTE — ED NOTES
Bed: ED02  Expected date: 7/16/24  Expected time: 10:39 AM  Means of arrival: Ambulance  Comments:  [facility] 47yo female, vomiting , droperidol given

## 2024-07-28 ENCOUNTER — HOSPITAL ENCOUNTER (EMERGENCY)
Facility: CLINIC | Age: 49
Discharge: HOME | End: 2024-07-28
Attending: EMERGENCY MEDICINE | Admitting: EMERGENCY MEDICINE
Payer: COMMERCIAL

## 2024-07-28 VITALS
RESPIRATION RATE: 18 BRPM | HEART RATE: 116 BPM | BODY MASS INDEX: 29.62 KG/M2 | SYSTOLIC BLOOD PRESSURE: 169 MMHG | WEIGHT: 200 LBS | DIASTOLIC BLOOD PRESSURE: 109 MMHG | HEIGHT: 69 IN | TEMPERATURE: 98.6 F | OXYGEN SATURATION: 97 %

## 2024-07-28 DIAGNOSIS — R11.15 CYCLICAL VOMITING: ICD-10-CM

## 2024-07-28 DIAGNOSIS — R51.9 ACUTE NONINTRACTABLE HEADACHE, UNSPECIFIED HEADACHE TYPE: ICD-10-CM

## 2024-07-28 DIAGNOSIS — R73.9 HYPERGLYCEMIA: ICD-10-CM

## 2024-07-28 DIAGNOSIS — R11.2 NAUSEA AND VOMITING, UNSPECIFIED VOMITING TYPE: ICD-10-CM

## 2024-07-28 LAB
ALBUMIN SERPL BCG-MCNC: 4.1 G/DL (ref 3.5–5.2)
ALBUMIN UR-MCNC: NEGATIVE MG/DL
ALP SERPL-CCNC: 125 U/L (ref 40–150)
ALT SERPL W P-5'-P-CCNC: 218 U/L (ref 0–50)
ANION GAP SERPL CALCULATED.3IONS-SCNC: 12 MMOL/L (ref 7–15)
APPEARANCE UR: CLEAR
AST SERPL W P-5'-P-CCNC: 152 U/L (ref 0–45)
BACTERIA #/AREA URNS HPF: ABNORMAL /HPF
BASOPHILS # BLD AUTO: 0 10E3/UL (ref 0–0.2)
BASOPHILS NFR BLD AUTO: 0 %
BILIRUB SERPL-MCNC: 0.4 MG/DL
BILIRUB UR QL STRIP: NEGATIVE
BUN SERPL-MCNC: 15.7 MG/DL (ref 6–20)
CALCIUM SERPL-MCNC: 10.5 MG/DL (ref 8.8–10.4)
CHLORIDE SERPL-SCNC: 96 MMOL/L (ref 98–107)
COLOR UR AUTO: ABNORMAL
CREAT SERPL-MCNC: 0.76 MG/DL (ref 0.51–0.95)
EGFRCR SERPLBLD CKD-EPI 2021: >90 ML/MIN/1.73M2
EOSINOPHIL # BLD AUTO: 0.6 10E3/UL (ref 0–0.7)
EOSINOPHIL NFR BLD AUTO: 8 %
ERYTHROCYTE [DISTWIDTH] IN BLOOD BY AUTOMATED COUNT: 13.4 % (ref 10–15)
GLUCOSE BLDC GLUCOMTR-MCNC: 228 MG/DL (ref 70–99)
GLUCOSE SERPL-MCNC: 380 MG/DL (ref 70–99)
GLUCOSE UR STRIP-MCNC: >=1000 MG/DL
HCO3 SERPL-SCNC: 25 MMOL/L (ref 22–29)
HCT VFR BLD AUTO: 40.1 % (ref 35–47)
HGB BLD-MCNC: 14.1 G/DL (ref 11.7–15.7)
HGB UR QL STRIP: NEGATIVE
IMM GRANULOCYTES # BLD: 0.1 10E3/UL
IMM GRANULOCYTES NFR BLD: 1 %
KETONES UR STRIP-MCNC: NEGATIVE MG/DL
LEUKOCYTE ESTERASE UR QL STRIP: NEGATIVE
LIPASE SERPL-CCNC: 33 U/L (ref 13–60)
LYMPHOCYTES # BLD AUTO: 1.8 10E3/UL (ref 0.8–5.3)
LYMPHOCYTES NFR BLD AUTO: 26 %
MAGNESIUM SERPL-MCNC: 1.7 MG/DL (ref 1.7–2.3)
MCH RBC QN AUTO: 31.8 PG (ref 26.5–33)
MCHC RBC AUTO-ENTMCNC: 35.2 G/DL (ref 31.5–36.5)
MCV RBC AUTO: 90 FL (ref 78–100)
MONOCYTES # BLD AUTO: 0.4 10E3/UL (ref 0–1.3)
MONOCYTES NFR BLD AUTO: 6 %
MUCOUS THREADS #/AREA URNS LPF: PRESENT /LPF
NEUTROPHILS # BLD AUTO: 4.1 10E3/UL (ref 1.6–8.3)
NEUTROPHILS NFR BLD AUTO: 59 %
NITRATE UR QL: NEGATIVE
NRBC # BLD AUTO: 0 10E3/UL
NRBC BLD AUTO-RTO: 0 /100
PH UR STRIP: 6 [PH] (ref 5–7)
PLATELET # BLD AUTO: 188 10E3/UL (ref 150–450)
POTASSIUM SERPL-SCNC: 4.8 MMOL/L (ref 3.4–5.3)
PROT SERPL-MCNC: 8.2 G/DL (ref 6.4–8.3)
RBC # BLD AUTO: 4.44 10E6/UL (ref 3.8–5.2)
RBC URINE: 0 /HPF
SODIUM SERPL-SCNC: 133 MMOL/L (ref 135–145)
SP GR UR STRIP: 1.02 (ref 1–1.03)
SQUAMOUS EPITHELIAL: 7 /HPF
UROBILINOGEN UR STRIP-MCNC: NORMAL MG/DL
WBC # BLD AUTO: 7 10E3/UL (ref 4–11)
WBC URINE: 1 /HPF

## 2024-07-28 PROCEDURE — 96372 THER/PROPH/DIAG INJ SC/IM: CPT | Mod: XS | Performed by: EMERGENCY MEDICINE

## 2024-07-28 PROCEDURE — 83735 ASSAY OF MAGNESIUM: CPT | Performed by: EMERGENCY MEDICINE

## 2024-07-28 PROCEDURE — 96374 THER/PROPH/DIAG INJ IV PUSH: CPT | Performed by: EMERGENCY MEDICINE

## 2024-07-28 PROCEDURE — 96375 TX/PRO/DX INJ NEW DRUG ADDON: CPT | Performed by: EMERGENCY MEDICINE

## 2024-07-28 PROCEDURE — 80053 COMPREHEN METABOLIC PANEL: CPT | Performed by: EMERGENCY MEDICINE

## 2024-07-28 PROCEDURE — 83690 ASSAY OF LIPASE: CPT | Performed by: EMERGENCY MEDICINE

## 2024-07-28 PROCEDURE — 96361 HYDRATE IV INFUSION ADD-ON: CPT | Performed by: EMERGENCY MEDICINE

## 2024-07-28 PROCEDURE — 85025 COMPLETE CBC W/AUTO DIFF WBC: CPT | Performed by: EMERGENCY MEDICINE

## 2024-07-28 PROCEDURE — 99284 EMERGENCY DEPT VISIT MOD MDM: CPT | Mod: 25 | Performed by: EMERGENCY MEDICINE

## 2024-07-28 PROCEDURE — 258N000003 HC RX IP 258 OP 636: Performed by: EMERGENCY MEDICINE

## 2024-07-28 PROCEDURE — 250N000011 HC RX IP 250 OP 636: Performed by: EMERGENCY MEDICINE

## 2024-07-28 PROCEDURE — 82962 GLUCOSE BLOOD TEST: CPT

## 2024-07-28 PROCEDURE — 99284 EMERGENCY DEPT VISIT MOD MDM: CPT | Performed by: EMERGENCY MEDICINE

## 2024-07-28 PROCEDURE — 81001 URINALYSIS AUTO W/SCOPE: CPT | Performed by: EMERGENCY MEDICINE

## 2024-07-28 RX ORDER — OLANZAPINE 10 MG/2ML
10 INJECTION, POWDER, FOR SOLUTION INTRAMUSCULAR ONCE
Status: COMPLETED | OUTPATIENT
Start: 2024-07-28 | End: 2024-07-28

## 2024-07-28 RX ORDER — KETOROLAC TROMETHAMINE 30 MG/ML
30 INJECTION, SOLUTION INTRAMUSCULAR; INTRAVENOUS ONCE
Status: COMPLETED | OUTPATIENT
Start: 2024-07-28 | End: 2024-07-28

## 2024-07-28 RX ADMIN — SODIUM CHLORIDE 1000 ML: 9 INJECTION, SOLUTION INTRAVENOUS at 10:32

## 2024-07-28 RX ADMIN — OLANZAPINE 10 MG: 10 INJECTION, POWDER, FOR SOLUTION INTRAMUSCULAR at 10:25

## 2024-07-28 RX ADMIN — SODIUM CHLORIDE 1000 ML: 9 INJECTION, SOLUTION INTRAVENOUS at 12:37

## 2024-07-28 RX ADMIN — KETOROLAC TROMETHAMINE 30 MG: 30 INJECTION, SOLUTION INTRAMUSCULAR at 10:31

## 2024-07-28 ASSESSMENT — ACTIVITIES OF DAILY LIVING (ADL)
ADLS_ACUITY_SCORE: 37

## 2024-07-28 NOTE — ED PROVIDER NOTES
[facility] EMERGENCY DEPARTMENT ([facility])    7/28/24      ED PROVIDER NOTE   History     Chief Complaint   Patient presents with    Abdominal Pain     Patient having stomach pain and nausea and a bad headache. Symptoms started about 1900 yesterday. Headache pain is 8/10. No history of headache. BE FAST negative except feels eye sight is slightly blurry. Last meal was 1600 yesterday.      BETTY Coyle is a 48 year old female with a past medical history significant for cyclical vomiting syndrome, DM II, HTN, chronic hepatitis C, GERD, opioid dependence in remission (on methadone), benzodiazepine use in remission, and cannabis abuse who presents to the ED for nausea, vomiting, and abdominal pain.  The patient states that she began feeling ill yesterday evening around 8:00 PM.  She states that she has pain in her left upper quadrant of her abdomen and has had nausea and vomiting that is consisted of gastric contents.  She reports a normal bowel movement yesterday.  She denies any fever.  No chest pain or dyspnea.  She complains of a bitemporal headache as well.  Patient denies a history of headaches but chart review indicates that she has complained of headaches several times previously under similar presenting circumstances (6/16/2024, 5/25/2024, 10/9/2023, 10/4/2023).  She underwent neuroimaging in 2022 including head CT, CTA head and neck, MRI brain, MRA brain.  She has variant [place] of Prabhakar anatomy but no history of aneurysm, AVM, or stenosis.  She denies any photophobia.  No rhinorrhea.  No nasal congestion.  No facial pain.  No eye pain or visual disturbance  She states that her blood sugars been running high (200s) as she has been trying to drink juice.  She also states that Zyprexa and IV fluids have been helpful for similar symptoms in the past.      Past Medical History  Past Medical History:   Diagnosis Date    Acute pancreatitis 06/29/2017    Anxiety     Cauda equina syndrome (H)  03/29/2013    Cyst of right ovary 11/16/2017    Depressive disorder     Gastroesophageal reflux disease     Hypertension     Hyponatremia 10/01/2023    Low back pain 08/20/2012    Overview:   History of cauda equina and surgery.      Type 2 diabetes mellitus (H)     Uncomplicated asthma      Past Surgical History:   Procedure Laterality Date    BACK SURGERY      x2    CHOLECYSTECTOMY      ORTHOPEDIC SURGERY      SALPINGO OOPHORECTOMY,R/L/ELDA Right      albuterol (PROAIR HFA/PROVENTIL HFA/VENTOLIN HFA) 108 (90 Base) MCG/ACT inhaler  Alcohol Swabs PADS  blood glucose (NO BRAND SPECIFIED) lancets standard  blood glucose (NO BRAND SPECIFIED) test strip  blood glucose calibration (NO BRAND SPECIFIED) solution  blood glucose monitoring (NO BRAND SPECIFIED) meter device kit  budesonide-formoterol (SYMBICORT) 160-4.5 MCG/ACT Inhaler  cloNIDine (CATAPRES) 0.1 MG tablet  Continuous Blood Gluc  (FREESTYLE MCKENZIE 2 READER) KESHIA  Continuous Blood Gluc Sensor (FREESTYLE MCKENZIE 2 SENSOR) MISC  glucagon 1 MG kit  glucose (BD GLUCOSE) 4 g chewable tablet  glucose 40 % (400 mg/mL) gel  insulin glargine (LANTUS PEN) 100 UNIT/ML pen  losartan (COZAAR) 25 MG tablet  metFORMIN (GLUCOPHAGE) 500 MG tablet  methadone (DOLOPHINE-INTENSOL) 10 MG/ML (HIGH CONC) solution  OLANZapine (ZYPREXA) 10 MG tablet  predniSONE (DELTASONE) 20 MG tablet  prochlorperazine (COMPAZINE) 10 MG tablet  prochlorperazine (COMPAZINE) 25 MG suppository  Sharps Container MISC      No Known Allergies  Family History  Family History   Problem Relation Age of Onset    Breast Cancer Mother         in her 50s.    Diabetes Mother     Breast Cancer Maternal Grandmother         in her 40s.    Diabetes Maternal Grandmother      Social History   Social History     Tobacco Use    Smoking status: Never    Smokeless tobacco: Never   Vaping Use    Vaping status: Never Used   Substance Use Topics    Alcohol use: No    Drug use: Yes     Types: Marijuana     Comment: pt was  "taking Oxycontin \"years ago.\" pt is now on Methadone.      A medically appropriate review of systems was performed with pertinent positives and negatives noted in the HPI, and all other systems negative.    Physical Exam   BP: (!) 156/109  Pulse: 116  Temp: 98.6  F (37  C)  Resp: 18  Height: 175.3 cm (5' 9\")  Weight: 90.7 kg (200 lb)  SpO2: 100 %  Physical Exam  Vitals and nursing note reviewed.   Constitutional:       General: She is not in acute distress.     Appearance: She is not diaphoretic.   HENT:      Head: Normocephalic and atraumatic.   Eyes:      Extraocular Movements: Extraocular movements intact.      Conjunctiva/sclera: Conjunctivae normal.   Cardiovascular:      Rate and Rhythm: Normal rate.      Heart sounds: Normal heart sounds.   Pulmonary:      Effort: Pulmonary effort is normal. No respiratory distress.      Breath sounds: Normal breath sounds.   Abdominal:      Palpations: Abdomen is soft.      Tenderness: There is no abdominal tenderness.   Musculoskeletal:         General: No tenderness. Normal range of motion.      Cervical back: Normal range of motion and neck supple.   Skin:     General: Skin is warm.      Findings: No rash.   Neurological:      General: No focal deficit present.      Mental Status: She is oriented to person, place, and time.      Cranial Nerves: No cranial nerve deficit.      Sensory: Sensation is intact.      Motor: No weakness.      Coordination: Coordination is intact. Romberg sign negative. Heel to Shin Test normal. Rapid alternating movements normal.      Gait: Gait is intact.      Deep Tendon Reflexes:      Reflex Scores:       Bicep reflexes are 2+ on the right side and 2+ on the left side.       Brachioradialis reflexes are 2+ on the right side and 2+ on the left side.       Patellar reflexes are 2+ on the right side and 2+ on the left side.       Achilles reflexes are 2+ on the right side and 2+ on the left side.          ED Course, Procedures, & Data    "   Reviewed multiple emergency department encounters including 7/16/2024, 7/12/2024, 6/30/2024, 3/26/2024, 2/13/2022.  Reviewed admission H&P 4/20/2024 and hospital course to [facility] for lactic acidosis and hypomagnesemia.  Reviewed primary care visit from 1/9/2024.  Reviewed previous CBC, comprehensive metabolic panel, lipase, head CT/CTA, brain MRI/MRI    Procedures             Results for orders placed or performed during the hospital encounter of 07/28/24   Comprehensive metabolic panel     Status: Abnormal   Result Value Ref Range    Sodium 133 (L) 135 - 145 mmol/L    Potassium 4.8 3.4 - 5.3 mmol/L    Carbon Dioxide (CO2) 25 22 - 29 mmol/L    Anion Gap 12 7 - 15 mmol/L    Urea Nitrogen 15.7 6.0 - 20.0 mg/dL    Creatinine 0.76 0.51 - 0.95 mg/dL    GFR Estimate >90 >60 mL/min/1.73m2    Calcium 10.5 (H) 8.8 - 10.4 mg/dL    Chloride 96 (L) 98 - 107 mmol/L    Glucose 380 (H) 70 - 99 mg/dL    Alkaline Phosphatase 125 40 - 150 U/L     (H) 0 - 45 U/L     (H) 0 - 50 U/L    Protein Total 8.2 6.4 - 8.3 g/dL    Albumin 4.1 3.5 - 5.2 g/dL    Bilirubin Total 0.4 <=1.2 mg/dL   Lipase     Status: Normal   Result Value Ref Range    Lipase 33 13 - 60 U/L   UA with Microscopic reflex to Culture     Status: Abnormal    Specimen: Urine, NOS   Result Value Ref Range    Color Urine Light Brown (A) Colorless, Straw, Light Yellow, Yellow    Appearance Urine Clear Clear    Glucose Urine >=1000 (A) Negative mg/dL    Bilirubin Urine Negative Negative    Ketones Urine Negative Negative mg/dL    Specific Gravity Urine 1.020 1.003 - 1.035    Blood Urine Negative Negative    pH Urine 6.0 5.0 - 7.0    Protein Albumin Urine Negative Negative mg/dL    Urobilinogen Urine Normal Normal, 2.0 mg/dL    Nitrite Urine Negative Negative    Leukocyte Esterase Urine Negative Negative    Bacteria Urine Few (A) None Seen /HPF    Mucus Urine Present (A) None Seen /LPF    RBC Urine 0 <=2 /HPF    WBC Urine 1 <=5 /HPF    Squamous  Epithelials Urine 7 (H) <=1 /HPF    Narrative    Urine Culture not indicated   Magnesium     Status: Normal   Result Value Ref Range    Magnesium 1.7 1.7 - 2.3 mg/dL   CBC with platelets and differential     Status: None   Result Value Ref Range    WBC Count 7.0 4.0 - 11.0 10e3/uL    RBC Count 4.44 3.80 - 5.20 10e6/uL    Hemoglobin 14.1 11.7 - 15.7 g/dL    Hematocrit 40.1 35.0 - 47.0 %    MCV 90 78 - 100 fL    MCH 31.8 26.5 - 33.0 pg    MCHC 35.2 31.5 - 36.5 g/dL    RDW 13.4 10.0 - 15.0 %    Platelet Count 188 150 - 450 10e3/uL    % Neutrophils 59 %    % Lymphocytes 26 %    % Monocytes 6 %    % Eosinophils 8 %    % Basophils 0 %    % Immature Granulocytes 1 %    NRBCs per 100 WBC 0 <1 /100    Absolute Neutrophils 4.1 1.6 - 8.3 10e3/uL    Absolute Lymphocytes 1.8 0.8 - 5.3 10e3/uL    Absolute Monocytes 0.4 0.0 - 1.3 10e3/uL    Absolute Eosinophils 0.6 0.0 - 0.7 10e3/uL    Absolute Basophils 0.0 0.0 - 0.2 10e3/uL    Absolute Immature Granulocytes 0.1 <=0.4 10e3/uL    Absolute NRBCs 0.0 10e3/uL   Glucose by meter     Status: Abnormal   Result Value Ref Range    GLUCOSE BY METER POCT 228 (H) 70 - 99 mg/dL   CBC with platelets differential     Status: None    Narrative    The following orders were created for panel order CBC with platelets differential.  Procedure                               Abnormality         Status                     ---------                               -----------         ------                     CBC with platelets and d...[[id number]]                      Final result                 Please view results for these tests on the individual orders.      Medications   sodium chloride 0.9% BOLUS 1,000 mL (0 mLs Intravenous Stopped 7/28/24 1419)   ketorolac (TORADOL) injection 30 mg (30 mg Intravenous $Given 7/28/24 1031)   OLANZapine (zyPREXA) injection 10 mg (10 mg Intramuscular $Given 7/28/24 1025)   sodium chloride 0.9% BOLUS 1,000 mL (0 mLs Intravenous Stopped 7/28/24 1418)         Results for  orders placed or performed during the hospital encounter of 07/28/24   Comprehensive metabolic panel     Status: Abnormal   Result Value Ref Range    Sodium 133 (L) 135 - 145 mmol/L    Potassium 4.8 3.4 - 5.3 mmol/L    Carbon Dioxide (CO2) 25 22 - 29 mmol/L    Anion Gap 12 7 - 15 mmol/L    Urea Nitrogen 15.7 6.0 - 20.0 mg/dL    Creatinine 0.76 0.51 - 0.95 mg/dL    GFR Estimate >90 >60 mL/min/1.73m2    Calcium 10.5 (H) 8.8 - 10.4 mg/dL    Chloride 96 (L) 98 - 107 mmol/L    Glucose 380 (H) 70 - 99 mg/dL    Alkaline Phosphatase 125 40 - 150 U/L     (H) 0 - 45 U/L     (H) 0 - 50 U/L    Protein Total 8.2 6.4 - 8.3 g/dL    Albumin 4.1 3.5 - 5.2 g/dL    Bilirubin Total 0.4 <=1.2 mg/dL   Lipase     Status: Normal   Result Value Ref Range    Lipase 33 13 - 60 U/L   UA with Microscopic reflex to Culture     Status: Abnormal    Specimen: Urine, NOS   Result Value Ref Range    Color Urine Light Brown (A) Colorless, Straw, Light Yellow, Yellow    Appearance Urine Clear Clear    Glucose Urine >=1000 (A) Negative mg/dL    Bilirubin Urine Negative Negative    Ketones Urine Negative Negative mg/dL    Specific Gravity Urine 1.020 1.003 - 1.035    Blood Urine Negative Negative    pH Urine 6.0 5.0 - 7.0    Protein Albumin Urine Negative Negative mg/dL    Urobilinogen Urine Normal Normal, 2.0 mg/dL    Nitrite Urine Negative Negative    Leukocyte Esterase Urine Negative Negative    Bacteria Urine Few (A) None Seen /HPF    Mucus Urine Present (A) None Seen /LPF    RBC Urine 0 <=2 /HPF    WBC Urine 1 <=5 /HPF    Squamous Epithelials Urine 7 (H) <=1 /HPF    Narrative    Urine Culture not indicated   Magnesium     Status: Normal   Result Value Ref Range    Magnesium 1.7 1.7 - 2.3 mg/dL   CBC with platelets and differential     Status: None   Result Value Ref Range    WBC Count 7.0 4.0 - 11.0 10e3/uL    RBC Count 4.44 3.80 - 5.20 10e6/uL    Hemoglobin 14.1 11.7 - 15.7 g/dL    Hematocrit 40.1 35.0 - 47.0 %    MCV 90 78 - 100 fL     MCH 31.8 26.5 - 33.0 pg    MCHC 35.2 31.5 - 36.5 g/dL    RDW 13.4 10.0 - 15.0 %    Platelet Count 188 150 - 450 10e3/uL    % Neutrophils 59 %    % Lymphocytes 26 %    % Monocytes 6 %    % Eosinophils 8 %    % Basophils 0 %    % Immature Granulocytes 1 %    NRBCs per 100 WBC 0 <1 /100    Absolute Neutrophils 4.1 1.6 - 8.3 10e3/uL    Absolute Lymphocytes 1.8 0.8 - 5.3 10e3/uL    Absolute Monocytes 0.4 0.0 - 1.3 10e3/uL    Absolute Eosinophils 0.6 0.0 - 0.7 10e3/uL    Absolute Basophils 0.0 0.0 - 0.2 10e3/uL    Absolute Immature Granulocytes 0.1 <=0.4 10e3/uL    Absolute NRBCs 0.0 10e3/uL   Glucose by meter     Status: Abnormal   Result Value Ref Range    GLUCOSE BY METER POCT 228 (H) 70 - 99 mg/dL   CBC with platelets differential     Status: None    Narrative    The following orders were created for panel order CBC with platelets differential.  Procedure                               Abnormality         Status                     ---------                               -----------         ------                     CBC with platelets and d...[[id number]]                      Final result                 Please view results for these tests on the individual orders.     Medications   sodium chloride 0.9% BOLUS 1,000 mL (0 mLs Intravenous Stopped 7/28/24 1419)   ketorolac (TORADOL) injection 30 mg (30 mg Intravenous $Given 7/28/24 1031)   OLANZapine (zyPREXA) injection 10 mg (10 mg Intramuscular $Given 7/28/24 1025)   sodium chloride 0.9% BOLUS 1,000 mL (0 mLs Intravenous Stopped 7/28/24 1418)     Labs Ordered and Resulted from Time of ED Arrival to Time of ED Departure   COMPREHENSIVE METABOLIC PANEL - Abnormal       Result Value    Sodium 133 (*)     Potassium 4.8      Carbon Dioxide (CO2) 25      Anion Gap 12      Urea Nitrogen 15.7      Creatinine 0.76      GFR Estimate >90      Calcium 10.5 (*)     Chloride 96 (*)     Glucose 380 (*)     Alkaline Phosphatase 125       (*)      (*)     Protein Total  8.2      Albumin 4.1      Bilirubin Total 0.4     ROUTINE UA WITH MICROSCOPIC REFLEX TO CULTURE - Abnormal    Color Urine Light Brown (*)     Appearance Urine Clear      Glucose Urine >=1000 (*)     Bilirubin Urine Negative      Ketones Urine Negative      Specific Gravity Urine 1.020      Blood Urine Negative      pH Urine 6.0      Protein Albumin Urine Negative      Urobilinogen Urine Normal      Nitrite Urine Negative      Leukocyte Esterase Urine Negative      Bacteria Urine Few (*)     Mucus Urine Present (*)     RBC Urine 0      WBC Urine 1      Squamous Epithelials Urine 7 (*)    GLUCOSE BY METER - Abnormal    GLUCOSE BY METER POCT 228 (*)    LIPASE - Normal    Lipase 33     MAGNESIUM - Normal    Magnesium 1.7     CBC WITH PLATELETS AND DIFFERENTIAL    WBC Count 7.0      RBC Count 4.44      Hemoglobin 14.1      Hematocrit 40.1      MCV 90      MCH 31.8      MCHC 35.2      RDW 13.4      Platelet Count 188      % Neutrophils 59      % Lymphocytes 26      % Monocytes 6      % Eosinophils 8      % Basophils 0      % Immature Granulocytes 1      NRBCs per 100 WBC 0      Absolute Neutrophils 4.1      Absolute Lymphocytes 1.8      Absolute Monocytes 0.4      Absolute Eosinophils 0.6      Absolute Basophils 0.0      Absolute Immature Granulocytes 0.1      Absolute NRBCs 0.0     GLUCOSE MONITOR NURSING POCT     No orders to display      12:13 PM Feeling better.  HA resolved.    Critical care was not performed.     Medical Decision Making  The patient's presentation was of moderate complexity (a chronic illness mild to moderate exacerbation, progression, or side effect of treatment).    The patient's evaluation involved:  review of external note(s) from 3+ sources (see separate area of note for details)  review of 3+ test result(s) ordered prior to this encounter (see separate area of note for details)  ordering and/or review of 3+ test(s) in this encounter (see separate area of note for details)    The patient's  management necessitated moderate risk (prescription drug management including medications given in the ED).    Assessment & Plan    48 year old female with history of cyclic vomiting, diabetes, to the emergency department with nausea, vomiting, and bifrontal headache.  She has not had any infectious symptoms and has a reassuring abdominal examination.  Differential diagnosis includes exacerbation of cyclic vomiting, DKA/hyperosmolar state, electrolyte disturbance, bowel obstruction, acute pancreatitis, acute liver pathology including bili obstruction and hepatitis, acute urinary tract infection.  Patient treated with her routine IV fluids as well as ketorolac and Zyprexa with complete resolution of her symptoms.  Her labs are remarkable for significant hyperglycemia with a glucose level of 380.  She does have transaminitis which she has had previously.  Patient treated with IV fluids and her blood sugar improved to 228.  Normal lipase do not suspect acute pancreatitis.  No evidence for urinary tract infection on urinalysis.  She is feeling markedly improved and ready for discharge to home.  Patient be discharged home with plan for primary care follow-up.  Return precautions provided.    I have reviewed the nursing notes. I have reviewed the findings, diagnosis, plan and need for follow up with the patient.    Discharge Medication List as of 7/28/2024  2:19 PM          Final diagnoses:   Nausea and vomiting, unspecified vomiting type   Cyclical vomiting   Acute nonintractable headache, unspecified headache type   Hyperglycemia     Chart documentation was completed with Dragon voice-recognition software. Even though reviewed, this chart may still contain some grammatical, spelling, and word errors.         [facility] EMERGENCY DEPARTMENT  7/28/2024     Anderson Blas MD  07/29/24 122

## 2024-07-28 NOTE — CONSULTS
"Consult received for Vascular access care.  See LDA for details. For additional needs place \"Nursing to Consult for Vascular Access\" [id number] order in EPIC.   "

## 2024-07-28 NOTE — ED TRIAGE NOTES
Patient having stomach pain and nausea and a bad headache. Symptoms started about 1900 yesterday. Headache pain is 8/10. No history of headache. BE FAST negative except feels eye sight is slightly blurry. Last meal was 1600 yesterday.      Triage Assessment (Adult)       Row Name 07/28/24 0915          Triage Assessment    Airway WDL WDL        Respiratory WDL    Respiratory WDL WDL        Skin Circulation/Temperature WDL    Skin Circulation/Temperature WDL WDL        Cardiac WDL    Cardiac WDL WDL        Peripheral/Neurovascular WDL    Peripheral Neurovascular WDL WDL        Cognitive/Neuro/Behavioral WDL    Cognitive/Neuro/Behavioral WDL WDL

## 2024-07-28 NOTE — DISCHARGE INSTRUCTIONS
Drink plenty of fluids.  Continue current medications.  Follow-up with your primary care clinic.    Return if fever, worse, or other concerns.

## 2024-07-29 ENCOUNTER — PATIENT OUTREACH (OUTPATIENT)
Dept: CARE COORDINATION | Facility: CLINIC | Age: 49
End: 2024-07-29
Payer: COMMERCIAL

## 2024-07-29 NOTE — PROGRESS NOTES
Clinical Product Navigator reviewed chart.   CPN Initial Information Gathering  Referral Source: ED Follow-Up  Referrals Places: Care Coordination    Patient identified due to high utilization with recent discharge for care management support with high probability risk of admission.      [facility] discharge report identified patient having had 13 ED admissions in the last 6 months. Patient has high risk score for hospital admission or ED visit of 93.5%. Per chart review, Care Mgmt team was unable to reach patient on 7/1/24. Patient has had 3 additional ED admissions since last Care Mgmt attempt. Patient would benefit from care management support for chronic disease management if reached/in agreement. Patient would benefit from Medication Therapy Management (MTM) support.     Carito Castro, DA  Social Work Care Coordinator   Clinical Product Navigation

## 2024-07-30 ENCOUNTER — PATIENT OUTREACH (OUTPATIENT)
Dept: CARE COORDINATION | Facility: CLINIC | Age: 49
End: 2024-07-30
Payer: COMMERCIAL

## 2024-07-30 NOTE — PROGRESS NOTES
Clinic Care Coordination Contact  [facility]/Voicemail      Chart Review:  7/29/24 - Patient identified due to high utilization with recent discharge for care management support with high probability risk of admission.       [facility] discharge report identified patient having had 13 ED admissions in the last 6 months. Patient has high risk score for hospital admission or ED visit of 93.5%. Per chart review, Care Mgmt team was unable to reach patient on 7/1/24. Patient has had 3 additional ED admissions since last Care Mgmt attempt. Patient would benefit from care management support for chronic disease management if reached/in agreement. Patient would benefit from Medication Therapy Management (MTM) support.   Clinical Data: Care Coordinator Outreach    [facility] on 7/[facility]24 and 7/2/24 to introduce care coordination.        Outreach Documentation Number of Outreach Attempt   7/1/2024  10:47 AM 1   7/2/2024  10:21 AM 2   7/30/2024  10:44 AM 1       Left message on patient's voicemail with call back information and requested return call.    Plan:  Care Coordinator will try to reach patient again in 1-2 business days.    Catherine Davis  Community Health Worker  [facility]  [phone]

## 2024-07-30 NOTE — LETTER
[facility] CARE COORDINATION  [address]  [place] [address]    July 31, 2024    Yun Coyle  [address]  [place] [address]      Dear Yun,    I am a clinic care coordinator who works with Gamaliel Granados DO with the [facility]. I wanted to introduce myself and provide you with my contact information for you to be able to call me with any questions or concerns. Below is a description of clinic care coordination and how I can further assist you.       The clinic care coordination team is made up of a registered nurse, , financial resource worker and community health worker who understand the health care system. The goal of clinic care coordination is to help you manage your health and improve access to the health care system. Our team works alongside your provider to assist you in determining your health and social needs. We can help you obtain health care and community resources, providing you with necessary information and education. We can work with you through any barriers and develop a care plan that helps coordinate and strengthen the communication between you and your care team.  Our services are voluntary and are offered without charge to you personally.    Please feel free to contact the community health worker, Catherine, with any questions or concerns regarding care coordination and what we can offer. She can be reached at [phone].    We are focused on providing you with the highest-quality healthcare experience possible.    Sincerely,     Crista Kaur, CHW, B.A. [facility]  Clinic Care Coordination  [facility]:   [place] [facility]  [phone]

## 2024-07-31 NOTE — PROGRESS NOTES
Clinic Care Coordination Contact  [facility]/Voicemail    Clinical Data: Care Coordinator Outreach    Outreach Documentation Number of Outreach Attempt   7/1/2024  10:47 AM 1   7/2/2024  10:21 AM 2   7/30/2024  10:44 AM 1   7/31/2024   9:48 AM 2       Left message on patient's voicemail with call back information and requested return call.    Plan: Care Coordinator will send care coordination introduction letter with care coordinator contact information and explanation of care coordination services via mail. Care Coordinator will do no further outreaches at this time.    Crista Kaur, KALIW, B.A. [facility] Care Coordination  [facility]:   [facility]  [phone]

## 2024-08-14 ENCOUNTER — HOSPITAL ENCOUNTER (EMERGENCY)
Facility: CLINIC | Age: 49
Discharge: HOME | End: 2024-08-14
Attending: EMERGENCY MEDICINE | Admitting: EMERGENCY MEDICINE
Payer: COMMERCIAL

## 2024-08-14 VITALS
TEMPERATURE: 98 F | DIASTOLIC BLOOD PRESSURE: 107 MMHG | BODY MASS INDEX: 29.62 KG/M2 | HEIGHT: 69 IN | OXYGEN SATURATION: 98 % | HEART RATE: 74 BPM | WEIGHT: 200 LBS | RESPIRATION RATE: 16 BRPM | SYSTOLIC BLOOD PRESSURE: 159 MMHG

## 2024-08-14 DIAGNOSIS — R11.2 NAUSEA AND VOMITING, UNSPECIFIED VOMITING TYPE: ICD-10-CM

## 2024-08-14 LAB
ALBUMIN SERPL BCG-MCNC: 4.1 G/DL (ref 3.5–5.2)
ALP SERPL-CCNC: 143 U/L (ref 40–150)
ALT SERPL W P-5'-P-CCNC: 147 U/L (ref 0–50)
ANION GAP SERPL CALCULATED.3IONS-SCNC: 13 MMOL/L (ref 7–15)
AST SERPL W P-5'-P-CCNC: 76 U/L (ref 0–45)
BASOPHILS # BLD AUTO: 0.1 10E3/UL (ref 0–0.2)
BASOPHILS NFR BLD AUTO: 1 %
BILIRUB SERPL-MCNC: 0.3 MG/DL
BUN SERPL-MCNC: 17.6 MG/DL (ref 6–20)
CALCIUM SERPL-MCNC: 9.7 MG/DL (ref 8.8–10.4)
CHLORIDE SERPL-SCNC: 96 MMOL/L (ref 98–107)
CREAT SERPL-MCNC: 0.84 MG/DL (ref 0.51–0.95)
EGFRCR SERPLBLD CKD-EPI 2021: 86 ML/MIN/1.73M2
EOSINOPHIL # BLD AUTO: 0.9 10E3/UL (ref 0–0.7)
EOSINOPHIL NFR BLD AUTO: 10 %
ERYTHROCYTE [DISTWIDTH] IN BLOOD BY AUTOMATED COUNT: 13.2 % (ref 10–15)
GLUCOSE SERPL-MCNC: 297 MG/DL (ref 70–99)
HCG UR QL: NEGATIVE
HCO3 SERPL-SCNC: 25 MMOL/L (ref 22–29)
HCT VFR BLD AUTO: 41.6 % (ref 35–47)
HGB BLD-MCNC: 14.6 G/DL (ref 11.7–15.7)
IMM GRANULOCYTES # BLD: 0 10E3/UL
IMM GRANULOCYTES NFR BLD: 0 %
LIPASE SERPL-CCNC: 57 U/L (ref 13–60)
LYMPHOCYTES # BLD AUTO: 3.7 10E3/UL (ref 0.8–5.3)
LYMPHOCYTES NFR BLD AUTO: 40 %
MCH RBC QN AUTO: 31.7 PG (ref 26.5–33)
MCHC RBC AUTO-ENTMCNC: 35.1 G/DL (ref 31.5–36.5)
MCV RBC AUTO: 90 FL (ref 78–100)
MONOCYTES # BLD AUTO: 0.5 10E3/UL (ref 0–1.3)
MONOCYTES NFR BLD AUTO: 5 %
NEUTROPHILS # BLD AUTO: 4 10E3/UL (ref 1.6–8.3)
NEUTROPHILS NFR BLD AUTO: 44 %
NRBC # BLD AUTO: 0 10E3/UL
NRBC BLD AUTO-RTO: 0 /100
PLATELET # BLD AUTO: 208 10E3/UL (ref 150–450)
POTASSIUM SERPL-SCNC: 4.1 MMOL/L (ref 3.4–5.3)
PROT SERPL-MCNC: 8.5 G/DL (ref 6.4–8.3)
RBC # BLD AUTO: 4.61 10E6/UL (ref 3.8–5.2)
SODIUM SERPL-SCNC: 134 MMOL/L (ref 135–145)
WBC # BLD AUTO: 9.2 10E3/UL (ref 4–11)

## 2024-08-14 PROCEDURE — 36415 COLL VENOUS BLD VENIPUNCTURE: CPT | Performed by: EMERGENCY MEDICINE

## 2024-08-14 PROCEDURE — 85025 COMPLETE CBC W/AUTO DIFF WBC: CPT | Performed by: EMERGENCY MEDICINE

## 2024-08-14 PROCEDURE — 96374 THER/PROPH/DIAG INJ IV PUSH: CPT | Performed by: EMERGENCY MEDICINE

## 2024-08-14 PROCEDURE — 83690 ASSAY OF LIPASE: CPT | Performed by: EMERGENCY MEDICINE

## 2024-08-14 PROCEDURE — 99284 EMERGENCY DEPT VISIT MOD MDM: CPT | Mod: 25 | Performed by: EMERGENCY MEDICINE

## 2024-08-14 PROCEDURE — 258N000003 HC RX IP 258 OP 636: Performed by: EMERGENCY MEDICINE

## 2024-08-14 PROCEDURE — 250N000011 HC RX IP 250 OP 636: Performed by: EMERGENCY MEDICINE

## 2024-08-14 PROCEDURE — 99284 EMERGENCY DEPT VISIT MOD MDM: CPT | Performed by: EMERGENCY MEDICINE

## 2024-08-14 PROCEDURE — 96372 THER/PROPH/DIAG INJ SC/IM: CPT | Performed by: EMERGENCY MEDICINE

## 2024-08-14 PROCEDURE — 82374 ASSAY BLOOD CARBON DIOXIDE: CPT | Performed by: EMERGENCY MEDICINE

## 2024-08-14 PROCEDURE — 81025 URINE PREGNANCY TEST: CPT | Performed by: EMERGENCY MEDICINE

## 2024-08-14 PROCEDURE — 96361 HYDRATE IV INFUSION ADD-ON: CPT | Performed by: EMERGENCY MEDICINE

## 2024-08-14 RX ORDER — ONDANSETRON 2 MG/ML
4 INJECTION INTRAMUSCULAR; INTRAVENOUS EVERY 30 MIN PRN
Status: DISCONTINUED | OUTPATIENT
Start: 2024-08-14 | End: 2024-08-15 | Stop reason: HOSPADM

## 2024-08-14 RX ORDER — OLANZAPINE 10 MG/2ML
10 INJECTION, POWDER, FOR SOLUTION INTRAMUSCULAR ONCE
Status: COMPLETED | OUTPATIENT
Start: 2024-08-14 | End: 2024-08-14

## 2024-08-14 RX ADMIN — OLANZAPINE 10 MG: 10 INJECTION, POWDER, FOR SOLUTION INTRAMUSCULAR at 18:48

## 2024-08-14 RX ADMIN — ONDANSETRON 4 MG: 2 INJECTION INTRAMUSCULAR; INTRAVENOUS at 18:45

## 2024-08-14 RX ADMIN — SODIUM CHLORIDE 1000 ML: 9 INJECTION, SOLUTION INTRAVENOUS at 18:33

## 2024-08-14 ASSESSMENT — ACTIVITIES OF DAILY LIVING (ADL)
ADLS_ACUITY_SCORE: 37
ADLS_ACUITY_SCORE: 35

## 2024-08-14 ASSESSMENT — COLUMBIA-SUICIDE SEVERITY RATING SCALE - C-SSRS
1. IN THE PAST MONTH, HAVE YOU WISHED YOU WERE DEAD OR WISHED YOU COULD GO TO SLEEP AND NOT WAKE UP?: NO
6. HAVE YOU EVER DONE ANYTHING, STARTED TO DO ANYTHING, OR PREPARED TO DO ANYTHING TO END YOUR LIFE?: NO
2. HAVE YOU ACTUALLY HAD ANY THOUGHTS OF KILLING YOURSELF IN THE PAST MONTH?: NO

## 2024-08-14 NOTE — ED PROVIDER NOTES
ED Provider Note  [facility]      History     Chief Complaint   Patient presents with    Nausea & Vomiting     Nausea/vomiting x 1 day. Unable to keep fluids down.     HPI  Yun Coyle is a 48 year old female with a history of cyclical vomiting syndrome, DMII, HTN, chronic hepatitis C, GERD, pancreatitis, and opioid use disorder on methadone who presents to the emergency department for nausea and vomiting x 1 day. Patient reports the nausea and vomiting started yesterday morning and she woke up sick.  She notes she is experiencing lower right quadrant abdominal pain.  She denies fever, constipation, diarrhea.  She also denies any recent cannabis use.      Past Medical History  Past Medical History:   Diagnosis Date    Acute pancreatitis 06/29/2017    Anxiety     Cauda equina syndrome (H) 03/29/2013    Cyst of right ovary 11/16/2017    Depressive disorder     Gastroesophageal reflux disease     Hypertension     Hyponatremia 10/01/2023    Low back pain 08/20/2012    Overview:   History of cauda equina and surgery.      Type 2 diabetes mellitus (H)     Uncomplicated asthma      Past Surgical History:   Procedure Laterality Date    BACK SURGERY      x2    CHOLECYSTECTOMY      ORTHOPEDIC SURGERY      SALPINGO OOPHORECTOMY,R/L/ELDA Right      albuterol (PROAIR HFA/PROVENTIL HFA/VENTOLIN HFA) 108 (90 Base) MCG/ACT inhaler  Alcohol Swabs PADS  blood glucose (NO BRAND SPECIFIED) lancets standard  blood glucose (NO BRAND SPECIFIED) test strip  blood glucose calibration (NO BRAND SPECIFIED) solution  blood glucose monitoring (NO BRAND SPECIFIED) meter device kit  budesonide-formoterol (SYMBICORT) 160-4.5 MCG/ACT Inhaler  cloNIDine (CATAPRES) 0.1 MG tablet  Continuous Blood Gluc  (FREESTYLE MCKENZIE 2 READER) KESHIA  Continuous Blood Gluc Sensor (FREESTYLE MCKENZIE 2 SENSOR) MISC  glucagon 1 MG kit  glucose (BD GLUCOSE) 4 g chewable tablet  glucose 40 % (400 mg/mL) gel  insulin glargine (LANTUS  "PEN) 100 UNIT/ML pen  losartan (COZAAR) 25 MG tablet  metFORMIN (GLUCOPHAGE) 500 MG tablet  methadone (DOLOPHINE-INTENSOL) 10 MG/ML (HIGH CONC) solution  OLANZapine (ZYPREXA) 10 MG tablet  predniSONE (DELTASONE) 20 MG tablet  prochlorperazine (COMPAZINE) 10 MG tablet  prochlorperazine (COMPAZINE) 25 MG suppository  Sharps Container MISC      No Known Allergies  Family History  Family History   Problem Relation Age of Onset    Breast Cancer Mother         in her 50s.    Diabetes Mother     Breast Cancer Maternal Grandmother         in her 40s.    Diabetes Maternal Grandmother      Social History   Social History     Tobacco Use    Smoking status: Never    Smokeless tobacco: Never   Vaping Use    Vaping status: Never Used   Substance Use Topics    Alcohol use: No    Drug use: Yes     Types: Marijuana     Comment: pt was taking Oxycontin \"years ago.\" pt is now on Methadone.      A medically appropriate review of systems was performed with pertinent positives and negatives noted in the HPI, and all other systems negative.    Physical Exam   BP: (!) 148/100  Pulse: 94  Temp: 98  F (36.7  C)  Resp: 18  Height: 175.3 cm (5' 9\")  Weight: 90.7 kg (200 lb)  SpO2: 98 %  Physical Exam  Vitals and nursing note reviewed.   Constitutional:       General: She is not in acute distress.     Appearance: Normal appearance. She is well-developed. She is not ill-appearing or diaphoretic.   HENT:      Head: Normocephalic and atraumatic.      Nose: Nose normal.      Mouth/Throat:      Mouth: Mucous membranes are dry.   Eyes:      General: No scleral icterus.     Conjunctiva/sclera: Conjunctivae normal.   Cardiovascular:      Rate and Rhythm: Normal rate.   Pulmonary:      Effort: Pulmonary effort is normal. No respiratory distress.      Breath sounds: No stridor.   Abdominal:      General: There is no distension.      Palpations: Abdomen is soft.      Tenderness: There is no abdominal tenderness. There is no guarding or rebound. "   Musculoskeletal:         General: No deformity or signs of injury. Normal range of motion.      Cervical back: Normal range of motion and neck supple. No rigidity.   Skin:     General: Skin is warm and dry.      Coloration: Skin is not jaundiced or pale.   Neurological:      General: No focal deficit present.      Mental Status: She is alert and oriented to person, place, and time.   Psychiatric:         Mood and Affect: Mood normal.         Behavior: Behavior normal.           ED Course, Procedures, & Data      Procedures                Results for orders placed or performed during the hospital encounter of 08/14/24   Comprehensive metabolic panel     Status: Abnormal   Result Value Ref Range    Sodium 134 (L) 135 - 145 mmol/L    Potassium 4.1 3.4 - 5.3 mmol/L    Carbon Dioxide (CO2) 25 22 - 29 mmol/L    Anion Gap 13 7 - 15 mmol/L    Urea Nitrogen 17.6 6.0 - 20.0 mg/dL    Creatinine 0.84 0.51 - 0.95 mg/dL    GFR Estimate 86 >60 mL/min/1.73m2    Calcium 9.7 8.8 - 10.4 mg/dL    Chloride 96 (L) 98 - 107 mmol/L    Glucose 297 (H) 70 - 99 mg/dL    Alkaline Phosphatase 143 40 - 150 U/L    AST 76 (H) 0 - 45 U/L     (H) 0 - 50 U/L    Protein Total 8.5 (H) 6.4 - 8.3 g/dL    Albumin 4.1 3.5 - 5.2 g/dL    Bilirubin Total 0.3 <=1.2 mg/dL   Lipase     Status: Normal   Result Value Ref Range    Lipase 57 13 - 60 U/L   HCG qualitative urine (UPT)     Status: Normal   Result Value Ref Range    hCG Urine Qualitative Negative Negative   CBC with platelets and differential     Status: Abnormal   Result Value Ref Range    WBC Count 9.2 4.0 - 11.0 10e3/uL    RBC Count 4.61 3.80 - 5.20 10e6/uL    Hemoglobin 14.6 11.7 - 15.7 g/dL    Hematocrit 41.6 35.0 - 47.0 %    MCV 90 78 - 100 fL    MCH 31.7 26.5 - 33.0 pg    MCHC 35.1 31.5 - 36.5 g/dL    RDW 13.2 10.0 - 15.0 %    Platelet Count 208 150 - 450 10e3/uL    % Neutrophils 44 %    % Lymphocytes 40 %    % Monocytes 5 %    % Eosinophils 10 %    % Basophils 1 %    % Immature  Granulocytes 0 %    NRBCs per 100 WBC 0 <1 /100    Absolute Neutrophils 4.0 1.6 - 8.3 10e3/uL    Absolute Lymphocytes 3.7 0.8 - 5.3 10e3/uL    Absolute Monocytes 0.5 0.0 - 1.3 10e3/uL    Absolute Eosinophils 0.9 (H) 0.0 - 0.7 10e3/uL    Absolute Basophils 0.1 0.0 - 0.2 10e3/uL    Absolute Immature Granulocytes 0.0 <=0.4 10e3/uL    Absolute NRBCs 0.0 10e3/uL   CBC with platelets differential     Status: Abnormal    Narrative    The following orders were created for panel order CBC with platelets differential.  Procedure                               Abnormality         Status                     ---------                               -----------         ------                     CBC with platelets and d...[[id number]]  Abnormal            Final result                 Please view results for these tests on the individual orders.     Medications   sodium chloride 0.9% BOLUS 1,000 mL (0 mLs Intravenous Stopped 8/14/24 2011)   OLANZapine (zyPREXA) injection 10 mg (10 mg Intramuscular $Given 8/14/24 1363)     Labs Ordered and Resulted from Time of ED Arrival to Time of ED Departure   COMPREHENSIVE METABOLIC PANEL - Abnormal       Result Value    Sodium 134 (*)     Potassium 4.1      Carbon Dioxide (CO2) 25      Anion Gap 13      Urea Nitrogen 17.6      Creatinine 0.84      GFR Estimate 86      Calcium 9.7      Chloride 96 (*)     Glucose 297 (*)     Alkaline Phosphatase 143      AST 76 (*)      (*)     Protein Total 8.5 (*)     Albumin 4.1      Bilirubin Total 0.3     CBC WITH PLATELETS AND DIFFERENTIAL - Abnormal    WBC Count 9.2      RBC Count 4.61      Hemoglobin 14.6      Hematocrit 41.6      MCV 90      MCH 31.7      MCHC 35.1      RDW 13.2      Platelet Count 208      % Neutrophils 44      % Lymphocytes 40      % Monocytes 5      % Eosinophils 10      % Basophils 1      % Immature Granulocytes 0      NRBCs per 100 WBC 0      Absolute Neutrophils 4.0      Absolute Lymphocytes 3.7      Absolute Monocytes 0.5       Absolute Eosinophils 0.9 (*)     Absolute Basophils 0.1      Absolute Immature Granulocytes 0.0      Absolute NRBCs 0.0     LIPASE - Normal    Lipase 57     HCG QUALITATIVE URINE - Normal    hCG Urine Qualitative Negative       No orders to display          Critical care was not performed.     Medical Decision Making  The patient's presentation was of moderate complexity (a chronic illness mild to moderate exacerbation, progression, or side effect of treatment).    The patient's evaluation involved:  review of external note(s) from 2 sources (see separate area of note for details)  review of 3+ test result(s) ordered prior to this encounter (see separate area of note for details)  ordering and/or review of 3+ test(s) in this encounter (see separate area of note for details)    The patient's management necessitated moderate risk (prescription drug management including medications given in the ED), high risk (a parenteral controlled substance), and high risk (a decision regarding hospitalization).    Assessment & Plan    Yun Coyle is a 48 year old female with a history of cyclical vomiting syndrome, DMII, HTN, chronic hepatitis C, GERD, pancreatitis, and opioid use disorder on methadone who presents to the emergency department for nausea and vomiting x 1 day.    Ddx: Cyclic vomiting, gastroenteritis, cannabinoid hyperemesis, GENA, electrolyte abnormality, dehydration    Labs within normal limits with normal potassium.  Normal creatinine.  LFTs stable with baseline elevation.  Alk phos and bilirubin normal.  Lipase normal.  Blood cell count and hemoglobin normal.  Urine pregnancy negative.  Given a liter of fluid, Zofran, 10 mg Zyprexa.  Patient had no recurrent vomiting while in the emergency department.  She felt improved and was agreeable to discharge.      I have reviewed the nursing notes. I have reviewed the findings, diagnosis, plan and need for follow up with the patient.    Discharge Medication List  as of 8/14/2024  9:52 PM          Final diagnoses:   Nausea and vomiting, unspecified vomiting type   I, Alina Andrade, am serving as a trained medical scribe to document services personally performed by Laure Guerrero MD, based on the provider's statements to me.     I, Laure Guerrero MD, was physically present and have reviewed and verified the accuracy of this note documented by Alina Andrade.     Laure Guerrero MD  [facility] EMERGENCY DEPARTMENT  8/14/2024     Laure Guerrero MD  08/16/24 0866

## 2024-08-14 NOTE — ED TRIAGE NOTES
Nausea/vomiting x 1 day. Unable to keep fluids down.     Triage Assessment (Adult)       Row Name 08/14/24 8990          Triage Assessment    Airway WDL WDL        Respiratory WDL    Respiratory WDL WDL        Skin Circulation/Temperature WDL    Skin Circulation/Temperature WDL WDL        Cardiac WDL    Cardiac WDL WDL        Peripheral/Neurovascular WDL    Peripheral Neurovascular WDL WDL        Cognitive/Neuro/Behavioral WDL    Cognitive/Neuro/Behavioral WDL WDL

## 2024-08-15 NOTE — DISCHARGE INSTRUCTIONS
Please make an appointment to follow up with Your Primary Care Provider in 3-5 days as needed.    Return to the ED for worsening pain, recurrent vomiting or diarrhea, blood in our stool, dehydration, fever, or abdominal distention and inability to pass gas from below.

## 2024-08-16 ENCOUNTER — PATIENT OUTREACH (OUTPATIENT)
Dept: CARE COORDINATION | Facility: CLINIC | Age: 49
End: 2024-08-16
Payer: COMMERCIAL

## 2024-08-16 NOTE — PROGRESS NOTES
[facility] Care Resource [facility] Contact  [facility]/Voicemail     Clinical Data: Care Coordination ED-sourced Outreach-     Outreach attempted x 2.  Left message on patient's voicemail, providing [facility]'s 24/7 scheduling and nurse triage phone number [phone] ([phone]) for questions/concerns and/or to schedule an appt with an [facility] provider.      [facility] unable to send Care Coordination introduction letter as patient does not have an active MyChart account. Clinic Care Coordination services remain available via referral if needed.    Plan: [facility] will do no further outreaches at this time.       DAI Borges  [facility] [place], [facility]    *Connected Care Resource Team does NOT follow patient ongoing. Referrals are identified based on internal discharge reports and the outreach is to ensure patient has an understanding of their discharge instructions.

## 2024-08-22 ENCOUNTER — APPOINTMENT (OUTPATIENT)
Dept: GENERAL RADIOLOGY | Facility: CLINIC | Age: 49
DRG: 641 | End: 2024-08-22
Attending: FAMILY MEDICINE
Payer: COMMERCIAL

## 2024-08-22 ENCOUNTER — APPOINTMENT (OUTPATIENT)
Dept: ULTRASOUND IMAGING | Facility: CLINIC | Age: 49
DRG: 641 | End: 2024-08-22
Attending: FAMILY MEDICINE
Payer: COMMERCIAL

## 2024-08-22 ENCOUNTER — HOSPITAL ENCOUNTER (INPATIENT)
Facility: CLINIC | Age: 49
LOS: 1 days | Discharge: HOME | DRG: 641 | End: 2024-08-23
Attending: FAMILY MEDICINE | Admitting: FAMILY MEDICINE
Payer: COMMERCIAL

## 2024-08-22 DIAGNOSIS — E11.65 TYPE 2 DIABETES MELLITUS WITH HYPERGLYCEMIA, WITH LONG-TERM CURRENT USE OF INSULIN (H): ICD-10-CM

## 2024-08-22 DIAGNOSIS — E87.20 LACTIC ACIDOSIS: ICD-10-CM

## 2024-08-22 DIAGNOSIS — R79.89 ELEVATED BLOOD KETONE BODY LEVEL: ICD-10-CM

## 2024-08-22 DIAGNOSIS — R11.15 CYCLICAL VOMITING: ICD-10-CM

## 2024-08-22 DIAGNOSIS — R10.13 EPIGASTRIC PAIN: ICD-10-CM

## 2024-08-22 DIAGNOSIS — K92.0 COFFEE GROUND EMESIS: ICD-10-CM

## 2024-08-22 DIAGNOSIS — F33.2 SEVERE EPISODE OF RECURRENT MAJOR DEPRESSIVE DISORDER, WITHOUT PSYCHOTIC FEATURES (H): ICD-10-CM

## 2024-08-22 DIAGNOSIS — Z79.4 TYPE 2 DIABETES MELLITUS WITH HYPERGLYCEMIA, WITH LONG-TERM CURRENT USE OF INSULIN (H): ICD-10-CM

## 2024-08-22 DIAGNOSIS — R11.15 CYCLIC VOMITING SYNDROME: Primary | ICD-10-CM

## 2024-08-22 DIAGNOSIS — B18.2 CHRONIC HEPATITIS C WITHOUT HEPATIC COMA (H): ICD-10-CM

## 2024-08-22 DIAGNOSIS — E86.0 DEHYDRATION: ICD-10-CM

## 2024-08-22 LAB
ALBUMIN SERPL BCG-MCNC: 3.9 G/DL (ref 3.5–5.2)
ALBUMIN SERPL BCG-MCNC: 4.5 G/DL (ref 3.5–5.2)
ALP SERPL-CCNC: 133 U/L (ref 40–150)
ALP SERPL-CCNC: 172 U/L (ref 40–150)
ALT SERPL W P-5'-P-CCNC: 152 U/L (ref 0–50)
ALT SERPL W P-5'-P-CCNC: 209 U/L (ref 0–50)
ANION GAP SERPL CALCULATED.3IONS-SCNC: 12 MMOL/L (ref 7–15)
ANION GAP SERPL CALCULATED.3IONS-SCNC: 24 MMOL/L (ref 7–15)
AST SERPL W P-5'-P-CCNC: 161 U/L (ref 0–45)
AST SERPL W P-5'-P-CCNC: 94 U/L (ref 0–45)
ATRIAL RATE - MUSE: 101 BPM
ATRIAL RATE - MUSE: 107 BPM
B-OH-BUTYR SERPL-SCNC: 1.28 MMOL/L
B-OH-BUTYR SERPL-SCNC: <0.18 MMOL/L
BASE EXCESS BLDV CALC-SCNC: -1.3 MMOL/L (ref -3–3)
BASOPHILS # BLD AUTO: 0 10E3/UL (ref 0–0.2)
BASOPHILS NFR BLD AUTO: 0 %
BILIRUB SERPL-MCNC: 0.6 MG/DL
BILIRUB SERPL-MCNC: 0.6 MG/DL
BUN SERPL-MCNC: 14.2 MG/DL (ref 6–20)
BUN SERPL-MCNC: 15.6 MG/DL (ref 6–20)
CALCIUM SERPL-MCNC: 10.3 MG/DL (ref 8.8–10.4)
CALCIUM SERPL-MCNC: 9.5 MG/DL (ref 8.8–10.4)
CHLORIDE SERPL-SCNC: 92 MMOL/L (ref 98–107)
CHLORIDE SERPL-SCNC: 98 MMOL/L (ref 98–107)
CREAT SERPL-MCNC: 0.73 MG/DL (ref 0.51–0.95)
CREAT SERPL-MCNC: 0.79 MG/DL (ref 0.51–0.95)
DIASTOLIC BLOOD PRESSURE - MUSE: NORMAL MMHG
DIASTOLIC BLOOD PRESSURE - MUSE: NORMAL MMHG
EGFRCR SERPLBLD CKD-EPI 2021: >90 ML/MIN/1.73M2
EGFRCR SERPLBLD CKD-EPI 2021: >90 ML/MIN/1.73M2
EOSINOPHIL # BLD AUTO: 0 10E3/UL (ref 0–0.7)
EOSINOPHIL NFR BLD AUTO: 0 %
ERYTHROCYTE [DISTWIDTH] IN BLOOD BY AUTOMATED COUNT: 13 % (ref 10–15)
GLUCOSE BLDC GLUCOMTR-MCNC: 254 MG/DL (ref 70–99)
GLUCOSE BLDC GLUCOMTR-MCNC: 290 MG/DL (ref 70–99)
GLUCOSE SERPL-MCNC: 255 MG/DL (ref 70–99)
GLUCOSE SERPL-MCNC: 525 MG/DL (ref 70–99)
HBA1C MFR BLD: 10.3 %
HCG SERPL QL: NEGATIVE
HCO3 BLDV-SCNC: 24 MMOL/L (ref 21–28)
HCO3 SERPL-SCNC: 20 MMOL/L (ref 22–29)
HCO3 SERPL-SCNC: 25 MMOL/L (ref 22–29)
HCT VFR BLD AUTO: 40.1 % (ref 35–47)
HGB BLD-MCNC: 14.1 G/DL (ref 11.7–15.7)
IMM GRANULOCYTES # BLD: 0.1 10E3/UL
IMM GRANULOCYTES NFR BLD: 1 %
INR PPP: 1.06 (ref 0.85–1.15)
INTERPRETATION ECG - MUSE: NORMAL
INTERPRETATION ECG - MUSE: NORMAL
LACTATE SERPL-SCNC: 2.2 MMOL/L (ref 0.7–2)
LACTATE SERPL-SCNC: 2.5 MMOL/L (ref 0.7–2)
LACTATE SERPL-SCNC: 6.5 MMOL/L (ref 0.7–2)
LIPASE SERPL-CCNC: 28 U/L (ref 13–60)
LYMPHOCYTES # BLD AUTO: 1.2 10E3/UL (ref 0.8–5.3)
LYMPHOCYTES NFR BLD AUTO: 11 %
MAGNESIUM SERPL-MCNC: 1.5 MG/DL (ref 1.7–2.3)
MCH RBC QN AUTO: 31.4 PG (ref 26.5–33)
MCHC RBC AUTO-ENTMCNC: 35.2 G/DL (ref 31.5–36.5)
MCV RBC AUTO: 89 FL (ref 78–100)
MONOCYTES # BLD AUTO: 0.3 10E3/UL (ref 0–1.3)
MONOCYTES NFR BLD AUTO: 3 %
NEUTROPHILS # BLD AUTO: 9.1 10E3/UL (ref 1.6–8.3)
NEUTROPHILS NFR BLD AUTO: 85 %
NRBC # BLD AUTO: 0 10E3/UL
NRBC BLD AUTO-RTO: 0 /100
O2/TOTAL GAS SETTING VFR VENT: 21 %
OXYHGB MFR BLDV: 91 % (ref 70–75)
P AXIS - MUSE: 68 DEGREES
P AXIS - MUSE: NORMAL DEGREES
PCO2 BLDV: 39 MM HG (ref 40–50)
PH BLDV: 7.39 [PH] (ref 7.32–7.43)
PLATELET # BLD AUTO: 215 10E3/UL (ref 150–450)
PO2 BLDV: 64 MM HG (ref 25–47)
POTASSIUM SERPL-SCNC: 4.3 MMOL/L (ref 3.4–5.3)
POTASSIUM SERPL-SCNC: 4.4 MMOL/L (ref 3.4–5.3)
PR INTERVAL - MUSE: 104 MS
PR INTERVAL - MUSE: 158 MS
PROT SERPL-MCNC: 7.6 G/DL (ref 6.4–8.3)
PROT SERPL-MCNC: 9 G/DL (ref 6.4–8.3)
QRS DURATION - MUSE: 84 MS
QRS DURATION - MUSE: 92 MS
QT - MUSE: 318 MS
QT - MUSE: 496 MS
QTC - MUSE: 412 MS
QTC - MUSE: 662 MS
R AXIS - MUSE: -26 DEGREES
R AXIS - MUSE: -37 DEGREES
RBC # BLD AUTO: 4.49 10E6/UL (ref 3.8–5.2)
SAO2 % BLDV: 92.2 % (ref 70–75)
SODIUM SERPL-SCNC: 135 MMOL/L (ref 135–145)
SODIUM SERPL-SCNC: 136 MMOL/L (ref 135–145)
SYSTOLIC BLOOD PRESSURE - MUSE: NORMAL MMHG
SYSTOLIC BLOOD PRESSURE - MUSE: NORMAL MMHG
T AXIS - MUSE: 45 DEGREES
T AXIS - MUSE: 51 DEGREES
TROPONIN T SERPL HS-MCNC: 6 NG/L
VENTRICULAR RATE- MUSE: 101 BPM
VENTRICULAR RATE- MUSE: 107 BPM
WBC # BLD AUTO: 10.7 10E3/UL (ref 4–11)

## 2024-08-22 PROCEDURE — 99291 CRITICAL CARE FIRST HOUR: CPT | Performed by: FAMILY MEDICINE

## 2024-08-22 PROCEDURE — 84484 ASSAY OF TROPONIN QUANT: CPT | Performed by: FAMILY MEDICINE

## 2024-08-22 PROCEDURE — 258N000003 HC RX IP 258 OP 636: Performed by: FAMILY MEDICINE

## 2024-08-22 PROCEDURE — 93010 ELECTROCARDIOGRAM REPORT: CPT | Performed by: FAMILY MEDICINE

## 2024-08-22 PROCEDURE — 120N000002 HC R&B MED SURG/OB UMMC

## 2024-08-22 PROCEDURE — 36415 COLL VENOUS BLD VENIPUNCTURE: CPT

## 2024-08-22 PROCEDURE — 250N000009 HC RX 250: Performed by: FAMILY MEDICINE

## 2024-08-22 PROCEDURE — 85610 PROTHROMBIN TIME: CPT | Performed by: FAMILY MEDICINE

## 2024-08-22 PROCEDURE — 258N000003 HC RX IP 258 OP 636: Performed by: STUDENT IN AN ORGANIZED HEALTH CARE EDUCATION/TRAINING PROGRAM

## 2024-08-22 PROCEDURE — 84155 ASSAY OF PROTEIN SERUM: CPT

## 2024-08-22 PROCEDURE — 76705 ECHO EXAM OF ABDOMEN: CPT

## 2024-08-22 PROCEDURE — 71046 X-RAY EXAM CHEST 2 VIEWS: CPT

## 2024-08-22 PROCEDURE — 83605 ASSAY OF LACTIC ACID: CPT | Performed by: FAMILY MEDICINE

## 2024-08-22 PROCEDURE — 96372 THER/PROPH/DIAG INJ SC/IM: CPT | Performed by: FAMILY MEDICINE

## 2024-08-22 PROCEDURE — 96361 HYDRATE IV INFUSION ADD-ON: CPT | Performed by: FAMILY MEDICINE

## 2024-08-22 PROCEDURE — 36415 COLL VENOUS BLD VENIPUNCTURE: CPT | Performed by: FAMILY MEDICINE

## 2024-08-22 PROCEDURE — 85025 COMPLETE CBC W/AUTO DIFF WBC: CPT | Performed by: FAMILY MEDICINE

## 2024-08-22 PROCEDURE — 83036 HEMOGLOBIN GLYCOSYLATED A1C: CPT | Performed by: FAMILY MEDICINE

## 2024-08-22 PROCEDURE — 80053 COMPREHEN METABOLIC PANEL: CPT | Performed by: FAMILY MEDICINE

## 2024-08-22 PROCEDURE — 83690 ASSAY OF LIPASE: CPT | Performed by: FAMILY MEDICINE

## 2024-08-22 PROCEDURE — 83605 ASSAY OF LACTIC ACID: CPT

## 2024-08-22 PROCEDURE — 93005 ELECTROCARDIOGRAM TRACING: CPT | Performed by: FAMILY MEDICINE

## 2024-08-22 PROCEDURE — 96375 TX/PRO/DX INJ NEW DRUG ADDON: CPT | Performed by: FAMILY MEDICINE

## 2024-08-22 PROCEDURE — 82805 BLOOD GASES W/O2 SATURATION: CPT | Performed by: FAMILY MEDICINE

## 2024-08-22 PROCEDURE — 84450 TRANSFERASE (AST) (SGOT): CPT

## 2024-08-22 PROCEDURE — 250N000011 HC RX IP 250 OP 636: Performed by: FAMILY MEDICINE

## 2024-08-22 PROCEDURE — 83735 ASSAY OF MAGNESIUM: CPT | Performed by: FAMILY MEDICINE

## 2024-08-22 PROCEDURE — 82010 KETONE BODYS QUAN: CPT

## 2024-08-22 PROCEDURE — 99291 CRITICAL CARE FIRST HOUR: CPT | Mod: 25 | Performed by: FAMILY MEDICINE

## 2024-08-22 PROCEDURE — 96365 THER/PROPH/DIAG IV INF INIT: CPT | Performed by: FAMILY MEDICINE

## 2024-08-22 PROCEDURE — 82962 GLUCOSE BLOOD TEST: CPT

## 2024-08-22 PROCEDURE — 250N000013 HC RX MED GY IP 250 OP 250 PS 637: Performed by: FAMILY MEDICINE

## 2024-08-22 PROCEDURE — 84703 CHORIONIC GONADOTROPIN ASSAY: CPT | Performed by: FAMILY MEDICINE

## 2024-08-22 PROCEDURE — 82010 KETONE BODYS QUAN: CPT | Performed by: FAMILY MEDICINE

## 2024-08-22 RX ORDER — MAGNESIUM HYDROXIDE/ALUMINUM HYDROXICE/SIMETHICONE 120; 1200; 1200 MG/30ML; MG/30ML; MG/30ML
15 SUSPENSION ORAL ONCE
Status: COMPLETED | OUTPATIENT
Start: 2024-08-22 | End: 2024-08-22

## 2024-08-22 RX ORDER — OLANZAPINE 10 MG/2ML
10 INJECTION, POWDER, FOR SOLUTION INTRAMUSCULAR ONCE
Status: COMPLETED | OUTPATIENT
Start: 2024-08-22 | End: 2024-08-22

## 2024-08-22 RX ORDER — LIDOCAINE 40 MG/G
CREAM TOPICAL
Status: DISCONTINUED | OUTPATIENT
Start: 2024-08-22 | End: 2024-08-23 | Stop reason: HOSPADM

## 2024-08-22 RX ORDER — OLANZAPINE 5 MG/1
10 TABLET ORAL AT BEDTIME
Status: DISCONTINUED | OUTPATIENT
Start: 2024-08-22 | End: 2024-08-23 | Stop reason: HOSPADM

## 2024-08-22 RX ORDER — AMOXICILLIN 250 MG
1 CAPSULE ORAL 2 TIMES DAILY PRN
Status: DISCONTINUED | OUTPATIENT
Start: 2024-08-22 | End: 2024-08-23 | Stop reason: HOSPADM

## 2024-08-22 RX ORDER — ACETAMINOPHEN 650 MG/1
650 SUPPOSITORY RECTAL EVERY 4 HOURS PRN
Status: DISCONTINUED | OUTPATIENT
Start: 2024-08-22 | End: 2024-08-23 | Stop reason: HOSPADM

## 2024-08-22 RX ORDER — PROCHLORPERAZINE 25 MG
25 SUPPOSITORY, RECTAL RECTAL EVERY 12 HOURS PRN
Status: DISCONTINUED | OUTPATIENT
Start: 2024-08-22 | End: 2024-08-23 | Stop reason: HOSPADM

## 2024-08-22 RX ORDER — LORAZEPAM 2 MG/ML
0.5 INJECTION INTRAMUSCULAR ONCE
Status: COMPLETED | OUTPATIENT
Start: 2024-08-22 | End: 2024-08-22

## 2024-08-22 RX ORDER — ONDANSETRON 4 MG/1
4 TABLET, ORALLY DISINTEGRATING ORAL EVERY 6 HOURS PRN
Status: DISCONTINUED | OUTPATIENT
Start: 2024-08-22 | End: 2024-08-23 | Stop reason: HOSPADM

## 2024-08-22 RX ORDER — METHADONE HYDROCHLORIDE 10 MG/ML
140 CONCENTRATE ORAL DAILY
Status: DISCONTINUED | OUTPATIENT
Start: 2024-08-23 | End: 2024-08-23 | Stop reason: HOSPADM

## 2024-08-22 RX ORDER — AMOXICILLIN 250 MG
2 CAPSULE ORAL 2 TIMES DAILY PRN
Status: DISCONTINUED | OUTPATIENT
Start: 2024-08-22 | End: 2024-08-23 | Stop reason: HOSPADM

## 2024-08-22 RX ORDER — LOSARTAN POTASSIUM 25 MG/1
25 TABLET ORAL DAILY
Status: DISCONTINUED | OUTPATIENT
Start: 2024-08-23 | End: 2024-08-23 | Stop reason: HOSPADM

## 2024-08-22 RX ORDER — CLONIDINE HYDROCHLORIDE 0.1 MG/1
0.1 TABLET ORAL 2 TIMES DAILY
Status: DISCONTINUED | OUTPATIENT
Start: 2024-08-23 | End: 2024-08-23 | Stop reason: HOSPADM

## 2024-08-22 RX ORDER — DEXTROSE MONOHYDRATE 25 G/50ML
25-50 INJECTION, SOLUTION INTRAVENOUS
Status: DISCONTINUED | OUTPATIENT
Start: 2024-08-22 | End: 2024-08-23 | Stop reason: HOSPADM

## 2024-08-22 RX ORDER — NICOTINE POLACRILEX 4 MG
15-30 LOZENGE BUCCAL
Status: DISCONTINUED | OUTPATIENT
Start: 2024-08-22 | End: 2024-08-23 | Stop reason: HOSPADM

## 2024-08-22 RX ORDER — CALCIUM CARBONATE 500 MG/1
1000 TABLET, CHEWABLE ORAL 4 TIMES DAILY PRN
Status: DISCONTINUED | OUTPATIENT
Start: 2024-08-22 | End: 2024-08-23 | Stop reason: HOSPADM

## 2024-08-22 RX ORDER — LIDOCAINE HYDROCHLORIDE 20 MG/ML
10 SOLUTION OROPHARYNGEAL ONCE
Status: COMPLETED | OUTPATIENT
Start: 2024-08-22 | End: 2024-08-22

## 2024-08-22 RX ORDER — PROCHLORPERAZINE MALEATE 10 MG
10 TABLET ORAL EVERY 6 HOURS PRN
Status: DISCONTINUED | OUTPATIENT
Start: 2024-08-22 | End: 2024-08-23 | Stop reason: HOSPADM

## 2024-08-22 RX ORDER — MAGNESIUM SULFATE HEPTAHYDRATE 40 MG/ML
2 INJECTION, SOLUTION INTRAVENOUS ONCE
Status: COMPLETED | OUTPATIENT
Start: 2024-08-22 | End: 2024-08-22

## 2024-08-22 RX ORDER — ALBUTEROL SULFATE 90 UG/1
2 AEROSOL, METERED RESPIRATORY (INHALATION) EVERY 4 HOURS PRN
Status: DISCONTINUED | OUTPATIENT
Start: 2024-08-22 | End: 2024-08-23 | Stop reason: HOSPADM

## 2024-08-22 RX ORDER — ONDANSETRON 2 MG/ML
4 INJECTION INTRAMUSCULAR; INTRAVENOUS EVERY 6 HOURS PRN
Status: DISCONTINUED | OUTPATIENT
Start: 2024-08-22 | End: 2024-08-23

## 2024-08-22 RX ORDER — ACETAMINOPHEN 325 MG/1
650 TABLET ORAL EVERY 4 HOURS PRN
Status: DISCONTINUED | OUTPATIENT
Start: 2024-08-22 | End: 2024-08-23 | Stop reason: HOSPADM

## 2024-08-22 RX ORDER — SODIUM CHLORIDE, SODIUM LACTATE, POTASSIUM CHLORIDE, CALCIUM CHLORIDE 600; 310; 30; 20 MG/100ML; MG/100ML; MG/100ML; MG/100ML
1000 INJECTION, SOLUTION INTRAVENOUS CONTINUOUS
Status: DISCONTINUED | OUTPATIENT
Start: 2024-08-22 | End: 2024-08-23

## 2024-08-22 RX ADMIN — MAGNESIUM SULFATE HEPTAHYDRATE 2 G: 40 INJECTION, SOLUTION INTRAVENOUS at 13:15

## 2024-08-22 RX ADMIN — LIDOCAINE HYDROCHLORIDE 10 ML: 20 SOLUTION ORAL at 11:38

## 2024-08-22 RX ADMIN — LORAZEPAM 0.5 MG: 2 INJECTION INTRAMUSCULAR; INTRAVENOUS at 11:39

## 2024-08-22 RX ADMIN — PANTOPRAZOLE SODIUM 40 MG: 40 INJECTION, POWDER, FOR SOLUTION INTRAVENOUS at 11:24

## 2024-08-22 RX ADMIN — ALUMINUM HYDROXIDE, MAGNESIUM HYDROXIDE, AND SIMETHICONE 15 ML: 1200; 120; 1200 SUSPENSION ORAL at 11:38

## 2024-08-22 RX ADMIN — FAMOTIDINE 20 MG: 10 INJECTION, SOLUTION INTRAVENOUS at 11:24

## 2024-08-22 RX ADMIN — SODIUM CHLORIDE, POTASSIUM CHLORIDE, SODIUM LACTATE AND CALCIUM CHLORIDE 1000 ML: 600; 310; 30; 20 INJECTION, SOLUTION INTRAVENOUS at 14:10

## 2024-08-22 RX ADMIN — OLANZAPINE 10 MG: 10 INJECTION, POWDER, FOR SOLUTION INTRAMUSCULAR at 13:14

## 2024-08-22 RX ADMIN — SODIUM CHLORIDE, POTASSIUM CHLORIDE, SODIUM LACTATE AND CALCIUM CHLORIDE 1000 ML: 600; 310; 30; 20 INJECTION, SOLUTION INTRAVENOUS at 11:23

## 2024-08-22 RX ADMIN — SODIUM CHLORIDE, POTASSIUM CHLORIDE, SODIUM LACTATE AND CALCIUM CHLORIDE 1000 ML: 600; 310; 30; 20 INJECTION, SOLUTION INTRAVENOUS at 22:07

## 2024-08-22 ASSESSMENT — ACTIVITIES OF DAILY LIVING (ADL)
ADLS_ACUITY_SCORE: 37
ADLS_ACUITY_SCORE: 37
ADLS_ACUITY_SCORE: 22
ADLS_ACUITY_SCORE: 22
ADLS_ACUITY_SCORE: 37
ADLS_ACUITY_SCORE: 22
ADLS_ACUITY_SCORE: 37
ADLS_ACUITY_SCORE: 37

## 2024-08-22 NOTE — ED PROVIDER NOTES
ED Provider Note  [facility]      History     Chief Complaint   Patient presents with    Hyperemesis     Patient having cyclic vomiting, has history of it. Does not have any Zyprexa at home and that is what she usually takes.      HPI  Yun Coyle is a 48 year old female with a PMH of hyperemesis cannabinoid syndrome that presents to the ED in the setting of cyclical vomiting that's been going on for the past 24 hours. She reports experiencing chest pain when she vomits. Hasn't noticed bile or blood in her vomit. Doesn't endorse flushed skin or pruritis. Rates her chest pain 6-7/10. Endorses abdominal pain, localizes it to her epigastrium. Hasn't been able to keep food and fluid down. Hasn't tried anything to help with her nausea. She usually takes Zyprexa, prescribed for cyclical vomiting,, but ran out. Last Last she used marijuana was roughly 3 weeks ago. Hasn't been experiencing any cravings. Doesn't endorse SOB, rhinorrhea, or congestion. No HA, dizziness, or numbness, tingling. Took metformin yesterday evening. Hasn't been checking her BG.     Past Medical History  Past Medical History:   Diagnosis Date    Acute pancreatitis 06/29/2017    Anxiety     Cauda equina syndrome (H) 03/29/2013    Cyst of right ovary 11/16/2017    Depressive disorder     Gastroesophageal reflux disease     Hypertension     Hyponatremia 10/01/2023    Low back pain 08/20/2012    Overview:   History of cauda equina and surgery.      Type 2 diabetes mellitus (H)     Uncomplicated asthma      Past Surgical History:   Procedure Laterality Date    BACK SURGERY      x2    CHOLECYSTECTOMY      ORTHOPEDIC SURGERY      SALPINGO OOPHORECTOMY,R/L/ELAD Right      No current outpatient medications on file.    No Known Allergies  Family History  Family History   Problem Relation Age of Onset    Breast Cancer Mother         in her 50s.    Diabetes Mother     Breast Cancer Maternal Grandmother         in her 40s.     "Diabetes Maternal Grandmother      Social History   Social History     Tobacco Use    Smoking status: Never    Smokeless tobacco: Never   Vaping Use    Vaping status: Never Used   Substance Use Topics    Alcohol use: No    Drug use: Yes     Types: Marijuana     Comment: pt was taking Oxycontin \"years ago.\" pt is now on Methadone.      A medically appropriate review of systems was performed with pertinent positives and negatives noted in the HPI, and all other systems negative.    Physical Exam   BP: (!) 167/103  Pulse: 112  Temp: 98.3  F (36.8  C)  Resp: 18  SpO2: 98 %  Physical Exam  Vitals and nursing note reviewed.   Constitutional:       General: She is in acute distress.      Appearance: Normal appearance. She is well-developed. She is ill-appearing. She is not toxic-appearing.   HENT:      Head: Normocephalic and atraumatic.      Nose: Nose normal.      Mouth/Throat:      Mouth: Mucous membranes are dry.      Pharynx: Oropharynx is clear.   Eyes:      General: No scleral icterus.     Extraocular Movements: Extraocular movements intact.      Conjunctiva/sclera: Conjunctivae normal.      Pupils: Pupils are equal, round, and reactive to light.   Cardiovascular:      Rate and Rhythm: Tachycardia present.      Pulses: Normal pulses.      Heart sounds: Normal heart sounds. No murmur heard.     No gallop.   Pulmonary:      Effort: Pulmonary effort is normal. No respiratory distress.      Breath sounds: Normal breath sounds. No wheezing or rales.   Abdominal:      General: Abdomen is flat. Bowel sounds are normal. There is no distension.      Palpations: Abdomen is soft. There is no mass.      Tenderness: There is abdominal tenderness. There is no right CVA tenderness, left CVA tenderness or guarding.      Comments: -Localized to the epigastrium. Coffee ground emesis noted.   Musculoskeletal:      Cervical back: Normal range of motion and neck supple.   Skin:     General: Skin is warm and dry.      Capillary Refill: " Capillary refill takes less than 2 seconds.      Coloration: Skin is not jaundiced or pale.      Findings: No rash.   Neurological:      General: No focal deficit present.      Mental Status: She is alert and oriented to person, place, and time. Mental status is at baseline.   Psychiatric:         Mood and Affect: Mood normal.         Thought Content: Thought content normal.      Comments: Patient anxious here in the ER but not delusional cooperative       ED Course, Procedures, & Data      Records reviewed in [facility].  Patient admitted to hospital for similar symptoms back in April of this to the family medicine service.  Labs reviewed medications reviewed etc.    In the ER and IV established.  Patient received lactated ringer boluses x 2 in the ER.  Along with continuous infusion.  Patient had received Inapsine initially by paramedics we did do an EKG initial EKG showed a QTc of 667 therefore we did not give any more typical antiemetics etc. we held off on the Zyprexa we gave Ativan after talk to pharmacy half milligram slightly improved.  EKG otherwise did not show any ischemic changes.  We repeated one later which then showed a normal QTc of 428.  Zyprexa 10 mg IM was given after that.  Venous gas 7.39.  pCO2 of 39 pO2 of 64.  Lactic acid initially 6.5.  After fluids decreased down to 2.2.  Ketones 1.28.  Sodium 136 potassium 4.4.  Bicarb is 20 gap 24.  Glucose was 525.  After 2 L of fluid down to 200 approximately.  LFT elevation with alk phos 172 AST of 161  total bilirubin 0.6.  Lipase normal at 28.  Troponin normal.  Hemoglobin A1c 10.3.  Pregnancy test negative.  Magnesium 1.5  White count 10.7 hemoglobin 14.1.    Right upper quadrant ultrasound did not show any significant findings no complex stone etc.  Patient had chest x-ray PA lateral interpreted by myself is negative for any pneumothorax effusion free air etc.    Patient was received a GI cocktail along with Protonix and Pepcid IV initially in the  ER.  Feeling better.  No history of varices etc.  Patient feeling better also.    As noted in the ER patient did receive a dose of Zyprexa IM also.  Improved reassessed and doing better no recurrent bleeding.  It should be noted Gastroccult positive coffee-ground emesis noted in the ER.  Hemoglobin stable.  At this point I talked to medicine and they will except the patient will plan to have the patient be managed by the standard medicine service as he did earlier this year.  Comparing previous labs are very similar to this today also.        Procedures            EKG Interpretation:      Interpreted by Mendoza Campos MD  Time reviewed: 1103  Symptoms at time of EKG: Chest pain nausea vomiting  Rhythm: normal sinus tachycardia  Rate: normal 107  Axis: normal  Ectopy: none  Conduction: QTc prolonged at 662.  ST Segments/ T Waves: No ST-T wave changes  Q Waves: none  Comparison to prior: Change from previous    Clinical Impression: Sinus tachycardia with prolonged QTc    The Lactic acid level is elevated due to dehydration with ketosis possible DKA versus dehydration., at this time there is no sign of severe sepsis or septic shock.       Results for orders placed or performed during the hospital encounter of 08/22/24   XR Chest 2 Views     Status: None    Narrative    CHEST TWO VIEWS  8/22/2024 12:45 PM     HISTORY:  Chest pain. Nausea. Vomiting.    COMPARISON: 7/1/2024.      Impression    IMPRESSION: Negative chest. Lungs clear. Cholecystectomy.    YAMILETH HEREDIA MD         SYSTEM ID:  [id number]   US Abdomen Limited     Status: None    Narrative    US ABDOMEN LIMITED 8/22/2024 12:37 PM    CLINICAL HISTORY: Abdominal pain. Nausea and vomiting. Elevated liver  function tests.  TECHNIQUE: Limited abdominal ultrasound.  COMPARISON: 4/13/2024.    FINDINGS:  GALLBLADDER: Cholecystectomy.    BILE DUCTS: There is no biliary dilatation. The common duct measures 6  mm. Portions of the common duct could not be visualized  due to  overlying bowel gas.    LIVER: Mild hepatic steatosis. No focal hepatic masses.    RIGHT KIDNEY: Unremarkable. No hydronephrosis.    PANCREAS: The visualized portions of the pancreas are normal.    No ascites.      Impression    IMPRESSION:  1.  Mild hepatic steatosis.  2.  Cholecystectomy.    YAMILETH HEREDIA MD         SYSTEM ID:  [id number]   INR     Status: Normal   Result Value Ref Range    INR 1.06 0.85 - 1.15   Comprehensive metabolic panel     Status: Abnormal   Result Value Ref Range    Sodium 136 135 - 145 mmol/L    Potassium 4.4 3.4 - 5.3 mmol/L    Carbon Dioxide (CO2) 20 (L) 22 - 29 mmol/L    Anion Gap 24 (H) 7 - 15 mmol/L    Urea Nitrogen 14.2 6.0 - 20.0 mg/dL    Creatinine 0.79 0.51 - 0.95 mg/dL    GFR Estimate >90 >60 mL/min/1.73m2    Calcium 10.3 8.8 - 10.4 mg/dL    Chloride 92 (L) 98 - 107 mmol/L    Glucose 525 (HH) 70 - 99 mg/dL    Alkaline Phosphatase 172 (H) 40 - 150 U/L     (H) 0 - 45 U/L     (H) 0 - 50 U/L    Protein Total 9.0 (H) 6.4 - 8.3 g/dL    Albumin 4.5 3.5 - 5.2 g/dL    Bilirubin Total 0.6 <=1.2 mg/dL   Magnesium     Status: Abnormal   Result Value Ref Range    Magnesium 1.5 (L) 1.7 - 2.3 mg/dL   Lipase     Status: Normal   Result Value Ref Range    Lipase 28 13 - 60 U/L   Troponin T, High Sensitivity     Status: Normal   Result Value Ref Range    Troponin T, High Sensitivity 6 <=14 ng/L   HCG qualitative Blood     Status: Normal   Result Value Ref Range    hCG Serum Qualitative Negative Negative   Ketone Beta-Hydroxybutyrate Quantitative     Status: Abnormal   Result Value Ref Range    Ketone (Beta-Hydroxybutyrate) Quantitative 1.28 (H) <=0.30 mmol/L   Hemoglobin A1c     Status: Abnormal   Result Value Ref Range    Hemoglobin A1C 10.3 (H) <5.7 %   CBC with platelets and differential     Status: Abnormal   Result Value Ref Range    WBC Count 10.7 4.0 - 11.0 10e3/uL    RBC Count 4.49 3.80 - 5.20 10e6/uL    Hemoglobin 14.1 11.7 - 15.7 g/dL    Hematocrit 40.1 35.0 -  47.0 %    MCV 89 78 - 100 fL    MCH 31.4 26.5 - 33.0 pg    MCHC 35.2 31.5 - 36.5 g/dL    RDW 13.0 10.0 - 15.0 %    Platelet Count 215 150 - 450 10e3/uL    % Neutrophils 85 %    % Lymphocytes 11 %    % Monocytes 3 %    % Eosinophils 0 %    % Basophils 0 %    % Immature Granulocytes 1 %    NRBCs per 100 WBC 0 <1 /100    Absolute Neutrophils 9.1 (H) 1.6 - 8.3 10e3/uL    Absolute Lymphocytes 1.2 0.8 - 5.3 10e3/uL    Absolute Monocytes 0.3 0.0 - 1.3 10e3/uL    Absolute Eosinophils 0.0 0.0 - 0.7 10e3/uL    Absolute Basophils 0.0 0.0 - 0.2 10e3/uL    Absolute Immature Granulocytes 0.1 <=0.4 10e3/uL    Absolute NRBCs 0.0 10e3/uL   Blood gas venous     Status: Abnormal   Result Value Ref Range    pH Venous 7.39 7.32 - 7.43    pCO2 Venous 39 (L) 40 - 50 mm Hg    pO2 Venous 64 (H) 25 - 47 mm Hg    Bicarbonate Venous 24 21 - 28 mmol/L    Base Excess/Deficit Venous -1.3 -3.0 - 3.0 mmol/L    FIO2 21     Oxyhemoglobin Venous 91 (H) 70 - 75 %    O2 Sat, Venous 92.2 (H) 70.0 - 75.0 %    Lactic Acid 6.5 (HH) 0.7 - 2.0 mmol/L    Narrative    In healthy individuals, oxyhemoglobin (O2Hb) and oxygen saturation (SO2) are approximately equal. In the presence of dyshemoglobins, oxyhemoglobin can be considerably lower than oxygen saturation.   Lactic acid whole blood     Status: Abnormal   Result Value Ref Range    Lactic Acid 2.2 (H) 0.7 - 2.0 mmol/L   Glucose by meter     Status: Abnormal   Result Value Ref Range    GLUCOSE BY METER POCT 290 (H) 70 - 99 mg/dL   EKG 12-lead, tracing only     Status: None (Preliminary result)   Result Value Ref Range    Systolic Blood Pressure  mmHg    Diastolic Blood Pressure  mmHg    Ventricular Rate 107 BPM    Atrial Rate 107 BPM    SD Interval 104 ms    QRS Duration 84 ms     ms    QTc 662 ms    P Axis  degrees    R AXIS -37 degrees    T Axis 51 degrees    Interpretation ECG       Sinus tachycardia with short SD  Left axis deviation  Nonspecific ST and T wave abnormality  Prolonged QT  Abnormal  ECG     EKG 12 lead     Status: None (Preliminary result)   Result Value Ref Range    Systolic Blood Pressure  mmHg    Diastolic Blood Pressure  mmHg    Ventricular Rate 101 BPM    Atrial Rate 101 BPM    CO Interval 158 ms    QRS Duration 92 ms     ms    QTc 412 ms    P Axis 68 degrees    R AXIS -26 degrees    T Axis 45 degrees    Interpretation ECG Sinus tachycardia  Otherwise normal ECG      CBC with platelets differential     Status: Abnormal    Narrative    The following orders were created for panel order CBC with platelets differential.  Procedure                               Abnormality         Status                     ---------                               -----------         ------                     CBC with platelets and d...[[id number]]  Abnormal            Final result                 Please view results for these tests on the individual orders.     Medications   lidocaine 1 % 0.1-1 mL (has no administration in time range)   lidocaine (LMX4) cream (has no administration in time range)   sodium chloride (PF) 0.9% PF flush 3 mL (3 mLs Intracatheter Not Given 8/22/24 1319)   sodium chloride (PF) 0.9% PF flush 3 mL (has no administration in time range)   lactated ringers infusion 1,000 mL ( Intravenous Rate/Dose Verify 8/22/24 1648)   lidocaine 1 % 0.1-1 mL (has no administration in time range)   lidocaine (LMX4) cream (has no administration in time range)   sodium chloride (PF) 0.9% PF flush 3 mL (has no administration in time range)   sodium chloride (PF) 0.9% PF flush 3 mL (has no administration in time range)   senna-docusate (SENOKOT-S/PERICOLACE) 8.6-50 MG per tablet 1 tablet (has no administration in time range)     Or   senna-docusate (SENOKOT-S/PERICOLACE) 8.6-50 MG per tablet 2 tablet (has no administration in time range)   calcium carbonate (TUMS) chewable tablet 1,000 mg (has no administration in time range)   acetaminophen (TYLENOL) tablet 650 mg (has no administration in time range)      Or   acetaminophen (TYLENOL) Suppository 650 mg (has no administration in time range)   ondansetron (ZOFRAN ODT) ODT tab 4 mg (has no administration in time range)     Or   ondansetron (ZOFRAN) injection 4 mg (has no administration in time range)   prochlorperazine (COMPAZINE) injection 10 mg (has no administration in time range)     Or   prochlorperazine (COMPAZINE) tablet 10 mg (has no administration in time range)     Or   prochlorperazine (COMPAZINE) suppository 25 mg (has no administration in time range)   famotidine (PEPCID) injection 20 mg (20 mg Intravenous $Given 8/22/24 1124)   pantoprazole (PROTONIX) IV push injection 40 mg (40 mg Intravenous $Given 8/22/24 1124)   lactated ringers BOLUS 1,000 mL (0 mLs Intravenous Stopped 8/22/24 1410)   LORazepam (ATIVAN) injection 0.5 mg (0.5 mg Intravenous $Given 8/22/24 1139)   alum & mag hydroxide-simethicone (MAALOX) suspension 15 mL (15 mLs Oral $Given 8/22/24 1138)   lidocaine (viscous) (XYLOCAINE) 2 % solution 10 mL (10 mLs Mouth/Throat $Given 8/22/24 1138)   magnesium sulfate 2 g in 50 mL sterile water intermittent infusion (0 g Intravenous Stopped 8/22/24 1410)   OLANZapine (zyPREXA) injection 10 mg (10 mg Intramuscular $Given 8/22/24 1314)     Labs Ordered and Resulted from Time of ED Arrival to Time of ED Departure   COMPREHENSIVE METABOLIC PANEL - Abnormal       Result Value    Sodium 136      Potassium 4.4      Carbon Dioxide (CO2) 20 (*)     Anion Gap 24 (*)     Urea Nitrogen 14.2      Creatinine 0.79      GFR Estimate >90      Calcium 10.3      Chloride 92 (*)     Glucose 525 (*)     Alkaline Phosphatase 172 (*)      (*)      (*)     Protein Total 9.0 (*)     Albumin 4.5      Bilirubin Total 0.6     MAGNESIUM - Abnormal    Magnesium 1.5 (*)    KETONE BETA-HYDROXYBUTYRATE QUANTITATIVE, RAPID - Abnormal    Ketone (Beta-Hydroxybutyrate) Quantitative 1.28 (*)    HEMOGLOBIN A1C - Abnormal    Hemoglobin A1C 10.3 (*)    CBC WITH PLATELETS AND  DIFFERENTIAL - Abnormal    WBC Count 10.7      RBC Count 4.49      Hemoglobin 14.1      Hematocrit 40.1      MCV 89      MCH 31.4      MCHC 35.2      RDW 13.0      Platelet Count 215      % Neutrophils 85      % Lymphocytes 11      % Monocytes 3      % Eosinophils 0      % Basophils 0      % Immature Granulocytes 1      NRBCs per 100 WBC 0      Absolute Neutrophils 9.1 (*)     Absolute Lymphocytes 1.2      Absolute Monocytes 0.3      Absolute Eosinophils 0.0      Absolute Basophils 0.0      Absolute Immature Granulocytes 0.1      Absolute NRBCs 0.0     BLOOD GAS VENOUS - Abnormal    pH Venous 7.39      pCO2 Venous 39 (*)     pO2 Venous 64 (*)     Bicarbonate Venous 24      Base Excess/Deficit Venous -1.3      FIO2 21      Oxyhemoglobin Venous 91 (*)     O2 Sat, Venous 92.2 (*)     Lactic Acid 6.5 (*)    LACTIC ACID WHOLE BLOOD - Abnormal    Lactic Acid 2.2 (*)    GLUCOSE BY METER - Abnormal    GLUCOSE BY METER POCT 290 (*)    INR - Normal    INR 1.06     LIPASE - Normal    Lipase 28     TROPONIN T, HIGH SENSITIVITY - Normal    Troponin T, High Sensitivity 6     HCG QUALITATIVE PREGNANCY - Normal    hCG Serum Qualitative Negative     ROUTINE UA WITH MICROSCOPIC REFLEX TO CULTURE   LACTIC ACID WHOLE BLOOD     XR Chest 2 Views   Final Result   IMPRESSION: Negative chest. Lungs clear. Cholecystectomy.      YAMILETH HEREDIA MD            SYSTEM ID:  [id number]      US Abdomen Limited   Final Result   IMPRESSION:   1.  Mild hepatic steatosis.   2.  Cholecystectomy.      YAMILETH HEREDIA MD            SYSTEM ID:  [id number]             Critical Care Addendum  My initial assessment, based on my review of prehospital provider report, review of nursing observations, review of vital signs, focused history, physical exam, review of cardiac rhythm monitor, 12 lead ECG analysis, discussion with medicine and pharmacy, and interpretation of labs EKG response to treatment , established a high suspicion that Yun Coyle has  severe agitation and a high risk electrolyte abnormality, which requires immediate intervention, and therefore she is critically ill.     After the initial assessment, the care team initiated multiple lab tests, initiated IV fluid administration, initiated medication therapy with aggressive IV fluids along with Ativan IV along with Zyprexa IM along with Protonix and Pepcid IV, and consulted with medicine pharmacy  to provide stabilization care. Due to the critical nature of this patient, I reassessed nursing observations, vital signs, physical exam, review of cardiac rhythm monitor, 12 lead ECG analysis, interpretation of x-rays labs ultrasound report imaging response to treatment, mental status, neurologic status, and respiratory status multiple times prior to her disposition.     Time also spent performing documentation, reviewing test results, discussion with consultants, and coordination of care.     Critical care time (excluding teaching time and procedures): 90 minutes.       Assessment & Plan    Yun Coyle is a 48 year old female with a PMH of hyperemesis cannabinoid syndrome that presents to the ED in the setting of cyclical vomiting with nausea. Ddx includes but not limited to acute exacerbation of hyperemesis cannabinoid syndrome, acute pancreatitis, ACS, boerhaave, ACS, pregnancy, and acute worsening of her T2DM. On presentation, she's hypertensive and tachycardic.   Work-up included, CBC, CMP, tropes, lipase, Hgb A1c, ketones, UA, UDS, EKG, and CXR. CBC was unremarkable. EKG showed prolonged QTc, thus ativan was started in the setting of ongoing nausea. LR was given. Slight hematemesis noted, thus started on Protonix and GI cocktail.     Less of a concern for acute pancreatitis given nl lipase. EKG repeated, Qtc interval w/in nl. Zyprexa given. Additionally, liver enzymes (AST, ALT, ALK) were elevated, RUQ US obtained. Results show mild hepatic steatosis with cholecystectomy.  With elevated BG  (525) along with an anion gap, VBG obtained, which showed lactic acidosis (~6.5). These results are consistent with severe cyclical vomiting 2/2 to hyperemesis cannabinoid syndrome. Reassuringly, she responded well to LR and Zyprexa as she's no longer hypertensive and tachycardic with reduced nausea. Another bolus of LR given. There was a reduction seen in repeat lactic acid labs (6.5 to 2.2). There was also reduction seen in BG (~290). Furthermore, while her cyclical vomiting has improved, there's ongoing concern for hematemesis vs continued worsening of T2DM. Further management will be needed, request to inpatient medicine placed.     I have reviewed the nursing notes. I have reviewed the findings, diagnosis, plan and need for follow up with the patient.      --    ED Attending Physician Attestation    I Mendoza Campos MD, cared for this patient with the Medical Student. I performed, or re-performed, the physical exam and medical decision-making. I have verified the accuracy of all the medical student findings and documentation above, and have edited as necessary.    Summary of HPI, PE, ED Course   Patient is a 48 year old female evaluated in the emergency department for cyclical vomiting with diabetes with elevated blood sugars with elevated ketones and lactic acid elevation with dehydration. Exam and ED course notable for IV fluids treatment with Ativan initial QTc prolonged repeat normal given Zyprexa IM after that.  Lactic acid and blood glucose improved with fluids.  Patient treated with H2 and PPI blockers.  Had some hematemesis but no recurrent hemoglobin stable.. After the completion of care in the emergency department, the patient was admitted to inpatient.      Mendoza Campos MD  Emergency Medicine     Current Discharge Medication List          Final diagnoses:   Cyclical vomiting   Coffee ground emesis   Epigastric pain   Type 2 diabetes mellitus with hyperglycemia, with long-term current use of  insulin (H)   Lactic acidosis   Elevated blood ketone body level   Dehydration       Mendoza Campos  [facility] ED EMERGENCY DEPARTMENT  8/22/2024    This note was created at least in part by the use of dragon voice dictation system. Inadvertent typographical errors may still exist.  Mendoza Campos MD.  Patient evaluated in the emergency department during the COVID-19 pandemic period. Careful attention to patients safety was addressed throughout the evaluation. Evaluation and treatment management was initiated with disposition made efficiently and appropriate as possible to minimize any risk of potential exposure to patient during this evaluation.       Mendoza Campos MD  08/22/24 2007

## 2024-08-22 NOTE — PHARMACY-ADMISSION MEDICATION HISTORY
Pharmacist Admission Medication History    Admission medication history is complete. The information provided in this note is only as accurate as the sources available at the time of the update.    Information Source(s): Patient and Clinic records via in-person    Pertinent Information: none    Changes made to PTA medication list:  Added: None  Deleted: Symbicort, Lantus, blood glucose testing supplies (patient reports they no longer test blood glucose at home)   Changed: None    Allergies reviewed with patient and updates made in EHR: yes    Medication History Completed By: Yoana Valentine [facility] 8/22/2024 6:59 PM    PTA Med List   Medication Sig Last Dose    albuterol (PROAIR HFA/PROVENTIL HFA/VENTOLIN HFA) 108 (90 Base) MCG/ACT inhaler Inhale 2 puffs into the lungs every 4 hours as needed for shortness of breath or wheezing Past Month    cloNIDine (CATAPRES) 0.1 MG tablet Take 1 tablet (0.1 mg) by mouth 2 times daily 8/20/2024    losartan (COZAAR) 25 MG tablet Take 1 tablet (25 mg) by mouth daily 8/20/2024    metFORMIN (GLUCOPHAGE) 500 MG tablet Take 2 tablets (1,000 mg) by mouth 2 times daily (with meals) 8/20/2024    methadone (DOLOPHINE-INTENSOL) 10 MG/ML (HIGH CONC) solution Take 140 mg by mouth daily 8/21/2024    OLANZapine (ZYPREXA) 10 MG tablet Take 1 tablet (10 mg) by mouth at bedtime Past Week    predniSONE (DELTASONE) 20 MG tablet Take two tablets (= 40mg) each day for 5 (five) days if in asthma exacerbation. More than a month    prochlorperazine (COMPAZINE) 10 MG tablet Take 1 tablet (10 mg) by mouth every 6 hours as needed for nausea or vomiting Past Month    prochlorperazine (COMPAZINE) 25 MG suppository Place 1 suppository (25 mg) rectally every 12 hours as needed for nausea or vomiting Past Month

## 2024-08-22 NOTE — ED TRIAGE NOTES
Patient brought in by EMS from home. Patient called to be brought in. Patient has been having hyperemesis for 24 hours. Patient had chest pain but has since passed, EMS did a 12 lead. Patient has been nauseous and vomiting. Patient does not have Zyprexa at home and that is what she usually uses. Patient given 2.5mg droperidol and 25 mg IV benadryl en route by EMS. Patient has an 18 g L forearm.      en route. Patient is diabetic, usually on metformin, has not taken due to the vomiting.

## 2024-08-22 NOTE — ED NOTES
[facility]   ED Nurse to Floor Handoff     Yun Coyle is a 48 year old female who speaks English and lives alone,  in a home  They arrived in the ED by EMS from home    ED Chief Complaint: Hyperemesis (Patient having cyclic vomiting, has history of it. Does not have any Zyprexa at home and that is what she usually takes. )    ED Dx;   Final diagnoses:   Cyclical vomiting   Coffee ground emesis   Epigastric pain   Type 2 diabetes mellitus with hyperglycemia, with long-term current use of insulin (H)   Lactic acidosis   Elevated blood ketone body level   Dehydration         Needed?: No    Allergies: No Known Allergies.  Past Medical Hx:   Past Medical History:   Diagnosis Date    Acute pancreatitis 06/29/2017    Anxiety     Cauda equina syndrome (H) 03/29/2013    Cyst of right ovary 11/16/2017    Depressive disorder     Gastroesophageal reflux disease     Hypertension     Hyponatremia 10/01/2023    Low back pain 08/20/2012    Overview:   History of cauda equina and surgery.      Type 2 diabetes mellitus (H)     Uncomplicated asthma       Baseline Mental status: WDL  Current Mental Status changes: at basesline    Infection present or suspected this encounter: no  Sepsis suspected: No  Isolation type: No active isolations  Patient tested for COVID 19 prior to admission: NO     Activity level - Baseline/Home:  Independent  Activity Level - Current:   Independent    Bariatric equipment needed?: No    In the ED these meds were given:   Medications   lidocaine 1 % 0.1-1 mL (has no administration in time range)   lidocaine (LMX4) cream (has no administration in time range)   sodium chloride (PF) 0.9% PF flush 3 mL (3 mLs Intracatheter Not Given 8/22/24 1319)   sodium chloride (PF) 0.9% PF flush 3 mL (has no administration in time range)   lactated ringers infusion 1,000 mL ( Intravenous Rate/Dose Verify 8/22/24 5188)   famotidine (PEPCID) injection 20 mg (20 mg  Intravenous $Given 8/22/24 1124)   pantoprazole (PROTONIX) IV push injection 40 mg (40 mg Intravenous $Given 8/22/24 1124)   lactated ringers BOLUS 1,000 mL (0 mLs Intravenous Stopped 8/22/24 1410)   LORazepam (ATIVAN) injection 0.5 mg (0.5 mg Intravenous $Given 8/22/24 1139)   alum & mag hydroxide-simethicone (MAALOX) suspension 15 mL (15 mLs Oral $Given 8/22/24 1138)   lidocaine (viscous) (XYLOCAINE) 2 % solution 10 mL (10 mLs Mouth/Throat $Given 8/22/24 1138)   magnesium sulfate 2 g in 50 mL sterile water intermittent infusion (0 g Intravenous Stopped 8/22/24 1410)   OLANZapine (zyPREXA) injection 10 mg (10 mg Intramuscular $Given 8/22/24 1314)       Drips running?  Yes    Home pump  No    Current LDAs  Peripheral IV 08/22/24 Anterior;Distal;Left Lower forearm (Active)   Number of days: 0       Labs results:   Labs Ordered and Resulted from Time of ED Arrival to Time of ED Departure   COMPREHENSIVE METABOLIC PANEL - Abnormal       Result Value    Sodium 136      Potassium 4.4      Carbon Dioxide (CO2) 20 (*)     Anion Gap 24 (*)     Urea Nitrogen 14.2      Creatinine 0.79      GFR Estimate >90      Calcium 10.3      Chloride 92 (*)     Glucose 525 (*)     Alkaline Phosphatase 172 (*)      (*)      (*)     Protein Total 9.0 (*)     Albumin 4.5      Bilirubin Total 0.6     MAGNESIUM - Abnormal    Magnesium 1.5 (*)    KETONE BETA-HYDROXYBUTYRATE QUANTITATIVE, RAPID - Abnormal    Ketone (Beta-Hydroxybutyrate) Quantitative 1.28 (*)    HEMOGLOBIN A1C - Abnormal    Hemoglobin A1C 10.3 (*)    CBC WITH PLATELETS AND DIFFERENTIAL - Abnormal    WBC Count 10.7      RBC Count 4.49      Hemoglobin 14.1      Hematocrit 40.1      MCV 89      MCH 31.4      MCHC 35.2      RDW 13.0      Platelet Count 215      % Neutrophils 85      % Lymphocytes 11      % Monocytes 3      % Eosinophils 0      % Basophils 0      % Immature Granulocytes 1      NRBCs per 100 WBC 0      Absolute Neutrophils 9.1 (*)     Absolute  Lymphocytes 1.2      Absolute Monocytes 0.3      Absolute Eosinophils 0.0      Absolute Basophils 0.0      Absolute Immature Granulocytes 0.1      Absolute NRBCs 0.0     BLOOD GAS VENOUS - Abnormal    pH Venous 7.39      pCO2 Venous 39 (*)     pO2 Venous 64 (*)     Bicarbonate Venous 24      Base Excess/Deficit Venous -1.3      FIO2 21      Oxyhemoglobin Venous 91 (*)     O2 Sat, Venous 92.2 (*)     Lactic Acid 6.5 (*)    LACTIC ACID WHOLE BLOOD - Abnormal    Lactic Acid 2.2 (*)    GLUCOSE BY METER - Abnormal    GLUCOSE BY METER POCT 290 (*)    INR - Normal    INR 1.06     LIPASE - Normal    Lipase 28     TROPONIN T, HIGH SENSITIVITY - Normal    Troponin T, High Sensitivity 6     HCG QUALITATIVE PREGNANCY - Normal    hCG Serum Qualitative Negative     ROUTINE UA WITH MICROSCOPIC REFLEX TO CULTURE   LACTIC ACID WHOLE BLOOD       Imaging Studies:   Recent Results (from the past 24 hour(s))   US Abdomen Limited    Narrative    US ABDOMEN LIMITED 8/22/2024 12:37 PM    CLINICAL HISTORY: Abdominal pain. Nausea and vomiting. Elevated liver  function tests.  TECHNIQUE: Limited abdominal ultrasound.  COMPARISON: 4/13/2024.    FINDINGS:  GALLBLADDER: Cholecystectomy.    BILE DUCTS: There is no biliary dilatation. The common duct measures 6  mm. Portions of the common duct could not be visualized due to  overlying bowel gas.    LIVER: Mild hepatic steatosis. No focal hepatic masses.    RIGHT KIDNEY: Unremarkable. No hydronephrosis.    PANCREAS: The visualized portions of the pancreas are normal.    No ascites.      Impression    IMPRESSION:  1.  Mild hepatic steatosis.  2.  Cholecystectomy.    YAMILETH HEREDIA MD         SYSTEM ID:  [id number]   XR Chest 2 Views    Narrative    CHEST TWO VIEWS  8/22/2024 12:45 PM     HISTORY:  Chest pain. Nausea. Vomiting.    COMPARISON: 7/1/2024.      Impression    IMPRESSION: Negative chest. Lungs clear. Cholecystectomy.    YAMILETH HEREDIA MD         SYSTEM ID:  [id number]       Recent vital  signs:   BP (!) 159/105   Pulse 84   Temp 98.3  F (36.8  C) (Oral)   Resp 16   LMP  (LMP Unknown)   SpO2 99%             Cardiac Rhythm: Normal Sinus  Pt needs tele? Yes  Skin/wound Issues: None    Code Status: Full Code    Pain control: fair    Nausea control: good    Abnormal labs/tests/findings requiring intervention: Prolonged QT, elevated BG    Family present during ED course? No   Family Comments/Social Situation comments:     Tasks needing completion: None    Jess Rouse, RN    [phone] [facility]

## 2024-08-22 NOTE — PHARMACY-CONSULT NOTE
Opioid Treatment Program ([facility]one [facility]) Information Note      Treatment program name: [facility]   Location ([facility]): [facility]   Phone number: [phone]              Spoke with: Kirstin      Patient's current methadone dose verified as: 140 mg PO daily   Last dose was administered on: 8/21/2024   Take-home dose information (if applicable): none      Note(s): Treatment programs in [place] dispense methadone using the 10 mg/ml oral concentrate.       Alert-The patient is alert, awake and responds to voice. The patient is oriented to time, place, and person. The triage nurse is able to obtain subjective information.

## 2024-08-22 NOTE — ED NOTES
Bed: ED04  Expected date: 8/22/24  Expected time: 9:59 AM  Means of arrival: Ambulance  Comments:  [facility] 49yo female, vomiting

## 2024-08-22 NOTE — H&P
[facility]    History and Physical - [facility]       Date of Admission:  8/22/2024    Assessment & Plan        Yun Coyle is a 48 year old female admitted on 4/20/2024. She has a past medical history of pancreatitis, depression, cauda equina, anxiety, hypertension, type 2 diabetes, asthma, cyclic vomiting syndrome, cannabis abuse, hyperlipidemia, OCD, opiate use disorder (on methadone), hepatitis C, and frequent falls who is being admitted for lactic acidosis and electrolyte abnormalities secondary to cyclical vomiting syndrome.     # Cyclic vomiting syndrome   # Lactic acidosis, improving   # Transaminitis  # History of pancreatitis  Currently tolerating PO. LA on admission was 6.5. Improved to 2.2 with fluids. Lipase wnl. Lactic acidosis, transaminitis (, ), and hypomagnesemia (Mg 1.5) likely secondary to cyclical vomiting syndrome. Last use of cannabis was about 3 weeks ago. Physical examination positive for epigastric and LUQ tenderness to palpation. Previous imaging of abdomen has consistently been negative. Repeat CXR and abdominal US at admission again unremarkable. Likely secondary to severe cyclical vomiting 2/2 to hyperemesis cannabinoid syndrome. Currently, symptoms improving with fluids and antiemetics.     Workup:  - CBC, CMP daily   - Trend Lactic acid  - UDS, UA needs to be collected   Management:  - s/p 1 L of LR x2  - LR continuous infusion @ 125 mL/hr   - Zofran 4 mg ODT/IV q6hr PRN  - Compazine 10 mg PO/IV or 25 mg suppository q6hr PRN  - PTA Olanzapine 10 mg at bedtime      # T2DM    # Hyperglycemia, improving   # Ketosis  # Elevate AG  BG on admission was 525. Improved to 290 with fluids. Elevated AG 24, Ketones at 1.28, and PH wnl at 7.39. Does not meet DKA criteria. Likely secondary to starvation ketosis. Will trend Ketones and continue IVF.   - HOLD PTA Metformin 1,000 mg BID in the setting of  "lactic acidosis  - MSSI  - Hypoglycemia protocol  - CMP, Beta ketones, pending     # At risk for refeeding syndrome  # Hypomagnesemia  Last meal was about 2 days ago. Hypomagnesemia (Mg 1.5) at admission.   - Daily Mg and Phos   - Magnesium STANDARD Replacement- RN managed  - Phosphorus STANDARD Replacement- RN managed     Chronic/stable:     # Moderate persistent asthma currently in mild exacerbation  - PTA albuterol 2 puffs q4hr PRN    # HTN   - PTA Losartan 25 mg daily   - PTA Clonidine 0.1 mg BID     # Polysubstance use (opiates)  # Depression   # Anxiety    - PTA Methadone 140 mg daily  - PTA Olanzapine 10 mg at bedtime  - PTA Clonidine 0.1 mg BID  - Consider discussing with pharmacy in morning holding Methadone d/t LA    # Hepatitis C, chronic untreated  GI (hepatology) referral placed at discharge on 1/11/2024, but Yun was lost to follow up.  - Consider referral at discharge     # Frequent falls  # History of cauda equina syndrome (2013)  - Fall precautions           Diet: Combination Diet Regular Diet Adult  DVT Prophylaxis: Low Risk/Ambulatory with no VTE prophylaxis indicated  Blanc Catheter: Not present  Fluids: IV fluids    Lines: PIV     Cardiac Monitoring: None  Code Status: Full Code    Clinically Significant Risk Factors Present on Admission            # Hypomagnesemia: Lowest Mg = 1.5 mg/dL in last 2 days, will replace as needed  # Anion Gap Metabolic Acidosis: Highest Anion Gap = 24 mmol/L in last 2 days, will monitor and treat as appropriate      # Hypertension: Noted on problem list        # DMII: A1C = 10.3 % (Ref range: <5.7 %) within past 6 months    # Obesity: Estimated body mass index is 30.18 kg/m  as calculated from the following:    Height as of this encounter: 1.753 m (5' 9\").    Weight as of this encounter: 92.7 kg (204 lb 5.9 oz).       # Asthma: noted on problem list              Disposition Plan      Expected Discharge Date: 08/23/2024                The patient's care was " discussed with the Attending Physician, Dr. Montgomery .      Jerry Beckett MD  [facility]  [facility]  Securely message with [employer] (more info)  Text page via [employer] Paging/Directory   See signed in provider for up to date coverage information  ______________________________________________________________________    Chief Complaint   Vomiting     History is obtained from the patient      History of Present Illness   Yun Coyle is a 48 year old female with past medical history of pancreatitis, depression, cauda equina, anxiety, ovarian cyst, hypertension, hyponatremia, type 2 diabetes, asthma, cyclic vomiting syndrome, cannabis abuse, hyperlipidemia, OCD, opiate use disorder (on methadone), hepatitis C, and frequent falls who is seen in the ED for nausea and vomiting.     Patient presented to the ED in the setting of cyclical vomiting that's been going on for the past 24 hours. She reports experiencing chest pain whenever she vomits. She denies having any blood in her vomit but was noted in ED to have coffee-ground emesis. Additionally, she was having mid abdominal pain. Patient states her symptoms have improved since this morning. States that she hasn't vomiting for the past 4-5 hours and abdominal pain has subsided. She reports that she usually takes Zyprexa, prescribed for cyclical vomiting, but ran out of the medication. Her last she used marijuana was about 3 weeks ago. Denies any cravings. Doesn't endorse SOB, rhinorrhea, or congestion. No HA, dizziness, or numbness, tingling. Took metformin yesterday evening. Hasn't been checking her BG and has not eaten for the past 2 days but has been able to eat in the last couple of hours.     Overall she feel much better. She states he normally feels better after receiving fluids during previous hospital visit for similar symptoms.         ED:     BP: 167/103  Pulse: 112  Temp: 98.3  F (36.8  C)   Resp: 18  SpO2: 98 %    Labs work up:   - Venous gas 7.39.  pCO2 of 39 pO2 of 64.    - Lactic acid initially 6.5. After fluids decreased down to 2.2.    - Ketones 1.28.   - Sodium 136, Potassium 4.4.  Bicarb is 20, AG 24, Glucose was 525  (improve to 290 after 2 L LR),  LFT elevation with alk phos 172 AST of 161  total bilirubin 0.6.  -Lipase normal at 28.    - Troponin normal.  - Hemoglobin A1c 10.3.    -Pregnancy test negative.    - Magnesium 1.5.   - WBC at 10.7 hemoglobin 14.1.    Imaging:   EKG showed a QTc of 667 therefore was not given any more typical antiemetics etc. ED  held off on the Zyprexa and gave Ativan after talk to pharmacy half milligram slightly improved. EKG otherwise did not show any ischemic changes. Repeated about one hour later which then showed a normal QTc of 428.  Zyprexa 10 mg IM was given after that.    RUQ US  IMPRESSION:  1.  Mild hepatic steatosis.  2.  Cholecystectomy.    CXR   IMPRESSION: Negative chest. Lungs clear. Cholecystectomy.     Patient received lactated ringer boluses x 2 in the ER.  Along with continuous IVF with symptoms improving.       Past Medical History    Past Medical History:   Diagnosis Date    Acute pancreatitis 06/29/2017    Anxiety     Cauda equina syndrome (H) 03/29/2013    Cyst of right ovary 11/16/2017    Depressive disorder     Gastroesophageal reflux disease     Hypertension     Hyponatremia 10/01/2023    Low back pain 08/20/2012    Overview:   History of cauda equina and surgery.      Type 2 diabetes mellitus (H)     Uncomplicated asthma        Past Surgical History   Past Surgical History:   Procedure Laterality Date    BACK SURGERY      x2    CHOLECYSTECTOMY      ORTHOPEDIC SURGERY      SALPINGO OOPHORECTOMY,R/L/ELDA Right        Prior to Admission Medications   Prior to Admission Medications   Prescriptions Last Dose Informant Patient Reported? Taking?   OLANZapine (ZYPREXA) 10 MG tablet Past Week  No Yes   Sig: Take 1 tablet (10 mg) by mouth  at bedtime   albuterol (PROAIR HFA/PROVENTIL HFA/VENTOLIN HFA) 108 (90 Base) MCG/ACT inhaler Past Month  No Yes   Sig: Inhale 2 puffs into the lungs every 4 hours as needed for shortness of breath or wheezing   cloNIDine (CATAPRES) 0.1 MG tablet 8/20/2024  No Yes   Sig: Take 1 tablet (0.1 mg) by mouth 2 times daily   glucagon 1 MG kit   No No   Sig: Inject 1 mg Subcutaneous once as needed for low blood sugar (Use as directed by provider)   glucose (BD GLUCOSE) 4 g chewable tablet   No No   Sig: Take 4 tablets by mouth every 15 minutes as needed for low blood sugar   glucose 40 % (400 mg/mL) gel   No No   Sig: 15 g every 15 minutes by mouth as needed for low blood sugar.  Oral gel is preferable for conscious and able to swallow patient.   losartan (COZAAR) 25 MG tablet 8/20/2024  No Yes   Sig: Take 1 tablet (25 mg) by mouth daily   metFORMIN (GLUCOPHAGE) 500 MG tablet 8/20/2024  No Yes   Sig: Take 2 tablets (1,000 mg) by mouth 2 times daily (with meals)   methadone (DOLOPHINE-INTENSOL) 10 MG/ML (HIGH CONC) solution 8/21/2024  Yes Yes   Sig: Take 140 mg by mouth daily   predniSONE (DELTASONE) 20 MG tablet More than a month  Yes Yes   Sig: Take two tablets (= 40mg) each day for 5 (five) days if in asthma exacerbation.   prochlorperazine (COMPAZINE) 10 MG tablet Past Month  No Yes   Sig: Take 1 tablet (10 mg) by mouth every 6 hours as needed for nausea or vomiting   prochlorperazine (COMPAZINE) 25 MG suppository Past Month  No Yes   Sig: Place 1 suppository (25 mg) rectally every 12 hours as needed for nausea or vomiting      Facility-Administered Medications: None        Review of Systems    The 10 point Review of Systems is negative other than noted in the HPI or here.      Physical Exam   Vital Signs: Temp: 98.5  F (36.9  C) Temp src: Oral BP: (!) 136/90 Pulse: 68   Resp: 16 SpO2: 100 % O2 Device: None (Room air)    Weight: 204 lbs 5.86 oz    Constitutional: awake, alert, cooperative, no apparent distress, and laying  comfortable in bed.   Eyes: Lids and lashes normal, extra ocular muscles intact, sclera clear, conjunctiva normal  ENT: Normocephalic, without obvious abnormality, atraumatic  Respiratory: No increased work of breathing, good air exchange, clear to auscultation bilaterally, no crackles or wheezing  Cardiovascular: Normal apical impulse, regular rate and rhythm, normal S1 and S2, no S3 or S4, and no murmur noted  GI: Tenderness to palpation along epigastric and LUQ abdominal region. No rebound or guarding. No scars, normal bowel sounds, soft, non-distended,no masses palpated, no hepatosplenomegally  Musculoskeletal: there is no redness, warmth, or swelling of the joints  Neurologic: Awake, alert, oriented to name, place and time.  Cranial nerves II-XII are grossly intact.     Medical Decision Making       Please see A&P for additional details of medical decision making.      Data     I have personally reviewed the following data over the past 24 hrs:    10.7  \   14.1   / 215     135 98 15.6 /  255 (H)   4.3 25 0.73 \     ALT: 152 (H) AST: 94 (H) AP: 133 TBILI: 0.6   ALB: 3.9 TOT PROTEIN: 7.6 LIPASE: 28     Trop: 6 BNP: N/A     TSH: N/A T4: N/A A1C: 10.3 (H)     Procal: N/A CRP: N/A Lactic Acid: 2.5 (H)       INR:  1.06 PTT:  N/A   D-dimer:  N/A Fibrinogen:  N/A       Imaging results reviewed over the past 24 hrs:   Recent Results (from the past 24 hour(s))   US Abdomen Limited    Narrative    US ABDOMEN LIMITED 8/22/2024 12:37 PM    CLINICAL HISTORY: Abdominal pain. Nausea and vomiting. Elevated liver  function tests.  TECHNIQUE: Limited abdominal ultrasound.  COMPARISON: 4/13/2024.    FINDINGS:  GALLBLADDER: Cholecystectomy.    BILE DUCTS: There is no biliary dilatation. The common duct measures 6  mm. Portions of the common duct could not be visualized due to  overlying bowel gas.    LIVER: Mild hepatic steatosis. No focal hepatic masses.    RIGHT KIDNEY: Unremarkable. No hydronephrosis.    PANCREAS: The  visualized portions of the pancreas are normal.    No ascites.      Impression    IMPRESSION:  1.  Mild hepatic steatosis.  2.  Cholecystectomy.    YAMILETH HEREDIA MD         SYSTEM ID:  [id number]   XR Chest 2 Views    Narrative    CHEST TWO VIEWS  8/22/2024 12:45 PM     HISTORY:  Chest pain. Nausea. Vomiting.    COMPARISON: 7/1/2024.      Impression    IMPRESSION: Negative chest. Lungs clear. Cholecystectomy.    YAMILETH HEREDIA MD         SYSTEM ID:  [id number]

## 2024-08-23 VITALS
BODY MASS INDEX: 30.27 KG/M2 | SYSTOLIC BLOOD PRESSURE: 140 MMHG | OXYGEN SATURATION: 98 % | RESPIRATION RATE: 16 BRPM | DIASTOLIC BLOOD PRESSURE: 89 MMHG | TEMPERATURE: 98.4 F | WEIGHT: 204.37 LBS | HEIGHT: 69 IN | HEART RATE: 66 BPM

## 2024-08-23 PROBLEM — F33.2 SEVERE EPISODE OF RECURRENT MAJOR DEPRESSIVE DISORDER, WITHOUT PSYCHOTIC FEATURES (H): Status: ACTIVE | Noted: 2024-08-23

## 2024-08-23 LAB
ALBUMIN SERPL BCG-MCNC: 3.5 G/DL (ref 3.5–5.2)
ALBUMIN UR-MCNC: NEGATIVE MG/DL
ALP SERPL-CCNC: 120 U/L (ref 40–150)
ALT SERPL W P-5'-P-CCNC: 128 U/L (ref 0–50)
AMPHETAMINES UR QL SCN: ABNORMAL
ANION GAP SERPL CALCULATED.3IONS-SCNC: 10 MMOL/L (ref 7–15)
APPEARANCE UR: CLEAR
AST SERPL W P-5'-P-CCNC: 84 U/L (ref 0–45)
BACTERIA #/AREA URNS HPF: ABNORMAL /HPF
BARBITURATES UR QL SCN: ABNORMAL
BASOPHILS # BLD AUTO: 0 10E3/UL (ref 0–0.2)
BASOPHILS NFR BLD AUTO: 0 %
BENZODIAZ UR QL SCN: ABNORMAL
BILIRUB SERPL-MCNC: 0.4 MG/DL
BILIRUB UR QL STRIP: NEGATIVE
BUN SERPL-MCNC: 16.6 MG/DL (ref 6–20)
BZE UR QL SCN: ABNORMAL
CALCIUM SERPL-MCNC: 9 MG/DL (ref 8.8–10.4)
CANNABINOIDS UR QL SCN: ABNORMAL
CHLORIDE SERPL-SCNC: 102 MMOL/L (ref 98–107)
COLOR UR AUTO: ABNORMAL
CREAT SERPL-MCNC: 0.68 MG/DL (ref 0.51–0.95)
EGFRCR SERPLBLD CKD-EPI 2021: >90 ML/MIN/1.73M2
EOSINOPHIL # BLD AUTO: 0.3 10E3/UL (ref 0–0.7)
EOSINOPHIL NFR BLD AUTO: 4 %
ERYTHROCYTE [DISTWIDTH] IN BLOOD BY AUTOMATED COUNT: 13.2 % (ref 10–15)
FENTANYL UR QL: ABNORMAL
GLUCOSE BLDC GLUCOMTR-MCNC: 191 MG/DL (ref 70–99)
GLUCOSE BLDC GLUCOMTR-MCNC: 233 MG/DL (ref 70–99)
GLUCOSE BLDC GLUCOMTR-MCNC: 234 MG/DL (ref 70–99)
GLUCOSE BLDC GLUCOMTR-MCNC: 248 MG/DL (ref 70–99)
GLUCOSE BLDC GLUCOMTR-MCNC: 282 MG/DL (ref 70–99)
GLUCOSE SERPL-MCNC: 208 MG/DL (ref 70–99)
GLUCOSE UR STRIP-MCNC: >=1000 MG/DL
HCO3 SERPL-SCNC: 26 MMOL/L (ref 22–29)
HCT VFR BLD AUTO: 34.5 % (ref 35–47)
HGB BLD-MCNC: 12.1 G/DL (ref 11.7–15.7)
HGB UR QL STRIP: NEGATIVE
IMM GRANULOCYTES # BLD: 0.1 10E3/UL
IMM GRANULOCYTES NFR BLD: 1 %
KETONES UR STRIP-MCNC: NEGATIVE MG/DL
LACTATE SERPL-SCNC: 1.8 MMOL/L (ref 0.7–2)
LEUKOCYTE ESTERASE UR QL STRIP: NEGATIVE
LYMPHOCYTES # BLD AUTO: 3.7 10E3/UL (ref 0.8–5.3)
LYMPHOCYTES NFR BLD AUTO: 46 %
MAGNESIUM SERPL-MCNC: 1.7 MG/DL (ref 1.7–2.3)
MCH RBC QN AUTO: 32.1 PG (ref 26.5–33)
MCHC RBC AUTO-ENTMCNC: 35.1 G/DL (ref 31.5–36.5)
MCV RBC AUTO: 92 FL (ref 78–100)
MONOCYTES # BLD AUTO: 0.6 10E3/UL (ref 0–1.3)
MONOCYTES NFR BLD AUTO: 7 %
NEUTROPHILS # BLD AUTO: 3.3 10E3/UL (ref 1.6–8.3)
NEUTROPHILS NFR BLD AUTO: 42 %
NITRATE UR QL: NEGATIVE
NRBC # BLD AUTO: 0 10E3/UL
NRBC BLD AUTO-RTO: 0 /100
OPIATES UR QL SCN: ABNORMAL
PCP QUAL URINE (ROCHE): ABNORMAL
PH UR STRIP: 7 [PH] (ref 5–7)
PHOSPHATE SERPL-MCNC: 2.7 MG/DL (ref 2.5–4.5)
PLATELET # BLD AUTO: 155 10E3/UL (ref 150–450)
POTASSIUM SERPL-SCNC: 4.1 MMOL/L (ref 3.4–5.3)
PROT SERPL-MCNC: 6.8 G/DL (ref 6.4–8.3)
RBC # BLD AUTO: 3.77 10E6/UL (ref 3.8–5.2)
RBC URINE: <1 /HPF
SODIUM SERPL-SCNC: 138 MMOL/L (ref 135–145)
SP GR UR STRIP: 1.02 (ref 1–1.03)
SQUAMOUS EPITHELIAL: <1 /HPF
UROBILINOGEN UR STRIP-MCNC: NORMAL MG/DL
WBC # BLD AUTO: 8 10E3/UL (ref 4–11)
WBC URINE: <1 /HPF

## 2024-08-23 PROCEDURE — 250N000013 HC RX MED GY IP 250 OP 250 PS 637

## 2024-08-23 PROCEDURE — 36415 COLL VENOUS BLD VENIPUNCTURE: CPT

## 2024-08-23 PROCEDURE — G0378 HOSPITAL OBSERVATION PER HR: HCPCS

## 2024-08-23 PROCEDURE — 83735 ASSAY OF MAGNESIUM: CPT

## 2024-08-23 PROCEDURE — 81001 URINALYSIS AUTO W/SCOPE: CPT | Performed by: STUDENT IN AN ORGANIZED HEALTH CARE EDUCATION/TRAINING PROGRAM

## 2024-08-23 PROCEDURE — 258N000003 HC RX IP 258 OP 636: Performed by: STUDENT IN AN ORGANIZED HEALTH CARE EDUCATION/TRAINING PROGRAM

## 2024-08-23 PROCEDURE — 80307 DRUG TEST PRSMV CHEM ANLYZR: CPT | Performed by: STUDENT IN AN ORGANIZED HEALTH CARE EDUCATION/TRAINING PROGRAM

## 2024-08-23 PROCEDURE — 80053 COMPREHEN METABOLIC PANEL: CPT

## 2024-08-23 PROCEDURE — 250N000012 HC RX MED GY IP 250 OP 636 PS 637

## 2024-08-23 PROCEDURE — 99222 1ST HOSP IP/OBS MODERATE 55: CPT | Mod: AI

## 2024-08-23 PROCEDURE — 83605 ASSAY OF LACTIC ACID: CPT

## 2024-08-23 PROCEDURE — 84100 ASSAY OF PHOSPHORUS: CPT

## 2024-08-23 PROCEDURE — 85048 AUTOMATED LEUKOCYTE COUNT: CPT

## 2024-08-23 PROCEDURE — 82962 GLUCOSE BLOOD TEST: CPT

## 2024-08-23 RX ORDER — OLANZAPINE 10 MG/1
10 TABLET ORAL AT BEDTIME
Qty: 30 TABLET | Refills: 0 | Status: CANCELLED | OUTPATIENT
Start: 2024-08-23

## 2024-08-23 RX ORDER — PROCHLORPERAZINE MALEATE 10 MG
10 TABLET ORAL EVERY 6 HOURS PRN
Qty: 30 TABLET | Refills: 0 | Status: SHIPPED | OUTPATIENT
Start: 2024-08-23 | End: 2024-10-03

## 2024-08-23 RX ORDER — ONDANSETRON 4 MG/1
4 TABLET, ORALLY DISINTEGRATING ORAL EVERY 6 HOURS PRN
Qty: 30 TABLET | Refills: 0 | Status: SHIPPED | OUTPATIENT
Start: 2024-08-23 | End: 2024-10-03

## 2024-08-23 RX ADMIN — LOSARTAN POTASSIUM 25 MG: 25 TABLET, FILM COATED ORAL at 08:57

## 2024-08-23 RX ADMIN — CLONIDINE HYDROCHLORIDE 0.1 MG: 0.1 TABLET ORAL at 08:57

## 2024-08-23 RX ADMIN — METFORMIN HYDROCHLORIDE 1000 MG: 500 TABLET, FILM COATED ORAL at 08:57

## 2024-08-23 RX ADMIN — CLONIDINE HYDROCHLORIDE 0.1 MG: 0.1 TABLET ORAL at 00:06

## 2024-08-23 RX ADMIN — SODIUM CHLORIDE, POTASSIUM CHLORIDE, SODIUM LACTATE AND CALCIUM CHLORIDE 1000 ML: 600; 310; 30; 20 INJECTION, SOLUTION INTRAVENOUS at 06:16

## 2024-08-23 RX ADMIN — OLANZAPINE 10 MG: 5 TABLET, FILM COATED ORAL at 00:06

## 2024-08-23 RX ADMIN — METFORMIN HYDROCHLORIDE 1000 MG: 500 TABLET, FILM COATED ORAL at 17:54

## 2024-08-23 RX ADMIN — METHADONE HYDROCHLORIDE 140 MG: 10 CONCENTRATE ORAL at 09:16

## 2024-08-23 RX ADMIN — INSULIN ASPART 1 UNITS: 100 INJECTION, SOLUTION INTRAVENOUS; SUBCUTANEOUS at 00:04

## 2024-08-23 ASSESSMENT — ACTIVITIES OF DAILY LIVING (ADL)
ADLS_ACUITY_SCORE: 22

## 2024-08-23 NOTE — UTILIZATION REVIEW
"  Admission Status; Secondary Review Determination         Under the authority of the Utilization Management Committee, the utilization review process indicated a secondary review on the above patient.  The review outcome is based on review of the medical records, discussions with staff, and applying clinical experience noted on the date of the review.          (x) Observation Status Appropriate - This patient does not meet hospital inpatient criteria and is placed in observation status. If this patient's primary payer is Medicare and was admitted as an inpatient, Condition Code 44 should be used and patient status changed to \"observation\".     RATIONALE FOR DETERMINATION     The patient is a 48-year-old female admitted on 4/20/2024.  Patient came in to the ED due to cyclical vomiting syndrome and an elevated lactic acid level.  Patient was hydrated and lactic acid level dropped to normal fairly quickly after admission.  Patient did have initial magnesium of 1.5 which was likely replaced and normal magnesium retest.  Phosphorus level is normal.  Concern in the notes for possible refeeding syndrome.  Patient has a normal albumin level.  Patient does have substance abuse history and type 2 diabetes mellitus with a single glucose of greater than 525 on this admission.  Patient has a history of pancreatitis with a normal lipase currently.  Patient states no cannabinoid used in the last 3 weeks in the notes however cannabinoid screen is positive.    Recommend switching the patient observation status.  If persistence of minimal oral intake persists, reconsideration for inpatient status could be done at that time.  Dr. Abarca will be notified of this recommendation via text.    The severity of illness, intensity of service provided, expected LOS and risk for adverse outcome make the care appropriate for further observation; however, doesn't meet criteria for hospital inpatient admission.   notified of this " determination.          The information on this document is developed by the utilization review team in order for the business office to ensure compliance.  This only denotes the appropriateness of proper admission status and does not reflect the quality of care rendered.         The definitions of Inpatient Status and Observation Status used in making the determination above are those provided in the CMS Coverage Manual, Chapter 1 and Chapter 6, section 70.4.      Sincerely,     Bharath Boswell MD  Physician Advisor  Utilization Review/ Case Management  [facility].

## 2024-08-23 NOTE — CONSULTS
"Care Management Discharge Note    Discharge Date: 08/24/2024       Discharge Disposition:  home    Discharge Services:      Discharge DME:      Discharge Transportation:  [employer]    Private pay costs discussed: Not applicable-[place]    Does the patient's insurance plan have a 3 day qualifying hospital stay waiver?  No    PAS Confirmation Code:    Patient/family educated on Medicare website which has current facility and service quality ratings:      Education Provided on the Discharge Plan:    Persons Notified of Discharge Plans: pt  Patient/Family in Agreement with the Plan:      Handoff Referral Completed: Yes    Additional Information:    1831 NORY spoke with 8MS RN Charge who asked SW to arrange discharge transportation for pt. RN agreed to a 1930 transport time and agreed to have pt at the hospital main entrance by that time.      1837 SW scheduled discharge transportation for 1930 pt pickup via [employer]. NORY provided  Unit RN station ([phone] ) for contact, and noted that pt \"would be at [facility] MAIN ENTRANCE. Please allow 5 minutes from call to allow passenger to get to vehicle.\"  Booking # [id number]    SW/RNCC is no longer following. Please place a new consult should new needs arise.    YANG Yang, LGSW  ED/OBS   [facility]  Phone: [phone]  Pager: [phone]  Fax: [phone]     After hours [employer]wine and After Hours [employer]oc[employer]     On-call pager, [phone], 4:00 pm to midnight           "

## 2024-08-23 NOTE — CONSULTS
"Brief Care Management Note:    CMA indicated d/t elevated readmission risk score.    Case discussed w/ provider this AM.  Per provider, pt is expected to discharge today w/ no needs requiring care coordination team involvement.    Received notification from bedside RN that pt is requesting assistance w/ discharge transportation.  Provided RN w/ handout that instructs pt on how to utilize her insurance transport benefits for discharge transportation.  RN to provide handout to pt.    2:12pm - Attempted to meet w/ pt at bedside.  Pt was asleep and did not wake to this writer knocking on door or entering and calling her name.  Opted to allow pt to rest at this time.    Unable to complete CMA today d/t time constraints.  In the event pt does not discharge today, CMA to be re-attempted.        TATYANA Holt  [facility]  Units 8M/S & 10 ICU  Reachable on [email or web address] - Search by name or search \"RNCC\"  Phone: [phone]    "

## 2024-08-23 NOTE — DISCHARGE SUMMARY
"[facility]  Discharge Summary - Medicine & Pediatrics       Date of Admission:  8/22/2024  Date of Discharge:  8/23/2024  Discharging Provider: Dr. Bia Ferguson  Discharge Service: [facility] Medicine Service    Discharge Diagnoses   # Cyclic vomiting syndrome  # Lactic acidosis  # Trnasaminitis  # T2DM  # Hyperglycemia  # Ketosis  # Anion gap  # Hypomagnesemis  # HTN  # Asthma  # Depression  # Anxiety  # Polysubstance use  # Hepatitis C    Clinically Significant Risk Factors     # DMII: A1C = 10.3 % (Ref range: <5.7 %) within past 6 months  # Obesity: Estimated body mass index is 30.18 kg/m  as calculated from the following:    Height as of this encounter: 1.753 m (5' 9\").    Weight as of this encounter: 92.7 kg (204 lb 5.9 oz).       Follow-ups Needed After Discharge   Follow-up Appointments     Adult [facility] Follow-up and recommended labs and tests      Follow up with primary care provider, Gamaliel Granados, within 7 days for   hospital follow- up.  No follow up labs or test are needed.      Appointments on [place] and/or [facility] (with [facility] or [facility]   provider or service). Call [phone] if you haven't heard regarding   these appointments within 7 days of discharge.            Unresulted Labs Ordered in the Past 30 Days of this Admission       No orders found for last 31 day(s).            Discharge Disposition   Discharged to home  Condition at discharge: Stable    Hospital Course   Yun Coyle is a 48 year old female admitted on 4/20/2024. She has a past medical history of pancreatitis, depression, cauda equina, anxiety, hypertension, type 2 diabetes, asthma, cyclic vomiting syndrome, cannabis abuse, hyperlipidemia, OCD, opiate use disorder (on methadone), hepatitis C, and frequent falls who is being admitted for lactic acidosis and electrolyte abnormalities secondary to cyclical vomiting syndrome.     # Cyclic vomiting syndrome   # Lactic " acidosis, resolved  # Starvation ketosis, resolved  # Hypomagnesemia, resolved  Admitted with 1 day of cyclic vomiting syndrome, found to have lactic acidosis, hypomagnesia, hyperglycemia, starvation ketosis. Per patient, episodes typically last 24-28h. Abdominal and chest imaging benign. S/p cholecystectomy. Patient symptomatically improved with IV fluids and zofran and compazine, lab abnormalities resolved, and patient was tolerating PO at time of discharge. Discharged with zofran and compazine, and patient has PTA zyprexa at home.     # T2DM    # Hyperglycemia, improving   # Ketosis  # Elevate AG  BG on admission was 525, improved with fluids. Elevated AG 24, Ketones at 1.28, and PH wnl at 7.39 at admission--did not meet DKA criteria. Likely secondary to starvation ketosis. Resolved with hydration.    - Continue PTA Metformin 1,000 mg BID     # Transaminitis  # Hepatitis C, chronic, untreated  Patient lost to follow up.   - GI referral placed at discharge.      Chronic/stable:      # Moderate persistent asthma currently in mild exacerbation  - PTA albuterol 2 puffs q4hr PRN     # HTN   - PTA Losartan 25 mg daily   - PTA Clonidine 0.1 mg BID     # Polysubstance use (opiates)  # Depression   # Anxiety    - PTA Methadone 140 mg daily  - PTA Olanzapine 10 mg at bedtime  - PTA Clonidine 0.1 mg BID  - Consider discussing with pharmacy in morning holding Methadone d/t LA     # Hepatitis C, chronic untreated  GI (hepatology) referral placed at discharge on 1/11/2024, but Yun was lost to follow up.  - Consider referral at discharge      # Frequent falls  # History of cauda equina syndrome (2013)  - Fall precautions       Consultations This Hospital Stay   CARE MANAGEMENT / SOCIAL WORK IP CONSULT    Code Status   Full Code       The patient was discussed with MD Jennifer Stewart's Service  [facility] [address]  [address] [place] [id number]  Phone: [phone]  Fax:  [phone]  ______________________________________________________________________    Physical Exam   Vital Signs: Temp: 98.5  F (36.9  C) Temp src: Oral BP: (!) 140/89 Pulse: 57   Resp: 18 SpO2: 98 % O2 Device: None (Room air)    Weight: 204 lbs 5.86 oz  Constitutional: awake, alert, cooperative, no apparent distress, and laying comfortable in bed.   Eyes: Lids and lashes normal, extra ocular muscles intact, sclera clear, conjunctiva normal  ENT: Normocephalic, without obvious abnormality, atraumatic  Respiratory: No increased work of breathing, good air exchange, clear to auscultation bilaterally, no crackles or wheezing  Cardiovascular: Normal apical impulse, regular rate and rhythm, normal S1 and S2, no S3 or S4, and no murmur noted  GI: Tenderness to palpation along epigastric and LUQ abdominal region. No rebound or guarding. No scars, normal bowel sounds, soft, non-distended,no masses palpated, no hepatosplenomegally  Musculoskeletal: there is no redness, warmth, or swelling of the joints  Neurologic: Awake, alert, oriented to name, place and time.  Cranial nerves II-XII are grossly intact.       Primary Care Physician   Gamaliel Granados    Discharge Orders      Reason for your hospital stay    You were seen in the hospital for vomiting. You felt better with nausea medication, stopped vomiting, and went home.     Activity    Your activity upon discharge: activity as tolerated     Adult [facility] Follow-up and recommended labs and tests    Follow up with primary care provider, Gamaliel Granados, within 7 days for hospital follow- up.  No follow up labs or test are needed.      Appointments on [place] and/or [facility] (with [facility] or [facility] provider or service). Call [phone] if you haven't heard regarding these appointments within 7 days of discharge.     Diet    Follow this diet upon discharge: Current Diet:Orders Placed This Encounter      Combination Diet Regular Diet Adult       Significant Results and  Procedures   Most Recent 3 CBC's:  Recent Labs   Lab Test 08/23/24  0633 08/22/24  1117 08/14/24  1832   WBC 8.0 10.7 9.2   HGB 12.1 14.1 14.6   MCV 92 89 90    215 208     Most Recent 3 BMP's:  Recent Labs   Lab Test 08/23/24  1158 08/23/24  0754 08/23/24  0633 08/23/24  0006 08/22/24  2212 08/22/24  1532 08/22/24  1117   NA  --   --  138  --  135  --  136   POTASSIUM  --   --  4.1  --  4.3  --  4.4   CHLORIDE  --   --  102  --  98  --  92*   CO2  --   --  26  --  25  --  20*   BUN  --   --  16.6  --  15.6  --  14.2   CR  --   --  0.68  --  0.73  --  0.79   ANIONGAP  --   --  10  --  12  --  24*   DA  --   --  9.0  --  9.5  --  10.3   * 282* 208*   < > 255*   < > 525*    < > = values in this interval not displayed.     Most Recent 2 LFT's:  Recent Labs   Lab Test 08/23/24 0633 08/22/24 2212   AST 84* 94*   * 152*   ALKPHOS 120 133   BILITOTAL 0.4 0.6       Discharge Medications   Current Discharge Medication List        START taking these medications    Details   ondansetron (ZOFRAN ODT) 4 MG ODT tab Take 1 tablet (4 mg) by mouth every 6 hours as needed for nausea or vomiting.  Qty: 30 tablet, Refills: 0    Associated Diagnoses: Cyclic vomiting syndrome           CONTINUE these medications which have CHANGED    Details   prochlorperazine (COMPAZINE) 10 MG tablet Take 1 tablet (10 mg) by mouth every 6 hours as needed for nausea or vomiting.  Qty: 30 tablet, Refills: 0    Associated Diagnoses: Cyclic vomiting syndrome           CONTINUE these medications which have NOT CHANGED    Details   albuterol (PROAIR HFA/PROVENTIL HFA/VENTOLIN HFA) 108 (90 Base) MCG/ACT inhaler Inhale 2 puffs into the lungs every 4 hours as needed for shortness of breath or wheezing  Qty: 18 g, Refills: 11    Comments: Pharmacy may dispense brand covered by insurance (Proair, or proventil or ventolin or generic albuterol inhaler)  Associated Diagnoses: Moderate persistent asthma with exacerbation      cloNIDine  (CATAPRES) 0.1 MG tablet Take 1 tablet (0.1 mg) by mouth 2 times daily  Qty: 180 tablet, Refills: 3    Associated Diagnoses: Secondary hypertension      losartan (COZAAR) 25 MG tablet Take 1 tablet (25 mg) by mouth daily  Qty: 90 tablet, Refills: 3    Associated Diagnoses: Secondary hypertension      metFORMIN (GLUCOPHAGE) 500 MG tablet Take 2 tablets (1,000 mg) by mouth 2 times daily (with meals)  Qty: 360 tablet, Refills: 3    Associated Diagnoses: Type 2 diabetes mellitus with hyperglycemia, with long-term current use of insulin (H)      methadone (DOLOPHINE-INTENSOL) 10 MG/ML (HIGH CONC) solution Take 140 mg by mouth daily      OLANZapine (ZYPREXA) 10 MG tablet Take 1 tablet (10 mg) by mouth at bedtime  Qty: 90 tablet, Refills: 1    Associated Diagnoses: Severe episode of recurrent major depressive disorder, without psychotic features (H); Cyclic vomiting syndrome      predniSONE (DELTASONE) 20 MG tablet Take two tablets (= 40mg) each day for 5 (five) days if in asthma exacerbation.    Associated Diagnoses: Moderate persistent asthma with exacerbation      prochlorperazine (COMPAZINE) 25 MG suppository Place 1 suppository (25 mg) rectally every 12 hours as needed for nausea or vomiting  Qty: 24 suppository, Refills: 0    Associated Diagnoses: Cyclic vomiting syndrome      glucagon 1 MG kit Inject 1 mg Subcutaneous once as needed for low blood sugar (Use as directed by provider)  Qty: 1 each, Refills: 0    Associated Diagnoses: Type 2 diabetes mellitus without complication, unspecified whether long term insulin use (H)      glucose (BD GLUCOSE) 4 g chewable tablet Take 4 tablets by mouth every 15 minutes as needed for low blood sugar  Qty: 50 tablet, Refills: 0    Associated Diagnoses: Type 2 diabetes mellitus without complication, unspecified whether long term insulin use (H)      glucose 40 % (400 mg/mL) gel 15 g every 15 minutes by mouth as needed for low blood sugar.  Oral gel is preferable for conscious and  able to swallow patient.  Qty: 112.5 g, Refills: 0    Associated Diagnoses: Type 2 diabetes mellitus without complication, unspecified whether long term insulin use (H)           Allergies   No Known Allergies

## 2024-08-23 NOTE — PLAN OF CARE
"8MS Admission Note    Reason for admission: Electrolyte abnormalities, lactic acidosis  Primary team notified of pt arrival.  Admitted from: [facility]  Via: ambulance  Accompanied by: Paramedics  Belongings: Placed in closet; valuables sent home with family  Admission Required Doc Completed: Yes  Teaching: Orientation to unit and call light- call light within reach, use of console, meal times, when to call for the RN, and enforced importance of safety.  IV Access: Yes  Telemetry: No  Ht./Wt.: Completed  Code Status verified on armband: Yes  2 RN Skin Assessment Completed with: Lissette DE LEON RN  Suction/Ambu bag/Flowmeter at bedside: Yes    Pt status:   /77   Pulse 68   Temp 98.7  F (37.1  C) (Oral)   Resp 18   Ht 1.753 m (5' 9\")   Wt 92.7 kg (204 lb 5.9 oz)   LMP  (LMP Unknown)   SpO2 98%   BMI 30.18 kg/m       A&Ox4. CMS intact. Denies numbness/tingling, nausea,vomiting, SOB, and chest pain. Patient reported nausea has improved, tolerating oral intake.  Up ind. In room, gait steady, no dizziness reported. voiding adequate amounts. Denies pain. L/PIV running  mL/hr  Plan: UA collected, following labs  Disposition: Possible discharge today  Continue with POC.  "

## 2024-08-24 NOTE — PROGRESS NOTES
"Pt. discharged at 1915 to home, and left with personal belongings. Pt. received complete discharge paperwork and medications  filled by discharge pharmacy Pt. Did not go home with medication due tot pharmacy being closed MD Was notified, Okay for pt. To pick medication up in the morning.  Pt. was given times of last dose for all discharge medications in writing on discharge medication sheets. Discharge teaching included  new medication.  Pt. Was told by nurse to wait for ride on the unit. Pt. Left without letting staff know. Writer and on coming nurse went downstairs to find Pt. To let her know the ride had arrived. However, the taxi left, pt. Was asked twice by writer and nursing staff to come back to the unit pt. Refused and said \" I don't live far I will walk thanks\"  Pt. had no further questions at the time of discharge and no unmet needs were identified.  "

## 2024-08-28 ENCOUNTER — PATIENT OUTREACH (OUTPATIENT)
Dept: CARE COORDINATION | Facility: CLINIC | Age: 49
End: 2024-08-28
Payer: COMMERCIAL

## 2024-08-28 NOTE — PROGRESS NOTES
Clinic Care Coordination Contact  [facility]/Voicemail    Clinical Data: Care Coordinator Outreach    Outreach Documentation Number of Outreach Attempt   7/30/2024  10:44 AM 1   7/31/2024   9:48 AM 2   8/28/2024   3:54 PM 1       Left message on patient's voicemail with call back information and requested return call.    Plan:  Care Coordinator will try to reach patient again in 1-2 business days.    MADDY BoboN, RN, PHN   Care Coordinator-Ambulatory Care Management  [facility] and [facility]  [phone]

## 2024-08-29 ENCOUNTER — HOSPITAL ENCOUNTER (EMERGENCY)
Facility: CLINIC | Age: 49
Discharge: HOME | End: 2024-08-29
Attending: EMERGENCY MEDICINE | Admitting: EMERGENCY MEDICINE
Payer: COMMERCIAL

## 2024-08-29 VITALS
SYSTOLIC BLOOD PRESSURE: 130 MMHG | RESPIRATION RATE: 18 BRPM | WEIGHT: 204 LBS | HEART RATE: 68 BPM | OXYGEN SATURATION: 99 % | TEMPERATURE: 98.4 F | BODY MASS INDEX: 30.13 KG/M2 | DIASTOLIC BLOOD PRESSURE: 87 MMHG

## 2024-08-29 DIAGNOSIS — R11.2 NAUSEA AND VOMITING, UNSPECIFIED VOMITING TYPE: ICD-10-CM

## 2024-08-29 LAB
ALBUMIN SERPL BCG-MCNC: 4 G/DL (ref 3.5–5.2)
ALBUMIN UR-MCNC: NEGATIVE MG/DL
ALP SERPL-CCNC: 148 U/L (ref 40–150)
ALT SERPL W P-5'-P-CCNC: 139 U/L (ref 0–50)
ANION GAP SERPL CALCULATED.3IONS-SCNC: 10 MMOL/L (ref 7–15)
APPEARANCE UR: CLEAR
AST SERPL W P-5'-P-CCNC: 79 U/L (ref 0–45)
ATRIAL RATE - MUSE: 72 BPM
BACTERIA #/AREA URNS HPF: ABNORMAL /HPF
BASOPHILS # BLD AUTO: 0.1 10E3/UL (ref 0–0.2)
BASOPHILS NFR BLD AUTO: 1 %
BILIRUB SERPL-MCNC: 0.2 MG/DL
BILIRUB UR QL STRIP: NEGATIVE
BUN SERPL-MCNC: 19.3 MG/DL (ref 6–20)
CALCIUM SERPL-MCNC: 9.6 MG/DL (ref 8.8–10.4)
CHLORIDE SERPL-SCNC: 98 MMOL/L (ref 98–107)
COLOR UR AUTO: ABNORMAL
CREAT SERPL-MCNC: 0.7 MG/DL (ref 0.51–0.95)
DIASTOLIC BLOOD PRESSURE - MUSE: NORMAL MMHG
EGFRCR SERPLBLD CKD-EPI 2021: >90 ML/MIN/1.73M2
EOSINOPHIL # BLD AUTO: 0.5 10E3/UL (ref 0–0.7)
EOSINOPHIL NFR BLD AUTO: 6 %
ERYTHROCYTE [DISTWIDTH] IN BLOOD BY AUTOMATED COUNT: 13 % (ref 10–15)
GLUCOSE SERPL-MCNC: 262 MG/DL (ref 70–99)
GLUCOSE UR STRIP-MCNC: >=1000 MG/DL
HCG INTACT+B SERPL-ACNC: <1 MIU/ML
HCO3 SERPL-SCNC: 24 MMOL/L (ref 22–29)
HCT VFR BLD AUTO: 39 % (ref 35–47)
HGB BLD-MCNC: 13.8 G/DL (ref 11.7–15.7)
HGB UR QL STRIP: NEGATIVE
HOLD SPECIMEN: NORMAL
IMM GRANULOCYTES # BLD: 0.1 10E3/UL
IMM GRANULOCYTES NFR BLD: 1 %
INTERPRETATION ECG - MUSE: NORMAL
KETONES UR STRIP-MCNC: NEGATIVE MG/DL
LEUKOCYTE ESTERASE UR QL STRIP: NEGATIVE
LIPASE SERPL-CCNC: 29 U/L (ref 13–60)
LYMPHOCYTES # BLD AUTO: 2.7 10E3/UL (ref 0.8–5.3)
LYMPHOCYTES NFR BLD AUTO: 29 %
MCH RBC QN AUTO: 32.1 PG (ref 26.5–33)
MCHC RBC AUTO-ENTMCNC: 35.4 G/DL (ref 31.5–36.5)
MCV RBC AUTO: 91 FL (ref 78–100)
MONOCYTES # BLD AUTO: 0.5 10E3/UL (ref 0–1.3)
MONOCYTES NFR BLD AUTO: 5 %
MUCOUS THREADS #/AREA URNS LPF: PRESENT /LPF
NEUTROPHILS # BLD AUTO: 5.4 10E3/UL (ref 1.6–8.3)
NEUTROPHILS NFR BLD AUTO: 58 %
NITRATE UR QL: NEGATIVE
NRBC # BLD AUTO: 0 10E3/UL
NRBC BLD AUTO-RTO: 0 /100
P AXIS - MUSE: 49 DEGREES
PH UR STRIP: 5.5 [PH] (ref 5–7)
PLATELET # BLD AUTO: 216 10E3/UL (ref 150–450)
POTASSIUM SERPL-SCNC: 4.7 MMOL/L (ref 3.4–5.3)
PR INTERVAL - MUSE: 140 MS
PROT SERPL-MCNC: 8.2 G/DL (ref 6.4–8.3)
QRS DURATION - MUSE: 88 MS
QT - MUSE: 414 MS
QTC - MUSE: 453 MS
R AXIS - MUSE: -21 DEGREES
RBC # BLD AUTO: 4.3 10E6/UL (ref 3.8–5.2)
RBC URINE: 2 /HPF
SODIUM SERPL-SCNC: 132 MMOL/L (ref 135–145)
SP GR UR STRIP: 1.04 (ref 1–1.03)
SQUAMOUS EPITHELIAL: 8 /HPF
SYSTOLIC BLOOD PRESSURE - MUSE: NORMAL MMHG
T AXIS - MUSE: 0 DEGREES
UROBILINOGEN UR STRIP-MCNC: NORMAL MG/DL
VENTRICULAR RATE- MUSE: 72 BPM
WBC # BLD AUTO: 9.2 10E3/UL (ref 4–11)
WBC URINE: 1 /HPF

## 2024-08-29 PROCEDURE — 258N000003 HC RX IP 258 OP 636: Performed by: EMERGENCY MEDICINE

## 2024-08-29 PROCEDURE — 96372 THER/PROPH/DIAG INJ SC/IM: CPT | Performed by: EMERGENCY MEDICINE

## 2024-08-29 PROCEDURE — 96374 THER/PROPH/DIAG INJ IV PUSH: CPT | Performed by: EMERGENCY MEDICINE

## 2024-08-29 PROCEDURE — 83690 ASSAY OF LIPASE: CPT | Performed by: EMERGENCY MEDICINE

## 2024-08-29 PROCEDURE — 81001 URINALYSIS AUTO W/SCOPE: CPT | Performed by: EMERGENCY MEDICINE

## 2024-08-29 PROCEDURE — 99284 EMERGENCY DEPT VISIT MOD MDM: CPT | Performed by: EMERGENCY MEDICINE

## 2024-08-29 PROCEDURE — 99284 EMERGENCY DEPT VISIT MOD MDM: CPT | Mod: 25 | Performed by: EMERGENCY MEDICINE

## 2024-08-29 PROCEDURE — 250N000011 HC RX IP 250 OP 636: Performed by: EMERGENCY MEDICINE

## 2024-08-29 PROCEDURE — 84702 CHORIONIC GONADOTROPIN TEST: CPT | Performed by: EMERGENCY MEDICINE

## 2024-08-29 PROCEDURE — 93005 ELECTROCARDIOGRAM TRACING: CPT | Performed by: EMERGENCY MEDICINE

## 2024-08-29 PROCEDURE — 36415 COLL VENOUS BLD VENIPUNCTURE: CPT | Performed by: EMERGENCY MEDICINE

## 2024-08-29 PROCEDURE — 96361 HYDRATE IV INFUSION ADD-ON: CPT | Performed by: EMERGENCY MEDICINE

## 2024-08-29 PROCEDURE — 85025 COMPLETE CBC W/AUTO DIFF WBC: CPT | Performed by: EMERGENCY MEDICINE

## 2024-08-29 PROCEDURE — 93010 ELECTROCARDIOGRAM REPORT: CPT | Performed by: EMERGENCY MEDICINE

## 2024-08-29 PROCEDURE — 80053 COMPREHEN METABOLIC PANEL: CPT | Performed by: EMERGENCY MEDICINE

## 2024-08-29 RX ORDER — KETOROLAC TROMETHAMINE 30 MG/ML
30 INJECTION, SOLUTION INTRAMUSCULAR; INTRAVENOUS ONCE
Status: COMPLETED | OUTPATIENT
Start: 2024-08-29 | End: 2024-08-29

## 2024-08-29 RX ORDER — OLANZAPINE 10 MG/2ML
10 INJECTION, POWDER, FOR SOLUTION INTRAMUSCULAR ONCE
Status: COMPLETED | OUTPATIENT
Start: 2024-08-29 | End: 2024-08-29

## 2024-08-29 RX ADMIN — OLANZAPINE 10 MG: 10 INJECTION, POWDER, FOR SOLUTION INTRAMUSCULAR at 12:27

## 2024-08-29 RX ADMIN — KETOROLAC TROMETHAMINE 30 MG: 30 INJECTION, SOLUTION INTRAMUSCULAR at 12:27

## 2024-08-29 RX ADMIN — SODIUM CHLORIDE 1000 ML: 9 INJECTION, SOLUTION INTRAVENOUS at 11:59

## 2024-08-29 ASSESSMENT — ACTIVITIES OF DAILY LIVING (ADL)
ADLS_ACUITY_SCORE: 37

## 2024-08-29 NOTE — ED PROVIDER NOTES
[facility] EMERGENCY DEPARTMENT ([facility])    8/29/24      ED PROVIDER NOTE     History     Chief Complaint   Patient presents with    Abdominal Pain    Vomiting     The history is provided by the patient and medical records.     Yun Coyle is a 48 year old female with history of type 2 diabetes, cyclic vomiting syndrome, polysubstance use disorder, hepatitis C who presents to the emergency department with abdominal pain, nausea and vomiting.  Patient was recently admitted for cyclic vomiting syndrome from 8/22-8/23/2024 with lactic acidosis, transaminitis, hypomagnesemia, hyperglycemia, starvation ketosis (Elevated AG 24, Ketones at 1.28, and PH wnl at 7.39 at admission--did not meet DKA criteria).  She was treated with IV fluids and zofran and compazine, lab abnormalities resolved, and patient was tolerating PO at time of discharge. Discharged with zofran and compazine, and patient has PTA zyprexa at home.  Patient states that she began to experience recurrent symptoms yesterday.  She reports some pain in her left upper quadrant.  She reports nausea and vomiting.  She states that she has vomited approximately 10 times and has been unable to tolerate fluids.  She denies any diarrhea.  No fever.  No dysuria, urgency, or frequency.  Patient denies recent marijuana use.  She has a history of prolonged QT previously.  She did not take any Zyprexa prior to coming to the emergency department.    Past Medical History  Past Medical History:   Diagnosis Date    Acute pancreatitis 06/29/2017    Anxiety     Cauda equina syndrome (H) 03/29/2013    Cyst of right ovary 11/16/2017    Depressive disorder     Gastroesophageal reflux disease     Hypertension     Hyponatremia 10/01/2023    Low back pain 08/20/2012    Overview:   History of cauda equina and surgery.      Type 2 diabetes mellitus (H)     Uncomplicated asthma      Past Surgical History:   Procedure Laterality Date    BACK SURGERY      x2     "CHOLECYSTECTOMY      ORTHOPEDIC SURGERY      SALPINGO OOPHORECTOMY,R/L/ELDA Right      albuterol (PROAIR HFA/PROVENTIL HFA/VENTOLIN HFA) 108 (90 Base) MCG/ACT inhaler  cloNIDine (CATAPRES) 0.1 MG tablet  glucagon 1 MG kit  glucose (BD GLUCOSE) 4 g chewable tablet  glucose 40 % (400 mg/mL) gel  losartan (COZAAR) 25 MG tablet  metFORMIN (GLUCOPHAGE) 500 MG tablet  methadone (DOLOPHINE-INTENSOL) 10 MG/ML (HIGH CONC) solution  OLANZapine (ZYPREXA) 10 MG tablet  ondansetron (ZOFRAN ODT) 4 MG ODT tab  predniSONE (DELTASONE) 20 MG tablet  prochlorperazine (COMPAZINE) 10 MG tablet  prochlorperazine (COMPAZINE) 25 MG suppository      No Known Allergies  Family History  Family History   Problem Relation Age of Onset    Breast Cancer Mother         in her 50s.    Diabetes Mother     Breast Cancer Maternal Grandmother         in her 40s.    Diabetes Maternal Grandmother      Social History   Social History     Tobacco Use    Smoking status: Never    Smokeless tobacco: Never   Vaping Use    Vaping status: Never Used   Substance Use Topics    Alcohol use: No    Drug use: Yes     Types: Marijuana     Comment: pt was taking Oxycontin \"years ago.\" pt is now on Methadone.        A medically appropriate review of systems was performed with pertinent positives and negatives noted in the HPI, and all other systems negative.    Physical Exam   BP: 132/88  Pulse: 86  Temp: 98.4  F (36.9  C)  Resp: 18  Weight: 92.5 kg (204 lb)  SpO2: 100 %  Physical Exam  Vitals and nursing note reviewed.   Constitutional:       General: She is not in acute distress.     Appearance: She is not diaphoretic.   HENT:      Head: Normocephalic and atraumatic.   Eyes:      Extraocular Movements: Extraocular movements intact.      Conjunctiva/sclera: Conjunctivae normal.   Cardiovascular:      Rate and Rhythm: Normal rate.      Heart sounds: Normal heart sounds.   Pulmonary:      Effort: Pulmonary effort is normal. No respiratory distress.      Breath sounds: " Normal breath sounds.   Abdominal:      Palpations: Abdomen is soft.      Tenderness: There is abdominal tenderness in the left upper quadrant. There is no guarding or rebound.   Musculoskeletal:         General: No tenderness. Normal range of motion.      Cervical back: Normal range of motion and neck supple.   Skin:     General: Skin is warm and dry.      Findings: No rash.   Neurological:      General: No focal deficit present.           ED Course, Procedures, & Data      Reviewed emergency department encounter and subsequent hospital admission on 8/22/2024 for cyclic vomiting, anion gap acidosis, ketosis.  Reviewed associated CBC, comprehensive metabolic panel, lipase, right upper quadrant ultrasound, chest radiograph.  Reviewed emergency room encounter from 7/28/2024 for cyclic vomiting.  Labs reassuring.  Discharged after Toradol and Zyprexa.    Procedures            EKG Interpretation:      Interpreted by BERNICE BAZZI MD, MD  Time reviewed: 1203  Symptoms at time of EKG: Vomiting  Rhythm: normal sinus   Rate: normal  Axis: normal  Ectopy: none  Conduction: normal  ST Segments/ T Waves: No ST-T wave changes  Q Waves: none  Comparison to prior: Unchanged    Clinical Impression: normal EKG        Results for orders placed or performed during the hospital encounter of 08/29/24   [place] Draw     Status: None    Narrative    The following orders were created for panel order [place] Draw.  Procedure                               Abnormality         Status                     ---------                               -----------         ------                     Extra Blue Top Tube[[id number]]                              Final result               Extra Red Top Tube[[id number]]                               Final result               Extra Green Top (Lithium...[[id number]]                      Final result               Extra Purple Top Tube[[id number]]                            Final result                 Please view  results for these tests on the individual orders.   Extra Blue Top Tube     Status: None   Result Value Ref Range    Hold Specimen [facility]    Extra Red Top Tube     Status: None   Result Value Ref Range    Hold Specimen hold    Extra Green Top (Lithium Heparin) Tube     Status: None   Result Value Ref Range    Hold Specimen hold    Extra Purple Top Tube     Status: None   Result Value Ref Range    Hold Specimen [facility]    Comprehensive metabolic panel     Status: Abnormal   Result Value Ref Range    Sodium 132 (L) 135 - 145 mmol/L    Potassium 4.7 3.4 - 5.3 mmol/L    Carbon Dioxide (CO2) 24 22 - 29 mmol/L    Anion Gap 10 7 - 15 mmol/L    Urea Nitrogen 19.3 6.0 - 20.0 mg/dL    Creatinine 0.70 0.51 - 0.95 mg/dL    GFR Estimate >90 >60 mL/min/1.73m2    Calcium 9.6 8.8 - 10.4 mg/dL    Chloride 98 98 - 107 mmol/L    Glucose 262 (H) 70 - 99 mg/dL    Alkaline Phosphatase 148 40 - 150 U/L    AST 79 (H) 0 - 45 U/L     (H) 0 - 50 U/L    Protein Total 8.2 6.4 - 8.3 g/dL    Albumin 4.0 3.5 - 5.2 g/dL    Bilirubin Total 0.2 <=1.2 mg/dL   Lipase     Status: Normal   Result Value Ref Range    Lipase 29 13 - 60 U/L   HCG quantitative pregnancy     Status: Normal   Result Value Ref Range    hCG Quantitative <1 <5 mIU/mL   UA with Microscopic reflex to Culture     Status: Abnormal    Specimen: Urine, Midstream   Result Value Ref Range    Color Urine Orange (A) Colorless, Straw, Light Yellow, Yellow    Appearance Urine Clear Clear    Glucose Urine >=1000 (A) Negative mg/dL    Bilirubin Urine Negative Negative    Ketones Urine Negative Negative mg/dL    Specific Gravity Urine 1.036 (H) 1.003 - 1.035    Blood Urine Negative Negative    pH Urine 5.5 5.0 - 7.0    Protein Albumin Urine Negative Negative mg/dL    Urobilinogen Urine Normal Normal, 2.0 mg/dL    Nitrite Urine Negative Negative    Leukocyte Esterase Urine Negative Negative    Bacteria Urine Moderate (A) None Seen /HPF    Mucus Urine Present (A) None Seen /LPF    RBC Urine 2 <=2  /HPF    WBC Urine 1 <=5 /HPF    Squamous Epithelials Urine 8 (H) <=1 /HPF    Narrative    Urine Culture not indicated   CBC with platelets and differential     Status: None   Result Value Ref Range    WBC Count 9.2 4.0 - 11.0 10e3/uL    RBC Count 4.30 3.80 - 5.20 10e6/uL    Hemoglobin 13.8 11.7 - 15.7 g/dL    Hematocrit 39.0 35.0 - 47.0 %    MCV 91 78 - 100 fL    MCH 32.1 26.5 - 33.0 pg    MCHC 35.4 31.5 - 36.5 g/dL    RDW 13.0 10.0 - 15.0 %    Platelet Count 216 150 - 450 10e3/uL    % Neutrophils 58 %    % Lymphocytes 29 %    % Monocytes 5 %    % Eosinophils 6 %    % Basophils 1 %    % Immature Granulocytes 1 %    NRBCs per 100 WBC 0 <1 /100    Absolute Neutrophils 5.4 1.6 - 8.3 10e3/uL    Absolute Lymphocytes 2.7 0.8 - 5.3 10e3/uL    Absolute Monocytes 0.5 0.0 - 1.3 10e3/uL    Absolute Eosinophils 0.5 0.0 - 0.7 10e3/uL    Absolute Basophils 0.1 0.0 - 0.2 10e3/uL    Absolute Immature Granulocytes 0.1 <=0.4 10e3/uL    Absolute NRBCs 0.0 10e3/uL   EKG 12-lead, tracing only     Status: None (Preliminary result)   Result Value Ref Range    Systolic Blood Pressure  mmHg    Diastolic Blood Pressure  mmHg    Ventricular Rate 72 BPM    Atrial Rate 72 BPM    ID Interval 140 ms    QRS Duration 88 ms     ms    QTc 453 ms    P Axis 49 degrees    R AXIS -21 degrees    T Axis 0 degrees    Interpretation ECG Sinus rhythm  Normal ECG      CBC with platelets differential     Status: None    Narrative    The following orders were created for panel order CBC with platelets differential.  Procedure                               Abnormality         Status                     ---------                               -----------         ------                     CBC with platelets and d...[[id number]]                      Final result                 Please view results for these tests on the individual orders.      1515-feeling better.  No ongoing nausea or vomiting.     Results for orders placed or performed during the hospital  encounter of 08/29/24   [place] Draw     Status: None    Narrative    The following orders were created for panel order [place] Draw.  Procedure                               Abnormality         Status                     ---------                               -----------         ------                     Extra Blue Top Tube[[id number]]                              Final result               Extra Red Top Tube[[id number]]                               Final result               Extra Green Top (Lithium...[[id number]]                      Final result               Extra Purple Top Tube[[id number]]                            Final result                 Please view results for these tests on the individual orders.   Extra Blue Top Tube     Status: None   Result Value Ref Range    Hold Specimen [facility]    Extra Red Top Tube     Status: None   Result Value Ref Range    Hold Specimen hold    Extra Green Top (Lithium Heparin) Tube     Status: None   Result Value Ref Range    Hold Specimen hold    Extra Purple Top Tube     Status: None   Result Value Ref Range    Hold Specimen [facility]    Comprehensive metabolic panel     Status: Abnormal   Result Value Ref Range    Sodium 132 (L) 135 - 145 mmol/L    Potassium 4.7 3.4 - 5.3 mmol/L    Carbon Dioxide (CO2) 24 22 - 29 mmol/L    Anion Gap 10 7 - 15 mmol/L    Urea Nitrogen 19.3 6.0 - 20.0 mg/dL    Creatinine 0.70 0.51 - 0.95 mg/dL    GFR Estimate >90 >60 mL/min/1.73m2    Calcium 9.6 8.8 - 10.4 mg/dL    Chloride 98 98 - 107 mmol/L    Glucose 262 (H) 70 - 99 mg/dL    Alkaline Phosphatase 148 40 - 150 U/L    AST 79 (H) 0 - 45 U/L     (H) 0 - 50 U/L    Protein Total 8.2 6.4 - 8.3 g/dL    Albumin 4.0 3.5 - 5.2 g/dL    Bilirubin Total 0.2 <=1.2 mg/dL   Lipase     Status: Normal   Result Value Ref Range    Lipase 29 13 - 60 U/L   HCG quantitative pregnancy     Status: Normal   Result Value Ref Range    hCG Quantitative <1 <5 mIU/mL   UA with Microscopic reflex to Culture     Status:  Abnormal    Specimen: Urine, Midstream   Result Value Ref Range    Color Urine Orange (A) Colorless, Straw, Light Yellow, Yellow    Appearance Urine Clear Clear    Glucose Urine >=1000 (A) Negative mg/dL    Bilirubin Urine Negative Negative    Ketones Urine Negative Negative mg/dL    Specific Gravity Urine 1.036 (H) 1.003 - 1.035    Blood Urine Negative Negative    pH Urine 5.5 5.0 - 7.0    Protein Albumin Urine Negative Negative mg/dL    Urobilinogen Urine Normal Normal, 2.0 mg/dL    Nitrite Urine Negative Negative    Leukocyte Esterase Urine Negative Negative    Bacteria Urine Moderate (A) None Seen /HPF    Mucus Urine Present (A) None Seen /LPF    RBC Urine 2 <=2 /HPF    WBC Urine 1 <=5 /HPF    Squamous Epithelials Urine 8 (H) <=1 /HPF    Narrative    Urine Culture not indicated   CBC with platelets and differential     Status: None   Result Value Ref Range    WBC Count 9.2 4.0 - 11.0 10e3/uL    RBC Count 4.30 3.80 - 5.20 10e6/uL    Hemoglobin 13.8 11.7 - 15.7 g/dL    Hematocrit 39.0 35.0 - 47.0 %    MCV 91 78 - 100 fL    MCH 32.1 26.5 - 33.0 pg    MCHC 35.4 31.5 - 36.5 g/dL    RDW 13.0 10.0 - 15.0 %    Platelet Count 216 150 - 450 10e3/uL    % Neutrophils 58 %    % Lymphocytes 29 %    % Monocytes 5 %    % Eosinophils 6 %    % Basophils 1 %    % Immature Granulocytes 1 %    NRBCs per 100 WBC 0 <1 /100    Absolute Neutrophils 5.4 1.6 - 8.3 10e3/uL    Absolute Lymphocytes 2.7 0.8 - 5.3 10e3/uL    Absolute Monocytes 0.5 0.0 - 1.3 10e3/uL    Absolute Eosinophils 0.5 0.0 - 0.7 10e3/uL    Absolute Basophils 0.1 0.0 - 0.2 10e3/uL    Absolute Immature Granulocytes 0.1 <=0.4 10e3/uL    Absolute NRBCs 0.0 10e3/uL   EKG 12-lead, tracing only     Status: None (Preliminary result)   Result Value Ref Range    Systolic Blood Pressure  mmHg    Diastolic Blood Pressure  mmHg    Ventricular Rate 72 BPM    Atrial Rate 72 BPM    AL Interval 140 ms    QRS Duration 88 ms     ms    QTc 453 ms    P Axis 49 degrees    R AXIS -21  degrees    T Axis 0 degrees    Interpretation ECG Sinus rhythm  Normal ECG      CBC with platelets differential     Status: None    Narrative    The following orders were created for panel order CBC with platelets differential.  Procedure                               Abnormality         Status                     ---------                               -----------         ------                     CBC with platelets and d...[[id number]]                      Final result                 Please view results for these tests on the individual orders.     Medications   sodium chloride 0.9% BOLUS 1,000 mL (0 mLs Intravenous Stopped 8/29/24 1403)   ketorolac (TORADOL) injection 30 mg (30 mg Intravenous $Given 8/29/24 1227)   OLANZapine (zyPREXA) injection 10 mg (10 mg Intramuscular $Given 8/29/24 1227)     Labs Ordered and Resulted from Time of ED Arrival to Time of ED Departure   COMPREHENSIVE METABOLIC PANEL - Abnormal       Result Value    Sodium 132 (*)     Potassium 4.7      Carbon Dioxide (CO2) 24      Anion Gap 10      Urea Nitrogen 19.3      Creatinine 0.70      GFR Estimate >90      Calcium 9.6      Chloride 98      Glucose 262 (*)     Alkaline Phosphatase 148      AST 79 (*)      (*)     Protein Total 8.2      Albumin 4.0      Bilirubin Total 0.2     ROUTINE UA WITH MICROSCOPIC REFLEX TO CULTURE - Abnormal    Color Urine Orange (*)     Appearance Urine Clear      Glucose Urine >=1000 (*)     Bilirubin Urine Negative      Ketones Urine Negative      Specific Gravity Urine 1.036 (*)     Blood Urine Negative      pH Urine 5.5      Protein Albumin Urine Negative      Urobilinogen Urine Normal      Nitrite Urine Negative      Leukocyte Esterase Urine Negative      Bacteria Urine Moderate (*)     Mucus Urine Present (*)     RBC Urine 2      WBC Urine 1      Squamous Epithelials Urine 8 (*)    LIPASE - Normal    Lipase 29     HCG QUANTITATIVE PREGNANCY - Normal    hCG Quantitative <1     CBC WITH PLATELETS AND  DIFFERENTIAL    WBC Count 9.2      RBC Count 4.30      Hemoglobin 13.8      Hematocrit 39.0      MCV 91      MCH 32.1      MCHC 35.4      RDW 13.0      Platelet Count 216      % Neutrophils 58      % Lymphocytes 29      % Monocytes 5      % Eosinophils 6      % Basophils 1      % Immature Granulocytes 1      NRBCs per 100 WBC 0      Absolute Neutrophils 5.4      Absolute Lymphocytes 2.7      Absolute Monocytes 0.5      Absolute Eosinophils 0.5      Absolute Basophils 0.1      Absolute Immature Granulocytes 0.1      Absolute NRBCs 0.0       No orders to display          Critical care was not performed.     Medical Decision Making  The patient's presentation was of moderate complexity (a chronic illness mild to moderate exacerbation, progression, or side effect of treatment).    The patient's evaluation involved:  review of external note(s) from 2 sources (see separate area of note for details)  review of 3+ test result(s) ordered prior to this encounter (see separate area of note for details)  ordering and/or review of 3+ test(s) in this encounter (see separate area of note for details)  independent interpretation of testing performed by another health professional (EKG interpreted by me with above results.)    The patient's management necessitated moderate risk (prescription drug management including medications given in the ED).    Assessment & Plan    48 year old female with history of cyclic vomiting, type 2 diabetes to the emergency department with left upper quadrant abdominal pain with associated nausea and vomiting.  Symptoms most consistent with recurrent cyclic vomiting syndrome.  Differential also includes acute pancreatitis, gastritis, bowel obstruction, pyelonephritis, ketoacidosis.  Patient has a reassuring abdominal examination with no focal abdominal tenderness.  She was treated with her usual medications including IM Zyprexa and Toradol with resolution of her symptoms.  Patient's labs are reassuring  with only hyperglycemia to 262.  She has mild transaminitis with values consistent with previous results.  Lipase not elevated so do not suspect acute pancreatitis.  Patient tolerating p.o.  She appears appropriate for outpatient management.  Will discharged to home with primary care follow-up.  Return precautions provided.    I have reviewed the nursing notes. I have reviewed the findings, diagnosis, plan and need for follow up with the patient.    New Prescriptions    No medications on file       Final diagnoses:   Nausea and vomiting, unspecified vomiting type     Chart documentation was completed with Dragon voice-recognition software. Even though reviewed, this chart may still contain some grammatical, spelling, and word errors.     [facility] EMERGENCY DEPARTMENT  8/29/2024     Anderson Blas MD  08/29/24 3843

## 2024-08-29 NOTE — ED TRIAGE NOTES
Triage Assessment (Adult)       Row Name 08/29/24 1055          Triage Assessment    Airway WDL WDL        Respiratory WDL    Respiratory WDL WDL        Skin Circulation/Temperature WDL    Skin Circulation/Temperature WDL WDL        Cardiac WDL    Cardiac WDL WDL        Peripheral/Neurovascular WDL    Peripheral Neurovascular WDL WDL        Cognitive/Neuro/Behavioral WDL    Cognitive/Neuro/Behavioral WDL WDL

## 2024-08-29 NOTE — ED TRIAGE NOTES
Reports onset of nausea yesterday and onset of abdominal pain this AM to the LU[facility], reports vomiting over 10 times and unable to keep liquids down.  Denies diarrhea.  Reports normal BM yesterday.  Reports yesterday was a normal regular eating day for her. Denies sick contacts.      Current pain level is 7/10 and sharp in nature.       Pt states she has a hx of cyclic vomiting and that this is similar.  The medication that helps her sx is zyprexa IM.

## 2024-08-29 NOTE — PROGRESS NOTES
Clinic Care Coordination Contact  Care Coordination Clinician Chart Review    Situation: Patient chart reviewed by care coordinator.    Background: Clinic Care Coordination Referral received from inpatient care team for transition handoff communication following hospital admission.    Assessment: Upon chart review, patient is not a candidate for Primary Care Clinic Care Coordination enrollment due to reason stated below:  Patient is back in the ER.    Plan/Recommendations: Clinic Care Coordination Referral/order cancelled. RN/SW CC will perform no further monitoring/outreaches at this time and will remain available as needed. If new needs arise, a new Care Coordination Referral may be placed.    MADDY BoboN, RN, PHN   Care Coordinator-Ambulatory Care Management  [facility]  [phone]

## 2024-09-03 ENCOUNTER — PATIENT OUTREACH (OUTPATIENT)
Dept: CARE COORDINATION | Facility: CLINIC | Age: 49
End: 2024-09-03
Payer: COMMERCIAL

## 2024-09-03 NOTE — PROGRESS NOTES
[facility] Care Resource Center Contact  [facility]/Voicemail     Clinical Data: Care Coordination ED-sourced Outreach-     Outreach attempted x 2.  Left message on patient's voicemail, providing [facility]'s 24/7 scheduling and nurse triage phone number [phone] ([phone]) for questions/concerns and/or to schedule an appt with an [facility] provider.      [facility] unable to send Care Coordination introduction letter as patient does not have an active MyChart account. Clinic Care Coordination services remain available via referral if needed.    Plan: [facility] will do no further outreaches at this time.       DAI Lara  [facility] Care Resource [place], [facility]    *Connected Care Resource Team does NOT follow patient ongoing. Referrals are identified based on internal discharge reports and the outreach is to ensure patient has an understanding of their discharge instructions.

## 2024-09-15 ENCOUNTER — HOSPITAL ENCOUNTER (EMERGENCY)
Facility: CLINIC | Age: 49
Discharge: HOME | End: 2024-09-15
Attending: EMERGENCY MEDICINE | Admitting: EMERGENCY MEDICINE
Payer: COMMERCIAL

## 2024-09-15 VITALS
HEART RATE: 108 BPM | TEMPERATURE: 98.3 F | BODY MASS INDEX: 29.76 KG/M2 | OXYGEN SATURATION: 98 % | SYSTOLIC BLOOD PRESSURE: 97 MMHG | WEIGHT: 200.9 LBS | RESPIRATION RATE: 16 BRPM | DIASTOLIC BLOOD PRESSURE: 77 MMHG | HEIGHT: 69 IN

## 2024-09-15 DIAGNOSIS — R11.2 NAUSEA AND VOMITING, UNSPECIFIED VOMITING TYPE: ICD-10-CM

## 2024-09-15 LAB
ALBUMIN SERPL BCG-MCNC: 4.2 G/DL (ref 3.5–5.2)
ALBUMIN UR-MCNC: 20 MG/DL
ALP SERPL-CCNC: 140 U/L (ref 40–150)
ALT SERPL W P-5'-P-CCNC: 234 U/L (ref 0–50)
ANION GAP SERPL CALCULATED.3IONS-SCNC: 14 MMOL/L (ref 7–15)
APPEARANCE UR: ABNORMAL
AST SERPL W P-5'-P-CCNC: 169 U/L (ref 0–45)
BACTERIA #/AREA URNS HPF: ABNORMAL /HPF
BASOPHILS # BLD AUTO: 0 10E3/UL (ref 0–0.2)
BASOPHILS NFR BLD AUTO: 1 %
BILIRUB SERPL-MCNC: 0.5 MG/DL
BILIRUB UR QL STRIP: NEGATIVE
BUN SERPL-MCNC: 14.7 MG/DL (ref 6–20)
CALCIUM SERPL-MCNC: 10.1 MG/DL (ref 8.8–10.4)
CHLORIDE SERPL-SCNC: 100 MMOL/L (ref 98–107)
COLOR UR AUTO: YELLOW
CREAT SERPL-MCNC: 1.07 MG/DL (ref 0.51–0.95)
EGFRCR SERPLBLD CKD-EPI 2021: 65 ML/MIN/1.73M2
EOSINOPHIL # BLD AUTO: 0.4 10E3/UL (ref 0–0.7)
EOSINOPHIL NFR BLD AUTO: 6 %
ERYTHROCYTE [DISTWIDTH] IN BLOOD BY AUTOMATED COUNT: 12.4 % (ref 10–15)
GLUCOSE SERPL-MCNC: 281 MG/DL (ref 70–99)
GLUCOSE UR STRIP-MCNC: 500 MG/DL
HCG SERPL QL: NEGATIVE
HCO3 SERPL-SCNC: 25 MMOL/L (ref 22–29)
HCT VFR BLD AUTO: 42.9 % (ref 35–47)
HGB BLD-MCNC: 15.2 G/DL (ref 11.7–15.7)
HGB UR QL STRIP: NEGATIVE
HYALINE CASTS: 30 /LPF
IMM GRANULOCYTES # BLD: 0.1 10E3/UL
IMM GRANULOCYTES NFR BLD: 1 %
KETONES UR STRIP-MCNC: ABNORMAL MG/DL
LEUKOCYTE ESTERASE UR QL STRIP: ABNORMAL
LIPASE SERPL-CCNC: 26 U/L (ref 13–60)
LYMPHOCYTES # BLD AUTO: 3.2 10E3/UL (ref 0.8–5.3)
LYMPHOCYTES NFR BLD AUTO: 41 %
MCH RBC QN AUTO: 32.5 PG (ref 26.5–33)
MCHC RBC AUTO-ENTMCNC: 35.4 G/DL (ref 31.5–36.5)
MCV RBC AUTO: 92 FL (ref 78–100)
MONOCYTES # BLD AUTO: 0.5 10E3/UL (ref 0–1.3)
MONOCYTES NFR BLD AUTO: 6 %
MUCOUS THREADS #/AREA URNS LPF: PRESENT /LPF
NEUTROPHILS # BLD AUTO: 3.5 10E3/UL (ref 1.6–8.3)
NEUTROPHILS NFR BLD AUTO: 45 %
NITRATE UR QL: NEGATIVE
NRBC # BLD AUTO: 0 10E3/UL
NRBC BLD AUTO-RTO: 0 /100
PH UR STRIP: 6 [PH] (ref 5–7)
PLATELET # BLD AUTO: 236 10E3/UL (ref 150–450)
POTASSIUM SERPL-SCNC: 4 MMOL/L (ref 3.4–5.3)
PROT SERPL-MCNC: 8.8 G/DL (ref 6.4–8.3)
RBC # BLD AUTO: 4.68 10E6/UL (ref 3.8–5.2)
RBC URINE: 1 /HPF
SODIUM SERPL-SCNC: 139 MMOL/L (ref 135–145)
SP GR UR STRIP: 1.02 (ref 1–1.03)
SQUAMOUS EPITHELIAL: 24 /HPF
UROBILINOGEN UR STRIP-MCNC: NORMAL MG/DL
WBC # BLD AUTO: 7.7 10E3/UL (ref 4–11)
WBC URINE: 6 /HPF

## 2024-09-15 PROCEDURE — 85025 COMPLETE CBC W/AUTO DIFF WBC: CPT | Performed by: EMERGENCY MEDICINE

## 2024-09-15 PROCEDURE — 81001 URINALYSIS AUTO W/SCOPE: CPT | Performed by: EMERGENCY MEDICINE

## 2024-09-15 PROCEDURE — 250N000009 HC RX 250: Performed by: EMERGENCY MEDICINE

## 2024-09-15 PROCEDURE — 84703 CHORIONIC GONADOTROPIN ASSAY: CPT | Performed by: EMERGENCY MEDICINE

## 2024-09-15 PROCEDURE — 258N000003 HC RX IP 258 OP 636: Performed by: EMERGENCY MEDICINE

## 2024-09-15 PROCEDURE — 96361 HYDRATE IV INFUSION ADD-ON: CPT | Performed by: EMERGENCY MEDICINE

## 2024-09-15 PROCEDURE — 83690 ASSAY OF LIPASE: CPT | Performed by: EMERGENCY MEDICINE

## 2024-09-15 PROCEDURE — 82247 BILIRUBIN TOTAL: CPT | Performed by: EMERGENCY MEDICINE

## 2024-09-15 PROCEDURE — 36415 COLL VENOUS BLD VENIPUNCTURE: CPT | Performed by: EMERGENCY MEDICINE

## 2024-09-15 PROCEDURE — 96374 THER/PROPH/DIAG INJ IV PUSH: CPT | Performed by: EMERGENCY MEDICINE

## 2024-09-15 PROCEDURE — 96375 TX/PRO/DX INJ NEW DRUG ADDON: CPT | Performed by: EMERGENCY MEDICINE

## 2024-09-15 PROCEDURE — 99284 EMERGENCY DEPT VISIT MOD MDM: CPT | Performed by: EMERGENCY MEDICINE

## 2024-09-15 PROCEDURE — 250N000011 HC RX IP 250 OP 636: Performed by: EMERGENCY MEDICINE

## 2024-09-15 PROCEDURE — 99284 EMERGENCY DEPT VISIT MOD MDM: CPT | Mod: 25 | Performed by: EMERGENCY MEDICINE

## 2024-09-15 RX ORDER — ONDANSETRON 2 MG/ML
4 INJECTION INTRAMUSCULAR; INTRAVENOUS EVERY 30 MIN PRN
Status: DISCONTINUED | OUTPATIENT
Start: 2024-09-15 | End: 2024-09-16 | Stop reason: HOSPADM

## 2024-09-15 RX ORDER — OMEPRAZOLE 40 MG/1
40 CAPSULE, DELAYED RELEASE ORAL DAILY
Qty: 30 CAPSULE | Refills: 0 | Status: SHIPPED | OUTPATIENT
Start: 2024-09-15 | End: 2024-10-15

## 2024-09-15 RX ORDER — SODIUM CHLORIDE 9 MG/ML
INJECTION, SOLUTION INTRAVENOUS CONTINUOUS
Status: DISCONTINUED | OUTPATIENT
Start: 2024-09-15 | End: 2024-09-16 | Stop reason: HOSPADM

## 2024-09-15 RX ORDER — SUCRALFATE 1 G/1
1 TABLET ORAL 4 TIMES DAILY
Qty: 28 TABLET | Refills: 0 | Status: SHIPPED | OUTPATIENT
Start: 2024-09-15 | End: 2024-09-22

## 2024-09-15 RX ADMIN — SODIUM CHLORIDE 1000 ML: 9 INJECTION, SOLUTION INTRAVENOUS at 18:45

## 2024-09-15 RX ADMIN — PANTOPRAZOLE SODIUM 40 MG: 40 INJECTION, POWDER, FOR SOLUTION INTRAVENOUS at 18:45

## 2024-09-15 RX ADMIN — ONDANSETRON 4 MG: 2 INJECTION INTRAMUSCULAR; INTRAVENOUS at 18:44

## 2024-09-15 RX ADMIN — SODIUM CHLORIDE: 9 INJECTION, SOLUTION INTRAVENOUS at 20:03

## 2024-09-15 ASSESSMENT — ACTIVITIES OF DAILY LIVING (ADL)
ADLS_ACUITY_SCORE: 37
ADLS_ACUITY_SCORE: 35
ADLS_ACUITY_SCORE: 37

## 2024-09-15 NOTE — ED TRIAGE NOTES
"Pt states she is here for left lower abdominal pain and N/V for last 30 hours. Pt states she has \"cyclic vomiting syndrome\" and this happens  3-4 at times a month. Pt states she is normally able to manage it at home but her Primary Care Provider told her to go to ER due to hx of dehydration.        "

## 2024-09-15 NOTE — ED PROVIDER NOTES
ED PROVIDER NOTE  [facility]  EAST AND [facility]      History     Chief Complaint   Patient presents with    Abdominal Pain     HPI  Yun Coyle is a 48 year old female who presents to the emergency departmnet with approximately 2 days worth of nausea and vomiting and states that she has a history of cyclical vomiting syndrome.  Patient states that she has been treated successfully in the past with IV fluids, Zofran, and droperidol, and states that these symptoms are an exacerbation of her usual vomiting syndrome.  The patient denies any new fevers, any new UTI symptoms, any melena or bright blood per rectum, and denies any diarrhea.  Previous abdominal CT done earlier this year revealed no evidence of diverticuli.    This part of the medical record was transcribed by Vidhya York Medical Scribe, from a dictation done by Calvin Davis MD.     I have reviewed the Medications, Allergies, Past Medical and Surgical History, and Social History in the [employer] system.    Past Medical History:   Diagnosis Date    Acute pancreatitis 06/29/2017    Anxiety     Cauda equina syndrome (H) 03/29/2013    Cyst of right ovary 11/16/2017    Depressive disorder     Gastroesophageal reflux disease     Hypertension     Hyponatremia 10/01/2023    Low back pain 08/20/2012    Overview:   History of cauda equina and surgery.      Type 2 diabetes mellitus (H)     Uncomplicated asthma        Past Surgical History:   Procedure Laterality Date    BACK SURGERY      x2    CHOLECYSTECTOMY      ORTHOPEDIC SURGERY      SALPINGO OOPHORECTOMY,R/L/ELDA Right          Dose / Directions   albuterol 108 (90 Base) MCG/ACT inhaler  Commonly known as: PROAIR HFA/PROVENTIL HFA/VENTOLIN HFA  Used for: Moderate persistent asthma with exacerbation      Dose: 2 puff  Inhale 2 puffs into the lungs every 4 hours as needed for shortness of breath or wheezing  Quantity: 18 g  Refills: 11     cloNIDine 0.1 MG tablet  Commonly known as: CATAPRES  Used  for: Secondary hypertension      Dose: 0.1 mg  Take 1 tablet (0.1 mg) by mouth 2 times daily  Quantity: 180 tablet  Refills: 3     glucagon 1 MG kit  Used for: Type 2 diabetes mellitus without complication, unspecified whether long term insulin use (H)      Dose: 1 mg  Inject 1 mg Subcutaneous once as needed for low blood sugar (Use as directed by provider)  Quantity: 1 each  Refills: 0     * glucose 4 g chewable tablet  Commonly known as: BD GLUCOSE  Used for: Type 2 diabetes mellitus without complication, unspecified whether long term insulin use (H)      Dose: 4 tablet  Take 4 tablets by mouth every 15 minutes as needed for low blood sugar  Quantity: 50 tablet  Refills: 0     * glucose 40 % (400 mg/mL) gel  Used for: Type 2 diabetes mellitus without complication, unspecified whether long term insulin use (H)      15 g every 15 minutes by mouth as needed for low blood sugar.  Oral gel is preferable for conscious and able to swallow patient.  Quantity: 112.5 g  Refills: 0     losartan 25 MG tablet  Commonly known as: COZAAR  Used for: Secondary hypertension      Dose: 25 mg  Take 1 tablet (25 mg) by mouth daily  Quantity: 90 tablet  Refills: 3     metFORMIN 500 MG tablet  Commonly known as: GLUCOPHAGE  Used for: Type 2 diabetes mellitus with hyperglycemia, with long-term current use of insulin (H)      Dose: 1,000 mg  Take 2 tablets (1,000 mg) by mouth 2 times daily (with meals)  Quantity: 360 tablet  Refills: 3     methadone 10 MG/ML (HIGH CONC) solution  Commonly known as: DOLOPHINE-INTENSOL      Dose: 140 mg  Take 140 mg by mouth daily  Refills: 0     OLANZapine 10 MG tablet  Commonly known as: zyPREXA  Used for: Severe episode of recurrent major depressive disorder, without psychotic features (H), Cyclic vomiting syndrome      Dose: 10 mg  Take 1 tablet (10 mg) by mouth at bedtime  Quantity: 90 tablet  Refills: 1     ondansetron 4 MG ODT tab  Commonly known as: ZOFRAN ODT  Used for: Cyclic vomiting syndrome    "   Dose: 4 mg  Take 1 tablet (4 mg) by mouth every 6 hours as needed for nausea or vomiting.  Quantity: 30 tablet  Refills: 0     predniSONE 20 MG tablet  Commonly known as: DELTASONE  Used for: Moderate persistent asthma with exacerbation      Take two tablets (= 40mg) each day for 5 (five) days if in asthma exacerbation.  Refills: 0     prochlorperazine 10 MG tablet  Commonly known as: COMPAZINE  Used for: Cyclic vomiting syndrome      Dose: 10 mg  Take 1 tablet (10 mg) by mouth every 6 hours as needed for nausea or vomiting.  Quantity: 30 tablet  Refills: 0     prochlorperazine 25 MG suppository  Commonly known as: COMPAZINE  Used for: Cyclic vomiting syndrome      Dose: 25 mg  Place 1 suppository (25 mg) rectally every 12 hours as needed for nausea or vomiting  Quantity: 24 suppository  Refills: 0             Past medical history, past surgical history, medications, and allergies were reviewed with the patient. Additional pertinent items: None    Family History   Problem Relation Age of Onset    Breast Cancer Mother         in her 50s.    Diabetes Mother     Breast Cancer Maternal Grandmother         in her 40s.    Diabetes Maternal Grandmother        Social History     Tobacco Use    Smoking status: Never    Smokeless tobacco: Never   Substance Use Topics    Alcohol use: No     Social history was reviewed with the patient. Additional pertinent items: None    No Known Allergies    Review of Systems  A medically appropriate review of systems was performed with pertinent positives and negatives noted in the HPI, and all other systems negative.    Physical Exam   BP: (!) 126/94  Pulse: 108  Temp: 98.3  F (36.8  C)  Resp: 18  Height: 175.3 cm (5' 9\")  Weight: 91.1 kg (200 lb 14.4 oz)  SpO2: 98 %      Physical Exam  Vitals and nursing note reviewed.   Constitutional:       Comments: Conversant but nauseated   HENT:      Head: Atraumatic.   Eyes:      Extraocular Movements: Extraocular movements intact.      Pupils: " Pupils are equal, round, and reactive to light.   Cardiovascular:      Rate and Rhythm: Regular rhythm.   Pulmonary:      Breath sounds: Normal breath sounds.   Abdominal:      Comments: And has epigastric tenderness to palpation   Neurological:      General: No focal deficit present.      Mental Status: She is alert and oriented to person, place, and time.   Psychiatric:         Mood and Affect: Mood normal.         ED Course     Orders Placed This Encounter   Procedures    Comprehensive metabolic panel    Lipase    HCG qualitative Blood    UA with Microscopic reflex to Culture    CBC with platelets and differential    Peripheral IV: Standard    CBC with platelets differential          Procedures         Medications   sodium chloride 0.9 % infusion ( Intravenous $New Bag 9/15/24 2003)   ondansetron (ZOFRAN) injection 4 mg (4 mg Intravenous $Given 9/15/24 1844)   sodium chloride 0.9% BOLUS 1,000 mL (0 mLs Intravenous Stopped 9/15/24 1956)   pantoprazole (PROTONIX) IV push injection 40 mg (40 mg Intravenous $Given 9/15/24 1845)         Results for orders placed or performed during the hospital encounter of 09/15/24 (from the past 24 hour(s))   CBC with platelets differential    Narrative    The following orders were created for panel order CBC with platelets differential.  Procedure                               Abnormality         Status                     ---------                               -----------         ------                     CBC with platelets and d...[[id number]]                      Final result                 Please view results for these tests on the individual orders.   Comprehensive metabolic panel   Result Value Ref Range    Sodium 139 135 - 145 mmol/L    Potassium 4.0 3.4 - 5.3 mmol/L    Carbon Dioxide (CO2) 25 22 - 29 mmol/L    Anion Gap 14 7 - 15 mmol/L    Urea Nitrogen 14.7 6.0 - 20.0 mg/dL    Creatinine 1.07 (H) 0.51 - 0.95 mg/dL    GFR Estimate 65 >60 mL/min/1.73m2    Calcium 10.1 8.8  - 10.4 mg/dL    Chloride 100 98 - 107 mmol/L    Glucose 281 (H) 70 - 99 mg/dL    Alkaline Phosphatase 140 40 - 150 U/L     (H) 0 - 45 U/L     (H) 0 - 50 U/L    Protein Total 8.8 (H) 6.4 - 8.3 g/dL    Albumin 4.2 3.5 - 5.2 g/dL    Bilirubin Total 0.5 <=1.2 mg/dL   Lipase   Result Value Ref Range    Lipase 26 13 - 60 U/L   HCG qualitative Blood   Result Value Ref Range    hCG Serum Qualitative Negative Negative   CBC with platelets and differential   Result Value Ref Range    WBC Count 7.7 4.0 - 11.0 10e3/uL    RBC Count 4.68 3.80 - 5.20 10e6/uL    Hemoglobin 15.2 11.7 - 15.7 g/dL    Hematocrit 42.9 35.0 - 47.0 %    MCV 92 78 - 100 fL    MCH 32.5 26.5 - 33.0 pg    MCHC 35.4 31.5 - 36.5 g/dL    RDW 12.4 10.0 - 15.0 %    Platelet Count 236 150 - 450 10e3/uL    % Neutrophils 45 %    % Lymphocytes 41 %    % Monocytes 6 %    % Eosinophils 6 %    % Basophils 1 %    % Immature Granulocytes 1 %    NRBCs per 100 WBC 0 <1 /100    Absolute Neutrophils 3.5 1.6 - 8.3 10e3/uL    Absolute Lymphocytes 3.2 0.8 - 5.3 10e3/uL    Absolute Monocytes 0.5 0.0 - 1.3 10e3/uL    Absolute Eosinophils 0.4 0.0 - 0.7 10e3/uL    Absolute Basophils 0.0 0.0 - 0.2 10e3/uL    Absolute Immature Granulocytes 0.1 <=0.4 10e3/uL    Absolute NRBCs 0.0 10e3/uL   UA with Microscopic reflex to Culture    Specimen: Urine, Clean Catch   Result Value Ref Range    Color Urine Yellow Colorless, Straw, Light Yellow, Yellow    Appearance Urine Slightly Cloudy (A) Clear    Glucose Urine 500 (A) Negative mg/dL    Bilirubin Urine Negative Negative    Ketones Urine Trace (A) Negative mg/dL    Specific Gravity Urine 1.023 1.003 - 1.035    Blood Urine Negative Negative    pH Urine 6.0 5.0 - 7.0    Protein Albumin Urine 20 (A) Negative mg/dL    Urobilinogen Urine Normal Normal, 2.0 mg/dL    Nitrite Urine Negative Negative    Leukocyte Esterase Urine Trace (A) Negative    Bacteria Urine Few (A) None Seen /HPF    Mucus Urine Present (A) None Seen /LPF    RBC  Urine 1 <=2 /HPF    WBC Urine 6 (H) <=5 /HPF    Squamous Epithelials Urine 24 (H) <=1 /HPF    Hyaline Casts Urine 30 (H) <=2 /LPF    Narrative    Urine Culture not indicated       After meds and fluids above the patient felt much better and able to tolerate oral liquids here in the ER and will be sent home.       Critical care was not performed.     Medical Decision Making  The patient's presentation was of moderate complexity (a chronic illness mild to moderate exacerbation, progression, or side effect of treatment).    The patient's evaluation involved:  ordering and/or review of 3+ test(s) in this encounter (see above)    The patient's management necessitated moderate risk (prescription drug management including medications given in the ED).      Assessments & Plan (with Medical Decision Making)     I have reviewed the nursing notes.    I have reviewed the findings, diagnosis, plan and need for follow up with the patient.      New Prescriptions    OMEPRAZOLE (PRILOSEC) 40 MG DR CAPSULE    Take 1 capsule (40 mg) by mouth daily.    SUCRALFATE (CARAFATE) 1 GM TABLET    Take 1 tablet (1 g) by mouth 4 times daily for 7 days.       Final diagnoses:   Nausea and vomiting, unspecified vomiting type     Home to rest tonight.    Fill your prescriptions and take as directed    Please make an appointment to follow up with Your Primary Care Provider in one week for recheck.      JUWAN LAND MD    9/15/2024   [facility] EMERGENCY DEPARTMENT       Juwan Land MD  09/15/24 2296

## 2024-09-16 NOTE — ED NOTES
Pt was in the bathroom for close to 10 minutes. Pt looked very drowsy after coming out of the bathroom, falling asleep on during conversation with staff.  D/x polysubstance us, Pt was asked if she used any drugs and denied. Pt was placed on the monitor. Provider aware

## 2024-09-16 NOTE — DISCHARGE INSTRUCTIONS
Home to rest tonight.    Fill your prescriptions and take as directed    Please make an appointment to follow up with Your Primary Care Provider in one week for recheck.

## 2024-09-17 ENCOUNTER — PATIENT OUTREACH (OUTPATIENT)
Dept: CARE COORDINATION | Facility: CLINIC | Age: 49
End: 2024-09-17
Payer: COMMERCIAL

## 2024-09-17 NOTE — PROGRESS NOTES
[facility] Contact  RUST/Voicemail     Clinical Data: Care Coordination ED-sourced Outreach-     Outreach attempted x 2.  Left message on patient's voicemail, providing [facility]'s 24/7 scheduling and nurse triage phone number [phone] ([phone]) for questions/concerns and/or to schedule an appt with an [facility] provider.      [facility] unable to send Care Coordination introduction letter as patient does not have an active MyChart account. Clinic Care Coordination services remain available via referral if needed.    Plan: [facility] will do no further outreaches at this time.       DAI Lara  [facility] [place], [facility]    *Connected Care Resource Team does NOT follow patient ongoing. Referrals are identified based on internal discharge reports and the outreach is to ensure patient has an understanding of their discharge instructions.

## 2024-09-24 ENCOUNTER — HOSPITAL ENCOUNTER (EMERGENCY)
Facility: CLINIC | Age: 49
Discharge: HOME | End: 2024-09-24
Attending: EMERGENCY MEDICINE | Admitting: EMERGENCY MEDICINE
Payer: COMMERCIAL

## 2024-09-24 VITALS
RESPIRATION RATE: 16 BRPM | BODY MASS INDEX: 29.71 KG/M2 | SYSTOLIC BLOOD PRESSURE: 137 MMHG | TEMPERATURE: 98.1 F | HEART RATE: 81 BPM | OXYGEN SATURATION: 98 % | WEIGHT: 201.2 LBS | DIASTOLIC BLOOD PRESSURE: 82 MMHG

## 2024-09-24 DIAGNOSIS — R11.2 NAUSEA AND VOMITING: ICD-10-CM

## 2024-09-24 LAB
ALBUMIN SERPL BCG-MCNC: 4.1 G/DL (ref 3.5–5.2)
ALBUMIN UR-MCNC: NEGATIVE MG/DL
ALP SERPL-CCNC: 148 U/L (ref 40–150)
ALT SERPL W P-5'-P-CCNC: 171 U/L (ref 0–50)
ANION GAP SERPL CALCULATED.3IONS-SCNC: 10 MMOL/L (ref 7–15)
APPEARANCE UR: CLEAR
AST SERPL W P-5'-P-CCNC: 107 U/L (ref 0–45)
BASOPHILS # BLD AUTO: 0 10E3/UL (ref 0–0.2)
BASOPHILS NFR BLD AUTO: 1 %
BILIRUB SERPL-MCNC: 0.3 MG/DL
BILIRUB UR QL STRIP: NEGATIVE
BUN SERPL-MCNC: 18.3 MG/DL (ref 6–20)
CALCIUM SERPL-MCNC: 10.1 MG/DL (ref 8.8–10.4)
CHLORIDE SERPL-SCNC: 99 MMOL/L (ref 98–107)
COLOR UR AUTO: ABNORMAL
CREAT SERPL-MCNC: 0.82 MG/DL (ref 0.51–0.95)
EGFRCR SERPLBLD CKD-EPI 2021: 89 ML/MIN/1.73M2
EOSINOPHIL # BLD AUTO: 0.5 10E3/UL (ref 0–0.7)
EOSINOPHIL NFR BLD AUTO: 5 %
ERYTHROCYTE [DISTWIDTH] IN BLOOD BY AUTOMATED COUNT: 11.9 % (ref 10–15)
GLUCOSE SERPL-MCNC: 339 MG/DL (ref 70–99)
GLUCOSE UR STRIP-MCNC: >=1000 MG/DL
HCG SERPL QL: NEGATIVE
HCO3 SERPL-SCNC: 26 MMOL/L (ref 22–29)
HCT VFR BLD AUTO: 40.6 % (ref 35–47)
HGB BLD-MCNC: 14.4 G/DL (ref 11.7–15.7)
HGB UR QL STRIP: NEGATIVE
IMM GRANULOCYTES # BLD: 0.1 10E3/UL
IMM GRANULOCYTES NFR BLD: 1 %
KETONES UR STRIP-MCNC: NEGATIVE MG/DL
LEUKOCYTE ESTERASE UR QL STRIP: NEGATIVE
LIPASE SERPL-CCNC: 51 U/L (ref 13–60)
LYMPHOCYTES # BLD AUTO: 3.6 10E3/UL (ref 0.8–5.3)
LYMPHOCYTES NFR BLD AUTO: 41 %
MCH RBC QN AUTO: 32.1 PG (ref 26.5–33)
MCHC RBC AUTO-ENTMCNC: 35.5 G/DL (ref 31.5–36.5)
MCV RBC AUTO: 90 FL (ref 78–100)
MONOCYTES # BLD AUTO: 0.5 10E3/UL (ref 0–1.3)
MONOCYTES NFR BLD AUTO: 6 %
MUCOUS THREADS #/AREA URNS LPF: PRESENT /LPF
NEUTROPHILS # BLD AUTO: 4.1 10E3/UL (ref 1.6–8.3)
NEUTROPHILS NFR BLD AUTO: 47 %
NITRATE UR QL: NEGATIVE
NRBC # BLD AUTO: 0 10E3/UL
NRBC BLD AUTO-RTO: 0 /100
PH UR STRIP: 6 [PH] (ref 5–7)
PLATELET # BLD AUTO: 208 10E3/UL (ref 150–450)
POTASSIUM SERPL-SCNC: 4.6 MMOL/L (ref 3.4–5.3)
PROT SERPL-MCNC: 8.5 G/DL (ref 6.4–8.3)
RBC # BLD AUTO: 4.49 10E6/UL (ref 3.8–5.2)
RBC URINE: 1 /HPF
SODIUM SERPL-SCNC: 135 MMOL/L (ref 135–145)
SP GR UR STRIP: 1.01 (ref 1–1.03)
SQUAMOUS EPITHELIAL: 8 /HPF
UROBILINOGEN UR STRIP-MCNC: NORMAL MG/DL
WBC # BLD AUTO: 8.8 10E3/UL (ref 4–11)
WBC URINE: 1 /HPF

## 2024-09-24 PROCEDURE — 96360 HYDRATION IV INFUSION INIT: CPT | Performed by: EMERGENCY MEDICINE

## 2024-09-24 PROCEDURE — 85025 COMPLETE CBC W/AUTO DIFF WBC: CPT | Performed by: EMERGENCY MEDICINE

## 2024-09-24 PROCEDURE — 99284 EMERGENCY DEPT VISIT MOD MDM: CPT | Performed by: EMERGENCY MEDICINE

## 2024-09-24 PROCEDURE — 258N000003 HC RX IP 258 OP 636: Performed by: PHYSICIAN ASSISTANT

## 2024-09-24 PROCEDURE — 99283 EMERGENCY DEPT VISIT LOW MDM: CPT | Mod: 25 | Performed by: EMERGENCY MEDICINE

## 2024-09-24 PROCEDURE — 250N000013 HC RX MED GY IP 250 OP 250 PS 637: Performed by: PHYSICIAN ASSISTANT

## 2024-09-24 PROCEDURE — 81003 URINALYSIS AUTO W/O SCOPE: CPT | Performed by: EMERGENCY MEDICINE

## 2024-09-24 PROCEDURE — 36415 COLL VENOUS BLD VENIPUNCTURE: CPT | Performed by: EMERGENCY MEDICINE

## 2024-09-24 PROCEDURE — 80053 COMPREHEN METABOLIC PANEL: CPT | Performed by: EMERGENCY MEDICINE

## 2024-09-24 PROCEDURE — 83690 ASSAY OF LIPASE: CPT | Performed by: EMERGENCY MEDICINE

## 2024-09-24 PROCEDURE — 84703 CHORIONIC GONADOTROPIN ASSAY: CPT | Performed by: EMERGENCY MEDICINE

## 2024-09-24 RX ORDER — OLANZAPINE 5 MG/1
5 TABLET, ORALLY DISINTEGRATING ORAL ONCE
Status: COMPLETED | OUTPATIENT
Start: 2024-09-24 | End: 2024-09-24

## 2024-09-24 RX ORDER — OLANZAPINE 5 MG/1
5 TABLET, ORALLY DISINTEGRATING ORAL PRN
Qty: 10 TABLET | Refills: 0 | Status: SHIPPED | OUTPATIENT
Start: 2024-09-24

## 2024-09-24 RX ADMIN — SODIUM CHLORIDE 1000 ML: 9 INJECTION, SOLUTION INTRAVENOUS at 16:43

## 2024-09-24 RX ADMIN — OLANZAPINE 5 MG: 5 TABLET, ORALLY DISINTEGRATING ORAL at 15:17

## 2024-09-24 ASSESSMENT — ACTIVITIES OF DAILY LIVING (ADL)
ADLS_ACUITY_SCORE: 35
ADLS_ACUITY_SCORE: 35
ADLS_ACUITY_SCORE: 37
ADLS_ACUITY_SCORE: 37

## 2024-09-24 NOTE — ED TRIAGE NOTES
Triage Assessment (Adult)       Row Name 09/24/24 1245          Triage Assessment    Airway WDL WDL        Respiratory WDL    Respiratory WDL WDL        Skin Circulation/Temperature WDL    Skin Circulation/Temperature WDL WDL        Cardiac WDL    Cardiac WDL WDL        Peripheral/Neurovascular WDL    Peripheral Neurovascular WDL WDL        Cognitive/Neuro/Behavioral WDL    Cognitive/Neuro/Behavioral WDL WDL

## 2024-09-24 NOTE — ED PROVIDER NOTES
ED Provider Note  [facility]      History     Chief Complaint   Patient presents with    Abdominal Pain     Started yesterday.     Nausea & Vomiting     History of cyclical vomiting syndrome.     HPI  47yo F pmhx cyclical vomiting syndrome, HTN, GERD, pancreatitis, HTN, DM2 p/w nausea and vomiting x late last night.  Notes roughly 10 episodes of NBNB emesis, with associated generalized abdominal pain.  No fever, chills, urinary or vaginal symptoms.  No new foods, travel, ill contacts.  States she is not currently nor she recently used marijuana.  Has p.o. Zyprexa for symptoms at home, but was unable to tolerate, and vomited it up.    Past Medical History  Past Medical History:   Diagnosis Date    Acute pancreatitis 06/29/2017    Anxiety     Cauda equina syndrome (H) 03/29/2013    Cyst of right ovary 11/16/2017    Depressive disorder     Gastroesophageal reflux disease     Hypertension     Hyponatremia 10/01/2023    Low back pain 08/20/2012    Overview:   History of cauda equina and surgery.      Type 2 diabetes mellitus (H)     Uncomplicated asthma      Past Surgical History:   Procedure Laterality Date    BACK SURGERY      x2    CHOLECYSTECTOMY      ORTHOPEDIC SURGERY      SALPINGO OOPHORECTOMY,R/L/ELDA Right      OLANZapine zydis (ZYPREXA) 5 MG ODT  albuterol (PROAIR HFA/PROVENTIL HFA/VENTOLIN HFA) 108 (90 Base) MCG/ACT inhaler  cloNIDine (CATAPRES) 0.1 MG tablet  glucagon 1 MG kit  glucose (BD GLUCOSE) 4 g chewable tablet  glucose 40 % (400 mg/mL) gel  losartan (COZAAR) 25 MG tablet  metFORMIN (GLUCOPHAGE) 500 MG tablet  methadone (DOLOPHINE-INTENSOL) 10 MG/ML (HIGH CONC) solution  OLANZapine (ZYPREXA) 10 MG tablet  omeprazole (PRILOSEC) 40 MG DR capsule  ondansetron (ZOFRAN ODT) 4 MG ODT tab  predniSONE (DELTASONE) 20 MG tablet  prochlorperazine (COMPAZINE) 10 MG tablet  prochlorperazine (COMPAZINE) 25 MG suppository      No Known Allergies  Family History  Family History   Problem  "Relation Age of Onset    Breast Cancer Mother         in her 50s.    Diabetes Mother     Breast Cancer Maternal Grandmother         in her 40s.    Diabetes Maternal Grandmother      Social History   Social History     Tobacco Use    Smoking status: Never    Smokeless tobacco: Never   Vaping Use    Vaping status: Never Used   Substance Use Topics    Alcohol use: No    Drug use: Yes     Types: Marijuana     Comment: pt was taking Oxycontin \"years ago.\" pt is now on Methadone.      A medically appropriate review of systems was performed with pertinent positives and negatives noted in the HPI, and all other systems negative.    Physical Exam   BP: 115/79  Pulse: 81  Temp: 98  F (36.7  C)  Resp: 16  Weight: 91.3 kg (201 lb 3.2 oz)  SpO2: 98 %  Physical Exam  Constitutional:       General: She is not in acute distress.     Appearance: Normal appearance. She is not diaphoretic.   HENT:      Head: Atraumatic.      Mouth/Throat:      Mouth: Mucous membranes are moist.   Eyes:      Conjunctiva/sclera: Conjunctivae normal.   Cardiovascular:      Rate and Rhythm: Normal rate.   Pulmonary:      Effort: No respiratory distress.   Abdominal:      General: Abdomen is flat.      Palpations: Abdomen is soft.      Tenderness: There is no abdominal tenderness.   Musculoskeletal:      Cervical back: Neck supple.   Skin:     General: Skin is warm.   Neurological:      Mental Status: She is alert.           ED Course, Procedures, & Data      Procedures                Results for orders placed or performed during the hospital encounter of 09/24/24   Comprehensive metabolic panel     Status: Abnormal   Result Value Ref Range    Sodium 135 135 - 145 mmol/L    Potassium 4.6 3.4 - 5.3 mmol/L    Carbon Dioxide (CO2) 26 22 - 29 mmol/L    Anion Gap 10 7 - 15 mmol/L    Urea Nitrogen 18.3 6.0 - 20.0 mg/dL    Creatinine 0.82 0.51 - 0.95 mg/dL    GFR Estimate 89 >60 mL/min/1.73m2    Calcium 10.1 8.8 - 10.4 mg/dL    Chloride 99 98 - 107 mmol/L    " Glucose 339 (H) 70 - 99 mg/dL    Alkaline Phosphatase 148 40 - 150 U/L     (H) 0 - 45 U/L     (H) 0 - 50 U/L    Protein Total 8.5 (H) 6.4 - 8.3 g/dL    Albumin 4.1 3.5 - 5.2 g/dL    Bilirubin Total 0.3 <=1.2 mg/dL   Lipase     Status: Normal   Result Value Ref Range    Lipase 51 13 - 60 U/L   HCG qualitative Blood     Status: Normal   Result Value Ref Range    hCG Serum Qualitative Negative Negative   UA with Microscopic reflex to Culture     Status: Abnormal    Specimen: Urine, Midstream   Result Value Ref Range    Color Urine Light Yellow Colorless, Straw, Light Yellow, Yellow    Appearance Urine Clear Clear    Glucose Urine >=1000 (A) Negative mg/dL    Bilirubin Urine Negative Negative    Ketones Urine Negative Negative mg/dL    Specific Gravity Urine 1.015 1.003 - 1.035    Blood Urine Negative Negative    pH Urine 6.0 5.0 - 7.0    Protein Albumin Urine Negative Negative mg/dL    Urobilinogen Urine Normal Normal, 2.0 mg/dL    Nitrite Urine Negative Negative    Leukocyte Esterase Urine Negative Negative    Mucus Urine Present (A) None Seen /LPF    RBC Urine 1 <=2 /HPF    WBC Urine 1 <=5 /HPF    Squamous Epithelials Urine 8 (H) <=1 /HPF    Narrative    Urine Culture not indicated   CBC with platelets and differential     Status: None   Result Value Ref Range    WBC Count 8.8 4.0 - 11.0 10e3/uL    RBC Count 4.49 3.80 - 5.20 10e6/uL    Hemoglobin 14.4 11.7 - 15.7 g/dL    Hematocrit 40.6 35.0 - 47.0 %    MCV 90 78 - 100 fL    MCH 32.1 26.5 - 33.0 pg    MCHC 35.5 31.5 - 36.5 g/dL    RDW 11.9 10.0 - 15.0 %    Platelet Count 208 150 - 450 10e3/uL    % Neutrophils 47 %    % Lymphocytes 41 %    % Monocytes 6 %    % Eosinophils 5 %    % Basophils 1 %    % Immature Granulocytes 1 %    NRBCs per 100 WBC 0 <1 /100    Absolute Neutrophils 4.1 1.6 - 8.3 10e3/uL    Absolute Lymphocytes 3.6 0.8 - 5.3 10e3/uL    Absolute Monocytes 0.5 0.0 - 1.3 10e3/uL    Absolute Eosinophils 0.5 0.0 - 0.7 10e3/uL    Absolute  Basophils 0.0 0.0 - 0.2 10e3/uL    Absolute Immature Granulocytes 0.1 <=0.4 10e3/uL    Absolute NRBCs 0.0 10e3/uL   CBC with platelets differential     Status: None    Narrative    The following orders were created for panel order CBC with platelets differential.  Procedure                               Abnormality         Status                     ---------                               -----------         ------                     CBC with platelets and d...[[id number]]                      Final result                 Please view results for these tests on the individual orders.     Medications   sodium chloride 0.9% BOLUS 1,000 mL (0 mLs Intravenous Stopped 9/24/24 1805)   OLANZapine zydis (zyPREXA) ODT tab 5 mg (5 mg Oral $Given 9/24/24 1517)     Labs Ordered and Resulted from Time of ED Arrival to Time of ED Departure   COMPREHENSIVE METABOLIC PANEL - Abnormal       Result Value    Sodium 135      Potassium 4.6      Carbon Dioxide (CO2) 26      Anion Gap 10      Urea Nitrogen 18.3      Creatinine 0.82      GFR Estimate 89      Calcium 10.1      Chloride 99      Glucose 339 (*)     Alkaline Phosphatase 148       (*)      (*)     Protein Total 8.5 (*)     Albumin 4.1      Bilirubin Total 0.3     ROUTINE UA WITH MICROSCOPIC REFLEX TO CULTURE - Abnormal    Color Urine Light Yellow      Appearance Urine Clear      Glucose Urine >=1000 (*)     Bilirubin Urine Negative      Ketones Urine Negative      Specific Gravity Urine 1.015      Blood Urine Negative      pH Urine 6.0      Protein Albumin Urine Negative      Urobilinogen Urine Normal      Nitrite Urine Negative      Leukocyte Esterase Urine Negative      Mucus Urine Present (*)     RBC Urine 1      WBC Urine 1      Squamous Epithelials Urine 8 (*)    LIPASE - Normal    Lipase 51     HCG QUALITATIVE PREGNANCY - Normal    hCG Serum Qualitative Negative     CBC WITH PLATELETS AND DIFFERENTIAL    WBC Count 8.8      RBC Count 4.49      Hemoglobin 14.4       Hematocrit 40.6      MCV 90      MCH 32.1      MCHC 35.5      RDW 11.9      Platelet Count 208      % Neutrophils 47      % Lymphocytes 41      % Monocytes 6      % Eosinophils 5      % Basophils 1      % Immature Granulocytes 1      NRBCs per 100 WBC 0      Absolute Neutrophils 4.1      Absolute Lymphocytes 3.6      Absolute Monocytes 0.5      Absolute Eosinophils 0.5      Absolute Basophils 0.0      Absolute Immature Granulocytes 0.1      Absolute NRBCs 0.0       No orders to display          Critical care was not performed.     Medical Decision Making  The patient's presentation was of high complexity (a chronic illness severe exacerbation, progression, or side effect of treatment).    The patient's evaluation involved:  review of external note(s) from 3+ sources (clinical)  ordering and/or review of 3+ test(s) in this encounter (see separate area of note for details)    The patient's management necessitated high risk (a decision regarding hospitalization).    Assessment & Plan    47yo F pmhx cyclical vomiting syndrome, HTN, GERD, pancreatitis, HTN, DM2 p/w nausea and vomiting x late last night, consistent with her usual cyclical vomiting.  In ED patient is HDS/AF, NAD.  Benign abdomen.  Labs were sent to rule out electrolyte abnormalities or leukocytosis or elevated lipase.  With unremarkable CBC, lipase, pregnancy.  CMP only notable for mild elevation in AST/ALT, consistent/better than previous.  UA notable for glucosuria, but no infectious markers.  Patient received 5 mg ODT Zyprexa in ED with resolution of symptoms.  Abdomen continues benign, low suspicion for acute surgical abdomen.  Will discharge with short prescription for ODT Zyprexa.  Advised PCP follow-up and ER return precautions.    I have reviewed the nursing notes. I have reviewed the findings, diagnosis, plan and need for follow up with the patient.    New Prescriptions    OLANZAPINE ZYDIS (ZYPREXA) 5 MG ODT    Take 1 tablet (5 mg) by mouth as  needed (nausea).       Final diagnoses:   Nausea and vomiting         Agustin Triplett PA-C  [facility] EMERGENCY DEPARTMENT  9/24/2024     Agustin Triplett PA-C  09/24/24 1809  ------------------------------------------------------------------------------------------------  --    ED Attending Physician Attestation    I Maricel Walters MD, cared for this patient with the Advanced Practice Provider (CASSANDRA). I personally provided a substantive portion of the care for this patient, including approving the care plan for the number and complexity of problems addressed and taking responsibility related to the risk of complications and/or morbidity or mortality of patient management. Please see the CASSANDRA's documentation for full details.    Summary of HPI, PE, ED Course   Patient is a 48 year old female evaluated in the emergency department for nausea and vomiting with a previous history of cyclic vomiting syndrome.  Symptoms are very consistent with previous flares.  She has Zofran at home but it is not ODT formulation and she was unable to keep it down.  Of note, she reports that she discontinued marijuana approximately 3 to 4 weeks ago.  Labs are reassuring.  Symptoms were controlled with 1 dose of Zyprexa here in the emergency department and she did receive IV fluids.  After she tolerated a p.o. challenge she was discharged home..  After the completion of care in the emergency department, the patient was discharged.      Maricel Walters MD  Emergency Medicine        Romeo, Maricel Paige MD  09/25/24 0206

## 2024-09-26 ENCOUNTER — PATIENT OUTREACH (OUTPATIENT)
Dept: CARE COORDINATION | Facility: CLINIC | Age: 49
End: 2024-09-26
Payer: COMMERCIAL

## 2024-09-26 NOTE — PROGRESS NOTES
[facility] Care Resource Center Contact  [facility]/Voicemail     Clinical Data: Care Coordination ED-sourced Outreach-     Outreach attempted x 2.  Left message on patient's voicemail, providing [facility]'s 24/7 scheduling and nurse triage phone number [email or web address] ([phone]) for questions/concerns and/or to schedule an appt with an [facility] provider.      [facility] unable to send Care Coordination introduction letter as patient does not have an active MyChart account. Clinic Care Coordination services remain available via referral if needed.    Plan: [facility] will do no further outreaches at this time.       DAI Borges  [facility] Resource [place], [facility]    *Connected Care Resource Team does NOT follow patient ongoing. Referrals are identified based on internal discharge reports and the outreach is to ensure patient has an understanding of their discharge instructions.

## 2024-10-03 ENCOUNTER — HOSPITAL ENCOUNTER (EMERGENCY)
Facility: CLINIC | Age: 49
Discharge: HOME | End: 2024-10-03
Attending: INTERNAL MEDICINE | Admitting: INTERNAL MEDICINE
Payer: COMMERCIAL

## 2024-10-03 VITALS
BODY MASS INDEX: 29.62 KG/M2 | RESPIRATION RATE: 18 BRPM | HEIGHT: 69 IN | SYSTOLIC BLOOD PRESSURE: 177 MMHG | DIASTOLIC BLOOD PRESSURE: 120 MMHG | TEMPERATURE: 97.5 F | WEIGHT: 200 LBS | OXYGEN SATURATION: 99 % | HEART RATE: 113 BPM

## 2024-10-03 DIAGNOSIS — R11.2 NAUSEA AND VOMITING, UNSPECIFIED VOMITING TYPE: ICD-10-CM

## 2024-10-03 LAB
ALBUMIN SERPL BCG-MCNC: 4 G/DL (ref 3.5–5.2)
ALP SERPL-CCNC: 164 U/L (ref 40–150)
ALT SERPL W P-5'-P-CCNC: 214 U/L (ref 0–50)
ANION GAP SERPL CALCULATED.3IONS-SCNC: 19 MMOL/L (ref 7–15)
AST SERPL W P-5'-P-CCNC: 192 U/L (ref 0–45)
BASE EXCESS BLDV CALC-SCNC: 2 MMOL/L (ref -3–3)
BASOPHILS # BLD AUTO: 0 10E3/UL (ref 0–0.2)
BASOPHILS NFR BLD AUTO: 0 %
BILIRUB SERPL-MCNC: 0.4 MG/DL
BUN SERPL-MCNC: 15.1 MG/DL (ref 6–20)
CALCIUM SERPL-MCNC: 10 MG/DL (ref 8.8–10.4)
CHLORIDE SERPL-SCNC: 93 MMOL/L (ref 98–107)
CREAT SERPL-MCNC: 0.72 MG/DL (ref 0.51–0.95)
EGFRCR SERPLBLD CKD-EPI 2021: >90 ML/MIN/1.73M2
EOSINOPHIL # BLD AUTO: 0.1 10E3/UL (ref 0–0.7)
EOSINOPHIL NFR BLD AUTO: 1 %
ERYTHROCYTE [DISTWIDTH] IN BLOOD BY AUTOMATED COUNT: 11.4 % (ref 10–15)
GLUCOSE SERPL-MCNC: 372 MG/DL (ref 70–99)
HCG SERPL QL: NEGATIVE
HCO3 BLDV-SCNC: 27 MMOL/L (ref 21–28)
HCO3 SERPL-SCNC: 24 MMOL/L (ref 22–29)
HCT VFR BLD AUTO: 38.3 % (ref 35–47)
HGB BLD-MCNC: 13.8 G/DL (ref 11.7–15.7)
IMM GRANULOCYTES # BLD: 0.2 10E3/UL
IMM GRANULOCYTES NFR BLD: 2 %
LACTATE BLD-SCNC: 3.7 MMOL/L
LIPASE SERPL-CCNC: 37 U/L (ref 13–60)
LYMPHOCYTES # BLD AUTO: 2 10E3/UL (ref 0.8–5.3)
LYMPHOCYTES NFR BLD AUTO: 17 %
MCH RBC QN AUTO: 31.7 PG (ref 26.5–33)
MCHC RBC AUTO-ENTMCNC: 36 G/DL (ref 31.5–36.5)
MCV RBC AUTO: 88 FL (ref 78–100)
MONOCYTES # BLD AUTO: 0.7 10E3/UL (ref 0–1.3)
MONOCYTES NFR BLD AUTO: 6 %
NEUTROPHILS # BLD AUTO: 8.8 10E3/UL (ref 1.6–8.3)
NEUTROPHILS NFR BLD AUTO: 74 %
NRBC # BLD AUTO: 0 10E3/UL
NRBC BLD AUTO-RTO: 0 /100
PCO2 BLDV: 42 MM HG (ref 40–50)
PH BLDV: 7.41 [PH] (ref 7.32–7.43)
PLATELET # BLD AUTO: 209 10E3/UL (ref 150–450)
PO2 BLDV: 31 MM HG (ref 25–47)
POTASSIUM SERPL-SCNC: 4 MMOL/L (ref 3.4–5.3)
PROT SERPL-MCNC: 8.5 G/DL (ref 6.4–8.3)
RBC # BLD AUTO: 4.36 10E6/UL (ref 3.8–5.2)
SAO2 % BLDV: 60 % (ref 70–75)
SODIUM SERPL-SCNC: 136 MMOL/L (ref 135–145)
WBC # BLD AUTO: 11.8 10E3/UL (ref 4–11)

## 2024-10-03 PROCEDURE — 36415 COLL VENOUS BLD VENIPUNCTURE: CPT | Performed by: EMERGENCY MEDICINE

## 2024-10-03 PROCEDURE — 85004 AUTOMATED DIFF WBC COUNT: CPT | Performed by: EMERGENCY MEDICINE

## 2024-10-03 PROCEDURE — 83690 ASSAY OF LIPASE: CPT | Performed by: EMERGENCY MEDICINE

## 2024-10-03 PROCEDURE — 96361 HYDRATE IV INFUSION ADD-ON: CPT

## 2024-10-03 PROCEDURE — 82040 ASSAY OF SERUM ALBUMIN: CPT | Performed by: EMERGENCY MEDICINE

## 2024-10-03 PROCEDURE — 85014 HEMATOCRIT: CPT | Performed by: EMERGENCY MEDICINE

## 2024-10-03 PROCEDURE — 99283 EMERGENCY DEPT VISIT LOW MDM: CPT | Mod: 25 | Performed by: INTERNAL MEDICINE

## 2024-10-03 PROCEDURE — 96360 HYDRATION IV INFUSION INIT: CPT

## 2024-10-03 PROCEDURE — 96372 THER/PROPH/DIAG INJ SC/IM: CPT | Performed by: INTERNAL MEDICINE

## 2024-10-03 PROCEDURE — 82803 BLOOD GASES ANY COMBINATION: CPT

## 2024-10-03 PROCEDURE — 99284 EMERGENCY DEPT VISIT MOD MDM: CPT | Performed by: INTERNAL MEDICINE

## 2024-10-03 PROCEDURE — 258N000003 HC RX IP 258 OP 636: Performed by: INTERNAL MEDICINE

## 2024-10-03 PROCEDURE — 250N000011 HC RX IP 250 OP 636: Mod: JZ | Performed by: INTERNAL MEDICINE

## 2024-10-03 PROCEDURE — 96360 HYDRATION IV INFUSION INIT: CPT | Performed by: INTERNAL MEDICINE

## 2024-10-03 PROCEDURE — 250N000013 HC RX MED GY IP 250 OP 250 PS 637: Performed by: INTERNAL MEDICINE

## 2024-10-03 PROCEDURE — 84703 CHORIONIC GONADOTROPIN ASSAY: CPT | Performed by: INTERNAL MEDICINE

## 2024-10-03 RX ORDER — MAGNESIUM HYDROXIDE/ALUMINUM HYDROXICE/SIMETHICONE 120; 1200; 1200 MG/30ML; MG/30ML; MG/30ML
15 SUSPENSION ORAL ONCE
Status: COMPLETED | OUTPATIENT
Start: 2024-10-03 | End: 2024-10-03

## 2024-10-03 RX ORDER — MAGNESIUM HYDROXIDE/ALUMINUM HYDROXICE/SIMETHICONE 120; 1200; 1200 MG/30ML; MG/30ML; MG/30ML
30 SUSPENSION ORAL ONCE
Status: COMPLETED | OUTPATIENT
Start: 2024-10-03 | End: 2024-10-03

## 2024-10-03 RX ORDER — OLANZAPINE 10 MG/2ML
10 INJECTION, POWDER, FOR SOLUTION INTRAMUSCULAR ONCE
Status: COMPLETED | OUTPATIENT
Start: 2024-10-03 | End: 2024-10-03

## 2024-10-03 RX ADMIN — SODIUM CHLORIDE 1000 ML: 9 INJECTION, SOLUTION INTRAVENOUS at 07:53

## 2024-10-03 RX ADMIN — SODIUM CHLORIDE 1000 ML: 9 INJECTION, SOLUTION INTRAVENOUS at 09:31

## 2024-10-03 RX ADMIN — ALUMINUM HYDROXIDE, MAGNESIUM HYDROXIDE, AND DIMETHICONE 15 ML: 200; 20; 200 SUSPENSION ORAL at 09:31

## 2024-10-03 RX ADMIN — OLANZAPINE 10 MG: 10 INJECTION, POWDER, FOR SOLUTION INTRAMUSCULAR at 07:43

## 2024-10-03 RX ADMIN — ALUMINUM HYDROXIDE, MAGNESIUM HYDROXIDE, AND DIMETHICONE 30 ML: 200; 20; 200 SUSPENSION ORAL at 07:24

## 2024-10-03 ASSESSMENT — ACTIVITIES OF DAILY LIVING (ADL)
ADLS_ACUITY_SCORE: 37

## 2024-10-03 NOTE — ED PROVIDER NOTES
[facility] [place] EMERGENCY DEPARTMENT ([facility])    10/03/24       ED PROVIDER NOTE    History     Chief Complaint   Patient presents with    Nausea & Vomiting    Abdominal Pain     HPI  Yun Coyle is a 48 year old female with a history of a cyclic vomiting, cannabis use chronically, elevated LFTs, significant mental health disorders, type 2 diabetes, and substance use, who presents with another episode of nausea vomiting that started yesterday.  She denies any fevers or chills or any diarrhea.  She reports that upper abdominal discomfort that usually comes with these episodes.  She denies any new symptoms.      Past Medical History  Past Medical History:   Diagnosis Date    Acute pancreatitis 06/29/2017    Anxiety     Cauda equina syndrome (H) 03/29/2013    Cyst of right ovary 11/16/2017    Depressive disorder     Gastroesophageal reflux disease     Hypertension     Hyponatremia 10/01/2023    Low back pain 08/20/2012    Overview:   History of cauda equina and surgery.      Type 2 diabetes mellitus (H)     Uncomplicated asthma      Past Surgical History:   Procedure Laterality Date    BACK SURGERY      x2    CHOLECYSTECTOMY      ORTHOPEDIC SURGERY      SALPINGO OOPHORECTOMY,R/L/ELDA Right      glucagon 1 MG kit  glucose (BD GLUCOSE) 4 g chewable tablet  glucose 40 % (400 mg/mL) gel  metFORMIN (GLUCOPHAGE) 500 MG tablet  OLANZapine (ZYPREXA) 10 MG tablet  OLANZapine zydis (ZYPREXA) 5 MG ODT  omeprazole (PRILOSEC) 40 MG DR capsule  losartan (COZAAR) 25 MG tablet  methadone (DOLOPHINE-INTENSOL) 10 MG/ML (HIGH CONC) solution  predniSONE (DELTASONE) 20 MG tablet  prochlorperazine (COMPAZINE) 25 MG suppository      No Known Allergies  Family History  Family History   Problem Relation Age of Onset    Breast Cancer Mother         in her 50s.    Diabetes Mother     Breast Cancer Maternal Grandmother         in her 40s.    Diabetes Maternal Grandmother      Social History   Social History     Tobacco Use     "Smoking status: Never    Smokeless tobacco: Never   Vaping Use    Vaping status: Never Used   Substance Use Topics    Alcohol use: No    Drug use: Yes     Types: Marijuana     Comment: pt was taking Oxycontin \"years ago.\" pt is now on Methadone.      A complete review of systems was performed with pertinent positives and negatives noted in the HPI, and all other systems negative.    Physical Exam   BP: (!) 177/120  Pulse: 113  Temp: 97.5  F (36.4  C)  Resp: 18  Height: 175.3 cm (5' 9\")  Weight: 90.7 kg (200 lb)  SpO2: 99 %  Physical Exam  Vitals and nursing note reviewed.   Constitutional:       General: She is not in acute distress.     Appearance: Normal appearance. She is not diaphoretic.   HENT:      Head: Normocephalic and atraumatic.      Mouth/Throat:      Mouth: Mucous membranes are dry.   Eyes:      General: No scleral icterus.     Extraocular Movements: Extraocular movements intact.      Conjunctiva/sclera: Conjunctivae normal.   Cardiovascular:      Rate and Rhythm: Normal rate and regular rhythm.      Heart sounds: Normal heart sounds. No murmur heard.     No friction rub. No gallop.   Pulmonary:      Effort: Pulmonary effort is normal. No respiratory distress.      Breath sounds: Normal breath sounds. No stridor. No wheezing, rhonchi or rales.   Chest:      Chest wall: No tenderness.   Abdominal:      General: Abdomen is flat. Bowel sounds are normal. There is no distension.      Palpations: Abdomen is soft. There is no mass.      Tenderness: There is no abdominal tenderness. There is no right CVA tenderness, left CVA tenderness, guarding or rebound.      Hernia: No hernia is present.   Musculoskeletal:         General: No tenderness. Normal range of motion.      Cervical back: Normal range of motion and neck supple.   Skin:     General: Skin is warm.      Findings: No rash.   Neurological:      General: No focal deficit present.      Mental Status: She is alert.      Cranial Nerves: No cranial nerve " deficit.           ED Course, Procedures, & Data      Procedures          Results for orders placed or performed during the hospital encounter of 10/03/24   Comprehensive metabolic panel     Status: Abnormal   Result Value Ref Range    Sodium 136 135 - 145 mmol/L    Potassium 4.0 3.4 - 5.3 mmol/L    Carbon Dioxide (CO2) 24 22 - 29 mmol/L    Anion Gap 19 (H) 7 - 15 mmol/L    Urea Nitrogen 15.1 6.0 - 20.0 mg/dL    Creatinine 0.72 0.51 - 0.95 mg/dL    GFR Estimate >90 >60 mL/min/1.73m2    Calcium 10.0 8.8 - 10.4 mg/dL    Chloride 93 (L) 98 - 107 mmol/L    Glucose 372 (H) 70 - 99 mg/dL    Alkaline Phosphatase 164 (H) 40 - 150 U/L     (H) 0 - 45 U/L     (H) 0 - 50 U/L    Protein Total 8.5 (H) 6.4 - 8.3 g/dL    Albumin 4.0 3.5 - 5.2 g/dL    Bilirubin Total 0.4 <=1.2 mg/dL   Lipase     Status: Normal   Result Value Ref Range    Lipase 37 13 - 60 U/L   HCG qualitative Blood     Status: Normal   Result Value Ref Range    hCG Serum Qualitative Negative Negative   CBC with platelets and differential     Status: Abnormal   Result Value Ref Range    WBC Count 11.8 (H) 4.0 - 11.0 10e3/uL    RBC Count 4.36 3.80 - 5.20 10e6/uL    Hemoglobin 13.8 11.7 - 15.7 g/dL    Hematocrit 38.3 35.0 - 47.0 %    MCV 88 78 - 100 fL    MCH 31.7 26.5 - 33.0 pg    MCHC 36.0 31.5 - 36.5 g/dL    RDW 11.4 10.0 - 15.0 %    Platelet Count 209 150 - 450 10e3/uL    % Neutrophils 74 %    % Lymphocytes 17 %    % Monocytes 6 %    % Eosinophils 1 %    % Basophils 0 %    % Immature Granulocytes 2 %    NRBCs per 100 WBC 0 <1 /100    Absolute Neutrophils 8.8 (H) 1.6 - 8.3 10e3/uL    Absolute Lymphocytes 2.0 0.8 - 5.3 10e3/uL    Absolute Monocytes 0.7 0.0 - 1.3 10e3/uL    Absolute Eosinophils 0.1 0.0 - 0.7 10e3/uL    Absolute Basophils 0.0 0.0 - 0.2 10e3/uL    Absolute Immature Granulocytes 0.2 <=0.4 10e3/uL    Absolute NRBCs 0.0 10e3/uL   iStat Gases (lactate) venous, POCT     Status: Abnormal   Result Value Ref Range    Lactic Acid POCT 3.7 (H)  <=2.0 mmol/L    Bicarbonate Venous POCT 27 21 - 28 mmol/L    O2 Sat, Venous POCT 60 (L) 70 - 75 %    pCO2 Venous POCT 42 40 - 50 mm Hg    pH Venous POCT 7.41 7.32 - 7.43    pO2 Venous POCT 31 25 - 47 mm Hg    Base Excess/Deficit (+/-) POCT 2.0 -3.0 - 3.0 mmol/L   CBC with Platelets & Differential     Status: Abnormal    Narrative    The following orders were created for panel order CBC with Platelets & Differential.  Procedure                               Abnormality         Status                     ---------                               -----------         ------                     CBC with platelets and d...[[id number]]  Abnormal            Final result                 Please view results for these tests on the individual orders.     Medications   sodium chloride 0.9% BOLUS 1,000 mL (0 mLs Intravenous Stopped 10/3/24 0853)   OLANZapine (zyPREXA) injection 10 mg (10 mg Intramuscular $Given 10/3/24 0743)   alum & mag hydroxide-simethicone (MAALOX) suspension 30 mL (30 mLs Oral $Given 10/3/24 0724)   sodium chloride 0.9% BOLUS 1,000 mL (0 mLs Intravenous Stopped 10/3/24 1000)   alum & mag hydroxide-simethicone (MAALOX) suspension 15 mL (15 mLs Oral $Given 10/3/24 0931)     Labs Ordered and Resulted from Time of ED Arrival to Time of ED Departure   COMPREHENSIVE METABOLIC PANEL - Abnormal       Result Value    Sodium 136      Potassium 4.0      Carbon Dioxide (CO2) 24      Anion Gap 19 (*)     Urea Nitrogen 15.1      Creatinine 0.72      GFR Estimate >90      Calcium 10.0      Chloride 93 (*)     Glucose 372 (*)     Alkaline Phosphatase 164 (*)      (*)      (*)     Protein Total 8.5 (*)     Albumin 4.0      Bilirubin Total 0.4     CBC WITH PLATELETS AND DIFFERENTIAL - Abnormal    WBC Count 11.8 (*)     RBC Count 4.36      Hemoglobin 13.8      Hematocrit 38.3      MCV 88      MCH 31.7      MCHC 36.0      RDW 11.4      Platelet Count 209      % Neutrophils 74      % Lymphocytes 17      % Monocytes 6       % Eosinophils 1      % Basophils 0      % Immature Granulocytes 2      NRBCs per 100 WBC 0      Absolute Neutrophils 8.8 (*)     Absolute Lymphocytes 2.0      Absolute Monocytes 0.7      Absolute Eosinophils 0.1      Absolute Basophils 0.0      Absolute Immature Granulocytes 0.2      Absolute NRBCs 0.0     ISTAT GASES LACTATE VENOUS POCT - Abnormal    Lactic Acid POCT 3.7 (*)     Bicarbonate Venous POCT 27      O2 Sat, Venous POCT 60 (*)     pCO2 Venous POCT 42      pH Venous POCT 7.41      pO2 Venous POCT 31      Base Excess/Deficit (+/-) POCT 2.0     LIPASE - Normal    Lipase 37     HCG QUALITATIVE PREGNANCY - Normal    hCG Serum Qualitative Negative       No orders to display          Critical care was not performed.     Medical Decision Making  The patient's presentation was of high complexity (a chronic illness severe exacerbation, progression, or side effect of treatment).    The patient's evaluation involved:  ordering and/or review of 3+ test(s) in this encounter (see separate area of note for details)    The patient's management necessitated moderate risk (prescription drug management including medications given in the ED).    Assessment & Plan    Recurrent N/V resolved after meds and IVF, tolerating po, labs ok, urine not collected as pt decided to leave before completion of the work up and discharge planning.    I have reviewed the nursing notes. I have reviewed the findings, diagnosis, plan and need for follow up with the patient.    Discharge Medication List as of 10/3/2024 11:05 AM          Final diagnoses:   Nausea and vomiting, unspecified vomiting type       Todd Green MD  [facility] EMERGENCY DEPARTMENT  10/3/2024     Todd Green MD  10/03/24 1604

## 2024-10-03 NOTE — ED TRIAGE NOTES
Pt to ER with c/o abd pain/n/v that started yesterday. Seen for same 2 days ago, Hx of cyclic n/v/abd pain.

## 2024-10-03 NOTE — ED TRIAGE NOTES
Triage Assessment (Adult)       Row Name 10/03/24 0636          Triage Assessment    Airway WDL WDL        Respiratory WDL    Respiratory WDL WDL        Skin Circulation/Temperature WDL    Skin Circulation/Temperature WDL WDL        Cardiac WDL    Cardiac WDL WDL        Cognitive/Neuro/Behavioral WDL    Cognitive/Neuro/Behavioral WDL WDL

## 2024-10-03 NOTE — ED NOTES
Bed: ED07  Expected date: 10/3/24  Expected time: 6:17 AM  Means of arrival:   Comments:  H419  48 F  N/V

## 2024-10-04 ENCOUNTER — TELEPHONE (OUTPATIENT)
Dept: FAMILY MEDICINE | Facility: CLINIC | Age: 49
End: 2024-10-04

## 2024-10-04 NOTE — TELEPHONE ENCOUNTER
Please call patient. Have her schedule an appointment with me so we can discuss a plan to try to use the infusion center for iv zofran and fluids rather than her needing to go to the ER.  Goal would be she could get in faster and more often rather than having to wait until it is an emergency.      Gamaliel Granados, DO

## 2024-10-13 ENCOUNTER — APPOINTMENT (OUTPATIENT)
Dept: GENERAL RADIOLOGY | Facility: CLINIC | Age: 49
End: 2024-10-13
Attending: FAMILY MEDICINE
Payer: COMMERCIAL

## 2024-10-13 ENCOUNTER — HOSPITAL ENCOUNTER (EMERGENCY)
Facility: CLINIC | Age: 49
Discharge: HOME | End: 2024-10-13
Attending: FAMILY MEDICINE | Admitting: FAMILY MEDICINE
Payer: COMMERCIAL

## 2024-10-13 VITALS
DIASTOLIC BLOOD PRESSURE: 81 MMHG | HEART RATE: 59 BPM | RESPIRATION RATE: 18 BRPM | SYSTOLIC BLOOD PRESSURE: 121 MMHG | HEIGHT: 69 IN | BODY MASS INDEX: 30.33 KG/M2 | TEMPERATURE: 98.1 F | OXYGEN SATURATION: 95 % | WEIGHT: 204.8 LBS

## 2024-10-13 DIAGNOSIS — R11.2 NAUSEA AND VOMITING, UNSPECIFIED VOMITING TYPE: ICD-10-CM

## 2024-10-13 DIAGNOSIS — E11.65 TYPE 2 DIABETES MELLITUS WITH HYPERGLYCEMIA, WITHOUT LONG-TERM CURRENT USE OF INSULIN (H): ICD-10-CM

## 2024-10-13 DIAGNOSIS — R79.89 ELEVATED LFTS: ICD-10-CM

## 2024-10-13 DIAGNOSIS — R07.89 ATYPICAL CHEST PAIN: ICD-10-CM

## 2024-10-13 LAB
ALBUMIN SERPL BCG-MCNC: 3.9 G/DL (ref 3.5–5.2)
ALP SERPL-CCNC: 152 U/L (ref 40–150)
ALT SERPL W P-5'-P-CCNC: 153 U/L (ref 0–50)
ANION GAP SERPL CALCULATED.3IONS-SCNC: 14 MMOL/L (ref 7–15)
APTT PPP: 27 SECONDS (ref 22–38)
AST SERPL W P-5'-P-CCNC: 112 U/L (ref 0–45)
ATRIAL RATE - MUSE: 97 BPM
BASOPHILS # BLD AUTO: 0 10E3/UL (ref 0–0.2)
BASOPHILS NFR BLD AUTO: 1 %
BILIRUB SERPL-MCNC: 0.5 MG/DL
BUN SERPL-MCNC: 13.8 MG/DL (ref 6–20)
CALCIUM SERPL-MCNC: 9.8 MG/DL (ref 8.8–10.4)
CHLORIDE SERPL-SCNC: 94 MMOL/L (ref 98–107)
CREAT SERPL-MCNC: 0.72 MG/DL (ref 0.51–0.95)
DIASTOLIC BLOOD PRESSURE - MUSE: NORMAL MMHG
EGFRCR SERPLBLD CKD-EPI 2021: >90 ML/MIN/1.73M2
EOSINOPHIL # BLD AUTO: 0.3 10E3/UL (ref 0–0.7)
EOSINOPHIL NFR BLD AUTO: 5 %
ERYTHROCYTE [DISTWIDTH] IN BLOOD BY AUTOMATED COUNT: 11.9 % (ref 10–15)
GLUCOSE BLDC GLUCOMTR-MCNC: 278 MG/DL (ref 70–99)
GLUCOSE SERPL-MCNC: 398 MG/DL (ref 70–99)
HCG SERPL QL: NEGATIVE
HCO3 SERPL-SCNC: 23 MMOL/L (ref 22–29)
HCT VFR BLD AUTO: 41.8 % (ref 35–47)
HGB BLD-MCNC: 14.3 G/DL (ref 11.7–15.7)
IMM GRANULOCYTES # BLD: 0.1 10E3/UL
IMM GRANULOCYTES NFR BLD: 1 %
INR PPP: 0.98 (ref 0.85–1.15)
INTERPRETATION ECG - MUSE: NORMAL
LIPASE SERPL-CCNC: 25 U/L (ref 13–60)
LYMPHOCYTES # BLD AUTO: 2 10E3/UL (ref 0.8–5.3)
LYMPHOCYTES NFR BLD AUTO: 29 %
MAGNESIUM SERPL-MCNC: 1.5 MG/DL (ref 1.7–2.3)
MCH RBC QN AUTO: 30.4 PG (ref 26.5–33)
MCHC RBC AUTO-ENTMCNC: 34.2 G/DL (ref 31.5–36.5)
MCV RBC AUTO: 89 FL (ref 78–100)
MONOCYTES # BLD AUTO: 0.6 10E3/UL (ref 0–1.3)
MONOCYTES NFR BLD AUTO: 8 %
NEUTROPHILS # BLD AUTO: 3.9 10E3/UL (ref 1.6–8.3)
NEUTROPHILS NFR BLD AUTO: 57 %
NRBC # BLD AUTO: 0 10E3/UL
NRBC BLD AUTO-RTO: 0 /100
NT-PROBNP SERPL-MCNC: 42 PG/ML (ref 0–450)
P AXIS - MUSE: 73 DEGREES
PLATELET # BLD AUTO: 202 10E3/UL (ref 150–450)
POTASSIUM SERPL-SCNC: 4.2 MMOL/L (ref 3.4–5.3)
PR INTERVAL - MUSE: 142 MS
PROT SERPL-MCNC: 8.5 G/DL (ref 6.4–8.3)
QRS DURATION - MUSE: 82 MS
QT - MUSE: 360 MS
QTC - MUSE: 457 MS
R AXIS - MUSE: -16 DEGREES
RBC # BLD AUTO: 4.7 10E6/UL (ref 3.8–5.2)
SODIUM SERPL-SCNC: 131 MMOL/L (ref 135–145)
SYSTOLIC BLOOD PRESSURE - MUSE: NORMAL MMHG
T AXIS - MUSE: 35 DEGREES
TROPONIN T SERPL HS-MCNC: 6 NG/L
TROPONIN T SERPL HS-MCNC: 7 NG/L
VENTRICULAR RATE- MUSE: 97 BPM
WBC # BLD AUTO: 6.8 10E3/UL (ref 4–11)

## 2024-10-13 PROCEDURE — 250N000013 HC RX MED GY IP 250 OP 250 PS 637: Performed by: FAMILY MEDICINE

## 2024-10-13 PROCEDURE — 71046 X-RAY EXAM CHEST 2 VIEWS: CPT

## 2024-10-13 PROCEDURE — 99285 EMERGENCY DEPT VISIT HI MDM: CPT | Mod: 25 | Performed by: FAMILY MEDICINE

## 2024-10-13 PROCEDURE — 82962 GLUCOSE BLOOD TEST: CPT

## 2024-10-13 PROCEDURE — 96374 THER/PROPH/DIAG INJ IV PUSH: CPT | Performed by: FAMILY MEDICINE

## 2024-10-13 PROCEDURE — 96375 TX/PRO/DX INJ NEW DRUG ADDON: CPT | Performed by: FAMILY MEDICINE

## 2024-10-13 PROCEDURE — 36415 COLL VENOUS BLD VENIPUNCTURE: CPT | Performed by: FAMILY MEDICINE

## 2024-10-13 PROCEDURE — 93010 ELECTROCARDIOGRAM REPORT: CPT | Performed by: FAMILY MEDICINE

## 2024-10-13 PROCEDURE — 250N000011 HC RX IP 250 OP 636: Performed by: FAMILY MEDICINE

## 2024-10-13 PROCEDURE — 84703 CHORIONIC GONADOTROPIN ASSAY: CPT | Performed by: FAMILY MEDICINE

## 2024-10-13 PROCEDURE — 83690 ASSAY OF LIPASE: CPT | Performed by: FAMILY MEDICINE

## 2024-10-13 PROCEDURE — 84132 ASSAY OF SERUM POTASSIUM: CPT | Performed by: FAMILY MEDICINE

## 2024-10-13 PROCEDURE — 85610 PROTHROMBIN TIME: CPT | Performed by: FAMILY MEDICINE

## 2024-10-13 PROCEDURE — 84484 ASSAY OF TROPONIN QUANT: CPT | Performed by: FAMILY MEDICINE

## 2024-10-13 PROCEDURE — 85025 COMPLETE CBC W/AUTO DIFF WBC: CPT | Performed by: FAMILY MEDICINE

## 2024-10-13 PROCEDURE — 99284 EMERGENCY DEPT VISIT MOD MDM: CPT | Performed by: FAMILY MEDICINE

## 2024-10-13 PROCEDURE — 258N000003 HC RX IP 258 OP 636: Performed by: FAMILY MEDICINE

## 2024-10-13 PROCEDURE — 999N000040 HC STATISTIC CONSULT NO CHARGE VASC ACCESS

## 2024-10-13 PROCEDURE — 93005 ELECTROCARDIOGRAM TRACING: CPT | Performed by: FAMILY MEDICINE

## 2024-10-13 PROCEDURE — 96361 HYDRATE IV INFUSION ADD-ON: CPT | Performed by: FAMILY MEDICINE

## 2024-10-13 PROCEDURE — 83880 ASSAY OF NATRIURETIC PEPTIDE: CPT | Performed by: FAMILY MEDICINE

## 2024-10-13 PROCEDURE — 83735 ASSAY OF MAGNESIUM: CPT | Performed by: FAMILY MEDICINE

## 2024-10-13 PROCEDURE — 85730 THROMBOPLASTIN TIME PARTIAL: CPT | Performed by: FAMILY MEDICINE

## 2024-10-13 RX ORDER — CLONIDINE HYDROCHLORIDE 0.1 MG/1
0.1 TABLET ORAL 2 TIMES DAILY
COMMUNITY

## 2024-10-13 RX ORDER — LIDOCAINE 40 MG/G
CREAM TOPICAL
Status: DISCONTINUED | OUTPATIENT
Start: 2024-10-13 | End: 2024-10-13 | Stop reason: HOSPADM

## 2024-10-13 RX ORDER — MAGNESIUM HYDROXIDE/ALUMINUM HYDROXICE/SIMETHICONE 120; 1200; 1200 MG/30ML; MG/30ML; MG/30ML
30 SUSPENSION ORAL ONCE
Status: COMPLETED | OUTPATIENT
Start: 2024-10-13 | End: 2024-10-13

## 2024-10-13 RX ORDER — ONDANSETRON 2 MG/ML
4 INJECTION INTRAMUSCULAR; INTRAVENOUS EVERY 30 MIN PRN
Status: DISCONTINUED | OUTPATIENT
Start: 2024-10-13 | End: 2024-10-13 | Stop reason: HOSPADM

## 2024-10-13 RX ADMIN — SODIUM CHLORIDE 1000 ML: 9 INJECTION, SOLUTION INTRAVENOUS at 12:15

## 2024-10-13 RX ADMIN — ALUMINUM HYDROXIDE, MAGNESIUM HYDROXIDE, AND SIMETHICONE 30 ML: 1200; 120; 1200 SUSPENSION ORAL at 15:03

## 2024-10-13 RX ADMIN — FAMOTIDINE 20 MG: 10 INJECTION, SOLUTION INTRAVENOUS at 12:16

## 2024-10-13 RX ADMIN — ONDANSETRON 4 MG: 2 INJECTION INTRAMUSCULAR; INTRAVENOUS at 12:15

## 2024-10-13 ASSESSMENT — ACTIVITIES OF DAILY LIVING (ADL)
ADLS_ACUITY_SCORE: 37
ADLS_ACUITY_SCORE: 35
ADLS_ACUITY_SCORE: 37

## 2024-10-13 ASSESSMENT — COLUMBIA-SUICIDE SEVERITY RATING SCALE - C-SSRS
6. HAVE YOU EVER DONE ANYTHING, STARTED TO DO ANYTHING, OR PREPARED TO DO ANYTHING TO END YOUR LIFE?: NO
1. IN THE PAST MONTH, HAVE YOU WISHED YOU WERE DEAD OR WISHED YOU COULD GO TO SLEEP AND NOT WAKE UP?: NO
2. HAVE YOU ACTUALLY HAD ANY THOUGHTS OF KILLING YOURSELF IN THE PAST MONTH?: NO

## 2024-10-13 NOTE — DISCHARGE INSTRUCTIONS
Home.  Your labs and EKG appear stable.  Xray did not show any changes.  You feel better.  OK home with encourage fluids and advance diet as able.  Monitor blood sugars at home also.  Use home medications.  Follow up with MD for recheck on symptoms and blood sugar.  Return if any concerns at all.  Use OTC liquid Mylanta or Maalox as needed also if needed.    Results for orders placed or performed during the hospital encounter of 10/13/24   XR Chest 2 Views     Status: None    Narrative    EXAM: XR CHEST 2 VIEWS  LOCATION: [facility]  DATE: 10/13/2024    INDICATION: nauea vomiting with left chest pain  COMPARISON: Chest radiograph 8/22/2024.      Impression    IMPRESSION: Heart size and pulmonary vascularity are normal. No focal consolidation, pleural effusion, or pneumothorax.   Partial thromboplastin time     Status: Normal   Result Value Ref Range    aPTT 27 22 - 38 Seconds   INR     Status: Normal   Result Value Ref Range    INR 0.98 0.85 - 1.15   Magnesium     Status: Abnormal   Result Value Ref Range    Magnesium 1.5 (L) 1.7 - 2.3 mg/dL   Comprehensive metabolic panel     Status: Abnormal   Result Value Ref Range    Sodium 131 (L) 135 - 145 mmol/L    Potassium 4.2 3.4 - 5.3 mmol/L    Carbon Dioxide (CO2) 23 22 - 29 mmol/L    Anion Gap 14 7 - 15 mmol/L    Urea Nitrogen 13.8 6.0 - 20.0 mg/dL    Creatinine 0.72 0.51 - 0.95 mg/dL    GFR Estimate >90 >60 mL/min/1.73m2    Calcium 9.8 8.8 - 10.4 mg/dL    Chloride 94 (L) 98 - 107 mmol/L    Glucose 398 (H) 70 - 99 mg/dL    Alkaline Phosphatase 152 (H) 40 - 150 U/L     (H) 0 - 45 U/L     (H) 0 - 50 U/L    Protein Total 8.5 (H) 6.4 - 8.3 g/dL    Albumin 3.9 3.5 - 5.2 g/dL    Bilirubin Total 0.5 <=1.2 mg/dL   Lipase     Status: Normal   Result Value Ref Range    Lipase 25 13 - 60 U/L   Troponin T, High Sensitivity     Status: Normal   Result Value Ref Range    Troponin T, High Sensitivity 6 <=14 ng/L   Nt probnp  inpatient (BNP)     Status: Normal   Result Value Ref Range    N terminal Pro BNP Inpatient 42 0 - 450 pg/mL   HCG qualitative Blood     Status: Normal   Result Value Ref Range    hCG Serum Qualitative Negative Negative   CBC with platelets and differential     Status: None   Result Value Ref Range    WBC Count 6.8 4.0 - 11.0 10e3/uL    RBC Count 4.70 3.80 - 5.20 10e6/uL    Hemoglobin 14.3 11.7 - 15.7 g/dL    Hematocrit 41.8 35.0 - 47.0 %    MCV 89 78 - 100 fL    MCH 30.4 26.5 - 33.0 pg    MCHC 34.2 31.5 - 36.5 g/dL    RDW 11.9 10.0 - 15.0 %    Platelet Count 202 150 - 450 10e3/uL    % Neutrophils 57 %    % Lymphocytes 29 %    % Monocytes 8 %    % Eosinophils 5 %    % Basophils 1 %    % Immature Granulocytes 1 %    NRBCs per 100 WBC 0 <1 /100    Absolute Neutrophils 3.9 1.6 - 8.3 10e3/uL    Absolute Lymphocytes 2.0 0.8 - 5.3 10e3/uL    Absolute Monocytes 0.6 0.0 - 1.3 10e3/uL    Absolute Eosinophils 0.3 0.0 - 0.7 10e3/uL    Absolute Basophils 0.0 0.0 - 0.2 10e3/uL    Absolute Immature Granulocytes 0.1 <=0.4 10e3/uL    Absolute NRBCs 0.0 10e3/uL   Troponin T, High Sensitivity     Status: Normal   Result Value Ref Range    Troponin T, High Sensitivity 7 <=14 ng/L   EKG 12-lead, tracing only     Status: None (Preliminary result)   Result Value Ref Range    Systolic Blood Pressure  mmHg    Diastolic Blood Pressure  mmHg    Ventricular Rate 97 BPM    Atrial Rate 97 BPM    NM Interval 142 ms    QRS Duration 82 ms     ms    QTc 457 ms    P Axis 73 degrees    R AXIS -16 degrees    T Axis 35 degrees    Interpretation ECG       Sinus rhythm  Nonspecific ST abnormality  Abnormal ECG     CBC with platelets differential     Status: None    Narrative    The following orders were created for panel order CBC with platelets differential.  Procedure                               Abnormality         Status                     ---------                               -----------         ------                     CBC with  platelets and d...[[id number]]                      Final result                 Please view results for these tests on the individual orders.

## 2024-10-13 NOTE — ED TRIAGE NOTES
Patient reported sharp pain to left anterior chest radiating to left arm. She said it started after episodes of vomiting. Patient c/o pain to left side of abdomen. She said pain was internment yesterday and constant since 0600 this morning. Patient reported constant nausea and episodes of vomiting.      Triage Assessment (Adult)       Row Name 10/13/24 1109          Triage Assessment    Airway WDL WDL        Respiratory WDL    Respiratory WDL WDL        Skin Circulation/Temperature WDL    Skin Circulation/Temperature WDL WDL        Cardiac WDL    Cardiac WDL X;chest pain        Chest Pain Assessment    Chest Pain Location anterior chest, left     Chest Pain Intervention 12-lead ECG obtained        Peripheral/Neurovascular WDL    Peripheral Neurovascular WDL WDL        Cognitive/Neuro/Behavioral WDL    Cognitive/Neuro/Behavioral WDL WDL

## 2024-10-13 NOTE — ED PROVIDER NOTES
[facility] EMERGENCY DEPARTMENT ([facility])    10/13/24      ED PROVIDER NOTE     History     Chief Complaint   Patient presents with    Chest Pain     Patient reported sharp pain to left anterior chest radiating to left arm. She said it started after episodes of vomiting.     Abdominal Pain     Patient c/o pain to left side of abdomen. She said pain was internment yesterday and constant since 0600 this morning. Patient reported constant nausea and episodes of vomiting.       The history is provided by the patient and medical records.     Yun Coyle is a 48 year old female with history of type 2 diabetes, hepatitis C, chronic cannabis use, multiple ED presentations for cyclic vomiting syndrome who presents to the emergency department with abdominal pain, chest pain, and nausea.  Patient noted she started developing nausea without hematemesis yesterday, has not used cannabis in quite a while, states that there is unclear etiology of the cyclical vomiting.  Patient then developed this left anterior chest pain with radiation to left arm also.  No syncope with this, no fevers, chills otherwise, denies hematemesis, no blood in the stool.  No rash or trauma otherwise, no history of known cardiac disease.  She now presents for evaluation.     Past Medical History  Past Medical History:   Diagnosis Date    Acute pancreatitis 06/29/2017    Anxiety     Cauda equina syndrome (H) 03/29/2013    Cyst of right ovary 11/16/2017    Depressive disorder     Gastroesophageal reflux disease     Hypertension     Hyponatremia 10/01/2023    Low back pain 08/20/2012    Overview:   History of cauda equina and surgery.      Type 2 diabetes mellitus (H)     Uncomplicated asthma      Past Surgical History:   Procedure Laterality Date    BACK SURGERY      x2    CHOLECYSTECTOMY      ORTHOPEDIC SURGERY      SALPINGO OOPHORECTOMY,R/L/ELDA Right      cloNIDine (CATAPRES) 0.1 MG tablet  losartan (COZAAR) 25 MG tablet  OLANZapine  "(ZYPREXA) 10 MG tablet  glucagon 1 MG kit  glucose (BD GLUCOSE) 4 g chewable tablet  glucose 40 % (400 mg/mL) gel  metFORMIN (GLUCOPHAGE) 500 MG tablet  methadone (DOLOPHINE-INTENSOL) 10 MG/ML (HIGH CONC) solution  OLANZapine zydis (ZYPREXA) 5 MG ODT  omeprazole (PRILOSEC) 40 MG DR capsule  predniSONE (DELTASONE) 20 MG tablet  prochlorperazine (COMPAZINE) 25 MG suppository      No Known Allergies  Family History  Family History   Problem Relation Age of Onset    Breast Cancer Mother         in her 50s.    Diabetes Mother     Breast Cancer Maternal Grandmother         in her 40s.    Diabetes Maternal Grandmother      Social History   Social History     Tobacco Use    Smoking status: Never    Smokeless tobacco: Never   Vaping Use    Vaping status: Never Used   Substance Use Topics    Alcohol use: No    Drug use: Not Currently     Types: Marijuana     Comment: pt was taking Oxycontin \"years ago.\" pt is now on Methadone.      A medically appropriate review of systems was performed with pertinent positives and negatives noted in the HPI, and all other systems negative.    Physical Exam   BP: (!) 138/90  Pulse: 104  Temp: 98.1  F (36.7  C)  Resp: 20  Height: 175.3 cm (5' 9\")  Weight: 92.9 kg (204 lb 12.8 oz)  SpO2: 97 %  Physical Exam  Vitals and nursing note reviewed.   Constitutional:       General: She is in acute distress.      Appearance: Normal appearance. She is well-developed. She is not toxic-appearing or diaphoretic.      Comments: Patient to ER is alert and orient x 3.  No active vomiting here no respiratory stress speaking full sentences appears nontoxic   HENT:      Head: Normocephalic and atraumatic.      Nose: Nose normal.      Mouth/Throat:      Mouth: Mucous membranes are moist.      Pharynx: Oropharynx is clear.   Eyes:      General: No scleral icterus.     Extraocular Movements: Extraocular movements intact.      Conjunctiva/sclera: Conjunctivae normal.      Pupils: Pupils are equal, round, and reactive " to light.   Cardiovascular:      Rate and Rhythm: Normal rate and regular rhythm.   Pulmonary:      Effort: Pulmonary effort is normal. No respiratory distress.      Breath sounds: Normal breath sounds. No stridor.   Abdominal:      General: Abdomen is flat. There is no distension.      Palpations: Abdomen is soft. There is no mass.      Tenderness: There is abdominal tenderness. There is no guarding.      Comments: Abdomen is soft some mid epigastric tenderness no rebound or guarding no other localizing tenderness rebound or guarding noted.  No masses palpable.   Musculoskeletal:         General: No swelling or tenderness.      Cervical back: Normal range of motion and neck supple.   Skin:     General: Skin is warm.      Capillary Refill: Capillary refill takes less than 2 seconds.      Coloration: Skin is not jaundiced or pale.      Findings: No rash.   Neurological:      General: No focal deficit present.      Mental Status: She is alert and oriented to person, place, and time. Mental status is at baseline.   Psychiatric:      Comments: Appropriate here in the ER           ED Course, Procedures, & Data      Records viewed in epic refer to HPI regarding this.  Patient states symptoms are different than her typical cyclical vomiting.  Has never had this chest pain before in the past.  Meds reviewed allergies reviewed.    In ER will establish an IV we will give a liter of LR will check laboratory testing the EKG will start with Zofran for nausea also.  Will get a chest x-ray also etc.    EKG done as noted below sinus rhythm nonspecific ST changes.  Patient had a chest x-ray done also revealing no pneumothorax no pneumomediastinum pre  No infiltrates or effusion.    Patient IV established noted labs drawn reviewed.  Lipase 25.  Troponin was normal BNP normal also.  Sodium 131 potassium is 4.2.  Bicarb 23 gap is 14 glucose was 398 alk phos 152 AST is 112 and ALT is 153 which patient is at similar findings with liver  function test the past before she had a cholecystectomy ultrasound at that point was negative for any significant findings.  At this point patient does not have any right upper quadrant tenderness at all I think with her LFTs being relatively similar would not suited with any further evaluation at this point.  Pregnancy test negative.  Magnesium 1.5.  I count 6.8 hemoglobin 14.3.  Urinalysis was ordered.    Patient given Pepcid 20 mg IV along with Zofran IV feeling better after this patient given Maalox also chest pain seems to be resolved at this point patient is comfortable going home feels much better feels she is dehydrated this point patient would like to go home I think reassuring her chest pain is atypical for nausea vomiting with history of cyclical although improved now he feels medications at home and will follow-up with MD return if any worsening symptoms at all.          Procedures            EKG Interpretation:      Interpreted by Mendoza Campos MD  Time reviewed: 1108  Symptoms at time of EKG: left chest pain with n/v   Rhythm: normal sinus   Rate: normal  Axis: normal  Ectopy: none  Conduction: normal  ST Segments/ T Waves: Noms[ecific ST-T wave changes  Q Waves: none  Comparison to prior: No old EKG available    Clinical Impression: nsr with nonspec st changes            Results for orders placed or performed during the hospital encounter of 10/13/24   XR Chest 2 Views     Status: None    Narrative    EXAM: XR CHEST 2 VIEWS  LOCATION: [facility]  DATE: 10/13/2024    INDICATION: nauea vomiting with left chest pain  COMPARISON: Chest radiograph 8/22/2024.      Impression    IMPRESSION: Heart size and pulmonary vascularity are normal. No focal consolidation, pleural effusion, or pneumothorax.   Partial thromboplastin time     Status: Normal   Result Value Ref Range    aPTT 27 22 - 38 Seconds   INR     Status: Normal   Result Value Ref Range    INR 0.98  0.85 - 1.15   Magnesium     Status: Abnormal   Result Value Ref Range    Magnesium 1.5 (L) 1.7 - 2.3 mg/dL   Comprehensive metabolic panel     Status: Abnormal   Result Value Ref Range    Sodium 131 (L) 135 - 145 mmol/L    Potassium 4.2 3.4 - 5.3 mmol/L    Carbon Dioxide (CO2) 23 22 - 29 mmol/L    Anion Gap 14 7 - 15 mmol/L    Urea Nitrogen 13.8 6.0 - 20.0 mg/dL    Creatinine 0.72 0.51 - 0.95 mg/dL    GFR Estimate >90 >60 mL/min/1.73m2    Calcium 9.8 8.8 - 10.4 mg/dL    Chloride 94 (L) 98 - 107 mmol/L    Glucose 398 (H) 70 - 99 mg/dL    Alkaline Phosphatase 152 (H) 40 - 150 U/L     (H) 0 - 45 U/L     (H) 0 - 50 U/L    Protein Total 8.5 (H) 6.4 - 8.3 g/dL    Albumin 3.9 3.5 - 5.2 g/dL    Bilirubin Total 0.5 <=1.2 mg/dL   Lipase     Status: Normal   Result Value Ref Range    Lipase 25 13 - 60 U/L   Troponin T, High Sensitivity     Status: Normal   Result Value Ref Range    Troponin T, High Sensitivity 6 <=14 ng/L   Nt probnp inpatient (BNP)     Status: Normal   Result Value Ref Range    N terminal Pro BNP Inpatient 42 0 - 450 pg/mL   HCG qualitative Blood     Status: Normal   Result Value Ref Range    hCG Serum Qualitative Negative Negative   CBC with platelets and differential     Status: None   Result Value Ref Range    WBC Count 6.8 4.0 - 11.0 10e3/uL    RBC Count 4.70 3.80 - 5.20 10e6/uL    Hemoglobin 14.3 11.7 - 15.7 g/dL    Hematocrit 41.8 35.0 - 47.0 %    MCV 89 78 - 100 fL    MCH 30.4 26.5 - 33.0 pg    MCHC 34.2 31.5 - 36.5 g/dL    RDW 11.9 10.0 - 15.0 %    Platelet Count 202 150 - 450 10e3/uL    % Neutrophils 57 %    % Lymphocytes 29 %    % Monocytes 8 %    % Eosinophils 5 %    % Basophils 1 %    % Immature Granulocytes 1 %    NRBCs per 100 WBC 0 <1 /100    Absolute Neutrophils 3.9 1.6 - 8.3 10e3/uL    Absolute Lymphocytes 2.0 0.8 - 5.3 10e3/uL    Absolute Monocytes 0.6 0.0 - 1.3 10e3/uL    Absolute Eosinophils 0.3 0.0 - 0.7 10e3/uL    Absolute Basophils 0.0 0.0 - 0.2 10e3/uL    Absolute  Immature Granulocytes 0.1 <=0.4 10e3/uL    Absolute NRBCs 0.0 10e3/uL   Troponin T, High Sensitivity     Status: Normal   Result Value Ref Range    Troponin T, High Sensitivity 7 <=14 ng/L   Glucose by meter     Status: Abnormal   Result Value Ref Range    GLUCOSE BY METER POCT 278 (H) 70 - 99 mg/dL   EKG 12-lead, tracing only     Status: None (Preliminary result)   Result Value Ref Range    Systolic Blood Pressure  mmHg    Diastolic Blood Pressure  mmHg    Ventricular Rate 97 BPM    Atrial Rate 97 BPM    GA Interval 142 ms    QRS Duration 82 ms     ms    QTc 457 ms    P Axis 73 degrees    R AXIS -16 degrees    T Axis 35 degrees    Interpretation ECG       Sinus rhythm  Nonspecific ST abnormality  Abnormal ECG     CBC with platelets differential     Status: None    Narrative    The following orders were created for panel order CBC with platelets differential.  Procedure                               Abnormality         Status                     ---------                               -----------         ------                     CBC with platelets and d...[[id number]]                      Final result                 Please view results for these tests on the individual orders.     Medications   lidocaine 1 % 0.1-1 mL (has no administration in time range)   lidocaine (LMX4) cream (has no administration in time range)   sodium chloride (PF) 0.9% PF flush 3 mL (3 mLs Intracatheter $Given 10/13/24 1216)   sodium chloride (PF) 0.9% PF flush 3 mL (has no administration in time range)   ondansetron (ZOFRAN) injection 4 mg (4 mg Intravenous $Given 10/13/24 1215)   sodium chloride 0.9% BOLUS 1,000 mL (0 mLs Intravenous Stopped 10/13/24 1448)   famotidine (PEPCID) injection 20 mg (20 mg Intravenous $Given 10/13/24 1216)   alum & mag hydroxide-simethicone (MAALOX) suspension 30 mL (30 mLs Oral $Given 10/13/24 1503)     Labs Ordered and Resulted from Time of ED Arrival to Time of ED Departure   MAGNESIUM - Abnormal        Result Value    Magnesium 1.5 (*)    COMPREHENSIVE METABOLIC PANEL - Abnormal    Sodium 131 (*)     Potassium 4.2      Carbon Dioxide (CO2) 23      Anion Gap 14      Urea Nitrogen 13.8      Creatinine 0.72      GFR Estimate >90      Calcium 9.8      Chloride 94 (*)     Glucose 398 (*)     Alkaline Phosphatase 152 (*)      (*)      (*)     Protein Total 8.5 (*)     Albumin 3.9      Bilirubin Total 0.5     GLUCOSE BY METER - Abnormal    GLUCOSE BY METER POCT 278 (*)    PARTIAL THROMBOPLASTIN TIME - Normal    aPTT 27     INR - Normal    INR 0.98     LIPASE - Normal    Lipase 25     TROPONIN T, HIGH SENSITIVITY - Normal    Troponin T, High Sensitivity 6     NT PROBNP INPATIENT - Normal    N terminal Pro BNP Inpatient 42     HCG QUALITATIVE PREGNANCY - Normal    hCG Serum Qualitative Negative     TROPONIN T, HIGH SENSITIVITY - Normal    Troponin T, High Sensitivity 7     CBC WITH PLATELETS AND DIFFERENTIAL    WBC Count 6.8      RBC Count 4.70      Hemoglobin 14.3      Hematocrit 41.8      MCV 89      MCH 30.4      MCHC 34.2      RDW 11.9      Platelet Count 202      % Neutrophils 57      % Lymphocytes 29      % Monocytes 8      % Eosinophils 5      % Basophils 1      % Immature Granulocytes 1      NRBCs per 100 WBC 0      Absolute Neutrophils 3.9      Absolute Lymphocytes 2.0      Absolute Monocytes 0.6      Absolute Eosinophils 0.3      Absolute Basophils 0.0      Absolute Immature Granulocytes 0.1      Absolute NRBCs 0.0     ROUTINE UA WITH MICROSCOPIC REFLEX TO CULTURE   GLUCOSE MONITOR NURSING POCT     XR Chest 2 Views   Final Result   IMPRESSION: Heart size and pulmonary vascularity are normal. No focal consolidation, pleural effusion, or pneumothorax.             Critical care was not performed.     Medical Decision Making  The patient's presentation was of moderate complexity (an acute illness with systemic symptoms).    The patient's evaluation involved:  review of external note(s) from 3+ sources  (see separate area of note for details)  review of 3+ test result(s) ordered prior to this encounter (see separate area of note for details)  ordering and/or review of 3+ test(s) in this encounter (see separate area of note for details)    The patient's management necessitated moderate risk (prescription drug management including medications given in the ED).    Assessment & Plan   48-year-old female history of type 2 diabetes hepatitis C with reports of chronic cannabinoid use with hyperemesis cyclical vomiting syndrome presented with nausea vomiting without hematemesis but some left-sided chest pain.  Patient noted symptoms yesterday has not used cannabis in quite a while evaluated here in the ER cardiac workup otherwise negative.  Chest x-ray did not show any sign of any pneumomediastinum or any other pulmonary changes free air under the diaphragm etc.  Patient received IV fluids along with this Zofran and Pepcid with improvement of symptoms.  Given Maalox also patient's chest pain seems resolved given fluids normal saline bolus.  As noted other workup patient's blood sugar noted to be initially 398 after fluids and repeat now down to 278.  Patient has had high blood sugars at this point recommended to follow-up with primary physician regarding her blood sugar evaluation other labs also including LFTs etc.  Patient to return to concerns at all but feels much better and will be discharged.  Patient has medications at home.       I have reviewed the nursing notes. I have reviewed the findings, diagnosis, plan and need for follow up with the patient.    Discharge Medication List as of 10/13/2024  3:48 PM          Final diagnoses:   Nausea and vomiting, unspecified vomiting type   Atypical chest pain - ? GI   Elevated LFTs - chronic?   Type 2 diabetes mellitus with hyperglycemia, without long-term current use of insulin (H)       Mendoza Campos MD  [facility] EMERGENCY DEPARTMENT  10/13/2024      This  note was created at least in part by the use of dragon voice dictation system. Inadvertent typographical errors may still exist.  Mendoza Campos MD.  Patient evaluated in the emergency department during the COVID-19 pandemic period. Careful attention to patients safety was addressed throughout the evaluation. Evaluation and treatment management was initiated with disposition made efficiently and appropriate as possible to minimize any risk of potential exposure to patient during this evaluation.             Mendoza Campos MD  10/13/24 [id number]

## 2024-10-13 NOTE — ED NOTES
Patient asked to use the restroom immediately following IV placement. Patient was in the bathroom for over 10 minutes. Patient came back to the room and was try to tape the IV back down and upon review the PIV was unclamped. When asking the patient why, she explained that she was just trying to tape it back down and maybe it became unclamped from her messing with it. After laying down patient began dosing off while hooking her up to monitor.

## 2024-10-15 ENCOUNTER — PATIENT OUTREACH (OUTPATIENT)
Dept: CARE COORDINATION | Facility: CLINIC | Age: 49
End: 2024-10-15
Payer: COMMERCIAL

## 2024-10-25 ENCOUNTER — HOSPITAL ENCOUNTER (EMERGENCY)
Facility: CLINIC | Age: 49
Discharge: HOME | End: 2024-10-25
Attending: EMERGENCY MEDICINE | Admitting: EMERGENCY MEDICINE
Payer: COMMERCIAL

## 2024-10-25 ENCOUNTER — APPOINTMENT (OUTPATIENT)
Dept: GENERAL RADIOLOGY | Facility: CLINIC | Age: 49
End: 2024-10-25
Attending: EMERGENCY MEDICINE
Payer: COMMERCIAL

## 2024-10-25 VITALS
WEIGHT: 200.2 LBS | HEIGHT: 69 IN | HEART RATE: 92 BPM | DIASTOLIC BLOOD PRESSURE: 82 MMHG | RESPIRATION RATE: 20 BRPM | SYSTOLIC BLOOD PRESSURE: 129 MMHG | TEMPERATURE: 98.8 F | OXYGEN SATURATION: 95 % | BODY MASS INDEX: 29.65 KG/M2

## 2024-10-25 DIAGNOSIS — J45.901 MODERATE ASTHMA WITH ACUTE EXACERBATION, UNSPECIFIED WHETHER PERSISTENT: ICD-10-CM

## 2024-10-25 DIAGNOSIS — R09.02 HYPOXIA: ICD-10-CM

## 2024-10-25 LAB
ALBUMIN SERPL BCG-MCNC: 4.2 G/DL (ref 3.5–5.2)
ALP SERPL-CCNC: 131 U/L (ref 40–150)
ALT SERPL W P-5'-P-CCNC: 120 U/L (ref 0–50)
ANION GAP SERPL CALCULATED.3IONS-SCNC: 14 MMOL/L (ref 7–15)
AST SERPL W P-5'-P-CCNC: 74 U/L (ref 0–45)
BASOPHILS # BLD AUTO: 0.1 10E3/UL (ref 0–0.2)
BASOPHILS NFR BLD AUTO: 1 %
BILIRUB SERPL-MCNC: 0.3 MG/DL
BUN SERPL-MCNC: 15 MG/DL (ref 6–20)
BURR CELLS BLD QL SMEAR: SLIGHT
CALCIUM SERPL-MCNC: 9.6 MG/DL (ref 8.8–10.4)
CHLORIDE SERPL-SCNC: 99 MMOL/L (ref 98–107)
CREAT SERPL-MCNC: 1.02 MG/DL (ref 0.51–0.95)
EGFRCR SERPLBLD CKD-EPI 2021: 68 ML/MIN/1.73M2
EOSINOPHIL # BLD AUTO: 1 10E3/UL (ref 0–0.7)
EOSINOPHIL NFR BLD AUTO: 10 %
ERYTHROCYTE [DISTWIDTH] IN BLOOD BY AUTOMATED COUNT: 11.9 % (ref 10–15)
GLUCOSE SERPL-MCNC: 246 MG/DL (ref 70–99)
HCO3 SERPL-SCNC: 25 MMOL/L (ref 22–29)
HCT VFR BLD AUTO: 40.6 % (ref 35–47)
HGB BLD-MCNC: 14.2 G/DL (ref 11.7–15.7)
IMM GRANULOCYTES # BLD: 0.1 10E3/UL
IMM GRANULOCYTES NFR BLD: 1 %
LYMPHOCYTES # BLD AUTO: 5.1 10E3/UL (ref 0.8–5.3)
LYMPHOCYTES NFR BLD AUTO: 51 %
MCH RBC QN AUTO: 31.3 PG (ref 26.5–33)
MCHC RBC AUTO-ENTMCNC: 35 G/DL (ref 31.5–36.5)
MCV RBC AUTO: 90 FL (ref 78–100)
MONOCYTES # BLD AUTO: 0.7 10E3/UL (ref 0–1.3)
MONOCYTES NFR BLD AUTO: 7 %
NEUTROPHILS # BLD AUTO: 3.1 10E3/UL (ref 1.6–8.3)
NEUTROPHILS NFR BLD AUTO: 31 %
NRBC # BLD AUTO: 0 10E3/UL
NRBC BLD AUTO-RTO: 0 /100
NT-PROBNP SERPL-MCNC: 60 PG/ML (ref 0–450)
PLAT MORPH BLD: ABNORMAL
PLATELET # BLD AUTO: 230 10E3/UL (ref 150–450)
POLYCHROMASIA BLD QL SMEAR: SLIGHT
POTASSIUM SERPL-SCNC: 4.1 MMOL/L (ref 3.4–5.3)
PROT SERPL-MCNC: 8.4 G/DL (ref 6.4–8.3)
RBC # BLD AUTO: 4.53 10E6/UL (ref 3.8–5.2)
RBC MORPH BLD: ABNORMAL
SODIUM SERPL-SCNC: 138 MMOL/L (ref 135–145)
TROPONIN T SERPL HS-MCNC: <6 NG/L
WBC # BLD AUTO: 10 10E3/UL (ref 4–11)

## 2024-10-25 PROCEDURE — 258N000003 HC RX IP 258 OP 636: Performed by: EMERGENCY MEDICINE

## 2024-10-25 PROCEDURE — 83880 ASSAY OF NATRIURETIC PEPTIDE: CPT | Performed by: EMERGENCY MEDICINE

## 2024-10-25 PROCEDURE — 84155 ASSAY OF PROTEIN SERUM: CPT | Performed by: EMERGENCY MEDICINE

## 2024-10-25 PROCEDURE — 93005 ELECTROCARDIOGRAM TRACING: CPT | Performed by: EMERGENCY MEDICINE

## 2024-10-25 PROCEDURE — 93010 ELECTROCARDIOGRAM REPORT: CPT | Performed by: EMERGENCY MEDICINE

## 2024-10-25 PROCEDURE — 96360 HYDRATION IV INFUSION INIT: CPT | Performed by: EMERGENCY MEDICINE

## 2024-10-25 PROCEDURE — 99291 CRITICAL CARE FIRST HOUR: CPT | Mod: 25 | Performed by: EMERGENCY MEDICINE

## 2024-10-25 PROCEDURE — 250N000009 HC RX 250: Performed by: FAMILY MEDICINE

## 2024-10-25 PROCEDURE — 85004 AUTOMATED DIFF WBC COUNT: CPT | Performed by: EMERGENCY MEDICINE

## 2024-10-25 PROCEDURE — 85018 HEMOGLOBIN: CPT | Performed by: EMERGENCY MEDICINE

## 2024-10-25 PROCEDURE — 250N000012 HC RX MED GY IP 250 OP 636 PS 637: Performed by: EMERGENCY MEDICINE

## 2024-10-25 PROCEDURE — 36415 COLL VENOUS BLD VENIPUNCTURE: CPT | Performed by: EMERGENCY MEDICINE

## 2024-10-25 PROCEDURE — 94640 AIRWAY INHALATION TREATMENT: CPT | Performed by: EMERGENCY MEDICINE

## 2024-10-25 PROCEDURE — 99291 CRITICAL CARE FIRST HOUR: CPT | Performed by: EMERGENCY MEDICINE

## 2024-10-25 PROCEDURE — 71046 X-RAY EXAM CHEST 2 VIEWS: CPT

## 2024-10-25 PROCEDURE — 84484 ASSAY OF TROPONIN QUANT: CPT | Performed by: EMERGENCY MEDICINE

## 2024-10-25 RX ORDER — PREDNISONE 20 MG/1
40 TABLET ORAL DAILY
Qty: 8 TABLET | Refills: 0 | Status: SHIPPED | OUTPATIENT
Start: 2024-10-25 | End: 2024-10-29

## 2024-10-25 RX ORDER — IPRATROPIUM BROMIDE AND ALBUTEROL SULFATE 2.5; .5 MG/3ML; MG/3ML
3 SOLUTION RESPIRATORY (INHALATION) ONCE
Status: COMPLETED | OUTPATIENT
Start: 2024-10-25 | End: 2024-10-25

## 2024-10-25 RX ADMIN — INSULIN ASPART 7 UNITS: 100 INJECTION, SOLUTION INTRAVENOUS; SUBCUTANEOUS at 21:02

## 2024-10-25 RX ADMIN — IPRATROPIUM BROMIDE AND ALBUTEROL SULFATE 3 ML: .5; 3 SOLUTION RESPIRATORY (INHALATION) at 19:06

## 2024-10-25 RX ADMIN — PREDNISONE 50 MG: 20 TABLET ORAL at 19:40

## 2024-10-25 RX ADMIN — SODIUM CHLORIDE, POTASSIUM CHLORIDE, SODIUM LACTATE AND CALCIUM CHLORIDE 1000 ML: 600; 310; 30; 20 INJECTION, SOLUTION INTRAVENOUS at 19:43

## 2024-10-25 ASSESSMENT — ACTIVITIES OF DAILY LIVING (ADL)
ADLS_ACUITY_SCORE: 0

## 2024-10-25 NOTE — ED TRIAGE NOTES
Triage Assessment (Adult)       Row Name 10/25/24 1858 10/25/24 1856       Triage Assessment    Airway WDL WDL WDL       Respiratory WDL    Respiratory WDL X;rhythm/pattern --       Skin Circulation/Temperature WDL    Skin Circulation/Temperature WDL WDL --       Cardiac WDL    Cardiac WDL X --    Cardiac Rhythm ST --       Peripheral/Neurovascular WDL    Peripheral Neurovascular WDL WDL --

## 2024-10-25 NOTE — ED TRIAGE NOTES
Reports she has a rescue inhaler and states onset of dry cough and dyspnea since this AM reports that her rescue inhaler isn't cutting it. Doesn't have a neb machine at home. Denies fever or recent illness/sick contacts.    Denies pain.    Also having NV since this am and unable to tolerate PO     Triage Assessment (Adult)       Row Name 10/25/24 1080          Triage Assessment    Airway WDL WDL

## 2024-10-26 LAB
ATRIAL RATE - MUSE: 106 BPM
ATRIAL RATE - MUSE: 87 BPM
DIASTOLIC BLOOD PRESSURE - MUSE: NORMAL MMHG
DIASTOLIC BLOOD PRESSURE - MUSE: NORMAL MMHG
INTERPRETATION ECG - MUSE: NORMAL
INTERPRETATION ECG - MUSE: NORMAL
P AXIS - MUSE: 54 DEGREES
P AXIS - MUSE: 56 DEGREES
PR INTERVAL - MUSE: 140 MS
PR INTERVAL - MUSE: 154 MS
QRS DURATION - MUSE: 82 MS
QRS DURATION - MUSE: 84 MS
QT - MUSE: 380 MS
QT - MUSE: 398 MS
QTC - MUSE: 478 MS
QTC - MUSE: 504 MS
R AXIS - MUSE: -22 DEGREES
R AXIS - MUSE: -26 DEGREES
SYSTOLIC BLOOD PRESSURE - MUSE: NORMAL MMHG
SYSTOLIC BLOOD PRESSURE - MUSE: NORMAL MMHG
T AXIS - MUSE: -4 DEGREES
T AXIS - MUSE: 59 DEGREES
VENTRICULAR RATE- MUSE: 106 BPM
VENTRICULAR RATE- MUSE: 87 BPM

## 2024-10-26 NOTE — ED PROVIDER NOTES
[facility] EMERGENCY DEPARTMENT ([facility])    10/25/24      ED PROVIDER NOTE    History     Chief Complaint   Patient presents with    Shortness of Breath    Nausea, Vomiting, & Diarrhea     HPI  Yun Coyle is a 48 year old female who presents with 1 day of shortness of breath, tachypnea and like her asthma is acting up. Patient reports going to bed normal last night without sick contacts, waking up short of breath. She is refractory to her home inhaler. Patient has no admissions to hospitals or intubations. In triage patient with hypoxic to 90, short of breath, tachycardic. Patient was placed in resuscitation room where she received a DuoNeb treatment. On arrival patient receiving nebulizer treatment hypoxic to the low 90s, mildly tachypneic and tachycardic. Patient denies fevers or productive cough or chest pain.     Past Medical History  Past Medical History:   Diagnosis Date    Acute pancreatitis 06/29/2017    Anxiety     Cauda equina syndrome (H) 03/29/2013    Cyst of right ovary 11/16/2017    Depressive disorder     Gastroesophageal reflux disease     Hypertension     Hyponatremia 10/01/2023    Low back pain 08/20/2012    Overview:   History of cauda equina and surgery.      Type 2 diabetes mellitus (H)     Uncomplicated asthma      Past Surgical History:   Procedure Laterality Date    BACK SURGERY      x2    CHOLECYSTECTOMY      ORTHOPEDIC SURGERY      SALPINGO OOPHORECTOMY,R/L/ELDA Right      predniSONE (DELTASONE) 20 MG tablet  cloNIDine (CATAPRES) 0.1 MG tablet  glucagon 1 MG kit  glucose (BD GLUCOSE) 4 g chewable tablet  glucose 40 % (400 mg/mL) gel  losartan (COZAAR) 25 MG tablet  metFORMIN (GLUCOPHAGE) 500 MG tablet  methadone (DOLOPHINE-INTENSOL) 10 MG/ML (HIGH CONC) solution  OLANZapine (ZYPREXA) 10 MG tablet  OLANZapine zydis (ZYPREXA) 5 MG ODT  predniSONE (DELTASONE) 20 MG tablet  prochlorperazine (COMPAZINE) 25 MG suppository      No Known Allergies  Family History  Family History  "  Problem Relation Age of Onset    Breast Cancer Mother         in her 50s.    Diabetes Mother     Breast Cancer Maternal Grandmother         in her 40s.    Diabetes Maternal Grandmother      Social History   Social History     Tobacco Use    Smoking status: Never    Smokeless tobacco: Never   Vaping Use    Vaping status: Never Used   Substance Use Topics    Alcohol use: No    Drug use: Not Currently     Types: Marijuana     Comment: pt was taking Oxycontin \"years ago.\" pt is now on Methadone.      A complete review of systems was performed with pertinent positives and negatives noted in the HPI, and all other systems negative.    Physical Exam   BP: 117/88  Pulse: (!) 122  Temp: 98.8  F (37.1  C)  Resp: 18  Height: 175.3 cm (5' 9\")  Weight: 90.8 kg (200 lb 3.2 oz)  SpO2: 90 %  Physical Exam  Atraumatic head   speaking 4-5 word sentences   moderately tachypneic   marked wheezes bilaterally   tachycardic   legs without edema     ED Course, Procedures, & Data      Procedures            EKG Interpretation:      Interpreted by Gilmer Camarillo MD  Time reviewed: 1933  Symptoms at time of EKG: SOB, asthma exacerbation   Rhythm: sinus tachycardia  Rate: 106 BPM  Axis: normal  Ectopy: none  Conduction: normal  ST Segments/ T Waves: prolonged QTc at 504  Q Waves: none  Comparison to prior: Unchanged from 10/13/24    Clinical Impression: sinus tachycardia with prolonged QTc and no active ischemia            Results for orders placed or performed during the hospital encounter of 10/25/24   XR Chest 2 Views     Status: None    Narrative    EXAM: XR CHEST 2 VIEWS  LOCATION: [facility]  DATE: 10/25/2024    INDICATION: hypoxia  COMPARISON: 10/13/2024      Impression    IMPRESSION: Heart size and pulmonary vascularity normal. The lungs are clear. Clips right upper quadrant.   Comprehensive metabolic panel     Status: Abnormal   Result Value Ref Range    Sodium 138 135 - 145 mmol/L    " Potassium 4.1 3.4 - 5.3 mmol/L    Carbon Dioxide (CO2) 25 22 - 29 mmol/L    Anion Gap 14 7 - 15 mmol/L    Urea Nitrogen 15.0 6.0 - 20.0 mg/dL    Creatinine 1.02 (H) 0.51 - 0.95 mg/dL    GFR Estimate 68 >60 mL/min/1.73m2    Calcium 9.6 8.8 - 10.4 mg/dL    Chloride 99 98 - 107 mmol/L    Glucose 246 (H) 70 - 99 mg/dL    Alkaline Phosphatase 131 40 - 150 U/L    AST 74 (H) 0 - 45 U/L     (H) 0 - 50 U/L    Protein Total 8.4 (H) 6.4 - 8.3 g/dL    Albumin 4.2 3.5 - 5.2 g/dL    Bilirubin Total 0.3 <=1.2 mg/dL   Troponin T, High Sensitivity     Status: Normal   Result Value Ref Range    Troponin T, High Sensitivity <6 <=14 ng/L   Nt probnp inpatient (BNP)     Status: Normal   Result Value Ref Range    N terminal Pro BNP Inpatient 60 0 - 450 pg/mL   CBC with platelets and differential     Status: Abnormal   Result Value Ref Range    WBC Count 10.0 4.0 - 11.0 10e3/uL    RBC Count 4.53 3.80 - 5.20 10e6/uL    Hemoglobin 14.2 11.7 - 15.7 g/dL    Hematocrit 40.6 35.0 - 47.0 %    MCV 90 78 - 100 fL    MCH 31.3 26.5 - 33.0 pg    MCHC 35.0 31.5 - 36.5 g/dL    RDW 11.9 10.0 - 15.0 %    Platelet Count 230 150 - 450 10e3/uL    % Neutrophils 31 %    % Lymphocytes 51 %    % Monocytes 7 %    % Eosinophils 10 %    % Basophils 1 %    % Immature Granulocytes 1 %    NRBCs per 100 WBC 0 <1 /100    Absolute Neutrophils 3.1 1.6 - 8.3 10e3/uL    Absolute Lymphocytes 5.1 0.8 - 5.3 10e3/uL    Absolute Monocytes 0.7 0.0 - 1.3 10e3/uL    Absolute Eosinophils 1.0 (H) 0.0 - 0.7 10e3/uL    Absolute Basophils 0.1 0.0 - 0.2 10e3/uL    Absolute Immature Granulocytes 0.1 <=0.4 10e3/uL    Absolute NRBCs 0.0 10e3/uL   RBC and Platelet Morphology     Status: Abnormal   Result Value Ref Range    RBC Morphology Confirmed RBC Indices     Platelet Assessment  Automated Count Confirmed. Platelet morphology is normal.     Automated Count Confirmed. Platelet morphology is normal.    [place] Cells Slight (A) None Seen    Polychromasia Slight (A) None Seen   CBC with  platelets differential     Status: Abnormal    Narrative    The following orders were created for panel order CBC with platelets differential.  Procedure                               Abnormality         Status                     ---------                               -----------         ------                     CBC with platelets and d...[[id number]]  Abnormal            Final result               RBC and Platelet Morphology[[id number]]  Abnormal            Final result                 Please view results for these tests on the individual orders.     Medications   ipratropium - albuterol 0.5 mg/2.5 mg/3 mL (DUONEB) neb solution 3 mL (3 mLs Nebulization $Given 10/25/24 1906)   lactated ringers BOLUS 1,000 mL (0 mLs Intravenous Stopped 10/25/24 2158)   predniSONE (DELTASONE) tablet 50 mg (50 mg Oral $Given 10/25/24 1940)   insulin aspart (NovoLOG) injection (RAPID ACTING) (7 Units Subcutaneous $Given 10/25/24 2102)     Labs Ordered and Resulted from Time of ED Arrival to Time of ED Departure   COMPREHENSIVE METABOLIC PANEL - Abnormal       Result Value    Sodium 138      Potassium 4.1      Carbon Dioxide (CO2) 25      Anion Gap 14      Urea Nitrogen 15.0      Creatinine 1.02 (*)     GFR Estimate 68      Calcium 9.6      Chloride 99      Glucose 246 (*)     Alkaline Phosphatase 131      AST 74 (*)      (*)     Protein Total 8.4 (*)     Albumin 4.2      Bilirubin Total 0.3     CBC WITH PLATELETS AND DIFFERENTIAL - Abnormal    WBC Count 10.0      RBC Count 4.53      Hemoglobin 14.2      Hematocrit 40.6      MCV 90      MCH 31.3      MCHC 35.0      RDW 11.9      Platelet Count 230      % Neutrophils 31      % Lymphocytes 51      % Monocytes 7      % Eosinophils 10      % Basophils 1      % Immature Granulocytes 1      NRBCs per 100 WBC 0      Absolute Neutrophils 3.1      Absolute Lymphocytes 5.1      Absolute Monocytes 0.7      Absolute Eosinophils 1.0 (*)     Absolute Basophils 0.1      Absolute Immature  Granulocytes 0.1      Absolute NRBCs 0.0     RBC AND PLATELET MORPHOLOGY - Abnormal    RBC Morphology Confirmed RBC Indices      Platelet Assessment        Value: Automated Count Confirmed. Platelet morphology is normal.    Rajesh Cells Slight (*)     Polychromasia Slight (*)    TROPONIN T, HIGH SENSITIVITY - Normal    Troponin T, High Sensitivity <6     NT PROBNP INPATIENT - Normal    N terminal Pro BNP Inpatient 60       XR Chest 2 Views   Final Result   IMPRESSION: Heart size and pulmonary vascularity normal. The lungs are clear. Clips right upper quadrant.             Critical Care Addendum  My initial assessment, based on my review of nursing observations, review of vital signs, focused history, physical exam, review of cardiac rhythm monitor, and 12 lead ECG analysis, established a high suspicion that Yun Coyle has  respiratory distress and hypoxia , which requires immediate intervention, and therefore she is critically ill.     After the initial assessment, the care team initiated multiple lab tests, initiated medication therapy with DuoNebs prednisone and oxygen, and performed rapid reassessment  to provide stabilization care. Due to the critical nature of this patient, I reassessed vital signs, physical exam, and respiratory status multiple times prior to her disposition.     Time also spent performing documentation, reviewing test results, and coordination of care.     Critical care time (excluding teaching time and procedures): 35 minutes.       Assessment & Plan    Moderate asthma exacerbation. I will rule out cardiac ischemia, pneumothorax, pneumonia. 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 At home by her daughter's asthma and her asthma has been worse this past day. Chest x-ray, labs including troponin, BNP back-to-back DuoNeb, nebulizers, IV fluids x 1, oral prednisone, EKG and reassess after initial workup.    30 minutes after initial assessment: Patient  feeling somewhat improved and repeat DuoNeb given.      Prior to discharge, I have independently reviewed the chest x-ray imaging showing no signs of pneumonia or pneumothorax.  Labs show hyperglycemia but no elevated BNP or troponin.  Patient feeling improved and not hypoxic anymore after multiple rounds of DuoNebs and steroids.  I have strongly considered admitting the patient though will discharge with quick PMD follow-up and medication      I have reviewed the nursing notes. I have reviewed the findings, diagnosis, plan and need for follow up with the patient.    Discharge Medication List as of 10/25/2024  9:59 PM        START taking these medications    Details   !! predniSONE (DELTASONE) 20 MG tablet Take 2 tablets (40 mg) by mouth daily for 4 days., Disp-8 tablet, R-0, E-Prescribe       !! - Potential duplicate medications found. Please discuss with provider.          Final diagnoses:   Hypoxia   Moderate asthma with acute exacerbation, unspecified whether persistent       Gilmer Camarillo I, Robles Carrero, am serving as a trained medical scribe to document services personally performed by Gilmer Camarillo MD based on the provider's statements to me on October 25, 2024.  This document has been checked and approved by the attending provider.    I, Gilmer Camarillo MD, was physically present and have reviewed and verified the accuracy of this note documented by Robles Carrero medical scribe.      Gilmer Camarillo MD    [facility] EMERGENCY DEPARTMENT  10/25/2024     Gilmer Camarillo MD  10/26/24 0047

## 2024-10-26 NOTE — DISCHARGE INSTRUCTIONS
Please call today to schedule a follow-up appointment with your primary medical doctor in 3-5 days for reevaluation, which is important after today's emergency department visit.   Please return to emergency department for fever>104F, persistent vomiting, worsening chest pain, shortness of breath   Take prednisone as prescribed starting tomorrow at lunchtime or dinnertime.  Use inhaler as needed for wheezing

## 2024-10-29 ENCOUNTER — HOSPITAL ENCOUNTER (EMERGENCY)
Facility: CLINIC | Age: 49
Discharge: HOME | End: 2024-10-29
Attending: EMERGENCY MEDICINE | Admitting: EMERGENCY MEDICINE
Payer: COMMERCIAL

## 2024-10-29 ENCOUNTER — PATIENT OUTREACH (OUTPATIENT)
Dept: CARE COORDINATION | Facility: CLINIC | Age: 49
End: 2024-10-29

## 2024-10-29 VITALS
SYSTOLIC BLOOD PRESSURE: 109 MMHG | DIASTOLIC BLOOD PRESSURE: 86 MMHG | HEART RATE: 93 BPM | RESPIRATION RATE: 18 BRPM | OXYGEN SATURATION: 97 % | TEMPERATURE: 97.3 F

## 2024-10-29 DIAGNOSIS — R11.15 CYCLICAL VOMITING: ICD-10-CM

## 2024-10-29 LAB
ALBUMIN SERPL BCG-MCNC: 4.5 G/DL (ref 3.5–5.2)
ALBUMIN UR-MCNC: 10 MG/DL
ALP SERPL-CCNC: 138 U/L (ref 40–150)
ALT SERPL W P-5'-P-CCNC: 91 U/L (ref 0–50)
ANION GAP SERPL CALCULATED.3IONS-SCNC: 14 MMOL/L (ref 7–15)
APPEARANCE UR: CLEAR
AST SERPL W P-5'-P-CCNC: 66 U/L (ref 0–45)
BACTERIA #/AREA URNS HPF: ABNORMAL /HPF
BASOPHILS # BLD AUTO: 0 10E3/UL (ref 0–0.2)
BASOPHILS NFR BLD AUTO: 0 %
BILIRUB SERPL-MCNC: 1 MG/DL
BILIRUB UR QL STRIP: NEGATIVE
BUN SERPL-MCNC: 10.2 MG/DL (ref 6–20)
CALCIUM SERPL-MCNC: 9.5 MG/DL (ref 8.8–10.4)
CHLORIDE SERPL-SCNC: 90 MMOL/L (ref 98–107)
COLOR UR AUTO: ABNORMAL
CREAT SERPL-MCNC: 0.76 MG/DL (ref 0.51–0.95)
EGFRCR SERPLBLD CKD-EPI 2021: >90 ML/MIN/1.73M2
EOSINOPHIL # BLD AUTO: 0.1 10E3/UL (ref 0–0.7)
EOSINOPHIL NFR BLD AUTO: 1 %
ERYTHROCYTE [DISTWIDTH] IN BLOOD BY AUTOMATED COUNT: 11.7 % (ref 10–15)
GLUCOSE SERPL-MCNC: 360 MG/DL (ref 70–99)
GLUCOSE UR STRIP-MCNC: >=1000 MG/DL
HCG UR QL: NEGATIVE
HCO3 SERPL-SCNC: 27 MMOL/L (ref 22–29)
HCT VFR BLD AUTO: 40.9 % (ref 35–47)
HGB BLD-MCNC: 14.8 G/DL (ref 11.7–15.7)
HGB UR QL STRIP: NEGATIVE
IMM GRANULOCYTES # BLD: 0 10E3/UL
IMM GRANULOCYTES NFR BLD: 0 %
INTERNAL QC OK POCT: NORMAL
KETONES UR STRIP-MCNC: 20 MG/DL
LEUKOCYTE ESTERASE UR QL STRIP: NEGATIVE
LIPASE SERPL-CCNC: 34 U/L (ref 13–60)
LYMPHOCYTES # BLD AUTO: 2.5 10E3/UL (ref 0.8–5.3)
LYMPHOCYTES NFR BLD AUTO: 27 %
MCH RBC QN AUTO: 31.3 PG (ref 26.5–33)
MCHC RBC AUTO-ENTMCNC: 36.2 G/DL (ref 31.5–36.5)
MCV RBC AUTO: 87 FL (ref 78–100)
MONOCYTES # BLD AUTO: 0.7 10E3/UL (ref 0–1.3)
MONOCYTES NFR BLD AUTO: 8 %
NEUTROPHILS # BLD AUTO: 6.1 10E3/UL (ref 1.6–8.3)
NEUTROPHILS NFR BLD AUTO: 64 %
NITRATE UR QL: NEGATIVE
NRBC # BLD AUTO: 0 10E3/UL
NRBC BLD AUTO-RTO: 0 /100
PH UR STRIP: 7 [PH] (ref 5–7)
PLATELET # BLD AUTO: 316 10E3/UL (ref 150–450)
POCT KIT EXPIRATION DATE: NORMAL
POCT KIT LOT NUMBER: NORMAL
POTASSIUM SERPL-SCNC: 3.6 MMOL/L (ref 3.4–5.3)
PROT SERPL-MCNC: 9.9 G/DL (ref 6.4–8.3)
RBC # BLD AUTO: 4.73 10E6/UL (ref 3.8–5.2)
RBC URINE: 1 /HPF
SODIUM SERPL-SCNC: 131 MMOL/L (ref 135–145)
SP GR UR STRIP: 1.03 (ref 1–1.03)
SQUAMOUS EPITHELIAL: 3 /HPF
UROBILINOGEN UR STRIP-MCNC: NORMAL MG/DL
WBC # BLD AUTO: 9.5 10E3/UL (ref 4–11)
WBC URINE: <1 /HPF

## 2024-10-29 PROCEDURE — 81025 URINE PREGNANCY TEST: CPT | Performed by: EMERGENCY MEDICINE

## 2024-10-29 PROCEDURE — 82310 ASSAY OF CALCIUM: CPT | Performed by: EMERGENCY MEDICINE

## 2024-10-29 PROCEDURE — 81001 URINALYSIS AUTO W/SCOPE: CPT | Performed by: EMERGENCY MEDICINE

## 2024-10-29 PROCEDURE — 258N000003 HC RX IP 258 OP 636: Performed by: EMERGENCY MEDICINE

## 2024-10-29 PROCEDURE — 96374 THER/PROPH/DIAG INJ IV PUSH: CPT | Performed by: EMERGENCY MEDICINE

## 2024-10-29 PROCEDURE — 96361 HYDRATE IV INFUSION ADD-ON: CPT | Performed by: EMERGENCY MEDICINE

## 2024-10-29 PROCEDURE — 80048 BASIC METABOLIC PNL TOTAL CA: CPT | Performed by: EMERGENCY MEDICINE

## 2024-10-29 PROCEDURE — 85004 AUTOMATED DIFF WBC COUNT: CPT | Performed by: EMERGENCY MEDICINE

## 2024-10-29 PROCEDURE — 99284 EMERGENCY DEPT VISIT MOD MDM: CPT | Performed by: EMERGENCY MEDICINE

## 2024-10-29 PROCEDURE — 250N000011 HC RX IP 250 OP 636: Performed by: EMERGENCY MEDICINE

## 2024-10-29 PROCEDURE — 999N000285 HC STATISTIC VASC ACCESS LAB DRAW WITH PIV START

## 2024-10-29 PROCEDURE — 999N000040 HC STATISTIC CONSULT NO CHARGE VASC ACCESS

## 2024-10-29 PROCEDURE — 99284 EMERGENCY DEPT VISIT MOD MDM: CPT | Mod: 25 | Performed by: EMERGENCY MEDICINE

## 2024-10-29 PROCEDURE — 250N000013 HC RX MED GY IP 250 OP 250 PS 637: Performed by: EMERGENCY MEDICINE

## 2024-10-29 PROCEDURE — 999N000127 HC STATISTIC PERIPHERAL IV START W US GUIDANCE

## 2024-10-29 PROCEDURE — 83690 ASSAY OF LIPASE: CPT | Performed by: EMERGENCY MEDICINE

## 2024-10-29 PROCEDURE — 85018 HEMOGLOBIN: CPT | Performed by: EMERGENCY MEDICINE

## 2024-10-29 RX ORDER — SODIUM CHLORIDE 9 MG/ML
INJECTION, SOLUTION INTRAVENOUS
Status: DISCONTINUED
Start: 2024-10-29 | End: 2024-10-29 | Stop reason: HOSPADM

## 2024-10-29 RX ORDER — ONDANSETRON 2 MG/ML
4 INJECTION INTRAMUSCULAR; INTRAVENOUS EVERY 30 MIN PRN
Status: DISCONTINUED | OUTPATIENT
Start: 2024-10-29 | End: 2024-10-29 | Stop reason: HOSPADM

## 2024-10-29 RX ORDER — OLANZAPINE 10 MG/1
10 TABLET, ORALLY DISINTEGRATING ORAL ONCE
Status: COMPLETED | OUTPATIENT
Start: 2024-10-29 | End: 2024-10-29

## 2024-10-29 RX ADMIN — SODIUM CHLORIDE 500 ML: 9 INJECTION, SOLUTION INTRAVENOUS at 09:48

## 2024-10-29 RX ADMIN — ONDANSETRON 4 MG: 2 INJECTION INTRAMUSCULAR; INTRAVENOUS at 09:48

## 2024-10-29 RX ADMIN — OLANZAPINE 10 MG: 10 TABLET, ORALLY DISINTEGRATING ORAL at 08:59

## 2024-10-29 ASSESSMENT — ACTIVITIES OF DAILY LIVING (ADL)
ADLS_ACUITY_SCORE: 0

## 2024-10-29 NOTE — ED PROVIDER NOTES
"ED Provider Note  [facility]      History     Chief Complaint   Patient presents with    Vomiting     Brought in by EMS, started Sunday morning. Has not been able to get the vomiting under control. She states, \"this is cyclic vomiting\".     HPI  Yun Coyle is a 48 year old female who history of cyclical vomiting disorder who presents to the ER complaining of ongoing nausea and vomiting.  Patient said the symptoms started this Friday and have been ongoing.  Says that she is tried taking oral Zofran and oral Zyprexa at home but has not been working.  Says her last vomit was about an hour prior to arrival to the ER.  She denies any focal abdominal pain.  She denies any fevers.  Says that this is feels similar to what she has had in the past.  Patient's last marijuana use was approximately 1 month ago.  She denies any other substances.    Past Medical History  Past Medical History:   Diagnosis Date    Acute pancreatitis 06/29/2017    Anxiety     Cauda equina syndrome (H) 03/29/2013    Cyst of right ovary 11/16/2017    Depressive disorder     Gastroesophageal reflux disease     Hypertension     Hyponatremia 10/01/2023    Low back pain 08/20/2012    Overview:   History of cauda equina and surgery.      Type 2 diabetes mellitus (H)     Uncomplicated asthma      Past Surgical History:   Procedure Laterality Date    BACK SURGERY      x2    CHOLECYSTECTOMY      ORTHOPEDIC SURGERY      SALPINGO OOPHORECTOMY,R/L/ELDA Right      cloNIDine (CATAPRES) 0.1 MG tablet  glucagon 1 MG kit  glucose (BD GLUCOSE) 4 g chewable tablet  glucose 40 % (400 mg/mL) gel  losartan (COZAAR) 25 MG tablet  metFORMIN (GLUCOPHAGE) 500 MG tablet  methadone (DOLOPHINE-INTENSOL) 10 MG/ML (HIGH CONC) solution  OLANZapine (ZYPREXA) 10 MG tablet  OLANZapine zydis (ZYPREXA) 5 MG ODT  predniSONE (DELTASONE) 20 MG tablet  predniSONE (DELTASONE) 20 MG tablet  prochlorperazine (COMPAZINE) 25 MG suppository      No Known " "Allergies  Family History  Family History   Problem Relation Age of Onset    Breast Cancer Mother         in her 50s.    Diabetes Mother     Breast Cancer Maternal Grandmother         in her 40s.    Diabetes Maternal Grandmother      Social History   Social History     Tobacco Use    Smoking status: Never    Smokeless tobacco: Never   Vaping Use    Vaping status: Never Used   Substance Use Topics    Alcohol use: No    Drug use: Not Currently     Types: Marijuana     Comment: pt was taking Oxycontin \"years ago.\" pt is now on Methadone.      Past medical history, past surgical history, medications, allergies, family history, and social history were reviewed with the patient. No additional pertinent items.   A complete review of systems was performed with pertinent positives and negatives noted in the HPI, and all other systems negative.    Physical Exam   BP: (!) 132/98  Pulse: 93  Temp: 97.3  F (36.3  C)  Resp: 18  SpO2: 96 %  Physical Exam  Constitutional:       General: She is not in acute distress.     Appearance: She is well-developed. She is not ill-appearing, toxic-appearing or diaphoretic.   HENT:      Head: Normocephalic and atraumatic.   Cardiovascular:      Rate and Rhythm: Normal rate and regular rhythm.      Pulses: Normal pulses.      Heart sounds: Normal heart sounds.   Pulmonary:      Effort: Pulmonary effort is normal. No respiratory distress.      Breath sounds: Normal breath sounds.   Abdominal:      General: There is no distension.      Palpations: Abdomen is soft.      Tenderness: There is no abdominal tenderness. There is no rebound.   Musculoskeletal:         General: No tenderness.      Cervical back: Normal range of motion.   Skin:     General: Skin is warm and dry.   Neurological:      General: No focal deficit present.      Mental Status: She is alert and oriented to person, place, and time.   Psychiatric:         Mood and Affect: Mood normal.         Behavior: Behavior normal.         " Thought Content: Thought content normal.           ED Course, Procedures, & Data      Procedures         Results for orders placed or performed during the hospital encounter of 10/29/24   Comprehensive metabolic panel     Status: Abnormal   Result Value Ref Range    Sodium 131 (L) 135 - 145 mmol/L    Potassium 3.6 3.4 - 5.3 mmol/L    Carbon Dioxide (CO2) 27 22 - 29 mmol/L    Anion Gap 14 7 - 15 mmol/L    Urea Nitrogen 10.2 6.0 - 20.0 mg/dL    Creatinine 0.76 0.51 - 0.95 mg/dL    GFR Estimate >90 >60 mL/min/1.73m2    Calcium 9.5 8.8 - 10.4 mg/dL    Chloride 90 (L) 98 - 107 mmol/L    Glucose 360 (H) 70 - 99 mg/dL    Alkaline Phosphatase 138 40 - 150 U/L    AST 66 (H) 0 - 45 U/L    ALT 91 (H) 0 - 50 U/L    Protein Total 9.9 (H) 6.4 - 8.3 g/dL    Albumin 4.5 3.5 - 5.2 g/dL    Bilirubin Total 1.0 <=1.2 mg/dL   Lipase     Status: Normal   Result Value Ref Range    Lipase 34 13 - 60 U/L   UA with Microscopic reflex to Culture     Status: Abnormal    Specimen: Urine, Clean Catch   Result Value Ref Range    Color Urine Light Yellow Colorless, Straw, Light Yellow, Yellow    Appearance Urine Clear Clear    Glucose Urine >=1000 (A) Negative mg/dL    Bilirubin Urine Negative Negative    Ketones Urine 20 (A) Negative mg/dL    Specific Gravity Urine 1.030 1.003 - 1.035    Blood Urine Negative Negative    pH Urine 7.0 5.0 - 7.0    Protein Albumin Urine 10 (A) Negative mg/dL    Urobilinogen Urine Normal Normal, 2.0 mg/dL    Nitrite Urine Negative Negative    Leukocyte Esterase Urine Negative Negative    Bacteria Urine Few (A) None Seen /HPF    RBC Urine 1 <=2 /HPF    WBC Urine <1 <=5 /HPF    Squamous Epithelials Urine 3 (H) <=1 /HPF    Narrative    Urine Culture not indicated   CBC with platelets and differential     Status: None   Result Value Ref Range    WBC Count 9.5 4.0 - 11.0 10e3/uL    RBC Count 4.73 3.80 - 5.20 10e6/uL    Hemoglobin 14.8 11.7 - 15.7 g/dL    Hematocrit 40.9 35.0 - 47.0 %    MCV 87 78 - 100 fL    MCH 31.3 26.5  - 33.0 pg    MCHC 36.2 31.5 - 36.5 g/dL    RDW 11.7 10.0 - 15.0 %    Platelet Count 316 150 - 450 10e3/uL    % Neutrophils 64 %    % Lymphocytes 27 %    % Monocytes 8 %    % Eosinophils 1 %    % Basophils 0 %    % Immature Granulocytes 0 %    NRBCs per 100 WBC 0 <1 /100    Absolute Neutrophils 6.1 1.6 - 8.3 10e3/uL    Absolute Lymphocytes 2.5 0.8 - 5.3 10e3/uL    Absolute Monocytes 0.7 0.0 - 1.3 10e3/uL    Absolute Eosinophils 0.1 0.0 - 0.7 10e3/uL    Absolute Basophils 0.0 0.0 - 0.2 10e3/uL    Absolute Immature Granulocytes 0.0 <=0.4 10e3/uL    Absolute NRBCs 0.0 10e3/uL   hCG qual urine POCT     Status: Normal   Result Value Ref Range    HCG Qual Urine Negative Negative    Internal QC Check POCT Valid Valid    POCT Kit Lot Number [id number]     POCT Kit Expiration Date 3/26/26    CBC with platelets differential     Status: None    Narrative    The following orders were created for panel order CBC with platelets differential.  Procedure                               Abnormality         Status                     ---------                               -----------         ------                     CBC with platelets and d...[[id number]]                      Final result                 Please view results for these tests on the individual orders.     Medications   ondansetron (ZOFRAN) injection 4 mg (4 mg Intravenous $Given 10/29/24 0948)   OLANZapine zydis (zyPREXA) ODT tab 10 mg (10 mg Oral $Given 10/29/24 0859)   sodium chloride 0.9% BOLUS 500 mL (0 mLs Intravenous Stopped 10/29/24 1048)            No results found for any visits on 10/29/24.  Medications - No data to display  Labs Ordered and Resulted from Time of ED Arrival to Time of ED Departure - No data to display  No orders to display          Critical care was not performed.     Medical Decision Making  The patient's presentation was of moderate complexity (a chronic illness mild to moderate exacerbation, progression, or side effect of treatment).    The  patient's evaluation involved:  review of external note(s) from 3+ sources (see separate area of note for details)  review of 3+ test result(s) ordered prior to this encounter (see separate area of note for details)  ordering and/or review of 3+ test(s) in this encounter (see separate area of note for details)    The patient's management necessitated moderate risk (prescription drug management including medications given in the ED).    Assessment & Plan    Patient is a 48-year-old female with a known history of chronic nausea and vomiting who presents to the ER due to recurrence of her symptoms.  Patient here received 500 mL of fluids nausea medications and some Zyprexa and is feeling better.  She was able to keep orals down.  Plan will be to discharge her home with outpatient follow-up.  No other acute issues.  No signs of acute abdomen.  Patient education importance of marijuana continued cessation.    I have reviewed the nursing notes. I have reviewed the findings, diagnosis, plan and need for follow up with the patient.    New Prescriptions    No medications on file       Final diagnoses:   Cyclical vomiting       Adelaida Cates  [facility] EMERGENCY DEPARTMENT  10/29/2024     Adelaida Cates MD  10/29/24 1120

## 2024-10-29 NOTE — ED NOTES
"Notified pt about providing a urine sample to collect, stated, \"I don't  feel like I have to pee right now\". Informed pt when able to, to bring a sample to room.  "

## 2024-10-29 NOTE — DISCHARGE INSTRUCTIONS
Your blood work is stable.    Take your nausea medications as needed.     Follow up with your primary care doctor for further care as needed.

## 2024-10-29 NOTE — ED NOTES
Bed: ED07  Expected date: 10/29/24  Expected time: 8:34 AM  Means of arrival:   Comments:  Cody 447: 47 y/o, F, vomiting

## 2024-10-29 NOTE — ED TRIAGE NOTES
"Pt states has not been able to keep anything down, food or drink since Saturday. Denies diarrhea, lightheaded, dizziness. Endorses 7/10 abdominal pain. States vomit looks like bile with brown \"stuff\" in it. Has a prescription of zyprexa and zofran for this but has not taken anything before coming in.      Triage Assessment (Adult)       Row Name 10/29/24 0845          Triage Assessment    Airway WDL WDL        Respiratory WDL    Respiratory WDL WDL        Skin Circulation/Temperature WDL    Skin Circulation/Temperature WDL WDL        Cardiac WDL    Cardiac WDL WDL        Peripheral/Neurovascular WDL    Peripheral Neurovascular WDL WDL        Cognitive/Neuro/Behavioral WDL    Cognitive/Neuro/Behavioral WDL WDL                     "

## 2024-10-29 NOTE — PROGRESS NOTES
[facility]    Background: Primary Care-Care Coordination initial outreach identified per system criteria and reviewed by [facility] team.    Assessment: Upon chart review, [facility] Team member will not proceed with patient outreach related to this episode of Primary Care-Care Coordination program due to reason below:    Patient has presented to Emergency Department, been readmitted to hospital, or transferred to another hospital.    Plan: Primary Care-Care Coordination episode addressed appropriately per reason noted above.      Reny García MA  [facility], [facility]    *[facility] Care Resource Team does NOT follow patient ongoing. Referrals are identified based on internal discharge reports and the outreach is to ensure patient has an understanding of their discharge instructions.

## 2024-10-29 NOTE — CONSULTS
"Consult received for Vascular access care.  See LDA for details. For additional needs place \"Nursing to Consult for Vascular Access\" [id number] order in EPIC.    "

## 2024-11-06 ENCOUNTER — APPOINTMENT (OUTPATIENT)
Dept: ULTRASOUND IMAGING | Facility: CLINIC | Age: 49
End: 2024-11-06
Attending: EMERGENCY MEDICINE
Payer: COMMERCIAL

## 2024-11-06 ENCOUNTER — HOSPITAL ENCOUNTER (EMERGENCY)
Facility: CLINIC | Age: 49
Discharge: HOME | End: 2024-11-07
Attending: EMERGENCY MEDICINE | Admitting: EMERGENCY MEDICINE
Payer: COMMERCIAL

## 2024-11-06 DIAGNOSIS — R73.9 HYPERGLYCEMIA: ICD-10-CM

## 2024-11-06 DIAGNOSIS — L02.91 ABSCESS: ICD-10-CM

## 2024-11-06 LAB
ALBUMIN SERPL BCG-MCNC: 3.8 G/DL (ref 3.5–5.2)
ALP SERPL-CCNC: 141 U/L (ref 40–150)
ALT SERPL W P-5'-P-CCNC: 40 U/L (ref 0–50)
ANION GAP SERPL CALCULATED.3IONS-SCNC: 11 MMOL/L (ref 7–15)
AST SERPL W P-5'-P-CCNC: 23 U/L (ref 0–45)
BASOPHILS # BLD AUTO: 0.1 10E3/UL (ref 0–0.2)
BASOPHILS NFR BLD AUTO: 0 %
BILIRUB SERPL-MCNC: 0.5 MG/DL
BUN SERPL-MCNC: 13 MG/DL (ref 6–20)
CALCIUM SERPL-MCNC: 9.4 MG/DL (ref 8.8–10.4)
CHLORIDE SERPL-SCNC: 90 MMOL/L (ref 98–107)
CREAT SERPL-MCNC: 0.84 MG/DL (ref 0.51–0.95)
EGFRCR SERPLBLD CKD-EPI 2021: 86 ML/MIN/1.73M2
EOSINOPHIL # BLD AUTO: 0.2 10E3/UL (ref 0–0.7)
EOSINOPHIL NFR BLD AUTO: 2 %
ERYTHROCYTE [DISTWIDTH] IN BLOOD BY AUTOMATED COUNT: 11.7 % (ref 10–15)
GLUCOSE SERPL-MCNC: 444 MG/DL (ref 70–99)
HCO3 SERPL-SCNC: 28 MMOL/L (ref 22–29)
HCT VFR BLD AUTO: 40.5 % (ref 35–47)
HGB BLD-MCNC: 14 G/DL (ref 11.7–15.7)
IMM GRANULOCYTES # BLD: 0.2 10E3/UL
IMM GRANULOCYTES NFR BLD: 1 %
LIPASE SERPL-CCNC: 19 U/L (ref 13–60)
LYMPHOCYTES # BLD AUTO: 2.6 10E3/UL (ref 0.8–5.3)
LYMPHOCYTES NFR BLD AUTO: 22 %
MCH RBC QN AUTO: 29.8 PG (ref 26.5–33)
MCHC RBC AUTO-ENTMCNC: 34.6 G/DL (ref 31.5–36.5)
MCV RBC AUTO: 86 FL (ref 78–100)
MONOCYTES # BLD AUTO: 1 10E3/UL (ref 0–1.3)
MONOCYTES NFR BLD AUTO: 8 %
NEUTROPHILS # BLD AUTO: 7.9 10E3/UL (ref 1.6–8.3)
NEUTROPHILS NFR BLD AUTO: 67 %
NRBC # BLD AUTO: 0 10E3/UL
NRBC BLD AUTO-RTO: 0 /100
PLATELET # BLD AUTO: 253 10E3/UL (ref 150–450)
POTASSIUM SERPL-SCNC: 4.5 MMOL/L (ref 3.4–5.3)
PROT SERPL-MCNC: 8.6 G/DL (ref 6.4–8.3)
RBC # BLD AUTO: 4.7 10E6/UL (ref 3.8–5.2)
SODIUM SERPL-SCNC: 129 MMOL/L (ref 135–145)
WBC # BLD AUTO: 11.8 10E3/UL (ref 4–11)

## 2024-11-06 PROCEDURE — 85025 COMPLETE CBC W/AUTO DIFF WBC: CPT | Performed by: EMERGENCY MEDICINE

## 2024-11-06 PROCEDURE — 99284 EMERGENCY DEPT VISIT MOD MDM: CPT | Mod: 25 | Performed by: EMERGENCY MEDICINE

## 2024-11-06 PROCEDURE — 250N000013 HC RX MED GY IP 250 OP 250 PS 637: Performed by: EMERGENCY MEDICINE

## 2024-11-06 PROCEDURE — 36415 COLL VENOUS BLD VENIPUNCTURE: CPT | Performed by: EMERGENCY MEDICINE

## 2024-11-06 PROCEDURE — 83690 ASSAY OF LIPASE: CPT | Performed by: EMERGENCY MEDICINE

## 2024-11-06 PROCEDURE — 99285 EMERGENCY DEPT VISIT HI MDM: CPT | Mod: 25 | Performed by: EMERGENCY MEDICINE

## 2024-11-06 PROCEDURE — 250N000011 HC RX IP 250 OP 636: Performed by: EMERGENCY MEDICINE

## 2024-11-06 PROCEDURE — 80053 COMPREHEN METABOLIC PANEL: CPT | Performed by: EMERGENCY MEDICINE

## 2024-11-06 PROCEDURE — 76882 US LMTD JT/FCL EVL NVASC XTR: CPT | Mod: RT

## 2024-11-06 PROCEDURE — 93971 EXTREMITY STUDY: CPT | Mod: RT

## 2024-11-06 PROCEDURE — 10060 I&D ABSCESS SIMPLE/SINGLE: CPT | Performed by: EMERGENCY MEDICINE

## 2024-11-06 RX ORDER — CEPHALEXIN 500 MG/1
500 CAPSULE ORAL ONCE
Status: COMPLETED | OUTPATIENT
Start: 2024-11-06 | End: 2024-11-06

## 2024-11-06 RX ORDER — LIDOCAINE HYDROCHLORIDE AND EPINEPHRINE 10; 10 MG/ML; UG/ML
1 INJECTION, SOLUTION INFILTRATION; PERINEURAL ONCE
Status: COMPLETED | OUTPATIENT
Start: 2024-11-06 | End: 2024-11-07

## 2024-11-06 RX ORDER — ONDANSETRON 4 MG/1
4 TABLET, ORALLY DISINTEGRATING ORAL ONCE
Status: COMPLETED | OUTPATIENT
Start: 2024-11-06 | End: 2024-11-06

## 2024-11-06 RX ADMIN — CEPHALEXIN 500 MG: 500 CAPSULE ORAL at 21:14

## 2024-11-06 RX ADMIN — ONDANSETRON 4 MG: 4 TABLET, ORALLY DISINTEGRATING ORAL at 18:30

## 2024-11-06 ASSESSMENT — ACTIVITIES OF DAILY LIVING (ADL)
ADLS_ACUITY_SCORE: 0

## 2024-11-06 ASSESSMENT — ENCOUNTER SYMPTOMS: NAUSEA: 1

## 2024-11-06 NOTE — ED PROVIDER NOTES
"       [facility] EMERGENCY DEPARTMENT ([facility])    11/06/24      ED PROVIDER NOTE   History   No chief complaint on file.    HPI  Yun Coyle is a 48 year old female with a past medical history of chronic hepatitis C, DM II, chronic cannabis use, and cyclical vomiting syndrome who presents to the emergency department for nausea and shortness of breath.  ***    {History Review Selection (Optional):690587}  {ROS Selection (Optional):088495}    Physical Exam      Physical Exam  ***    ED Course, Procedures, & Data      Procedures       {ED Course Selections (Optional):237438}  {ED Sepsis CMS Documentation (Optional):059070::\" \"}       No results found for any visits on 11/06/24.  Medications - No data to display  Labs Ordered and Resulted from Time of ED Arrival to Time of ED Departure - No data to display  No orders to display          {Critical Care Performed?:038869}    Assessment & Plan    ***    I have reviewed the nursing notes. I have reviewed the findings, diagnosis, plan and need for follow up with the patient.    New Prescriptions    No medications on file       Final diagnoses:   None       ***  [facility] EMERGENCY DEPARTMENT  11/6/2024  "

## 2024-11-06 NOTE — ED TRIAGE NOTES
Patient presents due to nausea, denies vomiting. Patient also reports 8/10 right upper arm pain accompanied by pain and swelling. Patient says IV infiltrated during last visit to the emergency room.      Triage Assessment (Adult)       Row Name 11/06/24 1736          Triage Assessment    Airway WDL WDL        Respiratory WDL    Respiratory WDL WDL        Skin Circulation/Temperature WDL    Skin Circulation/Temperature WDL X  redness and swelling to right upper arm        Cardiac WDL    Cardiac WDL WDL        Peripheral/Neurovascular WDL    Peripheral Neurovascular WDL WDL        Cognitive/Neuro/Behavioral WDL    Cognitive/Neuro/Behavioral WDL WDL

## 2024-11-07 VITALS
WEIGHT: 197 LBS | OXYGEN SATURATION: 98 % | TEMPERATURE: 98.6 F | HEART RATE: 66 BPM | DIASTOLIC BLOOD PRESSURE: 61 MMHG | BODY MASS INDEX: 29.18 KG/M2 | HEIGHT: 69 IN | RESPIRATION RATE: 14 BRPM | SYSTOLIC BLOOD PRESSURE: 94 MMHG

## 2024-11-07 LAB
GLUCOSE BLDC GLUCOMTR-MCNC: 214 MG/DL (ref 70–99)
GLUCOSE BLDC GLUCOMTR-MCNC: 317 MG/DL (ref 70–99)
GLUCOSE BLDC GLUCOMTR-MCNC: 341 MG/DL (ref 70–99)
GLUCOSE BLDC GLUCOMTR-MCNC: 469 MG/DL (ref 70–99)

## 2024-11-07 PROCEDURE — 87077 CULTURE AEROBIC IDENTIFY: CPT | Performed by: EMERGENCY MEDICINE

## 2024-11-07 PROCEDURE — 250N000011 HC RX IP 250 OP 636: Performed by: EMERGENCY MEDICINE

## 2024-11-07 PROCEDURE — 82962 GLUCOSE BLOOD TEST: CPT

## 2024-11-07 PROCEDURE — 250N000012 HC RX MED GY IP 250 OP 636 PS 637: Performed by: EMERGENCY MEDICINE

## 2024-11-07 PROCEDURE — 250N000013 HC RX MED GY IP 250 OP 250 PS 637: Performed by: EMERGENCY MEDICINE

## 2024-11-07 PROCEDURE — 87205 SMEAR GRAM STAIN: CPT | Performed by: EMERGENCY MEDICINE

## 2024-11-07 PROCEDURE — 10060 I&D ABSCESS SIMPLE/SINGLE: CPT | Performed by: EMERGENCY MEDICINE

## 2024-11-07 RX ORDER — SULFAMETHOXAZOLE AND TRIMETHOPRIM 800; 160 MG/1; MG/1
1 TABLET ORAL 2 TIMES DAILY
Qty: 20 TABLET | Refills: 0 | Status: SHIPPED | OUTPATIENT
Start: 2024-11-07 | End: 2024-11-07

## 2024-11-07 RX ORDER — SULFAMETHOXAZOLE AND TRIMETHOPRIM 800; 160 MG/1; MG/1
1 TABLET ORAL 2 TIMES DAILY
Qty: 20 TABLET | Refills: 0 | Status: SHIPPED | OUTPATIENT
Start: 2024-11-07 | End: 2024-11-13

## 2024-11-07 RX ADMIN — METFORMIN HYDROCHLORIDE 500 MG: 500 TABLET, FILM COATED ORAL at 01:32

## 2024-11-07 RX ADMIN — INSULIN ASPART 5 UNITS: 100 INJECTION, SOLUTION INTRAVENOUS; SUBCUTANEOUS at 00:16

## 2024-11-07 RX ADMIN — LIDOCAINE HYDROCHLORIDE AND EPINEPHRINE 1 ML: 10; 10 INJECTION, SOLUTION INFILTRATION; PERINEURAL at 00:00

## 2024-11-07 ASSESSMENT — ACTIVITIES OF DAILY LIVING (ADL)
ADLS_ACUITY_SCORE: 0

## 2024-11-07 NOTE — DISCHARGE INSTRUCTIONS
Take bactrim as prescribed to treat your skin and soft tissue infection.  Keep the area clean and dry.  Check the area regularly for signs of worsening infection such as increased redness, pain, swelling.  The wound will continue to drain for the next few days, this is normal.      Wiggle the drain back and forth periodically to help wound continue to drain. Follow up in a week for wound check and drain removal.     Maintain tight control of your blood sugar as this is important to prevent worsening infection.    Take 600 mg ibuprofen every 8 hours, for pain and inflammation.  Take this on schedule, for approximately 1 week or until resolution of  symptoms. Take this medication with food to prevent stomach upset.  If you taking a chronic antacid medication, make sure to take this while you are taking this medication.    Follow-up with your primary care doctor in 7-10 days for a recheck of the wound.    Return to the emergency department if you develop worsening swelling, pain, fever.

## 2024-11-07 NOTE — ED PROVIDER NOTES
[facility] EMERGENCY DEPARTMENT ([facility])    11/06/24      ED PROVIDER NOTE   History     Chief Complaint   Patient presents with    Nausea     Patient presents due to nausea, denies vomiting. Patient also reports 8/10 right upper arm pain accompanied by pain and swelling. Patient says IV infiltrated during last visit to the emergency room.        Rosio Coyle is a 48 year old female with a past medical history of chronic hepatitis C, DM II, chronic cannabis use, and cyclical vomiting syndrome who presents to the emergency department for nausea and right upper extremity pain and swelling. She reports her symptoms of nausea and vomiting started this morning. Has vomited 7-8x and is unable to tolerate PO intake. Denies bilious vomiting or hematemesis. Denies fevers, chills, sweats, recent sick contacts, diarrhea. Has had multiple ED visits for cyclical vomiting and states this feels similar to those episodes. Received a does of Zofran since arriving to the ED with improvement of nausea. Denies alcohol or cannabis use.    She also reports worsening right upper arm pain and swelling that started about 1 week ago. Reports she was in the ED on 10/29 and required a vascular access consult to place an IV via ultrasound after her IV infiltrated. Since then, she has had increased swelling, pain, and redness located at the prior IV that worsened overnight. Endorses numbness around the area of swelling but denies drainage or purulence. Denies numbness or tingling in right hand or fingers.      Past Medical History  Past Medical History:   Diagnosis Date    Acute pancreatitis 06/29/2017    Anxiety     Cauda equina syndrome (H) 03/29/2013    Cyst of right ovary 11/16/2017    Depressive disorder     Gastroesophageal reflux disease     Hypertension     Hyponatremia 10/01/2023    Low back pain 08/20/2012    Overview:   History of cauda equina and surgery.      Type 2 diabetes mellitus (H)      "Uncomplicated asthma      Past Surgical History:   Procedure Laterality Date    BACK SURGERY      x2    CHOLECYSTECTOMY      ORTHOPEDIC SURGERY      SALPINGO OOPHORECTOMY,R/L/ELDA Right      cloNIDine (CATAPRES) 0.1 MG tablet  losartan (COZAAR) 25 MG tablet  metFORMIN (GLUCOPHAGE) 500 MG tablet  methadone (DOLOPHINE-INTENSOL) 10 MG/ML (HIGH CONC) solution  OLANZapine (ZYPREXA) 10 MG tablet  OLANZapine zydis (ZYPREXA) 5 MG ODT  predniSONE (DELTASONE) 20 MG tablet  sulfamethoxazole-trimethoprim (BACTRIM DS) 800-160 MG tablet  glucagon 1 MG kit  glucose (BD GLUCOSE) 4 g chewable tablet  glucose 40 % (400 mg/mL) gel  prochlorperazine (COMPAZINE) 25 MG suppository      No Known Allergies  Family History  Family History   Problem Relation Age of Onset    Breast Cancer Mother         in her 50s.    Diabetes Mother     Breast Cancer Maternal Grandmother         in her 40s.    Diabetes Maternal Grandmother      Social History   Social History     Tobacco Use    Smoking status: Never    Smokeless tobacco: Never   Vaping Use    Vaping status: Never Used   Substance Use Topics    Alcohol use: No    Drug use: Not Currently     Types: Marijuana     Comment: pt was taking Oxycontin \"years ago.\" pt is now on Methadone.      Past medical history, past surgical history, medications, allergies, family history, and social history were reviewed with the patient. No additional pertinent items.   A medically appropriate review of systems was performed with pertinent positives and negatives noted in the HPI, and all other systems negative.    Physical Exam   BP: 106/71  Pulse: 89  Temp: 98.7  F (37.1  C)  Resp: 16  Height: 175.3 cm (5' 9\")  Weight: 89.4 kg (197 lb)  SpO2: 96 %    Physical Exam  General: Awake, alert, NAD  Neuro: Grossly oriented, answering questions appropriately, moving all extremities. SILT over median, ulnar, and radial distribution in bilateral upper extremities.  HEENT: Normocephalic, atraumatic, sclera " "anicteric.  Respiratory: CTAB, no wheezes or crackles.  Cardiovascular: RRR, S1/S2 present, no murmur, radial pulses 2+ bilaterally.  Extremities: Medial aspect of right upper arm with 6x6 cm area of erythema with 3x3 localized area of induration. No purulence or drainage. Remainder of upper and lower arm compartments soft and compressible.   Skin: Warm and dry, no other rashes in exposed areas.      ED Course, Procedures, & Data      [facility]    PROCEDURE: -Incision/Drainage    Date/Time: 11/7/2024 11:57 AM    Performed by: Laure Guerrero MD  Authorized by: Laure Guerrero MD    Risks, benefits and alternatives discussed.      LOCATION:      Type:  Abscess    Size:  2\" x 5\"    Location:  Upper extremity    Upper extremity location:  Arm    Arm location:  R upper arm    PRE-PROCEDURE DETAILS:     Skin preparation:  Chloraprep    PROCEDURE TYPE:     Complexity:  Simple    ANESTHESIA (see MAR for exact dosages):     Anesthesia method:  Local infiltration    Local anesthetic:  Lidocaine 1% WITH epi    PROCEDURE DETAILS:     Incision types:  Stab incision (two straight incisions)    Incision depth:  Subcutaneous    Scalpel blade:  11    Wound management:  Probed and deloculated    Drainage:  Bloody and purulent    Drainage amount:  Copious    Wound treatment:  Drain placed (penrose)    Packing materials:  None    PROCEDURE    Patient Tolerance:  Patient tolerated the procedure well with no immediate complications                   Results for orders placed or performed during the hospital encounter of 11/06/24   US Upper Extremity Venous Duplex Right     Status: None    Narrative    EXAM: US UPPER EXTREMITY VENOUS DUPLEX RIGHT  LOCATION: [facility]  DATE: 11/6/2024    INDICATION: iv infiltrated, swelling, eval dvt  COMPARISON: None.  TECHNIQUE: Venous Duplex ultrasound of the right upper extremity with (when possible) and " without compression, augmentation, and duplex. Color flow and spectral Doppler with waveform analysis performed.    FINDINGS: Ultrasound includes evaluation of the internal jugular vein, innominate vein, subclavian vein, axillary vein, and brachial vein. The superficial cephalic and basilic veins were also evaluated where seen.     RIGHT: No deep venous thrombosis. There is a segment of occlusive superficial thrombus in the basilic vein of the mid upper arm.      Impression    IMPRESSION:  1.  No deep venous thrombosis in the right upper extremity.  2.  Superficial thrombus in the basilic vein.   US Upper Extremity Non Vascular Right     Status: None    Narrative    EXAM: US UPPER EXTREMITY NON VASCULAR RIGHT  LOCATION: [facility]  DATE: 11/6/2024    INDICATION: eval abscess, cellulitis  COMPARISON: None.  TECHNIQUE: Routine.    FINDINGS: Ultrasound of the mid upper right arm in the area of patient's symptomatology demonstrates a 6.1 x 4.4 x 2.4 cm fluid collection. This is nonspecific but given history may represent an abscess.      Impression    IMPRESSION:  1.  6.1 x 4.4 x 2.4 cm fluid collection in the mid upper right arm in the area of patient's symptomatology. This is nonspecific but given history may represent an abscess.     Comprehensive metabolic panel     Status: Abnormal   Result Value Ref Range    Sodium 129 (L) 135 - 145 mmol/L    Potassium 4.5 3.4 - 5.3 mmol/L    Carbon Dioxide (CO2) 28 22 - 29 mmol/L    Anion Gap 11 7 - 15 mmol/L    Urea Nitrogen 13.0 6.0 - 20.0 mg/dL    Creatinine 0.84 0.51 - 0.95 mg/dL    GFR Estimate 86 >60 mL/min/1.73m2    Calcium 9.4 8.8 - 10.4 mg/dL    Chloride 90 (L) 98 - 107 mmol/L    Glucose 444 (H) 70 - 99 mg/dL    Alkaline Phosphatase 141 40 - 150 U/L    AST 23 0 - 45 U/L    ALT 40 0 - 50 U/L    Protein Total 8.6 (H) 6.4 - 8.3 g/dL    Albumin 3.8 3.5 - 5.2 g/dL    Bilirubin Total 0.5 <=1.2 mg/dL   Lipase     Status: Normal    Result Value Ref Range    Lipase 19 13 - 60 U/L   CBC with platelets and differential     Status: Abnormal   Result Value Ref Range    WBC Count 11.8 (H) 4.0 - 11.0 10e3/uL    RBC Count 4.70 3.80 - 5.20 10e6/uL    Hemoglobin 14.0 11.7 - 15.7 g/dL    Hematocrit 40.5 35.0 - 47.0 %    MCV 86 78 - 100 fL    MCH 29.8 26.5 - 33.0 pg    MCHC 34.6 31.5 - 36.5 g/dL    RDW 11.7 10.0 - 15.0 %    Platelet Count 253 150 - 450 10e3/uL    % Neutrophils 67 %    % Lymphocytes 22 %    % Monocytes 8 %    % Eosinophils 2 %    % Basophils 0 %    % Immature Granulocytes 1 %    NRBCs per 100 WBC 0 <1 /100    Absolute Neutrophils 7.9 1.6 - 8.3 10e3/uL    Absolute Lymphocytes 2.6 0.8 - 5.3 10e3/uL    Absolute Monocytes 1.0 0.0 - 1.3 10e3/uL    Absolute Eosinophils 0.2 0.0 - 0.7 10e3/uL    Absolute Basophils 0.1 0.0 - 0.2 10e3/uL    Absolute Immature Granulocytes 0.2 <=0.4 10e3/uL    Absolute NRBCs 0.0 10e3/uL   Glucose by meter     Status: Abnormal   Result Value Ref Range    GLUCOSE BY METER POCT 469 (H) 70 - 99 mg/dL   CBC with platelets differential     Status: Abnormal    Narrative    The following orders were created for panel order CBC with platelets differential.  Procedure                               Abnormality         Status                     ---------                               -----------         ------                     CBC with platelets and d...[[id number]]  Abnormal            Final result                 Please view results for these tests on the individual orders.     Medications   insulin aspart (NovoLOG) injection (RAPID ACTING) (has no administration in time range)   ondansetron (ZOFRAN ODT) ODT tab 4 mg (4 mg Oral $Given 11/6/24 1830)   cephALEXin (KEFLEX) capsule 500 mg (500 mg Oral $Given 11/6/24 2114)   lidocaine 1% with EPINEPHrine 1:100,000 injection 1 mL (1 mL Intradermal $Given by Other 11/7/24 0000)   insulin aspart (NovoLOG) injection (RAPID ACTING) (5 Units Subcutaneous $Given 11/7/24 0019)    metFORMIN (GLUCOPHAGE) tablet 500 mg (500 mg Oral $Given 11/7/24 0132)   insulin aspart (NovoLOG) injection (RAPID ACTING) (8 Units Subcutaneous $Given 11/7/24 0132)     Labs Ordered and Resulted from Time of ED Arrival to Time of ED Departure   COMPREHENSIVE METABOLIC PANEL - Abnormal       Result Value    Sodium 129 (*)     Potassium 4.5      Carbon Dioxide (CO2) 28      Anion Gap 11      Urea Nitrogen 13.0      Creatinine 0.84      GFR Estimate 86      Calcium 9.4      Chloride 90 (*)     Glucose 444 (*)     Alkaline Phosphatase 141      AST 23      ALT 40      Protein Total 8.6 (*)     Albumin 3.8      Bilirubin Total 0.5     CBC WITH PLATELETS AND DIFFERENTIAL - Abnormal    WBC Count 11.8 (*)     RBC Count 4.70      Hemoglobin 14.0      Hematocrit 40.5      MCV 86      MCH 29.8      MCHC 34.6      RDW 11.7      Platelet Count 253      % Neutrophils 67      % Lymphocytes 22      % Monocytes 8      % Eosinophils 2      % Basophils 0      % Immature Granulocytes 1      NRBCs per 100 WBC 0      Absolute Neutrophils 7.9      Absolute Lymphocytes 2.6      Absolute Monocytes 1.0      Absolute Eosinophils 0.2      Absolute Basophils 0.1      Absolute Immature Granulocytes 0.2      Absolute NRBCs 0.0     GLUCOSE BY METER - Abnormal    GLUCOSE BY METER POCT 469 (*)    LIPASE - Normal    Lipase 19     GLUCOSE MONITOR NURSING POCT   AEROBIC BACTERIAL CULTURE ROUTINE     US Upper Extremity Venous Duplex Right   Final Result   IMPRESSION:   1.  No deep venous thrombosis in the right upper extremity.   2.  Superficial thrombus in the basilic vein.      US Upper Extremity Non Vascular Right   Final Result   IMPRESSION:   1.  6.1 x 4.4 x 2.4 cm fluid collection in the mid upper right arm in the area of patient's symptomatology. This is nonspecific but given history may represent an abscess.                      Assessment & Plan    Yun Coyle is a 48 year old female with a PMH of cyclical vomiting syndrome and multiple  ED visits for nausea and vomiting, T2DM, chronic cannabis use who presents for the evaluation of 1 day of nausea and vomiting. Received Zofran in the ED with improvement of her symptoms.     Has also had progressive swelling, pain, and erythema located at the medial right upper arm. Symptoms started after she had IV placed via ultrasound on 10/29 after her IV was infiltrated. On exam, the right upper extremity is warm and well-perfused with no numbness or tingling in hand or finger. The medial aspect of the right arm has an 6x6 cm area of erythema with 3x3 localized area of induration. No purulence or drainage. Remainder of upper and lower arm compartments are soft and compressible. Patient is afebrile upon arrival. CBC pending. Differential includes IV infiltration, phlebitis, cellulitis vs erysipelas, or DVT. Will obtain RUE venous and soft tissue ultrasound to assess for DVT and abscess and start patient on empiric antibiotics with Keflex.    I have reviewed the nursing notes. I have reviewed the findings, diagnosis, plan and need for follow up with the patient.    New Prescriptions    SULFAMETHOXAZOLE-TRIMETHOPRIM (BACTRIM DS) 800-160 MG TABLET    Take 1 tablet by mouth 2 times daily.       Final diagnoses:   Abscess   Hyperglycemia     Ning Rc, MS4  [facility] [employer]    [facility] EMERGENCY DEPARTMENT  11/6/2024      ED Attending Physician Attestation    I Laure Guerrero MD, cared for this patient with the Medical Student. I performed, or re-performed, the physical exam and medical decision-making. I have verified the accuracy of all the medical student findings and documentation above, and have edited as necessary.    Right upper extremity ultrasound negative for DVT.  There is a superficial thrombus in the basilic vein.  Soft tissue ultrasound showed 6.1 x 4.4 x 2.4 cm fluid collection in the mid right upper arm.  Consistent with abscess.  Patient consented for bedside incision and drainage.   Performed a loop drainage with successful output of copious purulent bloody fluid.  Pain improved after drainage.  Labs notable for glucose of 444.  Sodium 129 but when corrected for blood glucose normal.  White count 11.8.  Creatinine and potassium normal.  Anion gap normal.  Patient has not had recurrent vomiting while in the emergency department.  Will treat her blood glucose with aspart.  Patient states she ran out of metformin a while ago and has not been taking this but she does take NovoLog at home.  Patient given 5 units of NovoLog and repeat glucose went from 444 to 469 on recheck.  Even metformin and another 8 units of NovoLog.  Repeat glucose 341.  Ordered an additional 5 units.  Patient signed out to Dr. Davis at shift change with disposition pending recheck glucose.  Discussed importance of tight blood sugar control to prevent worsening infection.  Patient prescribed Bactrim for home to treat abscess and cellulitis.  Advised pain control with NSAIDs.  Recommend follow-up with primary care in 7 to 10 days for wound recheck and drain removal.  Discussed detailed return precautions.    Laure Guerrero MD  Emergency Medicine       Laure Guerrero MD  11/07/24 6617

## 2024-11-09 ENCOUNTER — TELEPHONE (OUTPATIENT)
Dept: NURSING | Facility: CLINIC | Age: 49
End: 2024-11-09
Payer: COMMERCIAL

## 2024-11-09 LAB
BACTERIA ABSC ANAEROBE+AEROBE CULT: ABNORMAL
BACTERIA ABSC ANAEROBE+AEROBE CULT: ABNORMAL
GRAM STAIN RESULT: ABNORMAL
GRAM STAIN RESULT: ABNORMAL

## 2024-11-09 NOTE — LETTER
November 9, 2024        Yun Coyle  [address]  [place] [address]          Dear Yun Coyle:    You were seen in the [facility] Emergency Department at [facility] on 11/6/2024.  We are unable to reach you by phone, so we are sending you this letter.     It is important that you call [facility] Emergency Department lab result nurse at [phone], as we have information to relay to you AND/OR we MAY have to make some changes in your treatment.    Best time to call back is between 9AM and 5:30PM, 7 days a week.      Sincerely,     [facility] Emergency Department Lab Result RN  [phone]

## 2024-11-09 NOTE — TELEPHONE ENCOUNTER
[facility]    Reason for call: Lab Result Notification     Lab Result (including Rx patient on, if applicable).  If culture, copy of lab report at bottom.  Lab Result: Aerobic bacterial culture - right arm abscess (see below)    ED Rx: sulfamethoxazole-trimethoprim (BACTRIM DS) 800-160 MG tablet - Take 1 tablet by mouth 2 times daily x 10 days (Not tested)    I&D performed in ED    Creatinine Level (mg/dl)   Creatinine   Date Value Ref Range Status   11/06/2024 0.84 0.51 - 0.95 mg/dL Final   12/03/2020 0.72 0.52 - 1.04 mg/dL Final    Creatinine clearance (ml/min), if applicable    Serum creatinine: 0.84 mg/dL 11/06/24 2233  Estimated creatinine clearance: 99.7 mL/min     Patient presented to [facility] ED on 11/6/2024 with nausea, vomiting, and right upper extremity pain and swelling    RN Recommendations/Instructions per [facility] ED lab result protocol:   [facility] ED lab result protocol utilized: Culture protocol  Assess symptoms. Consult ED provider if no improvement.    Unable to reach patient/caregiver.     Left voicemail message requesting a call back to [phone] between 9 a.m. and 5:30 p.m. for patient's ED/UC lab results.      Letter pended to be sent via [employer] mail.       Jessica Mackenzie RN

## 2024-11-10 NOTE — TELEPHONE ENCOUNTER
Second attempt to reach patient. Left a message, letter pended.    Jessica Mackenzie RN  11/10/24 4:05 PM  [facility]  Emergency Department Lab Results RN

## 2024-11-11 ENCOUNTER — PATIENT OUTREACH (OUTPATIENT)
Dept: CARE COORDINATION | Facility: CLINIC | Age: 49
End: 2024-11-11
Payer: COMMERCIAL

## 2024-11-11 NOTE — PROGRESS NOTES
[facility] Care Resource Center Contact  [facility]/Voicemail     Clinical Data: Care Coordination ED-sourced Outreach-     Outreach attempted x 2.  Left message on patient's voicemail, providing [facility]'s 24/7 scheduling and nurse triage phone number [phone] ([phone]) for questions/concerns and/or to schedule an appt with an [facility] provider.      [facility] unable to send Care Coordination introduction letter as patient does not have an active MyChart account. Clinic Care Coordination services remain available via referral if needed.    Plan: [facility] will do no further outreaches at this time.       DAI Lara  [facility] Care Resource Ranger, [facility]    *Connected Care Resource Team does NOT follow patient ongoing. Referrals are identified based on internal discharge reports and the outreach is to ensure patient has an understanding of their discharge instructions.

## 2024-11-12 ENCOUNTER — APPOINTMENT (OUTPATIENT)
Dept: CT IMAGING | Facility: CLINIC | Age: 49
End: 2024-11-12
Attending: EMERGENCY MEDICINE
Payer: COMMERCIAL

## 2024-11-12 ENCOUNTER — HOSPITAL ENCOUNTER (INPATIENT)
Facility: CLINIC | Age: 49
End: 2024-11-12
Attending: EMERGENCY MEDICINE | Admitting: STUDENT IN AN ORGANIZED HEALTH CARE EDUCATION/TRAINING PROGRAM
Payer: COMMERCIAL

## 2024-11-12 DIAGNOSIS — E11.65 TYPE 2 DIABETES MELLITUS WITH HYPERGLYCEMIA, WITH LONG-TERM CURRENT USE OF INSULIN (H): ICD-10-CM

## 2024-11-12 DIAGNOSIS — I15.9 SECONDARY HYPERTENSION: ICD-10-CM

## 2024-11-12 DIAGNOSIS — Z79.4 TYPE 2 DIABETES MELLITUS WITH HYPERGLYCEMIA, WITH LONG-TERM CURRENT USE OF INSULIN (H): ICD-10-CM

## 2024-11-12 DIAGNOSIS — E11.9 TYPE 2 DIABETES MELLITUS WITHOUT COMPLICATION, UNSPECIFIED WHETHER LONG TERM INSULIN USE (H): ICD-10-CM

## 2024-11-12 DIAGNOSIS — F43.10 PTSD (POST-TRAUMATIC STRESS DISORDER): ICD-10-CM

## 2024-11-12 DIAGNOSIS — F11.21 MODERATE OPIOID USE DISORDER, IN EARLY REMISSION (H): ICD-10-CM

## 2024-11-12 DIAGNOSIS — F33.2 SEVERE EPISODE OF RECURRENT MAJOR DEPRESSIVE DISORDER, WITHOUT PSYCHOTIC FEATURES (H): ICD-10-CM

## 2024-11-12 DIAGNOSIS — R11.0 NAUSEA: Primary | ICD-10-CM

## 2024-11-12 DIAGNOSIS — R10.13 EPIGASTRIC PAIN: ICD-10-CM

## 2024-11-12 DIAGNOSIS — R45.851 SUICIDAL IDEATION: ICD-10-CM

## 2024-11-12 DIAGNOSIS — L02.413 ABSCESS OF ARM, RIGHT: ICD-10-CM

## 2024-11-12 DIAGNOSIS — R11.15 CYCLICAL VOMITING: ICD-10-CM

## 2024-11-12 LAB
ANION GAP SERPL CALCULATED.3IONS-SCNC: 9 MMOL/L (ref 7–15)
BASOPHILS # BLD AUTO: 0 10E3/UL (ref 0–0.2)
BASOPHILS NFR BLD AUTO: 0 %
BUN SERPL-MCNC: 15.6 MG/DL (ref 6–20)
CALCIUM SERPL-MCNC: 9.4 MG/DL (ref 8.8–10.4)
CHLORIDE SERPL-SCNC: 96 MMOL/L (ref 98–107)
CREAT SERPL-MCNC: 0.69 MG/DL (ref 0.51–0.95)
CRP SERPL-MCNC: 8.2 MG/L
EGFRCR SERPLBLD CKD-EPI 2021: >90 ML/MIN/1.73M2
EOSINOPHIL # BLD AUTO: 0.2 10E3/UL (ref 0–0.7)
EOSINOPHIL NFR BLD AUTO: 3 %
ERYTHROCYTE [DISTWIDTH] IN BLOOD BY AUTOMATED COUNT: 11.8 % (ref 10–15)
ERYTHROCYTE [SEDIMENTATION RATE] IN BLOOD BY WESTERGREN METHOD: 36 MM/HR (ref 0–20)
GLUCOSE SERPL-MCNC: 381 MG/DL (ref 70–99)
HCG UR QL: NEGATIVE
HCO3 SERPL-SCNC: 26 MMOL/L (ref 22–29)
HCT VFR BLD AUTO: 37.7 % (ref 35–47)
HGB BLD-MCNC: 13.2 G/DL (ref 11.7–15.7)
IMM GRANULOCYTES # BLD: 0.1 10E3/UL
IMM GRANULOCYTES NFR BLD: 1 %
INTERNAL QC OK POCT: NORMAL
LYMPHOCYTES # BLD AUTO: 2.1 10E3/UL (ref 0.8–5.3)
LYMPHOCYTES NFR BLD AUTO: 27 %
MCH RBC QN AUTO: 29.7 PG (ref 26.5–33)
MCHC RBC AUTO-ENTMCNC: 35 G/DL (ref 31.5–36.5)
MCV RBC AUTO: 85 FL (ref 78–100)
MONOCYTES # BLD AUTO: 0.4 10E3/UL (ref 0–1.3)
MONOCYTES NFR BLD AUTO: 6 %
NEUTROPHILS # BLD AUTO: 4.8 10E3/UL (ref 1.6–8.3)
NEUTROPHILS NFR BLD AUTO: 63 %
NRBC # BLD AUTO: 0 10E3/UL
NRBC BLD AUTO-RTO: 0 /100
PLATELET # BLD AUTO: 286 10E3/UL (ref 150–450)
POCT KIT EXPIRATION DATE: NORMAL
POCT KIT LOT NUMBER: NORMAL
POTASSIUM SERPL-SCNC: 4.6 MMOL/L (ref 3.4–5.3)
RBC # BLD AUTO: 4.45 10E6/UL (ref 3.8–5.2)
SODIUM SERPL-SCNC: 131 MMOL/L (ref 135–145)
WBC # BLD AUTO: 7.6 10E3/UL (ref 4–11)

## 2024-11-12 PROCEDURE — 250N000011 HC RX IP 250 OP 636: Performed by: EMERGENCY MEDICINE

## 2024-11-12 PROCEDURE — 99221 1ST HOSP IP/OBS SF/LOW 40: CPT | Performed by: SURGERY

## 2024-11-12 PROCEDURE — 96365 THER/PROPH/DIAG IV INF INIT: CPT | Mod: 59 | Performed by: EMERGENCY MEDICINE

## 2024-11-12 PROCEDURE — 99285 EMERGENCY DEPT VISIT HI MDM: CPT | Mod: 25 | Performed by: EMERGENCY MEDICINE

## 2024-11-12 PROCEDURE — 250N000013 HC RX MED GY IP 250 OP 250 PS 637: Performed by: EMERGENCY MEDICINE

## 2024-11-12 PROCEDURE — 81025 URINE PREGNANCY TEST: CPT | Performed by: FAMILY MEDICINE

## 2024-11-12 PROCEDURE — 73201 CT UPPER EXTREMITY W/DYE: CPT | Mod: RT

## 2024-11-12 PROCEDURE — 250N000009 HC RX 250: Performed by: EMERGENCY MEDICINE

## 2024-11-12 PROCEDURE — 36415 COLL VENOUS BLD VENIPUNCTURE: CPT | Performed by: EMERGENCY MEDICINE

## 2024-11-12 PROCEDURE — 99285 EMERGENCY DEPT VISIT HI MDM: CPT | Performed by: EMERGENCY MEDICINE

## 2024-11-12 PROCEDURE — 80053 COMPREHEN METABOLIC PANEL: CPT | Performed by: EMERGENCY MEDICINE

## 2024-11-12 PROCEDURE — 85018 HEMOGLOBIN: CPT | Performed by: EMERGENCY MEDICINE

## 2024-11-12 PROCEDURE — 85041 AUTOMATED RBC COUNT: CPT | Performed by: EMERGENCY MEDICINE

## 2024-11-12 PROCEDURE — 96366 THER/PROPH/DIAG IV INF ADDON: CPT | Performed by: EMERGENCY MEDICINE

## 2024-11-12 PROCEDURE — 10060 I&D ABSCESS SIMPLE/SINGLE: CPT | Mod: 58 | Performed by: SURGERY

## 2024-11-12 PROCEDURE — 86140 C-REACTIVE PROTEIN: CPT | Performed by: EMERGENCY MEDICINE

## 2024-11-12 PROCEDURE — 85025 COMPLETE CBC W/AUTO DIFF WBC: CPT | Performed by: EMERGENCY MEDICINE

## 2024-11-12 PROCEDURE — 0J9D00Z DRAINAGE OF RIGHT UPPER ARM SUBCUTANEOUS TISSUE AND FASCIA WITH DRAINAGE DEVICE, OPEN APPROACH: ICD-10-PCS | Performed by: SURGERY

## 2024-11-12 PROCEDURE — 85652 RBC SED RATE AUTOMATED: CPT | Performed by: EMERGENCY MEDICINE

## 2024-11-12 PROCEDURE — 82565 ASSAY OF CREATININE: CPT | Performed by: EMERGENCY MEDICINE

## 2024-11-12 PROCEDURE — 80048 BASIC METABOLIC PNL TOTAL CA: CPT | Performed by: EMERGENCY MEDICINE

## 2024-11-12 PROCEDURE — 250N000011 HC RX IP 250 OP 636

## 2024-11-12 PROCEDURE — 96375 TX/PRO/DX INJ NEW DRUG ADDON: CPT | Performed by: EMERGENCY MEDICINE

## 2024-11-12 PROCEDURE — 82040 ASSAY OF SERUM ALBUMIN: CPT | Performed by: STUDENT IN AN ORGANIZED HEALTH CARE EDUCATION/TRAINING PROGRAM

## 2024-11-12 RX ORDER — OXYCODONE HYDROCHLORIDE 5 MG/1
5 TABLET ORAL EVERY 6 HOURS PRN
Qty: 8 TABLET | Refills: 0 | Status: SHIPPED | OUTPATIENT
Start: 2024-11-12 | End: 2024-11-15

## 2024-11-12 RX ORDER — VANCOMYCIN HYDROCHLORIDE
1250 EVERY 12 HOURS
Status: DISCONTINUED | OUTPATIENT
Start: 2024-11-13 | End: 2024-11-15

## 2024-11-12 RX ORDER — CEPHALEXIN 500 MG/1
500 CAPSULE ORAL ONCE
Status: DISCONTINUED | OUTPATIENT
Start: 2024-11-12 | End: 2024-11-12

## 2024-11-12 RX ORDER — OLANZAPINE 10 MG/1
10 TABLET ORAL AT BEDTIME
Status: DISCONTINUED | OUTPATIENT
Start: 2024-11-13 | End: 2024-11-18

## 2024-11-12 RX ORDER — CEPHALEXIN 500 MG/1
500 CAPSULE ORAL 3 TIMES DAILY
Qty: 15 CAPSULE | Refills: 0 | Status: SHIPPED | OUTPATIENT
Start: 2024-11-12 | End: 2024-11-17

## 2024-11-12 RX ORDER — OXYCODONE HYDROCHLORIDE 5 MG/1
5 TABLET ORAL ONCE
Status: COMPLETED | OUTPATIENT
Start: 2024-11-12 | End: 2024-11-12

## 2024-11-12 RX ORDER — LIDOCAINE HYDROCHLORIDE AND EPINEPHRINE 10; 10 MG/ML; UG/ML
30 INJECTION, SOLUTION INFILTRATION; PERINEURAL ONCE
Status: DISCONTINUED | OUTPATIENT
Start: 2024-11-12 | End: 2024-11-16

## 2024-11-12 RX ORDER — OXYCODONE HYDROCHLORIDE 5 MG/1
5 TABLET ORAL EVERY 6 HOURS PRN
Status: DISPENSED | OUTPATIENT
Start: 2024-11-12 | End: 2024-11-14

## 2024-11-12 RX ORDER — IOPAMIDOL 755 MG/ML
100 INJECTION, SOLUTION INTRAVASCULAR ONCE
Status: COMPLETED | OUTPATIENT
Start: 2024-11-12 | End: 2024-11-12

## 2024-11-12 RX ORDER — ACETAMINOPHEN 500 MG
1000 TABLET ORAL 3 TIMES DAILY
Qty: 42 TABLET | Refills: 0 | Status: SHIPPED | OUTPATIENT
Start: 2024-11-12 | End: 2024-11-19

## 2024-11-12 RX ORDER — ONDANSETRON 2 MG/ML
4 INJECTION INTRAMUSCULAR; INTRAVENOUS ONCE
Status: DISCONTINUED | OUTPATIENT
Start: 2024-11-12 | End: 2024-11-12

## 2024-11-12 RX ORDER — SULFAMETHOXAZOLE AND TRIMETHOPRIM 800; 160 MG/1; MG/1
1 TABLET ORAL 2 TIMES DAILY
Status: DISCONTINUED | OUTPATIENT
Start: 2024-11-13 | End: 2024-11-15

## 2024-11-12 RX ORDER — VANCOMYCIN HYDROCHLORIDE
1500 ONCE
Status: COMPLETED | OUTPATIENT
Start: 2024-11-12 | End: 2024-11-12

## 2024-11-12 RX ORDER — LOSARTAN POTASSIUM 25 MG/1
25 TABLET ORAL DAILY
Status: DISCONTINUED | OUTPATIENT
Start: 2024-11-13 | End: 2024-12-04 | Stop reason: HOSPADM

## 2024-11-12 RX ORDER — MORPHINE SULFATE 4 MG/ML
4 INJECTION, SOLUTION INTRAMUSCULAR; INTRAVENOUS ONCE
Status: COMPLETED | OUTPATIENT
Start: 2024-11-12 | End: 2024-11-12

## 2024-11-12 RX ORDER — HYDROMORPHONE HYDROCHLORIDE 1 MG/ML
0.5 INJECTION, SOLUTION INTRAMUSCULAR; INTRAVENOUS; SUBCUTANEOUS ONCE
Status: COMPLETED | OUTPATIENT
Start: 2024-11-12 | End: 2024-11-12

## 2024-11-12 RX ORDER — SULFAMETHOXAZOLE AND TRIMETHOPRIM 800; 160 MG/1; MG/1
1 TABLET ORAL ONCE
Status: COMPLETED | OUTPATIENT
Start: 2024-11-12 | End: 2024-11-12

## 2024-11-12 RX ADMIN — Medication 1500 MG: at 11:47

## 2024-11-12 RX ADMIN — SULFAMETHOXAZOLE AND TRIMETHOPRIM 1 TABLET: 800; 160 TABLET ORAL at 19:37

## 2024-11-12 RX ADMIN — SODIUM CHLORIDE 60 ML: 9 INJECTION, SOLUTION INTRAVENOUS at 12:30

## 2024-11-12 RX ADMIN — HYDROMORPHONE HYDROCHLORIDE 0.5 MG: 1 INJECTION, SOLUTION INTRAMUSCULAR; INTRAVENOUS; SUBCUTANEOUS at 18:23

## 2024-11-12 RX ADMIN — OXYCODONE HYDROCHLORIDE 5 MG: 5 TABLET ORAL at 19:37

## 2024-11-12 RX ADMIN — IOPAMIDOL 90 ML: 755 INJECTION, SOLUTION INTRAVENOUS at 12:29

## 2024-11-12 RX ADMIN — MORPHINE SULFATE 4 MG: 4 INJECTION INTRAVENOUS at 16:25

## 2024-11-12 ASSESSMENT — ACTIVITIES OF DAILY LIVING (ADL)
ADLS_ACUITY_SCORE: 0

## 2024-11-12 NOTE — ED NOTES
When EMS arrived patient complained 8/10 pain and requested something for pain. MD informed 4 mg morphine ordered and administered.

## 2024-11-12 NOTE — H&P
[facility] Surgery H&P   2024    Yun Coyle  : 1975    Date of Service: 2024 5:13 PM    Assessment and Plan:  Yun Coyle is a 48 year old female with a history of chronic hepatitis C and DM II who presents to [facility] ED for surgical consultation of RUE abscess. Initially seen on  with I&D with penrose drain placement in ED. Discharged with antibiotics but was unable to obtain. CT today shows some fluid concerning for phlegmon vs abscess.     - bedside incision and drainage performed in ED  - okay for discharge from [facility] perspective  - wound care explained to patient and placed in discharge instruction  - close follow-up in general surgery clinic (ordered)  - recommend 7 day course of oral antibiotics     Discussed with Dr. Yefri Hollins, MD  PGY-2 General Surgery Resident    History of Present Illness:    Yun Coyle is a 48 year old female with a history of chronic hepatitis C and DM II who presents to [facility] ED for surgical consultation of RUE abscess. Approximately 2 weekks ago, patient presented to  ED due to nausea and vomiting. During this visit, the patient required vascular access consult to place an IV via ultrasound after her IV infiltrated. Patient returned to the ED on  with progressive swelling, pain and erythema along medial right upper arm. An ultrasound was performed which reveled a 6.1 x 4.4 x 2.4 cm fluid collection concerning for possible abscess. An I&D was performed in the ED and penrose drain was left in place. Patient discharged on course of Bactrim, which she was unable to obtain due to insurance issues. Patient presented to [facility] ED today due to persistent drainage from her right upper arm. Patient reports persistent chocolate milk colored drainage from the wound. Increasing pain over the past two days prompting patient to come for evaluation today. Denies any fevers or chills.     Past Medical History:  Past Medical  "History:   Diagnosis Date    Acute pancreatitis 06/29/2017    Anxiety     Cauda equina syndrome (H) 03/29/2013    Cyst of right ovary 11/16/2017    Depressive disorder     Gastroesophageal reflux disease     Hypertension     Hyponatremia 10/01/2023    Low back pain 08/20/2012    Overview:   History of cauda equina and surgery.      Type 2 diabetes mellitus (H)     Uncomplicated asthma        Past Surgical History  Past Surgical History:   Procedure Laterality Date    BACK SURGERY      x2    CHOLECYSTECTOMY      ORTHOPEDIC SURGERY      SALPINGO OOPHORECTOMY,R/L/ELDA Right        Family History:  Family History   Problem Relation Age of Onset    Breast Cancer Mother         in her 50s.    Diabetes Mother     Breast Cancer Maternal Grandmother         in her 40s.    Diabetes Maternal Grandmother        Social History:  Social History     Socioeconomic History    Marital status: Single     Spouse name: Not on file    Number of children: Not on file    Years of education: Not on file    Highest education level: Not on file   Occupational History    Not on file   Tobacco Use    Smoking status: Never    Smokeless tobacco: Never   Vaping Use    Vaping status: Never Used   Substance and Sexual Activity    Alcohol use: No    Drug use: Not Currently     Types: Marijuana     Comment: pt was taking Oxycontin \"years ago.\" pt is now on Methadone.    Sexual activity: Not Currently   Other Topics Concern    Not on file   Social History Narrative    Not on file     Social Drivers of Health     Financial Resource Strain: Low Risk  (8/23/2024)    Financial Resource Strain     Within the past 12 months, have you or your family members you live with been unable to get utilities (heat, electricity) when it was really needed?: No   Food Insecurity: Low Risk  (8/23/2024)    Food Insecurity     Within the past 12 months, did you worry that your food would run out before you got money to buy more?: No     Within the past 12 months, did the " food you bought just not last and you didn t have money to get more?: No   Transportation Needs: Low Risk  (8/23/2024)    Transportation Needs     Within the past 12 months, has lack of transportation kept you from medical appointments, getting your medicines, non-medical meetings or appointments, work, or from getting things that you need?: No   Physical Activity: Not on file   Stress: Not on file   Social Connections: Unknown (4/24/2022)    [facility]    Social Connections     Frequency of Communication with Friends and Family: Not on file   Interpersonal Safety: Not At Risk (8/31/2024)    Received from [facility]     Humiliation, Afraid, Rape, and Kick questionnaire     Fear of Current or Ex-Partner: No     Emotionally Abused: No     Physically Abused: No     Sexually Abused: No   Housing Stability: Low Risk  (8/23/2024)    Housing Stability     Do you have housing? : Yes     Are you worried about losing your housing?: No       Medications:  Current Outpatient Medications   Medication Sig Dispense Refill    cloNIDine (CATAPRES) 0.1 MG tablet Take 0.1 mg by mouth 2 times daily.      glucagon 1 MG kit Inject 1 mg Subcutaneous once as needed for low blood sugar (Use as directed by provider) 1 each 0    glucose (BD GLUCOSE) 4 g chewable tablet Take 4 tablets by mouth every 15 minutes as needed for low blood sugar 50 tablet 0    glucose 40 % (400 mg/mL) gel 15 g every 15 minutes by mouth as needed for low blood sugar.  Oral gel is preferable for conscious and able to swallow patient. 112.5 g 0    losartan (COZAAR) 25 MG tablet Take 1 tablet (25 mg) by mouth daily 90 tablet 3    metFORMIN (GLUCOPHAGE) 500 MG tablet Take 2 tablets (1,000 mg) by mouth 2 times daily (with meals) 360 tablet 3    methadone (DOLOPHINE-INTENSOL) 10 MG/ML (HIGH CONC) solution Take 140 mg by mouth daily      OLANZapine (ZYPREXA) 10 MG tablet  Take 1 tablet (10 mg) by mouth at bedtime 90 tablet 1    OLANZapine zydis (ZYPREXA) 5 MG ODT Take 1 tablet (5 mg) by mouth as needed (nausea). 10 tablet 0    predniSONE (DELTASONE) 20 MG tablet Take two tablets (= 40mg) each day for 5 (five) days if in asthma exacerbation.      prochlorperazine (COMPAZINE) 25 MG suppository Place 1 suppository (25 mg) rectally every 12 hours as needed for nausea or vomiting 24 suppository 0    sulfamethoxazole-trimethoprim (BACTRIM DS) 800-160 MG tablet Take 1 tablet by mouth 2 times daily. 20 tablet 0       Allergies:   No Known Allergies    Review of Symptoms:  A 10 point review of symptoms has been conducted and is negative except for that mentioned in the above HPI.    Physical Exam:    Blood pressure (!) 160/97, pulse 82, temperature 98  F (36.7  C), temperature source Oral, resp. rate 18, weight 89.3 kg (196 lb 13.9 oz), SpO2 97%, not currently breastfeeding.  Gen:    Lying in bed in NAD, A&OX3  HEENT: Normocephalic and atraumatic  CV:  RRR  Pulm:  Non-labored breathing  Abd:  Soft, non-tender, non-distended  Ext:  Two pin point incisions over medial aspect of right upper arm with penrose drain in place. Seropurulent drainage from both incisions. No overlying erythema or fluctuance.     Labs:  CBC RESULTS:   Recent Labs   Lab Test 11/12/24  1143   WBC 7.6   RBC 4.45   HGB 13.2   HCT 37.7   MCV 85   MCH 29.7   MCHC 35.0   RDW 11.8        Last Basic Metabolic Panel:  Lab Results   Component Value Date     11/12/2024     12/03/2020      Lab Results   Component Value Date    POTASSIUM 4.6 11/12/2024    POTASSIUM 4.9 04/20/2024    POTASSIUM 4.0 05/15/2022    POTASSIUM 3.5 12/03/2020     Lab Results   Component Value Date    CHLORIDE 96 11/12/2024    CHLORIDE 92 05/15/2022    CHLORIDE 101 12/03/2020     Lab Results   Component Value Date    DA 9.4 11/12/2024    DA 9.2 12/03/2020     Lab Results   Component Value Date    CO2 26 11/12/2024    CO2 26 05/15/2022     CO2 28 12/03/2020     Lab Results   Component Value Date    BUN 15.6 11/12/2024    BUN 29 05/15/2022    BUN 11 12/03/2020     Lab Results   Component Value Date    CR 0.69 11/12/2024    CR 0.72 12/03/2020     Lab Results   Component Value Date     11/12/2024     11/07/2024     05/15/2022     12/03/2020       Imaging:  CT Humerus Upper Arm Right w Contrast    Result Date: 11/12/2024  CT HUMERUS UPPER ARM RIGHT W CONTRAST 11/12/2024 12:42 PM INDICATION: ongoing copious drainage from a right upper extremity abscess.  X 6 days. COMPARISON: Upper extremity ultrasound 11/6/2024 CONTRAST: 87 mL isovue 370 TECHNIQUE: IV contrast. Axial, sagittal and coronal thin-section reconstruction. Dose reduction techniques were used. FINDINGS: Ill-defined hypoattenuating fluid with small locule of soft tissue gas within the posterior and medial subcutaneous fat of the mid-distal arm (series 3, images 113-160; series 7, image 33). This spans a proximal-distal distance of approximately 12.6 cm. This does not extend deep to the superficial muscular fascia. No fascial nodularity or thickening. No abnormality of the underlying humerus. No osteolysis or osseous erosive or destructive change. Mild degenerative arthritis acromioclavicular and glenohumeral joints. No axillary lymphadenopathy.     IMPRESSION: 1.  Ill-defined area of fluid within the subcutaneous fat of the posterior mid-distal arm consistent with phlegmon versus early abscess. Small internal locule of internal gas. Correlate with any history of recent aspiration. 2.  No evidence of underlying osteomyelitis. JAROD HERZOG MD   SYSTEM ID:  [id number]    US Upper Extremity Venous Duplex Right    Result Date: 11/6/2024  EXAM: US UPPER EXTREMITY VENOUS DUPLEX RIGHT LOCATION: [facility] DATE: 11/6/2024 INDICATION: iv infiltrated, swelling, eval dvt COMPARISON: None. TECHNIQUE: Venous Duplex ultrasound of the  right upper extremity with (when possible) and without compression, augmentation, and duplex. Color flow and spectral Doppler with waveform analysis performed. FINDINGS: Ultrasound includes evaluation of the internal jugular vein, innominate vein, subclavian vein, axillary vein, and brachial vein. The superficial cephalic and basilic veins were also evaluated where seen. RIGHT: No deep venous thrombosis. There is a segment of occlusive superficial thrombus in the basilic vein of the mid upper arm.     IMPRESSION: 1.  No deep venous thrombosis in the right upper extremity. 2.  Superficial thrombus in the basilic vein.    US Upper Extremity Non Vascular Right    Result Date: 11/6/2024  EXAM: US UPPER EXTREMITY NON VASCULAR RIGHT LOCATION: [facility] DATE: 11/6/2024 INDICATION: eval abscess, cellulitis COMPARISON: None. TECHNIQUE: Routine. FINDINGS: Ultrasound of the mid upper right arm in the area of patient's symptomatology demonstrates a 6.1 x 4.4 x 2.4 cm fluid collection. This is nonspecific but given history may represent an abscess.     IMPRESSION: 1.  6.1 x 4.4 x 2.4 cm fluid collection in the mid upper right arm in the area of patient's symptomatology. This is nonspecific but given history may represent an abscess.     XR Chest 2 Views    Result Date: 10/25/2024  EXAM: XR CHEST 2 VIEWS LOCATION: [facility] DATE: 10/25/2024 INDICATION: hypoxia COMPARISON: 10/13/2024     IMPRESSION: Heart size and pulmonary vascularity normal. The lungs are clear. Clips right upper quadrant.

## 2024-11-12 NOTE — ED NOTES
Report called over to [place] ED. Spoke to ED charge RN. Ambulance transport called and they will be here in 2/1/2 hrs.

## 2024-11-12 NOTE — PHARMACY-VANCOMYCIN DOSING SERVICE
Pharmacy Vancomycin Initial Note  Date of Service 2024  Patient's  1975  48 year old, female    Indication: Abscess    Current estimated CrCl = Estimated Creatinine Clearance: 121.3 mL/min (based on SCr of 0.69 mg/dL).    Creatinine for last 3 days  2024: 11:43 AM Creatinine 0.69 mg/dL    Recent Vancomycin Level(s) for last 3 days  No results found for requested labs within last 3 days.      Vancomycin IV Administrations (past 72 hours)                     vancomycin (VANCOCIN) 1,500 mg in 0.9% NaCl 250 mL intermittent infusion (mg) 1,500 mg New Bag 24 1147                    Nephrotoxins and other renal medications (From now, onward)      Start     Dose/Rate Route Frequency Ordered Stop    24 1200  vancomycin (VANCOCIN) 1,500 mg in 0.9% NaCl 250 mL intermittent infusion         1,500 mg  166.7 mL/hr over 90 Minutes Intravenous ONCE 24 1130      24 1130  vancomycin place mcintyre - receiving intermittent dosing         1 each Intravenous SEE ADMIN INSTRUCTIONS 24 1130              Contrast Orders - past 72 hours (72h ago, onward)      Start     Dose/Rate Route Frequency Stop    24 1225  iopamidol (ISOVUE-370) solution 100 mL         100 mL Intravenous ONCE 24 1229            Insight[employer] Prediction of Planned Initial Vancomycin Regimen  Loading dose: N/A  Regimen: 1250 mg IV every 12 hours.  Start time: 23:47 on 2024  Exposure target: AUC24 (range)400-600 mg/L.hr   AUC24,ss: 480 mg/L.hr  Probability of AUC24 > 400: 69 %  Ctrough,ss: 14.4 mg/L  Probability of Ctrough,ss > 20: 26 %  Probability of nephrotoxicity (Lodise HERI ): 10 %          Plan:  Start vancomycin  1250 mg IV q12h.   Vancomycin monitoring method: AUC  Vancomycin therapeutic monitoring goal: 400-600 mg*h/L  Pharmacy will check vancomycin levels as appropriate in 1-3 Days.    Serum creatinine levels will be ordered a minimum of twice weekly.      MAGGIE MONTOYA [facility]

## 2024-11-12 NOTE — ED TRIAGE NOTES
Triage Assessment (Adult)       Row Name 11/12/24 1104          Triage Assessment    Airway WDL WDL        Respiratory WDL    Respiratory WDL WDL        Skin Circulation/Temperature WDL    Skin Circulation/Temperature WDL WDL        Cardiac WDL    Cardiac WDL WDL        Peripheral/Neurovascular WDL    Peripheral Neurovascular WDL WDL        Cognitive/Neuro/Behavioral WDL    Cognitive/Neuro/Behavioral WDL WDL

## 2024-11-13 ENCOUNTER — TELEPHONE (OUTPATIENT)
Dept: BEHAVIORAL HEALTH | Facility: CLINIC | Age: 49
End: 2024-11-13
Payer: COMMERCIAL

## 2024-11-13 PROBLEM — F33.9 RECURRENT MAJOR DEPRESSION (H): Status: ACTIVE | Noted: 2024-11-13

## 2024-11-13 LAB
ALBUMIN SERPL BCG-MCNC: 3.7 G/DL (ref 3.5–5.2)
ALP SERPL-CCNC: 156 U/L (ref 40–150)
ALT SERPL W P-5'-P-CCNC: 126 U/L (ref 0–50)
ANION GAP SERPL CALCULATED.3IONS-SCNC: 9 MMOL/L (ref 7–15)
AST SERPL W P-5'-P-CCNC: 91 U/L (ref 0–45)
BILIRUB SERPL-MCNC: 0.3 MG/DL
BUN SERPL-MCNC: 15.6 MG/DL (ref 6–20)
CALCIUM SERPL-MCNC: 9.4 MG/DL (ref 8.8–10.4)
CHLORIDE SERPL-SCNC: 96 MMOL/L (ref 98–107)
CREAT SERPL-MCNC: 0.69 MG/DL (ref 0.51–0.95)
CREAT SERPL-MCNC: 0.69 MG/DL (ref 0.51–0.95)
EGFRCR SERPLBLD CKD-EPI 2021: >90 ML/MIN/1.73M2
EGFRCR SERPLBLD CKD-EPI 2021: >90 ML/MIN/1.73M2
FLUAV RNA SPEC QL NAA+PROBE: NEGATIVE
FLUBV RNA RESP QL NAA+PROBE: NEGATIVE
GLUCOSE SERPL-MCNC: 381 MG/DL (ref 70–99)
HCO3 SERPL-SCNC: 26 MMOL/L (ref 22–29)
POTASSIUM SERPL-SCNC: 4.6 MMOL/L (ref 3.4–5.3)
PROT SERPL-MCNC: 7.8 G/DL (ref 6.4–8.3)
RSV RNA SPEC NAA+PROBE: NEGATIVE
SARS-COV-2 RNA RESP QL NAA+PROBE: NEGATIVE
SODIUM SERPL-SCNC: 131 MMOL/L (ref 135–145)

## 2024-11-13 PROCEDURE — 87637 SARSCOV2&INF A&B&RSV AMP PRB: CPT | Performed by: STUDENT IN AN ORGANIZED HEALTH CARE EDUCATION/TRAINING PROGRAM

## 2024-11-13 PROCEDURE — 250N000013 HC RX MED GY IP 250 OP 250 PS 637: Performed by: FAMILY MEDICINE

## 2024-11-13 PROCEDURE — 99223 1ST HOSP IP/OBS HIGH 75: CPT | Performed by: CLINICAL NURSE SPECIALIST

## 2024-11-13 PROCEDURE — 250N000011 HC RX IP 250 OP 636: Performed by: EMERGENCY MEDICINE

## 2024-11-13 PROCEDURE — 250N000013 HC RX MED GY IP 250 OP 250 PS 637: Performed by: EMERGENCY MEDICINE

## 2024-11-13 PROCEDURE — 250N000013 HC RX MED GY IP 250 OP 250 PS 637: Performed by: CLINICAL NURSE SPECIALIST

## 2024-11-13 PROCEDURE — 96366 THER/PROPH/DIAG IV INF ADDON: CPT | Performed by: EMERGENCY MEDICINE

## 2024-11-13 RX ORDER — METHADONE HYDROCHLORIDE 10 MG/ML
150 CONCENTRATE ORAL DAILY
Status: DISCONTINUED | OUTPATIENT
Start: 2024-11-13 | End: 2024-12-04 | Stop reason: HOSPADM

## 2024-11-13 RX ORDER — CLONIDINE HYDROCHLORIDE 0.1 MG/1
0.1 TABLET ORAL 2 TIMES DAILY
Status: DISCONTINUED | OUTPATIENT
Start: 2024-11-13 | End: 2024-11-27

## 2024-11-13 RX ORDER — PROPARACAINE HYDROCHLORIDE 5 MG/ML
SOLUTION/ DROPS OPHTHALMIC
Status: DISCONTINUED
Start: 2024-11-13 | End: 2024-11-13 | Stop reason: WASHOUT

## 2024-11-13 RX ADMIN — OXYCODONE HYDROCHLORIDE 5 MG: 5 TABLET ORAL at 09:04

## 2024-11-13 RX ADMIN — SULFAMETHOXAZOLE AND TRIMETHOPRIM 1 TABLET: 800; 160 TABLET ORAL at 08:58

## 2024-11-13 RX ADMIN — OLANZAPINE 10 MG: 10 TABLET, FILM COATED ORAL at 21:06

## 2024-11-13 RX ADMIN — OXYCODONE HYDROCHLORIDE 5 MG: 5 TABLET ORAL at 19:41

## 2024-11-13 RX ADMIN — Medication 1250 MG: at 00:49

## 2024-11-13 RX ADMIN — SULFAMETHOXAZOLE AND TRIMETHOPRIM 1 TABLET: 800; 160 TABLET ORAL at 19:41

## 2024-11-13 RX ADMIN — METFORMIN HYDROCHLORIDE 1000 MG: 500 TABLET, FILM COATED ORAL at 08:58

## 2024-11-13 RX ADMIN — OLANZAPINE 10 MG: 10 TABLET, FILM COATED ORAL at 00:41

## 2024-11-13 RX ADMIN — SULFAMETHOXAZOLE AND TRIMETHOPRIM 1 TABLET: 800; 160 TABLET ORAL at 00:41

## 2024-11-13 RX ADMIN — CLONIDINE HYDROCHLORIDE 0.1 MG: 0.1 TABLET ORAL at 19:41

## 2024-11-13 RX ADMIN — METHADONE HYDROCHLORIDE 150 MG: 10 CONCENTRATE ORAL at 16:38

## 2024-11-13 RX ADMIN — METFORMIN HYDROCHLORIDE 1000 MG: 500 TABLET, FILM COATED ORAL at 18:55

## 2024-11-13 RX ADMIN — Medication 1250 MG: at 14:35

## 2024-11-13 RX ADMIN — LOSARTAN POTASSIUM 25 MG: 25 TABLET, FILM COATED ORAL at 09:28

## 2024-11-13 RX ADMIN — OXYCODONE HYDROCHLORIDE 5 MG: 5 TABLET ORAL at 00:41

## 2024-11-13 NOTE — DISCHARGE INSTRUCTIONS
The [facility] will call you to set up a follow up appointment.    mood, or thoughts of suicide to your treatment team or notify your primary provider.   IF THE SYMPTOMS YOU ARE EXPERIENCING ARE A MEDICAL EMERGENCY, CALL 911 IMMEDIATELY    Lifestyle Adjustment:   1. Adjust your lifestyle to get enough sleep, relaxation, exercise and good nutrition.  Continue to develop healthy coping skills to decrease stress and promote a healthy and sober lifestyle.  2. Abstain from all substances of abuse.  3. Take medications as prescribed.  Please work with your doctor to discuss any concerns you have with your medications or side effects you may be experiencing.  4. Follow up with appointments as scheduled.      Resources:   *[facility] Crisis: COPE: ([phone]) 24 hour mobile crisis support for people having a mental health crisis in [facility].   ADDITIONAL SUPPORTS:  Call or text 046 - [facility] Suicide & Crisis Lifeline - if you feel like you may be in crisis or having thoughts of  suicide.    [employer] [place] [employer] [place] ([place]) provides support groups and educational programs.  Visit www.namimn.org Helpline at 1-136.646.4647 or 509-025-8892 for further information.  [facility] ([place] [employer]) improves the lives of children and adults with mental  illnesses and their families by providing free classes on mental illnesses andsupport groups for adults with mental  illnesses, parents and family members.  For more information:  Phone: 816.599.5522  Toll free: 5-403-HZCA-HELPS  Website: www.namihelps.org    [facility] [place] [facility] provides a gggz-of-oyhi approach to mental health recovery, support and wellness. Calls are  answered by our team of professionally trained Certified Peer Specialists, who have first hand experience living with a  mental health condition.  Open Monday-Saturday, 5pm to 10pm. Call [phone].    You may contact the [facility] [place] [facility] for brief, short-term solution focused therapy support  with  your mental health goals. Call [phone] for more information or to schedule.       General Medication Instructions:   See your medication sheet(s) for instructions.   Take all medicines as directed.  Make no changes unless your doctor suggests them.   Go to all your doctor visits.  Be sure to have all your required lab tests. This way, your medicines can be refilled on time.  Do not use any drugs not prescribed by your doctor.    Advance Directives:   Scanned document on file with [facility]? No scanned doc  Is document scanned? Pt states no documents  Honoring Choices Your Rights Handout: Informed and given  Was more information offered? Materials given    The Treatment team has appreciated the opportunity to work with you. If you have any questions or concerns about your recent admission, you can contact the unit which can receive your call 24 hours a day, 7 days a week. They will be able to get in touch with a Provider if needed. The unit number is [phone]      [facility] will call you to set up a follow up appointment.

## 2024-11-13 NOTE — PHARMACY-ADMISSION MEDICATION HISTORY
Pharmacist Admission Medication History    Admission medication history is complete. The information provided in this note is only as accurate as the sources available at the time of the update.    Information Source(s): Patient, Nyla/Haris, and [facility] clinic  via in-person    Pertinent Information: Confirmed methadone dosing with clinic.     Changes made to PTA medication list:  Added: None  Deleted: Bactrim - prescribed 11/7 but patient couldn't afford it so never picked it up. Patient confirmed that she as not taking any antibiotics before current ED visit.   Changed: methadone 140 mg to 150 mg per patient and [facility] clinic; prednisone clarified this is prn course for asthma exacerbation     Allergies reviewed with patient and updates made in EHR: yes    Medication History Completed By: Jas Ross RPH 11/13/2024 10:42 AM    PTA Med List   Medication Sig Last Dose/Taking    cloNIDine (CATAPRES) 0.1 MG tablet Take 0.1 mg by mouth 2 times daily. 11/11/2024    glucagon 1 MG kit Inject 1 mg Subcutaneous once as needed for low blood sugar (Use as directed by provider) Unknown    glucose (BD GLUCOSE) 4 g chewable tablet Take 4 tablets by mouth every 15 minutes as needed for low blood sugar Unknown    glucose 40 % (400 mg/mL) gel 15 g every 15 minutes by mouth as needed for low blood sugar.  Oral gel is preferable for conscious and able to swallow patient. Unknown    losartan (COZAAR) 25 MG tablet Take 1 tablet (25 mg) by mouth daily 11/11/2024    metFORMIN (GLUCOPHAGE) 500 MG tablet Take 2 tablets (1,000 mg) by mouth 2 times daily (with meals) 11/11/2024    methadone (DOLOPHINE-INTENSOL) 10 MG/ML (HIGH CONC) solution Take 150 mg by mouth daily. 11/12/2024 Morning    OLANZapine (ZYPREXA) 10 MG tablet Take 1 tablet (10 mg) by mouth at bedtime 11/11/2024    OLANZapine zydis (ZYPREXA) 5 MG ODT Take 1 tablet (5 mg) by mouth as needed (nausea). Unknown    predniSONE (DELTASONE) 20  MG tablet Take by mouth as needed (asthma exacerbation, see instructions for details). Take two tablets (= 40mg) each day for 5 (five) days if in asthma exacerbation. Unknown    prochlorperazine (COMPAZINE) 25 MG suppository Place 1 suppository (25 mg) rectally every 12 hours as needed for nausea or vomiting Unknown

## 2024-11-13 NOTE — PHARMACY-CONSULT NOTE
Opioid Treatment Program (Methadone Clinic) Information Note      Treatment program name: [place]   Location ([facility]): [facility]   Phone number: [phone]                 Spoke with: Katlyn     Patient's current methadone dose verified as: 150 mg PO daily   Last dose was administered on: 11/12/24   Take-home dose information (if applicable): none      Note(s): Treatment programs in [place] dispense methadone using the 10 mg/ml oral concentrate.

## 2024-11-13 NOTE — CONSULTS
"[facility]  Department of Psychiatry  Consultation note    Yun Coyle MRN: [id number]   Age: 48 year old YOB: 1975     History     Chief Complaint   Patient presents with    Wound Infection     Pt reports having abscess drained in R upper arm recently. Pt presents for concerns for infection to the area.      HPI  Yun Coyle is a 48 year old female with history notable for chronic hepatitis and diabetes who presented to the ED on 11/12/24 for evaluation of an abscess on her right arm. After this was addressed and discharge was being discussed, she then brought up that she is having suicidal thoughts and would like to be admitted. Records indicate that she complained that she has agoraphobia, making it impossible for her to seek outpatient care.    Today, Yun reports that she feels down and depressed and continues to feel \"a bit suicidal.\" She said that she has been having suicidal thoughts between 2 to 4 weeks, and cannot identify any triggers. She attempted suicide a couple of years ago via overdose because she was tired of being sick and tired. Currently, her plan for suicide is to overdose on whatever medications she has at home.    She has been feeling depressed for a long time, especially as she has not been able to take her medications in about a year as it has been difficult for her to get refills due to the agoraphobia. Later in the interview, she said that it has only been a month since she stopped taking olanzapine, so she does not appear to be a reliable historian. She has found olanzapine helpful for nausea, and clonidine for anxiety. She would like to restart these.     She denies homicidal thoughts. She reports initial and middle insomnia, with a latency of a couple of hours. Her appetite has decreased. She denies hallucinations, but endorses that she feels like people are out to get her which is why she has been " agoraphobic for years. She reports that she gets so scared when she tries to venture out that she has a panic attack.     She is on maintenance methadone daily, but also uses fentanyl in addition. Fentanyl helps her cope and sleep. UDS is not available as she has not provided a urine sample.    She has been calm and cooperative, and has not required locked seclusion or restraints since she arrived.       Past Medical History  Past Medical History:   Diagnosis Date    Acute pancreatitis 06/29/2017    Anxiety     Cauda equina syndrome (H) 03/29/2013    Cyst of right ovary 11/16/2017    Depressive disorder     Gastroesophageal reflux disease     Hypertension     Hyponatremia 10/01/2023    Low back pain 08/20/2012    Overview:   History of cauda equina and surgery.      Type 2 diabetes mellitus (H)     Uncomplicated asthma      Past Surgical History:   Procedure Laterality Date    BACK SURGERY      x2    CHOLECYSTECTOMY      ORTHOPEDIC SURGERY      SALPINGO OOPHORECTOMY,R/L/ELDA Right      acetaminophen (TYLENOL) 500 MG tablet  cephALEXin (KEFLEX) 500 MG capsule  cloNIDine (CATAPRES) 0.1 MG tablet  glucagon 1 MG kit  glucose (BD GLUCOSE) 4 g chewable tablet  glucose 40 % (400 mg/mL) gel  losartan (COZAAR) 25 MG tablet  metFORMIN (GLUCOPHAGE) 500 MG tablet  methadone (DOLOPHINE-INTENSOL) 10 MG/ML (HIGH CONC) solution  OLANZapine (ZYPREXA) 10 MG tablet  OLANZapine zydis (ZYPREXA) 5 MG ODT  oxyCODONE (ROXICODONE) 5 MG tablet  predniSONE (DELTASONE) 20 MG tablet  prochlorperazine (COMPAZINE) 25 MG suppository      No Known Allergies  Family History  Family History   Problem Relation Age of Onset    Breast Cancer Mother         in her 50s.    Diabetes Mother     Breast Cancer Maternal Grandmother         in her 40s.    Diabetes Maternal Grandmother      Social History   Social History     Tobacco Use    Smoking status: Never    Smokeless tobacco: Never   Vaping Use    Vaping status: Never Used   Substance Use Topics     "Alcohol use: No    Drug use: Not Currently     Types: Marijuana     Comment: pt was taking Oxycontin \"years ago.\" pt is now on Methadone.      Past medical history, past surgical history, medications, allergies, family history, and social history were reviewed with the patient. No additional pertinent items.       Review of Systems  A medically appropriate review of systems was performed with pertinent positives and negatives noted in the HPI, and all other systems negative.    Physical Examination   BP: 114/82  Pulse: 72  Temp: 98  F (36.7  C)  Resp: 16  Weight: 89.3 kg (196 lb 13.9 oz)  SpO2: 97 %    Physical Exam  General: Appears stated age.   Neuro: Alert and fully oriented. Extremities appear to demonstrate normal strength on visual inspection.   Integumentary/Skin: no rash visualized, normal color    Psychiatric Examination   Appearance: awake, alert, adequately groomed, and casually dressed   Attitude:  cooperative  Eye Contact:  good  Mood:   \"pretty crappy physically, pretty down, depressed\"  Affect:  mood congruent and intensity is blunted  Speech:  clear, coherent  Psychomotor Behavior:  no evidence of tardive dyskinesia, dystonia, or tics  Thought Process:  linear and goal oriented  Associations:  no loose associations  Thought Content:  active suicidal ideation present and plan for suicide present  Insight:  partial  Judgement:  poor  Oriented to:  time, person, and place  Attention Span and Concentration:  fair  Recent and Remote Memory:  fair  Language: able to name/identify objects without impairment  Fund of Knowledge: intact with awareness of current and past events    ED Course        Labs Ordered and Resulted from Time of ED Arrival to Time of ED Departure   BASIC METABOLIC PANEL - Abnormal       Result Value    Sodium 131 (*)     Potassium 4.6      Chloride 96 (*)     Carbon Dioxide (CO2) 26      Anion Gap 9      Urea Nitrogen 15.6      Creatinine 0.69      GFR Estimate >90      Calcium 9.4   "    Glucose 381 (*)    CRP INFLAMMATION - Abnormal    CRP Inflammation 8.20 (*)    ERYTHROCYTE SEDIMENTATION RATE AUTO - Abnormal    Erythrocyte Sedimentation Rate 36 (*)    HCG QUALITATIVE URINE POCT - Normal    HCG Qual Urine Negative      Internal QC Check POCT Valid      POCT Kit Lot Number [id number]      POCT Kit Expiration Date 3/26/2026     CBC WITH PLATELETS AND DIFFERENTIAL    WBC Count 7.6      RBC Count 4.45      Hemoglobin 13.2      Hematocrit 37.7      MCV 85      MCH 29.7      MCHC 35.0      RDW 11.8      Platelet Count 286      % Neutrophils 63      % Lymphocytes 27      % Monocytes 6      % Eosinophils 3      % Basophils 0      % Immature Granulocytes 1      NRBCs per 100 WBC 0      Absolute Neutrophils 4.8      Absolute Lymphocytes 2.1      Absolute Monocytes 0.4      Absolute Eosinophils 0.2      Absolute Basophils 0.0      Absolute Immature Granulocytes 0.1      Absolute NRBCs 0.0         Assessments & Plan (with Medical Decision Making)   Patient presenting with suicidal ideation that has been going on between 2-4 weeks. She has been feeling depressed and anxious for a long time, and discontinuing medications for the past year is likely contributory. She cites agoraphobia as the reason behind her not being able to follow up with outpatient services and  medication refills. She is also experiencing some financial constraints. She would like to be admitted to get restarted on medications and to help her stay safe. She has a plan to overdose if discharged.     Nursing notes reviewed noting no acute issues.     I have reviewed the assessment completed by the [facility].     Preliminary diagnosis:    ICD-10-CM    1. Abscess of arm, right  L02.413       2. Suicidal ideation  R45.851    3.      Major depressive disorder, recurrent   4.      Panic disorder with agoraphobia  5.      Opioid use disorder, on methadone and fentanyl  6.      Unspecified personality disorder      Treatment Plan:  - she is on the  list for inpatient admission  - start the following medications:   - clonidine (Catapres) 0.1 mg BID for anxiety   - olanzapine (Zyprexa) 10 mg Q HS for sleep   - methadone 150 mg daily for OUD  - continue to consult psychiatry as needed while boarding in the ED    --  BENNY Carvajal CNP   [facility] EMERGENCY DEPARTMENT

## 2024-11-13 NOTE — TELEPHONE ENCOUNTER
9:23 AM Gerber/RONEN provided MRN for Pt review.    11:14 AM [facility] declined d/t medical acuity.    1:53 PM Per follow up call to Yuan at [facility] Pt was declined d/t acuity. [facility] updated.    2:22 PM [facility] will review Pt to [facility] [phone]    2:47 PM Fax sent.    R: [place] [facility] Access Inpatient [place] Call Log 11/13/24 8:00 AM    Intake has called facilities that have not updated the bed status within the last 12 hours.                    [facility]         [facility] is at capacity.           [facility] is posting 0 beds. [phone] Per call @8:31am  [facility] is posting 0 beds. Negative covid required.  [facility] is posting 0 beds. Neg covid. No high school/Debbie-psych.   [facility] is posting 0 beds. [phone]  [facility] is posting 0 beds. [phone]   [facility] is posting 0 beds. Negative covid.   [facility] is posting 0 beds [phone].    [facility] is posting 3 beds. LOW acuity. Ages 12+. Neg covid- [phone] 11/13 PT DECLINED D/T ACUITY.  [facility] has 1 bed posted. No aggression. Negative Covid. Low acuity.  PER  CALL AT CAPACITY UNTIL 10/14  [facility]) is posting 0 beds. Low acuity only. Neg covid.  [phone]   [facility] is posting 2 beds. Low acuity. No current aggression.  PER  CALL AT CAPACITY UNTIL 10/14  [facility] is posting 0 beds. Negative covid. [phone]   [facility] is posting 0 beds available. Negative covid.  [phone].      [facility] is posting 1 bed. Low acuity. 72 HH hold preferred. Debbie unit. Negative covid required. [phone]. [facility] FOR CALL BACK AT 2:19 PM  [facility] ([facility]) is posting 0 beds. Low acuity only. Neg covid.  [phone]   [facility] is posting 3 beds.  Negative covid required.   Vol only, No history of aggression, violence, or assault. No sexual offenders. No 72 HH  holds. [phone]     [facility] is posting 1 bed. Negative covid required.  (Must have the cognitive ability to do programming. No aggressive or violent behavior or recent HX in the last 2 yrs. MH must be primary.) Always low acuity.   [facility] has 3 beds posted. Negative covid required.  Low acuity only. Violence and aggression capped.  [phone] Per call @8:18am, call CRN around 9:30am for bed availability.  [facility] is posting 0 beds. Low acuity, Negative covid required. [phone]   [facility] posting 4 beds. Negative covid required.  [phone] 11/13 PT DECLINED D/T MEDICAL ACUITY.  [facility] is posting 5 beds. Negative covid. LOW acuity. (No lines, drains, or tubes, oxygen, CPAP, IV, etc.) Must Have a Ride Home. [phone]   [facility] is posting 2 beds. Negative covid. (No. lines, drains, or tubes, oxygen, CPAP, IV, etc.) [phone] PER CALL AT 11:53 AM AT [facility].  [facility] is posting 20 beds. No covid test required. [phone] Per call @7:59am    Pt remains on the work list pending appropriate bed availability.

## 2024-11-13 NOTE — ED PROVIDER NOTES
Patient evaluated by general surgery here in the department, please refer to the note for further details.  Incision and drainage performed with plan for discharge with antibiotics and pain control.  However upon completion of discharge patient reports that she has no money for any of her prescriptions.  Social work consult was placed and she was encouraged to return in the morning to discuss possible free care medications, however at that point she reports no suicidal ideation.  Discharge undone and behavioral health crisis assessment ordered and pending at time of dictation.     Jacey Gonzalez MD  11/12/24 [id number]

## 2024-11-13 NOTE — TELEPHONE ENCOUNTER
1:30 AM Intake called [facility]. Per Belinda, they are capped.     1:32 AM Intake called [facility]. Per Gina, they can review.               R: [place]  Access Inpatient [place] Call [place]  11/13/2024 12:31 AM  Intake has called facilities that have not updated their bed status within the last 12 hours.??      ADULTS:     *[facility] -- [facility]: @ Cap per website.  [place] -- [facility]:  @ Cap per website.    [place] -- [facility]: @ Cap per website.   [place] -- [facility]: @ Cap per website.  -12:35 AM Per Stephy, they are capped.  [place] -- [facility]: @ Cap per website.  [facility] -- [facility]: @ Cap per website.    [place] -- [facility] beds @ Posting 1 beds. Ages 18-35, Voluntary only, COVID test req'd, NO aggression, physical or sexual assault, violence hx or drug abuse, or psychosis - 12:31 AM Per [place]: YA: 0, Adol: 0, child: 1 (low acuity).   [place] -- [facility]: @ Cap per website.  [place] -- [facility]: @ cap per website.  [place] -- [facility]:  @ Cap per website.      *[facility]: @ Posting 3 beds. Mixed unit/Low acuity only.   [facility]: @ Posting 1 bed. Low acuity, No aggression    [facility]: @ cap per website.  [facility]:  @ Posting 2 bed. Low acuity only. No current aggression.  [facility]:  @ cap per website. COVID negative test req. Lower acuity only. No Aggression.   [facility]: @ Cap per website. Low acuity only. Prefer med adjustments placement. No aggression   [facility]:  @ Cap per website. COVID negative test req. Lower acuity only. No Aggression.   [place] -- [facility]: @ Cap per website. NO reviews after 10PM. Low acuity only.    [place] -- [facility]: @ Cap per website. No hx of aggression. No sexual offenders. Voluntary patients only   [place] -- [facility]:  @ Posting 1 bed. Low acuity only. Must have the cognitive ability to do programming. No aggressive or violent behavior  or recent HX in the last 2 yrs. MH must be primary.    [place] -- [facility]: @ Cap per website. COVID negative test. Must be low acuity ONLY. - 12:38 AM Per German, they have low acuity beds avail.   [place] -- [facility]: @ Cap per website.  Low acuity. Negative Covid. -12:40 AM Per Flor, they have a low acuity M bed.   [place] -- [facility] [facility]: @Posting 2 beds. Negative COVID test   [facility]: @ Posting 5 beds. No hx of aggression/assault. No lines, drains or tubes. Does not provide detox or CD treatment.   [place] -- [facility]: @ Posting 2 beds. Mixed unit/Low acuity/no medical devices - IV, CPAP etc. Negative Covid.).                                                                                                                                  * [place], [place] -- [facility]: Posting 12 beds. Out-of-State Facility.      Pt remains on waitlist pending appropriate placement availability.

## 2024-11-13 NOTE — TELEPHONE ENCOUNTER
07:05 - Laure from [facility] called: Pt is still under review - case will be discussed during their team meeting around 9AM.     Awaiting callback from [facility]

## 2024-11-13 NOTE — ED PROVIDER NOTES
Emergency Department I-PASS Sign-out      Illness Severity: Stable    Patient Summary:  48 year old female with pertinent PMH of hepatitis C and DM II, health issues who presented with initial concern for abscess which is definitively treated by general surgery, however prior to discharge patient reports she has suicidal ideation.    ED Course/treatment plan: Behavioral crisis assessment performed, recommendation for inpatient admission.    Clinical Impression:  (L02.413) Abscess of arm, right    (R45.851) Suicidal ideation      Edited by: Jacey Gonzalez MD at [phone]    Action List:  Tests to Follow-up:  Medical reconciliation by pharmacy, including confirmation of methadone dose in the morning    Medications Reconciled/Ordered:  No    ED Mental Health Boarding Order Set Used for Diet/PRNs/Other:  Yes    DEC, Extended Care, Psych Consult Orders:  Extended Care and Psychiatry consult ordered.    Situational Awareness & Contingency Plannin Hour Hold Status:  Voluntary, would reassess if wanting to leave.  Active Orders  N/A    Disposition:  Admit/Transfer to Behavioral Health Boarding subsequent shift/day updates:  Patient had an incision and drainage procedure done at the [facility], was deemed appropriate for discharge.  Subsequently complained of suicidal ideation and so was placed on the inpatient mental health waiting list and is now awaiting bed placement.    Edited by: Jacey Gonzalez MD at [phone]    [employer] after sign-out:  In by extended care.  Medications being adjusted by psychiatry consult service.  Continue with plan for inpatient admission        Abraham Joy MD   Emergency Medicine     Abraham Joy MD  [phone]

## 2024-11-13 NOTE — PLAN OF CARE
"Yun Coyle  November 13, 2024  Plan of Care Hand-off Note     Patient Care Path: inpatient mental health    Plan for Care:   Patient has lengthy PMH to include polysubstance dependence, suicide attempts, personality disorder, OCD, panic disorder, medication noncompliance, CPS involvement and MDD.  Patient has agoraphobia and reports she rarely leaves her apartment making OP services difficult. She has a long hx of medication intolerance and noncompliance.  The offer of inpatient services met with patient stating she is not safe to return home and has been thinking of \"best way\" end life.      She uses cannabis daily and last used Fentanyl last week.  She says she has no money for medications but says she is getting regular methadone treatments and is buying weed regularly.  She expresses interest in CD treament but says she is in need of IP treatment at this time to stay safe.  She has been admitted for medical procedures a number of times.  She has at least one prior mental health admission as a adolescent after a suicide attempt by OD as an adolescent.  She says she was inpatient for 2 months.  [facility]  While she says non of her CD treatments were outpatient, she did appear to be talking with CD services outpatient at one time per chart review.  Many options were discussed but patient insists she is not safe to return home. With past suicide attempts charted, personality disorder, comorbid active CD use, patient is referred to IP.      Identified Goals and Safety Issues: stabilize patient; assess CD tx needs, treat patient symptoms; patient safety    Overview:  Samanta Coyle  Daughter [phone] -- no answer at time of assessment  Marilu Coyle  Mother  [phone] (not in contact, lives in [place])      Legal Status: Legal Status at Admission: Voluntary/Patient has signed consent for treatment    Psychiatry Consult: yes, if available and patient remains in ED     Updated " regarding plan of care.  Attending physician     MARELY CastorenaSW

## 2024-11-13 NOTE — PROCEDURES
"[facility]    PROCEDURE: Incision and drainage    Date/Time: 11/12/2024 7:16 PM    Performed by: Karson Hollins MD  Authorized by: Karson Hollins MD    Risks, benefits and alternatives discussed.      LOCATION:      Type:  Abscess    Size:  4 cm    Location:  Upper extremity    Upper extremity location:  Arm    Arm location:  R upper arm    PRE-PROCEDURE DETAILS:     Skin preparation:  Chloraprep    PROCEDURE TYPE:     Complexity:  Simple    ANESTHESIA (see MAR for exact dosages):     Anesthesia method:  Local infiltration    Local anesthetic:  Lidocaine 1% WITH epi    PROCEDURE DETAILS:     Needle aspiration: no      Incision types:  Single straight    Incision depth:  Subcutaneous    Scalpel blade:  11    Wound management:  Probed and deloculated and irrigated with saline    Drainage:  Bloody and purulent    Drainage amount:  Moderate    Wound treatment:  Wound left open    Packing materials:  1/4 in gauze    Amount 1/4\":  One piece    PROCEDURE    Patient Tolerance:  Patient tolerated the procedure well with no immediate complications        "

## 2024-11-13 NOTE — TELEPHONE ENCOUNTER
S: [place] DEC  Katlyn  calling at 1:02 AM about a 48 year old/Female presenting with SI.     B: Pt arrived via EMS. Presenting problem, stressors: Pt is presenting for a wound check and it was drained. While RN was discussing with the pt, pt endorsed SI with intent. Triggers/Stressors: MH Concerns.     Pt affect in ED: Constricted  Pt Dx: Major Depressive Disorder, Generalized Anxiety Disorder, PTSD, Borderline Personality Disorder, OCD, Substance Use Disorder: Benzo, cannabis, opioids, and agoraphobia  Previous IPMH hx? Yes: Unsure  Pt endorses SI, no plan   Hx of suicide attempt? Yes: 2 in the past  Pt denies SIB  Pt denies HI   Pt denies hallucinations .   Pt RARS Score: 3    Hx of aggression/violence, sexual offenses, legal concerns, Epic care plan? describe: Yes: CPS involvement and CD treatment court ordered in the past  Current concerns for aggression this visit? No  Does pt have a history of Civil Commitment? No  Is Pt their own guardian? Yes    Pt is prescribed medication. Is patient medication compliant? No  Pt denies OP services   CD concerns: Actively using/consuming Benzo, opioids, cannabis  Acute or chronic medical concerns: Open wound ( mid-right-upper arm), diabetes type 2, - Medically cleared  Does Pt present with specific needs, assistive devices, or exclusionary criteria? None      Pt is ambulatory  Pt is able to perform ADLs independently      A: Pt to be reviewed for [facility] admission. Pt is Voluntary  Preferred placement: Statewide    COVID Symptoms: No  If yes, COVID test required   Utox: Ordered, not yet collected   CMP: Abnormalities: Sodium: 131, Glucose: 381  CBC: WNL  HCG: Negative    R: Patient cleared and ready for behavioral bed placement: Yes  Pt placed on [facility] worklist? Yes    Does Patient need a Transfer Center request created? No, Pt is located within [facility] ED, [facility] ED, or [place] ED

## 2024-11-13 NOTE — ED NOTES
Bed: ED08  Expected date:   Expected time:   Means of arrival:   Comments:  Returning from [place]

## 2024-11-13 NOTE — TELEPHONE ENCOUNTER
11:15 PM Per follow up call to [facility] declined d/t Pt not wanting IP and would need to be VOL. WL updated.    R:   [place]  Access Inpatient Bed Call Log 11/13/2024  @3:50 PM:   Intake has called facilities that have not updated the bed status within the last 12 hours.          [facility]                  [facility] is posting 0 beds.              [facility] is posting 3 beds. [phone]: Per call 3:49 PM at capacity   [facility] is posting 0 beds. Negative covid required.    [facility] is posting 0 beds. Neg covid. No high school/Debbie-psych. [phone] 3:50 PM per Laura, at capacity.   [facility] is posting 0 beds. [phone]    [facility] is posting 0 bed. [phone]     [facility] is posting 0 beds. (Ages 18-35) Negative covid. [phone]   [facility] is posting 0 beds.     [facility] is posting 0 beds [phone]         [facility] is posting 3 beds. LOW acuity ONLY. Mixed unit 12+. Negative covid- [phone] 11/13 PT DECLINED D/T ACUITY.   [facility] has 1 bed posted. No aggression. Negative Covid. Low acuity.  PER CALL AT CAP UNTIL 11/14 AFTER 10 AM.  [facility] ([place]) is posting 0 beds. Low acuity only. Neg covid.  [phone]     [facility] is posting 2 beds. Low acuity. No current aggression.    PER CALL AT CAP UNTIL 11/14 AFTER 10 AM.  [facility] is posting 0 beds. Negative covid. [phone].     [facility] ([place]) is posting 9 beds available. Negative covid.  [phone].    [facility] is posting 0 beds. Low acuity. 72 HH hold preferred. Negative covid required. [phone].    [facility] ([place] is posting 1 bed. Low acuity only. Neg covid.  [phone].      [facility] [place] is posting 8 beds.  Negative covid required.   Vol only, No history of aggression, violence, or assault. No sexual offenders. No 72  holds.  [phone]   11/13 DECLINED PT        [facility] is posting 1 beds. Negative covid required.  (Must have the cognitive ability to do programming. No aggressive or violent behavior or recent HX in the last 2 yrs. MH must be primary.) Always low acuity. [phone]     [facility] has 3 beds posted. Negative covid required.  Low acuity only. Violence and aggression capped.  [phone]   [facility] is posting 2 beds. Low acuity, Negative covid required. [phone].  Per Carlota, low acuity only.   [facility] posting 4 beds Negative covid required.  [phone].  11/13 PT DECLINED D/T MEDICAL ACUITY.  [facility] is posting 5 beds. Negative covid. LOW acuity. (No lines, drains, or tubes, oxygen, CPAP, IV, etc.) Must Have a Ride Home. [phone]    [facility] is posting 2 beds. Negative covid. (No. lines, drains, or tubes, oxygen, CPAP, IV, etc.) [phone]    [facility] is posting 12 beds. No covid test required. OUT OF STATE. [phone]        Pt remains on the work list pending appropriate bed availability.

## 2024-11-13 NOTE — ED PROVIDER NOTES
Emergency Department I-PASS Sign-out      Illness Severity: Stable    Patient Summary:  48 year old female with pertinent PMH of hepatitis C and DM II, health issues who presented with initial concern for abscess which is definitively treated by general surgery, however prior to discharge patient reports she has suicidal ideation.    ED Course/treatment plan: Behavioral crisis assessment performed, recommendation for inpatient admission.    Clinical Impression:  (L02.413) Abscess of arm, right    (R45.851) Suicidal ideation      Edited by: Jacey Gonzalez MD at 2024 3498    Action List:  Tests to Follow-up:  Medical reconciliation by pharmacy, including confirmation of methadone dose in the morning    Medications Reconciled/Ordered:  No    ED Mental Health Boarding Order Set Used for Diet/PRNs/Other:  Yes    DEC, Extended Care, Psych Consult Orders:  Extended Care and Psychiatry consult ordered.    Situational Awareness & Contingency Plannin Hour Hold Status:  Voluntary, would reassess if wanting to leave.  Active Orders  N/A    Disposition:  Admit/Transfer to Behavioral Health    Boarding subsequent shift/day updates:  No updates or changes reported at shift change.    Edited by: Jacey Gonzalez MD at 2024 6849    Synthesis & Events after sign-out:  Restarted medications.  No other changes.        Abraham Joy MD   Emergency Medicine     Abraham Jyo MD  [phone]
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
## 2024-11-14 ENCOUNTER — TELEPHONE (OUTPATIENT)
Dept: BEHAVIORAL HEALTH | Facility: CLINIC | Age: 49
End: 2024-11-14
Payer: COMMERCIAL

## 2024-11-14 LAB
AMPHETAMINES UR QL SCN: ABNORMAL
ANION GAP SERPL CALCULATED.3IONS-SCNC: 16 MMOL/L (ref 7–15)
BARBITURATES UR QL SCN: ABNORMAL
BENZODIAZ UR QL SCN: ABNORMAL
BUN SERPL-MCNC: 17.6 MG/DL (ref 6–20)
BZE UR QL SCN: ABNORMAL
CALCIUM SERPL-MCNC: 9.3 MG/DL (ref 8.8–10.4)
CANNABINOIDS UR QL SCN: ABNORMAL
CHLORIDE SERPL-SCNC: 95 MMOL/L (ref 98–107)
CREAT SERPL-MCNC: 0.9 MG/DL (ref 0.51–0.95)
CREAT SERPL-MCNC: 0.92 MG/DL (ref 0.51–0.95)
EGFRCR SERPLBLD CKD-EPI 2021: 77 ML/MIN/1.73M2
EGFRCR SERPLBLD CKD-EPI 2021: 79 ML/MIN/1.73M2
FENTANYL UR QL: ABNORMAL
GLUCOSE SERPL-MCNC: 341 MG/DL (ref 70–99)
HCG UR QL: NEGATIVE
HCO3 SERPL-SCNC: 20 MMOL/L (ref 22–29)
OPIATES UR QL SCN: ABNORMAL
PCP QUAL URINE (ROCHE): ABNORMAL
POTASSIUM SERPL-SCNC: 5.2 MMOL/L (ref 3.4–5.3)
SODIUM SERPL-SCNC: 131 MMOL/L (ref 135–145)
VANCOMYCIN SERPL-MCNC: 14.9 UG/ML

## 2024-11-14 PROCEDURE — 250N000013 HC RX MED GY IP 250 OP 250 PS 637: Performed by: EMERGENCY MEDICINE

## 2024-11-14 PROCEDURE — 36415 COLL VENOUS BLD VENIPUNCTURE: CPT | Performed by: FAMILY MEDICINE

## 2024-11-14 PROCEDURE — 80202 ASSAY OF VANCOMYCIN: CPT | Performed by: FAMILY MEDICINE

## 2024-11-14 PROCEDURE — 87522 HEPATITIS C REVRS TRNSCRPJ: CPT

## 2024-11-14 PROCEDURE — 250N000011 HC RX IP 250 OP 636: Performed by: EMERGENCY MEDICINE

## 2024-11-14 PROCEDURE — 96366 THER/PROPH/DIAG IV INF ADDON: CPT | Performed by: EMERGENCY MEDICINE

## 2024-11-14 PROCEDURE — 86803 HEPATITIS C AB TEST: CPT

## 2024-11-14 PROCEDURE — 80048 BASIC METABOLIC PNL TOTAL CA: CPT | Performed by: EMERGENCY MEDICINE

## 2024-11-14 PROCEDURE — 82565 ASSAY OF CREATININE: CPT | Performed by: FAMILY MEDICINE

## 2024-11-14 PROCEDURE — 250N000013 HC RX MED GY IP 250 OP 250 PS 637: Performed by: FAMILY MEDICINE

## 2024-11-14 PROCEDURE — 81025 URINE PREGNANCY TEST: CPT | Performed by: EMERGENCY MEDICINE

## 2024-11-14 PROCEDURE — 87081 CULTURE SCREEN ONLY: CPT | Performed by: EMERGENCY MEDICINE

## 2024-11-14 PROCEDURE — 99233 SBSQ HOSP IP/OBS HIGH 50: CPT | Performed by: PSYCHIATRY & NEUROLOGY

## 2024-11-14 PROCEDURE — 250N000013 HC RX MED GY IP 250 OP 250 PS 637: Performed by: CLINICAL NURSE SPECIALIST

## 2024-11-14 PROCEDURE — 36415 COLL VENOUS BLD VENIPUNCTURE: CPT

## 2024-11-14 PROCEDURE — 80307 DRUG TEST PRSMV CHEM ANLYZR: CPT | Performed by: EMERGENCY MEDICINE

## 2024-11-14 RX ADMIN — OXYCODONE HYDROCHLORIDE 5 MG: 5 TABLET ORAL at 17:45

## 2024-11-14 RX ADMIN — OXYCODONE HYDROCHLORIDE 5 MG: 5 TABLET ORAL at 09:52

## 2024-11-14 RX ADMIN — CLONIDINE HYDROCHLORIDE 0.1 MG: 0.1 TABLET ORAL at 20:15

## 2024-11-14 RX ADMIN — SULFAMETHOXAZOLE AND TRIMETHOPRIM 1 TABLET: 800; 160 TABLET ORAL at 09:50

## 2024-11-14 RX ADMIN — Medication 1250 MG: at 18:26

## 2024-11-14 RX ADMIN — Medication 1250 MG: at 06:24

## 2024-11-14 RX ADMIN — METHADONE HYDROCHLORIDE 150 MG: 10 CONCENTRATE ORAL at 11:58

## 2024-11-14 RX ADMIN — METFORMIN HYDROCHLORIDE 1000 MG: 500 TABLET, FILM COATED ORAL at 09:51

## 2024-11-14 RX ADMIN — LOSARTAN POTASSIUM 25 MG: 25 TABLET, FILM COATED ORAL at 09:49

## 2024-11-14 RX ADMIN — CLONIDINE HYDROCHLORIDE 0.1 MG: 0.1 TABLET ORAL at 09:50

## 2024-11-14 RX ADMIN — METFORMIN HYDROCHLORIDE 1000 MG: 500 TABLET, FILM COATED ORAL at 17:45

## 2024-11-14 RX ADMIN — SULFAMETHOXAZOLE AND TRIMETHOPRIM 1 TABLET: 800; 160 TABLET ORAL at 20:15

## 2024-11-14 RX ADMIN — OLANZAPINE 10 MG: 10 TABLET, FILM COATED ORAL at 20:21

## 2024-11-14 ASSESSMENT — COLUMBIA-SUICIDE SEVERITY RATING SCALE - C-SSRS
5. HAVE YOU STARTED TO WORK OUT OR WORKED OUT THE DETAILS OF HOW TO KILL YOURSELF? DO YOU INTEND TO CARRY OUT THIS PLAN?: NO
2. HAVE YOU ACTUALLY HAD ANY THOUGHTS OF KILLING YOURSELF?: OD
1. SINCE LAST CONTACT, HAVE YOU WISHED YOU WERE DEAD OR WISHED YOU COULD GO TO SLEEP AND NOT WAKE UP?: NO
2. HAVE YOU ACTUALLY HAD ANY THOUGHTS OF KILLING YOURSELF?: YES

## 2024-11-14 NOTE — TELEPHONE ENCOUNTER
1:45 PM Intake called st 30 and provided MRN to Charge, lazara, for review.     2:24 PM Intake called st 30. Per charge: need to know if pt has MRSA, new sodium, and new glucose. Does she require insulin?     2:39 PM Intake called [place] ed and spoke with Dr. Ayers about collecting a new glucose, sodium, and testing for MRSA. Per Armen, her high sodium is a reflection of high glucose, pt is not in DKA. Per Armen, pt is medically cleared and pt could benefit form a medical consult on unit once accepted. Pt is receiving her diabetic medications in the ED; metformin.     4:26 PM Intake called st 30 and spoke with CRLESLIE. Charge will review and call intake back.     4:40 PM Intake called st 30 and spoke with Diann HARDWICK. Intake received report that the pt is declined due to high glucose level and needing an MRSA swab on open wound. Pt is not medically stable.    6:28 PM Intake called st 32 and provided MRN to charge for review.     8:17 Pm intake received a call from  32 Sandy HARDWICK, declining this pt due to medical acuity.           R: [place]  Access Inpatient Bed Call Log 11/14/24 @ 7:03 am:    Intake has called facilities that have not updated the bed status within the last 12 hours.                               [facility] is at capacity.            [facility] is posting 0 beds. [phone] Per call at 7:04 am to Carine, they are at cap.    [facility] is posting 0 beds. Negative covid required.   [facility] is posting 0 beds. Neg covid. No high school/Debbie-psych.    [facility] is posting 0 beds. [phone]   [facility] is posting 0 beds. [phone]    [facility] is posting 0 beds. Negative covid. Per call to Flower at 7:06 am, they have a few y/a beds avail.    [facility] is posting 0 beds [phone].       [facility] is posting 3 beds. LOW acuity. Ages 12+. Neg covid- [phone]; per call at 7:09 am, they have beds avail, specific number unknown.   -Declined due to acuity  [facility] has 0 beds posted. No aggression. Negative Covid. Low acuity.   Health system ([place]) is posting 0 beds. Low acuity only. Neg covid.  [phone]  -Declined due to methadone  [facility] is posting 2 beds. Low acuity. No current aggression.     [facility] is posting 0 beds. Negative covid. [phone]    Health system ([place]) is posting 5 beds available. Negative covid.  [phone].     -Declined due to methadone  [facility] is posting 0 beds. Low acuity. 72 HH hold preferred. Debbie unit. Negative covid required. [phone]; per call at 7:27 am to Keron, they have 3 beds avail.    Health system ([place] is posting 1 bed. Low acuity only. Neg covid.  [phone]  -Declined due to methadone  [facility] in [place] is posting 6 beds.  Negative covid required.   Vol only, No history of aggression, violence, or assault. No sexual offenders. No 72 HH holds. [phone] -Declined due to pt not wanting placement here.        [facility] is posting 3 beds. Negative covid required.  (Must have the cognitive ability to do programming. No aggressive or violent behavior or recent HX in the last 2 yrs. MH must be primary.) Always low acuity.  -Declined due to acuity and CD as primaru.   [facility] has 4 beds posted. Negative covid required.  Low acuity only. Violence and aggression capped.  [phone]; per call at 7:30 am to Rickey, they are at cap.    [facility] is posting 2 beds. Low acuity, Negative covid required. [phone]; per call at 7:32 am to Carmelina, they have beds avail, specific number unknown.    [facility] posting 4 beds. Negative covid required.  [phone] per Catherine's call to CRISTAL Dee around 6 am, they have 3 step-down/low acuity beds avail.  -Declined due to medical acuity  [facility]. Negative  covid. LOW acuity. (No lines, drains, or tubes, oxygen, CPAP, IV, etc.) Must Have a Ride Home. [phone]; per call at 7:36 am to Ning, they have 4 beds avail.    [facility] is posting 2 beds. Negative covid. (No. lines, drains, or tubes, oxygen, CPAP, IV, etc.) [phone] per call at 7:37 am to Shell, they have 4 beds avail, no high acuity avail.          Pt remains on the work list pending appropriate bed availability.
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
## 2024-11-14 NOTE — ED PROVIDER NOTES
Emergency Department I-PASS Sign-out      Illness Severity: Stable    Patient Summary:  48 year old female with pertinent PMH of hepatitis C and DM II, health issues who presented with initial concern for abscess which is definitively treated by general surgery, however prior to discharge patient reports she has suicidal ideation.    ED Course/treatment plan: Behavioral crisis assessment performed, recommendation for inpatient admission. Will reassess today with Psychiatry because extended care feels she can be discharged today. On ABX for wound care and is restricted upon discharge for medications when it comes time to discharge.     Clinical Impression:  (L02.413) Abscess of arm, right  (R45.851) Suicidal ideation      Action List:  Tests to Follow-up:  Medical reconciliation by pharmacy, including confirmation of methadone dose in the morning    Medications Reconciled/Ordered:  No    ED Mental Health Boarding Order Set Used for Diet/PRNs/Other:  Yes    DEC, Extended Care, Psych Consult Orders:  Extended Care and Psychiatry consult ordered.    Situational Awareness & Contingency Plannin Hour Hold Status:  Voluntary, would reassess if wanting to leave.  Active Orders  N/A    Disposition:  Admit/Transfer to Behavioral Health    Boarding subsequent shift/day updates:  No updates or changes reported at shift change.      Synthesis & Events after sign-out:  Restarted medications.  No other changes.        Mark Ayers MD

## 2024-11-14 NOTE — CONSULTS
[facility] ED  Department of Psychiatry  Consultation note    Yun Coyle MRN: [id number]   Age: 48 year old YOB: 1975     History     Chief Complaint   Patient presents with    Wound Infection     Pt reports having abscess drained in R upper arm recently. Pt presents for concerns for infection to the area.      HPI  Yun Coyle is a 48 year old female here with SI and plan to overdose. She came into the ED on 11/12 for a wound infection. On 11/14 patient reported SI prior to discharge. Seen 11/13 by APRN CNP and inpatient was recommended, medications were started (clonidine 0.1 mg BID, olanzapine 10 mg at bedtime, methadone was continued).  Today she reports SI as unchanged with plan to overdose. She says she has been increasingly depressed for several weeks. Discussed potential crisis bed with her, she reports that she feels that she cannot be safe in that setting at this time.    Past Medical History  Past Medical History:   Diagnosis Date    Acute pancreatitis 06/29/2017    Anxiety     Cauda equina syndrome (H) 03/29/2013    Cyst of right ovary 11/16/2017    Depressive disorder     Gastroesophageal reflux disease     Hypertension     Hyponatremia 10/01/2023    Low back pain 08/20/2012    Overview:   History of cauda equina and surgery.      Type 2 diabetes mellitus (H)     Uncomplicated asthma      Past Surgical History:   Procedure Laterality Date    BACK SURGERY      x2    CHOLECYSTECTOMY      ORTHOPEDIC SURGERY      SALPINGO OOPHORECTOMY,R/L/ELDA Right      acetaminophen (TYLENOL) 500 MG tablet  cephALEXin (KEFLEX) 500 MG capsule  cloNIDine (CATAPRES) 0.1 MG tablet  glucagon 1 MG kit  glucose (BD GLUCOSE) 4 g chewable tablet  glucose 40 % (400 mg/mL) gel  losartan (COZAAR) 25 MG tablet  metFORMIN (GLUCOPHAGE) 500 MG tablet  methadone (DOLOPHINE-INTENSOL) 10 MG/ML (HIGH CONC) solution  OLANZapine (ZYPREXA) 10 MG tablet  OLANZapine zydis  "(ZYPREXA) 5 MG ODT  oxyCODONE (ROXICODONE) 5 MG tablet  predniSONE (DELTASONE) 20 MG tablet  prochlorperazine (COMPAZINE) 25 MG suppository      No Known Allergies  Family History  Family History   Problem Relation Age of Onset    Breast Cancer Mother         in her 50s.    Diabetes Mother     Breast Cancer Maternal Grandmother         in her 40s.    Diabetes Maternal Grandmother      Social History   Social History     Tobacco Use    Smoking status: Never    Smokeless tobacco: Never   Vaping Use    Vaping status: Never Used   Substance Use Topics    Alcohol use: No    Drug use: Not Currently     Types: Marijuana     Comment: pt was taking Oxycontin \"years ago.\" pt is now on Methadone.          Review of Systems  A medically appropriate review of systems was performed with pertinent positives and negatives noted in the HPI, and all other systems negative.    Physical Examination   BP: 114/82  Pulse: 72  Temp: 98  F (36.7  C)  Resp: 16  Weight: 89.3 kg (196 lb 13.9 oz)  SpO2: 97 %    Physical Exam  General: Appears stated age.   Neuro: Alert and fully oriented. Extremities appear to demonstrate normal strength on visual inspection.   Integumentary/Skin: no rash visualized, normal color    Psychiatric Examination   Appearance: awake, alert  Attitude:  cooperative  Eye Contact:  intense  Mood:  depressed  Affect:  mood congruent  Speech:  clear, coherent  Psychomotor Behavior:  no evidence of tardive dyskinesia, dystonia, or tics  Thought Process:  logical, linear, and goal oriented  Associations:  no loose associations  Thought Content:   +SI, not expressing HI, not responding to internal stimuli  Insight:  fair  Judgement:  intact  Oriented to:  time, person, and place  Attention Span and Concentration:  intact  Recent and Remote Memory:  intact  Language: able to name/identify objects without impairment  Fund of Knowledge: intact with awareness of current and past events    ED Course        Labs Ordered and Resulted " from Time of ED Arrival to Time of ED Departure   BASIC METABOLIC PANEL - Abnormal       Result Value    Sodium 131 (*)     Potassium 4.6      Chloride 96 (*)     Carbon Dioxide (CO2) 26      Anion Gap 9      Urea Nitrogen 15.6      Creatinine 0.69      GFR Estimate >90      Calcium 9.4      Glucose 381 (*)    CRP INFLAMMATION - Abnormal    CRP Inflammation 8.20 (*)    ERYTHROCYTE SEDIMENTATION RATE AUTO - Abnormal    Erythrocyte Sedimentation Rate 36 (*)    HCG QUALITATIVE URINE POCT - Normal    HCG Qual Urine Negative      Internal QC Check POCT Valid      POCT Kit Lot Number [id number]      POCT Kit Expiration Date 3/26/2026     CBC WITH PLATELETS AND DIFFERENTIAL    WBC Count 7.6      RBC Count 4.45      Hemoglobin 13.2      Hematocrit 37.7      MCV 85      MCH 29.7      MCHC 35.0      RDW 11.8      Platelet Count 286      % Neutrophils 63      % Lymphocytes 27      % Monocytes 6      % Eosinophils 3      % Basophils 0      % Immature Granulocytes 1      NRBCs per 100 WBC 0      Absolute Neutrophils 4.8      Absolute Lymphocytes 2.1      Absolute Monocytes 0.4      Absolute Eosinophils 0.2      Absolute Basophils 0.0      Absolute Immature Granulocytes 0.1      Absolute NRBCs 0.0         Assessments & Plan (with Medical Decision Making)   Patient presenting with SI and plan to overdose. Nursing notes reviewed noting no acute issues. Just started medications as detaiied above. SI remains unchanged.    I have reviewed the assessment completed by the [facility].     Preliminary diagnosis:    ICD-10-CM    1. MDD        2. Suicidal ideation              Treatment Plan:  -Patient remains on the list for an inpatient bed  -Would not recommend any medication changes today as meds just started    --  Lesly Ramos MD   [facility] EMERGENCY DEPARTMENT

## 2024-11-14 NOTE — TELEPHONE ENCOUNTER
02:06 - Called [facility]. Per Javier, pt meets exclusionary d/t methadone    02:24 - Called [facility]. They are able to review. Faxed clinical @ 2:43AM    04:50 - Sandra from [facility] called: Pt was declined d/t acuity and CD as primary concern    R:  [place] [facility] Access Inpatient Bed Call Log 11/13/24 @ 11:40PM  Intake has called facilities that have not updated their bed status within the last 12 hours.    [facility]     [facility]: @ cap  [facility]: Posting 3 beds. Per Norm @ 11:53PM, they are full  [facility]: @ cap per website  [facility]: @ cap per website  [facility]: @ cap per website   [facility]: @ cap per website   [facility]: @ cap per website (Young Adult unit: No recent aggressions, violence or sexual assault. Neg covid required). Per Flower @ 11:53PM, she does not have YA beds available tonight but can review for pending discharges tomorrow  [facility]: @ cap per website   [facility]: @ cap per website  [facility]: Posting 3 beds. Mixed unit/Low acuity only. Declined 11/13 d/t acuity  [facility]: Posting 1 bed; Low acuity, No aggression. Per Addis @ 12:19AM, no beds available currently   [facility]: @ cap per website   [facility]: Posting 2 beds; Low acuity, No aggression. Per Addis @ 12:19AM, no beds available currently   [facility]: Does not review after 10PM.     [facility]: @ cap per website   [facility]: Posting 6 beds. Low acuity only. Per Javier @ 1:27AM, low acuity beds and capped on Individual Assignments    [facility]: Posting 1 bed; Low acuity only.  Per Javier @ 1:27AM, low acuity beds and capped on Individual Assignments    [facility]: Posting 6 beds (No hx of aggression. No sexual offenders. Voluntary patients only). Declined 11/13 d/t pt not wanting IP and would need to be VOL  [facility]: [place] 3 beds (Always low acuity only. Must have the cognitive ability to do programming. No aggressive or violent behavior or  recent HX in the last 2 yrs. MH must be primary).   [facility]: Posting 4 beds; Low acuity only, Violence/aggression capped.      [facility]: Posting 2 beds (Low acuity, Neg Covid).  Per Miller @ 12:59AM, reviewing other referrals  [facility]: Posting 4 beds. Declined 11/13 d/t medical acuity  [facility]: Posting 4 beds (No hx of aggression/assault. No lines, drains or tubes. Does not provide detox or CD treatment. Must have confirmed ride home upon discharge).  Per Samantha @ 12:16AM, they are unable to take referrals overnight. She suggests a calling back after 8AM  [facility]: Posting 12 beds. Facility is in [place]. Per Intake policy, patients must be voluntary for placement in [place].   [facility]: Posting 2 beds (Mixed unit/Low acuity). Per Belinda @ 12:17AM, no high acuity beds available     Pt remains on work list until appropriate placement is available

## 2024-11-14 NOTE — ED NOTES
Patient has been calm and cooperative overnight. Slept throughout the night. Reported 8/10 pain which was managed with PRN meds. Patient denies SI and plans to harm herself. Currently resting in bed with eyes closed. RR WNL

## 2024-11-15 ENCOUNTER — TELEPHONE (OUTPATIENT)
Dept: BEHAVIORAL HEALTH | Facility: CLINIC | Age: 49
End: 2024-11-15

## 2024-11-15 LAB — GLUCOSE BLDC GLUCOMTR-MCNC: 248 MG/DL (ref 70–99)

## 2024-11-15 PROCEDURE — 250N000013 HC RX MED GY IP 250 OP 250 PS 637: Performed by: EMERGENCY MEDICINE

## 2024-11-15 PROCEDURE — 82962 GLUCOSE BLOOD TEST: CPT

## 2024-11-15 PROCEDURE — 250N000011 HC RX IP 250 OP 636: Performed by: EMERGENCY MEDICINE

## 2024-11-15 PROCEDURE — 96366 THER/PROPH/DIAG IV INF ADDON: CPT | Performed by: EMERGENCY MEDICINE

## 2024-11-15 PROCEDURE — 99223 1ST HOSP IP/OBS HIGH 75: CPT | Mod: AI

## 2024-11-15 PROCEDURE — 250N000013 HC RX MED GY IP 250 OP 250 PS 637: Performed by: FAMILY MEDICINE

## 2024-11-15 PROCEDURE — 250N000013 HC RX MED GY IP 250 OP 250 PS 637: Performed by: CLINICAL NURSE SPECIALIST

## 2024-11-15 RX ORDER — ONDANSETRON 4 MG/1
4 TABLET, ORALLY DISINTEGRATING ORAL ONCE
Status: COMPLETED | OUTPATIENT
Start: 2024-11-15 | End: 2024-11-15

## 2024-11-15 RX ORDER — IBUPROFEN 400 MG/1
800 TABLET, FILM COATED ORAL EVERY 6 HOURS PRN
Status: DISCONTINUED | OUTPATIENT
Start: 2024-11-15 | End: 2024-12-04 | Stop reason: HOSPADM

## 2024-11-15 RX ORDER — ACETAMINOPHEN 500 MG
1000 TABLET ORAL EVERY 8 HOURS PRN
Status: DISCONTINUED | OUTPATIENT
Start: 2024-11-15 | End: 2024-11-16

## 2024-11-15 RX ORDER — CEPHALEXIN 500 MG/1
500 CAPSULE ORAL 4 TIMES DAILY
Status: COMPLETED | OUTPATIENT
Start: 2024-11-16 | End: 2024-11-22

## 2024-11-15 RX ORDER — SULFAMETHOXAZOLE AND TRIMETHOPRIM 800; 160 MG/1; MG/1
1 TABLET ORAL 2 TIMES DAILY
Status: DISCONTINUED | OUTPATIENT
Start: 2024-11-15 | End: 2024-11-15

## 2024-11-15 RX ORDER — CEPHALEXIN 500 MG/1
500 CAPSULE ORAL 4 TIMES DAILY
Status: DISCONTINUED | OUTPATIENT
Start: 2024-11-16 | End: 2024-11-15

## 2024-11-15 RX ORDER — ACETAMINOPHEN 325 MG/1
650 TABLET ORAL EVERY 4 HOURS PRN
Status: CANCELLED | OUTPATIENT
Start: 2024-11-15

## 2024-11-15 RX ADMIN — METHADONE HYDROCHLORIDE 150 MG: 10 CONCENTRATE ORAL at 09:13

## 2024-11-15 RX ADMIN — LOSARTAN POTASSIUM 25 MG: 25 TABLET, FILM COATED ORAL at 09:11

## 2024-11-15 RX ADMIN — CLONIDINE HYDROCHLORIDE 0.1 MG: 0.1 TABLET ORAL at 21:01

## 2024-11-15 RX ADMIN — Medication 1250 MG: at 06:46

## 2024-11-15 RX ADMIN — METFORMIN HYDROCHLORIDE 1000 MG: 500 TABLET, FILM COATED ORAL at 09:12

## 2024-11-15 RX ADMIN — SULFAMETHOXAZOLE AND TRIMETHOPRIM 1 TABLET: 800; 160 TABLET ORAL at 09:12

## 2024-11-15 RX ADMIN — METFORMIN HYDROCHLORIDE 1000 MG: 500 TABLET, FILM COATED ORAL at 18:13

## 2024-11-15 RX ADMIN — ACETAMINOPHEN 1000 MG: 500 TABLET ORAL at 14:59

## 2024-11-15 RX ADMIN — ACETAMINOPHEN 1000 MG: 500 TABLET ORAL at 06:56

## 2024-11-15 RX ADMIN — IBUPROFEN 800 MG: 800 TABLET, FILM COATED ORAL at 21:11

## 2024-11-15 RX ADMIN — IBUPROFEN 800 MG: 800 TABLET, FILM COATED ORAL at 09:20

## 2024-11-15 RX ADMIN — ONDANSETRON 4 MG: 4 TABLET, ORALLY DISINTEGRATING ORAL at 20:23

## 2024-11-15 RX ADMIN — Medication 1250 MG: at 18:10

## 2024-11-15 RX ADMIN — CLONIDINE HYDROCHLORIDE 0.1 MG: 0.1 TABLET ORAL at 09:12

## 2024-11-15 RX ADMIN — OLANZAPINE 10 MG: 10 TABLET, FILM COATED ORAL at 21:01

## 2024-11-15 NOTE — TELEPHONE ENCOUNTER
R:  Intake paged Dr Flannery@ 2:12pm to review pt for unit 20.    Passed in shift handoff as no return call yet from Provider

## 2024-11-15 NOTE — ED PROVIDER NOTES
Emergency Department I-PASS Sign-out      Illness Severity: Stable    Patient Summary:  48 year old female with pertinent PMH of hepatitis C and DM II, health issues who presented with initial concern for abscess which is definitively treated by general surgery, however prior to discharge patient reports she has suicidal ideation.    ED Course/treatment plan: Behavioral crisis assessment performed, recommendation for inpatient admission.    Clinical Impression:  (L02.413) Abscess of arm, right    (R45.851) Suicidal ideation      Edited by: Jacey Gonzalez MD at 2024 [id number]    Action List:  Tests to Follow-up:  Medical reconciliation by pharmacy, including confirmation of methadone dose in the morning    Medications Reconciled/Ordered:  No    ED Mental Health Boarding Order Set Used for Diet/PRNs/Other:  Yes    DEC, Extended Care, Psych Consult Orders:  Extended Care and Psychiatry consult ordered.    Situational Awareness & Contingency Plannin Hour Hold Status:  Voluntary, would reassess if wanting to leave.  Active Orders  N/A    Disposition:  Admit/Transfer to Behavioral Health; mental health  felt patient likely did not require inpatient anymore however psychiatry yesterday noted she should be an inpatient.  Will defer to psychiatry for reassessment.    Boarding subsequent shift/day updates:      Edited by: Mark Ayers MD at 2024 1601    Synthesis & Events after sign-out:  Bactrim discontinued as no gram-negative's growing on her culture.  Otherwise no significant changes.        Marcia Jackson MD   Emergency Medicine     Marcia Jackson MD  11/15/24 1345       Marcia Jackson MD  11/15/24 1600

## 2024-11-15 NOTE — ED NOTES
Writer took over care at 2300. Pt was sleeping most of the night, rr wnl, even, and unlabored. Pt request pain medication upon waking. Tylenol given, ibuprofen not yet verified. Pt calm in bed, piv medication running, 1:1 at bedside.

## 2024-11-15 NOTE — PROGRESS NOTES
"Triage & Transition Services, Extended Care     Therapy Progress Note    Patient: Yun goes by \"Yun,\" uses she/her pronouns  Date of Service: November 15, 2024  Site of Service: [facility] EMERGENCY DEPARTMENT                             ED16A  Patient was seen yes  Mode of Assessment: In person    Presentation Summary: Pt continues to endorse SI with plan to OD. Unable to safety plan for d/c to lower level of care. Still requesting IP placement, willing to wait for bed availability.    Therapeutic Intervention(s) Provided: Engaged in guided discovery, explored patient's perspectives and helped expand them through socratic dialogue., Engaged in safety planning    Current Symptoms: anxious apathy, avoidance, thoughts of death/suicide, hoplessness anxious        Mental Status Exam   Affect: Appropriate  Appearance: Appropriate  Attention Span/Concentration: Attentive  Eye Contact: Engaged    Fund of Knowledge: Appropriate   Language /Speech Content: Fluent  Language /Speech Volume: Soft  Language /Speech Rate/Productions: Normal  Recent Memory: Variable  Remote Memory: Variable  Mood: Depressed, Anxious  Orientation to Person: Yes   Orientation to Place: Yes  Orientation to Time of Day: Yes  Orientation to Date: Yes     Situation (Do they understand why they are here?): Yes  Psychomotor Behavior: Normal  Thought Content: Clear, Suicidal  Thought Form: Goal Directed, Intact    Treatment Objective(s) Addressed: rapport building, orienting the patient to therapy, assessing safety, identifying an appropriate aftercare plan, safety planning, identifying additional supports, identifying treatment goals, exploring obstacles to safety in the community    Patient Response to Interventions: acceptance expressed    Progress Towards Goals: Patient Reports Symptoms Are: ongoing  Patient Progress Toward Goals: is making progress  Comment: Pt re-started on mes in ED, continuing to endorse SI.  Next Step to Work " Toward Discharge: follow up on referrals  Awaiting Acceptance at [facility] Level of Care Comment: IP     Case Management:      Plan: inpatient mental health  yes provider Renetta  yes    Clinical Substantiation: Pt continues to endorse SI today, unable to safety plan for lower level of care. On IP worklist, awaiting bed availability.    Legal Status: Legal Status at Admission: Voluntary/Patient has signed consent for treatment    Session Status: Time session started: 0930  Time session ended: 0940  Session Duration (minutes): 10 minutes  Session Number: 3  Anticipated number of sessions or this episode of care: 3    Time Spent: 10 minutes    CPT Code: CPT Codes: Non-Billable    Diagnosis:   Patient Active Problem List   Diagnosis Code    Suicidal behavior R45.89    Benign essential HTN I10    Cannabis abuse F12.10    Cyclic vomiting syndrome R11.15    Elevated LFTs R79.89    Hyperlipidemia with target low density lipoprotein (LDL) cholesterol less than 100 mg/dL E78.5    Insomnia G47.00    Major depressive disorder, recurrent episode, severe (H) F33.2    Mild intermittent asthma J45.20    Panic disorder with agoraphobia F40.01    OCD (obsessive compulsive disorder) F42.9    Severe benzodiazepine use disorder (H) in remission F13.20    S/P lumbar microdiscectomy Z98.890    S/P laparoscopic cholecystectomy Z90.49    Personality disorder (H) F60.9    Type 2 diabetes mellitus with hyperglycemia, with long-term current use of insulin (H) E11.65, Z79.4    Obesity (BMI 35.0-39.9) with comorbidity (H) E66.01    Falls frequently R29.6    Moderate opioid use disorder, in early remission (H) F11.21    Chronic hepatitis C without hepatic coma (H) B18.2    Hyperglycemia R73.9    Dehydration E86.0    Cyclical vomiting R11.15    Lactic acidosis E87.20    Epigastric pain R10.13    Coffee ground emesis K92.0    Elevated blood ketone body level R79.89    Severe episode of recurrent major depressive disorder, without psychotic features  (H) F33.2    PTSD (post-traumatic stress disorder) F43.10    Recurrent major depression (H) F33.9       Primary Problem This Admission: Active Hospital Problems    *Recurrent major depression (H)      Cannabis abuse      Personality disorder (H)      Panic disorder with agoraphobia        NAN Yepez   Licensed Mental Health Professional (LMHP), [facility]  [phone]

## 2024-11-15 NOTE — TELEPHONE ENCOUNTER
R: [place]  Access Inpatient Bed Call Log  11/15/2024 12:01 AM  Intake has called facilities that have not updated their bed status within the last 12 hours.??      ADULTS:     *METRO  [place] -- [facility]: @ Cap per website.  [place] -- [facility]:  @ Cap per website.  -12:11 AM Per [place], they are capped.   [place] -- [facility]: @ Cap per website.   [place] -- [facility]: @ Cap per website.  -12:12 AM Per Joseph, they are capped.  [place] -- [facility]: @ Cap per website.  [facility]: @ Cap per website.    [place] -- [facility]/YA beds @ Posting 1 beds. Ages 18-35, Voluntary only, COVID test req'd, NO aggression, physical or sexual assault, violence hx or drug abuse, or psychosis - 12:02 AM Per [place]: YA: 3, Adol: 0, child: 0  [place] -- [facility]: @ Cap per website.  [place] -- [facility]: @ cap per website.  [place] -- [facility]:  @ Posting 1 bed.      *[facility]: @ Posting 3 beds. Mixed unit/Low acuity only. -Declined due to acuity  [facility]: @ Posting 1 bed. Low acuity, No aggression    [facility]: @ cap per website.  [facility]:  @ Posting 2 bed. Low acuity only. No current aggression.  [facility]:  @ cap per website. COVID negative test req. Lower acuity only. No Aggression. -12:13 AM No answer. -Declined due to methadone  [facility]: @ Cap per website. Low acuity only. Prefer med adjustments placement. No aggression -12:13 AM No answer.-Declined due to methadone  [facility]:  @ Posting 3 beds COVID negative test req. Lower acuity only. No Aggression. -12:13 AM No answer.-Declined due to methadone  [place] -- [facility]: @ Cap per website. NO reviews after 10PM. Low acuity only.    [place] -- [facility]: @ Posting 4 beds. No hx of aggression. No sexual offenders. Voluntary patients only -Declined due to acuity  [place] -- [facility]:  @ Posting 1 bed. Low acuity only. Must have the  cognitive ability to do programming. No aggressive or violent behavior or recent HX in the last 2 yrs. MH must be primary.    [place] -- [facility]: @ Posting 4 beds. COVID negative test. Must be low acuity ONLY.   [place] -- [facility]: @ Cap per website.  Low acuity. Negative Covid. -12:19 AM Per Mary, can review but case-by-case.  [place] -- [facility]: @Posting 4 beds. Negative COVID test -Declined due to med acuity  [facility]: @ Posting 4 beds. No hx of aggression/assault. No lines, drains or tubes. Does not provide detox or CD treatment.   [place] -- [facility]: @ Posting 5 beds. Mixed unit/Low acuity/no medical devices - IV, CPAP etc. Negative Covid.).                                                                                                                                     Pt remains on waitlist pending appropriate placement availability.

## 2024-11-15 NOTE — TELEPHONE ENCOUNTER
2:41 PM: Pt accepted to station 20.     3:05 PM: Pt placed in queue and added to admit board. Indicia completed.    3:22 PM: Intake called CRN on 20 to inform that Pt is in queue. Intake to call back after shift-change.

## 2024-11-15 NOTE — TELEPHONE ENCOUNTER
R:  No current bed availability within [facility].      Statewide bed search initiated @ 11am:    [facility] is posting 3 beds. [phone].    [facility] is posting 0 beds. Negative covid required.   [facility] is posting 0 beds. Neg covid. No high school/Debbie-psych.    [place] is posting 0 beds. [phone]   [facility] is posting 2 beds. [phone]    [facility] is posting 8 beds. Negative covid. [phone]  [facility] is posting 0 beds [phone].     [facility] is posting 3 beds. LOW acuity. Ages 12+. Neg covid- [phone]; Pt too acute medically   [facility] has 0 bed posted. No aggression. Negative Covid. Low acuity.   [facility] is posting 0 beds. Low acuity only. Neg covid.  [phone]    [facility] is posting 0 beds. Low acuity. No current aggression.     [facility] is posting 0 beds. Negative covid. [phone]    [facility] is posting 5 beds available. Negative covid.  [phone].  Pt is exclusionary due to being on Metadone.   Do not review if on methodone.     [facility] is posting 0 beds. Low acuity. 72 HH hold preferred. Debbie unit. Negative covid required. [phone];   [facility] is posting 3 beds. Low acuity only. Neg covid.  [phone]  Pt is exclusionary from all [place] locations due to being on Methodone.   [facility] [place] is posting 4 beds.  Negative covid required.   Vol only, No history of aggression, violence, or assault. No sexual offenders. No 72 HH holds. [phone]  Pt is not voluntary.  Must be voluntary to be reviewed    [facility] is posting 1 bed. Negative covid required.  (Must have the cognitive ability to do programming. No aggressive or violent behavior or recent HX in the last 2 yrs. MH must be primary.) Always low acuity.  [facility] already reviewed and  declined pt due to CD being primary and due to pt acuity  [facility] [place]  has 4 beds posted. Negative covid required.  Low acuity only. Violence and aggression capped.  [phone];   [facility] is posting 0  beds. Low acuity, Negative covid required. [phone]  [facility] posting 4 beds. Negative covid required.  [phone] per Catherine's call to ANS 6 am, they have 3 step-down/low acuity beds avail.  [facility] already reviewed pt and declined r/t [facility]          Pt remains on the work list pending appropriate bed availability.

## 2024-11-16 VITALS
RESPIRATION RATE: 16 BRPM | WEIGHT: 200.5 LBS | SYSTOLIC BLOOD PRESSURE: 139 MMHG | HEIGHT: 69 IN | DIASTOLIC BLOOD PRESSURE: 91 MMHG | OXYGEN SATURATION: 98 % | BODY MASS INDEX: 29.7 KG/M2 | HEART RATE: 77 BPM | TEMPERATURE: 98.3 F

## 2024-11-16 PROBLEM — R45.851 SUICIDAL IDEATION: Status: ACTIVE | Noted: 2024-11-16

## 2024-11-16 PROBLEM — L02.413 ABSCESS OF ARM, RIGHT: Status: ACTIVE | Noted: 2024-11-16

## 2024-11-16 LAB
BACTERIA SPEC CULT: NORMAL
CHOLEST SERPL-MCNC: 107 MG/DL
EST. AVERAGE GLUCOSE BLD GHB EST-MCNC: 275 MG/DL
FOLATE SERPL-MCNC: 6.3 NG/ML (ref 4.6–34.8)
HBA1C MFR BLD: 11.2 %
HCV AB SERPL QL IA: REACTIVE
HDLC SERPL-MCNC: 31 MG/DL
LDLC SERPL CALC-MCNC: 48 MG/DL
NONHDLC SERPL-MCNC: 76 MG/DL
TRIGL SERPL-MCNC: 138 MG/DL
VIT B12 SERPL-MCNC: 938 PG/ML (ref 232–1245)

## 2024-11-16 PROCEDURE — 82746 ASSAY OF FOLIC ACID SERUM: CPT

## 2024-11-16 PROCEDURE — 250N000013 HC RX MED GY IP 250 OP 250 PS 637

## 2024-11-16 PROCEDURE — 80061 LIPID PANEL: CPT

## 2024-11-16 PROCEDURE — 82607 VITAMIN B-12: CPT

## 2024-11-16 PROCEDURE — 83036 HEMOGLOBIN GLYCOSYLATED A1C: CPT

## 2024-11-16 PROCEDURE — 124N000002 HC R&B MH UMMC

## 2024-11-16 PROCEDURE — 36415 COLL VENOUS BLD VENIPUNCTURE: CPT | Performed by: PSYCHIATRY & NEUROLOGY

## 2024-11-16 PROCEDURE — 84425 ASSAY OF VITAMIN B-1: CPT | Performed by: PSYCHIATRY & NEUROLOGY

## 2024-11-16 RX ORDER — HYDROXYZINE HYDROCHLORIDE 25 MG/1
25 TABLET, FILM COATED ORAL EVERY 4 HOURS PRN
Status: DISCONTINUED | OUTPATIENT
Start: 2024-11-16 | End: 2024-11-27

## 2024-11-16 RX ORDER — MAGNESIUM HYDROXIDE/ALUMINUM HYDROXICE/SIMETHICONE 120; 1200; 1200 MG/30ML; MG/30ML; MG/30ML
30 SUSPENSION ORAL EVERY 4 HOURS PRN
Status: DISCONTINUED | OUTPATIENT
Start: 2024-11-16 | End: 2024-12-04 | Stop reason: HOSPADM

## 2024-11-16 RX ORDER — AMOXICILLIN 250 MG
1 CAPSULE ORAL 2 TIMES DAILY PRN
Status: DISCONTINUED | OUTPATIENT
Start: 2024-11-16 | End: 2024-12-04 | Stop reason: HOSPADM

## 2024-11-16 RX ORDER — OLANZAPINE 10 MG/2ML
10 INJECTION, POWDER, FOR SOLUTION INTRAMUSCULAR 3 TIMES DAILY PRN
Status: DISCONTINUED | OUTPATIENT
Start: 2024-11-16 | End: 2024-12-04 | Stop reason: HOSPADM

## 2024-11-16 RX ORDER — OLANZAPINE 10 MG/1
10 TABLET ORAL 3 TIMES DAILY PRN
Status: DISCONTINUED | OUTPATIENT
Start: 2024-11-16 | End: 2024-12-04 | Stop reason: HOSPADM

## 2024-11-16 RX ADMIN — CEPHALEXIN 500 MG: 500 CAPSULE ORAL at 08:26

## 2024-11-16 RX ADMIN — HYDROXYZINE HYDROCHLORIDE 25 MG: 25 TABLET ORAL at 18:36

## 2024-11-16 RX ADMIN — OLANZAPINE 10 MG: 10 TABLET, FILM COATED ORAL at 21:30

## 2024-11-16 RX ADMIN — METFORMIN HYDROCHLORIDE 1000 MG: 500 TABLET, FILM COATED ORAL at 18:35

## 2024-11-16 RX ADMIN — HYDROXYZINE HYDROCHLORIDE 25 MG: 25 TABLET ORAL at 12:46

## 2024-11-16 RX ADMIN — CEPHALEXIN 500 MG: 500 CAPSULE ORAL at 12:46

## 2024-11-16 RX ADMIN — CEPHALEXIN 500 MG: 500 CAPSULE ORAL at 18:34

## 2024-11-16 RX ADMIN — CLONIDINE HYDROCHLORIDE 0.1 MG: 0.1 TABLET ORAL at 21:30

## 2024-11-16 RX ADMIN — IBUPROFEN 800 MG: 800 TABLET, FILM COATED ORAL at 08:26

## 2024-11-16 RX ADMIN — LOSARTAN POTASSIUM 25 MG: 25 TABLET, FILM COATED ORAL at 08:26

## 2024-11-16 RX ADMIN — Medication 3 MG: at 21:30

## 2024-11-16 RX ADMIN — METHADONE HYDROCHLORIDE 150 MG: 10 CONCENTRATE ORAL at 09:24

## 2024-11-16 RX ADMIN — IBUPROFEN 800 MG: 800 TABLET, FILM COATED ORAL at 18:34

## 2024-11-16 RX ADMIN — CLONIDINE HYDROCHLORIDE 0.1 MG: 0.1 TABLET ORAL at 08:26

## 2024-11-16 RX ADMIN — CEPHALEXIN 500 MG: 500 CAPSULE ORAL at 21:30

## 2024-11-16 RX ADMIN — METFORMIN HYDROCHLORIDE 1000 MG: 500 TABLET, FILM COATED ORAL at 08:26

## 2024-11-16 ASSESSMENT — ACTIVITIES OF DAILY LIVING (ADL)
ADLS_ACUITY_SCORE: 0
ADLS_ACUITY_SCORE: 0
WALKING_OR_CLIMBING_STAIRS_DIFFICULTY: NO
ADLS_ACUITY_SCORE: 0
HEARING_DIFFICULTY_OR_DEAF: NO
ADLS_ACUITY_SCORE: 0
DIFFICULTY_COMMUNICATING: NO
ADLS_ACUITY_SCORE: 0
ADLS_ACUITY_SCORE: 0
DIFFICULTY_EATING/SWALLOWING: NO
ADLS_ACUITY_SCORE: 0
DOING_ERRANDS_INDEPENDENTLY_DIFFICULTY: NO
CONCENTRATING,_REMEMBERING_OR_MAKING_DECISIONS_DIFFICULTY: NO
ADLS_ACUITY_SCORE: 0
CHANGE_IN_FUNCTIONAL_STATUS_SINCE_ONSET_OF_CURRENT_ILLNESS/INJURY: NO
HYGIENE/GROOMING: INDEPENDENT
DRESS: SCRUBS (BEHAVIORAL HEALTH)
ADLS_ACUITY_SCORE: 0
FALL_HISTORY_WITHIN_LAST_SIX_MONTHS: NO
ADLS_ACUITY_SCORE: 0
ADLS_ACUITY_SCORE: 0
DRESSING/BATHING_DIFFICULTY: NO
ADLS_ACUITY_SCORE: 0
TOILETING_ISSUES: NO
ADLS_ACUITY_SCORE: 0
ADLS_ACUITY_SCORE: 0
WEAR_GLASSES_OR_BLIND: YES
ORAL_HYGIENE: INDEPENDENT
ADLS_ACUITY_SCORE: 0

## 2024-11-16 NOTE — ED PROVIDER NOTES
"     Emergency Department Patient Sign-out       Brief HPI:  This is a 48 year old female signed out to me by Dr. Jackson .  See initial ED Provider note for details of the presentation.           Significant Events prior to my assuming care: Patient awaiting bed placement for inpatient psychiatry for suicidal ideation.  I received a call from the ED pharmacist noting that the patient recently had a negative MRSA swab.  She is currently on IV vancomycin for an abscess.  Upon review of the chart it looks like patient had I&D performed by general surgery and was discharged with recommendation to change to oral antibiotics.  The patient developed suicidal ideation at the time of her discharge and was eventually commended for psychiatric admission.  It looks like the vancomycin dosing was never discontinued since the patient technically did not get discharged from the emergency department.  I do not see any reason for her to continue on IV antibiotics.  Will treat her with an additional 7-day course of Keflex.    Exam:   Patient Vitals for the past 24 hrs:   BP Temp Temp src Pulse Resp SpO2 Height Weight   11/15/24 0908 128/86 98.4  F (36.9  C) Oral 72 16 96 % -- --   11/15/24 0900 -- -- -- -- -- -- 1.753 m (5' 9\") 90.7 kg (200 lb)           ED RESULTS:   Results for orders placed or performed during the hospital encounter of 11/12/24 (from the past 24 hours)   Glucose by meter     Status: Abnormal    Collection Time: 11/15/24 12:09 PM   Result Value Ref Range    GLUCOSE BY METER POCT 248 (H) 70 - 99 mg/dL       ED MEDICATIONS:   Medications   sodium chloride 0.9 % bag 500mL for CT scan flush use (60 mLs Intravenous $Given 11/12/24 1230)   lidocaine 1% with EPINEPHrine 1:100,000 injection 30 mL (has no administration in time range)   losartan (COZAAR) tablet 25 mg (25 mg Oral $Given 11/15/24 0911)   metFORMIN (GLUCOPHAGE) tablet 1,000 mg (1,000 mg Oral $Given 11/15/24 1813)   OLANZapine (zyPREXA) tablet 10 mg ( Oral " Canceled Entry 11/14/24 2317)   oxyCODONE (ROXICODONE) tablet 5 mg (5 mg Oral $Given 11/14/24 1745)   cloNIDine (CATAPRES) tablet 0.1 mg (0.1 mg Oral $Given 11/15/24 0912)   methadone (DOLOPHINE-INTENSOL) 10 MG/ML (HIGH CONC) solution 150 mg (150 mg Oral $Given 11/15/24 0913)   ibuprofen (ADVIL/MOTRIN) tablet 800 mg (800 mg Oral $Given 11/15/24 0920)   acetaminophen (TYLENOL) tablet 1,000 mg (1,000 mg Oral $Given 11/15/24 1459)   sulfamethoxazole-trimethoprim (BACTRIM DS) 800-160 MG per tablet 1 tablet (has no administration in time range)   vancomycin (VANCOCIN) 1,500 mg in 0.9% NaCl 250 mL intermittent infusion (0 mg Intravenous Stopped 11/12/24 1810)   iopamidol (ISOVUE-370) solution 100 mL (90 mLs Intravenous $Given 11/12/24 1229)   morphine (PF) injection 4 mg (4 mg Intravenous $Given 11/12/24 1625)   HYDROmorphone (PF) (DILAUDID) injection 0.5 mg (0.5 mg Intravenous $Given 11/12/24 1823)   sulfamethoxazole-trimethoprim (BACTRIM DS) 800-160 MG per tablet 1 tablet (1 tablet Oral $Given 11/12/24 1937)   oxyCODONE (ROXICODONE) tablet 5 mg (5 mg Oral $Given 11/12/24 1937)         Impression:    ICD-10-CM    1. Abscess of arm, right  L02.413       2. Suicidal ideation  R45.851           Plan:    Transition to oral Keflex.  Discontinue vancomycin.      MD Cesar Villafana Jill C, MD  11/15/24 2002       Laure Guerrero MD  11/15/24 2011

## 2024-11-16 NOTE — PLAN OF CARE
11/16/24 0154   Patient Belongings   Did you bring any home meds/supplements to the hospital?  No   Patient Belongings locker   Patient Belongings Put in Hospital Secure Location (Security or Locker, etc.) cell phone/electronics;clothing;shoes   Belongings Search Yes   Clothing Search Yes     Goal Outcome Evaluation:    In lockers:    Cellphone, socks, shoes, reebok sweater, headphones, 2 pack gauze, black joggers, 3 bandages, 7 abdominal pads, bottle with yellow liquid, 21 sterile sponges, clean sweatshirt, cream tshirt, black hair tie, , atiseptic spray    .A               Admission:  I am responsible for any personal items that are not sent to the safe or pharmacy.  [facility] is not responsible for loss, theft or damage of any property in my possession.    Signature:  _________________________________ Date: _______  Time: _____                                              Staff Signature:  ____________________________ Date: ________  Time: _____      2nd Staff person, if patient is unable/unwilling to sign:    Signature: ________________________________ Date: ________  Time: _____     Discharge:  [facility] has returned all of my personal belongings:    Signature: _________________________________ Date: ________  Time: _____                                          Staff Signature:  ____________________________ Date: ________  Time: _____

## 2024-11-16 NOTE — PLAN OF CARE
Problem: Anxiety  Goal: Anxiety Reduction or Resolution  Outcome: Not Progressing     Problem: Depression  Goal: Decreased Depression Symptoms  Outcome: Not Progressing   Goal Outcome Evaluation:    Patient presented with flat affect, mood sad and depress, she endorsed increase anxiety and depression rating anxiety 7/10 and depression 8/10, she continue to endorsed thoughts os SI stating she do not have a plan while in hospital but plans to overdose when she leaves, she was able to contract for safety. Has been isolative and withdrawn to room most of this shift, out for meals only, compliant with medication regimen, PRN Ibuprofen given x 1 for pain in right arm, dressing to surgical wound is clean, dry and intact.
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
## 2024-11-16 NOTE — PLAN OF CARE
Problem: Adult Behavioral Health Plan of Care  Goal: Develops/Participates in Therapeutic Jumping Branch to Support Successful Transition  Intervention: Mutually Develop Transition Plan  Recent Flowsheet Documentation  Taken 11/16/2024 0100 by Lilibeth Prather RN  Patient/Family Anticipates Transition to:   home   home with family   Goal Outcome Evaluation:       Pt is a 48 year old female with pertinent PMH of hepatitis C and DM II, health issues who presented to the ED with initial concern for abscess which is definitively treated by general surgery, however prior to discharge patient reports she has suicidal ideation.   Pt arrived on the unit at about 0115 am. She was compliant with search and the admission process but stated that she was hungry and would like to eat some snacks and go to bed. The admission assessment was not completed. Pt endorse SI, but stated that she would overdose on any medication she is able to lay her hands on at home. Pt however said that she feels save here and has no plan to hurt herself while on the unit. She rates anxiety and depression a 9/10 but declined intervention, stating that it was due to her being new in the environment. Pt has a wound on her right arm, and is currently on antibiotics for infection. Pt pain in her arm a 7/10. PRN ibuprofen was administered in the ED shortly before she was transferred. Pt appears to have slept for  hours.
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
## 2024-11-16 NOTE — TELEPHONE ENCOUNTER
6:23 PM Intake called unit 20- Informed pt is in queue - CRN states pt will admit on night shift d/t unit acuity and staffing.     6:23 PM Intake called ED with bed placement information and admit ETA

## 2024-11-17 PROCEDURE — 250N000013 HC RX MED GY IP 250 OP 250 PS 637

## 2024-11-17 PROCEDURE — 124N000002 HC R&B MH UMMC

## 2024-11-17 RX ADMIN — METFORMIN HYDROCHLORIDE 1000 MG: 500 TABLET, FILM COATED ORAL at 18:27

## 2024-11-17 RX ADMIN — CEPHALEXIN 500 MG: 500 CAPSULE ORAL at 16:22

## 2024-11-17 RX ADMIN — IBUPROFEN 800 MG: 800 TABLET, FILM COATED ORAL at 13:38

## 2024-11-17 RX ADMIN — OLANZAPINE 10 MG: 10 TABLET, FILM COATED ORAL at 13:31

## 2024-11-17 RX ADMIN — Medication 3 MG: at 20:59

## 2024-11-17 RX ADMIN — LOSARTAN POTASSIUM 25 MG: 25 TABLET, FILM COATED ORAL at 07:53

## 2024-11-17 RX ADMIN — CEPHALEXIN 500 MG: 500 CAPSULE ORAL at 07:53

## 2024-11-17 RX ADMIN — METHADONE HYDROCHLORIDE 150 MG: 10 CONCENTRATE ORAL at 07:53

## 2024-11-17 RX ADMIN — IBUPROFEN 800 MG: 800 TABLET, FILM COATED ORAL at 07:53

## 2024-11-17 RX ADMIN — IBUPROFEN 800 MG: 800 TABLET, FILM COATED ORAL at 20:59

## 2024-11-17 RX ADMIN — HYDROXYZINE HYDROCHLORIDE 25 MG: 25 TABLET ORAL at 20:59

## 2024-11-17 RX ADMIN — CLONIDINE HYDROCHLORIDE 0.1 MG: 0.1 TABLET ORAL at 07:53

## 2024-11-17 RX ADMIN — CEPHALEXIN 500 MG: 500 CAPSULE ORAL at 20:59

## 2024-11-17 RX ADMIN — HYDROXYZINE HYDROCHLORIDE 25 MG: 25 TABLET ORAL at 10:46

## 2024-11-17 RX ADMIN — CLONIDINE HYDROCHLORIDE 0.1 MG: 0.1 TABLET ORAL at 20:59

## 2024-11-17 RX ADMIN — CEPHALEXIN 500 MG: 500 CAPSULE ORAL at 12:16

## 2024-11-17 RX ADMIN — METFORMIN HYDROCHLORIDE 1000 MG: 500 TABLET, FILM COATED ORAL at 07:53

## 2024-11-17 RX ADMIN — OLANZAPINE 10 MG: 10 TABLET, FILM COATED ORAL at 21:00

## 2024-11-17 ASSESSMENT — ACTIVITIES OF DAILY LIVING (ADL)
ADLS_ACUITY_SCORE: 0
DRESS: SCRUBS (BEHAVIORAL HEALTH);INDEPENDENT
ADLS_ACUITY_SCORE: 0
ORAL_HYGIENE: INDEPENDENT
ADLS_ACUITY_SCORE: 0
HYGIENE/GROOMING: INDEPENDENT
ADLS_ACUITY_SCORE: 0

## 2024-11-17 NOTE — PLAN OF CARE
Problem: Anxiety  Goal: Anxiety Reduction or Resolution  Outcome: Not Progressing     Problem: Depression  Goal: Decreased Depression Symptoms  Outcome: Not Progressing     Problem: Skin Injury Risk Increased  Goal: Skin Health and Integrity  Intervention: Optimize Skin Protection  Recent Flowsheet Documentation  Taken 11/17/2024 1200 by Pati Ji RN  Activity Management: up ad nicholas   Goal Outcome Evaluation:  Patient withdrawn to self and isolative to room, avoids social interactions, continue to endorsed increase anxiety, depression and SI but do not have a plan and is dafne for safety. Patient utilized PRN Ibuprofen x 2 for pain management, Hydroxyzine for anxiety and Zyprexa for onset of increase anxiety and agitation. Wound care assessed by writer and WCON updated, wound nurse placed temporary order for dressing change with plans to follow up with patient on Monday. Dressing changed with Vashe soak packing 1/4 nu-guaze, cover with gauze and Mepilex, patient tolerated dressing.

## 2024-11-17 NOTE — PLAN OF CARE
Problem: Sleep Disturbance  Goal: Adequate Sleep/Rest  Outcome: Progressing   Goal Outcome Evaluation:  Patient slept through the night, no sign of distress noted, no request for PRN med or snack, she slept for 7 hrs this shift.

## 2024-11-17 NOTE — PLAN OF CARE
Patient was out visible withdrawn to self, minimal engagement with staff noted, she was able to verbalize needs, endorsed medial bicep pain secondary to abscess removal few days ago, rate her pain at 7/10, wound dressing was intact no drainage noted, Ibuprofen 800 mg was administered, patient c/o anxiety and requested hydroxyzine, she as given Chamomile tea to help with relaxation, patient's anxiety and depression has not improved since admission however her insight appears to have improved, she continues to endorse passive suicidal thoughts with no intent to self harm, she was compliant with medication management, , affect was blunted congruent with mood, hygiene and intake were adequate.   Problem: Anxiety  Goal: Anxiety Reduction or Resolution  Outcome: Not Progressing  Intervention: Promote Anxiety Reduction  Recent Flowsheet Documentation  Taken 11/16/2024 1908 by Lilian Tijerina RN  Family/Support System Care:   presence promoted   support provided     Problem: Depression  Goal: Decreased Depression Symptoms  Outcome: Not Progressing  Intervention: Monitor and Manage Depressive Symptoms  Recent Flowsheet Documentation  Taken 11/16/2024 1908 by Lilian Tijerina RN  Family/Support System Care:   presence promoted   support provided     Problem: Skin Injury Risk Increased  Goal: Skin Health and Integrity  Outcome: Progressing   Goal Outcome Evaluation:    Plan of Care Reviewed With: patient

## 2024-11-17 NOTE — CONSULTS
"[facility]  WO Nurse Inpatient Assessment     Consulted for: R arm abscess    Summary: Dressing removed by RN due to drainage. Unable to assess but discussed wound with RN. Temporary wound care plan entered until full assessment able to be completed Monday.     Patient History (according to provider note(s):      48 year old female with pertinent PMH of hepatitis C and DM II, health issues who presented with initial concern for abscess which is definitively treated by general surgery, however prior to discharge patient reports she has suicidal ideation.     Assessment:      Areas visualized during today's visit:  no assessment 11/17    WOC will assess tomorrow 11/18. Per RN wound is small and has depth needing packing. Draining serosanguinous drainage. See plan of care below. Will update after full assessment if needed.      Treatment Plan:     Right arm wound(s): Daily   Flush wound with 3 ml of Vashe (#693555).   Cut piece of 1/4\" nu-guaze (#914569) and pack into wound bed leaving a \"tail\" out for safety. If more than one piece is needed to pack to full depth, leave tail out for each piece.   Cover with mepilex.     Orders: Written    RECOMMEND PRIMARY TEAM ORDER: None, at this time  Education provided: plan of care  Discussed plan of care with: Nurse  WO nurse follow-up plan: weekly after initial assessment  Notify [facility] if wound(s) deteriorate.  Nursing to notify the Provider(s) and re-consult the WO Nurse if new skin concern.    DATA:     Current support surface: Standard  Foam mattress (Behavioral unit)  Containment of urine/stool: Continent of bladder and Continent of bowel  BMI: Body mass index is 29.61 kg/m .   Active diet order: Orders Placed This Encounter      Regular Diet Adult     Output: No intake/output data recorded.     Labs:   Recent Labs   Lab 11/16/24  1003 11/12/24  1143   ALBUMIN  --  3.7   HGB  --  13.2   WBC  --  7.6   A1C 11.2*  --  "     Pressure injury risk assessment:   Sensory Perception: 4-->no impairment  Moisture: 4-->rarely moist  Activity: 4-->walks frequently  Mobility: 3-->slightly limited  Nutrition: 3-->adequate  Friction and Shear: 3-->no apparent problem  Tito Score: 21    Leticia Mejia RN CWOCN  Pager no longer is use, please contact through [place] group: [facility] Nurse [facility]  Dept. Office Number: [phone]

## 2024-11-18 LAB
GLUCOSE BLDC GLUCOMTR-MCNC: 252 MG/DL (ref 70–99)
GLUCOSE BLDC GLUCOMTR-MCNC: 257 MG/DL (ref 70–99)
GLUCOSE BLDC GLUCOMTR-MCNC: 307 MG/DL (ref 70–99)

## 2024-11-18 PROCEDURE — 250N000013 HC RX MED GY IP 250 OP 250 PS 637

## 2024-11-18 PROCEDURE — 250N000013 HC RX MED GY IP 250 OP 250 PS 637: Performed by: STUDENT IN AN ORGANIZED HEALTH CARE EDUCATION/TRAINING PROGRAM

## 2024-11-18 PROCEDURE — G0463 HOSPITAL OUTPT CLINIC VISIT: HCPCS

## 2024-11-18 PROCEDURE — 99232 SBSQ HOSP IP/OBS MODERATE 35: CPT | Mod: GC | Performed by: STUDENT IN AN ORGANIZED HEALTH CARE EDUCATION/TRAINING PROGRAM

## 2024-11-18 PROCEDURE — 250N000012 HC RX MED GY IP 250 OP 636 PS 637

## 2024-11-18 PROCEDURE — 99222 1ST HOSP IP/OBS MODERATE 55: CPT

## 2024-11-18 PROCEDURE — 124N000002 HC R&B MH UMMC

## 2024-11-18 RX ORDER — DEXTROSE MONOHYDRATE 25 G/50ML
25-50 INJECTION, SOLUTION INTRAVENOUS
Status: DISCONTINUED | OUTPATIENT
Start: 2024-11-18 | End: 2024-12-04 | Stop reason: HOSPADM

## 2024-11-18 RX ORDER — PRAZOSIN HYDROCHLORIDE 1 MG/1
1 CAPSULE ORAL AT BEDTIME
Status: DISCONTINUED | OUTPATIENT
Start: 2024-11-18 | End: 2024-11-21

## 2024-11-18 RX ORDER — ESCITALOPRAM OXALATE 5 MG/1
5 TABLET ORAL DAILY
Status: DISCONTINUED | OUTPATIENT
Start: 2024-11-18 | End: 2024-11-18

## 2024-11-18 RX ORDER — ESCITALOPRAM OXALATE 10 MG/1
10 TABLET ORAL DAILY
Status: DISCONTINUED | OUTPATIENT
Start: 2024-11-19 | End: 2024-11-20

## 2024-11-18 RX ORDER — NICOTINE POLACRILEX 4 MG
15-30 LOZENGE BUCCAL
Status: DISCONTINUED | OUTPATIENT
Start: 2024-11-18 | End: 2024-12-04 | Stop reason: HOSPADM

## 2024-11-18 RX ORDER — OLANZAPINE 10 MG/1
10 TABLET, ORALLY DISINTEGRATING ORAL AT BEDTIME
Status: DISCONTINUED | OUTPATIENT
Start: 2024-11-18 | End: 2024-11-25

## 2024-11-18 RX ADMIN — CEPHALEXIN 500 MG: 500 CAPSULE ORAL at 16:18

## 2024-11-18 RX ADMIN — PRAZOSIN HYDROCHLORIDE 1 MG: 1 CAPSULE ORAL at 21:07

## 2024-11-18 RX ADMIN — CEPHALEXIN 500 MG: 500 CAPSULE ORAL at 21:09

## 2024-11-18 RX ADMIN — ESCITALOPRAM OXALATE 5 MG: 5 TABLET, FILM COATED ORAL at 11:20

## 2024-11-18 RX ADMIN — CEPHALEXIN 500 MG: 500 CAPSULE ORAL at 11:20

## 2024-11-18 RX ADMIN — HYDROXYZINE HYDROCHLORIDE 25 MG: 25 TABLET ORAL at 09:15

## 2024-11-18 RX ADMIN — CEPHALEXIN 500 MG: 500 CAPSULE ORAL at 08:56

## 2024-11-18 RX ADMIN — CLONIDINE HYDROCHLORIDE 0.1 MG: 0.1 TABLET ORAL at 08:56

## 2024-11-18 RX ADMIN — OLANZAPINE 10 MG: 10 TABLET, ORALLY DISINTEGRATING ORAL at 21:09

## 2024-11-18 RX ADMIN — INSULIN ASPART 3 UNITS: 100 INJECTION, SOLUTION INTRAVENOUS; SUBCUTANEOUS at 12:33

## 2024-11-18 RX ADMIN — IBUPROFEN 800 MG: 800 TABLET, FILM COATED ORAL at 16:20

## 2024-11-18 RX ADMIN — METFORMIN HYDROCHLORIDE 1000 MG: 500 TABLET, FILM COATED ORAL at 17:43

## 2024-11-18 RX ADMIN — CLONIDINE HYDROCHLORIDE 0.1 MG: 0.1 TABLET ORAL at 21:08

## 2024-11-18 RX ADMIN — LOSARTAN POTASSIUM 25 MG: 25 TABLET, FILM COATED ORAL at 08:56

## 2024-11-18 RX ADMIN — INSULIN ASPART 4 UNITS: 100 INJECTION, SOLUTION INTRAVENOUS; SUBCUTANEOUS at 17:45

## 2024-11-18 RX ADMIN — OLANZAPINE 10 MG: 10 TABLET, FILM COATED ORAL at 16:18

## 2024-11-18 RX ADMIN — METHADONE HYDROCHLORIDE 150 MG: 10 CONCENTRATE ORAL at 09:06

## 2024-11-18 RX ADMIN — METFORMIN HYDROCHLORIDE 1000 MG: 500 TABLET, FILM COATED ORAL at 08:56

## 2024-11-18 NOTE — PLAN OF CARE
Initial meeting note:    Therapist introduced self to patient and discussed psychotherapy service available to patient.     Pt response: Pt expressed interest in meeting 1:1 another time.    Plan: Made plan to meet with Pt later this week.

## 2024-11-18 NOTE — PROGRESS NOTES
"[facility]  WOC Nurse Inpatient Assessment     Consulted for: Right upper arm abscess    Patient History (according to provider note(s):      48 year old female with pertinent PMH of hepatitis C and DM II, health issues who presented with initial concern for abscess which is definitively treated by general surgery, however prior to discharge patient reports she has suicidal ideation.     Assessment:      Areas visualized during today's visit:  right upper inner arm    Wound location: Right upper inner arm    11/18    Last photo: 11/18/2024  Wound due to: Surgical Wound  Wound history/plan of care: Abscess s/p I&D on 11/15/2024.  Wound base: 100 % Granulation tissue     Palpation of the wound bed: normal      Drainage: small     Description of drainage: serosanguinous     Measurements (length x width x depth, in cm): 1.5  x 1  x  0.8 cm      Tunneling: N/A     Undermining: circumferentially with max depth of 0.3 cm at 12 o'clock  Periwound skin: Intact      Color: normal and consistent with surrounding tissue      Temperature: normal   Odor: none  Pain: moderate, burning with dressing change  Pain interventions prior to dressing change: patient tolerated well and slow and gentle cares   Treatment goal: Heal   STATUS: initial assessment  Supplies ordered: gathered      Treatment Plan:     Right arm wound(s): Daily   Flush wound with 3 ml of Vashe (#[id number]).   Cut piece of 1/4\" nu-guaze (#[id number]) and pack into wound bed leaving a \"tail\" out for safety. If more than one piece is needed to pack to full depth, leave tail out for each piece.   Cover with Mepilex 4x4 (#[id number]).     Orders: Written    RECOMMEND PRIMARY TEAM ORDER: None, at this time  Education provided: plan of care  Discussed plan of care with: Nurse  WOC nurse follow-up plan: weekly  Notify WOC if wound(s) deteriorate.  Nursing to notify the Provider(s) and re-consult the WOC Nurse if new skin concern.    DATA: "     Current support surface: Standard  Foam mattress (Behavioral unit)  Containment of urine/stool: Continent of bladder and Continent of bowel  BMI: Body mass index is 29.61 kg/m .   Active diet order: Orders Placed This Encounter      Regular Diet Adult     Output: No intake/output data recorded.     Labs:   Recent Labs   Lab 11/16/24  1003 11/12/24  1143   ALBUMIN  --  3.7   HGB  --  13.2   WBC  --  7.6   A1C 11.2*  --      Pressure injury risk assessment:   Sensory Perception: 4-->no impairment  Moisture: 4-->rarely moist  Activity: 4-->walks frequently  Mobility: 3-->slightly limited  Nutrition: 3-->adequate  Friction and Shear: 3-->no apparent problem  Tito Score: 21    Wendy Barreto RN CWOCN  Pager no longer is use, please contact through [place] group: [facility] Nurse [facility]  Dept. Office Number: [phone]

## 2024-11-18 NOTE — PLAN OF CARE
BEH IP Unit Acuity Rating Score (UARS)  Patient is given one point for every criteria they meet.    CRITERIA SCORING   On a 72 hour hold, court hold, committed, stay of commitment, or revocation. 0    Patient LOS on BEH unit exceeds 20 days. 0  LOS: 2   Patient under guardianship, 55+, otherwise medically complex, or under age 11. 0   Suicide ideation without relief of precipitating factors. 1   Current plan for suicide. 1   Current plan for homicide. 0   Imminent risk or actual attempt to seriously harm another without relief of factors precipitating the attempt. 1   Severe dysfunction in daily living (ex: complete neglect for self care, extreme disruption in vegetative function, extreme deterioration in social interactions). 1   Recent (last 7 days) or current physical aggression in the ED or on unit. 0   Restraints or seclusion episode in past 72 hours. 0   Recent (last 7 days) or current verbal aggression, agitation, yelling, etc., while in the ED or unit. 0   Active psychosis. 0   Need for constant or near constant redirection (from leaving, from others, etc).  0   Intrusive or disruptive behaviors. 0   Patient requires 3 or more hours of individualized nursing care per 8-hour shift (i.e. for ADLs, meds, therapeutic interventions). 0   TOTAL 4

## 2024-11-18 NOTE — PROVIDER NOTIFICATION
11/18/24 1043   Individualization/Patient Specific Goals   Patient Personal Strengths expressive of needs;independent living skills;motivated for treatment;positive vocational history;resilient;resourceful;stable living environment   Patient Vulnerabilities adverse childhood experience(s);family/relationship conflict;history of unsuccessful treatment;lacks insight into illness;legal concerns;substance abuse/addiction   Anxieties, Fears or Concerns N/A   Individualized Care Needs None   Interprofessional Rounds   Summary Patient admitted with worsening depression/SI. Has been using polysubstances   Participants CTC;nursing;psychiatrist  (med student)   Behavioral Team Discussion   Participants Dr. Flannery. Dr. Gambino, Nemo Perales MA.LP, Med student   Progress Initial assessment   Anticipated length of stay 5-7 days   Continued Stay Criteria/Rationale SI   Medical/Physical Admitted with arm wound   Precautions Per unit protocol   Plan Patient will be seen by Psychiatry daily.  Meds will be reviewed/adjusted per MD's as indicated.  Substance use will be addressed.  Patient will be encouraged to consider CYNTHIA treatment.  CTC will continue to assess needs, ensure appropriate f/u care is in place   Rationale for change in precautions or plan No change in plan/precautions   Safety Plan Patient to complete PTD   Anticipated Discharge Disposition home or self-care;substance use treatment     Goal Outcome Evaluation:

## 2024-11-18 NOTE — PLAN OF CARE
Patient was intermittently visible for meals and group this evening otherwise isolative to room, appeared to be napping, endorsed moderate anxiety and depressive symptoms, denied SI/SIB, rated her pain at 7/10 managed with Ibuprofen prn, engagement was minimal today.   Problem: Adult Inpatient Plan of Care  Goal: Optimal Comfort and Wellbeing  Outcome: Progressing  Intervention: Monitor Pain and Promote Comfort  Recent Flowsheet Documentation  Taken 11/17/2024 2105 by Lilian Tijerina RN  Pain Management Interventions: medication (see MAR)  Intervention: Provide Person-Centered Care  Recent Flowsheet Documentation  Taken 11/17/2024 2105 by Lilian Tijerina RN  Trust Relationship/Rapport:   choices provided   emotional support provided   thoughts/feelings acknowledged   questions answered     Problem: Anxiety  Goal: Anxiety Reduction or Resolution  Outcome: Progressing  Intervention: Promote Anxiety Reduction  Recent Flowsheet Documentation  Taken 11/17/2024 2107 by Lilian Tijerina RN  Supportive Measures:   active listening utilized   self-reflection promoted   self-responsibility promoted   relaxation techniques promoted  Family/Support System Care:   self-care encouraged   support provided  Taken 11/17/2024 2105 by Lilian Tijerina RN  Family/Support System Care:   presence promoted   support provided     Problem: Depression  Goal: Decreased Depression Symptoms  Outcome: Progressing  Intervention: Monitor and Manage Depressive Symptoms  Recent Flowsheet Documentation  Taken 11/17/2024 2107 by Lilian Tijerina RN  Supportive Measures:   active listening utilized   self-reflection promoted   self-responsibility promoted   relaxation techniques promoted  Family/Support System Care:   self-care encouraged   support provided  Taken 11/17/2024 2105 by Lilian Tijerina RN  Family/Support System Care:   presence promoted   support provided   Goal Outcome Evaluation:    Plan of Care Reviewed With: patient

## 2024-11-18 NOTE — PROGRESS NOTES
"  ----------------------------------------------------------------------------------------------------------  [facility]  Psychiatry Progress Note  Hospital Day #2     Interim History:     The patient's care was discussed with the treatment team and chart notes were reviewed.    Vitals: VSS  Sleep: 7 hours (11/18/24 0600)  Scheduled medications: Took all scheduled medications as prescribed  Psychiatric PRN medications: No psychiatric prns given overnight     Staff Report:   No acute events or safety concerns overnight. On 11/16, notably withdrawn and blunted affect with minimal engagement, continued passive SI with no intent. Reported medial bicep pain secondary to abscess I&D. On 11/17, patient continues to be withdrawn to self and isolative to room, endorses persistent anxiety, depression, and SI but do not have a plan and is dafne for safety. Please see staff notes for details.      Subjective:     Patient Interview:  Yun Coyle was interviewed in her room. States she is doing \"ok\", rating anxiety and depression around 8/10 which has not changed since her admission. Currently on Zyprexa for her cyclic vomiting and would like to stay on 10mg. Has previously tried Mirtazapine and Lexapro with improvement in mood. Patient is open to restarting Lexapro and prazosin for her nightmares.     Wound nurse visited for assessment of right arm abscess, see note. Patient notes no concerns or pain today in regards to wound. Had IM consult for uncontrolled DM, A1c of 11.2. See note for details.     ROS:  Patient has no bothersome physical symptoms  Patient denies acute concerns     Objective:     Vitals:  /71 (BP Location: Left arm, Patient Position: Right side, Cuff Size: Adult Regular)   Pulse 73   Temp 98.2  F (36.8  C) (Oral)   Resp 16   Ht 1.753 m (5' 9\")   Wt 90.9 kg (200 lb 8 oz)   LMP  (LMP Unknown)   SpO2 99%   BMI 29.61 kg/m      Allergies:  No " Known Allergies    Current Medications:  Scheduled:  Current Facility-Administered Medications   Medication Dose Route Frequency Provider Last Rate Last Admin    alum & mag hydroxide-simethicone (MAALOX) suspension 30 mL  30 mL Oral Q4H PRN Joseph Santana MD        cephALEXin (KEFLEX) capsule 500 mg  500 mg Oral 4x Daily Joseph Santana MD   500 mg at 11/17/24 2059    cloNIDine (CATAPRES) tablet 0.1 mg  0.1 mg Oral BID Joseph Santana MD   0.1 mg at 11/17/24 2059    hydrOXYzine HCl (ATARAX) tablet 25 mg  25 mg Oral Q4H PRN Joseph Santana MD   25 mg at 11/17/24 2059    ibuprofen (ADVIL/MOTRIN) tablet 800 mg  800 mg Oral Q6H PRN Joseph Santana MD   800 mg at 11/17/24 2059    losartan (COZAAR) tablet 25 mg  25 mg Oral Daily Joseph Santana MD   25 mg at 11/17/24 0753    melatonin tablet 3 mg  3 mg Oral At Bedtime PRN Joseph Santana MD   3 mg at 11/17/24 2059    metFORMIN (GLUCOPHAGE) tablet 1,000 mg  1,000 mg Oral BID w/meals Joseph Santana MD   1,000 mg at 11/17/24 1827    methadone (DOLOPHINE-INTENSOL) 10 MG/ML (HIGH CONC) solution 150 mg  150 mg Oral Daily Joseph Santana MD   150 mg at 11/17/24 0753    OLANZapine (zyPREXA) tablet 10 mg  10 mg Oral TID PRN Joseph Santana MD   10 mg at 11/17/24 1331    Or    OLANZapine (zyPREXA) injection 10 mg  10 mg Intramuscular TID PRN Joseph Santana MD        OLANZapine (zyPREXA) tablet 10 mg  10 mg Oral At Bedtime Joseph Santana MD   10 mg at 11/17/24 2100    senna-docusate (SENOKOT-S/PERICOLACE) 8.6-50 MG per tablet 1 tablet  1 tablet Oral BID PRN Joseph Santana MD           PRN:  Current Facility-Administered Medications   Medication Dose Route Frequency Provider Last Rate Last Admin    alum & mag hydroxide-simethicone (MAALOX) suspension 30 mL  30 mL Oral Q4H PRN Joseph Santana MD        cephALEXin (KEFLEX) capsule 500 mg  500 mg Oral 4x Daily Joseph Santana MD   500 mg at 11/17/24 2059    cloNIDine (CATAPRES) tablet  0.1 mg  0.1 mg Oral BID Joseph Santana MD   0.1 mg at 11/17/24 2059    hydrOXYzine HCl (ATARAX) tablet 25 mg  25 mg Oral Q4H PRN Joseph Santana MD   25 mg at 11/17/24 2059    ibuprofen (ADVIL/MOTRIN) tablet 800 mg  800 mg Oral Q6H PRN Joseph Santana MD   800 mg at 11/17/24 2059    losartan (COZAAR) tablet 25 mg  25 mg Oral Daily Joseph Santana MD   25 mg at 11/17/24 0753    melatonin tablet 3 mg  3 mg Oral At Bedtime PRN Joseph Santana MD   3 mg at 11/17/24 2059    metFORMIN (GLUCOPHAGE) tablet 1,000 mg  1,000 mg Oral BID w/meals Joseph Santana MD   1,000 mg at 11/17/24 1827    methadone (DOLOPHINE-INTENSOL) 10 MG/ML (HIGH CONC) solution 150 mg  150 mg Oral Daily Joseph Santana MD   150 mg at 11/17/24 0753    OLANZapine (zyPREXA) tablet 10 mg  10 mg Oral TID PRN Joseph Santana MD   10 mg at 11/17/24 1331    Or    OLANZapine (zyPREXA) injection 10 mg  10 mg Intramuscular TID PRN Joseph Santana MD        OLANZapine (zyPREXA) tablet 10 mg  10 mg Oral At Bedtime Joseph Santana MD   10 mg at 11/17/24 2100    senna-docusate (SENOKOT-S/PERICOLACE) 8.6-50 MG per tablet 1 tablet  1 tablet Oral BID PRN Joseph Santana MD           Labs and Imaging:  New results:   No results found for this or any previous visit (from the past 24 hours).    Data this admission:  - CBC unremarkable  - CMP unremarkable  - UDS + fentanyl, cannabinoids, and amphetamines  - Hgb A1c 11.2  - Lipids unremarkable  - Vit B12 normal  - Folate normal  - EKG normal sinus rhythm, QTc 478  - Hepatitis C pending   - MRSA negative     Mental Status Exam:     Oriented to:  Grossly Oriented  General:  Awake and Alert  Appearance:  appears stated age  Behavior/Attitude:  Calm, Cooperative, and Guarded  Eye Contact: Appropriate  Psychomotor: Normal and No evidence of tics, dystonia, or tardive dyskinesia  no catatonia present  Speech:  appropriate volume/tone and slowed speech  Language: Fluent in English with appropriate  "syntax and vocabulary.  Mood:  \"ok\"  Affect:  congruent with mood, flat, and blunted  Thought Process:  linear and coherent  Thought Content:   No SI/HI/AH/VH; No apparent delusions  Associations:  intact  Insight:  fair due to persistent depressive sx  Judgment:  fair due to persistent depressive sx  Impulse control: fair  Attention Span:  grossly intact  Concentration:  grossly intact  Recent and Remote Memory:  not formally assessed  Fund of Knowledge: average  Muscle Strength and Tone: normal  Gait and Station:  not assess - patient laying in bed     Psychiatric Assessment     Yun Coyle is a 48 year old female previously diagnosed with MDD, SANDIE, PTSD, Polysubstance use, Panic attacks, and agoraphobia who presented voluntarily with suicidal ideation with a plan in the context of worsening MDD, SANDIE, medication noncompliance, active fentanyl use on methadone, and daily cannabis use who is recently having issues with obtaining medical insurance. Most recent psychiatric hospitalization was during adolescence. Significant symptoms on admission include fatigue, insomnia, worry, anxiety, loss of appetite, anhedonia, worsening of depressed mood,  and suicidal ideation. The MSE on admission was pertinent for poor eye contact, blunted and sad affect, but normal thought process and content and no signs of altered behaviors.     Biological contributions to presentation include diagnosis of diabetes, chronic pain, and cauda equina syndrome. She has not been on any medication for a year except for methadone and olanzapine for nausea. She is actively using cannabis and recently started fentanyl use again this year. Patient has no history of other major neurologic disorders or head trauma but does have history of Hep C which showed positive RNA in January of this year which she did not receive treatment. She is now receiving appropriate medications for her underlying health conditions. Psychological contributions to " presentation include poor insight and lack of appropriate coping skills. Patient appears to demonstrate helplessness with reliance on others primarily daughter. She has not been able to leave her home or perform activities necessary for daily living. She demonstrated dependent personality and borderline personality traits and feels empty inside. Social factors contributing to presentation include good family support by daughter who is living with her. Patient does not work but is not concerned about financial stressors. She currently has no legal issues or interpersonal relationship conflicts with her daughter or other family. She is not able to engage with the community due to her current presentation.  Protective factors include family support and stable housing.      In summary, the patient's reported symptoms of SI with a plan and worsening depression in the context of medication noncompliance and substance use are consistent with worsening MDD that is severe and persistent. She will likely benefit from restarting medication therapy, engaging with therapy resources, being active on the milieu, and future outpatient resource planning this admission.     Given that she currently has SI and s/p suicide attempt, patient warrants inpatient psychiatric hospitalization to maintain her safety.      Psychiatric Plan by Diagnosis      Today's changes:  - START escitalopram 10mg every day   - START prazosin 1mg every day   - MODIFY Zyprexa 10mg oral tablet to disintegrating tablet     # MDD, severe with SI  - Olanzapine 10 mg PO every day  - Escitalopram 10mg every day   - Prazosin 1mg every day     # SANDIE  - Clonidine 0.1 mg BID    # Opiate Use Disorder  - Methadone 150 mg every day    Pertinent Labs/Monitoring:   - Review labs as above in note     Additional Plans:  - Patient will be treated in therapeutic milieu with appropriate individual and group therapies as described  - Patient would benefit from outpatient therapy  resource planning for anxiety related disorders and improving coping skills  - Consider outpatient CD treatment for fentanyl and cannabis use     Psychiatric Hospital Course:      Yun Coyle was admitted to [facility] as a voluntary patient.   Medications:  Patient not on any PTA medication  New medications started at the time of admission include Olanzapine 10 mg PO daily.   11/18: start escitalopram 10mg, prazosin 1mg, and modify olanzapine 10mg to disintegrating tablet     The risks, benefits, alternatives, and side effects were discussed and understood by the patient.     Medical Assessment and Plan     Medical diagnoses to be addressed this admission:    # Hypertension  - PTA Losartan     # Type II Diabetes  - PTA Metformin 1000 mg BID  - Sliding scale insulin  - Point-of-care glucose monitoring  - Educated patient on importance of follow-up with PCP for further diabetes management. Would benefit from GLP-1 or SGLT2 inhibitor.   - Continue to follow-up for assessment of insulin needs, may start additional agents         #Arm Abscess R arm s/p Drainage and wash out on 11/13  - Keflex 500mg QID x 7 days, started 11/16  - Management as per WOC  - Contact medicine if does not improve or with concern of infection      Medical course: Patient was physically examined by the ED prior to being transferred to the unit and was found to be medically stable and appropriate for admission.      Consults:    - IM for DMII management  - Wound Care for right arm abscess      Checklist     Legal Status: Voluntary     Safety Assessment:   Behavioral Orders   Procedures    Code 1 - Restrict to Unit    [facility] Extended Care     Until discharge, Extended Care to offer psychotherapeutic services to mental health patients boarding for admission or stabilization. These services are to include but are not limited to: individual psychotherapy, diagnostic assessment, case management and care planning, safety planning, etc. This may  include up to 1 visit per day. If patient is physically located at [facility] or [facility], group psychotherapy up to 2 time per day may be offered.     Routine Programming     As clinically indicated    Status 15     Every 15 minutes.       Risk Assessment:  Risk for harm is moderate.  Risk factors: SI, maladaptive coping, substance use, trauma, and past behaviors  Protective factors: engaged in treatment and help seeking      SIO: none    Disposition: TBD. Disposition pending clinical stabilization, medication optimization and development of an appropriate discharge plan.      Attestations     Resident with med student: Catherine Quinteros, MS3  [facility] Medical Student     I was present with the medical student who participated in the service and in the documentation of the note.  I have verified the history and personally performed the physical exam and medical decision making. I agree with the assessment and plan of care as documented in the note.    This patient was seen and discussed with my attending physician.    Brionna Gambino MD   Psychiatry Resident Physician

## 2024-11-18 NOTE — CONSULTS
[facility]  Consult Note - Hospitalist Service  Date of Admission:  11/12/2024  Consult Requested by:   Reason for Consult:     Assessment & Plan   Yun Coyle is a 48 year old female admitted on 11/12/2024.  Medical history notable for hypertension, diabetes mellitus type 2.  Admitted by psychiatry for suicidal ideation.      #Diabetes mellitus type 2  History of type 2 diabetes.  Prior to arrival metformin 1000 mg twice daily.  States is historically took insulin but stopped going out in public due to agoraphobia.  Noted to have A1c of 11.2 on 11/16/2024.  Consult placed by psychiatry for diabetes management.  Patient currently does not have insurance.  Would likely benefit from GLP-1 or SGLT2 inhibitor.  Will start sliding scale insulin while inpatient to assess insulin needs.  Additionally primary team noted will decrease Zyprexa, which should further decrease blood glucose  -Ordered sliding scale insulin  -Point-of-care glucose monitoring  -Continue metformin 1000 mg twice daily  -Educated patient on importance of follow-up with PCP for further diabetes management  -Continue to follow-up for assessment of insulin needs, may start additional agents    #Wound of upper extremity  Reports upper extremity wound due to infiltrated IV on right upper arm. Currently covered in dressing.  No surrounding erythema.  WOCN consult already placed  -Management as per WOC  -Contact medicine if does not improve or with concern of infection  -Continue cephalexin        Medicine will continue to follow     The patient's care was discussed with the Attending Physician    Clinically Significant Risk Factors                   # Hypertension: Noted on problem list           # DMII: A1C = 11.2 % (Ref range: <5.7 %) within past 6 months, PRESENT ON ADMISSION  # Overweight: Estimated body mass index is 29.61 kg/m  as calculated from the following:    Height as of this encounter: 1.753  "m (5' 9\").    Weight as of this encounter: 90.9 kg (200 lb 8 oz)., PRESENT ON ADMISSION     # Financial/Environmental Concerns: insurance, none;other (see comments) (buying non Rx substances and states she cannnot afford medications)  # Asthma: noted on problem list        Rey Brewer PA-C  Hospitalist Service  Securely message with [employer] (more info)  Text page via [facility] Paging/Directory   ______________________________________________________________________    Chief Complaint   Diabetes mellitus    History of Present Illness   Yun Coyle is a 48 year old female admitted on 11/12/2024.  Medical history notable for hypertension, diabetes mellitus type 2.  Admitted by psychiatry for suicidal ideation.  Patient reports history of diabetes.  Reports prior to arrival metformin 1000 mg twice daily.  Historically took insulin but no longer goes and pelvic duet to agoraphobia.  Open to restarting insulin here in the hospital.  Mention did not have insurance currently and but has sent in the paperwork.  Reports only other medical concern hypertension and upper arm wound due to infiltrated IV.  States that wound care nurses coming today to assess      Past Medical History    Past Medical History:   Diagnosis Date    Acute pancreatitis 06/29/2017    Anxiety     Cauda equina syndrome (H) 03/29/2013    Cyst of right ovary 11/16/2017    Depressive disorder     Gastroesophageal reflux disease     Hypertension     Hyponatremia 10/01/2023    Low back pain 08/20/2012    Overview:   History of cauda equina and surgery.      Type 2 diabetes mellitus (H)     Uncomplicated asthma        Past Surgical History   Past Surgical History:   Procedure Laterality Date    BACK SURGERY      x2    CHOLECYSTECTOMY      ORTHOPEDIC SURGERY      SALPINGO OOPHORECTOMY,R/L/ELDA Right        Medications   I have reviewed this patient's current medications     Physical Exam   Vital Signs: Temp: 98.1  F (36.7  C) Temp src: Oral BP: (!) 123/95 " Pulse: 85     SpO2: 100 % O2 Device: None (Room air)    Weight: 200 lbs 8 oz    Exam:  General: Appears stated age, no acute distress  Head: Normocephalic. No masses, lesions, tenderness or abnormalities  Cardiovascular: S1/S2, Normal rate and rhythm   Respiratory: Normal respiratory effort, lung fields clear to auscultation  Neuropsych: Speaks clearly, answers questions appropriately, motor/sensory innervation of extremities grossly intact       Medical Decision Making       45 MINUTES SPENT BY ME on the date of service doing chart review, history, exam, documentation & further activities per the note.

## 2024-11-18 NOTE — PLAN OF CARE
Problem: Sleep Disturbance  Goal: Adequate Sleep/Rest  Outcome: Progressing   Goal Outcome Evaluation:    Patient appeared to be asleep during safety rounds, no sign of distress noted, no request for PRN, patient slept for 7 hours.

## 2024-11-18 NOTE — PLAN OF CARE
"  Problem: Adult Inpatient Plan of Care  Goal: Plan of Care Review  Description: The Plan of Care Review/Shift note should be completed every shift.  The Outcome Evaluation is a brief statement about your assessment that the patient is improving, declining, or no change.  This information will be displayed automatically on your shift  note.  Outcome: Progressing  Flowsheets (Taken 11/18/2024 1416)  Plan of Care Reviewed With: patient  Overall Patient Progress: improving  Goal: Patient-Specific Goal (Individualized)  Description: You can add care plan individualizations to a care plan. Examples of Individualization might be:  \"Parent requests to be called daily at 9am for status\", \"I have a hard time hearing out of my right ear\", or \"Do not touch me to wake me up as it startles  me\".  Outcome: Progressing  Goal: Absence of Hospital-Acquired Illness or Injury  Outcome: Progressing  Intervention: Identify and Manage Fall Risk  Recent Flowsheet Documentation  Taken 11/18/2024 0930 by Sara Santacruz RN  Safety Promotion/Fall Prevention:   safety round/check completed   nonskid shoes/slippers when out of bed  Intervention: Prevent Skin Injury  Recent Flowsheet Documentation  Taken 11/18/2024 0930 by Sara Santacruz RN  Body Position: position changed independently  Intervention: Prevent and Manage VTE (Venous Thromboembolism) Risk  Recent Flowsheet Documentation  Taken 11/18/2024 0930 by Sara Santacruz RN  VTE Prevention/Management: SCDs off (sequential compression devices)  Intervention: Prevent Infection  Recent Flowsheet Documentation  Taken 11/18/2024 0930 by Sara Santacruz RN  Infection Prevention:   rest/sleep promoted   hand hygiene promoted  Goal: Optimal Comfort and Wellbeing  Outcome: Progressing  Intervention: Monitor Pain and Promote Comfort  Recent Flowsheet Documentation  Taken 11/18/2024 0930 by Saar Santacruz RN  Pain Management Interventions: medication (see MAR)  Intervention: Provide Person-Centered " "Care  Recent Flowsheet Documentation  Taken 11/18/2024 0930 by Sara Santacruz RN  Trust Relationship/Rapport:   choices provided   emotional support provided   thoughts/feelings acknowledged   questions answered  Goal: Readiness for Transition of Care  Outcome: Progressing     Problem: Adult Behavioral Health Plan of Care  Goal: Plan of Care Review  Outcome: Progressing  Flowsheets (Taken 11/18/2024 1416)  Plan of Care Reviewed With: patient  Overall Patient Progress: improving  Goal: Patient-Specific Goal (Individualization)  Description: You can add care plan individualizations to a care plan. Examples of Individualization might be:  \"Parent requests to be called daily at 9am for status\", \"I have a hard time hearing out of my right ear\", or \"Do not touch me to wake me up as it startles  me\".  Outcome: Progressing  Goal: Adheres to Safety Considerations for Self and Others  Outcome: Progressing  Goal: Absence of New-Onset Illness or Injury  Outcome: Progressing  Intervention: Identify and Manage Fall Risk  Recent Flowsheet Documentation  Taken 11/18/2024 0930 by Sara Santacruz RN  Safety Promotion/Fall Prevention:   safety round/check completed   nonskid shoes/slippers when out of bed  Intervention: Prevent VTE (Venous Thromboembolism)  Recent Flowsheet Documentation  Taken 11/18/2024 0930 by Sara Santacruz RN  VTE Prevention/Management: SCDs off (sequential compression devices)  Intervention: Prevent Infection  Recent Flowsheet Documentation  Taken 11/18/2024 0930 by Sara Santacruz RN  Infection Prevention:   rest/sleep promoted   hand hygiene promoted  Goal: Optimized Coping Skills in Response to Life Stressors  Outcome: Progressing  Intervention: Promote Effective Coping Strategies  Recent Flowsheet Documentation  Taken 11/18/2024 1040 by Sara Santacruz RN  Supportive Measures:   active listening utilized   relaxation techniques promoted   self-care encouraged   self-reflection promoted   journaling promoted   " verbalization of feelings encouraged  Goal: Develops/Participates in Therapeutic [place] to Support Successful Transition  Outcome: Progressing  Intervention: Foster Therapeutic [place]  Recent Flowsheet Documentation  Taken 11/18/2024 0930 by Sara Santacruz RN  Trust Relationship/Rapport:   choices provided   emotional support provided   thoughts/feelings acknowledged   questions answered     Problem: Sleep Disturbance  Goal: Adequate Sleep/Rest  Outcome: Progressing  Intervention: Promote Sleep/Rest  Recent Flowsheet Documentation  Taken 11/18/2024 1040 by Sara Santacruz RN  Sleep/Rest Enhancement:   reading promoted   consistent schedule promoted   relaxation techniques promoted   medication     Problem: Anxiety  Goal: Anxiety Reduction or Resolution  Outcome: Progressing  Intervention: Promote Anxiety Reduction  Recent Flowsheet Documentation  Taken 11/18/2024 1040 by Sara Santacruz RN  Supportive Measures:   active listening utilized   relaxation techniques promoted   self-care encouraged   self-reflection promoted   journaling promoted   verbalization of feelings encouraged  Family/Support System Care: self-care encouraged  Taken 11/18/2024 0930 by Sara Santacruz RN  Family/Support System Care:   presence promoted   support provided     Problem: Depression  Goal: Decreased Depression Symptoms  Outcome: Progressing  Intervention: Monitor and Manage Depressive Symptoms  Recent Flowsheet Documentation  Taken 11/18/2024 1040 by Sara Santacruz RN  Supportive Measures:   active listening utilized   relaxation techniques promoted   self-care encouraged   self-reflection promoted   journaling promoted   verbalization of feelings encouraged  Family/Support System Care: self-care encouraged  Taken 11/18/2024 0930 by Sara Santacruz RN  Family/Support System Care:   presence promoted   support provided     Problem: Suicide Risk  Goal: Absence of Self-Harm  Outcome: Progressing  Intervention: Assess Risk to Self and  Maintain Safety  Recent Flowsheet Documentation  Taken 11/18/2024 1040 by Sara Santacruz RN  Behavior Management: behavioral plan reviewed  Enhanced Safety Measures: review medications for side effects with activity  Taken 11/18/2024 0930 by Sara Santacruz RN  Enhanced Safety Measures: review medications for side effects with activity  Intervention: Promote Psychosocial Wellbeing  Recent Flowsheet Documentation  Taken 11/18/2024 1040 by Sara Santacruz RN  Supportive Measures:   active listening utilized   relaxation techniques promoted   self-care encouraged   self-reflection promoted   journaling promoted   verbalization of feelings encouraged  Sleep/Rest Enhancement:   reading promoted   consistent schedule promoted   relaxation techniques promoted   medication  Family/Support System Care: self-care encouraged  Taken 11/18/2024 0930 by Sara Santacruz RN  Family/Support System Care:   presence promoted   support provided  Intervention: Establish Safety Plan and Continuity of Care  Recent Flowsheet Documentation  Taken 11/18/2024 1040 by Sara Santacruz RN  Safe Transition Promotion: protective factors promoted     Problem: Skin Injury Risk Increased  Goal: Skin Health and Integrity  Outcome: Progressing  Intervention: Plan: Nurse Driven Intervention: Moisture Management  Recent Flowsheet Documentation  Taken 11/18/2024 0930 by Sara Santacruz RN  Moisture Interventions: Other (comment)  Taken 11/18/2024 0800 by Sara Santacruz RN  Moisture Interventions: Other (comment)  Bathing/Skin Care: other (see comments)  Intervention: Optimize Skin Protection  Recent Flowsheet Documentation  Taken 11/18/2024 0930 by Sara Santacruz RN  Activity Management: up ad nicholas  Head of Bed (HOB) Positioning: HOB flat   Goal Outcome Evaluation:      Plan of Care Reviewed With: patient    Overall Patient Progress: improving    Patient has been intermittently visible in the milieu.Patient presents with flat blunted affect.mood is sad.Patient  isolative to self,frequently naps in her room.Patient avoids social contact.Patient observed sitting in the D/R by herself coloring.Patient is appropriate.No escalation of behaviors.No safety concerns.Patient endorsed moderate anxiety and depression.Prn Hydroxyzine given x 1 with effect.Denies SI/SIB/AH/VH & HI.Blood sugar 252 @ noon.3 Units of Novolog given per sliding scale.WOC saw patient and changed the wound dressing on the right arm.Adequate food and fluid intake.Hygiene is appropriate.  Temp: 98.1  F (36.7  C) Temp src: Oral BP: (!) 123/95 Pulse: 85     SpO2: 100 % O2 Device: None (Room air)

## 2024-11-18 NOTE — PLAN OF CARE
Problem: Adult Inpatient Plan of Care  Goal: Plan of Care Review  Description: The Plan of Care Review/Shift note should be completed every shift.  The Outcome Evaluation is a brief statement about your assessment that the patient is improving, declining, or no change.  This information will be displayed automatically on your shift  note.  Outcome: Progressing     Problem: Adult Behavioral Health Plan of Care  Goal: Plan of Care Review  Outcome: Progressing     Problem: Depression  Goal: Decreased Depression Symptoms  Outcome: Progressing  Intervention: Monitor and Manage Depressive Symptoms  Recent Flowsheet Documentation  Taken 11/18/2024 1600 by Rakel Avina, RN  Family/Support System Care: self-care encouraged   Goal Outcome Evaluation:    Pt isolative and withdrawn to self in room, calm, quiet and polite, only responds what she's asked, intermittently visible in the milieu for requests and meals. C/O right arm pain rated 6/10, PRN Ibuprofen was given with slight effect, dressing on the right arm sight remains clean and intact. Pt endorsed anxiety 7/10, PRN Zyprexa was given with effect, denied SI, SIB, HI, denied A/V/Hallucinations and contracted for safety in the unit. Pt is compliant with medication, BS before meals 307 and at , sliding scale was administered per order.

## 2024-11-19 LAB
GLUCOSE BLDC GLUCOMTR-MCNC: 221 MG/DL (ref 70–99)
GLUCOSE BLDC GLUCOMTR-MCNC: 239 MG/DL (ref 70–99)
GLUCOSE BLDC GLUCOMTR-MCNC: 276 MG/DL (ref 70–99)
GLUCOSE BLDC GLUCOMTR-MCNC: 304 MG/DL (ref 70–99)
GLUCOSE BLDC GLUCOMTR-MCNC: 382 MG/DL (ref 70–99)
HCV RNA SERPL NAA+PROBE-ACNC: ABNORMAL IU/ML
HCV RNA SERPL NAA+PROBE-LOG IU: 5.2 {LOG_IU}/ML
VIT B1 PYROPHOSHATE BLD-SCNC: 106 NMOL/L

## 2024-11-19 PROCEDURE — 250N000013 HC RX MED GY IP 250 OP 250 PS 637

## 2024-11-19 PROCEDURE — 99231 SBSQ HOSP IP/OBS SF/LOW 25: CPT

## 2024-11-19 PROCEDURE — 250N000013 HC RX MED GY IP 250 OP 250 PS 637: Performed by: STUDENT IN AN ORGANIZED HEALTH CARE EDUCATION/TRAINING PROGRAM

## 2024-11-19 PROCEDURE — 250N000012 HC RX MED GY IP 250 OP 636 PS 637

## 2024-11-19 PROCEDURE — 97150 GROUP THERAPEUTIC PROCEDURES: CPT | Mod: GO

## 2024-11-19 PROCEDURE — 124N000002 HC R&B MH UMMC

## 2024-11-19 PROCEDURE — 99232 SBSQ HOSP IP/OBS MODERATE 35: CPT | Mod: GC | Performed by: PSYCHIATRY & NEUROLOGY

## 2024-11-19 RX ADMIN — METHADONE HYDROCHLORIDE 150 MG: 10 CONCENTRATE ORAL at 09:51

## 2024-11-19 RX ADMIN — PRAZOSIN HYDROCHLORIDE 1 MG: 1 CAPSULE ORAL at 21:26

## 2024-11-19 RX ADMIN — LOSARTAN POTASSIUM 25 MG: 25 TABLET, FILM COATED ORAL at 08:10

## 2024-11-19 RX ADMIN — IBUPROFEN 800 MG: 800 TABLET, FILM COATED ORAL at 09:51

## 2024-11-19 RX ADMIN — CLONIDINE HYDROCHLORIDE 0.1 MG: 0.1 TABLET ORAL at 21:26

## 2024-11-19 RX ADMIN — CLONIDINE HYDROCHLORIDE 0.1 MG: 0.1 TABLET ORAL at 08:10

## 2024-11-19 RX ADMIN — HYDROXYZINE HYDROCHLORIDE 25 MG: 25 TABLET ORAL at 01:27

## 2024-11-19 RX ADMIN — INSULIN ASPART 2 UNITS: 100 INJECTION, SOLUTION INTRAVENOUS; SUBCUTANEOUS at 08:09

## 2024-11-19 RX ADMIN — HYDROXYZINE HYDROCHLORIDE 25 MG: 25 TABLET ORAL at 16:46

## 2024-11-19 RX ADMIN — METFORMIN HYDROCHLORIDE 1000 MG: 500 TABLET, FILM COATED ORAL at 18:04

## 2024-11-19 RX ADMIN — OLANZAPINE 10 MG: 10 TABLET, FILM COATED ORAL at 13:07

## 2024-11-19 RX ADMIN — CEPHALEXIN 500 MG: 500 CAPSULE ORAL at 08:10

## 2024-11-19 RX ADMIN — IBUPROFEN 800 MG: 800 TABLET, FILM COATED ORAL at 16:07

## 2024-11-19 RX ADMIN — CEPHALEXIN 500 MG: 500 CAPSULE ORAL at 16:07

## 2024-11-19 RX ADMIN — ESCITALOPRAM OXALATE 10 MG: 10 TABLET ORAL at 08:10

## 2024-11-19 RX ADMIN — INSULIN ASPART 3 UNITS: 100 INJECTION, SOLUTION INTRAVENOUS; SUBCUTANEOUS at 18:04

## 2024-11-19 RX ADMIN — METFORMIN HYDROCHLORIDE 1000 MG: 500 TABLET, FILM COATED ORAL at 08:10

## 2024-11-19 RX ADMIN — INSULIN GLARGINE 14 UNITS: 100 INJECTION, SOLUTION SUBCUTANEOUS at 21:27

## 2024-11-19 RX ADMIN — CEPHALEXIN 500 MG: 500 CAPSULE ORAL at 21:26

## 2024-11-19 RX ADMIN — CEPHALEXIN 500 MG: 500 CAPSULE ORAL at 11:57

## 2024-11-19 RX ADMIN — INSULIN ASPART 5 UNITS: 100 INJECTION, SOLUTION INTRAVENOUS; SUBCUTANEOUS at 11:57

## 2024-11-19 RX ADMIN — OLANZAPINE 10 MG: 10 TABLET, ORALLY DISINTEGRATING ORAL at 21:26

## 2024-11-19 ASSESSMENT — ACTIVITIES OF DAILY LIVING (ADL)
ADLS_ACUITY_SCORE: 0
DRESS: SCRUBS (BEHAVIORAL HEALTH)
ADLS_ACUITY_SCORE: 0
HYGIENE/GROOMING: HANDWASHING;INDEPENDENT
ORAL_HYGIENE: INDEPENDENT
ADLS_ACUITY_SCORE: 0

## 2024-11-19 NOTE — PLAN OF CARE
Assessment/Intervention/Current Symtoms and Care Coordination:  Chart reviewed and patient met with team,   Discussed patient progress, symptomology, and response to treatment.  Discussed the discharge plan and any potential impediments to discharge.    Patient has been isolative to room mostly since admitted. Affect blunted.  Cooperative and polite.  Writer met with patient yesterday afternoon.  Discussed writers' role and inquired if she would like referrals for Psychiatric care/therapy. Patient was agreeable to Psychiatrist referral- prefers in person.  Patient was not interested in therapy or CYNTHIA assessment for treatment.  Reports dosing for Methadone at [facility]    Discharge Plan or Goal:  Patient will return home when stable  Psychiatry  Methadone Maintenance    Barriers to Discharge:  Depressive symptoms with SI     Referral Status:  Patient will be referred to outpatient Psychiatrist       Legal Status:  Voluntary     Contacts:  [facility]   Gamaliel Granados DO- [facility]    Upcoming Meetings and Dates/Important Information and next steps:  Team Update:   Mon

## 2024-11-19 NOTE — CARE PLAN
Rehab Group    Start time: 1015  End time: 1145  Patient time total: 80 minutes    attended partial group    #5 attended   Group Type: OT Clinic   Group Topic Covered: activity therapy and coping skills     Group Session Detail:  Coping skill exploration, creative expression within personally meaningful activities, and observation of social, cognitive, and kinesthetic performance skills     Patient Response/Contribution:  actively engaged, Limited eye contact, and withdrawn     Patient Detail:  Restricted affect. Expressed interest to initiate a self-directed project. IND to gather materials, organize work space, and sequence a painted wood task to completion. Demonstrated fair organization. Min-no social engagement with peers or writer.         [id number] OT Group (2 or more in attendance)      Patient Active Problem List   Diagnosis    Suicidal behavior    Benign essential HTN    Cannabis abuse    Cyclic vomiting syndrome    Elevated LFTs    Hyperlipidemia with target low density lipoprotein (LDL) cholesterol less than 100 mg/dL    Insomnia    Major depressive disorder, recurrent episode, severe (H)    Mild intermittent asthma    Panic disorder with agoraphobia    OCD (obsessive compulsive disorder)    Severe benzodiazepine use disorder (H) in remission    S/P lumbar microdiscectomy    S/P laparoscopic cholecystectomy    Personality disorder (H)    Type 2 diabetes mellitus with hyperglycemia, with long-term current use of insulin (H)    Obesity (BMI 35.0-39.9) with comorbidity (H)    Falls frequently    Moderate opioid use disorder, in early remission (H)    Chronic hepatitis C without hepatic coma (H)    Hyperglycemia    Dehydration    Cyclical vomiting    Lactic acidosis    Epigastric pain    Coffee ground emesis    Elevated blood ketone body level    Severe episode of recurrent major depressive disorder, without psychotic features (H)    PTSD (post-traumatic stress disorder)    Recurrent major depression (H)     Suicidal ideation    Abscess of arm, right

## 2024-11-19 NOTE — PROGRESS NOTES
Brief medicine note    AM sugars remain elevated. Could benefit from adding basal insulin to regimen.  Manufacture recommendation for starting Lantus would be 14 units based on weight.     Plan   -Ordered Lantus 14 units at bedtime  -Point-of-care glucose monitoring overnight to monitor for hypoglycemia    Rey Brewer PA-C  Hospitalist Service, [facility]  Securely message with [employer] (more info)  Text page via [facility] Paging/Directory   See signed in provider for up to date coverage information

## 2024-11-19 NOTE — CARE PLAN
Rehab Group    Start time: 1330  End time: 1415  Patient time total: 45 minutes    attended full group     #3 attended   Group Type: occupational therapy   Group Topic Covered: activity therapy, cognitive activities, and healthy leisure time     Group Session Detail:  Leisure exploration and engagement offered for increased intrinsic motivation to engage in social non-obligatory occupations, challenge new learning, sequencing, problem solving, and retention via a cognitive group game.      Patient Response/Contribution:  cooperative with task, organized, and actively engaged     Patient Detail:  Overall congruent affect. Able to learn and follow novel game instructions. Demonstrated awareness of strategy and provided assistance to peers as a helpful gesture.       [id number] OT Group (2 or more in attendance)      Patient Active Problem List   Diagnosis    Suicidal behavior    Benign essential HTN    Cannabis abuse    Cyclic vomiting syndrome    Elevated LFTs    Hyperlipidemia with target low density lipoprotein (LDL) cholesterol less than 100 mg/dL    Insomnia    Major depressive disorder, recurrent episode, severe (H)    Mild intermittent asthma    Panic disorder with agoraphobia    OCD (obsessive compulsive disorder)    Severe benzodiazepine use disorder (H) in remission    S/P lumbar microdiscectomy    S/P laparoscopic cholecystectomy    Personality disorder (H)    Type 2 diabetes mellitus with hyperglycemia, with long-term current use of insulin (H)    Obesity (BMI 35.0-39.9) with comorbidity (H)    Falls frequently    Moderate opioid use disorder, in early remission (H)    Chronic hepatitis C without hepatic coma (H)    Hyperglycemia    Dehydration    Cyclical vomiting    Lactic acidosis    Epigastric pain    Coffee ground emesis    Elevated blood ketone body level    Severe episode of recurrent major depressive disorder, without psychotic features (H)    PTSD (post-traumatic stress disorder)    Recurrent major  depression (H)    Suicidal ideation    Abscess of arm, right

## 2024-11-19 NOTE — PLAN OF CARE
"  Problem: Adult Inpatient Plan of Care  Goal: Plan of Care Review  Description: The Plan of Care Review/Shift note should be completed every shift.  The Outcome Evaluation is a brief statement about your assessment that the patient is improving, declining, or no change.  This information will be displayed automatically on your shift  note.  Outcome: Progressing  Flowsheets (Taken 11/19/2024 1450)  Plan of Care Reviewed With: patient  Goal: Patient-Specific Goal (Individualized)  Description: You can add care plan individualizations to a care plan. Examples of Individualization might be:  \"Parent requests to be called daily at 9am for status\", \"I have a hard time hearing out of my right ear\", or \"Do not touch me to wake me up as it startles  me\".  Outcome: Progressing  Goal: Absence of Hospital-Acquired Illness or Injury  Outcome: Progressing  Intervention: Prevent Skin Injury  Recent Flowsheet Documentation  Taken 11/19/2024 0900 by Flower Manuel RN  Body Position: position changed independently  Intervention: Prevent and Manage VTE (Venous Thromboembolism) Risk  Recent Flowsheet Documentation  Taken 11/19/2024 0900 by Flower Manuel RN  VTE Prevention/Management: SCDs off (sequential compression devices)  Intervention: Prevent Infection  Recent Flowsheet Documentation  Taken 11/19/2024 1441 by Flower Manuel RN  Infection Prevention: hand hygiene promoted  Goal: Optimal Comfort and Wellbeing  Outcome: Progressing  Intervention: Provide Person-Centered Care  Recent Flowsheet Documentation  Taken 11/19/2024 0900 by Flower Manuel RN  Trust Relationship/Rapport:   choices provided   emotional support provided   thoughts/feelings acknowledged   questions answered  Goal: Readiness for Transition of Care  Outcome: Progressing       Goal Outcome Evaluation:    Plan of Care Reviewed With: patient     Patient presented with a pleasant affect, smiley face, calm and polite. She denied pain and denied " all MH pysch symptoms but endorsed anxiety and depression 8/10 and declined intervention. Patient's hygiene was appropriate. She was eating and drinking adequately. Her bloold sugar before meals were 239 before breakfast and 382 before lunch. Insulin was administered per sliding scale. Patient took a shower today. Her wound dressing was changed. Patient attended groups. She spent most of the shift isolated and withdrawn to her room. There was no behavior or safety concerns this shift. Will continue current plan of care and notify provider with any concerns.

## 2024-11-19 NOTE — PLAN OF CARE
BEH IP Unit Acuity Rating Score (UARS)  Patient is given one point for every criteria they meet.    CRITERIA SCORING   On a 72 hour hold, court hold, committed, stay of commitment, or revocation. 0    Patient LOS on BEH unit exceeds 20 days. 0  LOS: 3   Patient under guardianship, 55+, otherwise medically complex, or under age 11. 0   Suicide ideation without relief of precipitating factors. 1   Current plan for suicide. 1   Current plan for homicide. 0   Imminent risk or actual attempt to seriously harm another without relief of factors precipitating the attempt. 1   Severe dysfunction in daily living (ex: complete neglect for self care, extreme disruption in vegetative function, extreme deterioration in social interactions). 1   Recent (last 7 days) or current physical aggression in the ED or on unit. 0   Restraints or seclusion episode in past 72 hours. 0   Recent (last 7 days) or current verbal aggression, agitation, yelling, etc., while in the ED or unit. 0   Active psychosis. 0   Need for constant or near constant redirection (from leaving, from others, etc).  0   Intrusive or disruptive behaviors. 0   Patient requires 3 or more hours of individualized nursing care per 8-hour shift (i.e. for ADLs, meds, therapeutic interventions). 0   TOTAL 4

## 2024-11-19 NOTE — PLAN OF CARE
Group Attendance:  attended full group    Time session began: 1015  Time session ended: 1100  Patient's total time in group: 45    Total # Attendees   2   Group Type psychotherapeutic     Group Topic Covered insight improvement, relationships, and relationships and boundaries     Group Session Detail Group members checked in and started discussion around mental health and spent the time of the group processing around mental health struggles, and different therapeutic techniques.     Patient's response to the group topic/interactions:  positive affect, cooperative with task, supportive of peers, socially appropriate, listened actively, and actively engaged     Patient Details: Pt attended group, engaged in discussion about mental health and shared her experiences with the group.            No Charge-less than 3 patients fully attended group  Patient Active Problem List   Diagnosis    Suicidal behavior    Benign essential HTN    Cannabis abuse    Cyclic vomiting syndrome    Elevated LFTs    Hyperlipidemia with target low density lipoprotein (LDL) cholesterol less than 100 mg/dL    Insomnia    Major depressive disorder, recurrent episode, severe (H)    Mild intermittent asthma    Panic disorder with agoraphobia    OCD (obsessive compulsive disorder)    Severe benzodiazepine use disorder (H) in remission    S/P lumbar microdiscectomy    S/P laparoscopic cholecystectomy    Personality disorder (H)    Type 2 diabetes mellitus with hyperglycemia, with long-term current use of insulin (H)    Obesity (BMI 35.0-39.9) with comorbidity (H)    Falls frequently    Moderate opioid use disorder, in early remission (H)    Chronic hepatitis C without hepatic coma (H)    Hyperglycemia    Dehydration    Cyclical vomiting    Lactic acidosis    Epigastric pain    Coffee ground emesis    Elevated blood ketone body level    Severe episode of recurrent major depressive disorder, without psychotic features (H)    PTSD (post-traumatic  stress disorder)    Recurrent major depression (H)    Suicidal ideation    Abscess of arm, right

## 2024-11-20 LAB
GLUCOSE BLDC GLUCOMTR-MCNC: 188 MG/DL (ref 70–99)
GLUCOSE BLDC GLUCOMTR-MCNC: 215 MG/DL (ref 70–99)
GLUCOSE BLDC GLUCOMTR-MCNC: 215 MG/DL (ref 70–99)
GLUCOSE BLDC GLUCOMTR-MCNC: 287 MG/DL (ref 70–99)
GLUCOSE BLDC GLUCOMTR-MCNC: 325 MG/DL (ref 70–99)

## 2024-11-20 PROCEDURE — 250N000013 HC RX MED GY IP 250 OP 250 PS 637

## 2024-11-20 PROCEDURE — 90853 GROUP PSYCHOTHERAPY: CPT

## 2024-11-20 PROCEDURE — 250N000013 HC RX MED GY IP 250 OP 250 PS 637: Performed by: STUDENT IN AN ORGANIZED HEALTH CARE EDUCATION/TRAINING PROGRAM

## 2024-11-20 PROCEDURE — 97150 GROUP THERAPEUTIC PROCEDURES: CPT | Mod: GO

## 2024-11-20 PROCEDURE — 99232 SBSQ HOSP IP/OBS MODERATE 35: CPT | Mod: GC | Performed by: PSYCHIATRY & NEUROLOGY

## 2024-11-20 PROCEDURE — 124N000002 HC R&B MH UMMC

## 2024-11-20 RX ORDER — ESCITALOPRAM OXALATE 20 MG/1
20 TABLET ORAL DAILY
Status: DISCONTINUED | OUTPATIENT
Start: 2024-11-21 | End: 2024-11-25

## 2024-11-20 RX ADMIN — METFORMIN HYDROCHLORIDE 1000 MG: 500 TABLET, FILM COATED ORAL at 18:01

## 2024-11-20 RX ADMIN — OLANZAPINE 10 MG: 10 TABLET, ORALLY DISINTEGRATING ORAL at 21:40

## 2024-11-20 RX ADMIN — CEPHALEXIN 500 MG: 500 CAPSULE ORAL at 20:07

## 2024-11-20 RX ADMIN — IBUPROFEN 800 MG: 800 TABLET, FILM COATED ORAL at 07:48

## 2024-11-20 RX ADMIN — PRAZOSIN HYDROCHLORIDE 1 MG: 1 CAPSULE ORAL at 21:39

## 2024-11-20 RX ADMIN — OLANZAPINE 10 MG: 10 TABLET, FILM COATED ORAL at 10:12

## 2024-11-20 RX ADMIN — CLONIDINE HYDROCHLORIDE 0.1 MG: 0.1 TABLET ORAL at 20:07

## 2024-11-20 RX ADMIN — METHADONE HYDROCHLORIDE 150 MG: 10 CONCENTRATE ORAL at 07:48

## 2024-11-20 RX ADMIN — CEPHALEXIN 500 MG: 500 CAPSULE ORAL at 15:22

## 2024-11-20 RX ADMIN — OLANZAPINE 10 MG: 10 TABLET, FILM COATED ORAL at 15:41

## 2024-11-20 RX ADMIN — ESCITALOPRAM OXALATE 10 MG: 10 TABLET ORAL at 07:47

## 2024-11-20 RX ADMIN — INSULIN ASPART 2 UNITS: 100 INJECTION, SOLUTION INTRAVENOUS; SUBCUTANEOUS at 17:59

## 2024-11-20 RX ADMIN — INSULIN ASPART 1 UNITS: 100 INJECTION, SOLUTION INTRAVENOUS; SUBCUTANEOUS at 07:49

## 2024-11-20 RX ADMIN — METFORMIN HYDROCHLORIDE 1000 MG: 500 TABLET, FILM COATED ORAL at 07:47

## 2024-11-20 RX ADMIN — LOSARTAN POTASSIUM 25 MG: 25 TABLET, FILM COATED ORAL at 07:48

## 2024-11-20 RX ADMIN — INSULIN ASPART 4 UNITS: 100 INJECTION, SOLUTION INTRAVENOUS; SUBCUTANEOUS at 12:11

## 2024-11-20 RX ADMIN — IBUPROFEN 800 MG: 800 TABLET, FILM COATED ORAL at 15:22

## 2024-11-20 RX ADMIN — CEPHALEXIN 500 MG: 500 CAPSULE ORAL at 12:11

## 2024-11-20 RX ADMIN — CEPHALEXIN 500 MG: 500 CAPSULE ORAL at 07:47

## 2024-11-20 RX ADMIN — INSULIN GLARGINE 14 UNITS: 100 INJECTION, SOLUTION SUBCUTANEOUS at 21:40

## 2024-11-20 RX ADMIN — CLONIDINE HYDROCHLORIDE 0.1 MG: 0.1 TABLET ORAL at 07:47

## 2024-11-20 ASSESSMENT — ACTIVITIES OF DAILY LIVING (ADL)
ADLS_ACUITY_SCORE: 0
DRESS: INDEPENDENT
HYGIENE/GROOMING: INDEPENDENT
ADLS_ACUITY_SCORE: 0
LAUNDRY: WITH SUPERVISION
ADLS_ACUITY_SCORE: 0
ORAL_HYGIENE: INDEPENDENT
ADLS_ACUITY_SCORE: 0

## 2024-11-20 NOTE — PLAN OF CARE
Problem: Sleep Disturbance  Goal: Adequate Sleep/Rest  Outcome: Progressing   Patient appears sleeping most of the shift. No complaints of pain and discomfort. Patient continues on q15 minutes safety checks, no behavioral issues noted. The patient had 7 total hours of sleep this shift. [id number]. Will continue to monitor the patient and provide therapeutic intervention as needed. Will continue with current plan of care. Notify MD with any concerns.

## 2024-11-20 NOTE — PROGRESS NOTES
"  Rehab Group    Start time: 1030  End time: 1115  Patient time total: 40 minutes    attended partial group     #3 attended   Group Type: dance movement   Group Topic Covered:  organization and integration       Group Session Detail:  Group focused on integration in a physical, mental and emotional level.  Bilateral and cross-lateral movement supported coordination and a sense of interweaving the self into one whole.       Patient Response/Contribution:  cooperative with task, organized, and listened actively       Patient Detail:    Pt was able to lead movements including stretching, opening to different directions and other patient-initiated movements.  She used supportive movement that other patients were able to follow.  She was organized and calm.  She stayed a while, then stated:  \"I think I'm done now\" and left the group room      Group Therapy [id number]      Patient Active Problem List   Diagnosis    Suicidal behavior    Benign essential HTN    Cannabis abuse    Cyclic vomiting syndrome    Elevated LFTs    Hyperlipidemia with target low density lipoprotein (LDL) cholesterol less than 100 mg/dL    Insomnia    Major depressive disorder, recurrent episode, severe (H)    Mild intermittent asthma    Panic disorder with agoraphobia    OCD (obsessive compulsive disorder)    Severe benzodiazepine use disorder (H) in remission    S/P lumbar microdiscectomy    S/P laparoscopic cholecystectomy    Personality disorder (H)    Type 2 diabetes mellitus with hyperglycemia, with long-term current use of insulin (H)    Obesity (BMI 35.0-39.9) with comorbidity (H)    Falls frequently    Moderate opioid use disorder, in early remission (H)    Chronic hepatitis C without hepatic coma (H)    Hyperglycemia    Dehydration    Cyclical vomiting    Lactic acidosis    Epigastric pain    Coffee ground emesis    Elevated blood ketone body level    Severe episode of recurrent major depressive disorder, without psychotic features (H)    PTSD " (post-traumatic stress disorder)    Recurrent major depression (H)    Suicidal ideation    Abscess of arm, right

## 2024-11-20 NOTE — PLAN OF CARE
Assessment/Intervention/Current Symtoms and Care Coordination:  Chart reviewed and patient met with team,   Discussed patient progress, symptomology, and response to treatment.  Discussed the discharge plan and any potential impediments to discharge.    Patient has been intermittently  isolative to room  but has been out for meals, attending some groups.  Affect remains blunted.    Patient stated that she has not noticed any improvement in mood since admission.  Meds continue to be monitored/adjusted per MD's.    Patient was not interested in therapy or CYNTHIA assessment for treatment.  Dosing [facility] Methadone at [facility]    Discharge Plan or Goal:  Patient will return home when stable  Psychiatry  Methadone Maintenance    Barriers to Discharge:  Depressive symptoms with SI     Referral Status:  Patient will be referred to outpatient Psychiatrist     Legal Status:  Voluntary     Contacts:  [facility]   Gamaliel Granados DO- [facility]    Upcoming Meetings and Dates/Important Information and next steps:  Team Update:   Mon

## 2024-11-20 NOTE — PROGRESS NOTES
"  ----------------------------------------------------------------------------------------------------------  [facility]  Psychiatry Progress Note  Hospital Day #4     Interim History:     The patient's care was discussed with the treatment team and chart notes were reviewed.    Vitals: VSS  Sleep: 7 hours (11/20/24 0600)  Scheduled medications: Took all scheduled medications as prescribed  Psychiatric PRN medications: No psychiatric prns given overnight     Staff Report:   No acute events or safety concerns overnight. Patient continues to be isolative to room, visible in milieu intermittently for meals and select groups. Interactive with peers when in milieu. Endorses persistent anxiety and depression, denies SI/HI and able to contract for safety. Right upper arm wound site pain managed with PRN Ibuprofen. Please see staff notes for details.      Subjective:     Patient Interview:  Yun Coyle was interviewed in the milieu. States she doesn't feel much different from yesterday, rating her anxiety and depression at 8/10. Patient reported historical use of Lexapro helped with mood, unsure of what her dosage was last time she took it.     States she likes painting and going to groups, open to going to more today. Per chart review, patient denied 1:1 psychotherapy when offered - discussed with patient today who is agreeable to trying it next time it is offered. Denies SI/HI/AH/VH.      ROS:  Patient has no bothersome physical symptoms. Notes improvement in infection of right arm. Does endorse some mild wound pain 7/10, managed by ibuprofen which \"takes the edge off\".  Patient denies acute concerns.     Objective:     Vitals:  BP (!) 141/94   Pulse 78   Temp 97.6  F (36.4  C)   Resp 18   Ht 1.753 m (5' 9\")   Wt 90.9 kg (200 lb 8 oz)   LMP  (LMP Unknown)   SpO2 99%   BMI 29.61 kg/m      Allergies:  No Known Allergies    Current Medications:  Scheduled:  Current " Facility-Administered Medications   Medication Dose Route Frequency Provider Last Rate Last Admin    alum & mag hydroxide-simethicone (MAALOX) suspension 30 mL  30 mL Oral Q4H PRN Joseph Santana MD        cephALEXin (KEFLEX) capsule 500 mg  500 mg Oral 4x Daily Alina Flannery MD   500 mg at 11/20/24 0747    cloNIDine (CATAPRES) tablet 0.1 mg  0.1 mg Oral BID Alina Flannery MD   0.1 mg at 11/20/24 0747    glucose gel 15-30 g  15-30 g Oral Q15 Min PRN Rey Brewer        Or    dextrose 50 % injection 25-50 mL  25-50 mL Intravenous Q15 Min PRN Rey Brewer        Or    glucagon injection 1 mg  1 mg Subcutaneous Q15 Min PRN Rey Brewer        [START ON 11/21/2024] escitalopram (LEXAPRO) tablet 20 mg  20 mg Oral Daily Brionna Gambino MD        hydrOXYzine HCl (ATARAX) tablet 25 mg  25 mg Oral Q4H PRN Joseph Santana MD   25 mg at 11/19/24 1646    ibuprofen (ADVIL/MOTRIN) tablet 800 mg  800 mg Oral Q6H PRN Alina Flannery MD   800 mg at 11/20/24 0748    insulin aspart (NovoLOG) injection (RAPID ACTING)   Subcutaneous Daily with breakfast Rey Brewer        insulin aspart (NovoLOG) injection (RAPID ACTING)   Subcutaneous Daily with lunch Rey Brewer        insulin aspart (NovoLOG) injection (RAPID ACTING)   Subcutaneous Daily with supper Rey Brewer        insulin aspart (NovoLOG) injection (RAPID ACTING)  1-7 Units Subcutaneous TID AC Rey Brewer   1 Units at 11/20/24 0749    insulin aspart (NovoLOG) injection (RAPID ACTING)  1-5 Units Subcutaneous At Bedtime Rey Brewer   3 Units at 11/19/24 2127    insulin glargine (LANTUS PEN) injection 14 Units  14 Units Subcutaneous At Bedtime Rey Brewer   14 Units at 11/19/24 2127    losartan (COZAAR) tablet 25 mg  25 mg Oral Daily Joseph Santana MD   25 mg at 11/20/24 0748    melatonin tablet 3 mg  3 mg Oral At Bedtime PRN Joseph Santana MD   3 mg at 11/17/24 2059    metFORMIN (GLUCOPHAGE) tablet 1,000 mg  1,000 mg Oral BID w/meals  Joseph Santana MD   1,000 mg at 11/20/24 0747    methadone (DOLOPHINE-INTENSOL) 10 MG/ML (HIGH CONC) solution 150 mg  150 mg Oral Daily Alina Flannery MD   150 mg at 11/20/24 0748    OLANZapine (zyPREXA) tablet 10 mg  10 mg Oral TID PRN Joseph Santana MD   10 mg at 11/20/24 1012    Or    OLANZapine (zyPREXA) injection 10 mg  10 mg Intramuscular TID PRN Joseph Santana MD        OLANZapine zydis (zyPREXA) ODT tab 10 mg  10 mg Oral At Bedtime Brionna Gambino MD   10 mg at 11/19/24 2126    prazosin (MINIPRESS) capsule 1 mg  1 mg Oral At Bedtime Brionna Gambino MD   1 mg at 11/19/24 2126    senna-docusate (SENOKOT-S/PERICOLACE) 8.6-50 MG per tablet 1 tablet  1 tablet Oral BID PRN Joseph Santana MD           PRN:  Current Facility-Administered Medications   Medication Dose Route Frequency Provider Last Rate Last Admin    alum & mag hydroxide-simethicone (MAALOX) suspension 30 mL  30 mL Oral Q4H PRN Joseph Santana MD        cephALEXin (KEFLEX) capsule 500 mg  500 mg Oral 4x Daily Alina Flannery MD   500 mg at 11/20/24 0747    cloNIDine (CATAPRES) tablet 0.1 mg  0.1 mg Oral BID Alina Flannery MD   0.1 mg at 11/20/24 0747    glucose gel 15-30 g  15-30 g Oral Q15 Min PRN Rey Brewer        Or    dextrose 50 % injection 25-50 mL  25-50 mL Intravenous Q15 Min PRN Rey Brewer        Or    glucagon injection 1 mg  1 mg Subcutaneous Q15 Min PRN Rey Brewer        [START ON 11/21/2024] escitalopram (LEXAPRO) tablet 20 mg  20 mg Oral Daily Brionna Gambino MD        hydrOXYzine HCl (ATARAX) tablet 25 mg  25 mg Oral Q4H PRN Joseph Santana MD   25 mg at 11/19/24 1646    ibuprofen (ADVIL/MOTRIN) tablet 800 mg  800 mg Oral Q6H PRN Alina Flannery MD   800 mg at 11/20/24 0748    insulin aspart (NovoLOG) injection (RAPID ACTING)   Subcutaneous Daily with breakfast Rey Brewer        insulin aspart (NovoLOG) injection (RAPID ACTING)   Subcutaneous Daily with lunch Rey Brewer        insulin quique  (NovoLOG) injection (RAPID ACTING)   Subcutaneous Daily with supper Rey Brewer        insulin aspart (NovoLOG) injection (RAPID ACTING)  1-7 Units Subcutaneous TID AC Rey Brewer   1 Units at 11/20/24 0749    insulin aspart (NovoLOG) injection (RAPID ACTING)  1-5 Units Subcutaneous At Bedtime Rey Brewer   3 Units at 11/19/24 2127    insulin glargine (LANTUS PEN) injection 14 Units  14 Units Subcutaneous At Bedtime Rey Brewer   14 Units at 11/19/24 2127    losartan (COZAAR) tablet 25 mg  25 mg Oral Daily Joseph Santana MD   25 mg at 11/20/24 0748    melatonin tablet 3 mg  3 mg Oral At Bedtime PRN Joseph Santana MD   3 mg at 11/17/24 2059    metFORMIN (GLUCOPHAGE) tablet 1,000 mg  1,000 mg Oral BID w/meals Joseph Santana MD   1,000 mg at 11/20/24 0747    methadone (DOLOPHINE-INTENSOL) 10 MG/ML (HIGH CONC) solution 150 mg  150 mg Oral Daily Alina Flannery MD   150 mg at 11/20/24 0748    OLANZapine (zyPREXA) tablet 10 mg  10 mg Oral TID PRN Joseph Santana MD   10 mg at 11/20/24 1012    Or    OLANZapine (zyPREXA) injection 10 mg  10 mg Intramuscular TID PRN Joseph Santana MD        OLANZapine zydis (zyPREXA) ODT tab 10 mg  10 mg Oral At Bedtime Brionna Gambino MD   10 mg at 11/19/24 2126    prazosin (MINIPRESS) capsule 1 mg  1 mg Oral At Bedtime Brionna Gambino MD   1 mg at 11/19/24 2126    senna-docusate (SENOKOT-S/PERICOLACE) 8.6-50 MG per tablet 1 tablet  1 tablet Oral BID PRN Joseph Santana MD           Labs and Imaging:  New results:   Recent Results (from the past 24 hours)   Glucose by meter    Collection Time: 11/19/24 11:47 AM   Result Value Ref Range    GLUCOSE BY METER POCT 382 (H) 70 - 99 mg/dL   Glucose by meter    Collection Time: 11/19/24  5:58 PM   Result Value Ref Range    GLUCOSE BY METER POCT 276 (H) 70 - 99 mg/dL   Glucose by meter    Collection Time: 11/19/24  9:22 PM   Result Value Ref Range    GLUCOSE BY METER POCT 304 (H) 70 - 99 mg/dL   Glucose by meter     "Collection Time: 11/20/24  2:52 AM   Result Value Ref Range    GLUCOSE BY METER POCT 287 (H) 70 - 99 mg/dL   Glucose by meter    Collection Time: 11/20/24  7:43 AM   Result Value Ref Range    GLUCOSE BY METER POCT 188 (H) 70 - 99 mg/dL       Data this admission:  - CBC unremarkable  - CMP unremarkable  - UDS + fentanyl, cannabinoids, and amphetamines  - Hgb A1c 11.2  - Lipids unremarkable  - Vit B12 normal  - Folate normal  - EKG normal sinus rhythm, QTc 478  - Hepatitis C pending   - MRSA negative     Mental Status Exam:     Oriented to:  Grossly Oriented  General:  Awake and Alert  Appearance:  appears stated age  Behavior/Attitude:  Calm, Cooperative, and Guarded  Eye Contact: Appropriate  Psychomotor: Normal and No evidence of tics, dystonia, or tardive dyskinesia  no catatonia present  Speech:  appropriate volume/tone and appropriate rate  Language: Fluent in English with appropriate syntax and vocabulary.  Mood:  \"same\"  Affect:  congruent with mood, flat, and blunted  Thought Process:  linear and coherent  Thought Content:   No SI/HI/AH/VH; No apparent delusions  Associations:  intact  Insight:  fair due to persistent depressive sx  Judgment:  fair due to persistent depressive sx  Impulse control: fair  Attention Span:  grossly intact  Concentration:  grossly intact  Recent and Remote Memory:  not formally assessed  Fund of Knowledge: average  Muscle Strength and Tone: normal  Gait and Station:  not assessed - patient sitting at table during interview     Psychiatric Assessment     Yun Coyle is a 48 year old female previously diagnosed with MDD, SANDIE, PTSD, Polysubstance use, Panic attacks, and agoraphobia who presented voluntarily with suicidal ideation with a plan in the context of worsening MDD, SANDIE, medication noncompliance, active fentanyl use on methadone, and daily cannabis use who is recently having issues with obtaining medical insurance. Most recent psychiatric hospitalization was during " adolescence. Significant symptoms on admission include fatigue, insomnia, worry, anxiety, loss of appetite, anhedonia, worsening of depressed mood,  and suicidal ideation. The MSE on admission was pertinent for poor eye contact, blunted and sad affect, but normal thought process and content and no signs of altered behaviors.     Biological contributions to presentation include diagnosis of diabetes, chronic pain, and cauda equina syndrome. She has not been on any medication for a year except for methadone and olanzapine for nausea. She is actively using cannabis and recently started fentanyl use again this year. Patient has no history of other major neurologic disorders or head trauma but does have history of Hep C which showed positive RNA in January of this year which she did not receive treatment. She is now receiving appropriate medications for her underlying health conditions. Psychological contributions to presentation include poor insight and lack of appropriate coping skills. Patient appears to demonstrate helplessness with reliance on others primarily daughter. She has not been able to leave her home or perform activities necessary for daily living. She demonstrated dependent personality and borderline personality traits and feels empty inside. Social factors contributing to presentation include good family support by daughter who is living with her. Patient does not work but is not concerned about financial stressors. She currently has no legal issues or interpersonal relationship conflicts with her daughter or other family. She is not able to engage with the community due to her current presentation.  Protective factors include family support and stable housing.      In summary, the patient's reported symptoms of SI with a plan and worsening depression in the context of medication noncompliance and substance use are consistent with worsening MDD that is severe and persistent. She will likely benefit from  restarting medication therapy, engaging with therapy resources, being active on the milieu, and future outpatient resource planning this admission.     Given her current persistent depression and anxiety, patient warrants continued inpatient psychiatric hospitalization and medication management to maintain her safety.      Psychiatric Plan by Diagnosis      Today's changes:  - Increase Lexapro from 10mg to 20mg every day      # MDD, severe with SI  - Olanzapine 10 mg PO every day  - Escitalopram 20 mg every day   - Prazosin 1mg every day     # SANDIE  - Clonidine 0.1 mg BID    # Opiate Use Disorder  - Methadone 150 mg every day    Pertinent Labs/Monitoring:   - Review labs as above in note     Additional Plans:  - Patient will be treated in therapeutic milieu with appropriate individual and group therapies as described  - Patient would benefit from outpatient therapy resource planning for anxiety related disorders and improving coping skills  - Consider outpatient CD treatment for fentanyl and cannabis use     Psychiatric Hospital Course:      Yun Coyle was admitted to [facility] as a voluntary patient.   Medications:  Patient not on any PTA medication  New medications started at the time of admission include Olanzapine 10 mg PO daily.   11/18: start escitalopram 10mg, prazosin 1mg, and modify olanzapine 10mg to disintegrating tablet. IM started sliding scale insulin and POC glucose monitoring   11/19: Started Lantus 14 units at bedtime  11/20: Increased Lexapro from 10mg to 20mg every day      The risks, benefits, alternatives, and side effects were discussed and understood by the patient.     Medical Assessment and Plan     Medical diagnoses to be addressed this admission:    # Hypertension  - PTA Losartan  - Few incidences of elevated BP readings, if >3 readings are elevated, consult IM for HTN management      # Type II Diabetes  - PTA Metformin 1000 mg BID  - Starting 11/19 Lantus 14 units at bedtime per  IM   - Sliding scale insulin  - Point-of-care glucose monitoring  - Educated patient on importance of follow-up with PCP for further diabetes management. Would benefit from GLP-1 or SGLT2 inhibitor.   - Continue to follow-up for assessment of insulin needs, may start additional agents         #Arm Abscess R arm s/p Drainage and wash out on 11/13  - Keflex 500mg QID x 7 days, started 11/16  - Management as per WOC  - Contact medicine if does not improve or with concern of infection      Medical course: Patient was physically examined by the ED prior to being transferred to the unit and was found to be medically stable and appropriate for admission.      Consults:    - Continued Wound Care for right arm abscess   - IM following for DMII care     Checklist     Legal Status: Voluntary     Safety Assessment:   Behavioral Orders   Procedures    Code 1 - Restrict to Unit    [facility] Extended Care     Until discharge, Extended Care to offer psychotherapeutic services to mental health patients boarding for admission or stabilization. These services are to include but are not limited to: individual psychotherapy, diagnostic assessment, case management and care planning, safety planning, etc. This may include up to 1 visit per day. If patient is physically located at [facility] or [facility] group psychotherapy up to 2 time per day may be offered.     Routine Programming     As clinically indicated    Status 15     Every 15 minutes.       Risk Assessment:  Risk for harm is moderate.  Risk factors: SI, maladaptive coping, substance use, trauma, and past behaviors  Protective factors: engaged in treatment and help seeking      SIO: none    Disposition: TBD. Disposition pending clinical stabilization, medication optimization and development of an appropriate discharge plan.      Attestations     Resident with med student: Catherine Quinteros, MS3  [facility] Medical Student     I was present with the medical student who participated in the service and in  the documentation of the note.  I have verified the history and personally performed the physical exam and medical decision making. I agree with the assessment and plan of care as documented in the note.    This patient was seen and discussed with my attending physician.    Brionna Gambino MD   Psychiatry Resident Physician

## 2024-11-20 NOTE — PLAN OF CARE
"  Problem: Anxiety  Goal: Anxiety Reduction or Resolution  Outcome: Progressing     Problem: Depression  Goal: Decreased Depression Symptoms  Outcome: Progressing     Problem: Suicide Risk  Goal: Absence of Self-Harm  Outcome: Progressing   Goal Outcome Evaluation:    Plan of Care Reviewed With: patient    Patient alert and able to make needs known, affect flat and blunted, she continue to endorse anxiety and depression rating 7/10, patient has pain in right arm she rates 7/10, PRN Ibuprofen given at the start of the shift. Patient denied SI, SIB, auditory and visual hallucination and is dafne for safety. Surgical wound to right upper inner arm WCON to follow up. Recent vitals BP (!) 141/94   Pulse 78   Temp 97.6  F (36.4  C)   Resp 18   Ht 1.753 m (5' 9\")   Wt 90.9 kg (200 lb 8 oz)   LMP  (LMP Unknown)   SpO2 99%   BMI 29.61 kg/m  , patient took all schedule medications, no behavior or safety concern noted, she requested for PRN Zyprexa and reported medication was effective rating anxiety at 5/10. Patient was able to participate in group activity this afternoon.     "

## 2024-11-20 NOTE — CONSULTS
11/20-     Test claim for Jardiance- covered medication $4.60 for 30 days supply.     Thank you for allowing me to help with your patient  Katlyn Larios [facility]  Pharmacy Discharge Liaison   [facility]    Disclaimer: Pharmacy test claims are estimates and may not reflect final costs. Suggested alternatives aim to be cost-effective but may not be therapeutically equivalent as this consult is informational and does not constitute medical advice. Clinical decisions should be made by qualified healthcare providers.  Covering for [employer] while on PTO

## 2024-11-20 NOTE — PLAN OF CARE
BEH IP Unit Acuity Rating Score (UARS)  Patient is given one point for every criteria they meet.    CRITERIA SCORING   On a 72 hour hold, court hold, committed, stay of commitment, or revocation. 0    Patient LOS on BEH unit exceeds 20 days. 0  LOS: 4   Patient under guardianship, 55+, otherwise medically complex, or under age 11. 0   Suicide ideation without relief of precipitating factors. 1   Current plan for suicide. 1   Current plan for homicide. 0   Imminent risk or actual attempt to seriously harm another without relief of factors precipitating the attempt. 1   Severe dysfunction in daily living (ex: complete neglect for self care, extreme disruption in vegetative function, extreme deterioration in social interactions). 1   Recent (last 7 days) or current physical aggression in the ED or on unit. 0   Restraints or seclusion episode in past 72 hours. 0   Recent (last 7 days) or current verbal aggression, agitation, yelling, etc., while in the ED or unit. 0   Active psychosis. 0   Need for constant or near constant redirection (from leaving, from others, etc).  0   Intrusive or disruptive behaviors. 0   Patient requires 3 or more hours of individualized nursing care per 8-hour shift (i.e. for ADLs, meds, therapeutic interventions). 0   TOTAL 4

## 2024-11-20 NOTE — CARE PLAN
Rehab Group    Start time: 1115  End time: 1200  Patient time total: 45 minutes    attended full group    #4 attended   Group Type: OT Clinic   Group Topic Covered: activity therapy and coping skills     Group Session Detail:  Coping skill exploration, creative expression within personally meaningful activities, and observation of social, cognitive, and kinesthetic performance skills     Patient Response/Contribution:  positive affect, supportive of peers, and actively engaged     Patient Detail:  Congruent affect. IND to gather materials, organize work space, and sequence task to completion. Demonstrated good focus and organization. Engaged in appropriate conversation with peers; provided positive feedback to peers.         [id number] OT Group (2 or more in attendance)      Patient Active Problem List   Diagnosis    Suicidal behavior    Benign essential HTN    Cannabis abuse    Cyclic vomiting syndrome    Elevated LFTs    Hyperlipidemia with target low density lipoprotein (LDL) cholesterol less than 100 mg/dL    Insomnia    Major depressive disorder, recurrent episode, severe (H)    Mild intermittent asthma    Panic disorder with agoraphobia    OCD (obsessive compulsive disorder)    Severe benzodiazepine use disorder (H) in remission    S/P lumbar microdiscectomy    S/P laparoscopic cholecystectomy    Personality disorder (H)    Type 2 diabetes mellitus with hyperglycemia, with long-term current use of insulin (H)    Obesity (BMI 35.0-39.9) with comorbidity (H)    Falls frequently    Moderate opioid use disorder, in early remission (H)    Chronic hepatitis C without hepatic coma (H)    Hyperglycemia    Dehydration    Cyclical vomiting    Lactic acidosis    Epigastric pain    Coffee ground emesis    Elevated blood ketone body level    Severe episode of recurrent major depressive disorder, without psychotic features (H)    PTSD (post-traumatic stress disorder)    Recurrent major depression (H)    Suicidal ideation     Abscess of arm, right

## 2024-11-20 NOTE — PLAN OF CARE
"  Problem: Adult Inpatient Plan of Care  Goal: Plan of Care Review  Description: The Plan of Care Review/Shift note should be completed every shift.  The Outcome Evaluation is a brief statement about your assessment that the patient is improving, declining, or no change.  This information will be displayed automatically on your shift  note.  Outcome: Progressing  Flowsheets (Taken 11/19/2024 1932)  Plan of Care Reviewed With: patient  Overall Patient Progress: no change     Problem: Suicide Risk  Goal: Absence of Self-Harm  Outcome: Progressing   Goal Outcome Evaluation:    Plan of Care Reviewed With: patient Plan of Care Reviewed With: patient    Overall Patient Progress: no changeOverall Patient Progress: no change     /76 (BP Location: Left arm, Patient Position: Sitting, Cuff Size: Adult Large)   Pulse 78   Temp 98.1  F (36.7  C) (Oral)   Resp 18   Ht 1.753 m (5' 9\")   Wt 90.9 kg (200 lb 8 oz)   LMP  (LMP Unknown)   SpO2 98%   BMI 29.61 kg/m        Alert and oriented. Isolative to room, visible in milieu for supper only. Interactive with peers when in milieu. Flat, blunted, irritable at times. She was compliant with medications. PRN Hydroxyzine given for anxiety rated at 7. Depression endorsed at 7. She denied suicidal/homicidal ideation, contracted for safety. Right upper arm wound site pain rated at 7, pain managed with PRN Ibuprofen. Wound dressing in place, no drainage noted. ADLs WNL. Food and fluid intake WNL.          "

## 2024-11-21 VITALS
RESPIRATION RATE: 16 BRPM | HEART RATE: 72 BPM | DIASTOLIC BLOOD PRESSURE: 79 MMHG | BODY MASS INDEX: 30.33 KG/M2 | SYSTOLIC BLOOD PRESSURE: 112 MMHG | HEIGHT: 69 IN | WEIGHT: 204.8 LBS | OXYGEN SATURATION: 99 % | TEMPERATURE: 98.1 F

## 2024-11-21 LAB
GLUCOSE BLDC GLUCOMTR-MCNC: 239 MG/DL (ref 70–99)
GLUCOSE BLDC GLUCOMTR-MCNC: 257 MG/DL (ref 70–99)
GLUCOSE BLDC GLUCOMTR-MCNC: 271 MG/DL (ref 70–99)
GLUCOSE BLDC GLUCOMTR-MCNC: 307 MG/DL (ref 70–99)
GLUCOSE BLDC GLUCOMTR-MCNC: 341 MG/DL (ref 70–99)

## 2024-11-21 PROCEDURE — 97150 GROUP THERAPEUTIC PROCEDURES: CPT | Mod: GO

## 2024-11-21 PROCEDURE — 250N000013 HC RX MED GY IP 250 OP 250 PS 637

## 2024-11-21 PROCEDURE — 99232 SBSQ HOSP IP/OBS MODERATE 35: CPT

## 2024-11-21 PROCEDURE — 250N000013 HC RX MED GY IP 250 OP 250 PS 637: Performed by: STUDENT IN AN ORGANIZED HEALTH CARE EDUCATION/TRAINING PROGRAM

## 2024-11-21 PROCEDURE — 99232 SBSQ HOSP IP/OBS MODERATE 35: CPT | Mod: GC | Performed by: STUDENT IN AN ORGANIZED HEALTH CARE EDUCATION/TRAINING PROGRAM

## 2024-11-21 PROCEDURE — 124N000002 HC R&B MH UMMC

## 2024-11-21 RX ORDER — CLONIDINE HYDROCHLORIDE 0.1 MG/1
0.2 TABLET ORAL 2 TIMES DAILY
Status: CANCELLED | OUTPATIENT
Start: 2024-11-21

## 2024-11-21 RX ORDER — PRAZOSIN HYDROCHLORIDE 2 MG/1
2 CAPSULE ORAL AT BEDTIME
Status: DISCONTINUED | OUTPATIENT
Start: 2024-11-21 | End: 2024-12-04 | Stop reason: HOSPADM

## 2024-11-21 RX ADMIN — INSULIN ASPART 2 UNITS: 100 INJECTION, SOLUTION INTRAVENOUS; SUBCUTANEOUS at 18:08

## 2024-11-21 RX ADMIN — CEPHALEXIN 500 MG: 500 CAPSULE ORAL at 16:12

## 2024-11-21 RX ADMIN — CEPHALEXIN 500 MG: 500 CAPSULE ORAL at 20:29

## 2024-11-21 RX ADMIN — CEPHALEXIN 500 MG: 500 CAPSULE ORAL at 07:53

## 2024-11-21 RX ADMIN — INSULIN ASPART 4 UNITS: 100 INJECTION, SOLUTION INTRAVENOUS; SUBCUTANEOUS at 08:30

## 2024-11-21 RX ADMIN — HYDROXYZINE HYDROCHLORIDE 25 MG: 25 TABLET ORAL at 16:12

## 2024-11-21 RX ADMIN — CLONIDINE HYDROCHLORIDE 0.1 MG: 0.1 TABLET ORAL at 07:53

## 2024-11-21 RX ADMIN — METHADONE HYDROCHLORIDE 150 MG: 10 CONCENTRATE ORAL at 08:01

## 2024-11-21 RX ADMIN — HYDROXYZINE HYDROCHLORIDE 25 MG: 25 TABLET ORAL at 20:29

## 2024-11-21 RX ADMIN — METFORMIN HYDROCHLORIDE 1000 MG: 500 TABLET, FILM COATED ORAL at 07:53

## 2024-11-21 RX ADMIN — PRAZOSIN HYDROCHLORIDE 2 MG: 2 CAPSULE ORAL at 22:00

## 2024-11-21 RX ADMIN — LOSARTAN POTASSIUM 25 MG: 25 TABLET, FILM COATED ORAL at 07:53

## 2024-11-21 RX ADMIN — CLONIDINE HYDROCHLORIDE 0.1 MG: 0.1 TABLET ORAL at 20:29

## 2024-11-21 RX ADMIN — INSULIN ASPART 5 UNITS: 100 INJECTION, SOLUTION INTRAVENOUS; SUBCUTANEOUS at 12:27

## 2024-11-21 RX ADMIN — IBUPROFEN 800 MG: 800 TABLET, FILM COATED ORAL at 11:21

## 2024-11-21 RX ADMIN — CEPHALEXIN 500 MG: 500 CAPSULE ORAL at 11:21

## 2024-11-21 RX ADMIN — ESCITALOPRAM 20 MG: 20 TABLET, FILM COATED ORAL at 07:53

## 2024-11-21 RX ADMIN — HYDROXYZINE HYDROCHLORIDE 25 MG: 25 TABLET ORAL at 02:47

## 2024-11-21 RX ADMIN — OLANZAPINE 10 MG: 10 TABLET, FILM COATED ORAL at 11:39

## 2024-11-21 RX ADMIN — OLANZAPINE 10 MG: 10 TABLET, ORALLY DISINTEGRATING ORAL at 22:01

## 2024-11-21 RX ADMIN — METFORMIN HYDROCHLORIDE 1000 MG: 500 TABLET, FILM COATED ORAL at 18:09

## 2024-11-21 ASSESSMENT — ACTIVITIES OF DAILY LIVING (ADL)
ADLS_ACUITY_SCORE: 0
HYGIENE/GROOMING: INDEPENDENT
ADLS_ACUITY_SCORE: 0
ADLS_ACUITY_SCORE: 0
LAUNDRY: WITH SUPERVISION
ADLS_ACUITY_SCORE: 0
ORAL_HYGIENE: INDEPENDENT
ADLS_ACUITY_SCORE: 0
ADLS_ACUITY_SCORE: 0
DRESS: INDEPENDENT

## 2024-11-21 NOTE — PLAN OF CARE
"  Problem: Sleep Disturbance  Goal: Adequate Sleep/Rest  Outcome: Progressing   Goal Outcome Evaluation:     Pt appears to have slept for  6.25 hours. Pt  denies pain but rated anxiety a 7/10. PRN hydroxyzine was administered. Pt verbalize that the medication was helpful. No safety or behavioral concerns noted or reported.    Blood pressure 122/83, pulse 84, temperature 98.2  F (36.8  C), temperature source Oral, resp. rate 18, height 1.753 m (5' 9\"), weight 90.9 kg (200 lb 8 oz), SpO2 98%, not currently breastfeeding.   "

## 2024-11-21 NOTE — PLAN OF CARE
Assessment/Intervention/Current Symtoms and Care Coordination:  Chart reviewed and patient met with team,   Discussed patient progress, symptomology, and response to treatment.  Discussed the discharge plan and any potential impediments to discharge.    Patient has been intermittently isolative to room  but has been out for meals, attending some groups. Patient in lounge today playing cards with female patient and seen working on a puzzle. Affect remains blunted.    Hygiene remains very poor.    Patient continues to report  mood is unchanged since admission .  Meds continue to be monitored/adjusted per MD's.    Discharge Plan or Goal:  Patient will return home when stable  Psychiatry  Methadone Maintenance    Barriers to Discharge:  Depressive symptoms with SI     Referral Status:  Patient has been referred to outpatient Psychiatrist     Legal Status:  Voluntary     Contacts:  [facility]- Methadone   Gamaliel Granados DO- [facility]    Upcoming Meetings and Dates/Important Information and next steps:  Team Update:   Mon

## 2024-11-21 NOTE — CARE PLAN
Rehab Group    Start time: 1000  End time: 1130  Patient time total: 55 minutes    attended partial group     #3 attended   Group Type: OT Clinic   Group Topic Covered: activity therapy and coping skills     Group Session Detail:  Coping skill exploration, creative expression within personally meaningful activities, and observation of social, cognitive, and kinesthetic performance skills     Patient Response/Contribution:  positive affect, organized, and actively engaged     Patient Detail:  Observed with a gradual broader range affect throughout this week.     IND to gather materials, organize work space, and sequence painted ceramic to completion. Reported feeling please with the finished result of her painted box from previous session; accepted positive feedback. Demonstrated fair organization. Initiated and engaged in appropriate conversation with peers.         [id number] OT Group (2 or more in attendance)      Patient Active Problem List   Diagnosis    Suicidal behavior    Benign essential HTN    Cannabis abuse    Cyclic vomiting syndrome    Elevated LFTs    Hyperlipidemia with target low density lipoprotein (LDL) cholesterol less than 100 mg/dL    Insomnia    Major depressive disorder, recurrent episode, severe (H)    Mild intermittent asthma    Panic disorder with agoraphobia    OCD (obsessive compulsive disorder)    Severe benzodiazepine use disorder (H) in remission    S/P lumbar microdiscectomy    S/P laparoscopic cholecystectomy    Personality disorder (H)    Type 2 diabetes mellitus with hyperglycemia, with long-term current use of insulin (H)    Obesity (BMI 35.0-39.9) with comorbidity (H)    Falls frequently    Moderate opioid use disorder, in early remission (H)    Chronic hepatitis C without hepatic coma (H)    Hyperglycemia    Dehydration    Cyclical vomiting    Lactic acidosis    Epigastric pain    Coffee ground emesis    Elevated blood ketone body level    Severe episode of recurrent major  depressive disorder, without psychotic features (H)    PTSD (post-traumatic stress disorder)    Recurrent major depression (H)    Suicidal ideation    Abscess of arm, right

## 2024-11-21 NOTE — PLAN OF CARE
BEH IP Unit Acuity Rating Score (UARS)  Patient is given one point for every criteria they meet.    CRITERIA SCORING   On a 72 hour hold, court hold, committed, stay of commitment, or revocation. 0    Patient LOS on BEH unit exceeds 20 days. 0  LOS: 5   Patient under guardianship, 55+, otherwise medically complex, or under age 11. 0   Suicide ideation without relief of precipitating factors. 1   Current plan for suicide. 1   Current plan for homicide. 0   Imminent risk or actual attempt to seriously harm another without relief of factors precipitating the attempt. 1   Severe dysfunction in daily living (ex: complete neglect for self care, extreme disruption in vegetative function, extreme deterioration in social interactions). 1   Recent (last 7 days) or current physical aggression in the ED or on unit. 0   Restraints or seclusion episode in past 72 hours. 0   Recent (last 7 days) or current verbal aggression, agitation, yelling, etc., while in the ED or unit. 0   Active psychosis. 0   Need for constant or near constant redirection (from leaving, from others, etc).  0   Intrusive or disruptive behaviors. 0   Patient requires 3 or more hours of individualized nursing care per 8-hour shift (i.e. for ADLs, meds, therapeutic interventions). 0   TOTAL 4

## 2024-11-21 NOTE — PLAN OF CARE
Pt was withdrawn and isolative to her room this shift. Pt was laying in bed in her room the whole evening shift. Pt only came out for dinner and medications. Pt presents with flat affect. Reports agitation which was managed with prn Zyprexa. Pt reported anxiety 8/10 and depression 7/10. Pt reported anxiety was better after taking the Zyprexa. Denied for SI/SIB/AH/VH. Dressing is clean and intact on the right arm wound. Ate 1005 of dinner. No behavior concerns noted.     Goal Outcome Evaluation:    Plan of Care Reviewed With: patient      Problem: Anxiety  Goal: Anxiety Reduction or Resolution  Outcome: Progressing     Problem: Depression  Goal: Decreased Depression Symptoms  Outcome: Progressing     Problem: Suicide Risk  Goal: Absence of Self-Harm  Outcome: Progressing

## 2024-11-21 NOTE — PLAN OF CARE
Problem: Depression  Goal: Decreased Depression Symptoms  Intervention: Monitor and Manage Depressive Symptoms  Recent Flowsheet Documentation  Taken 11/21/2024 1100 by Pati Ji RN  Family/Support System Care:   self-care encouraged   support provided   Goal Outcome Evaluation:    Plan of Care Reviewed With: patient    Patient presented with flat and blunted affect, continue to endorse persistent anxiety and depression which she rates at 7/10, she denied SI, SIB and hallucinations, pain 7/10, PRN Ibuprofen given prior to right arm wound dressing change. Patient requested for and took PRN Zyprexa, she was offered Hydroxyzine but declined and stated Zyprexa works better for managing her anxiety. Patient interactive with selected peers on unit, participated in one group this shift, was out more in Milieu this shift, hygiene is unkept, patient declined shower and stated she will do it tomorrow before wound cares. Patient intake is adequate, no behavior or safety concern noted this shift.

## 2024-11-21 NOTE — PROGRESS NOTES
"  ----------------------------------------------------------------------------------------------------------  [facility]  Psychiatry Progress Note  Hospital Day #5     Interim History:     The patient's care was discussed with the treatment team and chart notes were reviewed.    Vitals: VSS  Sleep: 6.25 hours (11/21/24 0615)  Scheduled medications: Took all scheduled medications as prescribed  Psychiatric PRN medications: PRN hydroxyzine given overnight     Staff Report:   No acute events or safety concerns overnight. Patient continues to be isolative to room, visible in milieu intermittently.  Endorses persistent anxiety and depression with no improvement, denies SI/HI and able to contract for safety. Right upper arm wound site pain managed with PRN Ibuprofen. Please see staff notes for details.      Subjective:     Patient Interview:  Yun Coyle was interviewed in the milieu. Patient states her mood has been unchanged from yesterday, rating persistent anxiety and depression 7/10. Doing puzzles and playing games to distract herself. Took the first dose of Lexapro 20mg this morning without any issue or side effects. States she went to groups yesterday and plans to attend some today. Denied SI/HI/AH/VH.     ROS:  Patient has no bothersome physical symptoms. Reports improvement in right arm wound pain.   Patient denies acute concerns.     Objective:     Vitals:  /83   Pulse 84   Temp 98.2  F (36.8  C) (Oral)   Resp 16   Ht 1.753 m (5' 9\")   Wt 92.9 kg (204 lb 12.8 oz)   LMP  (LMP Unknown)   SpO2 98%   BMI 30.24 kg/m      Allergies:  No Known Allergies    Current Medications:  Scheduled:  Current Facility-Administered Medications   Medication Dose Route Frequency Provider Last Rate Last Admin    alum & mag hydroxide-simethicone (MAALOX) suspension 30 mL  30 mL Oral Q4H PRN Joseph Santana MD        cephALEXin (KEFLEX) capsule 500 mg  500 mg Oral 4x " Daily Alina Flannery MD   500 mg at 11/21/24 1121    cloNIDine (CATAPRES) tablet 0.1 mg  0.1 mg Oral BID Alina Flannery MD   0.1 mg at 11/21/24 0753    glucose gel 15-30 g  15-30 g Oral Q15 Min PRN Rey Brewer        Or    dextrose 50 % injection 25-50 mL  25-50 mL Intravenous Q15 Min PRN Rey Brewer        Or    glucagon injection 1 mg  1 mg Subcutaneous Q15 Min PRN Rey Brewer        escitalopram (LEXAPRO) tablet 20 mg  20 mg Oral Daily Brionna Gambino MD   20 mg at 11/21/24 0753    hydrOXYzine HCl (ATARAX) tablet 25 mg  25 mg Oral Q4H PRN Joseph Santana MD   25 mg at 11/21/24 0247    ibuprofen (ADVIL/MOTRIN) tablet 800 mg  800 mg Oral Q6H PRN Alina Flannery MD   800 mg at 11/21/24 1121    insulin aspart (NovoLOG) injection (RAPID ACTING)   Subcutaneous Daily with breakfast Rey Brewer   1 Units at 11/21/24 0831    insulin aspart (NovoLOG) injection (RAPID ACTING)   Subcutaneous Daily with lunch Rey Brewer   9 Units at 11/20/24 1217    insulin aspart (NovoLOG) injection (RAPID ACTING)   Subcutaneous Daily with supper Rey Brewer   7 Units at 11/20/24 1756    insulin aspart (NovoLOG) injection (RAPID ACTING)  1-7 Units Subcutaneous TID AC Rey Brewer   4 Units at 11/21/24 0830    insulin aspart (NovoLOG) injection (RAPID ACTING)  1-5 Units Subcutaneous At Bedtime Rey Brewer   1 Units at 11/20/24 2141    insulin glargine (LANTUS PEN) injection 14 Units  14 Units Subcutaneous At Bedtime Rey Brewer   14 Units at 11/20/24 2140    losartan (COZAAR) tablet 25 mg  25 mg Oral Daily Joseph Santana MD   25 mg at 11/21/24 0753    melatonin tablet 3 mg  3 mg Oral At Bedtime PRN Joseph Santana MD   3 mg at 11/17/24 2059    metFORMIN (GLUCOPHAGE) tablet 1,000 mg  1,000 mg Oral BID w/meals Joseph Santana MD   1,000 mg at 11/21/24 0753    methadone (DOLOPHINE-INTENSOL) 10 MG/ML (HIGH CONC) solution 150 mg  150 mg Oral Daily Alina Flannery MD   150 mg at 11/21/24 0801     OLANZapine (zyPREXA) tablet 10 mg  10 mg Oral TID PRN Joseph Santana MD   10 mg at 11/21/24 1139    Or    OLANZapine (zyPREXA) injection 10 mg  10 mg Intramuscular TID PRN Joseph Santana MD        OLANZapine zydis (zyPREXA) ODT tab 10 mg  10 mg Oral At Bedtime Brionna Gambino MD   10 mg at 11/20/24 2140    prazosin (MINIPRESS) capsule 2 mg  2 mg Oral At Bedtime Brionna Gambino MD        senna-docusate (SENOKOT-S/PERICOLACE) 8.6-50 MG per tablet 1 tablet  1 tablet Oral BID PRN Joseph Santana MD           PRN:  Current Facility-Administered Medications   Medication Dose Route Frequency Provider Last Rate Last Admin    alum & mag hydroxide-simethicone (MAALOX) suspension 30 mL  30 mL Oral Q4H PRN Joseph Santana MD        cephALEXin (KEFLEX) capsule 500 mg  500 mg Oral 4x Daily Alina Flannery MD   500 mg at 11/21/24 1121    cloNIDine (CATAPRES) tablet 0.1 mg  0.1 mg Oral BID Alina Flannery MD   0.1 mg at 11/21/24 0753    glucose gel 15-30 g  15-30 g Oral Q15 Min PRN Rey Brewer        Or    dextrose 50 % injection 25-50 mL  25-50 mL Intravenous Q15 Min PRN Rey Brewer        Or    glucagon injection 1 mg  1 mg Subcutaneous Q15 Min PRN Rey Brewer        escitalopram (LEXAPRO) tablet 20 mg  20 mg Oral Daily Brionna Gambino MD   20 mg at 11/21/24 0753    hydrOXYzine HCl (ATARAX) tablet 25 mg  25 mg Oral Q4H PRN Joseph Santana MD   25 mg at 11/21/24 0247    ibuprofen (ADVIL/MOTRIN) tablet 800 mg  800 mg Oral Q6H PRN Alina Flannery MD   800 mg at 11/21/24 1121    insulin aspart (NovoLOG) injection (RAPID ACTING)   Subcutaneous Daily with breakfast Rey Brewer   1 Units at 11/21/24 0831    insulin aspart (NovoLOG) injection (RAPID ACTING)   Subcutaneous Daily with lunch Rey Brewer   9 Units at 11/20/24 1217    insulin aspart (NovoLOG) injection (RAPID ACTING)   Subcutaneous Daily with supper Rey Brewer   7 Units at 11/20/24 1756    insulin aspart (NovoLOG) injection (RAPID ACTING)   1-7 Units Subcutaneous TID AC Rey Brewer   4 Units at 11/21/24 0830    insulin aspart (NovoLOG) injection (RAPID ACTING)  1-5 Units Subcutaneous At Bedtime Rey Brewer   1 Units at 11/20/24 2141    insulin glargine (LANTUS PEN) injection 14 Units  14 Units Subcutaneous At Bedtime Rey Brewer   14 Units at 11/20/24 2140    losartan (COZAAR) tablet 25 mg  25 mg Oral Daily Joseph Santana MD   25 mg at 11/21/24 0753    melatonin tablet 3 mg  3 mg Oral At Bedtime PRN Joseph Santana MD   3 mg at 11/17/24 2059    metFORMIN (GLUCOPHAGE) tablet 1,000 mg  1,000 mg Oral BID w/meals Joseph Santana MD   1,000 mg at 11/21/24 0753    methadone (DOLOPHINE-INTENSOL) 10 MG/ML (HIGH CONC) solution 150 mg  150 mg Oral Daily Alina Flannery MD   150 mg at 11/21/24 0801    OLANZapine (zyPREXA) tablet 10 mg  10 mg Oral TID PRN Joseph Santana MD   10 mg at 11/21/24 1139    Or    OLANZapine (zyPREXA) injection 10 mg  10 mg Intramuscular TID PRN Joseph Santana MD        OLANZapine zydis (zyPREXA) ODT tab 10 mg  10 mg Oral At Bedtime Brionna Gambino MD   10 mg at 11/20/24 2140    prazosin (MINIPRESS) capsule 2 mg  2 mg Oral At Bedtime Brionna Gambino MD        senna-docusate (SENOKOT-S/PERICOLACE) 8.6-50 MG per tablet 1 tablet  1 tablet Oral BID PRN Joseph Santana MD           Labs and Imaging:  New results:   Recent Results (from the past 24 hours)   Glucose by meter    Collection Time: 11/20/24 12:01 PM   Result Value Ref Range    GLUCOSE BY METER POCT 325 (H) 70 - 99 mg/dL   Glucose by meter    Collection Time: 11/20/24  5:48 PM   Result Value Ref Range    GLUCOSE BY METER POCT 215 (H) 70 - 99 mg/dL   Glucose by meter    Collection Time: 11/20/24  9:33 PM   Result Value Ref Range    GLUCOSE BY METER POCT 215 (H) 70 - 99 mg/dL   Glucose by meter    Collection Time: 11/21/24  2:43 AM   Result Value Ref Range    GLUCOSE BY METER POCT 257 (H) 70 - 99 mg/dL   Glucose by meter    Collection Time: 11/21/24  7:58  "AM   Result Value Ref Range    GLUCOSE BY METER POCT 307 (H) 70 - 99 mg/dL   Glucose by meter    Collection Time: 11/21/24 11:26 AM   Result Value Ref Range    GLUCOSE BY METER POCT 341 (H) 70 - 99 mg/dL       Data this admission:  - CBC unremarkable  - CMP unremarkable  - UDS + fentanyl, cannabinoids, and amphetamines  - Hgb A1c 11.2  - Lipids unremarkable  - Vit B12 normal  - Folate normal  - EKG normal sinus rhythm, QTc 478  - Hepatitis C pending   - MRSA negative     Mental Status Exam:     Oriented to:  Grossly Oriented  General:  Awake and Alert  Appearance:  appears stated age  Behavior/Attitude:  Calm, Cooperative, and Guarded  Eye Contact: Appropriate  Psychomotor: Normal and No evidence of tics, dystonia, or tardive dyskinesia  no catatonia present  Speech:  appropriate volume/tone and appropriate rate  Language: Fluent in English with appropriate syntax and vocabulary.  Mood:  \"about the same\"  Affect:  congruent with mood, blunted, and detached  Thought Process:  linear and coherent  Thought Content:   No SI/HI/AH/VH; No apparent delusions  Associations:  intact  Insight:  fair due to persistent depressive sx  Judgment:  fair due to persistent depressive sx  Impulse control: fair  Attention Span:  grossly intact  Concentration:  grossly intact  Recent and Remote Memory:  not formally assessed  Fund of Knowledge: average  Muscle Strength and Tone: normal  Gait and Station:  not assessed - patient sitting at table during interview     Psychiatric Assessment     Yun Coyle is a 48 year old female previously diagnosed with MDD, SANDIE, PTSD, Polysubstance use, Panic attacks, and agoraphobia who presented voluntarily with suicidal ideation with a plan in the context of worsening MDD, SANDIE, medication noncompliance, active fentanyl use on methadone, and daily cannabis use who is recently having issues with obtaining medical insurance. Most recent psychiatric hospitalization was during adolescence. " Significant symptoms on admission include fatigue, insomnia, worry, anxiety, loss of appetite, anhedonia, worsening of depressed mood,  and suicidal ideation. The MSE on admission was pertinent for poor eye contact, blunted and sad affect, but normal thought process and content and no signs of altered behaviors.     Biological contributions to presentation include diagnosis of diabetes, chronic pain, and cauda equina syndrome. She has not been on any medication for a year except for methadone and olanzapine for nausea. She is actively using cannabis and recently started fentanyl use again this year. Patient has no history of other major neurologic disorders or head trauma but does have history of Hep C which showed positive RNA in January of this year which she did not receive treatment. She is now receiving appropriate medications for her underlying health conditions. Psychological contributions to presentation include poor insight and lack of appropriate coping skills. Patient appears to demonstrate helplessness with reliance on others primarily daughter. She has not been able to leave her home or perform activities necessary for daily living. She demonstrated dependent personality and borderline personality traits and feels empty inside. Social factors contributing to presentation include good family support by daughter who is living with her. Patient does not work but is not concerned about financial stressors. She currently has no legal issues or interpersonal relationship conflicts with her daughter or other family. She is not able to engage with the community due to her current presentation.  Protective factors include family support and stable housing.      In summary, the patient's reported symptoms of SI with a plan and worsening depression in the context of medication noncompliance and substance use are consistent with worsening MDD that is severe and persistent. She will likely benefit from restarting  medication therapy, engaging with therapy resources, being active on the milieu, and future outpatient resource planning this admission.     Given her current persistent depression and anxiety, patient warrants continued inpatient psychiatric hospitalization and medication management to maintain her safety.      Psychiatric Plan by Diagnosis      Today's changes:  - Increase prazosin from 1mg to 2mg     # MDD, severe with SI  - Olanzapine 10 mg PO every day (for cyclic vomiting syndrome)  - Escitalopram 20 mg every day   - Prazosin 2mg every day     # SANDIE  - Clonidine 0.1 mg BID    # Opiate Use Disorder  - Methadone 150 mg every day    Pertinent Labs/Monitoring:   - Review labs as above in note     Additional Plans:  - Patient will be treated in therapeutic milieu with appropriate individual and group therapies as described  - Patient would benefit from outpatient therapy resource planning for anxiety related disorders and improving coping skills  - Consider outpatient CD treatment for fentanyl and cannabis use     Psychiatric Hospital Course:      Yun Coyle was admitted to [facility] as a voluntary patient.   Medications:  Patient not on any PTA medication  New medications started at the time of admission include Olanzapine 10 mg PO daily.   11/18: start escitalopram 10mg, prazosin 1mg, and modify olanzapine 10mg to disintegrating tablet. IM started sliding scale insulin and POC glucose monitoring   11/19: Started Lantus 14 units at bedtime  11/20: Increased Lexapro from 10mg to 20mg every day   11/21: increase prazosin from 1mg to 2mg      The risks, benefits, alternatives, and side effects were discussed and understood by the patient.     Medical Assessment and Plan     Medical diagnoses to be addressed this admission:    # Hypertension  - PTA Losartan  - Few incidences of elevated BP readings, if >3 readings are elevated, consult IM for HTN management      # Type II Diabetes  - PTA Metformin 1000 mg  BID  - Starting 11/19 Lantus 14 units at bedtime per IM   - Sliding scale insulin  - Point-of-care glucose monitoring  - Educated patient on importance of follow-up with PCP for further diabetes management. Would benefit from GLP-1 or SGLT2 inhibitor.   - Continue to follow-up for assessment of insulin needs, may start additional agents         #Arm Abscess R arm s/p Drainage and wash out on 11/13  - Keflex 500mg QID x 7 days, started 11/16  - Management as per WOC  - Contact medicine if does not improve or with concern of infection      Medical course: Patient was physically examined by the ED prior to being transferred to the unit and was found to be medically stable and appropriate for admission.      Consults:    - Continued Wound Care for right arm abscess   - IM following for DMII care     Checklist     Legal Status: Voluntary     Safety Assessment:   Behavioral Orders   Procedures    Code 1 - Restrict to Unit    [facility] Extended Care     Until discharge, Extended Care to offer psychotherapeutic services to mental health patients boarding for admission or stabilization. These services are to include but are not limited to: individual psychotherapy, diagnostic assessment, case management and care planning, safety planning, etc. This may include up to 1 visit per day. If patient is physically located at [facility] or [facility], group psychotherapy up to 2 time per day may be offered.     Routine Programming     As clinically indicated    Status 15     Every 15 minutes.       Risk Assessment:  Risk for harm is moderate.  Risk factors: SI, maladaptive coping, substance use, trauma, and past behaviors  Protective factors: engaged in treatment and help seeking      SIO: none    Disposition: TBD. Disposition pending clinical stabilization, medication optimization and development of an appropriate discharge plan.      Attestations     Resident with med student: Catherine Quinteros, MS3  [facility] Medical Student     I was present with the  medical student who participated in the service and in the documentation of the note.  I have verified the history and personally performed the physical exam and medical decision making. I agree with the assessment and plan of care as documented in the note.    This patient was seen and discussed with my attending physician.    Brionna Gambino MD   Psychiatry Resident Physician

## 2024-11-21 NOTE — PROGRESS NOTES
Brief medicine progress note.    #Diabetes mellitus type 2    Met with patient in room to discuss ongoing diabetes management.  Informed patient that blood sugars remain elevated throughout the day.  Patient states that they frequently snack between meals.  Educated patient that with current insulin regiment, and is difficult to manage many snacks between meals based on duration of rapid acting insulin.  Patient tolerating glargine.  AM sugars remain elevated at 215.  Will increase glargine from 14 -> 20 units at hour of sleep.  Will add additional POC glucose check overnight.  Patient agreeable with plan.    Exam:  General: Appears stated age, no acute distress, resting in bed   Head: Normocephalic. No masses,  Respiratory: Normal respiratory effort, lung fields clear to auscultation  Musculoskeletal: normal muscle tone  Neuropsych: Speaks clearly, answers questions appropriately    Rey Brewer PA-C  Hospitalist Service, [facility]  Securely message with [employer] (more info)  Text page via [employer] Paging/Directory   See signed in provider for up to date coverage information     MEDICAL DECISION MAKING:  Moderate  Problems Addressed  Moderate  One or more chronic illnesses with exacerbation, progression, or side effects of treatment     Risk of Complications from Diagnostic Testing & Treatment  Moderate  Management of prescription medications

## 2024-11-21 NOTE — PLAN OF CARE
Care Coordination Note    Tasks Completed:     Care Coordinator scheduled the following appointments:     Psychiatry appointment: Friday, December 13, 2024 @ 12:05pm In-person for 75 minutes   Provider: Paco Woodward DNP  Location: [facility], [facility] [address], [place] [address], [place] [address]  Phone: [phone]  Fax: [phone]  Follow-up appointment: Friday, January 17, 2024 @ 11:30am In-person for 25 minutes  Notes: Intake paperwork to complete at least 48 hours prior to your appointment will be sent via email ([email or web address]). Otherwise, you may arrive 30 minutes prior to your appointment to complete paperwork instead.   HUC TO FAX AVS to above appointment, please     CC submitted a referral to [facility] via secure, encrypted PHI email ([email or web address]) and fax ([phone]) for the purpose of scheduling and appointment follow-up.     CC updated CTC and AVS

## 2024-11-22 LAB
GLUCOSE BLDC GLUCOMTR-MCNC: 218 MG/DL (ref 70–99)
GLUCOSE BLDC GLUCOMTR-MCNC: 247 MG/DL (ref 70–99)
GLUCOSE BLDC GLUCOMTR-MCNC: 247 MG/DL (ref 70–99)
GLUCOSE BLDC GLUCOMTR-MCNC: 269 MG/DL (ref 70–99)
GLUCOSE BLDC GLUCOMTR-MCNC: 296 MG/DL (ref 70–99)

## 2024-11-22 PROCEDURE — 250N000013 HC RX MED GY IP 250 OP 250 PS 637

## 2024-11-22 PROCEDURE — 250N000013 HC RX MED GY IP 250 OP 250 PS 637: Performed by: STUDENT IN AN ORGANIZED HEALTH CARE EDUCATION/TRAINING PROGRAM

## 2024-11-22 PROCEDURE — 99207 PR NO BILLABLE SERVICE THIS VISIT: CPT

## 2024-11-22 PROCEDURE — 99232 SBSQ HOSP IP/OBS MODERATE 35: CPT | Mod: GC | Performed by: PSYCHIATRY & NEUROLOGY

## 2024-11-22 PROCEDURE — G0463 HOSPITAL OUTPT CLINIC VISIT: HCPCS

## 2024-11-22 PROCEDURE — 99232 SBSQ HOSP IP/OBS MODERATE 35: CPT

## 2024-11-22 PROCEDURE — 124N000002 HC R&B MH UMMC

## 2024-11-22 PROCEDURE — 93010 ELECTROCARDIOGRAM REPORT: CPT | Performed by: INTERNAL MEDICINE

## 2024-11-22 RX ADMIN — METFORMIN HYDROCHLORIDE 1000 MG: 500 TABLET, FILM COATED ORAL at 18:05

## 2024-11-22 RX ADMIN — CEPHALEXIN 500 MG: 500 CAPSULE ORAL at 20:41

## 2024-11-22 RX ADMIN — OLANZAPINE 10 MG: 10 TABLET, ORALLY DISINTEGRATING ORAL at 21:23

## 2024-11-22 RX ADMIN — PRAZOSIN HYDROCHLORIDE 2 MG: 2 CAPSULE ORAL at 21:08

## 2024-11-22 RX ADMIN — CEPHALEXIN 500 MG: 500 CAPSULE ORAL at 08:16

## 2024-11-22 RX ADMIN — INSULIN ASPART 4 UNITS: 100 INJECTION, SOLUTION INTRAVENOUS; SUBCUTANEOUS at 08:12

## 2024-11-22 RX ADMIN — CEPHALEXIN 500 MG: 500 CAPSULE ORAL at 12:20

## 2024-11-22 RX ADMIN — LOSARTAN POTASSIUM 25 MG: 25 TABLET, FILM COATED ORAL at 08:15

## 2024-11-22 RX ADMIN — CLONIDINE HYDROCHLORIDE 0.1 MG: 0.1 TABLET ORAL at 20:41

## 2024-11-22 RX ADMIN — ESCITALOPRAM 20 MG: 20 TABLET, FILM COATED ORAL at 08:16

## 2024-11-22 RX ADMIN — INSULIN ASPART 3 UNITS: 100 INJECTION, SOLUTION INTRAVENOUS; SUBCUTANEOUS at 18:04

## 2024-11-22 RX ADMIN — INSULIN ASPART 3 UNITS: 100 INJECTION, SOLUTION INTRAVENOUS; SUBCUTANEOUS at 12:16

## 2024-11-22 RX ADMIN — CEPHALEXIN 500 MG: 500 CAPSULE ORAL at 16:18

## 2024-11-22 RX ADMIN — HYDROXYZINE HYDROCHLORIDE 25 MG: 25 TABLET ORAL at 09:27

## 2024-11-22 RX ADMIN — HYDROXYZINE HYDROCHLORIDE 25 MG: 25 TABLET ORAL at 16:18

## 2024-11-22 RX ADMIN — METHADONE HYDROCHLORIDE 150 MG: 10 CONCENTRATE ORAL at 08:16

## 2024-11-22 RX ADMIN — METFORMIN HYDROCHLORIDE 1000 MG: 500 TABLET, FILM COATED ORAL at 08:15

## 2024-11-22 RX ADMIN — CLONIDINE HYDROCHLORIDE 0.1 MG: 0.1 TABLET ORAL at 08:15

## 2024-11-22 ASSESSMENT — ACTIVITIES OF DAILY LIVING (ADL)
ADLS_ACUITY_SCORE: 0
ORAL_HYGIENE: INDEPENDENT
ADLS_ACUITY_SCORE: 0
LAUNDRY: WITH SUPERVISION
ADLS_ACUITY_SCORE: 0
ADLS_ACUITY_SCORE: 0
HYGIENE/GROOMING: INDEPENDENT
ADLS_ACUITY_SCORE: 0
DRESS: PROMPTS
DRESS: INDEPENDENT
ADLS_ACUITY_SCORE: 0
ORAL_HYGIENE: INDEPENDENT
ADLS_ACUITY_SCORE: 0
HYGIENE/GROOMING: INDEPENDENT
LAUNDRY: WITH SUPERVISION

## 2024-11-22 NOTE — PROGRESS NOTES
"  ----------------------------------------------------------------------------------------------------------  [facility]  Psychiatry Progress Note  Hospital Day #6     Interim History:     The patient's care was discussed with the treatment team and chart notes were reviewed.    Vitals: VSS  Sleep: 6.25 hours (11/22/24 0649)  Scheduled medications: Took all scheduled medications as prescribed  Psychiatric PRN medications: PRN hydroxyzine given overnight     Staff Report:   No acute events or safety concerns overnight. Patient has been intermittently isolative to room but also seen in the milieu with peers. Endorses persistent anxiety and depression with blunted affect. Please see staff notes for details.      Subjective:     Patient Interview:  Yun Coyle was interviewed in the milieu. Patient states she is \"doing ok\" endorses persistent depressive symptoms, 8/10, with improved anxiety post prn hydroxyzine of 6/10. However patient attending groups, interacting with peers, and appears to have more energy. Yesterday prazosin was increased to 2mg and patient reported no nightmares last night, notable improvement.     ROS:  Patient has no bothersome physical symptoms. Reports improvement in right arm wound pain, rating 3/10.   Patient denies acute concerns.     Objective:     Vitals:  /85   Pulse 72   Temp 97.6  F (36.4  C) (Oral)   Resp 16   Ht 1.753 m (5' 9\")   Wt 92.9 kg (204 lb 12.8 oz)   LMP  (LMP Unknown)   SpO2 98%   BMI 30.24 kg/m      Allergies:  No Known Allergies    Current Medications:  Scheduled:  Current Facility-Administered Medications   Medication Dose Route Frequency Provider Last Rate Last Admin    alum & mag hydroxide-simethicone (MAALOX) suspension 30 mL  30 mL Oral Q4H PRN Joseph Santana MD        cephALEXin (KEFLEX) capsule 500 mg  500 mg Oral 4x Daily Alina Flannery MD   500 mg at 11/22/24 1220    cloNIDine (CATAPRES) tablet " 0.1 mg  0.1 mg Oral BID Alina Flannery MD   0.1 mg at 11/22/24 0815    glucose gel 15-30 g  15-30 g Oral Q15 Min PRN Rey Brewer        Or    dextrose 50 % injection 25-50 mL  25-50 mL Intravenous Q15 Min PRN Rey Brewer        Or    glucagon injection 1 mg  1 mg Subcutaneous Q15 Min PRN Rey Brewer        escitalopram (LEXAPRO) tablet 20 mg  20 mg Oral Daily Brionna Gambino MD   20 mg at 11/22/24 0816    hydrOXYzine HCl (ATARAX) tablet 25 mg  25 mg Oral Q4H PRN Joseph Santana MD   25 mg at 11/22/24 0927    ibuprofen (ADVIL/MOTRIN) tablet 800 mg  800 mg Oral Q6H PRN Alina Flannery MD   800 mg at 11/21/24 1121    insulin aspart (NovoLOG) injection (RAPID ACTING)   Subcutaneous With Snacks or Supplements Rey Brewer        insulin aspart (NovoLOG) injection (RAPID ACTING)   Subcutaneous Daily with breakfast Rey Brewer   9 Units at 11/22/24 0818    insulin aspart (NovoLOG) injection (RAPID ACTING)   Subcutaneous Daily with lunch Rey Brewer   12 Units at 11/22/24 1217    insulin aspart (NovoLOG) injection (RAPID ACTING)   Subcutaneous Daily with supper Rey Brewer   11 Units at 11/21/24 1806    insulin aspart (NovoLOG) injection (RAPID ACTING)  1-7 Units Subcutaneous TID AC Rey Brewer   3 Units at 11/22/24 1216    insulin aspart (NovoLOG) injection (RAPID ACTING)  1-5 Units Subcutaneous At Bedtime Rey Brewer   2 Units at 11/21/24 2201    insulin glargine (LANTUS PEN) injection 20 Units  20 Units Subcutaneous At Bedtime Rey Brewer   20 Units at 11/21/24 2201    losartan (COZAAR) tablet 25 mg  25 mg Oral Daily Joseph Santana MD   25 mg at 11/22/24 0815    melatonin tablet 3 mg  3 mg Oral At Bedtime PRN Joseph Santana MD   3 mg at 11/17/24 2059    metFORMIN (GLUCOPHAGE) tablet 1,000 mg  1,000 mg Oral BID w/meals Joseph Santana MD   1,000 mg at 11/22/24 0815    methadone (DOLOPHINE-INTENSOL) 10 MG/ML (HIGH CONC) solution 150 mg  150 mg Oral Daily Alina Flannery,  MD   150 mg at 11/22/24 0816    OLANZapine (zyPREXA) tablet 10 mg  10 mg Oral TID PRN Joseph Santana MD   10 mg at 11/21/24 1139    Or    OLANZapine (zyPREXA) injection 10 mg  10 mg Intramuscular TID PRN Joseph Santana MD        OLANZapine zydis (zyPREXA) ODT tab 10 mg  10 mg Oral At Bedtime Brionna Gambino MD   10 mg at 11/21/24 2201    prazosin (MINIPRESS) capsule 2 mg  2 mg Oral At Bedtime Brionna Gambino MD   2 mg at 11/21/24 2200    senna-docusate (SENOKOT-S/PERICOLACE) 8.6-50 MG per tablet 1 tablet  1 tablet Oral BID PRN Joseph Santana MD           PRN:  Current Facility-Administered Medications   Medication Dose Route Frequency Provider Last Rate Last Admin    alum & mag hydroxide-simethicone (MAALOX) suspension 30 mL  30 mL Oral Q4H PRN Joseph Santana MD        cephALEXin (KEFLEX) capsule 500 mg  500 mg Oral 4x Daily Alina Flannery MD   500 mg at 11/22/24 1220    cloNIDine (CATAPRES) tablet 0.1 mg  0.1 mg Oral BID Alina Flannery MD   0.1 mg at 11/22/24 0815    glucose gel 15-30 g  15-30 g Oral Q15 Min PRN Rey Brewer        Or    dextrose 50 % injection 25-50 mL  25-50 mL Intravenous Q15 Min PRN Rey Brewer        Or    glucagon injection 1 mg  1 mg Subcutaneous Q15 Min PRN Rey Brewer        escitalopram (LEXAPRO) tablet 20 mg  20 mg Oral Daily Brionna Gambino MD   20 mg at 11/22/24 0816    hydrOXYzine HCl (ATARAX) tablet 25 mg  25 mg Oral Q4H PRN Joseph Santana MD   25 mg at 11/22/24 0927    ibuprofen (ADVIL/MOTRIN) tablet 800 mg  800 mg Oral Q6H PRN Alina Flannery MD   800 mg at 11/21/24 1121    insulin aspart (NovoLOG) injection (RAPID ACTING)   Subcutaneous With Snacks or Supplements Rey Brewer        insulin aspart (NovoLOG) injection (RAPID ACTING)   Subcutaneous Daily with breakfast Rey Brewer   9 Units at 11/22/24 0818    insulin aspart (NovoLOG) injection (RAPID ACTING)   Subcutaneous Daily with lunch Rey Brewer   12 Units at 11/22/24 1217    insulin  aspart (NovoLOG) injection (RAPID ACTING)   Subcutaneous Daily with supper Rey Brewer   11 Units at 11/21/24 1806    insulin aspart (NovoLOG) injection (RAPID ACTING)  1-7 Units Subcutaneous TID AC Rey Brewer   3 Units at 11/22/24 1216    insulin aspart (NovoLOG) injection (RAPID ACTING)  1-5 Units Subcutaneous At Bedtime Rey Brewer   2 Units at 11/21/24 2201    insulin glargine (LANTUS PEN) injection 20 Units  20 Units Subcutaneous At Bedtime Rey Brewer   20 Units at 11/21/24 2201    losartan (COZAAR) tablet 25 mg  25 mg Oral Daily Joseph Santana MD   25 mg at 11/22/24 0815    melatonin tablet 3 mg  3 mg Oral At Bedtime PRN Joseph Santana MD   3 mg at 11/17/24 2059    metFORMIN (GLUCOPHAGE) tablet 1,000 mg  1,000 mg Oral BID w/meals Joseph Santana MD   1,000 mg at 11/22/24 0815    methadone (DOLOPHINE-INTENSOL) 10 MG/ML (HIGH CONC) solution 150 mg  150 mg Oral Daily Alina Flannery MD   150 mg at 11/22/24 0816    OLANZapine (zyPREXA) tablet 10 mg  10 mg Oral TID PRN Joseph Santana MD   10 mg at 11/21/24 1139    Or    OLANZapine (zyPREXA) injection 10 mg  10 mg Intramuscular TID PRN Joseph Santana MD        OLANZapine zydis (zyPREXA) ODT tab 10 mg  10 mg Oral At Bedtime Brionna Gambino MD   10 mg at 11/21/24 2201    prazosin (MINIPRESS) capsule 2 mg  2 mg Oral At Bedtime Brionna Gambino MD   2 mg at 11/21/24 2200    senna-docusate (SENOKOT-S/PERICOLACE) 8.6-50 MG per tablet 1 tablet  1 tablet Oral BID PRN Joseph Santana MD           Labs and Imaging:  New results:   Recent Results (from the past 24 hours)   Glucose by meter    Collection Time: 11/21/24  5:41 PM   Result Value Ref Range    GLUCOSE BY METER POCT 239 (H) 70 - 99 mg/dL   Glucose by meter    Collection Time: 11/21/24  9:58 PM   Result Value Ref Range    GLUCOSE BY METER POCT 271 (H) 70 - 99 mg/dL   Glucose by meter    Collection Time: 11/22/24  2:37 AM   Result Value Ref Range    GLUCOSE BY METER POCT 247 (H) 70 -  "99 mg/dL   Glucose by meter    Collection Time: 11/22/24  8:02 AM   Result Value Ref Range    GLUCOSE BY METER POCT 296 (H) 70 - 99 mg/dL   EKG 12-lead, complete    Collection Time: 11/22/24  9:32 AM   Result Value Ref Range    Systolic Blood Pressure  mmHg    Diastolic Blood Pressure  mmHg    Ventricular Rate 82 BPM    Atrial Rate 82 BPM    NM Interval 140 ms    QRS Duration 84 ms     ms    QTc 441 ms    P Axis 56 degrees    R AXIS -24 degrees    T Axis 31 degrees    Interpretation ECG       Sinus rhythm  Normal ECG  When compared with ECG of 25-Oct-2024 20:47,  ST no longer depressed in Inferior leads     Glucose by meter    Collection Time: 11/22/24 12:12 PM   Result Value Ref Range    GLUCOSE BY METER POCT 269 (H) 70 - 99 mg/dL       Data this admission:  - CBC unremarkable  - CMP unremarkable  - UDS + fentanyl, cannabinoids, and amphetamines  - Hgb A1c 11.2  - Lipids unremarkable  - Vit B12 normal  - Folate normal  - EKG normal sinus rhythm, 11/22 QTc 441  - Hepatitis C pending   - MRSA negative     Mental Status Exam:     Oriented to:  Grossly Oriented  General:  Awake and Alert  Appearance:  appears stated age  Behavior/Attitude:  Calm, Cooperative, and Engaged  Eye Contact: Appropriate  Psychomotor: Normal and No evidence of tics, dystonia, or tardive dyskinesia  no catatonia present  Speech:  appropriate volume/tone and appropriate rate  Language: Fluent in English with appropriate syntax and vocabulary.  Mood:  \"same\"  Affect:  congruent with mood and blunted  Thought Process:  linear and coherent  Thought Content:   No SI/HI/AH/VH; No apparent delusions  Associations:  intact  Insight:  fair due to persistent depressive sx  Judgment:  fair due to persistent depressive sx  Impulse control: fair  Attention Span:  grossly intact  Concentration:  grossly intact  Recent and Remote Memory:  not formally assessed  Fund of Knowledge: average  Muscle Strength and Tone: normal  Gait and Station:  not assessed " - patient sitting at table during interview     Psychiatric Assessment     Yun Coyle is a 48 year old female previously diagnosed with MDD, SANDIE, PTSD, Polysubstance use, Panic attacks, and agoraphobia who presented voluntarily with suicidal ideation with a plan in the context of worsening MDD, SANDIE, medication noncompliance, active fentanyl use on methadone, and daily cannabis use who is recently having issues with obtaining medical insurance. Most recent psychiatric hospitalization was during adolescence. Significant symptoms on admission include fatigue, insomnia, worry, anxiety, loss of appetite, anhedonia, worsening of depressed mood,  and suicidal ideation. The MSE on admission was pertinent for poor eye contact, blunted and sad affect, but normal thought process and content and no signs of altered behaviors.     Biological contributions to presentation include diagnosis of diabetes, chronic pain, and cauda equina syndrome. She has not been on any medication for a year except for methadone and olanzapine for nausea. She is actively using cannabis and recently started fentanyl use again this year. Patient has no history of other major neurologic disorders or head trauma but does have history of Hep C which showed positive RNA in January of this year which she did not receive treatment. She is now receiving appropriate medications for her underlying health conditions. Psychological contributions to presentation include poor insight and lack of appropriate coping skills. Patient appears to demonstrate helplessness with reliance on others primarily daughter. She has not been able to leave her home or perform activities necessary for daily living. She demonstrated dependent personality and borderline personality traits and feels empty inside. Social factors contributing to presentation include good family support by daughter who is living with her. Patient does not work but is not concerned about financial  stressors. She currently has no legal issues or interpersonal relationship conflicts with her daughter or other family. She is not able to engage with the community due to her current presentation.  Protective factors include family support and stable housing.      In summary, the patient's reported symptoms of SI with a plan and worsening depression in the context of medication noncompliance and substance use are consistent with worsening MDD that is severe and persistent. She will likely benefit from restarting medication therapy, engaging with therapy resources, being active on the milieu, and future outpatient resource planning this admission.     Given her current persistent depression and anxiety, patient warrants continued inpatient psychiatric hospitalization and medication management to maintain her safety.      Psychiatric Plan by Diagnosis      Today's changes:  - Increase Lantus from 20 units to 28 units per IM  - Repeat EKG completed due to previous QTc of 478, today QTc 441     # MDD, severe with SI  - Olanzapine 10 mg PO every day (for cyclic vomiting syndrome)  - Escitalopram 20 mg every day   - Prazosin 2mg every day     # SANDIE  - Clonidine 0.1 mg BID    # Opiate Use Disorder  - Methadone 150 mg every day    Pertinent Labs/Monitoring:   - Review labs as above in note     Additional Plans:  - Patient will be treated in therapeutic milieu with appropriate individual and group therapies as described  - Patient would benefit from outpatient therapy resource planning for anxiety related disorders and improving coping skills  - Consider outpatient CD treatment for fentanyl and cannabis use     Psychiatric Hospital Course:      Yun Coyle was admitted to [facility] as a voluntary patient.   Medications:  Patient not on any PTA medication  New medications started at the time of admission include Olanzapine 10 mg PO daily.   11/18: start escitalopram 10mg, prazosin 1mg, and modify olanzapine 10mg to  disintegrating tablet. IM started sliding scale insulin and POC glucose monitoring   11/19: Started Lantus 14 units at bedtime  11/20: Increased Lexapro from 10mg to 20mg every day   11/21: increase prazosin from 1mg to 2mg, increased Lantus from 14 units to 20 units per IM   11/22: increase Lantus from 20 units to 28 units, repeat EKG for QTc monitoring     The risks, benefits, alternatives, and side effects were discussed and understood by the patient.     Medical Assessment and Plan     Medical diagnoses to be addressed this admission:    # Hypertension  - PTA Losartan  - Few incidences of elevated BP readings, if >3 readings are elevated, consult IM for HTN management      # Type II Diabetes  - PTA Metformin 1000 mg BID  - Starting 11/22 Lantus 28 units at bedtime per IM   - Sliding scale insulin  - Point-of-care glucose monitoring  - Educated patient on importance of follow-up with PCP for further diabetes management. Would benefit from GLP-1 or SGLT2 inhibitor.   - Continue to follow-up for assessment of insulin needs, may start additional agents         #Arm Abscess R arm s/p Drainage and wash out on 11/13  - Keflex 500mg QID x 7 days, started 11/16  - Management as per WOC  - Contact medicine if does not improve or with concern of infection      Medical course: Patient was physically examined by the ED prior to being transferred to the unit and was found to be medically stable and appropriate for admission.      Consults:    - Continued Wound Care for right arm abscess   - IM following for DMII care     Checklist     Legal Status: Voluntary     Safety Assessment:   Behavioral Orders   Procedures    Code 1 - Restrict to Unit    [facility] Extended Care     Until discharge, Extended Care to offer psychotherapeutic services to mental health patients boarding for admission or stabilization. These services are to include but are not limited to: individual psychotherapy, diagnostic assessment, case management and care  planning, safety planning, etc. This may include up to 1 visit per day. If patient is physically located at [facility] or [facility], group psychotherapy up to 2 time per day may be offered.     Routine Programming     As clinically indicated    Status 15     Every 15 minutes.       Risk Assessment:  Risk for harm is moderate.  Risk factors: SI, maladaptive coping, substance use, trauma, and past behaviors  Protective factors: engaged in treatment and help seeking      SIO: none    Disposition: TBD. Disposition pending clinical stabilization, medication optimization and development of an appropriate discharge plan.      Attestations     Resident with med student: Catherine Quinteros, MS3  [facility] Medical Student     I was present with the medical student who participated in the service and in the documentation of the note.  I have verified the history and personally performed the physical exam and medical decision making. I agree with the assessment and plan of care as documented in the note.    This patient was seen and discussed with my attending physician.    Brionna Gambino MD   Psychiatry Resident Physician    Attestation:  This patient has been seen and evaluated by me, Octavia Matthews MD.  I have discussed this patient with the house staff team including the resident and/or medical student and I agree with the findings and plan in this note.    I have reviewed today's vital signs, medications, labs and imaging. Octavia Matthews MD , PhD.

## 2024-11-22 NOTE — PLAN OF CARE
Problem: Sleep Disturbance  Goal: Adequate Sleep/Rest  Outcome: Progressing   Goal Outcome Evaluation:    Patient appears to have slept a total of 6.25 hours.   B  Safety/environment checks conducted every 15 minutes with no concerns noted. No complaints of pain/discomfort.

## 2024-11-22 NOTE — PROGRESS NOTES
Brief medicine progress note    Sugars remain elevated throughout the day.  Patient does frequently snack between meals which makes it difficult to avoid stacking insulin.  Increased carb coverage from 1 unit per 15 g CHO to 1 unit per 10g CHO.  Added snack coverage. Increased basal insulin from 20 units to 28 units hour of sleep.     Rey Brewer PA-C  Hospitalist Service, [facility]  Securely message with [employer] (more info)  Text page via [facility] Paging/Directory   See signed in provider for up to date coverage information

## 2024-11-22 NOTE — PLAN OF CARE
Pt was isolative to her room up until dinner time. After dinner pt stayed out watching tv in the lounge with others until late. Pt presents with flat and blunted affect. Pt was quiet and not engaging with peers. Reported anxiety 8/10 and received prn Hydroxyzine x2 this shift. Pt reported moderate effectiveness. Reported depression was 8/10. Denied for suicidal ideations or hallucinations. BG check was 239 before dinner and 271 at bedtime. Dressing on wound at the right upper arm is clean and intact. Ate well fr supper. Medication compliant. Vital signs:Temp: 98.1  F (36.7  C) Temp src: Oral BP: 112/79 Pulse: 72   Resp: 16 SpO2: 99 % O2 Device: None (Room air)       Goal Outcome Evaluation:    Plan of Care Reviewed With: patient      Problem: Anxiety  Goal: Anxiety Reduction or Resolution  Outcome: Progressing     Problem: Depression  Goal: Decreased Depression Symptoms  Outcome: Progressing

## 2024-11-22 NOTE — PLAN OF CARE
Assessment/Intervention/Current Symtoms and Care Coordination:  Chart reviewed and patient met with team,   Discussed patient progress, symptomology, and response to treatment.  Discussed the discharge plan and any potential impediments to discharge.    Patient continues to report no improvement in mood though states benefit from prazosin in reducing nightmares last night.  She has been  increasingly less isolative.  In lounge working on puzzle.  Affect remains blunted.    Hygiene remains very poor.    Discharge Plan or Goal:  Patient will return home when stable  Psychiatry  Methadone Maintenance    Barriers to Discharge:  Depressive symptoms with SI     Referral Status:  Patient has been referred to outpatient Psychiatrist     Legal Status:  Voluntary     Contacts:  [facility]- Methadone   Gamaliel Granados DO- [facility]    Upcoming Meetings and Dates/Important Information and next steps:  Team Update:   Mon

## 2024-11-22 NOTE — PLAN OF CARE
Problem: Adult Inpatient Plan of Care  Goal: Plan of Care Review  Description: The Plan of Care Review/Shift note should be completed every shift.  The Outcome Evaluation is a brief statement about your assessment that the patient is improving, declining, or no change.  This information will be displayed automatically on your shift  note.  Outcome: Progressing     Problem: Adult Behavioral Health Plan of Care  Goal: Plan of Care Review  Outcome: Progressing  Flowsheets (Taken 11/22/2024 1630)  Patient Agreement with Plan of Care: agrees     Problem: Anxiety  Goal: Anxiety Reduction or Resolution  Outcome: Progressing   Goal Outcome Evaluation:    Plan of Care Reviewed With: patient       Pt continues to be isolative and withdrawn to self in room, alert and able to make needs known, no social interaction with peers and staff. Pt was intermittently seen in the milieu, was observed watching the television in the lounge but keeping to herself. Endorsed anxiety 7/10, PRN Hydroxyzine was given and all other scheduled anti-anxiety with effect. Pt denied all other mental health symptoms and contracted for safety in the unit. BG before dinner 247 and 218 at HS, insulin given per order. Pt right arm dressing intact, denied pain from the area. Pt compliant with medication, adequate food/fluid intake.

## 2024-11-22 NOTE — PLAN OF CARE

## 2024-11-22 NOTE — PROGRESS NOTES
"[facility]  WOC Nurse Inpatient Assessment     Consulted for: Right upper arm abscess    Patient History (according to provider note(s):      48 year old female with pertinent PMH of hepatitis C and DM II, health issues who presented with initial concern for abscess which is definitively treated by general surgery, however prior to discharge patient reports she has suicidal ideation.     Assessment:      Areas visualized during today's visit:  right upper inner arm    Wound location: Right upper inner arm    11/18    Last photo: 11/18/2024  Wound due to: Surgical Wound  Wound history/plan of care: Abscess s/p I&D on 11/15/2024.  Wound base: 100 % Granulation tissue     Palpation of the wound bed: normal      Drainage: small     Description of drainage: serosanguinous     Measurements (length x width x depth, in cm): 1.2  x 1  x  0.4 cm      Tunneling: N/A     Undermining: circumferentially with max depth of 0.3 cm at 12 o'clock  Periwound skin: Intact      Color: normal and consistent with surrounding tissue      Temperature: normal   Odor: none  Pain: moderate, burning with dressing change but improving  Pain interventions prior to dressing change: patient tolerated well and slow and gentle cares   Treatment goal: Heal   STATUS: healing  Supplies ordered: gathered      Treatment Plan:     Right arm wound(s): Daily   Flush wound with 3 ml of Vashe (#[id number]).   Cut piece of 1/4\" nu-guaze (#[id number]) and pack into wound bed leaving a \"tail\" out for safety. If more than one piece is needed to pack to full depth, leave tail out for each piece.   Cover with Mepilex 4x4 (#[id number]).     Orders: Reviewed    RECOMMEND PRIMARY TEAM ORDER: None, at this time  Education provided: plan of care  Discussed plan of care with: Nurse  WOC nurse follow-up plan: weekly  Notify WOC if wound(s) deteriorate.  Nursing to notify the Provider(s) and re-consult the WOC Nurse if new skin " concern.    DATA:     Current support surface: Standard  Foam mattress (Behavioral unit)  Containment of urine/stool: Continent of bladder and Continent of bowel  BMI: Body mass index is 30.24 kg/m .   Active diet order: Orders Placed This Encounter      Regular Diet Adult     Output: No intake/output data recorded.     Labs:   Recent Labs   Lab 11/16/24  1003   A1C 11.2*     Pressure injury risk assessment:   Sensory Perception: 4-->no impairment  Moisture: 4-->rarely moist  Activity: 4-->walks frequently  Mobility: 4-->no limitation  Nutrition: 3-->adequate  Friction and Shear: 3-->no apparent problem  Tito Score: 22    Wendy Barreto RN CWOCN  Pager no longer is use, please contact through [employer] group: [facility] Nurse [facility]  Dept. Office Number: [phone]

## 2024-11-22 NOTE — PLAN OF CARE
Problem: Anxiety  Goal: Anxiety Reduction or Resolution  Outcome: Progressing   Goal Outcome Evaluation:      Admission date: 11/15/24    Admission diagnosis/ Psychiatric Diagnosis/Concerns: Suicidal ideation, MDD, SANDIE, PTSD, OCD, Panic d/o, Polysubstance abuse, Agoraphobia.     Precautions: None    Pertinent Medical Diagnosis/Concerns: Type II Diabetes & Hypertension    SIO Status:     Legal Status/Concerns: Voluntary    Safety/restraint/seclusion/manual hold within last 24-48 hrs: None    Medication Concerns: None    Brief Shift Overview: Pt alert and oriented. She was pleasant on approach. Flat affect noted. She denied pain. She reported 8/10 anxiety and depression. PRN hydroxyzine given - effective. She denied SI/HI/AVH. She's eating and drinking adequately. She's visible in the milieu, social with select patients.     Recommendations to Oncoming Staff: On carb count, scheduled and sliding scale insulin. EKG done this morning. Wound care done on right upper arm. No signs of infection noted.

## 2024-11-23 LAB
GLUCOSE BLDC GLUCOMTR-MCNC: 156 MG/DL (ref 70–99)
GLUCOSE BLDC GLUCOMTR-MCNC: 226 MG/DL (ref 70–99)
GLUCOSE BLDC GLUCOMTR-MCNC: 250 MG/DL (ref 70–99)
GLUCOSE BLDC GLUCOMTR-MCNC: 253 MG/DL (ref 70–99)
GLUCOSE BLDC GLUCOMTR-MCNC: 266 MG/DL (ref 70–99)

## 2024-11-23 PROCEDURE — 250N000013 HC RX MED GY IP 250 OP 250 PS 637

## 2024-11-23 PROCEDURE — 90853 GROUP PSYCHOTHERAPY: CPT

## 2024-11-23 PROCEDURE — 250N000013 HC RX MED GY IP 250 OP 250 PS 637: Performed by: STUDENT IN AN ORGANIZED HEALTH CARE EDUCATION/TRAINING PROGRAM

## 2024-11-23 PROCEDURE — 124N000002 HC R&B MH UMMC

## 2024-11-23 RX ADMIN — METFORMIN HYDROCHLORIDE 1000 MG: 500 TABLET, FILM COATED ORAL at 17:59

## 2024-11-23 RX ADMIN — CLONIDINE HYDROCHLORIDE 0.1 MG: 0.1 TABLET ORAL at 21:18

## 2024-11-23 RX ADMIN — HYDROXYZINE HYDROCHLORIDE 25 MG: 25 TABLET ORAL at 18:14

## 2024-11-23 RX ADMIN — METHADONE HYDROCHLORIDE 150 MG: 10 CONCENTRATE ORAL at 08:17

## 2024-11-23 RX ADMIN — METFORMIN HYDROCHLORIDE 1000 MG: 500 TABLET, FILM COATED ORAL at 08:14

## 2024-11-23 RX ADMIN — HYDROXYZINE HYDROCHLORIDE 25 MG: 25 TABLET ORAL at 14:03

## 2024-11-23 RX ADMIN — PRAZOSIN HYDROCHLORIDE 2 MG: 2 CAPSULE ORAL at 21:17

## 2024-11-23 RX ADMIN — CLONIDINE HYDROCHLORIDE 0.1 MG: 0.1 TABLET ORAL at 08:16

## 2024-11-23 RX ADMIN — INSULIN ASPART 3 UNITS: 100 INJECTION, SOLUTION INTRAVENOUS; SUBCUTANEOUS at 11:29

## 2024-11-23 RX ADMIN — ESCITALOPRAM 20 MG: 20 TABLET, FILM COATED ORAL at 08:16

## 2024-11-23 RX ADMIN — INSULIN ASPART 3 UNITS: 100 INJECTION, SOLUTION INTRAVENOUS; SUBCUTANEOUS at 08:11

## 2024-11-23 RX ADMIN — OLANZAPINE 10 MG: 10 TABLET, ORALLY DISINTEGRATING ORAL at 21:17

## 2024-11-23 RX ADMIN — HYDROXYZINE HYDROCHLORIDE 25 MG: 25 TABLET ORAL at 09:49

## 2024-11-23 RX ADMIN — INSULIN ASPART 1 UNITS: 100 INJECTION, SOLUTION INTRAVENOUS; SUBCUTANEOUS at 18:13

## 2024-11-23 RX ADMIN — LOSARTAN POTASSIUM 25 MG: 25 TABLET, FILM COATED ORAL at 08:16

## 2024-11-23 NOTE — PLAN OF CARE
Problem: Sleep Disturbance  Goal: Adequate Sleep/Rest  Outcome: Progressing   Patient appears sleeping most of the shift. Patient had 6.75 hours of sleep. . No complaints of pain and discomfort. Patient continues on q15 minutes safety checks, no behavioral issues or any safety concerns noted. Will continue to monitor the patient and provide therapeutic intervention as needed. Will continue with current plan of care. Notify MD with any concerns.

## 2024-11-23 NOTE — PLAN OF CARE
Problem: Adult Inpatient Plan of Care  Goal: Plan of Care Review  Description: The Plan of Care Review/Shift note should be completed every shift.  The Outcome Evaluation is a brief statement about your assessment that the patient is improving, declining, or no change.  This information will be displayed automatically on your shift  note.  Outcome: Progressing     Problem: Adult Behavioral Health Plan of Care  Goal: Plan of Care Review  Outcome: Progressing     Problem: Anxiety  Goal: Anxiety Reduction or Resolution  Outcome: Progressing     Problem: Depression  Goal: Decreased Depression Symptoms  Outcome: Progressing   Goal Outcome Evaluation:    Plan of Care Reviewed With: patient      Pt has been isolative and withdrawn to self in room, affect remains flat and blunted, avoids social interaction with peers and staff, pt was observed laying in her bed resting during the 15 minutes safety checks. Pt endorsed anxiety 7/10 PRN Hydroxyzine was given with effect, depression 4/10, denied all other psych symptoms and contracted for safety in the unit. BS before dinner 156 and 253 at HS, sliding scale and long acting insulin given per order. Pt compliant with medication, hygiene is ok, no safety concerns or behavior issues noted.

## 2024-11-23 NOTE — PLAN OF CARE
Group Attendance:  attended full group    Time session began: 1315  Time session ended: 1400  Patient's total time in group: 45    Total # Attendees   3   Group Type psychotherapeutic     Group Topic Covered healthy coping skills, goals and motivation, and positive psychology       Group Session Detail Participants engaged in a structured activity to promote the use of hope in their mental health recovery. Folks were asked to share something they hope for, and practical steps they can take to ensure they experience what they hope for. Participants were tasked with writing down words of hope, affirmation, and inspiration on strips of paper that were then glued together into a chain link. Folks will take a ring from their chain link each day and focus on the positive words for that day.      Patient's response to the group topic/interactions:  supportive of peers, socially appropriate, listened actively, expressed understanding of topic, attentive, and actively engaged     Patient Details: Yun completed the activity and was an active participant in the group discussion. She shared hope for the holiday season and her responses were very relationship-oriented. She described her family and she appeared to brighten when speaking of them. She had insight into the cycle of stopping medications then experiencing mental health decompensation. She had insight into her symptoms of agoraphobia being a barrier to staying consistent on her medications and when discussing medication delivery she stated that she learned about that during this hospitalization. She was supportive of her peers, offering empathy and validation to both peers throughout the group discussion.         24032 - Group psychotherapy - 1 Session  Patient Active Problem List   Diagnosis    Suicidal behavior    Benign essential HTN    Cannabis abuse    Cyclic vomiting syndrome    Elevated LFTs    Hyperlipidemia with target low density lipoprotein (LDL)  cholesterol less than 100 mg/dL    Insomnia    Major depressive disorder, recurrent episode, severe (H)    Mild intermittent asthma    Panic disorder with agoraphobia    OCD (obsessive compulsive disorder)    Severe benzodiazepine use disorder (H) in remission    S/P lumbar microdiscectomy    S/P laparoscopic cholecystectomy    Personality disorder (H)    Type 2 diabetes mellitus with hyperglycemia, with long-term current use of insulin (H)    Obesity (BMI 35.0-39.9) with comorbidity (H)    Falls frequently    Moderate opioid use disorder, in early remission (H)    Chronic hepatitis C without hepatic coma (H)    Hyperglycemia    Dehydration    Cyclical vomiting    Lactic acidosis    Epigastric pain    Coffee ground emesis    Elevated blood ketone body level    Severe episode of recurrent major depressive disorder, without psychotic features (H)    PTSD (post-traumatic stress disorder)    Recurrent major depression (H)    Suicidal ideation    Abscess of arm, right

## 2024-11-23 NOTE — PLAN OF CARE
Problem: Adult Inpatient Plan of Care  Goal: Plan of Care Review  Description: The Plan of Care Review/Shift note should be completed every shift.  The Outcome Evaluation is a brief statement about your assessment that the patient is improving, declining, or no change.  This information will be displayed automatically on your shift  note.  Outcome: Progressing  Flowsheets (Taken 11/23/2024 8281)  Plan of Care Reviewed With: patient  Overall Patient Progress: improving   Goal Outcome Evaluation:      Plan of Care Reviewed With: patient    Overall Patient Progress: improving  Patient has been visible in the milieu.Patient watched TV in the lounge and this afternoon she was in the D/R drawing by herself.Patient is not sociable with peers.Bg was 250 @ 8 am.Both sliding scale insulin administered.BG was 266 before lunch,3 units of Novolog  sliding scale given plus 10 U for carb count.Patient keeps juices in her room and she was reminded to let RN know for insulin coverage.Hydroxyzine prn given x 2 with relief.Right arm wound cares completed per orders.Patient hygiene is appropriate.Denies suicidal thoughts and self harm plus others and she contracted for safety..Blood pressure high this morning.  Temp: 97.4  F (36.3  C) Temp src: Temporal BP: (!) 155/108 Pulse: 87   Resp: 18 SpO2: 96 % O2 Device: None (Room air)

## 2024-11-24 LAB
GLUCOSE BLDC GLUCOMTR-MCNC: 177 MG/DL (ref 70–99)
GLUCOSE BLDC GLUCOMTR-MCNC: 209 MG/DL (ref 70–99)
GLUCOSE BLDC GLUCOMTR-MCNC: 250 MG/DL (ref 70–99)
GLUCOSE BLDC GLUCOMTR-MCNC: 306 MG/DL (ref 70–99)
GLUCOSE BLDC GLUCOMTR-MCNC: 361 MG/DL (ref 70–99)
GLUCOSE BLDC GLUCOMTR-MCNC: 363 MG/DL (ref 70–99)

## 2024-11-24 PROCEDURE — 90853 GROUP PSYCHOTHERAPY: CPT

## 2024-11-24 PROCEDURE — 99232 SBSQ HOSP IP/OBS MODERATE 35: CPT

## 2024-11-24 PROCEDURE — 250N000013 HC RX MED GY IP 250 OP 250 PS 637: Performed by: STUDENT IN AN ORGANIZED HEALTH CARE EDUCATION/TRAINING PROGRAM

## 2024-11-24 PROCEDURE — 250N000013 HC RX MED GY IP 250 OP 250 PS 637

## 2024-11-24 PROCEDURE — 124N000002 HC R&B MH UMMC

## 2024-11-24 RX ADMIN — METFORMIN HYDROCHLORIDE 1000 MG: 500 TABLET, FILM COATED ORAL at 07:56

## 2024-11-24 RX ADMIN — ESCITALOPRAM 20 MG: 20 TABLET, FILM COATED ORAL at 07:57

## 2024-11-24 RX ADMIN — CLONIDINE HYDROCHLORIDE 0.1 MG: 0.1 TABLET ORAL at 20:42

## 2024-11-24 RX ADMIN — INSULIN ASPART 9 UNITS: 100 INJECTION, SOLUTION INTRAVENOUS; SUBCUTANEOUS at 17:42

## 2024-11-24 RX ADMIN — PRAZOSIN HYDROCHLORIDE 2 MG: 2 CAPSULE ORAL at 21:00

## 2024-11-24 RX ADMIN — OLANZAPINE 10 MG: 10 TABLET, ORALLY DISINTEGRATING ORAL at 21:00

## 2024-11-24 RX ADMIN — LOSARTAN POTASSIUM 25 MG: 25 TABLET, FILM COATED ORAL at 07:57

## 2024-11-24 RX ADMIN — CLONIDINE HYDROCHLORIDE 0.1 MG: 0.1 TABLET ORAL at 07:57

## 2024-11-24 RX ADMIN — INSULIN ASPART 4 UNITS: 100 INJECTION, SOLUTION INTRAVENOUS; SUBCUTANEOUS at 07:57

## 2024-11-24 RX ADMIN — METHADONE HYDROCHLORIDE 150 MG: 10 CONCENTRATE ORAL at 07:59

## 2024-11-24 RX ADMIN — HYDROXYZINE HYDROCHLORIDE 25 MG: 25 TABLET ORAL at 09:18

## 2024-11-24 RX ADMIN — HYDROXYZINE HYDROCHLORIDE 25 MG: 25 TABLET ORAL at 17:33

## 2024-11-24 RX ADMIN — HYDROXYZINE HYDROCHLORIDE 25 MG: 25 TABLET ORAL at 13:01

## 2024-11-24 RX ADMIN — METFORMIN HYDROCHLORIDE 1000 MG: 500 TABLET, FILM COATED ORAL at 18:09

## 2024-11-24 ASSESSMENT — ACTIVITIES OF DAILY LIVING (ADL)
ADLS_ACUITY_SCORE: 0
HYGIENE/GROOMING: INDEPENDENT
ADLS_ACUITY_SCORE: 0
DRESS: SCRUBS (BEHAVIORAL HEALTH);INDEPENDENT
ADLS_ACUITY_SCORE: 0
ORAL_HYGIENE: INDEPENDENT
ADLS_ACUITY_SCORE: 0

## 2024-11-24 NOTE — PLAN OF CARE
Problem: Sleep Disturbance  Goal: Adequate Sleep/Rest  Outcome: Progressing   Patient sleeping most of the shift. Patient had 6.75 hours of sleep. Patient no complaints of pain and discomfort. Patient BG-209 @ 0212, asymptomatic. Patient declined R arm dressing to be checked. No behavioral or any safety concerns noted this shift. Will continue to monitor the patient and notify MD with any concerns.

## 2024-11-24 NOTE — PROGRESS NOTES
Brief medicine progress note    Continuing to make adjustments to diabetes regimen.  Continuing to uptitrate basal insulin.  A.m. sugars remain elevated.  Spoke with patient on unit.  Endorses overnight snacking  Reports they require overnight standing to feel okay.  Snacking appears to be consistent.  Should be able to increase glargine. Increased Lantus from 28 to 35 units at bedtime.  Increased sliding scale to high-resistance.  Will continue carb coverage at current 1 unit per 10 g of carbs.     Rey Brewer PA-C  Hospitalist Service, [facility]  Securely message with [employer] (more info)  Text page via [facility] Paging/Directory   See signed in provider for up to date coverage information

## 2024-11-24 NOTE — PLAN OF CARE
Problem: Adult Inpatient Plan of Care  Goal: Plan of Care Review  Description: The Plan of Care Review/Shift note should be completed every shift.  The Outcome Evaluation is a brief statement about your assessment that the patient is improving, declining, or no change.  This information will be displayed automatically on your shift  note.  Outcome: Progressing  Flowsheets (Taken 11/24/2024 7865)  Plan of Care Reviewed With: patient  Overall Patient Progress: improving   Goal Outcome Evaluation:      Plan of Care Reviewed With: patient    Overall Patient Progress: improving  Patient has been visible in the milieu. in the morning and 177 before lunch.Insulin given per orders.Patient endorsed anxiety and received Hydroxyzine prn x 2,Denies all other MH issues.Adequate food and fluid intake.Right arm wound cares completed per orders.  Temp: 98.2  F (36.8  C) Temp src: Oral BP: (!) 138/92 Pulse: 82     SpO2: 99 % O2 Device: None (Room air)

## 2024-11-25 LAB
ATRIAL RATE - MUSE: 75 BPM
ATRIAL RATE - MUSE: 82 BPM
DIASTOLIC BLOOD PRESSURE - MUSE: NORMAL MMHG
DIASTOLIC BLOOD PRESSURE - MUSE: NORMAL MMHG
GLUCOSE BLDC GLUCOMTR-MCNC: 180 MG/DL (ref 70–99)
GLUCOSE BLDC GLUCOMTR-MCNC: 205 MG/DL (ref 70–99)
GLUCOSE BLDC GLUCOMTR-MCNC: 245 MG/DL (ref 70–99)
GLUCOSE BLDC GLUCOMTR-MCNC: 252 MG/DL (ref 70–99)
GLUCOSE BLDC GLUCOMTR-MCNC: 325 MG/DL (ref 70–99)
INTERPRETATION ECG - MUSE: NORMAL
INTERPRETATION ECG - MUSE: NORMAL
P AXIS - MUSE: 56 DEGREES
P AXIS - MUSE: 57 DEGREES
PR INTERVAL - MUSE: 140 MS
PR INTERVAL - MUSE: 148 MS
QRS DURATION - MUSE: 84 MS
QRS DURATION - MUSE: 90 MS
QT - MUSE: 378 MS
QT - MUSE: 388 MS
QTC - MUSE: 433 MS
QTC - MUSE: 441 MS
R AXIS - MUSE: -20 DEGREES
R AXIS - MUSE: -24 DEGREES
SYSTOLIC BLOOD PRESSURE - MUSE: NORMAL MMHG
SYSTOLIC BLOOD PRESSURE - MUSE: NORMAL MMHG
T AXIS - MUSE: 18 DEGREES
T AXIS - MUSE: 31 DEGREES
VENTRICULAR RATE- MUSE: 75 BPM
VENTRICULAR RATE- MUSE: 82 BPM

## 2024-11-25 PROCEDURE — 250N000013 HC RX MED GY IP 250 OP 250 PS 637

## 2024-11-25 PROCEDURE — 250N000013 HC RX MED GY IP 250 OP 250 PS 637: Performed by: STUDENT IN AN ORGANIZED HEALTH CARE EDUCATION/TRAINING PROGRAM

## 2024-11-25 PROCEDURE — 93010 ELECTROCARDIOGRAM REPORT: CPT | Performed by: INTERNAL MEDICINE

## 2024-11-25 PROCEDURE — 124N000002 HC R&B MH UMMC

## 2024-11-25 PROCEDURE — 99232 SBSQ HOSP IP/OBS MODERATE 35: CPT | Mod: GC | Performed by: PSYCHIATRY & NEUROLOGY

## 2024-11-25 PROCEDURE — 93005 ELECTROCARDIOGRAM TRACING: CPT

## 2024-11-25 PROCEDURE — 250N000012 HC RX MED GY IP 250 OP 636 PS 637

## 2024-11-25 RX ADMIN — INSULIN ASPART 5 UNITS: 100 INJECTION, SOLUTION INTRAVENOUS; SUBCUTANEOUS at 12:28

## 2024-11-25 RX ADMIN — HYDROXYZINE HYDROCHLORIDE 25 MG: 25 TABLET ORAL at 11:52

## 2024-11-25 RX ADMIN — ESCITALOPRAM 20 MG: 20 TABLET, FILM COATED ORAL at 08:03

## 2024-11-25 RX ADMIN — METFORMIN HYDROCHLORIDE 1000 MG: 500 TABLET, FILM COATED ORAL at 19:08

## 2024-11-25 RX ADMIN — METFORMIN HYDROCHLORIDE 1000 MG: 500 TABLET, FILM COATED ORAL at 08:03

## 2024-11-25 RX ADMIN — HYDROXYZINE HYDROCHLORIDE 25 MG: 25 TABLET ORAL at 16:19

## 2024-11-25 RX ADMIN — LOSARTAN POTASSIUM 25 MG: 25 TABLET, FILM COATED ORAL at 08:03

## 2024-11-25 RX ADMIN — HYDROXYZINE HYDROCHLORIDE 25 MG: 25 TABLET ORAL at 08:06

## 2024-11-25 RX ADMIN — PRAZOSIN HYDROCHLORIDE 2 MG: 2 CAPSULE ORAL at 20:33

## 2024-11-25 RX ADMIN — INSULIN ASPART 8 UNITS: 100 INJECTION, SOLUTION INTRAVENOUS; SUBCUTANEOUS at 08:07

## 2024-11-25 RX ADMIN — CLONIDINE HYDROCHLORIDE 0.1 MG: 0.1 TABLET ORAL at 20:34

## 2024-11-25 RX ADMIN — CLONIDINE HYDROCHLORIDE 0.1 MG: 0.1 TABLET ORAL at 08:03

## 2024-11-25 RX ADMIN — METHADONE HYDROCHLORIDE 150 MG: 10 CONCENTRATE ORAL at 08:06

## 2024-11-25 RX ADMIN — OLANZAPINE 12.5 MG: 10 TABLET, ORALLY DISINTEGRATING ORAL at 20:34

## 2024-11-25 RX ADMIN — INSULIN ASPART 2 UNITS: 100 INJECTION, SOLUTION INTRAVENOUS; SUBCUTANEOUS at 18:19

## 2024-11-25 ASSESSMENT — ACTIVITIES OF DAILY LIVING (ADL)
ADLS_ACUITY_SCORE: 45
ADLS_ACUITY_SCORE: 0
DRESS: INDEPENDENT;SCRUBS (BEHAVIORAL HEALTH)
ADLS_ACUITY_SCORE: 0
ADLS_ACUITY_SCORE: 45
ADLS_ACUITY_SCORE: 0
ADLS_ACUITY_SCORE: 45
ADLS_ACUITY_SCORE: 0
ADLS_ACUITY_SCORE: 0
ADLS_ACUITY_SCORE: 45
ADLS_ACUITY_SCORE: 45
ORAL_HYGIENE: WITH SUPERVISION
ADLS_ACUITY_SCORE: 0
HYGIENE/GROOMING: INDEPENDENT
ADLS_ACUITY_SCORE: 0
DRESS: INDEPENDENT;SCRUBS (BEHAVIORAL HEALTH)
ADLS_ACUITY_SCORE: 45
HYGIENE/GROOMING: INDEPENDENT
ORAL_HYGIENE: INDEPENDENT
ADLS_ACUITY_SCORE: 0
ADLS_ACUITY_SCORE: 0
ADLS_ACUITY_SCORE: 45

## 2024-11-25 NOTE — PLAN OF CARE
Group Attendance:  attended full group    Time session began: 1800  Time session ended: 1837  Patient's total time in group: 37    Total # Attendees   4   Group Type DBT     Group Topic Covered DBT skills     Group Session Detail Participants engaged in a structured activity to promote learning DBT skills and implementing them in life scenarios. Participants chose one skill in emotion regulation, interpersonal effectiveness, mindfulness, or distress tolerance to be an effective tool for different life scenarios and had to share with the group why their chosen skill would be most effective. One participant then chose a winner based on what they perceived would be most effective.      Patient's response to the group topic/interactions:  cooperative with task, supportive of peers, socially appropriate, listened actively, expressed understanding of topic, attentive, and actively engaged     Patient Details: Yun was supportive of her peers and provided validation to peers when they voiced concerns about their choices of skill card. She made logical choices for skill cards for the presented conflicts.          13359 - Group psychotherapy - 1 Session  Patient Active Problem List   Diagnosis    Suicidal behavior    Benign essential HTN    Cannabis abuse    Cyclic vomiting syndrome    Elevated LFTs    Hyperlipidemia with target low density lipoprotein (LDL) cholesterol less than 100 mg/dL    Insomnia    Major depressive disorder, recurrent episode, severe (H)    Mild intermittent asthma    Panic disorder with agoraphobia    OCD (obsessive compulsive disorder)    Severe benzodiazepine use disorder (H) in remission    S/P lumbar microdiscectomy    S/P laparoscopic cholecystectomy    Personality disorder (H)    Type 2 diabetes mellitus with hyperglycemia, with long-term current use of insulin (H)    Obesity (BMI 35.0-39.9) with comorbidity (H)    Falls frequently    Moderate opioid use disorder, in early remission (H)     Chronic hepatitis C without hepatic coma (H)    Hyperglycemia    Dehydration    Cyclical vomiting    Lactic acidosis    Epigastric pain    Coffee ground emesis    Elevated blood ketone body level    Severe episode of recurrent major depressive disorder, without psychotic features (H)    PTSD (post-traumatic stress disorder)    Recurrent major depression (H)    Suicidal ideation    Abscess of arm, right

## 2024-11-25 NOTE — PLAN OF CARE
Problem: Anxiety  Goal: Anxiety Reduction or Resolution  Outcome: Progressing     Problem: Depression  Goal: Decreased Depression Symptoms  Outcome: Progressing     Problem: Adult Inpatient Plan of Care  Goal: Absence of Hospital-Acquired Illness or Injury  Intervention: Prevent Skin Injury  Recent Flowsheet Documentation  Taken 11/24/2024 1713 by Tiki Trujillo RN  Body Position: position changed independently  Intervention: Prevent Infection  Recent Flowsheet Documentation  Taken 11/24/2024 1915 by Tiki Trujillo RN  Infection Prevention: other (see comments)     Problem: Adult Behavioral Health Plan of Care  Goal: Adheres to Safety Considerations for Self and Others  Intervention: Develop and Maintain Individualized Safety Plan  Recent Flowsheet Documentation  Taken 11/24/2024 1713 by Tiki Trujillo RN  Safety Measures:   environmental rounds completed   safety rounds completed  Goal: Absence of New-Onset Illness or Injury  Intervention: Prevent Infection  Recent Flowsheet Documentation  Taken 11/24/2024 1915 by Tiki Trujillo RN  Infection Prevention: other (see comments)     Problem: Suicide Risk  Goal: Absence of Self-Harm  Intervention: Assess Risk to Self and Maintain Safety  Recent Flowsheet Documentation  Taken 11/24/2024 1713 by Tiki Trujillo RN  Self-Harm Prevention: environmental self-harm risks assessed  Intervention: Establish Safety Plan and Continuity of Care  Recent Flowsheet Documentation  Taken 11/24/2024 1713 by Tiki Trujillo RN  Safe Transition Promotion: protective factors promoted     Problem: Skin Injury Risk Increased  Goal: Skin Health and Integrity  Intervention: Plan: Nurse Driven Intervention: Moisture Management  Recent Flowsheet Documentation  Taken 11/24/2024 1713 by Tiki Trujillo RN  Moisture Interventions: No brief in bed  Intervention: Optimize Skin Protection  Recent Flowsheet Documentation  Taken 11/24/2024 1713 by Tiki Trujillo  RN  Head of Bed (HOB) Positioning: [facility] flat   Goal Outcome Evaluation:     Pt  was visible in the lounge majority of the shift watching TV with selected peers. Presented with a flat and blunted affect. Mood ok as described by pt. Pt denied pain and any physical discomfort. Endorsed anxiety 7, depression 7. Denies SI/SIB/ hallucinations and delusion. Pt requested and received Hydroxyzine X 1 for anxiety. Nutrition and hydration adequate. BG  before  meal 363  and 250 at bed time.Insulin administered per orders.  Pt was compliant with medication and contracted for safety. Pt attended group. No behavioral issues noted.

## 2024-11-25 NOTE — PLAN OF CARE
"Pt denies SI.  Pt rates depression at a 8/10 and anxiety 7/10.  Pt has requested and rec'd PRN Hydroxyzine 25mg x2 for anxiety.  Pt reports has been effective.  Pts blood sugars this shift 325 before breakfast and 245 before lunch.  Pt given insulin as per order.  Pt reminded if has snacks please inform staff so it can be covered with insulin.  Pt has some snack food at her bedside she states will eat tonight.  Pt dressing changed on rt arm.  No s/sx's of infection.  Pt reported pain in that arm but declined PRN.  Pt reports pain has decreased.  Pt come out of her room for meals and worked on word finds for short time in dinging area.  Pt observed little interaction with others.  Pt pleasant and cooperative upon approach.    Problem: Adult Inpatient Plan of Care  Goal: Plan of Care Review  Description: The Plan of Care Review/Shift note should be completed every shift.  The Outcome Evaluation is a brief statement about your assessment that the patient is improving, declining, or no change.  This information will be displayed automatically on your shift  note.  Outcome: Not Progressing  Goal: Patient-Specific Goal (Individualized)  Description: You can add care plan individualizations to a care plan. Examples of Individualization might be:  \"Parent requests to be called daily at 9am for status\", \"I have a hard time hearing out of my right ear\", or \"Do not touch me to wake me up as it startles  me\".  Outcome: Not Progressing  Goal: Absence of Hospital-Acquired Illness or Injury  Outcome: Not Progressing  Goal: Optimal Comfort and Wellbeing  Outcome: Not Progressing  Intervention: Monitor Pain and Promote Comfort  Recent Flowsheet Documentation  Taken 11/25/2024 0810 by Carlota Gresham RN  Pain Management Interventions: medication offered but refused  Goal: Readiness for Transition of Care  Outcome: Not Progressing     Problem: Adult Behavioral Health Plan of Care  Goal: Plan of Care Review  Outcome: Not " "Progressing  Goal: Patient-Specific Goal (Individualization)  Description: You can add care plan individualizations to a care plan. Examples of Individualization might be:  \"Parent requests to be called daily at 9am for status\", \"I have a hard time hearing out of my right ear\", or \"Do not touch me to wake me up as it startles  me\".  Outcome: Not Progressing  Goal: Adheres to Safety Considerations for Self and Others  Outcome: Not Progressing  Intervention: Develop and Maintain Individualized Safety Plan  Recent Flowsheet Documentation  Taken 11/25/2024 1124 by Carlota Gresham RN  Safety Measures:   environmental rounds completed   safety rounds completed  Goal: Absence of New-Onset Illness or Injury  Outcome: Not Progressing  Goal: Optimized Coping Skills in Response to Life Stressors  Outcome: Not Progressing  Goal: Develops/Participates in Therapeutic [place] to Support Successful Transition  Outcome: Not Progressing     Problem: Sleep Disturbance  Goal: Adequate Sleep/Rest  Outcome: Not Progressing     Problem: Anxiety  Goal: Anxiety Reduction or Resolution  Outcome: Not Progressing     Problem: Depression  Goal: Decreased Depression Symptoms  Outcome: Not Progressing     Problem: Suicide Risk  Goal: Absence of Self-Harm  Outcome: Not Progressing     Problem: Skin Injury Risk Increased  Goal: Skin Health and Integrity  Outcome: Not Progressing   Goal Outcome Evaluation:                        "

## 2024-11-25 NOTE — PLAN OF CARE
Problem: Sleep Disturbance  Goal: Adequate Sleep/Rest  Outcome: Progressing   Patient sleeping most of the shift. Patient had 6.5 hours of sleep. Patient no complaints of pain and discomfort. Patient BG-205 @ 0255, asymptomatic. Patient declined R arm dressing to be checked. No behavioral or any safety concerns noted this shift. Will continue to monitor the patient and notify MD with any concerns.

## 2024-11-25 NOTE — PROGRESS NOTES
"  ----------------------------------------------------------------------------------------------------------  [facility]  Psychiatry Progress Note  Hospital Day #9     Interim History:     The patient's care was discussed with the treatment team and chart notes were reviewed.    Vitals: VSS  Sleep: 6.5 hours (11/25/24 0600)  Scheduled medications: Took all scheduled medications as prescribed  Psychiatric PRN medications:   Last 24H PRN:     hydrOXYzine HCl (ATARAX) tablet 25 mg, 25 mg at 11/25/24 1152    insulin aspart (NovoLOG) injection (RAPID ACTING), Given at 11/24/24 2112    insulin aspart (NovoLOG) injection (RAPID ACTING), Given at 11/24/24 2114     Staff Report:    Patient sleeping most of the shift. Patient had 6.5 hours of sleep. Patient no complaints of pain and discomfort. Patient BG-205 @ 0255, asymptomatic. Patient declined R arm dressing to be checked. No behavioral or any safety concerns noted this shift. Will continue to monitor the patient and notify MD with any concerns.      Subjective:     Patient Interview:  Yun Coyle was interviewed in the milieu. Patient states she is \"doing ok\" endorses persistent depressive symptoms with8/10 depression and 6/10 anxiety. She says she has always had anxiety. Wakes about 4x per night. She is laying in bed and says she feels depressed. She says she has to wake during the night to eat because she has cyclic vomiting syndrome. She has not had it since being here. Nursing will come discuss snacking routine so they know how much insulin to give. Reports arm has no pain, redness, or discharge.   Last time she had thoughts of self harm was yesterday. Feels like a burden and if she was dead it would be better.   The plan to increase Lexapro and Zydis to help with her mood and sleep was discussed with the patient and she was in agreement with the plan. The post discharge options of IOP, PHP , and IRTS was discussed " "and the patient was interested in PHP program.       ROS:  Patient has no bothersome physical symptoms. Reports improvement in right arm wound pain, no redness or discharge  Patient denies acute concerns.     Objective:     Vitals:  BP (!) 134/90   Pulse 81   Temp 97.7  F (36.5  C) (Oral)   Resp 18   Ht 1.753 m (5' 9\")   Wt 93.6 kg (206 lb 6.4 oz)   LMP  (LMP Unknown)   SpO2 100%   BMI 30.48 kg/m      Allergies:  No Known Allergies    Current Medications:  Scheduled:  Current Facility-Administered Medications   Medication Dose Route Frequency Provider Last Rate Last Admin    alum & mag hydroxide-simethicone (MAALOX) suspension 30 mL  30 mL Oral Q4H PRN Joseph Santana MD        cloNIDine (CATAPRES) tablet 0.1 mg  0.1 mg Oral BID Alina Flannrey MD   0.1 mg at 11/25/24 0803    glucose gel 15-30 g  15-30 g Oral Q15 Min PRN Rey Brewer        Or    dextrose 50 % injection 25-50 mL  25-50 mL Intravenous Q15 Min PRN Rey Brewer        Or    glucagon injection 1 mg  1 mg Subcutaneous Q15 Min PRN Rey Brewer        [START ON 11/26/2024] escitalopram (LEXAPRO) tablet 25 mg  25 mg Oral Daily Brionna Gambino MD        hydrOXYzine HCl (ATARAX) tablet 25 mg  25 mg Oral Q4H PRN Joseph Santana MD   25 mg at 11/25/24 1152    ibuprofen (ADVIL/MOTRIN) tablet 800 mg  800 mg Oral Q6H PRN Alina Flannery MD   800 mg at 11/21/24 1121    insulin aspart (NovoLOG) injection (RAPID ACTING)  1-10 Units Subcutaneous TID AC Rey Brewer   5 Units at 11/25/24 1228    insulin aspart (NovoLOG) injection (RAPID ACTING)  1-7 Units Subcutaneous At Bedtime Rey Brewer   3 Units at 11/24/24 2115    insulin aspart (NovoLOG) injection (RAPID ACTING)   Subcutaneous Daily with breakfast Rey Brewer   1 Units at 11/25/24 0826    insulin aspart (NovoLOG) injection (RAPID ACTING)   Subcutaneous Daily with lunch Rey Brewer        insulin aspart (NovoLOG) injection (RAPID ACTING)   Subcutaneous Daily with supper " Rey Brewer   14 Units at 11/24/24 1834    insulin aspart (NovoLOG) injection (RAPID ACTING)   Subcutaneous With Snacks or Supplements Rey Brewer   Given at 11/24/24 2112    insulin aspart (NovoLOG) injection (RAPID ACTING)   Subcutaneous With Snacks or Supplements Rey Brewer   Given at 11/24/24 2114    insulin glargine (LANTUS PEN) injection 35 Units  35 Units Subcutaneous At Bedtime Rey Brewer   35 Units at 11/24/24 2110    losartan (COZAAR) tablet 25 mg  25 mg Oral Daily Joseph Santana MD   25 mg at 11/25/24 0803    melatonin tablet 3 mg  3 mg Oral At Bedtime PRN Joseph Santana MD   3 mg at 11/17/24 2059    metFORMIN (GLUCOPHAGE) tablet 1,000 mg  1,000 mg Oral BID w/meals Joseph Santana MD   1,000 mg at 11/25/24 0803    methadone (DOLOPHINE-INTENSOL) 10 MG/ML (HIGH CONC) solution 150 mg  150 mg Oral Daily Alina Flannery MD   150 mg at 11/25/24 0806    OLANZapine (zyPREXA) tablet 10 mg  10 mg Oral TID PRN Joseph Santana MD   10 mg at 11/21/24 1139    Or    OLANZapine (zyPREXA) injection 10 mg  10 mg Intramuscular TID PRN Joseph Santana MD        OLANZapine zydis (zyPREXA) ODT half-tab 12.5 mg  12.5 mg Oral At Bedtime Brionna Gambino MD        prazosin (MINIPRESS) capsule 2 mg  2 mg Oral At Bedtime Brionna Gambino MD   2 mg at 11/24/24 2100    senna-docusate (SENOKOT-S/PERICOLACE) 8.6-50 MG per tablet 1 tablet  1 tablet Oral BID PRN Joseph Santana MD           PRN:  Current Facility-Administered Medications   Medication Dose Route Frequency Provider Last Rate Last Admin    alum & mag hydroxide-simethicone (MAALOX) suspension 30 mL  30 mL Oral Q4H PRN Joseph Santana MD        cloNIDine (CATAPRES) tablet 0.1 mg  0.1 mg Oral BID Alina Flannery MD   0.1 mg at 11/25/24 0803    glucose gel 15-30 g  15-30 g Oral Q15 Min PRN Rey Brewer        Or    dextrose 50 % injection 25-50 mL  25-50 mL Intravenous Q15 Min PRN Rey Brewer        Or    glucagon injection 1 mg  1 mg  Subcutaneous Q15 Min PRN Rey Brewer        [START ON 11/26/2024] escitalopram (LEXAPRO) tablet 25 mg  25 mg Oral Daily Brionna Gambino MD        hydrOXYzine HCl (ATARAX) tablet 25 mg  25 mg Oral Q4H PRN Joseph Santana MD   25 mg at 11/25/24 1152    ibuprofen (ADVIL/MOTRIN) tablet 800 mg  800 mg Oral Q6H PRN Alina Flannery MD   800 mg at 11/21/24 1121    insulin aspart (NovoLOG) injection (RAPID ACTING)  1-10 Units Subcutaneous TID AC Rey Brewer   5 Units at 11/25/24 1228    insulin aspart (NovoLOG) injection (RAPID ACTING)  1-7 Units Subcutaneous At Bedtime Rey Brewer   3 Units at 11/24/24 2115    insulin aspart (NovoLOG) injection (RAPID ACTING)   Subcutaneous Daily with breakfast Rey Brewer   1 Units at 11/25/24 0826    insulin aspart (NovoLOG) injection (RAPID ACTING)   Subcutaneous Daily with lunch Rey Brewer        insulin aspart (NovoLOG) injection (RAPID ACTING)   Subcutaneous Daily with supper Rey Brewer   14 Units at 11/24/24 1834    insulin aspart (NovoLOG) injection (RAPID ACTING)   Subcutaneous With Snacks or Supplements Rey Brewer   Given at 11/24/24 2112    insulin aspart (NovoLOG) injection (RAPID ACTING)   Subcutaneous With Snacks or Supplements Rey Brewer   Given at 11/24/24 2114    insulin glargine (LANTUS PEN) injection 35 Units  35 Units Subcutaneous At Bedtime Rey Brewer   35 Units at 11/24/24 2110    losartan (COZAAR) tablet 25 mg  25 mg Oral Daily Joseph Santana MD   25 mg at 11/25/24 0803    melatonin tablet 3 mg  3 mg Oral At Bedtime PRN Joseph Santana MD   3 mg at 11/17/24 2059    metFORMIN (GLUCOPHAGE) tablet 1,000 mg  1,000 mg Oral BID w/meals Joseph Santana MD   1,000 mg at 11/25/24 0803    methadone (DOLOPHINE-INTENSOL) 10 MG/ML (HIGH CONC) solution 150 mg  150 mg Oral Daily Alina Flannery MD   150 mg at 11/25/24 0806    OLANZapine (zyPREXA) tablet 10 mg  10 mg Oral TID PRN Joseph Santana MD   10 mg at 11/21/24 1139    Or     OLANZapine (zyPREXA) injection 10 mg  10 mg Intramuscular TID PRN Joseph Santana MD        OLANZapine zydis (zyPREXA) ODT half-tab 12.5 mg  12.5 mg Oral At Bedtime Brionna Gambino MD        prazosin (MINIPRESS) capsule 2 mg  2 mg Oral At Bedtime Brionna Gambino MD   2 mg at 11/24/24 2100    senna-docusate (SENOKOT-S/PERICOLACE) 8.6-50 MG per tablet 1 tablet  1 tablet Oral BID PRN Joseph Santana MD           Labs and Imaging:  New results:   Recent Results (from the past 24 hours)   Glucose by meter    Collection Time: 11/24/24  5:39 PM   Result Value Ref Range    GLUCOSE BY METER POCT 363 (H) 70 - 99 mg/dL   Glucose by meter    Collection Time: 11/24/24  6:00 PM   Result Value Ref Range    GLUCOSE BY METER POCT 361 (H) 70 - 99 mg/dL   Glucose by meter    Collection Time: 11/24/24  9:07 PM   Result Value Ref Range    GLUCOSE BY METER POCT 250 (H) 70 - 99 mg/dL   Glucose by meter    Collection Time: 11/25/24  2:55 AM   Result Value Ref Range    GLUCOSE BY METER POCT 205 (H) 70 - 99 mg/dL   Glucose by meter    Collection Time: 11/25/24  7:48 AM   Result Value Ref Range    GLUCOSE BY METER POCT 325 (H) 70 - 99 mg/dL   Glucose by meter    Collection Time: 11/25/24 11:49 AM   Result Value Ref Range    GLUCOSE BY METER POCT 245 (H) 70 - 99 mg/dL   EKG 12-lead, complete    Collection Time: 11/25/24 12:22 PM   Result Value Ref Range    Systolic Blood Pressure  mmHg    Diastolic Blood Pressure  mmHg    Ventricular Rate 75 BPM    Atrial Rate 75 BPM    ND Interval 148 ms    QRS Duration 90 ms     ms    QTc 433 ms    P Axis 57 degrees    R AXIS -20 degrees    T Axis 18 degrees    Interpretation ECG       Sinus rhythm  Normal ECG  When compared with ECG of 22-Nov-2024 09:32, (unconfirmed)  No significant change was found         Data this admission:  - CBC unremarkable  - CMP unremarkable  - UDS + fentanyl, cannabinoids, and amphetamines  - Hgb A1c 11.2  - Lipids unremarkable  - Vit B12 normal  - Folate normal  - EKG  "normal sinus rhythm, 11/22 QTc 441  - Hepatitis C pending   - MRSA negative     Mental Status Exam:     Oriented to:  Grossly Oriented  General:  Awake and Alert  Appearance:  appears stated age  Behavior/Attitude:  Calm, Cooperative, and Engaged  Eye Contact: Appropriate  Psychomotor: Normal and No evidence of tics, dystonia, or tardive dyskinesia  no catatonia present  Speech:  appropriate volume/tone and appropriate rate  Language: Fluent in English with appropriate syntax and vocabulary.  Mood:  \"Depressed\"  Affect:  congruent with mood and blunted  Thought Process:  linear and coherent  Thought Content:   No SI/HI/AH/VH,Passive Suicidal thoughts; No apparent delusions  Associations:  intact  Insight:  fair due to persistent depressive sx  Judgment:  fair due to persistent depressive sx  Impulse control: fair  Attention Span:  grossly intact  Concentration:  grossly intact  Recent and Remote Memory:  not formally assessed  Fund of Knowledge: average  Muscle Strength and Tone: normal  Gait and Station:  not assessed - patient laying in bed     Psychiatric Assessment     Yun Coyle is a 48 year old female previously diagnosed with MDD, SANDIE, PTSD, Polysubstance use, Panic attacks, and agoraphobia who presented voluntarily with suicidal ideation with a plan in the context of worsening MDD, SANDIE, medication noncompliance, active fentanyl use on methadone, and daily cannabis use who is recently having issues with obtaining medical insurance. Most recent psychiatric hospitalization was during adolescence. Significant symptoms on admission include fatigue, insomnia, worry, anxiety, loss of appetite, anhedonia, worsening of depressed mood,  and suicidal ideation. The MSE on admission was pertinent for poor eye contact, blunted and sad affect, but normal thought process and content and no signs of altered behaviors.     Biological contributions to presentation include diagnosis of diabetes, chronic pain, and cauda " equina syndrome. She has not been on any medication for a year except for methadone and olanzapine for nausea. She is actively using cannabis and recently started fentanyl use again this year. Patient has no history of other major neurologic disorders or head trauma but does have history of Hep C which showed positive RNA in January of this year which she did not receive treatment. She is now receiving appropriate medications for her underlying health conditions. Psychological contributions to presentation include poor insight and lack of appropriate coping skills. Patient appears to demonstrate helplessness with reliance on others primarily daughter. She has not been able to leave her home or perform activities necessary for daily living. She demonstrated dependent personality and borderline personality traits and feels empty inside. Social factors contributing to presentation include good family support by daughter who is living with her. Patient does not work but is not concerned about financial stressors. She currently has no legal issues or interpersonal relationship conflicts with her daughter or other family. She is not able to engage with the community due to her current presentation.  Protective factors include family support and stable housing.      In summary, the patient's reported symptoms of SI with a plan and worsening depression in the context of medication noncompliance and substance use are consistent with worsening MDD that is severe and persistent. She will likely benefit from restarting medication therapy, engaging with therapy resources, being active on the milieu, and future outpatient resource planning this admission.     Given her current persistent depression and anxiety, patient warrants continued inpatient psychiatric hospitalization and medication management to maintain her safety.      Psychiatric Plan by Diagnosis      Today's changes:  - Increase Escitalopram to 25mg everyday  -  Increase Olanzapine to 12.5 mg PO HS  - the IP MH consult was placed for the patient     # MDD, severe with SI  - Olanzapine 12 mg PO every day (for cyclic vomiting syndrome)  - Escitalopram 25 mg every day   - Prazosin 2mg every day     # SANDIE  - Clonidine 0.1 mg BID    # Opiate Use Disorder  - Methadone 150 mg every day    Pertinent Labs/Monitoring:   - Review labs as above in note     Additional Plans:  - Patient will be treated in therapeutic milieu with appropriate individual and group therapies as described  - Patient would benefit from outpatient therapy resource planning for anxiety related disorders and improving coping skills  - Consider outpatient CD treatment for fentanyl and cannabis use     Psychiatric Hospital Course:      Yun Coyle was admitted to [facility] as a voluntary patient.   Medications:  Patient not on any PTA medication  New medications started at the time of admission include Olanzapine 10 mg PO daily.   11/18: start escitalopram 10mg, prazosin 1mg, and modify olanzapine 10mg to disintegrating tablet. IM started sliding scale insulin and POC glucose monitoring   11/19: Started Lantus 14 units at bedtime  11/20: Increased Lexapro from 10mg to 20mg every day   11/21: increase prazosin from 1mg to 2mg, increased Lantus from 14 units to 20 units per IM   11/22: increase Lantus from 20 units to 28 units, repeat EKG for QTc monitoring  11/25: Increase escitalopram to 25mg, Olanzapine increase to 12.5 mg      The risks, benefits, alternatives, and side effects were discussed and understood by the patient.     Medical Assessment and Plan     Medical diagnoses to be addressed this admission:    # Hypertension  - PTA Losartan  - Few incidences of elevated BP readings, if >3 readings are elevated, consult IM for HTN management      # Type II Diabetes  - PTA Metformin 1000 mg BID  - Starting 11/22 Lantus 28 units at bedtime per IM   - Increased Lantus from 28 to 35 units at bedtime on 11/24,  Will continue carb coverage at current 1 unit per 10 g of carbs.   - Sliding scale insulin  - Point-of-care glucose monitoring  - Educated patient on importance of follow-up with PCP for further diabetes management. Would benefit from GLP-1 or SGLT2 inhibitor.   - Continue to follow-up for assessment of insulin needs, may start additional agents         #Arm Abscess R arm s/p Drainage and wash out on 11/13  - Keflex 500mg QID x 7 days, started 11/16  - Management as per WOC  - Contact medicine if does not improve or with concern of infection      Medical course: Patient was physically examined by the ED prior to being transferred to the unit and was found to be medically stable and appropriate for admission.      Consults:    - Continued Wound Care for right arm abscess   - IM following for DMII care     Checklist     Legal Status: Voluntary     Safety Assessment:   Behavioral Orders   Procedures    Code 1 - Restrict to Unit    [facility] Extended Care     Until discharge, Extended Care to offer psychotherapeutic services to mental health patients boarding for admission or stabilization. These services are to include but are not limited to: individual psychotherapy, diagnostic assessment, case management and care planning, safety planning, etc. This may include up to 1 visit per day. If patient is physically located at [facility] or [facility], group psychotherapy up to 2 time per day may be offered.     Routine Programming     As clinically indicated    Status 15     Every 15 minutes.       Risk Assessment:  Risk for harm is moderate.  Risk factors: SI, maladaptive coping, substance use, trauma, and past behaviors  Protective factors: engaged in treatment and help seeking      SIO: none    Disposition: TBD. Disposition pending clinical stabilization, medication optimization and development of an appropriate discharge plan.      Attestations     Resident with med student: Alison Bourne, MS3  [facility] Medical Student     I was present with  the medical student who participated in the service and in the documentation of the note.  I have verified the history and personally performed the physical exam and medical decision making. I agree with the assessment and plan of care as documented in the note.    This patient was seen and discussed with my attending physician.    Brionna Gambino MD   Psychiatry Resident Physician    Attestation:  This patient has been seen and evaluated by me, Octavia Matthews MD.  I have discussed this patient with the house staff team including the resident and/or medical student and I agree with the findings and plan in this note.    I have reviewed today's vital signs, medications, labs and imaging. Octavia Matthews MD , PhD.

## 2024-11-25 NOTE — PROVIDER NOTIFICATION
11/25/24 1214   Individualization/Patient Specific Goals   Patient Personal Strengths expressive of needs;independent living skills;motivated for treatment;positive vocational history;resilient;resourceful;stable living environment   Patient Vulnerabilities adverse childhood experience(s);family/relationship conflict;history of unsuccessful treatment;lacks insight into illness;legal concerns;substance abuse/addiction   Interprofessional Rounds   Summary Patient admitted with worsening depression/SI. Has been using polysubstances   Participants CTC;nursing;psychiatrist   Behavioral Team Discussion   Participants Dr. Matthews. Dr. Gambino, Carlota Martinez RN, Nemo Perales MA.LP, Med student   Progress Patient reports she is feeling better. Less isolative to room.  [employer] with meds, attending groups.  Hygiene remains poor   Anticipated length of stay 2-3 days   Continued Stay Criteria/Rationale Med mgmt per MD's   Medical/Physical Admitted with arm wound- requiring would care   Precautions Per unit protocol   Plan Patient willcontinue to  be seen by Psychiatry daily.  Meds being adjusted per MD's as indicated. Patient not interested in CYNTHIA treatment buit agrreable to  IOP.  CTC will continue to assess needs, ensure appropriate f/u care is in place   Rationale for change in precautions or plan No change in plan/precautions   Safety Plan Patient to complete PTD   Anticipated Discharge Disposition home or self-care     Goal Outcome Evaluation:

## 2024-11-25 NOTE — PLAN OF CARE
Brief Psychotherapy Note    Therapist checked in with Pt to remind Pt about psychotherapy service available.    Pt response: Pt not interested currently in meeting 1:1. Writer offered brief session to complete safety plan and described purpose of safety plan; Pt declined.    Plan: Writer will remain available to Pt. Crisis resources will be provided on AVS.

## 2024-11-25 NOTE — PLAN OF CARE
BEH IP Unit Acuity Rating Score (UARS)  Patient is given one point for every criteria they meet.    CRITERIA SCORING   On a 72 hour hold, court hold, committed, stay of commitment, or revocation. 0    Patient LOS on BEH unit exceeds 20 days. 0  LOS: 9   Patient under guardianship, 55+, otherwise medically complex, or under age 11. 0   Suicide ideation without relief of precipitating factors. 1   Current plan for suicide. 0   Current plan for homicide. 0   Imminent risk or actual attempt to seriously harm another without relief of factors precipitating the attempt. 0   Severe dysfunction in daily living (ex: complete neglect for self care, extreme disruption in vegetative function, extreme deterioration in social interactions). 1   Recent (last 7 days) or current physical aggression in the ED or on unit. 0   Restraints or seclusion episode in past 72 hours. 0   Recent (last 7 days) or current verbal aggression, agitation, yelling, etc., while in the ED or unit. 0   Active psychosis. 0   Need for constant or near constant redirection (from leaving, from others, etc).  0   Intrusive or disruptive behaviors. 0   Patient requires 3 or more hours of individualized nursing care per 8-hour shift (i.e. for ADLs, meds, therapeutic interventions). 0   TOTAL 2

## 2024-11-26 LAB
GLUCOSE BLDC GLUCOMTR-MCNC: 167 MG/DL (ref 70–99)
GLUCOSE BLDC GLUCOMTR-MCNC: 180 MG/DL (ref 70–99)
GLUCOSE BLDC GLUCOMTR-MCNC: 211 MG/DL (ref 70–99)
GLUCOSE BLDC GLUCOMTR-MCNC: 228 MG/DL (ref 70–99)
GLUCOSE BLDC GLUCOMTR-MCNC: 246 MG/DL (ref 70–99)

## 2024-11-26 PROCEDURE — 124N000002 HC R&B MH UMMC

## 2024-11-26 PROCEDURE — 250N000013 HC RX MED GY IP 250 OP 250 PS 637

## 2024-11-26 PROCEDURE — 250N000013 HC RX MED GY IP 250 OP 250 PS 637: Performed by: STUDENT IN AN ORGANIZED HEALTH CARE EDUCATION/TRAINING PROGRAM

## 2024-11-26 PROCEDURE — 90853 GROUP PSYCHOTHERAPY: CPT

## 2024-11-26 PROCEDURE — 99232 SBSQ HOSP IP/OBS MODERATE 35: CPT | Mod: GC | Performed by: PSYCHIATRY & NEUROLOGY

## 2024-11-26 RX ADMIN — INSULIN ASPART 3 UNITS: 100 INJECTION, SOLUTION INTRAVENOUS; SUBCUTANEOUS at 12:14

## 2024-11-26 RX ADMIN — HYDROXYZINE HYDROCHLORIDE 25 MG: 25 TABLET ORAL at 16:19

## 2024-11-26 RX ADMIN — PRAZOSIN HYDROCHLORIDE 2 MG: 2 CAPSULE ORAL at 21:02

## 2024-11-26 RX ADMIN — CLONIDINE HYDROCHLORIDE 0.1 MG: 0.1 TABLET ORAL at 21:02

## 2024-11-26 RX ADMIN — LOSARTAN POTASSIUM 25 MG: 25 TABLET, FILM COATED ORAL at 07:56

## 2024-11-26 RX ADMIN — ESCITALOPRAM OXALATE 25 MG: 5 TABLET, FILM COATED ORAL at 07:57

## 2024-11-26 RX ADMIN — HYDROXYZINE HYDROCHLORIDE 25 MG: 25 TABLET ORAL at 12:08

## 2024-11-26 RX ADMIN — CLONIDINE HYDROCHLORIDE 0.1 MG: 0.1 TABLET ORAL at 07:56

## 2024-11-26 RX ADMIN — OLANZAPINE 12.5 MG: 10 TABLET, ORALLY DISINTEGRATING ORAL at 21:01

## 2024-11-26 RX ADMIN — INSULIN GLARGINE 38 UNITS: 100 INJECTION, SOLUTION SUBCUTANEOUS at 21:02

## 2024-11-26 RX ADMIN — METHADONE HYDROCHLORIDE 150 MG: 10 CONCENTRATE ORAL at 07:57

## 2024-11-26 RX ADMIN — METFORMIN HYDROCHLORIDE 1000 MG: 500 TABLET, FILM COATED ORAL at 18:12

## 2024-11-26 RX ADMIN — HYDROXYZINE HYDROCHLORIDE 25 MG: 25 TABLET ORAL at 07:56

## 2024-11-26 RX ADMIN — METFORMIN HYDROCHLORIDE 1000 MG: 500 TABLET, FILM COATED ORAL at 07:57

## 2024-11-26 RX ADMIN — INSULIN ASPART 5 UNITS: 100 INJECTION, SOLUTION INTRAVENOUS; SUBCUTANEOUS at 18:09

## 2024-11-26 RX ADMIN — INSULIN ASPART 4 UNITS: 100 INJECTION, SOLUTION INTRAVENOUS; SUBCUTANEOUS at 07:58

## 2024-11-26 ASSESSMENT — ACTIVITIES OF DAILY LIVING (ADL)
ADLS_ACUITY_SCORE: 45
HYGIENE/GROOMING: INDEPENDENT
ADLS_ACUITY_SCORE: 45
DRESS: INDEPENDENT
ADLS_ACUITY_SCORE: 45
ORAL_HYGIENE: INDEPENDENT
ADLS_ACUITY_SCORE: 45

## 2024-11-26 NOTE — PLAN OF CARE
Assessment/Intervention/Current Symtoms and Care Coordination:  Chart reviewed and patient met with team,   Discussed patient progress, symptomology, and response to treatment.  Discussed the discharge plan and any potential impediments to discharge.    Patient reports slight improvement in depressive symptoms. She denies any thoughts of self harm.  She has been increasingly less isolative.  Observed in lounge often working on puzzle.  Affect remains blunted.    Patient does attend groups.      Discharge Plan or Goal:  Patient will return home when stable  Psychiatry  Therapy  Methadone Maintenance    Barriers to Discharge:  Depressive symptoms with SI  Medication mgmt     Referral Status:  Patient has been referred to outpatient Psychiatrist, therapy, and IOP     Legal Status:  Voluntary     Contacts:  [facility]- Methadone   Gamaliel Granados DO- Family [facility]    Upcoming Meetings and Dates/Important Information and next steps:  Team Update:   Mon

## 2024-11-26 NOTE — PLAN OF CARE
Problem: Adult Inpatient Plan of Care  Goal: Plan of Care Review  Description: The Plan of Care Review/Shift note should be completed every shift.  The Outcome Evaluation is a brief statement about your assessment that the patient is improving, declining, or no change.  This information will be displayed automatically on your shift  note.  Outcome: Progressing  Flowsheets (Taken 11/26/2024 1152)  Plan of Care Reviewed With: patient  Overall Patient Progress: improving     Problem: Adult Inpatient Plan of Care  Goal: Absence of Hospital-Acquired Illness or Injury  Intervention: Prevent Skin Injury  Recent Flowsheet Documentation  Taken 11/26/2024 1045 by Sara Santacruz RN  Body Position: position changed independently   Goal Outcome Evaluation:      Plan of Care Reviewed With: patient    Overall Patient Progress: improving  Patient has been visible in the milieu.Patient observed in the D/R coloring on her own.She later on went to OT group where she participated in the activity.Right upper arm wound cares completed minimal serosanguineous noted on the packing.No symptoms of infection noted.Wound bed appears clean &  viable.Patient is compliant with medication.Endorsed anxiety @ 7/10.Prn Hydroxyzine given with relief.Denies all other MH issues.Adequate food and fluid intake.Blood sugar 226 @ breakfast and 211 @ lunch time.Insulin given per orders.patient presents with flat blunted affect. Mood has been calm.No suicidal thoughts and no safety concerns.Patient contracted for safety.  Temp: 97.4  F (36.3  C) Temp src: Oral BP: (!) 138/95 Pulse: 91     SpO2: 99 % O2 Device: None (Room air)

## 2024-11-26 NOTE — CONSULTS
Consulted to run a test claim for Glucose Meter/Supplies, Glargine, Rapid Analog, & CGM Korey.    Patient has pharmacy benefits through [employer]. Per insurance, the following are covered and preferred under the patient's plans:     Accu-chek meter/supplies - $0  OneTouch meter/supplies - $0  Freestyle Korey 1/2/3 reader/sensors - $0  Lantus pens/vials - $4.60  Insulin Aspart pens/vials - $4.60  Humalog pens/vials - $4.60     The following are not covered:  Contour meter/supplies  Freestyle meter/supplies  Semglee  Basaglar  Insulin Aspart  Novolog      Please feel free to contact me with any other test claims, prior authorizations, or insurance questions regarding outpatient medications.     Thanks!      Bekah Zepeda [facility]  Discharge Pharmacy Liaison  [facility] - [place], [place]/[facility]  Pronouns: She/Her/Hers    Securely message with [employer], [employer] Secure Chat, or [employer]  Phone: [phone]  Fax: [phone]  [email or web address]

## 2024-11-26 NOTE — PROGRESS NOTES
Brief Medicine Note  Medicine following for diabetes management. Blood sugar remains slightly above goal. No significant spikes with eating or meal times- would likely benefit from increased long acting. Discussed plan with patient, she reports she will eat significantly less as outpatient and already knows/plans to decrease her long acting when she discharges. Given she will be here until next week, will increase her Lantus to 38 units this evening and continue with same sliding scale and carb coverage.     Tiki Mendez PA-C  Hospitalist Service

## 2024-11-26 NOTE — PLAN OF CARE
Group Attendance:  attended partial group    Time session began: 1115  Time session ended: 1145  Patient's total time in group: 30    Total # Attendees   6   Group Type psychotherapeutic, life skills, and DBT     Group Topic Covered insight improvement, symptom management, DBT skills: emotional validation, Triggers and warning sighs, and relationships and boundaries     Group Session Detail Discussed DBT skill emotional validation, importance of self-compassion on mental health to enhance emotional resilience, reduce stress and anxiety, boost motivation and self-esteem and foster better relationships.      Patient's response to the group topic/interactions:  supportive of peers, listened actively, expressed understanding of topic, and actively engaged     Patient Details: Pt checked in feeling a bit anxious, was engaged in activity and requested gratitude journal handout. Pt was supportive of peers, acknowledged feeling an array of emotions is a human experience.           [id number] - Group psychotherapy - 1 Session  Patient Active Problem List   Diagnosis    Suicidal behavior    Benign essential HTN    Cannabis abuse    Cyclic vomiting syndrome    Elevated LFTs    Hyperlipidemia with target low density lipoprotein (LDL) cholesterol less than 100 mg/dL    Insomnia    Major depressive disorder, recurrent episode, severe (H)    Mild intermittent asthma    Panic disorder with agoraphobia    OCD (obsessive compulsive disorder)    Severe benzodiazepine use disorder (H) in remission    S/P lumbar microdiscectomy    S/P laparoscopic cholecystectomy    Personality disorder (H)    Type 2 diabetes mellitus with hyperglycemia, with long-term current use of insulin (H)    Obesity (BMI 35.0-39.9) with comorbidity (H)    Falls frequently    Moderate opioid use disorder, in early remission (H)    Chronic hepatitis C without hepatic coma (H)    Hyperglycemia    Dehydration    Cyclical vomiting    Lactic acidosis    Epigastric pain     Coffee ground emesis    Elevated blood ketone body level    Severe episode of recurrent major depressive disorder, without psychotic features (H)    PTSD (post-traumatic stress disorder)    Recurrent major depression (H)    Suicidal ideation    Abscess of arm, right          177.8 177.8 177.8 177.8

## 2024-11-26 NOTE — PLAN OF CARE
"  Problem: Anxiety  Goal: Anxiety Reduction or Resolution  Outcome: Progressing  Intervention: Promote Anxiety Reduction  Recent Flowsheet Documentation  Taken 11/26/2024 1754 by Everardo Butler RN  Supportive Measures:   problem-solving facilitated   positive reinforcement provided   relaxation techniques promoted  Family/Support System Care:   presence promoted   self-care encouraged   involvement promoted  Taken 11/26/2024 1700 by Everardo Butler RN  Family/Support System Care:   presence promoted   self-care encouraged   involvement promoted     Problem: Depression  Goal: Decreased Depression Symptoms  Outcome: Progressing  Intervention: Monitor and Manage Depressive Symptoms  Recent Flowsheet Documentation  Taken 11/26/2024 1754 by Everardo Butler RN  Supportive Measures:   problem-solving facilitated   positive reinforcement provided   relaxation techniques promoted  Family/Support System Care:   presence promoted   self-care encouraged   involvement promoted  Taken 11/26/2024 1700 by Everardo Butler RN  Family/Support System Care:   presence promoted   self-care encouraged   involvement promoted   Goal Outcome Evaluation:    Plan of Care Reviewed With: patient      Patient reported persistent depressive and anxiety symptoms of 7/10, but declined all anxiety related PRNs. She however did take scheduled Zyprexa 12.5 mg @ HS which seemed to have helped, as evidence by patient stating \"I feel relax and just need more rest.\" Patient's blood glucose levels were elevated to 246 mg/dl at dinner and 5 units of insulin were administered per sliding scale. For carbs patient ate 150 grams and 15 units of insulin were administered per doctor's order. At HS blood glucose level was 180 mg/dl and no insulin was needed per sliding scale. She denied all psych symptoms and contracted for safety.Will continue to monitor    "

## 2024-11-26 NOTE — PLAN OF CARE
BEH IP Unit Acuity Rating Score (UARS)  Patient is given one point for every criteria they meet.    CRITERIA SCORING   On a 72 hour hold, court hold, committed, stay of commitment, or revocation. 0    Patient LOS on BEH unit exceeds 20 days. 0  LOS: 10   Patient under guardianship, 55+, otherwise medically complex, or under age 11. 0   Suicide ideation without relief of precipitating factors. 1   Current plan for suicide. 0   Current plan for homicide. 0   Imminent risk or actual attempt to seriously harm another without relief of factors precipitating the attempt. 0   Severe dysfunction in daily living (ex: complete neglect for self care, extreme disruption in vegetative function, extreme deterioration in social interactions). 1   Recent (last 7 days) or current physical aggression in the ED or on unit. 0   Restraints or seclusion episode in past 72 hours. 0   Recent (last 7 days) or current verbal aggression, agitation, yelling, etc., while in the ED or unit. 0   Active psychosis. 0   Need for constant or near constant redirection (from leaving, from others, etc).  0   Intrusive or disruptive behaviors. 0   Patient requires 3 or more hours of individualized nursing care per 8-hour shift (i.e. for ADLs, meds, therapeutic interventions). 0   TOTAL 2

## 2024-11-26 NOTE — PROGRESS NOTES
"  ----------------------------------------------------------------------------------------------------------  [facility]  Psychiatry Progress Note  Hospital Day #11     Interim History:     The patient's care was discussed with the treatment team and chart notes were reviewed.    Vitals: VSS  Sleep: 7 hours (11/27/24 0657)  Scheduled medications: Took all scheduled medications as prescribed  Psychiatric PRN medications:   Last 24H PRN:     hydrOXYzine HCl (ATARAX) tablet 25-50 mg, 50 mg at 11/27/24 1242     Staff Report:   Patient [id number] this shift. Patient denies pain and discomfort. Patient continuous on q 15 minutes safety checks. No behavioral issues or any safety concerns at this time. Will continue to monitor the patient and notify MD with any concerns. Patient appears to have slept for 6.75 hours.      Subjective:     Patient Interview:    She says she still has the depression and anxiety 8/10 for both but feels it is better than yesterday. She is not trying to sleep the whole time and waiting for groups and trying to do things. She did not go to group yesterday because she was sleeping but maybe today.   Last night she slept not bad, got up a couple times to eat. The day before she got up 4x times during the night so has overall improvement with sleep. She mentioned she let the nurses know she had a snack.  She has no concerns or worries. Her arm is doing good; it still needs to be packed a lot but does not hurt. She reports no side effects.Denies any current SI/HI. States that the last time she had passive SI was two days ago.     ROS:  Patient has no bothersome physical symptoms. Reports improvement in right arm wound pain, no redness or discharge.  Patient denies acute concerns.     Objective:     Vitals:  BP (!) 132/90   Pulse 76   Temp 98  F (36.7  C)   Resp 18   Ht 1.753 m (5' 9\")   Wt 94.9 kg (209 lb 3.2 oz)   LMP  (LMP Unknown)   SpO2 100%   BMI " 30.89 kg/m      Allergies:  No Known Allergies    Current Medications:  Scheduled:  Current Facility-Administered Medications   Medication Dose Route Frequency Provider Last Rate Last Admin    alum & mag hydroxide-simethicone (MAALOX) suspension 30 mL  30 mL Oral Q4H PRN Joseph Santana MD        cloNIDine (CATAPRES) tablet 0.1 mg  0.1 mg Oral TID Brionna Gambino MD        glucose gel 15-30 g  15-30 g Oral Q15 Min PRN Rey Brewer        Or    dextrose 50 % injection 25-50 mL  25-50 mL Intravenous Q15 Min PRN Rey Brewer        Or    glucagon injection 1 mg  1 mg Subcutaneous Q15 Min PRN Rey Brewer        empagliflozin (JARDIANCE) tablet 10 mg  10 mg Oral Daily Rey Brewer   10 mg at 11/27/24 1056    escitalopram (LEXAPRO) tablet 25 mg  25 mg Oral Daily Brionna Gambino MD   25 mg at 11/27/24 0804    hydrOXYzine HCl (ATARAX) tablet 25-50 mg  25-50 mg Oral Q4H PRN Brionna Gambino MD   50 mg at 11/27/24 1242    ibuprofen (ADVIL/MOTRIN) tablet 800 mg  800 mg Oral Q6H PRN Alina Flannery MD   800 mg at 11/21/24 1121    insulin aspart (NovoLOG) injection (RAPID ACTING)  1-10 Units Subcutaneous TID AC Rey Brewer   3 Units at 11/27/24 1239    insulin aspart (NovoLOG) injection (RAPID ACTING)  1-7 Units Subcutaneous At Bedtime Rey Brewer   3 Units at 11/25/24 2243    insulin aspart (NovoLOG) injection (RAPID ACTING)   Subcutaneous Daily with breakfast Rey Brewer   9 Units at 11/27/24 0823    insulin aspart (NovoLOG) injection (RAPID ACTING)   Subcutaneous Daily with lunch Rey Brewer   14 Units at 11/27/24 1238    insulin aspart (NovoLOG) injection (RAPID ACTING)   Subcutaneous Daily with supper Rey Brewer   15 Units at 11/26/24 1826    insulin aspart (NovoLOG) injection (RAPID ACTING)   Subcutaneous With Snacks or Supplements Rey Brewer   2 Units at 11/25/24 2033    insulin glargine (LANTUS PEN) injection 38 Units  38 Units Subcutaneous At Bedtime Tiki Mendez PA-C   38 Units  at 11/26/24 2102    losartan (COZAAR) tablet 25 mg  25 mg Oral Daily Joseph Santana MD   25 mg at 11/27/24 0804    melatonin tablet 3 mg  3 mg Oral At Bedtime PRN Joseph Santana MD   3 mg at 11/17/24 2059    metFORMIN (GLUCOPHAGE) tablet 1,000 mg  1,000 mg Oral BID w/meals Joseph Santana MD   1,000 mg at 11/27/24 0804    methadone (DOLOPHINE-INTENSOL) 10 MG/ML (HIGH CONC) solution 150 mg  150 mg Oral Daily Alina Flannery MD   150 mg at 11/27/24 0810    OLANZapine (zyPREXA) tablet 10 mg  10 mg Oral TID PRN Joseph Santana MD   10 mg at 11/21/24 1139    Or    OLANZapine (zyPREXA) injection 10 mg  10 mg Intramuscular TID PRN Joseph Santnaa MD        OLANZapine zydis (zyPREXA) ODT half-tab 12.5 mg  12.5 mg Oral At Bedtime Brionna Gambino MD   12.5 mg at 11/26/24 2101    prazosin (MINIPRESS) capsule 2 mg  2 mg Oral At Bedtime Brionna Gambino MD   2 mg at 11/26/24 2102    senna-docusate (SENOKOT-S/PERICOLACE) 8.6-50 MG per tablet 1 tablet  1 tablet Oral BID PRN Joseph Santana MD           PRN:  Current Facility-Administered Medications   Medication Dose Route Frequency Provider Last Rate Last Admin    alum & mag hydroxide-simethicone (MAALOX) suspension 30 mL  30 mL Oral Q4H PRN Joseph Santana MD        cloNIDine (CATAPRES) tablet 0.1 mg  0.1 mg Oral TID Brionna Gambino MD        glucose gel 15-30 g  15-30 g Oral Q15 Min PRN Rey Brewer        Or    dextrose 50 % injection 25-50 mL  25-50 mL Intravenous Q15 Min PRN Rey Brewer        Or    glucagon injection 1 mg  1 mg Subcutaneous Q15 Min PRN Rey Brewer        empagliflozin (JARDIANCE) tablet 10 mg  10 mg Oral Daily Rey Brewer   10 mg at 11/27/24 1056    escitalopram (LEXAPRO) tablet 25 mg  25 mg Oral Daily Brionna Gambino MD   25 mg at 11/27/24 0804    hydrOXYzine HCl (ATARAX) tablet 25-50 mg  25-50 mg Oral Q4H PRN Brionna Gambino MD   50 mg at 11/27/24 1242    ibuprofen (ADVIL/MOTRIN) tablet 800 mg  800 mg Oral Q6H PRN Prudencio  MD Alina   800 mg at 11/21/24 1121    insulin aspart (NovoLOG) injection (RAPID ACTING)  1-10 Units Subcutaneous TID AC Rey Brewer   3 Units at 11/27/24 1239    insulin aspart (NovoLOG) injection (RAPID ACTING)  1-7 Units Subcutaneous At Bedtime Rey Brewer   3 Units at 11/25/24 2243    insulin aspart (NovoLOG) injection (RAPID ACTING)   Subcutaneous Daily with breakfast Rey Brewer   9 Units at 11/27/24 0823    insulin aspart (NovoLOG) injection (RAPID ACTING)   Subcutaneous Daily with lunch Rey Brewer   14 Units at 11/27/24 1238    insulin aspart (NovoLOG) injection (RAPID ACTING)   Subcutaneous Daily with supper Rey Breewr   15 Units at 11/26/24 1826    insulin aspart (NovoLOG) injection (RAPID ACTING)   Subcutaneous With Snacks or Supplements Rey Brewer   2 Units at 11/25/24 2033    insulin glargine (LANTUS PEN) injection 38 Units  38 Units Subcutaneous At Bedtime Tiki Mendez PA-C   38 Units at 11/26/24 2102    losartan (COZAAR) tablet 25 mg  25 mg Oral Daily Joseph Santana MD   25 mg at 11/27/24 0804    melatonin tablet 3 mg  3 mg Oral At Bedtime PRN Joseph Santana MD   3 mg at 11/17/24 2059    metFORMIN (GLUCOPHAGE) tablet 1,000 mg  1,000 mg Oral BID w/meals Joseph Santana MD   1,000 mg at 11/27/24 0804    methadone (DOLOPHINE-INTENSOL) 10 MG/ML (HIGH CONC) solution 150 mg  150 mg Oral Daily Alina Flannery MD   150 mg at 11/27/24 0810    OLANZapine (zyPREXA) tablet 10 mg  10 mg Oral TID PRN Joseph Santana MD   10 mg at 11/21/24 1139    Or    OLANZapine (zyPREXA) injection 10 mg  10 mg Intramuscular TID PRN Joseph Santana MD        OLANZapine zydis (zyPREXA) ODT half-tab 12.5 mg  12.5 mg Oral At Bedtime Brionna Gambino MD   12.5 mg at 11/26/24 2101    prazosin (MINIPRESS) capsule 2 mg  2 mg Oral At Bedtime Brionna Gambino MD   2 mg at 11/26/24 2102    senna-docusate (SENOKOT-S/PERICOLACE) 8.6-50 MG per tablet 1 tablet  1 tablet Oral BID PRN Reddyhough,  "MD Joseph           Labs and Imaging:  New results:   Recent Results (from the past 24 hours)   Glucose by meter    Collection Time: 11/26/24  6:02 PM   Result Value Ref Range    GLUCOSE BY METER POCT 246 (H) 70 - 99 mg/dL   Glucose by meter    Collection Time: 11/26/24  8:58 PM   Result Value Ref Range    GLUCOSE BY METER POCT 180 (H) 70 - 99 mg/dL   Glucose by meter    Collection Time: 11/27/24  3:24 AM   Result Value Ref Range    GLUCOSE BY METER POCT 139 (H) 70 - 99 mg/dL   Glucose by meter    Collection Time: 11/27/24  7:58 AM   Result Value Ref Range    GLUCOSE BY METER POCT 273 (H) 70 - 99 mg/dL   Glucose by meter    Collection Time: 11/27/24 12:16 PM   Result Value Ref Range    GLUCOSE BY METER POCT 203 (H) 70 - 99 mg/dL       Data this admission:  - CBC unremarkable  - CMP unremarkable  - UDS + fentanyl, cannabinoids, and amphetamines  - Hgb A1c 11.2  - Lipids unremarkable  - Vit B12 normal  - Folate normal  - EKG normal sinus rhythm, 11/22 QTc 441  - Hepatitis C pending   - MRSA negative     Mental Status Exam:     Oriented to:  Grossly Oriented  General:  Awake and Alert  Appearance:  appears stated age  Behavior/Attitude:  Calm, Cooperative, and Engaged  Eye Contact: Appropriate  Psychomotor: Normal and No evidence of tics, dystonia, or tardive dyskinesia  no catatonia present  Speech:  appropriate volume/tone and appropriate rate  Language: Fluent in English with appropriate syntax and vocabulary.  Mood:  \"Still have the depression and anxiety\"  Affect:  congruent with mood and blunted  Thought Process:  linear and coherent  Thought Content:   No SI/HI/AH/VH,Passive Suicidal thoughts; No apparent delusions  Associations:  intact  Insight:  fair due to persistent depressive sx  Judgment:  fair due to persistent depressive sx  Impulse control: fair  Attention Span:  grossly intact  Concentration:  grossly intact  Recent and Remote Memory:  not formally assessed  Fund of Knowledge: average  Muscle Strength " and Tone: normal  Gait and Station:  not assessed - patient sitting at table in [facility]     Psychiatric Assessment     Yun Coyle is a 48 year old female previously diagnosed with MDD, SANDIE, PTSD, Polysubstance use, Panic attacks, and agoraphobia who presented voluntarily with suicidal ideation with a plan in the context of worsening MDD, SANDIE, medication noncompliance, active fentanyl use on methadone, and daily cannabis use who is recently having issues with obtaining medical insurance. Most recent psychiatric hospitalization was during adolescence. Significant symptoms on admission include fatigue, insomnia, worry, anxiety, loss of appetite, anhedonia, worsening of depressed mood,  and suicidal ideation. The MSE on admission was pertinent for poor eye contact, blunted and sad affect, but normal thought process and content and no signs of altered behaviors.     Biological contributions to presentation include diagnosis of diabetes, chronic pain, and cauda equina syndrome. She has not been on any medication for a year except for methadone and olanzapine for nausea. She is actively using cannabis and recently started fentanyl use again this year. Patient has no history of other major neurologic disorders or head trauma but does have history of Hep C which showed positive RNA in January of this year which she did not receive treatment. She is now receiving appropriate medications for her underlying health conditions. Psychological contributions to presentation include poor insight and lack of appropriate coping skills. Patient appears to demonstrate helplessness with reliance on others primarily daughter. She has not been able to leave her home or perform activities necessary for daily living. She demonstrated dependent personality and borderline personality traits and feels empty inside. Social factors contributing to presentation include good family support by daughter who is living with her. Patient does not  work but is not concerned about financial stressors. She currently has no legal issues or interpersonal relationship conflicts with her daughter or other family. She is not able to engage with the community due to her current presentation.  Protective factors include family support and stable housing.      In summary, the patient's reported symptoms of SI with a plan and worsening depression in the context of medication noncompliance and substance use are consistent with worsening MDD that is severe and persistent. She will likely benefit from restarting medication therapy, engaging with therapy resources, being active on the milieu, and future outpatient resource planning this admission.     Given her current persistent depression and anxiety, patient warrants continued inpatient psychiatric hospitalization and medication management to maintain her safety.      Psychiatric Plan by Diagnosis      Today's changes:  - Diabetes education team consult  - Clonidine was increased to 0.1 mg TID, Lantus increased to 38 mg     # MDD, severe with SI  - Olanzapine 12.5 mg PO every day (for cyclic vomiting syndrome)  - Escitalopram 25 mg every day   - Prazosin 2mg every day     # SANDIE  - Clonidine 0.1 mg TID    # Opiate Use Disorder  - Methadone 150 mg every day    Pertinent Labs/Monitoring:   - Review labs as above in note     Additional Plans:  - Patient will be treated in therapeutic milieu with appropriate individual and group therapies as described  - Patient would benefit from outpatient therapy resource planning for anxiety related disorders and improving coping skills  - Consider outpatient CD treatment for fentanyl and cannabis use     Psychiatric Hospital Course:      Yun Coyle was admitted to [facility] as a voluntary patient.   Medications:  Patient not on any PTA medication  New medications started at the time of admission include Olanzapine 10 mg PO daily.   11/18: start escitalopram 10mg, prazosin 1mg,  and modify olanzapine 10mg to disintegrating tablet. IM started sliding scale insulin and POC glucose monitoring   11/19: Started Lantus 14 units at bedtime  11/20: Increased Lexapro from 10mg to 20mg every day   11/21: increase prazosin from 1mg to 2mg, increased Lantus from 14 units to 20 units per IM   11/22: increase Lantus from 20 units to 28 units, repeat EKG for QTc monitoring  11/25: Increase escitalopram to 25mg, Olanzapine increase to 12.5 mg,  IP MH consult was placed for the patient. Increase lantus from 28 to 35 units at bedtime. Increased sliding scale to high-resistance.  11/27 Clonidine was increased to TID , Lantus was increased to 38 unit.          The risks, benefits, alternatives, and side effects were discussed and understood by the patient.     Medical Assessment and Plan     Medical diagnoses to be addressed this admission:    # Hypertension  - PTA Losartan  - Few incidences of elevated BP readings, if >3 readings are elevated, consult IM for HTN management      # Type II Diabetes  - PTA Metformin 1000 mg BID  - Starting 11/22 Lantus 28 units at bedtime per IM   - Increased Lantus from 28 to 35 units at bedtime on 11/24, Will continue carb coverage at current 1 unit per 10 g of carbs.   - Sliding scale insulin  - Point-of-care glucose monitoring  - Educated patient on importance of follow-up with PCP for further diabetes management. Would benefit from GLP-1 or SGLT2 inhibitor.   - Lantus was increased to 38 on 11/27      #Arm Abscess R arm s/p Drainage and wash out on 11/13  - Keflex 500mg QID x 7 days, started 11/16  - Management as per WOC  - Contact medicine if does not improve or with concern of infection      Medical course: Patient was physically examined by the ED prior to being transferred to the unit and was found to be medically stable and appropriate for admission.      Consults:    - Continued Wound Care for right arm abscess   - IM following for DMII care     Checklist     Legal  Status: Voluntary     Safety Assessment:   Behavioral Orders   Procedures    Code 1 - Restrict to Unit    [facility] Extended [facility]     Until discharge, Extended Care to offer psychotherapeutic services to mental health patients boarding for admission or stabilization. These services are to include but are not limited to: individual psychotherapy, diagnostic assessment, case management and care planning, safety planning, etc. This may include up to 1 visit per day. If patient is physically located at [facility] or [facility], group psychotherapy up to 2 time per day may be offered.     Routine Programming     As clinically indicated    Status 15     Every 15 minutes.       Risk Assessment:  Risk for harm is moderate.  Risk factors: SI, maladaptive coping, substance use, trauma, and past behaviors  Protective factors: engaged in treatment and help seeking      SIO: none    Disposition: TBD. Disposition pending clinical stabilization, medication optimization and development of an appropriate discharge plan.      Attestations     Krysta Parker Msc MS3  [employer]   11/27/2024 1:07 PM      I was present with the medical student who participated in the service and in the documentation of the note.  I have verified the history and personally performed the physical exam and medical decision making. I agree with the assessment and plan of care as documented in the note.    This patient was seen and discussed with my attending physician.    Brionna Gambino MD   Psychiatry Resident Physician    Attestation:  This patient has been seen and evaluated by me, Octavia Matthews MD.  I have discussed this patient with the house staff team including the resident and/or medical student and I agree with the findings and plan in this note.    I have reviewed today's vital signs, medications, labs and imaging. Octavia Matthews MD , PhD.

## 2024-11-26 NOTE — PLAN OF CARE
Problem: Anxiety  Goal: Anxiety Reduction or Resolution  Outcome: Progressing     Problem: Depression  Goal: Decreased Depression Symptoms  Outcome: Progressing   Goal Outcome Evaluation:    Plan of Care Reviewed With: patient Plan of Care Reviewed With: patient    Overall Patient Progress: improvingOverall Patient Progress: improving     Alert and able to communicate needs. Remained isolative to room, intermittently visible in milieu. No social interactive noted. Pleasant, cooperative and medication compliant. Anxiety 6, depression 7, denied suicidal/homicidal ideation. ADLs WNL, Patient denied any pain/discomfort, right upper arm wound dressing in place. Food and fluid intake WNL.     /252

## 2024-11-26 NOTE — PLAN OF CARE
G  Problem: Sleep Disturbance  Goal: Adequate Sleep/Rest  Outcome: Progressing   Patient [id number]  this shift. Patient denies pain and discomfort. Patient continuous on q 15 minutes safety checks. No behavioral issues or any safety concerns at this time. Will continue to monitor the patient and notify MD with any concerns. Patient appears to have slept for 6.75 hours.

## 2024-11-27 ENCOUNTER — TELEPHONE (OUTPATIENT)
Dept: BEHAVIORAL HEALTH | Facility: CLINIC | Age: 49
End: 2024-11-27
Payer: COMMERCIAL

## 2024-11-27 DIAGNOSIS — F41.1 GENERALIZED ANXIETY DISORDER: Primary | ICD-10-CM

## 2024-11-27 LAB
GLUCOSE BLDC GLUCOMTR-MCNC: 100 MG/DL (ref 70–99)
GLUCOSE BLDC GLUCOMTR-MCNC: 139 MG/DL (ref 70–99)
GLUCOSE BLDC GLUCOMTR-MCNC: 195 MG/DL (ref 70–99)
GLUCOSE BLDC GLUCOMTR-MCNC: 203 MG/DL (ref 70–99)
GLUCOSE BLDC GLUCOMTR-MCNC: 273 MG/DL (ref 70–99)

## 2024-11-27 PROCEDURE — 124N000002 HC R&B MH UMMC

## 2024-11-27 PROCEDURE — 250N000013 HC RX MED GY IP 250 OP 250 PS 637

## 2024-11-27 PROCEDURE — H2032 ACTIVITY THERAPY, PER 15 MIN: HCPCS

## 2024-11-27 PROCEDURE — 250N000013 HC RX MED GY IP 250 OP 250 PS 637: Performed by: STUDENT IN AN ORGANIZED HEALTH CARE EDUCATION/TRAINING PROGRAM

## 2024-11-27 PROCEDURE — 97150 GROUP THERAPEUTIC PROCEDURES: CPT | Mod: GO

## 2024-11-27 RX ORDER — HYDROXYZINE HYDROCHLORIDE 25 MG/1
25-50 TABLET, FILM COATED ORAL EVERY 4 HOURS PRN
Status: DISCONTINUED | OUTPATIENT
Start: 2024-11-27 | End: 2024-12-04 | Stop reason: HOSPADM

## 2024-11-27 RX ORDER — CLONIDINE HYDROCHLORIDE 0.1 MG/1
0.1 TABLET ORAL 3 TIMES DAILY
Status: DISCONTINUED | OUTPATIENT
Start: 2024-11-27 | End: 2024-12-04 | Stop reason: HOSPADM

## 2024-11-27 RX ADMIN — LOSARTAN POTASSIUM 25 MG: 25 TABLET, FILM COATED ORAL at 08:04

## 2024-11-27 RX ADMIN — METHADONE HYDROCHLORIDE 150 MG: 10 CONCENTRATE ORAL at 08:10

## 2024-11-27 RX ADMIN — HYDROXYZINE HYDROCHLORIDE 50 MG: 25 TABLET ORAL at 12:42

## 2024-11-27 RX ADMIN — CLONIDINE HYDROCHLORIDE 0.1 MG: 0.1 TABLET ORAL at 21:30

## 2024-11-27 RX ADMIN — INSULIN ASPART 6 UNITS: 100 INJECTION, SOLUTION INTRAVENOUS; SUBCUTANEOUS at 08:20

## 2024-11-27 RX ADMIN — HYDROXYZINE HYDROCHLORIDE 25 MG: 25 TABLET ORAL at 08:04

## 2024-11-27 RX ADMIN — CLONIDINE HYDROCHLORIDE 0.1 MG: 0.1 TABLET ORAL at 08:04

## 2024-11-27 RX ADMIN — PRAZOSIN HYDROCHLORIDE 2 MG: 2 CAPSULE ORAL at 21:30

## 2024-11-27 RX ADMIN — EMPAGLIFLOZIN 10 MG: 10 TABLET, FILM COATED ORAL at 10:56

## 2024-11-27 RX ADMIN — INSULIN ASPART 3 UNITS: 100 INJECTION, SOLUTION INTRAVENOUS; SUBCUTANEOUS at 12:39

## 2024-11-27 RX ADMIN — METFORMIN HYDROCHLORIDE 1000 MG: 500 TABLET, FILM COATED ORAL at 18:00

## 2024-11-27 RX ADMIN — METFORMIN HYDROCHLORIDE 1000 MG: 500 TABLET, FILM COATED ORAL at 08:04

## 2024-11-27 RX ADMIN — OLANZAPINE 12.5 MG: 10 TABLET, ORALLY DISINTEGRATING ORAL at 21:29

## 2024-11-27 RX ADMIN — INSULIN GLARGINE 38 UNITS: 100 INJECTION, SOLUTION SUBCUTANEOUS at 21:36

## 2024-11-27 RX ADMIN — ESCITALOPRAM OXALATE 25 MG: 5 TABLET, FILM COATED ORAL at 08:04

## 2024-11-27 ASSESSMENT — ACTIVITIES OF DAILY LIVING (ADL)
ADLS_ACUITY_SCORE: 45
ORAL_HYGIENE: INDEPENDENT
DRESS: INDEPENDENT
HYGIENE/GROOMING: INDEPENDENT
ADLS_ACUITY_SCORE: 45

## 2024-11-27 ASSESSMENT — PATIENT HEALTH QUESTIONNAIRE - PHQ9: SUM OF ALL RESPONSES TO PHQ QUESTIONS 1-9: 20

## 2024-11-27 ASSESSMENT — ANXIETY QUESTIONNAIRES
7. FEELING AFRAID AS IF SOMETHING AWFUL MIGHT HAPPEN: NEARLY EVERY DAY
1. FEELING NERVOUS, ANXIOUS, OR ON EDGE: NEARLY EVERY DAY
GAD7 TOTAL SCORE: 20
IF YOU CHECKED OFF ANY PROBLEMS ON THIS QUESTIONNAIRE, HOW DIFFICULT HAVE THESE PROBLEMS MADE IT FOR YOU TO DO YOUR WORK, TAKE CARE OF THINGS AT HOME, OR GET ALONG WITH OTHER PEOPLE: EXTREMELY DIFFICULT
3. WORRYING TOO MUCH ABOUT DIFFERENT THINGS: NEARLY EVERY DAY
GAD7 TOTAL SCORE: 20
5. BEING SO RESTLESS THAT IT IS HARD TO SIT STILL: MORE THAN HALF THE DAYS
6. BECOMING EASILY ANNOYED OR IRRITABLE: NEARLY EVERY DAY
4. TROUBLE RELAXING: NEARLY EVERY DAY
2. NOT BEING ABLE TO STOP OR CONTROL WORRYING: NEARLY EVERY DAY

## 2024-11-27 NOTE — PROGRESS NOTES
"  ----------------------------------------------------------------------------------------------------------  [facility]  Psychiatry Progress Note  Hospital Day #11     Interim History:     The patient's care was discussed with the treatment team and chart notes were reviewed.    Vitals: VSS  Sleep: 7 hours (11/27/24 0657)  Scheduled medications: Took all scheduled medications as prescribed  Psychiatric PRN medications: No psychiatric prns given    Staff Report:   Patient [id number] this shift. Patient denies pain and discomfort. Patient continuous on q 15 minutes safety checks. No behavioral issues or any safety concerns at this time. Will continue to monitor the patient and notify MD with any concerns. Patient appears to have slept for 7 hours.      Subjective:     Patient Interview:  Yun went to group therapy and found it helpful. She feels the night was restless and she is unsure why. She says she kept waking up with anxiety and is not sure why or what she is anxious about. She reports this feeling at night as her being \"restless\". Her mood and anxiety is 7/10. She reports its gets a bit better everyday. She thinks she is improving since she has been here but her anxiety is about the same. She thinks the medications are helping her with no noticeable side effects. She reports sleeping better with the medications at higher doses and she has not had any nightmares anymore. She still has ruminating thoughts that keep her awake at night. She is still snacking at night and says she is letting the nurses know. She is receptive to the diabetes educator coming to talk to her today. She has no physical symptoms or pain. Notes her arm is doing well. Denies nausea, vomiting, SI, self harm. The last time she had thoughts of self harm was a few days ago so not in the last 2 days.     ROS:  Patient has no bothersome physical symptoms  Patient denies  nausea, vomiting     " "Objective:     Vitals:  BP (!) 132/90   Pulse 76   Temp 98  F (36.7  C)   Resp 18   Ht 1.753 m (5' 9\")   Wt 94.9 kg (209 lb 3.2 oz)   LMP  (LMP Unknown)   SpO2 100%   BMI 30.89 kg/m      Allergies:  No Known Allergies    Current Medications:  Scheduled:  Current Facility-Administered Medications   Medication Dose Route Frequency Provider Last Rate Last Admin    alum & mag hydroxide-simethicone (MAALOX) suspension 30 mL  30 mL Oral Q4H PRN Joseph Santana MD        cloNIDine (CATAPRES) tablet 0.1 mg  0.1 mg Oral BID Alina Flannery MD   0.1 mg at 11/27/24 0804    glucose gel 15-30 g  15-30 g Oral Q15 Min PRN Rey Brewer        Or    dextrose 50 % injection 25-50 mL  25-50 mL Intravenous Q15 Min PRN Rey Brewer        Or    glucagon injection 1 mg  1 mg Subcutaneous Q15 Min PRN Rey Brewer        empagliflozin (JARDIANCE) tablet 10 mg  10 mg Oral Daily Rey Brewer        escitalopram (LEXAPRO) tablet 25 mg  25 mg Oral Daily Brionna Gambino MD   25 mg at 11/27/24 0804    hydrOXYzine HCl (ATARAX) tablet 25 mg  25 mg Oral Q4H PRN Joseph Santana MD   25 mg at 11/27/24 0804    ibuprofen (ADVIL/MOTRIN) tablet 800 mg  800 mg Oral Q6H PRN Alina Flannery MD   800 mg at 11/21/24 1121    insulin aspart (NovoLOG) injection (RAPID ACTING)  1-10 Units Subcutaneous TID AC Rey Brewer   6 Units at 11/27/24 0820    insulin aspart (NovoLOG) injection (RAPID ACTING)  1-7 Units Subcutaneous At Bedtime Rey Brewer   3 Units at 11/25/24 2243    insulin aspart (NovoLOG) injection (RAPID ACTING)   Subcutaneous Daily with breakfast Rey Brewer   9 Units at 11/27/24 0823    insulin aspart (NovoLOG) injection (RAPID ACTING)   Subcutaneous Daily with lunch Rey Brewer   15 Units at 11/26/24 1221    insulin aspart (NovoLOG) injection (RAPID ACTING)   Subcutaneous Daily with supper Rey Brewer   15 Units at 11/26/24 1826    insulin aspart (NovoLOG) injection (RAPID ACTING)   Subcutaneous With " Snacks or Supplements Rey Brewer   2 Units at 11/25/24 2033    insulin glargine (LANTUS PEN) injection 38 Units  38 Units Subcutaneous At Bedtime Tiki Mendez PA-C   38 Units at 11/26/24 2102    losartan (COZAAR) tablet 25 mg  25 mg Oral Daily Joseph Santana MD   25 mg at 11/27/24 0804    melatonin tablet 3 mg  3 mg Oral At Bedtime PRN Joseph Santana MD   3 mg at 11/17/24 2059    metFORMIN (GLUCOPHAGE) tablet 1,000 mg  1,000 mg Oral BID w/meals Joseph Santana MD   1,000 mg at 11/27/24 0804    methadone (DOLOPHINE-INTENSOL) 10 MG/ML (HIGH CONC) solution 150 mg  150 mg Oral Daily Alina Flannery MD   150 mg at 11/27/24 0810    OLANZapine (zyPREXA) tablet 10 mg  10 mg Oral TID PRN Joseph Santana MD   10 mg at 11/21/24 1139    Or    OLANZapine (zyPREXA) injection 10 mg  10 mg Intramuscular TID PRN Joseph Santana MD        OLANZapine zydis (zyPREXA) ODT half-tab 12.5 mg  12.5 mg Oral At Bedtime Brionna Gambino MD   12.5 mg at 11/26/24 2101    prazosin (MINIPRESS) capsule 2 mg  2 mg Oral At Bedtime Brionna Gambino MD   2 mg at 11/26/24 2102    senna-docusate (SENOKOT-S/PERICOLACE) 8.6-50 MG per tablet 1 tablet  1 tablet Oral BID PRN Joseph Santana MD           PRN:  Current Facility-Administered Medications   Medication Dose Route Frequency Provider Last Rate Last Admin    alum & mag hydroxide-simethicone (MAALOX) suspension 30 mL  30 mL Oral Q4H PRN Joseph Santana MD        cloNIDine (CATAPRES) tablet 0.1 mg  0.1 mg Oral BID Alina Flannery MD   0.1 mg at 11/27/24 0804    glucose gel 15-30 g  15-30 g Oral Q15 Min PRN Rey Brewer        Or    dextrose 50 % injection 25-50 mL  25-50 mL Intravenous Q15 Min PRN Rey Brewer        Or    glucagon injection 1 mg  1 mg Subcutaneous Q15 Min PRN Rey Brewer        empagliflozin (JARDIANCE) tablet 10 mg  10 mg Oral Daily Rey Brewer        escitalopram (LEXAPRO) tablet 25 mg  25 mg Oral Daily Brionna Gambino MD   25 mg at 11/27/24  0804    hydrOXYzine HCl (ATARAX) tablet 25 mg  25 mg Oral Q4H PRN Joseph Santana MD   25 mg at 11/27/24 0804    ibuprofen (ADVIL/MOTRIN) tablet 800 mg  800 mg Oral Q6H PRN Alina Flannery MD   800 mg at 11/21/24 1121    insulin aspart (NovoLOG) injection (RAPID ACTING)  1-10 Units Subcutaneous TID AC Rey Brewer   6 Units at 11/27/24 0820    insulin aspart (NovoLOG) injection (RAPID ACTING)  1-7 Units Subcutaneous At Bedtime Rey Brewer   3 Units at 11/25/24 2243    insulin aspart (NovoLOG) injection (RAPID ACTING)   Subcutaneous Daily with breakfast Rey Brewer   9 Units at 11/27/24 0823    insulin aspart (NovoLOG) injection (RAPID ACTING)   Subcutaneous Daily with lunch Rey Brewer   15 Units at 11/26/24 1221    insulin aspart (NovoLOG) injection (RAPID ACTING)   Subcutaneous Daily with supper Rey Brewer   15 Units at 11/26/24 1826    insulin aspart (NovoLOG) injection (RAPID ACTING)   Subcutaneous With Snacks or Supplements Rey Brewer   2 Units at 11/25/24 2033    insulin glargine (LANTUS PEN) injection 38 Units  38 Units Subcutaneous At Bedtime Tiki Mendez PA-C   38 Units at 11/26/24 2102    losartan (COZAAR) tablet 25 mg  25 mg Oral Daily Joseph Santana MD   25 mg at 11/27/24 0804    melatonin tablet 3 mg  3 mg Oral At Bedtime PRN Joseph Santana MD   3 mg at 11/17/24 2059    metFORMIN (GLUCOPHAGE) tablet 1,000 mg  1,000 mg Oral BID w/meals Joseph Santana MD   1,000 mg at 11/27/24 0804    methadone (DOLOPHINE-INTENSOL) 10 MG/ML (HIGH CONC) solution 150 mg  150 mg Oral Daily Alina Flannery MD   150 mg at 11/27/24 0810    OLANZapine (zyPREXA) tablet 10 mg  10 mg Oral TID PRN Joseph Santana MD   10 mg at 11/21/24 1139    Or    OLANZapine (zyPREXA) injection 10 mg  10 mg Intramuscular TID PRN Joseph Santana MD        OLANZapine zydis (zyPREXA) ODT half-tab 12.5 mg  12.5 mg Oral At Bedtime Brionna Gambino MD   12.5 mg at 11/26/24 2101    prazosin (MINIPRESS)  "capsule 2 mg  2 mg Oral At Bedtime Brionna Gambino MD   2 mg at 11/26/24 2102    senna-docusate (SENOKOT-S/PERICOLACE) 8.6-50 MG per tablet 1 tablet  1 tablet Oral BID IVELISSEN Joseph Santana MD           Labs and Imaging:  New results:   Recent Results (from the past 24 hours)   Glucose by meter    Collection Time: 11/26/24 12:11 PM   Result Value Ref Range    GLUCOSE BY METER POCT 211 (H) 70 - 99 mg/dL   Glucose by meter    Collection Time: 11/26/24  6:02 PM   Result Value Ref Range    GLUCOSE BY METER POCT 246 (H) 70 - 99 mg/dL   Glucose by meter    Collection Time: 11/26/24  8:58 PM   Result Value Ref Range    GLUCOSE BY METER POCT 180 (H) 70 - 99 mg/dL   Glucose by meter    Collection Time: 11/27/24  3:24 AM   Result Value Ref Range    GLUCOSE BY METER POCT 139 (H) 70 - 99 mg/dL   Glucose by meter    Collection Time: 11/27/24  7:58 AM   Result Value Ref Range    GLUCOSE BY METER POCT 273 (H) 70 - 99 mg/dL       Data this admission:  - CBC unremarkable  - CMP unremarkable  - UDS + fentanyl, cannabinoids, and amphetamines  - Hgb A1c 11.2  - Lipids unremarkable  - Vit B12 normal  - Folate normal  - EKG normal sinus rhythm, 11/22 QTc 441  - Hepatitis C RNA 5.2    - MRSA negative     Mental Status Exam:     Oriented to:  Grossly Oriented  General:  Awake and Alert  Appearance:  appears stated age and Grooming is adequate  Behavior/Attitude:  Calm, Cooperative, and Engaged  Eye Contact: Appropriate  Psychomotor: Normal no catatonia present  Speech:  appropriate volume/tone  Language: Fluent in English with appropriate syntax and vocabulary.  Mood:  \"better, 7/10 for depression and anxiety\"  Affect:  appropriate and flat  Thought Process:  linear  Thought Content:   No HI, No VH, and No AH; No apparent delusions  Associations:  intact  Insight:  fair due to persistent depressive sxs  Judgment:  fair due to persistent depressive sxs  Impulse control: fair  Attention Span:  grossly intact  Concentration:  grossly " intact  Recent and Remote Memory:  not formally assessed  Fund of Knowledge: average  Muscle Strength and Tone:  normal   Gait and Station:  not formally assessed - patient sitting up in bed, meeting eye contact and talking with team      Psychiatric Assessment     Yun Coyle is a 48 year old female previously diagnosed with MDD, SANDIE, PTSD, Polysubstance use, Panic attacks, and agoraphobia who presented voluntarily with suicidal ideation with a plan in the context of worsening MDD, SANDIE, medication noncompliance, active fentanyl use on methadone, and daily cannabis use who is recently having issues with obtaining medical insurance. Most recent psychiatric hospitalization was during adolescence. Significant symptoms on admission include fatigue, insomnia, worry, anxiety, loss of appetite, anhedonia, worsening of depressed mood,  and suicidal ideation. The MSE on admission was pertinent for poor eye contact, blunted and sad affect, but normal thought process and content and no signs of altered behaviors.      Biological contributions to presentation include diagnosis of diabetes, chronic pain, and cauda equina syndrome. She has not been on any medication for a year except for methadone and olanzapine for nausea. She is actively using cannabis and recently started fentanyl use again this year. Patient has no history of other major neurologic disorders or head trauma but does have history of Hep C which showed positive RNA in January of this year which she did not receive treatment. She is now receiving appropriate medications for her underlying health conditions. Psychological contributions to presentation include poor insight and lack of appropriate coping skills. Patient appears to demonstrate helplessness with reliance on others primarily daughter. She has not been able to leave her home or perform activities necessary for daily living. She demonstrated dependent personality and borderline personality traits  and feels empty inside. Social factors contributing to presentation include good family support by daughter who is living with her. Patient does not work but is not concerned about financial stressors. She currently has no legal issues or interpersonal relationship conflicts with her daughter or other family. She is not able to engage with the community due to her current presentation.  Protective factors include family support and stable housing.      In summary, the patient's reported symptoms of SI with a plan and worsening depression in the context of medication noncompliance and substance use are consistent with worsening MDD that is severe and persistent. She will likely benefit from restarting medication therapy, engaging with therapy resources, being active on the milieu, and future outpatient resource planning this admission.     Given her current persistent depression and anxiety, patient warrants continued inpatient psychiatric hospitalization and medication management to maintain her safety.      Psychiatric Plan by Diagnosis     Today's changes:  - maintain current regimen given no acute side effects, Lantus increased to 38 units  - diabetes education team consult likely occurring today  - Increased restless at night, increased hydroxyzine to a range of 25-50mg PRN so she can take as needed  - Clonidine 0.1 mg BID was increased to TID     # MDD, severe with SI  - Olanzapine 12.5  mg PO every day (for cyclic vomiting syndrome)  - Escitalopram 25 mg every day   - Prazosin 2mg every day      # SANDIE  - Clonidine 0.1 mg TID     # Opiate Use Disorder  - Methadone 150 mg every day     Pertinent Labs/Monitoring:   - Review labs as above in note     Additional Plans:  - Patient will be treated in therapeutic milieu with appropriate individual and group therapies as described  - Patient would benefit from outpatient therapy resource planning for anxiety related disorders and improving coping skills  - Consider  outpatient CD treatment for fentanyl and cannabis use     Psychiatric Hospital Course:      Yun Coyle was admitted to Station 22 as a voluntary patient.   Medications:  Patient not on any PTA medication  New medications started at the time of admission include Olanzapine 10 mg PO daily.   11/18: start escitalopram 10mg, prazosin 1mg, and modify olanzapine 10mg to disintegrating tablet. IM started sliding scale insulin and POC glucose monitoring   11/19: Started Lantus 14 units at bedtime  11/20: Increased Lexapro from 10mg to 20mg every day   11/21: increase prazosin from 1mg to 2mg, increased Lantus from 14 units to 20 units per IM   11/22: increase Lantus from 20 units to 28 units, repeat EKG for QTc monitoring  11/25: Increase escitalopram to 25mg, Olanzapine increase to 12.5 mg   11/27 ,Clonidine 0.1 mg increased to TID, Lantus increased to 38 units     The risks, benefits, alternatives, and side effects were discussed and understood by the patient.     Medical Assessment and Plan     Medical diagnoses to be addressed this admission:    # Hypertension  - PTA Losartan  - Few incidences of elevated BP readings, if >3 readings are elevated, consult IM for HTN management      # Type II Diabetes  - PTA Metformin 1000 mg BID  - Starting 11/22 Lantus 28 units at bedtime per IM   - Increased Lantus from 35 to 38 units at bedtime on 11/26, Will continue carb coverage at current 1 unit per 10 g of carbs.   - High-resistance insulin  - Point-of-care glucose monitoring  - Educated patient on importance of follow-up with PCP for further diabetes management. Would benefit from GLP-1 or SGLT2 inhibitor.   - Continue to follow-up for assessment of insulin needs, may start additional agents         #Arm Abscess R arm s/p Drainage and wash out on 11/13  - Keflex 500mg QID x 7 days, started 11/16  - Management as per WOC  - Contact medicine if does not improve or with concern of infection      Medical course: Patient was  physically examined by the ED prior to being transferred to the unit and was found to be medically stable and appropriate for admission.      Consults:    - Continued Wound Care for right arm abscess   - IM following for DMII care  - Diabetes education        Checklist     Legal Status: Voluntary     Safety Assessment:   Behavioral Orders   Procedures    Code 1 - Restrict to Unit    [facility] Extended Care     Until discharge, Extended Care to offer psychotherapeutic services to mental health patients boarding for admission or stabilization. These services are to include but are not limited to: individual psychotherapy, diagnostic assessment, case management and care planning, safety planning, etc. This may include up to 1 visit per day. If patient is physically located at [facility] or [facility] group psychotherapy up to 2 time per day may be offered.     Routine Programming     As clinically indicated    Status 15     Every 15 minutes.       Risk Assessment:  Risk for harm is moderate.  Risk factors: SI, maladaptive coping, substance use, trauma, and past behaviors  Protective factors: engaged in treatment and help seeking      SIO: none    Disposition: TBD. Disposition pending clinical stabilization, medication optimization and development of an appropriate discharge plan.       Attestations     Resident with med student: Krysta Parker, MS3  [facility] Medical Student     I was present with the medical student who participated in the service and in the documentation of the note.  I have verified the history and personally performed the physical exam and medical decision making. I agree with the assessment and plan of care as documented in the note.    This patient was seen and discussed with my attending physician.  Brionna Gambino MD  Psychiatry Resident Physician

## 2024-11-27 NOTE — PLAN OF CARE
"  Problem: Adult Inpatient Plan of Care  Goal: Patient-Specific Goal (Individualized)  Description: You can add care plan individualizations to a care plan. Examples of Individualization might be:  \"Parent requests to be called daily at 9am for status\", \"I have a hard time hearing out of my right ear\", or \"Do not touch me to wake me up as it startles  me\".  Outcome: Progressing     Problem: Adult Behavioral Health Plan of Care  Goal: Adheres to Safety Considerations for Self and Others  Outcome: Progressing  Intervention: Develop and Maintain Individualized Safety Plan  Recent Flowsheet Documentation  Taken 11/27/2024 1500 by Kelli Smith RN  Safety Measures: environmental rounds completed  Goal: Absence of New-Onset Illness or Injury  Outcome: Progressing     Problem: Sleep Disturbance  Goal: Adequate Sleep/Rest  Outcome: Progressing  Intervention: Promote Sleep/Rest  Recent Flowsheet Documentation  Taken 11/27/2024 0901 by Kelli Smith RN  Sleep/Rest Enhancement: relaxation techniques promoted     Problem: Suicide Risk  Goal: Absence of Self-Harm  Outcome: Progressing  Intervention: Assess Risk to Self and Maintain Safety  Recent Flowsheet Documentation  Taken 11/27/2024 1500 by Kelli Smith RN  Behavior Management: impulse control promoted  Taken 11/27/2024 0901 by Kelli Smith RN  Behavior Management: behavioral plan reviewed  Self-Harm Prevention: environmental self-harm risks assessed  Enhanced Safety Measures:   pain management   room near unit station  Intervention: Promote Psychosocial Wellbeing  Recent Flowsheet Documentation  Taken 11/27/2024 1500 by Kelli Smith, RN  Family/Support System Care:   presence promoted   self-care encouraged   involvement promoted  Taken 11/27/2024 0901 by Kelli Smith RN  Supportive Measures: positive reinforcement provided  Sleep/Rest Enhancement: relaxation techniques promoted  Family/Support " System Care:   involvement promoted   presence promoted   self-care encouraged  Intervention: Establish Safety Plan and Continuity of Care  Recent Flowsheet Documentation  Taken 11/27/2024 0901 by Kelli Smith RN  Safe Transition Promotion: protective factors promoted   Goal Outcome Evaluation:    Plan of Care Reviewed With: patient      Pt is visible in the milieu. She is pleasant and cooperative, medication compliant. She is eating and drinking adequately. Pt BG were 273 and 203. Corresponding insulin and carb counting coverage administered.  She endorsed anxiety and depression at 7/10, denied SI and hallucination. Pt also denies pain, saying, once the infection is gone, pain went away. Wound care done in the morning. Pt was able to participate in groups. Pt 2 PM Clonidine was held. BP was 94/65 P was 75.  PRNs administered were Hydroxyzine x 2.

## 2024-11-27 NOTE — TELEPHONE ENCOUNTER
**Patient Navigator Follow Up**    11/27/2024;    Patient is currently IP on Station 20 NB  Unit Phone Number [phone]   CTC: [facility]        Referral for IOP-ADT;    Comments: Upon Discharge: IOP program for General Mood Disorders at [facility], Individual Therapy and Psychiatry for Medications Management.         Nemo HERNANDEZ states they don't have a discharge date yet and will [place] back to the Navigator accordingly.    **[facility] updated that patient will be discharging tomorrow, 12/4.    Patient prefers afternoons and is going to start in IOP-3; afternoons on Monday, 12/9.  I added her to the census and sent in basket message to the program as well.    I emailed [facility] program Welcome Letter to attach to patient's AVS upon discharge.        **[id number] orders were placed for both Therapy & Psychiatry Referrals.  The [facility] Dept will follow up with patient accordingly  to schedule the requested services.          Thank you,    Faviola AQUINO  Patient Navigator

## 2024-11-27 NOTE — TELEPHONE ENCOUNTER
Summary of Patient Care Communication Handoff to Patient Navigator Coordinator    PATIENT'S NAME: Yun Coyle  MRN:   [id number]  :   1975    DATE OF SERVICE: 24    Referral Needed: Yes    Is the patient coming from an inpatient unit? Yes   What station is the patient on? 20 NB    Unit Phone Number [phone]    Name of CTC to Contact with Follow up: [facility] Peral[facility]    Is this referral a high priority (high priority is patient discharging within the next 24 hours and needs placement) no    What program is this referral for? Adult Mental Health Referral    Level of Care Recommended:  Combined Intensive Outpatient Day Treatment Program (IOP-ADT) / Adult Day Treatment Program (ADT)    Specialty Track Recommendations:  MH Specialty Tracks: General Mood Disorder    Schedule Preferences: Schedule Preference:  No schedule preference (Mornings or Afternoons)    Are there any potential barriers for entrance into programmatic care? NA    Followed up from  Needed?:  No    Mental Health Referral Needed: Yes:  Individual Therapy and Psychiatry for Medications Management.     Release of Information Needed:  NA    Faxing Needed: No    Follow up Requests:  Patient Navigator Coordinator Follow-Up Needed: Referral to designated [facility].    Comments: Upon Discharge: IOP program for General Mood Disorders at [facility], Individual Therapy and Psychiatry for Medications Management.     Aramis Santoro [facility]        Patient Navigator Coordinator Contact Information  Pool Message: [email or web address] ([phone])   Phone:  [phone]  Fax:  [phone]  Email:  [email or web address]

## 2024-11-27 NOTE — PROGRESS NOTES
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 assessment was obtained from the medical record and  provided by patient who is a good historian.    Patient will have open access to their mental health medical record.    9.   Interactive Complexity: No    10. Safety Plan:   [place]ia Safety Plan      Creation Date: 11/25/24       Step 1: Warning signs:    Warning Signs    Declined      Step 2: Internal coping strategies - Things I can do to take my mind off my problems without contacting another person:    Strategies    Declined      Step 3: People and social settings that provide distraction:    Name Contact Information    Declined          Step 4: People whom I can ask for help during a crisis:   No contacts identified     Step 5: Professionals or agencies I can contact during a crisis:      Suicide Prevention Lifeline Phone: Call or Text 762  Crisis Text Line: Text HOME to 936653     Step 6: Making the environment safer (plan for lethal means safety):   Declined to answer     Optional: What is most important to me and worth living  for?:      [place]ia Safety Plan. Regina Chavis and Fransisco Granados. Used with permission of the authors.           Provider Name/ Credentials:  Aramis Santoro PhD, [facility], LADC.   [facility]    Mental Health & Addiction Clinical Services  [address] [place] [id number]   [email or web address]  Office: [phone] Fax: [phone]  November 27, 2024

## 2024-11-27 NOTE — PROGRESS NOTES
LOCUS Worksheet     Name: Yun Coyle MRN: [id number]    : 1975      Gender:  female    PMI:  NA   Provider Name: [facility]   Provider NPI:  [id number]    Actual level of Care Provided:  Assessment and Referral     Service(s) receiving or referred to:  IOP program for General Mood Disorders at [facility], Individual Therapy and Psychiatry for Medications Management.     Reason for Variance: Anxiety and Depression      Rating completed by: Aramis Santoro PhD, LPCC/LADC      I. Risk of Harm:   2      Low Risk of Harm    II. Functional Status:   2      Mild Impairment    III. Co-Morbidity:   2      Minor Co-Morbidity    IV - A. Recovery Environment - Level of Stress:   3      Moderately Stress Environment    IV - B. Recovery Environment - Level of Support:   4      Minimal Support in Environment    V. Treatment and Recovery History:   3      Moderate to Equivocal Response to Treatment and Recovery Management    VI. Engagement and Recovery Project:   2      Positive Engagement and Recovery       18 Composite Score    Level of Care Recommendation:   17 to 19       High Intensity Community Based Services     5/16/2018

## 2024-11-27 NOTE — CONSULTS
DA IP Consult Completed.  Recommendations: Upon Discharge: IOP program for General Mood Disorders at [facility], Individual Therapy and Psychiatry for Medications Management.

## 2024-11-27 NOTE — CONSULTS
Diabetes Educator consult received for Yun Coyle, age 48, female, admitted to behavioral on 11/12/24 for suicidal ideation. PMH significant for depression, anxiety, substance misuse, HTN, Type 2 DM insulin requiring    Reason for Consultation:    Diabetic patient with high BS      Medicine was consulted to manage her diabetes.  A1C 11.2% on 11/16/24.  Has taken insulin in the past, but stopped.  Most recently on Metformin 1000 mg PO BID.    Current treatment plan is below.  Yun Coyle-Diabetes Treatment Plan as of November 27, 2024    Glucose Monitoring  Check blood glucose before each meal and at bedtime  .  Diabetes Medications    Oral  Empagliflozin (Jardiance) 10 mg by mouth daily in AM    Insulin    Long-Acting Insulin Glargine (Lantus)  Lantus 38 units subcutaneously every 24 hours at 10:00 PM    Rapid-Acting Insulin Aspart (Novolog)    Meal Correction-Take with each meal based on your blood glucose value obtained just before the meal using the scale below.  Do Not give Correction Insulin if Pre-Meal BG less than 140.   For Pre-Meal  - 164 give 1 unit.   For Pre-Meal  - 189 give 2 units.   For Pre-Meal  - 214 give 3 units.   For Pre-Meal  - 239 give 4 units.   For Pre-Meal  - 264 give 5 units.   For Pre-Meal  - 289 give 6 units.   For Pre-Meal  - 314 give 7 units.   For Pre-Meal  - 339 give 8 units.   For Pre-Meal  - 364 give 9 units.   For Pre-Meal BG greater than or equal to 365 give 10 units     PLUS-Carbohydrate coverage of food in meal  1 unit for every 10 grams of Carbohydrate (CHO) eaten with meal.  Snacks are the same coverage of 1 unit for every 10 grams.  DO NOT use correction with snacks.    Bedtime Correction-Take at bedtime based on your blood glucose value obtained at bedtime using scale below.  Do Not give Bedtime Correction Insulin if BG less than 200.   For  - 224 give 1 units.   For  - 249 give 2 units.   For BG  250 - 274 give 3 units.   For  - 299 give 4 units.   For  - 324 give 5 units.   For  - 349 give 6 units.   For BG greater than or equal to 350 give 7 units.     Net with Yun after her lunch in an interview room for privacy and quiet.  Yun was open to discussing her diabetes plan and knowledge.      She informs me that she knows how to take injections with pens as confirmed by her RN, can count CHO grams (I used to do 1 for 15), and has apps to assist her, and has used a correction scale.  She states she has an Accu Chek but needs a new one and supplies as hasn't been monitoring recently.    Education and review included:  Goals of blood glucose and A1C  Monitoring before meals and at bedtime with Accu Chek Guide which is what she had previously.  Action, peak, dosage, duration of Lantus and Novolog insulins, use of I:C and correction along with daily basal insulin dose.  With several scenarios, she was able to determine accurate dosing.  She is familiar with injections with pens and pen needles, site selection and rotation, and storage of insulin.  Jardiance is a new medication started today.  Reviewed dose, frequency, action, side effects with her.  Occasionally, she may have a hypoglycemic reaction.  She was able to describe symptoms appropriately and treatment with juice/15 grams of CHO and recheck.    Resources provided include Understanding Diabetes Basics, Embecta Insulin Injections with Insulin Pens and Pen Needles, and current treatment plan.    Yun had no further questions and feels she can manage this when discharged, perhaps sometime next week.    Supplies needed at discharge include:  Accu Chek Guide glucose meter   Accu Chek Guide test strips   Accu Chek Soft Clix Lancets   Sharps Container   B-D 4 mm jose j pen needles   Alcohol Wipes   Empagliflozin per MD   Lantus Solostar insulin pens   Insulin Aspart Flexpens     Informed RN of completion of education.    Karol  BENNY Valiente  Diabetes CNS/ANN Sierra

## 2024-11-27 NOTE — PLAN OF CARE
Assessment/Intervention/Current Symtoms and Care Coordination:  Chart reviewed and patient met with team,   Discussed patient progress, symptomology, and response to treatment.  Discussed the discharge plan and any potential impediments to discharge.    No changes in past 24 hours. She continues to deny any thoughts of self harm.  She has been increasingly less isolative.  Observed in lounge often working on art project.  Affect remains blunted.    Patient does attend groups.      Discharge Plan or Goal:  Patient will return home when stable  Psychiatry  Therapy  IOP  Methadone Maintenance    Barriers to Discharge:  Ongoing depressive symptoms   Medication mgmt     Referral Status:  Patient has been referred to outpatient Psychiatrist, therapy, and IOP     Legal Status:  Voluntary     Contacts:  [facility]- Methadone   Gamaliel Granados DO- [facility]    Upcoming Meetings and Dates/Important Information and next steps:  Team Update:   Mon

## 2024-11-27 NOTE — PLAN OF CARE
BEH IP Unit Acuity Rating Score (UARS)  Patient is given one point for every criteria they meet.    CRITERIA SCORING   On a 72 hour hold, court hold, committed, stay of commitment, or revocation. 0    Patient LOS on BEH unit exceeds 20 days. 0  LOS: 11   Patient under guardianship, 55+, otherwise medically complex, or under age 11. 0   Suicide ideation without relief of precipitating factors. 1   Current plan for suicide. 0   Current plan for homicide. 0   Imminent risk or actual attempt to seriously harm another without relief of factors precipitating the attempt. 0   Severe dysfunction in daily living (ex: complete neglect for self care, extreme disruption in vegetative function, extreme deterioration in social interactions). 1   Recent (last 7 days) or current physical aggression in the ED or on unit. 0   Restraints or seclusion episode in past 72 hours. 0   Recent (last 7 days) or current verbal aggression, agitation, yelling, etc., while in the ED or unit. 0   Active psychosis. 0   Need for constant or near constant redirection (from leaving, from others, etc).  0   Intrusive or disruptive behaviors. 0   Patient requires 3 or more hours of individualized nursing care per 8-hour shift (i.e. for ADLs, meds, therapeutic interventions). 0   TOTAL 2

## 2024-11-27 NOTE — PLAN OF CARE
Problem: Sleep Disturbance  Goal: Adequate Sleep/Rest  Outcome: Progressing   Patient slept for 7 hours. BG-137 this shift. Patient denied pain and discomfort. Patient calm, no behavioral issues or any safety concerns noted so far this shift. Will continue to monitor the patient and notify MD with any concerns.

## 2024-11-27 NOTE — CARE PLAN
Rehab Group    Start time: 1100  End time: 1400  Patient time total: 50 minutes    attended partial group    #6 attended   Group Type: OT Clinic   Group Topic Covered: activity therapy and coping skills     Group Session Detail:  Coping skill exploration, creative expression within personally meaningful activities, and observation of social, cognitive, and kinesthetic performance skills     Patient Response/Contribution:  positive affect, cooperative with task, and organized     Patient Detail:   IND to gather materials, organize work space, and sequence a detailed scratch art task to completion. Demonstrated fair organization. Engaged in appropriate conversation with peers upon approach.        [id number] OT Group (2 or more in attendance)      Patient Active Problem List   Diagnosis    Suicidal behavior    Benign essential HTN    Cannabis abuse    Cyclic vomiting syndrome    Elevated LFTs    Hyperlipidemia with target low density lipoprotein (LDL) cholesterol less than 100 mg/dL    Insomnia    Major depressive disorder, recurrent episode, severe (H)    Mild intermittent asthma    Panic disorder with agoraphobia    OCD (obsessive compulsive disorder)    Severe benzodiazepine use disorder (H) in remission    S/P lumbar microdiscectomy    S/P laparoscopic cholecystectomy    Personality disorder (H)    Type 2 diabetes mellitus with hyperglycemia, with long-term current use of insulin (H)    Obesity (BMI 35.0-39.9) with comorbidity (H)    Falls frequently    Moderate opioid use disorder, in early remission (H)    Chronic hepatitis C without hepatic coma (H)    Hyperglycemia    Dehydration    Cyclical vomiting    Lactic acidosis    Epigastric pain    Coffee ground emesis    Elevated blood ketone body level    Severe episode of recurrent major depressive disorder, without psychotic features (H)    PTSD (post-traumatic stress disorder)    Recurrent major depression (H)    Suicidal ideation    Abscess of arm, right

## 2024-11-28 VITALS
TEMPERATURE: 98 F | BODY MASS INDEX: 30.97 KG/M2 | OXYGEN SATURATION: 98 % | HEIGHT: 69 IN | WEIGHT: 209.1 LBS | SYSTOLIC BLOOD PRESSURE: 127 MMHG | DIASTOLIC BLOOD PRESSURE: 86 MMHG | RESPIRATION RATE: 16 BRPM | HEART RATE: 63 BPM

## 2024-11-28 LAB
GLUCOSE BLDC GLUCOMTR-MCNC: 132 MG/DL (ref 70–99)
GLUCOSE BLDC GLUCOMTR-MCNC: 142 MG/DL (ref 70–99)
GLUCOSE BLDC GLUCOMTR-MCNC: 181 MG/DL (ref 70–99)
GLUCOSE BLDC GLUCOMTR-MCNC: 187 MG/DL (ref 70–99)
GLUCOSE BLDC GLUCOMTR-MCNC: 236 MG/DL (ref 70–99)

## 2024-11-28 PROCEDURE — 250N000013 HC RX MED GY IP 250 OP 250 PS 637

## 2024-11-28 PROCEDURE — 250N000013 HC RX MED GY IP 250 OP 250 PS 637: Performed by: STUDENT IN AN ORGANIZED HEALTH CARE EDUCATION/TRAINING PROGRAM

## 2024-11-28 PROCEDURE — 124N000002 HC R&B MH UMMC

## 2024-11-28 PROCEDURE — 97150 GROUP THERAPEUTIC PROCEDURES: CPT | Mod: GO

## 2024-11-28 RX ADMIN — INSULIN ASPART 2 UNITS: 100 INJECTION, SOLUTION INTRAVENOUS; SUBCUTANEOUS at 08:04

## 2024-11-28 RX ADMIN — METHADONE HYDROCHLORIDE 150 MG: 10 CONCENTRATE ORAL at 09:43

## 2024-11-28 RX ADMIN — EMPAGLIFLOZIN 10 MG: 10 TABLET, FILM COATED ORAL at 08:05

## 2024-11-28 RX ADMIN — ESCITALOPRAM OXALATE 25 MG: 5 TABLET, FILM COATED ORAL at 08:00

## 2024-11-28 RX ADMIN — HYDROXYZINE HYDROCHLORIDE 50 MG: 25 TABLET ORAL at 12:09

## 2024-11-28 RX ADMIN — LOSARTAN POTASSIUM 25 MG: 25 TABLET, FILM COATED ORAL at 08:00

## 2024-11-28 RX ADMIN — HYDROXYZINE HYDROCHLORIDE 50 MG: 25 TABLET ORAL at 16:08

## 2024-11-28 RX ADMIN — PRAZOSIN HYDROCHLORIDE 2 MG: 2 CAPSULE ORAL at 20:17

## 2024-11-28 RX ADMIN — METFORMIN HYDROCHLORIDE 1000 MG: 500 TABLET, FILM COATED ORAL at 08:00

## 2024-11-28 RX ADMIN — INSULIN GLARGINE 38 UNITS: 100 INJECTION, SOLUTION SUBCUTANEOUS at 21:21

## 2024-11-28 RX ADMIN — CLONIDINE HYDROCHLORIDE 0.1 MG: 0.1 TABLET ORAL at 14:03

## 2024-11-28 RX ADMIN — CLONIDINE HYDROCHLORIDE 0.1 MG: 0.1 TABLET ORAL at 08:00

## 2024-11-28 RX ADMIN — INSULIN ASPART 4 UNITS: 100 INJECTION, SOLUTION INTRAVENOUS; SUBCUTANEOUS at 18:27

## 2024-11-28 RX ADMIN — METFORMIN HYDROCHLORIDE 1000 MG: 500 TABLET, FILM COATED ORAL at 18:28

## 2024-11-28 RX ADMIN — HYDROXYZINE HYDROCHLORIDE 50 MG: 25 TABLET ORAL at 08:06

## 2024-11-28 RX ADMIN — OLANZAPINE 12.5 MG: 10 TABLET, ORALLY DISINTEGRATING ORAL at 20:17

## 2024-11-28 RX ADMIN — INSULIN ASPART 1 UNITS: 100 INJECTION, SOLUTION INTRAVENOUS; SUBCUTANEOUS at 12:32

## 2024-11-28 RX ADMIN — CLONIDINE HYDROCHLORIDE 0.1 MG: 0.1 TABLET ORAL at 20:17

## 2024-11-28 ASSESSMENT — ACTIVITIES OF DAILY LIVING (ADL)
ADLS_ACUITY_SCORE: 45
ORAL_HYGIENE: INDEPENDENT
ADLS_ACUITY_SCORE: 45
ORAL_HYGIENE: INDEPENDENT;PROMPTS
ADLS_ACUITY_SCORE: 45
LAUNDRY: WITH SUPERVISION
ADLS_ACUITY_SCORE: 45
DRESS: SCRUBS (BEHAVIORAL HEALTH);INDEPENDENT
ADLS_ACUITY_SCORE: 45
LAUNDRY: UNABLE TO COMPLETE
ADLS_ACUITY_SCORE: 45
DRESS: INDEPENDENT
ADLS_ACUITY_SCORE: 45
HYGIENE/GROOMING: HANDWASHING;INDEPENDENT
ADLS_ACUITY_SCORE: 45
HYGIENE/GROOMING: INDEPENDENT

## 2024-11-28 NOTE — PLAN OF CARE
"Goal Outcome Evaluation:    Plan of Care Reviewed With: patient Plan of Care Reviewed With: patient    Overall Patient Progress: improvingOverall Patient Progress: improving       Problem: Adult Inpatient Plan of Care  Goal: Plan of Care Review  Description: The Plan of Care Review/Shift note should be completed every shift.  The Outcome Evaluation is a brief statement about your assessment that the patient is improving, declining, or no change.  This information will be displayed automatically on your shift  note.  Outcome: Progressing  Flowsheets (Taken 11/28/2024 1440)  Plan of Care Reviewed With: patient  Overall Patient Progress: improving    Pt was presented with high anxiety and depression. Pt rated both at 7/10. PRN Hydroxyzine was utilized twice on this shift. BG before breakfast 181, before lunch 142. Pt was covered for both. Pt is on carb count. Initial BP this morning was elevated 170/110, re checked after morning medications 111/78. Pt has started Clonidine at 1400. Affect is flat and blunted. Pt does not socialize with peers. Observed sitting on the lounge but keep to herself. Hygiene remains unkempt. Pt declined to shower. She stated, \"in the evening.\" Eating and drinking adequately. There's no acute incidence or medications side effects noted or reported.     Pt wound care was completed to the right upper arm. There no sign of infections noted.     Blood pressure 105/73, pulse 73, temperature 97.9  F (36.6  C), temperature source Oral, resp. rate 16, height 1.753 m (5' 9\"), weight 94.8 kg (209 lb 1.6 oz), SpO2 98%, not currently breastfeeding.    "

## 2024-11-28 NOTE — PROGRESS NOTES
Rehab Group    Start time: 2000  End time: 2050  Patient time total: 35 minutes    attended partial group    #6 attended   Group Type: recreation   Group Topic Covered: activity therapy, Physical activity, and relaxation        Group Session Detail:  Exercise and relaxation     Patient Response/Contribution:  cooperative with task, attentive, and actively engaged       Patient Detail:    Pt actively participated in a structured Therapeutic Recreation group with a focus on leisure participation, socializing, and exercise. Pt entered group late, but participated in the guided exercise for the remainder of the group. Pt followed along, engaged in the guided chair exercise routine and added to the discussion prompts throughout the routine.  Pt was encouraged to use positive imagery with the deep breathing and stretching to foster relaxation, improves focus, and reduce stress.      Activity Therapy Per 15 min ()      Patient Active Problem List   Diagnosis    Suicidal behavior    Benign essential HTN    Cannabis abuse    Cyclic vomiting syndrome    Elevated LFTs    Hyperlipidemia with target low density lipoprotein (LDL) cholesterol less than 100 mg/dL    Insomnia    Major depressive disorder, recurrent episode, severe (H)    Mild intermittent asthma    Panic disorder with agoraphobia    OCD (obsessive compulsive disorder)    Severe benzodiazepine use disorder (H) in remission    S/P lumbar microdiscectomy    S/P laparoscopic cholecystectomy    Personality disorder (H)    Type 2 diabetes mellitus with hyperglycemia, with long-term current use of insulin (H)    Obesity (BMI 35.0-39.9) with comorbidity (H)    Falls frequently    Moderate opioid use disorder, in early remission (H)    Chronic hepatitis C without hepatic coma (H)    Hyperglycemia    Dehydration    Cyclical vomiting    Lactic acidosis    Epigastric pain    Coffee ground emesis    Elevated blood ketone body level    Severe episode of recurrent major  depressive disorder, without psychotic features (H)    PTSD (post-traumatic stress disorder)    Recurrent major depression (H)    Suicidal ideation    Abscess of arm, right

## 2024-11-28 NOTE — PLAN OF CARE
Problem: Sleep Disturbance  Goal: Adequate Sleep/Rest  Outcome: Progressing   Goal Outcome Evaluation:    Patient appears to have slept a total of 7 hours.   0200 B  Safety/environment checks conducted every 15 minutes with no further concerns noted. No complaints of pain/discomfort.

## 2024-11-28 NOTE — PLAN OF CARE
Problem: Anxiety  Goal: Anxiety Reduction or Resolution  Outcome: Progressing  Intervention: Promote Anxiety Reduction  Recent Flowsheet Documentation  Taken 11/28/2024 1700 by Everardo Butler RN  Supportive Measures: positive reinforcement provided  Family/Support System Care:   involvement promoted   presence promoted   self-care encouraged   support provided     Problem: Comorbidity Management  Goal: Blood Glucose Levels Within Targeted Range  Outcome: Progressing   Goal Outcome Evaluation:    Plan of Care Reviewed With: patient      Patient's blood glucose level was elevated to 236 mg/dl at dinner and 5 units of insulin was administered per sliding scale. Patient ate 141 grams carbs and 14 units of insulin was administered per order. HS blood glucose level was 187 mg/dl with no insulin given per sliding scale. She denied most psych symptoms and contracted for safety, but endorsed high levels of anxiety and requested and received hydroxyzine 50 mg PRN, which was somewhat effects. No abnormal behavior observed. Will continue to monitor

## 2024-11-28 NOTE — PLAN OF CARE
Problem: Psychotic Signs/Symptoms  Goal: Improved Behavioral Control (Psychotic Signs/Symptoms)  Outcome: Progressing     Problem: Pain Acute  Goal: Optimal Pain Control and Function  Outcome: Progressing   Goal Outcome Evaluation:    Plan of Care Reviewed With: patient        Patient reported no anxiety or depressive symptoms this shift. Unlike the past couple of shifts where patient isolated self in room, this shift, patient was visible in the milieu majority of the shift watching movies with peers. Affect was much brighter and mood was much relax. Patient denied thoughts of self harm and contracted for safety. Appetite was excellent and patient ate 100% dinner and HS snacks. She hydrated well. Blood sugar levels were much better this shift compared to the last:100 mg/dl @ dinner and 195@ HS with no insulin needed per sliding scale. At dinner, patient consumed 118 grams of carbs with 12 units of gila log administered per doctor.s orders. No psychotic behavior was observed and staff encourage patient patient to continue to adhere to treatment regimen. Will continue to monitor.

## 2024-11-29 LAB
GLUCOSE BLDC GLUCOMTR-MCNC: 141 MG/DL (ref 70–99)
GLUCOSE BLDC GLUCOMTR-MCNC: 141 MG/DL (ref 70–99)
GLUCOSE BLDC GLUCOMTR-MCNC: 180 MG/DL (ref 70–99)
GLUCOSE BLDC GLUCOMTR-MCNC: 180 MG/DL (ref 70–99)
GLUCOSE BLDC GLUCOMTR-MCNC: 257 MG/DL (ref 70–99)

## 2024-11-29 PROCEDURE — 124N000002 HC R&B MH UMMC

## 2024-11-29 PROCEDURE — 250N000012 HC RX MED GY IP 250 OP 636 PS 637

## 2024-11-29 PROCEDURE — 250N000013 HC RX MED GY IP 250 OP 250 PS 637: Performed by: STUDENT IN AN ORGANIZED HEALTH CARE EDUCATION/TRAINING PROGRAM

## 2024-11-29 PROCEDURE — 250N000013 HC RX MED GY IP 250 OP 250 PS 637

## 2024-11-29 PROCEDURE — G0463 HOSPITAL OUTPT CLINIC VISIT: HCPCS

## 2024-11-29 RX ADMIN — METFORMIN HYDROCHLORIDE 1000 MG: 500 TABLET, FILM COATED ORAL at 18:13

## 2024-11-29 RX ADMIN — CLONIDINE HYDROCHLORIDE 0.1 MG: 0.1 TABLET ORAL at 21:45

## 2024-11-29 RX ADMIN — CLONIDINE HYDROCHLORIDE 0.1 MG: 0.1 TABLET ORAL at 08:14

## 2024-11-29 RX ADMIN — HYDROXYZINE HYDROCHLORIDE 50 MG: 25 TABLET ORAL at 16:04

## 2024-11-29 RX ADMIN — LOSARTAN POTASSIUM 25 MG: 25 TABLET, FILM COATED ORAL at 08:14

## 2024-11-29 RX ADMIN — INSULIN ASPART 1 UNITS: 100 INJECTION, SOLUTION INTRAVENOUS; SUBCUTANEOUS at 12:23

## 2024-11-29 RX ADMIN — HYDROXYZINE HYDROCHLORIDE 50 MG: 25 TABLET ORAL at 08:15

## 2024-11-29 RX ADMIN — INSULIN GLARGINE 38 UNITS: 100 INJECTION, SOLUTION SUBCUTANEOUS at 21:44

## 2024-11-29 RX ADMIN — METHADONE HYDROCHLORIDE 150 MG: 10 CONCENTRATE ORAL at 08:14

## 2024-11-29 RX ADMIN — ESCITALOPRAM OXALATE 25 MG: 5 TABLET, FILM COATED ORAL at 08:14

## 2024-11-29 RX ADMIN — PRAZOSIN HYDROCHLORIDE 2 MG: 2 CAPSULE ORAL at 21:45

## 2024-11-29 RX ADMIN — INSULIN ASPART 2 UNITS: 100 INJECTION, SOLUTION INTRAVENOUS; SUBCUTANEOUS at 18:13

## 2024-11-29 RX ADMIN — OLANZAPINE 12.5 MG: 10 TABLET, ORALLY DISINTEGRATING ORAL at 21:45

## 2024-11-29 RX ADMIN — HYDROXYZINE HYDROCHLORIDE 50 MG: 25 TABLET ORAL at 12:03

## 2024-11-29 RX ADMIN — EMPAGLIFLOZIN 10 MG: 10 TABLET, FILM COATED ORAL at 08:14

## 2024-11-29 RX ADMIN — CLONIDINE HYDROCHLORIDE 0.1 MG: 0.1 TABLET ORAL at 15:16

## 2024-11-29 RX ADMIN — INSULIN ASPART 5 UNITS: 100 INJECTION, SOLUTION INTRAVENOUS; SUBCUTANEOUS at 08:20

## 2024-11-29 RX ADMIN — METFORMIN HYDROCHLORIDE 1000 MG: 500 TABLET, FILM COATED ORAL at 08:13

## 2024-11-29 ASSESSMENT — ACTIVITIES OF DAILY LIVING (ADL)
ADLS_ACUITY_SCORE: 45
DRESS: INDEPENDENT
ADLS_ACUITY_SCORE: 45
HYGIENE/GROOMING: INDEPENDENT
ADLS_ACUITY_SCORE: 45
DRESS: SCRUBS (BEHAVIORAL HEALTH);INDEPENDENT
LAUNDRY: WITH SUPERVISION
ADLS_ACUITY_SCORE: 45
LAUNDRY: WITH SUPERVISION
ADLS_ACUITY_SCORE: 45
ORAL_HYGIENE: INDEPENDENT;PROMPTS
ADLS_ACUITY_SCORE: 45
HYGIENE/GROOMING: HANDWASHING;SHOWER;INDEPENDENT
ADLS_ACUITY_SCORE: 45
ORAL_HYGIENE: INDEPENDENT
ADLS_ACUITY_SCORE: 45

## 2024-11-29 NOTE — PLAN OF CARE
Rehab Group    Start time: 1620  End time: 1710  Patient time total: 45 minutes    attended full group    #4 attended   Group Type: occupational therapy   Group Topic Covered: coping skills, healthy leisure time, and relaxation      Group Session Detail:    Patient Response: Pt partcipated in group focused on leisure participation and exploration, socialization and cognitive challenge. Discussed how participation in leisure activities can be used as a healthy coping skill in symptom management and a strategy to reduce stress.    Cognition: Able to follow along with each turn and cue others in the group as to who's turn it was. Demonstrated consistent focus and attention to activity.     Mood/Affect: Pleasant       Plan: Patient encouraged to maintain attendance for continued ongoing support in working towards occupational therapy goals to support overall treatment/care.        Patient Detail:    Active participant for the duration of time in group. At start of group writer presented two activity options for patient's to choose from. One patient was familiar with and one they were not. Patient requested the familiar one and appeared somewhat anxious by another peer suggesting the one they were not familiar with, despite writer's reassurance that they would teach patent the rules. Ultimately, patients selected the familiar activity which patient participated in with no cues needed. Was social off and on regarding the activity with writer and other peers.       [id number] OT Group (2 or more in attendance)    Patient Active Problem List   Diagnosis    Suicidal behavior    Benign essential HTN    Cannabis abuse    Cyclic vomiting syndrome    Elevated LFTs    Hyperlipidemia with target low density lipoprotein (LDL) cholesterol less than 100 mg/dL    Insomnia    Major depressive disorder, recurrent episode, severe (H)    Mild intermittent asthma    Panic disorder with agoraphobia    OCD (obsessive compulsive disorder)     Severe benzodiazepine use disorder (H) in remission    S/P lumbar microdiscectomy    S/P laparoscopic cholecystectomy    Personality disorder (H)    Type 2 diabetes mellitus with hyperglycemia, with long-term current use of insulin (H)    Obesity (BMI 35.0-39.9) with comorbidity (H)    Falls frequently    Moderate opioid use disorder, in early remission (H)    Chronic hepatitis C without hepatic coma (H)    Hyperglycemia    Dehydration    Cyclical vomiting    Lactic acidosis    Epigastric pain    Coffee ground emesis    Elevated blood ketone body level    Severe episode of recurrent major depressive disorder, without psychotic features (H)    PTSD (post-traumatic stress disorder)    Recurrent major depression (H)    Suicidal ideation    Abscess of arm, right

## 2024-11-29 NOTE — PROGRESS NOTES
"[facility]  WOC Nurse Inpatient Assessment     Consulted for: Right upper arm abscess    Patient History (according to provider note(s):      48 year old female with pertinent PMH of hepatitis C and DM II, health issues who presented with initial concern for abscess which is definitively treated by general surgery, however prior to discharge patient reports she has suicidal ideation.     Assessment:      Areas visualized during today's visit:  right upper inner arm    Wound location: Right upper inner arm     11/18 11/29    Last photo: 11/29/2024  Wound due to: Surgical Wound  Wound history/plan of care: Abscess s/p I&D on 11/15/2024.  Wound base: 100 % Granulation tissue     Palpation of the wound bed: normal      Drainage: small     Description of drainage: serosanguinous     Measurements (length x width x depth, in cm): 1.2  x 1  x  0.4 cm      Tunneling: N/A     Undermining: circumferentially with max depth of 0.3 cm at 12 o'clock  Periwound skin: Intact      Color: normal and consistent with surrounding tissue      Temperature: normal   Odor: none  Pain: moderate, burning with dressing change but improving  Pain interventions prior to dressing change: patient tolerated well and slow and gentle cares   Treatment goal: Heal   STATUS: healing  Supplies ordered: gathered      Treatment Plan:     Right arm wound(s): Daily   Flush wound with 3 ml of Vashe (#544077).   Cut piece of 1/4\" nu-guaze (#[id number]) and pack into wound bed leaving a \"tail\" out for safety. If more than one piece is needed to pack to full depth, leave tail out for each piece.   Cover with Mepilex 4x4 (#[id number]).     Orders: Reviewed    RECOMMEND PRIMARY TEAM ORDER: None, at this time  Education provided: plan of care  Discussed plan of care with: Nurse  WOC nurse follow-up plan: weekly  Notify WOC if wound(s) deteriorate.  Nursing to notify " "the Provider(s) and re-consult the [facility] Nurse if new skin concern.    DATA:     Current support surface: Standard  Foam mattress (Behavioral unit)  Containment of urine/stool: Continent of bladder and Continent of bowel  BMI: Body mass index is 30.88 kg/m .   Active diet order: Orders Placed This Encounter      Regular Diet Adult     Output: No intake/output data recorded.     Labs:   No lab results found in last 7 days.    Invalid input(s): \"GLUCOMBO\"    Pressure injury risk assessment:   Sensory Perception: 4-->no impairment  Moisture: 4-->rarely moist  Activity: 4-->walks frequently  Mobility: 4-->no limitation  Nutrition: 3-->adequate  Friction and Shear: 3-->no apparent problem  Tito Score: 22    Wendy Barreto RN CWOCN  Pager no longer is use, please contact through [employer] group: [facility] Nurse [facility]  Dept. Office Number: [phone]      "

## 2024-11-29 NOTE — PLAN OF CARE
"Goal Outcome Evaluation:    Plan of Care Reviewed With: patient Plan of Care Reviewed With: patient    Overall Patient Progress: improvingOverall Patient Progress: improving         Problem: Adult Inpatient Plan of Care  Goal: Plan of Care Review  Description: The Plan of Care Review/Shift note should be completed every shift.  The Outcome Evaluation is a brief statement about your assessment that the patient is improving, declining, or no change.  This information will be displayed automatically on your shift  note.  Outcome: Progressing  Flowsheets (Taken 11/29/2024 1331)  Plan of Care Reviewed With: patient  Overall Patient Progress: improving     Pt was presented anxiety and depression. Pt rated both at 7/10. PRN Hydroxyzine was utilized twice on this shift. BG before breakfast 257, before lunch 141. Pt was covered for both. Pt is on carb count. Initial Affect is flat and blunted. Pt minimally socialize with peers. Observed sitting on the lounge but keep to herself. Hygiene is appropriate to situation. Eating and drinking adequately. There's no acute incidence or medications side effects noted or reported.      Pt wound care was completed by the wound care nurse.      Blood pressure 138/89, pulse 63, temperature 98.2  F (36.8  C), temperature source Oral, resp. rate 16, height 1.753 m (5' 9\"), weight 94.8 kg (209 lb 1.6 oz), SpO2 99%, not currently breastfeeding.    "

## 2024-11-29 NOTE — PLAN OF CARE
BEH IP Unit Acuity Rating Score (UARS)  Patient is given one point for every criteria they meet.    CRITERIA SCORING   On a 72 hour hold, court hold, committed, stay of commitment, or revocation. 0    Patient LOS on BEH unit exceeds 20 days. 0  LOS: 13   Patient under guardianship, 55+, otherwise medically complex, or under age 11. 0   Suicide ideation without relief of precipitating factors. 1   Current plan for suicide. 0   Current plan for homicide. 0   Imminent risk or actual attempt to seriously harm another without relief of factors precipitating the attempt. 0   Severe dysfunction in daily living (ex: complete neglect for self care, extreme disruption in vegetative function, extreme deterioration in social interactions). 1   Recent (last 7 days) or current physical aggression in the ED or on unit. 0   Restraints or seclusion episode in past 72 hours. 0   Recent (last 7 days) or current verbal aggression, agitation, yelling, etc., while in the ED or unit. 0   Active psychosis. 0   Need for constant or near constant redirection (from leaving, from others, etc).  0   Intrusive or disruptive behaviors. 0   Patient requires 3 or more hours of individualized nursing care per 8-hour shift (i.e. for ADLs, meds, therapeutic interventions). 0   TOTAL 2

## 2024-11-30 LAB
GLUCOSE BLDC GLUCOMTR-MCNC: 149 MG/DL (ref 70–99)
GLUCOSE BLDC GLUCOMTR-MCNC: 184 MG/DL (ref 70–99)
GLUCOSE BLDC GLUCOMTR-MCNC: 191 MG/DL (ref 70–99)
GLUCOSE BLDC GLUCOMTR-MCNC: 197 MG/DL (ref 70–99)
GLUCOSE BLDC GLUCOMTR-MCNC: 204 MG/DL (ref 70–99)

## 2024-11-30 PROCEDURE — 124N000002 HC R&B MH UMMC

## 2024-11-30 PROCEDURE — 250N000013 HC RX MED GY IP 250 OP 250 PS 637

## 2024-11-30 PROCEDURE — 99232 SBSQ HOSP IP/OBS MODERATE 35: CPT | Mod: GC | Performed by: PSYCHIATRY & NEUROLOGY

## 2024-11-30 PROCEDURE — 250N000013 HC RX MED GY IP 250 OP 250 PS 637: Performed by: STUDENT IN AN ORGANIZED HEALTH CARE EDUCATION/TRAINING PROGRAM

## 2024-11-30 RX ADMIN — HYDROXYZINE HYDROCHLORIDE 50 MG: 25 TABLET ORAL at 07:36

## 2024-11-30 RX ADMIN — METFORMIN HYDROCHLORIDE 1000 MG: 500 TABLET, FILM COATED ORAL at 07:48

## 2024-11-30 RX ADMIN — INSULIN ASPART 1 UNITS: 100 INJECTION, SOLUTION INTRAVENOUS; SUBCUTANEOUS at 18:05

## 2024-11-30 RX ADMIN — CLONIDINE HYDROCHLORIDE 0.1 MG: 0.1 TABLET ORAL at 21:18

## 2024-11-30 RX ADMIN — INSULIN GLARGINE 38 UNITS: 100 INJECTION, SOLUTION SUBCUTANEOUS at 21:17

## 2024-11-30 RX ADMIN — INSULIN ASPART 3 UNITS: 100 INJECTION, SOLUTION INTRAVENOUS; SUBCUTANEOUS at 08:07

## 2024-11-30 RX ADMIN — HYDROXYZINE HYDROCHLORIDE 50 MG: 25 TABLET ORAL at 11:51

## 2024-11-30 RX ADMIN — CLONIDINE HYDROCHLORIDE 0.1 MG: 0.1 TABLET ORAL at 13:56

## 2024-11-30 RX ADMIN — EMPAGLIFLOZIN 10 MG: 10 TABLET, FILM COATED ORAL at 07:48

## 2024-11-30 RX ADMIN — METFORMIN HYDROCHLORIDE 1000 MG: 500 TABLET, FILM COATED ORAL at 18:04

## 2024-11-30 RX ADMIN — INSULIN ASPART 2 UNITS: 100 INJECTION, SOLUTION INTRAVENOUS; SUBCUTANEOUS at 12:12

## 2024-11-30 RX ADMIN — ESCITALOPRAM OXALATE 25 MG: 5 TABLET, FILM COATED ORAL at 07:48

## 2024-11-30 RX ADMIN — PRAZOSIN HYDROCHLORIDE 2 MG: 2 CAPSULE ORAL at 21:18

## 2024-11-30 RX ADMIN — HYDROXYZINE HYDROCHLORIDE 50 MG: 25 TABLET ORAL at 16:18

## 2024-11-30 RX ADMIN — LOSARTAN POTASSIUM 25 MG: 25 TABLET, FILM COATED ORAL at 07:48

## 2024-11-30 RX ADMIN — OLANZAPINE 12.5 MG: 10 TABLET, ORALLY DISINTEGRATING ORAL at 21:18

## 2024-11-30 RX ADMIN — CLONIDINE HYDROCHLORIDE 0.1 MG: 0.1 TABLET ORAL at 07:48

## 2024-11-30 RX ADMIN — METHADONE HYDROCHLORIDE 150 MG: 10 CONCENTRATE ORAL at 07:51

## 2024-11-30 ASSESSMENT — ACTIVITIES OF DAILY LIVING (ADL)
ADLS_ACUITY_SCORE: 45
DRESS: SCRUBS (BEHAVIORAL HEALTH);INDEPENDENT
ORAL_HYGIENE: INDEPENDENT;PROMPTS
ADLS_ACUITY_SCORE: 45
HYGIENE/GROOMING: HANDWASHING;SHOWER;INDEPENDENT
LAUNDRY: WITH SUPERVISION
HYGIENE/GROOMING: INDEPENDENT
ADLS_ACUITY_SCORE: 45
DRESS: INDEPENDENT
ADLS_ACUITY_SCORE: 45
ORAL_HYGIENE: INDEPENDENT
LAUNDRY: WITH SUPERVISION
ADLS_ACUITY_SCORE: 45

## 2024-11-30 NOTE — PLAN OF CARE
Pt was isolative in her room most of the shift. Pt was laying in bed with her head covered in blankets. Pt came out for dinner and stayed out for a short time to watch a movie. Pt was presents with blunted and flat affect. Pt endorsed for anxiety 7/10 and depression 6/10. Prn Hydroxyzine was administered. Denies for suicidal ideations  and hallucinations. Dressing is intact on her wound. Vital signs:Temp: 97.7  F (36.5  C) Temp src: Oral BP: 116/83 Pulse: 73   Resp: 18 SpO2: 98 % O2 Device: None (Room air)       Goal Outcome Evaluation:    Plan of Care Reviewed With: patient      Problem: Anxiety  Goal: Anxiety Reduction or Resolution  Outcome: Progressing     Problem: Depression  Goal: Decreased Depression Symptoms  Outcome: Progressing     Problem: Suicide Risk  Goal: Absence of Self-Harm  Outcome: Progressing     Problem: Psychotic Signs/Symptoms  Goal: Improved Mood Symptoms (Psychotic Signs/Symptoms)  Intervention: Optimize Emotion and Mood  Recent Flowsheet Documentation  Taken 11/29/2024 1700 by Mitchell Chapa RN  Diversional Activity: television

## 2024-11-30 NOTE — PLAN OF CARE
"Goal Outcome Evaluation:    Plan of Care Reviewed With: patient Plan of Care Reviewed With: patient    Overall Patient Progress: improvingOverall Patient Progress: improving       Problem: Adult Inpatient Plan of Care  Goal: Plan of Care Review  Description: The Plan of Care Review/Shift note should be completed every shift.  The Outcome Evaluation is a brief statement about your assessment that the patient is improving, declining, or no change.  This information will be displayed automatically on your shift  note.  Outcome: Progressing  Flowsheets (Taken 11/30/2024 1252)  Overall Patient Progress: improving     Pt was up and visible in the milieu intermittently. Continue to endorse high anxiety and depression. Pt rated both at 5/10. PRN Hydroxyzine was utilized twice on this shift. BG before breakfast 191, before lunch 184. Pt was covered for both. Pt is on carb count. Affect is flat and blunted. Pt somewhat socialize with peers. Observed sitting on the lounge watching TV and movie with peers. Hygiene is appropriate to situation. Eating and drinking adequately. There's no acute incidence or medications side effects noted or reported.      Pt wound care was completed to the right upper arm. There no sign of infections noted.     Blood pressure (!) 128/99, pulse 73, temperature 98  F (36.7  C), resp. rate 18, height 1.753 m (5' 9\"), weight 94.8 kg (209 lb 1.6 oz), SpO2 98%, not currently breastfeeding.    "

## 2024-11-30 NOTE — PLAN OF CARE
"  Problem: Sleep Disturbance  Goal: Adequate Sleep/Rest  Outcome: Progressing   Goal Outcome Evaluation:     Pt slept for about 6.75 hours this shift. She denies pain and all MH symptoms. BG was 212 @ 0200 am. No behavioral or safety concern at this time.     Pt's most resent vitals:  Blood pressure 116/83, pulse 73, temperature 97.7  F (36.5  C), temperature source Oral, resp. rate 18, height 1.753 m (5' 9\"), weight 94.8 kg (209 lb 1.6 oz), SpO2 98%, not currently breastfeeding.   "

## 2024-11-30 NOTE — PROGRESS NOTES
"  ----------------------------------------------------------------------------------------------------------  [facility]  CROSS COVER NOTE  Hospital Day #14       Subjective:     Patient Interview:  Yun Coyle was seen and evaluated. Denies medication side effect. Reports to be sleeping and eating well. Reports mood to be \"depressed and anxious\" but is unclear why. Reports she was able to talk to family this weekend and that went well. Requested increase in Zyprexa to help with sleep, however pt reported to fall asleep within 30 minutes and discussed we should not increase dose and continue to monitor if insomnia progresses. Patient agreeable and No further concerns expressed.     ROS: Patient denies bothersome physical symptoms.        Objective:     Vitals:  BP (!) 128/99   Pulse 73   Temp 98  F (36.7  C)   Resp 18   Ht 1.753 m (5' 9\")   Wt 94.8 kg (209 lb 1.6 oz)   LMP  (LMP Unknown)   SpO2 98%   BMI 30.88 kg/m      Current Medications:  Scheduled:  Current Facility-Administered Medications   Medication Dose Route Frequency Provider Last Rate Last Admin    alum & mag hydroxide-simethicone (MAALOX) suspension 30 mL  30 mL Oral Q4H PRN Joseph Santana MD        cloNIDine (CATAPRES) tablet 0.1 mg  0.1 mg Oral TID Brionna Gambino MD   0.1 mg at 11/30/24 0748    glucose gel 15-30 g  15-30 g Oral Q15 Min PRN Rey Brewer        Or    dextrose 50 % injection 25-50 mL  25-50 mL Intravenous Q15 Min PRN Rey Brewer        Or    glucagon injection 1 mg  1 mg Subcutaneous Q15 Min PRN Rey Brewer        empagliflozin (JARDIANCE) tablet 10 mg  10 mg Oral Daily Rey Brewer   10 mg at 11/30/24 0748    escitalopram (LEXAPRO) tablet 25 mg  25 mg Oral Daily Brionna Gambino MD   25 mg at 11/30/24 0748    hydrOXYzine HCl (ATARAX) tablet 25-50 mg  25-50 mg Oral Q4H PRN Brionna Gambino MD   50 mg at 11/30/24 1151    ibuprofen (ADVIL/MOTRIN) tablet 800 mg  800 mg " Oral Q6H PRN Alina Flannery MD   800 mg at 11/21/24 1121    insulin aspart (NovoLOG) injection (RAPID ACTING)  1-10 Units Subcutaneous TID AC Rey Brewer   2 Units at 11/30/24 1212    insulin aspart (NovoLOG) injection (RAPID ACTING)  1-7 Units Subcutaneous At Bedtime Rey Brewer   3 Units at 11/25/24 2243    insulin aspart (NovoLOG) injection (RAPID ACTING)   Subcutaneous Daily with breakfast Rey Brewer   5 Units at 11/30/24 0850    insulin aspart (NovoLOG) injection (RAPID ACTING)   Subcutaneous Daily with lunch Rey Brewer   8 Units at 11/30/24 1209    insulin aspart (NovoLOG) injection (RAPID ACTING)   Subcutaneous Daily with supper Rey Brewer   17 Units at 11/29/24 1812    insulin aspart (NovoLOG) injection (RAPID ACTING)   Subcutaneous With Snacks or Supplements Rey Brewer   2 Units at 11/25/24 2033    insulin glargine (LANTUS PEN) injection 38 Units  38 Units Subcutaneous At Bedtime Tiki Mendez PA-C   38 Units at 11/29/24 2144    losartan (COZAAR) tablet 25 mg  25 mg Oral Daily Joseph Santana MD   25 mg at 11/30/24 0748    melatonin tablet 3 mg  3 mg Oral At Bedtime PRN Joseph Santana MD   3 mg at 11/17/24 2059    metFORMIN (GLUCOPHAGE) tablet 1,000 mg  1,000 mg Oral BID w/meals Joseph Santana MD   1,000 mg at 11/30/24 0748    methadone (DOLOPHINE-INTENSOL) 10 MG/ML (HIGH CONC) solution 150 mg  150 mg Oral Daily Alina Flannery MD   150 mg at 11/30/24 0751    OLANZapine (zyPREXA) tablet 10 mg  10 mg Oral TID PRN Joseph Santana MD   10 mg at 11/21/24 1139    Or    OLANZapine (zyPREXA) injection 10 mg  10 mg Intramuscular TID PRN Joseph Santana MD        OLANZapine zydis (zyPREXA) ODT half-tab 12.5 mg  12.5 mg Oral At Bedtime Brionna Gambino MD   12.5 mg at 11/29/24 2145    prazosin (MINIPRESS) capsule 2 mg  2 mg Oral At Bedtime Brionna Gambino MD   2 mg at 11/29/24 2145    senna-docusate (SENOKOT-S/PERICOLACE) 8.6-50 MG per tablet 1 tablet  1 tablet Oral BID  PRN Joseph Santana MD           PRN:  Current Facility-Administered Medications   Medication Dose Route Frequency Provider Last Rate Last Admin    alum & mag hydroxide-simethicone (MAALOX) suspension 30 mL  30 mL Oral Q4H PRN Joseph Santana MD        cloNIDine (CATAPRES) tablet 0.1 mg  0.1 mg Oral TID Brionna Gambino MD   0.1 mg at 11/30/24 0748    glucose gel 15-30 g  15-30 g Oral Q15 Min PRN Rey Brewer        Or    dextrose 50 % injection 25-50 mL  25-50 mL Intravenous Q15 Min PRN Rey Brewer        Or    glucagon injection 1 mg  1 mg Subcutaneous Q15 Min PRN Rey Brewer        empagliflozin (JARDIANCE) tablet 10 mg  10 mg Oral Daily Rey Brewer   10 mg at 11/30/24 0748    escitalopram (LEXAPRO) tablet 25 mg  25 mg Oral Daily Brionna Gambino MD   25 mg at 11/30/24 0748    hydrOXYzine HCl (ATARAX) tablet 25-50 mg  25-50 mg Oral Q4H PRN Brionna Gambino MD   50 mg at 11/30/24 1151    ibuprofen (ADVIL/MOTRIN) tablet 800 mg  800 mg Oral Q6H PRN Alina Flannery MD   800 mg at 11/21/24 1121    insulin aspart (NovoLOG) injection (RAPID ACTING)  1-10 Units Subcutaneous TID AC Rey Brewer   2 Units at 11/30/24 1212    insulin aspart (NovoLOG) injection (RAPID ACTING)  1-7 Units Subcutaneous At Bedtime Rey Brewer   3 Units at 11/25/24 2243    insulin aspart (NovoLOG) injection (RAPID ACTING)   Subcutaneous Daily with breakfast Rey Brewer   5 Units at 11/30/24 0850    insulin aspart (NovoLOG) injection (RAPID ACTING)   Subcutaneous Daily with lunch Rey Brewer   8 Units at 11/30/24 1209    insulin aspart (NovoLOG) injection (RAPID ACTING)   Subcutaneous Daily with supper Rey Brewer   17 Units at 11/29/24 1812    insulin aspart (NovoLOG) injection (RAPID ACTING)   Subcutaneous With Snacks or Supplements Rey Brewer   2 Units at 11/25/24 2033    insulin glargine (LANTUS PEN) injection 38 Units  38 Units Subcutaneous At Bedtime Tiki Mendez PA-C   38 Units at 11/29/24 2144     "losartan (COZAAR) tablet 25 mg  25 mg Oral Daily Joseph Santana MD   25 mg at 11/30/24 0748    melatonin tablet 3 mg  3 mg Oral At Bedtime PRN Joseph Santana MD   3 mg at 11/17/24 2059    metFORMIN (GLUCOPHAGE) tablet 1,000 mg  1,000 mg Oral BID w/meals Joseph Santana MD   1,000 mg at 11/30/24 0748    methadone (DOLOPHINE-INTENSOL) 10 MG/ML (HIGH CONC) solution 150 mg  150 mg Oral Daily Alina Flannery MD   150 mg at 11/30/24 0751    OLANZapine (zyPREXA) tablet 10 mg  10 mg Oral TID PRN Joseph Santana MD   10 mg at 11/21/24 1139    Or    OLANZapine (zyPREXA) injection 10 mg  10 mg Intramuscular TID PRN Joseph Santana MD        OLANZapine zydis (zyPREXA) ODT half-tab 12.5 mg  12.5 mg Oral At Bedtime Brionna Gambino MD   12.5 mg at 11/29/24 2145    prazosin (MINIPRESS) capsule 2 mg  2 mg Oral At Bedtime Brionna Gambino MD   2 mg at 11/29/24 2145    senna-docusate (SENOKOT-S/PERICOLACE) 8.6-50 MG per tablet 1 tablet  1 tablet Oral BID PRN Joseph Santana MD            Mental Status Exam:     Oriented to:  Grossly Oriented  General:  Awake and Alert  Appearance:  appears stated age and Grooming is adequate  Behavior/Attitude:  Calm, Cooperative, and Engaged  Eye Contact: Appropriate  Psychomotor: Normal no catatonia present  Speech:  appropriate volume/tone  Language: Fluent in English with appropriate syntax and vocabulary.  Mood:  \"better, 7/10 for depression and anxiety\"  Affect:  appropriate and flat  Thought Process:  linear  Thought Content:   No HI, No VH, and No AH; No apparent delusions  Associations:  intact  Insight:  fair due to persistent depressive sxs  Judgment:  fair due to persistent depressive sxs  Impulse control: fair  Attention Span:  grossly intact  Concentration:  grossly intact  Recent and Remote Memory:  not formally assessed  Fund of Knowledge: average  Muscle Strength and Tone:  normal   Gait and Station:  not formally assessed - patient sitting up in bed, meeting eye " contact and talking with team      Assessment     Yun Coyle is a 48 year old female previously diagnosed with MDD, SANDIE, PTSD, Polysubstance use, Panic attacks, and agoraphobia who presented voluntarily with suicidal ideation with a plan in the context of worsening MDD, SANDIE, medication noncompliance, active fentanyl use on methadone, and daily cannabis use who is recently having issues with obtaining medical insurance. Most recent psychiatric hospitalization was during adolescence. Significant symptoms on admission include fatigue, insomnia, worry, anxiety, loss of appetite, anhedonia, worsening of depressed mood,  and suicidal ideation. The MSE on admission was pertinent for poor eye contact, blunted and sad affect, but normal thought process and content and no signs of altered behaviors.      Biological contributions to presentation include diagnosis of diabetes, chronic pain, and cauda equina syndrome. She has not been on any medication for a year except for methadone and olanzapine for nausea. She is actively using cannabis and recently started fentanyl use again this year. Patient has no history of other major neurologic disorders or head trauma but does have history of Hep C which showed positive RNA in January of this year which she did not receive treatment. She is now receiving appropriate medications for her underlying health conditions. Psychological contributions to presentation include poor insight and lack of appropriate coping skills. Patient appears to demonstrate helplessness with reliance on others primarily daughter. She has not been able to leave her home or perform activities necessary for daily living. She demonstrated dependent personality and borderline personality traits and feels empty inside. Social factors contributing to presentation include good family support by daughter who is living with her. Patient does not work but is not concerned about financial stressors. She currently  has no legal issues or interpersonal relationship conflicts with her daughter or other family. She is not able to engage with the community due to her current presentation.  Protective factors include family support and stable housing.      In summary, the patient's reported symptoms of SI with a plan and worsening depression in the context of medication noncompliance and substance use are consistent with worsening MDD that is severe and persistent. She will likely benefit from restarting medication therapy, engaging with therapy resources, being active on the milieu, and future outpatient resource planning this admission.     Given her current persistent depression and anxiety, patient warrants continued inpatient psychiatric hospitalization and medication management to maintain her safety.     Plan   ## MDD, severe with SI   # SANDIE   # Opiate Use Disorder   - No change at this time. Continue medications as scheduled above    Medical diagnoses to be addressed this admission:    # Hypertension  - PTA Losartan  - Few incidences of elevated BP readings, if >3 readings are elevated, consult IM for HTN management      # Type II Diabetes  - PTA Metformin 1000 mg BID  - Starting 11/22 Lantus 28 units at bedtime per IM   - Increased Lantus from 35 to 38 units at bedtime on 11/26, Will continue carb coverage at current 1 unit per 10 g of carbs.   - High-resistance insulin  - Point-of-care glucose monitoring  - Educated patient on importance of follow-up with PCP for further diabetes management. Would benefit from GLP-1 or SGLT2 inhibitor.   - Continue to follow-up for assessment of insulin needs, may start additional agents         #Arm Abscess R arm s/p Drainage and wash out on 11/13  - Keflex 500mg QID x 7 days, started 11/16  - Management as per WOC  - Contact medicine if does not improve or with concern of infection     Montrell Mendez, DO  PGY-2 Psychiatry Resident Physician

## 2024-12-01 LAB
GLUCOSE BLDC GLUCOMTR-MCNC: 137 MG/DL (ref 70–99)
GLUCOSE BLDC GLUCOMTR-MCNC: 161 MG/DL (ref 70–99)
GLUCOSE BLDC GLUCOMTR-MCNC: 166 MG/DL (ref 70–99)
GLUCOSE BLDC GLUCOMTR-MCNC: 222 MG/DL (ref 70–99)
GLUCOSE BLDC GLUCOMTR-MCNC: 252 MG/DL (ref 70–99)

## 2024-12-01 PROCEDURE — 250N000013 HC RX MED GY IP 250 OP 250 PS 637

## 2024-12-01 PROCEDURE — 124N000002 HC R&B MH UMMC

## 2024-12-01 PROCEDURE — 250N000013 HC RX MED GY IP 250 OP 250 PS 637: Performed by: STUDENT IN AN ORGANIZED HEALTH CARE EDUCATION/TRAINING PROGRAM

## 2024-12-01 RX ADMIN — METFORMIN HYDROCHLORIDE 1000 MG: 500 TABLET, FILM COATED ORAL at 17:53

## 2024-12-01 RX ADMIN — CLONIDINE HYDROCHLORIDE 0.1 MG: 0.1 TABLET ORAL at 07:53

## 2024-12-01 RX ADMIN — HYDROXYZINE HYDROCHLORIDE 50 MG: 25 TABLET ORAL at 07:53

## 2024-12-01 RX ADMIN — CLONIDINE HYDROCHLORIDE 0.1 MG: 0.1 TABLET ORAL at 21:08

## 2024-12-01 RX ADMIN — INSULIN ASPART 1 UNITS: 100 INJECTION, SOLUTION INTRAVENOUS; SUBCUTANEOUS at 12:31

## 2024-12-01 RX ADMIN — ESCITALOPRAM OXALATE 25 MG: 5 TABLET, FILM COATED ORAL at 07:53

## 2024-12-01 RX ADMIN — PRAZOSIN HYDROCHLORIDE 2 MG: 2 CAPSULE ORAL at 21:08

## 2024-12-01 RX ADMIN — CLONIDINE HYDROCHLORIDE 0.1 MG: 0.1 TABLET ORAL at 14:36

## 2024-12-01 RX ADMIN — INSULIN ASPART 4 UNITS: 100 INJECTION, SOLUTION INTRAVENOUS; SUBCUTANEOUS at 08:01

## 2024-12-01 RX ADMIN — EMPAGLIFLOZIN 10 MG: 10 TABLET, FILM COATED ORAL at 07:53

## 2024-12-01 RX ADMIN — METHADONE HYDROCHLORIDE 150 MG: 10 CONCENTRATE ORAL at 08:02

## 2024-12-01 RX ADMIN — LOSARTAN POTASSIUM 25 MG: 25 TABLET, FILM COATED ORAL at 07:53

## 2024-12-01 RX ADMIN — METFORMIN HYDROCHLORIDE 1000 MG: 500 TABLET, FILM COATED ORAL at 07:53

## 2024-12-01 RX ADMIN — HYDROXYZINE HYDROCHLORIDE 50 MG: 25 TABLET ORAL at 11:54

## 2024-12-01 RX ADMIN — HYDROXYZINE HYDROCHLORIDE 50 MG: 25 TABLET ORAL at 16:26

## 2024-12-01 RX ADMIN — INSULIN ASPART 5 UNITS: 100 INJECTION, SOLUTION INTRAVENOUS; SUBCUTANEOUS at 17:53

## 2024-12-01 RX ADMIN — OLANZAPINE 12.5 MG: 10 TABLET, ORALLY DISINTEGRATING ORAL at 21:08

## 2024-12-01 ASSESSMENT — ACTIVITIES OF DAILY LIVING (ADL)
ADLS_ACUITY_SCORE: 45
DRESS: INDEPENDENT
ADLS_ACUITY_SCORE: 45
ADLS_ACUITY_SCORE: 45
ORAL_HYGIENE: INDEPENDENT
ADLS_ACUITY_SCORE: 45
ORAL_HYGIENE: INDEPENDENT;PROMPTS
ADLS_ACUITY_SCORE: 45
HYGIENE/GROOMING: HANDWASHING;INDEPENDENT;SHOWER
ADLS_ACUITY_SCORE: 45
ADLS_ACUITY_SCORE: 45
LAUNDRY: WITH SUPERVISION
LAUNDRY: WITH SUPERVISION
ADLS_ACUITY_SCORE: 45
ADLS_ACUITY_SCORE: 45
HYGIENE/GROOMING: INDEPENDENT
ADLS_ACUITY_SCORE: 45
DRESS: SCRUBS (BEHAVIORAL HEALTH);INDEPENDENT
ADLS_ACUITY_SCORE: 45

## 2024-12-01 NOTE — PROGRESS NOTES
Brief Medicine Note  Medicine following on blood sugars. Patient was started on Jardiance 11/27. Her blood sugar is improved, though still slightly above goal. Give discharge date still pending, will increase Lantus to 40 units.     Please notify medicine when discharge date finalized so we can assist with discharge planning.    Medicine will continue to follow up on blood sugars peripherally. Thank you for allowing us to be a part of this patient's care. Please notify on call [facility]ASSAN[facility]A if any intercurrent medical issues arise.     Tiki Mendez PA-C  Hospitalist Service

## 2024-12-01 NOTE — PLAN OF CARE
Pt was isolative in her room most of the time this evening. Pt came out only for dinner. Presents with flat affect. Endorses anxiety 7/10 and depression 6/10. Prn Hydroxyzine was administered x1 this shift. Denies for suicidal ideations and hallucinations. Blood glucose was 149 before dinner time and 197 at bedtime. .Vital signs:Temp: 98.1  F (36.7  C) Temp src: Oral BP: 110/78 Pulse: 70   Resp: 18 SpO2: 100 % O2 Device: None (Room air)       Goal Outcome Evaluation:    Problem: Anxiety  Goal: Anxiety Reduction or Resolution  Outcome: Progressing     Problem: Depression  Goal: Decreased Depression Symptoms  Outcome: Progressing     Problem: Suicide Risk  Goal: Absence of Self-Harm  Outcome: Progressing

## 2024-12-01 NOTE — PLAN OF CARE
"Goal Outcome Evaluation:    Plan of Care Reviewed With: patient Plan of Care Reviewed With: patient    Overall Patient Progress: improvingOverall Patient Progress: improving       Problem: Adult Inpatient Plan of Care  Goal: Plan of Care Review  Description: The Plan of Care Review/Shift note should be completed every shift.  The Outcome Evaluation is a brief statement about your assessment that the patient is improving, declining, or no change.  This information will be displayed automatically on your shift  note.  Outcome: Progressing  Flowsheets (Taken 12/1/2024 1210)  Plan of Care Reviewed With: patient  Overall Patient Progress: improving     Problem: Adult Inpatient Plan of Care  Goal: Absence of Hospital-Acquired Illness or Injury  Intervention: Prevent Skin Injury  Recent Flowsheet Documentation  Taken 12/1/2024 0835 by Madison Brizuela RN  Body Position: position changed independently     Pt was minimally seen in the lounge. Spent majority of the time in her room napping between cares. Continue to endorse high anxiety and depression. Pt rated both at 7/10. PRN Hydroxyzine was utilized twice on this shift. BG before breakfast 222, before lunch 166. Pt was covered for both. Pt is on carb count. Affect is flat and blunted. Pt minimally socialize with peers. Hygiene is appropriate to situation. Eating and drinking adequately. There's no acute incidence or medications side effects noted or reported.      Pt wound care was completed to the right upper arm. There no sign of infections noted.     Blood pressure (!) 142/82, pulse 70, temperature 97.6  F (36.4  C), temperature source Oral, resp. rate 18, height 1.753 m (5' 9\"), weight 95.3 kg (210 lb 3.2 oz), SpO2 99%, not currently breastfeeding.    "

## 2024-12-01 NOTE — PLAN OF CARE
Problem: Sleep Disturbance  Goal: Adequate Sleep/Rest  Outcome: Progressing   Goal Outcome Evaluation:    Patient resting most of the night, blood sugar reading was 166 at 0200, no PRN requested for by patient, she slept for 5.25 hrs this shift.

## 2024-12-02 LAB
GLUCOSE BLDC GLUCOMTR-MCNC: 131 MG/DL (ref 70–99)
GLUCOSE BLDC GLUCOMTR-MCNC: 143 MG/DL (ref 70–99)
GLUCOSE BLDC GLUCOMTR-MCNC: 169 MG/DL (ref 70–99)
GLUCOSE BLDC GLUCOMTR-MCNC: 174 MG/DL (ref 70–99)
GLUCOSE BLDC GLUCOMTR-MCNC: 186 MG/DL (ref 70–99)

## 2024-12-02 PROCEDURE — 250N000013 HC RX MED GY IP 250 OP 250 PS 637: Performed by: STUDENT IN AN ORGANIZED HEALTH CARE EDUCATION/TRAINING PROGRAM

## 2024-12-02 PROCEDURE — 250N000012 HC RX MED GY IP 250 OP 636 PS 637

## 2024-12-02 PROCEDURE — 250N000013 HC RX MED GY IP 250 OP 250 PS 637

## 2024-12-02 PROCEDURE — 124N000002 HC R&B MH UMMC

## 2024-12-02 PROCEDURE — 99232 SBSQ HOSP IP/OBS MODERATE 35: CPT | Performed by: PSYCHIATRY & NEUROLOGY

## 2024-12-02 PROCEDURE — 250N000013 HC RX MED GY IP 250 OP 250 PS 637: Performed by: PSYCHIATRY & NEUROLOGY

## 2024-12-02 PROCEDURE — 99207 PR NO BILLABLE SERVICE THIS VISIT: CPT

## 2024-12-02 RX ORDER — OLANZAPINE 15 MG/1
15 TABLET, ORALLY DISINTEGRATING ORAL AT BEDTIME
Status: DISCONTINUED | OUTPATIENT
Start: 2024-12-02 | End: 2024-12-04 | Stop reason: HOSPADM

## 2024-12-02 RX ADMIN — CLONIDINE HYDROCHLORIDE 0.1 MG: 0.1 TABLET ORAL at 13:49

## 2024-12-02 RX ADMIN — METFORMIN HYDROCHLORIDE 1000 MG: 500 TABLET, FILM COATED ORAL at 18:09

## 2024-12-02 RX ADMIN — HYDROXYZINE HYDROCHLORIDE 50 MG: 25 TABLET ORAL at 07:46

## 2024-12-02 RX ADMIN — ESCITALOPRAM OXALATE 25 MG: 5 TABLET, FILM COATED ORAL at 07:45

## 2024-12-02 RX ADMIN — PRAZOSIN HYDROCHLORIDE 2 MG: 2 CAPSULE ORAL at 20:52

## 2024-12-02 RX ADMIN — HYDROXYZINE HYDROCHLORIDE 50 MG: 25 TABLET ORAL at 16:19

## 2024-12-02 RX ADMIN — CLONIDINE HYDROCHLORIDE 0.1 MG: 0.1 TABLET ORAL at 20:52

## 2024-12-02 RX ADMIN — INSULIN ASPART 2 UNITS: 100 INJECTION, SOLUTION INTRAVENOUS; SUBCUTANEOUS at 18:24

## 2024-12-02 RX ADMIN — CLONIDINE HYDROCHLORIDE 0.1 MG: 0.1 TABLET ORAL at 07:46

## 2024-12-02 RX ADMIN — OLANZAPINE 15 MG: 15 TABLET, ORALLY DISINTEGRATING ORAL at 20:52

## 2024-12-02 RX ADMIN — HYDROXYZINE HYDROCHLORIDE 50 MG: 25 TABLET ORAL at 12:08

## 2024-12-02 RX ADMIN — EMPAGLIFLOZIN 10 MG: 10 TABLET, FILM COATED ORAL at 07:45

## 2024-12-02 RX ADMIN — INSULIN ASPART 2 UNITS: 100 INJECTION, SOLUTION INTRAVENOUS; SUBCUTANEOUS at 07:48

## 2024-12-02 RX ADMIN — METHADONE HYDROCHLORIDE 150 MG: 10 CONCENTRATE ORAL at 07:51

## 2024-12-02 RX ADMIN — METFORMIN HYDROCHLORIDE 1000 MG: 500 TABLET, FILM COATED ORAL at 07:45

## 2024-12-02 RX ADMIN — LOSARTAN POTASSIUM 25 MG: 25 TABLET, FILM COATED ORAL at 07:45

## 2024-12-02 ASSESSMENT — ACTIVITIES OF DAILY LIVING (ADL)
ADLS_ACUITY_SCORE: 45
ADLS_ACUITY_SCORE: 45
HYGIENE/GROOMING: HANDWASHING;INDEPENDENT
ADLS_ACUITY_SCORE: 45
ORAL_HYGIENE: INDEPENDENT
ADLS_ACUITY_SCORE: 45
DRESS: INDEPENDENT;SCRUBS (BEHAVIORAL HEALTH)
ADLS_ACUITY_SCORE: 45

## 2024-12-02 NOTE — PROGRESS NOTES
"  ----------------------------------------------------------------------------------------------------------  [facility]  Psychiatry Progress Note  Hospital Day #16     Interim History:     The patient's care was discussed with the treatment team and chart notes were reviewed.    Vitals: VSS  Sleep: 7 hours (12/02/24 0600)  Scheduled medications: Took all scheduled medications as prescribed  Psychiatric PRN medications: No psychiatric prns given    Staff Report:   No acute events or safety concerns overnight. BG was 143 @ 200, 174 @ 730. In summary, Yun seems to be doing better with regards to her depression. Please see staff notes for details.      Subjective:     Patient Interview:  Yun Coyle reports that the weekend was good. Feels her depression is a 6/10 and anxiety a 7/10. She does not know what is making her anxious, it has always been that way but the Hydroxyzine helps. She says her sleep is not good and she is having trouble falling asleep and staying asleep. She would like to increase her Zyprexa because this could help with her sleep. Denies SI and HI. the last time she had self harm thoughts was sometime last week and it has been a while. no other pain or bothersome symptoms. Says her arm is doing well and healing well. No acute concerns.     ROS:  Patient has no bothersome physical symptoms  Patient denies acute concerns     Objective:     Vitals:  /78   Pulse 74   Temp 97.7  F (36.5  C) (Oral)   Resp 16   Ht 1.753 m (5' 9\")   Wt 95.3 kg (210 lb 3.2 oz)   LMP  (LMP Unknown)   SpO2 100%   BMI 31.04 kg/m      Allergies:  No Known Allergies    Current Medications:  Scheduled:  Current Facility-Administered Medications   Medication Dose Route Frequency Provider Last Rate Last Admin    alum & mag hydroxide-simethicone (MAALOX) suspension 30 mL  30 mL Oral Q4H PRN Joseph Santana MD        cloNIDine (CATAPRES) tablet 0.1 mg  0.1 mg " Oral TID Brionna Gambino MD   0.1 mg at 12/02/24 0746    glucose gel 15-30 g  15-30 g Oral Q15 Min PRN Rey Brewer        Or    dextrose 50 % injection 25-50 mL  25-50 mL Intravenous Q15 Min PRN Rey Brewer        Or    glucagon injection 1 mg  1 mg Subcutaneous Q15 Min PRN Rey Brewer        empagliflozin (JARDIANCE) tablet 10 mg  10 mg Oral Daily Rey Brewer   10 mg at 12/02/24 0745    escitalopram (LEXAPRO) tablet 25 mg  25 mg Oral Daily Brionna Gambino MD   25 mg at 12/02/24 0745    hydrOXYzine HCl (ATARAX) tablet 25-50 mg  25-50 mg Oral Q4H PRN Brionna Gambino MD   50 mg at 12/02/24 0746    ibuprofen (ADVIL/MOTRIN) tablet 800 mg  800 mg Oral Q6H PRN Alina Flannery MD   800 mg at 11/21/24 1121    insulin aspart (NovoLOG) injection (RAPID ACTING)  1-10 Units Subcutaneous TID AC Rey Brewer   2 Units at 12/02/24 0748    insulin aspart (NovoLOG) injection (RAPID ACTING)  1-7 Units Subcutaneous At Bedtime Rey Brewer   3 Units at 11/25/24 2243    insulin aspart (NovoLOG) injection (RAPID ACTING)   Subcutaneous Daily with breakfast Rey Brewer   9 Units at 12/02/24 0832    insulin aspart (NovoLOG) injection (RAPID ACTING)   Subcutaneous Daily with lunch Rey Brewer   12 Units at 12/01/24 1230    insulin aspart (NovoLOG) injection (RAPID ACTING)   Subcutaneous Daily with supper Rey Brewer   14 Units at 12/01/24 1753    insulin aspart (NovoLOG) injection (RAPID ACTING)   Subcutaneous With Snacks or Supplements Rey Brewer   2 Units at 11/25/24 2033    insulin glargine (LANTUS PEN) injection 40 Units  40 Units Subcutaneous At Bedtime Tiki Mendez PA-C   40 Units at 12/01/24 2107    losartan (COZAAR) tablet 25 mg  25 mg Oral Daily Joseph Santana MD   25 mg at 12/02/24 0745    melatonin tablet 3 mg  3 mg Oral At Bedtime PRN Joseph Santana MD   3 mg at 11/17/24 2059    metFORMIN (GLUCOPHAGE) tablet 1,000 mg  1,000 mg Oral BID w/meals Joseph Santana MD   1,000 mg at  12/02/24 0745    methadone (DOLOPHINE-INTENSOL) 10 MG/ML (HIGH CONC) solution 150 mg  150 mg Oral Daily Alina Flannery MD   150 mg at 12/02/24 0751    OLANZapine (zyPREXA) tablet 10 mg  10 mg Oral TID PRN Joseph Santana MD   10 mg at 11/21/24 1139    Or    OLANZapine (zyPREXA) injection 10 mg  10 mg Intramuscular TID PRN Joseph Santana MD        OLANZapine zydis (zyPREXA) ODT half-tab 12.5 mg  12.5 mg Oral At Bedtime Brionna Gambino MD   12.5 mg at 12/01/24 2108    prazosin (MINIPRESS) capsule 2 mg  2 mg Oral At Bedtime Brionna Gambino MD   2 mg at 12/01/24 2108    senna-docusate (SENOKOT-S/PERICOLACE) 8.6-50 MG per tablet 1 tablet  1 tablet Oral BID PRN Joseph Santana MD           PRN:  Current Facility-Administered Medications   Medication Dose Route Frequency Provider Last Rate Last Admin    alum & mag hydroxide-simethicone (MAALOX) suspension 30 mL  30 mL Oral Q4H PRN Joseph Santana MD        cloNIDine (CATAPRES) tablet 0.1 mg  0.1 mg Oral TID Brionna Gambino MD   0.1 mg at 12/02/24 0746    glucose gel 15-30 g  15-30 g Oral Q15 Min PRN Rey Brewer        Or    dextrose 50 % injection 25-50 mL  25-50 mL Intravenous Q15 Min PRN Rey Brewer        Or    glucagon injection 1 mg  1 mg Subcutaneous Q15 Min PRN Rey Brewer        empagliflozin (JARDIANCE) tablet 10 mg  10 mg Oral Daily Rey Brewer   10 mg at 12/02/24 0745    escitalopram (LEXAPRO) tablet 25 mg  25 mg Oral Daily Brionna Gambino MD   25 mg at 12/02/24 0745    hydrOXYzine HCl (ATARAX) tablet 25-50 mg  25-50 mg Oral Q4H PRN Brionna Gambino MD   50 mg at 12/02/24 0746    ibuprofen (ADVIL/MOTRIN) tablet 800 mg  800 mg Oral Q6H PRN Alina Flannery MD   800 mg at 11/21/24 1121    insulin aspart (NovoLOG) injection (RAPID ACTING)  1-10 Units Subcutaneous TID AC Rey Brewer   2 Units at 12/02/24 0748    insulin aspart (NovoLOG) injection (RAPID ACTING)  1-7 Units Subcutaneous At Bedtime Rey Brewer   3 Units at 11/25/24 1627     insulin aspart (NovoLOG) injection (RAPID ACTING)   Subcutaneous Daily with breakfast Rey Brewer   9 Units at 12/02/24 0832    insulin aspart (NovoLOG) injection (RAPID ACTING)   Subcutaneous Daily with lunch Rey Brewer   12 Units at 12/01/24 1230    insulin aspart (NovoLOG) injection (RAPID ACTING)   Subcutaneous Daily with supper Rey Brewer   14 Units at 12/01/24 1753    insulin aspart (NovoLOG) injection (RAPID ACTING)   Subcutaneous With Snacks or Supplements Rey Brewer   2 Units at 11/25/24 2033    insulin glargine (LANTUS PEN) injection 40 Units  40 Units Subcutaneous At Bedtime Tiki Mendez PA-C   40 Units at 12/01/24 2107    losartan (COZAAR) tablet 25 mg  25 mg Oral Daily Joseph Santana MD   25 mg at 12/02/24 0745    melatonin tablet 3 mg  3 mg Oral At Bedtime PRN Joseph Santana MD   3 mg at 11/17/24 2059    metFORMIN (GLUCOPHAGE) tablet 1,000 mg  1,000 mg Oral BID w/meals Joseph Santana MD   1,000 mg at 12/02/24 0745    methadone (DOLOPHINE-INTENSOL) 10 MG/ML (HIGH CONC) solution 150 mg  150 mg Oral Daily Alina Flannery MD   150 mg at 12/02/24 0751    OLANZapine (zyPREXA) tablet 10 mg  10 mg Oral TID PRN Joseph Santana MD   10 mg at 11/21/24 1139    Or    OLANZapine (zyPREXA) injection 10 mg  10 mg Intramuscular TID PRN Joseph Santana MD        OLANZapine zydis (zyPREXA) ODT half-tab 12.5 mg  12.5 mg Oral At Bedtime Brionna Gambino MD   12.5 mg at 12/01/24 2108    prazosin (MINIPRESS) capsule 2 mg  2 mg Oral At Bedtime Brionna Gambino MD   2 mg at 12/01/24 2108    senna-docusate (SENOKOT-S/PERICOLACE) 8.6-50 MG per tablet 1 tablet  1 tablet Oral BID PRN Joseph Santana MD           Labs and Imaging:  New results:   Recent Results (from the past 24 hours)   Glucose by meter    Collection Time: 12/01/24 11:53 AM   Result Value Ref Range    GLUCOSE BY METER POCT 161 (H) 70 - 99 mg/dL   Glucose by meter    Collection Time: 12/01/24  5:46 PM   Result Value Ref Range  "   GLUCOSE BY METER POCT 252 (H) 70 - 99 mg/dL   Glucose by meter    Collection Time: 12/01/24  9:02 PM   Result Value Ref Range    GLUCOSE BY METER POCT 137 (H) 70 - 99 mg/dL   Glucose by meter    Collection Time: 12/02/24  2:22 AM   Result Value Ref Range    GLUCOSE BY METER POCT 143 (H) 70 - 99 mg/dL   Glucose by meter    Collection Time: 12/02/24  7:36 AM   Result Value Ref Range    GLUCOSE BY METER POCT 174 (H) 70 - 99 mg/dL     Data this admission:   - CBC unremarkable  - CMP unremarkable  - UDS + fentanyl, cannabinoids, and amphetamines  - Hgb A1c 11.2  - Lipids unremarkable  - Vit B12 normal  - Folate normal  - EKG normal sinus rhythm, 11/22 QTc 441  - Hepatitis C RNA 5.2    - MRSA negative     Mental Status Exam:     Oriented to:  Grossly Oriented  General:  Awake and Alert  Appearance:  appears stated age and Grooming is adequate  Behavior/Attitude:  Calm, Cooperative, and Engaged  Eye Contact: Appropriate  Psychomotor: Normal no catatonia present  Speech:  appropriate volume/tone  Language: Fluent in English with appropriate syntax and vocabulary.  Mood:  \"a little bit better\"  Affect:  appropriate and flat  Thought Process:  linear  Thought Content:   No SI/HI/AH/VH; No apparent delusions  Associations:  intact  Insight:  fair due to persistent depressive sxs  Judgment:  fair due to persistent depressive sxs  Impulse control: fair  Attention Span:  grossly intact  Concentration:  grossly intact  Recent and Remote Memory:  not formally assessed  Fund of Knowledge: average  Muscle Strength and Tone: normal  Gait and Station:  not formally assessed - patient sat up in bed to talk with team     Psychiatric Assessment     Yun Coyle is a 48 year old female previously diagnosed with MDD, SANDIE, PTSD, Polysubstance use, Panic attacks, and agoraphobia who presented voluntarily with suicidal ideation with a plan in the context of worsening MDD, SANDIE, medication noncompliance, active fentanyl use on " methadone, and daily cannabis use who is recently having issues with obtaining medical insurance. Most recent psychiatric hospitalization was during adolescence. Significant symptoms on admission include fatigue, insomnia, worry, anxiety, loss of appetite, anhedonia, worsening of depressed mood,  and suicidal ideation. The MSE on admission was pertinent for poor eye contact, blunted and sad affect, but normal thought process and content and no signs of altered behaviors.      Biological contributions to presentation include diagnosis of diabetes, chronic pain, and cauda equina syndrome. She has not been on any medication for a year except for methadone and olanzapine for nausea. She is actively using cannabis and recently started fentanyl use again this year. Patient has no history of other major neurologic disorders or head trauma but does have history of Hep C which showed positive RNA in January of this year which she did not receive treatment. She is now receiving appropriate medications for her underlying health conditions. Psychological contributions to presentation include poor insight and lack of appropriate coping skills. Patient appears to demonstrate helplessness with reliance on others primarily daughter. She has not been able to leave her home or perform activities necessary for daily living. She demonstrated dependent personality and borderline personality traits and feels empty inside. Social factors contributing to presentation include good family support by daughter who is living with her. Patient does not work but is not concerned about financial stressors. She currently has no legal issues or interpersonal relationship conflicts with her daughter or other family. She is not able to engage with the community due to her current presentation.  Protective factors include family support and stable housing.      In summary, the patient's reported symptoms of SI with a plan and worsening depression in  the context of medication noncompliance and substance use are consistent with worsening MDD that is severe and persistent. She will likely benefit from restarting medication therapy, engaging with therapy resources, being active on the milieu, and future outpatient resource planning this admission.     Given her current persistent depression and anxiety, patient warrants continued inpatient psychiatric hospitalization and medication management to maintain her safety.      Psychiatric Plan by Diagnosis   Today's changes:  - Increase to 15mg Zyprexa (for help with sleep) and 30mg Lexapro     # MDD, severe with SI  - Olanzapine 15 mg PO every day (for cyclic vomiting syndrome)  - Escitalopram 30 mg every day   - Prazosin 2mg every day      # SANDIE  - Clonidine 0.1 mg TID     # Opiate Use Disorder  - Methadone 150 mg every day     Pertinent Labs/Monitoring:   - Review labs as above in note     Additional Plans:  - Patient will be treated in therapeutic milieu with appropriate individual and group therapies as described  - Patient would benefit from outpatient therapy resource planning for anxiety related disorders and improving coping skills  - Consider outpatient CD treatment for fentanyl and cannabis use     Psychiatric Hospital Course:      Yun Coyle was admitted to [facility] as a voluntary patient.   Medications:  Patient not on any PTA medication  New medications started at the time of admission include Olanzapine 10 mg PO daily.   11/18: start escitalopram 10mg, prazosin 1mg, and modify olanzapine 10mg to disintegrating tablet. IM started sliding scale insulin and POC glucose monitoring   11/19: Started Lantus 14 units at bedtime  11/20: Increased Lexapro from 10mg to 20mg every day   11/21: increase prazosin from 1mg to 2mg, increased Lantus from 14 units to 20 units per IM   11/22: increase Lantus from 20 units to 28 units, repeat EKG for QTc monitoring  11/25: Increase escitalopram to 25mg, Olanzapine  increase to 12.5 mg   11/27 ,Clonidine 0.1 mg increased to TID, Lantus increased to 38 units  12/2: increase lexapro to 30mg, zyprexa to 15mg. Lantus increased to 40 units     The risks, benefits, alternatives, and side effects were discussed and understood by the patient.     Medical Assessment and Plan     Medical diagnoses to be addressed this admission:    # Hypertension  - PTA Losartan  - Few incidences of elevated BP readings, if >3 readings are elevated, consult IM for HTN management      # Type II Diabetes  - PTA Metformin 1000 mg BID  - Starting 11/22 Lantus 28 units at bedtime per IM   - Increased Lantus from 38 to 40 units at bedtime on 12/1, Will continue carb coverage at current 1 unit per 10 g of carbs.   - High-resistance insulin  - Point-of-care glucose monitoring  - Educated patient on importance of follow-up with PCP for further diabetes management. Would benefit from GLP-1 or SGLT2 inhibitor.   - Continue to follow-up for assessment of insulin needs, may start additional agents         #Arm Abscess R arm s/p Drainage and wash out on 11/13  - Keflex 500mg QID x 7 days, started 11/16  - Management as per WOC  - Contact medicine if does not improve or with concern of infection      Medical course: Patient was physically examined by the ED prior to being transferred to the unit and was found to be medically stable and appropriate for admission.      Consults:    - Continued Wound Care for right arm abscess   - IM following for DMII care  - Diabetes education            Checklist     Legal Status: Voluntary     Safety Assessment:   Behavioral Orders   Procedures    Code 1 - Restrict to Unit    [facility] Extended Care     Until discharge, Extended Care to offer psychotherapeutic services to mental health patients boarding for admission or stabilization. These services are to include but are not limited to: individual psychotherapy, diagnostic assessment, case management and care planning, safety planning,  etc. This may include up to 1 visit per day. If patient is physically located at [facility] or [facility], group psychotherapy up to 2 time per day may be offered.     Routine Programming     As clinically indicated    Status 15     Every 15 minutes.       Risk Assessment:  Risk for harm is moderate.  Risk factors: SI, maladaptive coping, substance use, trauma, and past behaviors  Protective factors: engaged in treatment and help seeking      SIO: none    Disposition:  TBD. Disposition pending clinical stabilization, medication optimization and development of an appropriate discharge plan.       Attestations     Med Student: Krysta Parker, MS3  [facility] Medical Student     I was present with the medical student who participated in the service and in the documentation of the note. I have verified the history and personally performed the exam and medical decision making. I agree with the assessment and plan of care as documented in the note. Octavia Matthews M.D., Ph.D.

## 2024-12-02 NOTE — PROVIDER NOTIFICATION
12/02/24 1014   Individualization/Patient Specific Goals   Patient Personal Strengths expressive of needs;independent living skills;motivated for treatment;positive vocational history;resilient;resourceful;stable living environment   Patient Vulnerabilities adverse childhood experience(s);family/relationship conflict;history of unsuccessful treatment;lacks insight into illness;legal concerns;substance abuse/addiction   Anxieties, Fears or Concerns N/A   Individualized Care Needs None   Interprofessional Rounds   Summary Patient admitted with worsening depression/SI. Has been using polysubstances   Participants CTC;nursing;psychiatrist   Behavioral Team Discussion   Participants Dr. Matthews., Sara WHITE RN, Nemo Perales MA.LP, Med student   Progress Patient reports she is feeling s/w better. Less isolative to room.  [place] with meds, attending groups.  Hygiene remains poor   Anticipated length of stay 2-3 days   Medical/Physical Admitted with arm wound- requiring would care, Diabetes   Precautions Per unit protocol   Plan Patient willcontinue to  be seen by Psychiatry daily.  Meds being adjusted per MD's as indicated. Patient not interested in CYNTHIA treatment buit agrreable to  IOP.  CTC will continue to assess needs, ensure appropriate f/u care is in place   Safety Plan Patient to complete PTA   Anticipated Discharge Disposition home or self-care     Goal Outcome Evaluation:

## 2024-12-02 NOTE — PROGRESS NOTES
Brief Internal Medicine Progress Note:    Type 2 DM  Medicine following for glucose monitoring and medication regimen for discharge. Patient was started on Jardiance 11/27. Blood sugars improved, although slightly still above goal. Lantus increased to 38 -> 40 units on 12/1.   - continue Jardiance 10 mg daily   - continue Lantus 40 units nightly   - continue PTA metformin 1000 mg BID   - continue sliding scale insulin  - continue CHO coverage with meals   - follow up with PCP vs endocrinology within 1 week of discharge     Supplies needed at discharge per endocrine include:  - Accu Chek Guide glucose meter   - Accu Chek Guide test strips   - Accu Chek Soft Clix Lancets   - Sharps Container   - B-D 4 mm jose j pen needles   - Alcohol Wipes   - Empagliflozin per MD   - Lantus Solostar insulin pens   - Insulin Aspart Flexpens     Please notify Internal Medicine when discharge date is finalized so we can assist with discharge planning.     Internal Medicine will continue to follow peripherally.    Gabrielle Wood DNP, ACNPC-AG  Internal Medicine [facility]

## 2024-12-02 NOTE — PLAN OF CARE
"  Problem: Sleep Disturbance  Goal: Adequate Sleep/Rest  Outcome: Progressing   Goal Outcome Evaluation:      Pt appears to have slept for 7 hours. No reported pain issues. No PRN was administered. Pt denies feeling anxious or depressed. 0200 am BG was 143. No new concern at this time.    Blood pressure 113/78, pulse 74, temperature 98.2  F (36.8  C), temperature source Oral, resp. rate 18, height 1.753 m (5' 9\"), weight 95.3 kg (210 lb 3.2 oz), SpO2 99%, not currently breastfeeding.   "

## 2024-12-02 NOTE — PLAN OF CARE
BEH IP Unit Acuity Rating Score (UARS)  Patient is given one point for every criteria they meet.    CRITERIA SCORING   On a 72 hour hold, court hold, committed, stay of commitment, or revocation. 0    Patient LOS on BEH unit exceeds 20 days. 0  LOS: 16   Patient under guardianship, 55+, otherwise medically complex, or under age 11. 0   Suicide ideation without relief of precipitating factors. 1   Current plan for suicide. 0   Current plan for homicide. 0   Imminent risk or actual attempt to seriously harm another without relief of factors precipitating the attempt. 0   Severe dysfunction in daily living (ex: complete neglect for self care, extreme disruption in vegetative function, extreme deterioration in social interactions). 1   Recent (last 7 days) or current physical aggression in the ED or on unit. 0   Restraints or seclusion episode in past 72 hours. 0   Recent (last 7 days) or current verbal aggression, agitation, yelling, etc., while in the ED or unit. 0   Active psychosis. 0   Need for constant or near constant redirection (from leaving, from others, etc).  0   Intrusive or disruptive behaviors. 0   Patient requires 3 or more hours of individualized nursing care per 8-hour shift (i.e. for ADLs, meds, therapeutic interventions). 0   TOTAL 2

## 2024-12-02 NOTE — PLAN OF CARE
Pt denied for suicidal ideations and hallucinations. Endorsed anxiety 7/10 and 7/10 for depression. Prn Hydroxyzine was administered and pt reported moderate relief. Presents with flat affect. Pt was visible in the milieu this shift. Pt was out in the lounge watching tv with peers. Pt was withdrawn and kept to herself while at the lounge. Ate supper well. Blood glucose was 252 before dinner, coverage given and 137 at HS. Compliant with medications.    Goal Outcome Evaluation:    Plan of Care Reviewed With: patient      Problem: Anxiety  Goal: Anxiety Reduction or Resolution  Outcome: Progressing     Problem: Depression  Goal: Decreased Depression Symptoms  Outcome: Progressing     Problem: Suicide Risk  Goal: Absence of Self-Harm  Outcome: Progressing

## 2024-12-02 NOTE — PLAN OF CARE
Assessment/Intervention/Current Symtoms and Care Coordination:  Chart reviewed and patient met with team,   Discussed patient progress, symptomology, and response to treatment.  Discussed the discharge plan and any potential impediments to discharge.    No changes in past 72 hours.  She rates depression/anxiety 7/10, continues to deny any thoughts of self harm.  She has been out in milieu but doesn't interact with peers. Observed in lounge often working on art projects.  Affect remains blunted.    Patient does attend groups.  Likely discharge later this week.      Discharge Plan or Goal:  Patient will return home when stable  Psychiatry  Therapy  IOP  Methadone Maintenance    Barriers to Discharge:  Ongoing depressive symptoms   Medication mgmt     Referral Status:  Patient has been referred to outpatient Psychiatrist, therapy, and IOP     Legal Status:  Voluntary     Contacts:  [facility]- Methadone   Gamaliel Granados DO- [facility] [place]    Upcoming Meetings and Dates/Important Information and next steps:  Team Update:   Mon

## 2024-12-03 LAB
GLUCOSE BLDC GLUCOMTR-MCNC: 128 MG/DL (ref 70–99)
GLUCOSE BLDC GLUCOMTR-MCNC: 151 MG/DL (ref 70–99)
GLUCOSE BLDC GLUCOMTR-MCNC: 153 MG/DL (ref 70–99)
GLUCOSE BLDC GLUCOMTR-MCNC: 180 MG/DL (ref 70–99)
GLUCOSE BLDC GLUCOMTR-MCNC: 238 MG/DL (ref 70–99)

## 2024-12-03 PROCEDURE — 250N000013 HC RX MED GY IP 250 OP 250 PS 637: Performed by: STUDENT IN AN ORGANIZED HEALTH CARE EDUCATION/TRAINING PROGRAM

## 2024-12-03 PROCEDURE — 97150 GROUP THERAPEUTIC PROCEDURES: CPT | Mod: GO

## 2024-12-03 PROCEDURE — 250N000013 HC RX MED GY IP 250 OP 250 PS 637

## 2024-12-03 PROCEDURE — 124N000002 HC R&B MH UMMC

## 2024-12-03 PROCEDURE — 99232 SBSQ HOSP IP/OBS MODERATE 35: CPT | Mod: GC | Performed by: PSYCHIATRY & NEUROLOGY

## 2024-12-03 PROCEDURE — 250N000013 HC RX MED GY IP 250 OP 250 PS 637: Performed by: PSYCHIATRY & NEUROLOGY

## 2024-12-03 RX ORDER — METHADONE HYDROCHLORIDE 10 MG/ML
150 CONCENTRATE ORAL DAILY
Qty: 450 ML | Refills: 0 | Status: CANCELLED | OUTPATIENT
Start: 2024-12-03 | End: 2025-01-02

## 2024-12-03 RX ORDER — OLANZAPINE 15 MG/1
15 TABLET, ORALLY DISINTEGRATING ORAL AT BEDTIME
Qty: 30 TABLET | Refills: 0 | Status: SHIPPED | OUTPATIENT
Start: 2024-12-03

## 2024-12-03 RX ORDER — INSULIN LISPRO 100 [IU]/ML
15 INJECTION, SOLUTION INTRAVENOUS; SUBCUTANEOUS
Qty: 30 ML | Refills: 0 | OUTPATIENT
Start: 2024-12-03

## 2024-12-03 RX ORDER — NICOTINE POLACRILEX 4 MG
LOZENGE BUCCAL
Qty: 112.5 G | Refills: 0 | Status: SHIPPED | OUTPATIENT
Start: 2024-12-03

## 2024-12-03 RX ORDER — CLONIDINE HYDROCHLORIDE 0.1 MG/1
0.1 TABLET ORAL 3 TIMES DAILY
Qty: 90 TABLET | Refills: 0 | Status: SHIPPED | OUTPATIENT
Start: 2024-12-03

## 2024-12-03 RX ORDER — PRAZOSIN HYDROCHLORIDE 2 MG/1
2 CAPSULE ORAL AT BEDTIME
Qty: 30 CAPSULE | Refills: 0 | Status: SHIPPED | OUTPATIENT
Start: 2024-12-03

## 2024-12-03 RX ORDER — INSULIN LISPRO 100 [IU]/ML
15 INJECTION, SOLUTION INTRAVENOUS; SUBCUTANEOUS
Qty: 30 ML | Refills: 0 | Status: SHIPPED | OUTPATIENT
Start: 2024-12-03 | End: 2024-12-04

## 2024-12-03 RX ORDER — AMOXICILLIN 250 MG
1 CAPSULE ORAL 2 TIMES DAILY PRN
Qty: 60 TABLET | Refills: 0 | Status: SHIPPED | OUTPATIENT
Start: 2024-12-03

## 2024-12-03 RX ORDER — HYDROXYZINE HYDROCHLORIDE 25 MG/1
25-50 TABLET, FILM COATED ORAL EVERY 4 HOURS PRN
Qty: 90 TABLET | Refills: 0 | Status: SHIPPED | OUTPATIENT
Start: 2024-12-03

## 2024-12-03 RX ORDER — LOSARTAN POTASSIUM 25 MG/1
25 TABLET ORAL DAILY
Qty: 30 TABLET | Refills: 0 | Status: SHIPPED | OUTPATIENT
Start: 2024-12-03 | End: 2025-01-02

## 2024-12-03 RX ORDER — ESCITALOPRAM OXALATE 10 MG/1
30 TABLET ORAL DAILY
Qty: 30 TABLET | Refills: 0 | Status: SHIPPED | OUTPATIENT
Start: 2024-12-04

## 2024-12-03 RX ADMIN — INSULIN ASPART 2 UNITS: 100 INJECTION, SOLUTION INTRAVENOUS; SUBCUTANEOUS at 18:19

## 2024-12-03 RX ADMIN — METFORMIN HYDROCHLORIDE 1000 MG: 500 TABLET, FILM COATED ORAL at 07:45

## 2024-12-03 RX ADMIN — PRAZOSIN HYDROCHLORIDE 2 MG: 2 CAPSULE ORAL at 21:23

## 2024-12-03 RX ADMIN — LOSARTAN POTASSIUM 25 MG: 25 TABLET, FILM COATED ORAL at 07:46

## 2024-12-03 RX ADMIN — ESCITALOPRAM OXALATE 30 MG: 20 TABLET ORAL at 07:45

## 2024-12-03 RX ADMIN — EMPAGLIFLOZIN 10 MG: 10 TABLET, FILM COATED ORAL at 07:46

## 2024-12-03 RX ADMIN — CLONIDINE HYDROCHLORIDE 0.1 MG: 0.1 TABLET ORAL at 21:23

## 2024-12-03 RX ADMIN — METHADONE HYDROCHLORIDE 150 MG: 10 CONCENTRATE ORAL at 07:44

## 2024-12-03 RX ADMIN — HYDROXYZINE HYDROCHLORIDE 50 MG: 25 TABLET ORAL at 07:45

## 2024-12-03 RX ADMIN — METFORMIN HYDROCHLORIDE 1000 MG: 500 TABLET, FILM COATED ORAL at 18:15

## 2024-12-03 RX ADMIN — HYDROXYZINE HYDROCHLORIDE 50 MG: 25 TABLET ORAL at 11:53

## 2024-12-03 RX ADMIN — OLANZAPINE 15 MG: 15 TABLET, ORALLY DISINTEGRATING ORAL at 21:23

## 2024-12-03 RX ADMIN — CLONIDINE HYDROCHLORIDE 0.1 MG: 0.1 TABLET ORAL at 07:46

## 2024-12-03 RX ADMIN — CLONIDINE HYDROCHLORIDE 0.1 MG: 0.1 TABLET ORAL at 14:03

## 2024-12-03 RX ADMIN — HYDROXYZINE HYDROCHLORIDE 50 MG: 25 TABLET ORAL at 16:42

## 2024-12-03 RX ADMIN — INSULIN ASPART 1 UNITS: 100 INJECTION, SOLUTION INTRAVENOUS; SUBCUTANEOUS at 07:51

## 2024-12-03 ASSESSMENT — ACTIVITIES OF DAILY LIVING (ADL)
ADLS_ACUITY_SCORE: 45
LAUNDRY: WITH SUPERVISION
ADLS_ACUITY_SCORE: 45
DRESS: INDEPENDENT
HYGIENE/GROOMING: INDEPENDENT
ORAL_HYGIENE: INDEPENDENT
ADLS_ACUITY_SCORE: 45

## 2024-12-03 NOTE — PLAN OF CARE
Problem: Sleep Disturbance  Goal: Adequate Sleep/Rest  Outcome: Progressing   Goal Outcome Evaluation:  Patient appears to have slept for 7 hours. Endorsed no acute concerns during the night. No complaints of pain. No requests for prn's during encounters. Overnight BG was 151. Safety checks in place q 15 minutes, no concerns noted.

## 2024-12-03 NOTE — PLAN OF CARE
Problem: Adult Inpatient Plan of Care  Goal: Plan of Care Review  Description: The Plan of Care Review/Shift note should be completed every shift.  The Outcome Evaluation is a brief statement about your assessment that the patient is improving, declining, or no change.  This information will be displayed automatically on your shift  note.  Outcome: Progressing  Flowsheets (Taken 12/3/2024 6121)  Plan of Care Reviewed With: patient  Overall Patient Progress: improving     Problem: Adult Inpatient Plan of Care  Goal: Readiness for Transition of Care  Outcome: Progressing   Goal Outcome Evaluation:      Plan of Care Reviewed With: patient    Overall Patient Progress: improving  Patient has been visible in the milieu.Complaint with medication,Prn Hydroxyzine for anxiety given x 2 with relief.No escalation of behaviors.Right arm wound dressing changed.No sign of infection noted.Scanty drainage on dressing.Blood sugar taken x 2,insulin given per orders.Mood is calm.Denies all other MH issues.

## 2024-12-03 NOTE — DISCHARGE SUMMARY
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 December 13, 2024 @ 12:05pm , Paco Woodward DNP  with [facility]   Psychotherapy Appointments: n/a   Referrals: Intensive Outpatient Program: Monday, December 9, 2024 at 12:30   Medical follow up: Methadone Maintenance  [facility]: [id number], [place], [place] [address]      Attestations:   Krysta Parker, Msc MS3  [facility] [employer]   12/04/2024 12:19 PM]       I was present with the medical student who participated in the service and documentation of the note. I have verified the history and personally performed the physical/mental status exam and medical decision making. I agree with the assessment and plan documented in the note. Patient seen and discussed with attending physician, Dr. Matthews , who is in agreement with my assessment and plan.    Montrell Mendez D.O.  Psychiatry Resident  [facility]     have reviewed today's vital signs, medications, labs and imaging. Octavia Matthews MD , PhD.

## 2024-12-03 NOTE — PLAN OF CARE
Assessment/Intervention/Current Symtoms and Care Coordination:  Chart reviewed and patient met with team,   Discussed patient progress, symptomology, and response to treatment.  Discussed the discharge plan and any potential impediments to discharge.    Patient today reports that she is doing better and likely at baseline.  She denies any thoughts of self harm and feels safe to discharge home tomorrow    Patient's outpatient follow up care appts are all in place. Msg left with Navigator to schedule IOP start date.  Writer will notify Leonarda tomorrow of discharge and date of last dose of methadone provided.    Discharge Plan or Goal:  Patient will return home  Psychiatry  IOP  Methadone Maintenance    Barriers to Discharge:  None- discharge tomorrow     Referral Status:  Patient has been referred to outpatient Psychiatrist, therapy, and IOP     Legal Status:  Voluntary     Contacts:  [facility]- Martina Granados DO- [facility]    Upcoming Meetings and Dates/Important Information and next steps:  Team Update:   Mon

## 2024-12-03 NOTE — PLAN OF CARE
Problem: Adult Inpatient Plan of Care  Goal: Plan of Care Review  Description: The Plan of Care Review/Shift note should be completed every shift.  The Outcome Evaluation is a brief statement about your assessment that the patient is improving, declining, or no change.  This information will be displayed automatically on your shift  note.  Outcome: Progressing  Flowsheets (Taken 12/2/2024 2010)  Plan of Care Reviewed With: patient  Overall Patient Progress: improving     Problem: Anxiety  Goal: Anxiety Reduction or Resolution  Outcome: Progressing     Problem: Depression  Goal: Decreased Depression Symptoms  Outcome: Progressing     Problem: Suicide Risk  Goal: Absence of Self-Harm  Outcome: Progressing   Goal Outcome Evaluation:    Plan of Care Reviewed With: patient Plan of Care Reviewed With: patient    Overall Patient Progress: improvingOverall Patient Progress: improving      Alert and oriented, able to communicate needs. Intermittently visible in milieu, she spent time watching. No noted social interactive. Presented as flat, occasional bright. She was cooperative and medication compliant. Anxiety rated at 6, managed with PRN Hydroxyzine, depression rated at 6, she denied suicidal/homicidal ideation. ADLs WNL, Denied pain/discomfort. Wound dressing changed. Food and fluid intake WNL.  and 186.

## 2024-12-03 NOTE — PLAN OF CARE
Rehab Group    Start time: 1015  End time: 1145  Patient time total: 65 minutes    attended partial group    #5 attended   Group Type: OT Clinic   Group Topic Covered: coping skills, healthy leisure time, mindfulness, and relaxation      Group Session Detail:    Intervention:  Pt participated in a OT Clinic group to facilitate coping skills exploration and creative expression through personally meaningful activities, and to encourage utilization of these healthy coping skills to promote overall health and wellness. Group included clinical observation of social, cognitive and kinesthetic performance skills to inform treatment and safe discharge planning.    Mood/Affect: Flat, Pleasant      Plan: Patient encouraged to maintain attendance for continued ongoing support in working towards occupational therapy goals to support overall treatment/care.        Patient Detail:    Actively engaged in selected project for the duration of time in group. Leaving briefly to meet with team and returning right after. Appeared motivated to complete project today, stating they are expecting to discharge tomorrow. Shared that project was for granddaughter, wanting to make something that she can keep her toys in and will make her think of the patient. Made one comment on a project another peer was working on, otherwise was mostly focused on project during this time and did not initiate outside conversation other than to reply to writer or others when they initiated. Cleaned up all materials and thanked writer for their time before exiting group. Had writer place all finished project in their locker d/t anticipated discharge tomorrow.       [id number] OT Group (2 or more in attendance)    Patient Active Problem List   Diagnosis    Suicidal behavior    Benign essential HTN    Cannabis abuse    Cyclic vomiting syndrome    Elevated LFTs    Hyperlipidemia with target low density lipoprotein (LDL) cholesterol less than 100 mg/dL    Insomnia     Major depressive disorder, recurrent episode, severe (H)    Mild intermittent asthma    Panic disorder with agoraphobia    OCD (obsessive compulsive disorder)    Severe benzodiazepine use disorder (H) in remission    S/P lumbar microdiscectomy    S/P laparoscopic cholecystectomy    Personality disorder (H)    Type 2 diabetes mellitus with hyperglycemia, with long-term current use of insulin (H)    Obesity (BMI 35.0-39.9) with comorbidity (H)    Falls frequently    Moderate opioid use disorder, in early remission (H)    Chronic hepatitis C without hepatic coma (H)    Hyperglycemia    Dehydration    Cyclical vomiting    Lactic acidosis    Epigastric pain    Coffee ground emesis    Elevated blood ketone body level    Severe episode of recurrent major depressive disorder, without psychotic features (H)    PTSD (post-traumatic stress disorder)    Recurrent major depression (H)    Suicidal ideation    Abscess of arm, right

## 2024-12-03 NOTE — PROGRESS NOTES
"  ----------------------------------------------------------------------------------------------------------  [facility]  Psychiatry Progress Note  Hospital Day #17     Interim History:     The patient's care was discussed with the treatment team and chart notes were reviewed.    Vitals: VSS  Sleep: 7 hours (12/03/24 0600)  Scheduled medications: Took all scheduled medications as prescribed  Psychiatric PRN medications:    Last 24H PRN:     hydrOXYzine HCl (ATARAX) tablet 25-50 mg, 50 mg at 12/03/24 1153    insulin aspart (NovoLOG) injection (RAPID ACTING), 4 Units at 12/02/24 2056     Staff Report:   No acute events or safety concerns overnight. BG was 153 @ 7AM.  In summary, Yun seems to be doing better with regards to her depression. Please see staff notes for details.      Subjective:     Patient Interview:  Kim reports she feels \"much better\" and rates her depression at a 4. She expressed ready to go home, she expresses feeling physically and emotionally better and is no longer having bad thoughts. Reports sleep is still rough but she is used to it. She is ageeable to leaving tomorrow. Denies medication side effect. Feels confident in diabetes management.     ROS:  Patient denies acute concerns     Objective:     Vitals:  BP (!) 145/88 (BP Location: Left arm, Patient Position: Sitting, Cuff Size: Adult Regular)   Pulse 79   Temp 97.1  F (36.2  C) (Temporal)   Resp 18   Ht 1.753 m (5' 9\")   Wt 96.3 kg (212 lb 4.8 oz)   LMP  (LMP Unknown)   SpO2 99%   BMI 31.35 kg/m      Allergies:  No Known Allergies    Current Medications:  Scheduled:  Current Facility-Administered Medications   Medication Dose Route Frequency Provider Last Rate Last Admin    alum & mag hydroxide-simethicone (MAALOX) suspension 30 mL  30 mL Oral Q4H PRN Joseph Santana MD        cloNIDine (CATAPRES) tablet 0.1 mg  0.1 mg Oral TID Brionna Gambino MD   0.1 mg at 12/03/24 0746    glucose " gel 15-30 g  15-30 g Oral Q15 Min PRN Rey Brewer        Or    dextrose 50 % injection 25-50 mL  25-50 mL Intravenous Q15 Min PRN Rey Brewer        Or    glucagon injection 1 mg  1 mg Subcutaneous Q15 Min PRN Rey Brewer        empagliflozin (JARDIANCE) tablet 10 mg  10 mg Oral Daily Rey Brewer   10 mg at 12/03/24 0746    escitalopram (LEXAPRO) tablet 30 mg  30 mg Oral Daily Octavia Matthews MD   30 mg at 12/03/24 0745    hydrOXYzine HCl (ATARAX) tablet 25-50 mg  25-50 mg Oral Q4H PRN Brionna Gambino MD   50 mg at 12/03/24 0745    ibuprofen (ADVIL/MOTRIN) tablet 800 mg  800 mg Oral Q6H PRN Alina Flannery MD   800 mg at 11/21/24 1121    insulin aspart (NovoLOG) injection (RAPID ACTING)  1-10 Units Subcutaneous TID AC Rey Brewer   1 Units at 12/03/24 0751    insulin aspart (NovoLOG) injection (RAPID ACTING)  1-7 Units Subcutaneous At Bedtime Rey Brewer   3 Units at 11/25/24 2243    insulin aspart (NovoLOG) injection (RAPID ACTING)   Subcutaneous Daily with breakfast Rey Brewer   10 Units at 12/03/24 0822    insulin aspart (NovoLOG) injection (RAPID ACTING)   Subcutaneous Daily with lunch Rey Brewer   10 Units at 12/02/24 1256    insulin aspart (NovoLOG) injection (RAPID ACTING)   Subcutaneous Daily with supper Rey Brewer   11 Units at 12/02/24 1822    insulin aspart (NovoLOG) injection (RAPID ACTING)   Subcutaneous With Snacks or Supplements Rey Brewer   4 Units at 12/02/24 2056    insulin glargine (LANTUS PEN) injection 40 Units  40 Units Subcutaneous At Bedtime Tiki Mendez PA-C   40 Units at 12/02/24 2213    losartan (COZAAR) tablet 25 mg  25 mg Oral Daily Joseph Santana MD   25 mg at 12/03/24 0746    melatonin tablet 3 mg  3 mg Oral At Bedtime PRN Joseph Santana MD   3 mg at 11/17/24 2059    metFORMIN (GLUCOPHAGE) tablet 1,000 mg  1,000 mg Oral BID w/meals Joseph Santana MD   1,000 mg at 12/03/24 0774    methadone (DOLOPHINE-INTENSOL) 10 MG/ML (HIGH  CONC) solution 150 mg  150 mg Oral Daily Alina Flannery MD   150 mg at 12/03/24 0744    OLANZapine (zyPREXA) tablet 10 mg  10 mg Oral TID PRN Joseph Santana MD   10 mg at 11/21/24 1139    Or    OLANZapine (zyPREXA) injection 10 mg  10 mg Intramuscular TID PRN Joseph Santana MD        OLANZapine zydis (zyPREXA) ODT tab 15 mg  15 mg Oral At Bedtime Octavia Matthews MD   15 mg at 12/02/24 2052    prazosin (MINIPRESS) capsule 2 mg  2 mg Oral At Bedtime Brionna Gambino MD   2 mg at 12/02/24 2052    senna-docusate (SENOKOT-S/PERICOLACE) 8.6-50 MG per tablet 1 tablet  1 tablet Oral BID PRN Joseph Santana MD           PRN:  Current Facility-Administered Medications   Medication Dose Route Frequency Provider Last Rate Last Admin    alum & mag hydroxide-simethicone (MAALOX) suspension 30 mL  30 mL Oral Q4H PRN Joseph Santana MD        cloNIDine (CATAPRES) tablet 0.1 mg  0.1 mg Oral TID Brionna Gambino MD   0.1 mg at 12/03/24 0746    glucose gel 15-30 g  15-30 g Oral Q15 Min PRN Rey Brewer        Or    dextrose 50 % injection 25-50 mL  25-50 mL Intravenous Q15 Min PRN Rey Brewer        Or    glucagon injection 1 mg  1 mg Subcutaneous Q15 Min PRN Rey Brewer        empagliflozin (JARDIANCE) tablet 10 mg  10 mg Oral Daily Rey Brewer   10 mg at 12/03/24 0746    escitalopram (LEXAPRO) tablet 30 mg  30 mg Oral Daily Octavia Matthews MD   30 mg at 12/03/24 0745    hydrOXYzine HCl (ATARAX) tablet 25-50 mg  25-50 mg Oral Q4H PRN Brionna Gambino MD   50 mg at 12/03/24 0745    ibuprofen (ADVIL/MOTRIN) tablet 800 mg  800 mg Oral Q6H PRN Alina Flannery MD   800 mg at 11/21/24 1121    insulin aspart (NovoLOG) injection (RAPID ACTING)  1-10 Units Subcutaneous TID AC Rey Brewer   1 Units at 12/03/24 0751    insulin aspart (NovoLOG) injection (RAPID ACTING)  1-7 Units Subcutaneous At Bedtime Rey Brewer   3 Units at 11/25/24 2243    insulin aspart (NovoLOG) injection (RAPID ACTING)   Subcutaneous Daily with  breakfast Rey Brewer   10 Units at 12/03/24 0822    insulin aspart (NovoLOG) injection (RAPID ACTING)   Subcutaneous Daily with lunch Rey Brewer   10 Units at 12/02/24 1256    insulin aspart (NovoLOG) injection (RAPID ACTING)   Subcutaneous Daily with supper Rey Brewer   11 Units at 12/02/24 1822    insulin aspart (NovoLOG) injection (RAPID ACTING)   Subcutaneous With Snacks or Supplements Rey Brewer   4 Units at 12/02/24 2056    insulin glargine (LANTUS PEN) injection 40 Units  40 Units Subcutaneous At Bedtime Tiki Mendez PA-C   40 Units at 12/02/24 2213    losartan (COZAAR) tablet 25 mg  25 mg Oral Daily Joseph Santana MD   25 mg at 12/03/24 0746    melatonin tablet 3 mg  3 mg Oral At Bedtime PRN Joseph Santana MD   3 mg at 11/17/24 2059    metFORMIN (GLUCOPHAGE) tablet 1,000 mg  1,000 mg Oral BID w/meals Joseph Santana MD   1,000 mg at 12/03/24 0745    methadone (DOLOPHINE-INTENSOL) 10 MG/ML (HIGH CONC) solution 150 mg  150 mg Oral Daily Alina Flannery MD   150 mg at 12/03/24 0744    OLANZapine (zyPREXA) tablet 10 mg  10 mg Oral TID PRN Joseph Santana MD   10 mg at 11/21/24 1139    Or    OLANZapine (zyPREXA) injection 10 mg  10 mg Intramuscular TID PRN Joseph Santana MD        OLANZapine zydis (zyPREXA) ODT tab 15 mg  15 mg Oral At Bedtime Octavia Matthews MD   15 mg at 12/02/24 2052    prazosin (MINIPRESS) capsule 2 mg  2 mg Oral At Bedtime Brionna Gambino MD   2 mg at 12/02/24 2052    senna-docusate (SENOKOT-S/PERICOLACE) 8.6-50 MG per tablet 1 tablet  1 tablet Oral BID PRN Joseph Santana MD           Labs and Imaging:  New results:   Recent Results (from the past 24 hours)   Glucose by meter    Collection Time: 12/02/24 12:06 PM   Result Value Ref Range    GLUCOSE BY METER POCT 131 (H) 70 - 99 mg/dL   Glucose by meter    Collection Time: 12/02/24  5:47 PM   Result Value Ref Range    GLUCOSE BY METER POCT 169 (H) 70 - 99 mg/dL   Glucose by meter    Collection  "Time: 12/02/24 10:06 PM   Result Value Ref Range    GLUCOSE BY METER POCT 186 (H) 70 - 99 mg/dL   Glucose by meter    Collection Time: 12/03/24  3:54 AM   Result Value Ref Range    GLUCOSE BY METER POCT 151 (H) 70 - 99 mg/dL   Glucose by meter    Collection Time: 12/03/24  7:50 AM   Result Value Ref Range    GLUCOSE BY METER POCT 153 (H) 70 - 99 mg/dL     Data this admission:   - CBC unremarkable  - CMP unremarkable  - UDS + fentanyl, cannabinoids, and amphetamines  - Hgb A1c 11.2  - Lipids unremarkable  - Vit B12 normal  - Folate normal  - EKG normal sinus rhythm, 11/22 QTc 441  - Hepatitis C RNA 5.2    - MRSA negative     Mental Status Exam:     Oriented to:  Grossly Oriented  General:  Awake and Alert  Appearance:  appears stated age and Grooming is adequate  Behavior/Attitude:  Calm, Cooperative, and Engaged  Eye Contact: Appropriate  Psychomotor: Normal no catatonia present  Speech:  appropriate volume/tone  Language: Fluent in English with appropriate syntax and vocabulary.  Mood:  \"better\"  Affect:  appropriate and restricted  Thought Process:  linear, coherent, and goal directed  Thought Content:   No SI/HI/AH/VH; No apparent delusions  Associations:  intact  Insight:  good   Judgment:  good   Impulse control: fair  Attention Span:  grossly intact  Concentration:   not formally assessed   Recent and Remote Memory:  not formally assessed  Fund of Knowledge: average  Muscle Strength and Tone: normal  Gait and Station: Normal     Psychiatric Assessment     Yun Coyle is a 48 year old female previously diagnosed with MDD, SANDIE, PTSD, Polysubstance use, Panic attacks, and agoraphobia who presented voluntarily with suicidal ideation with a plan in the context of worsening MDD, SANDIE, medication noncompliance, active fentanyl use on methadone, and daily cannabis use who is recently having issues with obtaining medical insurance. Most recent psychiatric hospitalization was during adolescence. Significant " symptoms on admission include fatigue, insomnia, worry, anxiety, loss of appetite, anhedonia, worsening of depressed mood,  and suicidal ideation. The MSE on admission was pertinent for poor eye contact, blunted and sad affect, but normal thought process and content and no signs of altered behaviors.      Biological contributions to presentation include diagnosis of diabetes, chronic pain, and cauda equina syndrome. She has not been on any medication for a year except for methadone and olanzapine for nausea. She is actively using cannabis and recently started fentanyl use again this year. Patient has no history of other major neurologic disorders or head trauma but does have history of Hep C which showed positive RNA in January of this year which she did not receive treatment. She is now receiving appropriate medications for her underlying health conditions. Psychological contributions to presentation include poor insight and lack of appropriate coping skills. Patient appears to demonstrate helplessness with reliance on others primarily daughter. She has not been able to leave her home or perform activities necessary for daily living. She demonstrated dependent personality and borderline personality traits and feels empty inside. Social factors contributing to presentation include good family support by daughter who is living with her. Patient does not work but is not concerned about financial stressors. She currently has no legal issues or interpersonal relationship conflicts with her daughter or other family. She is not able to engage with the community due to her current presentation.  Protective factors include family support and stable housing.      In summary, the patient's reported symptoms of SI with a plan and worsening depression in the context of medication noncompliance and substance use are consistent with worsening MDD that is severe and persistent. She will likely benefit from restarting medication  therapy, engaging with therapy resources, being active on the milieu, and future outpatient resource planning this admission.     Given her current persistent depression and anxiety, patient warrants continued inpatient psychiatric hospitalization and medication management to maintain her safety.      Psychiatric Plan by Diagnosis   Today's changes:  - none      # MDD, severe with SI  - Olanzapine 15 mg PO every day (for cyclic vomiting syndrome)   - Escitalopram 30 mg every day  for mood     #PTSD  - Prazosin 2mg every day po for nightmares      # SANDIE  - Clonidine 0.1 mg TID     # Opiate Use Disorder  - Methadone 150 mg every day     Pertinent Labs/Monitoring:   - Review labs as above in note     Additional Plans:  - Patient will be treated in therapeutic milieu with appropriate individual and group therapies as described  - Patient would benefit from outpatient therapy resource planning for anxiety related disorders and improving coping skills  - Consider outpatient CD treatment for fentanyl and cannabis use     Psychiatric Hospital Course:      Yun Coyle was admitted to [facility] as a voluntary patient.   Medications:  Patient not on any PTA medication  New medications started at the time of admission include Olanzapine 10 mg PO daily.   11/18: start escitalopram 10mg, prazosin 1mg, and modify olanzapine 10mg to disintegrating tablet. IM started sliding scale insulin and POC glucose monitoring   11/19: Started Lantus 14 units at bedtime  11/20: Increased Lexapro from 10mg to 20mg every day   11/21: increase prazosin from 1mg to 2mg, increased Lantus from 14 units to 20 units per IM   11/22: increase Lantus from 20 units to 28 units, repeat EKG for QTc monitoring  11/25: Increase escitalopram to 25mg, Olanzapine increase to 12.5 mg   11/27 ,Clonidine 0.1 mg increased to TID, Lantus increased to 38 units  12/2: increase lexapro to 30mg, zyprexa to 15mg. Lantus increased to 40 units     The risks,  benefits, alternatives, and side effects were discussed and understood by the patient.     Medical Assessment and Plan     Medical diagnoses to be addressed this admission:    # Hypertension  - PTA Losartan  - Few incidences of elevated BP readings, if >3 readings are elevated, consult IM for HTN management      # Type II Diabetes  - PTA Metformin 1000 mg BID  - Starting 11/22 Lantus 28 units at bedtime per IM   - Increased Lantus from 38 to 40 units at bedtime on 12/1, Will continue carb coverage at current 1 unit per 10 g of carbs.   - High-resistance insulin  - Point-of-care glucose monitoring  - Educated patient on importance of follow-up with PCP for further diabetes management. Would benefit from GLP-1 or SGLT2 inhibitor.   - Continue to follow-up for assessment of insulin needs, may start additional agents         #Arm Abscess R arm s/p Drainage and wash out on 11/13  - Keflex 500mg QID x 7 days, started 11/16  - Management as per WOC  - Contact medicine if does not improve or with concern of infection      Medical course: Patient was physically examined by the ED prior to being transferred to the unit and was found to be medically stable and appropriate for admission.      Consults:    - Continued Wound Care for right arm abscess   - IM following for DMII care  - Diabetes education            Checklist     Legal Status: Voluntary     Safety Assessment:   Behavioral Orders   Procedures    Code 1 - Restrict to Unit    [facility] Extended Care     Until discharge, Extended Care to offer psychotherapeutic services to mental health patients boarding for admission or stabilization. These services are to include but are not limited to: individual psychotherapy, diagnostic assessment, case management and care planning, safety planning, etc. This may include up to 1 visit per day. If patient is physically located at [facility] or [facility], group psychotherapy up to 2 time per day may be offered.     Routine Programming     As  clinically indicated    Status 15     Every 15 minutes.       Risk Assessment:  Risk for harm is moderate.  Risk factors: SI, maladaptive coping, substance use, trauma, and past behaviors  Protective factors: engaged in treatment and help seeking      SIO: none    Disposition:  discharge tomorrow , will coordinate  time with daughter and notify staff      Attestations     Med Student: Krysta Parker, MS3  [employer] Medical [facility]        I was present with the medical student who participated in the service and documentation of the note. I have verified the history and personally performed the physical/mental status exam and medical decision making. I agree with the assessment and plan documented in the note. Patient seen and discussed with attending physician, Dr. Matthews, who is in agreement with my assessment and plan.    Montrell Mendez D.O.  Psychiatry Resident  [facility]

## 2024-12-03 NOTE — PLAN OF CARE
BEH IP Unit Acuity Rating Score (UARS)  Patient is given one point for every criteria they meet.    CRITERIA SCORING   On a 72 hour hold, court hold, committed, stay of commitment, or revocation. 0    Patient LOS on BEH unit exceeds 20 days. 0  LOS: 17   Patient under guardianship, 55+, otherwise medically complex, or under age 11. 0   Suicide ideation without relief of precipitating factors. 1   Current plan for suicide. 0   Current plan for homicide. 0   Imminent risk or actual attempt to seriously harm another without relief of factors precipitating the attempt. 0   Severe dysfunction in daily living (ex: complete neglect for self care, extreme disruption in vegetative function, extreme deterioration in social interactions). 1   Recent (last 7 days) or current physical aggression in the ED or on unit. 0   Restraints or seclusion episode in past 72 hours. 0   Recent (last 7 days) or current verbal aggression, agitation, yelling, etc., while in the ED or unit. 0   Active psychosis. 0   Need for constant or near constant redirection (from leaving, from others, etc).  0   Intrusive or disruptive behaviors. 0   Patient requires 3 or more hours of individualized nursing care per 8-hour shift (i.e. for ADLs, meds, therapeutic interventions). 0   TOTAL 2

## 2024-12-04 ENCOUNTER — TELEPHONE (OUTPATIENT)
Dept: BEHAVIORAL HEALTH | Facility: CLINIC | Age: 49
End: 2024-12-04
Payer: COMMERCIAL

## 2024-12-04 VITALS
BODY MASS INDEX: 31.44 KG/M2 | HEIGHT: 69 IN | TEMPERATURE: 98.8 F | DIASTOLIC BLOOD PRESSURE: 71 MMHG | RESPIRATION RATE: 16 BRPM | HEART RATE: 78 BPM | SYSTOLIC BLOOD PRESSURE: 102 MMHG | WEIGHT: 212.3 LBS | OXYGEN SATURATION: 100 %

## 2024-12-04 LAB
GLUCOSE BLDC GLUCOMTR-MCNC: 185 MG/DL (ref 70–99)
GLUCOSE BLDC GLUCOMTR-MCNC: 192 MG/DL (ref 70–99)
GLUCOSE BLDC GLUCOMTR-MCNC: 202 MG/DL (ref 70–99)
GLUCOSE BLDC GLUCOMTR-MCNC: 97 MG/DL (ref 70–99)

## 2024-12-04 PROCEDURE — 250N000013 HC RX MED GY IP 250 OP 250 PS 637

## 2024-12-04 PROCEDURE — 99238 HOSP IP/OBS DSCHRG MGMT 30/<: CPT | Mod: GC | Performed by: PSYCHIATRY & NEUROLOGY

## 2024-12-04 PROCEDURE — 250N000013 HC RX MED GY IP 250 OP 250 PS 637: Performed by: STUDENT IN AN ORGANIZED HEALTH CARE EDUCATION/TRAINING PROGRAM

## 2024-12-04 PROCEDURE — 99207 PR NO BILLABLE SERVICE THIS VISIT: CPT

## 2024-12-04 PROCEDURE — 250N000013 HC RX MED GY IP 250 OP 250 PS 637: Performed by: PSYCHIATRY & NEUROLOGY

## 2024-12-04 RX ORDER — BLOOD PRESSURE TEST KIT
KIT MISCELLANEOUS
Qty: 200 EACH | Refills: 1 | Status: SHIPPED | OUTPATIENT
Start: 2024-12-04

## 2024-12-04 RX ORDER — CONTAINER,EMPTY
EACH MISCELLANEOUS
Qty: 1 EACH | Refills: 1 | Status: SHIPPED | OUTPATIENT
Start: 2024-12-04

## 2024-12-04 RX ADMIN — INSULIN ASPART 2 UNITS: 100 INJECTION, SOLUTION INTRAVENOUS; SUBCUTANEOUS at 18:09

## 2024-12-04 RX ADMIN — ESCITALOPRAM OXALATE 30 MG: 20 TABLET ORAL at 08:32

## 2024-12-04 RX ADMIN — METHADONE HYDROCHLORIDE 150 MG: 10 CONCENTRATE ORAL at 08:51

## 2024-12-04 RX ADMIN — EMPAGLIFLOZIN 10 MG: 10 TABLET, FILM COATED ORAL at 08:33

## 2024-12-04 RX ADMIN — LOSARTAN POTASSIUM 25 MG: 25 TABLET, FILM COATED ORAL at 08:33

## 2024-12-04 RX ADMIN — HYDROXYZINE HYDROCHLORIDE 50 MG: 25 TABLET ORAL at 09:03

## 2024-12-04 RX ADMIN — INSULIN ASPART 3 UNITS: 100 INJECTION, SOLUTION INTRAVENOUS; SUBCUTANEOUS at 11:22

## 2024-12-04 RX ADMIN — METFORMIN HYDROCHLORIDE 1000 MG: 500 TABLET, FILM COATED ORAL at 18:09

## 2024-12-04 RX ADMIN — CLONIDINE HYDROCHLORIDE 0.1 MG: 0.1 TABLET ORAL at 14:38

## 2024-12-04 RX ADMIN — HYDROXYZINE HYDROCHLORIDE 50 MG: 25 TABLET ORAL at 17:36

## 2024-12-04 RX ADMIN — METFORMIN HYDROCHLORIDE 1000 MG: 500 TABLET, FILM COATED ORAL at 08:32

## 2024-12-04 RX ADMIN — INSULIN ASPART 3 UNITS: 100 INJECTION, SOLUTION INTRAVENOUS; SUBCUTANEOUS at 08:51

## 2024-12-04 RX ADMIN — CLONIDINE HYDROCHLORIDE 0.1 MG: 0.1 TABLET ORAL at 08:33

## 2024-12-04 RX ADMIN — HYDROXYZINE HYDROCHLORIDE 50 MG: 25 TABLET ORAL at 13:05

## 2024-12-04 ASSESSMENT — ACTIVITIES OF DAILY LIVING (ADL)
ADLS_ACUITY_SCORE: 45

## 2024-12-04 NOTE — PLAN OF CARE
Problem: Adult Inpatient Plan of Care  Goal: Plan of Care Review  Description: The Plan of Care Review/Shift note should be completed every shift.  The Outcome Evaluation is a brief statement about your assessment that the patient is improving, declining, or no change.  This information will be displayed automatically on your shift  note.  Outcome: Progressing  Flowsheets (Taken 12/4/2024 4786)  Plan of Care Reviewed With: patient  Overall Patient Progress: improving   Goal Outcome Evaluation:      Plan of Care Reviewed With: patient    Overall Patient Progress: improving  Patient has been intermittently visible in the milieu.Compliant with medication.Prn Hydroxyzine for anxiety given x 2 with relief.Patient denies depression.Denies SI/SIB/AH/VH & HI.No escalation of behaviors.No safety concerns .Patient will discharge after 6 pm & will be picked up by her  daughter.Patient is not sociable with peers.Patient did not attend groups but she worked on cross words independently in her room.BG was 192 before breakfast and 202 before lunch.Insulin given per orders.Hygiene is appropriate.Patient had a shower.Patient changed right arm wound dressing independently.Adequate food and fluid intake.  Temp: 98.3  F (36.8  C) Temp src: Oral BP: 130/87 Pulse: 71   Resp: 16 SpO2: 99 % O2 Device: None (Room air)

## 2024-12-04 NOTE — PLAN OF CARE
Pt was isolative to her room most of the time this shift. Pt has been laying on his bed in her room with her face covered. Pt came out later and watched movies in the lounge with peers.  Pt was not interactive with peers. Presented with blunted and flat affect.  Endorsed anxiety 7/10 and depression 7/10. Prn Hydroxyzine was given and pt reported it was helpful. Denies for SI/SIB/AH/VH. Food and fluid intake is good. Blood glucose was 180 before dinner and  238 at bedtime. Vital signs:  Temp: 97.1  F (36.2  C) Temp src: Temporal BP: 119/83 Pulse: 79   Resp: 18 SpO2: 99 % O2 Device: None (Room air)       Goal Outcome Evaluation:    Problem: Anxiety  Goal: Anxiety Reduction or Resolution  Outcome: Progressing     Problem: Suicide Risk  Goal: Absence of Self-Harm  Outcome: Progressing

## 2024-12-04 NOTE — PLAN OF CARE
BEH IP Unit Acuity Rating Score (UARS)  Patient is given one point for every criteria they meet.    CRITERIA SCORING   On a 72 hour hold, court hold, committed, stay of commitment, or revocation. 0    Patient LOS on BEH unit exceeds 20 days. 0  LOS: 18   Patient under guardianship, 55+, otherwise medically complex, or under age 11. 0   Suicide ideation without relief of precipitating factors. 1   Current plan for suicide. 0   Current plan for homicide. 0   Imminent risk or actual attempt to seriously harm another without relief of factors precipitating the attempt. 0   Severe dysfunction in daily living (ex: complete neglect for self care, extreme disruption in vegetative function, extreme deterioration in social interactions). 1   Recent (last 7 days) or current physical aggression in the ED or on unit. 0   Restraints or seclusion episode in past 72 hours. 0   Recent (last 7 days) or current verbal aggression, agitation, yelling, etc., while in the ED or unit. 0   Active psychosis. 0   Need for constant or near constant redirection (from leaving, from others, etc).  0   Intrusive or disruptive behaviors. 0   Patient requires 3 or more hours of individualized nursing care per 8-hour shift (i.e. for ADLs, meds, therapeutic interventions). 0   TOTAL 2

## 2024-12-04 NOTE — PROGRESS NOTES
Brief Progress Note Update    Resident talk with nursing staff:  Staff received discharge medication for patient but received Insulin Lispro instead of requested Insulin Aspart. Staff could not reach out to discharge pharmacy as they were already closed for clarification in this matter.   Psychiatry short call communicate with [facility] Pharmacist on call regarding this issue. Decided that would be best for the patient not be discharge with Insulin Lispro and to get this medication returned and solved questions about it with discharge pharmacy tomorrow.   Patient  can discharge with Insulin Aspart that they already have. Nurse Mitchell informed about this discussion and updated on the decision.      Attestation  Kelli Hallman MD  [facility] Psychiatry Resident  12/04/2024

## 2024-12-04 NOTE — PLAN OF CARE
Problem: Sleep Disturbance  Goal: Adequate Sleep/Rest  Outcome: Progressing   Goal Outcome Evaluation:  Patient appears to have slept for 6.75 hours. Endorsed no acute concerns during the night. No complaints of pain. No requests for prn's during encounters. Overnight BG was 97. Safety checks in place q 15 minutes, no concerns noted. Plan in place for patient to discharge today. No behavioral issues.

## 2024-12-04 NOTE — PROGRESS NOTES
Brief Internal Medicine Progress Note:     Type 2 DM  Medicine following for glucose monitoring and medication regimen for discharge. Patient was started on Jardiance 11/27. Blood sugars improved, although slightly still above goal. Lantus increased to 38 -> 40 units on 12/1. Notified that the patient is discharging today. Final discharge recommendations listed below.     Discharge regimen:  - continue Jardiance 10 mg daily   - continue Lantus 40 units nightly   - continue PTA metformin 1000 mg BID   - continue sliding scale insulin 3x/day with meals + bedtime   - continue CHO coverage with 3x/day with meals meals   - follow up with PCP vs endocrinology within 1 week of discharge      Supplies needed at discharge per endocrine include:  - Accu Chek Guide glucose meter   - Accu Chek Guide test strips   - Accu Chek Soft Clix Lancets   - Sharps Container   - B-D 4 mm jose j pen needles   - Alcohol Wipes     Patient is discharging, Internal Medicine will sign off. Please reach out with any future questions or concerns.     Gabrielle Wood DNP, ACNPC-AG  Internal Medicine [facility] Hospitalist  [facility]

## 2024-12-04 NOTE — PLAN OF CARE
Brief Psychotherapy Note    Therapist checked in with Pt to remind Pt about psychotherapy service available and offered to engage in safety planning.    Pt response: Pt not interested in meeting 1:1 or doing safety plan.    Plan: Pt is planned to discharge today.

## 2024-12-04 NOTE — PLAN OF CARE
Assessment/Intervention/Current Symtoms and Care Coordination:  Chart reviewed and patient met with team,   Discussed patient progress, symptomology, and response to treatment.  Discussed the discharge plan and any potential impediments to discharge.    Patient stable- no changes in past 24 hours. Plan remains to discharge home today.    Patient's outpatient follow up care appts are all in place. Patient scheduled to begin IOP on Monday 12/9  Writer will notify Leonarda of discharge and date of last dose of methadone provided.    Discharge Plan or Goal:  Patient will return home  Psychiatry  IOP  Methadone Maintenance    Barriers to Discharge:  None- discharge today     Referral Status:  Patient has been referred to outpatient Psychiatrist, therapy, and IOP     Legal Status:  Voluntary     Contacts:  [facility]- Methadone   Gamaliel Granados DO- [facility]    Upcoming Meetings and Dates/Important Information and next steps:  Team Update:   Mon

## 2024-12-04 NOTE — TELEPHONE ENCOUNTER
----- Message from Faviola PONCE sent at 12/3/2024  2:10 PM CST -----  Regarding: Referral for IOP-3; Afternoons  Adult Mental Health Programmatic Care Schedule Request    Patient Name: Ynu Coyle  Location of programming: [facility]  Start Date: 12/9/2024    Group/PROVIDER: ADMISSION IOP AM TRACK [[id number]]   Appt Time: 12pm   Duration of Appointment in minutes: 60 minutes   Visit Type: Treatment [870]   Attending Provider: You Ackerman     Adult Program Group: Adult Program Group: IOP 3 General Mood Track [[id number]]  Schedule: M, T, W, TH 1pm-4pm  12 hours per week for 9 weeks  Number of visits to be scheduled: 36 days    Attending Provider (MD):  Dr. You Ackerman ([place])  Visit Type:  In-Person    Accommodations Needed: No  Alerts Identified/Substantiation: No  Consulted with Supervisor: No

## 2024-12-05 NOTE — PROGRESS NOTES
Pt discharged home at this time. Pt picked up by family. Pt denied for SI/SIB thoughts at discharge. Pt was send home with all discharge medications except Insulin Lispro. This was due to discharge instructions is for Insulin Aspart and not Lispro. Traci was unable to reach out to discharge pharmacy as it was closed. Writer conducted on call resident and updated on the same and it was decided that pt be send home with the remaining supply of Aspart and discharge pharmacy to follow up in the morning. Insulin Lispro to be returned to discharge pharmacy in the morning. Pt took all her belonging with her. Discharge instructions discussed with pt al all questions answered.

## 2024-12-05 NOTE — PROGRESS NOTES
Insulin Lispro disposition passed on from night shift.    Per inquiry from Pharmacy, Insulin Lispro approved/ordered by provider in lieu of Insulin aspart (which is not covered by insurance). This RN called pt 2x on her listed phone number to inform her that unit is sending her Insulin Lispro via . No answer received, message was left.  Alternate number was also called with no answer (message left). Said insulin will be sent via  today, per unit HUC.

## 2024-12-09 ENCOUNTER — TELEPHONE (OUTPATIENT)
Dept: BEHAVIORAL HEALTH | Facility: CLINIC | Age: 49
End: 2024-12-09
Payer: COMMERCIAL

## 2024-12-09 NOTE — TELEPHONE ENCOUNTER
Writer called pt in attempt to complete admission interview for IOP today. Pt did not answer call.

## 2024-12-10 ENCOUNTER — TELEPHONE (OUTPATIENT)
Dept: BEHAVIORAL HEALTH | Facility: CLINIC | Age: 49
End: 2024-12-10
Payer: COMMERCIAL

## 2024-12-10 ENCOUNTER — TELEPHONE (OUTPATIENT)
Dept: PHARMACY | Facility: CLINIC | Age: 49
End: 2024-12-10
Payer: COMMERCIAL

## 2024-12-10 NOTE — TELEPHONE ENCOUNTER
Ph call to pt regarding program start as she was scheduled to attend programming on 12/9 no answer. Pt was sent welcome letter with information. Writer left msg asking pt to contact the program and is a no show today also.      NAN Vanegas on 12/10/2024 at 1:38 PM

## 2024-12-10 NOTE — TELEPHONE ENCOUNTER
MTM referral from: Transitions of Care (recent hospital discharge, TCU discharge, or ED visit)    MTM referral outreach attempt #2 on December 10, 2024 at 11:40 AM      Outcome: Patient not reachable after several attempts, email sent    Use TRICE siegel med adv, [facility] specialty psych for the carrier/Plan on the flowsheet      Email sent    Jocelyn Whitten [facility]  -[facility]  [phone]

## 2024-12-22 ENCOUNTER — HOSPITAL ENCOUNTER (INPATIENT)
Facility: CLINIC | Age: 49
LOS: 1 days | Discharge: HOME | End: 2024-12-25
Attending: EMERGENCY MEDICINE | Admitting: FAMILY MEDICINE
Payer: COMMERCIAL

## 2024-12-22 DIAGNOSIS — R00.0 TACHYCARDIA, UNSPECIFIED: ICD-10-CM

## 2024-12-22 DIAGNOSIS — B18.2 CHRONIC HEPATITIS C WITHOUT HEPATIC COMA (H): ICD-10-CM

## 2024-12-22 DIAGNOSIS — R11.2 INTRACTABLE NAUSEA AND VOMITING: ICD-10-CM

## 2024-12-22 DIAGNOSIS — R52 GENERALIZED PAIN: Primary | ICD-10-CM

## 2024-12-22 DIAGNOSIS — F11.21 MODERATE OPIOID USE DISORDER, IN EARLY REMISSION (H): ICD-10-CM

## 2024-12-22 LAB
ALBUMIN SERPL BCG-MCNC: 4.1 G/DL (ref 3.5–5.2)
ALP SERPL-CCNC: 233 U/L (ref 40–150)
ALT SERPL W P-5'-P-CCNC: 173 U/L (ref 0–50)
ANION GAP SERPL CALCULATED.3IONS-SCNC: 13 MMOL/L (ref 7–15)
AST SERPL W P-5'-P-CCNC: 112 U/L (ref 0–45)
BASOPHILS # BLD AUTO: 0 10E3/UL (ref 0–0.2)
BASOPHILS NFR BLD AUTO: 1 %
BILIRUB SERPL-MCNC: 0.3 MG/DL
BUN SERPL-MCNC: 16.4 MG/DL (ref 6–20)
CALCIUM SERPL-MCNC: 9.7 MG/DL (ref 8.8–10.4)
CHLORIDE SERPL-SCNC: 101 MMOL/L (ref 98–107)
CREAT SERPL-MCNC: 0.87 MG/DL (ref 0.51–0.95)
EGFRCR SERPLBLD CKD-EPI 2021: 83 ML/MIN/1.73M2
EOSINOPHIL # BLD AUTO: 0.6 10E3/UL (ref 0–0.7)
EOSINOPHIL NFR BLD AUTO: 8 %
ERYTHROCYTE [DISTWIDTH] IN BLOOD BY AUTOMATED COUNT: 13.2 % (ref 10–15)
GLUCOSE SERPL-MCNC: 238 MG/DL (ref 70–99)
HCG INTACT+B SERPL-ACNC: 5 MIU/ML
HCO3 SERPL-SCNC: 26 MMOL/L (ref 22–29)
HCT VFR BLD AUTO: 40.3 % (ref 35–47)
HGB BLD-MCNC: 13.6 G/DL (ref 11.7–15.7)
IMM GRANULOCYTES # BLD: 0 10E3/UL
IMM GRANULOCYTES NFR BLD: 0 %
LIPASE SERPL-CCNC: 34 U/L (ref 13–60)
LYMPHOCYTES # BLD AUTO: 3.3 10E3/UL (ref 0.8–5.3)
LYMPHOCYTES NFR BLD AUTO: 42 %
MCH RBC QN AUTO: 28.2 PG (ref 26.5–33)
MCHC RBC AUTO-ENTMCNC: 33.7 G/DL (ref 31.5–36.5)
MCV RBC AUTO: 83 FL (ref 78–100)
MONOCYTES # BLD AUTO: 0.5 10E3/UL (ref 0–1.3)
MONOCYTES NFR BLD AUTO: 7 %
NEUTROPHILS # BLD AUTO: 3.3 10E3/UL (ref 1.6–8.3)
NEUTROPHILS NFR BLD AUTO: 43 %
NRBC # BLD AUTO: 0 10E3/UL
NRBC BLD AUTO-RTO: 0 /100
PLATELET # BLD AUTO: 278 10E3/UL (ref 150–450)
POTASSIUM SERPL-SCNC: 4.8 MMOL/L (ref 3.4–5.3)
PROT SERPL-MCNC: 8.9 G/DL (ref 6.4–8.3)
RBC # BLD AUTO: 4.83 10E6/UL (ref 3.8–5.2)
SODIUM SERPL-SCNC: 140 MMOL/L (ref 135–145)
WBC # BLD AUTO: 7.7 10E3/UL (ref 4–11)

## 2024-12-22 PROCEDURE — 84702 CHORIONIC GONADOTROPIN TEST: CPT | Performed by: EMERGENCY MEDICINE

## 2024-12-22 PROCEDURE — 36415 COLL VENOUS BLD VENIPUNCTURE: CPT | Performed by: EMERGENCY MEDICINE

## 2024-12-22 PROCEDURE — 82010 KETONE BODYS QUAN: CPT

## 2024-12-22 PROCEDURE — 82962 GLUCOSE BLOOD TEST: CPT

## 2024-12-22 PROCEDURE — 99285 EMERGENCY DEPT VISIT HI MDM: CPT | Mod: 25 | Performed by: EMERGENCY MEDICINE

## 2024-12-22 PROCEDURE — 96374 THER/PROPH/DIAG INJ IV PUSH: CPT | Mod: 59 | Performed by: EMERGENCY MEDICINE

## 2024-12-22 PROCEDURE — 96361 HYDRATE IV INFUSION ADD-ON: CPT | Performed by: EMERGENCY MEDICINE

## 2024-12-22 PROCEDURE — 250N000011 HC RX IP 250 OP 636: Mod: JZ | Performed by: EMERGENCY MEDICINE

## 2024-12-22 PROCEDURE — 96372 THER/PROPH/DIAG INJ SC/IM: CPT | Performed by: EMERGENCY MEDICINE

## 2024-12-22 PROCEDURE — 99285 EMERGENCY DEPT VISIT HI MDM: CPT | Performed by: EMERGENCY MEDICINE

## 2024-12-22 PROCEDURE — 83690 ASSAY OF LIPASE: CPT | Performed by: EMERGENCY MEDICINE

## 2024-12-22 PROCEDURE — 83735 ASSAY OF MAGNESIUM: CPT

## 2024-12-22 PROCEDURE — 96375 TX/PRO/DX INJ NEW DRUG ADDON: CPT | Performed by: EMERGENCY MEDICINE

## 2024-12-22 PROCEDURE — 85025 COMPLETE CBC W/AUTO DIFF WBC: CPT | Performed by: EMERGENCY MEDICINE

## 2024-12-22 PROCEDURE — 80053 COMPREHEN METABOLIC PANEL: CPT | Performed by: EMERGENCY MEDICINE

## 2024-12-22 PROCEDURE — 258N000003 HC RX IP 258 OP 636: Performed by: EMERGENCY MEDICINE

## 2024-12-22 PROCEDURE — 93005 ELECTROCARDIOGRAM TRACING: CPT | Performed by: EMERGENCY MEDICINE

## 2024-12-22 PROCEDURE — 84100 ASSAY OF PHOSPHORUS: CPT

## 2024-12-22 PROCEDURE — 93010 ELECTROCARDIOGRAM REPORT: CPT | Performed by: EMERGENCY MEDICINE

## 2024-12-22 RX ORDER — OLANZAPINE 10 MG/2ML
10 INJECTION, POWDER, FOR SOLUTION INTRAMUSCULAR ONCE
Status: COMPLETED | OUTPATIENT
Start: 2024-12-22 | End: 2024-12-22

## 2024-12-22 RX ORDER — ONDANSETRON 2 MG/ML
8 INJECTION INTRAMUSCULAR; INTRAVENOUS ONCE
Status: COMPLETED | OUTPATIENT
Start: 2024-12-22 | End: 2024-12-22

## 2024-12-22 RX ORDER — LORAZEPAM 2 MG/ML
1 INJECTION INTRAMUSCULAR ONCE
Status: COMPLETED | OUTPATIENT
Start: 2024-12-22 | End: 2024-12-22

## 2024-12-22 RX ORDER — OLANZAPINE 10 MG/2ML
10 INJECTION, POWDER, FOR SOLUTION INTRAMUSCULAR ONCE
Status: DISCONTINUED | OUTPATIENT
Start: 2024-12-22 | End: 2024-12-22

## 2024-12-22 RX ORDER — METOCLOPRAMIDE HYDROCHLORIDE 5 MG/ML
5 INJECTION INTRAMUSCULAR; INTRAVENOUS ONCE
Status: COMPLETED | OUTPATIENT
Start: 2024-12-22 | End: 2024-12-22

## 2024-12-22 RX ADMIN — ONDANSETRON 8 MG: 2 INJECTION INTRAMUSCULAR; INTRAVENOUS at 22:36

## 2024-12-22 RX ADMIN — SODIUM CHLORIDE 1000 ML: 9 INJECTION, SOLUTION INTRAVENOUS at 23:53

## 2024-12-22 RX ADMIN — METOCLOPRAMIDE 5 MG: 5 INJECTION, SOLUTION INTRAMUSCULAR; INTRAVENOUS at 21:40

## 2024-12-22 RX ADMIN — OLANZAPINE 10 MG: 10 INJECTION, POWDER, FOR SOLUTION INTRAMUSCULAR at 18:46

## 2024-12-22 RX ADMIN — SODIUM CHLORIDE 1000 ML: 9 INJECTION, SOLUTION INTRAVENOUS at 19:28

## 2024-12-22 RX ADMIN — LORAZEPAM 1 MG: 2 INJECTION INTRAMUSCULAR; INTRAVENOUS at 23:53

## 2024-12-22 ASSESSMENT — ACTIVITIES OF DAILY LIVING (ADL)
ADLS_ACUITY_SCORE: 58

## 2024-12-23 ENCOUNTER — TRANSFERRED RECORDS (OUTPATIENT)
Dept: HEALTH INFORMATION MANAGEMENT | Facility: CLINIC | Age: 49
End: 2024-12-23

## 2024-12-23 ENCOUNTER — APPOINTMENT (OUTPATIENT)
Dept: CT IMAGING | Facility: CLINIC | Age: 49
End: 2024-12-23
Attending: EMERGENCY MEDICINE
Payer: COMMERCIAL

## 2024-12-23 PROBLEM — R11.2 INTRACTABLE NAUSEA AND VOMITING: Status: ACTIVE | Noted: 2024-12-23

## 2024-12-23 LAB
ALBUMIN SERPL BCG-MCNC: 4.4 G/DL (ref 3.5–5.2)
ALBUMIN UR-MCNC: 10 MG/DL
ALP SERPL-CCNC: 275 U/L (ref 40–150)
ALT SERPL W P-5'-P-CCNC: 208 U/L (ref 0–50)
ANION GAP SERPL CALCULATED.3IONS-SCNC: 17 MMOL/L (ref 7–15)
APPEARANCE UR: CLEAR
AST SERPL W P-5'-P-CCNC: 150 U/L (ref 0–45)
ATRIAL RATE - MUSE: 100 BPM
ATRIAL RATE - MUSE: 82 BPM
B-OH-BUTYR SERPL-SCNC: 1.03 MMOL/L
B-OH-BUTYR SERPL-SCNC: <0.18 MMOL/L
BILIRUB SERPL-MCNC: 0.5 MG/DL
BILIRUB UR QL STRIP: NEGATIVE
BUN SERPL-MCNC: 17.3 MG/DL (ref 6–20)
CALCIUM SERPL-MCNC: 10 MG/DL (ref 8.8–10.4)
CHLORIDE SERPL-SCNC: 94 MMOL/L (ref 98–107)
COLOR UR AUTO: ABNORMAL
CREAT SERPL-MCNC: 0.89 MG/DL (ref 0.51–0.95)
DIASTOLIC BLOOD PRESSURE - MUSE: NORMAL MMHG
DIASTOLIC BLOOD PRESSURE - MUSE: NORMAL MMHG
EGFRCR SERPLBLD CKD-EPI 2021: 81 ML/MIN/1.73M2
GLUCOSE BLDC GLUCOMTR-MCNC: 189 MG/DL (ref 70–99)
GLUCOSE BLDC GLUCOMTR-MCNC: 233 MG/DL (ref 70–99)
GLUCOSE BLDC GLUCOMTR-MCNC: 282 MG/DL (ref 70–99)
GLUCOSE BLDC GLUCOMTR-MCNC: 323 MG/DL (ref 70–99)
GLUCOSE BLDC GLUCOMTR-MCNC: 364 MG/DL (ref 70–99)
GLUCOSE BLDC GLUCOMTR-MCNC: 95 MG/DL (ref 70–99)
GLUCOSE SERPL-MCNC: 330 MG/DL (ref 70–99)
GLUCOSE UR STRIP-MCNC: >=1000 MG/DL
HCO3 SERPL-SCNC: 25 MMOL/L (ref 22–29)
HGB UR QL STRIP: NEGATIVE
INR PPP: 1.02 (ref 0.85–1.15)
INTERPRETATION ECG - MUSE: NORMAL
INTERPRETATION ECG - MUSE: NORMAL
KETONES UR STRIP-MCNC: 20 MG/DL
LEUKOCYTE ESTERASE UR QL STRIP: NEGATIVE
MAGNESIUM SERPL-MCNC: 1.9 MG/DL (ref 1.7–2.3)
MAGNESIUM SERPL-MCNC: 2 MG/DL (ref 1.7–2.3)
NITRATE UR QL: NEGATIVE
P AXIS - MUSE: 54 DEGREES
P AXIS - MUSE: 78 DEGREES
PH UR STRIP: 6.5 [PH] (ref 5–7)
PHOSPHATE SERPL-MCNC: 3.8 MG/DL (ref 2.5–4.5)
POTASSIUM SERPL-SCNC: 3.9 MMOL/L (ref 3.4–5.3)
PR INTERVAL - MUSE: 144 MS
PR INTERVAL - MUSE: 164 MS
PROT SERPL-MCNC: 9.5 G/DL (ref 6.4–8.3)
QRS DURATION - MUSE: 82 MS
QRS DURATION - MUSE: 88 MS
QT - MUSE: 310 MS
QT - MUSE: 530 MS
QTC - MUSE: 399 MS
QTC - MUSE: 619 MS
R AXIS - MUSE: -29 DEGREES
R AXIS - MUSE: -32 DEGREES
RBC URINE: <1 /HPF
SARS-COV-2 RNA RESP QL NAA+PROBE: NEGATIVE
SODIUM SERPL-SCNC: 136 MMOL/L (ref 135–145)
SP GR UR STRIP: 1.01 (ref 1–1.03)
SQUAMOUS EPITHELIAL: <1 /HPF
SYSTOLIC BLOOD PRESSURE - MUSE: NORMAL MMHG
SYSTOLIC BLOOD PRESSURE - MUSE: NORMAL MMHG
T AXIS - MUSE: 33 DEGREES
T AXIS - MUSE: 35 DEGREES
UROBILINOGEN UR STRIP-MCNC: NORMAL MG/DL
VENTRICULAR RATE- MUSE: 100 BPM
VENTRICULAR RATE- MUSE: 82 BPM
WBC URINE: <1 /HPF

## 2024-12-23 PROCEDURE — 250N000009 HC RX 250: Performed by: EMERGENCY MEDICINE

## 2024-12-23 PROCEDURE — G0378 HOSPITAL OBSERVATION PER HR: HCPCS

## 2024-12-23 PROCEDURE — 85610 PROTHROMBIN TIME: CPT

## 2024-12-23 PROCEDURE — 80359 METHYLENEDIOXYAMPHETAMINES: CPT

## 2024-12-23 PROCEDURE — 82310 ASSAY OF CALCIUM: CPT

## 2024-12-23 PROCEDURE — 250N000013 HC RX MED GY IP 250 OP 250 PS 637

## 2024-12-23 PROCEDURE — 81001 URINALYSIS AUTO W/SCOPE: CPT | Performed by: EMERGENCY MEDICINE

## 2024-12-23 PROCEDURE — 99222 1ST HOSP IP/OBS MODERATE 55: CPT | Mod: AI

## 2024-12-23 PROCEDURE — 96361 HYDRATE IV INFUSION ADD-ON: CPT

## 2024-12-23 PROCEDURE — 74177 CT ABD & PELVIS W/CONTRAST: CPT

## 2024-12-23 PROCEDURE — 250N000011 HC RX IP 250 OP 636: Performed by: EMERGENCY MEDICINE

## 2024-12-23 PROCEDURE — 250N000012 HC RX MED GY IP 250 OP 636 PS 637

## 2024-12-23 PROCEDURE — 250N000009 HC RX 250

## 2024-12-23 PROCEDURE — 36415 COLL VENOUS BLD VENIPUNCTURE: CPT

## 2024-12-23 PROCEDURE — 80367 DRUG SCREENING PROPOXYPHENE: CPT

## 2024-12-23 PROCEDURE — 82962 GLUCOSE BLOOD TEST: CPT

## 2024-12-23 PROCEDURE — 96375 TX/PRO/DX INJ NEW DRUG ADDON: CPT

## 2024-12-23 PROCEDURE — 80307 DRUG TEST PRSMV CHEM ANLYZR: CPT

## 2024-12-23 PROCEDURE — 80360 METHYLPHENIDATE: CPT

## 2024-12-23 PROCEDURE — 96376 TX/PRO/DX INJ SAME DRUG ADON: CPT

## 2024-12-23 PROCEDURE — 250N000011 HC RX IP 250 OP 636

## 2024-12-23 PROCEDURE — 82010 KETONE BODYS QUAN: CPT

## 2024-12-23 PROCEDURE — 83735 ASSAY OF MAGNESIUM: CPT

## 2024-12-23 PROCEDURE — 87635 SARS-COV-2 COVID-19 AMP PRB: CPT

## 2024-12-23 PROCEDURE — 258N000003 HC RX IP 258 OP 636

## 2024-12-23 RX ORDER — SODIUM CHLORIDE 9 MG/ML
INJECTION, SOLUTION INTRAVENOUS CONTINUOUS
Status: DISPENSED | OUTPATIENT
Start: 2024-12-23 | End: 2024-12-23

## 2024-12-23 RX ORDER — CAPSAICIN 0.75 MG/G
CREAM TOPICAL 4 TIMES DAILY PRN
Status: DISCONTINUED | OUTPATIENT
Start: 2024-12-23 | End: 2024-12-23

## 2024-12-23 RX ORDER — CALCIUM CARBONATE 500 MG/1
1000 TABLET, CHEWABLE ORAL 4 TIMES DAILY PRN
Status: DISCONTINUED | OUTPATIENT
Start: 2024-12-23 | End: 2024-12-25 | Stop reason: HOSPADM

## 2024-12-23 RX ORDER — IOPAMIDOL 755 MG/ML
500 INJECTION, SOLUTION INTRAVASCULAR ONCE
Status: COMPLETED | OUTPATIENT
Start: 2024-12-23 | End: 2024-12-23

## 2024-12-23 RX ORDER — NALOXONE HYDROCHLORIDE 0.4 MG/ML
0.2 INJECTION, SOLUTION INTRAMUSCULAR; INTRAVENOUS; SUBCUTANEOUS
Status: DISCONTINUED | OUTPATIENT
Start: 2024-12-23 | End: 2024-12-25 | Stop reason: HOSPADM

## 2024-12-23 RX ORDER — NICOTINE POLACRILEX 4 MG
15-30 LOZENGE BUCCAL
Status: DISCONTINUED | OUTPATIENT
Start: 2024-12-23 | End: 2024-12-25 | Stop reason: HOSPADM

## 2024-12-23 RX ORDER — AMOXICILLIN 250 MG
2 CAPSULE ORAL 2 TIMES DAILY PRN
Status: DISCONTINUED | OUTPATIENT
Start: 2024-12-23 | End: 2024-12-25 | Stop reason: HOSPADM

## 2024-12-23 RX ORDER — NALOXONE HYDROCHLORIDE 0.4 MG/ML
0.4 INJECTION, SOLUTION INTRAMUSCULAR; INTRAVENOUS; SUBCUTANEOUS
Status: DISCONTINUED | OUTPATIENT
Start: 2024-12-23 | End: 2024-12-25 | Stop reason: HOSPADM

## 2024-12-23 RX ORDER — FLUTICASONE FUROATE AND VILANTEROL 100; 25 UG/1; UG/1
1 POWDER RESPIRATORY (INHALATION) DAILY
Status: DISCONTINUED | OUTPATIENT
Start: 2024-12-23 | End: 2024-12-25 | Stop reason: HOSPADM

## 2024-12-23 RX ORDER — MAGNESIUM HYDROXIDE/ALUMINUM HYDROXICE/SIMETHICONE 120; 1200; 1200 MG/30ML; MG/30ML; MG/30ML
30 SUSPENSION ORAL EVERY 4 HOURS PRN
Status: DISCONTINUED | OUTPATIENT
Start: 2024-12-23 | End: 2024-12-25 | Stop reason: HOSPADM

## 2024-12-23 RX ORDER — PROCHLORPERAZINE MALEATE 5 MG/1
10 TABLET ORAL EVERY 6 HOURS PRN
Status: DISCONTINUED | OUTPATIENT
Start: 2024-12-23 | End: 2024-12-23

## 2024-12-23 RX ORDER — CLONIDINE HYDROCHLORIDE 0.1 MG/1
0.1 TABLET ORAL 2 TIMES DAILY
Status: DISCONTINUED | OUTPATIENT
Start: 2024-12-23 | End: 2024-12-25 | Stop reason: HOSPADM

## 2024-12-23 RX ORDER — PROCHLORPERAZINE 25 MG
25 SUPPOSITORY, RECTAL RECTAL EVERY 12 HOURS PRN
Status: DISCONTINUED | OUTPATIENT
Start: 2024-12-23 | End: 2024-12-23

## 2024-12-23 RX ORDER — PROCHLORPERAZINE MALEATE 5 MG/1
5 TABLET ORAL EVERY 6 HOURS PRN
Status: DISCONTINUED | OUTPATIENT
Start: 2024-12-23 | End: 2024-12-23

## 2024-12-23 RX ORDER — ONDANSETRON 2 MG/ML
4 INJECTION INTRAMUSCULAR; INTRAVENOUS EVERY 6 HOURS PRN
Status: DISCONTINUED | OUTPATIENT
Start: 2024-12-23 | End: 2024-12-23

## 2024-12-23 RX ORDER — METOCLOPRAMIDE 5 MG/1
10 TABLET ORAL EVERY 6 HOURS PRN
Status: DISCONTINUED | OUTPATIENT
Start: 2024-12-23 | End: 2024-12-23

## 2024-12-23 RX ORDER — ESCITALOPRAM OXALATE 10 MG/1
30 TABLET ORAL DAILY
Status: DISCONTINUED | OUTPATIENT
Start: 2024-12-23 | End: 2024-12-25 | Stop reason: HOSPADM

## 2024-12-23 RX ORDER — DEXTROSE MONOHYDRATE 25 G/50ML
25-50 INJECTION, SOLUTION INTRAVENOUS
Status: DISCONTINUED | OUTPATIENT
Start: 2024-12-23 | End: 2024-12-25 | Stop reason: HOSPADM

## 2024-12-23 RX ORDER — HALOPERIDOL 5 MG/ML
2 INJECTION INTRAMUSCULAR EVERY 6 HOURS PRN
Status: DISCONTINUED | OUTPATIENT
Start: 2024-12-23 | End: 2024-12-25 | Stop reason: HOSPADM

## 2024-12-23 RX ORDER — OLANZAPINE 5 MG/1
15 TABLET ORAL AT BEDTIME
Status: DISCONTINUED | OUTPATIENT
Start: 2024-12-23 | End: 2024-12-25 | Stop reason: HOSPADM

## 2024-12-23 RX ORDER — SCOLOPAMINE TRANSDERMAL SYSTEM 1 MG/1
1 PATCH, EXTENDED RELEASE TRANSDERMAL
Status: DISCONTINUED | OUTPATIENT
Start: 2024-12-23 | End: 2024-12-25 | Stop reason: HOSPADM

## 2024-12-23 RX ORDER — METHADONE HYDROCHLORIDE 10 MG/ML
74 CONCENTRATE ORAL DAILY
Status: DISCONTINUED | OUTPATIENT
Start: 2024-12-23 | End: 2024-12-25

## 2024-12-23 RX ORDER — POLYETHYLENE GLYCOL 3350 17 G/17G
17 POWDER, FOR SOLUTION ORAL 2 TIMES DAILY PRN
Status: DISCONTINUED | OUTPATIENT
Start: 2024-12-23 | End: 2024-12-25 | Stop reason: HOSPADM

## 2024-12-23 RX ORDER — METHADONE HYDROCHLORIDE 10 MG/ML
74 CONCENTRATE ORAL DAILY
COMMUNITY

## 2024-12-23 RX ORDER — AMOXICILLIN 250 MG
1 CAPSULE ORAL 2 TIMES DAILY PRN
Status: DISCONTINUED | OUTPATIENT
Start: 2024-12-23 | End: 2024-12-25 | Stop reason: HOSPADM

## 2024-12-23 RX ORDER — LIDOCAINE 40 MG/G
CREAM TOPICAL
Status: DISCONTINUED | OUTPATIENT
Start: 2024-12-23 | End: 2024-12-25 | Stop reason: HOSPADM

## 2024-12-23 RX ORDER — ALBUTEROL SULFATE 90 UG/1
2 INHALANT RESPIRATORY (INHALATION) EVERY 6 HOURS PRN
Status: DISCONTINUED | OUTPATIENT
Start: 2024-12-23 | End: 2024-12-25 | Stop reason: HOSPADM

## 2024-12-23 RX ORDER — OLANZAPINE 5 MG/1
5 TABLET, ORALLY DISINTEGRATING ORAL 4 TIMES DAILY PRN
Status: DISCONTINUED | OUTPATIENT
Start: 2024-12-23 | End: 2024-12-25 | Stop reason: HOSPADM

## 2024-12-23 RX ORDER — METOCLOPRAMIDE HYDROCHLORIDE 5 MG/ML
10 INJECTION INTRAMUSCULAR; INTRAVENOUS EVERY 6 HOURS PRN
Status: DISCONTINUED | OUTPATIENT
Start: 2024-12-23 | End: 2024-12-23

## 2024-12-23 RX ORDER — HYDROXYZINE HYDROCHLORIDE 25 MG/1
25-50 TABLET, FILM COATED ORAL EVERY 4 HOURS PRN
Status: DISCONTINUED | OUTPATIENT
Start: 2024-12-23 | End: 2024-12-25 | Stop reason: HOSPADM

## 2024-12-23 RX ORDER — PRAZOSIN HYDROCHLORIDE 2 MG/1
2 CAPSULE ORAL AT BEDTIME
Status: DISCONTINUED | OUTPATIENT
Start: 2024-12-23 | End: 2024-12-25 | Stop reason: HOSPADM

## 2024-12-23 RX ORDER — LOSARTAN POTASSIUM 25 MG/1
25 TABLET ORAL DAILY
Status: DISCONTINUED | OUTPATIENT
Start: 2024-12-23 | End: 2024-12-25 | Stop reason: HOSPADM

## 2024-12-23 RX ORDER — ONDANSETRON 4 MG/1
4 TABLET, ORALLY DISINTEGRATING ORAL EVERY 6 HOURS PRN
Status: DISCONTINUED | OUTPATIENT
Start: 2024-12-23 | End: 2024-12-23

## 2024-12-23 RX ADMIN — ONDANSETRON 4 MG: 2 INJECTION INTRAMUSCULAR; INTRAVENOUS at 06:51

## 2024-12-23 RX ADMIN — CLONIDINE HYDROCHLORIDE 0.1 MG: 0.1 TABLET ORAL at 21:30

## 2024-12-23 RX ADMIN — OLANZAPINE 5 MG: 5 TABLET, ORALLY DISINTEGRATING ORAL at 21:31

## 2024-12-23 RX ADMIN — SCOPOLAMINE 1 PATCH: 1.5 PATCH, EXTENDED RELEASE TRANSDERMAL at 02:35

## 2024-12-23 RX ADMIN — OLANZAPINE 5 MG: 5 TABLET, ORALLY DISINTEGRATING ORAL at 10:52

## 2024-12-23 RX ADMIN — INSULIN ASPART 2 UNITS: 100 INJECTION, SOLUTION INTRAVENOUS; SUBCUTANEOUS at 02:34

## 2024-12-23 RX ADMIN — SODIUM CHLORIDE: 9 INJECTION, SOLUTION INTRAVENOUS at 04:18

## 2024-12-23 RX ADMIN — METFORMIN HYDROCHLORIDE 1000 MG: 500 TABLET, FILM COATED ORAL at 17:41

## 2024-12-23 RX ADMIN — IOPAMIDOL 99 ML: 755 INJECTION, SOLUTION INTRAVENOUS at 01:08

## 2024-12-23 RX ADMIN — SODIUM CHLORIDE 65 ML: 9 INJECTION, SOLUTION INTRAVENOUS at 01:07

## 2024-12-23 RX ADMIN — ALUMINUM HYDROXIDE, MAGNESIUM HYDROXIDE, AND SIMETHICONE 30 ML: 200; 200; 20 SUSPENSION ORAL at 04:46

## 2024-12-23 RX ADMIN — HALOPERIDOL LACTATE 2 MG: 5 INJECTION, SOLUTION INTRAMUSCULAR at 17:41

## 2024-12-23 RX ADMIN — INSULIN GLARGINE 15 UNITS: 100 INJECTION, SOLUTION SUBCUTANEOUS at 02:39

## 2024-12-23 RX ADMIN — METHADONE HYDROCHLORIDE 74 MG: 10 CONCENTRATE ORAL at 12:25

## 2024-12-23 RX ADMIN — PROCHLORPERAZINE EDISYLATE 5 MG: 5 INJECTION INTRAMUSCULAR; INTRAVENOUS at 02:01

## 2024-12-23 RX ADMIN — CLONIDINE HYDROCHLORIDE 0.1 MG: 0.1 TABLET ORAL at 08:47

## 2024-12-23 RX ADMIN — PRAZOSIN HYDROCHLORIDE 2 MG: 2 CAPSULE ORAL at 21:31

## 2024-12-23 ASSESSMENT — ACTIVITIES OF DAILY LIVING (ADL)
ADLS_ACUITY_SCORE: 38
ADLS_ACUITY_SCORE: 58
ADLS_ACUITY_SCORE: 38
ADLS_ACUITY_SCORE: 58
ADLS_ACUITY_SCORE: 38
ADLS_ACUITY_SCORE: 58
ADLS_ACUITY_SCORE: 38
ADLS_ACUITY_SCORE: 38

## 2024-12-23 NOTE — PLAN OF CARE
Goal Outcome Evaluation: [id number]    Plan of Care Reviewed With: patient    Overall Patient Progress: no change    Outcome Evaluation:     VS: Hypertensive, tachycardic.   Neuro/CMS: A&Ox4.   Resp/Cardiac: Denies SOB, chest pain, dizziness.   GI/: Continent of bowel and bladder. Ongoing nausea and vomiting. Nausea PRNs available.  Skin: Small previous right abscess on right arm.   Activity: Independent.   Pain: 7/10 abdominal pain.   LDA: Right PIV infusing NS at 50ml/hr.   Diet: CLD. BG checks.   Other: Patient has been showering multiple times most of shift. Says showering and zyprexa are only things that keep her from vomiting.     Plan: Continue with POC.

## 2024-12-23 NOTE — ED PROVIDER NOTES
ED Provider Note  [facility]      History     Chief Complaint   Patient presents with    Nausea & Vomiting     Nausea/vomiting, body aches 2 days     HPI  Yun Coyle is a 48 year old female with a history of type 2 diabetes, cyclic vomiting syndrome, polysubstance use disorder, hepatitis C who presents to the emergency department with 2-day history of nausea and vomiting.  Patient states she has been unable to tolerate anything p.o.  She states her blood sugars are been running around 240 or so.  She denies any chest pain or dyspnea.  No fever or cough.  She denies abdominal pain.  She denies diarrhea and constipation.  She denies any dysuria, urgency, or frequency.      Past Medical History  Past Medical History:   Diagnosis Date    Acute pancreatitis 06/29/2017    Anxiety     Cauda equina syndrome (H) 03/29/2013    Cyst of right ovary 11/16/2017    Depressive disorder     Gastroesophageal reflux disease     Hypertension     Hyponatremia 10/01/2023    Low back pain 08/20/2012    Overview:   History of cauda equina and surgery.      Type 2 diabetes mellitus (H)     Uncomplicated asthma      Past Surgical History:   Procedure Laterality Date    BACK SURGERY      x2    CHOLECYSTECTOMY      ORTHOPEDIC SURGERY      SALPINGO OOPHORECTOMY,R/L/ELDA Right      Alcohol Swabs PADS  blood glucose (NO BRAND SPECIFIED) lancets standard  blood glucose (NO BRAND SPECIFIED) test strip  blood glucose calibration (NO BRAND SPECIFIED) solution  blood glucose monitoring (NO BRAND SPECIFIED) meter device kit  cloNIDine (CATAPRES) 0.1 MG tablet  cloNIDine (CATAPRES) 0.1 MG tablet  empagliflozin (JARDIANCE) 10 MG TABS tablet  escitalopram (LEXAPRO) 10 MG tablet  Glucagon (GVOKE HYPOPEN) 1 MG/0.2ML pen  glucose (BD GLUCOSE) 4 g chewable tablet  glucose 40 % (400 mg/mL) gel  hydrOXYzine HCl (ATARAX) 25 MG tablet  insulin aspart (NOVOLOG PEN) 100 UNIT/ML pen  insulin aspart (NOVOLOG PEN) 100 UNIT/ML  "pen  insulin aspart (NOVOLOG PEN) 100 UNIT/ML pen  insulin glargine (LANTUS PEN) 100 UNIT/ML pen  insulin pen needle (32G X 4 MM) 32G X 4 MM miscellaneous  losartan (COZAAR) 25 MG tablet  metFORMIN (GLUCOPHAGE) 500 MG tablet  methadone (DOLOPHINE-INTENSOL) 10 MG/ML (HIGH CONC) solution  OLANZapine zydis (ZYPREXA) 15 MG ODT  prazosin (MINIPRESS) 2 MG capsule  predniSONE (DELTASONE) 20 MG tablet  prochlorperazine (COMPAZINE) 25 MG suppository  senna-docusate (SENOKOT-S/PERICOLACE) 8.6-50 MG tablet  Sharps Container MISC      No Known Allergies  Family History  Family History   Problem Relation Age of Onset    Breast Cancer Mother         in her 50s.    Diabetes Mother     Breast Cancer Maternal Grandmother         in her 40s.    Diabetes Maternal Grandmother      Social History   Social History     Tobacco Use    Smoking status: Never    Smokeless tobacco: Never   Vaping Use    Vaping status: Never Used   Substance Use Topics    Alcohol use: No    Drug use: Not Currently     Types: Marijuana     Comment: pt was taking Oxycontin \"years ago.\" pt is now on Methadone.      A medically appropriate review of systems was performed with pertinent positives and negatives noted in the HPI, and all other systems negative.    Physical Exam   BP: (!) 144/93  Pulse: 113  Temp: 98  F (36.7  C)  Resp: 16  Height: 175.3 cm (5' 9\")  Weight: 94.2 kg (207 lb 9.6 oz)  SpO2: 97 %  Physical Exam  Vitals and nursing note reviewed.   Constitutional:       General: She is not in acute distress.     Appearance: She is not diaphoretic.   HENT:      Head: Normocephalic and atraumatic.   Eyes:      Extraocular Movements: Extraocular movements intact.      Conjunctiva/sclera: Conjunctivae normal.   Cardiovascular:      Rate and Rhythm: Tachycardia present.      Heart sounds: Normal heart sounds.   Pulmonary:      Effort: Pulmonary effort is normal. No respiratory distress.      Breath sounds: Normal breath sounds.   Abdominal:      Palpations: " Abdomen is soft.      Tenderness: There is no abdominal tenderness.   Musculoskeletal:         General: No tenderness. Normal range of motion.      Cervical back: Normal range of motion and neck supple.   Skin:     General: Skin is warm.      Findings: No rash.   Neurological:      General: No focal deficit present.      Motor: No weakness.      Coordination: Coordination normal.   Psychiatric:         Mood and Affect: Mood normal.           ED Course, Procedures, & Data      Reviewed emergency department encounter for nausea and vomiting (cyclic vomiting) on 10/29/2024 and 8/29/2024.  Reviewed Emergency Department encounter and subsequent mental health admission for nausea and vomiting, arm abscess, and suicidal ideation on 11/12/2024  Reviewed previous CBC, comprehensive metabolic panel, lipase, urinalysis.  Procedures                Results for orders placed or performed during the hospital encounter of 12/22/24   Comprehensive metabolic panel     Status: Abnormal   Result Value Ref Range    Sodium 140 135 - 145 mmol/L    Potassium 4.8 3.4 - 5.3 mmol/L    Carbon Dioxide (CO2) 26 22 - 29 mmol/L    Anion Gap 13 7 - 15 mmol/L    Urea Nitrogen 16.4 6.0 - 20.0 mg/dL    Creatinine 0.87 0.51 - 0.95 mg/dL    GFR Estimate 83 >60 mL/min/1.73m2    Calcium 9.7 8.8 - 10.4 mg/dL    Chloride 101 98 - 107 mmol/L    Glucose 238 (H) 70 - 99 mg/dL    Alkaline Phosphatase 233 (H) 40 - 150 U/L     (H) 0 - 45 U/L     (H) 0 - 50 U/L    Protein Total 8.9 (H) 6.4 - 8.3 g/dL    Albumin 4.1 3.5 - 5.2 g/dL    Bilirubin Total 0.3 <=1.2 mg/dL   Lipase     Status: Normal   Result Value Ref Range    Lipase 34 13 - 60 U/L   HCG quantitative pregnancy     Status: Abnormal   Result Value Ref Range    hCG Quantitative 5 (H) <5 mIU/mL   CBC with platelets and differential     Status: None   Result Value Ref Range    WBC Count 7.7 4.0 - 11.0 10e3/uL    RBC Count 4.83 3.80 - 5.20 10e6/uL    Hemoglobin 13.6 11.7 - 15.7 g/dL    Hematocrit  40.3 35.0 - 47.0 %    MCV 83 78 - 100 fL    MCH 28.2 26.5 - 33.0 pg    MCHC 33.7 31.5 - 36.5 g/dL    RDW 13.2 10.0 - 15.0 %    Platelet Count 278 150 - 450 10e3/uL    % Neutrophils 43 %    % Lymphocytes 42 %    % Monocytes 7 %    % Eosinophils 8 %    % Basophils 1 %    % Immature Granulocytes 0 %    NRBCs per 100 WBC 0 <1 /100    Absolute Neutrophils 3.3 1.6 - 8.3 10e3/uL    Absolute Lymphocytes 3.3 0.8 - 5.3 10e3/uL    Absolute Monocytes 0.5 0.0 - 1.3 10e3/uL    Absolute Eosinophils 0.6 0.0 - 0.7 10e3/uL    Absolute Basophils 0.0 0.0 - 0.2 10e3/uL    Absolute Immature Granulocytes 0.0 <=0.4 10e3/uL    Absolute NRBCs 0.0 10e3/uL   CBC with platelets differential     Status: None    Narrative    The following orders were created for panel order CBC with platelets differential.  Procedure                               Abnormality         Status                     ---------                               -----------         ------                     CBC with platelets and d...[[id number]]                      Final result                 Please view results for these tests on the individual orders.     Medications   sodium chloride 0.9% BOLUS 1,000 mL (1,000 mLs Intravenous $New Bag 12/22/24 2353)   sodium chloride 0.9% BOLUS 1,000 mL (0 mLs Intravenous Stopped 12/22/24 2030)   OLANZapine (zyPREXA) injection 10 mg (10 mg Intramuscular $Given 12/22/24 1846)   metoclopramide (REGLAN) injection 5 mg (5 mg Intravenous $Given 12/22/24 2140)   ondansetron (ZOFRAN) injection 8 mg (8 mg Intravenous $Given 12/22/24 2236)   LORazepam (ATIVAN) injection 1 mg (1 mg Intravenous $Given 12/22/24 2353)     Labs Ordered and Resulted from Time of ED Arrival to Time of ED Departure   COMPREHENSIVE METABOLIC PANEL - Abnormal       Result Value    Sodium 140      Potassium 4.8      Carbon Dioxide (CO2) 26      Anion Gap 13      Urea Nitrogen 16.4      Creatinine 0.87      GFR Estimate 83      Calcium 9.7      Chloride 101      Glucose 238  (*)     Alkaline Phosphatase 233 (*)      (*)      (*)     Protein Total 8.9 (*)     Albumin 4.1      Bilirubin Total 0.3     HCG QUANTITATIVE PREGNANCY - Abnormal    hCG Quantitative 5 (*)    LIPASE - Normal    Lipase 34     CBC WITH PLATELETS AND DIFFERENTIAL    WBC Count 7.7      RBC Count 4.83      Hemoglobin 13.6      Hematocrit 40.3      MCV 83      MCH 28.2      MCHC 33.7      RDW 13.2      Platelet Count 278      % Neutrophils 43      % Lymphocytes 42      % Monocytes 7      % Eosinophils 8      % Basophils 1      % Immature Granulocytes 0      NRBCs per 100 WBC 0      Absolute Neutrophils 3.3      Absolute Lymphocytes 3.3      Absolute Monocytes 0.5      Absolute Eosinophils 0.6      Absolute Basophils 0.0      Absolute Immature Granulocytes 0.0      Absolute NRBCs 0.0     GLUCOSE MONITOR NURSING POCT   ROUTINE UA WITH MICROSCOPIC REFLEX TO CULTURE     CT Abdomen Pelvis w Contrast    (Results Pending)             EKG Interpretation:      Interpreted by BERNICE BAZZI MD, MD  Time reviewed:0030   Symptoms at time of EKG: Vomiting  Rhythm: Normal sinus   Rate: 100  Axis: Normal  Ectopy: None  Conduction: Normal  ST Segments/ T Waves: Non-specific ST-T wave changes  Q Waves: None  Comparison to prior: Flattened T waves compared to previous    Clinical Impression: non-specific EKG     Critical care was not performed.     Medical Decision Making  The patient's presentation was of moderate complexity (a chronic illness mild to moderate exacerbation, progression, or side effect of treatment).    The patient's evaluation involved:  review of external note(s) from 2 sources (see separate area of note for details)  review of 3+ test result(s) ordered prior to this encounter (see separate area of note for details)  ordering and/or review of 3+ test(s) in this encounter (see separate area of note for details)    The patient's management necessitated high risk (a decision regarding  hospitalization).    Assessment & Plan    48 year old female with history of type 2 diabetes, cyclic vomiting syndrome to the emerged part with nausea and vomiting.  She has no abdominal pain or tenderness.  She is mildly tachycardic but otherwise has normal vital signs.  Patient's labs are largely unrevealing except for a blood sugar of 238.  There is no other significant metabolic derangement.  Normal lipase.  Liver enzymes elevated to baseline.  Given IV normal saline and IM Zyprexa with temporary control of her symptoms.  She subsequently experienced recurrent vomiting.  She was subsequently given Reglan and ondansetron without improvement.  Follow-up EKG has significant artifact so QT difficult to determine.   She was subsequently given IV lorazepam but continues to vomit.  Subsequent EKG reveals normal QTc interval of 399 ms. She has nausea and vomiting that has been intractable to Zyprexa, ondansetron, Reglan, and now lorazepam.  Will admit on observation basis for further management.    I have reviewed the nursing notes. I have reviewed the findings, diagnosis, plan and need for follow up with the patient.    New Prescriptions    No medications on file       Final diagnoses:   Intractable nausea and vomiting     Chart documentation was completed with Dragon voice-recognition software. Even though reviewed, this chart may still contain some grammatical, spelling, and word errors.     Andersno Blas Md    [facility] EMERGENCY DEPARTMENT  12/22/2024     Anderson Blas MD  12/23/24 0022       Anderson Blas MD  12/23/24 0036

## 2024-12-23 NOTE — PHARMACY-ADMISSION MEDICATION HISTORY
Pharmacist Admission Medication History    Patient was recently admitted from 12/12--12/4 see medication history and discharge summary from admission.    Admission Medication History completed by pharmacist, Sebastian Ross on 11/13/24. Please see Pharmacy - Admission Medication History note under previous encounter at [facility]. for information regarding prior to admission medications.    Subha Diaz, PharmD   Clinical Pharmacist

## 2024-12-23 NOTE — PHARMACY-CONSULT NOTE
Pharmacy Consult Note    Opioid Treatment Program ([facility]) Information Note      Treatment program name: [facility]   Location ([facility]): [place], [place]   Phone number: [phone]              Spoke with: Ingris      Patient's current methadone dose verified as: 74 mg PO daily   Last dose was administered on: 12/21   Take-home dose information (if applicable): 1 for 12/22      Note(s): Treatment programs in [place] dispense methadone using the 10 mg/ml oral concentrate.       Marcia Morales, PharmD  PGY1 Pharmacy Resident

## 2024-12-23 NOTE — PROGRESS NOTES
[facility]    Progress Note - [facility] Family Medicine Service       Date of Admission:  12/22/2024    Main Plans for Today   - Zyprexa 5mg ODT QID PRN, Haldol 2mg IV q6h PRN for nausea  - daily EKG for QT monitoring  - PTA Methadone 74mg daily  - Switched to NPO q4h HDSSI schedule  - NS @100ml/h, to timeout this afternoon    Assessment & Plan   Yun Coyle is a 48 year old female admitted on 12/22/2024. She has a history of major depression, generalized anxiety, PTSD, panic disorder with agoraphobia, polysubstance use disorder on methadone, type 2 diabetes mellitus (last A1C 11.2), cyclic vomiting and is admitted with observation status for intractable nausea and vomiting with inability to tolerate PO.      #Cyclic vomiting syndrome, suspect cannabinoid hyperemesis  #Persistent N/V, inability to tolerate PO  Chronic issue with 17 ED encounters in last 6 months. Possibly related to cannabis use. Currently using cannabis 1-2 times per week, previously had been daily user. Reports 3 days of not being able to keep down food or liquids. Her typical antiemetic regimen (nightly olanzapine, PRN snorted fentanyl) has been ineffective. QTc in ED originally reported as 619, but then corrected to 399 on recheck.  - CMP qAM  - daily EKG  - HOLD PTA olanzapine 15mg at bedtime  - Scopolamine patch in place  - Maalox q4h PRN nausea  - Zyprexa 5mg ODT QID PRN (1st line)  - Haldol 2mg IV q6h PRN (2nd line)  - Consider use of droperidol PRN if above not effective (favor avoiding at present given higher cardiac risk)  - NS @100ml/h, to timeout this afternoon     #T2DM with hyperglycemia  #Anion gap metabolic acidosis, presumed starvation  Last A1C 11.6 1 month ago. Restarted on insulin during last admission, reports fairly good adherence though has not taken insulin (or her other medications) while she's been unwell these past few days. Glucose elevated throughout admission with  some moderately elevated ketones - favor starvation ketosis over DKA. Will manage BG with hydration & q4h sliding scale insulin as she reintroduces normal diet.  - Lantus 30u nightly (PTA prescribed dose 40u nightly, but has only been taking 30u at home)  - PTA aspart: 1u:10g CHO w/meals; 1u:7g CHO w/snacks  - HDSSI (currently on NPO sched, q4h checks)  - Hypoglycemia protocol  - PTA metformin 1000mg BID  - PTA jardiance 10mg daily  - Ketones, Mag, Phos in AM    #Opioid use disorder  On methadone for many years, though dose miss some doses when feeling too unwell to go to the clinic. Reports using fentanyl primarily as an antiemetic. When feeling unwell, sorts fentanyl up to 2 times per day, she does not know quantity. Denies IV drug use. Reports BID use last 3 days, and a period of abstinence prior to that. Denies history of opioid withdrawal. Not interested in chemical dependency treatment.   - PTA Methadone 74mg daily  - Naloxone PRN     #Transaminitis  #Hepatitis C, untreated  Hepatitis C diagnosed earlier this year. Hep C RNA viral load 173,000 11/19/24. CT A/P on admit with findings possibly consistent with early cirrhosis.  - Consider utility of further testing for Hep C genotyping, etc given pt's profound difficulty following-up outpatient.   - Encourage outpatient follow-up for treatment. Provide referral on discharge if needed.       #Hypertension  Acutely elevated blood pressure on admit without evidence of end-organ damage.   - PTA losartan      #Mood   Recently admitted with suicidal ideation, denies SI/SH currently. Did not follow-up with planned intensive outpatient psychiatric programming, states she felt too sick.   - PTA sertraline  - PTA clonidine 0.1mg BID  - PTA hydroxyzine q4h PRN      #Asthma  Endorses daily use of albuterol. Has been years since she's had a preventative inhaler. She was prescribed prednisone in January for an asthma exacerbation. Open to restarting with a preventative  "inhaler. Would favor SMART therapy, though not available on hospital formulary.   - PTA albuterol PRN  - Start Breo-ellipta      #Indeterminant quant-HCG  Quant hcg 5. Identical result earlier this year. Likely does not reflect pregnancy.   - Ask pt for last LMP  - Consider utility of repeat      #SDOH  History of trouble with medication adherence. History of difficulty with outpatient follow-up due to agoraphobia. Financial concerns noted frequently in EMR.  - Inquire in AM if pt desires social work consult.        Diet: Advance Diet as Tolerated: Clear Liquid Diet    DVT Prophylaxis: Low Risk/Ambulatory with no VTE prophylaxis indicated  Blanc Catheter: Not present  Fluids: NS 100ml/h  Lines: None     Cardiac Monitoring: None  Code Status: Full Code      Clinically Significant Risk Factors Present on Admission                   # Hypertension: Noted on problem list          # DMII: A1C = 11.2 % (Ref range: <5.7 %) within past 6 months    # Overweight: Estimated body mass index is 29.53 kg/m  as calculated from the following:    Height as of this encounter: 1.753 m (5' 9\").    Weight as of this encounter: 90.7 kg (200 lb).       # Financial/Environmental Concerns:    # Asthma: noted on problem list        Disposition Plan      Expected Discharge Date: 12/24/2024                The patient's care was discussed with the Attending Physician, Dr. Cruz.    [facility]  [facility]  Securely message with [employer] (more info)  Text page via [employer] Paging/Directory   See signed in provider for up to date coverage information  ______________________________________________________________________    Interval History   Overnight:  Admitted overnight    Today: Patient reports continued nausea and vomiting with only minimal oral intake, states that Zofran, Compazine, Reglan, etc not usually helpful. Hot showers, zyprexa, " droperidol have been helpful. No other worsening or new symptoms.    Physical Exam   Vital Signs: Temp: 98.3  F (36.8  C) Temp src: Oral BP: (!) 174/106 (MD aware) Pulse: 96   Resp: 18 SpO2: 96 % O2 Device: None (Room air)    Weight: 200 lbs 0 oz  Vitals reviewed.   Constitutional: A&Ox4, in NAD, able to ambulate around room and in hallway without difficulty  Respiratory: Lungs CTAB without increased work of breathing  Cardiovascular: RRR without murmurs or extra sounds  Abdomen: Soft, non-distended, mildly tender to palpation  Skin: No new rash or lesion on limited exam  Musculoskeletal: No lower extremity tenderness or edema  Psychiatric: Mood & affect appropriate, consistent with baseline     Medical Decision Making   Please see A&P for additional details of medical decision making    Data   ------------------------- PAST 24 HR DATA REVIEWED -----------------------------------------------    I have personally reviewed the following data over the past 24 hrs:    7.7  \   13.6   / 278     140 101 16.4 /  323 (H)   4.8 26 0.87 \     ALT: 173 (H) AST: 112 (H) AP: 233 (H) TBILI: 0.3   ALB: 4.1 TOT PROTEIN: 8.9 (H) LIPASE: 34       Imaging results reviewed over the past 24 hrs:   Recent Results (from the past 24 hours)   CT Abdomen Pelvis w Contrast    Narrative    EXAM: CT ABDOMEN PELVIS W CONTRAST  LOCATION: [facility]  DATE: 12/23/2024    INDICATION: Vomiting and mid abdominal pain.  COMPARISON: 3/26/2024.  TECHNIQUE: CT scan of the abdomen and pelvis was performed following injection of IV contrast. Multiplanar reformats were obtained. Dose reduction techniques were used.  CONTRAST: 99 mL Isovue-370.    FINDINGS:    LOWER CHEST: Small mucous plugs within both lower lobes. Atherosclerosis of coronary arteries. Moderate-sized hiatal hernia.    HEPATOBILIARY: Subtle liver surface nodularity and widening of periportal spaces, which may suggest the early changes of  cirrhosis.    Cholecystectomy.    Unchanged mild biliary prominence, likely secondary to the postcholecystectomy state.    PANCREAS: Enhances normally. No peripancreatic inflammatory fat stranding.    SPLEEN: Enhances normally. Normal size.    ADRENAL GLANDS: Normal.    KIDNEYS: Both kidneys enhance symmetrically, without hydronephrosis. Persistent fetal lobulation of both kidneys.    No nephroureterolithiasis.    Urinary bladder is unremarkable.    PELVIC ORGANS: No suspicious abnormality. A simple appearing left adnexal cyst measures up to 1.3 cm, previously 1.2 cm. No further follow-up is recommended.    BOWEL: No evidence of acute gastrointestinal inflammation or obstruction. Normal appendix. Mild diffuse colonic stool burden.    No intraperitoneal free fluid or free air.    LYMPH NODES: No suspicious abdominal or pelvic lymphadenopathy.    VASCULATURE: No abdominal aortic aneurysm. Mild atheromatous disease.    MUSCULOSKELETAL: No suspicious abnormality. Lumbar postsurgical change.    OTHER: No additionally significant abnormalities.      Impression    IMPRESSION:   1.  No acute CT abnormality of the abdomen/pelvis.  2.  Moderate-sized hiatal hernia.  3.  Possible early changes of cirrhosis.

## 2024-12-23 NOTE — H&P
[facility]    History and Physical - [facility]       Date of Admission:  12/22/2024    Assessment & Plan      Yun Coyle is a 48 year old female admitted on 12/22/2024. She has a history of major depression, generalized anxiety, PTSD, panic disorder with agoraphobia, polysubstance use disorder on methadone, type 2 diabetes mellitus (last A1C 11.2), cyclic vomiting and is admitted with observation status for intractable nausea and vomiting with inability to tolerate PO.     #Cyclic vomiting syndrome  #Persistent N/V, inability to tolerate PO  Chronic issue with 17 ED encounters in last 6 months. Possibly related to cannabis use. Currently using cannabis 1-2 times per week, previously had been daily user. Reports 3 days of not being able to keep down food or liquids. Her typical antiemetic regimen (nightly olanzapine, PRN snorted fentanyl) has been ineffective. S/P fluid resuscitation in ED. Labs overall reassuring - no anion gap, no serum ketones, no electrolyte derangements. Appears euvolemic on exam, though endorses dry mouth. Tachycardic. Qtc in ED: 399.     - AM CMP  - S/P 2L NS bolus in ED. Continue with 50ml/hr NS for 12 hours.   - PTA olanzapine 15mg at bedtime  - Scopolamine patch (has not tried this before)   Nausea PRNs:  1st-line: ondansetron 4mg IV/ODT q6h PRN  2nd-line: prochorperazine 10mg IV/PO q6h PRN   3rd-line: metoclopramide 10mg IV/PO q6h PRN  - Maalox     #Opioid use disorder  On methadone for many years, though dose miss some doses when feeling too unwell to go to the clinic. Reports using fentanyl primarily as an antiemetic. When feeling unwell, sorts fentanyl up to 2 times per day, she does not know quantity. Denies IV drug use. Reports BID use last 3 days, and a period of abstinence prior to that. Denies history of opioid withdrawal. Not interested in chemical dependency treatment.   - Pharmacy consult to verify  outpatient methadone with [place]   - Naloxone PRN    #Elevated liver enzymes    #Hepatitis C, untreated  Hepatitis C diagnosed earlier this year. Hep C RNA viral load 173,000 11/19/24. CT A/P on admit with findings possibly consistent with early cirrhosis.   - INR  - Consider utility of further testing for Hep C genotyping, etc given pt's profound difficulty following-up outpatient.   - Encourage outpatient follow-up for treatment. Provide referral on discharge if needed.     #T2DM  Last A1C 11.6 1 month ago. Restarted on insulin during last admission, reports fairly good adherence though has not taken insulin (or her other medications) while she's been unwell these past few days.   - PTA lantus - prescribed 40 units, has been taking 30 units. Will start with 15 units on evening of admission given minimal PO intake.   - PTA aspart: 1 unit per 10 carbs with meals + HDSS  - Hypoglycemia protocol   - PTA metformin, no evidence of GENA therefore risk for metabolic acidosis during inpatient hospitalization is low.  - PTA jardiance     #Hypertension  Acutely elevated blood pressure on admit without evidence of end-organ damage.   - PTA losartan     #Mood   Recently admitted with suicidal ideation, denies SI/SH currently. Did not follow-up with planned intensive outpatient psychiatric programming, states she felt too sick.   - PTA sertraline  - PTA clonidine 0.1mg BID  - PTA hydroxyzine q4h PRN     #Asthma  Endorses daily use of albuterol. Has been years since she's had a preventative inhaler. She was prescribed prednisone in January for an asthma exacerbation. Open to restarting with a preventative inhaler. Would favor SMART therapy, though not available on hospital formulary.   - PTA albuterol PRN  - Start Breo-ellipta     #Indeterminant quant-HCG  Quant hcg 5. Identical result earlier this year. Likely does not reflect pregnancy.   - Ask pt for last LMP  - Consider utility of repeat     #SDOH  History of trouble with  "medication adherence. History of difficulty with outpatient follow-up due to agoraphobia. Financial concerns noted frequently in EMR.  - Inquire in AM if pt desires social work consult.         Diet: Advance Diet as Tolerated: Clear Liquid Diet  DVT Prophylaxis: Pneumatic Compression Devices  Blanc Catheter: Not present  Fluids: 2L NS in ED, then 50ml/hr NS for 12 hours, re-evaluate on AM rounds.   Lines: None     Cardiac Monitoring: None  Code Status: Full Code    Clinically Significant Risk Factors Present on Admission                   # Hypertension: Noted on problem list          # DMII: A1C = 11.2 % (Ref range: <5.7 %) within past 6 months    # Overweight: Estimated body mass index is 29.53 kg/m  as calculated from the following:    Height as of this encounter: 1.753 m (5' 9\").    Weight as of this encounter: 90.7 kg (200 lb).         # Financial/Environmental Concerns:    # Asthma: noted on problem list        Disposition Plan      Expected Discharge Date: 12/24/2024                The patient's care was discussed with the Attending Physician, Dr. Ramírez Hinson .      Katie Logan MD  [facility]  [facility]  Securely message with [employer] (more info)  Text page via [facility] Paging/Directory   See signed in provider for up to date coverage information  ______________________________________________________________________    Chief Complaint   Nausea, Vomiting    History is obtained from the patient and electronic health record    History of Present Illness   Yun Coyle is a 48 year old female admitted on 12/22/2024. She has a history of major depression, generalized anxiety, PTSD, panic disorder with agoraphobia, polysubstance use disorder on methadone, type 2 diabetes mellitus (last A1C 11.2), cyclic vomiting and is admitted for intractable nausea and vomiting with inability to tolerate PO.     Yun reports about 3 days of " persistent nausea/vomiting, not able to eat or drink anything. She is prescribed nightly olanzapine, which she states typically works, but has not been helping. She has been snorting fentanyl up to 2 times per day (she does not know how much) as her primary antiemetic. She states it helps. She does not use fentanyl when she is not nauseous - states before this 3 day period it had been several days since her last use.     She had previously used THC daily, but now states only using it 1-2 times per week.     She denies hematemesis. Last BM yesterday. She denies difficulty with breathing currently, though has used her rescue albuterol daily.     She has struggled in the past with medication adherence, but states had been taking her medications as-prescribed until she began to feel unwell ~3 days ago.     ED Course notable for tachycardia without hypotension, nausea refractory to one 5mg dose of metoclopramide, one 10mg dose of olanzapine, one 8mg dose of ondansetron, and 1mg IV lorazepam. She received 2L of NS. She did not receive any insulin. CBC unremarkable, CMP notable for hyperglycemia and elevated liver function tests, CT A/P notable for liver possibly consistent with early cirrhosis.     This is  her ~17th presentation to the ED for nausea and vomiting in the past 6 months.     She was previously admitted to this hospital 11/12/24-12/4/2024. She presented for wound care and required surgical management for an abscess on her right arm. She was, at the time, not taking any of her medications except methadone, and was experiencing suicidal ideation. Currently denies SI/SH.     Past Medical History    Past Medical History:   Diagnosis Date    Acute pancreatitis 06/29/2017    Anxiety     Cauda equina syndrome (H) 03/29/2013    Cyst of right ovary 11/16/2017    Depressive disorder     Gastroesophageal reflux disease     Hypertension     Hyponatremia 10/01/2023    Low back pain 08/20/2012    Overview:   History of  cauda equina and surgery.      Type 2 diabetes mellitus (H)     Uncomplicated asthma        Past Surgical History   Past Surgical History:   Procedure Laterality Date    BACK SURGERY      x2    CHOLECYSTECTOMY      ORTHOPEDIC SURGERY      SALPINGO OOPHORECTOMY,R/L/ELDA Right        Prior to Admission Medications   Prior to Admission Medications   Prescriptions Last Dose Informant Patient Reported? Taking?   Alcohol Swabs PADS Unknown  No No   Sig: Use to swab the area of the injection or marla as directed Per insurance coverage   Glucagon (GVOKE HYPOPEN) 1 MG/0.2ML pen Unknown  No No   Sig: Inject the contents of 1 device under the skin into lower abdomen, outer thigh, or outer upper arm as needed for hypoglycemia. If no response after 15 minutes, additional 1 mg dose from a new device may be injected while waiting for emergency assistance.   OLANZapine zydis (ZYPREXA) 15 MG ODT Past Week  No Yes   Sig: Take 1 tablet (15 mg) by mouth at bedtime.   Patient taking differently: Take 15 mg by mouth nightly as needed (Nausea).   Sharps Container MISC Unknown  No No   Sig: Use as directed to dispose of needles, lancets and other sharps   blood glucose (NO BRAND SPECIFIED) lancets standard Past Week  No Yes   Sig: To use to test glucose level in the blood Use to test blood sugar  3  times daily as directed. To accompany glucose monitor brands per insurance coverage.   blood glucose (NO BRAND SPECIFIED) test strip Past Week  No Yes   Sig: To use to test glucose level in the blood Use to test blood sugar  3  times daily as directed. To accompany glucose monitor brands per insurance coverage.   blood glucose calibration (NO BRAND SPECIFIED) solution Past Week  No Yes   Sig: Used to calibrate the blood glucose monitor as needed and as directed.  To accompany  blood glucose brands per insurance coverage   blood glucose monitoring (NO BRAND SPECIFIED) meter device kit Past Week  No Yes   Sig: Use as directed Per insurance  coverage   cloNIDine (CATAPRES) 0.1 MG tablet Past Week  Yes Yes   Sig: Take 0.1 mg by mouth 2 times daily.   empagliflozin (JARDIANCE) 10 MG TABS tablet Past Week  No Yes   Sig: Take 1 tablet (10 mg) by mouth daily.   escitalopram (LEXAPRO) 10 MG tablet Past Week  No Yes   Sig: Take 3 tablets (30 mg) by mouth daily.   glucose (BD GLUCOSE) 4 g chewable tablet Unknown  No No   Sig: Take 4 tablets by mouth every 15 minutes as needed for low blood sugar.   glucose 40 % (400 mg/mL) gel Unknown  No No   Sig: 15 g every 15 minutes by mouth as needed for low blood sugar.  Oral gel is preferable for conscious and able to swallow patient.   hydrOXYzine HCl (ATARAX) 25 MG tablet Past Week  No Yes   Sig: Take 1-2 tablets (25-50 mg) by mouth every 4 hours as needed for anxiety.   Patient taking differently: Take 25-50 mg by mouth 3 times daily.   insulin aspart (NOVOLOG PEN) 100 UNIT/ML pen Past Week  No Yes   Sig: Inject 1-10 Units subcutaneously 3 times daily (with meals). Do Not give Correction Insulin if Pre-Meal BG less than 140. For Pre-Meal  - 164 give 1 unit. For Pre-Meal  - 189 give 2 units. For Pre-Meal  - 214 give 3 units. For Pre-Meal  - 239 give 4 units. For Pre-Meal  - 264 give 5 units. For Pre-Meal  - 289 give 6 units. For Pre-Meal  - 314 give 7 units. For Pre-Meal  - 339 give 8 units. For Pre-Meal  - 364 give 9 units.  For Pre-Meal BG greater than or equal to 365 give 10 units   insulin aspart (NOVOLOG PEN) 100 UNIT/ML pen Past Week  No Yes   Sig: Inject 1-10 Units subcutaneously 3 times daily (with meals). Give 1 unit of insulin for each 10g of carbohydrates   insulin aspart (NOVOLOG PEN) 100 UNIT/ML pen Past Week  No Yes   Sig: Inject 1-7 Units subcutaneously at bedtime. Do Not give Bedtime Correction Insulin if BG less than 200. For  - 224 give 1 units. For  - 249 give 2 units. For  - 274 give 3 units. For  - 299 give 4 units. For   - 324 give 5 units. For  - 349 give 6 units. For BG greater than or equal to 350 give 7 units.   insulin glargine (LANTUS PEN) 100 UNIT/ML pen Past Week  No Yes   Sig: Inject 40 Units subcutaneously at bedtime.   Patient taking differently: Inject 30 Units subcutaneously at bedtime.   insulin pen needle (32G X 4 MM) 32G X 4 MM miscellaneous Past Week  No Yes   Sig: Use as directed by provider Per insurance coverage   losartan (COZAAR) 25 MG tablet Past Week  No Yes   Sig: Take 1 tablet (25 mg) by mouth daily.   metFORMIN (GLUCOPHAGE) 500 MG tablet Past Week  No Yes   Sig: Take 2 tablets (1,000 mg) by mouth 2 times daily (with meals).   methadone (DOLOPHINE-INTENSOL) 10 MG/ML (HIGH CONC) solution Past Week  Yes Yes   Sig: Take 150 mg by mouth daily.   prazosin (MINIPRESS) 2 MG capsule Past Week  No Yes   Sig: Take 1 capsule (2 mg) by mouth at bedtime.      Facility-Administered Medications: None        Physical Exam   Vital Signs: Temp: 98.3  F (36.8  C) Temp src: Oral BP: (!) 174/106 (MD aware) Pulse: 96   Resp: 18 SpO2: 96 % O2 Device: None (Room air)    Weight: 200 lbs 0 oz    Physical Exam  Constitutional:       General: She is in acute distress.      Appearance: She is ill-appearing and toxic-appearing.   HENT:      Mouth/Throat:      Mouth: Mucous membranes are moist.   Cardiovascular:      Rate and Rhythm: Regular rhythm. Tachycardia present.      Heart sounds: No murmur heard.  Pulmonary:      Effort: Pulmonary effort is normal.      Breath sounds: Normal breath sounds.   Abdominal:      General: There is no distension.      Palpations: Abdomen is soft.      Tenderness: There is abdominal tenderness. There is no guarding.   Skin:     Capillary Refill: Capillary refill takes less than 2 seconds.      Comments: Site of previous abscess on right arm - small area of violaceous tissue surrounding scab. No drainage, erythema, induration.    Neurological:      Mental Status: She is alert.              Data     I have personally reviewed the following data over the past 24 hrs:    7.7  \   13.6   / 278     140 101 16.4 /  323 (H)   4.8 26 0.87 \     ALT: 173 (H) AST: 112 (H) AP: 233 (H) TBILI: 0.3   ALB: 4.1 TOT PROTEIN: 8.9 (H) LIPASE: 34       Imaging results reviewed over the past 24 hrs:   Recent Results (from the past 24 hours)   CT Abdomen Pelvis w Contrast    Narrative    EXAM: CT ABDOMEN PELVIS W CONTRAST  LOCATION: [facility]  DATE: 12/23/2024    INDICATION: Vomiting and mid abdominal pain.  COMPARISON: 3/26/2024.  TECHNIQUE: CT scan of the abdomen and pelvis was performed following injection of IV contrast. Multiplanar reformats were obtained. Dose reduction techniques were used.  CONTRAST: 99 mL [id number].    FINDINGS:    LOWER CHEST: Small mucous plugs within both lower lobes. Atherosclerosis of coronary arteries. Moderate-sized hiatal hernia.    HEPATOBILIARY: Subtle liver surface nodularity and widening of periportal spaces, which may suggest the early changes of cirrhosis.    Cholecystectomy.    Unchanged mild biliary prominence, likely secondary to the postcholecystectomy state.    PANCREAS: Enhances normally. No peripancreatic inflammatory fat stranding.    SPLEEN: Enhances normally. Normal size.    ADRENAL GLANDS: Normal.    KIDNEYS: Both kidneys enhance symmetrically, without hydronephrosis. Persistent fetal lobulation of both kidneys.    No nephroureterolithiasis.    Urinary bladder is unremarkable.    PELVIC ORGANS: No suspicious abnormality. A simple appearing left adnexal cyst measures up to 1.3 cm, previously 1.2 cm. No further follow-up is recommended.    BOWEL: No evidence of acute gastrointestinal inflammation or obstruction. Normal appendix. Mild diffuse colonic stool burden.    No intraperitoneal free fluid or free air.    LYMPH NODES: No suspicious abdominal or pelvic lymphadenopathy.    VASCULATURE: No abdominal aortic  aneurysm. Mild atheromatous disease.    MUSCULOSKELETAL: No suspicious abnormality. Lumbar postsurgical change.    OTHER: No additionally significant abnormalities.      Impression    IMPRESSION:   1.  No acute CT abnormality of the abdomen/pelvis.  2.  Moderate-sized hiatal hernia.  3.  Possible early changes of cirrhosis.

## 2024-12-23 NOTE — ED TRIAGE NOTES
Triage Assessment (Adult)       Row Name 12/22/24 3725          Triage Assessment    Airway WDL WDL        Respiratory WDL    Respiratory WDL WDL        Skin Circulation/Temperature WDL    Skin Circulation/Temperature WDL WDL        Cardiac WDL    Cardiac WDL WDL        Cognitive/Neuro/Behavioral WDL    Cognitive/Neuro/Behavioral WDL WDL

## 2024-12-23 NOTE — PLAN OF CARE
"Goal Outcome Evaluation:      VS: BP (!) 174/106   Pulse 90   Temp 98.2  F (36.8  C) (Oral)   Resp 18   Ht 1.753 m (5' 9\")   Wt 90.7 kg (200 lb)   LMP  (LMP Unknown)   SpO2 96%   BMI 29.53 kg/m       O2: Stable on room air >90%   Output: Voids without difficulties   Last BM: 12/22/24   Activity: Independent   Up for meals? Yes   Skin: Intact   Pain: Reports abdominal pain at 7/10   CMS: AO x4   Dressing: None   Diet: Clear liquid    LDA: Rt PIV infusing NS @ 100mL/hr   Equipment: IV pole, personal belongings   Plan: Continue with POC   Additional Info: Takes Zyprexa and haldol for nausea   Takes long showers, states it helps with her nausea  Declined to be on tele, MD notified will do daily EKG for QT monitoring        "

## 2024-12-23 NOTE — PLAN OF CARE
8MS Admission Note 0302    Reason for admission: Nausea and vomiting  Primary team notified of pt arrival: yes  Admitted from: ED  Via: wheelchair  Belongings: remains with patient  Admission Required Doc Completed: Yes/No  Teaching: Orientation to unit and call light- call light within reach, use of console, meal times, when to call for the RN, and enforced importance of safety.  IV Access: yes  Telemetry: Yes  Ht./Wt.: Completed  Code Status verified on armband: Yes  2 RN Skin Assessment Completed with: Lissette Garza  Suction/Ambu bag/Flowmeter at bedside: No- need flowmeter.    Pt status: taking a long shower.      Temp:  [98  F (36.7  C)-98.3  F (36.8  C)] 98.3  F (36.8  C)  Pulse:  [] 96  Resp:  [16-18] 18  BP: (127-165)/() 162/105  SpO2:  [96 %-100 %] 96 %

## 2024-12-24 PROBLEM — R00.0 TACHYCARDIA, UNSPECIFIED: Status: ACTIVE | Noted: 2024-12-24

## 2024-12-24 PROBLEM — R52 GENERALIZED PAIN: Status: ACTIVE | Noted: 2024-12-24

## 2024-12-24 LAB
ALBUMIN SERPL BCG-MCNC: 4.1 G/DL (ref 3.5–5.2)
ALP SERPL-CCNC: 213 U/L (ref 40–150)
ALT SERPL W P-5'-P-CCNC: 152 U/L (ref 0–50)
ANION GAP SERPL CALCULATED.3IONS-SCNC: 11 MMOL/L (ref 7–15)
AST SERPL W P-5'-P-CCNC: 119 U/L (ref 0–45)
BILIRUB SERPL-MCNC: 0.4 MG/DL
BUN SERPL-MCNC: 15.9 MG/DL (ref 6–20)
CALCIUM SERPL-MCNC: 9.4 MG/DL (ref 8.8–10.4)
CHLORIDE SERPL-SCNC: 97 MMOL/L (ref 98–107)
CREAT SERPL-MCNC: 0.67 MG/DL (ref 0.51–0.95)
EGFRCR SERPLBLD CKD-EPI 2021: >90 ML/MIN/1.73M2
GLUCOSE BLDC GLUCOMTR-MCNC: 190 MG/DL (ref 70–99)
GLUCOSE BLDC GLUCOMTR-MCNC: 200 MG/DL (ref 70–99)
GLUCOSE BLDC GLUCOMTR-MCNC: 206 MG/DL (ref 70–99)
GLUCOSE BLDC GLUCOMTR-MCNC: 244 MG/DL (ref 70–99)
GLUCOSE BLDC GLUCOMTR-MCNC: 246 MG/DL (ref 70–99)
GLUCOSE BLDC GLUCOMTR-MCNC: 276 MG/DL (ref 70–99)
GLUCOSE BLDC GLUCOMTR-MCNC: 276 MG/DL (ref 70–99)
GLUCOSE BLDC GLUCOMTR-MCNC: 288 MG/DL (ref 70–99)
GLUCOSE BLDC GLUCOMTR-MCNC: 99 MG/DL (ref 70–99)
GLUCOSE SERPL-MCNC: 161 MG/DL (ref 70–99)
HCO3 SERPL-SCNC: 26 MMOL/L (ref 22–29)
HOLD SPECIMEN: NORMAL
MAGNESIUM SERPL-MCNC: 2.4 MG/DL (ref 1.7–2.3)
PHOSPHATE SERPL-MCNC: 1.8 MG/DL (ref 2.5–4.5)
POTASSIUM SERPL-SCNC: 4 MMOL/L (ref 3.4–5.3)
PROT SERPL-MCNC: 8.4 G/DL (ref 6.4–8.3)
SODIUM SERPL-SCNC: 134 MMOL/L (ref 135–145)

## 2024-12-24 PROCEDURE — 250N000013 HC RX MED GY IP 250 OP 250 PS 637

## 2024-12-24 PROCEDURE — 84100 ASSAY OF PHOSPHORUS: CPT

## 2024-12-24 PROCEDURE — 250N000011 HC RX IP 250 OP 636

## 2024-12-24 PROCEDURE — 99232 SBSQ HOSP IP/OBS MODERATE 35: CPT | Mod: GC

## 2024-12-24 PROCEDURE — 36415 COLL VENOUS BLD VENIPUNCTURE: CPT

## 2024-12-24 PROCEDURE — 96375 TX/PRO/DX INJ NEW DRUG ADDON: CPT

## 2024-12-24 PROCEDURE — 120N000002 HC R&B MED SURG/OB UMMC

## 2024-12-24 PROCEDURE — 93010 ELECTROCARDIOGRAM REPORT: CPT | Performed by: INTERNAL MEDICINE

## 2024-12-24 PROCEDURE — 84155 ASSAY OF PROTEIN SERUM: CPT

## 2024-12-24 PROCEDURE — 93005 ELECTROCARDIOGRAM TRACING: CPT

## 2024-12-24 PROCEDURE — G0378 HOSPITAL OBSERVATION PER HR: HCPCS

## 2024-12-24 PROCEDURE — 96376 TX/PRO/DX INJ SAME DRUG ADON: CPT

## 2024-12-24 PROCEDURE — 84520 ASSAY OF UREA NITROGEN: CPT

## 2024-12-24 PROCEDURE — 82962 GLUCOSE BLOOD TEST: CPT

## 2024-12-24 PROCEDURE — 83735 ASSAY OF MAGNESIUM: CPT

## 2024-12-24 RX ORDER — LORAZEPAM 2 MG/ML
1 INJECTION INTRAMUSCULAR EVERY 6 HOURS PRN
Status: DISCONTINUED | OUTPATIENT
Start: 2024-12-24 | End: 2024-12-25 | Stop reason: HOSPADM

## 2024-12-24 RX ORDER — MAGNESIUM SULFATE HEPTAHYDRATE 40 MG/ML
2 INJECTION, SOLUTION INTRAVENOUS ONCE
Status: COMPLETED | OUTPATIENT
Start: 2024-12-24 | End: 2024-12-24

## 2024-12-24 RX ADMIN — PRAZOSIN HYDROCHLORIDE 2 MG: 2 CAPSULE ORAL at 22:32

## 2024-12-24 RX ADMIN — ALUMINUM HYDROXIDE, MAGNESIUM HYDROXIDE, AND SIMETHICONE 30 ML: 200; 200; 20 SUSPENSION ORAL at 04:05

## 2024-12-24 RX ADMIN — MAGNESIUM SULFATE HEPTAHYDRATE 2 G: 40 INJECTION, SOLUTION INTRAVENOUS at 10:57

## 2024-12-24 RX ADMIN — LORAZEPAM 1 MG: 2 INJECTION INTRAMUSCULAR; INTRAVENOUS at 12:01

## 2024-12-24 RX ADMIN — CLONIDINE HYDROCHLORIDE 0.1 MG: 0.1 TABLET ORAL at 20:26

## 2024-12-24 RX ADMIN — METHADONE HYDROCHLORIDE 74 MG: 10 CONCENTRATE ORAL at 09:59

## 2024-12-24 RX ADMIN — ALUMINUM HYDROXIDE, MAGNESIUM HYDROXIDE, AND SIMETHICONE 30 ML: 200; 200; 20 SUSPENSION ORAL at 20:35

## 2024-12-24 RX ADMIN — LORAZEPAM 1 MG: 2 INJECTION INTRAMUSCULAR; INTRAVENOUS at 20:35

## 2024-12-24 RX ADMIN — OLANZAPINE 5 MG: 5 TABLET, ORALLY DISINTEGRATING ORAL at 04:05

## 2024-12-24 RX ADMIN — CLONIDINE HYDROCHLORIDE 0.1 MG: 0.1 TABLET ORAL at 09:49

## 2024-12-24 RX ADMIN — POTASSIUM & SODIUM PHOSPHATES POWDER PACK 280-160-250 MG 2 PACKET: 280-160-250 PACK at 15:32

## 2024-12-24 RX ADMIN — METFORMIN HYDROCHLORIDE 1000 MG: 500 TABLET, FILM COATED ORAL at 17:45

## 2024-12-24 RX ADMIN — FLUTICASONE FUROATE AND VILANTEROL TRIFENATATE 1 PUFF: 100; 25 POWDER RESPIRATORY (INHALATION) at 09:51

## 2024-12-24 RX ADMIN — POTASSIUM & SODIUM PHOSPHATES POWDER PACK 280-160-250 MG 2 PACKET: 280-160-250 PACK at 22:33

## 2024-12-24 RX ADMIN — HALOPERIDOL LACTATE 2 MG: 5 INJECTION, SOLUTION INTRAMUSCULAR at 05:35

## 2024-12-24 RX ADMIN — LOSARTAN POTASSIUM 25 MG: 25 TABLET, FILM COATED ORAL at 09:49

## 2024-12-24 RX ADMIN — POTASSIUM & SODIUM PHOSPHATES POWDER PACK 280-160-250 MG 2 PACKET: 280-160-250 PACK at 17:44

## 2024-12-24 ASSESSMENT — ACTIVITIES OF DAILY LIVING (ADL)
ADLS_ACUITY_SCORE: 38
ADLS_ACUITY_SCORE: 37
ADLS_ACUITY_SCORE: 38
ADLS_ACUITY_SCORE: 37
ADLS_ACUITY_SCORE: 38
ADLS_ACUITY_SCORE: 37
ADLS_ACUITY_SCORE: 38

## 2024-12-24 NOTE — PLAN OF CARE
"Goal Outcome Evaluation:      Plan of Care Reviewed With: patient    Overall Patient Progress: no change    Outcome Evaluation: Pt placed on teleetry  monitoring. Denies pain. Independent in room. Full liquid diet.    VS:       Pt A/O X 4. Afebrile. VSS. BP (!) 175/102   Pulse 95   Temp 98.2  F (36.8  C) (Oral)   Resp 18   Ht 1.753 m (5' 9\")   Wt 90.7 kg (200 lb)   LMP  (LMP Unknown)   SpO2 97%   BMI 29.53 kg/m    Denies shortness of breath, and chest pain.     Output:       Bowels- present in all four quadrants. Voids spontaneously without  difficulty in the toilet.      Activity:       Pt up independently.   Skin: Skin is intact.     Pain:     Denies pain.   CMS:       CMS and Neuro's are intact. Denies numbness and tingling in all extremities.      Dressing:       None.   Diet:       Pt is on a full liquid diet and appetite was poor this shift.       LDA:       PIV is patent in the RUE and saline locked.      Equipment:     Bilateral heels are elevated off the bed.     Plan:       Pt is able to make needs known and the call light is within the pt's reach. Continue to monitor.       Additional Info:     Pt had low phosphorus level; phosphorus was replaced. Pt is on telemetry monitoring. New order obtained for ativan PRN for nausea. PRN ativan given once. Pt reported feeling better.         "

## 2024-12-24 NOTE — PROGRESS NOTES
[facility]    Progress Note - [facility]       Date of Admission:  12/22/2024    Main Plans for Today   Exceptional QT prolongation at 649 this morning:  - Continuous telemetry  - 2g IV magnesium over an hour, x1 dose  - High Mag replacement protocol (keep mag >2), ONLY IV replacement  - Continue daily EKG  - HOLD QT prolonging meds above  - Plan to cross-titrate methadone with buprenorphine  - 1mg IV ativan q6h PRN for visualized vomiting (not for nausea)  - Decrease carb ratio to 1u:15g CHO  - Increase lantus to 40u nightly    Assessment & Plan   Yun Coyle is a 48 year old female admitted on 12/22/2024. She has a history of major depression, generalized anxiety, PTSD, panic disorder with agoraphobia, polysubstance use disorder on methadone, type 2 diabetes mellitus (last A1C 11.2), cyclic vomiting and is admitted with observation status for intractable nausea and vomiting with inability to tolerate PO.      #Cyclic vomiting syndrome, suspect cannabinoid hyperemesis  #Persistent N/V, inability to tolerate PO  Chronic issue with 17 ED encounters in last 6 months. Possibly related to cannabis use. Currently using cannabis 1-2 times per week, previously had been daily user. Reports 3 days of not being able to keep down food or liquids. Her typical antiemetic regimen (nightly olanzapine, PRN snorted fentanyl) has been ineffective.   - CMP qAM  - HOLD PTA olanzapine 15mg at bedtime  - Scopolamine patch in place  - Maalox q4h PRN nausea  - HOLD Zyprexa 5mg ODT QID PRN (1st line)  - HOLD Haldol 2mg IV q6h PRN (2nd line)    #Prolonged QT Interval  QTc in ED originally reported as 619, 399 on recheck in ED, but on 12/24 AM daily EKG demonstrated remarkably prolonged QTc of 649. Pt without symptoms of palpitations or chest pain, but does endorse feelings of a racing heart. All QT prolonging medications (except Methadone) were held, high mag  replacement was ordered, and telemetry was reinstated. With patient regularly spending hours at a time in the shower and refusing other cares, has been very difficult to keep her on continuous monitoring.  - Continuous telemetry  - 2g IV magnesium over an hour, x1 dose  - High Mag replacement protocol (keep mag >2), ONLY IV replacement  - Continue daily EKG  - HOLD QT prolonging meds above  - Plan to cross-titrate methadone with buprenorphine; see below    #T2DM with hyperglycemia  #Anion gap metabolic acidosis, presumed starvation  Last A1C 11.6 1 month ago. Restarted on insulin during last admission, reports fairly good adherence though has not taken insulin (or her other medications) while she's been unwell these past few days. Glucose elevated throughout admission with some moderately elevated ketones - favor starvation ketosis over DKA. Will manage BG with hydration & q4h sliding scale insulin as she reintroduces normal diet.  - Lantus 40u nightly (PTA prescribed dose 40u nightly, but has only been taking 30u at home)  - PTA aspart: 1u:15g CHO w/meals; 1u:7g CHO w/snacks  - HDSSI (currently on NPO sched, q4h checks)  - Hypoglycemia protocol  - PTA metformin 1000mg BID  - PTA jardiance 10mg daily    #Opioid use disorder  On methadone for many years, though dose miss some doses when feeling too unwell to go to the clinic. Reports using fentanyl primarily as an antiemetic. When feeling unwell, sorts fentanyl up to 2 times per day, she does not know quantity. Denies IV drug use. Denies history of opioid withdrawal. Not interested in chemical dependency treatment. Methadone dose previously much higher than current 74mg dose; reduced over last few months due to inability to make it to clinic. With exceptional QT prolongation, would strongly advise down-titrating methadone dose. States she has previously been on Suboxone but it made her vomit every day; refuses any care plan involving Suboxone because of this, but is  open to buprenorphine alone.  - PTA Methadone 74mg daily  - Naloxone PRN  - Will curbside addiction medicine to investigate options for cross-titrating methadone with Suboxone     #Transaminitis  #Hepatitis C, untreated  Hepatitis C diagnosed earlier this year. Hep C RNA viral load 173,000 11/19/24. CT A/P on admit with findings possibly consistent with early cirrhosis.  - Consider utility of further testing for Hep C genotyping, etc given pt's profound difficulty following-up outpatient.   - Encourage outpatient follow-up for treatment. Provide referral on discharge if needed.       #Hypertension  Acutely elevated blood pressure on admit without evidence of end-organ damage; patient has been nonadherent with medication regimen.  - PTA losartan      #Mood   Recently admitted with suicidal ideation, denies SI/SH currently. Did not follow-up with planned intensive outpatient psychiatric programming, states she felt too sick.   - HOLD PTA lexapro 30mg daily  - PTA clonidine 0.1mg BID  - PTA hydroxyzine q4h PRN      #Asthma  Endorses daily use of albuterol. Has been years since she's had a preventative inhaler. She was prescribed prednisone in January for an asthma exacerbation. Open to restarting with a preventative inhaler. Would favor SMART therapy, though not available on hospital formulary.   - PTA albuterol PRN  - Start Breo-ellipta      #SDOH  History of trouble with medication adherence. History of difficulty with outpatient follow-up due to agoraphobia. Financial concerns noted frequently in EMR.  - Inquire in AM if pt desires social work consult.        Diet: Full Liquid Diet    DVT Prophylaxis: Low Risk/Ambulatory with no VTE prophylaxis indicated  Blanc Catheter: Not present  Fluids: NS 100ml/h  Lines: None     Cardiac Monitoring: ACTIVE order. Indication: QTc prolonging medication (48 hours)  Code Status: Full Code      Clinically Significant Risk Factors Present on Admission          # Hypochloremia:  "Lowest Cl = 94 mmol/L in last 2 days, will monitor as appropriate          # Hypertension: Noted on problem list          # DMII: A1C = 11.2 % (Ref range: <5.7 %) within past 6 months    # Overweight: Estimated body mass index is 29.53 kg/m  as calculated from the following:    Height as of this encounter: 1.753 m (5' 9\").    Weight as of this encounter: 90.7 kg (200 lb).         # Financial/Environmental Concerns:    # Asthma: noted on problem list        Disposition Plan      Expected Discharge Date: 12/24/2024                The patient's care was discussed with the Attending Physician, Dr. Cruz.    [facility]  [facility]  Securely message with [employer] (more info)  Text page via [email or web address] Paging/Directory   See signed in provider for up to date coverage information  ______________________________________________________________________    Interval History   Overnight: NAEON    Today: Continues to report significant nausea (though no vomiting has actively been visualized by staff); denies chest pain or palpitations but does endorse racing heart. No syncopal episodes, no light headedness.    Physical Exam   Vital Signs: Temp: 98.2  F (36.8  C) Temp src: Oral BP: (!) 175/102 Pulse: 95   Resp: 18 SpO2: 97 % O2 Device: None (Room air)    Weight: 200 lbs 0 oz  Vitals reviewed.   Constitutional: A&Ox4, in NAD, able to ambulate around room and in hallway without difficulty  Respiratory: Lungs CTAB without increased work of breathing  Cardiovascular: RRR without murmurs or extra sounds  Abdomen: Soft, non-distended, mildly tender to palpation  Skin: No new rash or lesion on limited exam  Musculoskeletal: No lower extremity tenderness or edema  Psychiatric: Mood & affect appropriate, consistent with baseline     Medical Decision Making   Please see A&P for additional details of medical decision making    Data "   ------------------------- PAST 24 HR DATA REVIEWED -----------------------------------------------    I have personally reviewed the following data over the past 24 hrs:    N/A  \   N/A   / N/A     N/A N/A N/A /  244 (H)   N/A N/A N/A \     ALT: N/A AST: N/A AP: N/A TBILI: N/A   ALB: N/A TOT PROTEIN: N/A LIPASE: N/A     INR:  N/A PTT:  N/A   D-dimer:  N/A Fibrinogen:  N/A       Imaging results reviewed over the past 24 hrs:   No results found for this or any previous visit (from the past 24 hours).

## 2024-12-24 NOTE — PROGRESS NOTES
[facility]   BRIEF PROGRESS NOTE    Following up PM labs. QTC improved to 430 on ECG. Labs with mild hyponatremia, slightly high magnesium, low phosphorous. Continue close monitoring.   - RN Ph replacement HIGH added   - Continue cardiac telemetry    Please see daily rounding note for full A/P.  Yun Garcia MD  2:15 PM

## 2024-12-24 NOTE — PLAN OF CARE
Goal Outcome Evaluation:      Plan of Care Reviewed With: patient    Overall Patient Progress: improvingOverall Patient Progress: improving    Outcome Evaluation: Pt vomitted once today, nausea management. Denies pain. telemetry monitoring

## 2024-12-24 NOTE — PLAN OF CARE
Goal Outcome Evaluation:      Plan of Care Reviewed With: patient    Overall Patient Progress: no changeOverall Patient Progress: no change    Outcome Evaluation: No acute changes overnight. Pt is alert and orientated x4, VSS, stable on RA, O2 sat > 90%. Denied CP and SOB. PRNs given for nausea, pt is on a full liquid diet. Q4 hour insulin sliding scale, R PIV SL. Pt is continent bowel and bladder, independent in the room. Call light within reach, able to make needs known, continue with plan of care.

## 2024-12-25 VITALS
HEART RATE: 80 BPM | SYSTOLIC BLOOD PRESSURE: 130 MMHG | OXYGEN SATURATION: 98 % | TEMPERATURE: 98 F | WEIGHT: 200 LBS | BODY MASS INDEX: 29.62 KG/M2 | DIASTOLIC BLOOD PRESSURE: 90 MMHG | HEIGHT: 69 IN | RESPIRATION RATE: 18 BRPM

## 2024-12-25 LAB
ALBUMIN SERPL BCG-MCNC: 3.6 G/DL (ref 3.5–5.2)
ALP SERPL-CCNC: 201 U/L (ref 40–150)
ALT SERPL W P-5'-P-CCNC: 128 U/L (ref 0–50)
AMPHETAMINES UR QL SCN: ABNORMAL
ANION GAP SERPL CALCULATED.3IONS-SCNC: 11 MMOL/L (ref 7–15)
AST SERPL W P-5'-P-CCNC: 89 U/L (ref 0–45)
BARBITURATES UR QL SCN: ABNORMAL
BENZODIAZ UR QL SCN: ABNORMAL
BILIRUB SERPL-MCNC: 0.2 MG/DL
BUN SERPL-MCNC: 17.4 MG/DL (ref 6–20)
BZE UR QL SCN: ABNORMAL
CALCIUM SERPL-MCNC: 9.2 MG/DL (ref 8.8–10.4)
CANNABINOIDS UR QL SCN: ABNORMAL
CHLORIDE SERPL-SCNC: 99 MMOL/L (ref 98–107)
CREAT SERPL-MCNC: 0.69 MG/DL (ref 0.51–0.95)
CREAT UR-MCNC: 66 MG/DL
EGFRCR SERPLBLD CKD-EPI 2021: >90 ML/MIN/1.73M2
FENTANYL UR QL: ABNORMAL
GLUCOSE BLDC GLUCOMTR-MCNC: 206 MG/DL (ref 70–99)
GLUCOSE BLDC GLUCOMTR-MCNC: 211 MG/DL (ref 70–99)
GLUCOSE BLDC GLUCOMTR-MCNC: 221 MG/DL (ref 70–99)
GLUCOSE BLDC GLUCOMTR-MCNC: 227 MG/DL (ref 70–99)
GLUCOSE SERPL-MCNC: 240 MG/DL (ref 70–99)
HCO3 SERPL-SCNC: 24 MMOL/L (ref 22–29)
HOLD SPECIMEN: NORMAL
MAGNESIUM SERPL-MCNC: 1.8 MG/DL (ref 1.7–2.3)
OPIATES UR QL SCN: ABNORMAL
PCP QUAL URINE (ROCHE): ABNORMAL
PHOSPHATE SERPL-MCNC: 2.5 MG/DL (ref 2.5–4.5)
PHOSPHATE SERPL-MCNC: 2.6 MG/DL (ref 2.5–4.5)
POTASSIUM SERPL-SCNC: 4.6 MMOL/L (ref 3.4–5.3)
PROT SERPL-MCNC: 7.6 G/DL (ref 6.4–8.3)
SODIUM SERPL-SCNC: 134 MMOL/L (ref 135–145)

## 2024-12-25 PROCEDURE — 84520 ASSAY OF UREA NITROGEN: CPT

## 2024-12-25 PROCEDURE — 84100 ASSAY OF PHOSPHORUS: CPT | Performed by: FAMILY MEDICINE

## 2024-12-25 PROCEDURE — 84100 ASSAY OF PHOSPHORUS: CPT

## 2024-12-25 PROCEDURE — 93005 ELECTROCARDIOGRAM TRACING: CPT

## 2024-12-25 PROCEDURE — 93010 ELECTROCARDIOGRAM REPORT: CPT | Performed by: INTERNAL MEDICINE

## 2024-12-25 PROCEDURE — 250N000011 HC RX IP 250 OP 636

## 2024-12-25 PROCEDURE — 82310 ASSAY OF CALCIUM: CPT

## 2024-12-25 PROCEDURE — 99238 HOSP IP/OBS DSCHRG MGMT 30/<: CPT | Mod: GC

## 2024-12-25 PROCEDURE — 83735 ASSAY OF MAGNESIUM: CPT | Performed by: FAMILY MEDICINE

## 2024-12-25 PROCEDURE — 80069 RENAL FUNCTION PANEL: CPT

## 2024-12-25 PROCEDURE — 36415 COLL VENOUS BLD VENIPUNCTURE: CPT

## 2024-12-25 PROCEDURE — 250N000013 HC RX MED GY IP 250 OP 250 PS 637

## 2024-12-25 RX ORDER — MAGNESIUM OXIDE 400 MG/1
400 TABLET ORAL EVERY 4 HOURS
Status: COMPLETED | OUTPATIENT
Start: 2024-12-25 | End: 2024-12-25

## 2024-12-25 RX ORDER — ONDANSETRON 4 MG/1
4 TABLET, FILM COATED ORAL ONCE
Status: COMPLETED | OUTPATIENT
Start: 2024-12-25 | End: 2024-12-25

## 2024-12-25 RX ORDER — METHADONE HYDROCHLORIDE 10 MG/ML
50 CONCENTRATE ORAL DAILY
Status: DISCONTINUED | OUTPATIENT
Start: 2024-12-28 | End: 2024-12-25

## 2024-12-25 RX ORDER — ONDANSETRON 4 MG/1
4 TABLET, ORALLY DISINTEGRATING ORAL EVERY 8 HOURS PRN
Qty: 30 TABLET | Refills: 0 | Status: SHIPPED | OUTPATIENT
Start: 2024-12-25

## 2024-12-25 RX ORDER — BUPRENORPHINE 2 MG/1
2 TABLET SUBLINGUAL 2 TIMES DAILY
Status: DISCONTINUED | OUTPATIENT
Start: 2024-12-27 | End: 2024-12-25

## 2024-12-25 RX ORDER — METHADONE HYDROCHLORIDE 10 MG/ML
70 CONCENTRATE ORAL DAILY
Status: DISCONTINUED | OUTPATIENT
Start: 2024-12-26 | End: 2024-12-25

## 2024-12-25 RX ORDER — METHADONE HYDROCHLORIDE 10 MG/ML
40 CONCENTRATE ORAL DAILY
Status: DISCONTINUED | OUTPATIENT
Start: 2024-12-29 | End: 2024-12-25

## 2024-12-25 RX ORDER — BUPRENORPHINE 2 MG/1
4 TABLET SUBLINGUAL 2 TIMES DAILY
Status: DISCONTINUED | OUTPATIENT
Start: 2024-12-28 | End: 2024-12-25

## 2024-12-25 RX ORDER — METHADONE HYDROCHLORIDE 10 MG/ML
74 CONCENTRATE ORAL DAILY
Status: DISCONTINUED | OUTPATIENT
Start: 2024-12-26 | End: 2024-12-25 | Stop reason: HOSPADM

## 2024-12-25 RX ORDER — METHADONE HYDROCHLORIDE 10 MG/ML
60 CONCENTRATE ORAL DAILY
Status: DISCONTINUED | OUTPATIENT
Start: 2024-12-27 | End: 2024-12-25

## 2024-12-25 RX ADMIN — POTASSIUM & SODIUM PHOSPHATES POWDER PACK 280-160-250 MG 1 PACKET: 280-160-250 PACK at 12:19

## 2024-12-25 RX ADMIN — LOSARTAN POTASSIUM 25 MG: 25 TABLET, FILM COATED ORAL at 08:15

## 2024-12-25 RX ADMIN — METHADONE HYDROCHLORIDE 74 MG: 10 CONCENTRATE ORAL at 08:15

## 2024-12-25 RX ADMIN — MAGNESIUM OXIDE TAB 400 MG (241.3 MG ELEMENTAL MG) 400 MG: 400 (241.3 MG) TAB at 12:19

## 2024-12-25 RX ADMIN — POTASSIUM & SODIUM PHOSPHATES POWDER PACK 280-160-250 MG 1 PACKET: 280-160-250 PACK at 08:43

## 2024-12-25 RX ADMIN — FLUTICASONE FUROATE AND VILANTEROL TRIFENATATE 1 PUFF: 100; 25 POWDER RESPIRATORY (INHALATION) at 08:15

## 2024-12-25 RX ADMIN — METFORMIN HYDROCHLORIDE 1000 MG: 500 TABLET, FILM COATED ORAL at 08:15

## 2024-12-25 RX ADMIN — MAGNESIUM OXIDE TAB 400 MG (241.3 MG ELEMENTAL MG) 400 MG: 400 (241.3 MG) TAB at 08:43

## 2024-12-25 RX ADMIN — EMPAGLIFLOZIN 10 MG: 10 TABLET, FILM COATED ORAL at 08:15

## 2024-12-25 RX ADMIN — LORAZEPAM 1 MG: 2 INJECTION INTRAMUSCULAR; INTRAVENOUS at 06:45

## 2024-12-25 RX ADMIN — CLONIDINE HYDROCHLORIDE 0.1 MG: 0.1 TABLET ORAL at 08:15

## 2024-12-25 ASSESSMENT — ACTIVITIES OF DAILY LIVING (ADL)
ADLS_ACUITY_SCORE: 37

## 2024-12-25 NOTE — PLAN OF CARE
"Goal Outcome Evaluation:      Plan of Care Reviewed With: patient    Overall Patient Progress: improvingOverall Patient Progress: improving         Patient admitted 12/22/24 intractable vomiting    VS: BP (!) 130/90 (BP Location: Right arm)   Pulse 80   Temp 98  F (36.7  C) (Oral)   Resp 18   Ht 1.753 m (5' 9\")   Wt 90.7 kg (200 lb)   LMP  (LMP Unknown)   SpO2 98%   BMI 29.53 kg/m      Cardiac Monitoring discontinued this shift     O2: >90% on RA no complaints of SOB or chest pain.   Output: Voiding adequate amounts w/o pain or difficulty to bathroom.   Last BM: unknown   Activity: Independent    Skin: Intact   Pain: 7/10   CMS: AOX4   Dressing: None   Diet: Advanced this shift. Intermittent nausea   LDA: None   Equipment: Personal belongings   Plan: Discharge this shift.    Additional Info:      DISCHARGE SUMMARY    Pt discharging to: Home  Transportation: Family     AVS given and discussed: Yes  Stoplight Tool given and discussed: NA   Medications given: Yes  Belongings returned: NA  Comments:     "

## 2024-12-25 NOTE — DISCHARGE SUMMARY
"[facility]  Discharge Summary - Medicine & Pediatrics       Date of Admission:  12/22/2024  Date of Discharge:  12/25/2024  Discharging Provider: Janak العلي DO  Discharge Service: [facility] Medicine Service    Discharge Diagnoses   #Cyclic vomiting syndrome, suspect cannabinoid hyperemesis  #Prolonged QT Interval  #Opioid use disorder   #T2DM with hyperglycemia  #Anion gap metabolic acidosis, presumed fasting   #Transaminitis  #Hepatitis C, untreated  #Hypertension  #Mood    #Asthma    Clinically Significant Risk Factors     # DMII: A1C = 11.2 % (Ref range: <5.7 %) within past 6 months    # Overweight: Estimated body mass index is 29.53 kg/m  as calculated from the following:    Height as of this encounter: 1.753 m (5' 9\").    Weight as of this encounter: 90.7 kg (200 lb).       Follow-ups Needed After Discharge   Follow-up Appointments       Adult [facility] Follow-up and recommended labs and tests      Follow up with primary care provider, Gamaliel Granados, within 7 days for hospital follow- up.  The following labs/tests are recommended: EKG.      Appointments on [place] and/or [facility] (with [facility] or [facility] provider or service). Call [phone] if you haven't heard regarding these appointments within 7 days of discharge.              Unresulted Labs Ordered in the Past 30 Days of this Admission       Date and Time Order Name Status Description    12/25/2024  9:40 AM Urine Drug Confirmation Panel In process         These results will be followed up by Gamaliel Granados    Discharge Disposition   Discharged to home  Condition at discharge: Stable    Hospital Course   Yun Coyle is a 48 year old female admitted on 12/22/2024. She has a history of major depression, generalized anxiety, PTSD, panic disorder with agoraphobia, polysubstance use disorder on methadone, type 2 diabetes mellitus (last A1C 11.2), cyclic vomiting and is admitted " with observation status for intractable nausea and vomiting with inability to tolerate PO.   The following problems were addressed during her hospitalization:    #Cyclic vomiting syndrome, suspect cannabinoid hyperemesis  #Persistent N/V, inability to tolerate PO  Chronic issue with 17 ED encounters in last 6 months, most consistent with cannabinoid hyperemesis syndrome. Presented with several days of inability to tolerate PO with ineffective home regimen of 15mg nightly Zyprexa. Was treated here initially w/scopolamine patch, q6h zyprexa, and haldol in setting of ineffective zofran, compazine, reglan but treatment was held in setting of prolonged/variable QT. As of 12/25, appetite and vomiting finally improved and was able to tolerate PO intake prior to discharge.   - DISCONTINUE PTA olanzapine 15mg at bedtime     #Prolonged QT Interval  Pt largely asymptomatic (no chest pain, palpitations; but did endorse racing heart) with labile QTc from 399 - 649 while admitted. Was monitored with tele and daily EKGs and QT stabilized following repletion of mag/phos & holding of Zyprexa and other QT-prolonging medications.   - Strongly recommend creating a plan to discontinue Methadone in favor of Suboxone/Subutex; see below     #Opioid use disorder  On methadone for many years, most recently at 74mg daily, though dose miss some doses when feeling too unwell to go to the clinic. Denies history of opioid withdrawal. Not interested in chemical dependency treatment. Previously on Suboxone, not tolerated due to recurrent emesis she associates with the medication.  - Addiction medicine referral for consideration of switch to Suboxone/Subutex in setting of labile and severely prolonged QT with known QT-prolonging effects of Methadone.     #T2DM with hyperglycemia  #Anion gap metabolic acidosis, presumed starvation  Last A1C 11.6 1 month ago, admitted with hyperglycemia without DKA or HHS. Was managed with subcutaneous insulin not  requiring a drip. She followed the following regimen while inpatient, but would benefit from further optimization outpatient:  - Lantus 40u nightly (PTA prescribed dose 40u nightly, but has only been taking 30u at home)  - Aspart: 1u:15g CHO w/meals; 1u:7g CHO w/snacks  - HDSSI (currently on NPO sched, q4h checks)  - PTA metformin 1000mg BID  - PTA jardiance 10mg daily     #Transaminitis  #Hepatitis C, untreated  Hepatitis C diagnosed earlier this year. Hep C RNA viral load 173,000 11/19/24. CT A/P on admit with findings possibly consistent with early cirrhosis. Has not had any treatment for this condition; strongly recommend follow-up outpatient.  - GI Referral for Hepatology      #Hypertension  Acutely elevated blood pressure throughout admission, improved with taking PTA medication and improvement in N/V.  - PTA losartan     Consultations This Hospital Stay   PHARMACY IP CONSULT  CARE MANAGEMENT / SOCIAL WORK IP CONSULT    Code Status   Full Code       The patient was discussed with MD Janak Wong DO [facility]  [facility] [address]  [address] [place] [id number]  Phone: [phone]  Fax: [phone]  ______________________________________________________________________    Physical Exam   Vital Signs: Temp: 98  F (36.7  C) Temp src: Oral BP: (!) 130/90 Pulse: 80   Resp: 18 SpO2: 98 % O2 Device: None (Room air)    Weight: 200 lbs 0 oz  Vitals reviewed.   Constitutional: A&Ox4, in NAD, able to ambulate around room and in hallway without difficulty  Respiratory: Lungs CTAB without increased work of breathing  Cardiovascular: RRR without murmurs or extra sounds  Abdomen: Soft, non-distended  Skin: No new rash or lesion on limited exam  Musculoskeletal: No lower extremity tenderness or edema  Psychiatric: Mood & affect appropriate, consistent with baseline       Primary Care Physician   Gamaliel Granados    Discharge Orders      Adult Mental Health   Referral      Adult GI  Referral - Consult Only      Reason for your hospital stay    You were hospitalized for management of your severe nausea and vomiting with close monitoring of your heart     Activity    Your activity upon discharge: activity as tolerated     Adult [facility] Follow-up and recommended labs and tests    Follow up with primary care provider, Gamaliel Granados, within 7 days for hospital follow- up.  The following labs/tests are recommended: EKG.      Appointments on [place] and/or [facility] (with [facility] or [facility] provider or service). Call [phone] if you haven't heard regarding these appointments within 7 days of discharge.     Diet    Follow this diet upon discharge: Current Diet:Orders Placed This Encounter      Regular Diet Adult       Significant Results and Procedures   Results for orders placed or performed during the hospital encounter of 12/22/24   CT Abdomen Pelvis w Contrast    Narrative    EXAM: CT ABDOMEN PELVIS W CONTRAST  LOCATION: [facility]  DATE: 12/23/2024    INDICATION: Vomiting and mid abdominal pain.  COMPARISON: 3/26/2024.  TECHNIQUE: CT scan of the abdomen and pelvis was performed following injection of IV contrast. Multiplanar reformats were obtained. Dose reduction techniques were used.  CONTRAST: 99 mL Isovue-370.    FINDINGS:    LOWER CHEST: Small mucous plugs within both lower lobes. Atherosclerosis of coronary arteries. Moderate-sized hiatal hernia.    HEPATOBILIARY: Subtle liver surface nodularity and widening of periportal spaces, which may suggest the early changes of cirrhosis.    Cholecystectomy.    Unchanged mild biliary prominence, likely secondary to the postcholecystectomy state.    PANCREAS: Enhances normally. No peripancreatic inflammatory fat stranding.    SPLEEN: Enhances normally. Normal size.    ADRENAL GLANDS: Normal.    KIDNEYS: Both kidneys enhance symmetrically, without  hydronephrosis. Persistent fetal lobulation of both kidneys.    No nephroureterolithiasis.    Urinary bladder is unremarkable.    PELVIC ORGANS: No suspicious abnormality. A simple appearing left adnexal cyst measures up to 1.3 cm, previously 1.2 cm. No further follow-up is recommended.    BOWEL: No evidence of acute gastrointestinal inflammation or obstruction. Normal appendix. Mild diffuse colonic stool burden.    No intraperitoneal free fluid or free air.    LYMPH NODES: No suspicious abdominal or pelvic lymphadenopathy.    VASCULATURE: No abdominal aortic aneurysm. Mild atheromatous disease.    MUSCULOSKELETAL: No suspicious abnormality. Lumbar postsurgical change.    OTHER: No additionally significant abnormalities.      Impression    IMPRESSION:   1.  No acute CT abnormality of the abdomen/pelvis.  2.  Moderate-sized hiatal hernia.  3.  Possible early changes of cirrhosis.         Discharge Medications   Current Discharge Medication List        START taking these medications    Details   ondansetron (ZOFRAN ODT) 4 MG ODT tab Take 1 tablet (4 mg) by mouth every 8 hours as needed for nausea.  Qty: 30 tablet, Refills: 0    Associated Diagnoses: Intractable nausea and vomiting           CONTINUE these medications which have NOT CHANGED    Details   blood glucose (NO BRAND SPECIFIED) lancets standard To use to test glucose level in the blood Use to test blood sugar  3  times daily as directed. To accompany glucose monitor brands per insurance coverage.  Qty: 200 each, Refills: 1    Associated Diagnoses: Type 2 diabetes mellitus without complication, unspecified whether long term insulin use (H)      blood glucose (NO BRAND SPECIFIED) test strip To use to test glucose level in the blood Use to test blood sugar  3  times daily as directed. To accompany glucose monitor brands per insurance coverage.  Qty: 300 strip, Refills: 0    Associated Diagnoses: Type 2 diabetes mellitus without complication, unspecified whether  long term insulin use (H)      blood glucose calibration (NO BRAND SPECIFIED) solution Used to calibrate the blood glucose monitor as needed and as directed.  To accompany  blood glucose brands per insurance coverage  Qty: 1 each, Refills: 0    Associated Diagnoses: Type 2 diabetes mellitus without complication, unspecified whether long term insulin use (H)      blood glucose monitoring (NO BRAND SPECIFIED) meter device kit Use as directed Per insurance coverage  Qty: 1 kit, Refills: 0    Associated Diagnoses: Type 2 diabetes mellitus without complication, unspecified whether long term insulin use (H)      cloNIDine (CATAPRES) 0.1 MG tablet Take 0.1 mg by mouth 2 times daily.      empagliflozin (JARDIANCE) 10 MG TABS tablet Take 1 tablet (10 mg) by mouth daily.  Qty: 30 tablet, Refills: 0    Associated Diagnoses: Type 2 diabetes mellitus without complication, unspecified whether long term insulin use (H)      escitalopram (LEXAPRO) 10 MG tablet Take 3 tablets (30 mg) by mouth daily.  Qty: 30 tablet, Refills: 0    Associated Diagnoses: Severe episode of recurrent major depressive disorder, without psychotic features (H)      hydrOXYzine HCl (ATARAX) 25 MG tablet Take 1-2 tablets (25-50 mg) by mouth every 4 hours as needed for anxiety.  Qty: 90 tablet, Refills: 0    Associated Diagnoses: Severe episode of recurrent major depressive disorder, without psychotic features (H)      !! insulin aspart (NOVOLOG PEN) 100 UNIT/ML pen Inject 1-10 Units subcutaneously 3 times daily (with meals). Do Not give Correction Insulin if Pre-Meal BG less than 140. For Pre-Meal  - 164 give 1 unit. For Pre-Meal  - 189 give 2 units. For Pre-Meal  - 214 give 3 units. For Pre-Meal  - 239 give 4 units. For Pre-Meal  - 264 give 5 units. For Pre-Meal  - 289 give 6 units. For Pre-Meal  - 314 give 7 units. For Pre-Meal  - 339 give 8 units. For Pre-Meal  - 364 give 9 units.  For Pre-Meal BG  greater than or equal to 365 give 10 units  Qty: 15 mL, Refills: 0    Associated Diagnoses: Type 2 diabetes mellitus with hyperglycemia, with long-term current use of insulin (H)      !! insulin aspart (NOVOLOG PEN) 100 UNIT/ML pen Inject 1-10 Units subcutaneously 3 times daily (with meals). Give 1 unit of insulin for each 10g of carbohydrates  Qty: 15 mL, Refills: 0    Associated Diagnoses: Type 2 diabetes mellitus with hyperglycemia, with long-term current use of insulin (H)      !! insulin aspart (NOVOLOG PEN) 100 UNIT/ML pen Inject 1-7 Units subcutaneously at bedtime. Do Not give Bedtime Correction Insulin if BG less than 200. For  - 224 give 1 units. For  - 249 give 2 units. For  - 274 give 3 units. For  - 299 give 4 units. For  - 324 give 5 units. For  - 349 give 6 units. For BG greater than or equal to 350 give 7 units.  Qty: 15 mL, Refills: 0    Associated Diagnoses: Type 2 diabetes mellitus without complication, unspecified whether long term insulin use (H)      insulin glargine (LANTUS PEN) 100 UNIT/ML pen Inject 40 Units subcutaneously at bedtime.  Qty: 15 mL, Refills: 0    Comments: Please give enough for 2 months. If Lantus is not covered by insurance, may substitute Basaglar or Semglee or other insulin glargine product per insurance preference at same dose and frequency.  Associated Diagnoses: Type 2 diabetes mellitus without complication, unspecified whether long term insulin use (H)      insulin pen needle (32G X 4 MM) 32G X 4 MM miscellaneous Use as directed by provider Per insurance coverage  Qty: 200 each, Refills: 1    Associated Diagnoses: Type 2 diabetes mellitus without complication, unspecified whether long term insulin use (H)      losartan (COZAAR) 25 MG tablet Take 1 tablet (25 mg) by mouth daily.  Qty: 30 tablet, Refills: 0    Associated Diagnoses: Secondary hypertension      metFORMIN (GLUCOPHAGE) 500 MG tablet Take 2 tablets (1,000 mg) by mouth 2  times daily (with meals).  Qty: 60 tablet, Refills: 0    Associated Diagnoses: Type 2 diabetes mellitus with hyperglycemia, with long-term current use of insulin (H)      methadone (DOLOPHINE-INTENSOL) 10 MG/ML (HIGH CONC) solution Take 74 mg by mouth daily.      prazosin (MINIPRESS) 2 MG capsule Take 1 capsule (2 mg) by mouth at bedtime.  Qty: 30 capsule, Refills: 0    Associated Diagnoses: PTSD (post-traumatic stress disorder)      Alcohol Swabs PADS Use to swab the area of the injection or marla as directed Per insurance coverage  Qty: 200 each, Refills: 1    Associated Diagnoses: Type 2 diabetes mellitus without complication, unspecified whether long term insulin use (H)      Glucagon (GVOKE HYPOPEN) 1 MG/0.2ML pen Inject the contents of 1 device under the skin into lower abdomen, outer thigh, or outer upper arm as needed for hypoglycemia. If no response after 15 minutes, additional 1 mg dose from a new device may be injected while waiting for emergency assistance.  Qty: 30 each, Refills: 0    Comments: Please give enough for 2 months  Associated Diagnoses: Type 2 diabetes mellitus without complication, unspecified whether long term insulin use (H)      glucose (BD GLUCOSE) 4 g chewable tablet Take 4 tablets by mouth every 15 minutes as needed for low blood sugar.  Qty: 50 tablet, Refills: 0    Comments: Please give enough for 2 months  Associated Diagnoses: Type 2 diabetes mellitus without complication, unspecified whether long term insulin use (H)      glucose 40 % (400 mg/mL) gel 15 g every 15 minutes by mouth as needed for low blood sugar.  Oral gel is preferable for conscious and able to swallow patient.  Qty: 112.5 g, Refills: 0    Comments: Please given enough for 2 months  Associated Diagnoses: Type 2 diabetes mellitus without complication, unspecified whether long term insulin use (H)      Sharps Container MISC Use as directed to dispose of needles, lancets and other sharps  Qty: 1 each, Refills: 1     Associated Diagnoses: Type 2 diabetes mellitus without complication, unspecified whether long term insulin use (H)       !! - Potential duplicate medications found. Please discuss with provider.        STOP taking these medications       OLANZapine zydis (ZYPREXA) 15 MG ODT Comments:   Reason for Stopping:             Allergies   No Known Allergies

## 2024-12-25 NOTE — PLAN OF CARE
"VS: /73   Pulse 77   Temp 98.2  F (36.8  C) (Oral)   Resp 16   Ht 1.753 m (5' 9\")   Wt 90.7 kg (200 lb)   LMP  (LMP Unknown)   SpO2 99%   BMI 29.53 kg/m      O2: Stable on room air. No complaints of SOB or CP   Neuro: AOx4   Bowel/Bladder: Voids spontaneously. Bowel sounds present in all four quadrants.    LBM: unknown   Diet: Full Liquid diet   Skin: No skin issues noted   Pain: No complaints of pain this shift   Activity: Up ad nicholas, independent   Dressings: Refused IV dressing change   LDA: R PIV-SL   Equipment: Belongings remain with patient   Plan: Continue with plan of care   Additional Info: Phos replaced, labs redrawn therapeutic. PRN Ativan for nausea utilized. Patient on Tele monitoring.       Ofelia Jeter RN on 12/25/2024 at 2:48 AM       Goal Outcome Evaluation:      Plan of Care Reviewed With: patient    Overall Patient Progress: improvingOverall Patient Progress: improving    Outcome Evaluation: intermittent complaints of nausea, PRN medications utilized. No vomiting this shift. No acute changes overnight.      "

## 2024-12-26 LAB
ATRIAL RATE - MUSE: 104 BPM
ATRIAL RATE - MUSE: 68 BPM
ATRIAL RATE - MUSE: 88 BPM
DIASTOLIC BLOOD PRESSURE - MUSE: NORMAL MMHG
INTERPRETATION ECG - MUSE: NORMAL
P AXIS - MUSE: 20 DEGREES
P AXIS - MUSE: 55 DEGREES
P AXIS - MUSE: 81 DEGREES
PR INTERVAL - MUSE: 112 MS
PR INTERVAL - MUSE: 142 MS
PR INTERVAL - MUSE: NORMAL MS
QRS DURATION - MUSE: 104 MS
QRS DURATION - MUSE: 86 MS
QRS DURATION - MUSE: 88 MS
QT - MUSE: 356 MS
QT - MUSE: 414 MS
QT - MUSE: 496 MS
QTC - MUSE: 430 MS
QTC - MUSE: 440 MS
QTC - MUSE: 649 MS
R AXIS - MUSE: -19 DEGREES
R AXIS - MUSE: -33 DEGREES
R AXIS - MUSE: -6 DEGREES
SYSTOLIC BLOOD PRESSURE - MUSE: NORMAL MMHG
T AXIS - MUSE: 10 DEGREES
T AXIS - MUSE: 4 DEGREES
T AXIS - MUSE: 59 DEGREES
VENTRICULAR RATE- MUSE: 103 BPM
VENTRICULAR RATE- MUSE: 68 BPM
VENTRICULAR RATE- MUSE: 88 BPM

## 2024-12-27 LAB
EDDP CTO UR SCN-MCNC: ABNORMAL NG/ML
EDDP/CREAT UR: ABNORMAL NG/MG {CREAT}
FENTANYL UR CFM-MCNC: 90 NG/ML
FENTANYL/CREAT UR: 136 NG/MG {CREAT}
LORAZEPAM UR QL CFM: PRESENT
METHADONE UR CFM-MCNC: 5800 NG/ML
METHADONE/CREAT UR: 8788 NG/MG {CREAT}
METHAMPHET UR CFM-MCNC: 60 NG/ML
METHAMPHET/CREAT UR: 91 NG/MG {CREAT}
NORFENTANYL UR CFM-MCNC: 954 NG/ML
NORFENTANYL/CREAT UR: 1445 NG/MG {CREAT}
PREGABALIN UR QL CFM: PRESENT

## 2024-12-29 ENCOUNTER — HOSPITAL ENCOUNTER (EMERGENCY)
Facility: CLINIC | Age: 49
Discharge: HOME | End: 2024-12-29
Attending: EMERGENCY MEDICINE
Payer: COMMERCIAL

## 2024-12-29 ENCOUNTER — APPOINTMENT (OUTPATIENT)
Dept: GENERAL RADIOLOGY | Facility: CLINIC | Age: 49
End: 2024-12-29
Attending: EMERGENCY MEDICINE
Payer: COMMERCIAL

## 2024-12-29 VITALS
HEART RATE: 76 BPM | RESPIRATION RATE: 16 BRPM | SYSTOLIC BLOOD PRESSURE: 125 MMHG | OXYGEN SATURATION: 96 % | HEIGHT: 69 IN | BODY MASS INDEX: 29.62 KG/M2 | DIASTOLIC BLOOD PRESSURE: 86 MMHG | TEMPERATURE: 97.9 F | WEIGHT: 200 LBS

## 2024-12-29 DIAGNOSIS — R07.89 ATYPICAL CHEST PAIN: ICD-10-CM

## 2024-12-29 LAB
ANION GAP SERPL CALCULATED.3IONS-SCNC: 14 MMOL/L (ref 7–15)
BASOPHILS # BLD AUTO: 0 10E3/UL (ref 0–0.2)
BASOPHILS NFR BLD AUTO: 1 %
BUN SERPL-MCNC: 19.8 MG/DL (ref 6–20)
CALCIUM SERPL-MCNC: 9.3 MG/DL (ref 8.8–10.4)
CHLORIDE SERPL-SCNC: 95 MMOL/L (ref 98–107)
CREAT SERPL-MCNC: 0.8 MG/DL (ref 0.51–0.95)
D DIMER PPP FEU-MCNC: 0.35 UG/ML FEU (ref 0–0.5)
EGFRCR SERPLBLD CKD-EPI 2021: >90 ML/MIN/1.73M2
EOSINOPHIL # BLD AUTO: 0.4 10E3/UL (ref 0–0.7)
EOSINOPHIL NFR BLD AUTO: 6 %
ERYTHROCYTE [DISTWIDTH] IN BLOOD BY AUTOMATED COUNT: 13.8 % (ref 10–15)
GLUCOSE SERPL-MCNC: 315 MG/DL (ref 70–99)
HCG SERPL QL: NEGATIVE
HCO3 SERPL-SCNC: 22 MMOL/L (ref 22–29)
HCT VFR BLD AUTO: 39.8 % (ref 35–47)
HGB BLD-MCNC: 13.4 G/DL (ref 11.7–15.7)
HOLD SPECIMEN: NORMAL
IMM GRANULOCYTES # BLD: 0.1 10E3/UL
IMM GRANULOCYTES NFR BLD: 1 %
LYMPHOCYTES # BLD AUTO: 2.8 10E3/UL (ref 0.8–5.3)
LYMPHOCYTES NFR BLD AUTO: 42 %
MCH RBC QN AUTO: 28 PG (ref 26.5–33)
MCHC RBC AUTO-ENTMCNC: 33.7 G/DL (ref 31.5–36.5)
MCV RBC AUTO: 83 FL (ref 78–100)
MONOCYTES # BLD AUTO: 0.7 10E3/UL (ref 0–1.3)
MONOCYTES NFR BLD AUTO: 11 %
NEUTROPHILS # BLD AUTO: 2.6 10E3/UL (ref 1.6–8.3)
NEUTROPHILS NFR BLD AUTO: 40 %
NRBC # BLD AUTO: 0 10E3/UL
NRBC BLD AUTO-RTO: 0 /100
PLATELET # BLD AUTO: 236 10E3/UL (ref 150–450)
POTASSIUM SERPL-SCNC: 4.3 MMOL/L (ref 3.4–5.3)
RBC # BLD AUTO: 4.79 10E6/UL (ref 3.8–5.2)
SODIUM SERPL-SCNC: 131 MMOL/L (ref 135–145)
TROPONIN T SERPL HS-MCNC: 7 NG/L
TROPONIN T SERPL HS-MCNC: 8 NG/L
WBC # BLD AUTO: 6.6 10E3/UL (ref 4–11)

## 2024-12-29 PROCEDURE — 84703 CHORIONIC GONADOTROPIN ASSAY: CPT | Performed by: EMERGENCY MEDICINE

## 2024-12-29 PROCEDURE — 85014 HEMATOCRIT: CPT | Performed by: EMERGENCY MEDICINE

## 2024-12-29 PROCEDURE — 93005 ELECTROCARDIOGRAM TRACING: CPT | Performed by: EMERGENCY MEDICINE

## 2024-12-29 PROCEDURE — 99285 EMERGENCY DEPT VISIT HI MDM: CPT | Mod: 25 | Performed by: EMERGENCY MEDICINE

## 2024-12-29 PROCEDURE — 85379 FIBRIN DEGRADATION QUANT: CPT | Performed by: EMERGENCY MEDICINE

## 2024-12-29 PROCEDURE — 80048 BASIC METABOLIC PNL TOTAL CA: CPT | Performed by: EMERGENCY MEDICINE

## 2024-12-29 PROCEDURE — 36415 COLL VENOUS BLD VENIPUNCTURE: CPT | Performed by: EMERGENCY MEDICINE

## 2024-12-29 PROCEDURE — 93010 ELECTROCARDIOGRAM REPORT: CPT | Performed by: EMERGENCY MEDICINE

## 2024-12-29 PROCEDURE — 99284 EMERGENCY DEPT VISIT MOD MDM: CPT | Performed by: EMERGENCY MEDICINE

## 2024-12-29 PROCEDURE — 85004 AUTOMATED DIFF WBC COUNT: CPT | Performed by: EMERGENCY MEDICINE

## 2024-12-29 PROCEDURE — 84484 ASSAY OF TROPONIN QUANT: CPT | Performed by: EMERGENCY MEDICINE

## 2024-12-29 PROCEDURE — 71046 X-RAY EXAM CHEST 2 VIEWS: CPT

## 2024-12-29 PROCEDURE — 71046 X-RAY EXAM CHEST 2 VIEWS: CPT | Mod: 26 | Performed by: STUDENT IN AN ORGANIZED HEALTH CARE EDUCATION/TRAINING PROGRAM

## 2024-12-29 ASSESSMENT — COLUMBIA-SUICIDE SEVERITY RATING SCALE - C-SSRS
1. IN THE PAST MONTH, HAVE YOU WISHED YOU WERE DEAD OR WISHED YOU COULD GO TO SLEEP AND NOT WAKE UP?: NO
2. HAVE YOU ACTUALLY HAD ANY THOUGHTS OF KILLING YOURSELF IN THE PAST MONTH?: NO
6. HAVE YOU EVER DONE ANYTHING, STARTED TO DO ANYTHING, OR PREPARED TO DO ANYTHING TO END YOUR LIFE?: YES

## 2024-12-29 ASSESSMENT — ACTIVITIES OF DAILY LIVING (ADL)
ADLS_ACUITY_SCORE: 58

## 2024-12-29 NOTE — ED TRIAGE NOTES
"Pt arrives via EMS for increasing chest discomfort radiating to L arm. \"Tightness and achyess\" . 324 ASA, 2 nitroglycerin sprays which reduced pain to 4. 20G R Wrist IV. , DM2.         "

## 2024-12-29 NOTE — DISCHARGE INSTRUCTIONS
You had a reassuring evaluation in the emergency department today. Please follow up with your primary care physician for further ongoing management and evaluation. If you develop new significant symptoms or emergent concerns then return to the emergency department for further evaluation.

## 2024-12-29 NOTE — ED PROVIDER NOTES
ED Provider Note  [facility]      History     Chief Complaint   Patient presents with    Chest Pain     HPI  Yun Coyle is a 48 year old female PMH of opioid dependence, chronic hepatitis C, HLD, MDD, personality disorder, SI, DM2  who presents to the ER for evaluation of chest pain.    Past Medical History  Past Medical History:   Diagnosis Date    Acute pancreatitis 06/29/2017    Anxiety     Cauda equina syndrome (H) 03/29/2013    Cyst of right ovary 11/16/2017    Depressive disorder     Gastroesophageal reflux disease     Hypertension     Hyponatremia 10/01/2023    Low back pain 08/20/2012    Overview:   History of cauda equina and surgery.      Type 2 diabetes mellitus (H)     Uncomplicated asthma      Past Surgical History:   Procedure Laterality Date    BACK SURGERY      x2    CHOLECYSTECTOMY      ORTHOPEDIC SURGERY      SALPINGO OOPHORECTOMY,R/L/ELDA Right      Alcohol Swabs PADS  blood glucose (NO BRAND SPECIFIED) lancets standard  blood glucose (NO BRAND SPECIFIED) test strip  blood glucose calibration (NO BRAND SPECIFIED) solution  blood glucose monitoring (NO BRAND SPECIFIED) meter device kit  cloNIDine (CATAPRES) 0.1 MG tablet  empagliflozin (JARDIANCE) 10 MG TABS tablet  escitalopram (LEXAPRO) 10 MG tablet  Glucagon (GVOKE HYPOPEN) 1 MG/0.2ML pen  glucose (BD GLUCOSE) 4 g chewable tablet  glucose 40 % (400 mg/mL) gel  hydrOXYzine HCl (ATARAX) 25 MG tablet  insulin aspart (NOVOLOG PEN) 100 UNIT/ML pen  insulin aspart (NOVOLOG PEN) 100 UNIT/ML pen  insulin aspart (NOVOLOG PEN) 100 UNIT/ML pen  insulin glargine (LANTUS PEN) 100 UNIT/ML pen  insulin pen needle (32G X 4 MM) 32G X 4 MM miscellaneous  losartan (COZAAR) 25 MG tablet  metFORMIN (GLUCOPHAGE) 500 MG tablet  methadone (DOLOPHINE-INTENSOL) 10 MG/ML (HIGH CONC) solution  ondansetron (ZOFRAN ODT) 4 MG ODT tab  prazosin (MINIPRESS) 2 MG capsule  [employer] Container MISC      No Known Allergies  Family History  Family  "History   Problem Relation Age of Onset    Breast Cancer Mother         in her 50s.    Diabetes Mother     Breast Cancer Maternal Grandmother         in her 40s.    Diabetes Maternal Grandmother      Social History   Social History     Tobacco Use    Smoking status: Never    Smokeless tobacco: Never   Vaping Use    Vaping status: Never Used   Substance Use Topics    Alcohol use: No    Drug use: Not Currently     Types: Marijuana     Comment: pt was taking Oxycontin \"years ago.\" pt is now on Methadone.      A medically appropriate review of systems was performed with pertinent positives and negatives noted in the HPI, and all other systems negative.    Physical Exam   Height: 175.3 cm (5' 9\")  Weight: 90.7 kg (200 lb)  Physical Exam  Vitals and nursing note reviewed.   Constitutional:       General: She is not in acute distress.     Appearance: Normal appearance. She is not diaphoretic.   HENT:      Head: Atraumatic.      Mouth/Throat:      Mouth: Mucous membranes are moist.   Eyes:      General: No scleral icterus.     Conjunctiva/sclera: Conjunctivae normal.   Cardiovascular:      Rate and Rhythm: Normal rate.      Heart sounds: Normal heart sounds.   Pulmonary:      Effort: No respiratory distress.      Breath sounds: Normal breath sounds.   Abdominal:      General: Abdomen is flat.   Musculoskeletal:      Cervical back: Neck supple.   Skin:     General: Skin is warm.      Findings: No rash.   Neurological:      Mental Status: She is alert.       ***    ED Course, Procedures, & Data      Procedures       {ED Course Selections (Optional):008953}  {ED Sepsis CMS Documentation (Optional):733699::\" \"}       Results for orders placed or performed during the hospital encounter of 12/29/24   [place] Draw *Canceled*     Status: None ()    Narrative    The following orders were created for panel order [place] Draw.  Procedure                               Abnormality         Status                     ---------             "                   -----------         ------                       Please view results for these tests on the individual orders.   [place] Draw     Status: None (In process)    Narrative    The following orders were created for panel order [place] Draw.  Procedure                               Abnormality         Status                     ---------                               -----------         ------                     Extra Red Top Tube[[id number]]                                                          Extra Green Top (Lithium...[[id number]]                                                 Extra Purple Top Tube[[id number]]                                                       Extra Urine Collection[[id number]]                           In process                   Please view results for these tests on the individual orders.   [place] Draw     Status: None ()    Narrative    The following orders were created for panel order [place] Draw.  Procedure                               Abnormality         Status                     ---------                               -----------         ------                     Extra Blue Top Tube[[id number]]                                                           Please view results for these tests on the individual orders.   EKG 12-lead, tracing only     Status: None (Preliminary result)   Result Value Ref Range    Systolic Blood Pressure  mmHg    Diastolic Blood Pressure  mmHg    Ventricular Rate 82 BPM    Atrial Rate 82 BPM    MI Interval 138 ms    QRS Duration 84 ms     ms    QTc 441 ms    P Axis 57 degrees    R AXIS -10 degrees    T Axis 23 degrees    Interpretation ECG Sinus rhythm  Normal ECG      CBC with Platelets & Differential *Canceled*     Status: None ()    Narrative    The following orders were created for panel order CBC with Platelets & Differential.  Procedure                               Abnormality         Status                     ---------                                -----------         ------                     CBC with platelets and d...[[id number]]                                                   Please view results for these tests on the individual orders.     Medications - No data to display  Labs Ordered and Resulted from Time of ED Arrival to Time of ED Departure - No data to display  No orders to display          {Critical Care Performed?:050397}    Assessment & Plan    ***    I have reviewed the nursing notes. I have reviewed the findings, diagnosis, plan and need for follow up with the patient.    New Prescriptions    No medications on file       Final diagnoses:   None       Danny Centeno MD  [facility] EMERGENCY DEPARTMENT  12/29/2024   Qualitative Negative Negative   D dimer quantitative     Status: Normal   Result Value Ref Range    D-Dimer Quantitative 0.35 0.00 - 0.50 ug/mL FEU    Narrative    This D-dimer assay is intended for use in conjunction with a clinical pretest probability assessment model to exclude pulmonary embolism (PE) and deep venous thrombosis (DVT) in outpatients suspected of PE or DVT. The cut-off value is 0.50 ug/mL FEU.   CBC with platelets and differential     Status: None   Result Value Ref Range    WBC Count 6.6 4.0 - 11.0 10e3/uL    RBC Count 4.79 3.80 - 5.20 10e6/uL    Hemoglobin 13.4 11.7 - 15.7 g/dL    Hematocrit 39.8 35.0 - 47.0 %    MCV 83 78 - 100 fL    MCH 28.0 26.5 - 33.0 pg    MCHC 33.7 31.5 - 36.5 g/dL    RDW 13.8 10.0 - 15.0 %    Platelet Count 236 150 - 450 10e3/uL    % Neutrophils 40 %    % Lymphocytes 42 %    % Monocytes 11 %    % Eosinophils 6 %    % Basophils 1 %    % Immature Granulocytes 1 %    NRBCs per 100 WBC 0 <1 /100    Absolute Neutrophils 2.6 1.6 - 8.3 10e3/uL    Absolute Lymphocytes 2.8 0.8 - 5.3 10e3/uL    Absolute Monocytes 0.7 0.0 - 1.3 10e3/uL    Absolute Eosinophils 0.4 0.0 - 0.7 10e3/uL    Absolute Basophils 0.0 0.0 - 0.2 10e3/uL    Absolute Immature Granulocytes 0.1 <=0.4 10e3/uL    Absolute NRBCs 0.0 10e3/uL   Troponin T, High Sensitivity     Status: Normal   Result Value Ref Range    Troponin T, High Sensitivity 8 <=14 ng/L   EKG 12-lead, tracing only     Status: None   Result Value Ref Range    Systolic Blood Pressure  mmHg    Diastolic Blood Pressure  mmHg    Ventricular Rate 82 BPM    Atrial Rate 82 BPM    ND Interval 138 ms    QRS Duration 84 ms     ms    QTc 441 ms    P Axis 57 degrees    R AXIS -10 degrees    T Axis 23 degrees    Interpretation ECG       Sinus rhythm  Normal ECG  Unconfirmed report - interpretation of this ECG is computer generated - see medical record for final interpretation    Confirmed by - EMERGENCY ROOM, PHYSICIAN ([id number]),  SHAD DEVINE ([id number]) on  12/30/2024 10:06:24 AM     CBC with Platelets & Differential *Canceled*     Status: None ()    Narrative    The following orders were created for panel order CBC with Platelets & Differential.  Procedure                               Abnormality         Status                     ---------                               -----------         ------                     CBC with platelets and d...[[id number]]                                                   Please view results for these tests on the individual orders.   CBC with Platelets & Differential     Status: None    Narrative    The following orders were created for panel order CBC with Platelets & Differential.  Procedure                               Abnormality         Status                     ---------                               -----------         ------                     CBC with platelets and d...[[id number]]                      Final result                 Please view results for these tests on the individual orders.     Medications - No data to display  Labs Ordered and Resulted from Time of ED Arrival to Time of ED Departure   BASIC METABOLIC PANEL - Abnormal       Result Value    Sodium 131 (*)     Potassium 4.3      Chloride 95 (*)     Carbon Dioxide (CO2) 22      Anion Gap 14      Urea Nitrogen 19.8      Creatinine 0.80      GFR Estimate >90      Calcium 9.3      Glucose 315 (*)    TROPONIN T, HIGH SENSITIVITY - Normal    Troponin T, High Sensitivity 7     HCG QUALITATIVE PREGNANCY - Normal    hCG Serum Qualitative Negative     D DIMER QUANTITATIVE - Normal    D-Dimer Quantitative 0.35     TROPONIN T, HIGH SENSITIVITY - Normal    Troponin T, High Sensitivity 8     CBC WITH PLATELETS AND DIFFERENTIAL    WBC Count 6.6      RBC Count 4.79      Hemoglobin 13.4      Hematocrit 39.8      MCV 83      MCH 28.0      MCHC 33.7      RDW 13.8      Platelet Count 236      % Neutrophils 40      % Lymphocytes 42      % Monocytes 11      % Eosinophils 6      %  Basophils 1      % Immature Granulocytes 1      NRBCs per 100 WBC 0      Absolute Neutrophils 2.6      Absolute Lymphocytes 2.8      Absolute Monocytes 0.7      Absolute Eosinophils 0.4      Absolute Basophils 0.0      Absolute Immature Granulocytes 0.1      Absolute NRBCs 0.0       Chest XR,  PA & LAT   Final Result   IMPRESSION:       No acute airspace disease. Mild prominence of bronchovascular markings   which may at times be seen with reactive/inflammatory airway disease   or mild pulmonary congestion      I have personally reviewed the examination and initial interpretation   and I agree with the findings.      RAJ BARAJAS MD            SYSTEM ID:  [id number]             Critical care was not performed.     Medical Decision Making  The patient's presentation was of moderate complexity (an undiagnosed new problem with uncertain prognosis).    The patient's evaluation involved:  review of external note(s) from 1 sources (see separate area of note for details)  review of 3+ test result(s) ordered prior to this encounter (see separate area of note for details)  ordering and/or review of 3+ test(s) in this encounter (see separate area of note for details)    The patient's management necessitated only low risk treatment.    Assessment & Plan    Yun Coyle is a 48 year old female PMH of opioid dependence, chronic hepatitis C, HLD, MDD, personality disorder, SI, DM2  who presents to the ER for evaluation of chest pain. Patient is nontoxic appearing on exam, has vital signs within normal limits. EKG is non-ischemic. Chest x-ray obtained and negative for infiltrate on my read. Labwork obtained as above including troponin x2 which were negative and overall reassuring. D-dimer obtained and negative. Patient had a reassuring evaluation overall, low risk for ACS by Heart Score; discharged with outpatient follow up and strict return precautions.     I have reviewed the nursing notes. I have reviewed the findings,  diagnosis, plan and need for follow up with the patient.    Discharge Medication List as of 12/29/2024  5:02 PM          Final diagnoses:   Atypical chest pain       Danny Centeno MD  [facility] EMERGENCY DEPARTMENT  12/29/2024     Danny Centeno MD  01/03/25 0719

## 2024-12-30 LAB
ATRIAL RATE - MUSE: 82 BPM
DIASTOLIC BLOOD PRESSURE - MUSE: NORMAL MMHG
INTERPRETATION ECG - MUSE: NORMAL
P AXIS - MUSE: 57 DEGREES
PR INTERVAL - MUSE: 138 MS
QRS DURATION - MUSE: 84 MS
QT - MUSE: 378 MS
QTC - MUSE: 441 MS
R AXIS - MUSE: -10 DEGREES
SYSTOLIC BLOOD PRESSURE - MUSE: NORMAL MMHG
T AXIS - MUSE: 23 DEGREES
VENTRICULAR RATE- MUSE: 82 BPM

## 2025-01-08 ENCOUNTER — HOSPITAL ENCOUNTER (EMERGENCY)
Facility: CLINIC | Age: 50
Discharge: HOME | End: 2025-01-09
Attending: STUDENT IN AN ORGANIZED HEALTH CARE EDUCATION/TRAINING PROGRAM
Payer: COMMERCIAL

## 2025-01-08 DIAGNOSIS — R73.9 HYPERGLYCEMIA: ICD-10-CM

## 2025-01-08 DIAGNOSIS — R11.15 CYCLIC VOMITING SYNDROME: ICD-10-CM

## 2025-01-08 LAB
ALBUMIN SERPL BCG-MCNC: 4.1 G/DL (ref 3.5–5.2)
ALBUMIN UR-MCNC: 20 MG/DL
ALP SERPL-CCNC: 257 U/L (ref 40–150)
ALT SERPL W P-5'-P-CCNC: 241 U/L (ref 0–50)
ANION GAP SERPL CALCULATED.3IONS-SCNC: 17 MMOL/L (ref 7–15)
APPEARANCE UR: ABNORMAL
AST SERPL W P-5'-P-CCNC: ABNORMAL U/L
BASOPHILS # BLD AUTO: 0.1 10E3/UL (ref 0–0.2)
BASOPHILS NFR BLD AUTO: 1 %
BILIRUB SERPL-MCNC: 0.6 MG/DL
BILIRUB UR QL STRIP: NEGATIVE
BUN SERPL-MCNC: 15.7 MG/DL (ref 6–20)
CALCIUM SERPL-MCNC: 9.7 MG/DL (ref 8.8–10.4)
CHLORIDE SERPL-SCNC: 94 MMOL/L (ref 98–107)
COLOR UR AUTO: YELLOW
CREAT SERPL-MCNC: 0.78 MG/DL (ref 0.51–0.95)
EGFRCR SERPLBLD CKD-EPI 2021: >90 ML/MIN/1.73M2
EOSINOPHIL # BLD AUTO: 0.3 10E3/UL (ref 0–0.7)
EOSINOPHIL NFR BLD AUTO: 4 %
ERYTHROCYTE [DISTWIDTH] IN BLOOD BY AUTOMATED COUNT: 13.4 % (ref 10–15)
GLUCOSE BLDC GLUCOMTR-MCNC: 295 MG/DL (ref 70–99)
GLUCOSE SERPL-MCNC: 342 MG/DL (ref 70–99)
GLUCOSE UR STRIP-MCNC: >=1000 MG/DL
HCG SERPL QL: NEGATIVE
HCO3 SERPL-SCNC: 23 MMOL/L (ref 22–29)
HCT VFR BLD AUTO: 41.9 % (ref 35–47)
HGB BLD-MCNC: 14.1 G/DL (ref 11.7–15.7)
HGB UR QL STRIP: NEGATIVE
HYALINE CASTS: 6 /LPF
IMM GRANULOCYTES # BLD: 0 10E3/UL
IMM GRANULOCYTES NFR BLD: 0 %
KETONES UR STRIP-MCNC: NEGATIVE MG/DL
LEUKOCYTE ESTERASE UR QL STRIP: NEGATIVE
LIPASE SERPL-CCNC: 149 U/L (ref 13–60)
LYMPHOCYTES # BLD AUTO: 2.1 10E3/UL (ref 0.8–5.3)
LYMPHOCYTES NFR BLD AUTO: 29 %
MCH RBC QN AUTO: 27.3 PG (ref 26.5–33)
MCHC RBC AUTO-ENTMCNC: 33.7 G/DL (ref 31.5–36.5)
MCV RBC AUTO: 81 FL (ref 78–100)
MONOCYTES # BLD AUTO: 0.4 10E3/UL (ref 0–1.3)
MONOCYTES NFR BLD AUTO: 5 %
MUCOUS THREADS #/AREA URNS LPF: PRESENT /LPF
NEUTROPHILS # BLD AUTO: 4.2 10E3/UL (ref 1.6–8.3)
NEUTROPHILS NFR BLD AUTO: 60 %
NITRATE UR QL: NEGATIVE
NRBC # BLD AUTO: 0 10E3/UL
NRBC BLD AUTO-RTO: 0 /100
PH UR STRIP: 6.5 [PH] (ref 5–7)
PLAT MORPH BLD: NORMAL
PLATELET # BLD AUTO: 198 10E3/UL (ref 150–450)
POTASSIUM SERPL-SCNC: 4.9 MMOL/L (ref 3.4–5.3)
PROT SERPL-MCNC: 8.7 G/DL (ref 6.4–8.3)
RBC # BLD AUTO: 5.16 10E6/UL (ref 3.8–5.2)
RBC MORPH BLD: NORMAL
RBC URINE: 1 /HPF
SODIUM SERPL-SCNC: 134 MMOL/L (ref 135–145)
SP GR UR STRIP: 1.03 (ref 1–1.03)
SQUAMOUS EPITHELIAL: 35 /HPF
UROBILINOGEN UR STRIP-MCNC: NORMAL MG/DL
WBC # BLD AUTO: 7 10E3/UL (ref 4–11)
WBC URINE: 16 /HPF

## 2025-01-08 PROCEDURE — 96361 HYDRATE IV INFUSION ADD-ON: CPT | Performed by: STUDENT IN AN ORGANIZED HEALTH CARE EDUCATION/TRAINING PROGRAM

## 2025-01-08 PROCEDURE — 93010 ELECTROCARDIOGRAM REPORT: CPT | Performed by: STUDENT IN AN ORGANIZED HEALTH CARE EDUCATION/TRAINING PROGRAM

## 2025-01-08 PROCEDURE — 87086 URINE CULTURE/COLONY COUNT: CPT | Performed by: STUDENT IN AN ORGANIZED HEALTH CARE EDUCATION/TRAINING PROGRAM

## 2025-01-08 PROCEDURE — 84155 ASSAY OF PROTEIN SERUM: CPT | Performed by: STUDENT IN AN ORGANIZED HEALTH CARE EDUCATION/TRAINING PROGRAM

## 2025-01-08 PROCEDURE — 96374 THER/PROPH/DIAG INJ IV PUSH: CPT | Performed by: STUDENT IN AN ORGANIZED HEALTH CARE EDUCATION/TRAINING PROGRAM

## 2025-01-08 PROCEDURE — 84075 ASSAY ALKALINE PHOSPHATASE: CPT | Performed by: STUDENT IN AN ORGANIZED HEALTH CARE EDUCATION/TRAINING PROGRAM

## 2025-01-08 PROCEDURE — 82962 GLUCOSE BLOOD TEST: CPT

## 2025-01-08 PROCEDURE — 84703 CHORIONIC GONADOTROPIN ASSAY: CPT | Performed by: STUDENT IN AN ORGANIZED HEALTH CARE EDUCATION/TRAINING PROGRAM

## 2025-01-08 PROCEDURE — 81003 URINALYSIS AUTO W/O SCOPE: CPT | Performed by: STUDENT IN AN ORGANIZED HEALTH CARE EDUCATION/TRAINING PROGRAM

## 2025-01-08 PROCEDURE — 85048 AUTOMATED LEUKOCYTE COUNT: CPT | Performed by: STUDENT IN AN ORGANIZED HEALTH CARE EDUCATION/TRAINING PROGRAM

## 2025-01-08 PROCEDURE — 83690 ASSAY OF LIPASE: CPT | Performed by: STUDENT IN AN ORGANIZED HEALTH CARE EDUCATION/TRAINING PROGRAM

## 2025-01-08 PROCEDURE — 250N000013 HC RX MED GY IP 250 OP 250 PS 637: Performed by: STUDENT IN AN ORGANIZED HEALTH CARE EDUCATION/TRAINING PROGRAM

## 2025-01-08 PROCEDURE — 258N000003 HC RX IP 258 OP 636: Performed by: STUDENT IN AN ORGANIZED HEALTH CARE EDUCATION/TRAINING PROGRAM

## 2025-01-08 PROCEDURE — 93005 ELECTROCARDIOGRAM TRACING: CPT | Performed by: STUDENT IN AN ORGANIZED HEALTH CARE EDUCATION/TRAINING PROGRAM

## 2025-01-08 PROCEDURE — 99284 EMERGENCY DEPT VISIT MOD MDM: CPT | Performed by: STUDENT IN AN ORGANIZED HEALTH CARE EDUCATION/TRAINING PROGRAM

## 2025-01-08 PROCEDURE — 99284 EMERGENCY DEPT VISIT MOD MDM: CPT | Mod: 25 | Performed by: STUDENT IN AN ORGANIZED HEALTH CARE EDUCATION/TRAINING PROGRAM

## 2025-01-08 PROCEDURE — 250N000012 HC RX MED GY IP 250 OP 636 PS 637: Performed by: STUDENT IN AN ORGANIZED HEALTH CARE EDUCATION/TRAINING PROGRAM

## 2025-01-08 PROCEDURE — 85018 HEMOGLOBIN: CPT | Performed by: STUDENT IN AN ORGANIZED HEALTH CARE EDUCATION/TRAINING PROGRAM

## 2025-01-08 PROCEDURE — 36415 COLL VENOUS BLD VENIPUNCTURE: CPT | Performed by: STUDENT IN AN ORGANIZED HEALTH CARE EDUCATION/TRAINING PROGRAM

## 2025-01-08 PROCEDURE — 250N000011 HC RX IP 250 OP 636: Performed by: STUDENT IN AN ORGANIZED HEALTH CARE EDUCATION/TRAINING PROGRAM

## 2025-01-08 RX ORDER — OLANZAPINE 5 MG/1
5 TABLET, ORALLY DISINTEGRATING ORAL ONCE
Status: COMPLETED | OUTPATIENT
Start: 2025-01-08 | End: 2025-01-08

## 2025-01-08 RX ORDER — ONDANSETRON 2 MG/ML
4 INJECTION INTRAMUSCULAR; INTRAVENOUS EVERY 30 MIN PRN
Status: DISCONTINUED | OUTPATIENT
Start: 2025-01-08 | End: 2025-01-09 | Stop reason: HOSPADM

## 2025-01-08 RX ADMIN — ONDANSETRON 4 MG: 2 INJECTION INTRAMUSCULAR; INTRAVENOUS at 19:18

## 2025-01-08 RX ADMIN — INSULIN ASPART 5 UNITS: 100 INJECTION, SOLUTION INTRAVENOUS; SUBCUTANEOUS at 23:08

## 2025-01-08 RX ADMIN — SODIUM CHLORIDE, POTASSIUM CHLORIDE, SODIUM LACTATE AND CALCIUM CHLORIDE 1000 ML: 600; 310; 30; 20 INJECTION, SOLUTION INTRAVENOUS at 19:16

## 2025-01-08 RX ADMIN — OLANZAPINE 5 MG: 5 TABLET, ORALLY DISINTEGRATING ORAL at 19:16

## 2025-01-08 ASSESSMENT — ACTIVITIES OF DAILY LIVING (ADL)
ADLS_ACUITY_SCORE: 58

## 2025-01-09 VITALS
DIASTOLIC BLOOD PRESSURE: 85 MMHG | SYSTOLIC BLOOD PRESSURE: 138 MMHG | TEMPERATURE: 98.8 F | HEART RATE: 83 BPM | OXYGEN SATURATION: 95 % | RESPIRATION RATE: 16 BRPM

## 2025-01-09 LAB
ATRIAL RATE - MUSE: 91 BPM
BACTERIA UR CULT: NORMAL
DIASTOLIC BLOOD PRESSURE - MUSE: NORMAL MMHG
GLUCOSE BLDC GLUCOMTR-MCNC: 317 MG/DL (ref 70–99)
INTERPRETATION ECG - MUSE: NORMAL
P AXIS - MUSE: 52 DEGREES
PR INTERVAL - MUSE: 144 MS
QRS DURATION - MUSE: 70 MS
QT - MUSE: 350 MS
QTC - MUSE: 430 MS
R AXIS - MUSE: -27 DEGREES
SYSTOLIC BLOOD PRESSURE - MUSE: NORMAL MMHG
T AXIS - MUSE: -3 DEGREES
VENTRICULAR RATE- MUSE: 91 BPM

## 2025-01-09 NOTE — ED PROVIDER NOTES
[facility] EMERGENCY DEPARTMENT ([facility])    1/08/25      ED PROVIDER NOTE    History     Chief Complaint   Patient presents with    Nausea & Vomiting     Nausea and vomiting since yesterday     The history is provided by the patient and medical records.     Yun Coyle is a 49 year old female with history notable for cyclical vomiting syndrome with many ED visits, insulin-dependent DM2, pancreatitis, chronic hepatitis C, HTN, uncomplicated asthma, polysubstance use disorder (on methadone), anxiety/depression, PTSD, and panic disorder who presents to the ED with 1 day of nausea and vomiting.    Patient reports that this feels very similar to her multiple episodes of previous recurrent nausea and vomiting in the past.  Has been having some nonbilious nonbloody emesis darker emesis that she thought could be coffee-ground in appearance, however she is not sure.  No diarrhea.  No significant abdominal pain.  Has been taking her Zofran and her Zyprexa at home without any significant relief.  Denies any shortness of breath, chief fevers or chills.      Past Medical History  Past Medical History:   Diagnosis Date    Acute pancreatitis 06/29/2017    Anxiety     Cauda equina syndrome (H) 03/29/2013    Cyst of right ovary 11/16/2017    Depressive disorder     Gastroesophageal reflux disease     Hypertension     Hyponatremia 10/01/2023    Low back pain 08/20/2012    Overview:   History of cauda equina and surgery.      Type 2 diabetes mellitus (H)     Uncomplicated asthma      Past Surgical History:   Procedure Laterality Date    BACK SURGERY      x2    CHOLECYSTECTOMY      ORTHOPEDIC SURGERY      SALPINGO OOPHORECTOMY,R/L/ELDA Right      Alcohol Swabs PADS  blood glucose (NO BRAND SPECIFIED) lancets standard  blood glucose (NO BRAND SPECIFIED) test strip  blood glucose calibration (NO BRAND SPECIFIED) solution  blood glucose monitoring (NO BRAND SPECIFIED) meter device kit  cloNIDine (CATAPRES) 0.1 MG  "tablet  empagliflozin (JARDIANCE) 10 MG TABS tablet  escitalopram (LEXAPRO) 10 MG tablet  Glucagon (GVOKE HYPOPEN) 1 MG/0.2ML pen  glucose (BD GLUCOSE) 4 g chewable tablet  glucose 40 % (400 mg/mL) gel  hydrOXYzine HCl (ATARAX) 25 MG tablet  insulin aspart (NOVOLOG PEN) 100 UNIT/ML pen  insulin aspart (NOVOLOG PEN) 100 UNIT/ML pen  insulin aspart (NOVOLOG PEN) 100 UNIT/ML pen  insulin glargine (LANTUS PEN) 100 UNIT/ML pen  insulin pen needle (32G X 4 MM) 32G X 4 MM miscellaneous  losartan (COZAAR) 25 MG tablet  metFORMIN (GLUCOPHAGE) 500 MG tablet  methadone (DOLOPHINE-INTENSOL) 10 MG/ML (HIGH CONC) solution  ondansetron (ZOFRAN ODT) 4 MG ODT tab  prazosin (MINIPRESS) 2 MG capsule  Sharps Container MISC      No Known Allergies  Family History  Family History   Problem Relation Age of Onset    Breast Cancer Mother         in her 50s.    Diabetes Mother     Breast Cancer Maternal Grandmother         in her 40s.    Diabetes Maternal Grandmother      Social History   Social History     Tobacco Use    Smoking status: Never    Smokeless tobacco: Never   Vaping Use    Vaping status: Never Used   Substance Use Topics    Alcohol use: No    Drug use: Not Currently     Types: Marijuana     Comment: pt was taking Oxycontin \"years ago.\" pt is now on Methadone.      Past medical history, past surgical history, medications, allergies, family history, and social history were reviewed with the patient. No additional pertinent items.     A medically appropriate review of systems was performed with pertinent positives and negatives noted in the HPI, and all other systems negative.    Physical Exam   BP: 138/85  Pulse: 102  Temp: 97.9  F (36.6  C)  Resp: 16  SpO2: 95 %    Physical Exam  GEN: Well appearing, non toxic, cooperative  HEENT: normocephalic and atraumatic, PERRLA, EOMI  CV: well-perfused, normal skin color for ethnicity, regular rate and rhythm, no murmurs rubs or gallops  PULM: breathing comfortably, in no respiratory " distress, clear to auscultation upper and lower lung fields  ABD: nondistended, soft, nontender, negative [place], negative [place] point tenderness  EXT: Full range of motion.  No edema.  NEURO: awake, conversant, grossly normal bilateral upper and lower extremity strength & ROM   SKIN: No rashes, ecchymosis, or lacerations  PSYCH: Calm and cooperative, interactive    ED Course, Procedures, & Data      Procedures            EKG Interpretation:      Interpreted by Marcia Jackson MD  Time reviewed: 1821  Symptoms at time of EKG: Nausea and vomiting  Rhythm: normal sinus   Rate: normal  Axis: normal  Ectopy: none  Conduction: normal  ST Segments/ T Waves: No ST-T wave changes  Q Waves: none  Comparison to prior: Unchanged    Clinical Impression: normal EKG     Results for orders placed or performed during the hospital encounter of 01/08/25   Comprehensive metabolic panel     Status: Abnormal   Result Value Ref Range    Sodium 134 (L) 135 - 145 mmol/L    Potassium 4.9 3.4 - 5.3 mmol/L    Carbon Dioxide (CO2) 23 22 - 29 mmol/L    Anion Gap 17 (H) 7 - 15 mmol/L    Urea Nitrogen 15.7 6.0 - 20.0 mg/dL    Creatinine 0.78 0.51 - 0.95 mg/dL    GFR Estimate >90 >60 mL/min/1.73m2    Calcium 9.7 8.8 - 10.4 mg/dL    Chloride 94 (L) 98 - 107 mmol/L    Glucose 342 (H) 70 - 99 mg/dL    Alkaline Phosphatase 257 (H) 40 - 150 U/L    AST       (H) 0 - 50 U/L    Protein Total 8.7 (H) 6.4 - 8.3 g/dL    Albumin 4.1 3.5 - 5.2 g/dL    Bilirubin Total 0.6 <=1.2 mg/dL   Lipase     Status: Abnormal   Result Value Ref Range    Lipase 149 (H) 13 - 60 U/L   HCG qualitative Blood     Status: Normal   Result Value Ref Range    hCG Serum Qualitative Negative Negative   CBC with platelets and differential     Status: None   Result Value Ref Range    WBC Count 7.0 4.0 - 11.0 10e3/uL    RBC Count 5.16 3.80 - 5.20 10e6/uL    Hemoglobin 14.1 11.7 - 15.7 g/dL    Hematocrit 41.9 35.0 - 47.0 %    MCV 81 78 - 100 fL    MCH 27.3 26.5 - 33.0 pg    MCHC 33.7  31.5 - 36.5 g/dL    RDW 13.4 10.0 - 15.0 %    Platelet Count 198 150 - 450 10e3/uL    % Neutrophils 60 %    % Lymphocytes 29 %    % Monocytes 5 %    % Eosinophils 4 %    % Basophils 1 %    % Immature Granulocytes 0 %    NRBCs per 100 WBC 0 <1 /100    Absolute Neutrophils 4.2 1.6 - 8.3 10e3/uL    Absolute Lymphocytes 2.1 0.8 - 5.3 10e3/uL    Absolute Monocytes 0.4 0.0 - 1.3 10e3/uL    Absolute Eosinophils 0.3 0.0 - 0.7 10e3/uL    Absolute Basophils 0.1 0.0 - 0.2 10e3/uL    Absolute Immature Granulocytes 0.0 <=0.4 10e3/uL    Absolute NRBCs 0.0 10e3/uL   RBC and Platelet Morphology     Status: None   Result Value Ref Range    RBC Morphology Confirmed RBC Indices     Platelet Assessment  Automated Count Confirmed. Platelet morphology is normal.     Automated Count Confirmed. Platelet morphology is normal.   EKG 12-lead, tracing only     Status: None (Preliminary result)   Result Value Ref Range    Systolic Blood Pressure  mmHg    Diastolic Blood Pressure  mmHg    Ventricular Rate 91 BPM    Atrial Rate 91 BPM    DE Interval 144 ms    QRS Duration 70 ms     ms    QTc 430 ms    P Axis 52 degrees    R AXIS -27 degrees    T Axis -3 degrees    Interpretation ECG       Sinus rhythm  Low voltage QRS  Nonspecific ST abnormality  Abnormal ECG     CBC with platelets differential     Status: None    Narrative    The following orders were created for panel order CBC with platelets differential.  Procedure                               Abnormality         Status                     ---------                               -----------         ------                     CBC with platelets and d...[[id number]]                      Final result               RBC and Platelet Morphology[[id number]]                      Final result                 Please view results for these tests on the individual orders.     Medications   ondansetron (ZOFRAN) injection 4 mg (4 mg Intravenous $Given 1/8/25 1918)   insulin aspart (NovoLOG) injection  (RAPID ACTING) (has no administration in time range)   lactated ringers BOLUS 1,000 mL (0 mLs Intravenous Stopped 1/8/25 2135)   OLANZapine zydis (zyPREXA) ODT tab 5 mg (5 mg Oral $Given 1/8/25 1916)     Labs Ordered and Resulted from Time of ED Arrival to Time of ED Departure   COMPREHENSIVE METABOLIC PANEL - Abnormal       Result Value    Sodium 134 (*)     Potassium 4.9      Carbon Dioxide (CO2) 23      Anion Gap 17 (*)     Urea Nitrogen 15.7      Creatinine 0.78      GFR Estimate >90      Calcium 9.7      Chloride 94 (*)     Glucose 342 (*)     Alkaline Phosphatase 257 (*)     AST         (*)     Protein Total 8.7 (*)     Albumin 4.1      Bilirubin Total 0.6     LIPASE - Abnormal    Lipase 149 (*)    HCG QUALITATIVE PREGNANCY - Normal    hCG Serum Qualitative Negative     CBC WITH PLATELETS AND DIFFERENTIAL    WBC Count 7.0      RBC Count 5.16      Hemoglobin 14.1      Hematocrit 41.9      MCV 81      MCH 27.3      MCHC 33.7      RDW 13.4      Platelet Count 198      % Neutrophils 60      % Lymphocytes 29      % Monocytes 5      % Eosinophils 4      % Basophils 1      % Immature Granulocytes 0      NRBCs per 100 WBC 0      Absolute Neutrophils 4.2      Absolute Lymphocytes 2.1      Absolute Monocytes 0.4      Absolute Eosinophils 0.3      Absolute Basophils 0.1      Absolute Immature Granulocytes 0.0      Absolute NRBCs 0.0     RBC AND PLATELET MORPHOLOGY    RBC Morphology Confirmed RBC Indices      Platelet Assessment        Value: Automated Count Confirmed. Platelet morphology is normal.   ROUTINE UA WITH MICROSCOPIC REFLEX TO CULTURE     No orders to display          Critical care was not performed.     Medical Decision Making  The patient's presentation was of high complexity (a chronic illness severe exacerbation, progression, or side effect of treatment).    The patient's evaluation involved:  review of external note(s) from 3+ sources (see separate area of note for details)  review of 3+ test  result(s) ordered prior to this encounter (see separate area of note for details)  ordering and/or review of 3+ test(s) in this encounter (see separate area of note for details)    The patient's management necessitated moderate risk (prescription drug management including medications given in the ED).    Assessment & Plan    50-year-old female with a history of recurrent nausea and vomiting, cyclic vomiting syndrome, opiate dependence, chronic hepatitis C, insulin-dependent diabetes presents emergency department due to nausea and vomiting despite taking her antiemetics at home.  Endorses this is very similar to her typical presentations with recurrent nausea and vomiting.  Does not have any fevers, chills, abdomen is soft and nontender with low suspicion of an acute intra-abdominal process.    Lab work does demonstrate fairly similar previously noted LFT derangements, glucose is 342 here, however no evidence of any significant anion gap, and not acidotic at this time.  Lipase is elevated at 149.  She has received IV fluids and antiemetics.    Blood sugar is noted however to be elevated at 8 and will give her a dose of subcutaneous insulin.  Currently awaiting urinalysis, reevaluation for p.o. trial    12:10 AM given 5 units of insulin, blood sugar did come down, did briefly bump back up however.  She has been able to eat and drink, is feeling quite a bit better, no evidence of any ketones, no evidence of DKA.  Will plan for discharge home, patient is aware to monitor her glucose and continue to give correcting units as needed    I have reviewed the nursing notes. I have reviewed the findings, diagnosis, plan and need for follow up with the patient.    New Prescriptions    No medications on file       Final diagnoses:   Cyclic vomiting syndrome   Hyperglycemia       Marcia Jackson MD  [facility] EMERGENCY DEPARTMENT  1/8/2025     Marcia Jackson MD  01/09/25 0010

## 2025-01-09 NOTE — DISCHARGE INSTRUCTIONS
You are seen in Emergency Department due to nausea and vomiting.  Your blood sugar was high.  You did get some insulin here, and were able to tolerate eating and drinking.  Your symptoms felt better before you are able to go home.    Watch for any signs of worsening nausea and vomiting, severe high blood sugars that are not able to come down with insulin, worsening abdominal pain, or any other concerning symptoms.

## 2025-01-10 LAB — BACTERIA UR CULT: NORMAL

## 2025-01-13 ENCOUNTER — PATIENT OUTREACH (OUTPATIENT)
Dept: CARE COORDINATION | Facility: CLINIC | Age: 50
End: 2025-01-13
Payer: COMMERCIAL

## 2025-01-13 NOTE — PROGRESS NOTES
[facility] Care Resource Center Contact  [facility]/Voicemail     Clinical Data: Care Coordination ED-sourced Outreach-     Outreach attempted x 2.  Left message on patient's voicemail, providing [facility]'s 24/7 scheduling and nurse triage phone number [phone] ([phone]) for questions/concerns and/or to schedule an appt with an [facility] provider.      [facility] unable to send Care Coordination introduction letter as patient does not have an active MyChart account. Clinic Care Coordination services remain available via referral if needed.    Plan: [facility] will do no further outreaches at this time.       DAI Lara  [facility] Care Resource [place], [facility]    *Connected Care Resource Team does NOT follow patient ongoing. Referrals are identified based on internal discharge reports and the outreach is to ensure patient has an understanding of their discharge instructions.

## 2025-01-18 ENCOUNTER — HOSPITAL ENCOUNTER (EMERGENCY)
Facility: CLINIC | Age: 50
Discharge: HOME | End: 2025-01-18
Attending: EMERGENCY MEDICINE | Admitting: EMERGENCY MEDICINE
Payer: COMMERCIAL

## 2025-01-18 VITALS
RESPIRATION RATE: 14 BRPM | HEART RATE: 115 BPM | BODY MASS INDEX: 29.62 KG/M2 | HEIGHT: 69 IN | TEMPERATURE: 97.5 F | OXYGEN SATURATION: 97 % | SYSTOLIC BLOOD PRESSURE: 164 MMHG | DIASTOLIC BLOOD PRESSURE: 111 MMHG | WEIGHT: 200 LBS

## 2025-01-18 DIAGNOSIS — R11.2 NAUSEA AND VOMITING, UNSPECIFIED VOMITING TYPE: ICD-10-CM

## 2025-01-18 LAB
ALBUMIN SERPL BCG-MCNC: 4.7 G/DL (ref 3.5–5.2)
ALP SERPL-CCNC: 215 U/L (ref 40–150)
ALT SERPL W P-5'-P-CCNC: 156 U/L (ref 0–50)
ANION GAP SERPL CALCULATED.3IONS-SCNC: 24 MMOL/L (ref 7–15)
AST SERPL W P-5'-P-CCNC: 85 U/L (ref 0–45)
B-OH-BUTYR SERPL-SCNC: 2.63 MMOL/L
BASE EXCESS BLDV CALC-SCNC: -0.9 MMOL/L (ref -3–3)
BASOPHILS # BLD AUTO: 0 10E3/UL (ref 0–0.2)
BASOPHILS NFR BLD AUTO: 0 %
BILIRUB SERPL-MCNC: 0.5 MG/DL
BUN SERPL-MCNC: 19.8 MG/DL (ref 6–20)
CALCIUM SERPL-MCNC: 10.8 MG/DL (ref 8.8–10.4)
CHLORIDE SERPL-SCNC: 89 MMOL/L (ref 98–107)
CREAT SERPL-MCNC: 1 MG/DL (ref 0.51–0.95)
EGFRCR SERPLBLD CKD-EPI 2021: 70 ML/MIN/1.73M2
EOSINOPHIL # BLD AUTO: 0 10E3/UL (ref 0–0.7)
EOSINOPHIL NFR BLD AUTO: 0 %
ERYTHROCYTE [DISTWIDTH] IN BLOOD BY AUTOMATED COUNT: 13.2 % (ref 10–15)
GLUCOSE BLDC GLUCOMTR-MCNC: 398 MG/DL (ref 70–99)
GLUCOSE SERPL-MCNC: 380 MG/DL (ref 70–99)
HCO3 BLDV-SCNC: 25 MMOL/L (ref 21–28)
HCO3 SERPL-SCNC: 20 MMOL/L (ref 22–29)
HCT VFR BLD AUTO: 41.2 % (ref 35–47)
HGB BLD-MCNC: 14.1 G/DL (ref 11.7–15.7)
IMM GRANULOCYTES # BLD: 0.1 10E3/UL
IMM GRANULOCYTES NFR BLD: 1 %
LIPASE SERPL-CCNC: 33 U/L (ref 13–60)
LYMPHOCYTES # BLD AUTO: 1.5 10E3/UL (ref 0.8–5.3)
LYMPHOCYTES NFR BLD AUTO: 16 %
MCH RBC QN AUTO: 27 PG (ref 26.5–33)
MCHC RBC AUTO-ENTMCNC: 34.2 G/DL (ref 31.5–36.5)
MCV RBC AUTO: 79 FL (ref 78–100)
MONOCYTES # BLD AUTO: 0.3 10E3/UL (ref 0–1.3)
MONOCYTES NFR BLD AUTO: 3 %
NEUTROPHILS # BLD AUTO: 7.7 10E3/UL (ref 1.6–8.3)
NEUTROPHILS NFR BLD AUTO: 81 %
NRBC # BLD AUTO: 0 10E3/UL
NRBC BLD AUTO-RTO: 0 /100
O2/TOTAL GAS SETTING VFR VENT: 21 %
OXYHGB MFR BLDV: 84 % (ref 70–75)
PCO2 BLDV: 45 MM HG (ref 40–50)
PH BLDV: 7.35 [PH] (ref 7.32–7.43)
PLATELET # BLD AUTO: 353 10E3/UL (ref 150–450)
PO2 BLDV: 55 MM HG (ref 25–47)
POTASSIUM SERPL-SCNC: 4.4 MMOL/L (ref 3.4–5.3)
PROT SERPL-MCNC: 10.2 G/DL (ref 6.4–8.3)
RBC # BLD AUTO: 5.22 10E6/UL (ref 3.8–5.2)
SAO2 % BLDV: 86.1 % (ref 70–75)
SODIUM SERPL-SCNC: 133 MMOL/L (ref 135–145)
WBC # BLD AUTO: 9.5 10E3/UL (ref 4–11)

## 2025-01-18 PROCEDURE — 99284 EMERGENCY DEPT VISIT MOD MDM: CPT | Mod: 25 | Performed by: EMERGENCY MEDICINE

## 2025-01-18 PROCEDURE — 36415 COLL VENOUS BLD VENIPUNCTURE: CPT | Performed by: EMERGENCY MEDICINE

## 2025-01-18 PROCEDURE — 82010 KETONE BODYS QUAN: CPT | Performed by: EMERGENCY MEDICINE

## 2025-01-18 PROCEDURE — 258N000003 HC RX IP 258 OP 636: Performed by: EMERGENCY MEDICINE

## 2025-01-18 PROCEDURE — 250N000012 HC RX MED GY IP 250 OP 636 PS 637: Performed by: EMERGENCY MEDICINE

## 2025-01-18 PROCEDURE — 250N000013 HC RX MED GY IP 250 OP 250 PS 637: Performed by: EMERGENCY MEDICINE

## 2025-01-18 PROCEDURE — 82962 GLUCOSE BLOOD TEST: CPT

## 2025-01-18 PROCEDURE — 250N000011 HC RX IP 250 OP 636: Performed by: EMERGENCY MEDICINE

## 2025-01-18 PROCEDURE — 82435 ASSAY OF BLOOD CHLORIDE: CPT | Performed by: EMERGENCY MEDICINE

## 2025-01-18 PROCEDURE — 85004 AUTOMATED DIFF WBC COUNT: CPT | Performed by: EMERGENCY MEDICINE

## 2025-01-18 PROCEDURE — 82805 BLOOD GASES W/O2 SATURATION: CPT | Performed by: EMERGENCY MEDICINE

## 2025-01-18 PROCEDURE — 99284 EMERGENCY DEPT VISIT MOD MDM: CPT | Performed by: EMERGENCY MEDICINE

## 2025-01-18 PROCEDURE — 96361 HYDRATE IV INFUSION ADD-ON: CPT | Performed by: EMERGENCY MEDICINE

## 2025-01-18 PROCEDURE — 83690 ASSAY OF LIPASE: CPT | Performed by: EMERGENCY MEDICINE

## 2025-01-18 PROCEDURE — 96374 THER/PROPH/DIAG INJ IV PUSH: CPT | Performed by: EMERGENCY MEDICINE

## 2025-01-18 RX ORDER — DEXTROSE MONOHYDRATE 25 G/50ML
25-50 INJECTION, SOLUTION INTRAVENOUS
Status: DISCONTINUED | OUTPATIENT
Start: 2025-01-18 | End: 2025-01-18 | Stop reason: HOSPADM

## 2025-01-18 RX ORDER — ONDANSETRON 2 MG/ML
4 INJECTION INTRAMUSCULAR; INTRAVENOUS ONCE
Status: COMPLETED | OUTPATIENT
Start: 2025-01-18 | End: 2025-01-18

## 2025-01-18 RX ORDER — NICOTINE POLACRILEX 4 MG
15-30 LOZENGE BUCCAL
Status: DISCONTINUED | OUTPATIENT
Start: 2025-01-18 | End: 2025-01-18 | Stop reason: HOSPADM

## 2025-01-18 RX ORDER — OLANZAPINE 10 MG/1
10 TABLET, ORALLY DISINTEGRATING ORAL ONCE
Status: COMPLETED | OUTPATIENT
Start: 2025-01-18 | End: 2025-01-18

## 2025-01-18 RX ADMIN — OLANZAPINE 10 MG: 10 TABLET, ORALLY DISINTEGRATING ORAL at 13:35

## 2025-01-18 RX ADMIN — INSULIN ASPART 5 UNITS: 100 INJECTION, SOLUTION INTRAVENOUS; SUBCUTANEOUS at 15:39

## 2025-01-18 RX ADMIN — SODIUM CHLORIDE, POTASSIUM CHLORIDE, SODIUM LACTATE AND CALCIUM CHLORIDE 1000 ML: 600; 310; 30; 20 INJECTION, SOLUTION INTRAVENOUS at 14:06

## 2025-01-18 RX ADMIN — ONDANSETRON 4 MG: 2 INJECTION INTRAMUSCULAR; INTRAVENOUS at 14:06

## 2025-01-18 ASSESSMENT — ACTIVITIES OF DAILY LIVING (ADL)
ADLS_ACUITY_SCORE: 58

## 2025-01-18 NOTE — ED PROVIDER NOTES
"ED Provider Note  [facility]      History     Chief Complaint   Patient presents with    Nausea & Vomiting     HPI  Yun Coyle is a 49 year old female with a history of cyclical vomiting syndrome, insulin-dependent diabetes, pancreatitis, chronic hepatitis C, hypertension, polysubstance use disorder, and PTSD who presents to the emergency department with 24 hours of abdominal pain, nausea, and vomiting.  Patient reports emesis consists of bile and \"brown stuff\".  She states her symptoms feel similar to her past episodes.  She endorses cannabis use, but notes that she does not use cannabis daily.  Patient denies fever, diarrhea.  Patient endorses history of cholecystectomy and right oophorectomy.        Past Medical History  Past Medical History:   Diagnosis Date    Acute pancreatitis 06/29/2017    Anxiety     Cauda equina syndrome (H) 03/29/2013    Cyst of right ovary 11/16/2017    Depressive disorder     Gastroesophageal reflux disease     Hypertension     Hyponatremia 10/01/2023    Low back pain 08/20/2012    Overview:   History of cauda equina and surgery.      Type 2 diabetes mellitus (H)     Uncomplicated asthma      Past Surgical History:   Procedure Laterality Date    BACK SURGERY      x2    CHOLECYSTECTOMY      ORTHOPEDIC SURGERY      SALPINGO OOPHORECTOMY,R/L/ELDA Right      Alcohol Swabs PADS  blood glucose (NO BRAND SPECIFIED) lancets standard  blood glucose (NO BRAND SPECIFIED) test strip  blood glucose calibration (NO BRAND SPECIFIED) solution  blood glucose monitoring (NO BRAND SPECIFIED) meter device kit  cloNIDine (CATAPRES) 0.1 MG tablet  empagliflozin (JARDIANCE) 10 MG TABS tablet  escitalopram (LEXAPRO) 10 MG tablet  Glucagon (GVOKE HYPOPEN) 1 MG/0.2ML pen  glucose (BD GLUCOSE) 4 g chewable tablet  glucose 40 % (400 mg/mL) gel  hydrOXYzine HCl (ATARAX) 25 MG tablet  insulin aspart (NOVOLOG PEN) 100 UNIT/ML pen  insulin aspart (NOVOLOG PEN) 100 UNIT/ML " "pen  insulin aspart (NOVOLOG PEN) 100 UNIT/ML pen  insulin glargine (LANTUS PEN) 100 UNIT/ML pen  insulin pen needle (32G X 4 MM) 32G X 4 MM miscellaneous  losartan (COZAAR) 25 MG tablet  metFORMIN (GLUCOPHAGE) 500 MG tablet  methadone (DOLOPHINE-INTENSOL) 10 MG/ML (HIGH CONC) solution  ondansetron (ZOFRAN ODT) 4 MG ODT tab  prazosin (MINIPRESS) 2 MG capsule  Sharps Container MISC      No Known Allergies  Family History  Family History   Problem Relation Age of Onset    Breast Cancer Mother         in her 50s.    Diabetes Mother     Breast Cancer Maternal Grandmother         in her 40s.    Diabetes Maternal Grandmother      Social History   Social History     Tobacco Use    Smoking status: Never    Smokeless tobacco: Never   Vaping Use    Vaping status: Never Used   Substance Use Topics    Alcohol use: No    Drug use: Not Currently     Types: Marijuana     Comment: pt was taking Oxycontin \"years ago.\" pt is now on Methadone.      A medically appropriate review of systems was performed with pertinent positives and negatives noted in the HPI, and all other systems negative.    Physical Exam      Physical Exam  Physical Exam   Constitutional: oriented to person, place, and time. appears well-developed and well-nourished.   HENT:   Head: Normocephalic and atraumatic.   Neck: Normal range of motion.   Pulmonary/Chest: Effort normal. No respiratory distress.   Cardiac: No murmurs, rubs, gallops. RRR.  Abdominal: Abdomen soft, nontender, nondistended. No rebound tenderness.  MSK: Long bones without deformity or evidence of trauma  Neurological: alert and oriented to person, place, and time.   Skin: Skin is warm and dry.   Psychiatric:  normal mood and affect.  behavior is normal. Thought content normal.      ED Course, Procedures, & Data      Procedures            Results for orders placed or performed during the hospital encounter of 01/18/25   Comprehensive metabolic panel     Status: Abnormal   Result Value Ref Range    " Sodium 133 (L) 135 - 145 mmol/L    Potassium 4.4 3.4 - 5.3 mmol/L    Carbon Dioxide (CO2) 20 (L) 22 - 29 mmol/L    Anion Gap 24 (H) 7 - 15 mmol/L    Urea Nitrogen 19.8 6.0 - 20.0 mg/dL    Creatinine 1.00 (H) 0.51 - 0.95 mg/dL    GFR Estimate 70 >60 mL/min/1.73m2    Calcium 10.8 (H) 8.8 - 10.4 mg/dL    Chloride 89 (L) 98 - 107 mmol/L    Glucose 380 (H) 70 - 99 mg/dL    Alkaline Phosphatase 215 (H) 40 - 150 U/L    AST 85 (H) 0 - 45 U/L     (H) 0 - 50 U/L    Protein Total 10.2 (H) 6.4 - 8.3 g/dL    Albumin 4.7 3.5 - 5.2 g/dL    Bilirubin Total 0.5 <=1.2 mg/dL   Lipase     Status: Normal   Result Value Ref Range    Lipase 33 13 - 60 U/L   CBC with platelets and differential     Status: Abnormal   Result Value Ref Range    WBC Count 9.5 4.0 - 11.0 10e3/uL    RBC Count 5.22 (H) 3.80 - 5.20 10e6/uL    Hemoglobin 14.1 11.7 - 15.7 g/dL    Hematocrit 41.2 35.0 - 47.0 %    MCV 79 78 - 100 fL    MCH 27.0 26.5 - 33.0 pg    MCHC 34.2 31.5 - 36.5 g/dL    RDW 13.2 10.0 - 15.0 %    Platelet Count 353 150 - 450 10e3/uL    % Neutrophils 81 %    % Lymphocytes 16 %    % Monocytes 3 %    % Eosinophils 0 %    % Basophils 0 %    % Immature Granulocytes 1 %    NRBCs per 100 WBC 0 <1 /100    Absolute Neutrophils 7.7 1.6 - 8.3 10e3/uL    Absolute Lymphocytes 1.5 0.8 - 5.3 10e3/uL    Absolute Monocytes 0.3 0.0 - 1.3 10e3/uL    Absolute Eosinophils 0.0 0.0 - 0.7 10e3/uL    Absolute Basophils 0.0 0.0 - 0.2 10e3/uL    Absolute Immature Granulocytes 0.1 <=0.4 10e3/uL    Absolute NRBCs 0.0 10e3/uL   Blood gas venous     Status: Abnormal   Result Value Ref Range    pH Venous 7.35 7.32 - 7.43    pCO2 Venous 45 40 - 50 mm Hg    pO2 Venous 55 (H) 25 - 47 mm Hg    Bicarbonate Venous 25 21 - 28 mmol/L    Base Excess/Deficit Venous -0.9 -3.0 - 3.0 mmol/L    FIO2 21     Oxyhemoglobin Venous 84 (H) 70 - 75 %    O2 Sat, Venous 86.1 (H) 70.0 - 75.0 %    Narrative    In healthy individuals, oxyhemoglobin (O2Hb) and oxygen saturation (SO2) are  approximately equal. In the presence of dyshemoglobins, oxyhemoglobin can be considerably lower than oxygen saturation.   Ketone Beta-Hydroxybutyrate Quantitative     Status: Abnormal   Result Value Ref Range    Ketone (Beta-Hydroxybutyrate) Quantitative 2.63 (HH) <=0.30 mmol/L   Glucose by meter     Status: Abnormal   Result Value Ref Range    GLUCOSE BY METER POCT 398 (H) 70 - 99 mg/dL   CBC with platelets differential     Status: Abnormal    Narrative    The following orders were created for panel order CBC with platelets differential.  Procedure                               Abnormality         Status                     ---------                               -----------         ------                     CBC with platelets and d...[[id number]]  Abnormal            Final result                 Please view results for these tests on the individual orders.     Medications - No data to display  Labs Ordered and Resulted from Time of ED Arrival to Time of ED Departure - No data to display  No orders to display          Critical care was not performed.     Medical Decision Making  The patient's presentation was of high complexity (a chronic illness severe exacerbation, progression, or side effect of treatment).    The patient's evaluation involved:  ordering and/or review of 3+ test(s) in this encounter (see separate area of note for details)    The patient's management necessitated only low risk treatment.    Assessment & Plan    Patient resenting with nausea vomiting.  She is a benign abdomen.  At very low special for obstruction as she is having normal bowel movements.  She has not had an episode of vomiting since being in the emergency department.  She is given fluids and Zyprexa with complete resolution of symptoms.  She is eating and drinking otherwise.  She does have elevated ketones likely related to vomiting and dehydration but she is not in DKA.  pH is normal.  She was given some supplemental insulin for  her elevated blood glucose.  Did discuss my concerns with her ketones however she states she would like to go home.  She is eating and drinking so I do feel that this will eventually resolve.  She has insulin at home, a working glucometer and she knows how to control this.  Discussed she needs to return if sugars are worsening or if she has any further concerns.    I have reviewed the nursing notes. I have reviewed the findings, diagnosis, plan and need for follow up with the patient.    New Prescriptions    No medications on file       Final diagnoses:   Nausea and vomiting, unspecified vomiting type   I, Alina Andrade, am serving as a trained medical scribe to document services personally performed by Ramírez Ireland MD, based on the provider's statements to me.     I, Ramírez Ireland MD, was physically present and have reviewed and verified the accuracy of this note documented by Alina Andrade.     Ramírez Ireland MD  [facility] EMERGENCY DEPARTMENT  1/18/2025     Ramírez Ireland MD  01/18/25 1951

## 2025-01-18 NOTE — DISCHARGE INSTRUCTIONS
If you develop worsening pain, fevers or worsening blood sugars return to the emergency department.

## 2025-01-18 NOTE — ED NOTES
Bed: ED20  Expected date: 1/18/25  Expected time: 1:05 PM  Means of arrival:   Comments:  HEMS 447     49 F nausea for 24 hours abd pain ETA 5

## 2025-01-18 NOTE — ED TRIAGE NOTES
EMS: Pt has had N/V for 24 hours.  No fever No diarrhea,  En Route.  168/104   Oral zofran 4mg en route.

## 2025-01-22 ENCOUNTER — HOSPITAL ENCOUNTER (EMERGENCY)
Facility: CLINIC | Age: 50
Discharge: HOME | End: 2025-01-22
Attending: EMERGENCY MEDICINE | Admitting: EMERGENCY MEDICINE
Payer: COMMERCIAL

## 2025-01-22 VITALS
WEIGHT: 200 LBS | TEMPERATURE: 98.1 F | HEIGHT: 69 IN | RESPIRATION RATE: 16 BRPM | BODY MASS INDEX: 29.62 KG/M2 | HEART RATE: 90 BPM | SYSTOLIC BLOOD PRESSURE: 146 MMHG | OXYGEN SATURATION: 99 % | DIASTOLIC BLOOD PRESSURE: 101 MMHG

## 2025-01-22 DIAGNOSIS — R11.15 CYCLICAL VOMITING: ICD-10-CM

## 2025-01-22 LAB
ALBUMIN SERPL BCG-MCNC: 3.7 G/DL (ref 3.5–5.2)
ALBUMIN UR-MCNC: NEGATIVE MG/DL
ALP SERPL-CCNC: 156 U/L (ref 40–150)
ALT SERPL W P-5'-P-CCNC: 119 U/L (ref 0–50)
AMPHETAMINES UR QL SCN: ABNORMAL
ANION GAP SERPL CALCULATED.3IONS-SCNC: 7 MMOL/L (ref 7–15)
APPEARANCE UR: CLEAR
AST SERPL W P-5'-P-CCNC: 82 U/L (ref 0–45)
ATRIAL RATE - MUSE: 75 BPM
B-OH-BUTYR SERPL-SCNC: <0.18 MMOL/L
BARBITURATES UR QL SCN: ABNORMAL
BASOPHILS # BLD AUTO: 0 10E3/UL (ref 0–0.2)
BASOPHILS NFR BLD AUTO: 1 %
BENZODIAZ UR QL SCN: ABNORMAL
BILIRUB SERPL-MCNC: 0.4 MG/DL
BILIRUB UR QL STRIP: NEGATIVE
BUN SERPL-MCNC: 16.8 MG/DL (ref 6–20)
BZE UR QL SCN: ABNORMAL
CALCIUM SERPL-MCNC: 8.9 MG/DL (ref 8.8–10.4)
CANNABINOIDS UR QL SCN: ABNORMAL
CHLORIDE SERPL-SCNC: 102 MMOL/L (ref 98–107)
COLOR UR AUTO: ABNORMAL
CREAT SERPL-MCNC: 0.86 MG/DL (ref 0.51–0.95)
DIASTOLIC BLOOD PRESSURE - MUSE: NORMAL MMHG
EGFRCR SERPLBLD CKD-EPI 2021: 83 ML/MIN/1.73M2
EOSINOPHIL # BLD AUTO: 0.3 10E3/UL (ref 0–0.7)
EOSINOPHIL NFR BLD AUTO: 5 %
ERYTHROCYTE [DISTWIDTH] IN BLOOD BY AUTOMATED COUNT: 13.4 % (ref 10–15)
FENTANYL UR QL: ABNORMAL
GLUCOSE SERPL-MCNC: 249 MG/DL (ref 70–99)
GLUCOSE UR STRIP-MCNC: >=1000 MG/DL
HCO3 SERPL-SCNC: 26 MMOL/L (ref 22–29)
HCT VFR BLD AUTO: 36.5 % (ref 35–47)
HGB BLD-MCNC: 12.1 G/DL (ref 11.7–15.7)
HGB UR QL STRIP: NEGATIVE
IMM GRANULOCYTES # BLD: 0 10E3/UL
IMM GRANULOCYTES NFR BLD: 1 %
INTERPRETATION ECG - MUSE: NORMAL
KETONES UR STRIP-MCNC: NEGATIVE MG/DL
LACTATE SERPL-SCNC: 1.2 MMOL/L (ref 0.7–2)
LEUKOCYTE ESTERASE UR QL STRIP: NEGATIVE
LIPASE SERPL-CCNC: 61 U/L (ref 13–60)
LYMPHOCYTES # BLD AUTO: 1.3 10E3/UL (ref 0.8–5.3)
LYMPHOCYTES NFR BLD AUTO: 19 %
MAGNESIUM SERPL-MCNC: 1.7 MG/DL (ref 1.7–2.3)
MCH RBC QN AUTO: 27 PG (ref 26.5–33)
MCHC RBC AUTO-ENTMCNC: 33.2 G/DL (ref 31.5–36.5)
MCV RBC AUTO: 82 FL (ref 78–100)
MONOCYTES # BLD AUTO: 0.4 10E3/UL (ref 0–1.3)
MONOCYTES NFR BLD AUTO: 5 %
MUCOUS THREADS #/AREA URNS LPF: PRESENT /LPF
NEUTROPHILS # BLD AUTO: 4.4 10E3/UL (ref 1.6–8.3)
NEUTROPHILS NFR BLD AUTO: 69 %
NITRATE UR QL: NEGATIVE
NRBC # BLD AUTO: 0 10E3/UL
NRBC BLD AUTO-RTO: 0 /100
OPIATES UR QL SCN: ABNORMAL
P AXIS - MUSE: 47 DEGREES
PCP QUAL URINE (ROCHE): ABNORMAL
PH UR STRIP: 6 [PH] (ref 5–7)
PLATELET # BLD AUTO: 183 10E3/UL (ref 150–450)
POTASSIUM SERPL-SCNC: 4.3 MMOL/L (ref 3.4–5.3)
PR INTERVAL - MUSE: 136 MS
PROT SERPL-MCNC: 7.5 G/DL (ref 6.4–8.3)
QRS DURATION - MUSE: 90 MS
QT - MUSE: 400 MS
QTC - MUSE: 446 MS
R AXIS - MUSE: -27 DEGREES
RBC # BLD AUTO: 4.48 10E6/UL (ref 3.8–5.2)
RBC URINE: 1 /HPF
SODIUM SERPL-SCNC: 135 MMOL/L (ref 135–145)
SP GR UR STRIP: 1.01 (ref 1–1.03)
SQUAMOUS EPITHELIAL: 3 /HPF
SYSTOLIC BLOOD PRESSURE - MUSE: NORMAL MMHG
T AXIS - MUSE: 21 DEGREES
UROBILINOGEN UR STRIP-MCNC: NORMAL MG/DL
VENTRICULAR RATE- MUSE: 75 BPM
WBC # BLD AUTO: 6.4 10E3/UL (ref 4–11)
WBC URINE: <1 /HPF

## 2025-01-22 PROCEDURE — 36415 COLL VENOUS BLD VENIPUNCTURE: CPT | Performed by: EMERGENCY MEDICINE

## 2025-01-22 PROCEDURE — 250N000009 HC RX 250: Performed by: EMERGENCY MEDICINE

## 2025-01-22 PROCEDURE — 93005 ELECTROCARDIOGRAM TRACING: CPT | Performed by: EMERGENCY MEDICINE

## 2025-01-22 PROCEDURE — 258N000003 HC RX IP 258 OP 636: Performed by: EMERGENCY MEDICINE

## 2025-01-22 PROCEDURE — 250N000011 HC RX IP 250 OP 636: Performed by: EMERGENCY MEDICINE

## 2025-01-22 PROCEDURE — 85025 COMPLETE CBC W/AUTO DIFF WBC: CPT | Performed by: EMERGENCY MEDICINE

## 2025-01-22 PROCEDURE — 80307 DRUG TEST PRSMV CHEM ANLYZR: CPT | Performed by: EMERGENCY MEDICINE

## 2025-01-22 PROCEDURE — 83605 ASSAY OF LACTIC ACID: CPT | Performed by: EMERGENCY MEDICINE

## 2025-01-22 PROCEDURE — 81001 URINALYSIS AUTO W/SCOPE: CPT

## 2025-01-22 PROCEDURE — 96374 THER/PROPH/DIAG INJ IV PUSH: CPT | Mod: 59 | Performed by: EMERGENCY MEDICINE

## 2025-01-22 PROCEDURE — 82010 KETONE BODYS QUAN: CPT

## 2025-01-22 PROCEDURE — 83690 ASSAY OF LIPASE: CPT | Performed by: EMERGENCY MEDICINE

## 2025-01-22 PROCEDURE — 99285 EMERGENCY DEPT VISIT HI MDM: CPT | Performed by: EMERGENCY MEDICINE

## 2025-01-22 PROCEDURE — 82040 ASSAY OF SERUM ALBUMIN: CPT | Performed by: EMERGENCY MEDICINE

## 2025-01-22 PROCEDURE — 93010 ELECTROCARDIOGRAM REPORT: CPT | Performed by: EMERGENCY MEDICINE

## 2025-01-22 PROCEDURE — 250N000013 HC RX MED GY IP 250 OP 250 PS 637

## 2025-01-22 PROCEDURE — 96361 HYDRATE IV INFUSION ADD-ON: CPT | Performed by: EMERGENCY MEDICINE

## 2025-01-22 PROCEDURE — 99285 EMERGENCY DEPT VISIT HI MDM: CPT | Mod: 25 | Performed by: EMERGENCY MEDICINE

## 2025-01-22 PROCEDURE — 83735 ASSAY OF MAGNESIUM: CPT | Performed by: EMERGENCY MEDICINE

## 2025-01-22 RX ORDER — OLANZAPINE 10 MG/1
2.5 INJECTION, POWDER, LYOPHILIZED, FOR SOLUTION INTRAMUSCULAR ONCE
Status: COMPLETED | OUTPATIENT
Start: 2025-01-22 | End: 2025-01-22

## 2025-01-22 RX ORDER — MAGNESIUM HYDROXIDE/ALUMINUM HYDROXICE/SIMETHICONE 120; 1200; 1200 MG/30ML; MG/30ML; MG/30ML
15 SUSPENSION ORAL ONCE
Status: COMPLETED | OUTPATIENT
Start: 2025-01-22 | End: 2025-01-22

## 2025-01-22 RX ORDER — IOPAMIDOL 755 MG/ML
100 INJECTION, SOLUTION INTRAVASCULAR ONCE
Status: COMPLETED | OUTPATIENT
Start: 2025-01-22 | End: 2025-01-22

## 2025-01-22 RX ADMIN — ALUMINUM HYDROXIDE, MAGNESIUM HYDROXIDE, AND SIMETHICONE 15 ML: 200; 200; 20 SUSPENSION ORAL at 11:47

## 2025-01-22 RX ADMIN — SODIUM CHLORIDE 50 ML: 9 INJECTION, SOLUTION INTRAVENOUS at 10:41

## 2025-01-22 RX ADMIN — SODIUM CHLORIDE 1000 ML: 9 INJECTION, SOLUTION INTRAVENOUS at 09:18

## 2025-01-22 RX ADMIN — IOPAMIDOL 98 ML: 755 INJECTION, SOLUTION INTRAVENOUS at 10:40

## 2025-01-22 RX ADMIN — OLANZAPINE 2.5 MG: 10 INJECTION, POWDER, FOR SOLUTION INTRAMUSCULAR at 09:19

## 2025-01-22 ASSESSMENT — ACTIVITIES OF DAILY LIVING (ADL)
ADLS_ACUITY_SCORE: 58

## 2025-01-22 NOTE — DISCHARGE INSTRUCTIONS
Patient should follow up with PCP if nausea and vomiting persist and soon as possible. Given negative exam, lab, and imaging findings if symptoms persist consider discussing with pcp about alternate regimens for treatment of cyclic vomiting syndrome. Continue to have good oral intake primarily drinking fluids and maintaining hydration. No further management necessary at this time.

## 2025-01-22 NOTE — ED PROVIDER NOTES
ED Provider Note  [facility]      History     Chief Complaint   Patient presents with    Nausea, Vomiting, & Diarrhea     nausea/vomiting BIBA. 4mg IV zofran given by EMS. HTN. ref EKG.  -Pt is diabetic     HPI  Yun Coyle is a 49 year old female who presents to the ED with 2 days of nausea, vomiting and abdominal pain. It started yesterday with nausea and clear plegm like vomit that progressed to yellow and bilious. No blood in vomit. Ab pain started sometime last night and is primarily on the left side and does not radiate to the back. She rates pain 7/10 and is an achy feeling. Patient took her scheduled zofran and zyprexa without relief of symptoms. She reports it was somewhat effective in the past. She has been taking zyprexa for 2 months. She denies withdrawals from methadone or missing doses of any of her scheduled medication. She reports her glucoses were in the 200s last night when this started. She is not taking GLP-1s. Did not eat anything yesterday or today. Denies dizzniness, light headeness, or passing out. Denies diarrhea, constipation, or blood in stool. Denied genitourinary symptoms. Remainder of ROS negative. She has not tried Sumatriptans, prochlorperazine or promethazine for nausea and vomiting previously. She has taken Ativan in the past and found it to be helpful during acute episodes.     Past Medical History  Past Medical History:   Diagnosis Date    Acute pancreatitis 06/29/2017    Anxiety     Cauda equina syndrome (H) 03/29/2013    Cyst of right ovary 11/16/2017    Depressive disorder     Gastroesophageal reflux disease     Hypertension     Hyponatremia 10/01/2023    Low back pain 08/20/2012    Overview:   History of cauda equina and surgery.      Type 2 diabetes mellitus (H)     Uncomplicated asthma      Past Surgical History:   Procedure Laterality Date    BACK SURGERY      x2    CHOLECYSTECTOMY      ORTHOPEDIC SURGERY      SALPINGO  "OOPHORECTOMY,R/L/ELDA Right      Alcohol Swabs PADS  blood glucose (NO BRAND SPECIFIED) lancets standard  blood glucose (NO BRAND SPECIFIED) test strip  blood glucose calibration (NO BRAND SPECIFIED) solution  blood glucose monitoring (NO BRAND SPECIFIED) meter device kit  cloNIDine (CATAPRES) 0.1 MG tablet  empagliflozin (JARDIANCE) 10 MG TABS tablet  escitalopram (LEXAPRO) 10 MG tablet  Glucagon (GVOKE HYPOPEN) 1 MG/0.2ML pen  glucose (BD GLUCOSE) 4 g chewable tablet  glucose 40 % (400 mg/mL) gel  hydrOXYzine HCl (ATARAX) 25 MG tablet  insulin aspart (NOVOLOG PEN) 100 UNIT/ML pen  insulin aspart (NOVOLOG PEN) 100 UNIT/ML pen  insulin aspart (NOVOLOG PEN) 100 UNIT/ML pen  insulin glargine (LANTUS PEN) 100 UNIT/ML pen  insulin pen needle (32G X 4 MM) 32G X 4 MM miscellaneous  losartan (COZAAR) 25 MG tablet  metFORMIN (GLUCOPHAGE) 500 MG tablet  methadone (DOLOPHINE-INTENSOL) 10 MG/ML (HIGH CONC) solution  ondansetron (ZOFRAN ODT) 4 MG ODT tab  prazosin (MINIPRESS) 2 MG capsule  Sharps Container MISC      No Known Allergies  Family History  Family History   Problem Relation Age of Onset    Breast Cancer Mother         in her 50s.    Diabetes Mother     Breast Cancer Maternal Grandmother         in her 40s.    Diabetes Maternal Grandmother      Social History   Social History     Tobacco Use    Smoking status: Never    Smokeless tobacco: Never   Vaping Use    Vaping status: Never Used   Substance Use Topics    Alcohol use: No    Drug use: Not Currently     Types: Marijuana     Comment: pt was taking Oxycontin \"years ago.\" pt is now on Methadone.      A medically appropriate review of systems was performed with pertinent positives and negatives noted in the HPI, and all other systems negative.    Physical Exam   BP: (!) 144/92  Pulse: 90  Temp: 98.1  F (36.7  C)  Resp: 16  Height: 175.3 cm (5' 9\")  Weight: 90.7 kg (200 lb)  SpO2: 97 %  Physical Exam  Constitutional:       General: She is not in acute distress.     " Appearance: Normal appearance. She is not diaphoretic.   HENT:      Head: Atraumatic.      Mouth/Throat:      Mouth: Mucous membranes are moist.   Eyes:      General: No scleral icterus.     Conjunctiva/sclera: Conjunctivae normal.   Cardiovascular:      Rate and Rhythm: Normal rate.      Pulses: Normal pulses.      Heart sounds: Normal heart sounds.   Pulmonary:      Effort: No respiratory distress.      Breath sounds: Normal breath sounds. No wheezing, rhonchi or rales.   Chest:      Chest wall: No tenderness.   Abdominal:      General: Abdomen is flat. Bowel sounds are normal. There is no distension.      Palpations: Abdomen is soft.      Tenderness: There is abdominal tenderness (LLQ). There is no right CVA tenderness, left CVA tenderness, guarding or rebound.   Musculoskeletal:         General: Normal range of motion.      Cervical back: Neck supple.   Skin:     General: Skin is warm.      Coloration: Skin is not jaundiced or pale.      Findings: No erythema or rash.   Neurological:      Mental Status: She is alert.           ED Course, Procedures, & Data     ED Course as of 01/22/25 1515   Wed Jan 22, 2025   1035 Comprehensive metabolic panel(!)     Procedures            Results for orders placed or performed during the hospital encounter of 01/22/25   CT Abdomen Pelvis w Contrast     Status: None    Narrative    EXAM: CT ABDOMEN PELVIS W CONTRAST  LOCATION: [facility]  DATE: 1/22/2025    INDICATION: LLQ tender, prior cyclical vomiting syndrome  COMPARISON: CT abdomen and pelvis 12/23/2024  TECHNIQUE: CT scan of the abdomen and pelvis was performed following injection of IV contrast. Multiplanar reformats were obtained. Dose reduction techniques were used.  CONTRAST: 98mL Isovue 370    FINDINGS:   LOWER CHEST: Territories of subsegmental atelectasis are present in the posterior costophrenic sulci and inferior lingula.    HEPATOBILIARY: Postcholecystectomy. Bile  ducts are normal in contour and caliber. No choledocholithiasis. Focal fat infiltration near the fissure of the falciform ligament. Liver is normal in size with smooth capsule.    PANCREAS: Normal.    SPLEEN: Normal.    ADRENAL GLANDS: Normal.    KIDNEYS/BLADDER: Normal.    BOWEL: Normal. Normal appendix.    LYMPH NODES: Normal.    VASCULATURE: Mild mixed attenuation plaque in the normal caliber abdominal aorta and iliac arteries. No aneurysm or critical stenosis. Normal contrast opacification of the mesenteric and portal veins.    PELVIC ORGANS: Normal.    MUSCULOSKELETAL: Disc space narrowing with effective ankylosis at L4-L5. Posterior disc osteophyte complexes are present at L3-4 and L4-L5. Lumbar facet arthropathy is present. No aggressive or destructive bone lesions.      Impression    IMPRESSION:     No mechanical bowel obstruction or focal intra-abdominal inflammatory process.   Urine Drug Screen Panel     Status: Abnormal   Result Value Ref Range    Amphetamines Urine Screen Negative Screen Negative    Barbituates Urine Screen Negative Screen Negative    Benzodiazepine Urine Screen Negative Screen Negative    Cannabinoids Urine Screen Positive (A) Screen Negative    Cocaine Urine Screen Negative Screen Negative    Fentanyl Qual Urine Screen Positive (A) Screen Negative    Opiates Urine Screen Negative Screen Negative    PCP Urine Screen Negative Screen Negative   Comprehensive metabolic panel     Status: Abnormal   Result Value Ref Range    Sodium 135 135 - 145 mmol/L    Potassium 4.3 3.4 - 5.3 mmol/L    Carbon Dioxide (CO2) 26 22 - 29 mmol/L    Anion Gap 7 7 - 15 mmol/L    Urea Nitrogen 16.8 6.0 - 20.0 mg/dL    Creatinine 0.86 0.51 - 0.95 mg/dL    GFR Estimate 83 >60 mL/min/1.73m2    Calcium 8.9 8.8 - 10.4 mg/dL    Chloride 102 98 - 107 mmol/L    Glucose 249 (H) 70 - 99 mg/dL    Alkaline Phosphatase 156 (H) 40 - 150 U/L    AST 82 (H) 0 - 45 U/L     (H) 0 - 50 U/L    Protein Total 7.5 6.4 - 8.3 g/dL     Albumin 3.7 3.5 - 5.2 g/dL    Bilirubin Total 0.4 <=1.2 mg/dL   Lipase     Status: Abnormal   Result Value Ref Range    Lipase 61 (H) 13 - 60 U/L   Lactic acid whole blood with 1x repeat in 2 hr when >2     Status: Normal   Result Value Ref Range    Lactic Acid, Initial 1.2 0.7 - 2.0 mmol/L   Magnesium     Status: Normal   Result Value Ref Range    Magnesium 1.7 1.7 - 2.3 mg/dL   Ketone Beta-Hydroxybutyrate Quantitative     Status: Normal   Result Value Ref Range    Ketone (Beta-Hydroxybutyrate) Quantitative <0.18 <=0.30 mmol/L   UA with Microscopic reflex to Culture     Status: Abnormal    Specimen: Urine, Midstream   Result Value Ref Range    Color Urine Light Yellow Colorless, Straw, Light Yellow, Yellow    Appearance Urine Clear Clear    Glucose Urine >=1000 (A) Negative mg/dL    Bilirubin Urine Negative Negative    Ketones Urine Negative Negative mg/dL    Specific Gravity Urine 1.015 1.003 - 1.035    Blood Urine Negative Negative    pH Urine 6.0 5.0 - 7.0    Protein Albumin Urine Negative Negative mg/dL    Urobilinogen Urine Normal Normal, 2.0 mg/dL    Nitrite Urine Negative Negative    Leukocyte Esterase Urine Negative Negative    Mucus Urine Present (A) None Seen /LPF    RBC Urine 1 <=2 /HPF    WBC Urine <1 <=5 /HPF    Squamous Epithelials Urine 3 (H) <=1 /HPF    Narrative    Urine Culture not indicated   CBC with platelets and differential     Status: None   Result Value Ref Range    WBC Count 6.4 4.0 - 11.0 10e3/uL    RBC Count 4.48 3.80 - 5.20 10e6/uL    Hemoglobin 12.1 11.7 - 15.7 g/dL    Hematocrit 36.5 35.0 - 47.0 %    MCV 82 78 - 100 fL    MCH 27.0 26.5 - 33.0 pg    MCHC 33.2 31.5 - 36.5 g/dL    RDW 13.4 10.0 - 15.0 %    Platelet Count 183 150 - 450 10e3/uL    % Neutrophils 69 %    % Lymphocytes 19 %    % Monocytes 5 %    % Eosinophils 5 %    % Basophils 1 %    % Immature Granulocytes 1 %    NRBCs per 100 WBC 0 <1 /100    Absolute Neutrophils 4.4 1.6 - 8.3 10e3/uL    Absolute Lymphocytes 1.3 0.8 -  5.3 10e3/uL    Absolute Monocytes 0.4 0.0 - 1.3 10e3/uL    Absolute Eosinophils 0.3 0.0 - 0.7 10e3/uL    Absolute Basophils 0.0 0.0 - 0.2 10e3/uL    Absolute Immature Granulocytes 0.0 <=0.4 10e3/uL    Absolute NRBCs 0.0 10e3/uL   EKG 12 lead     Status: None   Result Value Ref Range    Systolic Blood Pressure  mmHg    Diastolic Blood Pressure  mmHg    Ventricular Rate 75 BPM    Atrial Rate 75 BPM    MA Interval 136 ms    QRS Duration 90 ms     ms    QTc 446 ms    P Axis 47 degrees    R AXIS -27 degrees    T Axis 21 degrees    Interpretation ECG       Sinus rhythm  Normal ECG  Unconfirmed report - interpretation of this ECG is computer generated - see medical record for final interpretation    Confirmed by - EMERGENCY ROOM, PHYSICIAN ([id number]),  SHAD DEVINE ([id number]) on 1/22/2025 2:17:29 PM     Urine Drug Screen     Status: Abnormal    Narrative    The following orders were created for panel order Urine Drug Screen.  Procedure                               Abnormality         Status                     ---------                               -----------         ------                     Urine Drug Screen Panel[[id number]]      Abnormal            Final result                 Please view results for these tests on the individual orders.   CBC with platelets differential     Status: None    Narrative    The following orders were created for panel order CBC with platelets differential.  Procedure                               Abnormality         Status                     ---------                               -----------         ------                     CBC with platelets and d...[[id number]]                      Final result                 Please view results for these tests on the individual orders.     Medications   sodium chloride 0.9% BOLUS 1,000 mL (0 mLs Intravenous Stopped 1/22/25 1029)   OLANZapine (zyPREXA) IV injection 2.5 mg (2.5 mg Intravenous $Given 1/22/25 9039)   iopamidol (ISOVUE-370)  solution 100 mL (98 mLs Intravenous $Given 1/22/25 1040)   sodium chloride 0.9 % bag 500mL for CT scan flush use (50 mLs As instructed $Given 1/22/25 1041)   alum & mag hydroxide-simethicone (MAALOX) suspension 15 mL (15 mLs Oral $Given 1/22/25 1147)     Labs Ordered and Resulted from Time of ED Arrival to Time of ED Departure   URINE DRUG SCREEN PANEL - Abnormal       Result Value    Amphetamines Urine Screen Negative      Barbituates Urine Screen Negative      Benzodiazepine Urine Screen Negative      Cannabinoids Urine Screen Positive (*)     Cocaine Urine Screen Negative      Fentanyl Qual Urine Screen Positive (*)     Opiates Urine Screen Negative      PCP Urine Screen Negative     COMPREHENSIVE METABOLIC PANEL - Abnormal    Sodium 135      Potassium 4.3      Carbon Dioxide (CO2) 26      Anion Gap 7      Urea Nitrogen 16.8      Creatinine 0.86      GFR Estimate 83      Calcium 8.9      Chloride 102      Glucose 249 (*)     Alkaline Phosphatase 156 (*)     AST 82 (*)      (*)     Protein Total 7.5      Albumin 3.7      Bilirubin Total 0.4     LIPASE - Abnormal    Lipase 61 (*)    ROUTINE UA WITH MICROSCOPIC REFLEX TO CULTURE - Abnormal    Color Urine Light Yellow      Appearance Urine Clear      Glucose Urine >=1000 (*)     Bilirubin Urine Negative      Ketones Urine Negative      Specific Gravity Urine 1.015      Blood Urine Negative      pH Urine 6.0      Protein Albumin Urine Negative      Urobilinogen Urine Normal      Nitrite Urine Negative      Leukocyte Esterase Urine Negative      Mucus Urine Present (*)     RBC Urine 1      WBC Urine <1      Squamous Epithelials Urine 3 (*)    LACTIC ACID WHOLE BLOOD WITH 1X REPEAT IN 2 HR WHEN >2 - Normal    Lactic Acid, Initial 1.2     MAGNESIUM - Normal    Magnesium 1.7     KETONE BETA-HYDROXYBUTYRATE QUANTITATIVE, RAPID - Normal    Ketone (Beta-Hydroxybutyrate) Quantitative <0.18     CBC WITH PLATELETS AND DIFFERENTIAL    WBC Count 6.4      RBC Count 4.48       Hemoglobin 12.1      Hematocrit 36.5      MCV 82      MCH 27.0      MCHC 33.2      RDW 13.4      Platelet Count 183      % Neutrophils 69      % Lymphocytes 19      % Monocytes 5      % Eosinophils 5      % Basophils 1      % Immature Granulocytes 1      NRBCs per 100 WBC 0      Absolute Neutrophils 4.4      Absolute Lymphocytes 1.3      Absolute Monocytes 0.4      Absolute Eosinophils 0.3      Absolute Basophils 0.0      Absolute Immature Granulocytes 0.0      Absolute NRBCs 0.0       CT Abdomen Pelvis w Contrast   Final Result   IMPRESSION:       No mechanical bowel obstruction or focal intra-abdominal inflammatory process.             Critical care was not performed.     Medical Decision Making  The patient's presentation was of high complexity (an acute health issue posing potential threat to life or bodily function).    The patient's evaluation involved:  ordering and/or review of 2 test(s) in this encounter (see separate area of note for details)    The patient's management necessitated high risk (a parenteral controlled substance) and high risk (a decision regarding hospitalization).    Assessment & Plan    Yun Coyle is a 49 year old female with PMHX of Cyclical vomiting syndrome, substance use, GERD, type II diabetes insulin dependent, cholecystitis s/p cholecystectomy, obesity, chronic hep C, and an episode of previous pancreatitis who presents with 2 days of nausea and non bloody vomiting with no other associated symptoms. Pt reports blood glucoses were in the 200s yesterday when this started and remained elevated. She used zofran and zyprexa to no effect. She reports left sided abdominal pain and denies all other associated symptoms. Vitals were pertinent for HTN of 144/92. On physical exam patient demonstrated LLQ tenderness to palpation. Patient initially received zyprexa 2.5 mg in the ED for nausea. Work up was performed to rule out emergent causes of nausea, vomiting, and abdominal pain  including UA, CBC, CMP, serum ketone, lipase, UDS, CT abdomen and pelvis. Labs and imaging were pertinent for elevated glucose of 249 without serum ketones. Glucose present in urine. Lactic acid, lipase, CBC, and CMP were at patients baseline or non significant. UDS was positive for fentanyl and cannabinoids. EKG was within normal limits and CT abd pelvis did not demonstrate any acute findings. Differential for the patient was GERD vs appendicitis vs pancreatitis vs DKA vs acute gastroenteritis vs Cyclic vomiting syndrome. Given patients history, benign physical exam and lack of significant lab/imaging findings patient is likely experiencing an episode of nausea and vomiting secondary to cyclical vomiting syndrome and will require acute management of nausea prior to discharge. Once patient is able to tolerate PO intake without emesis she is appropriate to discharge. After trial of juice and a sandwich she was able to tolerate PO intake without worsening nausea and no episodes of emesis. Given her stable presentation she was discharged with recommended follow up with PCP if symptoms recur.     I have reviewed the nursing notes. I have reviewed the findings, diagnosis, plan and need for follow up with the patient.    Discharge Medication List as of 1/22/2025  1:03 PM          Final diagnoses:   Cyclical vomiting   This patient was seen and discussed with my attending physician.  Gaston Rodriguez,   PGY-1 Psychiatry Resident     Gilmer Camarillo  [facility] EMERGENCY DEPARTMENT  1/22/2025    =======    --    ED Attending Physician Attestation    I Gilmer Camarillo MD, cared for this patient with the Resident. I have performed a history and physical examination of the patient and discussed management with the resident. I reviewed the resident's documentation above and agree with the documented findings and plan of care.    Summary of HPI, PE, ED Course     Exam -tenderness to palpation with distention of the left  lower quadrant    Assessment and plan: Left-sided abdominal tenderness with tractable nausea and vomiting, in the setting of cyclic vomiting syndrome  Differential diagnosis includes intra-abdominal infection, small bowel obstruction or acute on chronic cyclic vomiting syndrome.  Rule out organ dysfunction or electrolyte abnormality   - IV antiemetics, IV fluid, CT abdomen pelvis    Prior to discharge, patient feeling much improved.  CT unremarkable.  Return precautions discussed and discharge    Gilmer Camarillo MD  Emergency Medicine        Gilmer Camarillo MD  01/22/25 9633

## 2025-01-22 NOTE — ED TRIAGE NOTES
nausea/vomiting BIBA. 4mg IV zofran given by EMS. HTN. ref EKG.  -Pt is diabetic      Triage Assessment (Adult)       Row Name 01/22/25 0854          Triage Assessment    Airway WDL WDL        Respiratory WDL    Respiratory WDL WDL        Skin Circulation/Temperature WDL    Skin Circulation/Temperature WDL WDL        Cardiac WDL    Cardiac WDL X;all     Pulse Rate & Regularity --  HTN        Chest Pain Assessment    Chest Pain Location epigastric        Peripheral/Neurovascular WDL    Peripheral Neurovascular WDL WDL        Cognitive/Neuro/Behavioral WDL    Cognitive/Neuro/Behavioral WDL WDL

## 2025-01-23 ENCOUNTER — PATIENT OUTREACH (OUTPATIENT)
Dept: CARE COORDINATION | Facility: CLINIC | Age: 50
End: 2025-01-23
Payer: COMMERCIAL

## 2025-01-23 NOTE — PROGRESS NOTES
Clinical Product Navigator CC reviewed chart; patient on payer product coverage.  Review results:      CC notified via health plan report of recent ED utilization. Pt has previously been unreachable by Care Coordination.  Obtaining further information to determine needs and next steps.  Clinic Care Coordination Contact  [facility]/Voicemail    Clinical Data: Care Coordinator Outreach    Outreach Documentation Number of Outreach Attempt   1/23/2025   1:01 PM 1       Left message on patient's voicemail with call back information and requested return call.      Plan: Care Coordinator will try to reach patient again in 3-5 business days.      ELYSIA Murcia (she/her/hers)  Social Work Clinic Care Coordinator   [facility]  Casual DOMINICK [email or web address]  [phone]

## 2025-01-30 ENCOUNTER — APPOINTMENT (OUTPATIENT)
Dept: GENERAL RADIOLOGY | Facility: CLINIC | Age: 50
End: 2025-01-30
Attending: FAMILY MEDICINE
Payer: COMMERCIAL

## 2025-01-30 ENCOUNTER — HOSPITAL ENCOUNTER (EMERGENCY)
Facility: CLINIC | Age: 50
Discharge: HOME | End: 2025-01-30
Attending: FAMILY MEDICINE
Payer: COMMERCIAL

## 2025-01-30 VITALS
RESPIRATION RATE: 16 BRPM | SYSTOLIC BLOOD PRESSURE: 159 MMHG | BODY MASS INDEX: 29.31 KG/M2 | HEART RATE: 92 BPM | TEMPERATURE: 98.2 F | WEIGHT: 197.9 LBS | DIASTOLIC BLOOD PRESSURE: 99 MMHG | OXYGEN SATURATION: 99 % | HEIGHT: 69 IN

## 2025-01-30 DIAGNOSIS — R07.89 OTHER CHEST PAIN: ICD-10-CM

## 2025-01-30 DIAGNOSIS — K21.9 GASTROESOPHAGEAL REFLUX DISEASE, UNSPECIFIED WHETHER ESOPHAGITIS PRESENT: ICD-10-CM

## 2025-01-30 LAB
ALBUMIN SERPL BCG-MCNC: 4.1 G/DL (ref 3.5–5.2)
ALP SERPL-CCNC: 201 U/L (ref 40–150)
ALT SERPL W P-5'-P-CCNC: 168 U/L (ref 0–50)
ANION GAP SERPL CALCULATED.3IONS-SCNC: 14 MMOL/L (ref 7–15)
AST SERPL W P-5'-P-CCNC: 117 U/L (ref 0–45)
ATRIAL RATE - MUSE: 80 BPM
BASOPHILS # BLD AUTO: 0 10E3/UL (ref 0–0.2)
BASOPHILS NFR BLD AUTO: 1 %
BILIRUB SERPL-MCNC: 0.3 MG/DL
BUN SERPL-MCNC: 16.9 MG/DL (ref 6–20)
CALCIUM SERPL-MCNC: 10 MG/DL (ref 8.8–10.4)
CHLORIDE SERPL-SCNC: 93 MMOL/L (ref 98–107)
CREAT SERPL-MCNC: 0.7 MG/DL (ref 0.51–0.95)
DIASTOLIC BLOOD PRESSURE - MUSE: NORMAL MMHG
EGFRCR SERPLBLD CKD-EPI 2021: >90 ML/MIN/1.73M2
EOSINOPHIL # BLD AUTO: 0.3 10E3/UL (ref 0–0.7)
EOSINOPHIL NFR BLD AUTO: 3 %
ERYTHROCYTE [DISTWIDTH] IN BLOOD BY AUTOMATED COUNT: 13.4 % (ref 10–15)
FLUAV RNA SPEC QL NAA+PROBE: NEGATIVE
FLUBV RNA RESP QL NAA+PROBE: NEGATIVE
GLUCOSE SERPL-MCNC: 345 MG/DL (ref 70–99)
HCO3 SERPL-SCNC: 24 MMOL/L (ref 22–29)
HCT VFR BLD AUTO: 39.2 % (ref 35–47)
HGB BLD-MCNC: 13.8 G/DL (ref 11.7–15.7)
IMM GRANULOCYTES # BLD: 0 10E3/UL
IMM GRANULOCYTES NFR BLD: 1 %
INTERPRETATION ECG - MUSE: NORMAL
LIPASE SERPL-CCNC: 39 U/L (ref 13–60)
LYMPHOCYTES # BLD AUTO: 3.2 10E3/UL (ref 0.8–5.3)
LYMPHOCYTES NFR BLD AUTO: 38 %
MCH RBC QN AUTO: 28.2 PG (ref 26.5–33)
MCHC RBC AUTO-ENTMCNC: 35.2 G/DL (ref 31.5–36.5)
MCV RBC AUTO: 80 FL (ref 78–100)
MONOCYTES # BLD AUTO: 0.5 10E3/UL (ref 0–1.3)
MONOCYTES NFR BLD AUTO: 6 %
NEUTROPHILS # BLD AUTO: 4.5 10E3/UL (ref 1.6–8.3)
NEUTROPHILS NFR BLD AUTO: 53 %
NRBC # BLD AUTO: 0 10E3/UL
NRBC BLD AUTO-RTO: 0 /100
P AXIS - MUSE: 71 DEGREES
PLATELET # BLD AUTO: 256 10E3/UL (ref 150–450)
POTASSIUM SERPL-SCNC: 4.7 MMOL/L (ref 3.4–5.3)
PR INTERVAL - MUSE: 152 MS
PROT SERPL-MCNC: 8.9 G/DL (ref 6.4–8.3)
QRS DURATION - MUSE: 74 MS
QT - MUSE: 386 MS
QTC - MUSE: 445 MS
R AXIS - MUSE: -34 DEGREES
RBC # BLD AUTO: 4.9 10E6/UL (ref 3.8–5.2)
RSV RNA SPEC NAA+PROBE: NEGATIVE
SARS-COV-2 RNA RESP QL NAA+PROBE: NEGATIVE
SODIUM SERPL-SCNC: 131 MMOL/L (ref 135–145)
SYSTOLIC BLOOD PRESSURE - MUSE: NORMAL MMHG
T AXIS - MUSE: -14 DEGREES
TROPONIN T SERPL HS-MCNC: 7 NG/L
VENTRICULAR RATE- MUSE: 80 BPM
WBC # BLD AUTO: 8.5 10E3/UL (ref 4–11)

## 2025-01-30 PROCEDURE — 84484 ASSAY OF TROPONIN QUANT: CPT | Performed by: FAMILY MEDICINE

## 2025-01-30 PROCEDURE — 99285 EMERGENCY DEPT VISIT HI MDM: CPT | Mod: 25 | Performed by: FAMILY MEDICINE

## 2025-01-30 PROCEDURE — 84155 ASSAY OF PROTEIN SERUM: CPT | Performed by: FAMILY MEDICINE

## 2025-01-30 PROCEDURE — 71046 X-RAY EXAM CHEST 2 VIEWS: CPT

## 2025-01-30 PROCEDURE — 250N000009 HC RX 250: Performed by: FAMILY MEDICINE

## 2025-01-30 PROCEDURE — 80051 ELECTROLYTE PANEL: CPT | Performed by: FAMILY MEDICINE

## 2025-01-30 PROCEDURE — 83690 ASSAY OF LIPASE: CPT | Performed by: FAMILY MEDICINE

## 2025-01-30 PROCEDURE — 76604 US EXAM CHEST: CPT | Performed by: FAMILY MEDICINE

## 2025-01-30 PROCEDURE — 250N000011 HC RX IP 250 OP 636: Performed by: FAMILY MEDICINE

## 2025-01-30 PROCEDURE — 36415 COLL VENOUS BLD VENIPUNCTURE: CPT | Performed by: FAMILY MEDICINE

## 2025-01-30 PROCEDURE — 93010 ELECTROCARDIOGRAM REPORT: CPT | Performed by: FAMILY MEDICINE

## 2025-01-30 PROCEDURE — 85004 AUTOMATED DIFF WBC COUNT: CPT | Performed by: FAMILY MEDICINE

## 2025-01-30 PROCEDURE — 76604 US EXAM CHEST: CPT | Mod: 26 | Performed by: FAMILY MEDICINE

## 2025-01-30 PROCEDURE — 87637 SARSCOV2&INF A&B&RSV AMP PRB: CPT | Performed by: FAMILY MEDICINE

## 2025-01-30 PROCEDURE — 93005 ELECTROCARDIOGRAM TRACING: CPT | Performed by: FAMILY MEDICINE

## 2025-01-30 PROCEDURE — 82040 ASSAY OF SERUM ALBUMIN: CPT | Performed by: FAMILY MEDICINE

## 2025-01-30 PROCEDURE — 96375 TX/PRO/DX INJ NEW DRUG ADDON: CPT | Performed by: FAMILY MEDICINE

## 2025-01-30 PROCEDURE — 99284 EMERGENCY DEPT VISIT MOD MDM: CPT | Mod: 25 | Performed by: FAMILY MEDICINE

## 2025-01-30 PROCEDURE — 96374 THER/PROPH/DIAG INJ IV PUSH: CPT | Performed by: FAMILY MEDICINE

## 2025-01-30 RX ORDER — ONDANSETRON 2 MG/ML
4 INJECTION INTRAMUSCULAR; INTRAVENOUS ONCE
Status: COMPLETED | OUTPATIENT
Start: 2025-01-30 | End: 2025-01-30

## 2025-01-30 RX ADMIN — PANTOPRAZOLE SODIUM 40 MG: 40 INJECTION, POWDER, FOR SOLUTION INTRAVENOUS at 12:57

## 2025-01-30 RX ADMIN — ONDANSETRON 4 MG: 2 INJECTION INTRAMUSCULAR; INTRAVENOUS at 12:54

## 2025-01-30 ASSESSMENT — ACTIVITIES OF DAILY LIVING (ADL)
ADLS_ACUITY_SCORE: 58

## 2025-01-30 NOTE — ED TRIAGE NOTES
Pt here for chest pain and nausea.  Chest pain and nausea started this morning.  Pain did wake pt up from her sleep.  Has not taken anything for it.    CP is a 6/10 and nausea-mild.  C/o SOB.   Denies dizziness/lightheadedness.  Denies vomiting.

## 2025-01-30 NOTE — ED PROVIDER NOTES
"    [facility] EMERGENCY DEPARTMENT ([facility])    1/30/25      ED PROVIDER NOTE       History     Chief Complaint   Patient presents with    Chest Pain     HPI  Yun Coyle is a 49 year old female with a past medical history of opioid dependence, chronic hepatitis C, HLD, MDD, personality disorder, SI, DM2  who presents to the ED for evaluation of chest pain.  Patient states she woke up around 7 AM feeling nauseous and had a sharp pain in the left side of her chest.  She \"gagged\" a small amount of acid but did not vomit.  She has not vomited but still feels nauseated.  Seen in ED for vomiting illness on 1/22/2025, workup including CT abdomen pelvis was negative that day, was prescribed medications for nausea and vomiting and states her GI symptoms had essentially resolved until this morning.  She is not having abdominal pain.  She does have 2 days of a productive cough but is not short of breath.  No definite fever or chills.  Slight runny nose, no sore throat.  Not noted any extremity pain or swelling.  Does note a prior history of chest pain with negative workup at an ED visit on December 29, 2024.    Past Medical History  Past Medical History:   Diagnosis Date    Acute pancreatitis 06/29/2017    Anxiety     Cauda equina syndrome (H) 03/29/2013    Cyst of right ovary 11/16/2017    Depressive disorder     Gastroesophageal reflux disease     Hypertension     Hyponatremia 10/01/2023    Low back pain 08/20/2012    Overview:   History of cauda equina and surgery.      Type 2 diabetes mellitus (H)     Uncomplicated asthma      Past Surgical History:   Procedure Laterality Date    BACK SURGERY      x2    CHOLECYSTECTOMY      ORTHOPEDIC SURGERY      SALPINGO OOPHORECTOMY,R/L/ELDA Right      Alcohol Swabs PADS  blood glucose (NO BRAND SPECIFIED) lancets standard  blood glucose (NO BRAND SPECIFIED) test strip  blood glucose calibration (NO BRAND SPECIFIED) solution  blood glucose monitoring (NO BRAND SPECIFIED) " "meter device kit  cloNIDine (CATAPRES) 0.1 MG tablet  empagliflozin (JARDIANCE) 10 MG TABS tablet  escitalopram (LEXAPRO) 10 MG tablet  Glucagon (GVOKE HYPOPEN) 1 MG/0.2ML pen  glucose (BD GLUCOSE) 4 g chewable tablet  glucose 40 % (400 mg/mL) gel  hydrOXYzine HCl (ATARAX) 25 MG tablet  insulin aspart (NOVOLOG PEN) 100 UNIT/ML pen  insulin aspart (NOVOLOG PEN) 100 UNIT/ML pen  insulin aspart (NOVOLOG PEN) 100 UNIT/ML pen  insulin glargine (LANTUS PEN) 100 UNIT/ML pen  insulin pen needle (32G X 4 MM) 32G X 4 MM miscellaneous  losartan (COZAAR) 25 MG tablet  metFORMIN (GLUCOPHAGE) 500 MG tablet  methadone (DOLOPHINE-INTENSOL) 10 MG/ML (HIGH CONC) solution  ondansetron (ZOFRAN ODT) 4 MG ODT tab  prazosin (MINIPRESS) 2 MG capsule  Sharps Container MISC      No Known Allergies  Family History  Family History   Problem Relation Age of Onset    Breast Cancer Mother         in her 50s.    Diabetes Mother     Breast Cancer Maternal Grandmother         in her 40s.    Diabetes Maternal Grandmother      Social History   Social History     Tobacco Use    Smoking status: Never    Smokeless tobacco: Never   Vaping Use    Vaping status: Never Used   Substance Use Topics    Alcohol use: No    Drug use: Not Currently     Types: Marijuana     Comment: pt was taking Oxycontin \"years ago.\" pt is now on Methadone.      A medically appropriate review of systems was performed with pertinent positives and negatives noted in the HPI, and all other systems negative.    Physical Exam   BP: (!) 159/99  Pulse: 92  Temp: 98.2  F (36.8  C)  Resp: 16  Height: 175.3 cm (5' 9\")  Weight: 89.8 kg (197 lb 14.4 oz)  SpO2: 99 %  Physical Exam  Vitals and nursing note reviewed.   Constitutional:       General: She is not in acute distress.     Appearance: Normal appearance. She is not diaphoretic.   HENT:      Head: Atraumatic.      Mouth/Throat:      Mouth: Mucous membranes are moist.   Eyes:      General: No scleral icterus.     Conjunctiva/sclera: " Conjunctivae normal.   Cardiovascular:      Rate and Rhythm: Normal rate and regular rhythm.      Pulses:           Carotid pulses are 2+ on the right side and 2+ on the left side.       Radial pulses are 2+ on the right side and 2+ on the left side.        Dorsalis pedis pulses are 2+ on the right side and 2+ on the left side.        Posterior tibial pulses are 2+ on the right side and 2+ on the left side.      Heart sounds: Normal heart sounds.   Pulmonary:      Effort: No respiratory distress.      Breath sounds: Normal breath sounds.   Chest:      Chest wall: No tenderness.   Abdominal:      General: Abdomen is flat. Bowel sounds are normal.      Palpations: Abdomen is soft.   Musculoskeletal:      Cervical back: Neck supple.   Skin:     General: Skin is warm.      Findings: No rash.   Neurological:      General: No focal deficit present.      Mental Status: She is alert and oriented to person, place, and time.           ED Course, Procedures, & Data      Procedures  Results for orders placed during the hospital encounter of 01/30/25    POC US ECHO LIMITED    Impression  Limited Bedside Cardiac Ultrasound, performed and interpreted by me.  Indication: Chest Pain.  Parasternal long axis, parasternal short axis, and apical 4 chamber views were acquired.  Image quality was satisfactory.    Findings:  Global left ventricular function appears intact.  Chambers do not appear dilated.  There is no evidence of free fluid within the pericardium.    IMPRESSION: Grossly normal left ventricular function and chamber size.  No pericardial effusion..            EKG Interpretation:      Interpreted by Abraham Joy MD  Time reviewed: 1130  Symptoms at time of EKG: Chest pain  Rhythm: normal sinus   Rate: Normal  Axis: Left Axis Deviation  Ectopy: none  Conduction: normal  ST Segments/ T Waves: No ST-T wave changes and No acute ischemic changes  Q Waves: none  Comparison to prior: Unchanged from 1/22/2025    Clinical  Impression: Normal sinus rhythm with left axis deviation, unchanged from 1/22/2025                  Results for orders placed or performed during the hospital encounter of 01/30/25   POC US ECHO LIMITED     Status: None    Impression    Limited Bedside Cardiac Ultrasound, performed and interpreted by me.   Indication: Chest Pain.  Parasternal long axis, parasternal short axis, and apical 4 chamber views were acquired.   Image quality was satisfactory.    Findings:    Global left ventricular function appears intact.  Chambers do not appear dilated.  There is no evidence of free fluid within the pericardium.    IMPRESSION: Grossly normal left ventricular function and chamber size.  No pericardial effusion..      XR Chest 2 Views     Status: None    Narrative    EXAM: XR CHEST 2 VIEWS  LOCATION: [facility]  DATE: 1/30/2025    INDICATION: Vomiting, chest pain  COMPARISON: 12/29/2024      Impression    IMPRESSION: Negative chest.   Comprehensive metabolic panel     Status: Abnormal   Result Value Ref Range    Sodium 131 (L) 135 - 145 mmol/L    Potassium 4.7 3.4 - 5.3 mmol/L    Carbon Dioxide (CO2) 24 22 - 29 mmol/L    Anion Gap 14 7 - 15 mmol/L    Urea Nitrogen 16.9 6.0 - 20.0 mg/dL    Creatinine 0.70 0.51 - 0.95 mg/dL    GFR Estimate >90 >60 mL/min/1.73m2    Calcium 10.0 8.8 - 10.4 mg/dL    Chloride 93 (L) 98 - 107 mmol/L    Glucose 345 (H) 70 - 99 mg/dL    Alkaline Phosphatase 201 (H) 40 - 150 U/L     (H) 0 - 45 U/L     (H) 0 - 50 U/L    Protein Total 8.9 (H) 6.4 - 8.3 g/dL    Albumin 4.1 3.5 - 5.2 g/dL    Bilirubin Total 0.3 <=1.2 mg/dL   Troponin T, High Sensitivity     Status: Normal   Result Value Ref Range    Troponin T, High Sensitivity 7 <=14 ng/L   Lipase     Status: Normal   Result Value Ref Range    Lipase 39 13 - 60 U/L   Influenza A/B, RSV and SARS-CoV2 PCR (COVID-19) Nasopharyngeal     Status: Normal    Specimen: Nasopharyngeal; Swab   Result  Value Ref Range    Influenza A PCR Negative Negative    Influenza B PCR Negative Negative    RSV PCR Negative Negative    SARS CoV2 PCR Negative Negative    Narrative    Testing was performed using the Xpert Xpress CoV2/Flu/RSV Assay on the Cepheid GeneXpert Instrument. This test should be ordered for the detection of SARS-CoV2, influenza, and RSV viruses in individuals with signs and symptoms of respiratory tract infection. This test is for in vitro diagnostic use under the [place] FDA for laboratories certified under CLIA to perform high or moderate complexity testing. This test has been [place] FDA cleared. A negative result does not rule out the presence of PCR inhibitors in the specimen or target RNA in concentration below the limit of detection for the assay. If only one viral target is positive but coinfection with multiple targets is suspected, the sample should be re-tested with another FDA cleared, approved, or authorized test, if coninfection would change clinical management. This test was validated by the [facility]. These laboratories are certified under the Clinical Laboratory Improvement Amendments of 1988 (CLIA-88) as qualified to perfom high complexity laboratory testing.   CBC with platelets and differential     Status: None   Result Value Ref Range    WBC Count 8.5 4.0 - 11.0 10e3/uL    RBC Count 4.90 3.80 - 5.20 10e6/uL    Hemoglobin 13.8 11.7 - 15.7 g/dL    Hematocrit 39.2 35.0 - 47.0 %    MCV 80 78 - 100 fL    MCH 28.2 26.5 - 33.0 pg    MCHC 35.2 31.5 - 36.5 g/dL    RDW 13.4 10.0 - 15.0 %    Platelet Count 256 150 - 450 10e3/uL    % Neutrophils 53 %    % Lymphocytes 38 %    % Monocytes 6 %    % Eosinophils 3 %    % Basophils 1 %    % Immature Granulocytes 1 %    NRBCs per 100 WBC 0 <1 /100    Absolute Neutrophils 4.5 1.6 - 8.3 10e3/uL    Absolute Lymphocytes 3.2 0.8 - 5.3 10e3/uL    Absolute Monocytes 0.5 0.0 - 1.3 10e3/uL    Absolute Eosinophils 0.3 0.0 - 0.7 10e3/uL    Absolute  Basophils 0.0 0.0 - 0.2 10e3/uL    Absolute Immature Granulocytes 0.0 <=0.4 10e3/uL    Absolute NRBCs 0.0 10e3/uL   EKG 12-lead, tracing only     Status: None   Result Value Ref Range    Systolic Blood Pressure  mmHg    Diastolic Blood Pressure  mmHg    Ventricular Rate 80 BPM    Atrial Rate 80 BPM    DC Interval 152 ms    QRS Duration 74 ms     ms    QTc 445 ms    P Axis 71 degrees    R AXIS -34 degrees    T Axis -14 degrees    Interpretation ECG       Sinus rhythm  Left axis deviation  Abnormal ECG  Unconfirmed report - interpretation of this ECG is computer generated - see medical record for final interpretation  Confirmed by - EMERGENCY ROOM, PHYSICIAN ([id number]),  CALE VILLATORO ([id number]) on 1/30/2025 12:28:31 PM     CBC with platelets differential     Status: None    Narrative    The following orders were created for panel order CBC with platelets differential.  Procedure                               Abnormality         Status                     ---------                               -----------         ------                     CBC with platelets and d...[[id number]]                      Final result                 Please view results for these tests on the individual orders.     Medications   ondansetron (ZOFRAN) injection 4 mg (4 mg Intravenous $Given 1/30/25 1254)   pantoprazole (PROTONIX) IV push injection 40 mg (40 mg Intravenous $Given 1/30/25 1257)     Labs Ordered and Resulted from Time of ED Arrival to Time of ED Departure   COMPREHENSIVE METABOLIC PANEL - Abnormal       Result Value    Sodium 131 (*)     Potassium 4.7      Carbon Dioxide (CO2) 24      Anion Gap 14      Urea Nitrogen 16.9      Creatinine 0.70      GFR Estimate >90      Calcium 10.0      Chloride 93 (*)     Glucose 345 (*)     Alkaline Phosphatase 201 (*)      (*)      (*)     Protein Total 8.9 (*)     Albumin 4.1      Bilirubin Total 0.3     TROPONIN T, HIGH SENSITIVITY - Normal    Troponin T, High  Sensitivity 7     LIPASE - Normal    Lipase 39     INFLUENZA A/B, RSV AND SARS-COV2 PCR - Normal    Influenza A PCR Negative      Influenza B PCR Negative      RSV PCR Negative      SARS CoV2 PCR Negative     CBC WITH PLATELETS AND DIFFERENTIAL    WBC Count 8.5      RBC Count 4.90      Hemoglobin 13.8      Hematocrit 39.2      MCV 80      MCH 28.2      MCHC 35.2      RDW 13.4      Platelet Count 256      % Neutrophils 53      % Lymphocytes 38      % Monocytes 6      % Eosinophils 3      % Basophils 1      % Immature Granulocytes 1      NRBCs per 100 WBC 0      Absolute Neutrophils 4.5      Absolute Lymphocytes 3.2      Absolute Monocytes 0.5      Absolute Eosinophils 0.3      Absolute Basophils 0.0      Absolute Immature Granulocytes 0.0      Absolute NRBCs 0.0     TROPONIN T, HIGH SENSITIVITY     XR Chest 2 Views   Final Result   IMPRESSION: Negative chest.      POC US ECHO LIMITED   Final Result   Limited Bedside Cardiac Ultrasound, performed and interpreted by me.    Indication: Chest Pain.   Parasternal long axis, parasternal short axis, and apical 4 chamber views were acquired.    Image quality was satisfactory.      Findings:     Global left ventricular function appears intact.   Chambers do not appear dilated.   There is no evidence of free fluid within the pericardium.      IMPRESSION: Grossly normal left ventricular function and chamber size.  No pericardial effusion..                 Critical care was not performed.     Medical Decision Making  The patient's presentation was of moderate complexity (an acute illness with systemic symptoms).    The patient's evaluation involved:  review of external note(s) from 3+ sources (review of documentation from ED visit for chest pain on 12/29/2024, also ED visits for nausea and vomiting on 1/8/2025, 1/18/2025, and 1/22/2025)  review of 3+ test result(s) ordered prior to this encounter (review of the multiple elevated liver enzyme tests from previous encounters for  comparison to today, review of results from CT imaging 1/22/2025, and review of results from patient's most recent abdominal ultrasound on 8/22/2024)  strong consideration of a test (right upper quadrant ultrasound and abdominal CT were ultimately deferred based on benign abdominal exam, resolution of symptoms and clinical course) that was ultimately deferred  ordering and/or review of 3+ test(s) in this encounter (see separate area of note for details)  independent interpretation of testing performed by another health professional (chest x-ray images personally reviewed and reviewed radiologist interpretation)    The patient's management necessitated moderate risk (prescription drug management including medications given in the ED).    Assessment & Plan    49-year-old female with multiple medical problems as outlined above now presenting with 5 hours of atypical chest pain.  She did have a preceding 2-day illness associated with cough, and a recent workup for vomiting illness.   Initial differential diagnosis included a life-threatening cause of chest pain such as a ACS, PE, dissection, pneumonia, pneumothorax, pericarditis , myocarditis, esophagitis, Boerhaave tear, and other life-threatening as well as nonlife threatening causes of acute chest pain which is costochondritis, intercostal neuritis, GERD.  Exam patient is hypertensive but appears in no acute distress.  Other vital signs are normal.  Her cardiovascular exam is normal with symmetric pulses in all extremities also.  Lungs are clear.  Chest wall is nonspecifically tender.  She has a completely soft nontender abdominal exam.  Her physical exam is otherwise unremarkable.  Patient's initial EKG normal sinus rhythm with left axis deviation, no change from previous and no ischemic changes, initial troponin 7, this is comparable to multiple recent troponin values during other evaluations and I do not think a serial troponin is clinically indicated today based  on the scenario.  The point of care echocardiogram shows no pericardial effusion, wall motion abnormality or dilated chambers.  Clinically I do not think further workup of ACS is indicated.  Patient is low probability for pulmonary embolism by Wells criteria, and is PERC negative.  I think clinically the diagnosis of pulmonary embolism is highly unlikely to represent the etiology of the symptoms today.  Chest x-ray normal.  There is no clinical evidence to support a diagnosis of pericarditis or myocarditis.  No clinical evidence of congestive heart failure.  Patient has symmetric pulse and BP, no congenital connective tissue disorder, no widening of mediastinum or secondary signs of dissection on chest x-ray, and no recent history to support acquired risk factors for Thoracic Aortic Dissection, I do not feel further work-up for dissection is indicated.  Her abdominal exam is benign, she does have elevated liver enzymes, these are not changed from previous.  Her gallbladder has been removed and she has been worked up numerous times in the past for elevated liver enzymes.  Her symptoms appear most likely related to acid reflux and chronic cyclic vomiting syndrome, overall based on the entirety of the evaluation today there does not appear to be any evidence of an acute life-threatening etiology of symptoms, and the patient would appear to be appropriate to be managed symptomatically with close outpatient follow-up.     I have reviewed the nursing notes. I have reviewed the findings, diagnosis, plan and need for follow up with the patient.    Discharge Medication List as of 1/30/2025  3:11 PM          Final diagnoses:   Other chest pain   Gastroesophageal reflux disease, unspecified whether esophagitis present       Abraham Joy MD  [facility] EMERGENCY DEPARTMENT  1/30/2025     Abraham Joy MD  01/30/25 1529

## 2025-01-30 NOTE — DISCHARGE INSTRUCTIONS
Thank you for choosing [facility].     Please closely monitor for further symptoms. Return to the Emergency Department if you develop any new or worsening signs or symptoms.    If you received any opiate pain medications or sedatives during your visit, please do not drive for at least 8 hours.     Labs, cultures or final xray interpretations may still need to be reviewed.  We will call you if your plan of care needs to be changed.    Please follow up with your primary care physician or clinic.

## 2025-02-16 PROCEDURE — 99285 EMERGENCY DEPT VISIT HI MDM: CPT | Performed by: EMERGENCY MEDICINE

## 2025-02-16 PROCEDURE — 99284 EMERGENCY DEPT VISIT MOD MDM: CPT | Mod: 25 | Performed by: EMERGENCY MEDICINE

## 2025-02-17 ENCOUNTER — HOSPITAL ENCOUNTER (EMERGENCY)
Facility: CLINIC | Age: 50
Discharge: HOME | End: 2025-02-17
Attending: EMERGENCY MEDICINE
Payer: COMMERCIAL

## 2025-02-17 VITALS
WEIGHT: 200 LBS | BODY MASS INDEX: 29.62 KG/M2 | SYSTOLIC BLOOD PRESSURE: 163 MMHG | OXYGEN SATURATION: 96 % | RESPIRATION RATE: 16 BRPM | DIASTOLIC BLOOD PRESSURE: 100 MMHG | HEIGHT: 69 IN | HEART RATE: 80 BPM | TEMPERATURE: 97.3 F

## 2025-02-17 DIAGNOSIS — R11.15 CYCLIC VOMITING SYNDROME: ICD-10-CM

## 2025-02-17 LAB
ALBUMIN SERPL BCG-MCNC: 3.9 G/DL (ref 3.5–5.2)
ALBUMIN UR-MCNC: NEGATIVE MG/DL
ALP SERPL-CCNC: 157 U/L (ref 40–150)
ALT SERPL W P-5'-P-CCNC: 145 U/L (ref 0–50)
ANION GAP SERPL CALCULATED.3IONS-SCNC: 15 MMOL/L (ref 7–15)
APPEARANCE UR: CLEAR
AST SERPL W P-5'-P-CCNC: 134 U/L (ref 0–45)
ATRIAL RATE - MUSE: 78 BPM
BASOPHILS # BLD AUTO: 0 10E3/UL (ref 0–0.2)
BASOPHILS NFR BLD AUTO: 1 %
BILIRUB SERPL-MCNC: 0.3 MG/DL
BILIRUB UR QL STRIP: NEGATIVE
BUN SERPL-MCNC: 15.9 MG/DL (ref 6–20)
CALCIUM SERPL-MCNC: 10 MG/DL (ref 8.8–10.4)
CHLORIDE SERPL-SCNC: 95 MMOL/L (ref 98–107)
COLOR UR AUTO: ABNORMAL
CREAT SERPL-MCNC: 0.8 MG/DL (ref 0.51–0.95)
DIASTOLIC BLOOD PRESSURE - MUSE: NORMAL MMHG
EGFRCR SERPLBLD CKD-EPI 2021: >90 ML/MIN/1.73M2
EOSINOPHIL # BLD AUTO: 0.4 10E3/UL (ref 0–0.7)
EOSINOPHIL NFR BLD AUTO: 5 %
ERYTHROCYTE [DISTWIDTH] IN BLOOD BY AUTOMATED COUNT: 14.1 % (ref 10–15)
GLUCOSE SERPL-MCNC: 344 MG/DL (ref 70–99)
GLUCOSE UR STRIP-MCNC: >=1000 MG/DL
HCO3 SERPL-SCNC: 24 MMOL/L (ref 22–29)
HCT VFR BLD AUTO: 40.3 % (ref 35–47)
HGB BLD-MCNC: 13.2 G/DL (ref 11.7–15.7)
HGB UR QL STRIP: NEGATIVE
IMM GRANULOCYTES # BLD: 0 10E3/UL
IMM GRANULOCYTES NFR BLD: 0 %
INTERPRETATION ECG - MUSE: NORMAL
KETONES UR STRIP-MCNC: NEGATIVE MG/DL
LEUKOCYTE ESTERASE UR QL STRIP: NEGATIVE
LYMPHOCYTES # BLD AUTO: 1.9 10E3/UL (ref 0.8–5.3)
LYMPHOCYTES NFR BLD AUTO: 26 %
MAGNESIUM SERPL-MCNC: 1.6 MG/DL (ref 1.7–2.3)
MCH RBC QN AUTO: 26.7 PG (ref 26.5–33)
MCHC RBC AUTO-ENTMCNC: 32.8 G/DL (ref 31.5–36.5)
MCV RBC AUTO: 81 FL (ref 78–100)
MONOCYTES # BLD AUTO: 0.5 10E3/UL (ref 0–1.3)
MONOCYTES NFR BLD AUTO: 7 %
MUCOUS THREADS #/AREA URNS LPF: PRESENT /LPF
NEUTROPHILS # BLD AUTO: 4.6 10E3/UL (ref 1.6–8.3)
NEUTROPHILS NFR BLD AUTO: 62 %
NITRATE UR QL: NEGATIVE
NRBC # BLD AUTO: 0 10E3/UL
NRBC BLD AUTO-RTO: 0 /100
P AXIS - MUSE: 60 DEGREES
PH UR STRIP: 6.5 [PH] (ref 5–7)
PLATELET # BLD AUTO: 204 10E3/UL (ref 150–450)
POTASSIUM SERPL-SCNC: 5 MMOL/L (ref 3.4–5.3)
PR INTERVAL - MUSE: 140 MS
PROT SERPL-MCNC: 8.3 G/DL (ref 6.4–8.3)
QRS DURATION - MUSE: 86 MS
QT - MUSE: 328 MS
QTC - MUSE: 373 MS
R AXIS - MUSE: -28 DEGREES
RBC # BLD AUTO: 4.95 10E6/UL (ref 3.8–5.2)
RBC URINE: 1 /HPF
SODIUM SERPL-SCNC: 134 MMOL/L (ref 135–145)
SP GR UR STRIP: 1.02 (ref 1–1.03)
SQUAMOUS EPITHELIAL: 2 /HPF
SYSTOLIC BLOOD PRESSURE - MUSE: NORMAL MMHG
T AXIS - MUSE: 9 DEGREES
TROPONIN T SERPL HS-MCNC: 20 NG/L
TROPONIN T SERPL HS-MCNC: 23 NG/L
UROBILINOGEN UR STRIP-MCNC: NORMAL MG/DL
VENTRICULAR RATE- MUSE: 78 BPM
WBC # BLD AUTO: 7.4 10E3/UL (ref 4–11)
WBC URINE: 2 /HPF

## 2025-02-17 PROCEDURE — 85004 AUTOMATED DIFF WBC COUNT: CPT | Performed by: EMERGENCY MEDICINE

## 2025-02-17 PROCEDURE — 96365 THER/PROPH/DIAG IV INF INIT: CPT | Performed by: EMERGENCY MEDICINE

## 2025-02-17 PROCEDURE — 999N000040 HC STATISTIC CONSULT NO CHARGE VASC ACCESS

## 2025-02-17 PROCEDURE — 83735 ASSAY OF MAGNESIUM: CPT | Performed by: EMERGENCY MEDICINE

## 2025-02-17 PROCEDURE — 96376 TX/PRO/DX INJ SAME DRUG ADON: CPT | Performed by: EMERGENCY MEDICINE

## 2025-02-17 PROCEDURE — 99284 EMERGENCY DEPT VISIT MOD MDM: CPT | Mod: 25 | Performed by: EMERGENCY MEDICINE

## 2025-02-17 PROCEDURE — 93005 ELECTROCARDIOGRAM TRACING: CPT | Mod: RTG

## 2025-02-17 PROCEDURE — 81003 URINALYSIS AUTO W/O SCOPE: CPT | Performed by: EMERGENCY MEDICINE

## 2025-02-17 PROCEDURE — 999N000127 HC STATISTIC PERIPHERAL IV START W US GUIDANCE

## 2025-02-17 PROCEDURE — 36415 COLL VENOUS BLD VENIPUNCTURE: CPT | Performed by: EMERGENCY MEDICINE

## 2025-02-17 PROCEDURE — 96361 HYDRATE IV INFUSION ADD-ON: CPT | Performed by: EMERGENCY MEDICINE

## 2025-02-17 PROCEDURE — 96375 TX/PRO/DX INJ NEW DRUG ADDON: CPT | Performed by: EMERGENCY MEDICINE

## 2025-02-17 PROCEDURE — 84484 ASSAY OF TROPONIN QUANT: CPT | Performed by: EMERGENCY MEDICINE

## 2025-02-17 PROCEDURE — 80053 COMPREHEN METABOLIC PANEL: CPT | Performed by: EMERGENCY MEDICINE

## 2025-02-17 PROCEDURE — 999N000285 HC STATISTIC VASC ACCESS LAB DRAW WITH PIV START

## 2025-02-17 PROCEDURE — 258N000003 HC RX IP 258 OP 636: Performed by: EMERGENCY MEDICINE

## 2025-02-17 PROCEDURE — 250N000011 HC RX IP 250 OP 636: Performed by: EMERGENCY MEDICINE

## 2025-02-17 PROCEDURE — 250N000013 HC RX MED GY IP 250 OP 250 PS 637: Performed by: EMERGENCY MEDICINE

## 2025-02-17 PROCEDURE — 85048 AUTOMATED LEUKOCYTE COUNT: CPT | Performed by: EMERGENCY MEDICINE

## 2025-02-17 PROCEDURE — 85014 HEMATOCRIT: CPT | Performed by: EMERGENCY MEDICINE

## 2025-02-17 RX ORDER — MAGNESIUM SULFATE HEPTAHYDRATE 40 MG/ML
2 INJECTION, SOLUTION INTRAVENOUS ONCE
Status: COMPLETED | OUTPATIENT
Start: 2025-02-17 | End: 2025-02-17

## 2025-02-17 RX ORDER — MAGNESIUM HYDROXIDE/ALUMINUM HYDROXICE/SIMETHICONE 120; 1200; 1200 MG/30ML; MG/30ML; MG/30ML
15 SUSPENSION ORAL ONCE
Status: COMPLETED | OUTPATIENT
Start: 2025-02-17 | End: 2025-02-17

## 2025-02-17 RX ORDER — HALOPERIDOL 5 MG/ML
2 INJECTION INTRAMUSCULAR ONCE
Status: COMPLETED | OUTPATIENT
Start: 2025-02-17 | End: 2025-02-17

## 2025-02-17 RX ORDER — ONDANSETRON 4 MG/1
4 TABLET, ORALLY DISINTEGRATING ORAL EVERY 8 HOURS PRN
Qty: 12 TABLET | Refills: 0 | Status: SHIPPED | OUTPATIENT
Start: 2025-02-17

## 2025-02-17 RX ADMIN — FAMOTIDINE 20 MG: 10 INJECTION, SOLUTION INTRAVENOUS at 11:52

## 2025-02-17 RX ADMIN — SODIUM CHLORIDE 1000 ML: 9 INJECTION, SOLUTION INTRAVENOUS at 08:30

## 2025-02-17 RX ADMIN — HALOPERIDOL LACTATE 2 MG: 5 INJECTION, SOLUTION INTRAMUSCULAR at 12:01

## 2025-02-17 RX ADMIN — MAGNESIUM SULFATE HEPTAHYDRATE 2 G: 40 INJECTION, SOLUTION INTRAVENOUS at 09:47

## 2025-02-17 RX ADMIN — HALOPERIDOL LACTATE 2 MG: 5 INJECTION, SOLUTION INTRAMUSCULAR at 08:35

## 2025-02-17 RX ADMIN — ALUMINUM HYDROXIDE, MAGNESIUM HYDROXIDE, AND SIMETHICONE 15 ML: 200; 200; 20 SUSPENSION ORAL at 12:56

## 2025-02-17 RX ADMIN — ALUMINUM HYDROXIDE, MAGNESIUM HYDROXIDE, AND SIMETHICONE 15 ML: 200; 200; 20 SUSPENSION ORAL at 12:03

## 2025-02-17 ASSESSMENT — ACTIVITIES OF DAILY LIVING (ADL)
ADLS_ACUITY_SCORE: 58

## 2025-02-17 NOTE — ED TRIAGE NOTES
Triage Assessment (Adult)       Row Name 02/17/25 0635          Triage Assessment    Airway WDL WDL        Respiratory WDL    Respiratory WDL WDL        Skin Circulation/Temperature WDL    Skin Circulation/Temperature WDL WDL        Cardiac WDL    Cardiac WDL WDL        Peripheral/Neurovascular WDL    Peripheral Neurovascular WDL WDL        Cognitive/Neuro/Behavioral WDL    Cognitive/Neuro/Behavioral WDL WDL

## 2025-02-17 NOTE — CONSULTS
"Consult received for Vascular access care.  See LDA for details. For additional needs place \"Nursing to Consult for Vascular Access\" [id number] order in EPIC.  "

## 2025-02-17 NOTE — ED PROVIDER NOTES
[facility] EMERGENCY DEPARTMENT ([facility])    2/17/25      ED PROVIDER NOTE   ED 19  History     Chief Complaint   Patient presents with    Nausea & Vomiting     Started 2 days ago, hx cyclic vomiting.  Used cannabis 4 days ago. Last emesis 2 hours ago. Took Zofran as home without relief. Afebrile, denies fever or chills.     HPI  Yun Coyle is a 49 year old female with history of MDD, SANDIE, PTSD, Polysubstance abuse with 3 prior CD treatment programs, oxy use now on methadone, agoraphobia, and panic disorder, cyclic vomiting syndrome, cannabinoid use who returns to the ED with nausea, vomiting that started 2 days ago.  She has been trying Zofran without improvement.  Last vomited 2 hours prior to arrival.  Patient denies any new or atypical symptoms during this episode.    ROS: 10 point review of systems otherwise negative.    Past Medical History  Past Medical History:   Diagnosis Date    Acute pancreatitis 06/29/2017    Anxiety     Cauda equina syndrome (H) 03/29/2013    Cyst of right ovary 11/16/2017    Depressive disorder     Gastroesophageal reflux disease     Hypertension     Hyponatremia 10/01/2023    Low back pain 08/20/2012    Overview:   History of cauda equina and surgery.      Type 2 diabetes mellitus (H)     Uncomplicated asthma      Past Surgical History:   Procedure Laterality Date    BACK SURGERY      x2    CHOLECYSTECTOMY      ORTHOPEDIC SURGERY      SALPINGO OOPHORECTOMY,R/L/ELDA Right      ondansetron (ZOFRAN ODT) 4 MG ODT tab  Alcohol Swabs PADS  blood glucose (NO BRAND SPECIFIED) lancets standard  blood glucose (NO BRAND SPECIFIED) test strip  blood glucose calibration (NO BRAND SPECIFIED) solution  blood glucose monitoring (NO BRAND SPECIFIED) meter device kit  cloNIDine (CATAPRES) 0.1 MG tablet  empagliflozin (JARDIANCE) 10 MG TABS tablet  escitalopram (LEXAPRO) 10 MG tablet  Glucagon (GVOKE HYPOPEN) 1 MG/0.2ML pen  glucose (BD GLUCOSE) 4 g chewable tablet  glucose 40 % (400  "mg/mL) gel  hydrOXYzine HCl (ATARAX) 25 MG tablet  insulin aspart (NOVOLOG PEN) 100 UNIT/ML pen  insulin aspart (NOVOLOG PEN) 100 UNIT/ML pen  insulin aspart (NOVOLOG PEN) 100 UNIT/ML pen  insulin glargine (LANTUS PEN) 100 UNIT/ML pen  insulin pen needle (32G X 4 MM) 32G X 4 MM miscellaneous  losartan (COZAAR) 25 MG tablet  metFORMIN (GLUCOPHAGE) 500 MG tablet  methadone (DOLOPHINE-INTENSOL) 10 MG/ML (HIGH CONC) solution  ondansetron (ZOFRAN ODT) 4 MG ODT tab  prazosin (MINIPRESS) 2 MG capsule  Sharps Container MISC      No Known Allergies  Family History  Family History   Problem Relation Age of Onset    Breast Cancer Mother         in her 50s.    Diabetes Mother     Breast Cancer Maternal Grandmother         in her 40s.    Diabetes Maternal Grandmother      Social History   Social History     Tobacco Use    Smoking status: Never    Smokeless tobacco: Never   Vaping Use    Vaping status: Never Used   Substance Use Topics    Alcohol use: No    Drug use: Not Currently     Types: Marijuana     Comment: pt was taking Oxycontin \"years ago.\" pt is now on Methadone.      Past medical history, past surgical history, medications, allergies, family history, and social history were reviewed with the patient. No additional pertinent items.           Physical Exam   BP: (!) 132/92  Pulse: 105  Temp: 97.8  F (36.6  C)  Resp: 18  Height: 175.3 cm (5' 9\")  Weight: 90.7 kg (200 lb)  SpO2: 96 %  Physical Exam  ***    ED Course, Procedures, & Data      Procedures       {ED Course Selections (Optional):557863}  {ED Sepsis CMS Documentation (Optional):264475::\" \"}       Results for orders placed or performed during the hospital encounter of 02/17/25   Comprehensive metabolic panel     Status: Abnormal   Result Value Ref Range    Sodium 134 (L) 135 - 145 mmol/L    Potassium 5.0 3.4 - 5.3 mmol/L    Carbon Dioxide (CO2) 24 22 - 29 mmol/L    Anion Gap 15 7 - 15 mmol/L    Urea Nitrogen 15.9 6.0 - 20.0 mg/dL    Creatinine 0.80 0.51 - 0.95 " mg/dL    GFR Estimate >90 >60 mL/min/1.73m2    Calcium 10.0 8.8 - 10.4 mg/dL    Chloride 95 (L) 98 - 107 mmol/L    Glucose 344 (H) 70 - 99 mg/dL    Alkaline Phosphatase 157 (H) 40 - 150 U/L     (H) 0 - 45 U/L     (H) 0 - 50 U/L    Protein Total 8.3 6.4 - 8.3 g/dL    Albumin 3.9 3.5 - 5.2 g/dL    Bilirubin Total 0.3 <=1.2 mg/dL   Magnesium     Status: Abnormal   Result Value Ref Range    Magnesium 1.6 (L) 1.7 - 2.3 mg/dL   Troponin T, High Sensitivity     Status: Abnormal   Result Value Ref Range    Troponin T, High Sensitivity 23 (H) <=14 ng/L   UA with Microscopic reflex to Culture     Status: Abnormal    Specimen: Urine, Clean Catch   Result Value Ref Range    Color Urine Light Yellow Colorless, Straw, Light Yellow, Yellow    Appearance Urine Clear Clear    Glucose Urine >=1000 (A) Negative mg/dL    Bilirubin Urine Negative Negative    Ketones Urine Negative Negative mg/dL    Specific Gravity Urine 1.020 1.003 - 1.035    Blood Urine Negative Negative    pH Urine 6.5 5.0 - 7.0    Protein Albumin Urine Negative Negative mg/dL    Urobilinogen Urine Normal Normal, 2.0 mg/dL    Nitrite Urine Negative Negative    Leukocyte Esterase Urine Negative Negative    Mucus Urine Present (A) None Seen /LPF    RBC Urine 1 <=2 /HPF    WBC Urine 2 <=5 /HPF    Squamous Epithelials Urine 2 (H) <=1 /HPF    Narrative    Urine Culture not indicated   CBC with platelets and differential     Status: None   Result Value Ref Range    WBC Count 7.4 4.0 - 11.0 10e3/uL    RBC Count 4.95 3.80 - 5.20 10e6/uL    Hemoglobin 13.2 11.7 - 15.7 g/dL    Hematocrit 40.3 35.0 - 47.0 %    MCV 81 78 - 100 fL    MCH 26.7 26.5 - 33.0 pg    MCHC 32.8 31.5 - 36.5 g/dL    RDW 14.1 10.0 - 15.0 %    Platelet Count 204 150 - 450 10e3/uL    % Neutrophils 62 %    % Lymphocytes 26 %    % Monocytes 7 %    % Eosinophils 5 %    % Basophils 1 %    % Immature Granulocytes 0 %    NRBCs per 100 WBC 0 <1 /100    Absolute Neutrophils 4.6 1.6 - 8.3 10e3/uL     Absolute Lymphocytes 1.9 0.8 - 5.3 10e3/uL    Absolute Monocytes 0.5 0.0 - 1.3 10e3/uL    Absolute Eosinophils 0.4 0.0 - 0.7 10e3/uL    Absolute Basophils 0.0 0.0 - 0.2 10e3/uL    Absolute Immature Granulocytes 0.0 <=0.4 10e3/uL    Absolute NRBCs 0.0 10e3/uL   CBC with platelets differential     Status: None    Narrative    The following orders were created for panel order CBC with platelets differential.  Procedure                               Abnormality         Status                     ---------                               -----------         ------                     CBC with platelets and d...[[id number]]                      Final result                 Please view results for these tests on the individual orders.     Medications   sodium chloride 0.9% BOLUS 1,000 mL (1,000 mLs Intravenous $New Bag 2/17/25 0830)   haloperidol lactate (HALDOL) injection 2 mg (2 mg Intravenous $Given 2/17/25 0812)     Labs Ordered and Resulted from Time of ED Arrival to Time of ED Departure   COMPREHENSIVE METABOLIC PANEL - Abnormal       Result Value    Sodium 134 (*)     Potassium 5.0      Carbon Dioxide (CO2) 24      Anion Gap 15      Urea Nitrogen 15.9      Creatinine 0.80      GFR Estimate >90      Calcium 10.0      Chloride 95 (*)     Glucose 344 (*)     Alkaline Phosphatase 157 (*)      (*)      (*)     Protein Total 8.3      Albumin 3.9      Bilirubin Total 0.3     MAGNESIUM - Abnormal    Magnesium 1.6 (*)    TROPONIN T, HIGH SENSITIVITY - Abnormal    Troponin T, High Sensitivity 23 (*)    ROUTINE UA WITH MICROSCOPIC REFLEX TO CULTURE - Abnormal    Color Urine Light Yellow      Appearance Urine Clear      Glucose Urine >=1000 (*)     Bilirubin Urine Negative      Ketones Urine Negative      Specific Gravity Urine 1.020      Blood Urine Negative      pH Urine 6.5      Protein Albumin Urine Negative      Urobilinogen Urine Normal      Nitrite Urine Negative      Leukocyte Esterase Urine Negative       Mucus Urine Present (*)     RBC Urine 1      WBC Urine 2      Squamous Epithelials Urine 2 (*)    CBC WITH PLATELETS AND DIFFERENTIAL    WBC Count 7.4      RBC Count 4.95      Hemoglobin 13.2      Hematocrit 40.3      MCV 81      MCH 26.7      MCHC 32.8      RDW 14.1      Platelet Count 204      % Neutrophils 62      % Lymphocytes 26      % Monocytes 7      % Eosinophils 5      % Basophils 1      % Immature Granulocytes 0      NRBCs per 100 WBC 0      Absolute Neutrophils 4.6      Absolute Lymphocytes 1.9      Absolute Monocytes 0.5      Absolute Eosinophils 0.4      Absolute Basophils 0.0      Absolute Immature Granulocytes 0.0      Absolute NRBCs 0.0     TROPONIN T, HIGH SENSITIVITY     No orders to display          {Critical Care Performed?:836234}    Assessment & Plan    ***    I have reviewed the nursing notes. I have reviewed the findings, diagnosis, plan and need for follow up with the patient.    New Prescriptions    No medications on file       Final diagnoses:   None     Riri PAIGE, am serving as a trained medical scribe to document services personally performed by Jacey Gonzalez MD based on the provider's statements to me on February 17, 2025.  This document has been checked and approved by the attending provider.    IJacey MD was physically present and have reviewed and verified the accuracy of this note documented by Riri No, medical scribe.      Jacey Gonzalez MD    [facility] EMERGENCY DEPARTMENT  2/17/2025   this encounter (see separate area of note for details)  discussion of management or test interpretation with another health professional (pharmacy)    The patient's management necessitated moderate risk (prescription drug management including medications given in the ED).    Assessment & Plan      49 year old female with history of MDD, SANDIE, PTSD, Polysubstance abuse with 3 prior CD treatment programs, oxy use now on methadone, agoraphobia, and panic disorder, cyclic vomiting syndrome, cannabinoid use who returns to the ED with nausea, vomiting that started 2 days ago.  Vital signs notable for pulse elevation 105 but otherwise stable and afebrile including normal pulse ox at 96% on room air.  EKG obtained which revealed normal sinus rhythm no acute ischemic changes.  IV established, labs sent which revealed magnesium decreased to 1.6, transaminitis with AST ALT of 134/145 otherwise normal CBC electrolytes.  Troponin elevated but delta downtrending from 23 to 20.  Patient given Haldol 2 mg IV followed by a liter normal saline and 2 g magnesium sulfate IV piggyback and Pepcid 20 mg IV.  Upon repeat assessment patient symptoms did not adequately improved and she was given a second dose of Haldol 2 mg IV followed by Maalox.  Upon repeat assessment yet again patient's symptoms had resolved and she was tolerating p.o. without difficulty.  Plan for discharge and follow-up with primary care provider.    I have reviewed the nursing notes. I have reviewed the findings, diagnosis, plan and need for follow up with the patient.    New Prescriptions    No medications on file       Final diagnoses:   Cyclic vomiting syndrome     Riri PAIGE, am serving as a trained medical scribe to document services personally performed by Jacey Gonzalez MD based on the provider's statements to me on February 17, 2025.  This document has been checked and approved by the attending provider.    IJacey MD was physically present  and have reviewed and verified the accuracy of this note documented by Riri No, medical scribe.      Jacey Gonzalez MD    [facility] EMERGENCY DEPARTMENT  2/17/2025     Jacey Gonzalez MD  03/09/25 [id number]

## 2025-02-22 ENCOUNTER — HOSPITAL ENCOUNTER (EMERGENCY)
Facility: CLINIC | Age: 50
Discharge: HOME | End: 2025-02-23
Attending: EMERGENCY MEDICINE | Admitting: EMERGENCY MEDICINE
Payer: COMMERCIAL

## 2025-02-22 DIAGNOSIS — R11.15 CYCLIC VOMITING SYNDROME: ICD-10-CM

## 2025-02-22 PROCEDURE — 99284 EMERGENCY DEPT VISIT MOD MDM: CPT | Mod: 25 | Performed by: EMERGENCY MEDICINE

## 2025-02-22 PROCEDURE — 93010 ELECTROCARDIOGRAM REPORT: CPT | Performed by: EMERGENCY MEDICINE

## 2025-02-22 PROCEDURE — 93005 ELECTROCARDIOGRAM TRACING: CPT | Performed by: EMERGENCY MEDICINE

## 2025-02-22 PROCEDURE — 99284 EMERGENCY DEPT VISIT MOD MDM: CPT | Performed by: EMERGENCY MEDICINE

## 2025-02-23 VITALS
BODY MASS INDEX: 29.62 KG/M2 | TEMPERATURE: 98.7 F | HEIGHT: 69 IN | DIASTOLIC BLOOD PRESSURE: 86 MMHG | SYSTOLIC BLOOD PRESSURE: 133 MMHG | OXYGEN SATURATION: 99 % | RESPIRATION RATE: 16 BRPM | HEART RATE: 79 BPM | WEIGHT: 200 LBS

## 2025-02-23 LAB
ALBUMIN SERPL BCG-MCNC: 4.3 G/DL (ref 3.5–5.2)
ALP SERPL-CCNC: 211 U/L (ref 40–150)
ALT SERPL W P-5'-P-CCNC: 187 U/L (ref 0–50)
AMPHETAMINES UR QL SCN: ABNORMAL
ANION GAP SERPL CALCULATED.3IONS-SCNC: 17 MMOL/L (ref 7–15)
AST SERPL W P-5'-P-CCNC: ABNORMAL U/L
ATRIAL RATE - MUSE: 80 BPM
BARBITURATES UR QL SCN: ABNORMAL
BASOPHILS # BLD AUTO: 0 10E3/UL (ref 0–0.2)
BASOPHILS NFR BLD AUTO: 0 %
BENZODIAZ UR QL SCN: ABNORMAL
BILIRUB SERPL-MCNC: 0.7 MG/DL
BUN SERPL-MCNC: 17.2 MG/DL (ref 6–20)
BZE UR QL SCN: ABNORMAL
CALCIUM SERPL-MCNC: 9.8 MG/DL (ref 8.8–10.4)
CANNABINOIDS UR QL SCN: ABNORMAL
CHLORIDE SERPL-SCNC: 91 MMOL/L (ref 98–107)
CREAT SERPL-MCNC: 0.89 MG/DL (ref 0.51–0.95)
DIASTOLIC BLOOD PRESSURE - MUSE: NORMAL MMHG
EGFRCR SERPLBLD CKD-EPI 2021: 80 ML/MIN/1.73M2
EOSINOPHIL # BLD AUTO: 0 10E3/UL (ref 0–0.7)
EOSINOPHIL NFR BLD AUTO: 0 %
ERYTHROCYTE [DISTWIDTH] IN BLOOD BY AUTOMATED COUNT: 14.4 % (ref 10–15)
FENTANYL UR QL: ABNORMAL
GLUCOSE BLDC GLUCOMTR-MCNC: 314 MG/DL (ref 70–99)
GLUCOSE SERPL-MCNC: 324 MG/DL (ref 70–99)
HCG SERPL QL: NEGATIVE
HCO3 SERPL-SCNC: 23 MMOL/L (ref 22–29)
HCT VFR BLD AUTO: 38.7 % (ref 35–47)
HGB BLD-MCNC: 13.5 G/DL (ref 11.7–15.7)
IMM GRANULOCYTES # BLD: 0 10E3/UL
IMM GRANULOCYTES NFR BLD: 0 %
INR PPP: 1.02 (ref 0.85–1.15)
INTERPRETATION ECG - MUSE: NORMAL
LIPASE SERPL-CCNC: 22 U/L (ref 13–60)
LYMPHOCYTES # BLD AUTO: 2.2 10E3/UL (ref 0.8–5.3)
LYMPHOCYTES NFR BLD AUTO: 24 %
MAGNESIUM SERPL-MCNC: 1.5 MG/DL (ref 1.7–2.3)
MCH RBC QN AUTO: 27.1 PG (ref 26.5–33)
MCHC RBC AUTO-ENTMCNC: 34.9 G/DL (ref 31.5–36.5)
MCV RBC AUTO: 78 FL (ref 78–100)
MONOCYTES # BLD AUTO: 0.5 10E3/UL (ref 0–1.3)
MONOCYTES NFR BLD AUTO: 5 %
NEUTROPHILS # BLD AUTO: 6.6 10E3/UL (ref 1.6–8.3)
NEUTROPHILS NFR BLD AUTO: 71 %
NRBC # BLD AUTO: 0 10E3/UL
NRBC BLD AUTO-RTO: 0 /100
OPIATES UR QL SCN: ABNORMAL
P AXIS - MUSE: 69 DEGREES
PCP QUAL URINE (ROCHE): ABNORMAL
PLATELET # BLD AUTO: 250 10E3/UL (ref 150–450)
POTASSIUM SERPL-SCNC: 4.1 MMOL/L (ref 3.4–5.3)
PR INTERVAL - MUSE: 148 MS
PROT SERPL-MCNC: 9 G/DL (ref 6.4–8.3)
QRS DURATION - MUSE: 82 MS
QT - MUSE: 330 MS
QTC - MUSE: 380 MS
R AXIS - MUSE: 8 DEGREES
RBC # BLD AUTO: 4.98 10E6/UL (ref 3.8–5.2)
SODIUM SERPL-SCNC: 131 MMOL/L (ref 135–145)
SYSTOLIC BLOOD PRESSURE - MUSE: NORMAL MMHG
T AXIS - MUSE: 30 DEGREES
VENTRICULAR RATE- MUSE: 80 BPM
WBC # BLD AUTO: 9.3 10E3/UL (ref 4–11)

## 2025-02-23 PROCEDURE — 36415 COLL VENOUS BLD VENIPUNCTURE: CPT | Performed by: EMERGENCY MEDICINE

## 2025-02-23 PROCEDURE — 82962 GLUCOSE BLOOD TEST: CPT

## 2025-02-23 PROCEDURE — 85025 COMPLETE CBC W/AUTO DIFF WBC: CPT | Performed by: EMERGENCY MEDICINE

## 2025-02-23 PROCEDURE — 84155 ASSAY OF PROTEIN SERUM: CPT | Performed by: EMERGENCY MEDICINE

## 2025-02-23 PROCEDURE — 83735 ASSAY OF MAGNESIUM: CPT | Performed by: EMERGENCY MEDICINE

## 2025-02-23 PROCEDURE — 83690 ASSAY OF LIPASE: CPT | Performed by: EMERGENCY MEDICINE

## 2025-02-23 PROCEDURE — 258N000003 HC RX IP 258 OP 636: Performed by: EMERGENCY MEDICINE

## 2025-02-23 PROCEDURE — 84075 ASSAY ALKALINE PHOSPHATASE: CPT | Performed by: EMERGENCY MEDICINE

## 2025-02-23 PROCEDURE — 85610 PROTHROMBIN TIME: CPT | Performed by: EMERGENCY MEDICINE

## 2025-02-23 PROCEDURE — 96375 TX/PRO/DX INJ NEW DRUG ADDON: CPT | Performed by: EMERGENCY MEDICINE

## 2025-02-23 PROCEDURE — 80307 DRUG TEST PRSMV CHEM ANLYZR: CPT | Performed by: EMERGENCY MEDICINE

## 2025-02-23 PROCEDURE — 250N000011 HC RX IP 250 OP 636: Performed by: EMERGENCY MEDICINE

## 2025-02-23 PROCEDURE — 96372 THER/PROPH/DIAG INJ SC/IM: CPT | Performed by: EMERGENCY MEDICINE

## 2025-02-23 PROCEDURE — 96365 THER/PROPH/DIAG IV INF INIT: CPT | Performed by: EMERGENCY MEDICINE

## 2025-02-23 PROCEDURE — 250N000009 HC RX 250: Performed by: EMERGENCY MEDICINE

## 2025-02-23 PROCEDURE — 96361 HYDRATE IV INFUSION ADD-ON: CPT | Performed by: EMERGENCY MEDICINE

## 2025-02-23 PROCEDURE — 84703 CHORIONIC GONADOTROPIN ASSAY: CPT | Performed by: EMERGENCY MEDICINE

## 2025-02-23 RX ORDER — PANTOPRAZOLE SODIUM 40 MG/1
40 TABLET, DELAYED RELEASE ORAL DAILY
Qty: 14 TABLET | Refills: 0 | Status: SHIPPED | OUTPATIENT
Start: 2025-02-23 | End: 2025-03-09

## 2025-02-23 RX ORDER — MAGNESIUM SULFATE HEPTAHYDRATE 40 MG/ML
2 INJECTION, SOLUTION INTRAVENOUS ONCE
Status: COMPLETED | OUTPATIENT
Start: 2025-02-23 | End: 2025-02-23

## 2025-02-23 RX ORDER — OLANZAPINE 10 MG/2ML
10 INJECTION, POWDER, FOR SOLUTION INTRAMUSCULAR ONCE
Status: COMPLETED | OUTPATIENT
Start: 2025-02-23 | End: 2025-02-23

## 2025-02-23 RX ADMIN — SODIUM CHLORIDE 1000 ML: 9 INJECTION, SOLUTION INTRAVENOUS at 00:47

## 2025-02-23 RX ADMIN — MAGNESIUM SULFATE HEPTAHYDRATE 2 G: 40 INJECTION, SOLUTION INTRAVENOUS at 03:26

## 2025-02-23 RX ADMIN — OLANZAPINE 10 MG: 10 INJECTION, POWDER, FOR SOLUTION INTRAMUSCULAR at 00:47

## 2025-02-23 RX ADMIN — PANTOPRAZOLE SODIUM 40 MG: 40 INJECTION, POWDER, FOR SOLUTION INTRAVENOUS at 00:47

## 2025-02-23 ASSESSMENT — ACTIVITIES OF DAILY LIVING (ADL)
ADLS_ACUITY_SCORE: 58

## 2025-02-23 NOTE — ED PROVIDER NOTES
ED Provider Note  [facility]      History     Chief Complaint   Patient presents with    Hematemesis     BIBA from home. Pt presents with hematemesis for the past 3 days. PT has a hx of upper GI bleed. Pt reports nausea and chest pain r/t to vomiting. EMS reports pt is slightly tachycardic. Pt received 4 mg of Zofran and 500 mls of saline en route.     HPI  Yun Coyle is a 49 year old female PMH of OUD, HTN, chronic hepatitis C, HLD, MDD, obesity, personality disorder, DM2 who presents to the ER for evaluation of hematemesis.     Has a hx of cyclic vomiting. Has not used marijuana in 2 weeks.  Sitting up and vomiting for 2 days.  Unable to count the number of times.  Blood is occasionally left brown or orange.  Has not noted any blood clotting or karen hematemesis.  No fevers or chills.    Has not been able to take her medicines for 2 days.  Specifically has not taken anything for her blood sugar for at least 2 days last checked her blood sugar on Thursday and it was in the mid 200s.  Blood sugar today 314.   Has also noted heartburn feeling.       Past Medical History  Past Medical History:   Diagnosis Date    Acute pancreatitis 06/29/2017    Anxiety     Cauda equina syndrome (H) 03/29/2013    Cyst of right ovary 11/16/2017    Depressive disorder     Gastroesophageal reflux disease     Hypertension     Hyponatremia 10/01/2023    Low back pain 08/20/2012    Overview:   History of cauda equina and surgery.      Type 2 diabetes mellitus (H)     Uncomplicated asthma      Past Surgical History:   Procedure Laterality Date    BACK SURGERY      x2    CHOLECYSTECTOMY      ORTHOPEDIC SURGERY      SALPINGO OOPHORECTOMY,R/L/ELDA Right      pantoprazole (PROTONIX) 40 MG EC tablet  Alcohol Swabs PADS  blood glucose (NO BRAND SPECIFIED) lancets standard  blood glucose (NO BRAND SPECIFIED) test strip  blood glucose calibration (NO BRAND SPECIFIED) solution  blood glucose monitoring (NO BRAND  "SPECIFIED) meter device kit  cloNIDine (CATAPRES) 0.1 MG tablet  empagliflozin (JARDIANCE) 10 MG TABS tablet  escitalopram (LEXAPRO) 10 MG tablet  Glucagon (GVOKE HYPOPEN) 1 MG/0.2ML pen  glucose (BD GLUCOSE) 4 g chewable tablet  glucose 40 % (400 mg/mL) gel  hydrOXYzine HCl (ATARAX) 25 MG tablet  insulin aspart (NOVOLOG PEN) 100 UNIT/ML pen  insulin aspart (NOVOLOG PEN) 100 UNIT/ML pen  insulin aspart (NOVOLOG PEN) 100 UNIT/ML pen  insulin glargine (LANTUS PEN) 100 UNIT/ML pen  insulin pen needle (32G X 4 MM) 32G X 4 MM miscellaneous  losartan (COZAAR) 25 MG tablet  metFORMIN (GLUCOPHAGE) 500 MG tablet  methadone (DOLOPHINE-INTENSOL) 10 MG/ML (HIGH CONC) solution  ondansetron (ZOFRAN ODT) 4 MG ODT tab  ondansetron (ZOFRAN ODT) 4 MG ODT tab  prazosin (MINIPRESS) 2 MG capsule  Sharps Container MISC      No Known Allergies  Family History  Family History   Problem Relation Age of Onset    Breast Cancer Mother         in her 50s.    Diabetes Mother     Breast Cancer Maternal Grandmother         in her 40s.    Diabetes Maternal Grandmother      Social History   Social History     Tobacco Use    Smoking status: Never    Smokeless tobacco: Never   Vaping Use    Vaping status: Never Used   Substance Use Topics    Alcohol use: No    Drug use: Not Currently     Types: Marijuana     Comment: pt was taking Oxycontin \"years ago.\" pt is now on Methadone.      A medically appropriate review of systems was performed with pertinent positives and negatives noted in the HPI, and all other systems negative.    Physical Exam   BP: (!) 129/100  Pulse: 111  Temp: 98.7  F (37.1  C)  Resp: 18  Height: 175.3 cm (5' 9\") (Pt reported)  Weight: 90.7 kg (200 lb) (Pt reported)  SpO2: 95 %    Physical Exam  Gen:A&Ox3, no acute distress  HEENT:PERRL, no facial tenderness or wounds, head atraumatic, oropharynx clear, mucous membranes moist  Neck:no bony tenderness or step offs, no JVD, trachea midline  Back: no CVA tenderness, no midline bony " tenderness  CV:RRR without murmurs  PULM:Clear to auscultation bilaterally  Abd:soft, with mild epigastric tenderness with negative leal's sign and no peritonitis  UE:No traumatic injuries, skin normal  LE:no traumatic injuries, skin normal, no LE edema  Neuro:CN II-XII intact, strength 5/5 throughout  Skin: no rashes or ecchymoses      ED Course, Procedures, & Data      Procedures            EKG Interpretation:      Interpreted by Sarina Marks MD  Time reviewed: 1:03AM  Symptoms at time of EKG: epigastric pain  Rhythm: normal sinus   Rate: 80  Axis: normal  Ectopy: none  Conduction: normal  ST Segments/ T Waves: T wave flattening   Q Waves: none  Comparison to prior: Unchanged from Feb 17, 2025    Clinical Impression: NSR with nonspecific T wave flattening.        Results for orders placed or performed during the hospital encounter of 02/22/25   Comprehensive metabolic panel     Status: Abnormal   Result Value Ref Range    Sodium 131 (L) 135 - 145 mmol/L    Potassium 4.1 3.4 - 5.3 mmol/L    Carbon Dioxide (CO2) 23 22 - 29 mmol/L    Anion Gap 17 (H) 7 - 15 mmol/L    Urea Nitrogen 17.2 6.0 - 20.0 mg/dL    Creatinine 0.89 0.51 - 0.95 mg/dL    GFR Estimate 80 >60 mL/min/1.73m2    Calcium 9.8 8.8 - 10.4 mg/dL    Chloride 91 (L) 98 - 107 mmol/L    Glucose 324 (H) 70 - 99 mg/dL    Alkaline Phosphatase 211 (H) 40 - 150 U/L    AST       (H) 0 - 50 U/L    Protein Total 9.0 (H) 6.4 - 8.3 g/dL    Albumin 4.3 3.5 - 5.2 g/dL    Bilirubin Total 0.7 <=1.2 mg/dL   Lipase     Status: Normal   Result Value Ref Range    Lipase 22 13 - 60 U/L   INR     Status: Normal   Result Value Ref Range    INR 1.02 0.85 - 1.15   HCG qualitative pregnancy (blood)     Status: Normal   Result Value Ref Range    hCG Serum Qualitative Negative Negative   CBC with platelets and differential     Status: None   Result Value Ref Range    WBC Count 9.3 4.0 - 11.0 10e3/uL    RBC Count 4.98 3.80 - 5.20 10e6/uL    Hemoglobin 13.5 11.7 - 15.7  g/dL    Hematocrit 38.7 35.0 - 47.0 %    MCV 78 78 - 100 fL    MCH 27.1 26.5 - 33.0 pg    MCHC 34.9 31.5 - 36.5 g/dL    RDW 14.4 10.0 - 15.0 %    Platelet Count 250 150 - 450 10e3/uL    % Neutrophils 71 %    % Lymphocytes 24 %    % Monocytes 5 %    % Eosinophils 0 %    % Basophils 0 %    % Immature Granulocytes 0 %    NRBCs per 100 WBC 0 <1 /100    Absolute Neutrophils 6.6 1.6 - 8.3 10e3/uL    Absolute Lymphocytes 2.2 0.8 - 5.3 10e3/uL    Absolute Monocytes 0.5 0.0 - 1.3 10e3/uL    Absolute Eosinophils 0.0 0.0 - 0.7 10e3/uL    Absolute Basophils 0.0 0.0 - 0.2 10e3/uL    Absolute Immature Granulocytes 0.0 <=0.4 10e3/uL    Absolute NRBCs 0.0 10e3/uL   Glucose by meter     Status: Abnormal   Result Value Ref Range    GLUCOSE BY METER POCT 314 (H) 70 - 99 mg/dL   Magnesium     Status: Abnormal   Result Value Ref Range    Magnesium 1.5 (L) 1.7 - 2.3 mg/dL   Urine Drug Screen Panel     Status: Abnormal   Result Value Ref Range    Amphetamines Urine Screen Negative Screen Negative    Barbituates Urine Screen Negative Screen Negative    Benzodiazepine Urine Screen Negative Screen Negative    Cannabinoids Urine Screen Positive (A) Screen Negative    Cocaine Urine Screen Negative Screen Negative    Fentanyl Qual Urine Screen Positive (A) Screen Negative    Opiates Urine Screen Negative Screen Negative    PCP Urine Screen Negative Screen Negative   EKG 12 lead     Status: None   Result Value Ref Range    Systolic Blood Pressure  mmHg    Diastolic Blood Pressure  mmHg    Ventricular Rate 80 BPM    Atrial Rate 80 BPM    VA Interval 148 ms    QRS Duration 82 ms     ms    QTc 380 ms    P Axis 69 degrees    R AXIS 8 degrees    T Axis 30 degrees    Interpretation ECG       Sinus rhythm  Nonspecific T wave abnormality  Abnormal ECG  Unconfirmed report - interpretation of this ECG is computer generated - see medical record for final interpretation  Confirmed by - EMERGENCY ROOM, PHYSICIAN ([id number]),  CALE VILLATORO ([id number])  on 2/23/2025 8:32:42 AM     CBC with platelets differential     Status: None    Narrative    The following orders were created for panel order CBC with platelets differential.  Procedure                               Abnormality         Status                     ---------                               -----------         ------                     CBC with platelets and d...[[id number]]                      Final result                 Please view results for these tests on the individual orders.   Urine Drug Screen     Status: Abnormal    Narrative    The following orders were created for panel order Urine Drug Screen.  Procedure                               Abnormality         Status                     ---------                               -----------         ------                     Urine Drug Screen Panel[[id number]]      Abnormal            Final result                 Please view results for these tests on the individual orders.     Medications   sodium chloride 0.9% BOLUS 1,000 mL (0 mLs Intravenous Stopped 2/23/25 0150)   OLANZapine (zyPREXA) injection 10 mg (10 mg Intramuscular $Given 2/23/25 0047)   pantoprazole (PROTONIX) IV push injection 40 mg (40 mg Intravenous $Given 2/23/25 0047)   magnesium sulfate 2 g in 50 mL sterile water intermittent infusion (0 g Intravenous Stopped 2/23/25 0426)     Labs Ordered and Resulted from Time of ED Arrival to Time of ED Departure   COMPREHENSIVE METABOLIC PANEL - Abnormal       Result Value    Sodium 131 (*)     Potassium 4.1      Carbon Dioxide (CO2) 23      Anion Gap 17 (*)     Urea Nitrogen 17.2      Creatinine 0.89      GFR Estimate 80      Calcium 9.8      Chloride 91 (*)     Glucose 324 (*)     Alkaline Phosphatase 211 (*)     AST         (*)     Protein Total 9.0 (*)     Albumin 4.3      Bilirubin Total 0.7     GLUCOSE BY METER - Abnormal    GLUCOSE BY METER POCT 314 (*)    MAGNESIUM - Abnormal    Magnesium 1.5 (*)    URINE DRUG SCREEN PANEL -  Abnormal    Amphetamines Urine Screen Negative      Barbituates Urine Screen Negative      Benzodiazepine Urine Screen Negative      Cannabinoids Urine Screen Positive (*)     Cocaine Urine Screen Negative      Fentanyl Qual Urine Screen Positive (*)     Opiates Urine Screen Negative      PCP Urine Screen Negative     LIPASE - Normal    Lipase 22     INR - Normal    INR 1.02     HCG QUALITATIVE PREGNANCY - Normal    hCG Serum Qualitative Negative     CBC WITH PLATELETS AND DIFFERENTIAL    WBC Count 9.3      RBC Count 4.98      Hemoglobin 13.5      Hematocrit 38.7      MCV 78      MCH 27.1      MCHC 34.9      RDW 14.4      Platelet Count 250      % Neutrophils 71      % Lymphocytes 24      % Monocytes 5      % Eosinophils 0      % Basophils 0      % Immature Granulocytes 0      NRBCs per 100 WBC 0      Absolute Neutrophils 6.6      Absolute Lymphocytes 2.2      Absolute Monocytes 0.5      Absolute Eosinophils 0.0      Absolute Basophils 0.0      Absolute Immature Granulocytes 0.0      Absolute NRBCs 0.0       No orders to display          Critical care was not performed.     Medical Decision Making  The patient's presentation was of high complexity (a chronic illness severe exacerbation, progression, or side effect of treatment).    The patient's evaluation involved:  ordering and/or review of 3+ test(s) in this encounter (see separate area of note for details)    The patient's management necessitated moderate risk (prescription drug management including medications given in the ED).    Assessment & Plan    29-year-old female with cyclic vomiting presenting with nausea, vomiting and possible blood-tinged emesis.  Her vitals are stable other than moderate tachycardia with a heart rate of 111.  Abdominal exam without peritonitis.  IV access obtained and laboratory testing to include CBC, comprehensive panel, INR, lipase and pregnancy test.  Treated with IV fluids Zyprexa and Protonix.    Labs notable for mild  hypomagnesemia, treated with 2g IV magnesium sulfate.   No further vomiting noted in the ED.   Tolerated trial of PO liquids.   Discharged home. Start prontonix for heart burn symptoms/gastritis/blood tinged emesis.    I have reviewed the nursing notes. I have reviewed the findings, diagnosis, plan and need for follow up with the patient.    Discharge Medication List as of 2/23/2025  4:38 AM        START taking these medications    Details   pantoprazole (PROTONIX) 40 MG EC tablet Take 1 tablet (40 mg) by mouth daily for 14 days., Disp-14 tablet, R-0, E-Prescribe             Final diagnoses:   Cyclic vomiting syndrome       Sarina Marks MD  [facility] EMERGENCY DEPARTMENT  2/22/2025     Sarina Marks MD  02/25/25 [id number]

## 2025-02-23 NOTE — ED TRIAGE NOTES
BIBA from home. Pt presents with hematemesis for the past 3 days. PT has a hx of upper GI bleed. Pt reports nausea and chest pain r/t to vomiting. EMS reports pt is slightly tachycardic. Pt received 4 mg of Zofran and 500 mls of saline en route.     Triage Assessment (Adult)       Row Name 02/23/25 0012          Triage Assessment    Airway WDL WDL        Respiratory WDL    Respiratory WDL WDL        Skin Circulation/Temperature WDL    Skin Circulation/Temperature WDL WDL        Cardiac WDL    Cardiac WDL X;rhythm        Peripheral/Neurovascular WDL    Peripheral Neurovascular WDL WDL        Cognitive/Neuro/Behavioral WDL    Cognitive/Neuro/Behavioral WDL WDL

## 2025-02-23 NOTE — ED NOTES
Pt discharged to home via hospital provided taxi. Instructions and education for home and prescription mediations provided, pt verbalized understanding. All questions and concerns addressed prior to discharge.

## 2025-02-23 NOTE — ED NOTES
Pt reports she has been unable to keep food down for the past three days, as a result she has not been taking her prescribed Lantus. Pt's current BG is 314. MD notified.

## 2025-02-23 NOTE — DISCHARGE INSTRUCTIONS
Thank you for coming to the [facility] Emergency Department.     Start protonix daily for 2 weeks due to the small amount of blood in your vomit.     Please restart taking your blood sugar daily and administering your lantus and correction novolog.

## 2025-02-26 ENCOUNTER — PATIENT OUTREACH (OUTPATIENT)
Dept: CARE COORDINATION | Facility: CLINIC | Age: 50
End: 2025-02-26
Payer: COMMERCIAL

## 2025-02-26 ENCOUNTER — HOSPITAL ENCOUNTER (EMERGENCY)
Facility: CLINIC | Age: 50
Discharge: HOME | End: 2025-02-26
Attending: EMERGENCY MEDICINE | Admitting: EMERGENCY MEDICINE
Payer: COMMERCIAL

## 2025-02-26 VITALS
SYSTOLIC BLOOD PRESSURE: 124 MMHG | TEMPERATURE: 98 F | BODY MASS INDEX: 29.41 KG/M2 | DIASTOLIC BLOOD PRESSURE: 97 MMHG | WEIGHT: 198.6 LBS | RESPIRATION RATE: 16 BRPM | HEART RATE: 69 BPM | HEIGHT: 69 IN | OXYGEN SATURATION: 100 %

## 2025-02-26 DIAGNOSIS — R10.31 RIGHT LOWER QUADRANT ABDOMINAL PAIN: ICD-10-CM

## 2025-02-26 DIAGNOSIS — K59.00 CONSTIPATION, UNSPECIFIED CONSTIPATION TYPE: ICD-10-CM

## 2025-02-26 DIAGNOSIS — K76.0 HEPATIC STEATOSIS: ICD-10-CM

## 2025-02-26 LAB
ALBUMIN SERPL BCG-MCNC: 3.9 G/DL (ref 3.5–5.2)
ALBUMIN UR-MCNC: NEGATIVE MG/DL
ALP SERPL-CCNC: 162 U/L (ref 40–150)
ALT SERPL W P-5'-P-CCNC: 110 U/L (ref 0–50)
ANION GAP SERPL CALCULATED.3IONS-SCNC: 10 MMOL/L (ref 7–15)
APPEARANCE UR: CLEAR
AST SERPL W P-5'-P-CCNC: 70 U/L (ref 0–45)
BASOPHILS # BLD AUTO: 0 10E3/UL (ref 0–0.2)
BASOPHILS NFR BLD AUTO: 0 %
BILIRUB SERPL-MCNC: 0.2 MG/DL
BILIRUB UR QL STRIP: NEGATIVE
BUN SERPL-MCNC: 10.3 MG/DL (ref 6–20)
CALCIUM SERPL-MCNC: 9.4 MG/DL (ref 8.8–10.4)
CHLORIDE SERPL-SCNC: 100 MMOL/L (ref 98–107)
COLOR UR AUTO: ABNORMAL
CREAT SERPL-MCNC: 0.7 MG/DL (ref 0.51–0.95)
EGFRCR SERPLBLD CKD-EPI 2021: >90 ML/MIN/1.73M2
EOSINOPHIL # BLD AUTO: 0.2 10E3/UL (ref 0–0.7)
EOSINOPHIL NFR BLD AUTO: 5 %
ERYTHROCYTE [DISTWIDTH] IN BLOOD BY AUTOMATED COUNT: 14.1 % (ref 10–15)
ETHANOL SERPL-MCNC: <0.01 G/DL
GLUCOSE SERPL-MCNC: 299 MG/DL (ref 70–99)
GLUCOSE UR STRIP-MCNC: >=1000 MG/DL
HCG UR QL: NEGATIVE
HCO3 SERPL-SCNC: 25 MMOL/L (ref 22–29)
HCT VFR BLD AUTO: 36.7 % (ref 35–47)
HGB BLD-MCNC: 12.4 G/DL (ref 11.7–15.7)
HGB UR QL STRIP: NEGATIVE
IMM GRANULOCYTES # BLD: 0 10E3/UL
IMM GRANULOCYTES NFR BLD: 0 %
KETONES UR STRIP-MCNC: NEGATIVE MG/DL
LEUKOCYTE ESTERASE UR QL STRIP: NEGATIVE
LYMPHOCYTES # BLD AUTO: 2.3 10E3/UL (ref 0.8–5.3)
LYMPHOCYTES NFR BLD AUTO: 48 %
MCH RBC QN AUTO: 27 PG (ref 26.5–33)
MCHC RBC AUTO-ENTMCNC: 33.8 G/DL (ref 31.5–36.5)
MCV RBC AUTO: 80 FL (ref 78–100)
MONOCYTES # BLD AUTO: 0.4 10E3/UL (ref 0–1.3)
MONOCYTES NFR BLD AUTO: 8 %
MUCOUS THREADS #/AREA URNS LPF: PRESENT /LPF
NEUTROPHILS # BLD AUTO: 1.8 10E3/UL (ref 1.6–8.3)
NEUTROPHILS NFR BLD AUTO: 39 %
NITRATE UR QL: NEGATIVE
NRBC # BLD AUTO: 0 10E3/UL
NRBC BLD AUTO-RTO: 0 /100
PH UR STRIP: 7 [PH] (ref 5–7)
PLATELET # BLD AUTO: 169 10E3/UL (ref 150–450)
POTASSIUM SERPL-SCNC: 4.4 MMOL/L (ref 3.4–5.3)
PROT SERPL-MCNC: 7.8 G/DL (ref 6.4–8.3)
RBC # BLD AUTO: 4.59 10E6/UL (ref 3.8–5.2)
RBC URINE: 0 /HPF
SODIUM SERPL-SCNC: 135 MMOL/L (ref 135–145)
SP GR UR STRIP: 1.03 (ref 1–1.03)
SQUAMOUS EPITHELIAL: 5 /HPF
UROBILINOGEN UR STRIP-MCNC: NORMAL MG/DL
WBC # BLD AUTO: 4.7 10E3/UL (ref 4–11)
WBC URINE: <1 /HPF

## 2025-02-26 PROCEDURE — 80053 COMPREHEN METABOLIC PANEL: CPT | Performed by: EMERGENCY MEDICINE

## 2025-02-26 PROCEDURE — 81003 URINALYSIS AUTO W/O SCOPE: CPT | Performed by: EMERGENCY MEDICINE

## 2025-02-26 PROCEDURE — 99284 EMERGENCY DEPT VISIT MOD MDM: CPT | Performed by: EMERGENCY MEDICINE

## 2025-02-26 PROCEDURE — 250N000013 HC RX MED GY IP 250 OP 250 PS 637: Performed by: EMERGENCY MEDICINE

## 2025-02-26 PROCEDURE — 36415 COLL VENOUS BLD VENIPUNCTURE: CPT | Performed by: EMERGENCY MEDICINE

## 2025-02-26 PROCEDURE — 82077 ASSAY SPEC XCP UR&BREATH IA: CPT | Performed by: EMERGENCY MEDICINE

## 2025-02-26 PROCEDURE — 85025 COMPLETE CBC W/AUTO DIFF WBC: CPT | Performed by: EMERGENCY MEDICINE

## 2025-02-26 PROCEDURE — 250N000011 HC RX IP 250 OP 636: Performed by: EMERGENCY MEDICINE

## 2025-02-26 PROCEDURE — 250N000009 HC RX 250: Performed by: EMERGENCY MEDICINE

## 2025-02-26 PROCEDURE — 81025 URINE PREGNANCY TEST: CPT | Performed by: EMERGENCY MEDICINE

## 2025-02-26 PROCEDURE — 96375 TX/PRO/DX INJ NEW DRUG ADDON: CPT | Performed by: EMERGENCY MEDICINE

## 2025-02-26 PROCEDURE — 99285 EMERGENCY DEPT VISIT HI MDM: CPT | Mod: 25 | Performed by: EMERGENCY MEDICINE

## 2025-02-26 PROCEDURE — 96374 THER/PROPH/DIAG INJ IV PUSH: CPT | Mod: 59 | Performed by: EMERGENCY MEDICINE

## 2025-02-26 PROCEDURE — 96376 TX/PRO/DX INJ SAME DRUG ADON: CPT | Performed by: EMERGENCY MEDICINE

## 2025-02-26 RX ORDER — HYDROMORPHONE HYDROCHLORIDE 1 MG/ML
0.5 INJECTION, SOLUTION INTRAMUSCULAR; INTRAVENOUS; SUBCUTANEOUS EVERY 30 MIN PRN
Status: COMPLETED | OUTPATIENT
Start: 2025-02-26 | End: 2025-02-26

## 2025-02-26 RX ORDER — ONDANSETRON 2 MG/ML
4 INJECTION INTRAMUSCULAR; INTRAVENOUS EVERY 30 MIN PRN
Status: DISCONTINUED | OUTPATIENT
Start: 2025-02-26 | End: 2025-02-26 | Stop reason: HOSPADM

## 2025-02-26 RX ORDER — MAGNESIUM HYDROXIDE/ALUMINUM HYDROXICE/SIMETHICONE 120; 1200; 1200 MG/30ML; MG/30ML; MG/30ML
15 SUSPENSION ORAL ONCE
Status: COMPLETED | OUTPATIENT
Start: 2025-02-26 | End: 2025-02-26

## 2025-02-26 RX ORDER — IOPAMIDOL 755 MG/ML
100 INJECTION, SOLUTION INTRAVASCULAR ONCE
Status: COMPLETED | OUTPATIENT
Start: 2025-02-26 | End: 2025-02-26

## 2025-02-26 RX ADMIN — HYDROMORPHONE HYDROCHLORIDE 0.5 MG: 1 INJECTION, SOLUTION INTRAMUSCULAR; INTRAVENOUS; SUBCUTANEOUS at 11:56

## 2025-02-26 RX ADMIN — HYDROMORPHONE HYDROCHLORIDE 0.5 MG: 1 INJECTION, SOLUTION INTRAMUSCULAR; INTRAVENOUS; SUBCUTANEOUS at 11:13

## 2025-02-26 RX ADMIN — IOPAMIDOL 97 ML: 755 INJECTION, SOLUTION INTRAVENOUS at 13:02

## 2025-02-26 RX ADMIN — HYDROMORPHONE HYDROCHLORIDE 0.5 MG: 1 INJECTION, SOLUTION INTRAMUSCULAR; INTRAVENOUS; SUBCUTANEOUS at 13:30

## 2025-02-26 RX ADMIN — ONDANSETRON 4 MG: 2 INJECTION INTRAMUSCULAR; INTRAVENOUS at 11:14

## 2025-02-26 RX ADMIN — SODIUM CHLORIDE 50 ML: 9 INJECTION, SOLUTION INTRAVENOUS at 13:02

## 2025-02-26 RX ADMIN — ALUMINUM HYDROXIDE, MAGNESIUM HYDROXIDE, AND SIMETHICONE 15 ML: 200; 200; 20 SUSPENSION ORAL at 13:27

## 2025-02-26 ASSESSMENT — ACTIVITIES OF DAILY LIVING (ADL)
ADLS_ACUITY_SCORE: 58

## 2025-02-26 NOTE — PROGRESS NOTES
[facility]  Community Health Worker Initial Outreach    Background: Primary Care - Care Coordination program identified per system criteria based on ED discharge report and reviewed for possible outreach.    CHW will not proceed with patient outreach due to the following reason:    CHW attempted to contact patient on 2/24/2025 and left a message at that time with 24/7 nurse triage and scheduling phone number.     Patient has returned to the Emergency Department.     Plan: Primary Care - Care Coordination episode addressed appropriately per reason noted above.        Subha Esteves  Community Health Worker  [facility]    *Connected Care Resource Team does NOT follow patient ongoing. Referrals are identified based on internal discharge reports and the outreach is to ensure patient has an understanding of their discharge instructions.

## 2025-02-26 NOTE — ED PROVIDER NOTES
ED Provider Note  [facility]      History     Chief Complaint   Patient presents with    Abdominal Pain     Lower R abdominal pain, described as a throbbing ache. Pt states she woke up to the pain. Most recent BM this morning did not relieve the pain.      The history is provided by the patient and medical records.     Yun Coyle is a 49 year old female with a history of chronic hepatitis, DM2, HTN, HLD, polysubstance use disorder on methadone, cyclic vomiting, s/p cholecystectomy (2009), right salpingo oophorectomy (2017) who presents to the ED for evaluation of abdominal pain. The patient states that she woke this morning with severe right lower quadrant abdominal pain. She describes her pain as a constant aching/throbbing sensation, 8/10 in severity, that has worsened since onset. She also reports worsening pain with palpation but denies change in her pain with walking. The patient denies fever, nausea, vomiting, back pain, or dysuria. No change in bowel movements. She denies abnormal vaginal discharge or bleeding; no concern for pregnancy. The patient reports a history of right ovarian cyst but states that her current pain does not feel similar to that. Surgical history notable for cholecystectomy and right salpingo oophorectomy. Appendix in situ.     Past Medical History  Past Medical History:   Diagnosis Date    Acute pancreatitis 06/29/2017    Anxiety     Cauda equina syndrome (H) 03/29/2013    Cyst of right ovary 11/16/2017    Depressive disorder     Gastroesophageal reflux disease     Hypertension     Hyponatremia 10/01/2023    Low back pain 08/20/2012    Overview:   History of cauda equina and surgery.      Type 2 diabetes mellitus (H)     Uncomplicated asthma      Past Surgical History:   Procedure Laterality Date    BACK SURGERY      x2    CHOLECYSTECTOMY      ORTHOPEDIC SURGERY      SALPINGO OOPHORECTOMY,R/L/ELDA Right      Alcohol Swabs PADS  blood glucose (NO BRAND  "SPECIFIED) lancets standard  blood glucose (NO BRAND SPECIFIED) test strip  blood glucose calibration (NO BRAND SPECIFIED) solution  blood glucose monitoring (NO BRAND SPECIFIED) meter device kit  cloNIDine (CATAPRES) 0.1 MG tablet  empagliflozin (JARDIANCE) 10 MG TABS tablet  escitalopram (LEXAPRO) 10 MG tablet  Glucagon (GVOKE HYPOPEN) 1 MG/0.2ML pen  glucose (BD GLUCOSE) 4 g chewable tablet  glucose 40 % (400 mg/mL) gel  hydrOXYzine HCl (ATARAX) 25 MG tablet  insulin aspart (NOVOLOG PEN) 100 UNIT/ML pen  insulin aspart (NOVOLOG PEN) 100 UNIT/ML pen  insulin aspart (NOVOLOG PEN) 100 UNIT/ML pen  insulin glargine (LANTUS PEN) 100 UNIT/ML pen  insulin pen needle (32G X 4 MM) 32G X 4 MM miscellaneous  losartan (COZAAR) 25 MG tablet  metFORMIN (GLUCOPHAGE) 500 MG tablet  methadone (DOLOPHINE-INTENSOL) 10 MG/ML (HIGH CONC) solution  ondansetron (ZOFRAN ODT) 4 MG ODT tab  ondansetron (ZOFRAN ODT) 4 MG ODT tab  pantoprazole (PROTONIX) 40 MG EC tablet  prazosin (MINIPRESS) 2 MG capsule  Sharps Container MISC      No Known Allergies  Family History  Family History   Problem Relation Age of Onset    Breast Cancer Mother         in her 50s.    Diabetes Mother     Breast Cancer Maternal Grandmother         in her 40s.    Diabetes Maternal Grandmother      Social History   Social History     Tobacco Use    Smoking status: Never    Smokeless tobacco: Never   Vaping Use    Vaping status: Never Used   Substance Use Topics    Alcohol use: No    Drug use: Not Currently     Types: Marijuana     Comment: pt was taking Oxycontin \"years ago.\" pt is now on Methadone.      Past medical history, past surgical history, medications, allergies, family history, and social history were reviewed with the patient. No additional pertinent items.   A medically appropriate review of systems was performed with pertinent positives and negatives noted in the HPI, and all other systems negative.    Physical Exam   BP: (!) 113/93  Pulse: 77  Temp: 98  F " "(36.7  C)  Resp: 16  Height: 175.3 cm (5' 9\")  Weight: 90.1 kg (198 lb 9.6 oz)  SpO2: 97 %  Physical Exam  Vitals and nursing note reviewed.   Constitutional:       General: She is not in acute distress.     Appearance: Normal appearance. She is well-developed. She is not ill-appearing or diaphoretic.   HENT:      Head: Normocephalic and atraumatic.      Nose: Nose normal.      Mouth/Throat:      Mouth: Mucous membranes are moist.   Eyes:      General: No scleral icterus.     Conjunctiva/sclera: Conjunctivae normal.   Cardiovascular:      Rate and Rhythm: Normal rate.      Heart sounds: Normal heart sounds.   Pulmonary:      Effort: Pulmonary effort is normal. No respiratory distress.      Breath sounds: Normal breath sounds.   Abdominal:      General: Abdomen is flat. There is no distension.      Palpations: Abdomen is soft. There is no mass.      Tenderness: There is abdominal tenderness in the right lower quadrant. There is guarding and rebound. Positive signs include McBurney's sign.   Musculoskeletal:         General: No deformity or signs of injury. Normal range of motion.      Cervical back: Normal range of motion and neck supple.   Skin:     General: Skin is warm and dry.      Coloration: Skin is not jaundiced or pale.      Findings: No rash.   Neurological:      General: No focal deficit present.      Mental Status: She is alert and oriented to person, place, and time.   Psychiatric:         Mood and Affect: Mood normal.         Behavior: Behavior normal.           ED Course, Procedures, & Data      Procedures                Results for orders placed or performed during the hospital encounter of 02/26/25   CT Abdomen Pelvis w Contrast     Status: None    Narrative    EXAM: CT ABDOMEN PELVIS W CONTRAST  LOCATION: [facility]  DATE: 2/26/2025    INDICATION: RLQ pain, appy  COMPARISON: 1/22/2025  TECHNIQUE: CT scan of the abdomen and pelvis was performed following " injection of IV contrast. Multiplanar reformats were obtained. Dose reduction techniques were used.  CONTRAST: 97 mL Isovue 370    FINDINGS:   LOWER CHEST: Normal.    HEPATOBILIARY: Diffuse hepatic steatosis. No significant mass. No bile duct dilatation. Cholecystectomy.    PANCREAS: Normal.    SPLEEN: Normal.    ADRENAL GLANDS: Normal.    KIDNEYS/BLADDER: No significant mass, stone, or hydronephrosis. The bladder is collapsed.    BOWEL: No obstruction or inflammatory change. Normal appendix. Small hiatal hernia.    LYMPH NODES: Normal.    VASCULATURE: No abdominal aortic aneurysm.    PELVIC ORGANS: Normal.    MUSCULOSKELETAL: Degenerative changes in the spine.      Impression    IMPRESSION:   1.  No acute abnormality in the abdomen and pelvis.  2.  Hepatic steatosis.  3.  Small hiatal hernia.     Comprehensive metabolic panel     Status: Abnormal   Result Value Ref Range    Sodium 135 135 - 145 mmol/L    Potassium 4.4 3.4 - 5.3 mmol/L    Carbon Dioxide (CO2) 25 22 - 29 mmol/L    Anion Gap 10 7 - 15 mmol/L    Urea Nitrogen 10.3 6.0 - 20.0 mg/dL    Creatinine 0.70 0.51 - 0.95 mg/dL    GFR Estimate >90 >60 mL/min/1.73m2    Calcium 9.4 8.8 - 10.4 mg/dL    Chloride 100 98 - 107 mmol/L    Glucose 299 (H) 70 - 99 mg/dL    Alkaline Phosphatase 162 (H) 40 - 150 U/L    AST 70 (H) 0 - 45 U/L     (H) 0 - 50 U/L    Protein Total 7.8 6.4 - 8.3 g/dL    Albumin 3.9 3.5 - 5.2 g/dL    Bilirubin Total 0.2 <=1.2 mg/dL   UA with Microscopic reflex to Culture     Status: Abnormal    Specimen: Urine, Midstream   Result Value Ref Range    Color Urine Light Yellow Colorless, Straw, Light Yellow, Yellow    Appearance Urine Clear Clear    Glucose Urine >=1000 (A) Negative mg/dL    Bilirubin Urine Negative Negative    Ketones Urine Negative Negative mg/dL    Specific Gravity Urine 1.034 1.003 - 1.035    Blood Urine Negative Negative    pH Urine 7.0 5.0 - 7.0    Protein Albumin Urine Negative Negative mg/dL    Urobilinogen Urine Normal  Normal, 2.0 mg/dL    Nitrite Urine Negative Negative    Leukocyte Esterase Urine Negative Negative    Mucus Urine Present (A) None Seen /LPF    RBC Urine 0 <=2 /HPF    WBC Urine <1 <=5 /HPF    Squamous Epithelials Urine 5 (H) <=1 /HPF    Narrative    Urine Culture not indicated   HCG qualitative urine (UPT)     Status: Normal   Result Value Ref Range    hCG Urine Qualitative Negative Negative   CBC with platelets and differential     Status: None   Result Value Ref Range    WBC Count 4.7 4.0 - 11.0 10e3/uL    RBC Count 4.59 3.80 - 5.20 10e6/uL    Hemoglobin 12.4 11.7 - 15.7 g/dL    Hematocrit 36.7 35.0 - 47.0 %    MCV 80 78 - 100 fL    MCH 27.0 26.5 - 33.0 pg    MCHC 33.8 31.5 - 36.5 g/dL    RDW 14.1 10.0 - 15.0 %    Platelet Count 169 150 - 450 10e3/uL    % Neutrophils 39 %    % Lymphocytes 48 %    % Monocytes 8 %    % Eosinophils 5 %    % Basophils 0 %    % Immature Granulocytes 0 %    NRBCs per 100 WBC 0 <1 /100    Absolute Neutrophils 1.8 1.6 - 8.3 10e3/uL    Absolute Lymphocytes 2.3 0.8 - 5.3 10e3/uL    Absolute Monocytes 0.4 0.0 - 1.3 10e3/uL    Absolute Eosinophils 0.2 0.0 - 0.7 10e3/uL    Absolute Basophils 0.0 0.0 - 0.2 10e3/uL    Absolute Immature Granulocytes 0.0 <=0.4 10e3/uL    Absolute NRBCs 0.0 10e3/uL   Ethyl Alcohol Level     Status: Normal   Result Value Ref Range    Alcohol ethyl <0.01 <=0.01 g/dL   CBC with platelets differential     Status: None    Narrative    The following orders were created for panel order CBC with platelets differential.  Procedure                               Abnormality         Status                     ---------                               -----------         ------                     CBC with platelets and d...[[id number]]                      Final result                 Please view results for these tests on the individual orders.     Medications   ondansetron (ZOFRAN) injection 4 mg (4 mg Intravenous $Given 2/26/25 1114)   sodium chloride 0.9 % bag 500mL for CT  scan flush use (50 mLs Intravenous $Given 2/26/25 1302)   HYDROmorphone (PF) (DILAUDID) injection 0.5 mg (0.5 mg Intravenous $Given 2/26/25 1330)   iopamidol (ISOVUE-370) solution 100 mL (97 mLs Intravenous $Given 2/26/25 1302)   alum & mag hydroxide-simethicone (MAALOX) suspension 15 mL (15 mLs Oral $Given 2/26/25 1327)     Labs Ordered and Resulted from Time of ED Arrival to Time of ED Departure   COMPREHENSIVE METABOLIC PANEL - Abnormal       Result Value    Sodium 135      Potassium 4.4      Carbon Dioxide (CO2) 25      Anion Gap 10      Urea Nitrogen 10.3      Creatinine 0.70      GFR Estimate >90      Calcium 9.4      Chloride 100      Glucose 299 (*)     Alkaline Phosphatase 162 (*)     AST 70 (*)      (*)     Protein Total 7.8      Albumin 3.9      Bilirubin Total 0.2     ROUTINE UA WITH MICROSCOPIC REFLEX TO CULTURE - Abnormal    Color Urine Light Yellow      Appearance Urine Clear      Glucose Urine >=1000 (*)     Bilirubin Urine Negative      Ketones Urine Negative      Specific Gravity Urine 1.034      Blood Urine Negative      pH Urine 7.0      Protein Albumin Urine Negative      Urobilinogen Urine Normal      Nitrite Urine Negative      Leukocyte Esterase Urine Negative      Mucus Urine Present (*)     RBC Urine 0      WBC Urine <1      Squamous Epithelials Urine 5 (*)    HCG QUALITATIVE URINE - Normal    hCG Urine Qualitative Negative     ETHYL ALCOHOL LEVEL - Normal    Alcohol ethyl <0.01     CBC WITH PLATELETS AND DIFFERENTIAL    WBC Count 4.7      RBC Count 4.59      Hemoglobin 12.4      Hematocrit 36.7      MCV 80      MCH 27.0      MCHC 33.8      RDW 14.1      Platelet Count 169      % Neutrophils 39      % Lymphocytes 48      % Monocytes 8      % Eosinophils 5      % Basophils 0      % Immature Granulocytes 0      NRBCs per 100 WBC 0      Absolute Neutrophils 1.8      Absolute Lymphocytes 2.3      Absolute Monocytes 0.4      Absolute Eosinophils 0.2      Absolute Basophils 0.0       Absolute Immature Granulocytes 0.0      Absolute NRBCs 0.0       CT Abdomen Pelvis w Contrast   Final Result   IMPRESSION:    1.  No acute abnormality in the abdomen and pelvis.   2.  Hepatic steatosis.   3.  Small hiatal hernia.                    Assessment & Plan    Yun Coyle is a 49 year old female with a history of chronic hepatitis, DM2, HTN, HLD, polysubstance use disorder on methadone, cyclic vomiting, s/p cholecystectomy (2009), right salpingo oophorectomy (2017).     Ddx: Enthesitis, epiploic appendagitis, constipation, diverticulitis, UVJ stone, gynecologic pain    significant right lower quadrant tenderness on examination.  Given Dilaudid for pain control.  Urinalysis bland without RBCs or infection.  Urine pregnancy negative.  CMP notable for glucose of 299 consistent with baseline elevation.  Also baseline mild LFT and alk phos elevations but normal bilirubin.  CBC normal.  Alcohol negative.  Patient is status postcholecystectomy and right sided salpingectomy and oophorectomy.  CT shows hepatic steatosis and a small hiatal hernia but no other acute abnormality in the abdomen and pelvis.  On reassessment, patient's pain was significantly improved.  She does report having pebble-like stools so I suspect her pain is related to constipation.  It does look like there is a significant stool burden in the cecum on CT scan corresponding with the area of pain on examination.  The patient has stool softener at home and says she will try taking this.  She does feel comfortable discharging home with her current level of pain.  Since she is currently comfortable and her pregnancy test is negative I do not think pelvic imaging is warranted for emergent evaluation.  Detailed return precautions provided.  Discussed follow-up with primary care to discuss management of the diagnosis of hepatic steatosis on a nonemergent basis.    I have reviewed the nursing notes. I have reviewed the findings, diagnosis, plan  and need for follow up with the patient.    New Prescriptions    No medications on file       Final diagnoses:   Right lower quadrant abdominal pain   Constipation, unspecified constipation type   Hepatic steatosis     I, Cate White, am serving as a trained medical scribe to document services personally performed by Laure Guerrero MD, based on the provider's statements to me.     ILaure MD, was physically present and have reviewed and verified the accuracy of this note documented by Cate White.    Laure Guerrero MD   [facility] EMERGENCY DEPARTMENT  2/26/2025     Laure Guerrero MD  02/26/25 1263

## 2025-02-26 NOTE — ED TRIAGE NOTES
Lower R abdominal pain, described as a throbbing ache. Pt states she woke up to the pain. 8 on a scale of 1-10. Most recent BM this morning did not relieve the pain. No nausea/vomiting.      Triage Assessment (Adult)       Row Name 02/26/25 1012          Triage Assessment    Airway WDL WDL        Respiratory WDL    Respiratory WDL WDL        Cardiac WDL    Cardiac WDL WDL        Peripheral/Neurovascular WDL    Peripheral Neurovascular WDL WDL        Cognitive/Neuro/Behavioral WDL    Cognitive/Neuro/Behavioral WDL WDL

## 2025-02-26 NOTE — DISCHARGE INSTRUCTIONS
Please make an appointment to follow up with Your Primary Care Provider in 7-30 days as needed.    Take stool softener to treat constipation.     Follow-up with your primary care doctor to talk about treatment of your hepatic steatosis.    Return to the ED for worsening pain, recurrent vomiting or diarrhea, blood in our stool, dehydration, fever, or abdominal distention and inability to pass gas from below.

## 2025-03-07 ENCOUNTER — HOSPITAL ENCOUNTER (EMERGENCY)
Facility: CLINIC | Age: 50
Discharge: HOME | End: 2025-03-07
Attending: EMERGENCY MEDICINE | Admitting: EMERGENCY MEDICINE
Payer: COMMERCIAL

## 2025-03-07 VITALS
DIASTOLIC BLOOD PRESSURE: 96 MMHG | TEMPERATURE: 98.4 F | RESPIRATION RATE: 18 BRPM | OXYGEN SATURATION: 98 % | BODY MASS INDEX: 28.8 KG/M2 | SYSTOLIC BLOOD PRESSURE: 147 MMHG | HEART RATE: 79 BPM | WEIGHT: 195 LBS

## 2025-03-07 DIAGNOSIS — R11.15 CYCLICAL VOMITING: ICD-10-CM

## 2025-03-07 LAB
ALBUMIN SERPL BCG-MCNC: 4.1 G/DL (ref 3.5–5.2)
ALBUMIN UR-MCNC: 10 MG/DL
ALP SERPL-CCNC: 160 U/L (ref 40–150)
ALT SERPL W P-5'-P-CCNC: 116 U/L (ref 0–50)
ANION GAP SERPL CALCULATED.3IONS-SCNC: 13 MMOL/L (ref 7–15)
APPEARANCE UR: CLEAR
AST SERPL W P-5'-P-CCNC: 84 U/L (ref 0–45)
BACTERIA #/AREA URNS HPF: ABNORMAL /HPF
BASOPHILS # BLD AUTO: 0 10E3/UL (ref 0–0.2)
BASOPHILS NFR BLD AUTO: 1 %
BILIRUB SERPL-MCNC: 0.2 MG/DL
BILIRUB UR QL STRIP: NEGATIVE
BUN SERPL-MCNC: 17.7 MG/DL (ref 6–20)
CALCIUM SERPL-MCNC: 10.1 MG/DL (ref 8.8–10.4)
CHLORIDE SERPL-SCNC: 96 MMOL/L (ref 98–107)
COLOR UR AUTO: YELLOW
CREAT SERPL-MCNC: 0.75 MG/DL (ref 0.51–0.95)
EGFRCR SERPLBLD CKD-EPI 2021: >90 ML/MIN/1.73M2
EOSINOPHIL # BLD AUTO: 0.5 10E3/UL (ref 0–0.7)
EOSINOPHIL NFR BLD AUTO: 6 %
ERYTHROCYTE [DISTWIDTH] IN BLOOD BY AUTOMATED COUNT: 14.5 % (ref 10–15)
GLUCOSE SERPL-MCNC: 230 MG/DL (ref 70–99)
GLUCOSE UR STRIP-MCNC: >=1000 MG/DL
HCO3 SERPL-SCNC: 26 MMOL/L (ref 22–29)
HCT VFR BLD AUTO: 40.1 % (ref 35–47)
HGB BLD-MCNC: 13.6 G/DL (ref 11.7–15.7)
HGB UR QL STRIP: NEGATIVE
IMM GRANULOCYTES # BLD: 0.1 10E3/UL
IMM GRANULOCYTES NFR BLD: 1 %
KETONES UR STRIP-MCNC: NEGATIVE MG/DL
LEUKOCYTE ESTERASE UR QL STRIP: NEGATIVE
LIPASE SERPL-CCNC: 42 U/L (ref 13–60)
LYMPHOCYTES # BLD AUTO: 3.7 10E3/UL (ref 0.8–5.3)
LYMPHOCYTES NFR BLD AUTO: 42 %
MCH RBC QN AUTO: 27.2 PG (ref 26.5–33)
MCHC RBC AUTO-ENTMCNC: 33.9 G/DL (ref 31.5–36.5)
MCV RBC AUTO: 80 FL (ref 78–100)
MONOCYTES # BLD AUTO: 0.5 10E3/UL (ref 0–1.3)
MONOCYTES NFR BLD AUTO: 5 %
MUCOUS THREADS #/AREA URNS LPF: PRESENT /LPF
NEUTROPHILS # BLD AUTO: 4.1 10E3/UL (ref 1.6–8.3)
NEUTROPHILS NFR BLD AUTO: 46 %
NITRATE UR QL: NEGATIVE
NRBC # BLD AUTO: 0 10E3/UL
NRBC BLD AUTO-RTO: 0 /100
PH UR STRIP: 6 [PH] (ref 5–7)
PLATELET # BLD AUTO: 265 10E3/UL (ref 150–450)
POTASSIUM SERPL-SCNC: 4.6 MMOL/L (ref 3.4–5.3)
PROT SERPL-MCNC: 8.7 G/DL (ref 6.4–8.3)
RBC # BLD AUTO: 5 10E6/UL (ref 3.8–5.2)
RBC URINE: <1 /HPF
SODIUM SERPL-SCNC: 135 MMOL/L (ref 135–145)
SP GR UR STRIP: 1.03 (ref 1–1.03)
SQUAMOUS EPITHELIAL: 3 /HPF
UROBILINOGEN UR STRIP-MCNC: NORMAL MG/DL
WBC # BLD AUTO: 8.8 10E3/UL (ref 4–11)
WBC URINE: 1 /HPF
YEAST #/AREA URNS HPF: ABNORMAL /HPF

## 2025-03-07 PROCEDURE — 85018 HEMOGLOBIN: CPT | Performed by: EMERGENCY MEDICINE

## 2025-03-07 PROCEDURE — 85004 AUTOMATED DIFF WBC COUNT: CPT | Performed by: EMERGENCY MEDICINE

## 2025-03-07 PROCEDURE — 999N000040 HC STATISTIC CONSULT NO CHARGE VASC ACCESS

## 2025-03-07 PROCEDURE — 81003 URINALYSIS AUTO W/O SCOPE: CPT | Performed by: EMERGENCY MEDICINE

## 2025-03-07 PROCEDURE — 99284 EMERGENCY DEPT VISIT MOD MDM: CPT | Mod: 25 | Performed by: EMERGENCY MEDICINE

## 2025-03-07 PROCEDURE — 36415 COLL VENOUS BLD VENIPUNCTURE: CPT | Performed by: EMERGENCY MEDICINE

## 2025-03-07 PROCEDURE — 999N000285 HC STATISTIC VASC ACCESS LAB DRAW WITH PIV START

## 2025-03-07 PROCEDURE — 99284 EMERGENCY DEPT VISIT MOD MDM: CPT | Performed by: EMERGENCY MEDICINE

## 2025-03-07 PROCEDURE — 96374 THER/PROPH/DIAG INJ IV PUSH: CPT | Performed by: EMERGENCY MEDICINE

## 2025-03-07 PROCEDURE — 250N000011 HC RX IP 250 OP 636: Performed by: EMERGENCY MEDICINE

## 2025-03-07 PROCEDURE — 96375 TX/PRO/DX INJ NEW DRUG ADDON: CPT | Performed by: EMERGENCY MEDICINE

## 2025-03-07 PROCEDURE — 258N000003 HC RX IP 258 OP 636: Performed by: EMERGENCY MEDICINE

## 2025-03-07 PROCEDURE — 82565 ASSAY OF CREATININE: CPT | Performed by: EMERGENCY MEDICINE

## 2025-03-07 PROCEDURE — 96361 HYDRATE IV INFUSION ADD-ON: CPT | Performed by: EMERGENCY MEDICINE

## 2025-03-07 PROCEDURE — 83690 ASSAY OF LIPASE: CPT | Performed by: EMERGENCY MEDICINE

## 2025-03-07 PROCEDURE — 999N000127 HC STATISTIC PERIPHERAL IV START W US GUIDANCE

## 2025-03-07 PROCEDURE — 82040 ASSAY OF SERUM ALBUMIN: CPT | Performed by: EMERGENCY MEDICINE

## 2025-03-07 PROCEDURE — 82310 ASSAY OF CALCIUM: CPT | Performed by: EMERGENCY MEDICINE

## 2025-03-07 RX ORDER — HYDROMORPHONE HYDROCHLORIDE 1 MG/ML
0.5 INJECTION, SOLUTION INTRAMUSCULAR; INTRAVENOUS; SUBCUTANEOUS EVERY 30 MIN PRN
Status: DISCONTINUED | OUTPATIENT
Start: 2025-03-07 | End: 2025-03-07 | Stop reason: HOSPADM

## 2025-03-07 RX ORDER — ONDANSETRON 2 MG/ML
4 INJECTION INTRAMUSCULAR; INTRAVENOUS EVERY 30 MIN PRN
Status: DISCONTINUED | OUTPATIENT
Start: 2025-03-07 | End: 2025-03-07 | Stop reason: HOSPADM

## 2025-03-07 RX ADMIN — SODIUM CHLORIDE 1000 ML: 9 INJECTION, SOLUTION INTRAVENOUS at 13:28

## 2025-03-07 RX ADMIN — HYDROMORPHONE HYDROCHLORIDE 0.5 MG: 1 INJECTION, SOLUTION INTRAMUSCULAR; INTRAVENOUS; SUBCUTANEOUS at 13:29

## 2025-03-07 RX ADMIN — ONDANSETRON 4 MG: 2 INJECTION INTRAMUSCULAR; INTRAVENOUS at 13:29

## 2025-03-07 ASSESSMENT — ACTIVITIES OF DAILY LIVING (ADL)
ADLS_ACUITY_SCORE: 58

## 2025-03-07 NOTE — ED PROVIDER NOTES
[facility] [place] [facility] ([facility])    3/07/25      ED PROVIDER NOTE   ED 9  History     Chief Complaint   Patient presents with    Nausea & Vomiting     The history is provided by the patient and medical records.     Yun Coyle is a 49 year old female with history of type 2 diabetes, MDD, SANDIE, PTSD, hepatitis C, cannabis use, hiatal hernia, cyclic vomiting syndrome, suspected cannabinoid hyperemesis syndrome, prolonged QT interval who presents to the emergency department after waking up with nausea and vomiting this morning.  She developed abdominal pain after nausea and vomiting started. No fever or chills. No chest pain or shortness of breath. No cough. No rhinorrhea, ear pain, sore throat. No diarrhea. No bloody emesis. No dysuria, hematuria. Hasn't checked blood sugar today yet, but typically blood sugar has been 150-200s range. No sick contacts. No unusual food ingestion. Has had prior cholecystectomy, right ovary and fallopian tube removal.     Past Medical History  Past Medical History:   Diagnosis Date    Acute pancreatitis 06/29/2017    Anxiety     Cauda equina syndrome (H) 03/29/2013    Cyst of right ovary 11/16/2017    Depressive disorder     Gastroesophageal reflux disease     Hypertension     Hyponatremia 10/01/2023    Low back pain 08/20/2012    Overview:   History of cauda equina and surgery.      Type 2 diabetes mellitus (H)     Uncomplicated asthma      Past Surgical History:   Procedure Laterality Date    BACK SURGERY      x2    CHOLECYSTECTOMY      ORTHOPEDIC SURGERY      SALPINGO OOPHORECTOMY,R/L/ELDA Right      Alcohol Swabs PADS  blood glucose (NO BRAND SPECIFIED) lancets standard  blood glucose (NO BRAND SPECIFIED) test strip  blood glucose calibration (NO BRAND SPECIFIED) solution  blood glucose monitoring (NO BRAND SPECIFIED) meter device kit  cloNIDine (CATAPRES) 0.1 MG tablet  empagliflozin (JARDIANCE) 10 MG TABS tablet  escitalopram (LEXAPRO) 10 MG  "tablet  Glucagon (GVOKE HYPOPEN) 1 MG/0.2ML pen  glucose (BD GLUCOSE) 4 g chewable tablet  glucose 40 % (400 mg/mL) gel  hydrOXYzine HCl (ATARAX) 25 MG tablet  insulin aspart (NOVOLOG PEN) 100 UNIT/ML pen  insulin aspart (NOVOLOG PEN) 100 UNIT/ML pen  insulin aspart (NOVOLOG PEN) 100 UNIT/ML pen  insulin glargine (LANTUS PEN) 100 UNIT/ML pen  insulin pen needle (32G X 4 MM) 32G X 4 MM miscellaneous  losartan (COZAAR) 25 MG tablet  metFORMIN (GLUCOPHAGE) 500 MG tablet  methadone (DOLOPHINE-INTENSOL) 10 MG/ML (HIGH CONC) solution  ondansetron (ZOFRAN ODT) 4 MG ODT tab  ondansetron (ZOFRAN ODT) 4 MG ODT tab  pantoprazole (PROTONIX) 40 MG EC tablet  prazosin (MINIPRESS) 2 MG capsule  Sharps Container MISC      No Known Allergies  Family History  Family History   Problem Relation Age of Onset    Breast Cancer Mother         in her 50s.    Diabetes Mother     Breast Cancer Maternal Grandmother         in her 40s.    Diabetes Maternal Grandmother      Social History   Social History     Tobacco Use    Smoking status: Never    Smokeless tobacco: Never   Vaping Use    Vaping status: Never Used   Substance Use Topics    Alcohol use: No    Drug use: Not Currently     Types: Marijuana     Comment: pt was taking Oxycontin \"years ago.\" pt is now on Methadone.      A medically appropriate review of systems was performed with pertinent positives and negatives noted in the HPI, and all other systems negative.    Physical Exam   BP: (!) 147/96  Pulse: 79  Temp: 98.4  F (36.9  C)  Resp: 18  Weight: 88.5 kg (195 lb)  SpO2: 98 %  Physical Exam  Vitals and nursing note reviewed.   Constitutional:       General: She is not in acute distress.     Appearance: Normal appearance. She is not diaphoretic.   HENT:      Head: Atraumatic.      Mouth/Throat:      Mouth: Mucous membranes are moist.   Eyes:      General: No scleral icterus.     Conjunctiva/sclera: Conjunctivae normal.   Cardiovascular:      Rate and Rhythm: Normal rate.      Heart " sounds: Normal heart sounds.   Pulmonary:      Effort: No respiratory distress.      Breath sounds: Normal breath sounds.   Abdominal:      General: Abdomen is flat.   Musculoskeletal:      Cervical back: Neck supple.   Skin:     General: Skin is warm.      Findings: No rash.   Neurological:      General: No focal deficit present.      Mental Status: She is alert.   Psychiatric:         Mood and Affect: Mood normal.           ED Course, Procedures, & Data      Procedures            Results for orders placed or performed during the hospital encounter of 03/07/25   Comprehensive metabolic panel     Status: Abnormal   Result Value Ref Range    Sodium 135 135 - 145 mmol/L    Potassium 4.6 3.4 - 5.3 mmol/L    Carbon Dioxide (CO2) 26 22 - 29 mmol/L    Anion Gap 13 7 - 15 mmol/L    Urea Nitrogen 17.7 6.0 - 20.0 mg/dL    Creatinine 0.75 0.51 - 0.95 mg/dL    GFR Estimate >90 >60 mL/min/1.73m2    Calcium 10.1 8.8 - 10.4 mg/dL    Chloride 96 (L) 98 - 107 mmol/L    Glucose 230 (H) 70 - 99 mg/dL    Alkaline Phosphatase 160 (H) 40 - 150 U/L    AST 84 (H) 0 - 45 U/L     (H) 0 - 50 U/L    Protein Total 8.7 (H) 6.4 - 8.3 g/dL    Albumin 4.1 3.5 - 5.2 g/dL    Bilirubin Total 0.2 <=1.2 mg/dL   Lipase     Status: Normal   Result Value Ref Range    Lipase 42 13 - 60 U/L   UA with Microscopic reflex to Culture     Status: Abnormal    Specimen: Urine, Clean Catch   Result Value Ref Range    Color Urine Yellow Colorless, Straw, Light Yellow, Yellow    Appearance Urine Clear Clear    Glucose Urine >=1000 (A) Negative mg/dL    Bilirubin Urine Negative Negative    Ketones Urine Negative Negative mg/dL    Specific Gravity Urine 1.034 1.003 - 1.035    Blood Urine Negative Negative    pH Urine 6.0 5.0 - 7.0    Protein Albumin Urine 10 (A) Negative mg/dL    Urobilinogen Urine Normal Normal, 2.0 mg/dL    Nitrite Urine Negative Negative    Leukocyte Esterase Urine Negative Negative    Bacteria Urine Few (A) None Seen /HPF    Budding Yeast  Urine Few (A) None Seen /HPF    Mucus Urine Present (A) None Seen /LPF    RBC Urine <1 <=2 /HPF    WBC Urine 1 <=5 /HPF    Squamous Epithelials Urine 3 (H) <=1 /HPF    Narrative    Urine Culture not indicated   CBC with platelets and differential     Status: None   Result Value Ref Range    WBC Count 8.8 4.0 - 11.0 10e3/uL    RBC Count 5.00 3.80 - 5.20 10e6/uL    Hemoglobin 13.6 11.7 - 15.7 g/dL    Hematocrit 40.1 35.0 - 47.0 %    MCV 80 78 - 100 fL    MCH 27.2 26.5 - 33.0 pg    MCHC 33.9 31.5 - 36.5 g/dL    RDW 14.5 10.0 - 15.0 %    Platelet Count 265 150 - 450 10e3/uL    % Neutrophils 46 %    % Lymphocytes 42 %    % Monocytes 5 %    % Eosinophils 6 %    % Basophils 1 %    % Immature Granulocytes 1 %    NRBCs per 100 WBC 0 <1 /100    Absolute Neutrophils 4.1 1.6 - 8.3 10e3/uL    Absolute Lymphocytes 3.7 0.8 - 5.3 10e3/uL    Absolute Monocytes 0.5 0.0 - 1.3 10e3/uL    Absolute Eosinophils 0.5 0.0 - 0.7 10e3/uL    Absolute Basophils 0.0 0.0 - 0.2 10e3/uL    Absolute Immature Granulocytes 0.1 <=0.4 10e3/uL    Absolute NRBCs 0.0 10e3/uL   CBC with platelets differential     Status: None    Narrative    The following orders were created for panel order CBC with platelets differential.  Procedure                               Abnormality         Status                     ---------                               -----------         ------                     CBC with platelets and ...[[id number]]                      Final result                 Please view results for these tests on the individual orders.     Medications   sodium chloride 0.9% BOLUS 1,000 mL (0 mLs Intravenous Stopped 3/7/25 3012)     Labs Ordered and Resulted from Time of ED Arrival to Time of ED Departure   COMPREHENSIVE METABOLIC PANEL - Abnormal       Result Value    Sodium 135      Potassium 4.6      Carbon Dioxide (CO2) 26      Anion Gap 13      Urea Nitrogen 17.7      Creatinine 0.75      GFR Estimate >90      Calcium 10.1      Chloride 96 (*)      Glucose 230 (*)     Alkaline Phosphatase 160 (*)     AST 84 (*)      (*)     Protein Total 8.7 (*)     Albumin 4.1      Bilirubin Total 0.2     ROUTINE UA WITH MICROSCOPIC REFLEX TO CULTURE - Abnormal    Color Urine Yellow      Appearance Urine Clear      Glucose Urine >=1000 (*)     Bilirubin Urine Negative      Ketones Urine Negative      Specific Gravity Urine 1.034      Blood Urine Negative      pH Urine 6.0      Protein Albumin Urine 10 (*)     Urobilinogen Urine Normal      Nitrite Urine Negative      Leukocyte Esterase Urine Negative      Bacteria Urine Few (*)     Budding Yeast Urine Few (*)     Mucus Urine Present (*)     RBC Urine <1      WBC Urine 1      Squamous Epithelials Urine 3 (*)    LIPASE - Normal    Lipase 42     CBC WITH PLATELETS AND DIFFERENTIAL    WBC Count 8.8      RBC Count 5.00      Hemoglobin 13.6      Hematocrit 40.1      MCV 80      MCH 27.2      MCHC 33.9      RDW 14.5      Platelet Count 265      % Neutrophils 46      % Lymphocytes 42      % Monocytes 5      % Eosinophils 6      % Basophils 1      % Immature Granulocytes 1      NRBCs per 100 WBC 0      Absolute Neutrophils 4.1      Absolute Lymphocytes 3.7      Absolute Monocytes 0.5      Absolute Eosinophils 0.5      Absolute Basophils 0.0      Absolute Immature Granulocytes 0.1      Absolute NRBCs 0.0       No orders to display          Critical care was not performed.     Medical Decision Making  The patient's presentation was of moderate complexity (an acute illness with systemic symptoms).    The patient's evaluation involved:  ordering and/or review of 3+ test(s) in this encounter (see separate area of note for details)    The patient's management necessitated only low risk treatment.    Assessment & Plan    48 yo female here with N/V/D and feeling light-headed today. Labs and IV fluids ordered.    Labs show elevated LFTs and alk phos but she has a benign abdominal exam. She has not vomited here and feels better after IV  fluids. I think this is likely to be her cyclical vomiting syndrome. She will be discharged to home with follow up with her PCP in the next 3-5 days.    I have reviewed the nursing notes. I have reviewed the findings, diagnosis, plan and need for follow up with the patient.    Discharge Medication List as of 3/7/2025  2:21 PM          Final diagnoses:   Cyclical vomiting     I, Riri No, am serving as a trained medical scribe to document services personally performed by Blas Rose MD based on the provider's statements to me on March 7, 2025.  This document has been checked and approved by the attending provider.    I, Blas Rose MD, was physically present and have reviewed and verified the accuracy of this note documented by Riri No, medical scribe.      Blas Rose MD   [facility] EMERGENCY DEPARTMENT  3/7/2025     Blas Rose MD  03/08/25 6520

## 2025-03-07 NOTE — DISCHARGE INSTRUCTIONS
Your labs are at your normal baseline today. Continue your home anti-nausea medications. You will be discharged to home with follow up with your primary care provider in the next 3-5 days.

## 2025-03-13 ENCOUNTER — HOSPITAL ENCOUNTER (EMERGENCY)
Facility: CLINIC | Age: 50
Discharge: HOME | End: 2025-03-13
Attending: EMERGENCY MEDICINE
Payer: COMMERCIAL

## 2025-03-13 VITALS
HEART RATE: 76 BPM | DIASTOLIC BLOOD PRESSURE: 93 MMHG | TEMPERATURE: 97.9 F | SYSTOLIC BLOOD PRESSURE: 147 MMHG | RESPIRATION RATE: 14 BRPM | WEIGHT: 200 LBS | BODY MASS INDEX: 29.53 KG/M2 | OXYGEN SATURATION: 100 %

## 2025-03-13 DIAGNOSIS — R10.817 GENERALIZED ABDOMINAL TENDERNESS WITHOUT REBOUND TENDERNESS: Primary | ICD-10-CM

## 2025-03-13 DIAGNOSIS — R76.8 HEPATITIS C ANTIBODY POSITIVE IN BLOOD: ICD-10-CM

## 2025-03-13 DIAGNOSIS — R11.2 NAUSEA AND VOMITING, UNSPECIFIED VOMITING TYPE: ICD-10-CM

## 2025-03-13 DIAGNOSIS — R76.8 HEPATITIS C ANTIBODY POSITIVE IN BLOOD: Primary | ICD-10-CM

## 2025-03-13 DIAGNOSIS — R11.15 CYCLICAL VOMITING: ICD-10-CM

## 2025-03-13 LAB
ALBUMIN SERPL BCG-MCNC: 4.3 G/DL (ref 3.5–5.2)
ALP SERPL-CCNC: 179 U/L (ref 40–150)
ALT SERPL W P-5'-P-CCNC: 130 U/L (ref 0–50)
ANION GAP SERPL CALCULATED.3IONS-SCNC: 21 MMOL/L (ref 7–15)
AST SERPL W P-5'-P-CCNC: 113 U/L (ref 0–45)
ATRIAL RATE - MUSE: 74 BPM
BASOPHILS # BLD AUTO: 0 10E3/UL (ref 0–0.2)
BASOPHILS NFR BLD AUTO: 0 %
BILIRUB SERPL-MCNC: 0.4 MG/DL
BUN SERPL-MCNC: 13.9 MG/DL (ref 6–20)
CALCIUM SERPL-MCNC: 10.7 MG/DL (ref 8.8–10.4)
CHLORIDE SERPL-SCNC: 94 MMOL/L (ref 98–107)
CREAT SERPL-MCNC: 0.7 MG/DL (ref 0.51–0.95)
DIASTOLIC BLOOD PRESSURE - MUSE: NORMAL MMHG
EGFRCR SERPLBLD CKD-EPI 2021: >90 ML/MIN/1.73M2
EOSINOPHIL # BLD AUTO: 0.1 10E3/UL (ref 0–0.7)
EOSINOPHIL NFR BLD AUTO: 1 %
ERYTHROCYTE [DISTWIDTH] IN BLOOD BY AUTOMATED COUNT: 14 % (ref 10–15)
GLUCOSE SERPL-MCNC: 311 MG/DL (ref 70–99)
HAV IGM SERPL QL IA: NONREACTIVE
HBV CORE IGM SERPL QL IA: NONREACTIVE
HBV SURFACE AG SERPL QL IA: NONREACTIVE
HCG UR QL: NEGATIVE
HCO3 SERPL-SCNC: 23 MMOL/L (ref 22–29)
HCT VFR BLD AUTO: 40.6 % (ref 35–47)
HCV AB SERPL QL IA: REACTIVE
HGB BLD-MCNC: 13.9 G/DL (ref 11.7–15.7)
HOLD SPECIMEN: NORMAL
HOLD SPECIMEN: NORMAL
IMM GRANULOCYTES # BLD: 0 10E3/UL
IMM GRANULOCYTES NFR BLD: 1 %
INTERNAL QC OK POCT: NORMAL
INTERPRETATION ECG - MUSE: NORMAL
LIPASE SERPL-CCNC: 36 U/L (ref 13–60)
LYMPHOCYTES # BLD AUTO: 1.5 10E3/UL (ref 0.8–5.3)
LYMPHOCYTES NFR BLD AUTO: 17 %
MCH RBC QN AUTO: 27.3 PG (ref 26.5–33)
MCHC RBC AUTO-ENTMCNC: 34.2 G/DL (ref 31.5–36.5)
MCV RBC AUTO: 80 FL (ref 78–100)
MONOCYTES # BLD AUTO: 0.4 10E3/UL (ref 0–1.3)
MONOCYTES NFR BLD AUTO: 4 %
NEUTROPHILS # BLD AUTO: 6.8 10E3/UL (ref 1.6–8.3)
NEUTROPHILS NFR BLD AUTO: 77 %
NRBC # BLD AUTO: 0 10E3/UL
NRBC BLD AUTO-RTO: 0 /100
P AXIS - MUSE: 57 DEGREES
PLATELET # BLD AUTO: 237 10E3/UL (ref 150–450)
POCT KIT EXPIRATION DATE: NORMAL
POCT KIT LOT NUMBER: NORMAL
POTASSIUM SERPL-SCNC: 3.8 MMOL/L (ref 3.4–5.3)
PR INTERVAL - MUSE: 148 MS
PROT SERPL-MCNC: 9 G/DL (ref 6.4–8.3)
QRS DURATION - MUSE: 94 MS
QT - MUSE: 380 MS
QTC - MUSE: 421 MS
R AXIS - MUSE: -11 DEGREES
RBC # BLD AUTO: 5.1 10E6/UL (ref 3.8–5.2)
SODIUM SERPL-SCNC: 138 MMOL/L (ref 135–145)
SYSTOLIC BLOOD PRESSURE - MUSE: NORMAL MMHG
T AXIS - MUSE: 34 DEGREES
VENTRICULAR RATE- MUSE: 74 BPM
WBC # BLD AUTO: 8.8 10E3/UL (ref 4–11)

## 2025-03-13 PROCEDURE — 84155 ASSAY OF PROTEIN SERUM: CPT | Performed by: EMERGENCY MEDICINE

## 2025-03-13 PROCEDURE — 87522 HEPATITIS C REVRS TRNSCRPJ: CPT

## 2025-03-13 PROCEDURE — 250N000013 HC RX MED GY IP 250 OP 250 PS 637: Performed by: FAMILY MEDICINE

## 2025-03-13 PROCEDURE — 258N000003 HC RX IP 258 OP 636: Performed by: EMERGENCY MEDICINE

## 2025-03-13 PROCEDURE — 250N000013 HC RX MED GY IP 250 OP 250 PS 637: Performed by: EMERGENCY MEDICINE

## 2025-03-13 PROCEDURE — 86709 HEPATITIS A IGM ANTIBODY: CPT | Performed by: FAMILY MEDICINE

## 2025-03-13 PROCEDURE — 82435 ASSAY OF BLOOD CHLORIDE: CPT | Performed by: EMERGENCY MEDICINE

## 2025-03-13 PROCEDURE — 250N000011 HC RX IP 250 OP 636: Performed by: EMERGENCY MEDICINE

## 2025-03-13 PROCEDURE — 86705 HEP B CORE ANTIBODY IGM: CPT | Performed by: FAMILY MEDICINE

## 2025-03-13 PROCEDURE — 36415 COLL VENOUS BLD VENIPUNCTURE: CPT | Performed by: EMERGENCY MEDICINE

## 2025-03-13 PROCEDURE — 81025 URINE PREGNANCY TEST: CPT | Performed by: FAMILY MEDICINE

## 2025-03-13 PROCEDURE — 99284 EMERGENCY DEPT VISIT MOD MDM: CPT | Performed by: EMERGENCY MEDICINE

## 2025-03-13 PROCEDURE — 99284 EMERGENCY DEPT VISIT MOD MDM: CPT | Mod: 25 | Performed by: EMERGENCY MEDICINE

## 2025-03-13 PROCEDURE — 96374 THER/PROPH/DIAG INJ IV PUSH: CPT | Performed by: EMERGENCY MEDICINE

## 2025-03-13 PROCEDURE — 96361 HYDRATE IV INFUSION ADD-ON: CPT | Performed by: EMERGENCY MEDICINE

## 2025-03-13 PROCEDURE — 83690 ASSAY OF LIPASE: CPT | Performed by: EMERGENCY MEDICINE

## 2025-03-13 PROCEDURE — 250N000009 HC RX 250: Performed by: EMERGENCY MEDICINE

## 2025-03-13 PROCEDURE — 85025 COMPLETE CBC W/AUTO DIFF WBC: CPT | Performed by: EMERGENCY MEDICINE

## 2025-03-13 PROCEDURE — 82040 ASSAY OF SERUM ALBUMIN: CPT | Performed by: EMERGENCY MEDICINE

## 2025-03-13 RX ORDER — LIDOCAINE HYDROCHLORIDE 20 MG/ML
10 SOLUTION OROPHARYNGEAL ONCE
Status: COMPLETED | OUTPATIENT
Start: 2025-03-13 | End: 2025-03-13

## 2025-03-13 RX ORDER — OLANZAPINE 10 MG/1
2.5 INJECTION, POWDER, LYOPHILIZED, FOR SOLUTION INTRAMUSCULAR ONCE
Status: COMPLETED | OUTPATIENT
Start: 2025-03-13 | End: 2025-03-13

## 2025-03-13 RX ORDER — DIPHENHYDRAMINE HYDROCHLORIDE AND LIDOCAINE HYDROCHLORIDE AND ALUMINUM HYDROXIDE AND MAGNESIUM HYDRO
15 KIT ONCE
Status: COMPLETED | OUTPATIENT
Start: 2025-03-13 | End: 2025-03-13

## 2025-03-13 RX ORDER — ONDANSETRON 4 MG/1
4 TABLET, ORALLY DISINTEGRATING ORAL EVERY 8 HOURS PRN
Qty: 10 TABLET | Refills: 0 | Status: SHIPPED | OUTPATIENT
Start: 2025-03-13 | End: 2025-03-18

## 2025-03-13 RX ORDER — MAGNESIUM HYDROXIDE/ALUMINUM HYDROXICE/SIMETHICONE 120; 1200; 1200 MG/30ML; MG/30ML; MG/30ML
15 SUSPENSION ORAL ONCE
Status: COMPLETED | OUTPATIENT
Start: 2025-03-13 | End: 2025-03-13

## 2025-03-13 RX ADMIN — LIDOCAINE HYDROCHLORIDE 10 ML: 20 SOLUTION ORAL at 07:02

## 2025-03-13 RX ADMIN — ALUMINUM HYDROXIDE, MAGNESIUM HYDROXIDE, AND SIMETHICONE 15 ML: 200; 200; 20 SUSPENSION ORAL at 07:02

## 2025-03-13 RX ADMIN — OLANZAPINE 2.5 MG: 10 INJECTION, POWDER, FOR SOLUTION INTRAMUSCULAR at 07:04

## 2025-03-13 RX ADMIN — SODIUM CHLORIDE 1000 ML: 0.9 INJECTION, SOLUTION INTRAVENOUS at 07:02

## 2025-03-13 RX ADMIN — DIPHENHYDRAMINE HYDROCHLORIDE AND LIDOCAINE HYDROCHLORIDE AND ALUMINUM HYDROXIDE AND MAGNESIUM HYDRO 15 ML: KIT at 09:11

## 2025-03-13 ASSESSMENT — ACTIVITIES OF DAILY LIVING (ADL)
ADLS_ACUITY_SCORE: 58

## 2025-03-13 NOTE — DISCHARGE INSTRUCTIONS
Discharge to home establish care at primary care clinic at [facility] and follow-up on hepatitis panel ordered today.

## 2025-03-13 NOTE — ED PROVIDER NOTES
Patient signed out to me by night staff noting that the patient is likely here with cyclical vomiting she had improved with medications provided however I did note on review of the chart the patient has had a number of visits to the emergency room with elevated liver function tests unclear as to the exact etiology of the elevated liver function tests and a hepatitis panel was added to patient's labs still pending at this time.  Patient will be discharged to establish primary care at [facility] with an expectation of following up on hepatitis results when establishing care.     Sean Christensen MD  03/13/25 5704

## 2025-03-13 NOTE — ED PROVIDER NOTES
ED Provider Note  [facility]      History     Chief Complaint   Patient presents with    Nausea & Vomiting     HPI  Yun Coyle is a 49 year old female with history of type 2 diabetes mellitus, MDD, SANDIE, PTSD, hepatitis C, cannabis use, hiatal hernia, cyclical vomiting syndrome, possible cannabinoid hyperemesis syndrome, prolonged QT, who presents to the ED for evaluation of nausea and vomiting.  She states this feels similar to her typical typical vomiting symptoms.  Started yesterday.  Has been persistent since onset.  She feels severely dehydrated.  Has been unable to keep anything down.  Has some discomfort throughout her entire abdomen without any obvious focality.  She has not been urinating as much due to her detected hydration.  Bowel movements have been normal.  Surgical history includes prior cholecystectomy and fallopian tube removal    Past Medical History  Past Medical History:   Diagnosis Date    Acute pancreatitis 06/29/2017    Anxiety     Cauda equina syndrome (H) 03/29/2013    Cyst of right ovary 11/16/2017    Depressive disorder     Gastroesophageal reflux disease     Hypertension     Hyponatremia 10/01/2023    Low back pain 08/20/2012    Overview:   History of cauda equina and surgery.      Type 2 diabetes mellitus (H)     Uncomplicated asthma      Past Surgical History:   Procedure Laterality Date    BACK SURGERY      x2    CHOLECYSTECTOMY      ORTHOPEDIC SURGERY      SALPINGO OOPHORECTOMY,R/L/ELDA Right      Alcohol Swabs PADS  blood glucose (NO BRAND SPECIFIED) lancets standard  blood glucose (NO BRAND SPECIFIED) test strip  blood glucose calibration (NO BRAND SPECIFIED) solution  blood glucose monitoring (NO BRAND SPECIFIED) meter device kit  cloNIDine (CATAPRES) 0.1 MG tablet  empagliflozin (JARDIANCE) 10 MG TABS tablet  escitalopram (LEXAPRO) 10 MG tablet  Glucagon (GVOKE HYPOPEN) 1 MG/0.2ML pen  glucose (BD GLUCOSE) 4 g chewable tablet  glucose 40 % (400  "mg/mL) gel  hydrOXYzine HCl (ATARAX) 25 MG tablet  insulin aspart (NOVOLOG PEN) 100 UNIT/ML pen  insulin aspart (NOVOLOG PEN) 100 UNIT/ML pen  insulin aspart (NOVOLOG PEN) 100 UNIT/ML pen  insulin glargine (LANTUS PEN) 100 UNIT/ML pen  insulin pen needle (32G X 4 MM) 32G X 4 MM miscellaneous  losartan (COZAAR) 25 MG tablet  metFORMIN (GLUCOPHAGE) 500 MG tablet  methadone (DOLOPHINE-INTENSOL) 10 MG/ML (HIGH CONC) solution  ondansetron (ZOFRAN ODT) 4 MG ODT tab  ondansetron (ZOFRAN ODT) 4 MG ODT tab  prazosin (MINIPRESS) 2 MG capsule  Sharps Container MISC      No Known Allergies  Family History  Family History   Problem Relation Age of Onset    Breast Cancer Mother         in her 50s.    Diabetes Mother     Breast Cancer Maternal Grandmother         in her 40s.    Diabetes Maternal Grandmother      Social History   Social History     Tobacco Use    Smoking status: Never    Smokeless tobacco: Never   Vaping Use    Vaping status: Never Used   Substance Use Topics    Alcohol use: No    Drug use: Not Currently     Types: Marijuana     Comment: pt was taking Oxycontin \"years ago.\" pt is now on Methadone.      A medically appropriate review of systems was performed with pertinent positives and negatives noted in the HPI, and all other systems negative.    Physical Exam   BP: (!) 149/85  Pulse: 79  Temp: 97  F (36.1  C)  Resp: 18  Weight: 90.7 kg (200 lb)  SpO2: 100 %  Physical Exam  Vitals and nursing note reviewed.   Constitutional:       General: She is in acute distress.      Appearance: Normal appearance. She is ill-appearing.   HENT:      Head: Normocephalic.      Nose: Nose normal.      Mouth/Throat:      Mouth: Mucous membranes are dry.   Eyes:      Pupils: Pupils are equal, round, and reactive to light.   Cardiovascular:      Rate and Rhythm: Normal rate and regular rhythm.   Pulmonary:      Effort: Pulmonary effort is normal.   Abdominal:      General: There is no distension.      Comments: Mild tenderness " throughout the entire abdomen.  No guarding rebound or signs peritonitis.  No obvious focality.  No overlying skin changes.  Abdomen is soft and nondistended.  No CVA tenderness.   Musculoskeletal:         General: No deformity. Normal range of motion.      Cervical back: Normal range of motion.   Skin:     General: Skin is warm and dry.      Coloration: Skin is pale.   Neurological:      Mental Status: She is alert and oriented to person, place, and time.   Psychiatric:         Mood and Affect: Mood normal.           ED Course, Procedures, & Data           Results for orders placed or performed during the hospital encounter of 03/13/25   [place] Draw     Status: None (In process)    Narrative    The following orders were created for panel order [place]lass Draw.  Procedure                               Abnormality         Status                     ---------                               -----------         ------                     Extra Green Top (Lithiu...[[id number]]                      In process                 Extra Purple Top Tube[[id number]]                           In process                   Please view results for these tests on the individual orders.   CBC with platelets differential     Status: None (In process)    Narrative    The following orders were created for panel order CBC with platelets differential.  Procedure                               Abnormality         Status                     ---------                               -----------         ------                     CBC with platelets and ...[[id number]]                      In process                   Please view results for these tests on the individual orders.     Medications   sodium chloride 0.9% BOLUS 1,000 mL (has no administration in time range)   OLANZapine (zyPREXA) IV injection 2.5 mg (has no administration in time range)   alum & mag hydroxide-simethicone (MAALOX) suspension 15 mL (has no administration in time range)    lidocaine (viscous) (XYLOCAINE) 2 % solution 10 mL (has no administration in time range)     Labs Ordered and Resulted from Time of ED Arrival to Time of ED Departure - No data to display  No orders to display          Critical care was not performed.     Medical Decision Making  The patient's presentation was of moderate complexity (a chronic illness mild to moderate exacerbation, progression, or side effect of treatment).    The patient's evaluation involved:  ordering and/or review of 3+ test(s) in this encounter (see separate area of note for details)  Previous CT (2/26/25) reviewed    The patient's management necessitated high risk (drug therapy requiring intensive monitoring (see separate area of note for details)).  IV olanzapine    Assessment & Plan    Patient presents the ED for evaluation of nausea vomiting, suspected dehydration.  Has a history of cyclical vomiting syndrome.  On arrival, patient slightly hypertensive, otherwise has normal vital signs.  Her abdomen is without signs of peritonitis.  No focal abdominal tenderness.  Reviewed CT scan from 2/26/2025 which did not show any acute abnormality in the abdomen pelvis.  Given her reassuring abdominal exam do not feel that repeat abdominal imaging would be warranted at this time.  Will check basic labs including CBC, CMP, UA, lipase.  Plan for IV fluids, IV olanzapine, and GI cocktail.    Signed out to morning provider with plan to follow-up on labs, reassess, disposition accordingly.    I have reviewed the nursing notes. I have reviewed the findings, diagnosis, plan and need for follow up with the patient.    New Prescriptions    No medications on file       Final diagnoses:   Nausea and vomiting, unspecified vomiting type       Bryan Kruse DO  [facility] EMERGENCY DEPARTMENT  3/13/2025     Bryan Kruse DO  03/13/25 0651

## 2025-03-13 NOTE — ED TRIAGE NOTES
Patient stated she is being having nausea and vomiting for 24 hours. Pt stated she had   too many vomits

## 2025-03-14 LAB
ATRIAL RATE - MUSE: 74 BPM
DIASTOLIC BLOOD PRESSURE - MUSE: NORMAL MMHG
HCV RNA SERPL NAA+PROBE-ACNC: ABNORMAL IU/ML
HCV RNA SERPL NAA+PROBE-LOG IU: 6 {LOG_IU}/ML
INTERPRETATION ECG - MUSE: NORMAL
P AXIS - MUSE: 57 DEGREES
PR INTERVAL - MUSE: 148 MS
QRS DURATION - MUSE: 94 MS
QT - MUSE: 380 MS
QTC - MUSE: 421 MS
R AXIS - MUSE: -11 DEGREES
SYSTOLIC BLOOD PRESSURE - MUSE: NORMAL MMHG
T AXIS - MUSE: 34 DEGREES
VENTRICULAR RATE- MUSE: 74 BPM

## 2025-03-17 ENCOUNTER — HOSPITAL ENCOUNTER (INPATIENT)
Facility: CLINIC | Age: 50
End: 2025-03-17
Attending: EMERGENCY MEDICINE | Admitting: PSYCHIATRY & NEUROLOGY
Payer: COMMERCIAL

## 2025-03-17 ENCOUNTER — TELEPHONE (OUTPATIENT)
Dept: BEHAVIORAL HEALTH | Facility: CLINIC | Age: 50
End: 2025-03-17

## 2025-03-17 ENCOUNTER — APPOINTMENT (OUTPATIENT)
Dept: CT IMAGING | Facility: CLINIC | Age: 50
End: 2025-03-17
Attending: EMERGENCY MEDICINE
Payer: COMMERCIAL

## 2025-03-17 DIAGNOSIS — F43.10 PTSD (POST-TRAUMATIC STRESS DISORDER): ICD-10-CM

## 2025-03-17 DIAGNOSIS — F11.21 MODERATE OPIOID USE DISORDER, IN EARLY REMISSION (H): ICD-10-CM

## 2025-03-17 DIAGNOSIS — E11.9 TYPE 2 DIABETES MELLITUS WITHOUT COMPLICATION, UNSPECIFIED WHETHER LONG TERM INSULIN USE (H): ICD-10-CM

## 2025-03-17 DIAGNOSIS — G47.00 INSOMNIA, UNSPECIFIED TYPE: ICD-10-CM

## 2025-03-17 DIAGNOSIS — E11.65 TYPE 2 DIABETES MELLITUS WITH HYPERGLYCEMIA, WITH LONG-TERM CURRENT USE OF INSULIN (H): ICD-10-CM

## 2025-03-17 DIAGNOSIS — R10.31 RIGHT LOWER QUADRANT ABDOMINAL PAIN: ICD-10-CM

## 2025-03-17 DIAGNOSIS — R11.15 CYCLICAL VOMITING: ICD-10-CM

## 2025-03-17 DIAGNOSIS — I10 BENIGN ESSENTIAL HTN: ICD-10-CM

## 2025-03-17 DIAGNOSIS — R45.851 SUICIDAL IDEATION: ICD-10-CM

## 2025-03-17 DIAGNOSIS — R11.0 NAUSEA: Primary | ICD-10-CM

## 2025-03-17 DIAGNOSIS — I15.9 SECONDARY HYPERTENSION: ICD-10-CM

## 2025-03-17 DIAGNOSIS — F33.2 SEVERE EPISODE OF RECURRENT MAJOR DEPRESSIVE DISORDER, WITHOUT PSYCHOTIC FEATURES (H): ICD-10-CM

## 2025-03-17 DIAGNOSIS — Z79.4 TYPE 2 DIABETES MELLITUS WITH HYPERGLYCEMIA, WITH LONG-TERM CURRENT USE OF INSULIN (H): ICD-10-CM

## 2025-03-17 DIAGNOSIS — F41.9 ANXIETY: ICD-10-CM

## 2025-03-17 PROBLEM — F33.9 RECURRENT MAJOR DEPRESSION: Status: ACTIVE | Noted: 2024-11-13

## 2025-03-17 LAB
ALBUMIN SERPL BCG-MCNC: 4.4 G/DL (ref 3.5–5.2)
ALBUMIN UR-MCNC: NEGATIVE MG/DL
ALP SERPL-CCNC: 226 U/L (ref 40–150)
ALT SERPL W P-5'-P-CCNC: 158 U/L (ref 0–50)
ANION GAP SERPL CALCULATED.3IONS-SCNC: 13 MMOL/L (ref 7–15)
APPEARANCE UR: CLEAR
AST SERPL W P-5'-P-CCNC: 241 U/L (ref 0–45)
BASOPHILS # BLD AUTO: 0 10E3/UL (ref 0–0.2)
BASOPHILS NFR BLD AUTO: 0 %
BILIRUB SERPL-MCNC: 0.7 MG/DL
BILIRUB UR QL STRIP: NEGATIVE
BUN SERPL-MCNC: 17.8 MG/DL (ref 6–20)
CALCIUM SERPL-MCNC: 10.3 MG/DL (ref 8.8–10.4)
CHLORIDE SERPL-SCNC: 95 MMOL/L (ref 98–107)
COLOR UR AUTO: YELLOW
CREAT SERPL-MCNC: 0.75 MG/DL (ref 0.51–0.95)
EGFRCR SERPLBLD CKD-EPI 2021: >90 ML/MIN/1.73M2
EOSINOPHIL # BLD AUTO: 0.3 10E3/UL (ref 0–0.7)
EOSINOPHIL NFR BLD AUTO: 3 %
ERYTHROCYTE [DISTWIDTH] IN BLOOD BY AUTOMATED COUNT: 14.6 % (ref 10–15)
GLUCOSE SERPL-MCNC: 227 MG/DL (ref 70–99)
GLUCOSE UR STRIP-MCNC: >=1000 MG/DL
HCG SERPL QL: NEGATIVE
HCO3 SERPL-SCNC: 24 MMOL/L (ref 22–29)
HCT VFR BLD AUTO: 40.6 % (ref 35–47)
HGB BLD-MCNC: 13.8 G/DL (ref 11.7–15.7)
HGB UR QL STRIP: NEGATIVE
IMM GRANULOCYTES # BLD: 0 10E3/UL
IMM GRANULOCYTES NFR BLD: 0 %
KETONES UR STRIP-MCNC: NEGATIVE MG/DL
LEUKOCYTE ESTERASE UR QL STRIP: NEGATIVE
LYMPHOCYTES # BLD AUTO: 3.2 10E3/UL (ref 0.8–5.3)
LYMPHOCYTES NFR BLD AUTO: 28 %
MCH RBC QN AUTO: 27.2 PG (ref 26.5–33)
MCHC RBC AUTO-ENTMCNC: 34 G/DL (ref 31.5–36.5)
MCV RBC AUTO: 80 FL (ref 78–100)
MONOCYTES # BLD AUTO: 0.6 10E3/UL (ref 0–1.3)
MONOCYTES NFR BLD AUTO: 5 %
MUCOUS THREADS #/AREA URNS LPF: PRESENT /LPF
NEUTROPHILS # BLD AUTO: 7.1 10E3/UL (ref 1.6–8.3)
NEUTROPHILS NFR BLD AUTO: 63 %
NITRATE UR QL: NEGATIVE
NRBC # BLD AUTO: 0 10E3/UL
NRBC BLD AUTO-RTO: 0 /100
PH UR STRIP: 6 [PH] (ref 5–7)
PLATELET # BLD AUTO: 283 10E3/UL (ref 150–450)
POTASSIUM SERPL-SCNC: 5.4 MMOL/L (ref 3.4–5.3)
PROT SERPL-MCNC: 8.9 G/DL (ref 6.4–8.3)
RBC # BLD AUTO: 5.07 10E6/UL (ref 3.8–5.2)
RBC URINE: 1 /HPF
SODIUM SERPL-SCNC: 132 MMOL/L (ref 135–145)
SP GR UR STRIP: 1.03 (ref 1–1.03)
SQUAMOUS EPITHELIAL: 7 /HPF
UROBILINOGEN UR STRIP-MCNC: NORMAL MG/DL
WBC # BLD AUTO: 11.2 10E3/UL (ref 4–11)
WBC URINE: 1 /HPF

## 2025-03-17 PROCEDURE — 999N000127 HC STATISTIC PERIPHERAL IV START W US GUIDANCE

## 2025-03-17 PROCEDURE — 99285 EMERGENCY DEPT VISIT HI MDM: CPT | Performed by: EMERGENCY MEDICINE

## 2025-03-17 PROCEDURE — 81001 URINALYSIS AUTO W/SCOPE: CPT | Performed by: EMERGENCY MEDICINE

## 2025-03-17 PROCEDURE — 83036 HEMOGLOBIN GLYCOSYLATED A1C: CPT | Performed by: EMERGENCY MEDICINE

## 2025-03-17 PROCEDURE — 250N000011 HC RX IP 250 OP 636: Performed by: EMERGENCY MEDICINE

## 2025-03-17 PROCEDURE — 96374 THER/PROPH/DIAG INJ IV PUSH: CPT | Mod: 59 | Performed by: EMERGENCY MEDICINE

## 2025-03-17 PROCEDURE — 250N000013 HC RX MED GY IP 250 OP 250 PS 637: Performed by: EMERGENCY MEDICINE

## 2025-03-17 PROCEDURE — 96361 HYDRATE IV INFUSION ADD-ON: CPT | Performed by: EMERGENCY MEDICINE

## 2025-03-17 PROCEDURE — 74177 CT ABD & PELVIS W/CONTRAST: CPT

## 2025-03-17 PROCEDURE — 85025 COMPLETE CBC W/AUTO DIFF WBC: CPT | Performed by: EMERGENCY MEDICINE

## 2025-03-17 PROCEDURE — 96375 TX/PRO/DX INJ NEW DRUG ADDON: CPT | Performed by: EMERGENCY MEDICINE

## 2025-03-17 PROCEDURE — 80053 COMPREHEN METABOLIC PANEL: CPT | Performed by: EMERGENCY MEDICINE

## 2025-03-17 PROCEDURE — 258N000003 HC RX IP 258 OP 636: Performed by: EMERGENCY MEDICINE

## 2025-03-17 PROCEDURE — 36415 COLL VENOUS BLD VENIPUNCTURE: CPT

## 2025-03-17 PROCEDURE — 84703 CHORIONIC GONADOTROPIN ASSAY: CPT | Performed by: EMERGENCY MEDICINE

## 2025-03-17 PROCEDURE — 36415 COLL VENOUS BLD VENIPUNCTURE: CPT | Performed by: EMERGENCY MEDICINE

## 2025-03-17 PROCEDURE — 99285 EMERGENCY DEPT VISIT HI MDM: CPT | Mod: 25 | Performed by: EMERGENCY MEDICINE

## 2025-03-17 PROCEDURE — 250N000009 HC RX 250: Performed by: EMERGENCY MEDICINE

## 2025-03-17 RX ORDER — MAGNESIUM HYDROXIDE/ALUMINUM HYDROXICE/SIMETHICONE 120; 1200; 1200 MG/30ML; MG/30ML; MG/30ML
30 SUSPENSION ORAL ONCE
Status: COMPLETED | OUTPATIENT
Start: 2025-03-17 | End: 2025-03-17

## 2025-03-17 RX ORDER — KETOROLAC TROMETHAMINE 15 MG/ML
15 INJECTION, SOLUTION INTRAMUSCULAR; INTRAVENOUS ONCE
Status: COMPLETED | OUTPATIENT
Start: 2025-03-17 | End: 2025-03-17

## 2025-03-17 RX ORDER — LACTULOSE 10 G/15ML
30 SOLUTION ORAL 3 TIMES DAILY
Qty: 450 ML | Refills: 0 | Status: SHIPPED | OUTPATIENT
Start: 2025-03-17 | End: 2025-03-22

## 2025-03-17 RX ORDER — LIDOCAINE HYDROCHLORIDE 20 MG/ML
10 SOLUTION OROPHARYNGEAL ONCE
Status: COMPLETED | OUTPATIENT
Start: 2025-03-17 | End: 2025-03-17

## 2025-03-17 RX ORDER — ONDANSETRON 4 MG/1
4 TABLET, ORALLY DISINTEGRATING ORAL EVERY 6 HOURS PRN
Qty: 10 TABLET | Refills: 0 | Status: SHIPPED | OUTPATIENT
Start: 2025-03-17 | End: 2025-03-20

## 2025-03-17 RX ORDER — LACTULOSE 10 G/10G
20 SOLUTION ORAL 3 TIMES DAILY
Status: DISCONTINUED | OUTPATIENT
Start: 2025-03-17 | End: 2025-03-18

## 2025-03-17 RX ORDER — ONDANSETRON 2 MG/ML
4 INJECTION INTRAMUSCULAR; INTRAVENOUS EVERY 30 MIN PRN
Status: DISCONTINUED | OUTPATIENT
Start: 2025-03-17 | End: 2025-03-18

## 2025-03-17 RX ORDER — IOPAMIDOL 755 MG/ML
100 INJECTION, SOLUTION INTRAVASCULAR ONCE
Status: COMPLETED | OUTPATIENT
Start: 2025-03-17 | End: 2025-03-17

## 2025-03-17 RX ADMIN — ONDANSETRON 4 MG: 2 INJECTION INTRAMUSCULAR; INTRAVENOUS at 12:08

## 2025-03-17 RX ADMIN — SODIUM CHLORIDE 1000 ML: 0.9 INJECTION, SOLUTION INTRAVENOUS at 12:08

## 2025-03-17 RX ADMIN — SODIUM CHLORIDE 50 ML: 9 INJECTION, SOLUTION INTRAVENOUS at 13:49

## 2025-03-17 RX ADMIN — LIDOCAINE HYDROCHLORIDE 10 ML: 20 SOLUTION ORAL at 14:28

## 2025-03-17 RX ADMIN — LACTULOSE 20 G: 20 POWDER, FOR SOLUTION ORAL at 17:20

## 2025-03-17 RX ADMIN — LACTULOSE 20 G: 20 POWDER, FOR SOLUTION ORAL at 20:34

## 2025-03-17 RX ADMIN — ALUMINUM HYDROXIDE, MAGNESIUM HYDROXIDE, AND SIMETHICONE 30 ML: 200; 200; 20 SUSPENSION ORAL at 14:28

## 2025-03-17 RX ADMIN — IOPAMIDOL 90 ML: 755 INJECTION, SOLUTION INTRAVENOUS at 13:48

## 2025-03-17 RX ADMIN — KETOROLAC TROMETHAMINE 15 MG: 15 INJECTION, SOLUTION INTRAMUSCULAR; INTRAVENOUS at 12:08

## 2025-03-17 ASSESSMENT — ACTIVITIES OF DAILY LIVING (ADL)
ADLS_ACUITY_SCORE: 58

## 2025-03-17 ASSESSMENT — COLUMBIA-SUICIDE SEVERITY RATING SCALE - C-SSRS
1. IN THE PAST MONTH, HAVE YOU WISHED YOU WERE DEAD OR WISHED YOU COULD GO TO SLEEP AND NOT WAKE UP?: NO
2. HAVE YOU ACTUALLY HAD ANY THOUGHTS OF KILLING YOURSELF IN THE PAST MONTH?: NO

## 2025-03-17 NOTE — DISCHARGE INSTRUCTIONS
Home to rest today    Fill your prescriptions and take as directed    Clear liquids today and then advance diet as tolerated tomorrow    Please make an appointment to follow up with Your Primary Care Provider in 7-10 days unless symptoms completely resolve.   instructions to fax [phone]    Patient Navigation Hub:   [facility] Navigators work to be your point-of-contact for trustworthy and compassionate care from Inpatient services to [facility] Programmatic Care. We will provide resources and communication to help guide you into programmatic care. Ultimately, our goal is to be the one-stop-shop of communication, coordination, and support for your journey to programmatic care.    Phone: [phone]    Information will be faxed to your outpatient providers to ensure a healthy continuity of care for you.     Attend all scheduled appointments with your outpatient providers. Call at least 24 hours in advance if you need to reschedule an appointment to ensure continued access to your outpatient providers.     Major Treatments, Procedures and Findings:  You were provided with: {Major Treatments:728711}    Symptoms to Report: {symptoms:813799}    Early warning signs can include: {BH Warning Signs:695204}    Safety and Wellness:  {Age Group Safety Wellness:071580}    Resources:   Mental Health Crisis Resources  Throughout [place]: call **CRISIS (**375266)  Crisis Text Line: is available for free, 24/7 by texting [place] to 358551  Suicide Awareness Voices of Education (SAVE) (www.save.org): 330-574-RAQP (7370)  The National Suicide Prevention Lifeline is now: 988 Suicide and Crisis Lifeline. Call 988 anytime.  National [place] on Mental Illness (www.[place].silvia.org): 322.255.9548 or 935-216-3252.  Uvcd8hone: text the word LIFE to 41418 for immediate support and crisis intervention  Mental Health Consumer/Survivor Network of [place] (www.mhcsn.net): 872.444.2567 or 769-969-1462  Mental Health Association of [place] (www.mentalhealth.org): 116.948.1137 or 024-022-2706  Peer Support Connection [place] Warmline (PSC) 1-199.776.6374 Available from 5pm - 9am (7 days a week/365 days a year)  Call or Text 988 or [facility] 1-893.640.2303 Community Outreach for Psych  Emergencies      General Medication Instructions:   See your medication sheet(s) for instructions.   Take all medicines as directed.  Make no changes unless your doctor suggests them.   Go to all your doctor visits.  Be sure to have all your required lab tests. This way, your medicines can be refilled on time.  Do not use any drugs not prescribed by your doctor.  Avoid alcohol.    Advance Directives:   Scanned document on file with [employer]? No scanned doc  Is document scanned? Pt states no documents  Honoring Choices Your Rights Handout: Informed and given  Was more information offered? Pt declined    The Treatment team has appreciated the opportunity to work with you. If you have any questions or concerns about your recent admission, you can contact the unit which can receive your call 24 hours a day, 7 days a week. They will be able to get in touch with a Provider if needed. The unit number is [phone] .     doctor visits.  Be sure to have all your required lab tests. This way, your medicines can be refilled on time.  Do not use any drugs not prescribed by your doctor.  Avoid alcohol.    Advance Directives:   Scanned document on file with [employer] No scanned doc  Is document scanned? Pt states no documents  Honoring Choices Your Rights Handout: Informed and given  Was more information offered? Pt declined    The Treatment team has appreciated the opportunity to work with you. If you have any questions or concerns about your recent admission, you can contact the unit which can receive your call 24 hours a day, 7 days a week. They will be able to get in touch with a Provider if needed. The unit number is [phone] .

## 2025-03-17 NOTE — CONSULTS
"Consult received for Vascular access care.  See LDA for details. For additional needs place \"Nursing to Consult for Vascular Access\" [id number] order in EPIC.   "

## 2025-03-17 NOTE — ED PROVIDER NOTES
ED PROVIDER NOTE  [facility]  [facility] [place]      History     Chief Complaint   Patient presents with    Abdominal Pain     Patient presents due 8/10 sharp constant right lower abdominal pain; present since 0500 today; accompanied by nausea.     The history is provided by the patient and medical records.     Yun Coyle is a 49 year old female with prior history of cyclic vomiting syndrome, hepatic steatosis, hiatal hernia, cannabis and fentanyl use who presents to the Emergency Department with some right lower quadrant abdominal pain that started approximately 5 AM today and woke her from sleep.  Patient states she has not had pain like this before, states that she has had no UTI symptoms, no radiation of the pain into her right flank, and presents here for evaluation. Patient states that she has been nauseated with the pain and the pain is still persistent.  The patient states that she is status post cholecystectomy but still has her appendix intact.  Patient has a history of adult onset diabetes and previous pancreatitis.    I have reviewed the Medications, Allergies, Past Medical and Surgical History, and Social History in the [facility] system.    Past Medical History:   Diagnosis Date    Acute pancreatitis 06/29/2017    Anxiety     Cauda equina syndrome (H) 03/29/2013    Cyst of right ovary 11/16/2017    Depressive disorder     Gastroesophageal reflux disease     Hypertension     Hyponatremia 10/01/2023    Low back pain 08/20/2012    Type 2 diabetes mellitus (H)     Uncomplicated asthma        Past Surgical History:   Procedure Laterality Date    BACK SURGERY      x2    CHOLECYSTECTOMY      ORTHOPEDIC SURGERY      SALPINGO OOPHORECTOMY,R/L/ELDA Right          Dose / Directions   cloNIDine 0.1 MG tablet  Commonly known as: CATAPRES      Dose: 0.1 mg  Take 0.1 mg by mouth 2 times daily.  Refills: 0     empagliflozin 10 MG Tabs tablet  Commonly known as: JARDIANCE  Used for: Type 2 diabetes  mellitus without complication, unspecified whether long term insulin use (H)      Dose: 10 mg  Take 1 tablet (10 mg) by mouth daily.  Quantity: 30 tablet  Refills: 0     escitalopram 10 MG tablet  Commonly known as: LEXAPRO  Used for: Severe episode of recurrent major depressive disorder, without psychotic features (H)      Dose: 30 mg  Take 3 tablets (30 mg) by mouth daily.  Quantity: 30 tablet  Refills: 0     Glucagon 1 MG/0.2ML pen  Commonly known as: GVOKE HYPOPEN  Used for: Type 2 diabetes mellitus without complication, unspecified whether long term insulin use (H)      Inject the contents of 1 device under the skin into lower abdomen, outer thigh, or outer upper arm as needed for hypoglycemia. If no response after 15 minutes, additional 1 mg dose from a new device may be injected while waiting for emergency assistance.  Quantity: 30 each  Refills: 0     * glucose 4 g chewable tablet  Commonly known as: BD GLUCOSE  Used for: Type 2 diabetes mellitus without complication, unspecified whether long term insulin use (H)      Dose: 4 tablet  Take 4 tablets by mouth every 15 minutes as needed for low blood sugar.  Quantity: 50 tablet  Refills: 0     * glucose 40 % (400 mg/mL) gel  Used for: Type 2 diabetes mellitus without complication, unspecified whether long term insulin use (H)      15 g every 15 minutes by mouth as needed for low blood sugar.  Oral gel is preferable for conscious and able to swallow patient.  Quantity: 112.5 g  Refills: 0     hydrOXYzine HCl 25 MG tablet  Commonly known as: ATARAX  Used for: Severe episode of recurrent major depressive disorder, without psychotic features (H)      Dose: 25-50 mg  Take 1-2 tablets (25-50 mg) by mouth every 4 hours as needed for anxiety.  Quantity: 90 tablet  Refills: 0     * insulin aspart 100 UNIT/ML pen  Commonly known as: NovoLOG PEN  Used for: Type 2 diabetes mellitus with hyperglycemia, with long-term current use of insulin (H)      Dose: 1-10 Units  Inject  1-10 Units subcutaneously 3 times daily (with meals). Do Not give Correction Insulin if Pre-Meal BG less than 140. For Pre-Meal  - 164 give 1 unit. For Pre-Meal  - 189 give 2 units. For Pre-Meal  - 214 give 3 units. For Pre-Meal  - 239 give 4 units. For Pre-Meal  - 264 give 5 units. For Pre-Meal  - 289 give 6 units. For Pre-Meal  - 314 give 7 units. For Pre-Meal  - 339 give 8 units. For Pre-Meal  - 364 give 9 units.  For Pre-Meal BG greater than or equal to 365 give 10 units  Quantity: 15 mL  Refills: 0     * insulin aspart 100 UNIT/ML pen  Commonly known as: NovoLOG PEN  Used for: Type 2 diabetes mellitus with hyperglycemia, with long-term current use of insulin (H)      Dose: 1-10 Units  Inject 1-10 Units subcutaneously 3 times daily (with meals). Give 1 unit of insulin for each 10g of carbohydrates  Quantity: 15 mL  Refills: 0     * insulin aspart 100 UNIT/ML pen  Commonly known as: NovoLOG PEN  Used for: Type 2 diabetes mellitus without complication, unspecified whether long term insulin use (H)      Dose: 1-7 Units  Inject 1-7 Units subcutaneously at bedtime. Do Not give Bedtime Correction Insulin if BG less than 200. For  - 224 give 1 units. For  - 249 give 2 units. For  - 274 give 3 units. For  - 299 give 4 units. For  - 324 give 5 units. For  - 349 give 6 units. For BG greater than or equal to 350 give 7 units.  Quantity: 15 mL  Refills: 0     insulin glargine 100 UNIT/ML pen  Commonly known as: LANTUS PEN  Used for: Type 2 diabetes mellitus without complication, unspecified whether long term insulin use (H)      Dose: 40 Units  Inject 40 Units subcutaneously at bedtime.  Quantity: 15 mL  Refills: 0     losartan 25 MG tablet  Commonly known as: COZAAR  Used for: Secondary hypertension      Dose: 25 mg  Take 1 tablet (25 mg) by mouth daily.  Quantity: 30 tablet  Refills: 0     metFORMIN 500 MG tablet  Commonly known as:  GLUCOPHAGE  Used for: Type 2 diabetes mellitus with hyperglycemia, with long-term current use of insulin (H)      Dose: 1,000 mg  Take 2 tablets (1,000 mg) by mouth 2 times daily (with meals).  Quantity: 60 tablet  Refills: 0     methadone 10 MG/ML (HIGH CONC) solution  Commonly known as: DOLOPHINE-INTENSOL      Dose: 74 mg  Take 74 mg by mouth daily.  Refills: 0     * ondansetron 4 MG ODT tab  Commonly known as: ZOFRAN ODT  Used for: Intractable nausea and vomiting      Dose: 4 mg  Take 1 tablet (4 mg) by mouth every 8 hours as needed for nausea.  Quantity: 30 tablet  Refills: 0     * ondansetron 4 MG ODT tab  Commonly known as: ZOFRAN ODT      Dose: 4 mg  Take 1 tablet (4 mg) by mouth every 8 hours as needed for nausea.  Quantity: 12 tablet  Refills: 0     * ondansetron 4 MG ODT tab  Commonly known as: ZOFRAN ODT  Ask about: Should I take this medication?      Dose: 4 mg  Take 1 tablet (4 mg) by mouth every 8 hours as needed for nausea.  Quantity: 10 tablet  Refills: 0     prazosin 2 MG capsule  Commonly known as: MINIPRESS  Used for: PTSD (post-traumatic stress disorder)      Dose: 2 mg  Take 1 capsule (2 mg) by mouth at bedtime.  Quantity: 30 capsule  Refills: 0             Dose / Directions   Alcohol Swabs Pads  Used for: Type 2 diabetes mellitus without complication, unspecified whether long term insulin use (H)      Use to swab the area of the injection or marla as directed Per insurance coverage  Quantity: 200 each  Refills: 1     blood glucose calibration solution  Commonly known as: NO BRAND SPECIFIED  Used for: Type 2 diabetes mellitus without complication, unspecified whether long term insulin use (H)      Used to calibrate the blood glucose monitor as needed and as directed.  To accompany  blood glucose brands per insurance coverage  Quantity: 1 each  Refills: 0     blood glucose lancets standard  Commonly known as: NO BRAND SPECIFIED  Used for: Type 2 diabetes mellitus without complication, unspecified  whether long term insulin use (H)      To use to test glucose level in the blood Use to test blood sugar  3  times daily as directed. To accompany glucose monitor brands per insurance coverage.  Quantity: 200 each  Refills: 1     blood glucose monitoring meter device kit  Commonly known as: NO BRAND SPECIFIED  Used for: Type 2 diabetes mellitus without complication, unspecified whether long term insulin use (H)      Use as directed Per insurance coverage  Quantity: 1 kit  Refills: 0     blood glucose test strip  Commonly known as: NO BRAND SPECIFIED  Used for: Type 2 diabetes mellitus without complication, unspecified whether long term insulin use (H)      To use to test glucose level in the blood Use to test blood sugar  3  times daily as directed. To accompany glucose monitor brands per insurance coverage.  Quantity: 300 strip  Refills: 0     insulin pen needle 32G X 4 MM miscellaneous  Commonly known as: 32G X 4 MM  Used for: Type 2 diabetes mellitus without complication, unspecified whether long term insulin use (H)      Use as directed by provider Per insurance coverage  Quantity: 200 each  Refills: 1     Sharps Container Misc  Used for: Type 2 diabetes mellitus without complication, unspecified whether long term insulin use (H)      Use as directed to dispose of needles, lancets and other sharps  Quantity: 1 each  Refills: 1       Past medical history, past surgical history, medications, and allergies were reviewed with the patient. Additional pertinent items: None    Family History   Problem Relation Age of Onset    Breast Cancer Mother         in her 50s.    Diabetes Mother     Breast Cancer Maternal Grandmother         in her 40s.    Diabetes Maternal Grandmother        Social History     Tobacco Use    Smoking status: Never    Smokeless tobacco: Never   Substance Use Topics    Alcohol use: No     Social history was reviewed with the patient. Additional pertinent items: None    No Known Allergies    Review  "of Systems  Patient states and admits to some constipation recently  A medically appropriate review of systems was performed with pertinent positives and negatives noted in the HPI, and all other systems negative.    Physical Exam   BP: (!) 151/103 (patient did not take her BP medications today)  Pulse: 83  Temp: 98.2  F (36.8  C)  Resp: 14  Height: 175.3 cm (5' 9\")  Weight: 87.5 kg (193 lb)  SpO2: 99 %      Physical Exam  Vitals and nursing note reviewed.   Constitutional:       Comments: Ambulatory and conversant   HENT:      Head: Atraumatic.   Eyes:      Extraocular Movements: Extraocular movements intact.      Pupils: Pupils are equal, round, and reactive to light.   Pulmonary:      Breath sounds: Normal breath sounds.   Abdominal:      Comments: Soft with tenderness in the right lower quadrant above the pelvis    No CVA tenderness; no right upper quadrant tenderness   Neurological:      General: No focal deficit present.      Mental Status: She is alert and oriented to person, place, and time.   Psychiatric:         Mood and Affect: Mood normal.         ED Course     Orders Placed This Encounter   Procedures    CT Abdomen Pelvis w Contrast    Comprehensive metabolic panel    UA with Microscopic reflex to Culture    HCG qualitative Blood    CBC with platelets and differential    Peripheral IV: Standard    Nursing to Consult for Vascular Access Care    CBC with platelets differential          Procedures         Results for orders placed or performed during the hospital encounter of 03/17/25 (from the past 24 hours)   CBC with platelets differential    Narrative    The following orders were created for panel order CBC with platelets differential.  Procedure                               Abnormality         Status                     ---------                               -----------         ------                     CBC with platelets and ...[[id number]]  Abnormal            Final result                 Please " view results for these tests on the individual orders.   Comprehensive metabolic panel   Result Value Ref Range    Sodium 132 (L) 135 - 145 mmol/L    Potassium 5.4 (H) 3.4 - 5.3 mmol/L    Carbon Dioxide (CO2) 24 22 - 29 mmol/L    Anion Gap 13 7 - 15 mmol/L    Urea Nitrogen 17.8 6.0 - 20.0 mg/dL    Creatinine 0.75 0.51 - 0.95 mg/dL    GFR Estimate >90 >60 mL/min/1.73m2    Calcium 10.3 8.8 - 10.4 mg/dL    Chloride 95 (L) 98 - 107 mmol/L    Glucose 227 (H) 70 - 99 mg/dL    Alkaline Phosphatase 226 (H) 40 - 150 U/L     (H) 0 - 45 U/L     (H) 0 - 50 U/L    Protein Total 8.9 (H) 6.4 - 8.3 g/dL    Albumin 4.4 3.5 - 5.2 g/dL    Bilirubin Total 0.7 <=1.2 mg/dL   HCG qualitative Blood   Result Value Ref Range    hCG Serum Qualitative Negative Negative   CBC with platelets and differential   Result Value Ref Range    WBC Count 11.2 (H) 4.0 - 11.0 10e3/uL    RBC Count 5.07 3.80 - 5.20 10e6/uL    Hemoglobin 13.8 11.7 - 15.7 g/dL    Hematocrit 40.6 35.0 - 47.0 %    MCV 80 78 - 100 fL    MCH 27.2 26.5 - 33.0 pg    MCHC 34.0 31.5 - 36.5 g/dL    RDW 14.6 10.0 - 15.0 %    Platelet Count 283 150 - 450 10e3/uL    % Neutrophils 63 %    % Lymphocytes 28 %    % Monocytes 5 %    % Eosinophils 3 %    % Basophils 0 %    % Immature Granulocytes 0 %    NRBCs per 100 WBC 0 <1 /100    Absolute Neutrophils 7.1 1.6 - 8.3 10e3/uL    Absolute Lymphocytes 3.2 0.8 - 5.3 10e3/uL    Absolute Monocytes 0.6 0.0 - 1.3 10e3/uL    Absolute Eosinophils 0.3 0.0 - 0.7 10e3/uL    Absolute Basophils 0.0 0.0 - 0.2 10e3/uL    Absolute Immature Granulocytes 0.0 <=0.4 10e3/uL    Absolute NRBCs 0.0 10e3/uL   UA with Microscopic reflex to Culture    Specimen: Urine, Clean Catch   Result Value Ref Range    Color Urine Yellow Colorless, Straw, Light Yellow, Yellow    Appearance Urine Clear Clear    Glucose Urine >=1000 (A) Negative mg/dL    Bilirubin Urine Negative Negative    Ketones Urine Negative Negative mg/dL    Specific Gravity Urine 1.035 1.003 -  1.035    Blood Urine Negative Negative    pH Urine 6.0 5.0 - 7.0    Protein Albumin Urine Negative Negative mg/dL    Urobilinogen Urine Normal Normal, 2.0 mg/dL    Nitrite Urine Negative Negative    Leukocyte Esterase Urine Negative Negative    Mucus Urine Present (A) None Seen /LPF    RBC Urine 1 <=2 /HPF    WBC Urine 1 <=5 /HPF    Squamous Epithelials Urine 7 (H) <=1 /HPF    Narrative    Urine Culture not indicated   CT Abdomen Pelvis w Contrast    Narrative    EXAM: CT ABDOMEN PELVIS W CONTRAST  LOCATION: [facility]  DATE: 3/17/2025    INDICATION: Right lower quadrant abdominal pain.  COMPARISON: CT abdomen pelvis 2/26/2025.  TECHNIQUE: CT scan of the abdomen and pelvis was performed following injection of IV contrast. Multiplanar reformats were obtained. Dose reduction techniques were used.  CONTRAST: 90 mL Isovue 370    FINDINGS:     LOWER CHEST: Small hiatal hernia.    HEPATOBILIARY: Status post cholecystectomy with unchanged postcholecystectomy biliary ductal ectasia. No focal liver lesions.    PANCREAS: Normal.    SPLEEN: Normal.    ADRENAL GLANDS: Normal.    KIDNEYS/BLADDER: Symmetric renal enhancement. No urinary calculi or hydronephrosis. Normal bladder.    BOWEL: No bowel obstruction or inflammation. Normal appendix.    LYMPH NODES: No enlarged lymph nodes.    VASCULATURE: Patent portal, splenic, and superior mesenteric veins. Mild aortobiiliac atherosclerosis. No abdominal aortic aneurysm.    PELVIC ORGANS: Normal.    MUSCULOSKELETAL: Small fat-containing periumbilical hernia. Multilevel degenerative changes of the spine. Partial fusion of the L4-L5 vertebral bodies. No acute bony abnormality.      Impression    IMPRESSION:     1.  No appendicitis or other acute abnormality.    2.  Small hiatal hernia.         Critical care was not performed.     Medical Decision Making  The patient's presentation was of moderate complexity (an acute illness with systemic  symptoms).    The patient's evaluation involved:  ordering and/or review of 3+ test(s) in this encounter (see above)    The patient's management necessitated moderate risk (prescription drug management including medications given in the ED).      Assessments & Plan (with Medical Decision Making)     I have reviewed the nursing notes.    Patient abdominal CT is otherwise negative and at this time she will be sent home with the medication below    I have reviewed the findings, diagnosis, plan and need for follow up with the patient.      New Prescriptions    LACTULOSE 20 GM/30ML SOLUTION    Take 30 mLs by mouth 3 times daily for 5 days.    ONDANSETRON (ZOFRAN ODT) 4 MG ODT TAB    Take 1 tablet (4 mg) by mouth every 6 hours as needed for nausea or vomiting.       Final diagnoses:   Right lower quadrant abdominal pain     Home to rest today    Fill your prescriptions and take as directed    Clear liquids today and then advance diet as tolerated tomorrow    Please make an appointment to follow up with Your Primary Care Provider in 7-10 days unless symptoms completely resolve.      JUWAN LAND MD    3/17/2025   [facility] EMERGENCY DEPARTMENT          Juwan Land MD  03/17/25 [id number]

## 2025-03-17 NOTE — Clinical Note
Yun Coyle was seen and treated in our emergency department on 3/17/2025.  She may return to work on 03/19/2025.       If you have any questions or concerns, please don't hesitate to call.      Calvin Davis MD

## 2025-03-17 NOTE — ED TRIAGE NOTES
Patient presents due 8/10 sharp constant right lower abdominal pain; present since 0500 today; accompanied by nausea.   Triage Assessment (Adult)       Row Name 03/17/25 0943          Triage Assessment    Airway WDL WDL        Respiratory WDL    Respiratory WDL WDL        Skin Circulation/Temperature WDL    Skin Circulation/Temperature WDL WDL        Cardiac WDL    Cardiac WDL WDL        Cognitive/Neuro/Behavioral WDL    Cognitive/Neuro/Behavioral WDL WDL

## 2025-03-18 ENCOUNTER — TELEPHONE (OUTPATIENT)
Dept: BEHAVIORAL HEALTH | Facility: CLINIC | Age: 50
End: 2025-03-18
Payer: COMMERCIAL

## 2025-03-18 PROBLEM — R10.31 RIGHT LOWER QUADRANT ABDOMINAL PAIN: Status: ACTIVE | Noted: 2025-03-18

## 2025-03-18 PROBLEM — R45.851 SUICIDAL IDEATION: Status: ACTIVE | Noted: 2024-11-16

## 2025-03-18 LAB
ALBUMIN SERPL BCG-MCNC: 4.2 G/DL (ref 3.5–5.2)
ALP SERPL-CCNC: 215 U/L (ref 40–150)
ALT SERPL W P-5'-P-CCNC: 178 U/L (ref 0–50)
ANION GAP SERPL CALCULATED.3IONS-SCNC: 14 MMOL/L (ref 7–15)
AST SERPL W P-5'-P-CCNC: 137 U/L (ref 0–45)
ATRIAL RATE - MUSE: 88 BPM
BASE EXCESS BLDV CALC-SCNC: 2.9 MMOL/L (ref -3–3)
BILIRUB SERPL-MCNC: 0.4 MG/DL
BUN SERPL-MCNC: 16.1 MG/DL (ref 6–20)
CALCIUM SERPL-MCNC: 9.6 MG/DL (ref 8.8–10.4)
CHLORIDE SERPL-SCNC: 93 MMOL/L (ref 98–107)
CREAT SERPL-MCNC: 0.68 MG/DL (ref 0.51–0.95)
DIASTOLIC BLOOD PRESSURE - MUSE: NORMAL MMHG
EGFRCR SERPLBLD CKD-EPI 2021: >90 ML/MIN/1.73M2
ERYTHROCYTE [DISTWIDTH] IN BLOOD BY AUTOMATED COUNT: 14.6 % (ref 10–15)
EST. AVERAGE GLUCOSE BLD GHB EST-MCNC: 217 MG/DL
GLUCOSE BLDC GLUCOMTR-MCNC: 220 MG/DL (ref 70–99)
GLUCOSE BLDC GLUCOMTR-MCNC: 224 MG/DL (ref 70–99)
GLUCOSE BLDC GLUCOMTR-MCNC: 242 MG/DL (ref 70–99)
GLUCOSE BLDC GLUCOMTR-MCNC: 243 MG/DL (ref 70–99)
GLUCOSE BLDC GLUCOMTR-MCNC: 244 MG/DL (ref 70–99)
GLUCOSE BLDC GLUCOMTR-MCNC: 256 MG/DL (ref 70–99)
GLUCOSE SERPL-MCNC: 307 MG/DL (ref 70–99)
HBA1C MFR BLD: 9.2 %
HCO3 BLDV-SCNC: 29 MMOL/L (ref 21–28)
HCO3 SERPL-SCNC: 24 MMOL/L (ref 22–29)
HCT VFR BLD AUTO: 39 % (ref 35–47)
HGB BLD-MCNC: 13.1 G/DL (ref 11.7–15.7)
INR PPP: 0.95 (ref 0.85–1.15)
INTERPRETATION ECG - MUSE: NORMAL
LACTATE SERPL-SCNC: 1.8 MMOL/L (ref 0.7–2)
MCH RBC QN AUTO: 27 PG (ref 26.5–33)
MCHC RBC AUTO-ENTMCNC: 33.6 G/DL (ref 31.5–36.5)
MCV RBC AUTO: 80 FL (ref 78–100)
O2/TOTAL GAS SETTING VFR VENT: 21 %
OXYHGB MFR BLDV: 77 % (ref 70–75)
P AXIS - MUSE: 45 DEGREES
PCO2 BLDV: 49 MM HG (ref 40–50)
PH BLDV: 7.38 [PH] (ref 7.32–7.43)
PLATELET # BLD AUTO: 239 10E3/UL (ref 150–450)
PO2 BLDV: 45 MM HG (ref 25–47)
POTASSIUM SERPL-SCNC: 4.5 MMOL/L (ref 3.4–5.3)
PR INTERVAL - MUSE: 136 MS
PROT SERPL-MCNC: 8.8 G/DL (ref 6.4–8.3)
QRS DURATION - MUSE: 80 MS
QT - MUSE: 392 MS
QTC - MUSE: 474 MS
R AXIS - MUSE: -25 DEGREES
RBC # BLD AUTO: 4.86 10E6/UL (ref 3.8–5.2)
SAO2 % BLDV: 78.5 % (ref 70–75)
SODIUM SERPL-SCNC: 131 MMOL/L (ref 135–145)
SYSTOLIC BLOOD PRESSURE - MUSE: NORMAL MMHG
T AXIS - MUSE: 11 DEGREES
TROPONIN T SERPL HS-MCNC: 11 NG/L
TROPONIN T SERPL HS-MCNC: 9 NG/L
VENTRICULAR RATE- MUSE: 88 BPM
WBC # BLD AUTO: 6.2 10E3/UL (ref 4–11)

## 2025-03-18 PROCEDURE — 124N000002 HC R&B MH UMMC

## 2025-03-18 PROCEDURE — 250N000013 HC RX MED GY IP 250 OP 250 PS 637: Performed by: PSYCHIATRY & NEUROLOGY

## 2025-03-18 PROCEDURE — 93005 ELECTROCARDIOGRAM TRACING: CPT

## 2025-03-18 PROCEDURE — 250N000013 HC RX MED GY IP 250 OP 250 PS 637: Performed by: EMERGENCY MEDICINE

## 2025-03-18 PROCEDURE — 82805 BLOOD GASES W/O2 SATURATION: CPT | Performed by: PHYSICIAN ASSISTANT

## 2025-03-18 PROCEDURE — 250N000012 HC RX MED GY IP 250 OP 636 PS 637: Performed by: EMERGENCY MEDICINE

## 2025-03-18 PROCEDURE — 99223 1ST HOSP IP/OBS HIGH 75: CPT | Mod: AI | Performed by: PSYCHIATRY & NEUROLOGY

## 2025-03-18 PROCEDURE — 36415 COLL VENOUS BLD VENIPUNCTURE: CPT | Performed by: PHYSICIAN ASSISTANT

## 2025-03-18 PROCEDURE — 85610 PROTHROMBIN TIME: CPT | Performed by: PHYSICIAN ASSISTANT

## 2025-03-18 PROCEDURE — 99207 PR NO CHARGE LOS: CPT

## 2025-03-18 PROCEDURE — 250N000012 HC RX MED GY IP 250 OP 636 PS 637: Performed by: PHYSICIAN ASSISTANT

## 2025-03-18 PROCEDURE — 250N000013 HC RX MED GY IP 250 OP 250 PS 637: Performed by: PHYSICIAN ASSISTANT

## 2025-03-18 PROCEDURE — 82962 GLUCOSE BLOOD TEST: CPT

## 2025-03-18 PROCEDURE — 83605 ASSAY OF LACTIC ACID: CPT | Performed by: PHYSICIAN ASSISTANT

## 2025-03-18 PROCEDURE — 80053 COMPREHEN METABOLIC PANEL: CPT | Performed by: PHYSICIAN ASSISTANT

## 2025-03-18 PROCEDURE — 85018 HEMOGLOBIN: CPT | Performed by: PHYSICIAN ASSISTANT

## 2025-03-18 PROCEDURE — 84484 ASSAY OF TROPONIN QUANT: CPT | Performed by: PHYSICIAN ASSISTANT

## 2025-03-18 RX ORDER — ONDANSETRON 4 MG/1
4 TABLET, FILM COATED ORAL EVERY 6 HOURS PRN
Status: ACTIVE | OUTPATIENT
Start: 2025-03-18

## 2025-03-18 RX ORDER — NALOXONE HYDROCHLORIDE 0.4 MG/ML
0.4 INJECTION, SOLUTION INTRAMUSCULAR; INTRAVENOUS; SUBCUTANEOUS
Status: ACTIVE | OUTPATIENT
Start: 2025-03-18

## 2025-03-18 RX ORDER — CLONIDINE HYDROCHLORIDE 0.1 MG/1
0.1 TABLET ORAL 2 TIMES DAILY PRN
Status: DISCONTINUED | OUTPATIENT
Start: 2025-03-18 | End: 2025-03-18

## 2025-03-18 RX ORDER — ACETAMINOPHEN 325 MG/1
650 TABLET ORAL EVERY 8 HOURS PRN
Status: ACTIVE | OUTPATIENT
Start: 2025-03-18

## 2025-03-18 RX ORDER — DEXTROSE MONOHYDRATE 25 G/50ML
25-50 INJECTION, SOLUTION INTRAVENOUS
Status: DISCONTINUED | OUTPATIENT
Start: 2025-03-18 | End: 2025-03-18

## 2025-03-18 RX ORDER — CLONIDINE HYDROCHLORIDE 0.1 MG/1
0.1 TABLET ORAL 2 TIMES DAILY
Status: DISCONTINUED | OUTPATIENT
Start: 2025-03-18 | End: 2025-03-18

## 2025-03-18 RX ORDER — OLANZAPINE 10 MG/2ML
10 INJECTION, POWDER, FOR SOLUTION INTRAMUSCULAR 3 TIMES DAILY PRN
Status: ACTIVE | OUTPATIENT
Start: 2025-03-18

## 2025-03-18 RX ORDER — NALOXONE HYDROCHLORIDE 0.4 MG/ML
0.2 INJECTION, SOLUTION INTRAMUSCULAR; INTRAVENOUS; SUBCUTANEOUS
Status: ACTIVE | OUTPATIENT
Start: 2025-03-18

## 2025-03-18 RX ORDER — GABAPENTIN 100 MG/1
100 CAPSULE ORAL EVERY 6 HOURS PRN
Status: DISPENSED | OUTPATIENT
Start: 2025-03-18

## 2025-03-18 RX ORDER — NICOTINE POLACRILEX 4 MG
15 LOZENGE BUCCAL
Status: DISCONTINUED | OUTPATIENT
Start: 2025-03-18 | End: 2025-03-18

## 2025-03-18 RX ORDER — LOSARTAN POTASSIUM 25 MG/1
25 TABLET ORAL DAILY
Status: DISPENSED | OUTPATIENT
Start: 2025-03-19

## 2025-03-18 RX ORDER — NICOTINE POLACRILEX 4 MG
15-30 LOZENGE BUCCAL
Status: DISCONTINUED | OUTPATIENT
Start: 2025-03-18 | End: 2025-03-18

## 2025-03-18 RX ORDER — HYDROXYZINE HYDROCHLORIDE 25 MG/1
25-50 TABLET, FILM COATED ORAL EVERY 4 HOURS PRN
Status: DISPENSED | OUTPATIENT
Start: 2025-03-18

## 2025-03-18 RX ORDER — AMOXICILLIN 250 MG
1 CAPSULE ORAL 2 TIMES DAILY PRN
Status: ACTIVE | OUTPATIENT
Start: 2025-03-18

## 2025-03-18 RX ORDER — MAGNESIUM HYDROXIDE/ALUMINUM HYDROXICE/SIMETHICONE 120; 1200; 1200 MG/30ML; MG/30ML; MG/30ML
30 SUSPENSION ORAL ONCE
Status: COMPLETED | OUTPATIENT
Start: 2025-03-18 | End: 2025-03-18

## 2025-03-18 RX ORDER — DEXTROSE MONOHYDRATE 25 G/50ML
25-50 INJECTION, SOLUTION INTRAVENOUS
Status: ACTIVE | OUTPATIENT
Start: 2025-03-18

## 2025-03-18 RX ORDER — NICOTINE POLACRILEX 4 MG
15-30 LOZENGE BUCCAL
Status: ACTIVE | OUTPATIENT
Start: 2025-03-18

## 2025-03-18 RX ORDER — TRAZODONE HYDROCHLORIDE 50 MG/1
50 TABLET ORAL
Status: DISPENSED | OUTPATIENT
Start: 2025-03-18

## 2025-03-18 RX ORDER — OLANZAPINE 10 MG/1
10 TABLET ORAL 3 TIMES DAILY PRN
Status: DISPENSED | OUTPATIENT
Start: 2025-03-18

## 2025-03-18 RX ORDER — METHADONE HYDROCHLORIDE 10 MG/ML
100 CONCENTRATE ORAL DAILY
Status: DISPENSED | OUTPATIENT
Start: 2025-03-18

## 2025-03-18 RX ORDER — LOSARTAN POTASSIUM 25 MG/1
25 TABLET ORAL DAILY
Status: DISCONTINUED | OUTPATIENT
Start: 2025-03-18 | End: 2025-03-18

## 2025-03-18 RX ORDER — PRAZOSIN HYDROCHLORIDE 2 MG/1
2 CAPSULE ORAL AT BEDTIME
Status: DISPENSED | OUTPATIENT
Start: 2025-03-18

## 2025-03-18 RX ORDER — CLONIDINE HYDROCHLORIDE 0.1 MG/1
0.1 TABLET ORAL 2 TIMES DAILY PRN
Status: ACTIVE | OUTPATIENT
Start: 2025-03-18

## 2025-03-18 RX ADMIN — INSULIN GLARGINE 30 UNITS: 100 INJECTION, SOLUTION SUBCUTANEOUS at 22:22

## 2025-03-18 RX ADMIN — LACTULOSE 20 G: 20 POWDER, FOR SOLUTION ORAL at 10:01

## 2025-03-18 RX ADMIN — OLANZAPINE 10 MG: 10 TABLET, FILM COATED ORAL at 14:47

## 2025-03-18 RX ADMIN — OLANZAPINE 10 MG: 10 TABLET, FILM COATED ORAL at 22:14

## 2025-03-18 RX ADMIN — METHADONE HYDROCHLORIDE 100 MG: 10 CONCENTRATE ORAL at 14:47

## 2025-03-18 RX ADMIN — INSULIN ASPART 3 UNITS: 100 INJECTION, SOLUTION INTRAVENOUS; SUBCUTANEOUS at 18:21

## 2025-03-18 RX ADMIN — LOSARTAN POTASSIUM 25 MG: 25 TABLET, FILM COATED ORAL at 11:07

## 2025-03-18 RX ADMIN — LACTULOSE 20 G: 20 POWDER, FOR SOLUTION ORAL at 13:17

## 2025-03-18 RX ADMIN — INSULIN ASPART 2 UNITS: 100 INJECTION, SOLUTION INTRAVENOUS; SUBCUTANEOUS at 13:17

## 2025-03-18 RX ADMIN — PRAZOSIN HYDROCHLORIDE 2 MG: 2 CAPSULE ORAL at 22:20

## 2025-03-18 RX ADMIN — INSULIN ASPART 3 UNITS: 100 INJECTION, SOLUTION INTRAVENOUS; SUBCUTANEOUS at 08:31

## 2025-03-18 RX ADMIN — ESCITALOPRAM OXALATE 30 MG: 20 TABLET ORAL at 08:32

## 2025-03-18 RX ADMIN — ALUMINUM HYDROXIDE, MAGNESIUM HYDROXIDE, AND SIMETHICONE 30 ML: 200; 200; 20 SUSPENSION ORAL at 15:01

## 2025-03-18 ASSESSMENT — ACTIVITIES OF DAILY LIVING (ADL)
ADLS_ACUITY_SCORE: 48
DRESS: INDEPENDENT
DIFFICULTY_EATING/SWALLOWING: NO
ADLS_ACUITY_SCORE: 58
TOILETING_ISSUES: NO
HYGIENE/GROOMING: INDEPENDENT
ADLS_ACUITY_SCORE: 58
ADLS_ACUITY_SCORE: 58
ADLS_ACUITY_SCORE: 48
ADLS_ACUITY_SCORE: 68
LAUNDRY: WITH SUPERVISION
ORAL_HYGIENE: INDEPENDENT
WALKING_OR_CLIMBING_STAIRS_DIFFICULTY: NO
ADLS_ACUITY_SCORE: 58
ADLS_ACUITY_SCORE: 48
HYGIENE/GROOMING: INDEPENDENT
ADLS_ACUITY_SCORE: 58
ADLS_ACUITY_SCORE: 58
ADLS_ACUITY_SCORE: 48
FALL_HISTORY_WITHIN_LAST_SIX_MONTHS: NO
ADLS_ACUITY_SCORE: 48
CHANGE_IN_FUNCTIONAL_STATUS_SINCE_ONSET_OF_CURRENT_ILLNESS/INJURY: NO
HEARING_DIFFICULTY_OR_DEAF: NO
ADLS_ACUITY_SCORE: 58
ADLS_ACUITY_SCORE: 58
LAUNDRY: WITH SUPERVISION
DRESSING/BATHING_DIFFICULTY: NO
CONCENTRATING,_REMEMBERING_OR_MAKING_DECISIONS_DIFFICULTY: NO
DRESS: INDEPENDENT
ADLS_ACUITY_SCORE: 48
ADLS_ACUITY_SCORE: 48
DIFFICULTY_COMMUNICATING: NO
ADLS_ACUITY_SCORE: 58
DOING_ERRANDS_INDEPENDENTLY_DIFFICULTY: YES
ADLS_ACUITY_SCORE: 68
ORAL_HYGIENE: INDEPENDENT

## 2025-03-18 ASSESSMENT — LIFESTYLE VARIABLES: SKIP TO QUESTIONS 9-10: 1

## 2025-03-18 ASSESSMENT — PATIENT HEALTH QUESTIONNAIRE - PHQ9: SUM OF ALL RESPONSES TO PHQ9 QUESTIONS 1 & 2: 6

## 2025-03-18 NOTE — PLAN OF CARE
"   03/18/25 1506   Patient Belongings   Did you bring any home meds/supplements to the hospital?  Yes   Patient Belongings other (see comments)   Patient Belongings Put in Hospital Secure Location (Security or Locker, etc.) other (see comments)   Belongings Search Yes   Clothing Search Yes   Second Staff Tatiana     In locker #8:  Blue shirt  Pink bra  Black underwear  Black socks  Gray sweatpants  Three keys and small pocket tool on key ring  Cell phone  Charging cord  Twins scarf  Black box containing receipts, photos, medical cards  Cream sweatshirt w/earbuds in pocket  Black zip up jacket w/earbuds in pocket  Black shoes  In card mcintyre: [place] ID, \"Motion Picture Use Only\" $100 dollar bill    Sent to security:  $23 cash  [place] EBT [id number]   debit [id number]   debit [id number]  Visa debit [id number]    Meds given to nurse     A               Admission:  I am responsible for any personal items that are not sent to the safe or pharmacy.  [facility] is not responsible for loss, theft or damage of any property in my possession.    Signature:  _________________________________ Date: _______  Time: _____                                              Staff Signature:  ____________________________ Date: ________  Time: _____      2nd Staff person, if patient is unable/unwilling to sign:    Signature: ________________________________ Date: ________  Time: _____     Discharge:  [facility] has returned all of my personal belongings:    Signature: _________________________________ Date: ________  Time: _____                                          Staff Signature:  ____________________________ Date: ________  Time: _____     "

## 2025-03-18 NOTE — ED PROVIDER NOTES
"     Emergency Department Patient Sign-out       Brief HPI:  This is a 49 year old female signed out to me by  *** .  See initial ED Provider note for details of the presentation.     {edsignoutdetails:[id number]::\" \"}      Significant Events prior to my assuming care: ***    DEC Report: The patient has been depressed for the past few months but over the past few weeks she has developed suicidal ideation. In the past week her thoughts of suicide have been constant and more intense. She now has a plan to overdose on fentanyl. She has been trying to obtain fentanyl but has been nervous about it. She states that if she leaves the hospital she may act on her plan. She does smoke marijuana a few times a week and is on methadone. She denies any other substances. She is willing to be admitted and DEC  thinks this is a good idea. She was inpatient in November but once she was discharge she did not follow up.     Exam:   Patient Vitals for the past 24 hrs:   BP Temp Temp src Pulse Resp SpO2 Height Weight   03/17/25 0944 (!) 151/103 98.2  F (36.8  C) Oral 83 14 99 % -- --   03/17/25 0941 -- -- -- -- -- -- 1.753 m (5' 9\") 87.5 kg (193 lb)           ED RESULTS:   Results for orders placed or performed during the hospital encounter of 03/17/25 (from the past 24 hours)   Nursing to Consult for Vascular Access Care     Status: None ()    Collection Time: 03/17/25 10:38 AM    Narrative    Edmundo Ceballos, RN     3/17/2025 11:46 AM  Consult received for Vascular access care.  See LDA for details.   For additional needs place \"Nursing to Consult for Vascular   Access\" [id number] order in EPIC.    CBC with platelets differential     Status: Abnormal    Collection Time: 03/17/25 11:31 AM    Narrative    The following orders were created for panel order CBC with platelets differential.  Procedure                               Abnormality         Status                     ---------                               -----------         " ------                     CBC with platelets and ...[[id number]]  Abnormal            Final result                 Please view results for these tests on the individual orders.   Comprehensive metabolic panel     Status: Abnormal    Collection Time: 03/17/25 11:31 AM   Result Value Ref Range    Sodium 132 (L) 135 - 145 mmol/L    Potassium 5.4 (H) 3.4 - 5.3 mmol/L    Carbon Dioxide (CO2) 24 22 - 29 mmol/L    Anion Gap 13 7 - 15 mmol/L    Urea Nitrogen 17.8 6.0 - 20.0 mg/dL    Creatinine 0.75 0.51 - 0.95 mg/dL    GFR Estimate >90 >60 mL/min/1.73m2    Calcium 10.3 8.8 - 10.4 mg/dL    Chloride 95 (L) 98 - 107 mmol/L    Glucose 227 (H) 70 - 99 mg/dL    Alkaline Phosphatase 226 (H) 40 - 150 U/L     (H) 0 - 45 U/L     (H) 0 - 50 U/L    Protein Total 8.9 (H) 6.4 - 8.3 g/dL    Albumin 4.4 3.5 - 5.2 g/dL    Bilirubin Total 0.7 <=1.2 mg/dL   HCG qualitative Blood     Status: Normal    Collection Time: 03/17/25 11:31 AM   Result Value Ref Range    hCG Serum Qualitative Negative Negative   CBC with platelets and differential     Status: Abnormal    Collection Time: 03/17/25 11:31 AM   Result Value Ref Range    WBC Count 11.2 (H) 4.0 - 11.0 10e3/uL    RBC Count 5.07 3.80 - 5.20 10e6/uL    Hemoglobin 13.8 11.7 - 15.7 g/dL    Hematocrit 40.6 35.0 - 47.0 %    MCV 80 78 - 100 fL    MCH 27.2 26.5 - 33.0 pg    MCHC 34.0 31.5 - 36.5 g/dL    RDW 14.6 10.0 - 15.0 %    Platelet Count 283 150 - 450 10e3/uL    % Neutrophils 63 %    % Lymphocytes 28 %    % Monocytes 5 %    % Eosinophils 3 %    % Basophils 0 %    % Immature Granulocytes 0 %    NRBCs per 100 WBC 0 <1 /100    Absolute Neutrophils 7.1 1.6 - 8.3 10e3/uL    Absolute Lymphocytes 3.2 0.8 - 5.3 10e3/uL    Absolute Monocytes 0.6 0.0 - 1.3 10e3/uL    Absolute Eosinophils 0.3 0.0 - 0.7 10e3/uL    Absolute Basophils 0.0 0.0 - 0.2 10e3/uL    Absolute Immature Granulocytes 0.0 <=0.4 10e3/uL    Absolute NRBCs 0.0 10e3/uL   UA with Microscopic reflex to Culture     Status:  Abnormal    Collection Time: 03/17/25 11:32 AM    Specimen: Urine, Clean Catch   Result Value Ref Range    Color Urine Yellow Colorless, Straw, Light Yellow, Yellow    Appearance Urine Clear Clear    Glucose Urine >=1000 (A) Negative mg/dL    Bilirubin Urine Negative Negative    Ketones Urine Negative Negative mg/dL    Specific Gravity Urine 1.035 1.003 - 1.035    Blood Urine Negative Negative    pH Urine 6.0 5.0 - 7.0    Protein Albumin Urine Negative Negative mg/dL    Urobilinogen Urine Normal Normal, 2.0 mg/dL    Nitrite Urine Negative Negative    Leukocyte Esterase Urine Negative Negative    Mucus Urine Present (A) None Seen /LPF    RBC Urine 1 <=2 /HPF    WBC Urine 1 <=5 /HPF    Squamous Epithelials Urine 7 (H) <=1 /HPF    Narrative    Urine Culture not indicated   CT Abdomen Pelvis w Contrast     Status: None    Collection Time: 03/17/25  1:56 PM    Narrative    EXAM: CT ABDOMEN PELVIS W CONTRAST  LOCATION: [facility]  DATE: 3/17/2025    INDICATION: Right lower quadrant abdominal pain.  COMPARISON: CT abdomen pelvis 2/26/2025.  TECHNIQUE: CT scan of the abdomen and pelvis was performed following injection of IV contrast. Multiplanar reformats were obtained. Dose reduction techniques were used.  CONTRAST: 90 mL Isovue 370    FINDINGS:     LOWER CHEST: Small hiatal hernia.    HEPATOBILIARY: Status post cholecystectomy with unchanged postcholecystectomy biliary ductal ectasia. No focal liver lesions.    PANCREAS: Normal.    SPLEEN: Normal.    ADRENAL GLANDS: Normal.    KIDNEYS/BLADDER: Symmetric renal enhancement. No urinary calculi or hydronephrosis. Normal bladder.    BOWEL: No bowel obstruction or inflammation. Normal appendix.    LYMPH NODES: No enlarged lymph nodes.    VASCULATURE: Patent portal, splenic, and superior mesenteric veins. Mild aortobiiliac atherosclerosis. No abdominal aortic aneurysm.    PELVIC ORGANS: Normal.    MUSCULOSKELETAL: Small  fat-containing periumbilical hernia. Multilevel degenerative changes of the spine. Partial fusion of the L4-L5 vertebral bodies. No acute bony abnormality.      Impression    IMPRESSION:     1.  No appendicitis or other acute abnormality.    2.  Small hiatal hernia.       ED MEDICATIONS:   Medications   ondansetron (ZOFRAN) injection 4 mg (4 mg Intravenous $Given 3/17/25 1208)   lactulose (CEPHULAC) Packet 20 g (20 g Oral $Given 3/17/25 2034)   sodium chloride 0.9% BOLUS 1,000 mL (0 mLs Intravenous Stopped 3/17/25 1405)   ketorolac (TORADOL) injection 15 mg (15 mg Intravenous $Given 3/17/25 1208)   iopamidol (ISOVUE-370) solution 100 mL (90 mLs Intravenous $Given 3/17/25 1348)   sodium chloride 0.9 % bag 500mL for CT scan flush use (50 mLs As instructed $Given 3/17/25 1349)   alum & mag hydroxide-simethicone (MAALOX) suspension 30 mL (30 mLs Oral $Given 3/17/25 1428)   lidocaine (viscous) (XYLOCAINE) 2 % solution 10 mL (10 mLs Mouth/Throat $Given 3/17/25 1428)         Impression:    ICD-10-CM    1. Right lower quadrant abdominal pain  R10.31           Plan:    Pending studies include ***.        Jacey Gonzalez                        Scheduling Type..: Person, MD

## 2025-03-18 NOTE — H&P
[facility], [place]   Psychiatric History and Physical.    Chief complaint and reason for admission:     Depression, Suicidal ideation.    History of present illness: per ED note:    Yun Coyle presents to the ED by  self. Patient is presenting to the ED for the following concerns: Depression, Suicidal ideation, Anxiety, Cannabis and Fentanyl use and Health stressors. Factors that make the mental health crisis life threatening or complex are: Pt presents to [facility] ED for an evaluation that appears to be initially for physical concerns including abdominal pain and nauseau that's started 5AM today and woke her from sleep, but pt then later divulged worsening SI prompting DEC assessment. At the time of assessment, pt was agreeable to meeting. She reports for the past few months she has been experiencing worsening symptoms of depression. She reports she has been feeling hopeless and over the past week has had worsening suicidal thoughts. She reports that she started to think of plans and this scared her. She reports she has been thinking about overdosing on fentanyl and has been trying to aquire this. She reports SI is constant and daily over the past week. She is not able to identify any recent stressors she feels may be contributing to her symptoms. She denies any HI, NSSI, AH, VH. She does endorse marijuana use 3-4x per week. She reports that she has not been sleeping or eating very much over the past month due to stomach pain, nauseau and depression. She does not feel she can be safe outside of the hospital at this time as she feels she would act on these thoughts. She reports she is not currently taking any medication other than methadone and does not have any current outpatient providers.     Current Anxiety Symptoms:  anxious, excessive worry  Current Depression/Trauma:  helplessness, hopelessness, sadness, difficulty concentrating, thoughts of death/suicide  Current Somatic  Symptoms:  anxious, excessive worry  Current Psychosis/Thought Disturbance:  impulsive  Current Eating Symptoms:  loss of appetite     Collateral Information    Is there collateral information: Yes      Collateral information name, relationship, phone number:  Samanta Coyle, daughter, [phone]     What happened today: She reports that she is barely at home so would not know of any MH concerns or changes.      What is different about patient's functioning: unknown      Has patient made comments about wanting to kill themselves/others: no     If d/c is recommended, can they take part in safety/aftercare planning:  Yes    During visit with this provider: Pt was dressed in hospital scrubs and appeared as stated age. She was oriented to self , time and place. Mood appeared sad with flat affect. She said that for the past two weeks she has had worsening depression, she has been unable to get out of bed, unable to shower, unable to eat or sleep (sleeps 3-4 hours), low energy, unable to focus. She endorsed having suicidal thoughts with plan to overdose on Fentanyl if she was discharged to go home. She contracted for safety in the unit. She also said that she is constantly worried about the future and her physical health. She noted that yesterday she had a panic attack where she was shaking, sweating, crying and vomiting. She further added that she experiences nightmares every night. She denied any auditory or visual hallucinations.  She said that she does not have any coping skills to utilize but her children are the reasons she wants to live. Instead she smokes Fentanyl to help with her pain and sleep on average couple of time per week. Pt said that she does not  take any Psych medications for the past 3 months. She is willing to stay here voluntarily until she gets better. We will connect patient with a CD treatment Counsellor. IM consult was placed to follow up with patient's medical needs.  Pt was encouraged to  "be in the milieu, attend groups, take medications as prescribed. We will start Zoloft 50 mg on 3/18/2025. All these recommendations were reviewed and OKd by the patient.      Past Psych History    Past diagnosis:  Anxiety Disorder, Depression, Suicide attempt(s), Personality Disorder, Substance Use Disorder, PTSD , MDD, SANDIE, PTSD, polysubstance use, panic attacks, agoraphobia and unspecified personality disorder - concerns previously noted for possible dependent personality and borderline personality traits. Most recent IP MH was at [facility] from 11/12/24-12/4/24 due to SI. Pt endorses a hx of 5 previous suicide attempts, all via overdose.   Past treatment:  Individual therapy, Case management, Probation/Court ordered treatment, Inpatient Hospitalization, Psychiatric Medication Management  Details of most recent treatment:  Hx of 3 previous residential CD tx. Hx of IP MH, most recently in November 2024. Pt reports she currently is taking methadone through [facility] in [place], but otherwise denies any established outpatient MH providers, treatments or medications at this time.    Family and Social History: no known family hx of mental illness.    Family:  Single, Has three Children age 29, 25, 20  Support System:  Children  Employment Status:  unemployed, disabled  Source of Income:  public assistance, disability  Financial Environmental Concerns:  none  Current Hobbies:  other (see comments), family functions (\"pacing\")  Barriers in Personal Life:  mental health concerns, emotional concerns, medical concerns    Substance Use History  Chemical Use History:  Alcohol: None  Benzodiazepines: None  Heroine: Yes. Last used 1 week ago  Opiates: None  Cocaine: None  Marijuana: Other (comments) (3-4x per week per pt, unknown last date of use)  Other Use: None  Withdrawal Symptoms:  (none reported)  Addictions:  (none reported)             Medications:     Current Facility-Administered Medications   Medication Dose Route " "Frequency Provider Last Rate Last Admin    empagliflozin (JARDIANCE) tablet 10 mg  10 mg Oral Daily Aramis Casas MD        methadone (DOLOPHINE-INTENSOL) 10 MG/ML (HIGH CONC) solution 100 mg  100 mg Oral Daily Anderson Blas MD        prazosin (MINIPRESS) capsule 2 mg  2 mg Oral At Bedtime Aramis Casas MD        sertraline (ZOLOFT) tablet 50 mg  50 mg Oral Daily Aramis Casas MD              Allergies:   No Known Allergies       Labs:     Recent Results (from the past 24 hours)   Glucose by meter    Collection Time: 03/18/25  3:46 AM   Result Value Ref Range    GLUCOSE BY METER POCT 224 (H) 70 - 99 mg/dL   Glucose by meter    Collection Time: 03/18/25  8:16 AM   Result Value Ref Range    GLUCOSE BY METER POCT 242 (H) 70 - 99 mg/dL   Glucose by meter    Collection Time: 03/18/25 12:50 PM   Result Value Ref Range    GLUCOSE BY METER POCT 220 (H) 70 - 99 mg/dL          Psychiatric Examination:     BP (!) 160/108   Pulse 83   Temp 98.7  F (37.1  C) (Oral)   Resp 18   Ht 1.753 m (5' 9\")   Wt 87.5 kg (193 lb)   LMP  (LMP Unknown)   SpO2 99%   BMI 28.50 kg/m    Weight is 193 lbs 0 oz  Body mass index is 28.5 kg/m .                                             Appearance: awake, alert and dressed in hospital scrubs  Attitude:  cooperative  Eye Contact:  good  Mood:  anxious, sad , and depressed  Affect:  mood congruent  Speech:  clear, coherent  Psychomotor Behavior:  no evidence of tardive dyskinesia, dystonia, or tics  Throught Process:  logical and linear  Associations:  no loose associations  Thought Content:  Endorsed suicidal thoughts with a plan to overdose on Fentanyl. Denied any auditory or visual hallucinations.  Insight:  fair  Judgement:  fair  Oriented to:  time, person, and place  Attention Span and Concentration:  fair  Recent and Remote Memory:  fair       Review of systems:     For physical examination and 12 point review of system please, see note of Calvin Duckworth " MD [place] from 3/17/2025   I reviewed that note and agree with it.         DIagnoses:     Unspecified depressive disorder (MDD recurrent vs bipolar affective type 2 depressed)  Suicidal ideation,   Post traumatic Stress disorder  Generalized Anxiety Disorder  Panic Disorder with agoraphobia    Opiate use disorder moderate severity, patient is on maintenance assisted therapy with methadone  Cannabis use disorder, severe  Benzodiazepine use disorder            Plan:     3/18/25 Start Zoloft 50 mg Daily    Pt is willing to stay here voluntarily until she gets better. We will connect patient with a CD treatment Counsellor. IM consult was placed to follow up with patient's medical needs.       I was present with PA student who participated in the service and in the documentation of the note. I have verified the history and personally performed the physical exam and medical decision making. I agree with the assessment and plan of care as documented in the note.     Aramis Casas MD  [facility]      Total time spent was 59 minutes. Over 50% of times was spent counseling and coordination of care regarding coping skills, medication and discharge planning.

## 2025-03-18 NOTE — TELEPHONE ENCOUNTER
R: [place]  Access Inpatient Bed Call Log 3/17/2025 @11:43 PM:  Intake has called facilities that have not updated the bed status within the last 12 hours.    (Adults);                     [facility] is posting 0 beds.            [facility] is posting 2 beds. [phone]: 11:50 PM CRN WILL CB.    [facility] is posting 0 beds. Negative covid required.    [facility] is posting 2 beds. Neg covid. No high school/Debbie-psych. [phone] 1:22 AM Per [place], no available beds.   [facility] is posting 0 beds. [phone]    [facility] is posting 0 bed. [phone]     [facility] is posting 0 beds. (Ages 18-35) Negative covid. [phone] 11:44 PM PM Per [place] NO YA, NO DUYEN, SOME ADOLESCENTS, NO CHILD BEDS.   [facility] is posting 0 beds.     [facility] is posting 1 bed [phone]     Pt remains on the work list pending appropriate bed availability.
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
## 2025-03-18 NOTE — CONSULTS
[facility]  Consult Note - Hospitalist Service  Date of Admission:  3/17/2025  Consult Requested by: psychiatry   Reason for Consult: medical co management     Assessment & Plan   Yun Coyle is a 49 year old female admitted on 3/17/2025. She has a hx of pancreatitis, GERD, type II DM, asthma, cauda equina syndrome, current admitted to psych unit.     # Type II DM   Home regimen (list as lantus 40 nightly but patient takes 30). Also does low dose correctional and carb coverage. No gap or ketones on admission.   - Lantus 30 at bedtime   - medium dose correctional   - carb coverage 1:10   - glucose checks tid and hs   - hold jaurdiance and metformin for now     # Acute abdomin pain   # Acute on chronic nausea   # Hx of pancreatitis   Patient has had intermittent nausea/vomiting for months which she is fairly certain is 2/2 cyclic vomiting syndrome iso of ongoing marijuana use. The abdominal pain she feels is more acute, though she has had multiple ED visits in the past for abdominal pain. CT of the abdomen in the emergency department did not reveal any pathology. On exam, she has diffuse tenderness without rebound or guarding. She feels generally unwell and diaphoretic.  - CMP, CBC, lactic acid, lipase, troponin  - zofran   - GI cocktail     # Hypertension  Continue PTA losartan 25 mg daily   - clonidine 0.1 mg bid prn for systolic > 180 (patient takes at home)     # Elevated LFT's  # Hepatitis C   LFT's have been chronically elevated. They have slowly trended up. She had a cholecystectomy in the past. CT on 2/17 largely unremarkable.   - CTM    # hx of QTC prolongation  - check EKG     # Hx of OUD  - PTA methadone    # Hx of hyponatremia   - unclear etiology  - monitor     # Hx of hyperkalemia   - unclear etiology  - monitor     # Hx of GERD  - not currently on PPI     # hx of cauda equina 2012 2/2 central disc bulge   Has some radicular pain down the right legs at  "times. Denies saddle anesthesia, lower extremity weakness, or incontinence.           Clinically Significant Risk Factors Present on Admission        # Hyperkalemia: Highest K = 5.4 mmol/L in last 2 days, will monitor as appropriate  # Hyponatremia: Lowest Na = 132 mmol/L in last 2 days, will monitor as appropriate  # Hypochloremia: Lowest Cl = 95 mmol/L in last 2 days, will monitor as appropriate          # Hypertension: Noted on problem list          # DMII: A1C = 9.2 % (Ref range: <5.7 %) within past 6 months    # Overweight: Estimated body mass index is 29.14 kg/m  as calculated from the following:    Height as of this encounter: 1.753 m (5' 9\").    Weight as of this encounter: 89.5 kg (197 lb 4.8 oz).       # Financial/Environmental Concerns: none  # Asthma: noted on problem list        Abraham Patel PA-C  Hospitalist Service  Securely message with [employer] (more info)  Text page via [facility] Paging/Directory   ______________________________________________________________________    Chief Complaint   Abdominal pain     History is obtained from the patient    History of Present Illness   Yun Coyle is a 49 year old female hx as above who is admitted to psych, I was asked to see the patient today due to abdominal pain and to order insulin.    Abdominal pain several days to week, along with intermittent nausea and vomiting. Ongoing marijuana use. Feels that she has cyclic vomiting syndrome.    Denies fevers or chills. Denies chest pain or shortness of breath. Denies urinary symptoms.     Patient uses marijuana, denies other drug use.     CBC yesterday showed white count of 11.2  CMP yesterday showed a sodium of 142, potassium of 5.4   Glucose 226   ,     CT in the ED yesterday no acute abnormalities      Past Medical History    Past Medical History:   Diagnosis Date    Acute pancreatitis 06/29/2017    Anxiety     Cauda equina syndrome (H) 03/29/2013    Cyst of right ovary 11/16/2017    " Depressive disorder     Gastroesophageal reflux disease     Hypertension     Hyponatremia 10/01/2023    Low back pain 08/20/2012    Type 2 diabetes mellitus (H)     Uncomplicated asthma        Past Surgical History   Past Surgical History:   Procedure Laterality Date    BACK SURGERY      x2    CHOLECYSTECTOMY      ORTHOPEDIC SURGERY      SALPINGO OOPHORECTOMY,R/L/ELDA Right        Medications   I have reviewed this patient's current medications       Review of Systems    The 10 point Review of Systems is negative other than noted in the HPI or here.    Physical Exam   Vital Signs: Temp: 98.7  F (37.1  C) Temp src: Oral BP: (!) 188/108 Pulse: 81   Resp: 18 SpO2: 98 % O2 Device: None (Room air)    Weight: 197 lbs 4.8 oz    General Appearance: Alert, no acute distress   HEENT: Anicteric sclera, MMM   Respiratory: Lungs CTAB, No wheezing , no crackles,   Cardiovascular: Regular rate , Regular rhythm , no murmur   GI: Abdomen soft, is diffusely tender, No guarding or rebound  Skin: No rash or jaundice on exposed skin   Musculoskeletal: No lower extremity edema   Neurologic: Oriented X 3 , CN II-XII grossly intact, Moves extremities equally       Medical Decision Making       60 MINUTES SPENT BY ME on the date of service doing chart review, history, exam, documentation & further activities per the note.      Data   ------------------------- PAST 24 HR DATA REVIEWED -----------------------------------------------

## 2025-03-18 NOTE — ED NOTES
Evaluation for BEC inclusion evaluation, Patient home medications require reconciliation, and ordering, glucose checks, diet order for care of patient on BEC.

## 2025-03-18 NOTE — ED NOTES
Patient arrived on the unit with transport, patient vs taken, welcome packet provided, orientated to unit bathroom and water fountain.  Belongings secured and placed in family room / locker area.   Patient denies HI/ AVH, contracts for safety.  SI thoughts present.

## 2025-03-18 NOTE — CONSULTS
Brief Psychiatry Note     Patient transferred to inpatient psychiatry prior to being seen by consult service today.     Joanna Priest, PMAMILCARP-BC  Consult/Liaison Psychiatry   [facility]

## 2025-03-18 NOTE — PLAN OF CARE
Yun Coyle  March 17, 2025  Plan of Care Hand-off Note     Patient Recommended Care Path: inpatient mental health    Clinical Substantiation:  It is the recommendation of this clinician that pt admit to [facility] for safety and stabilization. Pt displays the following risk factors that support IP admission: pt endorses worsening depression and suicidal thoughts with a plan to overdose on fentanyl. Pt is unable to engage in safety planning to mitigate risk level in a non-secure setting. Lower levels of care would not be sufficient in managing the level of risk pt is presenting with. Due to this IP is the least restrictive option of care for pt. Pt should remain in IP until deemed safe to return to the community and engage in OP MH supports. Pt will need assistance establishing OP MH services prior to discharge.    Goals for crisis stabilization:  stabilization and reduction of symptms - SI and depression    Next steps for Care Team:  Plan for IP  for safety and stabilization. Pt would likely benefit from establishing outpatient therapy and medication management upon discharge.    Treatment Objectives Addressed:  rapport building, orienting the patient to therapy, assessing safety, processing feelings    Therapeutic Interventions:  Explored strategies for self-soothing.    Has a specific means been identified for suicidal.homicide actions: Yes  If yes, describe: overdose on fentanyl.  Explain action steps toward mitigation: Pt agreeable to staying for IP  where she does not have access to means.  Document completion of mitigation action: Complete. Pt will stay for admission for safety and stabilization.  The follow up action still needed prior to discharge: Pt will need to engage in safety and aftercare planning prior to discharge.    Patient coping skills attempted to reduce the crisis:  Pt reports that talking to others and pacing can sometimes be helpful.       Imminent risk of harm: Suicidal  Behavior  Severe psychiatric, behavioral or other comorbid conditions are appropriate for management at inpatient mental health as indicated by at least one of the following: Psychiatric Symptoms  Severe dysfunction in daily living is present as indicated by at least one of the following: Other evidence of severe dysfunction  Situation and expectations are appropriate for inpatient care: Biopsychosocial stresses potentially contributing to clinical presentation (co morbidities) have been assessed and are absent or manageable at proposed level of care, Patient management/treatment at lower level of care is not feasible or is inappropriate  Inpatient mental health services are necessary to meet patient needs and at least one of the following: Specific condition related to admission diagnosis is present and judged likely to deteriorate in absence of treatment at proposed level of care      Collateral contact information:       Legal Status: Voluntary/Patient has signed consent for treatment                                                   Reviewed court records: yes     Psychiatry Consult:     NAN Andrade

## 2025-03-18 NOTE — TELEPHONE ENCOUNTER
R: [place]  Access Inpatient Bed Call Log 03/18/2025 @ 8:10 AM: (metro):  Intake has called facilities that have not updated the bed status within the last 12 hours.             [facility] is posting 0 beds.  [facility] is posting 2 beds. [phone] Per call @ 8:26 AM, at capacity.   [facility] ([facility]) is posting 0 beds.   [facility] is posting 0 beds. No [facility] or [facility]. [phone] Call after morning meeting. Per call at 9:22 am to Anabela, she will ask RN to call us back with availability.   [facility] ([facility]) is posting 0 beds.  [facility] () is posting 0 beds. [phone]   [facility] is posting 6 beds. Ages 18-35, labs required. [phone] Per Royal @ 8:15 AM, No YA beds.  [facility] ([facility]) is posting 0 beds.  [facility] ([facility]) is posting 1 bed. [phone]; Per Italia's call to Stephy at 9:06 am, call back after 10 am. Per call at 10:19 am to Martine, they are at cap for males but are reviewing a pt who is higher on the list than this pt and said if she is declined, then we can call back to possibly present pt.     Paged Bren at 10:13 am.  Per Irene at 10:39 am, he accepts pt for himself.  30/Bren accepted for himself; Tatiana on unit notified at 10:43 am; Tita in bec informed at 10:46 am          
Negative

## 2025-03-18 NOTE — ED NOTES
Received report from Baljit RN ED, Baljit to administer insulin due and rest of medications to be reviewed reconciled in am by pharmacy and MD.

## 2025-03-18 NOTE — MEDICATION SCRIBE - ADMISSION MEDICATION HISTORY
Medication Scribe Admission Medication History    Admission medication history is complete. The information provided in this note is only as accurate as the sources available at the time of the update.    Information Source(s): Patient and CareEverywhere/SureScripts via in-person    Pertinent Information:     Patient reports she has not been taking all medications on PTA for the past 3 months except daily methadone which she takes in [facility].  When asked if medications were stopped by provider, she states self stopping medications and filling meds.  Patient reports she has not been adherent to her insulin injection either. Last dose of methadone was 03/16/25.  Medications left on PTA.    Changes made to PTA medication list:    Added: None    Deleted: None    Changed: None    Allergies reviewed with patient and updates made in EHR: yes    Medication History Completed By: Fernanda Rivas 3/18/2025 8:24 AM    PTA Med List   Medication Sig Last Dose/Taking    Alcohol Swabs PADS Use to swab the area of the injection or marla as directed Per insurance coverage More than a month    blood glucose (NO BRAND SPECIFIED) lancets standard To use to test glucose level in the blood Use to test blood sugar  3  times daily as directed. To accompany glucose monitor brands per insurance coverage. More than a month    blood glucose (NO BRAND SPECIFIED) test strip To use to test glucose level in the blood Use to test blood sugar  3  times daily as directed. To accompany glucose monitor brands per insurance coverage. More than a month    blood glucose calibration (NO BRAND SPECIFIED) solution Used to calibrate the blood glucose monitor as needed and as directed.  To accompany  blood glucose brands per insurance coverage More than a month    blood glucose monitoring (NO BRAND SPECIFIED) meter device kit Use as directed Per insurance coverage More than a month    empagliflozin (JARDIANCE) 10 MG TABS tablet Take 1  tablet (10 mg) by mouth daily. More than a month    escitalopram (LEXAPRO) 10 MG tablet Take 3 tablets (30 mg) by mouth daily. More than a month    Glucagon (GVOKE HYPOPEN) 1 MG/0.2ML pen Inject the contents of 1 device under the skin into lower abdomen, outer thigh, or outer upper arm as needed for hypoglycemia. If no response after 15 minutes, additional 1 mg dose from a new device may be injected while waiting for emergency assistance. More than a month    lactulose 20 GM/30ML solution Take 30 mLs by mouth 3 times daily for 5 days. Taking    methadone (DOLOPHINE-INTENSOL) 10 MG/ML (HIGH CONC) solution Take 74 mg by mouth daily. 3/16/2025    ondansetron (ZOFRAN ODT) 4 MG ODT tab Take 1 tablet (4 mg) by mouth every 6 hours as needed for nausea or vomiting. Taking As Needed    cloNIDine (CATAPRES) 0.1 MG tablet Take 0.1 mg by mouth 2 times daily. More than a month    glucose (BD GLUCOSE) 4 g chewable tablet Take 4 tablets by mouth every 15 minutes as needed for low blood sugar. Unknown    glucose 40 % (400 mg/mL) gel 15 g every 15 minutes by mouth as needed for low blood sugar.  Oral gel is preferable for conscious and able to swallow patient. Unknown    hydrOXYzine HCl (ATARAX) 25 MG tablet Take 1-2 tablets (25-50 mg) by mouth every 4 hours as needed for anxiety. More than a month    insulin aspart (NOVOLOG PEN) 100 UNIT/ML pen Inject 1-10 Units subcutaneously 3 times daily (with meals). Do Not give Correction Insulin if Pre-Meal BG less than 140. For Pre-Meal  - 164 give 1 unit. For Pre-Meal  - 189 give 2 units. For Pre-Meal  - 214 give 3 units. For Pre-Meal  - 239 give 4 units. For Pre-Meal  - 264 give 5 units. For Pre-Meal  - 289 give 6 units. For Pre-Meal  - 314 give 7 units. For Pre-Meal  - 339 give 8 units. For Pre-Meal  - 364 give 9 units.  For Pre-Meal BG greater than or equal to 365 give 10 units More than a month    insulin aspart (NOVOLOG PEN) 100  UNIT/ML pen Inject 1-10 Units subcutaneously 3 times daily (with meals). Give 1 unit of insulin for each 10g of carbohydrates More than a month    insulin aspart (NOVOLOG PEN) 100 UNIT/ML pen Inject 1-7 Units subcutaneously at bedtime. Do Not give Bedtime Correction Insulin if BG less than 200. For  - 224 give 1 units. For  - 249 give 2 units. For  - 274 give 3 units. For  - 299 give 4 units. For  - 324 give 5 units. For  - 349 give 6 units. For BG greater than or equal to 350 give 7 units. More than a month    insulin glargine (LANTUS PEN) 100 UNIT/ML pen Inject 40 Units subcutaneously at bedtime. More than a month    insulin pen needle (32G X 4 MM) 32G X 4 MM miscellaneous Use as directed by provider Per insurance coverage More than a month    losartan (COZAAR) 25 MG tablet Take 1 tablet (25 mg) by mouth daily. More than a month    metFORMIN (GLUCOPHAGE) 500 MG tablet Take 2 tablets (1,000 mg) by mouth 2 times daily (with meals). More than a month    prazosin (MINIPRESS) 2 MG capsule Take 1 capsule (2 mg) by mouth at bedtime. More than a month    Sharps Container MISC Use as directed to dispose of needles, lancets and other sharps More than a month
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
## 2025-03-18 NOTE — CONSULTS
Diagnostic Evaluation Consultation  Crisis Assessment    Patient Name: Yun Coyle  Age:  49 year old  Legal Sex: female  Gender Identity: female  Pronouns:   Race: White  Ethnicity: Not  or   Language: English      Patient was assessed: In person   Crisis Assessment Start Date: 03/17/25  Crisis Assessment Start Time: 2040  Crisis Assessment Stop Time: 2111  Patient location: [facility] Emergency Department                             [facility]    Referral Data and Chief Complaint  Yun Coyle presents to the ED by  self. Patient is presenting to the ED for the following concerns: Depression, Suicidal ideation, Anxiety, Health stressors. Factors that make the mental health crisis life threatening or complex are: Pt presents to [facility] ED for an evaluation that appears to be initially for physical concerns including abdominal pain and nauseau, but pt then later divulged worsening SI prompting DEC assessment. At the time of assessment, pt was agreeable to meeting. She reports for the past few months she has been experiencing worsening symptoms of depression. She reports she has been feeling hopeless and over the past week has had worsening suicidal thoughts. She reports that she started to think of plans and this scared her. She reports she has been thinking about overdosing on fentanyl and has been trying to aquire this. She reports SI is constant and daily over the past week. She is not able to identify any recent stressors she feels may be contributing to her symptoms. She denies any HI, NSSI, AH, VH. She does endorse marijuana use 3-4x per week. She reports that she has not been sleeping or eating very much over the past month due to stomach pain, nauseau and depression. She does not feel she can be safe outside of the hospital at this time as she feels she would act on these thoughts. She reports she is not currently taking any medication other than methadone and does not have  "any current outpatient providers.    Informed Consent and Assessment Methods  Explained the crisis assessment process, including applicable information disclosures and limits to confidentiality, assessed understanding of the process, and obtained consent to proceed with the assessment.  Assessment methods included conducting a formal interview with patient, review of medical records, collaboration with medical staff, and obtaining relevant collateral information from family and community providers when available.  : done     History of the Crisis   MDD, SANDIE, PTSD, polysubstance use, panic attacks, agoraphobia and unspecified personality disorder - concerns previously noted for possible dependent personality and borderline personality traits. Most recent IP MH was at [facility] from 11/12/24-12/4/24 due to SI. Pt endorses a hx of 5 previous suicide attempts, all via overdose. Pt reports she does not have any established outpatient providers and is not taking any psychiatric medication other than methadone which she reports she takes through [facility] in [place]. Pt reports she was set up with outpatient appointments and medication when she was discharged from her last inpatient stay, but never followed up with these services or continued taking her medication and she states she doesn't know why.    Brief Psychosocial History  Family:  Single, Children yes  Support System:  Children  Employment Status:  unemployed, disabled  Source of Income:  public assistance, disability  Financial Environmental Concerns:  none  Current Hobbies:  other (see comments), family functions (\"pacing\")  Barriers in Personal Life:  mental health concerns, emotional concerns    Significant Clinical History  Current Anxiety Symptoms:  anxious, excessive worry  Current Depression/Trauma:  helplessness, hopelessness, sadness, difficulty concentrating, thoughts of death/suicide  Current Somatic Symptoms:  anxious, excessive worry  Current " Psychosis/Thought Disturbance:  impulsive  Current Eating Symptoms:  loss of appetite  Chemical Use History:  Alcohol: None  Benzodiazepines: None  Opiates: None  Cocaine: None  Marijuana: Other (comments) (3-4x per week per pt, unknown last date of use)  Other Use: None  Withdrawal Symptoms:  (none reported)  Addictions:  (none reported)   Past diagnosis:  Anxiety Disorder, Depression, Suicide attempt(s), Personality Disorder, Substance Use Disorder, PTSD  Family history:  No known history of mental health or chemical health concerns  Past treatment:  Individual therapy, Case management, Probation/Court ordered treatment, Inpatient Hospitalization, Psychiatric Medication Management  Details of most recent treatment:  Hx of 3 previous residential CD tx. Hx of [facility], most recently in November 2024. Pt reports she currently is taking methadone through [facility] in [place], but otherwise denies any established outpatient  providers, treatments or medications at this time.  Other relevant history:       Have there been any medication changes in the past two weeks:  no       Is the patient compliant with medications:  no  pt reports she hasn't been taking any psychiatric medication and hasn't since she was last discharged from [facility].     Collateral Information  Is there collateral information: Yes     Collateral information name, relationship, phone number:  Samanta Coyle, daughter, [phone]    What happened today: She reports that she is barely at home so would not know of any  concerns or changes.     What is different about patient's functioning: unknown     Has patient made comments about wanting to kill themselves/others: no    If d/c is recommended, can they take part in safety/aftercare planning:  yes     Risk Assessment  [place] Suicide Severity Rating Scale Full Clinical Version:  Suicidal Ideation  Q1 Wish to be Dead (Lifetime): Yes  Q2 Non-Specific Active Suicidal Thoughts (Lifetime): Yes  3.  Active Suicidal Ideation with any Methods (Not Plan) Without Intent to Act (Lifetime): Yes  4. Active Suicidal Ideation with Some Intent to Act, Without Specific Plan (Lifetime): Yes  5. Active Suicidal Ideation with Specific Plan and Intent (Lifetime): Yes  Q6 Suicide Behavior (Lifetime): yes  Intensity of Ideation (Lifetime)  Most Severe Ideation Rating (Lifetime): 5  Frequency (Lifetime): Many times each day  Duration (Lifetime): More than 8 hours/persistent or continuous  Controllability (Lifetime): Can control thoughts with a lot of difficulty  Deterrents (Lifetime): Deterrents most likely did not stop you  Reasons for Ideation (Lifetime): Completely to end or stop the pain (You couldn't go on living with the pain or how you were feeling)  Suicidal Behavior (Lifetime)  Actual Attempt (Lifetime): Yes  Total Number of Actual Attempts (Lifetime): 5  Actual Attempt Description (Lifetime): Pt endorses 5 previous suicide attempts, all via overdose. Reports her first suicide attempt was at the age of 14 and most recent was October 2024.  Has subject engaged in non-suicidal self-injurious behavior? (Lifetime): No  Interrupted Attempts (Lifetime): No  Aborted or Self-Interrupted Attempt (Lifetime): No  Preparatory Acts or Behavior (Lifetime): Yes  Total Number of Preparatory Acts (Lifetime): 1  Preparatory Acts or Behavior Description (Lifetime): Reports she has been trying to acquire fentanyl    [place] Suicide Severity Rating Scale Recent:   Suicidal Ideation (Recent)  Q1 Wished to be Dead (Past Month): yes  Q2 Suicidal Thoughts (Past Month): yes  Q3 Suicidal Thought Method: yes  Q4 Suicidal Intent without Specific Plan: yes  Q5 Suicide Intent with Specific Plan: yes  Level of Risk per Screen: high risk  Intensity of Ideation (Recent)  Most Severe Ideation Rating (Past 1 Month): 5  Frequency (Past 1 Month): Many times each day  Duration (Past 1 Month): More than 8 hours/persistent or continuous  Controllability (Past  1 Month): Can control thoughts with a lot of difficulty  Deterrents (Past 1 Month): Deterrents probably stopped you  Reasons for Ideation (Past 1 Month): Completely to end or stop the pain (You couldn't go on living with the pain or how you were feeling)  Suicidal Behavior (Recent)  Actual Attempt (Past 3 Months): No  Total Number of Actual Attempts (Past 3 Months): 0  Has subject engaged in non-suicidal self-injurious behavior? (Past 3 Months): No  Interrupted Attempts (Past 3 Months): No  Total Number of Interrupted Attempts (Past 3 Months): 0  Aborted or Self-Interrupted Attempt (Past 3 Months): No  Total Number of Aborted or Self-Interrupted Attempts (Past 3 Months): 0  Preparatory Acts or Behavior (Past 3 Months): No  Total Number of Preparatory Acts (Past 3 Months): 1  Preparatory Acts or Behavior Description (Past 3 Months): Reports she has been trying to acquire fentanyl    Environmental or Psychosocial Events: helplessness/hopelessness, ongoing abuse of substances  Protective Factors: Protective Factors: strong bond to family unit, community support, or employment, lives in a responsibly safe and stable environment, help seeking    Does the patient have thoughts of harming others? Feels Like Hurting Others: no  Previous Attempt to Hurt Others: no  Current presentation:  (calm and cooperative)  Is the patient engaging in sexually inappropriate behavior?: no  Does Patient have a known history of aggressive behavior: No  Has aggression occurred as a result of MH concerns/diagnosis: no  Does patient have history of aggression in hospital: no    Is the patient engaging in sexually inappropriate behavior?  no        Mental Status Exam   Affect: Flat  Appearance: Appropriate  Attention Span/Concentration: Attentive  Eye Contact: Engaged    Fund of Knowledge: Appropriate   Language /Speech Content: Fluent  Language /Speech Volume: Soft  Language /Speech Rate/Productions: Normal  Recent Memory: Intact  Remote  Memory: Intact  Mood: Depressed, Anxious  Orientation to Person: Yes   Orientation to Place: Yes  Orientation to Time of Day: Yes  Orientation to Date: Yes     Situation (Do they understand why they are here?): Yes  Psychomotor Behavior: Normal  Thought Content: Suicidal  Thought Form: Intact     Medication  Psychotropic medications:   Medication Orders - Psychiatric (From admission, onward)      None          Current Facility-Administered Medications   Medication Dose Route Frequency Provider Last Rate Last Admin    lactulose (CEPHULAC) Packet 20 g  20 g Oral TID Calvin Davis MD   20 g at 03/17/25 2034    ondansetron (ZOFRAN) injection 4 mg  4 mg Intravenous Q30 Min PRN Calvin Davis MD   4 mg at 03/17/25 1208     Current Outpatient Medications   Medication Sig Dispense Refill    lactulose 20 GM/30ML solution Take 30 mLs by mouth 3 times daily for 5 days. 450 mL 0    ondansetron (ZOFRAN ODT) 4 MG ODT tab Take 1 tablet (4 mg) by mouth every 6 hours as needed for nausea or vomiting. 10 tablet 0    Alcohol Swabs PADS Use to swab the area of the injection or marla as directed Per insurance coverage 200 each 1    blood glucose (NO BRAND SPECIFIED) lancets standard To use to test glucose level in the blood Use to test blood sugar  3  times daily as directed. To accompany glucose monitor brands per insurance coverage. 200 each 1    blood glucose (NO BRAND SPECIFIED) test strip To use to test glucose level in the blood Use to test blood sugar  3  times daily as directed. To accompany glucose monitor brands per insurance coverage. 300 strip 0    blood glucose calibration (NO BRAND SPECIFIED) solution Used to calibrate the blood glucose monitor as needed and as directed.  To accompany  blood glucose brands per insurance coverage 1 each 0    blood glucose monitoring (NO BRAND SPECIFIED) meter device kit Use as directed Per insurance coverage 1 kit 0    cloNIDine (CATAPRES) 0.1 MG tablet Take 0.1 mg by  mouth 2 times daily.      empagliflozin (JARDIANCE) 10 MG TABS tablet Take 1 tablet (10 mg) by mouth daily. 30 tablet 0    escitalopram (LEXAPRO) 10 MG tablet Take 3 tablets (30 mg) by mouth daily. 30 tablet 0    Glucagon (GVOKE HYPOPEN) 1 MG/0.2ML pen Inject the contents of 1 device under the skin into lower abdomen, outer thigh, or outer upper arm as needed for hypoglycemia. If no response after 15 minutes, additional 1 mg dose from a new device may be injected while waiting for emergency assistance. 30 each 0    glucose (BD GLUCOSE) 4 g chewable tablet Take 4 tablets by mouth every 15 minutes as needed for low blood sugar. 50 tablet 0    glucose 40 % (400 mg/mL) gel 15 g every 15 minutes by mouth as needed for low blood sugar.  Oral gel is preferable for conscious and able to swallow patient. 112.5 g 0    hydrOXYzine HCl (ATARAX) 25 MG tablet Take 1-2 tablets (25-50 mg) by mouth every 4 hours as needed for anxiety. 90 tablet 0    insulin aspart (NOVOLOG PEN) 100 UNIT/ML pen Inject 1-10 Units subcutaneously 3 times daily (with meals). Do Not give Correction Insulin if Pre-Meal BG less than 140. For Pre-Meal  - 164 give 1 unit. For Pre-Meal  - 189 give 2 units. For Pre-Meal  - 214 give 3 units. For Pre-Meal  - 239 give 4 units. For Pre-Meal  - 264 give 5 units. For Pre-Meal  - 289 give 6 units. For Pre-Meal  - 314 give 7 units. For Pre-Meal  - 339 give 8 units. For Pre-Meal  - 364 give 9 units.  For Pre-Meal BG greater than or equal to 365 give 10 units 15 mL 0    insulin aspart (NOVOLOG PEN) 100 UNIT/ML pen Inject 1-10 Units subcutaneously 3 times daily (with meals). Give 1 unit of insulin for each 10g of carbohydrates 15 mL 0    insulin aspart (NOVOLOG PEN) 100 UNIT/ML pen Inject 1-7 Units subcutaneously at bedtime. Do Not give Bedtime Correction Insulin if BG less than 200. For  - 224 give 1 units. For  - 249 give 2 units. For  - 274 give 3  units. For  - 299 give 4 units. For  - 324 give 5 units. For  - 349 give 6 units. For BG greater than or equal to 350 give 7 units. 15 mL 0    insulin glargine (LANTUS PEN) 100 UNIT/ML pen Inject 40 Units subcutaneously at bedtime. 15 mL 0    insulin pen needle (32G X 4 MM) 32G X 4 MM miscellaneous Use as directed by provider Per insurance coverage 200 each 1    losartan (COZAAR) 25 MG tablet Take 1 tablet (25 mg) by mouth daily. 30 tablet 0    metFORMIN (GLUCOPHAGE) 500 MG tablet Take 2 tablets (1,000 mg) by mouth 2 times daily (with meals). 60 tablet 0    methadone (DOLOPHINE-INTENSOL) 10 MG/ML (HIGH CONC) solution Take 74 mg by mouth daily.      prazosin (MINIPRESS) 2 MG capsule Take 1 capsule (2 mg) by mouth at bedtime. 30 capsule 0    Sharps Container MISC Use as directed to dispose of needles, lancets and other sharps 1 each 1      Current Care Team  Patient Care Team:  Gamaliel Granados DO as PCP - General (Family Medicine)  Ezequiel Shah OD (Optometry)  Gamaliel Granados DO as Assigned PCP    Diagnosis  Patient Active Problem List   Diagnosis Code    Suicidal behavior R45.89    Benign essential HTN I10    Cannabis abuse F12.10    Cyclic vomiting syndrome R11.15    Elevated LFTs R79.89    Hyperlipidemia with target low density lipoprotein (LDL) cholesterol less than 100 mg/dL E78.5    Insomnia G47.00    Mild intermittent asthma J45.20    Panic disorder with agoraphobia F40.01    OCD (obsessive compulsive disorder) F42.9    Severe benzodiazepine use disorder (H) in remission F13.20    S/P lumbar microdiscectomy Z98.890    S/P laparoscopic cholecystectomy Z90.49    Personality disorder (H) F60.9    Type 2 diabetes mellitus with hyperglycemia, with long-term current use of insulin (H) E11.65, Z79.4    Obesity (BMI 35.0-39.9) with comorbidity (H) E66.01    Falls frequently R29.6    Moderate opioid use disorder, in early remission (H) F11.21    Chronic hepatitis C without hepatic coma (H)  B18.2    Hyperglycemia R73.9    Dehydration E86.0    Cyclical vomiting R11.15    Lactic acidosis E87.20    Epigastric pain R10.13    Coffee ground emesis K92.0    Elevated blood ketone body level R79.89    Severe episode of recurrent major depressive disorder, without psychotic features (H) F33.2    PTSD (post-traumatic stress disorder) F43.10    Recurrent major depression F33.9    Suicidal ideation R45.851    Abscess of arm, right L02.413    Intractable nausea and vomiting R11.2    Tachycardia, unspecified R00.0    Generalized pain R52    Anxiety F41.9       Primary Problem This Admission  Active Hospital Problems    Anxiety  F41.9      *Recurrent major depression  F33.9      Clinical Summary and Substantiation of Recommendations   Clinical Substantiation:  It is the recommendation of this clinician that pt admit to IP MH for safety and stabilization. Pt displays the following risk factors that support IP admission: pt endorses worsening depression and suicidal thoughts with a plan to overdose on fentanyl. Pt is unable to engage in safety planning to mitigate risk level in a non-secure setting. Lower levels of care would not be sufficient in managing the level of risk pt is presenting with. Due to this IP is the least restrictive option of care for pt. Pt should remain in IP until deemed safe to return to the community and engage in OP MH supports. Pt will need assistance establishing OP MH services prior to discharge.    Goals for crisis stabilization:  stabilization and reduction of symptms - SI and depression    Next steps for Care Team:  Plan for IP MH for safety and stabilization. Pt would likely benefit from establishing outpatient therapy and medication management upon discharge.    Treatment Objectives Addressed:  rapport building, orienting the patient to therapy, assessing safety, processing feelings    Therapeutic Interventions:  Explored strategies for self-soothing.    Has a specific means been  identified for suicidal/homicide actions: Yes    If yes, describe:  overdose on fentanyl.    Explain action steps toward mitigation:  Pt agreeable to staying for IP  where she does not have access to means.    Document completion of mitigation actions:  Complete. Pt will stay for admission for safety and stabilization.    The follow up action still needed prior to discharge:  Pt will need to engage in safety and aftercare planning prior to discharge.    Patient coping skills attempted to reduce the crisis:  Pt reports that talking to others and pacing can sometimes be helpful.    Disposition  Recommended referrals: Individual Therapy, Medication Management        Reviewed case and recommendations with attending provider. Attending Name: Dr. Gonzalez       Attending concurs with disposition: yes       Patient and/or validated legal guardian concurs with disposition:   yes       Final disposition:  inpatient mental health         Imminent risk of harm: Suicidal Behavior  Severe psychiatric, behavioral or other comorbid conditions are appropriate for management at inpatient mental health as indicated by at least one of the following: Psychiatric Symptoms  Severe dysfunction in daily living is present as indicated by at least one of the following: Other evidence of severe dysfunction  Situation and expectations are appropriate for inpatient care: Biopsychosocial stresses potentially contributing to clinical presentation (co morbidities) have been assessed and are absent or manageable at proposed level of care, Patient management/treatment at lower level of care is not feasible or is inappropriate  Inpatient mental health services are necessary to meet patient needs and at least one of the following: Specific condition related to admission diagnosis is present and judged likely to deteriorate in absence of treatment at proposed level of care      Legal status: Voluntary/Patient has signed consent for treatment                            Reviewed court records: yes       Assessment Details   Total duration spent with the patient: 31 min     CPT code(s) utilized: 97958 - Psychotherapy for Crisis - 60 (30-74*) min    NAN Andrade, Psychotherapist  [facility] - Triage & Transition Services  Callback: [phone]

## 2025-03-18 NOTE — TELEPHONE ENCOUNTER
S: [facility] MARSHA Hawkins  Ryann  calling at 9:04 PM about a 49 year old/Female presenting with SI w/ a plan.      B: Pt arrived via Self . Presenting problem, stressors: Pt was originally coming in for abdominal pain and divulge she has SI with plan to overdose on fentanyl.  She reports she has been trying to acquire it.     She is not sure of any stressors.     Pt affect in ED: Cooperative  and Flat  Pt Dx: Major Depressive Disorder, Generalized Anxiety Disorder, PTSD, and panic disorder and polysubstance use.   Previous IPMH hx? Yes: Nov 2024 at [facility].  Pt endorses SI with a plan to overdose.     Hx of suicide attempt? Yes: 5 prior overdose attempts.   Pt denies SIB  Pt denies HI   Pt denies hallucinations .   Pt RARS Score: 3    Hx of aggression/violence, sexual offenses, legal concerns, Epic care plan? describe: No  Current concerns for aggression this visit? No  Does pt have a history of Civil Commitment? No  Is Pt their own guardian? Yes    Pt is prescribed medication. Is patient medication compliant? No  She was prescribed meds and discharged in December but never followed up.   Pt denies OP services   CD concerns: Actively using/consuming marijuana 3/4 times per week.  Acute or chronic medical concerns: She has multiple visits for nausea and abdominal visits but no concerns.   Does Pt present with specific needs, assistive devices, or exclusionary criteria? None      Pt is ambulatory  Pt is able to perform ADLs independently      A: Pt to be reviewed for [facility] admission. Pt is Voluntary  Preferred placement: [facility]    COVID Symptoms: No  If yes, COVID test required   Utox: Not ordered, intake to request lab    CMP: Abnormalities: [id number], Kafkvpc035, Alkaline [id number], GEB926, VIK123, Protein Total 8.9,   CBC: WNL  HCG: Negative    R: Patient cleared and ready for behavioral bed placement: Yes  Pt placed on [facility] worklist? Yes    Does Patient need a Transfer Center request created? No, Pt is  located within [facility] ED, [facility] ED, or [facility]     R: [place]  Access Inpatient Bed Call Log 3/17/2025 @9:15PM:  Intake has called facilities that have not updated the bed status within the last 12 hours.   (Adults);                   [facility] is posting 0 beds.            [facility] is posting 2 beds. [phone]: At capacity.   [facility] is posting 0 beds. Negative covid required.  [facility] is posting 0 beds. Neg covid. No high school/Debbie-psych. [phone] 8:37AM PER CEASAR, POTENTIAL SDU BED AVAILABILITY. CB AFTER 9:30AM.    [facility] is posting 0 beds. [phone]  [facility] is posting 0 bed. [phone]    [facility] is posting 6 beds. (Ages 18-35) Negative covid. [phone] 8:38AM PER JOSSE, LOW ACUITY ADOL BEDS AVAILABLE. NO CHILD AND NO YA.  [facility] is posting 0 beds.    [facility] is posting 1 beds [phone]. At capacity.     Pt remains on the waitlist pending appropriate bed availability.

## 2025-03-18 NOTE — PHARMACY-CONSULT NOTE
Opioid Treatment Program (Methadone Clinic) Information Note      Treatment program name: [facility]   Location ([facility]): Felicia [facility]   Phone number: [phone]              Spoke with: Ingris      Patient's current methadone dose verified as: 100 mg PO daily   Last dose was administered on: 3/17 in clinic  Take-home dose information (if applicable): given 100 mg take home dose for 3/18      Note(s): Treatment programs in [place] dispense methadone using the 10 mg/ml oral concentrate.

## 2025-03-18 NOTE — PROGRESS NOTES
The patient has been adjusting to the environment. The patient appears to be pleasant and cooperative with both the care and staff. Pt is medication compliant. Pt reported pain and endorsed suicidal ideation (SI) with no plan. The patient agreed to a safety plan. [facility]  nurse reported the patient's high blood pressure to the ED provider. The ED provider immediately ordered 25 mg of Losartan for the patient. Recheck Pt's BP in one hour, and it is still high. Pt stated it could be because she is in pain. The nurse was going to administer the patient's insulin, but the diet office sent the patient's lunch to station 30. Therefore, the nurse must escort the patient to Station 30 for her to receive her treatment on time. The nurse called station 30 to give a final report to either the charge nurse or the receiving nurse. No staff was able to talk to the [facility] nurse.

## 2025-03-19 LAB
GLUCOSE BLDC GLUCOMTR-MCNC: 198 MG/DL (ref 70–99)
GLUCOSE BLDC GLUCOMTR-MCNC: 208 MG/DL (ref 70–99)
GLUCOSE BLDC GLUCOMTR-MCNC: 242 MG/DL (ref 70–99)
GLUCOSE BLDC GLUCOMTR-MCNC: 262 MG/DL (ref 70–99)
HOLD SPECIMEN: NORMAL
TSH SERPL DL<=0.005 MIU/L-ACNC: 0.83 UIU/ML (ref 0.3–4.2)

## 2025-03-19 PROCEDURE — 84443 ASSAY THYROID STIM HORMONE: CPT | Performed by: PSYCHIATRY & NEUROLOGY

## 2025-03-19 PROCEDURE — 36415 COLL VENOUS BLD VENIPUNCTURE: CPT | Performed by: PSYCHIATRY & NEUROLOGY

## 2025-03-19 PROCEDURE — 250N000013 HC RX MED GY IP 250 OP 250 PS 637: Performed by: EMERGENCY MEDICINE

## 2025-03-19 PROCEDURE — 124N000002 HC R&B MH UMMC

## 2025-03-19 PROCEDURE — 99233 SBSQ HOSP IP/OBS HIGH 50: CPT | Performed by: PSYCHIATRY & NEUROLOGY

## 2025-03-19 PROCEDURE — 250N000013 HC RX MED GY IP 250 OP 250 PS 637: Performed by: PHYSICIAN ASSISTANT

## 2025-03-19 PROCEDURE — 250N000013 HC RX MED GY IP 250 OP 250 PS 637: Performed by: PSYCHIATRY & NEUROLOGY

## 2025-03-19 RX ADMIN — METHADONE HYDROCHLORIDE 100 MG: 10 CONCENTRATE ORAL at 08:19

## 2025-03-19 RX ADMIN — LOSARTAN POTASSIUM 25 MG: 25 TABLET, FILM COATED ORAL at 08:14

## 2025-03-19 RX ADMIN — INSULIN ASPART 2 UNITS: 100 INJECTION, SOLUTION INTRAVENOUS; SUBCUTANEOUS at 08:25

## 2025-03-19 RX ADMIN — INSULIN GLARGINE 30 UNITS: 100 INJECTION, SOLUTION SUBCUTANEOUS at 21:45

## 2025-03-19 RX ADMIN — INSULIN ASPART 3 UNITS: 100 INJECTION, SOLUTION INTRAVENOUS; SUBCUTANEOUS at 18:09

## 2025-03-19 RX ADMIN — PRAZOSIN HYDROCHLORIDE 2 MG: 2 CAPSULE ORAL at 21:43

## 2025-03-19 RX ADMIN — INSULIN ASPART 3 UNITS: 100 INJECTION, SOLUTION INTRAVENOUS; SUBCUTANEOUS at 12:39

## 2025-03-19 RX ADMIN — HYDROXYZINE HYDROCHLORIDE 50 MG: 25 TABLET ORAL at 16:50

## 2025-03-19 RX ADMIN — SERTRALINE HYDROCHLORIDE 50 MG: 50 TABLET ORAL at 08:14

## 2025-03-19 RX ADMIN — OLANZAPINE 10 MG: 10 TABLET, FILM COATED ORAL at 08:14

## 2025-03-19 RX ADMIN — HYDROXYZINE HYDROCHLORIDE 50 MG: 25 TABLET ORAL at 21:43

## 2025-03-19 ASSESSMENT — ACTIVITIES OF DAILY LIVING (ADL)
HYGIENE/GROOMING: INDEPENDENT
ORAL_HYGIENE: INDEPENDENT
ADLS_ACUITY_SCORE: 48
LAUNDRY: WITH SUPERVISION
LAUNDRY: WITH SUPERVISION
ADLS_ACUITY_SCORE: 48
HYGIENE/GROOMING: INDEPENDENT
ADLS_ACUITY_SCORE: 48
ORAL_HYGIENE: INDEPENDENT
ADLS_ACUITY_SCORE: 48
DRESS: INDEPENDENT
ADLS_ACUITY_SCORE: 48
DRESS: INDEPENDENT
ADLS_ACUITY_SCORE: 48

## 2025-03-19 NOTE — PROGRESS NOTES
"[facility] [place]   Psychiatric Progress Note        Interim History:   The patient's care was discussed with the treatment team during the daily team meeting and/or staff's chart notes were reviewed.  Patient presented and remained as anxious and tense. She continued to complain of high anxiety and feeling sad, abdominal pain. She also reported passive Suicidal ideation. Staff report patient met with nutritionist who didn't feel that patient met criteria for malnutrition. She also had IM consult who addressed following issues (appreciate IM help):    \"Assessment & Plan  Yun Coyle is a 49 year old female admitted on 3/17/2025. She has a hx of pancreatitis, GERD, type II DM, asthma, cauda equina syndrome, current admitted to psych unit.      # Type II DM   Home regimen (list as lantus 40 nightly but patient takes 30). Also does low dose correctional and carb coverage. No gap or ketones on admission.   - Lantus 30 at bedtime   - medium dose correctional   - carb coverage 1:10   - glucose checks tid and hs   - hold jaurdiance and metformin for now      # Acute abdomin pain   # Acute on chronic nausea   # Hx of pancreatitis   Patient has had intermittent nausea/vomiting for months which she is fairly certain is 2/2 cyclic vomiting syndrome iso of ongoing marijuana use. The abdominal pain she feels is more acute, though she has had multiple ED visits in the past for abdominal pain. CT of the abdomen in the emergency department did not reveal any pathology. On exam, she has diffuse tenderness without rebound or guarding. She feels generally unwell and diaphoretic.  - CMP, CBC, lactic acid, lipase, troponin  - zofran   - GI cocktail      # Hypertension  Continue PTA losartan 25 mg daily   - clonidine 0.1 mg bid prn for systolic > 180 (patient takes at home)      # Elevated LFT's  # Hepatitis C   LFT's have been chronically elevated. They have slowly trended up. She had a " cholecystectomy in the past. CT on 2/17 largely unremarkable.   - CTM     # hx of QTC prolongation  - check EKG      # Hx of OUD  - PTA methadone     # Hx of hyponatremia   - unclear etiology  - monitor      # Hx of hyperkalemia   - unclear etiology  - monitor      # Hx of GERD  - not currently on PPI      # hx of cauda equina 2012 2/2 central disc bulge   Has some radicular pain down the right legs at times. Denies saddle anesthesia, lower extremity weakness, or incontinence.     Met with patient: she was seen in the conference room. She went there willingly and talked to us. Looked sad and withdrawn, reported still feeling very anxious and rated both anxiety and depression as 8/10. Admitted to still having Suicidal ideation with thoughts of overdosing on Fentanyl, contracted for safety at this hospital. We asked if she still had plans of entering CD treatment program. Yun said that she did, but outpatient only. We informed her that we would place CD consult request today and one of counselors will talk to her either today or tomorrow. She thanked us and had no other questions or concerns. Lab work from yesterday and today was reviewed: EKG showed elevated QTc - 474, still elevated LFTs, Na -131.         Medications:     Current Facility-Administered Medications   Medication Dose Route Frequency Provider Last Rate Last Admin    [Held by provider] empagliflozin (JARDIANCE) tablet 10 mg  10 mg Oral Daily Aramis Casas MD        insulin aspart (NovoLOG) injection (RAPID ACTING)  1-7 Units Subcutaneous TID AC Abraham Patel PA-C   3 Units at 03/19/25 1239    insulin aspart (NovoLOG) injection (RAPID ACTING)  1-5 Units Subcutaneous At Bedtime Abraham Patel PA-C   1 Units at 03/18/25 2213    insulin aspart (NovoLOG) injection (RAPID ACTING)   Subcutaneous Daily with breakfast Abraham Patel PA-C   5 Units at 03/19/25 0824    insulin aspart (NovoLOG) injection (RAPID ACTING)   Subcutaneous  Daily with lunch Abraham Patel PA-C   12 Units at 03/19/25 1238    insulin aspart (NovoLOG) injection (RAPID ACTING)   Subcutaneous Daily with supper Abraham Patel PA-C   9 Units at 03/18/25 1820    insulin glargine (LANTUS PEN) injection 30 Units  30 Units Subcutaneous At Bedtime Abraham Patel PA-C   30 Units at 03/18/25 2222    losartan (COZAAR) tablet 25 mg  25 mg Oral Daily Abraham Patel PA-C   25 mg at 03/19/25 0814    [Held by provider] metFORMIN (GLUCOPHAGE) tablet 1,000 mg  1,000 mg Oral BID w/meals Arbaham Patel PA-C        methadone (DOLOPHINE-INTENSOL) 10 MG/ML (HIGH CONC) solution 100 mg  100 mg Oral Daily Anderson Blas MD   100 mg at 03/19/25 0819    prazosin (MINIPRESS) capsule 2 mg  2 mg Oral At Bedtime Aramis Casas MD   2 mg at 03/18/25 2220    sertraline (ZOLOFT) tablet 50 mg  50 mg Oral Daily Aramis Casas MD   50 mg at 03/19/25 0814          Allergies:   No Known Allergies       Labs:     Recent Results (from the past 24 hours)   EKG 12-lead, complete    Collection Time: 03/18/25  2:54 PM   Result Value Ref Range    Systolic Blood Pressure  mmHg    Diastolic Blood Pressure  mmHg    Ventricular Rate 88 BPM    Atrial Rate 88 BPM    WA Interval 136 ms    QRS Duration 80 ms     ms    QTc 474 ms    P Axis 45 degrees    R AXIS -25 degrees    T Axis 11 degrees    Interpretation ECG       ** Poor data quality, interpretation may be adversely affected  Sinus rhythm  Normal ECG  When compared with ECG of 13-Mar-2025 07:35,  QT has lengthened     CBC with platelets    Collection Time: 03/18/25  3:38 PM   Result Value Ref Range    WBC Count 6.2 4.0 - 11.0 10e3/uL    RBC Count 4.86 3.80 - 5.20 10e6/uL    Hemoglobin 13.1 11.7 - 15.7 g/dL    Hematocrit 39.0 35.0 - 47.0 %    MCV 80 78 - 100 fL    MCH 27.0 26.5 - 33.0 pg    MCHC 33.6 31.5 - 36.5 g/dL    RDW 14.6 10.0 - 15.0 %    Platelet Count 239 150 - 450 10e3/uL   Comprehensive metabolic panel    Collection  Time: 03/18/25  3:38 PM   Result Value Ref Range    Sodium 131 (L) 135 - 145 mmol/L    Potassium 4.5 3.4 - 5.3 mmol/L    Carbon Dioxide (CO2) 24 22 - 29 mmol/L    Anion Gap 14 7 - 15 mmol/L    Urea Nitrogen 16.1 6.0 - 20.0 mg/dL    Creatinine 0.68 0.51 - 0.95 mg/dL    GFR Estimate >90 >60 mL/min/1.73m2    Calcium 9.6 8.8 - 10.4 mg/dL    Chloride 93 (L) 98 - 107 mmol/L    Glucose 307 (H) 70 - 99 mg/dL    Alkaline Phosphatase 215 (H) 40 - 150 U/L     (H) 0 - 45 U/L     (H) 0 - 50 U/L    Protein Total 8.8 (H) 6.4 - 8.3 g/dL    Albumin 4.2 3.5 - 5.2 g/dL    Bilirubin Total 0.4 <=1.2 mg/dL   INR    Collection Time: 03/18/25  3:38 PM   Result Value Ref Range    INR 0.95 0.85 - 1.15   Troponin T, High Sensitivity    Collection Time: 03/18/25  3:38 PM   Result Value Ref Range    Troponin T, High Sensitivity 11 <=14 ng/L   Lactic acid whole blood    Collection Time: 03/18/25  3:38 PM   Result Value Ref Range    Lactic Acid 1.8 0.7 - 2.0 mmol/L   Blood gas venous    Collection Time: 03/18/25  3:38 PM   Result Value Ref Range    pH Venous 7.38 7.32 - 7.43    pCO2 Venous 49 40 - 50 mm Hg    pO2 Venous 45 25 - 47 mm Hg    Bicarbonate Venous 29 (H) 21 - 28 mmol/L    Base Excess/Deficit Venous 2.9 -3.0 - 3.0 mmol/L    FIO2 21     Oxyhemoglobin Venous 77 (H) 70 - 75 %    O2 Sat, Venous 78.5 (H) 70.0 - 75.0 %   Glucose by meter    Collection Time: 03/18/25  6:02 PM   Result Value Ref Range    GLUCOSE BY METER POCT 243 (H) 70 - 99 mg/dL   Troponin T, High Sensitivity    Collection Time: 03/18/25  6:20 PM   Result Value Ref Range    Troponin T, High Sensitivity 9 <=14 ng/L   Glucose by meter    Collection Time: 03/18/25  9:33 PM   Result Value Ref Range    GLUCOSE BY METER POCT 256 (H) 70 - 99 mg/dL   Glucose by meter    Collection Time: 03/18/25 10:05 PM   Result Value Ref Range    GLUCOSE BY METER POCT 244 (H) 70 - 99 mg/dL   TSH with free T4 reflex and/or T3 as indicated    Collection Time: 03/19/25  8:00 AM   Result  "Value Ref Range    TSH 0.83 0.30 - 4.20 uIU/mL   Extra Purple Top Tube    Collection Time: 03/19/25  8:00 AM   Result Value Ref Range    Hold Specimen JIC    Glucose by meter    Collection Time: 03/19/25  8:04 AM   Result Value Ref Range    GLUCOSE BY METER POCT 198 (H) 70 - 99 mg/dL   Glucose by meter    Collection Time: 03/19/25 11:50 AM   Result Value Ref Range    GLUCOSE BY METER POCT 242 (H) 70 - 99 mg/dL          Psychiatric Examination:     BP (!) 139/96   Pulse 71   Temp 98  F (36.7  C) (Oral)   Resp 17   Ht 1.753 m (5' 9\")   Wt 89.5 kg (197 lb 4.8 oz)   LMP  (LMP Unknown)   SpO2 98%   BMI 29.14 kg/m    Weight is 197 lbs 4.8 oz  Body mass index is 29.14 kg/m .    Orthostatic Vitals         Most Recent      Sitting Orthostatic /96 03/19 0841    Sitting Orthostatic Pulse (bpm) 129 03/19 0841    Standing Orthostatic /90 03/19 0841    Standing Orthostatic Pulse (bpm) 132 03/19 0841          Appearance: awake, alert, adequately groomed, and appeared as age stated  Attitude:  cooperative  Eye Contact:  fair  Mood:  anxious and sad   Affect:  constricted mobility  Speech:  clear, coherent  Psychomotor Behavior:  no evidence of tardive dyskinesia, dystonia, or tics  Throught Process:  linear and goal oriented  Associations:  no loose associations  Thought Content:  no evidence of psychotic thought and passive suicidal ideation present  Insight:  partial  Judgement:  fair  Oriented to:  time, person, and place  Attention Span and Concentration:  fair  Recent and Remote Memory:  fair    Clinical Global Impressions  First: 7/4 3/19/2025      Most recent:            Precautions:     Behavioral Orders   Procedures    Code 1 - Restrict to Unit    Routine Programming     As clinically indicated    Status 15     Every 15 minutes.    Suicide precautions: Suicide Risk: HIGH     Patients on Suicide Precautions should have a Combination Diet ordered that includes a Diet selection(s) AND a Behavioral Tray " selection for Safe Tray - with utensils, or Safe Tray - NO utensils       Order Specific Question:   Suicide Risk     Answer:   HIGH          DIagnoses:     Unspecified depressive disorder (MDD recurrent vs bipolar affective type 2 depressed)  Suicidal ideation,   Post traumatic Stress disorder  Generalized Anxiety Disorder  Panic Disorder with agoraphobia    Opiate use disorder moderate severity, patient is on maintenance assisted therapy with methadone  Cannabis use disorder, severe  Benzodiazepine use disorder          Plan:     Was started on Sertraline yesterday. No medication changes today 3/19/2025. Will order CD consult. Was seen by IM and nutrition, See discussion above. Will continue to provide support and structure.      Total time spent was 37 minutes. Over 50% of times was spent counseling and coordination of care regarding coping skills, medication and discharge planning.

## 2025-03-19 NOTE — PLAN OF CARE
"Goal Outcome Evaluation:    Plan of Care Reviewed With: patient      Patient slept 7 hours over night and was up at breakfast time.  She napped off and on throughout the shift.  Patient reports abdominal pain rated 5/10 and says this is chronic.  Scheduled methadone is somewhat helpful per patient and she declined other interventions.  She does not enage much with staff or patients and did not attend groups but she is pleasant and appropriate upon approach. She ate 100% of breakfast and lunch in the lounge.  Blood sugars this shift - 198 before breakfast and 242 before lunch.  Medication compliant, no PRN's this shift. Patient denies SI in the hospital but still feels she would not be safe leaving.  Denies HI and hallucinations.  Contracts for safety on the unit and agrees to inform staff if she is experiencing any discomforts.  BP (!) 139/96   Pulse 71   Temp 98  F (36.7  C) (Oral)   Resp 17   Ht 1.753 m (5' 9\")   Wt 89.5 kg (197 lb 4.8 oz)   LMP  (LMP Unknown)   SpO2 98%   BMI 29.14 kg/m  .    " No

## 2025-03-19 NOTE — PLAN OF CARE
Problem: Adult Behavioral Health Plan of Care  Goal: Plan of Care Review  Outcome: Progressing     Problem: Sleep Disturbance  Goal: Adequate Sleep/Rest  Outcome: Progressing   Goal Outcome Evaluation:       Pt appears to be sleeping comfortably with even and non labored respirations as observed during all safety checks. Pt slept for 7 hours. No safety or behavioral concerns noted. Will continue with same plan of care.

## 2025-03-19 NOTE — CONSULTS
3/19/2025  Pt completed her CYNTHIA CA today. She is interested in IOP CYNTHIA treatment at this time. I emailed her unit CTC a list of potential tx programs pt could attend.    Next Steps:  1) pt picks a tx program.  2) pt informs me or the unit sw which tx program she wants to go to.  3) pt signs ENE for tx program.  4) either myself or unit SW sends CYNTHIA CA to tx program.    [facility] ID[facility] [id number]    Recommendations:   2.1 Intensive Outpatient Services    Clinical Substantiation:    Pt reports using methadone for the past 15 years. She reports using fentanyl 1-2 times per week. She appears to recognize that she needs additional sober supports and accountability in her life to help her maintain abstinence from non-prescribed opiates.      Referrals/ Alternatives:  Uri:   Phone: [phone]  Fax: [phone]  Email: [email or web address]  [email or web address]    [facility]ensive [facility]  [address] [place] [address]  *Services Available:  Walk-in or telehealth comprehensive assessments (formerly known as Rule 25s) available for both adults and adolescents, either in person or remotely  Mental health services  Medicated assisted treatment (MAT)  HYBRID Intensive outpatient (co-occurring) group  Peer Recovery Support  *Hours of Operation:  Walk-in/Drop-in hours: Mondays, Tuesdays, and Fridays 10 am - 4 pm  Scheduled appointment hours: Monday - Friday, 9 am - 5 pm  Adult Intensive Outpatient (co-occurring) group hours: Monday, Wednesday, & Thursday, 1 pm - 4 pm  Contact Information:  : [phone] for information & scheduling  TRACIE Ngyuen, Recovery : [phone] for questions    [facility] Out[facility]: (in-person & virtual)  Locations: [facility], [place], [facility], and [facility]3].  Phone: [phone]  Fax:  [phone]  Email:  [email or web address]  email: [email or web address]  [email or web address]  *Virtual IOP:  Day program: Monday through Thursday from 9 a - noon.   Evening program: Monday through Thursday from 5 p - 8 p.  *[facility] Location:  [address], [address]  [place] [address]  Phone:   Fax: [phone]  Group is Monday-Thursday  - Co-Ed IOP is from 12:30 - 3:30 p  - Virtual IOP is from 9 a - noon  - Women's IOP from 9 a - noon    [facility])  [facility] Intensive Outpatient Program  Phone: [phone]  Fax: [phone]  Behavioral Health:  Phone: [phone] *press 1  IOP Referral Fax: [phone]  [address] [place] [address]  [email or web address]     [facility]: (in-person & virtual)  Main phone: [phone]  Direct Dial: [phone]   Admissions email: [email or web address]   CYNTHIA fax: [phone]  [email or web address]  *Groups are held on Mondays, Tuesdays, Wednesdays, or Thursdays, depending on the location and level of care.  *Groups are generally held from 8am - 12pm and 4pm - 7pm.     [facility]  Phone: [phone]  Fax: [phone]  email: [email or web address]  [email or web address]    [facility] ([facility])  [address] [place] [address]  Phone: [phone]  Email:  [email or web address]  [email or web address]/services/[email or web address]/[email or web address]    [facility]: IOP- virtual  [address]  [place] [address]  Phone: [phone]  Fax: [phone]  email: [email or web address].  [email or web address]  Monday - Thursday 5:30 pm - 8:30 pm, Friday 11:00 am - 1:00 pm    CYNTHIA consult completed by:   Reny Chew [facility]    CYNTHIA Evaluation Counselor  Email: [email or web address] ; Phone:  [phone]  [facility] Mental Health and Addiction Services Evaluation Department  [address] [place] [address]     *Due to regulation of Title 42 of the Code of Federal Regulations (CFR) Part 2: Confidentiality laws apply to this note and the information wherein.  Thus, this note cannot be copy and pasted into any other health care staff's note nor can it be included in general medical records sent to ANY outside agency without the patient's written consent.
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
## 2025-03-19 NOTE — PLAN OF CARE
03/19/25 1558   Individualization/Patient Specific Goals   Patient Personal Strengths family/social support;motivated for recovery   Patient Vulnerabilities adverse childhood experience(s);substance abuse/addiction   Anxieties, Fears or Concerns pt is not expressing concerns   Individualized Care Needs thorough evaluation and medicatino management   Patient/Family-Specific Goals (Include Timeframe) Resovle suicidality and return to [facility] community   Interprofessional Rounds   Summary Pt presented to the ER with physical ailments and reported suicidal ideation with plan to OD on fetanyl.   Participants CTC;nursing;OT;psychiatrist;other (see comments)   Behavioral Team Discussion   Participants Aramis Howard MD, Nori MONTANA, Ander Muro RN, PA student, Ale Shaw OTR, NP student   Progress Pt has been in her bed but has agreed to interviews. Is agreeable to CYNTHIA treatment.   Anticipated length of stay 1 month   Continued Stay Criteria/Rationale the pt needs further evlauation of risk of harm to self   Medical/Physical pt has an extensive list of medical problems   Precautions suicide   Plan CYNTHIA assessment and refer to treatment, internal medicine consult   Rationale for change in precautions or plan na   Safety Plan rene be completed by unit therapist.   Anticipated Discharge Disposition home with family;substance use treatment     Goal Outcome Evaluation:

## 2025-03-19 NOTE — PROGRESS NOTES
"CLINICAL NUTRITION SERVICES - ASSESSMENT NOTE    RECOMMENDATIONS FOR MDs/PROVIDERS TO ORDER:  None at this time    Malnutrition Status:    Patient does not meet two of the established criteria necessary for diagnosing malnutrition    Registered Dietitian Interventions:  -Pt denies requests/concerns currently    Future/Additional Recommendations:  Monitor PO intake, GI sx, labs, weight trends.      REASON FOR ASSESSMENT  RN consult - Pt requesting nutrition consult. Reports she has lost over 34 pounds. She reports decreased appetite. Pt reports she has hx of nausea and vomiting (cyclic vomiting). Pt also reports abdominal pain makes it difficult to eat sometimes. Pt has been seen by IM but is also requesting to be seen by registered dietitian. She is also type 2 diabetic and takes insulin (sliding scale and carb coverage).    SUBJECTIVE INFORMATION  Assessed patient in room.    NUTRITION HISTORY  Pt reports hx of cyclic vomiting, T2DM, and acute abdominal pain. Pt states abdominal pain is minimal currently. She ate breakfast and reports tolerating it well. Pt denies any requests or concerns right now.     CURRENT NUTRITION ORDERS  Diet: Regular  Supplement:  none  Allergies: NKFA    CURRENT INTAKE/TOLERANCE  Eating and drinking adequately per RN notes    LABS  Nutrition-relevant labs:    03/18/25 15:38   Sodium 131 (L)    (H)    (H)      03/18/25 03:46 03/18/25 08:16 03/18/25 12:50 03/18/25 18:02 03/18/25 21:33 03/18/25 22:05   GLUCOSE BY METER POCT 224 (H) 242 (H) 220 (H) 243 (H) 256 (H) 244 (H)     MEDICATIONS  Nutrition-relevant medications: Reviewed    ANTHROPOMETRICS  Height: 175.3 cm (5' 9\")  Most Recent Weight: 89.5 kg (197 lb 4.8 oz)  IBW: 65.9 kg   % IBW: 135%  BMI (kg/m ): Overweight BMI 25-29.9  Weight History:   Wt Readings from Last 15 Encounters:   03/18/25 89.5 kg (197 lb 4.8 oz)   03/17/25 87.5 kg (193 lb)      03/13/25 90.7 kg (200 lb)   03/07/25 88.5 kg (195 lb)   02/26/25 90.1 kg (198 " lb 9.6 oz)   02/23/25 90.7 kg (200 lb)   02/17/25 90.7 kg (200 lb)   01/30/25 89.8 kg (197 lb 14.4 oz)   01/22/25 90.7 kg (200 lb)   01/18/25 90.7 kg (200 lb)   12/29/24 90.7 kg (200 lb)   12/23/24 90.7 kg (200 lb)   12/03/24 96.3 kg (212 lb 4.8 oz)   11/06/24 89.4 kg (197 lb)   10/25/24 90.8 kg (200 lb 3.2 oz)   10/13/24 92.9 kg (204 lb 12.8 oz)   7% weight loss in 4 months    Dosing Weight: 71 kg, based on adjusted wt    ASSESSED NUTRITION NEEDS  Estimated Energy Needs: 1775 - 2130 kcals/day (25 - 30 kcals/kg)  Justification: Maintenance  Estimated Protein Needs: 56 - 71 grams protein/day (0.8 - 1 grams of pro/kg)  Justification: Maintenance  Estimated Fluid Needs: 1 mL/kcal  Justification: Maintenance    SYSTEM FINDINGS    Skin/wounds: none  GI symptoms: Pt denied N/V/D/C     MALNUTRITION  % Intake: No decreased intake noted  % Weight Loss: Weight loss does not meet criteria   Subcutaneous Fat Loss: None observed  Muscle Loss: None observed  Fluid Accumulation/Edema: None noted  Malnutrition Diagnosis: Patient does not meet two of the established criteria necessary for diagnosing malnutrition  Malnutrition Present on Admission: No    NUTRITION DIAGNOSIS  Predicted inadequate nutrient intake (kcal/protein)  related to abdominal pain/cyclic vomiting as evidenced by weight loss PTA.    INTERVENTIONS  See nutrition interventions above    Goals  Patient to consume % of nutritionally adequate meal trays TID, or the equivalent with supplements/snacks.     Monitoring/Evaluation  Progress toward goals will be monitored and evaluated per policy.    Sonia Perez MPH, RDN, LD  Behavioral Health Adult & Pediatric Dietitian  BEH Clinical Dietitian Mariela, Teams, or Desk: [phone]  Weekend/Holiday Mariela: Weekend Holiday Clinical Dietitian [Multi Site Groups]

## 2025-03-19 NOTE — PLAN OF CARE
Problem: Suicide Risk  Goal: Absence of Self-Harm  Outcome: Trae Malcolm spends most of the shift isolated in her room, lying in bed. She is pleasant with a flat affect. She came out for dinner. She ate 91 g of carbs and her BG was 243. Pt received Insulin per medication order. At HS pt had a snack consisting of 27g of carbs and her BG was 244. Given Insulin per order. She endorses SI with a plan outside of the hospital, but contracts for safety on the unit. She tells writer that if she were to leave she likely would follow through with her plan, but on the unit she does not have SI. She denies HI/AH/VH. She requested & received PRN Zyprexa 10 mg @ 4671.

## 2025-03-19 NOTE — PROGRESS NOTES
[facility]  [address] [place] [address]      3/19/2025      Yun Coyle  [address]  [place] [address]      Dear Ms. Coyle,    Here are some [facility] CYNTHIA treatment programs to look into.  Since you completed a CYNTHIA Comprehensive Assessment with me, you can let me know which program you would like to attend, and I will fax your assessment to the program of choice for you to get started. I will need you to sign a Release of Information before I fax your assessment. This Release of Information can be sent to you via Ignyta (electronic) or via the Unit . If you have questions, my phone number is [phone].    Thanks,  Shakila      Referrals/ Alternatives:  Uri:   Phone: [phone]  Fax: [phone]  Email: [email or web address]  https://[employer] Company[facility].org/treatment-and-recovery/treatment-and-recovery-individuals/    [facility] Intensive Outpatient Program  [address] [place] [address]  *Services Available:  Walk-in or telehealth comprehensive assessments (formerly known as Rule 25s) available for both adults and adolescents, either in person or remotely  Mental health services  Medicated assisted treatment (MAT)  HYBRID Intensive outpatient (co-occurring) group  Peer Recovery Support  *Hours of Operation:  Walk-in/Drop-in hours: Mondays, Tuesdays, and Fridays 10 am - 4 pm  Scheduled appointment hours: Monday - Friday, 9 am - 5 pm  Adult Intensive Outpatient (co-occurring) group hours: Monday, Wednesday, & Thursday, 1 pm - 4 pm  Contact Information:  : [phone] for information & scheduling  TRACIE Nguyen, Recovery : [phone] for questions    [facility]: (in-person & virtual)  Locations: [facility], [place], [facility], and [facility].  Phone: [phone]  Fax:  [phone]  Email: [email or web address]  email:  [email or web address]  [email or web address]  *Virtual IOP:  Day program: Monday through Thursday from 9 a - noon.   Evening program: Monday through Thursday from 5 p - 8 p.  *[facility] Location:  [address]  [place] [address]  Phone:   Fax: [phone]  Group is Monday-Thursday  - Co-Ed IOP is from 12:30 - 3:30 p  - Virtual IOP is from 9 a - noon  - Women's IOP from 9 a - noon    [facility] ([facility])  [facility]  Phone: [phone]  Fax: [phone]  Behavioral Health:  Phone: [phone] *press 1  IOP Referral Fax: [phone]  [facility] [place] [address]  [email or web address]     [facility]: (in-person & virtual)  Main phone: [phone]  Direct Dial: [phone]   Admissions email: [email or web address]   CYNTHIA fax: [phone]  [email or web address].[employer]  *Groups are held on Mondays, Tuesdays, Wednesdays, or Thursdays, depending on the location and level of care.  *Groups are generally held from 8am - 12pm and 4pm - 7pm.     [facility]  Phone: [phone]  Fax: [phone]  email: [email or web address]  https://www."[employer]/    [facility] (Hiram sanon)  [address] [place] [address]  Phone: [phone]  Email:  [email or web address]  https://www.Similar Pages.org/services/human_services/[facility]/locations.php    [facility]: IOP- virtual  [address]  [place] [address]  Phone: [phone]  Fax: [phone]  email: [email or web address].  www.[email or web address].org  Monday - Thursday 5:30 pm - 8:30 pm, Friday 11:00 am - 1:00 pm    Reny Schumacher MA [facility]  CD Evaluation Counselor  [phone]

## 2025-03-19 NOTE — PLAN OF CARE
BEH IP Unit Acuity Rating Score (UARS)  Patient is given one point for every criteria they meet.    CRITERIA SCORING   On a 72 hour hold, court hold, committed, stay of commitment, or revocation. 0    Patient LOS on BEH unit exceeds 20 days. 0  LOS: 1   Patient under guardianship, 55+, otherwise medically complex, or under age 11. 1   Suicide ideation without relief of precipitating factors. 1   Current plan for suicide. 1   Current plan for homicide. 0   Imminent risk or actual attempt to seriously harm another without relief of factors precipitating the attempt. 0   Severe dysfunction in daily living (ex: complete neglect for self care, extreme disruption in vegetative function, extreme deterioration in social interactions). 1   Recent (last 7 days) or current physical aggression in the ED or on unit. 0   Restraints or seclusion episode in past 72 hours. 0   Recent (last 7 days) or current verbal aggression, agitation, yelling, etc., while in the ED or unit. 0   Active psychosis. 0   Need for constant or near constant redirection (from leaving, from others, etc).  0   Intrusive or disruptive behaviors. 0   Patient requires 3 or more hours of individualized nursing care per 8-hour shift (i.e. for ADLs, meds, therapeutic interventions). 0   TOTAL 4

## 2025-03-19 NOTE — PROGRESS NOTES
Please add this to medical record for coding purposes    -Uncontrolled Type 2 Diabetes with Hyperglycemia  -Other (please specify)

## 2025-03-20 VITALS
OXYGEN SATURATION: 96 % | HEART RATE: 103 BPM | RESPIRATION RATE: 16 BRPM | DIASTOLIC BLOOD PRESSURE: 79 MMHG | BODY MASS INDEX: 29.44 KG/M2 | TEMPERATURE: 98.1 F | WEIGHT: 198.8 LBS | HEIGHT: 69 IN | SYSTOLIC BLOOD PRESSURE: 117 MMHG

## 2025-03-20 LAB
GLUCOSE BLDC GLUCOMTR-MCNC: 176 MG/DL (ref 70–99)
GLUCOSE BLDC GLUCOMTR-MCNC: 185 MG/DL (ref 70–99)
GLUCOSE BLDC GLUCOMTR-MCNC: 218 MG/DL (ref 70–99)
GLUCOSE BLDC GLUCOMTR-MCNC: 242 MG/DL (ref 70–99)
GLUCOSE BLDC GLUCOMTR-MCNC: 249 MG/DL (ref 70–99)
GLUCOSE BLDC GLUCOMTR-MCNC: 348 MG/DL (ref 70–99)

## 2025-03-20 PROCEDURE — 250N000013 HC RX MED GY IP 250 OP 250 PS 637: Performed by: PSYCHIATRY & NEUROLOGY

## 2025-03-20 PROCEDURE — 124N000002 HC R&B MH UMMC

## 2025-03-20 PROCEDURE — 250N000013 HC RX MED GY IP 250 OP 250 PS 637: Performed by: EMERGENCY MEDICINE

## 2025-03-20 PROCEDURE — 250N000013 HC RX MED GY IP 250 OP 250 PS 637: Performed by: PHYSICIAN ASSISTANT

## 2025-03-20 PROCEDURE — 99233 SBSQ HOSP IP/OBS HIGH 50: CPT | Performed by: PSYCHIATRY & NEUROLOGY

## 2025-03-20 RX ADMIN — HYDROXYZINE HYDROCHLORIDE 50 MG: 25 TABLET ORAL at 21:17

## 2025-03-20 RX ADMIN — INSULIN ASPART 1 UNITS: 100 INJECTION, SOLUTION INTRAVENOUS; SUBCUTANEOUS at 13:10

## 2025-03-20 RX ADMIN — INSULIN ASPART 5 UNITS: 100 INJECTION, SOLUTION INTRAVENOUS; SUBCUTANEOUS at 17:58

## 2025-03-20 RX ADMIN — HYDROXYZINE HYDROCHLORIDE 50 MG: 25 TABLET ORAL at 12:06

## 2025-03-20 RX ADMIN — OLANZAPINE 10 MG: 10 TABLET, FILM COATED ORAL at 08:03

## 2025-03-20 RX ADMIN — LOSARTAN POTASSIUM 25 MG: 25 TABLET, FILM COATED ORAL at 08:03

## 2025-03-20 RX ADMIN — METHADONE HYDROCHLORIDE 100 MG: 10 CONCENTRATE ORAL at 08:03

## 2025-03-20 RX ADMIN — SERTRALINE HYDROCHLORIDE 50 MG: 50 TABLET ORAL at 08:03

## 2025-03-20 RX ADMIN — PRAZOSIN HYDROCHLORIDE 2 MG: 2 CAPSULE ORAL at 21:17

## 2025-03-20 RX ADMIN — INSULIN ASPART 3 UNITS: 100 INJECTION, SOLUTION INTRAVENOUS; SUBCUTANEOUS at 08:10

## 2025-03-20 RX ADMIN — HYDROXYZINE HYDROCHLORIDE 25 MG: 25 TABLET ORAL at 16:21

## 2025-03-20 ASSESSMENT — ACTIVITIES OF DAILY LIVING (ADL)
ADLS_ACUITY_SCORE: 48
HYGIENE/GROOMING: INDEPENDENT
ADLS_ACUITY_SCORE: 48
LAUNDRY: WITH SUPERVISION
ADLS_ACUITY_SCORE: 48
ADLS_ACUITY_SCORE: 48
DRESS: INDEPENDENT
ADLS_ACUITY_SCORE: 48
HYGIENE/GROOMING: INDEPENDENT
ADLS_ACUITY_SCORE: 48
DRESS: INDEPENDENT
ADLS_ACUITY_SCORE: 48
LAUNDRY: WITH SUPERVISION
ORAL_HYGIENE: INDEPENDENT
ORAL_HYGIENE: INDEPENDENT
ADLS_ACUITY_SCORE: 48

## 2025-03-20 NOTE — PLAN OF CARE
"Goal Outcome Evaluation:    Patient slept 7 hours over night and was up at breakfast time.  She spent the majority of the shift in her room - napped off and on throughout the shift.  She does not enage much with staff or patients and did not attend groups but she is pleasant and appropriate upon approach.  She presents as fearfulShe ate 100% of breakfast and lunch in the lounge.  Blood sugars this shift - 249 before breakfast and 176 before lunch.  Medication compliant, no PRN's this shift. Patient denies SI in the hospital but still feels she would not be safe leaving.  Denies HI and hallucinations.  Contracts for safety on the unit and agrees to inform staff if she is experiencing any discomforts.  /81 (BP Location: Left arm)   Pulse 79   Temp 98.2  F (36.8  C) (Oral)   Resp 16   Ht 1.753 m (5' 9\")   Wt 90.2 kg (198 lb 12.8 oz)   LMP  (LMP Unknown)   SpO2 98%   BMI 29.36 kg/m      "

## 2025-03-20 NOTE — PLAN OF CARE
BEH IP Unit Acuity Rating Score (UARS)  Patient is given one point for every criteria they meet.    CRITERIA SCORING   On a 72 hour hold, court hold, committed, stay of commitment, or revocation. 0    Patient LOS on BEH unit exceeds 20 days. 0  LOS: 2   Patient under guardianship, 55+, otherwise medically complex, or under age 11. 1   Suicide ideation without relief of precipitating factors. 1   Current plan for suicide. 1   Current plan for homicide. 0   Imminent risk or actual attempt to seriously harm another without relief of factors precipitating the attempt. 0   Severe dysfunction in daily living (ex: complete neglect for self care, extreme disruption in vegetative function, extreme deterioration in social interactions). 1   Recent (last 7 days) or current physical aggression in the ED or on unit. 0   Restraints or seclusion episode in past 72 hours. 0   Recent (last 7 days) or current verbal aggression, agitation, yelling, etc., while in the ED or unit. 0   Active psychosis. 0   Need for constant or near constant redirection (from leaving, from others, etc).  0   Intrusive or disruptive behaviors. 0   Patient requires 3 or more hours of individualized nursing care per 8-hour shift (i.e. for ADLs, meds, therapeutic interventions). 0   TOTAL 4

## 2025-03-20 NOTE — PROGRESS NOTES
"[facility], [place]   Psychiatric Progress Note        Interim History:     The patient's care was discussed with the treatment team during the daily team meeting and/or staff's chart notes were reviewed.  Patient presented and remained tense. She continued to complain of high anxiety and feeling sad. Continued to isolate herself in her room. Slept for 7 hours last night. Talked to CD counselor, said that she was more open to CD counselor's recommendations. IM reviewed patient's meds and increased her Lantus insulin dose. For more details, please, see Internal Medicine note. Appreciate medical team's input.  Patient had minimal interactions with peers and staff members, but was reported to be compliant with meds and cares, see RN's note below:    \"Patient slept 7 hours over night and was up at breakfast time.  She spent the majority of the shift in her room - napped off and on throughout the shift.  She does not enage much with staff or patients and did not attend groups but she is pleasant and appropriate upon approach.  She presents as fearfulShe ate 100% of breakfast and lunch in the lounge.  Blood sugars this shift - 249 before breakfast and 176 before lunch.  Medication compliant, no PRN's this shift. Patient denies SI in the hospital but still feels she would not be safe leaving.  Denies HI and hallucinations.  Contracts for safety on the unit and agrees to inform staff if she is experiencing any discomforts.  /81 (BP Location: Left arm)   Pulse 79   Temp 98.2  F (36.8  C) (Oral)   Resp 16   Ht 1.753 m (5' 9\")   Wt 90.2 kg (198 lb 12.8 oz)   LMP  (LMP Unknown)   SpO2 98%   BMI 29.36 kg/m \"    Met with patient: she was seen in her room/bed. Yun turned around in her bed and faced myself and PA student. She confirmed still feeling sad and anxious, confirmed still having Suicidal ideation, but today denied any plans to harm self. We asked her what she would think " could be the best way to stay clean and sober. She agreed that doing outpatient program would carry more dangers of relapse and said that now she was more open to going to residential program. We informed her that IM would look into her blood sugars and, possibly, would adjust her insulins. Encouraged her to spend more time outside of her room/bed. Yun he indicated that she understood and had no questions or concerns. Yesterday and today lab work was reviewed.         Medications:     Current Facility-Administered Medications   Medication Dose Route Frequency Provider Last Rate Last Admin    [Held by provider] empagliflozin (JARDIANCE) tablet 10 mg  10 mg Oral Daily Aramis Casas MD        insulin aspart (NovoLOG) injection (RAPID ACTING)  1-7 Units Subcutaneous TID AC Abraham Patel PA-C   1 Units at 03/20/25 1310    insulin aspart (NovoLOG) injection (RAPID ACTING)  1-5 Units Subcutaneous At Bedtime Abraham Patel PA-C   1 Units at 03/19/25 2145    insulin aspart (NovoLOG) injection (RAPID ACTING)   Subcutaneous Daily with breakfast Abraham Patel PA-C   12 Units at 03/20/25 0810    insulin aspart (NovoLOG) injection (RAPID ACTING)   Subcutaneous Daily with lunch Abraham Patel PA-C   12 Units at 03/20/25 1310    insulin aspart (NovoLOG) injection (RAPID ACTING)   Subcutaneous Daily with supper Abraham Patel PA-C   6 Units at 03/19/25 1808    insulin glargine (LANTUS PEN) injection 35 Units  35 Units Subcutaneous At Bedtime Abraham Patel PA-C        losartan (COZAAR) tablet 25 mg  25 mg Oral Daily Abraham Patel PA-C   25 mg at 03/20/25 0803    [Held by provider] metFORMIN (GLUCOPHAGE) tablet 1,000 mg  1,000 mg Oral BID w/meals Abraham Patel PA-C        methadone (DOLOPHINE-INTENSOL) 10 MG/ML (HIGH CONC) solution 100 mg  100 mg Oral Daily Anderson Blas MD   100 mg at 03/20/25 0803    prazosin (MINIPRESS) capsule 2 mg  2 mg Oral At Bedtime Aramis Casas  "MD SUSANA   2 mg at 03/19/25 2143    sertraline (ZOLOFT) tablet 50 mg  50 mg Oral Daily Aramis Casas MD   50 mg at 03/20/25 0803          Allergies:   No Known Allergies       Labs:     Recent Results (from the past 24 hours)   Glucose by meter    Collection Time: 03/19/25  5:51 PM   Result Value Ref Range    GLUCOSE BY METER POCT 262 (H) 70 - 99 mg/dL   Glucose by meter    Collection Time: 03/19/25  9:37 PM   Result Value Ref Range    GLUCOSE BY METER POCT 208 (H) 70 - 99 mg/dL   Glucose by meter    Collection Time: 03/20/25  1:56 AM   Result Value Ref Range    GLUCOSE BY METER POCT 218 (H) 70 - 99 mg/dL   Glucose by meter    Collection Time: 03/20/25  8:08 AM   Result Value Ref Range    GLUCOSE BY METER POCT 249 (H) 70 - 99 mg/dL   Glucose by meter    Collection Time: 03/20/25 11:59 AM   Result Value Ref Range    GLUCOSE BY METER POCT 176 (H) 70 - 99 mg/dL          Psychiatric Examination:     /81 (BP Location: Left arm)   Pulse 79   Temp 98.2  F (36.8  C) (Oral)   Resp 16   Ht 1.753 m (5' 9\")   Wt 90.2 kg (198 lb 12.8 oz)   LMP  (LMP Unknown)   SpO2 98%   BMI 29.36 kg/m    Weight is 198 lbs 12.8 oz  Body mass index is 29.36 kg/m .    Orthostatic Vitals         Most Recent      Sitting Orthostatic /101 03/20 0855    Sitting Orthostatic Pulse (bpm) 103 03/20 0855    Standing Orthostatic /94 03/20 0855    Standing Orthostatic Pulse (bpm) 120 03/20 0855           Appearance: awake, alert, adequately groomed, and appeared as age stated  Attitude: more cooperative  Eye Contact:  fair  Mood:  anxious and sad   Affect:  constricted mobility  Speech:  clear, coherent  Psychomotor Behavior:  no evidence of tardive dyskinesia, dystonia, or tics  Throught Process:  linear and goal oriented  Associations:  no loose associations  Thought Content:  no evidence of psychotic thought and passive suicidal ideation present, denies having suicidal plans  Insight:  partial  Judgement:  fair  Oriented " "to:  time, person, and place  Attention Span and Concentration:  fair  Recent and Remote Memory:  fair    Clinical Global Impressions  First: 7/4 3/19/2025      Most recent:            Precautions:     Behavioral Orders   Procedures    Code 1 - Restrict to Unit    Routine Programming     As clinically indicated    Status 15     Every 15 minutes.    Suicide precautions: Suicide Risk: HIGH     Patients on Suicide Precautions should have a Combination Diet ordered that includes a Diet selection(s) AND a Behavioral Tray selection for Safe Tray - with utensils, or Safe Tray - NO utensils       Order Specific Question:   Suicide Risk     Answer:   HIGH          DIagnoses:     Unspecified depressive disorder (MDD recurrent vs bipolar affective type 2 depressed)  Suicidal ideation,   Post traumatic Stress disorder  Generalized Anxiety Disorder  Panic Disorder with agoraphobia    Opiate use disorder moderate severity, patient is on maintenance assisted therapy with methadone  Cannabis use disorder, severe  Benzodiazepine use disorder          Plan:     Was started on Sertraline on 3/18. No medication changes today 3/20/2025. Will continue to provide support and structure. CD counselor recommended following programs:    \"Referrals/ Alternatives:  Uri:   Phone: [phone]  Fax: [phone]  Email: [email or web address]  [email or web address]/treatment-and-recovery/treatment-and-recovery-individuals/     [facility] Intensive Outpatient Program  [address] [place] [address]  *Services Available:  Walk-in or telehealth comprehensive assessments (formerly known as Rule 25s) available for both adults and adolescents, either in person or remotely  Mental health services  Medicated assisted treatment (MAT)  HYBRID Intensive outpatient (co-occurring) group  Peer Recovery Support  *Hours of Operation:  Walk-in/Drop-in hours: Mondays, Tuesdays, and Fridays 10 am - 4 pm  Scheduled appointment hours: Monday " - Friday, 9 am - 5 pm  Adult Intensive Outpatient (co-occurring) group hours: Monday, Wednesday, & Thursday, 1 pm - 4 pm  Contact Information:  : [phone] for information & scheduling  AKIN Nguyen-S, Recovery : [phone] for questions     [facility] Out[facility]: (in-person & virtual)  Locations: [facility], [facility], and [facility].  Phone: [phone]  Fax:  [phone]  Email: [email or web address]  email: [email or web address]  [email or web address]  *Virtual IOP:  Day program: Monday through Thursday from 9 a - noon.   Evening program: Monday through Thursday from 5 p - 8 p.  *[facility] Location:  [address]  [place] [address]  Phone:   Fax: [phone]  Group is Monday-Thursday  - Co-Ed IOP is from 12:30 - 3:30 p  - Virtual IOP is from 9 a - noon  - Women's IOP from 9 a - noon     [facility] ( [facility])  [facility]  Phone: [phone]  Fax: [phone]  Behavioral Health:  Phone: [phone] *press 1  IOP Referral Fax: [phone]  [address] [place] [address]  [email or web address].org     [facility]: (in-person & virtual)  Main phone: [phone]  Direct Dial: [phone]   Admissions email: [email or web address]   CYNTHIA fax: [phone]  [email or web address] COI  *Groups are held on Mondays, Tuesdays, Wednesdays, or Thursdays, depending on the location and level of care.  *Groups are generally held from 8am - 12pm and 4pm - 7pm.     [facility]  Phone: [phone]  Fax: [phone]  email: [email or web address]  [email or web address] [email or web address]     [facility] (Hiram sanon)  [address] [place] [address]  Phone: [phone]  Email:   "[email or web address]  https://www.[employer].org/services/human_services/[facility]/locations.php     [facility]: [facility]- virtual  [address]  [place] [address]  Phone: [phone]  Fax: [phone]  email: [email or web address].  [email or web address].[email or web address]  Monday - Thursday 5:30 pm - 8:30 pm, Friday 11:00 am - 1:00 pm\"       Total time spent was 38 minutes. Over 50% of times was spent counseling and coordination of care regarding coping skills, medication and discharge planning.         "

## 2025-03-20 NOTE — PROGRESS NOTES
Discussed uncontrolled sugars with primary team. Will increase lantus to 35 units tonight. Will follow.

## 2025-03-20 NOTE — PLAN OF CARE
"  Problem: Suicide Risk  Goal: Absence of Self-Harm  Outcome: Progressing   Goal Outcome Evaluation:    Plan of Care Reviewed With: patient      Presents and anxious, polite, and cooperative. She asks for Zyprexa for anxiety. Informed is only indicated for agitation in emergencies and offered Gabapentin or Hydroxyzine. She takes hydroxyzine and does say it was helpful. States she also uses Zyprexa as an antiemetic too and says Zofran doesn't work. Informed I would pass it on to the provider. Sticky note in addition to this note.     She endorses suicidal thoughts. Does commit to safety and agrees to notify staff of worsening symptoms. Makes needs known. Speech is clear and coherent, appropriate. States she is sleeping \"ok\" and her appetite is normal. Endorses chronic stomach discomfort. Denies problems with elimination.     "

## 2025-03-20 NOTE — PLAN OF CARE
Problem: Psychotic Signs/Symptoms  Goal: Improved Sleep (Psychotic Signs/Symptoms)  Intervention: Promote Healthy Sleep Hygiene  Recent Flowsheet Documentation  Taken 3/20/2025 0614 by Kayleigh Tirado, RN  Sleep Hygiene Promotion:   awakenings minimized   noise level reduced   regular sleep pattern promoted   Goal Outcome Evaluation:             Pt slept 7 hrs. Blood glucose at 0200 was 218. Safety Rounds completed every 15 minutes throughout the night. Visible respirations noted with no signes of distress. No safety or behavior concerns noted this shift. Will continue to monitor and provide support as needed.

## 2025-03-20 NOTE — PLAN OF CARE
Team Note Due:  Wednesday    Assessment/Intervention/Current Symtoms and Care Coordination:  Chart review and met with team, discussed pt progress, symptomology, and response to treatment.  Discussed the discharge plan and any potential impediments to discharge.      I approached the pt.  She was in bed mid-afternoon.  She had a washcloth on her forehead.  She said she was alright.  I gave the pt the list of [facility]THIA treatment centers that the  has sent.  I directed to review this list and let me know what facilty she wanted to go to.       Discharge Plan or Goal:  Home with IOP     Barriers to Discharge:  Pt is still symptomatic     Referral Status:  CYNTHIA assessment completed    Referrals/ Alternatives:  [facility]:   Phone: [phone]  Fax: [phone]  Email: [email or web address]  [email or web address]-[email or web address]/treatment-and-recovery-[email or web address]    Formerly named [facility] Intensive Outpatient Program  [address] [place] [address]  *Services Available:  Walk-in or telehealth comprehensive assessments (formerly known as Rule 25s) available for both adults and adolescents, either in person or remotely  Mental health services  Medicated assisted treatment (MAT)  HYBRID Intensive outpatient (co-occurring) group  Peer Recovery Support  *Hours of Operation:  Walk-in/Drop-in hours: Mondays, Tuesdays, and Fridays 10 am - 4 pm  Scheduled appointment hours: Monday - Friday, 9 am - 5 pm  Adult Intensive Outpatient (co-occurring) group hours: Monday, Wednesday, & Thursday, 1 pm - 4 pm  Contact Information:  : [phone] for information & scheduling  TRACIE Nguyen, Recovery : [phone] for questions    [facility]: (in-person & virtual)  Locations: [facility], [place], [facility], and [facility].  Phone: [phone]  Fax:  [phone]  Email: [email or web address]  email:  [email or web address]  [email or web address]  *Virtual IOP:  Day program: Monday through Thursday from 9 a - noon.   Evening program: Monday through Thursday from 5 p - 8 p.  *[facility] Location:  [address], [address]  [place] [address]  Phone:   Fax: [phone]  Group is Monday-Thursday  - Co-Ed IOP is from 12:30 - 3:30 p  - Virtual IOP is from 9 a - noon  - Women's IOP from 9 a - noon    [facility])  [facility]  Phone: [phone]  Fax: [phone]  Behavioral Health:  Phone: [phone] *press 1  IOP Referral Fax: [phone]  [facility] [place] [address]  [email or web address]     [facility]: (in-person & virtual)  Main phone: [phone]  Direct Dial: [phone]   Admissions email: [email or web address]   CYNTHIA fax: [phone]  [email or web address]  *Groups are held on Mondays, Tuesdays, Wednesdays, or Thursdays, depending on the location and level of care.  *Groups are generally held from 8am - 12pm and 4pm - 7pm.    [facility]  Phone: [phone]  Fax: [phone]  email: [email or web address]  [email or web address]://[email or web address]./    [facility] ([facility])  [address] [place] [address]  Phone: [phone]  Email:  [email or web address]  https://www.[employer].org/services/human_services/[facility]/locations.php    [facility]: IOP- virtual  [address]  [place] [address]  Phone: [phone]  Fax: [phone]  email: [email or web address].  [email or web address]  Monday - Thursday 5:30 pm - 8:30 pm, Friday 11:00 am - 1:00 pm    Reny Schumacher MA LADC /   CYNTHIA Evaluation Counselor  Triage and Transition: Consult & Liasion   [address]  [email or web address]  [email or web address].org   Office: [phone] Fax: [phone]   Gender Pronouns: she/her         Legal Status:  Voluntary       Contacts (include ENE status):    Ene for daughter:  Samanta granados          Upcoming Meetings and Dates/Important Information and next steps:

## 2025-03-21 LAB
GLUCOSE BLDC GLUCOMTR-MCNC: 213 MG/DL (ref 70–99)
GLUCOSE BLDC GLUCOMTR-MCNC: 216 MG/DL (ref 70–99)
GLUCOSE BLDC GLUCOMTR-MCNC: 236 MG/DL (ref 70–99)
GLUCOSE BLDC GLUCOMTR-MCNC: 239 MG/DL (ref 70–99)
GLUCOSE BLDC GLUCOMTR-MCNC: 251 MG/DL (ref 70–99)

## 2025-03-21 PROCEDURE — 99233 SBSQ HOSP IP/OBS HIGH 50: CPT | Performed by: PSYCHIATRY & NEUROLOGY

## 2025-03-21 PROCEDURE — 124N000002 HC R&B MH UMMC

## 2025-03-21 PROCEDURE — 250N000013 HC RX MED GY IP 250 OP 250 PS 637: Performed by: PHYSICIAN ASSISTANT

## 2025-03-21 PROCEDURE — 250N000013 HC RX MED GY IP 250 OP 250 PS 637: Performed by: PSYCHIATRY & NEUROLOGY

## 2025-03-21 PROCEDURE — 250N000013 HC RX MED GY IP 250 OP 250 PS 637: Performed by: EMERGENCY MEDICINE

## 2025-03-21 RX ORDER — SERTRALINE HYDROCHLORIDE 100 MG/1
100 TABLET, FILM COATED ORAL DAILY
Status: DISCONTINUED | OUTPATIENT
Start: 2025-03-22 | End: 2025-03-26

## 2025-03-21 RX ADMIN — SERTRALINE HYDROCHLORIDE 50 MG: 50 TABLET ORAL at 07:43

## 2025-03-21 RX ADMIN — HYDROXYZINE HYDROCHLORIDE 50 MG: 25 TABLET ORAL at 18:02

## 2025-03-21 RX ADMIN — OLANZAPINE 10 MG: 10 TABLET, FILM COATED ORAL at 07:39

## 2025-03-21 RX ADMIN — INSULIN ASPART 2 UNITS: 100 INJECTION, SOLUTION INTRAVENOUS; SUBCUTANEOUS at 12:42

## 2025-03-21 RX ADMIN — INSULIN ASPART 2 UNITS: 100 INJECTION, SOLUTION INTRAVENOUS; SUBCUTANEOUS at 08:03

## 2025-03-21 RX ADMIN — LOSARTAN POTASSIUM 25 MG: 25 TABLET, FILM COATED ORAL at 07:43

## 2025-03-21 RX ADMIN — HYDROXYZINE HYDROCHLORIDE 50 MG: 25 TABLET ORAL at 11:46

## 2025-03-21 RX ADMIN — GABAPENTIN 100 MG: 100 CAPSULE ORAL at 20:28

## 2025-03-21 RX ADMIN — TRAZODONE HYDROCHLORIDE 50 MG: 50 TABLET ORAL at 20:29

## 2025-03-21 RX ADMIN — GABAPENTIN 100 MG: 100 CAPSULE ORAL at 13:00

## 2025-03-21 RX ADMIN — METHADONE HYDROCHLORIDE 100 MG: 10 CONCENTRATE ORAL at 07:38

## 2025-03-21 RX ADMIN — INSULIN ASPART 2 UNITS: 100 INJECTION, SOLUTION INTRAVENOUS; SUBCUTANEOUS at 17:57

## 2025-03-21 RX ADMIN — PRAZOSIN HYDROCHLORIDE 2 MG: 2 CAPSULE ORAL at 20:22

## 2025-03-21 RX ADMIN — OLANZAPINE 10 MG: 10 TABLET, FILM COATED ORAL at 14:05

## 2025-03-21 ASSESSMENT — ACTIVITIES OF DAILY LIVING (ADL)
ADLS_ACUITY_SCORE: 48
LAUNDRY: WITH SUPERVISION
ADLS_ACUITY_SCORE: 48
LAUNDRY: WITH SUPERVISION
ADLS_ACUITY_SCORE: 48
ADLS_ACUITY_SCORE: 48
ORAL_HYGIENE: INDEPENDENT
ORAL_HYGIENE: INDEPENDENT
ADLS_ACUITY_SCORE: 48
HYGIENE/GROOMING: INDEPENDENT
ADLS_ACUITY_SCORE: 48
HYGIENE/GROOMING: INDEPENDENT
ADLS_ACUITY_SCORE: 48
DRESS: INDEPENDENT
ADLS_ACUITY_SCORE: 48
DRESS: INDEPENDENT

## 2025-03-21 NOTE — PLAN OF CARE
Team Note Due:  Wednesday    Assessment/Intervention/Current Symtoms and Care Coordination:  Chart review and met with team, discussed pt progress, symptomology, and response to treatment.  Discussed the discharge plan and any potential impediments to discharge.      Team Meeting  MD  reports the pt wants to go to residential level of care.  RN reports that pt is asking other patients for food and not reporting what she ate to the nurse.      I have contacted the CYNTHIA  who will create a residential level of care list for the pt to pick from.     Discharge Plan or Goal:  Home with IOP     Barriers to Discharge:  Pt is still symptomatic     Referral Status:  CYNTHIA assessment completed  Pt was given a list of IOP program but now she wants residential level of care.      Referrals/ Alternatives:  [facility]:   Phone: [phone]  Fax: [phone]  Email: [email or web address]  [email or web address]/treatment-[email or web address]-recovery/treatment-and-recovery-individuals/    [facility] Intensive Outpatient Program  [address] [place] [address]  *Services Available:  Walk-in or telehealth comprehensive assessments (formerly known as Rule 25s) available for both adults and adolescents, either in person or remotely  Mental health services  Medicated assisted treatment (MAT)  HYBRID Intensive outpatient (co-occurring) group  Peer Recovery Support  *Hours of Operation:  Walk-in/Drop-in hours: Mondays, Tuesdays, and Fridays 10 am - 4 pm  Scheduled appointment hours: Monday - Friday, 9 am - 5 pm  Adult Intensive Outpatient (co-occurring) group hours: Monday, Wednesday, & Thursday, 1 pm - 4 pm  Contact Information:  : [phone] for information & scheduling  TRACIE Nguyen, Recovery : [phone] for questions    [facility]: (in-person & virtual)  Locations: [facility], [place], [facility] and [facility].  Phone: [phone]  Fax:   [phone]  Email: [email or web address]  email: [email or web address]  [email or web address]  *Virtual IOP:  Day program: Monday through Thursday from 9 a - noon.   Evening program: Monday through Thursday from 5 p - 8 p.  *[facility] Location:  [address]  [place] [address]  Phone:   Fax: [phone]  Group is Monday-Thursday  - Co-Ed IOP is from 12:30 - 3:30 p  - Virtual IOP is from 9 a - noon  - Women's IOP from 9 a - noon    [facility])  [facility]  Phone: [phone]  Fax: [phone]  Behavioral Health:  Phone: [phone] *press 1  IOP Referral Fax: [phone]  [address] [place] [address]  [email or web address]     [facility]: (in-person & virtual)  Main phone: [phone]  Direct Dial: [phone]   Admissions email: [email or web address]   CYNTHIA fax: [phone]  www.[email or web address]  *Groups are held on Mondays, Tuesdays, Wednesdays, or Thursdays, depending on the location and level of care.  *Groups are generally held from 8am - 12pm and 4pm - 7pm.    [facility]  Phone: [phone]  Fax: [phone]  email: info@[email or web address]  [email or web address]://www.[employer]/    [facility] ([facility])  [address] [place] [address]  Phone: [phone]  Email:  [email or web address]  https://www.[employer].org/services/human_services/[facility]/locations.php    [facility]: IOP- virtual  [address]  [place] [address]  Phone: [phone]  Fax: [phone]  email: [email or web address].  [email or web address]  Monday - Thursday 5:30 pm - 8:30 pm, Friday 11:00 am - 1:00 pm    Reny Schumacher MA LADC /   CYNTHIA Evaluation Counselor  Triage and Transition: Consult & Liasion   [facility]  [address]  [email or web address]  [email or web address].org   Office: [phone] Fax: [phone]   Gender Pronouns: she/her         Legal Status:  Voluntary       Contacts (include ENE status):    Ene for daughter:  Samanta Alejo granados          Upcoming Meetings and Dates/Important Information and next steps:      Decide on what facilty she wants to go to.                             Anesthesia Volume In Cc (Will Not Render If 0): 0.5

## 2025-03-21 NOTE — PLAN OF CARE
BEH IP Unit Acuity Rating Score (UARS)  Patient is given one point for every criteria they meet.    CRITERIA SCORING   On a 72 hour hold, court hold, committed, stay of commitment, or revocation. 0    Patient LOS on BEH unit exceeds 20 days. 0  LOS: 3   Patient under guardianship, 55+, otherwise medically complex, or under age 11. 1   Suicide ideation without relief of precipitating factors. 1   Current plan for suicide. 1   Current plan for homicide. 0   Imminent risk or actual attempt to seriously harm another without relief of factors precipitating the attempt. 0   Severe dysfunction in daily living (ex: complete neglect for self care, extreme disruption in vegetative function, extreme deterioration in social interactions). 0   Recent (last 7 days) or current physical aggression in the ED or on unit. 0   Restraints or seclusion episode in past 72 hours. 0   Recent (last 7 days) or current verbal aggression, agitation, yelling, etc., while in the ED or unit. 0   Active psychosis. 0   Need for constant or near constant redirection (from leaving, from others, etc).  0   Intrusive or disruptive behaviors. 0   Patient requires 3 or more hours of individualized nursing care per 8-hour shift (i.e. for ADLs, meds, therapeutic interventions). 0   TOTAL 3

## 2025-03-21 NOTE — PLAN OF CARE
Problem: Sleep Disturbance  Goal: Adequate Sleep/Rest  Outcome: Progressing   Goal Outcome Evaluation:      Pt asleep at start of shift. 15 minutes safety checks done throughout the shift. Slept for a total of 6.75 hours.

## 2025-03-21 NOTE — PLAN OF CARE
"Goal Outcome Evaluation:    Plan of Care Reviewed With: patient      Patient slept 6.75 hours over night and was up at breakfast time.  She spent the majority of the shift in the lounge watching TV.  Napped off and on in her room throughout the shift.  She does not enage much with staff or patients and did not attend groups but she is pleasant and appropriate upon approach.  She presents as less fearful and anxious today but continues to rate her anxiety 7-8/10.  She ate 100% of breakfast and lunch in the lounge.  Blood sugars this shift - 236 before breakfast and 239 before lunch.  Medication compliant, PRN Zxyprexa given x2 this shift as well as gabapentin and hydroxyzine for anxiety.  She reports the Zyprexa and hydroxyzine were helpful but did not get much if any relief from gabapentin.  Patient denies SI in the hospital but still feels she would not be safe leaving.  Denies HI and hallucinations.  Contracts for safety on the unit and agrees to inform staff if she is experiencing any discomforts.   /89 (BP Location: Left arm)   Pulse 106   Temp 97.9  F (36.6  C) (Temporal)   Resp 16   Ht 1.753 m (5' 9\")   Wt 90.2 kg (198 lb 12.8 oz)   LMP  (LMP Unknown)   SpO2 98%   BMI 29.36 kg/m         "

## 2025-03-21 NOTE — PLAN OF CARE
Problem: Psychotic Signs/Symptoms  Goal: Improved Behavioral Control (Psychotic Signs/Symptoms)  Outcome: Progressing   Goal Outcome Evaluation:    Plan of Care Reviewed With: patient      Presents as polite and cooperative during evening shift. Endorses suicidal thoughts, but commits to safety and agrees to notify staff of worsening symptoms. Denies other concerns. Blunted affect, speech is clear and coherent. Has been diligent reporting snacks for insulin carb coverage when asked. Spends majority of shift in room, but out in milieu occasionally.

## 2025-03-21 NOTE — PROGRESS NOTES
[facility] [place]   Psychiatric Progress Note        Interim History:   The patient's care was discussed with the treatment team during the daily team meeting and/or staff's chart notes were reviewed.  Staff report patient slept 7.5 hours. She endorsed suicidal thoughts yesterday, but without a plan. Staff states she has been snacking often at night, occasionally not reporting it to staff which has made carbohydrate count slightly inaccurate. She continues to spend the majority of her shift in her room, will come out to grab meals.     Met with patient who appears sad. When asking patient how things were going, she says things have not changed much since when she get admitted. Her anxiety and depression are both 8/10. She states her zyprexa helps. Asked patient if we can increase her sertraline to help depressive symptoms, patient agreeable to this. IM consulted for diabetic management and increased evening Lantus. She states she has been eating every meal, and snacks at night. She states if she does not have a snack in the middle of the night, she will get nauseous. When discussing diabetic management at home, she states she only checks her blood sugars about once a day. Reviewed that checking them more often, more like 3 times a day plus one at bedtime is better for control. Discussed uncontrolled diabetes can lead to complications down the line if left uncontrolled long enough. Plan is for IM to consult again before discharge. Jardiance and Metformin currently held per IM team, blood sugars ranging from 185-348 since being admitted. Patient currently only on insulin, and she states at home she only does basal insulin, no mealtime dosing.  Anticipated discharge is to chemical dependency treatment.          Medications:     Current Facility-Administered Medications   Medication Dose Route Frequency Provider Last Rate Last Admin    [Held by provider] empagliflozin (JARDIANCE) tablet 10  mg  10 mg Oral Daily Aramis Casas MD        insulin aspart (NovoLOG) injection (RAPID ACTING)  1-7 Units Subcutaneous TID AC Abraham Patel PA-C   2 Units at 03/21/25 1242    insulin aspart (NovoLOG) injection (RAPID ACTING)  1-5 Units Subcutaneous At Bedtime Abraham Patel PA-C   1 Units at 03/19/25 2145    insulin aspart (NovoLOG) injection (RAPID ACTING)   Subcutaneous Daily with breakfast Abraham Patel PA-C   12 Units at 03/21/25 0805    insulin aspart (NovoLOG) injection (RAPID ACTING)   Subcutaneous Daily with lunch Abraham Patel PA-C   12 Units at 03/21/25 1241    insulin aspart (NovoLOG) injection (RAPID ACTING)   Subcutaneous Daily with supper Abraham Patel PA-C   16 Units at 03/20/25 1758    insulin glargine (LANTUS PEN) injection 35 Units  35 Units Subcutaneous At Bedtime Abraham Patel PA-C   35 Units at 03/20/25 2118    losartan (COZAAR) tablet 25 mg  25 mg Oral Daily Abraham Patel PA-C   25 mg at 03/21/25 0743    [Held by provider] metFORMIN (GLUCOPHAGE) tablet 1,000 mg  1,000 mg Oral BID w/meals Abraham Patel PA-C        methadone (DOLOPHINE-INTENSOL) 10 MG/ML (HIGH CONC) solution 100 mg  100 mg Oral Daily Anderson Blas MD   100 mg at 03/21/25 0738    prazosin (MINIPRESS) capsule 2 mg  2 mg Oral At Bedtime Aramis Casas MD   2 mg at 03/20/25 2117    [START ON 3/22/2025] sertraline (ZOLOFT) tablet 100 mg  100 mg Oral Daily Aramis Casas MD              Allergies:   No Known Allergies       Labs:     Recent Results (from the past 24 hours)   Glucose by meter    Collection Time: 03/20/25  4:18 PM   Result Value Ref Range    GLUCOSE BY METER POCT 242 (H) 70 - 99 mg/dL   Glucose by meter    Collection Time: 03/20/25  5:49 PM   Result Value Ref Range    GLUCOSE BY METER POCT 348 (H) 70 - 99 mg/dL   Glucose by meter    Collection Time: 03/20/25  9:13 PM   Result Value Ref Range    GLUCOSE BY METER POCT 185 (H) 70 - 99 mg/dL   Glucose by  "meter    Collection Time: 03/21/25  3:18 AM   Result Value Ref Range    GLUCOSE BY METER POCT 251 (H) 70 - 99 mg/dL   Glucose by meter    Collection Time: 03/21/25  7:31 AM   Result Value Ref Range    GLUCOSE BY METER POCT 236 (H) 70 - 99 mg/dL   Glucose by meter    Collection Time: 03/21/25 11:48 AM   Result Value Ref Range    GLUCOSE BY METER POCT 239 (H) 70 - 99 mg/dL          Psychiatric Examination:     /89 (BP Location: Left arm)   Pulse 106   Temp 97.9  F (36.6  C) (Temporal)   Resp 16   Ht 1.753 m (5' 9\")   Wt 90.2 kg (198 lb 12.8 oz)   LMP  (LMP Unknown)   SpO2 98%   BMI 29.36 kg/m    Weight is 198 lbs 12.8 oz  Body mass index is 29.36 kg/m .    Orthostatic Vitals         Most Recent      Sitting Orthostatic /101 03/20 0855    Sitting Orthostatic Pulse (bpm) 103 03/20 0855    Standing Orthostatic /94 03/20 0855    Standing Orthostatic Pulse (bpm) 120 03/20 0855          Appearance: awake, alert, slightly unkempt  Attitude: cooperative  Eye Contact:  fair  Mood:  anxious and sad   Affect:  constricted mobility, mood congruent  Speech:  clear, coherent, quiet   Psychomotor Behavior:  no evidence of tardive dyskinesia, dystonia, or tics  Throught Process:  linear and goal oriented  Associations:  no loose associations  Thought Content:  no evidence of psychotic thought and passive suicidal ideation present, denies having suicidal plans  Insight:  partial  Judgement:  fair  Oriented to:  time, person, and place  Attention Span and Concentration:  fair  Recent and Remote Memory:  fair    Clinical Global Impressions  First: 7/4 3/19/2025      Most recent:            Precautions:     Behavioral Orders   Procedures    Code 1 - Restrict to Unit    Routine Programming     As clinically indicated    Status 15     Every 15 minutes.    Suicide precautions: Suicide Risk: HIGH     Patients on Suicide Precautions should have a Combination Diet ordered that includes a Diet selection(s) AND a " Behavioral Tray selection for Safe Tray - with utensils, or Safe Tray - NO utensils       Order Specific Question:   Suicide Risk     Answer:   HIGH          DIagnoses:     Unspecified depressive disorder (MDD recurrent vs bipolar affective type 2 depressed)  Suicidal ideation,   Post traumatic Stress disorder  Generalized Anxiety Disorder  Panic Disorder with agoraphobia    Opiate use disorder moderate severity, patient is on maintenance assisted therapy with methadone  Cannabis use disorder, severe  Benzodiazepine use disorder           Plan:   Increase Sertraline from 50 to 100mg today, 03/21/25. CD counselor recommended outpatient programs,  looking into residential programs to fit patients needs. IM consulted on diabetes, blood sugars continue to be high and uncontrolled. Recommend patient limit snacking in evening time. Will place another IM consult for diabetic management before patient discharges. Encouraged patient to attend group sessions. Will continue to provider support and structure. Anticipated discharge to CD treatment.      I was present with PA student who participated in the service and in the documentation of the note. I have verified the history and personally performed the physical exam and medical decision making. I agree with the assessment and plan of care as documented in the note.     Aramis Casas MD  [facility] Psychiatry    Total time spent was 37 minutes. Over 50% of times was spent counseling and coordination of care regarding coping skills, medication and discharge planning.

## 2025-03-22 LAB
GLUCOSE BLDC GLUCOMTR-MCNC: 196 MG/DL (ref 70–99)
GLUCOSE BLDC GLUCOMTR-MCNC: 247 MG/DL (ref 70–99)
GLUCOSE BLDC GLUCOMTR-MCNC: 251 MG/DL (ref 70–99)
GLUCOSE BLDC GLUCOMTR-MCNC: 277 MG/DL (ref 70–99)
GLUCOSE BLDC GLUCOMTR-MCNC: 340 MG/DL (ref 70–99)

## 2025-03-22 PROCEDURE — 124N000002 HC R&B MH UMMC

## 2025-03-22 PROCEDURE — 250N000013 HC RX MED GY IP 250 OP 250 PS 637: Performed by: EMERGENCY MEDICINE

## 2025-03-22 PROCEDURE — 250N000013 HC RX MED GY IP 250 OP 250 PS 637: Performed by: PHYSICIAN ASSISTANT

## 2025-03-22 PROCEDURE — 250N000013 HC RX MED GY IP 250 OP 250 PS 637: Performed by: PSYCHIATRY & NEUROLOGY

## 2025-03-22 RX ADMIN — INSULIN ASPART 3 UNITS: 100 INJECTION, SOLUTION INTRAVENOUS; SUBCUTANEOUS at 18:04

## 2025-03-22 RX ADMIN — TRAZODONE HYDROCHLORIDE 50 MG: 50 TABLET ORAL at 20:20

## 2025-03-22 RX ADMIN — OLANZAPINE 10 MG: 10 TABLET, FILM COATED ORAL at 20:20

## 2025-03-22 RX ADMIN — OLANZAPINE 10 MG: 10 TABLET, FILM COATED ORAL at 07:40

## 2025-03-22 RX ADMIN — OLANZAPINE 10 MG: 10 TABLET, FILM COATED ORAL at 13:45

## 2025-03-22 RX ADMIN — LOSARTAN POTASSIUM 25 MG: 25 TABLET, FILM COATED ORAL at 07:40

## 2025-03-22 RX ADMIN — INSULIN ASPART 2 UNITS: 100 INJECTION, SOLUTION INTRAVENOUS; SUBCUTANEOUS at 12:51

## 2025-03-22 RX ADMIN — HYDROXYZINE HYDROCHLORIDE 50 MG: 25 TABLET ORAL at 18:07

## 2025-03-22 RX ADMIN — METHADONE HYDROCHLORIDE 100 MG: 10 CONCENTRATE ORAL at 07:40

## 2025-03-22 RX ADMIN — SERTRALINE HYDROCHLORIDE 100 MG: 100 TABLET ORAL at 07:40

## 2025-03-22 RX ADMIN — INSULIN ASPART 3 UNITS: 100 INJECTION, SOLUTION INTRAVENOUS; SUBCUTANEOUS at 08:31

## 2025-03-22 RX ADMIN — HYDROXYZINE HYDROCHLORIDE 50 MG: 25 TABLET ORAL at 12:10

## 2025-03-22 RX ADMIN — PRAZOSIN HYDROCHLORIDE 2 MG: 2 CAPSULE ORAL at 20:19

## 2025-03-22 RX ADMIN — GABAPENTIN 100 MG: 100 CAPSULE ORAL at 12:54

## 2025-03-22 ASSESSMENT — ACTIVITIES OF DAILY LIVING (ADL)
ADLS_ACUITY_SCORE: 48
LAUNDRY: WITH SUPERVISION
DRESS: INDEPENDENT
ADLS_ACUITY_SCORE: 48
DRESS: INDEPENDENT
ADLS_ACUITY_SCORE: 48
ORAL_HYGIENE: INDEPENDENT
ADLS_ACUITY_SCORE: 48
HYGIENE/GROOMING: INDEPENDENT
ADLS_ACUITY_SCORE: 48
ADLS_ACUITY_SCORE: 48
ORAL_HYGIENE: INDEPENDENT
ADLS_ACUITY_SCORE: 48
HYGIENE/GROOMING: INDEPENDENT
ADLS_ACUITY_SCORE: 48
LAUNDRY: WITH SUPERVISION
ADLS_ACUITY_SCORE: 48
ADLS_ACUITY_SCORE: 48

## 2025-03-22 NOTE — PLAN OF CARE
"Goal Outcome Evaluation:    Plan of Care Reviewed With: patient      Patient slept 6.5 hours over night and was up at the start of the shift.  She spent the majority of the shift in the lounge watching TV and also napped off and on in her room throughout the shift.  She does not enage much with staff or patients and did not attend group but she is pleasant and appropriate upon approach.  She continues to rate her anxiety 7-8/10.  She ate 100% of breakfast and lunch in the lounge.  Blood sugars this shift - 277 before breakfast and 196 before lunch.  Medication compliant, PRN Zxyprexa given x 2 this shift as well as gabapentin and hydroxyzine for anxiety.  She reports the Zyprexa and hydroxyzine were helpful but did not get much relief from gabapentin.  Patient denies SI in the hospital but still feels she would not be safe leaving.  Denies HI and hallucinations.  Contracts for safety on the unit and agrees to inform staff if she is experiencing any discomforts.   BP (!) 132/93   Pulse 89   Temp 98.5  F (36.9  C) (Oral)   Resp 18   Ht 1.753 m (5' 9\")   Wt 90.2 kg (198 lb 12.8 oz)   LMP  (LMP Unknown)   SpO2 96%   BMI 29.36 kg/m      "

## 2025-03-22 NOTE — PLAN OF CARE
Problem: Sleep Disturbance  Goal: Adequate Sleep/Rest  Outcome: Progressing   Goal Outcome Evaluation:                  Pt slept 6.35 hrs. BG at 0200 was 340. Pt reported she had a snack prior to writer checking blood sugar. Patient was reminded to let staff know to check blood sugar before she eats in the future. Safety Rounds completed every 15 minutes throughout the night. Visible respirations noted with no signes of distress. No safety or behavior concerns noted this shift. Will continue to monitor and provide support as needed.

## 2025-03-22 NOTE — PLAN OF CARE
"Problem: Suicide Risk  Goal: Absence of Self-Harm  Outcome: Progressing    Patient has been withdrawn, resting in her room most of the shift. She came out at dinner time and ate 100% of meal. Minimal interactions noted. Affect is flat and blunted. Appearance unkempt. She has been pleasant and cooperative on approach. She endorses anxious and depressed mood. PRN hydroxyzine requested and given for anxiety 8/10, which was somewhat helpful. Patient denies SI/SIB/HI/AH/VH. No reports of pain or discomfort. Blood glucose was 216 before dinner and 213 at bedtime. Patient took all scheduled medications with out concerns. PRN trazodone also requested and given at bedtime.      /85 (BP Location: Left arm, Patient Position: Sitting)   Pulse 81   Temp 97.9  F (36.6  C) (Temporal)   Resp 16   Ht 1.753 m (5' 9\")   Wt 90.2 kg (198 lb 12.8 oz)   LMP  (LMP Unknown)   SpO2 97%   BMI 29.36 kg/m      "

## 2025-03-23 LAB
GLUCOSE BLDC GLUCOMTR-MCNC: 243 MG/DL (ref 70–99)
GLUCOSE BLDC GLUCOMTR-MCNC: 277 MG/DL (ref 70–99)
GLUCOSE BLDC GLUCOMTR-MCNC: 290 MG/DL (ref 70–99)
GLUCOSE BLDC GLUCOMTR-MCNC: 315 MG/DL (ref 70–99)
GLUCOSE BLDC GLUCOMTR-MCNC: 315 MG/DL (ref 70–99)

## 2025-03-23 PROCEDURE — 250N000013 HC RX MED GY IP 250 OP 250 PS 637: Performed by: PSYCHIATRY & NEUROLOGY

## 2025-03-23 PROCEDURE — 124N000002 HC R&B MH UMMC

## 2025-03-23 PROCEDURE — 99233 SBSQ HOSP IP/OBS HIGH 50: CPT | Performed by: PHYSICIAN ASSISTANT

## 2025-03-23 PROCEDURE — 250N000013 HC RX MED GY IP 250 OP 250 PS 637: Performed by: EMERGENCY MEDICINE

## 2025-03-23 PROCEDURE — 250N000013 HC RX MED GY IP 250 OP 250 PS 637: Performed by: PHYSICIAN ASSISTANT

## 2025-03-23 RX ORDER — METHOCARBAMOL 500 MG/1
500 TABLET, FILM COATED ORAL 4 TIMES DAILY PRN
Status: DISCONTINUED | OUTPATIENT
Start: 2025-03-23 | End: 2025-03-23

## 2025-03-23 RX ADMIN — OLANZAPINE 10 MG: 10 TABLET, FILM COATED ORAL at 20:16

## 2025-03-23 RX ADMIN — OLANZAPINE 10 MG: 10 TABLET, FILM COATED ORAL at 07:44

## 2025-03-23 RX ADMIN — HYDROXYZINE HYDROCHLORIDE 50 MG: 25 TABLET ORAL at 12:32

## 2025-03-23 RX ADMIN — HYDROXYZINE HYDROCHLORIDE 50 MG: 25 TABLET ORAL at 17:43

## 2025-03-23 RX ADMIN — PRAZOSIN HYDROCHLORIDE 2 MG: 2 CAPSULE ORAL at 20:18

## 2025-03-23 RX ADMIN — INSULIN ASPART 3 UNITS: 100 INJECTION, SOLUTION INTRAVENOUS; SUBCUTANEOUS at 17:47

## 2025-03-23 RX ADMIN — SERTRALINE HYDROCHLORIDE 100 MG: 100 TABLET ORAL at 07:48

## 2025-03-23 RX ADMIN — TRAZODONE HYDROCHLORIDE 50 MG: 50 TABLET ORAL at 20:16

## 2025-03-23 RX ADMIN — INSULIN ASPART 3 UNITS: 100 INJECTION, SOLUTION INTRAVENOUS; SUBCUTANEOUS at 12:28

## 2025-03-23 RX ADMIN — METHADONE HYDROCHLORIDE 100 MG: 10 CONCENTRATE ORAL at 07:48

## 2025-03-23 RX ADMIN — INSULIN ASPART 4 UNITS: 100 INJECTION, SOLUTION INTRAVENOUS; SUBCUTANEOUS at 08:42

## 2025-03-23 RX ADMIN — LOSARTAN POTASSIUM 25 MG: 25 TABLET, FILM COATED ORAL at 07:48

## 2025-03-23 ASSESSMENT — ACTIVITIES OF DAILY LIVING (ADL)
ADLS_ACUITY_SCORE: 48
ADLS_ACUITY_SCORE: 48
DRESS: INDEPENDENT
DRESS: INDEPENDENT
ADLS_ACUITY_SCORE: 48
ORAL_HYGIENE: INDEPENDENT
ADLS_ACUITY_SCORE: 48
LAUNDRY: WITH SUPERVISION
ADLS_ACUITY_SCORE: 48
LAUNDRY: WITH SUPERVISION
HYGIENE/GROOMING: INDEPENDENT
ADLS_ACUITY_SCORE: 48
HYGIENE/GROOMING: INDEPENDENT
ADLS_ACUITY_SCORE: 48
ORAL_HYGIENE: INDEPENDENT
ADLS_ACUITY_SCORE: 48
ADLS_ACUITY_SCORE: 48

## 2025-03-23 NOTE — PLAN OF CARE
Problem: Sleep Disturbance  Goal: Adequate Sleep/Rest  Outcome: Progressing     Problem: Psychotic Signs/Symptoms  Goal: Improved Sleep (Psychotic Signs/Symptoms)  Intervention: Promote Healthy Sleep Hygiene  Recent Flowsheet Documentation  Taken 3/23/2025 0300 by Kayleigh Tirado RN  Sleep Hygiene Promotion:   awakenings minimized   noise level reduced   Goal Outcome Evaluation:             Pt slept 6.5 hrs. BG at 0200 was 315, pt stated she had a snack two hours prior to blood glucose being checked. Pt was encouraged to talk to staff next time before she eats so her blood sugar can be checked.  Pt has poor insight. Pt used the bathroom and went back to sleep. Safety Rounds completed every 15 minutes throughout the night. Visible respirations noted with no signes of distress while patient was asleep. Will continue to monitor and provide support and redirection as needed.

## 2025-03-23 NOTE — PLAN OF CARE
"Problem: Suicide Risk  Goal: Absence of Self-Harm  Outcome: Progressing    Problem: Psychotic Signs/Symptoms  Goal: Optimal Cognitive Function (Psychotic Signs/Symptoms)  Outcome: Progressing     Patient has been withdrawn, resting in her room, coming out at meal and snack times only. Continues to avoid social contact. Presents with a flat, blunted affect. Endorses anxious and depressed mood. Denies SI/SIB/HI/AH/VH. No reports of pain or discomfort. PRN hydroxyzine requested and given x 1 for anxiety 8/10, which was somewhat effective.   Patient took all scheduled medications for the evening with out concerns. PRN trazodone and zyprexa also requested and given.  BG was 247 before dinner and 251 before bedtime. She ate 100% of dinner and snacks. Patient is cooperative with novolog coverage for carb counts and sliding scale.     /87 (BP Location: Right arm, Patient Position: Sitting)   Pulse 92   Temp 98.4  F (36.9  C)   Resp 18   Ht 1.753 m (5' 9\")   Wt 90.2 kg (198 lb 12.8 oz)   LMP  (LMP Unknown)   SpO2 97%   BMI 29.36 kg/m      "

## 2025-03-23 NOTE — PLAN OF CARE
"Goal Outcome Evaluation:    Plan of Care Reviewed With: patient      Patient slept 6.5 hours over night and was up at the start of the shift.  She spent the majority of the shift in the lounge watching TV and also napped off and on in her room throughout the shift.  She does not enage much with staff or patients and did not attend community meeting but she is pleasant and appropriate upon approach.  She continues to rate her anxiety 7-8/10.  She ate 100% of breakfast and lunch in the lounge.  Blood sugars this shift - 290 before breakfast and 243 before lunch.  Medication compliant, PRN Zxyprexa given x 2 this shift as well as gabapentin and hydroxyzine for anxiety.  She reports the Zyprexa and hydroxyzine were helpful but did not get much relief from gabapentin.  Patient denies SI in the hospital but still feels she would not be safe leaving.  Denies HI and hallucinations.  Contracts for safety on the unit and agrees to inform staff if she is experiencing any discomforts.  BP (!) 146/95   Pulse 98   Temp 98.1  F (36.7  C) (Oral)   Resp 18   Ht 1.753 m (5' 9\")   Wt 92.3 kg (203 lb 8 oz)   LMP  (LMP Unknown)   SpO2 98%   BMI 30.05 kg/m      "

## 2025-03-24 LAB
ALBUMIN SERPL BCG-MCNC: 3.8 G/DL (ref 3.5–5.2)
ALP SERPL-CCNC: 176 U/L (ref 40–150)
ALT SERPL W P-5'-P-CCNC: 120 U/L (ref 0–50)
ANION GAP SERPL CALCULATED.3IONS-SCNC: 12 MMOL/L (ref 7–15)
AST SERPL W P-5'-P-CCNC: 92 U/L (ref 0–45)
BILIRUB SERPL-MCNC: 0.2 MG/DL
BUN SERPL-MCNC: 23.8 MG/DL (ref 6–20)
CALCIUM SERPL-MCNC: 9.6 MG/DL (ref 8.8–10.4)
CHLORIDE SERPL-SCNC: 99 MMOL/L (ref 98–107)
CREAT SERPL-MCNC: 0.69 MG/DL (ref 0.51–0.95)
EGFRCR SERPLBLD CKD-EPI 2021: >90 ML/MIN/1.73M2
GLUCOSE BLDC GLUCOMTR-MCNC: 242 MG/DL (ref 70–99)
GLUCOSE BLDC GLUCOMTR-MCNC: 257 MG/DL (ref 70–99)
GLUCOSE BLDC GLUCOMTR-MCNC: 315 MG/DL (ref 70–99)
GLUCOSE BLDC GLUCOMTR-MCNC: 317 MG/DL (ref 70–99)
GLUCOSE BLDC GLUCOMTR-MCNC: 367 MG/DL (ref 70–99)
GLUCOSE SERPL-MCNC: 241 MG/DL (ref 70–99)
HCO3 SERPL-SCNC: 24 MMOL/L (ref 22–29)
HOLD SPECIMEN: NORMAL
POTASSIUM SERPL-SCNC: 4.8 MMOL/L (ref 3.4–5.3)
PROT SERPL-MCNC: 8.2 G/DL (ref 6.4–8.3)
SODIUM SERPL-SCNC: 135 MMOL/L (ref 135–145)

## 2025-03-24 PROCEDURE — 36415 COLL VENOUS BLD VENIPUNCTURE: CPT | Performed by: PHYSICIAN ASSISTANT

## 2025-03-24 PROCEDURE — 97150 GROUP THERAPEUTIC PROCEDURES: CPT | Mod: GO

## 2025-03-24 PROCEDURE — 250N000013 HC RX MED GY IP 250 OP 250 PS 637: Performed by: PSYCHIATRY & NEUROLOGY

## 2025-03-24 PROCEDURE — 99233 SBSQ HOSP IP/OBS HIGH 50: CPT | Performed by: PSYCHIATRY & NEUROLOGY

## 2025-03-24 PROCEDURE — 250N000012 HC RX MED GY IP 250 OP 636 PS 637: Performed by: PHYSICIAN ASSISTANT

## 2025-03-24 PROCEDURE — 250N000013 HC RX MED GY IP 250 OP 250 PS 637: Performed by: PHYSICIAN ASSISTANT

## 2025-03-24 PROCEDURE — 124N000002 HC R&B MH UMMC

## 2025-03-24 PROCEDURE — 250N000013 HC RX MED GY IP 250 OP 250 PS 637: Performed by: EMERGENCY MEDICINE

## 2025-03-24 PROCEDURE — 80053 COMPREHEN METABOLIC PANEL: CPT | Performed by: PHYSICIAN ASSISTANT

## 2025-03-24 RX ADMIN — INSULIN ASPART 3 UNITS: 100 INJECTION, SOLUTION INTRAVENOUS; SUBCUTANEOUS at 08:46

## 2025-03-24 RX ADMIN — HYDROXYZINE HYDROCHLORIDE 50 MG: 25 TABLET ORAL at 09:23

## 2025-03-24 RX ADMIN — LOSARTAN POTASSIUM 25 MG: 25 TABLET, FILM COATED ORAL at 08:02

## 2025-03-24 RX ADMIN — SERTRALINE HYDROCHLORIDE 100 MG: 100 TABLET ORAL at 08:02

## 2025-03-24 RX ADMIN — INSULIN ASPART 4 UNITS: 100 INJECTION, SOLUTION INTRAVENOUS; SUBCUTANEOUS at 13:02

## 2025-03-24 RX ADMIN — PRAZOSIN HYDROCHLORIDE 2 MG: 2 CAPSULE ORAL at 21:00

## 2025-03-24 RX ADMIN — OLANZAPINE 10 MG: 10 TABLET, FILM COATED ORAL at 21:00

## 2025-03-24 RX ADMIN — METHADONE HYDROCHLORIDE 100 MG: 10 CONCENTRATE ORAL at 08:02

## 2025-03-24 RX ADMIN — INSULIN GLARGINE 45 UNITS: 100 INJECTION, SOLUTION SUBCUTANEOUS at 21:34

## 2025-03-24 RX ADMIN — TRAZODONE HYDROCHLORIDE 50 MG: 50 TABLET ORAL at 21:00

## 2025-03-24 RX ADMIN — OLANZAPINE 10 MG: 10 TABLET, FILM COATED ORAL at 14:00

## 2025-03-24 RX ADMIN — INSULIN ASPART 3 UNITS: 100 INJECTION, SOLUTION INTRAVENOUS; SUBCUTANEOUS at 18:08

## 2025-03-24 RX ADMIN — OLANZAPINE 10 MG: 10 TABLET, FILM COATED ORAL at 08:02

## 2025-03-24 RX ADMIN — HYDROXYZINE HYDROCHLORIDE 50 MG: 25 TABLET ORAL at 16:50

## 2025-03-24 ASSESSMENT — ACTIVITIES OF DAILY LIVING (ADL)
ADLS_ACUITY_SCORE: 48
ORAL_HYGIENE: INDEPENDENT
ORAL_HYGIENE: INDEPENDENT
DRESS: SCRUBS (BEHAVIORAL HEALTH);INDEPENDENT
DRESS: INDEPENDENT
ADLS_ACUITY_SCORE: 48
HYGIENE/GROOMING: INDEPENDENT
ADLS_ACUITY_SCORE: 48
LAUNDRY: WITH SUPERVISION
ADLS_ACUITY_SCORE: 48
HYGIENE/GROOMING: INDEPENDENT
ADLS_ACUITY_SCORE: 48
LAUNDRY: UNABLE TO COMPLETE

## 2025-03-24 NOTE — PLAN OF CARE
Problem: Sleep Disturbance  Goal: Adequate Sleep/Rest  Outcome: Progressing   Goal Outcome Evaluation:    NOC Shift Report    Pt was in bed at the beginning of the shift. Breathing was regular, spontaneous, and unlabored. Pt did not present with any medical concerns this shift. There were no  PRNs given.  . The plan of care is ongoing.       Pt appeared to sleep for 6.5 hours

## 2025-03-24 NOTE — PROGRESS NOTES
Brief medicine progress note    Continues to be hyperglycemic in a.m. following increase in Lantus.  Will increase p.m. Lantus from 40 to 45 units.     Continue planning scale insulin with carb coverage    Medicine will continue to follow    Rey Brewer PA-C  Hospitalist Service, [facility]  Securely message with [employer] (more info)  Text page via [facility] Paging/Directory   See signed in provider for up to date coverage information

## 2025-03-24 NOTE — PROGRESS NOTES
[facility]    Medicine Progress Note - Hospitalist Service    Date of Admission:  3/17/2025    Assessment & Plan    Yun Coyle is a 49 year old female admitted on 3/17/2025. She has a hx of pancreatitis, GERD, type II DM, asthma, cauda equina syndrome, current admitted to psych unit.      # Type II DM   Home regimen (list as lantus 40 nightly but patient takes 30). Also does low dose correctional and carb coverage. No gap or ketones on admission. Also on jaurdiance and metformin PTA.   - Lantus increase to  40 at bedtime 2/23  - medium dose correctional   - carb coverage 1:10   - glucose checks tid and hs   - hold jaurdiance and metformin for now due to severe dehydration PTA (risk of lactic acidosis, euglycemic DKA)-> can consider resumption in upcoming days if PO intake remains stable     Elevated LFT's  # Hepatitis C   LFT's have been chronically elevated. They have slowly trended up. She had a cholecystectomy in the past. CT on 2/17 largely unremarkable. Could be related to hepatis C.   - repeat CMP tomorrow   - Hepatitis C treatment outpatient      # Acute abdomin pain (resolved)  # Acute on chronic nausea   # Hx of pancreatitis   Patient has had intermittent nausea/vomiting for months which she is fairly certain is 2/2 cyclic vomiting syndrome iso of ongoing marijuana use. The abdominal pain she feels is more acute, though she has had multiple ED visits in the past for abdominal pain. CT of the abdomen in the emergency department did not reveal any pathology. On exam, she has diffuse tenderness without rebound or guarding. She feels generally unwell and diaphoretic.resolved with GI cocktail.    # Hypertension  Continue PTA losartan 25 mg daily   - clonidine 0.1 mg bid prn for systolic > 180 (patient takes at home)      # Hx of OUD  - PTA methadone     # Hx of hyponatremia   - unclear etiology  - CMP tomorrow      # Hx of hyperkalemia   - unclear etiology  -  "monitor      # Hx of GERD  - not currently on PPI      # hx of cauda equina 2012 2/2 central disc bulge   Has some radicular pain down the right legs at times. Denies saddle anesthesia, lower extremity weakness, or incontinence.          Diet: Regular Diet Adult    DVT Prophylaxis: none  Blanc Catheter: Not present  Lines: None     Cardiac Monitoring: None  Code Status: Full Code      Clinically Significant Risk Factors                   # Hypertension: Noted on problem list           # DMII: A1C = 9.2 % (Ref range: <5.7 %) within past 6 months   # Obesity: Estimated body mass index is 30.05 kg/m  as calculated from the following:    Height as of this encounter: 1.753 m (5' 9\").    Weight as of this encounter: 92.3 kg (203 lb 8 oz).      # Financial/Environmental Concerns: none  # Asthma: noted on problem list        Social Drivers of Health    Depression: At risk (11/12/2024)    PHQ-2     PHQ-2 Score: 6    [facility] [place] [place]    Social Connections          Disposition Plan     Medically Ready for Discharge: per primary      Abraham Patel PA-C  Hospitalist Service  [facility]  Securely message with [employer] (more info)  Text page via [employer] Paging/Directory   ______________________________________________________________________    Interval History   See above     Physical Exam   Vital Signs: Temp: 98.1  F (36.7  C) Temp src: Oral BP: (!) 146/95 (Nurse was informed) Pulse: 98   Resp: 18 SpO2: 98 % O2 Device: None (Room air)    Weight: 203 lbs 8 oz    General Appearance: Alert, no acute distress   HEENT: Anicteric sclera, MMM   Respiratory: Lungs CTAB, No wheezing , no crackles,   Cardiovascular: Regular rate , Regular rhythm , no murmur   GI: Abdomen soft, non-tender, active bowel sounds. No guarding or rebound  Skin: No rash or jaundice on exposed skin   Musculoskeletal: No lower " extremity edema   Neurologic: Oriented X 3 , CN II-XII grossly intact, Moves extremities equally       Medical Decision Making       60 MINUTES SPENT BY ME on the date of service doing chart review, history, exam, documentation & further activities per the note.      Data   ------------------------- PAST 24 HR DATA REVIEWED -----------------------------------------------

## 2025-03-24 NOTE — PLAN OF CARE
"Problem: Suicide Risk  Goal: Absence of Self-Harm  Outcome: Progressing    Problem: Adult Behavioral Health Plan of Care  Goal: Absence of New-Onset Illness or Injury  Outcome: Progressing     Pt continues to isolate to room most of the shift, coming out for meals and snacks only. She is pleasant and cooperative on approach. Affect is flat and blunted. Patient continues to endorse anxious and depressed mood. She denies SI/SIB/HI/AH/VH. No reports of pain. PRN hydroxyzine requested at meal time for anxiety 8/10, which was helpful. Patient ate 100% of dinner. BG was 277 and 315 this shift. Patient has been medication compliant. PRN trazodone and zyprexa requested and given at bedtime.     /81 (BP Location: Left arm, Patient Position: Sitting)   Pulse 89   Temp 98.3  F (36.8  C)   Resp 18   Ht 1.753 m (5' 9\")   Wt 92.3 kg (203 lb 8 oz)   LMP  (LMP Unknown)   SpO2 98%   BMI 30.05 kg/m      "
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
## 2025-03-24 NOTE — PLAN OF CARE
"Goal Outcome Evaluation:    Plan of Care Reviewed With: patient      Patient slept 6.5 hours over night and was up at the start of the shift.  She spent the majority of the shift in the lounge watching TV and also napped off and on in her room throughout the shift.  She does not enage much with staff or patients and did not attend community meeting but she is pleasant and appropriate upon approach.  She continues to rate her anxiety 7-8/10 prior to PRN medication administration and reduced to 4-5/10 after 1 hour.  She ate 100% of breakfast and lunch in the lounge.  Blood sugars this shift - 257 before breakfast and 367 before lunch.  Patient tends to keep food/snacks in her room and potentially not informing staff of all the carbs she consumes.  Medication compliant, PRN Zxyprexa given x 2 this shift as well as hydroxyzine for anxiety.  She reports the PRN medications are helpful for her anxiety.  There is not trigger she could identify for having anxiety, just an internal feeling of fear and uneasiness.  Patient denies SI in the hospital but still feels she would not be safe leaving.  Denies HI and hallucinations.  Contracts for safety on the unit and agrees to inform staff if she is experiencing any discomforts.  BP (!) 130/95 (BP Location: Left arm)   Pulse 90   Temp 97.7  F (36.5  C) (Temporal)   Resp 18   Ht 1.753 m (5' 9\")   Wt 92.3 kg (203 lb 8 oz)   LMP  (LMP Unknown)   SpO2 93%   BMI 30.05 kg/m      "

## 2025-03-24 NOTE — PLAN OF CARE
Team Note Due:  Wednesday    Assessment/Intervention/Current Symtoms and Care Coordination:  Chart review and met with team, discussed pt progress, symptomology, and response to treatment.  Discussed the discharge plan and any potential impediments to discharge.      Team Meeting  BS are not stable.  Pt does not engage with peers.    I approached the pt to discuss the list of residential treatment options.  Pt was in group and I did not disturb her.  I approached her as she going into the shower and told her I would place the list on her bed.  I did not hear back from her.      CYNTHIA  sent residential options for pt.  IP CYNTHIA treatment:  [facility]:   Phone: [phone]  Fax: [phone]  Email: [email or web address]  https://[facility].org/treatment-and-recovery/treatment-and-recovery-individuals/    [address] [address]  Phone: [phone]   Fax: [phone]  Email: info@[facility].org  https://www.[employer].mn.org/    [facility]:  Access Team for Referrals  Phone: [phone]  Fax: [phone]  Email: [email or web address]  Email: [email or web address]   Email: [email or web address]  https://[employer]/residential-programs/    [facility]  [address] [place] [address]  Phone: [phone]  Fax: [phone]  Email: admissions@[employer]uc[employer]  https://[employer]/womens-residential/    [facility]:    RESIDENTIAL ADMISSIONS (NUWAY I, NUWAY II, NUWAY III)  Phone: [phone]  Fax: [phone]  Email: Residential.admissions@[facility].org  www.[facility].[employer]    [facility] (IP- Men & Women)  General Admissions  [address] [place] [address]  Phone: [phone]  Fax:  [phone]  Email: intake@[employer]  https://[employer]/    [place] - Women IP  [address] [place] [address]  Phone:  [phone]  Fax: [phone]  email: intake@[facility]  www.[facility].org       Discharge Plan or Goal:  Home with IOP     Barriers to Discharge:  Pt is still symptomatic     Referral Status:  CYNTHIA assessment completed  Pt was given a list of IOP program but now she wants residential level of care.      Referrals/ Alternatives:  [facility]:   Phone: [phone]  Fax: [phone]  Email: [email or web address]  https://Toushay - It's what's in storevomn.org/treatment-and-recovery/treatment-and-recovery-individuals/    [facility]  [address] [place] [address]  *Services Available:  Walk-in or telehealth comprehensive assessments (formerly known as Rule 25s) available for both adults and adolescents, either in person or remotely  Mental health services  Medicated assisted treatment (MAT)  HYBRID Intensive outpatient (co-occurring) group  Peer Recovery Support  *Hours of Operation:  Walk-in/Drop-in hours: Mondays, Tuesdays, and Fridays 10 am - 4 pm  Scheduled appointment hours: Monday - Friday, 9 am - 5 pm  Adult Intensive Outpatient (co-occurring) group hours: Monday, Wednesday, & Thursday, 1 pm - 4 pm  Contact Information:  : [phone] for information & scheduling  TRACIE Nguyen, Recovery : [phone] for questions    [facility]: (in-person & virtual)  Locations: [facility], [facility], and [facility].  Phone: [phone]  Fax:  [phone]  Email: [email or web address]  email: [email or web address]  https://JOOR/outpatient-treatment/  *Virtual IOP:  Day program: Monday through Thursday from 9 a - noon.   Evening program: Monday through Thursday from 5 p - 8 p.  *[facility] Location:  [address], [address]  [place] [address]  Phone:   Fax: [phone]  Group is Monday-Thursday  - Co-Ed IOP is from 12:30 - 3:30 p  -  Virtual IOP is from 9 a - noon  - Women's IOP from 9 a - noon    [facility] ([facility])  [facility] Intensive Outpatient Program  Phone: [phone]  Fax: [phone]  Behavioral Health:  Phone: [phone] *press 1  IOP Referral Fax: [phone]  [facility] [place] [address]  http://[facility].org     [facility]: (in-person & virtual)  Main phone: [phone]  Direct Dial: [phone]   Admissions email: [email or web address]   CYNTHIA fax: [phone]  www.POPRAGEOUS  *Groups are held on Mondays, Tuesdays, Wednesdays, or Thursdays, depending on the location and level of care.  *Groups are generally held from 8am - 12pm and 4pm - 7pm.    [facility]  Phone: [phone]  Fax: [phone]  email: info@[email or web address]  https://www.[employer]/    [facility] ([facility])  [address] [place] [address]  Phone: [phone]  Email:  [email or web address].[employer] Me [email or web address]  https://www.[employer]ug[employer].org/services/human_services/[facility]/locations.php    [facility]: IOP- virtual  [address] [place] [address] [address]  [place] [address]  Phone: [phone]  Fax: [phone]  email: intake@[email or web address].org.  www.[facility].org  Monday - Thursday 5:30 pm - 8:30 pm, Friday 11:00 am - 1:00 pm    Reny Schumacher MA LADC /   CYNTHIA Evaluation Counselor  Triage and Transition: Consult & Liasion   [address]  [email or web address].org   Office: [phone] Fax: [phone]   Gender Pronouns: she/her         Legal Status:  Voluntary       Contacts (include ENE status):    Ene for daughter:  Samanta granados          Upcoming Meetings and Dates/Important Information and next steps:      Decide on what facilty she wants to go to.

## 2025-03-24 NOTE — PLAN OF CARE
BEH IP Unit Acuity Rating Score (UARS)  Patient is given one point for every criteria they meet.    CRITERIA SCORING   On a 72 hour hold, court hold, committed, stay of commitment, or revocation. 0    Patient LOS on BEH unit exceeds 20 days. 0  LOS: 6   Patient under guardianship, 55+, otherwise medically complex, or under age 11. 1   Suicide ideation without relief of precipitating factors. 0   Current plan for suicide. 0   Current plan for homicide. 0   Imminent risk or actual attempt to seriously harm another without relief of factors precipitating the attempt. 0   Severe dysfunction in daily living (ex: complete neglect for self care, extreme disruption in vegetative function, extreme deterioration in social interactions). 0   Recent (last 7 days) or current physical aggression in the ED or on unit. 0   Restraints or seclusion episode in past 72 hours. 0   Recent (last 7 days) or current verbal aggression, agitation, yelling, etc., while in the ED or unit. 0   Active psychosis. 0   Need for constant or near constant redirection (from leaving, from others, etc).  0   Intrusive or disruptive behaviors. 0   Patient requires 3 or more hours of individualized nursing care per 8-hour shift (i.e. for ADLs, meds, therapeutic interventions). 0   TOTAL 1

## 2025-03-24 NOTE — PLAN OF CARE
Rehab Group    Start time: 1015  End time: 1200  Patient time total: 60 minutes    attended full group    #7 attended   Group Type: occupational therapy   Group Topic Covered: cognitive activities and healthy leisure time   Group Session Detail: OT: Education on healthy activity engagement and healthy leisure engagement with creative hands-on endeavor (yarn baskets) to increase concentration, focus, attention to task/detail, decision making, problem solving, frustration tolerance, task follow through, coping with stress, healthy leisure engagement, creative expression, and social engagement    Patient Response/Contribution:  cooperative with task and actively engaged; pleasant   Patient Detail: Pt arrived late to group and stayed for remainder. Pt sat among peers for presented activity and engaged in brief social interactions with peers and therapist. Pt able to follow verbal directions and visual demonstration for novel task. Pt reported they were pleased with the outcome and looking forward to using the completed project after discharge. Pt verbalized understanding of importance of healthy leisure in a healthy lifestyle.      [id number] OT Group (2 or more in attendance)    Patient Active Problem List   Diagnosis    Suicidal behavior    Benign essential HTN    Cannabis abuse    Cyclic vomiting syndrome    Elevated LFTs    Hyperlipidemia with target low density lipoprotein (LDL) cholesterol less than 100 mg/dL    Insomnia    Mild intermittent asthma    Panic disorder with agoraphobia    OCD (obsessive compulsive disorder)    Severe benzodiazepine use disorder (H) in remission    S/P lumbar microdiscectomy    S/P laparoscopic cholecystectomy    Personality disorder (H)    Type 2 diabetes mellitus with hyperglycemia, with long-term current use of insulin (H)    Obesity (BMI 35.0-39.9) with comorbidity (H)    Falls frequently    Moderate opioid use disorder, in early remission (H)    Chronic hepatitis C without hepatic  coma (H)    Hyperglycemia    Dehydration    Cyclical vomiting    Lactic acidosis    Epigastric pain    Coffee ground emesis    Elevated blood ketone body level    Severe episode of recurrent major depressive disorder, without psychotic features (H)    PTSD (post-traumatic stress disorder)    Recurrent major depression    Suicidal ideation    Abscess of arm, right    Intractable nausea and vomiting    Tachycardia, unspecified    Generalized pain    Anxiety    Right lower quadrant abdominal pain

## 2025-03-24 NOTE — PROGRESS NOTES
3/24/2025  Pt is requesting some IP CYNTHIA tx options. The below information was emailed to her unit CTC today.    IP CYNTHIA treatment:  [facility]:   Phone: [phone]  Fax: [phone]  Email: intakereferrals@"[employer]"vomn.org  https://"[employer]"vomn.org/treatment-and-recovery/treatment-and-recovery-individuals/    [facility]   [address] [address]  Phone: [phone]   Fax: [phone]  Email: info@[employer]mn.org  https://www.Atonometrics.org/    [facility]:  Access Team for Referrals  Phone: [phone]  Fax: [phone]  Email: AccessTeam@[email or web address]  Email: carc@[employer]   Email: accessteam@[employer]  https://Lol[employer]x/residential-programs/    [facility] IP  [address] [place] [address]  Phone: [phone]  Fax: [phone]  Email: admissions@[employer]  https://[employer]/womens-residential/    [facility]:    RESIDENTIAL ADMISSIONS ([facility] I, [facility] II, [facility] III)  Phone: [phone]  Fax: [phone]  Email: Residential.admissions@[facility].org  www.[facility].org    [facility] (IP- Men & Women)  General Admissions  [address] [place] [address]  Phone: [phone]  Fax:  [phone]  Email: intake@[email or web address]  [email or web address]    [facility] Women IP  [address] [place] [address]  Phone: [phone]  Fax: [phone]  email: intake@[email or web address].org  www.[facility].org    Reny Schumacher MA [facility]  CYNTHIA Evaluation Counselor  [phone]  [email or web address]

## 2025-03-25 LAB
GLUCOSE BLDC GLUCOMTR-MCNC: 185 MG/DL (ref 70–99)
GLUCOSE BLDC GLUCOMTR-MCNC: 246 MG/DL (ref 70–99)
GLUCOSE BLDC GLUCOMTR-MCNC: 255 MG/DL (ref 70–99)
GLUCOSE BLDC GLUCOMTR-MCNC: 267 MG/DL (ref 70–99)
GLUCOSE BLDC GLUCOMTR-MCNC: 283 MG/DL (ref 70–99)

## 2025-03-25 PROCEDURE — 124N000002 HC R&B MH UMMC

## 2025-03-25 PROCEDURE — 250N000013 HC RX MED GY IP 250 OP 250 PS 637: Performed by: EMERGENCY MEDICINE

## 2025-03-25 PROCEDURE — 99233 SBSQ HOSP IP/OBS HIGH 50: CPT | Performed by: PSYCHIATRY & NEUROLOGY

## 2025-03-25 PROCEDURE — 97150 GROUP THERAPEUTIC PROCEDURES: CPT | Mod: GO

## 2025-03-25 PROCEDURE — 250N000013 HC RX MED GY IP 250 OP 250 PS 637: Performed by: PSYCHIATRY & NEUROLOGY

## 2025-03-25 PROCEDURE — 250N000013 HC RX MED GY IP 250 OP 250 PS 637: Performed by: PHYSICIAN ASSISTANT

## 2025-03-25 RX ADMIN — INSULIN ASPART 1 UNITS: 100 INJECTION, SOLUTION INTRAVENOUS; SUBCUTANEOUS at 18:08

## 2025-03-25 RX ADMIN — INSULIN GLARGINE 45 UNITS: 100 INJECTION, SOLUTION SUBCUTANEOUS at 21:18

## 2025-03-25 RX ADMIN — HYDROXYZINE HYDROCHLORIDE 50 MG: 25 TABLET ORAL at 17:17

## 2025-03-25 RX ADMIN — PRAZOSIN HYDROCHLORIDE 2 MG: 2 CAPSULE ORAL at 21:16

## 2025-03-25 RX ADMIN — INSULIN ASPART 3 UNITS: 100 INJECTION, SOLUTION INTRAVENOUS; SUBCUTANEOUS at 08:35

## 2025-03-25 RX ADMIN — TRAZODONE HYDROCHLORIDE 50 MG: 50 TABLET ORAL at 21:16

## 2025-03-25 RX ADMIN — HYDROXYZINE HYDROCHLORIDE 50 MG: 25 TABLET ORAL at 11:55

## 2025-03-25 RX ADMIN — INSULIN ASPART 3 UNITS: 100 INJECTION, SOLUTION INTRAVENOUS; SUBCUTANEOUS at 12:44

## 2025-03-25 RX ADMIN — OLANZAPINE 10 MG: 10 TABLET, FILM COATED ORAL at 08:01

## 2025-03-25 RX ADMIN — OLANZAPINE 10 MG: 10 TABLET, FILM COATED ORAL at 21:17

## 2025-03-25 RX ADMIN — METHADONE HYDROCHLORIDE 100 MG: 10 CONCENTRATE ORAL at 08:04

## 2025-03-25 RX ADMIN — LOSARTAN POTASSIUM 25 MG: 25 TABLET, FILM COATED ORAL at 08:01

## 2025-03-25 RX ADMIN — GABAPENTIN 100 MG: 100 CAPSULE ORAL at 12:45

## 2025-03-25 RX ADMIN — SERTRALINE HYDROCHLORIDE 100 MG: 100 TABLET ORAL at 08:01

## 2025-03-25 ASSESSMENT — ACTIVITIES OF DAILY LIVING (ADL)
HYGIENE/GROOMING: INDEPENDENT
ADLS_ACUITY_SCORE: 48
ADLS_ACUITY_SCORE: 48
HYGIENE/GROOMING: INDEPENDENT
ADLS_ACUITY_SCORE: 48
ORAL_HYGIENE: INDEPENDENT
ADLS_ACUITY_SCORE: 48
DRESS: SCRUBS (BEHAVIORAL HEALTH)
ADLS_ACUITY_SCORE: 48
LAUNDRY: UNABLE TO COMPLETE
ADLS_ACUITY_SCORE: 48

## 2025-03-25 NOTE — PROGRESS NOTES
Medicine progress note    Reviewed a.m. glucose.  Patient persistently hyperglycemic in a.m. met with patient on unit.  Reports snacking overnight which is likely contributing to elevated a.m. glucoses.  Will continue to trend, may add snack coverage overnight.     Medicine will continue to follow    Rey Brewer PA-C  Hospitalist Service, [facility]  Securely message with [employer] (more info)  Text page via [facility] Paging/Directory   See signed in provider for up to date coverage information

## 2025-03-25 NOTE — PLAN OF CARE
"  Rehab Group    Start time: 10:15  End time: 11:00  Patient time total: 35 minutes    attended partial group     #3 attended   Group Type: occupational therapy   Group Topic Covered: coping skills and relaxation      Group Session Detail:  Patient actively participated in a progressive muscle relaxation group in order to promote: positive relaxation and coping skills, encourage insight and self-reflection, promote a positive change, manage behaviors, and improve focus. In addition, patient was guided through proper deep breathing techniques and gentle full body movements/stretches to further promote relaxation.      Patient Response/Contribution:  cooperative with task, listened actively, and actively engaged     Patient Detail:  Patient entered group late. Patient reported one activity she enjoys is going on nature walks to promote relaxation. Patient remained an attentive, engaged, and cooperative group attendee. Patient followed along to verbal prompts and was observed engaging in all guided exercises. Good attention and follow-through observed. Patient endorsed feeling \"pretty relaxed\" at conclusion of group. No safety or behavioral concerns noted in group.       [id number] OT Group (2 or more in attendance)      Patient Active Problem List   Diagnosis    Suicidal behavior    Benign essential HTN    Cannabis abuse    Cyclic vomiting syndrome    Elevated LFTs    Hyperlipidemia with target low density lipoprotein (LDL) cholesterol less than 100 mg/dL    Insomnia    Mild intermittent asthma    Panic disorder with agoraphobia    OCD (obsessive compulsive disorder)    Severe benzodiazepine use disorder (H) in remission    S/P lumbar microdiscectomy    S/P laparoscopic cholecystectomy    Personality disorder (H)    Type 2 diabetes mellitus with hyperglycemia, with long-term current use of insulin (H)    Obesity (BMI 35.0-39.9) with comorbidity (H)    Falls frequently    Moderate opioid use disorder, in early remission (H) "    Chronic hepatitis C without hepatic coma (H)    Hyperglycemia    Dehydration    Cyclical vomiting    Lactic acidosis    Epigastric pain    Coffee ground emesis    Elevated blood ketone body level    Severe episode of recurrent major depressive disorder, without psychotic features (H)    PTSD (post-traumatic stress disorder)    Recurrent major depression    Suicidal ideation    Abscess of arm, right    Intractable nausea and vomiting    Tachycardia, unspecified    Generalized pain    Anxiety    Right lower quadrant abdominal pain

## 2025-03-25 NOTE — PROGRESS NOTES
[facility], [place]   Psychiatric Progress Note        Interim History:   The patient's care was discussed with the treatment team during the daily team meeting and/or staff's chart notes were reviewed.  Staff report patient slept 6.5 hours. She continues to endorse depression, anxiety and passive suicidal ideation. She does not attend community meetings but did go to 1 OT group yesterday. She has good appetite. Blood sugars continue to remain high. IM is following. Compliant with meds and cares.     Met with patient who says she still has anxiety and depression and passive suicidal ideation. She states she feels pretty restless today. She states she feels like something bad is going to happen, but is unsure what. Speculated that this might be due to discussion of discharge yesterday, patient does state she is anxious because she does not feel she is ready for discharge. She feels she will get discharged and have worsening SI with plan. Reviewed with patient we will not discharge her until she feels ready, but it is not healthy for patients to stay in the hospital for a long time and get institutionalized. She agrees and states she is motivated to get better. She attended 1 OT group yesterday and made an art project. She states she will start attending groups at least once daily. Denies side effects from medications, denies physical symptoms today. All questions answered.  IM consult yesterday, continues to be hyperglycemic in AM, increased PM lantus from 40 to 45 units. Medicine is following.         Medications:     Current Facility-Administered Medications   Medication Dose Route Frequency Provider Last Rate Last Admin    [Held by provider] empagliflozin (JARDIANCE) tablet 10 mg  10 mg Oral Daily Aramis Casas MD        insulin aspart (NovoLOG) injection (RAPID ACTING)  1-7 Units Subcutaneous TID Abraham Rojas PA-C   3 Units at 03/25/25 1244    insulin aspart  (NovoLOG) injection (RAPID ACTING)  1-5 Units Subcutaneous At Bedtime Abraham Patel PA-C   3 Units at 03/24/25 2102    insulin aspart (NovoLOG) injection (RAPID ACTING)   Subcutaneous Daily with breakfast Abraham Patel PA-C   13 Units at 03/25/25 0835    insulin aspart (NovoLOG) injection (RAPID ACTING)   Subcutaneous Daily with lunch Abraham Patel PA-C   14 Units at 03/25/25 1243    insulin aspart (NovoLOG) injection (RAPID ACTING)   Subcutaneous Daily with supper Abraham Patel PA-C   12 Units at 03/24/25 1807    insulin glargine (LANTUS PEN) injection 45 Units  45 Units Subcutaneous At Bedtime Rey Brewer PA-C   45 Units at 03/24/25 2134    losartan (COZAAR) tablet 25 mg  25 mg Oral Daily Abraham Patel PA-C   25 mg at 03/25/25 0801    [Held by provider] metFORMIN (GLUCOPHAGE) tablet 1,000 mg  1,000 mg Oral BID w/meals Abraham Patel PA-C        methadone (DOLOPHINE-INTENSOL) 10 MG/ML (HIGH CONC) solution 100 mg  100 mg Oral Daily Anderson Blas MD   100 mg at 03/25/25 0804    prazosin (MINIPRESS) capsule 2 mg  2 mg Oral At Bedtime Aramis Casas MD   2 mg at 03/24/25 2100    sertraline (ZOLOFT) tablet 100 mg  100 mg Oral Daily Aramis Casas MD   100 mg at 03/25/25 0801          Allergies:   No Known Allergies       Labs:     Recent Results (from the past 24 hours)   Glucose by meter    Collection Time: 03/24/25  5:56 PM   Result Value Ref Range    GLUCOSE BY METER POCT 242 (H) 70 - 99 mg/dL   Glucose by meter    Collection Time: 03/24/25  8:55 PM   Result Value Ref Range    GLUCOSE BY METER POCT 315 (H) 70 - 99 mg/dL   Glucose by meter    Collection Time: 03/25/25  2:08 AM   Result Value Ref Range    GLUCOSE BY METER POCT 283 (H) 70 - 99 mg/dL   Glucose by meter    Collection Time: 03/25/25  7:52 AM   Result Value Ref Range    GLUCOSE BY METER POCT 255 (H) 70 - 99 mg/dL   Glucose by meter    Collection Time: 03/25/25 11:52 AM   Result Value Ref Range     "GLUCOSE BY METER POCT 267 (H) 70 - 99 mg/dL          Psychiatric Examination:     BP (!) 146/101 (BP Location: Left arm, Patient Position: Sitting)   Pulse 90   Temp (!) 92.2  F (33.4  C) (Temporal)   Resp 18   Ht 1.753 m (5' 9\")   Wt 93 kg (205 lb)   LMP  (LMP Unknown)   SpO2 98%   BMI 30.27 kg/m    Weight is 205 lbs 0 oz  Body mass index is 30.27 kg/m .    Orthostatic Vitals         Most Recent      Standing Orthostatic /108 03/25 0700    Standing Orthostatic Pulse (bpm) 105 03/25 0700          Appearance: awake, alert, unkempt without odor   Attitude: cooperative, states she is motivated   Eye Contact:  better   Mood:  anxious, sad, restless    Affect:  constricted mobility  Speech:  clear, coherent, quiet  Psychomotor Behavior:  no evidence of tardive dyskinesia, dystonia, or tics  Throught Process:  linear, appears to be goal oriented  Associations:  no loose associations  Thought Content:  no evidence of psychotic thought and passive suicidal ideation present, denies having suicidal plans  Insight:  partial  Judgement:  fair  Oriented to:  time, person, and place  Attention Span and Concentration:  fair  Recent and Remote Memory:  fair    Clinical Global Impressions  First: 7/4 3/19/2025      Most recent:            Precautions:     Behavioral Orders   Procedures    Code 1 - Restrict to Unit    Routine Programming     As clinically indicated    Status 15     Every 15 minutes.    Suicide precautions: Suicide Risk: HIGH     Patients on Suicide Precautions should have a Combination Diet ordered that includes a Diet selection(s) AND a Behavioral Tray selection for Safe Tray - with utensils, or Safe Tray - NO utensils       Order Specific Question:   Suicide Risk     Answer:   HIGH          DIagnoses:     Unspecified depressive disorder (MDD recurrent vs bipolar affective type 2 depressed)  Suicidal ideation,   Post traumatic Stress disorder  Generalized Anxiety Disorder  Panic Disorder with " agoraphobia    Opiate use disorder moderate severity, patient is on maintenance assisted therapy with methadone  Cannabis use disorder, severe  Benzodiazepine use disorder         Plan:   Sertraline increased to 100mg on 3/21/25. No medication changes today, 03/25/25. CD counselor recommended inpatient treatment centers, CTC involved and started referral process. IM consulted on diabetes, blood sugars continue to be high and uncontrolled. Jardiance and metformin held per IM, increased Lantus to 45 units on 3/24/25. Recommend patient limit snacking in evening time and be more carb-conscious. Encouraged patient to hydrate more. Encouraged patient to attend group sessions as this can provide support and structure. Anticipated discharge to CD treatment when patient feels ready.     I was present with PA student who participated in the service and in the documentation of the note. I have verified the history and personally performed the physical exam and medical decision making. I agree with the assessment and plan of care as documented in the note.     Aramis Casas MD  [facility]iat[facility]      Total time spent was 35 minutes. Over 50% of times was spent counseling and coordination of care regarding coping skills, medication and discharge planning.

## 2025-03-25 NOTE — PLAN OF CARE
Problem: Psychotic Signs/Symptoms  Goal: Enhanced Social, Occupational or Functional Skills (Psychotic Signs/Symptoms)  Outcome: Progressing  Intervention: Promote Social, Occupational and Functional Ability  Recent Flowsheet Documentation  Taken 3/25/2025 1019 by Vanessa Gasca RN  Trust Relationship/Rapport: care explained   Goal Outcome Evaluation:    Plan of Care Reviewed With: patient          Patient visible on the unit most of the shift. Mood was calmed and cooperative, denied SI/SIB, rated depression and anxiety 7/10. PRN Hydroxyzine 50 mg and gabapentin 100 Mg given for anxiety. Blood sugar checked = 255 and 267 coverage given per carb count and sliding scale. Patient ate well for breakfast and lunch. Medication compliant, no report of pain or any discomfort. No concern with behavior.

## 2025-03-25 NOTE — PLAN OF CARE
BEH IP Unit Acuity Rating Score (UARS)  Patient is given one point for every criteria they meet.    CRITERIA SCORING   On a 72 hour hold, court hold, committed, stay of commitment, or revocation. 0    Patient LOS on BEH unit exceeds 20 days. 0  LOS: 7   Patient under guardianship, 55+, otherwise medically complex, or under age 11. 1   Suicide ideation without relief of precipitating factors. 0   Current plan for suicide. 0   Current plan for homicide. 0   Imminent risk or actual attempt to seriously harm another without relief of factors precipitating the attempt. 0   Severe dysfunction in daily living (ex: complete neglect for self care, extreme disruption in vegetative function, extreme deterioration in social interactions). 0   Recent (last 7 days) or current physical aggression in the ED or on unit. 0   Restraints or seclusion episode in past 72 hours. 0   Recent (last 7 days) or current verbal aggression, agitation, yelling, etc., while in the ED or unit. 0   Active psychosis. 0   Need for constant or near constant redirection (from leaving, from others, etc).  0   Intrusive or disruptive behaviors. 0   Patient requires 3 or more hours of individualized nursing care per 8-hour shift (i.e. for ADLs, meds, therapeutic interventions). 0   TOTAL 1

## 2025-03-25 NOTE — PLAN OF CARE
Problem: Sleep Disturbance  Goal: Adequate Sleep/Rest  Outcome: Progressing     Goal Outcome Evaluation:    Patient asleep at start of the shift. Breathing quiet and unlabored when sleeping. Pt had no c/o pain or discomfort during the HS. BG check at 0208 was 283 mg/dL. Pt slept 6.75 hours.

## 2025-03-25 NOTE — PLAN OF CARE
Problem: Adult Behavioral Health Plan of Care  Goal: Plan of Care Review  Outcome: Progressing  Flowsheets (Taken 3/24/2025 1701)  Patient Agreement with Plan of Care: agrees     Problem: Psychotic Signs/Symptoms  Goal: Improved Behavioral Control (Psychotic Signs/Symptoms)  Outcome: Progressing   Goal Outcome Evaluation:    Plan of Care Reviewed With: patient        Pt endorsed both anxiety and depression and rated at 7/10, but denied SI/SIB/HI/AVH, and contracted for safety. Hydroxyzine administered with some relief. Pt is isolative and withdrawn to room all shift, but came out for meals. Pt presents as unkempt. Affect presents as guarded, flat, and blunted. Affect is consistent with mood. Judgment and insight not appropriate to situation. Pt is eating and drinking adequately. BG check = 242 and 315 mg/dl. Covered per sliding scale. Insuline, Lantus has been increased from 40 units at HS to 45 units. No safety or behavioral concerns noted. No medication adverse effects noted. Will continue with same plan of care.

## 2025-03-25 NOTE — PLAN OF CARE
Team Note Due:  Wednesday    Assessment/Intervention/Current Symtoms and Care Coordination:  Chart review and met with team, discussed pt progress, symptomology, and response to treatment.  Discussed the discharge plan and any potential impediments to discharge.      Team Meeting  BS are not stable.  Pt does not go to groups unless directed to do so.  Pt is not motivated to work on going to treatment.        Pt was given this list yesterday and has not approached me to discuss.    CYNTHIA  sent residential options for pt.  IP CYNTHIA treatment:  [facility]:   Phone: [phone]  Fax: [phone]  Email: [email or web address]  https://avandeo.org/treatment-and-recovery/treatment-and-recovery-individuals/    [address] [address]  Phone: [phone]   Fax: [phone]  Email: info@Executive Channelmn.org  https://www.Executive Channelmn.Ph[employer]/    [facility] [employer]:  Access Team for Referrals  Phone: [phone]  Fax: [phone]  Email: AccessTeam@[employer]  Email: carc@[email or web address] Adventures   Email: [email or web address]@[email or web address]  https://The Box Populi/residential-programs/    [facility]'s IP  [address] [place] [address]  Phone: [phone]  Fax: [phone]  Email: admissions@Behalf  https://Behalf/womens-residential/    [facility]:    RESIDENTIAL ADMISSIONS ([facility], [facility], [facility] III)  Phone: [phone]  Fax: [phone]  Email: [email or web address].admissions@[email or web address].org  www.[employer].org    [facility] (IP- Men & Women)  General Admissions  [address] [place] [address]  Phone: [phone]  Fax:  [phone]  Email: intake@[employer]  https://[employer]/    [place] - Women IP  [address] [place] [address]  Phone: [phone]  Fax: [phone]  email: intake@[facility].org  www.[facility].org       Discharge  Plan or Goal:  Home with IOP     Barriers to Discharge:  Pt is still symptomatic     Referral Status:  CYNTHIA assessment completed  Pt was given a list of IOP program but now she wants residential level of care.      Referrals/ Alternatives:  [facility]:   Phone: [phone]  Fax: [phone]  Email: [email or web address]  https://[facility].org/treatment-and-recovery/treatment-and-recovery-individuals/    [facility] Intensive Outpatient Program  [address] [place] [address]  *Services Available:  Walk-in or telehealth comprehensive assessments (formerly known as Rule 25s) available for both adults and adolescents, either in person or remotely  Mental health services  Medicated assisted treatment (MAT)  HYBRID Intensive outpatient (co-occurring) group  Peer Recovery Support  *Hours of Operation:  Walk-in/Drop-in hours: Mondays, Tuesdays, and Fridays 10 am - 4 pm  Scheduled appointment hours: Monday - Friday, 9 am - 5 pm  Adult Intensive Outpatient (co-occurring) group hours: Monday, Wednesday, & Thursday, 1 pm - 4 pm  Contact Information:  : [phone] for information & scheduling  TRACIE Nguyen, Recovery : [phone] for questions    [facility]: (in-person & virtual)  Locations: [facility], [place], [facility], and [facility].  Phone: [phone]  Fax:  [phone]  Email: IOPreferrals@[employer] Machine  email: accesstecarina@[employer]  https://Shoptiques/outpatient-treatment/  *Virtual IOP:  Day program: Monday through Thursday from 9 a - noon.   Evening program: Monday through Thursday from 5 p - 8 p.  *[facility] Location:  [address]  [place] [address]  Phone:   Fax: [phone]  Group is Monday-Thursday  - Co-Ed IOP is from 12:30 - 3:30 p  - Virtual IOP is from 9 a - noon  - Women's IOP from 9 a - noon    [facility] ( [facility])  [facility] Intensive Outpatient Program  Phone: [phone]  Fax: [phone]  Behavioral Health:  Phone: [phone] *press 1  IOP Referral Fax: [phone]  [address] [place] [address]  http://[facility].org     [facility]: (in-person & virtual)  Main phone: [phone]  Direct Dial: [phone]   Admissions email: [email or web address]   CYNTHIA fax: [phone]  www.[email or web address]  *Groups are held on Mondays, Tuesdays, Wednesdays, or Thursdays, depending on the location and level of care.  *Groups are generally held from 8am - 12pm and 4pm - 7pm.    [facility]  Phone: [phone]  Fax: [phone]  email: [email or web address]  https://www.Marinelayer/    [facility] ([facility]am [facility])  [address] [place] [address]  Phone: [phone]  Email:  Eliu@[employer].Indelsul  https://www.[employer] Combined.Indelsul/services/human_services/[facility]/locations.php    [facility]: IOP- virtual  [address] [place] [address] [address]  [place] [address]  Phone: [phone]  Fax: [phone]  email: intake@[facility].Indelsul.  www.[facility].org  Monday - Thursday 5:30 pm - 8:30 pm, Friday 11:00 am - 1:00 pm    Reny Schumacher MA LADC /   CYNTHIA Evaluation Counselor  Triage and Transition: Consult & Liasion   [address]  [email or web address].org   Office: [phone] Fax: [phone]   Gender Pronouns: she/her         Legal Status:  Voluntary       Contacts (include ENE status):    Ene for daughter:  Samanta Dhillon otjayna          Upcoming Meetings and Dates/Important Information and next steps:      Decide on what facilty she wants to go to.

## 2025-03-26 LAB
GLUCOSE BLDC GLUCOMTR-MCNC: 178 MG/DL (ref 70–99)
GLUCOSE BLDC GLUCOMTR-MCNC: 182 MG/DL (ref 70–99)
GLUCOSE BLDC GLUCOMTR-MCNC: 213 MG/DL (ref 70–99)
GLUCOSE BLDC GLUCOMTR-MCNC: 239 MG/DL (ref 70–99)
GLUCOSE BLDC GLUCOMTR-MCNC: 270 MG/DL (ref 70–99)
GLUCOSE BLDC GLUCOMTR-MCNC: 324 MG/DL (ref 70–99)

## 2025-03-26 PROCEDURE — 250N000012 HC RX MED GY IP 250 OP 636 PS 637

## 2025-03-26 PROCEDURE — 250N000013 HC RX MED GY IP 250 OP 250 PS 637: Performed by: PSYCHIATRY & NEUROLOGY

## 2025-03-26 PROCEDURE — 250N000013 HC RX MED GY IP 250 OP 250 PS 637: Performed by: PHYSICIAN ASSISTANT

## 2025-03-26 PROCEDURE — 250N000013 HC RX MED GY IP 250 OP 250 PS 637

## 2025-03-26 PROCEDURE — 90853 GROUP PSYCHOTHERAPY: CPT

## 2025-03-26 PROCEDURE — 99233 SBSQ HOSP IP/OBS HIGH 50: CPT | Performed by: PSYCHIATRY & NEUROLOGY

## 2025-03-26 PROCEDURE — 124N000002 HC R&B MH UMMC

## 2025-03-26 PROCEDURE — 250N000013 HC RX MED GY IP 250 OP 250 PS 637: Performed by: EMERGENCY MEDICINE

## 2025-03-26 RX ADMIN — HYDROXYZINE HYDROCHLORIDE 50 MG: 25 TABLET ORAL at 11:59

## 2025-03-26 RX ADMIN — SERTRALINE HYDROCHLORIDE 100 MG: 100 TABLET ORAL at 07:54

## 2025-03-26 RX ADMIN — OLANZAPINE 10 MG: 10 TABLET, FILM COATED ORAL at 07:54

## 2025-03-26 RX ADMIN — TRAZODONE HYDROCHLORIDE 50 MG: 50 TABLET ORAL at 20:46

## 2025-03-26 RX ADMIN — HYDROXYZINE HYDROCHLORIDE 50 MG: 25 TABLET ORAL at 16:22

## 2025-03-26 RX ADMIN — INSULIN ASPART 4 UNITS: 100 INJECTION, SOLUTION INTRAVENOUS; SUBCUTANEOUS at 08:27

## 2025-03-26 RX ADMIN — INSULIN ASPART 1 UNITS: 100 INJECTION, SOLUTION INTRAVENOUS; SUBCUTANEOUS at 12:38

## 2025-03-26 RX ADMIN — INSULIN ASPART 3 UNITS: 100 INJECTION, SOLUTION INTRAVENOUS; SUBCUTANEOUS at 17:51

## 2025-03-26 RX ADMIN — LOSARTAN POTASSIUM 25 MG: 25 TABLET, FILM COATED ORAL at 07:54

## 2025-03-26 RX ADMIN — INSULIN GLARGINE 45 UNITS: 100 INJECTION, SOLUTION SUBCUTANEOUS at 21:13

## 2025-03-26 RX ADMIN — METFORMIN HYDROCHLORIDE 1000 MG: 500 TABLET, FILM COATED ORAL at 17:52

## 2025-03-26 RX ADMIN — OLANZAPINE 10 MG: 10 TABLET, FILM COATED ORAL at 20:46

## 2025-03-26 RX ADMIN — PRAZOSIN HYDROCHLORIDE 2 MG: 2 CAPSULE ORAL at 20:57

## 2025-03-26 RX ADMIN — METHADONE HYDROCHLORIDE 100 MG: 10 CONCENTRATE ORAL at 07:54

## 2025-03-26 ASSESSMENT — ACTIVITIES OF DAILY LIVING (ADL)
ADLS_ACUITY_SCORE: 48
HYGIENE/GROOMING: INDEPENDENT
ADLS_ACUITY_SCORE: 48
ADLS_ACUITY_SCORE: 48
ORAL_HYGIENE: INDEPENDENT
ADLS_ACUITY_SCORE: 48
LAUNDRY: UNABLE TO COMPLETE
ADLS_ACUITY_SCORE: 48

## 2025-03-26 NOTE — PLAN OF CARE
Problem: Sleep Disturbance  Goal: Adequate Sleep/Rest  Outcome: Progressing     Goal Outcome Evaluation:    Patient observed sleeping at start of the shift during 15 minutes safety rounds check. Breathing quiet and unlabored when sleeping. BG check at 02:07 was 213 mg/dL. Pt had no c/o pain or discomfort during the HS. Pt slept 6.25 hours.

## 2025-03-26 NOTE — PLAN OF CARE
Problem: Adult Behavioral Health Plan of Care  Goal: Adheres to Safety Considerations for Self and Others  Outcome: Progressing     Problem: Psychotic Signs/Symptoms  Goal: Optimal Cognitive Function (Psychotic Signs/Symptoms)  Outcome: Progressing      Patient continues to keep to herself much of the time. Calm and cooperative on approach, yet somewhat guarded. Affect flat and blunted. She endorses some depression and reported anxiety 7/10 at the start of the shift. Patient denies SI/SIB/HI/AH/VH. PRN hydroxyzine was requested and given,  which was somewhat helpful per patient. She attended group and has been observed in the lounge at meal and snack times.   BG was 182 before afternoon snack, and 270 before dinner. Patient given 6 units of novolog PRN with snack and 15 units of novolog with dinner per carb count and sliding scale orders. Metformin was also started again this evening. Patient is cooperative with all medications. BG was 239 at bedtime. PRN novolog coverage given for hs snack. Patient also requested and given PRNs trazodone and zyprexa before going to bed.

## 2025-03-26 NOTE — PLAN OF CARE
Team Note Due:  Wednesday    Assessment/Intervention/Current Symtoms and Care Coordination:  Chart review and met with team, discussed pt progress, symptomology, and response to treatment.  Discussed the discharge plan and any potential impediments to discharge.      Team Meeting  BS are not stable as pt sneaks food.  Pt does not go to groups unless directed to do so.  Pt is not motivated to work on going to treatment.  Could she be malingering since it is the end of the month?      I approached the pt and she was in the dining room alone doing a word puzzle.  Pt  signed ENE for methadone provider. I called and left a vm for the pt's counselor who is also named Yun.  [facility]al [facility] Group ([facility]) (formerly [facility])  [address] [place] [address]  Phone: [phone]  The Health Unit Coordinator has faxed these discharge instructions to fax [phone]  Her counselor called me back - Yun @ [phone]  Yun states that the pt tried CYNTHIA treatment once, at [facility] nad only lasted a couple of days.  She has been telling pt that she needs a higher level of care but the pt is not motivated. At this time she is treating pt with the Harm Reduction model.  Pt goes there in insurance funded cabs M-F and gets one take out for the weekend.  She asked what our plan was given that the pt is presenting here in the same way and how long would we keep her. I said that we wre beginnng these conversations today.      I have asked the [facility] to set up primary care appointment, hopefully here at [facility].    CYNTHIA  sent residential options for pt.  IP CYNTHIA treatment:  [facility]:   Phone: [phone]  Fax: [phone]  Email: intakereferrals@[email or web address]  https://avivomn.org/treatment-and-recovery/treatment-and-recovery-individuals/    [address] [address]  Phone: [phone]   Fax: [phone]  Email:  info@theTaecanetmn.org  https://www.theTaecanetmn.org/    [facility]:  Access Team for Referrals  Phone: [phone]  Fax: [phone]  Email: AccessTeam@[employer]  Email: [email or web address]   Email: [email or web address]@Elecar  https://IPG/residential-programs/    [facility]'s IP  [address] [place] [address]  Phone: [phone]  Fax: [phone]  Email: admissions@MLW Squared  https://MLW Squared/womens-residential/    [facility]:    RESIDENTIAL ADMISSIONS ([facility] I, [facility] II, [facility] III)  Phone: [phone]  Fax: [phone]  Email: Residential.admissions@[facility].Master Route  www.[facility].Master Route    [facility] (- Men & Women)  General Admissions  [address] [place] [address]  Phone: [phone]  Fax:  [phone]  Email: intake@DabKick  https://DabKick/    [place] - Women IP  [address] [place] [address]  Phone: [phone]  Fax: [phone]  email: intake@[facility].Master Route  www.[facility].org       Discharge Plan or Goal:  Home with IOP     Barriers to Discharge:  Pt is still symptomatic     Referral Status:  CYNTHIA assessment completed  Pt was given a list of IOP program but now she wants residential level of care.      Referrals/ Alternatives:  [facility]:   Phone: [phone]  Fax: [phone]  Email: intakereferrals@ProsperWorksvomn.org  https://ProsperWorksvomn.org/treatment-and-recovery/treatment-and-recovery-individuals/    [facility] Intensive Outpatient Program  [address] [place] [address]  *Services Available:  Walk-in or telehealth comprehensive assessments (formerly known as Rule 25s) available for both adults and adolescents, either in person or remotely  Mental health services  Medicated assisted treatment (MAT)  HYBRID Intensive outpatient (co-occurring) group  Peer Recovery Support  *Hours of  Operation:  Walk-in/Drop-in hours: Mondays, Tuesdays, and Fridays 10 am - 4 pm  Scheduled appointment hours: Monday - Friday, 9 am - 5 pm  Adult Intensive Outpatient (co-occurring) group hours: Monday, Wednesday, & Thursday, 1 pm - 4 pm  Contact Information:  : [phone] for information & scheduling  ARLENE Nguyen-S, Recovery : [phone] for questions    [facility]: (in-person & virtual)  Locations: [facility] [place], [facility], and [facility].  Phone: [phone]  Fax:  [phone]  Email: IOPreferrals@Gousto  email: accessteam@[employer].Streemio  https://Piqniq/outpatient-treatment/  *Virtual IOP:  Day program: Monday through Thursday from 9 a - noon.   Evening program: Monday through Thursday from 5 p - 8 p.  *[facility] Location:  [address]  [place] [address]  Phone:   Fax: [phone]  Group is Monday-Thursday  - Co-Ed IOP is from 12:30 - 3:30 p  - Virtual IOP is from 9 a - noon  - Women's IOP from 9 a - noon    [facility] ( [facility])  [place] Intensive Outpatient Program  Phone: [phone]  Fax: [phone]  Behavioral Health:  Phone: [phone] *press 1  IOP Referral Fax: [phone]  Counts include 234 beds at the [facility] [place] [address]  [email or web address]://[facility]-healthcare.[email or web address]     [facility]: (in-person & virtual)  Main phone: [phone]  Direct Dial: [phone]   Admissions email: [email or web address]   CYNTHIA fax: [phone]  www.[employer]  *Groups are held on Mondays, Tuesdays, Wednesdays, or Thursdays, depending on the location and level of care.  *Groups are generally held from 8am - 12pm and 4pm - 7pm.    [facility]  Phone: [phone]  Fax: [phone]  email: [email or web address]  https://www.Snapcious.Streemio/    [facility] (Hiram Sanches)  [address]  [place] [address]  Phone: [phone]  Email:  Eliu@6Wunderkinder.[employer]erve [employer]  https://www.[employer].ROOOMERS.UDeserve Technologies/services/human_services/[facility]/locations.php    [facility]: IOP- virtual  [address]  [place] [address]  Phone: [phone]  Fax: [phone]  email: intake@[facility].  www.[facility].org  Monday - Thursday 5:30 pm - 8:30 pm, Friday 11:00 am - 1:00 pm    Reny Schumacher MA LADC /   CYNTHIA Evaluation Counselor  Triage and Transition: Consult & Liasion   [address]  [email or web address]   Office: [phone] Fax: [phone]   Gender Pronouns: she/her         Legal Status:  Voluntary       Contacts (include ENE status):    Ene for daughter:  Samanta granados  [phone]        Upcoming Meetings and Dates/Important Information and next steps:      Decide on what facilty she wants to go to.

## 2025-03-26 NOTE — PLAN OF CARE
Group Attendance:  attended full group    Time session began: 1700  Time session ended: 0740  Patient's total time in group: 40    Total # Attendees   4   Group Type psychotherapeutic and life skills     Group Topic Covered emotional regulation, incorporating skill sets, and relationships and boundaries     Group Session Detail [place] setting: psychoeducation on boundaries, model assertiveness, use role-playing exercises, discuss real-life scenarios, and encourage open communication and self-awareness.        Patient's response to the group topic/interactions:  positive affect, cooperative with task, organized, and actively engaged     Patient Details: Pt was engaged throughout group, checked in feeling a bit anxious, reports that she values spending time, playing board games with family. Pt demonstrated assertive communication when asserting boundaries in role play activity.           [address] - Group psychotherapy - 1 Session  Patient Active Problem List   Diagnosis    Suicidal behavior    Benign essential HTN    Cannabis abuse    Cyclic vomiting syndrome    Elevated LFTs    Hyperlipidemia with target low density lipoprotein (LDL) cholesterol less than 100 mg/dL    Insomnia    Mild intermittent asthma    Panic disorder with agoraphobia    OCD (obsessive compulsive disorder)    Severe benzodiazepine use disorder (H) in remission    S/P lumbar microdiscectomy    S/P laparoscopic cholecystectomy    Personality disorder (H)    Type 2 diabetes mellitus with hyperglycemia, with long-term current use of insulin (H)    Obesity (BMI 35.0-39.9) with comorbidity (H)    Falls frequently    Moderate opioid use disorder, in early remission (H)    Chronic hepatitis C without hepatic coma (H)    Hyperglycemia    Dehydration    Cyclical vomiting    Lactic acidosis    Epigastric pain    Coffee ground emesis    Elevated blood ketone body level    Severe episode of recurrent major depressive disorder, without psychotic features (H)     PTSD (post-traumatic stress disorder)    Recurrent major depression    Suicidal ideation    Abscess of arm, right    Intractable nausea and vomiting    Tachycardia, unspecified    Generalized pain    Anxiety    Right lower quadrant abdominal pain

## 2025-03-26 NOTE — PLAN OF CARE
"  Problem: Adult Inpatient Plan of Care  Goal: Plan of Care Review  Outcome: Progressing     Problem: Psychotic Signs/Symptoms  Goal: Improved Behavioral Control (Psychotic Signs/Symptoms)  Outcome: Progressing   Milieu Participation:Behavior: Pt was isolative to room much of the shift. Came out for dinner and to watch TV for a while. Declined going to Recreational Therapy group but briefly came to see what it was about. Presents as guarded yet pleasant and cooperative. Unkept. No overtly psychotic s/s noted or observed. Pt denies SI, SIB, HI, AVH. Endorsed anxiety and depression. Requested and received hydroxyzine 50 mg for anxiety and stated this medication does help with anxiety. Later in shift pt requested and received Zyprexa 10 mg for agitation. Medication compliant with scheduled medications.  and 246. Coverage given for carb counts and sliding scale. PRN trazodone 50 mg given at bedtime per request.    Safety: status 15 SI/Self harm: denies  Aggression/agitation/HI: denies, exhibited safe behavior  AVH: denies Affect: blunted Mood: calm    Physical Complaints/Issues: denies  Medication AE: denies  I & O: eating and drinking well 100%  Sleep: denies concerns  LBM: denies concerns  ADLs: independent, unkept  Visits/calls: none    Vitals:  Blood pressure 100/71, pulse 83, temperature 98.2  F (36.8  C), temperature source Temporal, resp. rate 16, height 1.753 m (5' 9\"), weight 93 kg (205 lb), SpO2 98%, not currently breastfeeding.                          "

## 2025-03-26 NOTE — PLAN OF CARE
BEH IP Unit Acuity Rating Score (UARS)  Patient is given one point for every criteria they meet.    CRITERIA SCORING   On a 72 hour hold, court hold, committed, stay of commitment, or revocation. 0    Patient LOS on BEH unit exceeds 20 days. 0  LOS: 8   Patient under guardianship, 55+, otherwise medically complex, or under age 11. 1   Suicide ideation without relief of precipitating factors. 0   Current plan for suicide. 0   Current plan for homicide. 0   Imminent risk or actual attempt to seriously harm another without relief of factors precipitating the attempt. 0   Severe dysfunction in daily living (ex: complete neglect for self care, extreme disruption in vegetative function, extreme deterioration in social interactions). 0   Recent (last 7 days) or current physical aggression in the ED or on unit. 0   Restraints or seclusion episode in past 72 hours. 0   Recent (last 7 days) or current verbal aggression, agitation, yelling, etc., while in the ED or unit. 0   Active psychosis. 0   Need for constant or near constant redirection (from leaving, from others, etc).  0   Intrusive or disruptive behaviors. 0   Patient requires 3 or more hours of individualized nursing care per 8-hour shift (i.e. for ADLs, meds, therapeutic interventions). 0   TOTAL 1

## 2025-03-26 NOTE — PLAN OF CARE
Care Coordinator Note(s):    Care Request(s):   PCP   Preferences: Time Frame: 2 Weeks, In Person  Notes: MARIAN - [place], Pt  lives nearby and needs primary care, Would like [facility]      Care Outcome(s):    CC Progress Note(s)/ Documentation:    Appointment: Primary Care  Date/time: Wednesday April 9th, 2025 @ 2:10 PM In person  Provider:  Dina Daigle APRN CNP  Address: [address] [place] [address]  Phone: [phone]     -Gina Delarosa  Adult Behavioral Health Care Coordinator

## 2025-03-26 NOTE — PLAN OF CARE
03/26/25 1136   Individualization/Patient Specific Goals   Patient Vulnerabilities adverse childhood experience(s);substance abuse/addiction   Anxieties, Fears or Concerns pt is not motivated for treatment   Individualized Care Needs pt needs to be motivated for treatment, needs diabetes stabilization   Patient/Family-Specific Goals (Include Timeframe) start planning for discharge   Interprofessional Rounds   Summary Pt has been using fetanyl, She reported suicidal ideation.   Participants CTC;nursing;OT;psychiatrist;other (see comments)   Behavioral Team Discussion   Participants Aramis Howard MD, Nori MONTANA,   Mallory Gasca RN, Jess ALAS student,   Progress pt has had [facility] assessment but shows no motivation to go to treatment, she does not go to groups, pt may be malingering due to insuffiocent funds at the end of the month   Anticipated length of stay 2 days   Continued Stay Criteria/Rationale the pt needs further evlauation of risk of harm to self   Medical/Physical no issues were discussed   Precautions suicide   Plan decide whether the pt should be discharged to home rather than [facility]NTH[facility] treatment due to lack of motication and possible malingering   Rationale for change in precautions or plan na   Safety Plan will be completed by unit therapist   Anticipated Discharge Disposition home with family;substance use treatment     Goal Outcome Evaluation:

## 2025-03-26 NOTE — PROGRESS NOTES
[facility], [place]   Psychiatric Progress Note        Interim History:   The patient's care was discussed with the treatment team during the daily team meeting and/or staff's chart notes were reviewed.  Staff report patient slept 6.25 hours last night. She continued to report high anxiety and depression, requested and received prn meds. Pt was isolative to room much of the shift. Came out for dinner and to watch TV for a while. Declined going to Recreational Therapy group but briefly came to see what it was about. Blood sugars continue to run high. Per IM's note patient continues to be hyperglycemic in a.m., likely due to overnight snacking.  Now has adequate p.o. intake.  Will resume metformin and empagliflozin.  Will continue to monitor sugars, may need to add additional snack coverage.     Met with patient who walked into the conference room willingly and talked to myself and student. Said that her mood and anxiety were still high, rated them as 8/10. Endorsed passive Suicidal ideation, told us that she would not be safe to be discharged as she could attempt suicide. Said that she went to one group yesterday. Appeared to be somewhat defensive and disputed when we told her that she went to a group for a short time only, See discussion above. Passively acknowledged that she doesn't follow diabetic diet. We made it clear during our visit that not following her diabetic care might prevent Yun from being accepted to CD program, Encouraged her to make more healthy decisions with food and to exercise on bike, regularly attend groups. She passively nodded. It was unclear how our suggestions were perceived by her.         Medications:     Current Facility-Administered Medications   Medication Dose Route Frequency Provider Last Rate Last Admin    empagliflozin (JARDIANCE) tablet 10 mg  10 mg Oral Daily Rey Brewer PA-C        insulin aspart (NovoLOG) injection (RAPID ACTING)  1-7  Units Subcutaneous TID AC Abraham Patel PA-C   1 Units at 03/26/25 1238    insulin aspart (NovoLOG) injection (RAPID ACTING)  1-5 Units Subcutaneous At Bedtime Abraham Patel PA-C   1 Units at 03/25/25 2117    insulin aspart (NovoLOG) injection (RAPID ACTING)   Subcutaneous Daily with breakfast Abraham Patel PA-C   12 Units at 03/26/25 0827    insulin aspart (NovoLOG) injection (RAPID ACTING)   Subcutaneous Daily with lunch Abraham Patel PA-C   12 Units at 03/26/25 1238    insulin aspart (NovoLOG) injection (RAPID ACTING)   Subcutaneous Daily with supper Abraham Patel PA-C   15 Units at 03/25/25 1807    insulin glargine (LANTUS PEN) injection 45 Units  45 Units Subcutaneous At Bedtime Rey Brewer PA-C   45 Units at 03/25/25 2118    losartan (COZAAR) tablet 25 mg  25 mg Oral Daily Abraham Patel PA-C   25 mg at 03/26/25 0754    metFORMIN (GLUCOPHAGE) tablet 1,000 mg  1,000 mg Oral BID w/meals Rey Brewer PA-C        methadone (DOLOPHINE-INTENSOL) 10 MG/ML (HIGH CONC) solution 100 mg  100 mg Oral Daily Anderson Blas MD   100 mg at 03/26/25 0754    prazosin (MINIPRESS) capsule 2 mg  2 mg Oral At Bedtime Aramis Casas MD   2 mg at 03/25/25 2116    sertraline (ZOLOFT) tablet 100 mg  100 mg Oral Daily Aramis Casas MD   100 mg at 03/26/25 0754          Allergies:   No Known Allergies       Labs:     Recent Results (from the past 24 hours)   Glucose by meter    Collection Time: 03/25/25  5:13 PM   Result Value Ref Range    GLUCOSE BY METER POCT 185 (H) 70 - 99 mg/dL   Glucose by meter    Collection Time: 03/25/25  9:03 PM   Result Value Ref Range    GLUCOSE BY METER POCT 246 (H) 70 - 99 mg/dL   Glucose by meter    Collection Time: 03/26/25  2:07 AM   Result Value Ref Range    GLUCOSE BY METER POCT 213 (H) 70 - 99 mg/dL   Glucose by meter    Collection Time: 03/26/25  7:50 AM   Result Value Ref Range    GLUCOSE BY METER POCT 324 (H) 70 - 99 mg/dL   Glucose by  "meter    Collection Time: 03/26/25 11:39 AM   Result Value Ref Range    GLUCOSE BY METER POCT 178 (H) 70 - 99 mg/dL          Psychiatric Examination:     /87   Pulse 88   Temp 97.7  F (36.5  C) (Temporal)   Resp 16   Ht 1.753 m (5' 9\")   Wt 93 kg (205 lb)   LMP  (LMP Unknown)   SpO2 98%   BMI 30.27 kg/m    Weight is 205 lbs 0 oz  Body mass index is 30.27 kg/m .    Orthostatic Vitals         Most Recent      Standing Orthostatic /81 03/26 0815    Standing Orthostatic Pulse (bpm) 106 03/26 0815           Appearance: awake, alert, unkempt without odor   Attitude: somewhat/superficially cooperative  Eye Contact:  fair  Mood:  anxious and sad   Affect:  constricted mobility, mood congruent  Speech:  clear, coherent, quiet   Psychomotor Behavior:  no evidence of tardive dyskinesia, dystonia, or tics, some psychomotor retardation present;  Throught Process:  linear and goal oriented  Associations:  no loose associations  Thought Content:  no evidence of psychotic thought and passive suicidal ideation present, denies having suicidal plans  Insight:  partial  Judgement: limited  Oriented to:  time, person, and place  Attention Span and Concentration:  fair  Recent and Remote Memory:  fair    Clinical Global Impressions  First: 7/4 3/19/2025      Most recent:            Precautions:     Behavioral Orders   Procedures    Code 1 - Restrict to Unit    Routine Programming     As clinically indicated    Status 15     Every 15 minutes.    Suicide precautions: Suicide Risk: HIGH     Patients on Suicide Precautions should have a Combination Diet ordered that includes a Diet selection(s) AND a Behavioral Tray selection for Safe Tray - with utensils, or Safe Tray - NO utensils       Order Specific Question:   Suicide Risk     Answer:   HIGH          DIagnoses:     Unspecified depressive disorder (MDD recurrent vs bipolar affective type 2 depressed)  Suicidal ideation,   Post traumatic Stress disorder  Generalized " Anxiety Disorder  Panic Disorder with agoraphobia    Opiate use disorder moderate severity, patient is on maintenance assisted therapy with methadone  Cannabis use disorder, severe  Benzodiazepine use disorder         Plan:   Sertraline increased to 100mg on 3/21/25. Will increase it today, 03/26/25. CD counselor recommended inpatient treatment centers, CTC involved and will start referral process.  IM consulted on diabetes, blood sugars continue to be high and uncontrolled. Jardiance and metformin were restarted per IM, increased Lantus to 40 units on 3/23/25. Recommend patient limit snacking in evening time and be more carb-conscious. Encouraged patient to hydrate more. Encouraged patient to attend group sessions and shower. Will continue to provider support and structure. Anticipated discharge to CD treatment.      I was present with PA student who participated in the service and in the documentation of the note. I have verified the history and personally performed the physical exam and medical decision making. I agree with the assessment and plan of care as documented in the note.     Aramis Casas MD  [facility] Psychiatry      Total time spent was 38 minutes. Over 50% of times was spent counseling and coordination of care regarding coping skills, medication and discharge planning.

## 2025-03-26 NOTE — PROGRESS NOTES
Brief medicine progress note    Patient continues to be hyperglycemic in a.m., likely due to overnight snacking.  Now has adequate p.o. intake.  Will resume metformin and empagliflozin.  Will continue to monitor sugars, may need to add additional snack coverage.    Medicine will continue to follow    Rey Brewer PA-C  Hospitalist Service, [facility]  Securely message with [employer] (formerly [employer]) (more info)  Text page via [facility] Paging/Directory   See signed in provider for up to date coverage information

## 2025-03-26 NOTE — PLAN OF CARE
"  Problem: Psychotic Signs/Symptoms  Goal: Improved Psychomotor Symptoms (Psychotic Signs/Symptoms)  Outcome: Progressing  Intervention: Manage Psychomotor Movement  Recent Flowsheet Documentation  Taken 3/26/2025 1000 by Vanessa Gasca, RN  Activity (Behavioral Health): up ad nicholas     Patient spent most of the shift in and out of the room, attended group. Bright affect with interaction, mood was calmed and cooperative. Denied SI/SIB, rated depression and anxiety  8/10. PRN Zyprexa and hydroxyzine given.  Blood sugar checked =324 and 178 coverage given per order. Took all scheduled medications with zero problem. Patient ate 100% of breakfast and lunch. No safety concern with behavior.    /87   Pulse 88   Temp 97.7  F (36.5  C) (Temporal)   Resp 16   Ht 1.753 m (5' 9\")   Wt 93 kg (205 lb)   LMP  (LMP Unknown)   SpO2 98%   BMI 30.27 kg/m      "

## 2025-03-27 VITALS
SYSTOLIC BLOOD PRESSURE: 129 MMHG | BODY MASS INDEX: 30.36 KG/M2 | OXYGEN SATURATION: 97 % | HEIGHT: 69 IN | WEIGHT: 205 LBS | TEMPERATURE: 98.3 F | HEART RATE: 98 BPM | RESPIRATION RATE: 16 BRPM | DIASTOLIC BLOOD PRESSURE: 86 MMHG

## 2025-03-27 LAB
GLUCOSE BLDC GLUCOMTR-MCNC: 106 MG/DL (ref 70–99)
GLUCOSE BLDC GLUCOMTR-MCNC: 144 MG/DL (ref 70–99)
GLUCOSE BLDC GLUCOMTR-MCNC: 148 MG/DL (ref 70–99)
GLUCOSE BLDC GLUCOMTR-MCNC: 151 MG/DL (ref 70–99)
GLUCOSE BLDC GLUCOMTR-MCNC: 299 MG/DL (ref 70–99)

## 2025-03-27 PROCEDURE — 97150 GROUP THERAPEUTIC PROCEDURES: CPT | Mod: GO

## 2025-03-27 PROCEDURE — 99233 SBSQ HOSP IP/OBS HIGH 50: CPT | Performed by: PSYCHIATRY & NEUROLOGY

## 2025-03-27 PROCEDURE — 124N000002 HC R&B MH UMMC

## 2025-03-27 PROCEDURE — 250N000013 HC RX MED GY IP 250 OP 250 PS 637: Performed by: PHYSICIAN ASSISTANT

## 2025-03-27 PROCEDURE — 250N000013 HC RX MED GY IP 250 OP 250 PS 637

## 2025-03-27 PROCEDURE — 250N000013 HC RX MED GY IP 250 OP 250 PS 637: Performed by: PSYCHIATRY & NEUROLOGY

## 2025-03-27 PROCEDURE — 250N000013 HC RX MED GY IP 250 OP 250 PS 637: Performed by: EMERGENCY MEDICINE

## 2025-03-27 RX ADMIN — TRAZODONE HYDROCHLORIDE 50 MG: 50 TABLET ORAL at 20:19

## 2025-03-27 RX ADMIN — METFORMIN HYDROCHLORIDE 1000 MG: 500 TABLET, FILM COATED ORAL at 18:07

## 2025-03-27 RX ADMIN — OLANZAPINE 10 MG: 10 TABLET, FILM COATED ORAL at 07:39

## 2025-03-27 RX ADMIN — METFORMIN HYDROCHLORIDE 1000 MG: 500 TABLET, FILM COATED ORAL at 07:39

## 2025-03-27 RX ADMIN — INSULIN ASPART 4 UNITS: 100 INJECTION, SOLUTION INTRAVENOUS; SUBCUTANEOUS at 07:44

## 2025-03-27 RX ADMIN — SERTRALINE HYDROCHLORIDE 150 MG: 100 TABLET ORAL at 07:39

## 2025-03-27 RX ADMIN — LOSARTAN POTASSIUM 25 MG: 25 TABLET, FILM COATED ORAL at 07:40

## 2025-03-27 RX ADMIN — HYDROXYZINE HYDROCHLORIDE 50 MG: 25 TABLET ORAL at 18:07

## 2025-03-27 RX ADMIN — HYDROXYZINE HYDROCHLORIDE 50 MG: 25 TABLET ORAL at 08:55

## 2025-03-27 RX ADMIN — INSULIN ASPART 1 UNITS: 100 INJECTION, SOLUTION INTRAVENOUS; SUBCUTANEOUS at 12:39

## 2025-03-27 RX ADMIN — OLANZAPINE 10 MG: 10 TABLET, FILM COATED ORAL at 20:18

## 2025-03-27 RX ADMIN — EMPAGLIFLOZIN 10 MG: 10 TABLET, FILM COATED ORAL at 07:41

## 2025-03-27 RX ADMIN — GABAPENTIN 100 MG: 100 CAPSULE ORAL at 11:56

## 2025-03-27 RX ADMIN — METHADONE HYDROCHLORIDE 100 MG: 10 CONCENTRATE ORAL at 07:40

## 2025-03-27 RX ADMIN — PRAZOSIN HYDROCHLORIDE 2 MG: 2 CAPSULE ORAL at 20:18

## 2025-03-27 RX ADMIN — INSULIN GLARGINE 45 UNITS: 100 INJECTION, SOLUTION SUBCUTANEOUS at 20:17

## 2025-03-27 ASSESSMENT — ACTIVITIES OF DAILY LIVING (ADL)
ADLS_ACUITY_SCORE: 48
ORAL_HYGIENE: INDEPENDENT
ADLS_ACUITY_SCORE: 48
LAUNDRY: WITH SUPERVISION
DRESS: INDEPENDENT
ADLS_ACUITY_SCORE: 48
HYGIENE/GROOMING: INDEPENDENT
ADLS_ACUITY_SCORE: 48

## 2025-03-27 NOTE — PROGRESS NOTES
[facility], [place]   Psychiatric Progress Note        Interim History:     The patient's care was discussed with the treatment team during the daily team meeting and/or staff's chart notes were reviewed.  Staff report patient slept for 7 hours. Pt continues to guarded and withdrawn. She is minimally social to peers and staff. Her BS continue to be elevated due to frequent snacking. She reports anxiety and depression which she rates at 7 or 8. She did request for Hydroxyzine, Olanzapine and Gabapentin PRN today. She did report in OT group that she is getting better compared to how she felt on admission.     Met with patient with PA students in the conference room. Pt was dressed in hospital scrubs and wearing her glasses. During conversation, pt maintained poor eye contact, she was looking down most of the time. She did endorse depression and anxiety. Denied any auditory or visual hallucinations. She endorsed passive suicidal thoughts with no plan. We continued to encourage pt to minimize her snacking because it elevates her BS levels. She said that she cannot control it because when she feels hungry she gets nauseous. She reports that she is no longer interested in going to CD treatment because it will take her 3-4 months and there will be no one to watch her grand daughter. She say she lives with her daughter and grand daughter and its expensive for her daughter to pay for , therefore CD treatment is not an option for her. We recommended if she would be interested in a Partial Hospitalization Program or Day treatment and she said that she does not have a ride and there will be no one to watch her grand daughter. She is open to a virtual outpatient program or live one if she gets medical ride. She also said that she is willing to discharge home but not right now. No medication changes were made today. Labs were reviewed and BS are still elevated.          Medications:      Current Facility-Administered Medications   Medication Dose Route Frequency Provider Last Rate Last Admin    empagliflozin (JARDIANCE) tablet 10 mg  10 mg Oral Daily Rey Brewer PA-C   10 mg at 03/27/25 0741    insulin aspart (NovoLOG) injection (RAPID ACTING)  1-7 Units Subcutaneous TID AC Abraham Patel PA-C   1 Units at 03/27/25 1239    insulin aspart (NovoLOG) injection (RAPID ACTING)  1-5 Units Subcutaneous At Bedtime Abraham Patel PA-C   1 Units at 03/26/25 2055    insulin aspart (NovoLOG) injection (RAPID ACTING)   Subcutaneous Daily with breakfast Abraham Patel PA-C   13 Units at 03/27/25 0813    insulin aspart (NovoLOG) injection (RAPID ACTING)   Subcutaneous Daily with lunch Abraham Patel PA-C   14 Units at 03/27/25 1239    insulin aspart (NovoLOG) injection (RAPID ACTING)   Subcutaneous Daily with supper Abraham Patel PA-C   12 Units at 03/26/25 1751    insulin glargine (LANTUS PEN) injection 45 Units  45 Units Subcutaneous At Bedtime Rey Brewer PA-C   45 Units at 03/26/25 2113    losartan (COZAAR) tablet 25 mg  25 mg Oral Daily Abraham Patel PA-C   25 mg at 03/27/25 0740    metFORMIN (GLUCOPHAGE) tablet 1,000 mg  1,000 mg Oral BID w/meals Rey Brewer PA-C   1,000 mg at 03/27/25 0739    methadone (DOLOPHINE-INTENSOL) 10 MG/ML (HIGH CONC) solution 100 mg  100 mg Oral Daily Anderson Blas MD   100 mg at 03/27/25 0740    prazosin (MINIPRESS) capsule 2 mg  2 mg Oral At Bedtime Aramis Casas MD   2 mg at 03/26/25 2057    sertraline (ZOLOFT) tablet 150 mg  150 mg Oral Daily Aramis Casas MD   150 mg at 03/27/25 0739          Allergies:   No Known Allergies       Labs:     Recent Results (from the past 24 hours)   Glucose by meter    Collection Time: 03/26/25  4:18 PM   Result Value Ref Range    GLUCOSE BY METER POCT 182 (H) 70 - 99 mg/dL   Glucose by meter    Collection Time: 03/26/25  5:42 PM   Result Value Ref Range    GLUCOSE BY METER  "POCT 270 (H) 70 - 99 mg/dL   Glucose by meter    Collection Time: 03/26/25  8:49 PM   Result Value Ref Range    GLUCOSE BY METER POCT 239 (H) 70 - 99 mg/dL   Glucose by meter    Collection Time: 03/27/25  2:39 AM   Result Value Ref Range    GLUCOSE BY METER POCT 151 (H) 70 - 99 mg/dL   Glucose by meter    Collection Time: 03/27/25  7:35 AM   Result Value Ref Range    GLUCOSE BY METER POCT 299 (H) 70 - 99 mg/dL   Glucose by meter    Collection Time: 03/27/25 11:54 AM   Result Value Ref Range    GLUCOSE BY METER POCT 144 (H) 70 - 99 mg/dL          Psychiatric Examination:     /86 (BP Location: Left arm, Cuff Size: Adult Regular)   Pulse 98   Temp 98.3  F (36.8  C) (Oral)   Resp 16   Ht 1.753 m (5' 9\")   Wt 93 kg (205 lb)   LMP  (LMP Unknown)   SpO2 97%   BMI 30.27 kg/m    Weight is 205 lbs 0 oz  Body mass index is 30.27 kg/m .    Orthostatic Vitals         Most Recent      Standing Orthostatic /85 03/27 0945    Standing Orthostatic Pulse (bpm) 101 03/27 0945          Appearance: awake, alert, unkempt without odor   Attitude: somewhat cooperative, unmotivated  Eye Contact: poor, looked down most of the interview  Mood:  anxious, sad, depressed   Affect:  constricted mobility, mood congruent  Speech:  clear, coherent, quiet  Psychomotor Behavior:  no evidence of tardive dyskinesia, dystonia, or tics  Throught Process:  linear, appears to be goal oriented (wants to go take care of her grandchildren)  Associations:  no loose associations  Thought Content:  no evidence of psychotic thought and passive suicidal ideation present, denies having suicidal plans  Insight:  limited  Judgement: poor  Oriented to:  time, person, and place  Attention Span and Concentration:  fair  Recent and Remote Memory:  fair    Clinical Global Impressions  First:7/4 3/19/25     Most recent:            Precautions:     Behavioral Orders   Procedures    Code 1 - Restrict to Unit    Routine Programming     As clinically " indicated    Status 15     Every 15 minutes.    Suicide precautions: Suicide Risk: HIGH     Patients on Suicide Precautions should have a Combination Diet ordered that includes a Diet selection(s) AND a Behavioral Tray selection for Safe Tray - with utensils, or Safe Tray - NO utensils       Order Specific Question:   Suicide Risk     Answer:   HIGH          DIagnoses:     Unspecified depressive disorder (MDD recurrent vs bipolar affective type 2 depressed)  Suicidal ideation,   Post traumatic Stress disorder  Generalized Anxiety Disorder  Panic Disorder with agoraphobia    Opiate use disorder moderate severity, patient is on maintenance assisted therapy with methadone  Cannabis use disorder, severe  Benzodiazepine use disorder         Plan:     Sertraline increased to 100mg on 3/21/25.Sertraline increased to 150 mg on 03/26/25. CD counselor recommended inpatient treatment centers, CTC involved and will start referral process.On 3/27/25 Pt is no longer interested in CD treatment and wants to discharge home with virtual outpatient program.  IM consulted on diabetes, blood sugars continue to be high and uncontrolled. Jardiance and metformin were restarted per IM, increased Lantus to 40 units on 3/23/25. Recommend patient limit snacking in evening time and be more carb-conscious. Encouraged patient to hydrate more. Encouraged patient to attend group sessions and shower. Will continue to provider support and structure. Anticipated discharge to CD treatment.        I was present with PA student who participated in the service and in the documentation of the note. I have verified the history and personally performed the physical exam and medical decision making. I agree with the assessment and plan of care as documented in the note.     Aramis Casas MD  [facility] Psychiatry    Total time spent was 38 minutes. Over 50% of times was spent counseling and coordination of care regarding coping skills,  medication and discharge planning.

## 2025-03-27 NOTE — PLAN OF CARE
BG was 299 before breakfast and 144 before lunch, received coverage both times. Patient presents with a flat affect. Mood is depressed, anxious. She is withdrawn to herself, not social with peers and minimally social with staff. Patient endorsed anxiety of a 7/10 and depression of a 8/10. Patient describes her mood as ' restless.' Patient complained of headache, she declined tylenol. She reported SI with no plan, she contracts for safety on the unit. Last night sleep was off and on. She is unkempt, last shower was 2 days ago. PO intake is adequate.   PRNs this shift  Hydroxyzine, Olanzapine and gabapentin.

## 2025-03-27 NOTE — PROGRESS NOTES
CLINICAL NUTRITION SERVICES - REASSESSMENT NOTE     RECOMMENDATIONS FOR MDs/PROVIDERS TO ORDER:  ***    Malnutrition Status:    ***    Registered Dietitian Interventions:  ***    Future/Additional Recommendations:  ***     SUBJECTIVE INFORMATION  {RDNSubjectiveinformation:773243}    CURRENT NUTRITION ORDERS  Diet: Regular  Supplement:  none    CURRENT INTAKE/TOLERANCE  ***     NEW FINDINGS  Weight:   Wt Readings from Last 15 Encounters:   03/25/25 93 kg (205 lb)   03/23/25 92.3 kg (203 lb 8 oz)      03/20/25 90.2 kg (198 lb 12.8 oz)      03/18/25 89.5 kg (197 lb 4.8 oz)      03/17/25 87.5 kg (193 lb)      03/13/25 90.7 kg (200 lb)   03/07/25 88.5 kg (195 lb)   02/26/25 90.1 kg (198 lb 9.6 oz)   02/23/25 90.7 kg (200 lb)   02/17/25 90.7 kg (200 lb)   01/30/25 89.8 kg (197 lb 14.4 oz)   01/22/25 90.7 kg (200 lb)   01/18/25 90.7 kg (200 lb)   12/29/24 90.7 kg (200 lb)   12/23/24 90.7 kg (200 lb)   12/03/24 96.3 kg (212 lb 4.8 oz)   11/06/24 89.4 kg (197 lb)   10/25/24 90.8 kg (200 lb 3.2 oz)   10/13/24 92.9 kg (204 lb 12.8 oz)   ***    Skin/wounds: ***  GI symptoms: ***  Nutrition-relevant labs: {RDNNewfindingsrelevantlabsmeds:549366}  Nutrition-relevant medications: {RDNNewfindingsrelevantlabsmeds:472416}    MALNUTRITION  % Intake: {RDNMalnutrition%intake:330361}  % Weight Loss: {RDNMalnutrition%weightloss:636162}  Subcutaneous Fat Loss: {RDNMalnutritionsubcutaneousfatloss:149195}  Muscle Loss: {RDNMalnutritionmuscleloss:077630}  Fluid Accumulation/Edema: {RDNMalnutritionfluidaccumulationedema:065001}  Malnutrition Diagnosis: {RDNMalnutritiondiagnosis:079888}  Malnutrition Present on Admission: {RDNMalnutritionpresentonadmit:061092}    EVALUATION OF THE PROGRESS TOWARD GOALS   Previous Goals  Patient to consume % of nutritionally adequate meal trays TID, or the equivalent with supplements/snacks.  Evaluation: {RDNpreviousgoalevaluation:806138}    Previous Nutrition Diagnosis  Predicted inadequate nutrient intake  (kcal/protein)  related to abdominal pain/cyclic vomiting as evidenced by weight loss PTA.  Evaluation: {RDNpreviousnutritiondiagnosisevaluation:868438}    NUTRITION DIAGNOSIS  {RDNNutritiondiagnosis:452983} related to *** as evidenced by ***    INTERVENTIONS  {RDNInterventions:050891}    Goals  {RDNGoals:100647}     Monitoring/Evaluation      Progress toward goals will be monitored and evaluated per policy.    Sonia Perez MPH, RDN, LD  Behavioral Health Adult & Pediatric Dietitian  BEH Clinical Dietitian [place], Teams, or Desk: [phone]  Weekend/Holiday [place]: Weekend Holiday Clinical Dietitian [Multi Site Groups]

## 2025-03-27 NOTE — PLAN OF CARE
Problem: Sleep Disturbance  Goal: Adequate Sleep/Rest  Outcome: Progressing  Intervention: Promote Sleep/Rest          Patient slept approximately 7 hours. No PRN's given or requested. Patient's BG @ 0200 was 151. Will monitor as needed for the rest of the shift.

## 2025-03-27 NOTE — PLAN OF CARE
BEH IP Unit Acuity Rating Score (UARS)  Patient is given one point for every criteria they meet.    CRITERIA SCORING   On a 72 hour hold, court hold, committed, stay of commitment, or revocation. 0    Patient LOS on BEH unit exceeds 20 days. 0  LOS: 9   Patient under guardianship, 55+, otherwise medically complex, or under age 11. 1   Suicide ideation without relief of precipitating factors. 0   Current plan for suicide. 0   Current plan for homicide. 0   Imminent risk or actual attempt to seriously harm another without relief of factors precipitating the attempt. 0   Severe dysfunction in daily living (ex: complete neglect for self care, extreme disruption in vegetative function, extreme deterioration in social interactions). 0   Recent (last 7 days) or current physical aggression in the ED or on unit. 0   Restraints or seclusion episode in past 72 hours. 0   Recent (last 7 days) or current verbal aggression, agitation, yelling, etc., while in the ED or unit. 0   Active psychosis. 0   Need for constant or near constant redirection (from leaving, from others, etc).  0   Intrusive or disruptive behaviors. 0   Patient requires 3 or more hours of individualized nursing care per 8-hour shift (i.e. for ADLs, meds, therapeutic interventions). 0   TOTAL 1

## 2025-03-27 NOTE — PLAN OF CARE
"  Rehab Group    Start time: 1015  End time: 1145  Patient time total: 80 minutes    attended full group    #5 attended   Group Type: OT Clinic   Group Topic Covered: balanced lifestyle, coping skills, mindfulness, and relaxation      Group Session Detail:    Intervention: Pt participated in a OT Clinic group to facilitate coping skills exploration and creative expression through personally meaningful activities, and to encourage utilization of these healthy coping skills to promote overall health and wellness. Group included clinical observation of social, cognitive and kinesthetic performance skills to inform treatment and safe discharge planning.    Cognition: Goal directed, Follows through with task, Sequences task, Attends to detail     Mood/Affect:  Pleasant       Plan: Patient encouraged to maintain attendance for continued ongoing support in working towards occupational therapy goals to support overall treatment/care.        Patient Detail:    Joined right at start of group. Upon checking in with writer stated they were \"good\" and are hopeful to return home after discharge. Does report starting to feel better as compared to at admission.   Was ind selected a project to work on and set up materials. Was intermittently social off and on, mostly with writer and regarding the project. Demonstrated consistent focus and attention to selected task for duration of time in group. Requested additional supplies from writer as needed and was ind to clean up supplies at end of group.       [address] OT Group (2 or more in attendance)    Patient Active Problem List   Diagnosis    Suicidal behavior    Benign essential HTN    Cannabis abuse    Cyclic vomiting syndrome    Elevated LFTs    Hyperlipidemia with target low density lipoprotein (LDL) cholesterol less than 100 mg/dL    Insomnia    Mild intermittent asthma    Panic disorder with agoraphobia    OCD (obsessive compulsive disorder)    Severe benzodiazepine use disorder (H) " in remission    S/P lumbar microdiscectomy    S/P laparoscopic cholecystectomy    Personality disorder (H)    Type 2 diabetes mellitus with hyperglycemia, with long-term current use of insulin (H)    Obesity (BMI 35.0-39.9) with comorbidity (H)    Falls frequently    Moderate opioid use disorder, in early remission (H)    Chronic hepatitis C without hepatic coma (H)    Hyperglycemia    Dehydration    Cyclical vomiting    Lactic acidosis    Epigastric pain    Coffee ground emesis    Elevated blood ketone body level    Severe episode of recurrent major depressive disorder, without psychotic features (H)    PTSD (post-traumatic stress disorder)    Recurrent major depression    Suicidal ideation    Abscess of arm, right    Intractable nausea and vomiting    Tachycardia, unspecified    Generalized pain    Anxiety    Right lower quadrant abdominal pain

## 2025-03-27 NOTE — PLAN OF CARE
Team Note Due:  Wednesday    Assessment/Intervention/Current Symtoms and Care Coordination:  Chart review and met with team, discussed pt progress, symptomology, and response to treatment.  Discussed the discharge plan and any potential impediments to discharge.      Team Meeting  BS are  still high.  Team resigns iit self that the pt will not go to treatmetn but she also will not agrees to discharge.      Discharge Plan or Goal:  Home with IOP     Barriers to Discharge:  Pt is still symptomatic     Referral Status:  CYNTHIA assessment completed  Pt was given a list of IOP program but now she wants residential level of care.      Referrals/ Alternatives:  [facility]:   Phone: [phone]  Fax: [phone]  Email: [email or web address]  [email or web address]    [facility] Intensive Outpatient Program  [address] [place] [address]  *Services Available:  Walk-in or telehealth comprehensive assessments (formerly known as Rule 25s) available for both adults and adolescents, either in person or remotely  Mental health services  Medicated assisted treatment (MAT)  [facility]BR[facility] Intensive outpatient (co-occurring) group  Peer Recovery Support  *Hours of Operation:  Walk-in/Drop-in hours: Mondays, Tuesdays, and Fridays 10 am - 4 pm  Scheduled appointment hours: Monday - Friday, 9 am - 5 pm  Adult Intensive Outpatient (co-occurring) group hours: Monday, Wednesday, & Thursday, 1 pm - 4 pm  Contact Information:  : [phone] for information & scheduling  TRACIE Nguyen, Recovery : [phone] for questions    [facility]: (in-person & virtual)  Locations: [facility], [place], [facility], and [facility].  Phone: [phone]  Fax:  [phone]  Email: [email or web address]  email: [email or web address]  [email or web address]  *Virtual IOP:  Day  program: Monday through Thursday from 9 a - noon.   Evening program: Monday through Thursday from 5 p - 8 p.  *[facility] Location:  [address], [address]  [place] [address]  Phone:   Fax: [phone]  Group is Monday-Thursday  - Co-Ed IOP is from 12:30 - 3:30 p  - Virtual IOP is from 9 a - noon  - Women's IOP from 9 a - noon    [facility] ([facility])  [facility]  Phone: [phone]  Fax: [phone]  Behavioral Health:  Phone: [phone] *press 1  IOP Referral Fax: [phone]  [address] [place] [address]  [email or web address]     [facility]: (in-person & virtual)  Main phone: [phone]  Direct Dial: [phone]   Admissions email: [email or web address]   CYNTHIA fax: [phone]  [email or web address]  *Groups are held on Mondays, Tuesdays, Wednesdays, or Thursdays, depending on the location and level of care.  *Groups are generally held from 8am - 12pm and 4pm - 7pm.    [facility]  Phone: [phone]  Fax: [phone]  email: [email or web address]  [email or web address]    [facility] ([facility])  [address] [place] [address]  Phone: [phone]  Email:  [email or web address]  [email or web address]://[email or web address]/services/human_services/[facility]/locations.php    [facility]: IOP- virtual  [address]  [place] [address]  Phone: [phone]  Fax: [phone]  email: [email or web address].  [email or web address].org  Monday - Thursday 5:30 pm - 8:30 pm, Friday 11:00 am - 1:00 pm    Reny Schumahcer MA LADC /   CYNTHIA Evaluation Counselor  Triage and Transition: Consult & Liasion   [facility]    [address]  [email or web address]   Office: [phone] Fax:  [phone]   Gender Pronouns: she/her         Legal Status:  Voluntary       Contacts (include ENE status):    Ene for daughter:  Samanta granados  [phone]        Upcoming Meetings and Dates/Important Information and next steps:      Decide on what facilty she wants to go to.

## 2025-03-28 LAB
GLUCOSE BLDC GLUCOMTR-MCNC: 147 MG/DL (ref 70–99)
GLUCOSE BLDC GLUCOMTR-MCNC: 149 MG/DL (ref 70–99)
GLUCOSE BLDC GLUCOMTR-MCNC: 170 MG/DL (ref 70–99)
GLUCOSE BLDC GLUCOMTR-MCNC: 196 MG/DL (ref 70–99)
GLUCOSE BLDC GLUCOMTR-MCNC: 84 MG/DL (ref 70–99)

## 2025-03-28 PROCEDURE — 250N000013 HC RX MED GY IP 250 OP 250 PS 637: Performed by: PHYSICIAN ASSISTANT

## 2025-03-28 PROCEDURE — 250N000012 HC RX MED GY IP 250 OP 636 PS 637: Performed by: PHYSICIAN ASSISTANT

## 2025-03-28 PROCEDURE — 250N000013 HC RX MED GY IP 250 OP 250 PS 637: Performed by: PSYCHIATRY & NEUROLOGY

## 2025-03-28 PROCEDURE — 250N000013 HC RX MED GY IP 250 OP 250 PS 637: Performed by: EMERGENCY MEDICINE

## 2025-03-28 PROCEDURE — 124N000002 HC R&B MH UMMC

## 2025-03-28 PROCEDURE — 99232 SBSQ HOSP IP/OBS MODERATE 35: CPT | Performed by: PSYCHIATRY & NEUROLOGY

## 2025-03-28 PROCEDURE — 250N000013 HC RX MED GY IP 250 OP 250 PS 637

## 2025-03-28 RX ADMIN — METHADONE HYDROCHLORIDE 100 MG: 10 CONCENTRATE ORAL at 08:38

## 2025-03-28 RX ADMIN — PRAZOSIN HYDROCHLORIDE 2 MG: 2 CAPSULE ORAL at 21:16

## 2025-03-28 RX ADMIN — INSULIN ASPART 14 UNITS: 100 INJECTION, SOLUTION INTRAVENOUS; SUBCUTANEOUS at 08:33

## 2025-03-28 RX ADMIN — INSULIN ASPART 2 UNITS: 100 INJECTION, SOLUTION INTRAVENOUS; SUBCUTANEOUS at 08:22

## 2025-03-28 RX ADMIN — METFORMIN HYDROCHLORIDE 1000 MG: 500 TABLET, FILM COATED ORAL at 08:13

## 2025-03-28 RX ADMIN — OLANZAPINE 10 MG: 10 TABLET, FILM COATED ORAL at 08:14

## 2025-03-28 RX ADMIN — OLANZAPINE 10 MG: 10 TABLET, FILM COATED ORAL at 21:16

## 2025-03-28 RX ADMIN — HYDROXYZINE HYDROCHLORIDE 50 MG: 25 TABLET ORAL at 17:43

## 2025-03-28 RX ADMIN — INSULIN ASPART 1 UNITS: 100 INJECTION, SOLUTION INTRAVENOUS; SUBCUTANEOUS at 19:18

## 2025-03-28 RX ADMIN — HYDROXYZINE HYDROCHLORIDE 50 MG: 25 TABLET ORAL at 06:26

## 2025-03-28 RX ADMIN — TRAZODONE HYDROCHLORIDE 50 MG: 50 TABLET ORAL at 21:16

## 2025-03-28 RX ADMIN — METFORMIN HYDROCHLORIDE 1000 MG: 500 TABLET, FILM COATED ORAL at 17:43

## 2025-03-28 RX ADMIN — LOSARTAN POTASSIUM 25 MG: 25 TABLET, FILM COATED ORAL at 08:13

## 2025-03-28 RX ADMIN — SERTRALINE HYDROCHLORIDE 150 MG: 100 TABLET ORAL at 08:13

## 2025-03-28 RX ADMIN — INSULIN GLARGINE 45 UNITS: 100 INJECTION, SOLUTION SUBCUTANEOUS at 21:16

## 2025-03-28 RX ADMIN — EMPAGLIFLOZIN 10 MG: 10 TABLET, FILM COATED ORAL at 08:22

## 2025-03-28 ASSESSMENT — ACTIVITIES OF DAILY LIVING (ADL)
ADLS_ACUITY_SCORE: 48
ADLS_ACUITY_SCORE: 48
HYGIENE/GROOMING: INDEPENDENT
ADLS_ACUITY_SCORE: 48
ORAL_HYGIENE: INDEPENDENT
ADLS_ACUITY_SCORE: 48
LAUNDRY: WITH SUPERVISION
ADLS_ACUITY_SCORE: 48
DRESS: INDEPENDENT
ADLS_ACUITY_SCORE: 48

## 2025-03-28 NOTE — CONSULTS
DA Consult Order placed.Clinician reached out to the CTC and confirmed patient is not interested in programmatic care. Closing order.     Fatimah Cervantes, Lead Mental Health & Addiction Clinical Coordinator

## 2025-03-28 NOTE — PLAN OF CARE
NOC Shift Report    Pt in bed at beginning of shift, breathing quiet and unlabored. Pt slept through shift. Pt slept 6.5 hours.     No pt complaints or concerns at this time.     No PRNs given. Will continue to monitor.     BS @ 0200 147 and 0612 was 196

## 2025-03-28 NOTE — PLAN OF CARE
"    Team Note Due:  Wednesday    Assessment/Intervention/Current Symtoms and Care Coordination:  Chart review and met with team, discussed pt progress, symptomology, and response to treatment.  Discussed the discharge plan and any potential impediments to discharge.      Team Meeting  BS are  stabilizing.  MD placed DA consult order.    I met with pt with MD and NP student.  She was in bed and had a towel on her head. MD offered interventions but pt declined.  Pt was informed that she would be discharged on Monday March 31.  After sorting out the various options for outpatient treatment, pt only agreed to do some groups at [facility].  I did ask pt if she was fearful to go home and she said no, I asked her if she had enough food at home and she said that she did.  I asked if she wanted me to set up a cab and she said her daughter would pick her up.    OP  asked if he still should do the Da and I confirmed with the pt that she did not want to come to out OP programs.    I returned after lunch and pt was still laying in the bed with a washcloth on her head.  I asked her about the [facility] psychiatry appointment that was scheduled last time she was discharged. She said she never went. But she would go to a psychiatry appointment. I asked if she was  given the referrals to CYNTHIA tx and she said yes but she did not want to go to any of \"those places.\"  I have asked the CCs to schedule psychiatry at [facility].    Discharge Plan or Goal:  Home with IOP     Barriers to Discharge:  Pt is still symptomatic     Referral Status:             Legal Status:  Voluntary       Contacts (include ENE status):    Ene for daughter:  Samanta Dhillon juliana  [phone]        Discharge on March 31                            "

## 2025-03-28 NOTE — PLAN OF CARE
Goal Outcome Evaluation:    Pt is isolative and withdrawn to her room most of the shift resting in bed. She was observed in the lounge area for meals, medications and snacks. Continues to endorse high anxiety and depression 8/10 this shift. Yun denied any SI/SIB/AVH this shift. Was medication compliant this shift and denied any side effects. Ate 100% of the meals this shift. Reported sleep as adequate. Diabetic management was completed this shift. Staff will continue to monitor and support with current POC.    Plan of Care Reviewed With: patient

## 2025-03-28 NOTE — PROGRESS NOTES
"Care Coordinator Note(s):    Care Request(s):   Psychiatry   Preferences: Time Frame: 3 Weeks, , In Person,   Notes: Pt lives in [facility]. Annie try [facility] first for psychiatry. Is leaving Monday 3/31 mayeb as early as 11. \"[facility]  Human Services Building  [address] [address]  General Information: [phone]  Appointments: [phone]  Nurse Line: [phone]  If you need to get in earlier, call the nurse line - [phone].  The Health Unit Coordinator has faxed these discharge instructions have been faxed to fax: [phone]    Care Outcome(s): CC scheduled psychiatry with patient's preferred clinic. CC faxed requests documents for referral and notified CTC of need for ENE    CC Progress Note(s)/ Documentation:    Provider requests the following documents be sent for referral:       ENE signed by patient authorizing communication between [facility] and [facility] - PLEASE FAX to [phone] PRIOR TO DISCHARGE  AVS with medications and dosage information patient is discharged with, after discharged - PLEASE FAX  to [phone] AFTER DISCHARGE  H&P - Faxed to [phone] by  3/28  MAR - Faxed to [phone] by  3/28  Face sheet -  Faxed to [phone] by CC 3/28      Appointment: Psychiatry   Date/time: Thursday, April 10th, 2025 at 9:30am  In person  Provider:  Mayo Monique  Address: [address] [address] - [address] Floor  Phone: [phone]  Note: Initial appointment is 1.5 hours kaykay      -Cait Shine  Adult Behavioral Health Care Coordinator     "

## 2025-03-28 NOTE — PROGRESS NOTES
[facility] [place]   Psychiatric Progress Note        Interim History:   The patient's care was discussed with the treatment team during the daily team meeting and/or staff's chart notes were reviewed.  Staff report patient 6.5 hours. BS are getting better than previous days; BS @1246 was 84. Pt continues to be isolative and withdrawn to her room, she would come out for meals, medications and snacks. She rates her depression and anxiety at 8/10.She denies any SI/SIB/AVH.      DA Consult on 3/28/25: MH DA Consult Order placed.Clinician reached out to the CTC and confirmed patient is not interested in programmatic care. Closing order.    Nutrition Consult:  Pt is requesting Lactaid TID with meals, message sent to provider.   Patient does not meet two of the established criteria necessary for diagnosing malnutrition  Continue current orders: Regular Diet    Met with patient with JOSE ALFREDO singh and CTC in pt's room. Pt was lying in bed, dressed in hospital scrubs. She stated that she had a bad headache. She was encouraged to request for PRN to help relieve the pain. She endorsed having depression and anxiety. She denies suicidal thoughts. We talked to patient about discharge plans and she was interested in Day treatment. CTC did remind her about a previous Day treatment that she was not able to complete. Pt stated that this time she is motivated to get better and willing to complete the treatment. Pt also said that she is interested in going to the group session that happens at her Methadone clinic. [facility] told her that she will connect with her CM and see if this was a possibility. [facility] also told her that she does have [facility] cubs that can help with transportation. We told patient that she seems to be doing better than she came, she did agree with this, and we recommend she discharges on Monday next week. Pt was silent for awhile and closed her eyes when she had about discharging on Monday. Upon  asking about her fears about discharge, pt was unable to answer. We asked about food insecurity and pt said she has enough food at home. She also said that hers and daughter's relationship is good.  We reminded Yun that prolonged hospital stay would unlikely change her emotional state, reminded that her daughter and granddaughter need her at home. Labs were reviewed. Pt's BS levels are getting better. BS at 12 p was 84. This is the first time since admission that pt's BS has been WNL. We will encourage to continue watching watching what she eats.          Medications:     Current Facility-Administered Medications   Medication Dose Route Frequency Provider Last Rate Last Admin    empagliflozin (JARDIANCE) tablet 10 mg  10 mg Oral Daily Rey Brewer PA-C   10 mg at 03/28/25 0822    insulin aspart (NovoLOG) injection (RAPID ACTING)  1-7 Units Subcutaneous TID AC Abraham Patel PA-C   2 Units at 03/28/25 0822    insulin aspart (NovoLOG) injection (RAPID ACTING)  1-5 Units Subcutaneous At Bedtime Abraham Patel PA-C   1 Units at 03/26/25 2055    insulin aspart (NovoLOG) injection (RAPID ACTING)   Subcutaneous Daily with breakfast Abraham Patel PA-C   14 Units at 03/28/25 0833    insulin aspart (NovoLOG) injection (RAPID ACTING)   Subcutaneous Daily with lunch Abraham Patel PA-C   12 Units at 03/28/25 1259    insulin aspart (NovoLOG) injection (RAPID ACTING)   Subcutaneous Daily with supper Abraham Patel PA-C   17 Units at 03/27/25 1815    insulin glargine (LANTUS PEN) injection 45 Units  45 Units Subcutaneous At Bedtime Rey Brewer PA-C   45 Units at 03/27/25 2017    losartan (COZAAR) tablet 25 mg  25 mg Oral Daily Abraham Patel PA-C   25 mg at 03/28/25 0813    metFORMIN (GLUCOPHAGE) tablet 1,000 mg  1,000 mg Oral BID w/meals Rey Brewer PA-C   1,000 mg at 03/28/25 0813    methadone (DOLOPHINE-INTENSOL) 10 MG/ML (HIGH CONC) solution 100 mg  100 mg Oral Daily Wan  "MD Anderson   100 mg at 03/28/25 0838    prazosin (MINIPRESS) capsule 2 mg  2 mg Oral At Bedtime Aramis Casas MD   2 mg at 03/27/25 2018    sertraline (ZOLOFT) tablet 150 mg  150 mg Oral Daily Aramis Casas MD   150 mg at 03/28/25 0813          Allergies:   No Known Allergies       Labs:     Recent Results (from the past 24 hours)   Glucose by meter    Collection Time: 03/27/25  5:36 PM   Result Value Ref Range    GLUCOSE BY METER POCT 106 (H) 70 - 99 mg/dL   Glucose by meter    Collection Time: 03/27/25  8:14 PM   Result Value Ref Range    GLUCOSE BY METER POCT 148 (H) 70 - 99 mg/dL   Glucose by meter    Collection Time: 03/28/25  2:05 AM   Result Value Ref Range    GLUCOSE BY METER POCT 147 (H) 70 - 99 mg/dL   Glucose by meter    Collection Time: 03/28/25  6:29 AM   Result Value Ref Range    GLUCOSE BY METER POCT 196 (H) 70 - 99 mg/dL   Glucose by meter    Collection Time: 03/28/25 12:46 PM   Result Value Ref Range    GLUCOSE BY METER POCT 84 70 - 99 mg/dL          Psychiatric Examination:     /82 (BP Location: Left arm)   Pulse 99   Temp 98.8  F (37.1  C) (Oral)   Resp 16   Ht 1.753 m (5' 9\")   Wt 93 kg (205 lb)   LMP  (LMP Unknown)   SpO2 98%   BMI 30.27 kg/m    Weight is 205 lbs 0 oz  Body mass index is 30.27 kg/m .    Orthostatic Vitals         Most Recent      Standing Orthostatic /77 03/28 1100    Standing Orthostatic Pulse (bpm) 97 03/28 1100          Appearance: awake, alert, dressed in hospital scrubs  Attitude: somewhat cooperative, unmotivated  Eye Contact: Intermittent eye contact  Mood:  anxious, sad, depressed   Affect:  constricted mobility, mood congruent  Speech:  clear, coherent, quiet  Psychomotor Behavior:  no evidence of tardive dyskinesia, dystonia, or tics  Throught Process:  linear  Associations:  no loose associations  Thought Content:  no evidence of psychotic thought and passive suicidal ideation present, denies having suicidal plans, reports that " Suicidal ideation is chronic  Insight:  limited  Judgement: poor  Oriented to:  time, person, and place  Attention Span and Concentration:  fair  Recent and Remote Memory:  fair    Clinical Global Impressions  First:7/4 3/19/25     Most recent:            Precautions:     Behavioral Orders   Procedures    Code 1 - Restrict to Unit    Routine Programming     As clinically indicated    Status 15     Every 15 minutes.    Suicide precautions: Suicide Risk: HIGH     Patients on Suicide Precautions should have a Combination Diet ordered that includes a Diet selection(s) AND a Behavioral Tray selection for Safe Tray - with utensils, or Safe Tray - NO utensils       Order Specific Question:   Suicide Risk     Answer:   HIGH          DIagnoses:     Unspecified depressive disorder (MDD recurrent vs bipolar affective type 2 depressed)  Suicidal ideation,   Post traumatic Stress disorder  Generalized Anxiety Disorder  Panic Disorder with agoraphobia    Opiate use disorder moderate severity, patient is on maintenance assisted therapy with methadone  Cannabis use disorder, severe  Benzodiazepine use disorder         Plan:     Sertraline increased to 100mg on 3/21/25.Sertraline increased to 150 mg on 03/26/25. CD counselor recommended inpatient treatment centers, CTC involved and will start referral process. On 3/27/25 Pt is no longer interested in CD treatment and wants to discharge home with virtual outpatient program. On 3/28/2025 patient said that she didn't want CD program and was not interested in Day Treatment as well. IM consulted on diabetes, blood sugars continue to be high and uncontrolled. Jardiance and metformin were restarted per IM, increased Lantus to 40 units on 3/23/25. Recommend patient limit snacking in evening time and be more carb-conscious. Encouraged patient to hydrate more. Encouraged patient to attend group sessions and shower. Will continue to provider support and structure. Patient appears to be  avoiding discharge home despite not having indications for further hospitalization. Anticipated discharge home on Monday with out patient programs, see discussion above.         I was present with PA student who participated in the service and in the documentation of the note. I have verified the history and personally performed the physical exam and medical decision making. I agree with the assessment and plan of care as documented in the note.     Aramis Casas MD  [facility] Psychiatry    Total time spent was 38 minutes. Over 50% of times was spent counseling and coordination of care regarding coping skills, medication and discharge planning.

## 2025-03-28 NOTE — PLAN OF CARE
Problem: Psychotic Signs/Symptoms  Goal: Improved Mood Symptoms (Psychotic Signs/Symptoms)  Outcome: Progressing     Pt was visible in milieu, minimally social with peers. Affect was blunted, flat. Mood was anxious, depressed. Pt ate meals and was compliant with medications, requested prn zyprexa 10 mg for severe anxiety this morning. Pt's BG at breakfast was 196 and 87 at lunch, pt received carb coverage insulin and sliding scale insulin at breakfast but not lunch d/t order parameters not being met. No SI/SIB noted or observed. Will continue to monitor.

## 2025-03-28 NOTE — PLAN OF CARE
BEH IP Unit Acuity Rating Score (UARS)  Patient is given one point for every criteria they meet.    CRITERIA SCORING   On a 72 hour hold, court hold, committed, stay of commitment, or revocation. 0    Patient LOS on BEH unit exceeds 20 days. 0  LOS: 10   Patient under guardianship, 55+, otherwise medically complex, or under age 11. 1   Suicide ideation without relief of precipitating factors. 0   Current plan for suicide. 0   Current plan for homicide. 0   Imminent risk or actual attempt to seriously harm another without relief of factors precipitating the attempt. 0   Severe dysfunction in daily living (ex: complete neglect for self care, extreme disruption in vegetative function, extreme deterioration in social interactions). 0   Recent (last 7 days) or current physical aggression in the ED or on unit. 0   Restraints or seclusion episode in past 72 hours. 0   Recent (last 7 days) or current verbal aggression, agitation, yelling, etc., while in the ED or unit. 0   Active psychosis. 0   Need for constant or near constant redirection (from leaving, from others, etc).  0   Intrusive or disruptive behaviors. 0   Patient requires 3 or more hours of individualized nursing care per 8-hour shift (i.e. for ADLs, meds, therapeutic interventions). 0   TOTAL 1

## 2025-03-29 LAB
GLUCOSE BLDC GLUCOMTR-MCNC: 100 MG/DL (ref 70–99)
GLUCOSE BLDC GLUCOMTR-MCNC: 117 MG/DL (ref 70–99)
GLUCOSE BLDC GLUCOMTR-MCNC: 123 MG/DL (ref 70–99)
GLUCOSE BLDC GLUCOMTR-MCNC: 167 MG/DL (ref 70–99)
GLUCOSE BLDC GLUCOMTR-MCNC: 172 MG/DL (ref 70–99)

## 2025-03-29 PROCEDURE — 250N000013 HC RX MED GY IP 250 OP 250 PS 637: Performed by: PHYSICIAN ASSISTANT

## 2025-03-29 PROCEDURE — 250N000013 HC RX MED GY IP 250 OP 250 PS 637: Performed by: EMERGENCY MEDICINE

## 2025-03-29 PROCEDURE — 250N000013 HC RX MED GY IP 250 OP 250 PS 637

## 2025-03-29 PROCEDURE — 124N000002 HC R&B MH UMMC

## 2025-03-29 PROCEDURE — 250N000013 HC RX MED GY IP 250 OP 250 PS 637: Performed by: PSYCHIATRY & NEUROLOGY

## 2025-03-29 RX ADMIN — HYDROXYZINE HYDROCHLORIDE 50 MG: 25 TABLET ORAL at 17:54

## 2025-03-29 RX ADMIN — OLANZAPINE 10 MG: 10 TABLET, FILM COATED ORAL at 08:03

## 2025-03-29 RX ADMIN — INSULIN ASPART 14 UNITS: 100 INJECTION, SOLUTION INTRAVENOUS; SUBCUTANEOUS at 08:14

## 2025-03-29 RX ADMIN — EMPAGLIFLOZIN 10 MG: 10 TABLET, FILM COATED ORAL at 08:03

## 2025-03-29 RX ADMIN — METHADONE HYDROCHLORIDE 100 MG: 10 CONCENTRATE ORAL at 06:51

## 2025-03-29 RX ADMIN — LOSARTAN POTASSIUM 25 MG: 25 TABLET, FILM COATED ORAL at 08:03

## 2025-03-29 RX ADMIN — OLANZAPINE 10 MG: 10 TABLET, FILM COATED ORAL at 21:30

## 2025-03-29 RX ADMIN — PRAZOSIN HYDROCHLORIDE 2 MG: 2 CAPSULE ORAL at 21:30

## 2025-03-29 RX ADMIN — INSULIN ASPART 1 UNITS: 100 INJECTION, SOLUTION INTRAVENOUS; SUBCUTANEOUS at 08:13

## 2025-03-29 RX ADMIN — HYDROXYZINE HYDROCHLORIDE 50 MG: 25 TABLET ORAL at 12:13

## 2025-03-29 RX ADMIN — TRAZODONE HYDROCHLORIDE 50 MG: 50 TABLET ORAL at 21:30

## 2025-03-29 RX ADMIN — SERTRALINE HYDROCHLORIDE 150 MG: 100 TABLET ORAL at 08:02

## 2025-03-29 RX ADMIN — HYDROXYZINE HYDROCHLORIDE 50 MG: 25 TABLET ORAL at 06:51

## 2025-03-29 RX ADMIN — METFORMIN HYDROCHLORIDE 1000 MG: 500 TABLET, FILM COATED ORAL at 08:03

## 2025-03-29 RX ADMIN — INSULIN ASPART 1 UNITS: 100 INJECTION, SOLUTION INTRAVENOUS; SUBCUTANEOUS at 12:18

## 2025-03-29 RX ADMIN — METFORMIN HYDROCHLORIDE 1000 MG: 500 TABLET, FILM COATED ORAL at 17:53

## 2025-03-29 ASSESSMENT — ACTIVITIES OF DAILY LIVING (ADL)
ADLS_ACUITY_SCORE: 48
LAUNDRY: WITH SUPERVISION
ADLS_ACUITY_SCORE: 48
HYGIENE/GROOMING: INDEPENDENT
ORAL_HYGIENE: INDEPENDENT
DRESS: INDEPENDENT
ADLS_ACUITY_SCORE: 48

## 2025-03-29 NOTE — PROGRESS NOTES
Brief medicine progress note    Medicine following blood glucoses.  Appears greatly improved after reintroduction of Jardiance and metformin.  Will continue to monitor trends and assess if carb coverage for overnight snacks would benefit patient.     Medicine will continue to follow    Rey Brewer PA-C  Hospitalist Service, [facility]  Securely message with [employer]Sheet (more info)  Text page via [facility] Paging/Directory   See signed in provider for up to date coverage information

## 2025-03-29 NOTE — PLAN OF CARE
NOC Shift Report    Pt in bed at beginning of shift, breathing quiet and unlabored. Pt slept through shift. Pt slept 6.25 hours.     No pt complaints or concerns at this time.     No PRNs given. Will continue to monitor.     BS @ 0200 was 123

## 2025-03-29 NOTE — PROGRESS NOTES
Brief Medicine Progress Note    Please see Initial medicine consult note, and subsequent notes for details. Following blood sugars for DM2 management. Patient home regimen Lantus 30 Units at bedtime, Jardiance, and metformin 1000 mg BID.     During admission, home Jardiance and metformin were initially held and blood sugars were managed with lantus which was increased to 45 Untis and Novolog Carb coverage 1:10 CHO TIDAC and snacks. Blood sugars were still elevated in 200s, but since resuming Jardiance and metformin, blood sugars are very well controlled with one reading low normal at 84 yesterday.     Recent Labs   Lab 03/29/25  0649 03/29/25  0211 03/28/25  2114 03/28/25  1742 03/28/25  1246 03/28/25  0629   * 123* 149* 170* 84 196*     Plan:   - Basal:  Decrease Lantus to 40 Units at bedtime.   - Prandial: Continue novolog carb coverage 1:10 CHO TIDAC and snack  - Continue current doses of metformin and Jardiance.   - Correctional: Continue correctional medium sliding scale insulin   - Continue to monitor BG TIDAC, nightly and 0200   - Hypoglycemia protocol   - Please notify medicine for any episode of hypoglycemia < 70, or if BG persistently >350    Medicine will continue to follow on blood sugars.  Please page the on-call CASSANDRA for any intercurrent medical issues which arise.     Sheridan Ulloa PA-C  Hospitalist Service  Contact information available via [facility] Paging/Directory     Goal Outcome Evaluation:  Plan of Care Reviewed With: patient           Outcome Summary: Pt is A7O X4, VSS, no s/sx of distress. Pt on 3L NC 92%, SOB with exertion. Pain and anxiety managed with oral meds, flexeril  prn given. Bed in lowest position, call light within reach. Possible DC home soon. Will continue to monitor.

## 2025-03-29 NOTE — PLAN OF CARE
Goal Outcome Evaluation:    Pt was isolative and withdrawn to her room 75% of the shift resting in bed. She was observed in the lounge area briefly watching TV, for meals, medications and snacks. Continues to endorse high anxiety and depression 8/10 this shift. Yun denied any SI/SIB/AVH this shift. Was medication compliant this shift and denied any side effects. Ate 100% of the meals this shift. Reported sleep as adequate. Diabetic management was completed this shift. Staff will continue to monitor and support with current POC.     Plan of Care Reviewed With: patient

## 2025-03-29 NOTE — PLAN OF CARE
Problem: Psychotic Signs/Symptoms  Goal: Improved Behavioral Control (Psychotic Signs/Symptoms)  Outcome: Progressing   Goal Outcome Evaluation:    Plan of Care Reviewed With: patient          Patient alert and oriented x 4. Patient was pleasant on approach. She denied pain. Patient on BG checks, carb count and insulin.  She endorsed anxiety and depression 7/10. She denied SI/HI/AVH. She was visible in the milieu reading a book. She ate 100% of her meals. She stayed in her room after lunch.    PRN given:  Zyprexa for agitation and hydroxyzine for anxiety - effective per patient.     Temp: 97.9  F (36.6  C) Temp src: Oral BP: 109/76 Pulse: 83     SpO2: 97 % O2 Device: None (Room air)

## 2025-03-30 LAB
GLUCOSE BLDC GLUCOMTR-MCNC: 101 MG/DL (ref 70–99)
GLUCOSE BLDC GLUCOMTR-MCNC: 128 MG/DL (ref 70–99)
GLUCOSE BLDC GLUCOMTR-MCNC: 136 MG/DL (ref 70–99)
GLUCOSE BLDC GLUCOMTR-MCNC: 250 MG/DL (ref 70–99)
GLUCOSE BLDC GLUCOMTR-MCNC: 98 MG/DL (ref 70–99)

## 2025-03-30 PROCEDURE — 250N000013 HC RX MED GY IP 250 OP 250 PS 637

## 2025-03-30 PROCEDURE — 250N000013 HC RX MED GY IP 250 OP 250 PS 637: Performed by: PSYCHIATRY & NEUROLOGY

## 2025-03-30 PROCEDURE — 124N000002 HC R&B MH UMMC

## 2025-03-30 PROCEDURE — 250N000013 HC RX MED GY IP 250 OP 250 PS 637: Performed by: EMERGENCY MEDICINE

## 2025-03-30 PROCEDURE — 250N000013 HC RX MED GY IP 250 OP 250 PS 637: Performed by: PHYSICIAN ASSISTANT

## 2025-03-30 PROCEDURE — 99207 PR NO BILLABLE SERVICE THIS VISIT: CPT | Performed by: PHYSICIAN ASSISTANT

## 2025-03-30 RX ADMIN — HYDROXYZINE HYDROCHLORIDE 50 MG: 25 TABLET ORAL at 12:19

## 2025-03-30 RX ADMIN — EMPAGLIFLOZIN 10 MG: 10 TABLET, FILM COATED ORAL at 07:48

## 2025-03-30 RX ADMIN — METFORMIN HYDROCHLORIDE 1000 MG: 500 TABLET, FILM COATED ORAL at 07:46

## 2025-03-30 RX ADMIN — INSULIN ASPART 3 UNITS: 100 INJECTION, SOLUTION INTRAVENOUS; SUBCUTANEOUS at 09:02

## 2025-03-30 RX ADMIN — PRAZOSIN HYDROCHLORIDE 2 MG: 2 CAPSULE ORAL at 21:35

## 2025-03-30 RX ADMIN — HYDROXYZINE HYDROCHLORIDE 50 MG: 25 TABLET ORAL at 17:35

## 2025-03-30 RX ADMIN — METHADONE HYDROCHLORIDE 100 MG: 10 CONCENTRATE ORAL at 07:49

## 2025-03-30 RX ADMIN — SERTRALINE HYDROCHLORIDE 150 MG: 100 TABLET ORAL at 07:46

## 2025-03-30 RX ADMIN — INSULIN ASPART 19 UNITS: 100 INJECTION, SOLUTION INTRAVENOUS; SUBCUTANEOUS at 09:01

## 2025-03-30 RX ADMIN — LOSARTAN POTASSIUM 25 MG: 25 TABLET, FILM COATED ORAL at 07:46

## 2025-03-30 RX ADMIN — HYDROXYZINE HYDROCHLORIDE 50 MG: 25 TABLET ORAL at 21:35

## 2025-03-30 RX ADMIN — OLANZAPINE 10 MG: 10 TABLET, FILM COATED ORAL at 07:46

## 2025-03-30 RX ADMIN — METFORMIN HYDROCHLORIDE 1000 MG: 500 TABLET, FILM COATED ORAL at 18:01

## 2025-03-30 RX ADMIN — OLANZAPINE 10 MG: 10 TABLET, FILM COATED ORAL at 20:42

## 2025-03-30 RX ADMIN — TRAZODONE HYDROCHLORIDE 50 MG: 50 TABLET ORAL at 21:35

## 2025-03-30 ASSESSMENT — ACTIVITIES OF DAILY LIVING (ADL)
ADLS_ACUITY_SCORE: 48
ORAL_HYGIENE: INDEPENDENT
ADLS_ACUITY_SCORE: 48
HYGIENE/GROOMING: INDEPENDENT
LAUNDRY: WITH SUPERVISION
ADLS_ACUITY_SCORE: 48
DRESS: INDEPENDENT
ADLS_ACUITY_SCORE: 48

## 2025-03-30 NOTE — PLAN OF CARE
Problem: Sleep Disturbance  Goal: Adequate Sleep/Rest  Outcome: Progressing   Goal Outcome Evaluation:     Pt was sleeping at the start of the shift, observed breathing normal with chest rise symmetrical, safety checks completed every 15 minutes, no PRNs requested or given. BS at 0200 am 128. Pt slept for 7 hrs.

## 2025-03-30 NOTE — PLAN OF CARE
Problem: Adult Behavioral Health Plan of Care  Goal: Optimized Coping Skills in Response to Life Stressors  Outcome: Progressing    Major complaint today was high anxiety rated at an 8 throughout the day.Slept after she took prn's.  Pt took Zyprexa, 10 mg at 0746 and Hydroxyzine, 50 mg, prn at 1219. Denied SI/HI or thoughts of SI. Difficult to assess due to how little she was in the lounge. Pleasant and cooperative. No unusual comments or behaviors.

## 2025-03-30 NOTE — PLAN OF CARE
Goal Outcome Evaluation:    Pt was isolative and withdrawn to her room 100% of the shift resting in bed. She was observed in the lounge area briefly watching TV, for meals, medications and snacks. Continues to endorse High anxiety and depression 7/10 this shift. Yun denied any SI/SIB/AVH this shift. Was medication compliant this shift and denied any side effects. Ate 100% of the meals this shift. Appears fairy kempt. Reported sleep as adequate. Diabetic management was completed this shift. Staff will continue to monitor and support with current POC.      PRN: Olanzapine and Trazodone      Plan of Care Reviewed With: patient

## 2025-03-30 NOTE — PLAN OF CARE
Goal Outcome Evaluation:    Pt was isolative and withdrawn to her room 100% of the shift resting in bed. She was observed in the lounge area for meals, medications and snacks. Continues to endorse moderate anxiety and depression 4/10 this shift. Yun denied any SI/SIB/AVH this shift. Was medication compliant this shift and denied any side effects. Ate 100% of the meals this shift. Appears fairy kempt. Reported sleep as adequate. Diabetic management was completed this shift. Staff will continue to monitor and support with current POC.     PRN: Olanzapine and Trazodone    Plan of Care Reviewed With: patient

## 2025-03-30 NOTE — PROGRESS NOTES
Brief Medicine Note    Medicine following for diabetic management.   Good overall control past 48 hours - this AM outlier of 250.   Discussed with nursing - patient continues to snack frequently. Suspect this is a post prandial reading.   Will not make insulin adjustments at this time, but note that HS Lantus was decreased from 45 to 40 units last night, so if AM BG remains high in coming days could consider adjusting again.     Ingris Lu PA-C  Hospitalist Service  Contact information available via [facility] Paging/Directory

## 2025-03-31 VITALS
HEIGHT: 69 IN | DIASTOLIC BLOOD PRESSURE: 87 MMHG | BODY MASS INDEX: 30.36 KG/M2 | TEMPERATURE: 97.7 F | SYSTOLIC BLOOD PRESSURE: 141 MMHG | RESPIRATION RATE: 16 BRPM | HEART RATE: 89 BPM | OXYGEN SATURATION: 100 % | WEIGHT: 205 LBS

## 2025-03-31 LAB
GLUCOSE BLDC GLUCOMTR-MCNC: 137 MG/DL (ref 70–99)
GLUCOSE BLDC GLUCOMTR-MCNC: 159 MG/DL (ref 70–99)
GLUCOSE BLDC GLUCOMTR-MCNC: 75 MG/DL (ref 70–99)
GLUCOSE BLDC GLUCOMTR-MCNC: 96 MG/DL (ref 70–99)

## 2025-03-31 PROCEDURE — 250N000013 HC RX MED GY IP 250 OP 250 PS 637: Performed by: EMERGENCY MEDICINE

## 2025-03-31 PROCEDURE — 99239 HOSP IP/OBS DSCHRG MGMT >30: CPT | Performed by: PSYCHIATRY & NEUROLOGY

## 2025-03-31 PROCEDURE — 250N000013 HC RX MED GY IP 250 OP 250 PS 637: Performed by: PSYCHIATRY & NEUROLOGY

## 2025-03-31 PROCEDURE — 250N000013 HC RX MED GY IP 250 OP 250 PS 637

## 2025-03-31 PROCEDURE — 250N000013 HC RX MED GY IP 250 OP 250 PS 637: Performed by: PHYSICIAN ASSISTANT

## 2025-03-31 PROCEDURE — 97150 GROUP THERAPEUTIC PROCEDURES: CPT | Mod: GO

## 2025-03-31 RX ORDER — LOSARTAN POTASSIUM 25 MG/1
25 TABLET ORAL DAILY
Qty: 30 TABLET | Refills: 0 | Status: SHIPPED | OUTPATIENT
Start: 2025-03-31

## 2025-03-31 RX ORDER — CLONIDINE HYDROCHLORIDE 0.1 MG/1
0.1 TABLET ORAL 2 TIMES DAILY PRN
Qty: 30 TABLET | Refills: 0 | Status: SHIPPED | OUTPATIENT
Start: 2025-03-31

## 2025-03-31 RX ORDER — ONDANSETRON 4 MG/1
4 TABLET, FILM COATED ORAL EVERY 6 HOURS PRN
Qty: 30 TABLET | Refills: 0 | Status: SHIPPED | OUTPATIENT
Start: 2025-03-31

## 2025-03-31 RX ORDER — PRAZOSIN HYDROCHLORIDE 2 MG/1
2 CAPSULE ORAL AT BEDTIME
Qty: 30 CAPSULE | Refills: 0 | Status: SHIPPED | OUTPATIENT
Start: 2025-03-31

## 2025-03-31 RX ORDER — TRAZODONE HYDROCHLORIDE 50 MG/1
50 TABLET ORAL
Qty: 30 TABLET | Refills: 1 | Status: SHIPPED | OUTPATIENT
Start: 2025-03-31

## 2025-03-31 RX ORDER — GABAPENTIN 100 MG/1
100 CAPSULE ORAL EVERY 6 HOURS PRN
Qty: 60 CAPSULE | Refills: 1 | Status: SHIPPED | OUTPATIENT
Start: 2025-03-31

## 2025-03-31 RX ORDER — HYDROXYZINE HYDROCHLORIDE 25 MG/1
25-50 TABLET, FILM COATED ORAL EVERY 4 HOURS PRN
Qty: 90 TABLET | Refills: 0 | Status: SHIPPED | OUTPATIENT
Start: 2025-03-31

## 2025-03-31 RX ORDER — OLANZAPINE 10 MG/1
10 TABLET, FILM COATED ORAL 2 TIMES DAILY PRN
Qty: 45 TABLET | Refills: 1 | Status: SHIPPED | OUTPATIENT
Start: 2025-03-31

## 2025-03-31 RX ORDER — METHADONE HYDROCHLORIDE 10 MG/ML
100 CONCENTRATE ORAL DAILY
Status: SHIPPED
Start: 2025-04-01

## 2025-03-31 RX ADMIN — INSULIN ASPART 1 UNITS: 100 INJECTION, SOLUTION INTRAVENOUS; SUBCUTANEOUS at 12:08

## 2025-03-31 RX ADMIN — EMPAGLIFLOZIN 10 MG: 10 TABLET, FILM COATED ORAL at 07:57

## 2025-03-31 RX ADMIN — SERTRALINE HYDROCHLORIDE 150 MG: 100 TABLET ORAL at 07:56

## 2025-03-31 RX ADMIN — METFORMIN HYDROCHLORIDE 1000 MG: 500 TABLET, FILM COATED ORAL at 17:39

## 2025-03-31 RX ADMIN — HYDROXYZINE HYDROCHLORIDE 50 MG: 25 TABLET ORAL at 12:06

## 2025-03-31 RX ADMIN — OLANZAPINE 10 MG: 10 TABLET, FILM COATED ORAL at 14:14

## 2025-03-31 RX ADMIN — INSULIN ASPART 10 UNITS: 100 INJECTION, SOLUTION INTRAVENOUS; SUBCUTANEOUS at 08:41

## 2025-03-31 RX ADMIN — METHADONE HYDROCHLORIDE 100 MG: 10 CONCENTRATE ORAL at 08:00

## 2025-03-31 RX ADMIN — METFORMIN HYDROCHLORIDE 1000 MG: 500 TABLET, FILM COATED ORAL at 07:56

## 2025-03-31 RX ADMIN — OLANZAPINE 10 MG: 10 TABLET, FILM COATED ORAL at 07:56

## 2025-03-31 RX ADMIN — LOSARTAN POTASSIUM 25 MG: 25 TABLET, FILM COATED ORAL at 07:56

## 2025-03-31 ASSESSMENT — ACTIVITIES OF DAILY LIVING (ADL)
ADLS_ACUITY_SCORE: 48
ORAL_HYGIENE: INDEPENDENT
ADLS_ACUITY_SCORE: 48
DRESS: INDEPENDENT
ADLS_ACUITY_SCORE: 48
HYGIENE/GROOMING: INDEPENDENT
LAUNDRY: WITH SUPERVISION

## 2025-03-31 NOTE — DISCHARGE SUMMARY
Psychiatric Discharge Summary    Yun Coyle MRN# [id number]   Age: 49 year old YOB: 1975     Date of Admission:  3/17/2025  Date of Discharge:  3/31/2025  Admitting Physician:  Aramis Casas MD  Discharge Physician:  Aramis Casas MD (Contact: [phone])         Event Leading to Hospitalization:     History of present illness: per ED note:     Yun Coyle presents to the ED by  self. Patient is presenting to the ED for the following concerns: Depression, Suicidal ideation, Anxiety, Cannabis and Fentanyl use and Health stressors. Factors that make the mental health crisis life threatening or complex are: Pt presents to [facility] ED for an evaluation that appears to be initially for physical concerns including abdominal pain and nauseau that's started 5AM today and woke her from sleep, but pt then later divulged worsening SI prompting DEC assessment. At the time of assessment, pt was agreeable to meeting. She reports for the past few months she has been experiencing worsening symptoms of depression. She reports she has been feeling hopeless and over the past week has had worsening suicidal thoughts. She reports that she started to think of plans and this scared her. She reports she has been thinking about overdosing on fentanyl and has been trying to aquire this. She reports SI is constant and daily over the past week. She is not able to identify any recent stressors she feels may be contributing to her symptoms. She denies any HI, NSSI, AH, VH. She does endorse marijuana use 3-4x per week. She reports that she has not been sleeping or eating very much over the past month due to stomach pain, nauseau and depression. She does not feel she can be safe outside of the hospital at this time as she feels she would act on these thoughts. She reports she is not currently taking any medication other than methadone and does not have any current outpatient providers.          See  Admission note by by admitting physician/nurse-practitioner Aramis Casas MD for additional details.          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         Labs:      Latest Reference Range & Units 03/24/25 07:20   Sodium 135 - 145 mmol/L 135   Potassium 3.4 - 5.3 mmol/L 4.8   Chloride 98 - 107 mmol/L 99   Carbon Dioxide (CO2) 22 - 29 mmol/L 24   Urea Nitrogen 6.0 - 20.0 mg/dL 23.8 (H)   Creatinine 0.51 - 0.95 mg/dL 0.69   GFR Estimate >60 mL/min/1.73m2 >90   Calcium 8.8 - 10.4 mg/dL 9.6   Anion Gap 7 - 15 mmol/L 12   Albumin 3.5 - 5.2 g/dL 3.8   Protein Total 6.4 - 8.3 g/dL 8.2   Alkaline Phosphatase 40 - 150 U/L 176 (H)   ALT 0 - 50 U/L 120 (H)   AST 0 - 45 U/L 92 (H)   Bilirubin Total <=1.2 mg/dL 0.2   Glucose 70 - 99 mg/dL 241 (H)   (H): Data is abnormally high   Latest Reference Range & Units 03/31/25 01:18 03/31/25 08:02 03/31/25 12:06   GLUCOSE BY METER POCT 70 - 99 mg/dL 96 137 (H) 159 (H)   (H): Data is abnormally high         Consults:     3/28/2025     DA Consult Order placed.Clinician reached out to the CTC and confirmed patient is not interested in programmatic care. Closing order.      Fatimah Cervantes, Lead Mental Health & Addiction Clinical Coordinato    3/19/2025  Pt completed her CYNTHIA CA today. She is interested in IOP CYNTHIA treatment at this time. I emailed her unit CTC a list of potential tx programs pt could attend.     Next Steps:  1) pt picks a tx program.  2) pt informs me or the unit sw which tx program she wants to go to.  3) pt signs ENE for tx program.  4) either myself or unit SW sends CYNTHIA CA to tx  program.     DAANES Service Initiation Date ID: [id number]     Recommendations:   2.1 Intensive Outpatient Services  CYNTHIA consult completed by:   Reny Chew [facility]     IM consult:  Assessment & Plan  Yun Coyle is a 49 year old female admitted on 3/17/2025. She has a hx of pancreatitis, GERD, type II DM, asthma, cauda equina syndrome, current admitted to psych unit.      # Type II DM   Home regimen (list as lantus 40 nightly but patient takes 30). Also does low dose correctional and carb coverage. No gap or ketones on admission.   - Lantus 30 at bedtime   - medium dose correctional   - carb coverage 1:10   - glucose checks tid and hs   - hold jaurdiance and metformin for now      # Acute abdomin pain   # Acute on chronic nausea   # Hx of pancreatitis   Patient has had intermittent nausea/vomiting for months which she is fairly certain is 2/2 cyclic vomiting syndrome iso of ongoing marijuana use. The abdominal pain she feels is more acute, though she has had multiple ED visits in the past for abdominal pain. CT of the abdomen in the emergency department did not reveal any pathology. On exam, she has diffuse tenderness without rebound or guarding. She feels generally unwell and diaphoretic.  - CMP, CBC, lactic acid, lipase, troponin  - zofran   - GI cocktail      # Hypertension  Continue PTA losartan 25 mg daily   - clonidine 0.1 mg bid prn for systolic > 180 (patient takes at home)      # Elevated LFT's  # Hepatitis C   LFT's have been chronically elevated. They have slowly trended up. She had a cholecystectomy in the past. CT on 2/17 largely unremarkable.   - CTM     # hx of QTC prolongation  - check EKG      # Hx of OUD  - PTA methadone     # Hx of hyponatremia   - unclear etiology  - monitor      # Hx of hyperkalemia   - unclear etiology  - monitor      # Hx of GERD  - not currently on PPI      # hx of cauda equina 2012 2/2 central disc bulge   Has some radicular pain down the right legs at  "times. Denies saddle anesthesia, lower extremity weakness, or incontinence.      Clinically Significant Risk Factors Present on Admission       # Hyperkalemia: Highest K = 5.4 mmol/L in last 2 days, will monitor as appropriate  # Hyponatremia: Lowest Na = 132 mmol/L in last 2 days, will monitor as appropriate  # Hypochloremia: Lowest Cl = 95 mmol/L in last 2 days, will monitor as appropriate          # Hypertension: Noted on problem list      # DMII: A1C = 9.2 % (Ref range: <5.7 %) within past 6 months    # Overweight: Estimated body mass index is 29.14 kg/m  as calculated from the following:    Height as of this encounter: 1.753 m (5' 9\").    Weight as of this encounter: 89.5 kg (197 lb 4.8 oz).       # Financial/Environmental Concerns: none  # Asthma: noted on problem list       Abraham Patel PA-C  Hospitalist Service          Hospital Course:   Yun Coyle was admitted to [facility]  with attending Aramis Casas MD  as a voluntary patient. The patient was placed under status 15 (15 minute checks) to ensure patient safety.     3/18/2025-3/19/2025: Pt presented to the unit, she looked sad and withdrawn, she reported worsening depression, inability to sleep, endorsed suicidal thoughts with the plan of overdose of fentanyl. She denied auditory or visual hallucinations. Pt stopped taking her psych medications for 3 months. Pt was willing to stay voluntarily until she is stable. We discussed about CD treatment and pt was willing to connect with CD Counsellor. IM consult was placed to follow up with patient's medical needs.  Pt was encouraged to be in the milieu, attend groups, decrease her snacking intake to help control her BS, take medications as prescribed. We discussed with patient and she agreed to start Zoloft 50 mg on 3/18/2025.Lab work was reviewed: EKG showed elevated   QTc - 474, still elevated LFTs, Na -131.   3/20/25-3/24/25: Pt slept 7 hours. Continued to endorse passive " suicidal thoughts with no plan, depression and anxiety.Pt did not attend groups. Pt talked to CD counselor, said that she was more open to CD counselor's recommendations. IM reviewed patient's meds and increased her Lantus insulin dose. For more details see consult. Sertraline increased to 100mg on 3/21/25.    3/26/25: She continued to report high anxiety and depression, requested and received prn meds.Endorsed passive suicidal ideation and told us that she would attempt suicide if we discharge her on this day. Pt was isolative to room much of the shift. Declined going to Recreational Therapy group but briefly came to see what it was about. Pt encouraged to attend groups to increased her chances of being accepted to CD program. BS were continue to run high due to patient's overnight snacking. Encouraged to make more healthy choices and minimize overnight snacking. Sertraline was increased to 150 mg on 3/26/25.  3/27/205: She endorsed passive suicidal thoughts with no plan. She reports that she is no longer interested in going to CD treatment because it will take her 3-4 months and there will be no one to watch her grand daughter. Partial Hospitalization Program or Day treatment and she said that she does not have a ride and there will be no one to watch her grand daughter. She is open to a virtual outpatient program or live one if she gets medical ride.She also said that she is willing to discharge home but not right now. No medication changes were made today.   On 3/28/25-3/31/25: Pt has been denying suicidal ideation for the past 3 days. We talked to patient about discharge plans and she was interested in Day treatment. [facility] did remind her about a previous Day treatment that she was not able to complete. Pt stated that this time she is motivated to get better and willing to complete the treatment. Pt also said that she is interested in going to the group session that happens at her Methadone clinic. [facility] told her  that she will connect with her CM and see if this was a possibility. CTC also told her that she does have [employer] that can help with transportation. We told patient that she seems to be doing better than she came, she did agree with this, and we recommend she discharges on Monday next week. Pt refused Day treatment (See consult above). BS levels are better than when patient came.Pt and provider discussed and reviewed discharge medications. Pt was educated on maintaining a healthy diet and BS checks to help manage her BS levels. IM consult for diabetes management was ordered to help with discharge diabetes medication and education.     Yun Coyle did participate in few groups and was not visible in the milieu and only came out for meals or medications.     The patient's symptoms of suicidal ideation with a plan, depression, anxiety, BS levels  improved.     Yun Coyle was released to home. At the time of discharge Yun Coyle was determined to not be a danger to herself or others.          Discharge Medications:     Current Discharge Medication List        START taking these medications    Details   gabapentin (NEURONTIN) 100 MG capsule Take 1 capsule (100 mg) by mouth every 6 hours as needed (anxiety).  Qty: 60 capsule, Refills: 1    Associated Diagnoses: Anxiety      OLANZapine (ZYPREXA) 10 MG tablet Take 1 tablet (10 mg) by mouth 2 times daily as needed (agitation).  Qty: 45 tablet, Refills: 1    Associated Diagnoses: Anxiety      ondansetron (ZOFRAN) 4 MG tablet Take 1 tablet (4 mg) by mouth every 6 hours as needed for nausea or vomiting.  Qty: 30 tablet, Refills: 0    Associated Diagnoses: Cyclical vomiting      sertraline (ZOLOFT) 50 MG tablet Take 3 tablets (150 mg) by mouth daily.  Qty: 90 tablet, Refills: 1    Associated Diagnoses: Severe episode of recurrent major depressive disorder, without psychotic features (H)      traZODone (DESYREL) 50 MG tablet Take 1 tablet (50 mg) by  mouth nightly as needed for sleep (may repeat after 60 minutes).  Qty: 30 tablet, Refills: 1    Associated Diagnoses: Insomnia, unspecified type           CONTINUE these medications which have CHANGED    Details   cloNIDine (CATAPRES) 0.1 MG tablet Take 1 tablet (0.1 mg) by mouth 2 times daily as needed (opiate withdrawals or BP > 180 systolic).  Qty: 30 tablet, Refills: 0    Associated Diagnoses: Moderate opioid use disorder, in early remission (H); Benign essential HTN      empagliflozin (JARDIANCE) 10 MG TABS tablet Take 1 tablet (10 mg) by mouth daily.  Qty: 30 tablet, Refills: 0    Associated Diagnoses: Type 2 diabetes mellitus without complication, unspecified whether long term insulin use (H)      hydrOXYzine HCl (ATARAX) 25 MG tablet Take 1-2 tablets (25-50 mg) by mouth every 4 hours as needed for anxiety.  Qty: 90 tablet, Refills: 0    Associated Diagnoses: Severe episode of recurrent major depressive disorder, without psychotic features (H)      losartan (COZAAR) 25 MG tablet Take 1 tablet (25 mg) by mouth daily.  Qty: 30 tablet, Refills: 0    Associated Diagnoses: Secondary hypertension      metFORMIN (GLUCOPHAGE) 1000 MG tablet Take 1 tablet (1,000 mg) by mouth 2 times daily (with meals).  Qty: 60 tablet, Refills: 0    Associated Diagnoses: Type 2 diabetes mellitus with hyperglycemia, with long-term current use of insulin (H)      methadone (DOLOPHINE-INTENSOL) 10 MG/ML (HIGH CONC) solution Take 10 mLs (100 mg) by mouth daily.    Associated Diagnoses: Moderate opioid use disorder, in early remission (H)      prazosin (MINIPRESS) 2 MG capsule Take 1 capsule (2 mg) by mouth at bedtime.  Qty: 30 capsule, Refills: 0    Associated Diagnoses: PTSD (post-traumatic stress disorder)           CONTINUE these medications which have NOT CHANGED    Details   Alcohol Swabs PADS Use to swab the area of the injection or marla as directed Per insurance coverage  Qty: 200 each, Refills: 1    Associated Diagnoses: Type 2  diabetes mellitus without complication, unspecified whether long term insulin use (H)      blood glucose (NO BRAND SPECIFIED) lancets standard To use to test glucose level in the blood Use to test blood sugar  3  times daily as directed. To accompany glucose monitor brands per insurance coverage.  Qty: 200 each, Refills: 1    Associated Diagnoses: Type 2 diabetes mellitus without complication, unspecified whether long term insulin use (H)      blood glucose (NO BRAND SPECIFIED) test strip To use to test glucose level in the blood Use to test blood sugar  3  times daily as directed. To accompany glucose monitor brands per insurance coverage.  Qty: 300 strip, Refills: 0    Associated Diagnoses: Type 2 diabetes mellitus without complication, unspecified whether long term insulin use (H)      blood glucose calibration (NO BRAND SPECIFIED) solution Used to calibrate the blood glucose monitor as needed and as directed.  To accompany  blood glucose brands per insurance coverage  Qty: 1 each, Refills: 0    Associated Diagnoses: Type 2 diabetes mellitus without complication, unspecified whether long term insulin use (H)      blood glucose monitoring (NO BRAND SPECIFIED) meter device kit Use as directed Per insurance coverage  Qty: 1 kit, Refills: 0    Associated Diagnoses: Type 2 diabetes mellitus without complication, unspecified whether long term insulin use (H)      glucose 40 % (400 mg/mL) gel 15 g every 15 minutes by mouth as needed for low blood sugar.  Oral gel is preferable for conscious and able to swallow patient.  Qty: 112.5 g, Refills: 0    Comments: Please given enough for 2 months  Associated Diagnoses: Type 2 diabetes mellitus without complication, unspecified whether long term insulin use (H)      !! insulin aspart (NOVOLOG PEN) 100 UNIT/ML pen Inject 1-10 Units subcutaneously 3 times daily (with meals). Do Not give Correction Insulin if Pre-Meal BG less than 140. For Pre-Meal  - 164 give 1 unit. For  Pre-Meal  - 189 give 2 units. For Pre-Meal  - 214 give 3 units. For Pre-Meal  - 239 give 4 units. For Pre-Meal  - 264 give 5 units. For Pre-Meal  - 289 give 6 units. For Pre-Meal  - 314 give 7 units. For Pre-Meal  - 339 give 8 units. For Pre-Meal  - 364 give 9 units.  For Pre-Meal BG greater than or equal to 365 give 10 units  Qty: 15 mL, Refills: 0    Associated Diagnoses: Type 2 diabetes mellitus with hyperglycemia, with long-term current use of insulin (H)      !! insulin aspart (NOVOLOG PEN) 100 UNIT/ML pen Inject 1-10 Units subcutaneously 3 times daily (with meals). Give 1 unit of insulin for each 10g of carbohydrates  Qty: 15 mL, Refills: 0    Associated Diagnoses: Type 2 diabetes mellitus with hyperglycemia, with long-term current use of insulin (H)      !! insulin aspart (NOVOLOG PEN) 100 UNIT/ML pen Inject 1-7 Units subcutaneously at bedtime. Do Not give Bedtime Correction Insulin if BG less than 200. For  - 224 give 1 units. For  - 249 give 2 units. For  - 274 give 3 units. For  - 299 give 4 units. For  - 324 give 5 units. For  - 349 give 6 units. For BG greater than or equal to 350 give 7 units.  Qty: 15 mL, Refills: 0    Associated Diagnoses: Type 2 diabetes mellitus without complication, unspecified whether long term insulin use (H)      insulin glargine (LANTUS PEN) 100 UNIT/ML pen Inject 40 Units subcutaneously at bedtime.  Qty: 15 mL, Refills: 0    Comments: Please give enough for 2 months. If Lantus is not covered by insurance, may substitute Basaglar or Semglee or other insulin glargine product per insurance preference at same dose and frequency.  Associated Diagnoses: Type 2 diabetes mellitus without complication, unspecified whether long term insulin use (H)      insulin pen needle (32G X 4 MM) 32G X 4 MM miscellaneous Use as directed by provider Per insurance coverage  Qty: 200 each, Refills: 1    Associated  Diagnoses: Type 2 diabetes mellitus without complication, unspecified whether long term insulin use (H)      Sharps Container MISC Use as directed to dispose of needles, lancets and other sharps  Qty: 1 each, Refills: 1    Associated Diagnoses: Type 2 diabetes mellitus without complication, unspecified whether long term insulin use (H)       !! - Potential duplicate medications found. Please discuss with provider.        STOP taking these medications       lactulose 20 GM/30ML solution Comments:   Reason for Stopping:         ondansetron (ZOFRAN ODT) 4 MG ODT tab Comments:   Reason for Stopping:         escitalopram (LEXAPRO) 10 MG tablet Comments:   Reason for Stopping:         Glucagon (GVOKE HYPOPEN) 1 MG/0.2ML pen Comments:   Reason for Stopping:         glucose (BD GLUCOSE) 4 g chewable tablet Comments:   Reason for Stopping:         ondansetron (ZOFRAN ODT) 4 MG ODT tab Comments:   Reason for Stopping:         ondansetron (ZOFRAN ODT) 4 MG ODT tab Comments:   Reason for Stopping:                    Psychiatric Examination:   Appearance:  awake, alert and dressed in hospital scrubs  Attitude:  cooperative  Eye Contact:  fair  Mood:   Pt described her mood as ok, endorsed depression and anxiety  Affect:  mood congruent  Speech:  clear, coherent  Psychomotor Behavior:  no evidence of tardive dyskinesia, dystonia, or tics  Thought Process:  linear, goal oriented  Associations:  no loose associations  Thought Content:   Denies suicidal ideation , no evidence of psychotic symptoms;  Insight:  limited  Judgment:  limited  Oriented to:  time, person, and place  Attention Span and Concentration:  fair  Recent and Remote Memory:  fair  Language: Able to name objects, Able to repeat phrases, and Able to read and write  Fund of Knowledge: appropriate  Muscle Strength and Tone: normal  Gait and Station: Normal         Discharge Plan:     Behavioral Discharge Planning and Instructions     Health Care Follow-up:   [facility]  Medicare Replacement   and   [employer] PMAP.  You have a Special Needs Basic Care health insurance plan through [facility] with [facility]. [employer] is [phone], press 1 to find out who your Care Coordinator is and press 2 for [facility] SNBC Provide A Ride for transportation to health care services. Your care coordination is provided by [facility] number is [phone]. The [facility] Line is [phone].     For your diabetes regimen:  - Continue Lantus at 40 units at bedtime  - Continue low dose correctional insulin with meals and at bedtime. If your pre-mealtime blood sugar is 140, do not give yourself insulin. If your pre-bedtime   - Continue carb coverage as you were doing at home  - Continue Metformin and Jardiance  - Continue to check your blood sugars before each meal, at bedtime, and 2am    Please attend your primary care appointment to follow-up on the issues you were seen in the emergncy room for.  Appointment: Primary Care  Date/time: Wednesday April 9th, 2025 @ 2:10 PM In person  Provider:  Dina Daigle APRN CNP  Address: [address] [place] [id number]  Phone: [phone]     Psychiatric Appointment  Appointment: Psychiatry   Date/time: Thursday, April 10th, 2025 at 9:30am  In person  Provider:  Mayo Monique  Address: [address] [address] - [address] Floor  Phone: [phone]  Note: Initial appointment is 1.5 hours long    Return to your methadone program. You also are interested in going to groups there. Yun has been made aware of this and will talk to you about this.  Ynu is expecting to see you at the [facility] program on Tuesday, April 1, 2025.Your dosing window is 5:30AM to 1PM.     Your counselor is Yun @ [phone]   [facility] ([facility]) (formerly [facility])  [address] [place] [address]  Phone: [phone]  The Health Unit Coordinator has faxed these discharge instructions to fax [phone]    [facility] and [facility] both recommend that you enter into a higher level of care.  You received a substance use assessment on March 19, 2025  CYNTHIA consult completed by:   Reny Chew [facility]    CYNTHIA Evaluation Counselor  Email: [email or web address] ; Phone: [phone]  [facility] Mental Health and Addiction Services Evaluation Department  [address] [place] [id number]    Patient Navigation Hub:   [facility] work to be your point-of-contact for trustworthy and compassionate care from Inpatient services to [facility] Programmatic Care. We will provide resources and communication to help guide you into programmatic care. Ultimately, our goal is to be the one-stop-shop of communication, coordination, and support for your journey to programmatic care.    Phone: [phone]    Information will be faxed to your outpatient providers to ensure a healthy continuity of care for you.     Attend all scheduled appointments with your outpatient providers. Call at least 24 hours in advance if you need to reschedule an appointment to ensure continued access to your outpatient providers.     Major Treatments, Procedures and Findings:  You were provided with: a psychiatric assessment, assessed for medical stability, medication evaluation and/or management, group therapy, individual therapy, CD evaluation/assessment, and medical interventions    Symptoms to Report: mood getting worse, thoughts of suicide, or urges to use drugs    Early warning signs can include: increased depression or anxiety sleep disturbances increased thoughts or behaviors of suicide or self-harm     Safety and Wellness:  Take all medicines as directed.  Make no changes unless your doctor suggests them.      Follow treatment recommendations.   Refrain from alcohol and non-prescribed drugs.  If there is a concern for safety, call 911.    Resources:   Mental Health Crisis Resources  Throughout [place]: call **CRISIS (**114321)  Crisis Text Line: is available for free, 24/7 by texting [place] to 910181  Suicide Awareness Voices of Education (SAVE) (www.save.org): 265-901-SSCN (4177)  The National Suicide Prevention Lifeline is now: 988 Suicide and Crisis Lifeline. Call 988 anytime.  National [place] on Mental Illness (www.[place].silvia.org): 934.688.6323 or 146-097-9357.  Jgcx5wgbe: text the word LIFE to 53024 for immediate support and crisis intervention  Mental Health Consumer/Survivor Network of [place] (www.mhcsn.net): [phone] or [phone]  [place] [employer] [place] (www.mentalhealth.org): 646.785.2151 or 381-588-1113  Peer Support Connection [place] [facility] ([facility]) [phone] Available from 5pm - 9am (7 days a week/365 days a year)  Call or Text 988 or [facility] [phone] Community Outreach for Psych Emergencies      General Medication Instructions:   See your medication sheet(s) for instructions.   Take all medicines as directed.  Make no changes unless your doctor suggests them.   Go to all your doctor visits.  Be sure to have all your required lab tests. This way, your medicines can be refilled on time.  Do not use any drugs not prescribed by your doctor.  Avoid alcohol.    Advance Directives:   Scanned document on file with [facility]? No scanned doc  Is document scanned? Pt states no documents  Honoring Choices Your Rights Handout: Informed and given  Was more information offered? Pt declined    The Treatment team has appreciated the opportunity to work with you. If you have any questions or concerns about your recent admission, you can contact the unit which can receive your call 24 hours a day, 7 days a week. They will be able to get in touch with a Provider if needed. The unit number is [phone] .    Attestation:  The patient has  been seen and evaluated by me,  Aramis Casas MD      I was present with PA student who participated in the service and in the documentation of the note. I have verified the history and personally performed the physical exam and medical decision making. I agree with the assessment and plan of care as documented in the note.     Aramis Casas MD  [facility]    Total time spent was 37 minutes. Over 50% of times was spent counseling and coordination of care regarding coping skills, medication and discharge planning.

## 2025-03-31 NOTE — PLAN OF CARE
"  Rehab Group    Start time: 13:15  End time: 14:00  Patient time total: 45 minutes    attended full group    #6 attended   Group Type: occupational therapy   Group Topic Covered: cognitive activities, healthy leisure time, self-esteem, and social skills       Group Session Detail:  Patient engaged in a cognitive-based leisure activity (Bingo) with incorporated discussion prompts. Therapeutic benefits and skills addressed during today's leisure activity include: problem solving, building self-esteem, promoting attention/focus, improving social skills, and exploring healthy leisure opportunities.       Patient Response/Contribution:  cooperative with task, organized, and actively engaged, somewhat distracted at times      Patient Detail:  Patient arrived to this group on time. Upon arrival, patient expressed familiarity with Bingo concepts and did not require a repeat of instructions. Patient demonstrated good attention during the activity and appeared comfortable answering discussion prompts. When prompted to identify a positive trait about herself, patient stated \"I'm a good listener.\" On another occasion, patient shared that one of the greatest compliment she has received was being told that she was meant to work with children. Patient appeared to actively listen to peer sharing most of the time; however, did appear to become somewhat distracted with side conversations with peers or playing with game chips. Affect appeared congruent with the activity. Patient remained calm and cooperative during this group.       [id number] OT Group (2 or more in attendance)      Patient Active Problem List   Diagnosis    Suicidal behavior    Benign essential HTN    Cannabis abuse    Cyclic vomiting syndrome    Elevated LFTs    Hyperlipidemia with target low density lipoprotein (LDL) cholesterol less than 100 mg/dL    Insomnia    Mild intermittent asthma    Panic disorder with agoraphobia    OCD (obsessive compulsive disorder)    " Severe benzodiazepine use disorder (H) in remission    S/P lumbar microdiscectomy    S/P laparoscopic cholecystectomy    Personality disorder (H)    Type 2 diabetes mellitus with hyperglycemia, with long-term current use of insulin (H)    Obesity (BMI 35.0-39.9) with comorbidity (H)    Falls frequently    Moderate opioid use disorder, in early remission (H)    Chronic hepatitis C without hepatic coma (H)    Hyperglycemia    Dehydration    Cyclical vomiting    Lactic acidosis    Epigastric pain    Coffee ground emesis    Elevated blood ketone body level    Severe episode of recurrent major depressive disorder, without psychotic features (H)    PTSD (post-traumatic stress disorder)    Recurrent major depression    Suicidal ideation    Abscess of arm, right    Intractable nausea and vomiting    Tachycardia, unspecified    Generalized pain    Anxiety    Right lower quadrant abdominal pain

## 2025-03-31 NOTE — PLAN OF CARE
"  Rehab Group    Start time: 10:15  End time: 11:45  Patient time total: 55 minutes    attended partial group    #6 attended    Group Type: OT Clinic   Group Topic Covered: balanced lifestyle, coping skills, healthy leisure time, relaxation , and social skills       Group Session Detail:  Pt actively participated in occupational therapy clinic to facilitate coping skill exploration, creative expression within personally meaningful activities, and clinical observation of social, cognitive, and kinesthetic performance skills.        Patient Response/Contribution:  cooperative with task, organized, socially appropriate, safe use of materials/supplies, attentive, and actively engaged     Patient Detail:  Pt response: Patient arrived to this group on time and independently selected to complete a figurine painting project. Upon approach, patient expressed feeling \"alright\" today. Patient was independent to initiate, gather materials, sequence and adjust to workspace demands as needed. Demonstrated good focus, planning, and problem solving for selected painting task. Able to ask for assistance as needed and socialized with a select peer on one occasion. Patient also noted a plan to discharge today and brightened somewhat when discussing this with this writer. After completing the painting project, patient elected to begin a small fuzzy color project following additional encouragement from OT. After completing this task, patient opted to leave group early: however, independently cleaned up task supplies prior to leaving the group room space. Affect appeared somewhat restricted; however, patient remained calm and cooperative during this group.       [id number] OT Group (2 or more in attendance)      Patient Active Problem List   Diagnosis    Suicidal behavior    Benign essential HTN    Cannabis abuse    Cyclic vomiting syndrome    Elevated LFTs    Hyperlipidemia with target low density lipoprotein (LDL) cholesterol less than 100 " mg/dL    Insomnia    Mild intermittent asthma    Panic disorder with agoraphobia    OCD (obsessive compulsive disorder)    Severe benzodiazepine use disorder (H) in remission    S/P lumbar microdiscectomy    S/P laparoscopic cholecystectomy    Personality disorder (H)    Type 2 diabetes mellitus with hyperglycemia, with long-term current use of insulin (H)    Obesity (BMI 35.0-39.9) with comorbidity (H)    Falls frequently    Moderate opioid use disorder, in early remission (H)    Chronic hepatitis C without hepatic coma (H)    Hyperglycemia    Dehydration    Cyclical vomiting    Lactic acidosis    Epigastric pain    Coffee ground emesis    Elevated blood ketone body level    Severe episode of recurrent major depressive disorder, without psychotic features (H)    PTSD (post-traumatic stress disorder)    Recurrent major depression    Suicidal ideation    Abscess of arm, right    Intractable nausea and vomiting    Tachycardia, unspecified    Generalized pain    Anxiety    Right lower quadrant abdominal pain

## 2025-03-31 NOTE — PLAN OF CARE
NOC Shift Report    Pt in bed at beginning of shift, breathing quiet and unlabored. Pt slept through shift. Pt slept 6.5 hours.     No pt complaints or concerns at this time.     No PRNs given. Will continue to monitor.     0200 BS was 96

## 2025-03-31 NOTE — PLAN OF CARE
"  Problem: Psychotic Signs/Symptoms  Goal: Improved Behavioral Control (Psychotic Signs/Symptoms)  Intervention: Manage Behavior  Recent Flowsheet Documentation  Taken 3/31/2025 1032 by Tatiana Power RN  De-Escalation Techniques: medication administered   Goal Outcome Evaluation:    Plan of Care Reviewed With: patient          Pt is discharging today between 1830 - 1900, daughter will pick her up. pt reports feeling anxious about going home, unsure if she's ready to go, requesting Hydroxyzine 50 mg. Discharge medications, security envelope and AVS in med room.      Earlier in the shift pt requested Zyprexa x 2, helpful, visible in the lounge social with selective staff, attended and participated in group working on art projects, pt presents with a flat affect, pleasant on approach, calm and cooperative. Thought process is linear and organized, speech is clear and coherent, hygiene is unkempt but clean. Pt calm and cooperative all shift.    Pt endorses high anxiety and low depression, denies SI/HI/SIB and hallucinations. Medication compliance and contracted for safety.    AM BS - 137  Noon BS - 157  Insulin administered per sliding scale.    Blood pressure (!) 147/102, pulse 89, temperature 97.9  F (36.6  C), temperature source Temporal, resp. rate 16, height 1.753 m (5' 9\"), weight 93 kg (205 lb), SpO2 100%, not currently breastfeeding.       "

## 2025-03-31 NOTE — PLAN OF CARE
Pt to be picked up by daughter at 6:30PM.        Behavioral Discharge Planning and Instructions    Summary: You were admitted on 3/17/2025  due to medical problems, sucidal ideaiton and drug use.  You were treated by Aramis Casas MD and discharged on March 31, 2025 from [facility] Home    ENE: Daughter: Samanta Crocker @ [phone]       Main Diagnosis:   Unspecified depressive disorder (MDD recurrent vs bipolar affective type 2 depressed)  Suicidal ideation,   Post traumatic Stress disorder  Generalized Anxiety Disorder  Panic Disorder with agoraphobia    Opiate use disorder moderate severity, patient is on maintenance assisted therapy with methadone  Cannabis use disorder, severe  Benzodiazepine use disorder           Health Care Follow-up:   [employer] Medicare Replacement   and   [facility] PMAP.  You have a Special Needs Basic Care health insurance plan through [employer] with [facility]. [employer] Customer Services is [phone], press 1 to find out who your Care Coordinator is and press 2 for Medic SNBC Provide A Ride for transportation to health care services. Your care coordination is provided by [facility] Services number is [phone]. The [facility] Line is [phone].       For your diabetes regimen:  - Continue Lantus at 40 units at bedtime  - Continue low dose correctional insulin with meals and at bedtime. If your pre-mealtime blood sugar is 140, do not give yourself insulin. If your pre-bedtime   - Continue carb coverage as you were doing at home  - Continue Metformin and Jardiance  - Continue to check your blood sugars before each meal, at bedtime, and 2am        Please attend your primary care appointment to follow-up on the issues you were seen in the emergncy room for.  Appointment: Primary Care  Date/time: Wednesday April 9th, 2025 @ 2:10 PM In person  Provider:  Dina Daigle APRN CNP  Address: [address]  [facility] [address]  Phone: [phone]           Psychiatric Appointment  Appointment: Psychiatry   Date/time: Thursday, April 10th, 2025 at 9:30am  In person  Provider:  Mayo Monique  Address: [address] [address] - [address]  Phone: [phone]  Note: Initial appointment is 1.5 hours long        Return to your methadone program. You also are interested in going to groups there. Yun has been made aware of this and will talk to you about this.  Yun is expecting to see you at the [facility] program on Tuesday, April 1, 2025.Your dosing window is 5:30AM to 1PM.   Your counselor is Yun @ [phone]   [facility] ([facility]) (formerly [facility])  [address] [address]  Phone: [phone]  The Health Unit Coordinator has faxed these discharge instructions to fax [phone]      [facility] and [facility] both recommend that you enter into a higher level of care.  You received a substance use assessment on March 19, 2025  CYNTHIA consult completed by:   Reny Chew [facility]    CYNTHIA Evaluation Counselor  Email: [email or web address] ; Phone: [phone]  [facility] Mental Health and Addiction Services Evaluation Department  [address] [place] [address]          Patient Navigation Hub:   [facility] s Navigators work to be your point-of-contact for trustworthy and compassionate care from Inpatient services to [facility] Programmatic Care. We will provide resources and communication to help guide you into programmatic care. Ultimately, our goal is to be the one-stop-shop of communication, coordination, and support for your journey to programmatic care.    Phone: [phone]    Information will be faxed to your outpatient providers to ensure a healthy continuity of care for you.     Attend all scheduled appointments with  your outpatient providers. Call at least 24 hours in advance if you need to reschedule an appointment to ensure continued access to your outpatient providers.     Major Treatments, Procedures and Findings:  You were provided with: a psychiatric assessment, assessed for medical stability, medication evaluation and/or management, group therapy, individual therapy, CD evaluation/assessment, and medical interventions    Symptoms to Report: mood getting worse, thoughts of suicide, or urges to use drugs    Early warning signs can include: increased depression or anxiety sleep disturbances increased thoughts or behaviors of suicide or self-harm     Safety and Wellness:  Take all medicines as directed.  Make no changes unless your doctor suggests them.      Follow treatment recommendations.  Refrain from alcohol and non-prescribed drugs.  If there is a concern for safety, call 911.    Resources:   Mental Health Crisis Resources  Throughout [place]: call **CRISIS (**787862)  Crisis Text Line: is available for free, 24/7 by texting [place] to 440692  Suicide Awareness Voices of Education (SAVE) (www.save.org): 810-812-ZUCG (5462)  The National Suicide Prevention Lifeline is now: 988 Suicide and Crisis Lifeline. Call 988 anytime.  National [place] on Mental Illness (www.[place].silvia.org): 725.595.9366 or 629-388-6023.  Wciq0rsoj: text the word LIFE to 61410 for immediate support and crisis intervention  Mental Health Consumer/Survivor Network of [place] (www.mhcsn.net): 458.681.3826 or 335-404-5658  Mental [place] Association [place] (www.mentalhealth.org): 439.601.2386 or 214-578-2095  Peer Support Connection [place] Warmline (PSC) 1-702.672.1189 Available from 5pm - 9am (7 days a week/365 days a year)  Call or Text 988 or [facility] 1-908.284.4501 Community Outreach for Psych Emergencies      General Medication Instructions:   See your medication sheet(s) for instructions.   Take all medicines as directed.  Make no changes unless your  doctor suggests them.   Go to all your doctor visits.  Be sure to have all your required lab tests. This way, your medicines can be refilled on time.  Do not use any drugs not prescribed by your doctor.  Avoid alcohol.    Advance Directives:   Scanned document on file with [facility]? No scanned doc  Is document scanned? Pt states no documents  Honoring Choices Your Rights Handout: Informed and given  Was more information offered? Pt declined    The Treatment team has appreciated the opportunity to work with you. If you have any questions or concerns about your recent admission, you can contact the unit which can receive your call 24 hours a day, 7 days a week. They will be able to get in touch with a Provider if needed. The unit number is [phone] .

## 2025-03-31 NOTE — PLAN OF CARE
BEH IP Unit Acuity Rating Score (UARS)  Patient is given one point for every criteria they meet.    CRITERIA SCORING   On a 72 hour hold, court hold, committed, stay of commitment, or revocation. 0    Patient LOS on BEH unit exceeds 20 days. 0  LOS: 13   Patient under guardianship, 55+, otherwise medically complex, or under age 11. 1   Suicide ideation without relief of precipitating factors. 0   Current plan for suicide. 0   Current plan for homicide. 0   Imminent risk or actual attempt to seriously harm another without relief of factors precipitating the attempt. 0   Severe dysfunction in daily living (ex: complete neglect for self care, extreme disruption in vegetative function, extreme deterioration in social interactions). 0   Recent (last 7 days) or current physical aggression in the ED or on unit. 0   Restraints or seclusion episode in past 72 hours. 0   Recent (last 7 days) or current verbal aggression, agitation, yelling, etc., while in the ED or unit. 0   Active psychosis. 0   Need for constant or near constant redirection (from leaving, from others, etc).  0   Intrusive or disruptive behaviors. 0   Patient requires 3 or more hours of individualized nursing care per 8-hour shift (i.e. for ADLs, meds, therapeutic interventions). 0   TOTAL 1

## 2025-04-01 DIAGNOSIS — Z09 HOSPITAL DISCHARGE FOLLOW-UP: ICD-10-CM

## 2025-04-01 NOTE — PROGRESS NOTES
Patient discharging 3/31/2025 accompanied by daughter Samanta and destination is home.    Discharge paperwork reviewed and medications reviewed with Yun Coyle who verbalizes understanding.     Patient has verbalized understanding of discharge instructions and medication administration.     Patient states that she is ready for discharge. Has spent the early part of the this shift in her room resting. Denies suicidal ideation and self injurious thoughts. Reports having some anxiety and depression. Denies auditory and visual hallucinations. Denies homicidal ideation. Ate dinner. Blood sugar was 75. Affect is flat but brightens a bit on approach. Pleasant and cooperative on the unit.     Copy of AVS received.    Medications from pharmacy received and signed.     Home medications returned.     Security items received and signed for.     Locker items received and signed for.

## 2025-04-02 ENCOUNTER — HOSPITAL ENCOUNTER (EMERGENCY)
Facility: CLINIC | Age: 50
Discharge: HOME | End: 2025-04-02
Attending: EMERGENCY MEDICINE | Admitting: EMERGENCY MEDICINE
Payer: COMMERCIAL

## 2025-04-02 ENCOUNTER — PATIENT OUTREACH (OUTPATIENT)
Dept: CARE COORDINATION | Facility: CLINIC | Age: 50
End: 2025-04-02
Payer: COMMERCIAL

## 2025-04-02 VITALS
HEART RATE: 75 BPM | DIASTOLIC BLOOD PRESSURE: 93 MMHG | TEMPERATURE: 98.2 F | RESPIRATION RATE: 14 BRPM | SYSTOLIC BLOOD PRESSURE: 135 MMHG | OXYGEN SATURATION: 100 %

## 2025-04-02 DIAGNOSIS — R11.15 CYCLICAL VOMITING: ICD-10-CM

## 2025-04-02 PROCEDURE — 99283 EMERGENCY DEPT VISIT LOW MDM: CPT | Performed by: EMERGENCY MEDICINE

## 2025-04-02 PROCEDURE — 99284 EMERGENCY DEPT VISIT MOD MDM: CPT | Performed by: EMERGENCY MEDICINE

## 2025-04-02 ASSESSMENT — ACTIVITIES OF DAILY LIVING (ADL): ADLS_ACUITY_SCORE: 58

## 2025-04-02 NOTE — ED NOTES
Bed: ED03  Expected date: 4/2/25  Expected time: 7:06 AM  Means of arrival:   Comments:  [facility] [id number] vomiting

## 2025-04-02 NOTE — PROGRESS NOTES
Connected Care Resource Center:   [facility] Resource Center Contact  [facility]/Voicemail     Clinical Data: Post-Discharge Outreach     Outreach attempted x 2. Mailbox full, unable to leave messages     Plan:  [facility] will do no further outreaches at this time.       DA Watt  [facility], [facility]    *Connected Care Resource Team does NOT follow patient ongoing. Referrals are identified based on internal discharge reports and the outreach is to ensure patient has an understanding of their discharge instructions.

## 2025-04-02 NOTE — ED PROVIDER NOTES
ED Provider Note  [facility]      History     Chief Complaint   Patient presents with    Nausea & Vomiting     HPI  Yun Coyle is a 49 year old female who presents to us with a chief complaint of nausea or vomiting.  She has a history of cyclical vomiting.  This is a recurrent issue for her.  She called EMS and was transported here and received 2.5 mg of droperidol en route.  Upon arrival here she confirms that her symptoms are similar to previous episodes.  She states she is feeling much better.    Past Medical History  Past Medical History:   Diagnosis Date    Acute pancreatitis 06/29/2017    Anxiety     Cauda equina syndrome (H) 03/29/2013    Cyst of right ovary 11/16/2017    Depressive disorder     Gastroesophageal reflux disease     Hypertension     Hyponatremia 10/01/2023    Low back pain 08/20/2012    Type 2 diabetes mellitus (H)     Uncomplicated asthma      Past Surgical History:   Procedure Laterality Date    BACK SURGERY      x2    CHOLECYSTECTOMY      ORTHOPEDIC SURGERY      SALPINGO OOPHORECTOMY,R/L/ELDA Right      Alcohol Swabs PADS  blood glucose (NO BRAND SPECIFIED) lancets standard  blood glucose (NO BRAND SPECIFIED) test strip  blood glucose calibration (NO BRAND SPECIFIED) solution  blood glucose monitoring (NO BRAND SPECIFIED) meter device kit  cloNIDine (CATAPRES) 0.1 MG tablet  empagliflozin (JARDIANCE) 10 MG TABS tablet  gabapentin (NEURONTIN) 100 MG capsule  glucose 40 % (400 mg/mL) gel  hydrOXYzine HCl (ATARAX) 25 MG tablet  insulin aspart (NOVOLOG PEN) 100 UNIT/ML pen  insulin aspart (NOVOLOG PEN) 100 UNIT/ML pen  insulin aspart (NOVOLOG PEN) 100 UNIT/ML pen  insulin glargine (LANTUS PEN) 100 UNIT/ML pen  insulin pen needle (32G X 4 MM) 32G X 4 MM miscellaneous  losartan (COZAAR) 25 MG tablet  metFORMIN (GLUCOPHAGE) 1000 MG tablet  methadone (DOLOPHINE-INTENSOL) 10 MG/ML (HIGH CONC) solution  OLANZapine (ZYPREXA) 10 MG tablet  ondansetron (ZOFRAN) 4 MG  "tablet  prazosin (MINIPRESS) 2 MG capsule  sertraline (ZOLOFT) 50 MG tablet  [employer] MISC  traZODone (DESYREL) 50 MG tablet      No Known Allergies  Family History  Family History   Problem Relation Age of Onset    Breast Cancer Mother         in her 50s.    Diabetes Mother     Breast Cancer Maternal Grandmother         in her 40s.    Diabetes Maternal Grandmother      Social History   Social History     Tobacco Use    Smoking status: Never    Smokeless tobacco: Never   Vaping Use    Vaping status: Never Used   Substance Use Topics    Alcohol use: No    Drug use: Not Currently     Types: Marijuana     Comment: pt was taking Oxycontin \"years ago.\" pt is now on Methadone.      A medically appropriate review of systems was performed with pertinent positives and negatives noted in the HPI, and all other systems negative.    Physical Exam   BP: (!) 135/93  Pulse: 75  Temp: 98.2  F (36.8  C)  Resp: 14  SpO2: 100 %  Physical Exam  Constitutional:       General: She is not in acute distress.     Appearance: She is not diaphoretic.   HENT:      Head: Normocephalic and atraumatic.      Right Ear: External ear normal.      Left Ear: External ear normal.      Nose: Nose normal.   Eyes:      Extraocular Movements: Extraocular movements intact.      Conjunctiva/sclera: Conjunctivae normal.   Cardiovascular:      Rate and Rhythm: Normal rate and regular rhythm.      Heart sounds: Normal heart sounds.   Pulmonary:      Effort: Pulmonary effort is normal. No respiratory distress.      Breath sounds: Normal breath sounds.   Abdominal:      General: There is no distension.      Palpations: Abdomen is soft.      Tenderness: There is no abdominal tenderness.   Musculoskeletal:         General: No swelling or deformity.      Cervical back: Normal range of motion and neck supple.   Skin:     General: Skin is warm and dry.   Neurological:      Mental Status: Mental status is at baseline.      Comments: Alert, oriented "   Psychiatric:         Mood and Affect: Mood normal.         Behavior: Behavior normal.           ED Course, Procedures, & Data      Procedures       2023 Emergency Medicine Coding Guide from [employer]  on 4/2/2025  ** All calculations should be rechecked by clinician prior to use **    RESULT SUMMARY:  4 Estimated Level of Service  Problems: Moderate (4)    Risk: Moderate (4)    Data: Limited (3)    NARRATIVE MDM:  This patient's problem complexity is Moderate as patient: with chronic illness(es) with exacerbation/progression/side effects of treatment.   This patient's risk is Moderate due to: overall presentation requiring evaluation for a potentially Moderate-risk process.  This patient's data complexity is Limited due to:   -external notes reviewed        INPUTS:  Number and Complexity --> 3 = 4: chronic illness with exacerbation (c)  Risk level --> 3 = Moderate  Tests ordered --> 0 = 0  Tests results reviewed (excluding labs) --> 0 = 0  Prior external notes reviewed --> 2 = 2  Assessment requiring and independent historian --> 0 = No  Independent interpretation of tests --> 0 = No  Discussed management/test interpretation w/external professional --> 0 = No       Results for orders placed or performed during the hospital encounter of 04/02/25   [place] Draw *Canceled*     Status: None ()    Narrative    The following orders were created for panel order [place] Draw.  Procedure                               Abnormality         Status                     ---------                               -----------         ------                       Please view results for these tests on the individual orders.     Medications - No data to display  Labs Ordered and Resulted from Time of ED Arrival to Time of ED Departure - No data to display  No orders to display            Assessment & Plan    49-year-old female presents to us with a chief complaint of nausea and vomiting.  She reports this is a chronic issue for her.   She did receive IV droperidol via EMS.  Vital signs today unremarkable.  Previous records were reviewed.  She is feeling better and her vital signs are stable and this is a chronic issue for her we will discharge her home.  She is comfortable with this plan    I have reviewed the nursing notes. I have reviewed the findings, diagnosis, plan and need for follow up with the patient.    New Prescriptions    No medications on file       Final diagnoses:   Cyclical vomiting       Alcides Garza  [facility] EMERGENCY DEPARTMENT  4/2/2025     Alcides Garza DO  04/02/25 0759

## 2025-04-04 ENCOUNTER — TELEPHONE (OUTPATIENT)
Dept: FAMILY MEDICINE | Facility: CLINIC | Age: 50
End: 2025-04-04

## 2025-04-04 NOTE — TELEPHONE ENCOUNTER
MTM referral from: Transitions of Care (recent hospital discharge, TCU discharge, or ED visit)    MTM referral outreach attempt #2 on April 4, 2025 at 1:13 PM      Outcome: Patient not reachable after several attempts, routed to Pharmacist Team/Provider as an FYI    Use [facility] med adv  for the carrier/Plan on the flowsheet      MTM Practitioner please send patient letter    HODA Walton   [phone]

## 2025-04-27 ENCOUNTER — HOSPITAL ENCOUNTER (EMERGENCY)
Facility: CLINIC | Age: 50
Discharge: HOME | End: 2025-04-27
Attending: EMERGENCY MEDICINE | Admitting: EMERGENCY MEDICINE
Payer: COMMERCIAL

## 2025-04-27 VITALS
RESPIRATION RATE: 17 BRPM | SYSTOLIC BLOOD PRESSURE: 133 MMHG | OXYGEN SATURATION: 96 % | HEIGHT: 69 IN | DIASTOLIC BLOOD PRESSURE: 92 MMHG | HEART RATE: 72 BPM | WEIGHT: 210 LBS | BODY MASS INDEX: 31.1 KG/M2 | TEMPERATURE: 97.8 F

## 2025-04-27 DIAGNOSIS — R74.8 ELEVATED LIVER ENZYMES: ICD-10-CM

## 2025-04-27 DIAGNOSIS — R11.2 NAUSEA AND VOMITING, UNSPECIFIED VOMITING TYPE: ICD-10-CM

## 2025-04-27 DIAGNOSIS — R11.15 CYCLICAL VOMITING: ICD-10-CM

## 2025-04-27 LAB
ALBUMIN SERPL BCG-MCNC: 4.1 G/DL (ref 3.5–5.2)
ALP SERPL-CCNC: 227 U/L (ref 40–150)
ALT SERPL W P-5'-P-CCNC: 132 U/L (ref 0–50)
ANION GAP SERPL CALCULATED.3IONS-SCNC: 13 MMOL/L (ref 7–15)
AST SERPL W P-5'-P-CCNC: 92 U/L (ref 0–45)
BASOPHILS # BLD AUTO: 0 10E3/UL (ref 0–0.2)
BASOPHILS NFR BLD AUTO: 0 %
BILIRUB SERPL-MCNC: 0.2 MG/DL
BUN SERPL-MCNC: 14.4 MG/DL (ref 6–20)
CALCIUM SERPL-MCNC: 9.6 MG/DL (ref 8.8–10.4)
CHLORIDE SERPL-SCNC: 98 MMOL/L (ref 98–107)
CREAT SERPL-MCNC: 0.76 MG/DL (ref 0.51–0.95)
EGFRCR SERPLBLD CKD-EPI 2021: >90 ML/MIN/1.73M2
EOSINOPHIL # BLD AUTO: 0.6 10E3/UL (ref 0–0.7)
EOSINOPHIL NFR BLD AUTO: 8 %
ERYTHROCYTE [DISTWIDTH] IN BLOOD BY AUTOMATED COUNT: 15.4 % (ref 10–15)
GLUCOSE SERPL-MCNC: 247 MG/DL (ref 70–99)
HCO3 SERPL-SCNC: 25 MMOL/L (ref 22–29)
HCT VFR BLD AUTO: 39.8 % (ref 35–47)
HGB BLD-MCNC: 13.2 G/DL (ref 11.7–15.7)
IMM GRANULOCYTES # BLD: 0 10E3/UL
IMM GRANULOCYTES NFR BLD: 0 %
LIPASE SERPL-CCNC: 33 U/L (ref 13–60)
LYMPHOCYTES # BLD AUTO: 3.3 10E3/UL (ref 0.8–5.3)
LYMPHOCYTES NFR BLD AUTO: 39 %
MAGNESIUM SERPL-MCNC: 1.9 MG/DL (ref 1.7–2.3)
MCH RBC QN AUTO: 26.8 PG (ref 26.5–33)
MCHC RBC AUTO-ENTMCNC: 33.2 G/DL (ref 31.5–36.5)
MCV RBC AUTO: 81 FL (ref 78–100)
MONOCYTES # BLD AUTO: 0.4 10E3/UL (ref 0–1.3)
MONOCYTES NFR BLD AUTO: 4 %
NEUTROPHILS # BLD AUTO: 4.2 10E3/UL (ref 1.6–8.3)
NEUTROPHILS NFR BLD AUTO: 49 %
NRBC # BLD AUTO: 0 10E3/UL
NRBC BLD AUTO-RTO: 0 /100
PLATELET # BLD AUTO: 262 10E3/UL (ref 150–450)
POTASSIUM SERPL-SCNC: 4.2 MMOL/L (ref 3.4–5.3)
PROT SERPL-MCNC: 8.7 G/DL (ref 6.4–8.3)
RBC # BLD AUTO: 4.92 10E6/UL (ref 3.8–5.2)
SODIUM SERPL-SCNC: 136 MMOL/L (ref 135–145)
TSH SERPL DL<=0.005 MIU/L-ACNC: 1.08 UIU/ML (ref 0.3–4.2)
WBC # BLD AUTO: 8.6 10E3/UL (ref 4–11)

## 2025-04-27 PROCEDURE — 84443 ASSAY THYROID STIM HORMONE: CPT | Performed by: EMERGENCY MEDICINE

## 2025-04-27 PROCEDURE — 96374 THER/PROPH/DIAG INJ IV PUSH: CPT | Performed by: EMERGENCY MEDICINE

## 2025-04-27 PROCEDURE — 83690 ASSAY OF LIPASE: CPT | Performed by: EMERGENCY MEDICINE

## 2025-04-27 PROCEDURE — 83735 ASSAY OF MAGNESIUM: CPT | Performed by: EMERGENCY MEDICINE

## 2025-04-27 PROCEDURE — 96361 HYDRATE IV INFUSION ADD-ON: CPT | Performed by: EMERGENCY MEDICINE

## 2025-04-27 PROCEDURE — 80053 COMPREHEN METABOLIC PANEL: CPT | Performed by: EMERGENCY MEDICINE

## 2025-04-27 PROCEDURE — 250N000011 HC RX IP 250 OP 636: Performed by: EMERGENCY MEDICINE

## 2025-04-27 PROCEDURE — 99284 EMERGENCY DEPT VISIT MOD MDM: CPT | Mod: 25 | Performed by: EMERGENCY MEDICINE

## 2025-04-27 PROCEDURE — 85025 COMPLETE CBC W/AUTO DIFF WBC: CPT | Performed by: EMERGENCY MEDICINE

## 2025-04-27 PROCEDURE — 258N000003 HC RX IP 258 OP 636: Performed by: EMERGENCY MEDICINE

## 2025-04-27 PROCEDURE — 96375 TX/PRO/DX INJ NEW DRUG ADDON: CPT | Performed by: EMERGENCY MEDICINE

## 2025-04-27 PROCEDURE — 93005 ELECTROCARDIOGRAM TRACING: CPT | Performed by: EMERGENCY MEDICINE

## 2025-04-27 PROCEDURE — 36415 COLL VENOUS BLD VENIPUNCTURE: CPT | Performed by: EMERGENCY MEDICINE

## 2025-04-27 PROCEDURE — 99284 EMERGENCY DEPT VISIT MOD MDM: CPT | Mod: GC | Performed by: EMERGENCY MEDICINE

## 2025-04-27 RX ORDER — DROPERIDOL 2.5 MG/ML
0.62 INJECTION, SOLUTION INTRAMUSCULAR; INTRAVENOUS ONCE
Status: COMPLETED | OUTPATIENT
Start: 2025-04-27 | End: 2025-04-27

## 2025-04-27 RX ORDER — OLANZAPINE 10 MG/1
2.5 INJECTION, POWDER, LYOPHILIZED, FOR SOLUTION INTRAMUSCULAR ONCE
Status: COMPLETED | OUTPATIENT
Start: 2025-04-27 | End: 2025-04-27

## 2025-04-27 RX ADMIN — DROPERIDOL 0.62 MG: 2.5 INJECTION, SOLUTION INTRAMUSCULAR; INTRAVENOUS at 18:40

## 2025-04-27 RX ADMIN — OLANZAPINE 2.5 MG: 10 INJECTION, POWDER, FOR SOLUTION INTRAMUSCULAR at 18:40

## 2025-04-27 RX ADMIN — SODIUM CHLORIDE, SODIUM LACTATE, POTASSIUM CHLORIDE, AND CALCIUM CHLORIDE 1000 ML: .6; .31; .03; .02 INJECTION, SOLUTION INTRAVENOUS at 18:40

## 2025-04-27 RX ADMIN — PANTOPRAZOLE SODIUM 40 MG: 40 INJECTION, POWDER, LYOPHILIZED, FOR SOLUTION INTRAVENOUS at 19:13

## 2025-04-27 RX ADMIN — FAMOTIDINE 20 MG: 10 INJECTION, SOLUTION INTRAVENOUS at 19:13

## 2025-04-27 ASSESSMENT — ACTIVITIES OF DAILY LIVING (ADL)
ADLS_ACUITY_SCORE: 58

## 2025-04-27 NOTE — ED TRIAGE NOTES
Pt reports Nausea started two days ago and abdominal pain started today in the upper left quadrant. Pt reports unable to keep anything down

## 2025-04-27 NOTE — ED PROVIDER NOTES
"ED Provider Note  [facility] EMERGENCY DEPARTMENT ([facility])    4/27/25       ED PROVIDER NOTE     History     Chief Complaint   Patient presents with    Abdominal Pain    Nausea & Vomiting     HPI  Yun Coyle is a 49 year old female well-known to our emergency department with cyclic vomiting syndrome, prior distant cholecystectomy presents with acute nausea and vomiting. She takes daily methadone and was able to take it this morning. She came via walk-in and did not get any medications by EMS. She reports that droperidol and Zyprexa worked the best for her. Reports vague abdominal pain upon initial assessment.         Physical Exam   BP: (!) 134/91  Pulse: 89  Temp: 98.4  F (36.9  C)  Resp: 18  Height: 175.3 cm (5' 9\")  Weight: 95.3 kg (210 lb)  SpO2: 96 %  Physical Exam  Alert, oriented, breathing comfortably   Not vomiting, will reassess after initial medications     ED Course, Procedures, & Data      Procedures            EKG Interpretation:      Interpreted by Gilmer Camarillo MD  Time reviewed: 1800  Symptoms at time of EKG: Nausea vomiting  Rhythm: normal sinus   Rate: 80 bpm  Axis: Normal  ST Segments/ T Waves: No acute ischemic changes, QTc 442 ms      Clinical Impression: Normal sinus rhythm without prolonged QTc or ischemia                 Results for orders placed or performed during the hospital encounter of 04/27/25   Comprehensive metabolic panel     Status: Abnormal   Result Value Ref Range    Sodium 136 135 - 145 mmol/L    Potassium 4.2 3.4 - 5.3 mmol/L    Carbon Dioxide (CO2) 25 22 - 29 mmol/L    Anion Gap 13 7 - 15 mmol/L    Urea Nitrogen 14.4 6.0 - 20.0 mg/dL    Creatinine 0.76 0.51 - 0.95 mg/dL    GFR Estimate >90 >60 mL/min/1.73m2    Calcium 9.6 8.8 - 10.4 mg/dL    Chloride 98 98 - 107 mmol/L    Glucose 247 (H) 70 - 99 mg/dL    Alkaline Phosphatase 227 (H) 40 - 150 U/L    AST 92 (H) 0 - 45 U/L     (H) 0 - 50 U/L    Protein Total " 8.7 (H) 6.4 - 8.3 g/dL    Albumin 4.1 3.5 - 5.2 g/dL    Bilirubin Total 0.2 <=1.2 mg/dL   Lipase     Status: Normal   Result Value Ref Range    Lipase 33 13 - 60 U/L   Magnesium     Status: Normal   Result Value Ref Range    Magnesium 1.9 1.7 - 2.3 mg/dL   TSH with free T4 reflex     Status: Normal   Result Value Ref Range    TSH 1.08 0.30 - 4.20 uIU/mL   CBC with platelets and differential     Status: Abnormal   Result Value Ref Range    WBC Count 8.6 4.0 - 11.0 10e3/uL    RBC Count 4.92 3.80 - 5.20 10e6/uL    Hemoglobin 13.2 11.7 - 15.7 g/dL    Hematocrit 39.8 35.0 - 47.0 %    MCV 81 78 - 100 fL    MCH 26.8 26.5 - 33.0 pg    MCHC 33.2 31.5 - 36.5 g/dL    RDW 15.4 (H) 10.0 - 15.0 %    Platelet Count 262 150 - 450 10e3/uL    % Neutrophils 49 %    % Lymphocytes 39 %    % Monocytes 4 %    % Eosinophils 8 %    % Basophils 0 %    % Immature Granulocytes 0 %    NRBCs per 100 WBC 0 <1 /100    Absolute Neutrophils 4.2 1.6 - 8.3 10e3/uL    Absolute Lymphocytes 3.3 0.8 - 5.3 10e3/uL    Absolute Monocytes 0.4 0.0 - 1.3 10e3/uL    Absolute Eosinophils 0.6 0.0 - 0.7 10e3/uL    Absolute Basophils 0.0 0.0 - 0.2 10e3/uL    Absolute Immature Granulocytes 0.0 <=0.4 10e3/uL    Absolute NRBCs 0.0 10e3/uL   CBC with platelets differential     Status: Abnormal    Narrative    The following orders were created for panel order CBC with platelets differential.  Procedure                               Abnormality         Status                     ---------                               -----------         ------                     CBC with platelets and ...[[id number]]  Abnormal            Final result                 Please view results for these tests on the individual orders.     Medications   lactated ringers BOLUS 1,000 mL (1,000 mLs Intravenous $New Bag 4/27/25 3630)   OLANZapine (zyPREXA) IV injection 2.5 mg (2.5 mg Intravenous $Given 4/27/25 1840)   droPERidol (INAPSINE) injection 0.625 mg (0.625 mg Intravenous $Given 4/27/25  1840)   famotidine (PEPCID) injection 20 mg (20 mg Intravenous $Given 4/27/25 1913)   pantoprazole (PROTONIX) IV push injection 40 mg (40 mg Intravenous $Given 4/27/25 1913)     Labs Ordered and Resulted from Time of ED Arrival to Time of ED Departure   COMPREHENSIVE METABOLIC PANEL - Abnormal       Result Value    Sodium 136      Potassium 4.2      Carbon Dioxide (CO2) 25      Anion Gap 13      Urea Nitrogen 14.4      Creatinine 0.76      GFR Estimate >90      Calcium 9.6      Chloride 98      Glucose 247 (*)     Alkaline Phosphatase 227 (*)     AST 92 (*)      (*)     Protein Total 8.7 (*)     Albumin 4.1      Bilirubin Total 0.2     CBC WITH PLATELETS AND DIFFERENTIAL - Abnormal    WBC Count 8.6      RBC Count 4.92      Hemoglobin 13.2      Hematocrit 39.8      MCV 81      MCH 26.8      MCHC 33.2      RDW 15.4 (*)     Platelet Count 262      % Neutrophils 49      % Lymphocytes 39      % Monocytes 4      % Eosinophils 8      % Basophils 0      % Immature Granulocytes 0      NRBCs per 100 WBC 0      Absolute Neutrophils 4.2      Absolute Lymphocytes 3.3      Absolute Monocytes 0.4      Absolute Eosinophils 0.6      Absolute Basophils 0.0      Absolute Immature Granulocytes 0.0      Absolute NRBCs 0.0     LIPASE - Normal    Lipase 33     MAGNESIUM - Normal    Magnesium 1.9     TSH WITH FREE T4 REFLEX - Normal    TSH 1.08     ROUTINE UA WITH MICROSCOPIC REFLEX TO CULTURE     No orders to display          Critical care was not performed.     Medical Decision Making  The patient's presentation was of high complexity (a chronic illness severe exacerbation, progression, or side effect of treatment).    The patient's evaluation involved:  review of external note(s) from 1 sources (prior emergency department note from outside hospital showing similar cyclical vomiting presentation treated by Zyprexa and droperidol)  review of 1 test result(s) ordered prior to this encounter (prior liver function enzymes which were  elevated)  strong consideration of a test (CT abdomen pelvis) that was ultimately deferred  ordering and/or review of 2 test(s) in this encounter (see separate area of note for details)    The patient's management necessitated high risk (a parenteral controlled substance).    Assessment & Plan    Acute on chronic nausea, vomiting in the setting of cyclic vomiting syndrome. I reviewed past several EKGs with QTc below 480 ms. Order IV Zyprexa and IV droperidol with requiring EKG and cardiac monitoring. Rule out electrolyte abnormality, pancreatitis, or organ failure. Labs, IV fluids. Will reassess after initial workup.      8:15 PM -EKG shows normal Qtc  Patient treated with droperidol and Zyprexa  I reassessed patient who was no abdominal tenderness and is sleeping comfortably and feeling well enough to return home.      I have strongly considered CT abdomen pelvis on my initial assessment but now do not believe it is in patient's best interest.      I reviewed prior liver function enzymes which were elevated to the same rate that the current liver function tests are elevated.      No new acute organ failure or electrolyte abnormality.  Will discharge with return precautions and rapid outpatient follow-up    This part of the medical record was transcribed by Pietro Carpenter, Medical Scribe, from a dictation done by Gilmer Camarillo MD.      I have reviewed the nursing notes. I have reviewed the findings, diagnosis, plan and need for follow up with the patient.    New Prescriptions    No medications on file       Final diagnoses:   Cyclical vomiting   Nausea and vomiting, unspecified vomiting type   Elevated liver enzymes   I, Pietro Carpenter, am serving as a trained medical scribe to document services personally performed by Gilmer Camarillo MD, based on the provider's statements to me.     I, Gilmer Camarillo MD, was physically present and have reviewed and verified the accuracy of this note documented by Pietro Carpenter.      Gilmer Camarillo MD  [facility] EMERGENCY DEPARTMENT  4/27/2025     Gilmer Camarillo MD  04/27/25 2017

## 2025-04-28 LAB
ATRIAL RATE - MUSE: 80 BPM
DIASTOLIC BLOOD PRESSURE - MUSE: NORMAL MMHG
INTERPRETATION ECG - MUSE: NORMAL
P AXIS - MUSE: 69 DEGREES
PR INTERVAL - MUSE: 144 MS
QRS DURATION - MUSE: 80 MS
QT - MUSE: 384 MS
QTC - MUSE: 442 MS
R AXIS - MUSE: -28 DEGREES
SYSTOLIC BLOOD PRESSURE - MUSE: NORMAL MMHG
T AXIS - MUSE: 29 DEGREES
VENTRICULAR RATE- MUSE: 80 BPM

## 2025-04-28 NOTE — DISCHARGE INSTRUCTIONS
Please return to emergency department for fever>104F, persistent vomiting, worsening chest pain, shortness of breath   Please call today to schedule a follow-up appointment with your primary medical doctor in 3-5 days for reevaluation, which is important after today's emergency department visit.   Continue taking home medications

## 2025-04-29 ENCOUNTER — PATIENT OUTREACH (OUTPATIENT)
Dept: CARE COORDINATION | Facility: CLINIC | Age: 50
End: 2025-04-29
Payer: COMMERCIAL

## 2025-04-29 NOTE — PROGRESS NOTES
[facility] Care Resource Center Contact  [facility]/Voicemail     Clinical Data: Care Coordination ED-sourced Outreach-     Outreach attempted x 2.  Left message on patient's voicemail, providing [facility]'s 24/7 scheduling and nurse triage phone number [phone] ([phone]) for questions/concerns and/or to schedule an appt with an [facility] provider.      [facility] unable to send Care Coordination introduction letter as patient does not have an active MyChart account. Clinic Care Coordination services remain available via referral if needed.    Plan: [facility] will do no further outreaches at this time.       DAI Borges  [facility] Resource [place], [facility]    *Connected Care Resource Team does NOT follow patient ongoing. Referrals are identified based on internal discharge reports and the outreach is to ensure patient has an understanding of their discharge instructions.

## 2025-05-01 ENCOUNTER — PATIENT OUTREACH (OUTPATIENT)
Dept: CARE COORDINATION | Facility: CLINIC | Age: 50
End: 2025-05-01
Payer: COMMERCIAL

## 2025-05-01 NOTE — PROGRESS NOTES
Clinical Product Navigator reviewed chart.   CPN Initial Information Gathering  Referral Source: ED Follow-Up    Patient identified due to high utilization with recent discharge for care management support with high probability risk of admission.  [facility] internal & external discharge report identified patient having had 18 ED admissions and 3 hospital admissions in the last 6 months. Patient has high risk score for hospital admission or ED visit of 92.6%.     Per chart review, on 4/29/25 Care Management team made recent outreach attempt within last 30 days. Patient does not meet referral criteria at this time.  Will monitor for future needs    DA Kirby  Casual CPN/ Clinical Product Navigation

## 2025-05-07 ENCOUNTER — PATIENT OUTREACH (OUTPATIENT)
Dept: CARE COORDINATION | Facility: CLINIC | Age: 50
End: 2025-05-07
Payer: COMMERCIAL

## 2025-05-13 ENCOUNTER — HOSPITAL ENCOUNTER (EMERGENCY)
Facility: CLINIC | Age: 50
Discharge: HOME | End: 2025-05-14
Attending: EMERGENCY MEDICINE | Admitting: EMERGENCY MEDICINE
Payer: COMMERCIAL

## 2025-05-13 VITALS
BODY MASS INDEX: 30.04 KG/M2 | WEIGHT: 202.8 LBS | HEIGHT: 69 IN | DIASTOLIC BLOOD PRESSURE: 75 MMHG | HEART RATE: 103 BPM | TEMPERATURE: 98.2 F | RESPIRATION RATE: 16 BRPM | OXYGEN SATURATION: 95 % | SYSTOLIC BLOOD PRESSURE: 121 MMHG

## 2025-05-13 DIAGNOSIS — R11.15 CYCLICAL VOMITING: ICD-10-CM

## 2025-05-13 PROCEDURE — 99284 EMERGENCY DEPT VISIT MOD MDM: CPT | Mod: 25 | Performed by: EMERGENCY MEDICINE

## 2025-05-13 PROCEDURE — 36415 COLL VENOUS BLD VENIPUNCTURE: CPT | Performed by: EMERGENCY MEDICINE

## 2025-05-13 PROCEDURE — 99284 EMERGENCY DEPT VISIT MOD MDM: CPT | Performed by: EMERGENCY MEDICINE

## 2025-05-13 PROCEDURE — 82565 ASSAY OF CREATININE: CPT | Performed by: EMERGENCY MEDICINE

## 2025-05-13 PROCEDURE — 83690 ASSAY OF LIPASE: CPT | Performed by: EMERGENCY MEDICINE

## 2025-05-13 PROCEDURE — 85025 COMPLETE CBC W/AUTO DIFF WBC: CPT | Performed by: EMERGENCY MEDICINE

## 2025-05-14 LAB
ALBUMIN SERPL BCG-MCNC: 4 G/DL (ref 3.5–5.2)
ALP SERPL-CCNC: 212 U/L (ref 40–150)
ALT SERPL W P-5'-P-CCNC: 126 U/L (ref 0–50)
ANION GAP SERPL CALCULATED.3IONS-SCNC: 14 MMOL/L (ref 7–15)
AST SERPL W P-5'-P-CCNC: 90 U/L (ref 0–45)
BASOPHILS # BLD AUTO: 0 10E3/UL (ref 0–0.2)
BASOPHILS NFR BLD AUTO: 0 %
BILIRUB SERPL-MCNC: 0.4 MG/DL
BUN SERPL-MCNC: 14.1 MG/DL (ref 6–20)
CALCIUM SERPL-MCNC: 9.1 MG/DL (ref 8.8–10.4)
CHLORIDE SERPL-SCNC: 100 MMOL/L (ref 98–107)
CREAT SERPL-MCNC: 0.78 MG/DL (ref 0.51–0.95)
EGFRCR SERPLBLD CKD-EPI 2021: >90 ML/MIN/1.73M2
EOSINOPHIL # BLD AUTO: 0.5 10E3/UL (ref 0–0.7)
EOSINOPHIL NFR BLD AUTO: 7 %
ERYTHROCYTE [DISTWIDTH] IN BLOOD BY AUTOMATED COUNT: 14.6 % (ref 10–15)
GLUCOSE BLDC GLUCOMTR-MCNC: 259 MG/DL (ref 70–99)
GLUCOSE SERPL-MCNC: 310 MG/DL (ref 70–99)
HCO3 SERPL-SCNC: 20 MMOL/L (ref 22–29)
HCT VFR BLD AUTO: 39.1 % (ref 35–47)
HGB BLD-MCNC: 13.2 G/DL (ref 11.7–15.7)
HOLD SPECIMEN: NORMAL
HOLD SPECIMEN: NORMAL
IMM GRANULOCYTES # BLD: 0 10E3/UL
IMM GRANULOCYTES NFR BLD: 0 %
LIPASE SERPL-CCNC: 24 U/L (ref 13–60)
LYMPHOCYTES # BLD AUTO: 2.4 10E3/UL (ref 0.8–5.3)
LYMPHOCYTES NFR BLD AUTO: 32 %
MCH RBC QN AUTO: 27.7 PG (ref 26.5–33)
MCHC RBC AUTO-ENTMCNC: 33.8 G/DL (ref 31.5–36.5)
MCV RBC AUTO: 82 FL (ref 78–100)
MONOCYTES # BLD AUTO: 0.6 10E3/UL (ref 0–1.3)
MONOCYTES NFR BLD AUTO: 8 %
NEUTROPHILS # BLD AUTO: 3.9 10E3/UL (ref 1.6–8.3)
NEUTROPHILS NFR BLD AUTO: 52 %
NRBC # BLD AUTO: 0 10E3/UL
NRBC BLD AUTO-RTO: 0 /100
PLATELET # BLD AUTO: 226 10E3/UL (ref 150–450)
POTASSIUM SERPL-SCNC: 3.9 MMOL/L (ref 3.4–5.3)
PROT SERPL-MCNC: 8.4 G/DL (ref 6.4–8.3)
RBC # BLD AUTO: 4.77 10E6/UL (ref 3.8–5.2)
SODIUM SERPL-SCNC: 134 MMOL/L (ref 135–145)
WBC # BLD AUTO: 7.5 10E3/UL (ref 4–11)

## 2025-05-14 PROCEDURE — 96374 THER/PROPH/DIAG INJ IV PUSH: CPT | Performed by: EMERGENCY MEDICINE

## 2025-05-14 PROCEDURE — 250N000013 HC RX MED GY IP 250 OP 250 PS 637: Performed by: EMERGENCY MEDICINE

## 2025-05-14 PROCEDURE — 258N000003 HC RX IP 258 OP 636: Performed by: EMERGENCY MEDICINE

## 2025-05-14 PROCEDURE — 82962 GLUCOSE BLOOD TEST: CPT

## 2025-05-14 PROCEDURE — 96361 HYDRATE IV INFUSION ADD-ON: CPT | Performed by: EMERGENCY MEDICINE

## 2025-05-14 PROCEDURE — 250N000011 HC RX IP 250 OP 636: Mod: JZ | Performed by: EMERGENCY MEDICINE

## 2025-05-14 RX ORDER — MAGNESIUM HYDROXIDE/ALUMINUM HYDROXICE/SIMETHICONE 120; 1200; 1200 MG/30ML; MG/30ML; MG/30ML
15 SUSPENSION ORAL ONCE
Status: COMPLETED | OUTPATIENT
Start: 2025-05-14 | End: 2025-05-14

## 2025-05-14 RX ORDER — METOCLOPRAMIDE 10 MG/1
10 TABLET ORAL 4 TIMES DAILY PRN
Qty: 15 TABLET | Refills: 0 | Status: SHIPPED | OUTPATIENT
Start: 2025-05-14

## 2025-05-14 RX ORDER — METOCLOPRAMIDE HYDROCHLORIDE 5 MG/ML
5 INJECTION INTRAMUSCULAR; INTRAVENOUS ONCE
Status: COMPLETED | OUTPATIENT
Start: 2025-05-14 | End: 2025-05-14

## 2025-05-14 RX ADMIN — ALUMINUM HYDROXIDE, MAGNESIUM HYDROXIDE, AND SIMETHICONE 15 ML: 200; 200; 20 SUSPENSION ORAL at 01:19

## 2025-05-14 RX ADMIN — METOCLOPRAMIDE 5 MG: 5 INJECTION, SOLUTION INTRAMUSCULAR; INTRAVENOUS at 00:21

## 2025-05-14 RX ADMIN — SODIUM CHLORIDE 1000 ML: 0.9 INJECTION, SOLUTION INTRAVENOUS at 00:26

## 2025-05-14 ASSESSMENT — ACTIVITIES OF DAILY LIVING (ADL)
ADLS_ACUITY_SCORE: 58
ADLS_ACUITY_SCORE: 58

## 2025-05-14 NOTE — DISCHARGE INSTRUCTIONS
Please make an appointment to follow up with Your Primary Care Provider in 2-3 days if not improving.      Drink plenty of fluids to maintain hydration.  You should drink water and an electrolyte beverage (such as Powerade, Pedialyte, or Gatorade).      Limit food intake to bland, clear liquids (such as chicken or vegetable broth) until your symptoms improve.  You can advance your diet, as tolerated.        Take Zofran or reglan for nausea, as needed.    Seek evaluation in the emergency department if you develop worsening pain, dehydration, blood in your stool or vomit.

## 2025-05-14 NOTE — ED PROVIDER NOTES
[facility] EMERGENCY DEPARTMENT ([facility])    5/13/25      ED PROVIDER NOTE    History     Chief Complaint   Patient presents with    Nausea & Vomiting     Nausea and vomiting past two days. Vomiting 1x in waiting room.  SOB 5-6 hours ago.      HPI  Yun Coyle is a 49 year old female with a history of cyclical vomiting, T2DM, HTN, asthma, and opioid dependence who presents to the ED via EMS with nausea/vomiting.  Patient initially was feeling short of breath but has improved with DuoNeb given by EMS.  She also received 2.5 mg droperidol en route.  Patient reports this feels like a normal flareup of her cyclical vomiting.  Symptoms began 2 days ago.  She reports epigastric/left upper quadrant abdominal pain with this but this is typical of her prior flares.  No worsening in pain.  No recent infectious symptoms such as fever, cough, dysuria.  She reports 7-10 episodes of vomiting today.  She tried taking Zofran at home without significant improvement in symptoms.  She is currently taking methadone and has not missed any doses.  She denies any recent substance use.  No other concerns at this time.    Past Medical History  Past Medical History:   Diagnosis Date    Acute pancreatitis 06/29/2017    Anxiety     Cauda equina syndrome (H) 03/29/2013    Cyst of right ovary 11/16/2017    Depressive disorder     Gastroesophageal reflux disease     Hypertension     Hyponatremia 10/01/2023    Low back pain 08/20/2012    Type 2 diabetes mellitus (H)     Uncomplicated asthma      Past Surgical History:   Procedure Laterality Date    BACK SURGERY      x2    CHOLECYSTECTOMY      ORTHOPEDIC SURGERY      SALPINGO OOPHORECTOMY,R/L/ELDA Right      cloNIDine (CATAPRES) 0.1 MG tablet  empagliflozin (JARDIANCE) 10 MG TABS tablet  gabapentin (NEURONTIN) 100 MG capsule  insulin aspart (NOVOLOG PEN) 100 UNIT/ML pen  insulin aspart (NOVOLOG PEN) 100 UNIT/ML pen  insulin aspart (NOVOLOG PEN) 100 UNIT/ML pen  insulin glargine  "(LANTUS PEN) 100 UNIT/ML pen  losartan (COZAAR) 25 MG tablet  metFORMIN (GLUCOPHAGE) 1000 MG tablet  methadone (DOLOPHINE-INTENSOL) 10 MG/ML (HIGH CONC) solution  metoclopramide (REGLAN) 10 MG tablet  OLANZapine (ZYPREXA) 10 MG tablet  ondansetron (ZOFRAN) 4 MG tablet  prazosin (MINIPRESS) 2 MG capsule  sertraline (ZOLOFT) 50 MG tablet  traZODone (DESYREL) 50 MG tablet  Alcohol Swabs PADS  blood glucose (NO BRAND SPECIFIED) lancets standard  blood glucose (NO BRAND SPECIFIED) test strip  blood glucose calibration (NO BRAND SPECIFIED) solution  blood glucose monitoring (NO BRAND SPECIFIED) meter device kit  glucose 40 % (400 mg/mL) gel  hydrOXYzine HCl (ATARAX) 25 MG tablet  insulin pen needle (32G X 4 MM) 32G X 4 MM miscellaneous  [employer]ar[employer] Container MISC      No Known Allergies  Family History  Family History   Problem Relation Age of Onset    Breast Cancer Mother         in her 50s.    Diabetes Mother     Breast Cancer Maternal Grandmother         in her 40s.    Diabetes Maternal Grandmother      Social History   Social History     Tobacco Use    Smoking status: Never    Smokeless tobacco: Never   Vaping Use    Vaping status: Never Used   Substance Use Topics    Alcohol use: No    Drug use: Not Currently     Types: Marijuana     Comment: pt was taking Oxycontin \"years ago.\" pt is now on Methadone.      Past medical history, past surgical history, medications, allergies, family history, and social history were reviewed with the patient. No additional pertinent items.     A medically appropriate review of systems was performed with pertinent positives and negatives noted in the HPI, and all other systems negative.    Physical Exam   BP: 121/75  Pulse: 103  Temp: 98.2  F (36.8  C)  Resp: 16  Height: 175.3 cm (5' 9\")  Weight: 92 kg (202 lb 12.8 oz)  SpO2: 95 %    Physical Exam  Vitals and nursing note reviewed.   Constitutional:       General: She is not in acute distress.     Appearance: Normal appearance. She is " well-developed. She is not ill-appearing or diaphoretic.   HENT:      Head: Normocephalic and atraumatic.      Nose: Nose normal.      Mouth/Throat:      Mouth: Mucous membranes are moist.   Eyes:      General: No scleral icterus.     Conjunctiva/sclera: Conjunctivae normal.   Cardiovascular:      Rate and Rhythm: Normal rate.   Pulmonary:      Effort: Pulmonary effort is normal. No respiratory distress.      Breath sounds: No stridor.   Abdominal:      General: There is no distension.      Palpations: Abdomen is soft.      Tenderness: There is abdominal tenderness in the left upper quadrant. There is no guarding or rebound.   Musculoskeletal:         General: No deformity or signs of injury. Normal range of motion.      Cervical back: Normal range of motion and neck supple. No rigidity.   Skin:     General: Skin is warm and dry.      Coloration: Skin is not jaundiced or pale.   Neurological:      General: No focal deficit present.      Mental Status: She is alert and oriented to person, place, and time.   Psychiatric:         Mood and Affect: Mood normal.         Behavior: Behavior normal.       ED Course, Procedures, & Data      Procedures                 Results for orders placed or performed during the hospital encounter of 05/13/25   [place] Draw     Status: None (In process)    Narrative    The following orders were created for panel order [place] Draw.  Procedure                               Abnormality         Status                     ---------                               -----------         ------                     Extra Green Top (Lithiu...[[id number]]                      In process                 Extra Purple Top Tube[[id number]]                           In process                   Please view results for these tests on the individual orders.   Comprehensive metabolic panel     Status: Abnormal   Result Value Ref Range    Sodium 134 (L) 135 - 145 mmol/L    Potassium 3.9 3.4 - 5.3 mmol/L     Carbon Dioxide (CO2) 20 (L) 22 - 29 mmol/L    Anion Gap 14 7 - 15 mmol/L    Urea Nitrogen 14.1 6.0 - 20.0 mg/dL    Creatinine 0.78 0.51 - 0.95 mg/dL    GFR Estimate >90 >60 mL/min/1.73m2    Calcium 9.1 8.8 - 10.4 mg/dL    Chloride 100 98 - 107 mmol/L    Glucose 310 (H) 70 - 99 mg/dL    Alkaline Phosphatase 212 (H) 40 - 150 U/L    AST 90 (H) 0 - 45 U/L     (H) 0 - 50 U/L    Protein Total 8.4 (H) 6.4 - 8.3 g/dL    Albumin 4.0 3.5 - 5.2 g/dL    Bilirubin Total 0.4 <=1.2 mg/dL   Lipase     Status: Normal   Result Value Ref Range    Lipase 24 13 - 60 U/L   CBC with platelets and differential     Status: None   Result Value Ref Range    WBC Count 7.5 4.0 - 11.0 10e3/uL    RBC Count 4.77 3.80 - 5.20 10e6/uL    Hemoglobin 13.2 11.7 - 15.7 g/dL    Hematocrit 39.1 35.0 - 47.0 %    MCV 82 78 - 100 fL    MCH 27.7 26.5 - 33.0 pg    MCHC 33.8 31.5 - 36.5 g/dL    RDW 14.6 10.0 - 15.0 %    Platelet Count 226 150 - 450 10e3/uL    % Neutrophils 52 %    % Lymphocytes 32 %    % Monocytes 8 %    % Eosinophils 7 %    % Basophils 0 %    % Immature Granulocytes 0 %    NRBCs per 100 WBC 0 <1 /100    Absolute Neutrophils 3.9 1.6 - 8.3 10e3/uL    Absolute Lymphocytes 2.4 0.8 - 5.3 10e3/uL    Absolute Monocytes 0.6 0.0 - 1.3 10e3/uL    Absolute Eosinophils 0.5 0.0 - 0.7 10e3/uL    Absolute Basophils 0.0 0.0 - 0.2 10e3/uL    Absolute Immature Granulocytes 0.0 <=0.4 10e3/uL    Absolute NRBCs 0.0 10e3/uL   CBC with platelets differential     Status: None    Narrative    The following orders were created for panel order CBC with platelets differential.  Procedure                               Abnormality         Status                     ---------                               -----------         ------                     CBC with platelets and ...[[id number]]                      Final result                 Please view results for these tests on the individual orders.     Medications   sodium chloride 0.9% BOLUS 1,000 mL (1,000 mLs  Intravenous $New Bag 5/14/25 0026)   alum & mag hydroxide-simethicone (MAALOX) suspension 15 mL (has no administration in time range)   metoclopramide (REGLAN) injection 5 mg (5 mg Intravenous $Given 5/14/25 0021)     Labs Ordered and Resulted from Time of ED Arrival to Time of ED Departure   COMPREHENSIVE METABOLIC PANEL - Abnormal       Result Value    Sodium 134 (*)     Potassium 3.9      Carbon Dioxide (CO2) 20 (*)     Anion Gap 14      Urea Nitrogen 14.1      Creatinine 0.78      GFR Estimate >90      Calcium 9.1      Chloride 100      Glucose 310 (*)     Alkaline Phosphatase 212 (*)     AST 90 (*)      (*)     Protein Total 8.4 (*)     Albumin 4.0      Bilirubin Total 0.4     LIPASE - Normal    Lipase 24     CBC WITH PLATELETS AND DIFFERENTIAL    WBC Count 7.5      RBC Count 4.77      Hemoglobin 13.2      Hematocrit 39.1      MCV 82      MCH 27.7      MCHC 33.8      RDW 14.6      Platelet Count 226      % Neutrophils 52      % Lymphocytes 32      % Monocytes 8      % Eosinophils 7      % Basophils 0      % Immature Granulocytes 0      NRBCs per 100 WBC 0      Absolute Neutrophils 3.9      Absolute Lymphocytes 2.4      Absolute Monocytes 0.6      Absolute Eosinophils 0.5      Absolute Basophils 0.0      Absolute Immature Granulocytes 0.0      Absolute NRBCs 0.0       No orders to display              Assessment & Plan    Yun Coyle is a 49 year old female with a history of cyclical vomiting, T2DM, HTN, asthma, and opioid dependence who presents to the ED with nausea/vomiting.    Labs normal/stable. Glucose 310. Chronically runs high. No AG. Given IVF. Nausea resolved with reglan. Has zofran at home so will send with reglan rx. Abd soft.     Glucose 259 after a liter of fluid.  Passed a p.o. challenge and felt ready to be discharged.  Given a prescription for Reglan.  Recommend primary care follow-up if her symptoms do not resolve.  Return precautions provided.    I have reviewed the nursing  notes. I have reviewed the findings, diagnosis, plan and need for follow up with the patient.    Current Discharge Medication List        START taking these medications    Details   metoclopramide (REGLAN) 10 MG tablet Take 1 tablet (10 mg) by mouth 4 times daily as needed for nausea or vomiting.  Qty: 15 tablet, Refills: 0             Final diagnoses:   Cyclical vomiting     IVidhya, am serving as a trained medical scribe to document services personally performed by Laure Guerrero MD based on the provider's statements to me on May 14, 2025.  This document has been checked and approved by the attending provider.    I, Laure Guerrero MD, was physically present and have reviewed and verified the accuracy of this note documented by Vidhya York medical scribe.      Laure Guerrero MD  [facility] located within [facility] EMERGENCY DEPARTMENT  5/13/2025     Laure Guerrero MD  05/14/25 0144

## 2025-05-14 NOTE — ED TRIAGE NOTES
Nausea and vomiting past two days. Vomiting 1x in waiting room.  SOB 5-6 hours ago. O2 88-95% in waiting room.HX cyclical vomiting with unknown origin.    EMS treatments:  - duoneb with improvement  - 2.5mg Droperidol given  - 18 in L forearm.    . HX of diabetes.      Triage Assessment (Adult)       Row Name 05/13/25 5192          Triage Assessment    Airway WDL WDL        Respiratory WDL    Respiratory WDL X;all     Rhythm/Pattern, Respiratory shortness of breath        Skin Circulation/Temperature WDL    Skin Circulation/Temperature WDL WDL        Cardiac WDL    Cardiac WDL WDL        Peripheral/Neurovascular WDL    Peripheral Neurovascular WDL WDL        Cognitive/Neuro/Behavioral WDL    Cognitive/Neuro/Behavioral WDL WDL

## 2025-05-22 ENCOUNTER — APPOINTMENT (OUTPATIENT)
Dept: GENERAL RADIOLOGY | Facility: CLINIC | Age: 50
End: 2025-05-22
Payer: COMMERCIAL

## 2025-05-22 ENCOUNTER — HOSPITAL ENCOUNTER (EMERGENCY)
Facility: CLINIC | Age: 50
Discharge: HOME | End: 2025-05-22
Attending: EMERGENCY MEDICINE
Payer: COMMERCIAL

## 2025-05-22 VITALS
HEIGHT: 69 IN | TEMPERATURE: 97.9 F | RESPIRATION RATE: 18 BRPM | SYSTOLIC BLOOD PRESSURE: 122 MMHG | WEIGHT: 200 LBS | OXYGEN SATURATION: 95 % | BODY MASS INDEX: 29.62 KG/M2 | HEART RATE: 80 BPM | DIASTOLIC BLOOD PRESSURE: 70 MMHG

## 2025-05-22 DIAGNOSIS — E83.42 HYPOMAGNESEMIA: ICD-10-CM

## 2025-05-22 DIAGNOSIS — R11.15 CYCLIC VOMITING SYNDROME: ICD-10-CM

## 2025-05-22 DIAGNOSIS — R06.02 SHORTNESS OF BREATH: ICD-10-CM

## 2025-05-22 LAB
ALBUMIN SERPL BCG-MCNC: 3.7 G/DL (ref 3.5–5.2)
ALBUMIN SERPL BCG-MCNC: 4.3 G/DL (ref 3.5–5.2)
ALP SERPL-CCNC: 162 U/L (ref 40–150)
ALP SERPL-CCNC: 191 U/L (ref 40–150)
ALT SERPL W P-5'-P-CCNC: 86 U/L (ref 0–50)
ALT SERPL W P-5'-P-CCNC: 98 U/L (ref 0–50)
ANION GAP SERPL CALCULATED.3IONS-SCNC: 13 MMOL/L (ref 7–15)
ANION GAP SERPL CALCULATED.3IONS-SCNC: 16 MMOL/L (ref 7–15)
AST SERPL W P-5'-P-CCNC: 77 U/L (ref 0–45)
AST SERPL W P-5'-P-CCNC: 78 U/L (ref 0–45)
AST SERPL W P-5'-P-CCNC: ABNORMAL U/L
ATRIAL RATE - MUSE: 65 BPM
BASOPHILS # BLD AUTO: 0.1 10E3/UL (ref 0–0.2)
BASOPHILS NFR BLD AUTO: 1 %
BILIRUB SERPL-MCNC: 0.4 MG/DL
BILIRUB SERPL-MCNC: 0.5 MG/DL
BUN SERPL-MCNC: 14.3 MG/DL (ref 6–20)
BUN SERPL-MCNC: 14.4 MG/DL (ref 6–20)
CALCIUM SERPL-MCNC: 9.1 MG/DL (ref 8.8–10.4)
CALCIUM SERPL-MCNC: 9.6 MG/DL (ref 8.8–10.4)
CHLORIDE SERPL-SCNC: 95 MMOL/L (ref 98–107)
CHLORIDE SERPL-SCNC: 97 MMOL/L (ref 98–107)
CREAT SERPL-MCNC: 0.83 MG/DL (ref 0.51–0.95)
CREAT SERPL-MCNC: 0.87 MG/DL (ref 0.51–0.95)
DIASTOLIC BLOOD PRESSURE - MUSE: NORMAL MMHG
EGFRCR SERPLBLD CKD-EPI 2021: 82 ML/MIN/1.73M2
EGFRCR SERPLBLD CKD-EPI 2021: 87 ML/MIN/1.73M2
EOSINOPHIL # BLD AUTO: 0.6 10E3/UL (ref 0–0.7)
EOSINOPHIL NFR BLD AUTO: 8 %
ERYTHROCYTE [DISTWIDTH] IN BLOOD BY AUTOMATED COUNT: 14.6 % (ref 10–15)
GLUCOSE SERPL-MCNC: 319 MG/DL (ref 70–99)
GLUCOSE SERPL-MCNC: 320 MG/DL (ref 70–99)
HCO3 SERPL-SCNC: 23 MMOL/L (ref 22–29)
HCO3 SERPL-SCNC: 24 MMOL/L (ref 22–29)
HCT VFR BLD AUTO: 38.6 % (ref 35–47)
HGB BLD-MCNC: 12.7 G/DL (ref 11.7–15.7)
IMM GRANULOCYTES # BLD: 0 10E3/UL
IMM GRANULOCYTES NFR BLD: 0 %
INTERPRETATION ECG - MUSE: NORMAL
LIPASE SERPL-CCNC: 28 U/L (ref 13–60)
LYMPHOCYTES # BLD AUTO: 2.9 10E3/UL (ref 0.8–5.3)
LYMPHOCYTES NFR BLD AUTO: 39 %
MAGNESIUM SERPL-MCNC: 1.6 MG/DL (ref 1.7–2.3)
MCH RBC QN AUTO: 27 PG (ref 26.5–33)
MCHC RBC AUTO-ENTMCNC: 32.9 G/DL (ref 31.5–36.5)
MCV RBC AUTO: 82 FL (ref 78–100)
MONOCYTES # BLD AUTO: 0.4 10E3/UL (ref 0–1.3)
MONOCYTES NFR BLD AUTO: 6 %
NEUTROPHILS # BLD AUTO: 3.3 10E3/UL (ref 1.6–8.3)
NEUTROPHILS NFR BLD AUTO: 46 %
NRBC # BLD AUTO: 0 10E3/UL
NRBC BLD AUTO-RTO: 0 /100
P AXIS - MUSE: 32 DEGREES
PLATELET # BLD AUTO: NORMAL 10*3/UL
POTASSIUM SERPL-SCNC: 4.2 MMOL/L (ref 3.4–5.3)
POTASSIUM SERPL-SCNC: 6 MMOL/L (ref 3.4–5.3)
PR INTERVAL - MUSE: 148 MS
PROT SERPL-MCNC: 7.7 G/DL (ref 6.4–8.3)
PROT SERPL-MCNC: 8.9 G/DL (ref 6.4–8.3)
QRS DURATION - MUSE: 90 MS
QT - MUSE: 418 MS
QTC - MUSE: 434 MS
R AXIS - MUSE: -9 DEGREES
RBC # BLD AUTO: 4.71 10E6/UL (ref 3.8–5.2)
SODIUM SERPL-SCNC: 133 MMOL/L (ref 135–145)
SODIUM SERPL-SCNC: 135 MMOL/L (ref 135–145)
SYSTOLIC BLOOD PRESSURE - MUSE: NORMAL MMHG
T AXIS - MUSE: 16 DEGREES
VENTRICULAR RATE- MUSE: 65 BPM
WBC # BLD AUTO: 7.3 10E3/UL (ref 4–11)

## 2025-05-22 PROCEDURE — 83690 ASSAY OF LIPASE: CPT

## 2025-05-22 PROCEDURE — 96360 HYDRATION IV INFUSION INIT: CPT | Mod: 59 | Performed by: EMERGENCY MEDICINE

## 2025-05-22 PROCEDURE — 96375 TX/PRO/DX INJ NEW DRUG ADDON: CPT | Performed by: EMERGENCY MEDICINE

## 2025-05-22 PROCEDURE — 85014 HEMATOCRIT: CPT

## 2025-05-22 PROCEDURE — 36415 COLL VENOUS BLD VENIPUNCTURE: CPT | Performed by: EMERGENCY MEDICINE

## 2025-05-22 PROCEDURE — 93005 ELECTROCARDIOGRAM TRACING: CPT | Performed by: EMERGENCY MEDICINE

## 2025-05-22 PROCEDURE — 250N000009 HC RX 250

## 2025-05-22 PROCEDURE — 83735 ASSAY OF MAGNESIUM: CPT

## 2025-05-22 PROCEDURE — 80053 COMPREHEN METABOLIC PANEL: CPT

## 2025-05-22 PROCEDURE — 99285 EMERGENCY DEPT VISIT HI MDM: CPT | Mod: 25

## 2025-05-22 PROCEDURE — 93010 ELECTROCARDIOGRAM REPORT: CPT | Performed by: EMERGENCY MEDICINE

## 2025-05-22 PROCEDURE — 94640 AIRWAY INHALATION TREATMENT: CPT | Performed by: EMERGENCY MEDICINE

## 2025-05-22 PROCEDURE — 96361 HYDRATE IV INFUSION ADD-ON: CPT | Performed by: EMERGENCY MEDICINE

## 2025-05-22 PROCEDURE — 99284 EMERGENCY DEPT VISIT MOD MDM: CPT | Mod: FS | Performed by: EMERGENCY MEDICINE

## 2025-05-22 PROCEDURE — 96365 THER/PROPH/DIAG IV INF INIT: CPT | Performed by: EMERGENCY MEDICINE

## 2025-05-22 PROCEDURE — 71046 X-RAY EXAM CHEST 2 VIEWS: CPT

## 2025-05-22 PROCEDURE — 258N000003 HC RX IP 258 OP 636

## 2025-05-22 PROCEDURE — 36415 COLL VENOUS BLD VENIPUNCTURE: CPT

## 2025-05-22 PROCEDURE — 250N000011 HC RX IP 250 OP 636

## 2025-05-22 RX ORDER — MAGNESIUM SULFATE 1 G/100ML
1 INJECTION INTRAVENOUS ONCE
Status: COMPLETED | OUTPATIENT
Start: 2025-05-22 | End: 2025-05-22

## 2025-05-22 RX ORDER — DROPERIDOL 2.5 MG/ML
0.62 INJECTION, SOLUTION INTRAMUSCULAR; INTRAVENOUS ONCE
Status: COMPLETED | OUTPATIENT
Start: 2025-05-22 | End: 2025-05-22

## 2025-05-22 RX ORDER — IPRATROPIUM BROMIDE AND ALBUTEROL SULFATE 2.5; .5 MG/3ML; MG/3ML
3 SOLUTION RESPIRATORY (INHALATION) ONCE
Status: COMPLETED | OUTPATIENT
Start: 2025-05-22 | End: 2025-05-22

## 2025-05-22 RX ORDER — ALBUTEROL SULFATE 90 UG/1
2 INHALANT RESPIRATORY (INHALATION) EVERY 6 HOURS PRN
Qty: 18 G | Refills: 0 | Status: SHIPPED | OUTPATIENT
Start: 2025-05-22

## 2025-05-22 RX ORDER — MAGNESIUM SULFATE IN WATER 40 MG/ML
INJECTION, SOLUTION INTRAVENOUS
Status: COMPLETED
Start: 2025-05-22 | End: 2025-05-22

## 2025-05-22 RX ADMIN — PANTOPRAZOLE SODIUM 40 MG: 40 INJECTION, POWDER, FOR SOLUTION INTRAVENOUS at 13:49

## 2025-05-22 RX ADMIN — MAGNESIUM SULFATE HEPTAHYDRATE 1 G: 1 INJECTION, SOLUTION INTRAVENOUS at 16:39

## 2025-05-22 RX ADMIN — SODIUM CHLORIDE 1000 ML: 0.9 INJECTION, SOLUTION INTRAVENOUS at 13:27

## 2025-05-22 RX ADMIN — IPRATROPIUM BROMIDE AND ALBUTEROL SULFATE 3 ML: .5; 3 SOLUTION RESPIRATORY (INHALATION) at 12:40

## 2025-05-22 RX ADMIN — DROPERIDOL 0.62 MG: 2.5 INJECTION, SOLUTION INTRAMUSCULAR; INTRAVENOUS at 13:50

## 2025-05-22 ASSESSMENT — ACTIVITIES OF DAILY LIVING (ADL)
ADLS_ACUITY_SCORE: 58

## 2025-05-22 NOTE — ED PROVIDER NOTES
[facility] [place] EMERGENCY DEPARTMENT ([facility])    5/22/25      ED PROVIDER NOTE     History     Chief Complaint   Patient presents with    Nausea & Vomiting     Pt states she started having nausea and vomiting yesterday. Pt states she vomited around 10 am this morning.     Shortness of Breath     States she started having shortness of breath this morning.     The history is provided by the patient and medical records. No  was used.     Yun Coyle is a 49 year old female with a history of cyclical vomiting syndrome, HTN, HLD, DM2, and asthma who presents to the emergency department for nausea, vomiting, and shortness of breath. She states she began having recurrent nausea and vomiting yesterday that has continued into today.  She states that she has had approximately 10 episodes of emesis in the past 24 hours.  She denies any hematemesis or coffee-ground appearance.  She reports generalized abdominal tenderness, mostly to her epigastric region that is consistent with her history of cyclical vomiting symptoms.  She also reports some shortness of breath that developed today as well as a dry cough but denies fever, chills, rhinorrhea, or sore throat.  She denies any chest pain or heaviness.  She denies any diarrhea or constipation.  She denies any urinary symptoms.  She reports last marijuana use a week ago.  She denies tobacco use or smoking otherwise.  She does have a history of asthma but denies history of COPD.  She denies alcohol use.    Past Medical History  Past Medical History:   Diagnosis Date    Acute pancreatitis 06/29/2017    Anxiety     Cauda equina syndrome (H) 03/29/2013    Cyst of right ovary 11/16/2017    Depressive disorder     Gastroesophageal reflux disease     Hypertension     Hyponatremia 10/01/2023    Low back pain 08/20/2012    Type 2 diabetes mellitus (H)     Uncomplicated asthma      Past Surgical History:   Procedure Laterality Date    BACK SURGERY      x2  "   CHOLECYSTECTOMY      ORTHOPEDIC SURGERY      SALPINGO OOPHORECTOMY,R/L/ELDA Right      albuterol (PROAIR HFA/PROVENTIL HFA/VENTOLIN HFA) 108 (90 Base) MCG/ACT inhaler  Alcohol Swabs PADS  blood glucose (NO BRAND SPECIFIED) lancets standard  blood glucose (NO BRAND SPECIFIED) test strip  blood glucose calibration (NO BRAND SPECIFIED) solution  blood glucose monitoring (NO BRAND SPECIFIED) meter device kit  cloNIDine (CATAPRES) 0.1 MG tablet  empagliflozin (JARDIANCE) 10 MG TABS tablet  gabapentin (NEURONTIN) 100 MG capsule  glucose 40 % (400 mg/mL) gel  hydrOXYzine HCl (ATARAX) 25 MG tablet  insulin aspart (NOVOLOG PEN) 100 UNIT/ML pen  insulin aspart (NOVOLOG PEN) 100 UNIT/ML pen  insulin aspart (NOVOLOG PEN) 100 UNIT/ML pen  insulin glargine (LANTUS PEN) 100 UNIT/ML pen  insulin pen needle (32G X 4 MM) 32G X 4 MM miscellaneous  losartan (COZAAR) 25 MG tablet  metFORMIN (GLUCOPHAGE) 1000 MG tablet  methadone (DOLOPHINE-INTENSOL) 10 MG/ML (HIGH CONC) solution  metoclopramide (REGLAN) 10 MG tablet  OLANZapine (ZYPREXA) 10 MG tablet  ondansetron (ZOFRAN) 4 MG tablet  prazosin (MINIPRESS) 2 MG capsule  sertraline (ZOLOFT) 50 MG tablet  [employer] Container MISC  traZODone (DESYREL) 50 MG tablet      No Known Allergies  Family History  Family History   Problem Relation Age of Onset    Breast Cancer Mother         in her 50s.    Diabetes Mother     Breast Cancer Maternal Grandmother         in her 40s.    Diabetes Maternal Grandmother      Social History   Social History     Tobacco Use    Smoking status: Never    Smokeless tobacco: Never   Vaping Use    Vaping status: Never Used   Substance Use Topics    Alcohol use: No    Drug use: Not Currently     Types: Marijuana     Comment: pt was taking Oxycontin \"years ago.\" pt is now on Methadone.      Past medical history, past surgical history, medications, allergies, family history, and social history were reviewed with the patient. No additional pertinent items.     A " "medically appropriate review of systems was performed with pertinent positives and negatives noted in the HPI, and all other systems negative.    Physical Exam   BP: 106/75  Pulse: 83  Temp: 98  F (36.7  C)  Resp: 18  Height: 175.3 cm (5' 9\")  Weight: 90.7 kg (200 lb)  SpO2: (!) 91 %    Vitals:    05/22/25 1121 05/22/25 1615   BP: 106/75 122/70   Pulse: 83 80   Resp: 18 18   Temp: 98  F (36.7  C) 97.9  F (36.6  C)   TempSrc: Oral Oral   SpO2: (!) 91% 95%   Weight: 90.7 kg (200 lb)    Height: 1.753 m (5' 9\")       Physical Exam  Constitutional:       General: She is not in acute distress.     Appearance: She is well-developed and normal weight. She is not ill-appearing, toxic-appearing or diaphoretic.   HENT:      Head: Normocephalic and atraumatic.      Mouth/Throat:      Mouth: Mucous membranes are moist.      Pharynx: Oropharynx is clear.   Cardiovascular:      Rate and Rhythm: Normal rate and regular rhythm.   Pulmonary:      Effort: Pulmonary effort is normal.      Breath sounds: Wheezing present. No decreased breath sounds, rhonchi or rales.   Abdominal:      General: Bowel sounds are normal.      Palpations: Abdomen is soft.      Tenderness: There is no abdominal tenderness. There is no guarding or rebound.   Musculoskeletal:         General: Normal range of motion.      Right lower leg: No edema.      Left lower leg: No edema.   Skin:     General: Skin is warm.      Coloration: Skin is not jaundiced.   Neurological:      Mental Status: She is alert. Mental status is at baseline.   Psychiatric:         Mood and Affect: Mood normal.         Behavior: Behavior normal.         ED Course, Procedures, & Data     ED Course as of 05/22/25 1640   Thu May 22, 2025   1352 Glucose(!): 320   1434 Magnesium(!): 1.6     Procedures            EKG Interpretation:      Interpreted by Loida Barry PA-C  Time reviewed: 1231  Symptoms at time of EKG: None   Rhythm: normal sinus   Rate: Normal  Axis: Normal  Ectopy: " none  Conduction: normal  ST Segments/ T Waves: No ST-T wave changes and No acute ischemic changes  Q Waves: none  Comparison to prior: Unchanged from 04/27/25    Clinical Impression: normal EKG           Results for orders placed or performed during the hospital encounter of 05/22/25   XR Chest 2 Views     Status: None    Narrative    EXAM: XR CHEST 2 VIEWS  LOCATION: [facility]  DATE: 5/22/2025    INDICATION: shortness of breath  COMPARISON: 1/30/2025      Impression    IMPRESSION: Negative chest.   Comprehensive metabolic panel     Status: Abnormal   Result Value Ref Range    Sodium 133 (L) 135 - 145 mmol/L    Potassium 6.0 (H) 3.4 - 5.3 mmol/L    Carbon Dioxide (CO2) 23 22 - 29 mmol/L    Anion Gap 13 7 - 15 mmol/L    Urea Nitrogen 14.3 6.0 - 20.0 mg/dL    Creatinine 0.83 0.51 - 0.95 mg/dL    GFR Estimate 87 >60 mL/min/1.73m2    Calcium 9.1 8.8 - 10.4 mg/dL    Chloride 97 (L) 98 - 107 mmol/L    Glucose 320 (H) 70 - 99 mg/dL    Alkaline Phosphatase 162 (H) 40 - 150 U/L    AST      ALT 86 (H) 0 - 50 U/L    Protein Total 7.7 6.4 - 8.3 g/dL    Albumin 3.7 3.5 - 5.2 g/dL    Bilirubin Total 0.4 <=1.2 mg/dL   Lipase     Status: Normal   Result Value Ref Range    Lipase 28 13 - 60 U/L   Magnesium     Status: Abnormal   Result Value Ref Range    Magnesium 1.6 (L) 1.7 - 2.3 mg/dL   CBC with platelets and differential     Status: None   Result Value Ref Range    WBC Count 7.3 4.0 - 11.0 10e3/uL    RBC Count 4.71 3.80 - 5.20 10e6/uL    Hemoglobin 12.7 11.7 - 15.7 g/dL    Hematocrit 38.6 35.0 - 47.0 %    MCV 82 78 - 100 fL    MCH 27.0 26.5 - 33.0 pg    MCHC 32.9 31.5 - 36.5 g/dL    RDW 14.6 10.0 - 15.0 %    Platelet Count      % Neutrophils 46 %    % Lymphocytes 39 %    % Monocytes 6 %    % Eosinophils 8 %    % Basophils 1 %    % Immature Granulocytes 0 %    NRBCs per 100 WBC 0 <1 /100    Absolute Neutrophils 3.3 1.6 - 8.3 10e3/uL    Absolute Lymphocytes 2.9 0.8 - 5.3 10e3/uL     Absolute Monocytes 0.4 0.0 - 1.3 10e3/uL    Absolute Eosinophils 0.6 0.0 - 0.7 10e3/uL    Absolute Basophils 0.1 0.0 - 0.2 10e3/uL    Absolute Immature Granulocytes 0.0 <=0.4 10e3/uL    Absolute NRBCs 0.0 10e3/uL   AST     Status: Abnormal   Result Value Ref Range    AST 77 (H) 0 - 45 U/L   Comprehensive metabolic panel     Status: Abnormal   Result Value Ref Range    Sodium 135 135 - 145 mmol/L    Potassium 4.2 3.4 - 5.3 mmol/L    Carbon Dioxide (CO2) 24 22 - 29 mmol/L    Anion Gap 16 (H) 7 - 15 mmol/L    Urea Nitrogen 14.4 6.0 - 20.0 mg/dL    Creatinine 0.87 0.51 - 0.95 mg/dL    GFR Estimate 82 >60 mL/min/1.73m2    Calcium 9.6 8.8 - 10.4 mg/dL    Chloride 95 (L) 98 - 107 mmol/L    Glucose 319 (H) 70 - 99 mg/dL    Alkaline Phosphatase 191 (H) 40 - 150 U/L    AST 78 (H) 0 - 45 U/L    ALT 98 (H) 0 - 50 U/L    Protein Total 8.9 (H) 6.4 - 8.3 g/dL    Albumin 4.3 3.5 - 5.2 g/dL    Bilirubin Total 0.5 <=1.2 mg/dL   EKG 12-lead, tracing only     Status: None   Result Value Ref Range    Systolic Blood Pressure  mmHg    Diastolic Blood Pressure  mmHg    Ventricular Rate 65 BPM    Atrial Rate 65 BPM    ME Interval 148 ms    QRS Duration 90 ms     ms    QTc 434 ms    P Axis 32 degrees    R AXIS -9 degrees    T Axis 16 degrees    Interpretation ECG       Unconfirmed report - interpretation of this ECG is computer generated - see medical record for final interpretation  Sinus rhythm  Normal ECG    Confirmed by - EMERGENCY ROOM, PHYSICIAN ([id number]),  Yun Mendoza ([id number]) on 5/22/2025 3:09:08 PM     CBC with platelets differential     Status: None    Narrative    The following orders were created for panel order CBC with platelets differential.  Procedure                               Abnormality         Status                     ---------                               -----------         ------                     CBC with platelets and ...[[id number]]                      Final result               RBC and  Platelet Morpho...[[id number]]                                                   Please view results for these tests on the individual orders.     Medications   magnesium sulfate 1 g in 100 mL D5W intermittent infusion (1 g Intravenous $New Bag 5/22/25 1639)   ipratropium - albuterol 0.5 mg/2.5 mg/3 mL (DUONEB) neb solution 3 mL (3 mLs Nebulization $Given 5/22/25 1240)   sodium chloride 0.9% BOLUS 1,000 mL (0 mLs Intravenous Stopped 5/22/25 1550)   droPERidol (INAPSINE) injection 0.625 mg (0.625 mg Intravenous $Given 5/22/25 1350)   pantoprazole (PROTONIX) IV push injection 40 mg (40 mg Intravenous $Given 5/22/25 1349)   magnesium sulfate 40 MG/ML infusion (  Return to Cabinet 5/22/25 1402)     Labs Ordered and Resulted from Time of ED Arrival to Time of ED Departure   COMPREHENSIVE METABOLIC PANEL - Abnormal       Result Value    Sodium 133 (*)     Potassium 6.0 (*)     Carbon Dioxide (CO2) 23      Anion Gap 13      Urea Nitrogen 14.3      Creatinine 0.83      GFR Estimate 87      Calcium 9.1      Chloride 97 (*)     Glucose 320 (*)     Alkaline Phosphatase 162 (*)     AST        ALT 86 (*)     Protein Total 7.7      Albumin 3.7      Bilirubin Total 0.4     MAGNESIUM - Abnormal    Magnesium 1.6 (*)    AST - Abnormal    AST 77 (*)    COMPREHENSIVE METABOLIC PANEL - Abnormal    Sodium 135      Potassium 4.2      Carbon Dioxide (CO2) 24      Anion Gap 16 (*)     Urea Nitrogen 14.4      Creatinine 0.87      GFR Estimate 82      Calcium 9.6      Chloride 95 (*)     Glucose 319 (*)     Alkaline Phosphatase 191 (*)     AST 78 (*)     ALT 98 (*)     Protein Total 8.9 (*)     Albumin 4.3      Bilirubin Total 0.5     LIPASE - Normal    Lipase 28     CBC WITH PLATELETS AND DIFFERENTIAL    WBC Count 7.3      RBC Count 4.71      Hemoglobin 12.7      Hematocrit 38.6      MCV 82      MCH 27.0      MCHC 32.9      RDW 14.6      Platelet Count        % Neutrophils 46      % Lymphocytes 39      % Monocytes 6      % Eosinophils 8       % Basophils 1      % Immature Granulocytes 0      NRBCs per 100 WBC 0      Absolute Neutrophils 3.3      Absolute Lymphocytes 2.9      Absolute Monocytes 0.4      Absolute Eosinophils 0.6      Absolute Basophils 0.1      Absolute Immature Granulocytes 0.0      Absolute NRBCs 0.0     GLUCOSE MONITOR NURSING POCT     XR Chest 2 Views   Final Result   IMPRESSION: Negative chest.             Critical care was not performed.     Medical Decision Making  The patient's presentation was of high complexity (a chronic illness severe exacerbation, progression, or side effect of treatment).    The patient's evaluation involved:  review of external note(s) from 1 sources (Previous ED encounters 05/14/25, 04/27/25)  review of 1 test result(s) ordered prior to this encounter (previous labs)  ordering and/or review of 3+ test(s) in this encounter (see separate area of note for details)    The patient's management necessitated moderate risk (prescription drug management including medications given in the ED).    Assessment & Plan    Yun is a 49-year-old female that presented to the ED with concerns of recurrent as well as some shortness of breath.  SPO2 91% on room air on presentation with improvement after DuoNeb administration.  Patient otherwise afebrile and normotensive without tachycardia.  Patient in no acute distress and nontoxic-appearing throughout the ED visit.  Some wheezing on auscultation of bilateral posterior lung fields.  No acute abdomen signs including guarding, rebound, or rigidity.  Patient states her abdominal discomfort and nausea vomiting symptoms are consistent with previous cyclical vomiting episodes without any acute change or new symptoms.  CBC unremarkable and within normal limits.  Magnesium 1.6 which was replaced via IV magnesium sulfate.  Anion gap 16.  Stable elevation of alkaline phosphatase, AST, and ALT.  Glucose 319.  Patient states that droperidol usually works for her symptoms.  IV NS bolus,  IV droperidol, IV Protonix administered for GI symptoms.  DuoNeb given for shortness of breath with improvement of symptoms and O2 saturations.  Patient tolerated oral intake of apple juice and crackers without emesis.  Patient's symptoms overall felt much improved and felt comfortable discharging home at this time.  Strict return precautions given discussed at length.  Rx albuterol inhaler to utilize as needed for recurrent shortness of breath symptoms.  Advise follow-up with PCP office for continuing at home regimen for recurrent nausea and vomiting.  Patient was agreeable to the discharge plan, voiced understanding, and all questions answered prior to discharge.    --    ED Attending Physician Attestation    I Blas Alexander DO, cared for this patient with the Advanced Practice Provider (CASSANDRA). I personally provided a substantive portion of the care for this patient, including approving the care plan for the number and complexity of problems addressed and taking responsibility related to the risk of complications and/or morbidity or mortality of patient management. Please see the CASSANDRA's documentation for full details.          Blas Alexander DO  Emergency Medicine      I have reviewed the nursing notes. I have reviewed the findings, diagnosis, plan and need for follow up with the patient.    New Prescriptions    ALBUTEROL (PROAIR HFA/PROVENTIL HFA/VENTOLIN HFA) 108 (90 BASE) MCG/ACT INHALER    Inhale 2 puffs into the lungs every 6 hours as needed for shortness of breath, wheezing or cough.       Final diagnoses:   Cyclic vomiting syndrome   Hypomagnesemia   Shortness of breath       Loida Barry PA-C    [facility] EMERGENCY DEPARTMENT  5/22/2025     Loida Barry PA-C  05/22/25 1645       Blas Alexander DO  05/22/25 0772

## 2025-05-22 NOTE — ED TRIAGE NOTES
Pt states she started having nausea and vomiting yesterday. Pt states she vomited around 10 am this morning.      Triage Assessment (Adult)       Row Name 05/22/25 1120          Triage Assessment    Airway WDL WDL        Respiratory WDL    Respiratory WDL X;all     Rhythm/Pattern, Respiratory shortness of breath        Skin Circulation/Temperature WDL    Skin Circulation/Temperature WDL WDL        Cardiac WDL    Cardiac WDL WDL        Peripheral/Neurovascular WDL    Peripheral Neurovascular WDL WDL        Cognitive/Neuro/Behavioral WDL    Cognitive/Neuro/Behavioral WDL WDL

## 2025-05-22 NOTE — DISCHARGE INSTRUCTIONS
Continue fluids and diet as tolerated as you continue to improve from your nausea and vomiting symptoms after your visit today  Utilize albuterol inhaler as needed for symptoms of shortness of air related to your asthma  Utilize your at home medications for recurrent nausea and vomiting symptoms  Follow-up with your primary care office next week for reevaluation and recheck of your symptoms  Otherwise, do not hesitate to seek urgent or emergent reevaluation if you have any worsening or concerning signs or symptoms

## 2025-06-02 ENCOUNTER — APPOINTMENT (OUTPATIENT)
Dept: GENERAL RADIOLOGY | Facility: CLINIC | Age: 50
End: 2025-06-02
Attending: EMERGENCY MEDICINE
Payer: COMMERCIAL

## 2025-06-02 ENCOUNTER — HOSPITAL ENCOUNTER (EMERGENCY)
Facility: CLINIC | Age: 50
Discharge: HOME | End: 2025-06-02
Attending: EMERGENCY MEDICINE | Admitting: EMERGENCY MEDICINE
Payer: COMMERCIAL

## 2025-06-02 VITALS
DIASTOLIC BLOOD PRESSURE: 83 MMHG | OXYGEN SATURATION: 95 % | TEMPERATURE: 97.7 F | SYSTOLIC BLOOD PRESSURE: 128 MMHG | HEART RATE: 82 BPM | RESPIRATION RATE: 18 BRPM

## 2025-06-02 DIAGNOSIS — R11.2 NAUSEA AND VOMITING, UNSPECIFIED VOMITING TYPE: ICD-10-CM

## 2025-06-02 LAB
ALBUMIN SERPL BCG-MCNC: 3.8 G/DL (ref 3.5–5.2)
ALBUMIN UR-MCNC: 70 MG/DL
ALP SERPL-CCNC: 137 U/L (ref 40–150)
ALT SERPL W P-5'-P-CCNC: 51 U/L (ref 0–50)
ANION GAP SERPL CALCULATED.3IONS-SCNC: 20 MMOL/L (ref 7–15)
APPEARANCE UR: ABNORMAL
AST SERPL W P-5'-P-CCNC: 43 U/L (ref 0–45)
ATRIAL RATE - MUSE: 107 BPM
BASOPHILS # BLD AUTO: 0 10E3/UL (ref 0–0.2)
BASOPHILS NFR BLD AUTO: 0 %
BILIRUB SERPL-MCNC: 0.4 MG/DL
BILIRUB UR QL STRIP: NEGATIVE
BUN SERPL-MCNC: 21.7 MG/DL (ref 6–20)
CALCIUM SERPL-MCNC: 9.5 MG/DL (ref 8.8–10.4)
CHLORIDE SERPL-SCNC: 96 MMOL/L (ref 98–107)
COLOR UR AUTO: ABNORMAL
CREAT SERPL-MCNC: 1.13 MG/DL (ref 0.51–0.95)
DIASTOLIC BLOOD PRESSURE - MUSE: NORMAL MMHG
EGFRCR SERPLBLD CKD-EPI 2021: 60 ML/MIN/1.73M2
EOSINOPHIL # BLD AUTO: 0.2 10E3/UL (ref 0–0.7)
EOSINOPHIL NFR BLD AUTO: 2 %
ERYTHROCYTE [DISTWIDTH] IN BLOOD BY AUTOMATED COUNT: 14.1 % (ref 10–15)
GLUCOSE SERPL-MCNC: 336 MG/DL (ref 70–99)
GLUCOSE UR STRIP-MCNC: >=1000 MG/DL
HCO3 SERPL-SCNC: 19 MMOL/L (ref 22–29)
HCT VFR BLD AUTO: 41.8 % (ref 35–47)
HGB BLD-MCNC: 14.3 G/DL (ref 11.7–15.7)
HGB UR QL STRIP: NEGATIVE
HYALINE CASTS: 100 /LPF
IMM GRANULOCYTES # BLD: 0 10E3/UL
IMM GRANULOCYTES NFR BLD: 1 %
INTERPRETATION ECG - MUSE: NORMAL
KETONES UR STRIP-MCNC: 10 MG/DL
LEUKOCYTE ESTERASE UR QL STRIP: ABNORMAL
LIPASE SERPL-CCNC: 34 U/L (ref 13–60)
LYMPHOCYTES # BLD AUTO: 1.4 10E3/UL (ref 0.8–5.3)
LYMPHOCYTES NFR BLD AUTO: 20 %
MAGNESIUM SERPL-MCNC: 1.8 MG/DL (ref 1.7–2.3)
MCH RBC QN AUTO: 27.8 PG (ref 26.5–33)
MCHC RBC AUTO-ENTMCNC: 34.2 G/DL (ref 31.5–36.5)
MCV RBC AUTO: 81 FL (ref 78–100)
MONOCYTES # BLD AUTO: 0.5 10E3/UL (ref 0–1.3)
MONOCYTES NFR BLD AUTO: 7 %
MUCOUS THREADS #/AREA URNS LPF: PRESENT /LPF
NEUTROPHILS # BLD AUTO: 5.2 10E3/UL (ref 1.6–8.3)
NEUTROPHILS NFR BLD AUTO: 71 %
NITRATE UR QL: NEGATIVE
NRBC # BLD AUTO: 0 10E3/UL
NRBC BLD AUTO-RTO: 0 /100
P AXIS - MUSE: 73 DEGREES
PH UR STRIP: 5.5 [PH] (ref 5–7)
PLATELET # BLD AUTO: 204 10E3/UL (ref 150–450)
POTASSIUM SERPL-SCNC: 3.9 MMOL/L (ref 3.4–5.3)
PR INTERVAL - MUSE: 146 MS
PROT SERPL-MCNC: 7.9 G/DL (ref 6.4–8.3)
QRS DURATION - MUSE: 82 MS
QT - MUSE: 388 MS
QTC - MUSE: 517 MS
R AXIS - MUSE: -51 DEGREES
RBC # BLD AUTO: 5.14 10E6/UL (ref 3.8–5.2)
RBC URINE: 2 /HPF
SODIUM SERPL-SCNC: 135 MMOL/L (ref 135–145)
SP GR UR STRIP: >1.03 (ref 1–1.03)
SQUAMOUS EPITHELIAL: 18 /HPF
SYSTOLIC BLOOD PRESSURE - MUSE: NORMAL MMHG
T AXIS - MUSE: 60 DEGREES
TROPONIN T SERPL HS-MCNC: 13 NG/L
TROPONIN T SERPL HS-MCNC: 15 NG/L
UROBILINOGEN UR STRIP-MCNC: 2 MG/DL
VENTRICULAR RATE- MUSE: 107 BPM
WBC # BLD AUTO: 7.4 10E3/UL (ref 4–11)
WBC URINE: 12 /HPF

## 2025-06-02 PROCEDURE — 84484 ASSAY OF TROPONIN QUANT: CPT | Performed by: EMERGENCY MEDICINE

## 2025-06-02 PROCEDURE — 250N000011 HC RX IP 250 OP 636: Performed by: EMERGENCY MEDICINE

## 2025-06-02 PROCEDURE — 80053 COMPREHEN METABOLIC PANEL: CPT | Performed by: EMERGENCY MEDICINE

## 2025-06-02 PROCEDURE — 36415 COLL VENOUS BLD VENIPUNCTURE: CPT | Performed by: EMERGENCY MEDICINE

## 2025-06-02 PROCEDURE — 85025 COMPLETE CBC W/AUTO DIFF WBC: CPT | Performed by: EMERGENCY MEDICINE

## 2025-06-02 PROCEDURE — 99284 EMERGENCY DEPT VISIT MOD MDM: CPT | Performed by: EMERGENCY MEDICINE

## 2025-06-02 PROCEDURE — 93005 ELECTROCARDIOGRAM TRACING: CPT | Performed by: EMERGENCY MEDICINE

## 2025-06-02 PROCEDURE — 83690 ASSAY OF LIPASE: CPT | Performed by: EMERGENCY MEDICINE

## 2025-06-02 PROCEDURE — 250N000013 HC RX MED GY IP 250 OP 250 PS 637: Performed by: EMERGENCY MEDICINE

## 2025-06-02 PROCEDURE — 87086 URINE CULTURE/COLONY COUNT: CPT | Performed by: EMERGENCY MEDICINE

## 2025-06-02 PROCEDURE — 71046 X-RAY EXAM CHEST 2 VIEWS: CPT

## 2025-06-02 PROCEDURE — 96361 HYDRATE IV INFUSION ADD-ON: CPT | Performed by: EMERGENCY MEDICINE

## 2025-06-02 PROCEDURE — 99285 EMERGENCY DEPT VISIT HI MDM: CPT | Mod: 25 | Performed by: EMERGENCY MEDICINE

## 2025-06-02 PROCEDURE — 93010 ELECTROCARDIOGRAM REPORT: CPT | Performed by: EMERGENCY MEDICINE

## 2025-06-02 PROCEDURE — 96374 THER/PROPH/DIAG INJ IV PUSH: CPT | Performed by: EMERGENCY MEDICINE

## 2025-06-02 PROCEDURE — 83735 ASSAY OF MAGNESIUM: CPT | Performed by: EMERGENCY MEDICINE

## 2025-06-02 PROCEDURE — 81003 URINALYSIS AUTO W/O SCOPE: CPT | Performed by: EMERGENCY MEDICINE

## 2025-06-02 PROCEDURE — 258N000003 HC RX IP 258 OP 636: Performed by: EMERGENCY MEDICINE

## 2025-06-02 PROCEDURE — 250N000009 HC RX 250: Performed by: EMERGENCY MEDICINE

## 2025-06-02 RX ORDER — ACETAMINOPHEN 500 MG
1000 TABLET ORAL ONCE
Status: COMPLETED | OUTPATIENT
Start: 2025-06-02 | End: 2025-06-02

## 2025-06-02 RX ORDER — IPRATROPIUM BROMIDE AND ALBUTEROL SULFATE 2.5; .5 MG/3ML; MG/3ML
3 SOLUTION RESPIRATORY (INHALATION) ONCE
Status: COMPLETED | OUTPATIENT
Start: 2025-06-02 | End: 2025-06-02

## 2025-06-02 RX ADMIN — ACETAMINOPHEN 1000 MG: 500 TABLET ORAL at 07:33

## 2025-06-02 RX ADMIN — PANTOPRAZOLE SODIUM 40 MG: 40 INJECTION, POWDER, LYOPHILIZED, FOR SOLUTION INTRAVENOUS at 07:35

## 2025-06-02 RX ADMIN — IPRATROPIUM BROMIDE AND ALBUTEROL SULFATE 3 ML: .5; 3 SOLUTION RESPIRATORY (INHALATION) at 07:33

## 2025-06-02 RX ADMIN — SODIUM CHLORIDE 1000 ML: 0.9 INJECTION, SOLUTION INTRAVENOUS at 07:36

## 2025-06-02 ASSESSMENT — ACTIVITIES OF DAILY LIVING (ADL)
ADLS_ACUITY_SCORE: 58

## 2025-06-02 NOTE — ED TRIAGE NOTES
Triage Assessment (Adult)       Row Name 06/02/25 0623          Triage Assessment    Airway WDL WDL        Respiratory WDL    Respiratory WDL WDL        Skin Circulation/Temperature WDL    Skin Circulation/Temperature WDL WDL        Cardiac WDL    Cardiac WDL WDL        Peripheral/Neurovascular WDL    Peripheral Neurovascular WDL WDL        Cognitive/Neuro/Behavioral WDL    Cognitive/Neuro/Behavioral WDL WDL

## 2025-06-02 NOTE — ED TRIAGE NOTES
BIBA  c/o hyperemesis and CP HR up to 150's per EMS EKG. Was done ST and 10 mg droperidol was given .

## 2025-06-02 NOTE — ED PROVIDER NOTES
ED Provider Note  [facility]      History     Chief Complaint   Patient presents with    Anxiety     HPI  Yun Coyle is a 49 year old female who presents with nausea and vomiting and chest pain.  Patient reports that a few days ago she started having nausea and vomiting and some intermittent abdominal pain.  She states this feels similar to when she has had cyclic vomiting in the past.  Vomiting has been persistent since then.  She does note some epigastric pain with this similar to prior episodes.  No changes in bowel movements or urination.    She also reports that earlier today she developed some left upper achy chest pain.  Pain is nonradiating.  She states that she felt short of breath earlier this morning when she has an asthma but states she does not feel short of breath right now.    She received droperidol from EMS and reports that she does feel like her nausea has improved.    Past Medical History  Past Medical History:   Diagnosis Date    Acute pancreatitis 06/29/2017    Anxiety     Cauda equina syndrome (H) 03/29/2013    Cyst of right ovary 11/16/2017    Depressive disorder     Gastroesophageal reflux disease     Hypertension     Hyponatremia 10/01/2023    Low back pain 08/20/2012    Type 2 diabetes mellitus (H)     Uncomplicated asthma      Past Surgical History:   Procedure Laterality Date    BACK SURGERY      x2    CHOLECYSTECTOMY      ORTHOPEDIC SURGERY      SALPINGO OOPHORECTOMY,R/L/ELDA Right      albuterol (PROAIR HFA/PROVENTIL HFA/VENTOLIN HFA) 108 (90 Base) MCG/ACT inhaler  Alcohol Swabs PADS  blood glucose (NO BRAND SPECIFIED) lancets standard  blood glucose (NO BRAND SPECIFIED) test strip  blood glucose calibration (NO BRAND SPECIFIED) solution  blood glucose monitoring (NO BRAND SPECIFIED) meter device kit  cloNIDine (CATAPRES) 0.1 MG tablet  empagliflozin (JARDIANCE) 10 MG TABS tablet  gabapentin (NEURONTIN) 100 MG capsule  glucose 40 % (400 mg/mL)  "gel  hydrOXYzine HCl (ATARAX) 25 MG tablet  insulin aspart (NOVOLOG PEN) 100 UNIT/ML pen  insulin aspart (NOVOLOG PEN) 100 UNIT/ML pen  insulin aspart (NOVOLOG PEN) 100 UNIT/ML pen  insulin glargine (LANTUS PEN) 100 UNIT/ML pen  insulin pen needle (32G X 4 MM) 32G X 4 MM miscellaneous  losartan (COZAAR) 25 MG tablet  metFORMIN (GLUCOPHAGE) 1000 MG tablet  methadone (DOLOPHINE-INTENSOL) 10 MG/ML (HIGH CONC) solution  metoclopramide (REGLAN) 10 MG tablet  OLANZapine (ZYPREXA) 10 MG tablet  ondansetron (ZOFRAN) 4 MG tablet  prazosin (MINIPRESS) 2 MG capsule  sertraline (ZOLOFT) 50 MG tablet  [employer] Container MISC  traZODone (DESYREL) 50 MG tablet      No Known Allergies  Family History  Family History   Problem Relation Age of Onset    Breast Cancer Mother         in her 50s.    Diabetes Mother     Breast Cancer Maternal Grandmother         in her 40s.    Diabetes Maternal Grandmother      Social History   Social History     Tobacco Use    Smoking status: Never    Smokeless tobacco: Never   Vaping Use    Vaping status: Never Used   Substance Use Topics    Alcohol use: No    Drug use: Not Currently     Types: Marijuana     Comment: pt was taking Oxycontin \"years ago.\" pt is now on Methadone.      A medically appropriate review of systems was performed with pertinent positives and negatives noted in the HPI, and all other systems negative.    Physical Exam   BP: 118/87  Pulse: (!) 123  Temp: 97.7  F (36.5  C)  Resp: 18  SpO2: 98 %  Physical Exam  Constitutional:       Appearance: She is not toxic-appearing.   HENT:      Head: Normocephalic and atraumatic.      Nose: Nose normal.      Mouth/Throat:      Mouth: Mucous membranes are moist.      Pharynx: Oropharynx is clear.   Eyes:      Conjunctiva/sclera: Conjunctivae normal.      Pupils: Pupils are equal, round, and reactive to light.   Cardiovascular:      Rate and Rhythm: Normal rate and regular rhythm.      Heart sounds: No murmur heard.  Pulmonary:      Effort: " Pulmonary effort is normal. No respiratory distress.      Breath sounds: Wheezing present. No rales.   Abdominal:      General: There is no distension.      Palpations: Abdomen is soft.      Tenderness: There is no abdominal tenderness. There is no guarding.   Musculoskeletal:         General: Normal range of motion.   Skin:     General: Skin is warm and dry.   Neurological:      General: No focal deficit present.      Mental Status: She is alert and oriented to person, place, and time.           ED Course, Procedures, & Data      Procedures            EKG Interpretation:      Interpreted by You Weeks MD  Time reviewed: 7:10  Symptoms at time of EKG: chest pain   Rhythm: normal sinus   Rate: Tachycardia  Axis: normal  Ectopy: none  Conduction: QT prolongation  ST Segments/ T Waves: No ST-T wave changes  Q Waves: none      Clinical Impression: normal EKG            Results for orders placed or performed during the hospital encounter of 06/02/25   Chest XR,  PA & LAT     Status: None    Narrative    EXAM: XR CHEST 2 VIEWS  LOCATION: [facility]  DATE: 6/2/2025    INDICATION: chest pain short of breath  COMPARISON: None.      Impression    IMPRESSION: Negative chest.   Comprehensive metabolic panel     Status: Abnormal   Result Value Ref Range    Sodium 135 135 - 145 mmol/L    Potassium 3.9 3.4 - 5.3 mmol/L    Carbon Dioxide (CO2) 19 (L) 22 - 29 mmol/L    Anion Gap 20 (H) 7 - 15 mmol/L    Urea Nitrogen 21.7 (H) 6.0 - 20.0 mg/dL    Creatinine 1.13 (H) 0.51 - 0.95 mg/dL    GFR Estimate 60 (L) >60 mL/min/1.73m2    Calcium 9.5 8.8 - 10.4 mg/dL    Chloride 96 (L) 98 - 107 mmol/L    Glucose 336 (H) 70 - 99 mg/dL    Alkaline Phosphatase 137 40 - 150 U/L    AST 43 0 - 45 U/L    ALT 51 (H) 0 - 50 U/L    Protein Total 7.9 6.4 - 8.3 g/dL    Albumin 3.8 3.5 - 5.2 g/dL    Bilirubin Total 0.4 <=1.2 mg/dL   Lipase     Status: Normal   Result Value Ref Range    Lipase 34 13 - 60 U/L    Magnesium     Status: Normal   Result Value Ref Range    Magnesium 1.8 1.7 - 2.3 mg/dL   Troponin T, High Sensitivity     Status: Abnormal   Result Value Ref Range    Troponin T, High Sensitivity 15 (H) <=14 ng/L   CBC with platelets and differential     Status: None   Result Value Ref Range    WBC Count 7.4 4.0 - 11.0 10e3/uL    RBC Count 5.14 3.80 - 5.20 10e6/uL    Hemoglobin 14.3 11.7 - 15.7 g/dL    Hematocrit 41.8 35.0 - 47.0 %    MCV 81 78 - 100 fL    MCH 27.8 26.5 - 33.0 pg    MCHC 34.2 31.5 - 36.5 g/dL    RDW 14.1 10.0 - 15.0 %    Platelet Count 204 150 - 450 10e3/uL    % Neutrophils 71 %    % Lymphocytes 20 %    % Monocytes 7 %    % Eosinophils 2 %    % Basophils 0 %    % Immature Granulocytes 1 %    NRBCs per 100 WBC 0 <1 /100    Absolute Neutrophils 5.2 1.6 - 8.3 10e3/uL    Absolute Lymphocytes 1.4 0.8 - 5.3 10e3/uL    Absolute Monocytes 0.5 0.0 - 1.3 10e3/uL    Absolute Eosinophils 0.2 0.0 - 0.7 10e3/uL    Absolute Basophils 0.0 0.0 - 0.2 10e3/uL    Absolute Immature Granulocytes 0.0 <=0.4 10e3/uL    Absolute NRBCs 0.0 10e3/uL   UA with Microscopic reflex to Culture     Status: Abnormal    Specimen: Urine, Clean Catch   Result Value Ref Range    Color Urine Orange (A) Colorless, Straw, Light Yellow, Yellow    Appearance Urine Slightly Cloudy (A) Clear    Glucose Urine >=1000 (A) Negative mg/dL    Bilirubin Urine Negative Negative    Ketones Urine 10 (A) Negative mg/dL    Specific Gravity Urine >1.030 1.003 - 1.035    Blood Urine Negative Negative    pH Urine 5.5 5.0 - 7.0    Protein Albumin Urine 70 (A) Negative mg/dL    Urobilinogen Urine 2.0 (A) Normal mg/dL    Nitrite Urine Negative Negative    Leukocyte Esterase Urine Small (A) Negative    Mucus Urine Present (A) None Seen /LPF    RBC Urine 2 <=2 /HPF    WBC Urine 12 (H) <=5 /HPF    Squamous Epithelials Urine 18 (H) <=1 /HPF    Hyaline Casts Urine 100 (H) <=2 /LPF    Narrative    Urine Culture ordered based on laboratory criteria   Troponin T, High  Sensitivity     Status: Normal   Result Value Ref Range    Troponin T, High Sensitivity 13 <=14 ng/L   EKG 12-lead, complete     Status: None   Result Value Ref Range    Systolic Blood Pressure  mmHg    Diastolic Blood Pressure  mmHg    Ventricular Rate 107 BPM    Atrial Rate 107 BPM    MI Interval 146 ms    QRS Duration 82 ms     ms    QTc 517 ms    P Axis 73 degrees    R AXIS -51 degrees    T Axis 60 degrees    Interpretation ECG       Sinus tachycardia  Right atrial enlargement  Pulmonary disease pattern  Left anterior fascicular block  Nonspecific ST abnormality  Abnormal ECG  Unconfirmed report - interpretation of this ECG is computer generated - see medical record for final interpretation  Confirmed by - EMERGENCY ROOM, PHYSICIAN ([id number]),  CALE VILLATORO ([id number]) on 6/2/2025 6:40:49 AM     CBC with platelets differential     Status: None    Narrative    The following orders were created for panel order CBC with platelets differential.  Procedure                               Abnormality         Status                     ---------                               -----------         ------                     CBC with platelets and ...[[id number]]                      Final result                 Please view results for these tests on the individual orders.     Medications   ipratropium - albuterol 0.5 mg/2.5 mg/3 mL (DUONEB) neb solution 3 mL (3 mLs Nebulization $Given 6/2/25 0733)   sodium chloride 0.9% BOLUS 1,000 mL (0 mLs Intravenous Stopped 6/2/25 1046)   pantoprazole (PROTONIX) IV push injection 40 mg (40 mg Intravenous $Given 6/2/25 0735)   acetaminophen (TYLENOL) tablet 1,000 mg (1,000 mg Oral $Given 6/2/25 0733)     Labs Ordered and Resulted from Time of ED Arrival to Time of ED Departure   COMPREHENSIVE METABOLIC PANEL - Abnormal       Result Value    Sodium 135      Potassium 3.9      Carbon Dioxide (CO2) 19 (*)     Anion Gap 20 (*)     Urea Nitrogen 21.7 (*)     Creatinine 1.13 (*)      GFR Estimate 60 (*)     Calcium 9.5      Chloride 96 (*)     Glucose 336 (*)     Alkaline Phosphatase 137      AST 43      ALT 51 (*)     Protein Total 7.9      Albumin 3.8      Bilirubin Total 0.4     TROPONIN T, HIGH SENSITIVITY - Abnormal    Troponin T, High Sensitivity 15 (*)    ROUTINE UA WITH MICROSCOPIC REFLEX TO CULTURE - Abnormal    Color Urine Orange (*)     Appearance Urine Slightly Cloudy (*)     Glucose Urine >=1000 (*)     Bilirubin Urine Negative      Ketones Urine 10 (*)     Specific Gravity Urine >1.030      Blood Urine Negative      pH Urine 5.5      Protein Albumin Urine 70 (*)     Urobilinogen Urine 2.0 (*)     Nitrite Urine Negative      Leukocyte Esterase Urine Small (*)     Mucus Urine Present (*)     RBC Urine 2      WBC Urine 12 (*)     Squamous Epithelials Urine 18 (*)     Hyaline Casts Urine 100 (*)    LIPASE - Normal    Lipase 34     MAGNESIUM - Normal    Magnesium 1.8     TROPONIN T, HIGH SENSITIVITY - Normal    Troponin T, High Sensitivity 13     CBC WITH PLATELETS AND DIFFERENTIAL    WBC Count 7.4      RBC Count 5.14      Hemoglobin 14.3      Hematocrit 41.8      MCV 81      MCH 27.8      MCHC 34.2      RDW 14.1      Platelet Count 204      % Neutrophils 71      % Lymphocytes 20      % Monocytes 7      % Eosinophils 2      % Basophils 0      % Immature Granulocytes 1      NRBCs per 100 WBC 0      Absolute Neutrophils 5.2      Absolute Lymphocytes 1.4      Absolute Monocytes 0.5      Absolute Eosinophils 0.2      Absolute Basophils 0.0      Absolute Immature Granulocytes 0.0      Absolute NRBCs 0.0     URINE CULTURE     Chest XR,  PA & LAT   Final Result   IMPRESSION: Negative chest.             Critical care was not performed.     Medical Decision Making  The patient's presentation was of high complexity (a chronic illness severe exacerbation, progression, or side effect of treatment).    The patient's evaluation involved:  review of external note(s) from 3+ sources (ED notes, clinic  notes, nursing notes, progress notes)  review of 3+ test result(s) ordered prior to this encounter (CBC, CMP, lipase, troponin)  ordering and/or review of 3+ test(s) in this encounter (see separate area of note for details)    The patient's management necessitated moderate risk (prescription drug management including medications given in the ED).    Assessment & Plan    This is a 49-year-old female presenting with nausea and vomiting.  She does have a history of cyclic vomiting and reports having similar symptoms with that.  Here vitals were notable for slight tachycardia on arrival which improved.  She did have borderline pulse ox however was wheezing, does report feeling short of breath.  She has had similar pulse ox levels during prior ED visits.  She does have a history of asthma, has not taken anything for this.  She was given a DuoNeb and subsequently did have improvement in shortness of breath and pulse ox.  No associated chest pain.  EKG did not show ischemic findings, initial and repeat troponin were stable.  Slight QT prolongation noted which she does have a history of, did receive QT prolonging medications from EMS.  Chest x-ray was clear.    Remainder of her labs were reassuring.  The patient had no further nausea here, vitals remained stable, she felt improved and was able to tolerate oral intake.  Urinalysis indeterminate get presence of squamous cells however she denies any symptoms so we will send for culture and defer treatment pending culture given lack of symptoms.    Patient was discharged in stable condition.  She was agreeable with plan for outpatient follow-up.  Return precautions were discussed and all questions answered.    I have reviewed the nursing notes. I have reviewed the findings, diagnosis, plan and need for follow up with the patient.    Discharge Medication List as of 6/2/2025 10:46 AM          Final diagnoses:   Nausea and vomiting, unspecified vomiting type       You  Desi LE[facility] EMERGENCY DEPARTMENT  6/2/2025     You Weeks MD  06/02/25 [id number]

## 2025-06-03 ENCOUNTER — RESULTS FOLLOW-UP (OUTPATIENT)
Dept: NURSING | Facility: CLINIC | Age: 50
End: 2025-06-03

## 2025-06-03 LAB — BACTERIA UR CULT: NORMAL

## 2025-06-04 ENCOUNTER — PATIENT OUTREACH (OUTPATIENT)
Dept: CARE COORDINATION | Facility: CLINIC | Age: 50
End: 2025-06-04
Payer: COMMERCIAL

## 2025-06-04 NOTE — PROGRESS NOTES
[facility] Care Resource Center Contact  [facility]/Voicemail     Clinical Data: Care Coordination ED-sourced Outreach-     Outreach attempted x 2.  Left message on patient's voicemail, providing [facility]'s 24/7 scheduling and nurse triage phone number [phone] ([phone]) for questions/concerns and/or to schedule an appt with an [facility] provider.      [facility] unable to send Care Coordination introduction letter as patient does not have an active MyChart account. Clinic Care Coordination services remain available via referral if needed.    Plan: [facility] will do no further outreaches at this time.       DAI Lara  [facility] Care Resource [place], [facility]    *Connected Care Resource Team does NOT follow patient ongoing. Referrals are identified based on internal discharge reports and the outreach is to ensure patient has an understanding of their discharge instructions.

## 2025-06-29 ENCOUNTER — HOSPITAL ENCOUNTER (EMERGENCY)
Facility: CLINIC | Age: 50
Discharge: HOME | End: 2025-06-29
Attending: EMERGENCY MEDICINE | Admitting: EMERGENCY MEDICINE
Payer: COMMERCIAL

## 2025-06-29 ENCOUNTER — APPOINTMENT (OUTPATIENT)
Dept: CT IMAGING | Facility: CLINIC | Age: 50
End: 2025-06-29
Attending: EMERGENCY MEDICINE
Payer: COMMERCIAL

## 2025-06-29 VITALS
OXYGEN SATURATION: 99 % | HEART RATE: 64 BPM | BODY MASS INDEX: 29.33 KG/M2 | DIASTOLIC BLOOD PRESSURE: 105 MMHG | RESPIRATION RATE: 16 BRPM | SYSTOLIC BLOOD PRESSURE: 165 MMHG | HEIGHT: 69 IN | WEIGHT: 198 LBS | TEMPERATURE: 98.3 F

## 2025-06-29 DIAGNOSIS — R10.31 RIGHT LOWER QUADRANT ABDOMINAL PAIN: ICD-10-CM

## 2025-06-29 DIAGNOSIS — K59.00 CONSTIPATION, UNSPECIFIED CONSTIPATION TYPE: ICD-10-CM

## 2025-06-29 LAB
ALBUMIN SERPL BCG-MCNC: 3.9 G/DL (ref 3.5–5.2)
ALBUMIN UR-MCNC: NEGATIVE MG/DL
ALP SERPL-CCNC: 142 U/L (ref 40–150)
ALT SERPL W P-5'-P-CCNC: 101 U/L (ref 0–50)
ANION GAP SERPL CALCULATED.3IONS-SCNC: 14 MMOL/L (ref 7–15)
APPEARANCE UR: CLEAR
AST SERPL W P-5'-P-CCNC: 65 U/L (ref 0–45)
BASOPHILS # BLD AUTO: 0 10E3/UL (ref 0–0.2)
BASOPHILS NFR BLD AUTO: 0 %
BILIRUB SERPL-MCNC: 0.2 MG/DL
BILIRUB UR QL STRIP: NEGATIVE
BUN SERPL-MCNC: 20.3 MG/DL (ref 6–20)
CALCIUM SERPL-MCNC: 9.3 MG/DL (ref 8.8–10.4)
CHLORIDE SERPL-SCNC: 99 MMOL/L (ref 98–107)
COLOR UR AUTO: ABNORMAL
CREAT SERPL-MCNC: 0.69 MG/DL (ref 0.51–0.95)
EGFRCR SERPLBLD CKD-EPI 2021: >90 ML/MIN/1.73M2
EOSINOPHIL # BLD AUTO: 0.7 10E3/UL (ref 0–0.7)
EOSINOPHIL NFR BLD AUTO: 8 %
ERYTHROCYTE [DISTWIDTH] IN BLOOD BY AUTOMATED COUNT: 15.1 % (ref 10–15)
GLUCOSE SERPL-MCNC: 232 MG/DL (ref 70–99)
GLUCOSE UR STRIP-MCNC: >=1000 MG/DL
HCG UR QL: NEGATIVE
HCO3 SERPL-SCNC: 22 MMOL/L (ref 22–29)
HCT VFR BLD AUTO: 39.9 % (ref 35–47)
HGB BLD-MCNC: 13.6 G/DL (ref 11.7–15.7)
HGB UR QL STRIP: NEGATIVE
IMM GRANULOCYTES # BLD: 0 10E3/UL
IMM GRANULOCYTES NFR BLD: 0 %
KETONES UR STRIP-MCNC: NEGATIVE MG/DL
LEUKOCYTE ESTERASE UR QL STRIP: NEGATIVE
LIPASE SERPL-CCNC: 82 U/L (ref 13–60)
LYMPHOCYTES # BLD AUTO: 5 10E3/UL (ref 0.8–5.3)
LYMPHOCYTES NFR BLD AUTO: 54 %
MCH RBC QN AUTO: 27.7 PG (ref 26.5–33)
MCHC RBC AUTO-ENTMCNC: 34.1 G/DL (ref 31.5–36.5)
MCV RBC AUTO: 81 FL (ref 78–100)
MONOCYTES # BLD AUTO: 0.4 10E3/UL (ref 0–1.3)
MONOCYTES NFR BLD AUTO: 5 %
MUCOUS THREADS #/AREA URNS LPF: PRESENT /LPF
NEUTROPHILS # BLD AUTO: 3.1 10E3/UL (ref 1.6–8.3)
NEUTROPHILS NFR BLD AUTO: 33 %
NITRATE UR QL: NEGATIVE
NRBC # BLD AUTO: 0 10E3/UL
NRBC BLD AUTO-RTO: 0 /100
PH UR STRIP: 6.5 [PH] (ref 5–7)
PLATELET # BLD AUTO: 238 10E3/UL (ref 150–450)
POTASSIUM SERPL-SCNC: 4.6 MMOL/L (ref 3.4–5.3)
PROT SERPL-MCNC: 8.1 G/DL (ref 6.4–8.3)
RBC # BLD AUTO: 4.91 10E6/UL (ref 3.8–5.2)
RBC URINE: 1 /HPF
SODIUM SERPL-SCNC: 135 MMOL/L (ref 135–145)
SP GR UR STRIP: 1.01 (ref 1–1.03)
SQUAMOUS EPITHELIAL: 2 /HPF
UROBILINOGEN UR STRIP-MCNC: NORMAL MG/DL
WBC # BLD AUTO: 9.3 10E3/UL (ref 4–11)
WBC URINE: <1 /HPF

## 2025-06-29 PROCEDURE — 85025 COMPLETE CBC W/AUTO DIFF WBC: CPT | Performed by: EMERGENCY MEDICINE

## 2025-06-29 PROCEDURE — 81001 URINALYSIS AUTO W/SCOPE: CPT | Performed by: EMERGENCY MEDICINE

## 2025-06-29 PROCEDURE — 250N000009 HC RX 250: Performed by: EMERGENCY MEDICINE

## 2025-06-29 PROCEDURE — 250N000011 HC RX IP 250 OP 636: Performed by: EMERGENCY MEDICINE

## 2025-06-29 PROCEDURE — 80053 COMPREHEN METABOLIC PANEL: CPT | Performed by: EMERGENCY MEDICINE

## 2025-06-29 PROCEDURE — 83690 ASSAY OF LIPASE: CPT | Performed by: EMERGENCY MEDICINE

## 2025-06-29 PROCEDURE — 99284 EMERGENCY DEPT VISIT MOD MDM: CPT | Performed by: EMERGENCY MEDICINE

## 2025-06-29 PROCEDURE — 74177 CT ABD & PELVIS W/CONTRAST: CPT

## 2025-06-29 PROCEDURE — 96374 THER/PROPH/DIAG INJ IV PUSH: CPT | Mod: 59 | Performed by: EMERGENCY MEDICINE

## 2025-06-29 PROCEDURE — 258N000003 HC RX IP 258 OP 636: Performed by: EMERGENCY MEDICINE

## 2025-06-29 PROCEDURE — 99285 EMERGENCY DEPT VISIT HI MDM: CPT | Mod: 25 | Performed by: EMERGENCY MEDICINE

## 2025-06-29 PROCEDURE — 999N000127 HC STATISTIC PERIPHERAL IV START W US GUIDANCE

## 2025-06-29 PROCEDURE — 999N000285 HC STATISTIC VASC ACCESS LAB DRAW WITH PIV START

## 2025-06-29 PROCEDURE — 96375 TX/PRO/DX INJ NEW DRUG ADDON: CPT | Performed by: EMERGENCY MEDICINE

## 2025-06-29 PROCEDURE — 96361 HYDRATE IV INFUSION ADD-ON: CPT | Performed by: EMERGENCY MEDICINE

## 2025-06-29 PROCEDURE — 81025 URINE PREGNANCY TEST: CPT | Performed by: EMERGENCY MEDICINE

## 2025-06-29 RX ORDER — HYDROMORPHONE HYDROCHLORIDE 1 MG/ML
0.5 INJECTION, SOLUTION INTRAMUSCULAR; INTRAVENOUS; SUBCUTANEOUS
Refills: 0 | Status: COMPLETED | OUTPATIENT
Start: 2025-06-29 | End: 2025-06-29

## 2025-06-29 RX ORDER — IOPAMIDOL 755 MG/ML
100 INJECTION, SOLUTION INTRAVASCULAR ONCE
Status: COMPLETED | OUTPATIENT
Start: 2025-06-29 | End: 2025-06-29

## 2025-06-29 RX ORDER — SENNA AND DOCUSATE SODIUM 50; 8.6 MG/1; MG/1
1 TABLET, FILM COATED ORAL AT BEDTIME
Qty: 7 TABLET | Refills: 0 | Status: SHIPPED | OUTPATIENT
Start: 2025-06-29 | End: 2025-07-06

## 2025-06-29 RX ORDER — ONDANSETRON 2 MG/ML
4 INJECTION INTRAMUSCULAR; INTRAVENOUS EVERY 30 MIN PRN
Status: DISCONTINUED | OUTPATIENT
Start: 2025-06-29 | End: 2025-06-30 | Stop reason: HOSPADM

## 2025-06-29 RX ORDER — LIDOCAINE 40 MG/G
CREAM TOPICAL
Status: DISCONTINUED | OUTPATIENT
Start: 2025-06-29 | End: 2025-06-30 | Stop reason: HOSPADM

## 2025-06-29 RX ADMIN — IOPAMIDOL 97 ML: 755 INJECTION, SOLUTION INTRAVENOUS at 22:52

## 2025-06-29 RX ADMIN — SODIUM CHLORIDE 60 ML: 9 INJECTION, SOLUTION INTRAVENOUS at 22:54

## 2025-06-29 RX ADMIN — HYDROMORPHONE HYDROCHLORIDE 0.5 MG: 1 INJECTION, SOLUTION INTRAMUSCULAR; INTRAVENOUS; SUBCUTANEOUS at 21:23

## 2025-06-29 RX ADMIN — SODIUM CHLORIDE 1000 ML: 0.9 INJECTION, SOLUTION INTRAVENOUS at 19:46

## 2025-06-29 RX ADMIN — ONDANSETRON 4 MG: 2 INJECTION INTRAMUSCULAR; INTRAVENOUS at 19:46

## 2025-06-29 ASSESSMENT — ACTIVITIES OF DAILY LIVING (ADL)
ADLS_ACUITY_SCORE: 58

## 2025-06-29 NOTE — ED PROVIDER NOTES
"    [facility] EMERGENCY DEPARTMENT ([facility])    6/29/25       ED PROVIDER NOTE  History     Chief Complaint   Patient presents with    Abdominal Pain     Last night started having \" severe\" RLQ pain. This AM started being nausea \" I have not been able to eat\" No changes in B&B. Has not taken any pain meds.  Has had \"gallbladder removed, R fallopian and ovary also removed probably 10 years ago\"     HPI  Yun Coyle is a 49 year old female with a history notable for cyclical vomiting, T2DM, HTN, and previous polysubstance abuse (on methadone) who presents to the ED with right lower quadrant abdominal pain.  Patient reports this began last night.  He describes it as a constant achy pain.  It is localized to the right lower quadrant, no radiation.  She rates it as an 8/10.  She denies any vomiting.  No history of appendectomy but she does have a history of previous cholecystectomy and right-sided salpingo-oophorectomy.  She denies any alcohol or drug use.  No dysuria.      Past Medical History  Past Medical History:   Diagnosis Date    Acute pancreatitis 06/29/2017    Anxiety     Cauda equina syndrome (H) 03/29/2013    Cyst of right ovary 11/16/2017    Depressive disorder     Gastroesophageal reflux disease     Hypertension     Hyponatremia 10/01/2023    Low back pain 08/20/2012    Type 2 diabetes mellitus (H)     Uncomplicated asthma      Past Surgical History:   Procedure Laterality Date    BACK SURGERY      x2    CHOLECYSTECTOMY      ORTHOPEDIC SURGERY      SALPINGO OOPHORECTOMY,R/L/ELDA Right      SENNA-docusate sodium (SENNA S) 8.6-50 MG tablet  albuterol (PROAIR HFA/PROVENTIL HFA/VENTOLIN HFA) 108 (90 Base) MCG/ACT inhaler  Alcohol Swabs PADS  blood glucose (NO BRAND SPECIFIED) lancets standard  blood glucose (NO BRAND SPECIFIED) test strip  blood glucose calibration (NO BRAND SPECIFIED) solution  blood glucose monitoring (NO BRAND SPECIFIED) meter device kit  cloNIDine (CATAPRES) 0.1 MG " "tablet  empagliflozin (JARDIANCE) 10 MG TABS tablet  gabapentin (NEURONTIN) 100 MG capsule  glucose 40 % (400 mg/mL) gel  hydrOXYzine HCl (ATARAX) 25 MG tablet  insulin aspart (NOVOLOG PEN) 100 UNIT/ML pen  insulin aspart (NOVOLOG PEN) 100 UNIT/ML pen  insulin aspart (NOVOLOG PEN) 100 UNIT/ML pen  insulin glargine (LANTUS PEN) 100 UNIT/ML pen  insulin pen needle (32G X 4 MM) 32G X 4 MM miscellaneous  losartan (COZAAR) 25 MG tablet  metFORMIN (GLUCOPHAGE) 1000 MG tablet  methadone (DOLOPHINE-INTENSOL) 10 MG/ML (HIGH CONC) solution  metoclopramide (REGLAN) 10 MG tablet  OLANZapine (ZYPREXA) 10 MG tablet  ondansetron (ZOFRAN) 4 MG tablet  prazosin (MINIPRESS) 2 MG capsule  sertraline (ZOLOFT) 50 MG tablet  [employer] MISC  traZODone (DESYREL) 50 MG tablet      No Known Allergies  Family History  Family History   Problem Relation Age of Onset    Breast Cancer Mother         in her 50s.    Diabetes Mother     Breast Cancer Maternal Grandmother         in her 40s.    Diabetes Maternal Grandmother      Social History   Social History     Tobacco Use    Smoking status: Never    Smokeless tobacco: Never   Vaping Use    Vaping status: Never Used   Substance Use Topics    Alcohol use: No    Drug use: Not Currently     Types: Marijuana     Comment: pt was taking Oxycontin \"years ago.\" pt is now on Methadone.      Past medical history, past surgical history, medications, allergies, family history, and social history were reviewed with the patient. No additional pertinent items.     A medically appropriate review of systems was performed with pertinent positives and negatives noted in the HPI, and all other systems negative.    Physical Exam   BP: (!) 157/100  Pulse: 70  Temp: 98.4  F (36.9  C)  Resp: 16  Height: 175.3 cm (5' 9\")  Weight: 89.8 kg (198 lb)  SpO2: 98 %    Physical Exam  Vitals and nursing note reviewed.   Constitutional:       General: She is not in acute distress.     Appearance: Normal appearance. She is " well-developed. She is not ill-appearing or diaphoretic.   HENT:      Head: Normocephalic and atraumatic.      Nose: Nose normal.      Mouth/Throat:      Mouth: Mucous membranes are moist.   Eyes:      General: No scleral icterus.     Conjunctiva/sclera: Conjunctivae normal.   Cardiovascular:      Rate and Rhythm: Normal rate.      Heart sounds: Normal heart sounds.   Pulmonary:      Effort: Pulmonary effort is normal. No respiratory distress.      Breath sounds: Normal breath sounds. No stridor.   Abdominal:      General: Abdomen is flat. There is no distension.      Palpations: Abdomen is soft.      Tenderness: There is abdominal tenderness in the right lower quadrant. There is guarding and rebound. Positive signs include McBurney's sign.   Musculoskeletal:         General: No deformity or signs of injury. Normal range of motion.      Cervical back: Normal range of motion and neck supple.   Skin:     General: Skin is warm and dry.      Coloration: Skin is not jaundiced or pale.      Findings: No rash.   Neurological:      General: No focal deficit present.      Mental Status: She is alert and oriented to person, place, and time.   Psychiatric:         Mood and Affect: Mood normal.         Behavior: Behavior normal.         ED Course, Procedures, & Data      Procedures                Results for orders placed or performed during the hospital encounter of 06/29/25   CT Abdomen Pelvis w Contrast    Impression    IMPRESSION:   1.  Appendix is normal.  2.  Moderate colonic stool.  3.  Hepatic steatosis.   Comprehensive metabolic panel   Result Value Ref Range    Sodium 135 135 - 145 mmol/L    Potassium 4.6 3.4 - 5.3 mmol/L    Carbon Dioxide (CO2) 22 22 - 29 mmol/L    Anion Gap 14 7 - 15 mmol/L    Urea Nitrogen 20.3 (H) 6.0 - 20.0 mg/dL    Creatinine 0.69 0.51 - 0.95 mg/dL    GFR Estimate >90 >60 mL/min/1.73m2    Calcium 9.3 8.8 - 10.4 mg/dL    Chloride 99 98 - 107 mmol/L    Glucose 232 (H) 70 - 99 mg/dL    Alkaline  Phosphatase 142 40 - 150 U/L    AST 65 (H) 0 - 45 U/L     (H) 0 - 50 U/L    Protein Total 8.1 6.4 - 8.3 g/dL    Albumin 3.9 3.5 - 5.2 g/dL    Bilirubin Total 0.2 <=1.2 mg/dL   Result Value Ref Range    Lipase 82 (H) 13 - 60 U/L   UA with Microscopic reflex to Culture    Specimen: Urine, Midstream   Result Value Ref Range    Color Urine Light Yellow Colorless, Straw, Light Yellow, Yellow    Appearance Urine Clear Clear    Glucose Urine >=1000 (A) Negative mg/dL    Bilirubin Urine Negative Negative    Ketones Urine Negative Negative mg/dL    Specific Gravity Urine 1.015 1.003 - 1.035    Blood Urine Negative Negative    pH Urine 6.5 5.0 - 7.0    Protein Albumin Urine Negative Negative mg/dL    Urobilinogen Urine Normal Normal mg/dL    Nitrite Urine Negative Negative    Leukocyte Esterase Urine Negative Negative    Mucus Urine Present (A) None Seen /LPF    RBC Urine 1 <=2 /HPF    WBC Urine <1 <=5 /HPF    Squamous Epithelials Urine 2 (H) <=1 /HPF   HCG qualitative urine (UPT)   Result Value Ref Range    hCG Urine Qualitative Negative Negative   CBC with platelets and differential   Result Value Ref Range    WBC Count 9.3 4.0 - 11.0 10e3/uL    RBC Count 4.91 3.80 - 5.20 10e6/uL    Hemoglobin 13.6 11.7 - 15.7 g/dL    Hematocrit 39.9 35.0 - 47.0 %    MCV 81 78 - 100 fL    MCH 27.7 26.5 - 33.0 pg    MCHC 34.1 31.5 - 36.5 g/dL    RDW 15.1 (H) 10.0 - 15.0 %    Platelet Count 238 150 - 450 10e3/uL    % Neutrophils 33 %    % Lymphocytes 54 %    % Monocytes 5 %    % Eosinophils 8 %    % Basophils 0 %    % Immature Granulocytes 0 %    NRBCs per 100 WBC 0 <1 /100    Absolute Neutrophils 3.1 1.6 - 8.3 10e3/uL    Absolute Lymphocytes 5.0 0.8 - 5.3 10e3/uL    Absolute Monocytes 0.4 0.0 - 1.3 10e3/uL    Absolute Eosinophils 0.7 0.0 - 0.7 10e3/uL    Absolute Basophils 0.0 0.0 - 0.2 10e3/uL    Absolute Immature Granulocytes 0.0 <=0.4 10e3/uL    Absolute NRBCs 0.0 10e3/uL     Medications   sodium chloride 0.9% BOLUS 1,000 mL (0 mLs  Intravenous Stopped 6/29/25 2114)   HYDROmorphone (PF) (DILAUDID) injection 0.5 mg (0.5 mg Intravenous $Given 6/29/25 2123)   iopamidol (ISOVUE-370) solution 100 mL (97 mLs Intravenous $Given 6/29/25 2252)   sodium chloride 0.9 % bag for CT scan flush (60 mLs As instructed $Given 6/29/25 2254)              Assessment & Plan    Yun Coyle is a 49 year old female with a history notable for cyclical vomiting, T2DM, HTN, and previous polysubstance abuse (on methadone) who presents to the ED with right lower quadrant abdominal pain.    Afebrile, hypertensive, vitals otherwise normal.  Given IV fluid, Zofran.  On examination she does have localized right lower quadrant tenderness with guarding and rebound.  This is different than patient's baseline chronic pain examination.  Given Dilaudid for pain control.  Labs notable for glucose of 232 with chronically elevated glucose and poorly managed diabetes.  Creatinine normal.  AST 65 and  with normal bilirubin.  This is a mild bump in LFTs with a history of similarly mild intermittent elevations.  Lipase 82 which does not meet criteria for pancreatitis.  Patient denies alcohol use.  CBC normal.  UA bland.  Urine pregnancy negative.  CT abdomen pelvis shows a normal appendix and hepatic steatosis.  Moderate colonic stool burden.  Patient advised to take senna docusate for treatment of constipation as a possible cause for her pain.  Discharged with a prescription for senna docusate.  Return precautions provided.    I have reviewed the nursing notes. I have reviewed the findings, diagnosis, plan and need for follow up with the patient.    Discharge Medication List as of 6/29/2025 11:45 PM        START taking these medications    Details   SENNA-docusate sodium (SENNA S) 8.6-50 MG tablet Take 1 tablet by mouth at bedtime for 7 days., Disp-7 tablet, R-0, Local Print             Final diagnoses:   Constipation, unspecified constipation type   Right lower quadrant  abdominal pain     I, Vidhya York, am serving as a trained medical scribe to document services personally performed by Laure Guerrero MD based on the provider's statements to me on June 29, 2025.  This document has been checked and approved by the attending provider.    I, Laure Guerrero MD, was physically present and have reviewed and verified the accuracy of this note documented by Vidhya York medical scribe.      Laure Guerrero MD  [facility] EMERGENCY DEPARTMENT  6/29/2025     Laure Guerrero MD  06/30/25 2049

## 2025-06-29 NOTE — ED TRIAGE NOTES
"  Last night started having \" severe\" RLQ pain. This AM started being nausea \" I have not been able to eat\" No changes in B&B. Has not taken any pain meds. Has had \"gallbladder removed, R fallopian and ovary also removed probably 10 years ago\"   Triage Assessment (Adult)       Row Name 06/29/25 4790          Triage Assessment    Airway WDL WDL        Respiratory WDL    Respiratory WDL WDL        Skin Circulation/Temperature WDL    Skin Circulation/Temperature WDL WDL        Cardiac WDL    Cardiac WDL WDL        Peripheral/Neurovascular WDL    Peripheral Neurovascular WDL WDL        Cognitive/Neuro/Behavioral WDL    Cognitive/Neuro/Behavioral WDL WDL                     "

## 2025-06-29 NOTE — ED NOTES
Attempted IV insertion, pt reports being an ultrasound stick. Attempt unsuccessful. Ultrasound trained ED RN and Flyer unavailable at this time. Vasc Access order placed.

## 2025-06-30 NOTE — DISCHARGE INSTRUCTIONS
Please make an appointment to follow up with Your Primary Care Provider in 2-3 days if not improving.    Take senna-docusate for constipation.     Return to the ED for worsening pain, recurrent vomiting or diarrhea, blood in our stool, dehydration, fever, or abdominal distention and inability to pass gas from below.

## 2025-06-30 NOTE — CONSULTS
"Consult received for Vascular Access Team.  See LDA for details. For additional needs place \"Consult for Inpatient Vascular Access Care\"  [id number] order in EPIC.    "

## 2025-07-21 ENCOUNTER — HOSPITAL ENCOUNTER (EMERGENCY)
Facility: CLINIC | Age: 50
Discharge: HOME | End: 2025-07-21
Attending: STUDENT IN AN ORGANIZED HEALTH CARE EDUCATION/TRAINING PROGRAM | Admitting: STUDENT IN AN ORGANIZED HEALTH CARE EDUCATION/TRAINING PROGRAM
Payer: COMMERCIAL

## 2025-07-21 ENCOUNTER — APPOINTMENT (OUTPATIENT)
Dept: CT IMAGING | Facility: CLINIC | Age: 50
End: 2025-07-21
Attending: STUDENT IN AN ORGANIZED HEALTH CARE EDUCATION/TRAINING PROGRAM
Payer: COMMERCIAL

## 2025-07-21 VITALS
TEMPERATURE: 98.2 F | OXYGEN SATURATION: 99 % | WEIGHT: 195.9 LBS | SYSTOLIC BLOOD PRESSURE: 158 MMHG | DIASTOLIC BLOOD PRESSURE: 108 MMHG | HEART RATE: 65 BPM | RESPIRATION RATE: 16 BRPM | BODY MASS INDEX: 29.02 KG/M2 | HEIGHT: 69 IN

## 2025-07-21 DIAGNOSIS — R74.01 TRANSAMINITIS: ICD-10-CM

## 2025-07-21 DIAGNOSIS — R11.15 CYCLICAL VOMITING SYNDROME: ICD-10-CM

## 2025-07-21 DIAGNOSIS — R10.9 RIGHT-SIDED ABDOMINAL PAIN OF UNKNOWN CAUSE: ICD-10-CM

## 2025-07-21 DIAGNOSIS — E11.65 HYPERGLYCEMIA DUE TO DIABETES MELLITUS (H): ICD-10-CM

## 2025-07-21 DIAGNOSIS — K59.00 CONSTIPATION, UNSPECIFIED CONSTIPATION TYPE: ICD-10-CM

## 2025-07-21 LAB
ALBUMIN SERPL BCG-MCNC: 4.4 G/DL (ref 3.5–5.2)
ALBUMIN UR-MCNC: NEGATIVE MG/DL
ALP SERPL-CCNC: 148 U/L (ref 40–150)
ALT SERPL W P-5'-P-CCNC: 120 U/L (ref 0–50)
AMPHETAMINES UR QL SCN: ABNORMAL
ANION GAP SERPL CALCULATED.3IONS-SCNC: 17 MMOL/L (ref 7–15)
APPEARANCE UR: CLEAR
AST SERPL W P-5'-P-CCNC: 86 U/L (ref 0–45)
BARBITURATES UR QL SCN: ABNORMAL
BASOPHILS # BLD AUTO: 0.1 10E3/UL (ref 0–0.2)
BASOPHILS NFR BLD AUTO: 1 %
BENZODIAZ UR QL SCN: ABNORMAL
BILIRUB SERPL-MCNC: 0.4 MG/DL
BILIRUB UR QL STRIP: NEGATIVE
BUN SERPL-MCNC: 18.1 MG/DL (ref 6–20)
BUPRENORPHINE UR QL: NORMAL
BZE UR QL SCN: ABNORMAL
CALCIUM SERPL-MCNC: 10 MG/DL (ref 8.8–10.4)
CANNABINOIDS UR QL SCN: ABNORMAL
CHLORIDE SERPL-SCNC: 96 MMOL/L (ref 98–107)
CK SERPL-CCNC: 57 U/L (ref 26–192)
COLOR UR AUTO: ABNORMAL
CREAT SERPL-MCNC: 0.79 MG/DL (ref 0.51–0.95)
EGFRCR SERPLBLD CKD-EPI 2021: >90 ML/MIN/1.73M2
EOSINOPHIL # BLD AUTO: 0.7 10E3/UL (ref 0–0.7)
EOSINOPHIL NFR BLD AUTO: 8 %
ERYTHROCYTE [DISTWIDTH] IN BLOOD BY AUTOMATED COUNT: 15 % (ref 10–15)
FENTANYL UR QL: ABNORMAL
GLUCOSE SERPL-MCNC: 314 MG/DL (ref 70–99)
GLUCOSE UR STRIP-MCNC: >=1000 MG/DL
HCO3 SERPL-SCNC: 22 MMOL/L (ref 22–29)
HCT VFR BLD AUTO: 42.4 % (ref 35–47)
HGB BLD-MCNC: 14.9 G/DL (ref 11.7–15.7)
HGB UR QL STRIP: NEGATIVE
HYALINE CASTS: 1 /LPF
IMM GRANULOCYTES # BLD: 0 10E3/UL
IMM GRANULOCYTES NFR BLD: 1 %
KETONES UR STRIP-MCNC: NEGATIVE MG/DL
LACTATE SERPL-SCNC: 2.1 MMOL/L (ref 0.7–2)
LACTATE SERPL-SCNC: 2.6 MMOL/L (ref 0.7–2)
LEUKOCYTE ESTERASE UR QL STRIP: NEGATIVE
LIPASE SERPL-CCNC: 33 U/L (ref 13–60)
LYMPHOCYTES # BLD AUTO: 2.9 10E3/UL (ref 0.8–5.3)
LYMPHOCYTES NFR BLD AUTO: 34 %
MAGNESIUM SERPL-MCNC: 1.7 MG/DL (ref 1.7–2.3)
MCH RBC QN AUTO: 28.8 PG (ref 26.5–33)
MCHC RBC AUTO-ENTMCNC: 35.1 G/DL (ref 31.5–36.5)
MCV RBC AUTO: 82 FL (ref 78–100)
METHADONE UR QL SCN: ABNORMAL
MONOCYTES # BLD AUTO: 0.3 10E3/UL (ref 0–1.3)
MONOCYTES NFR BLD AUTO: 4 %
MUCOUS THREADS #/AREA URNS LPF: PRESENT /LPF
NEUTROPHILS # BLD AUTO: 4.6 10E3/UL (ref 1.6–8.3)
NEUTROPHILS NFR BLD AUTO: 53 %
NITRATE UR QL: NEGATIVE
NRBC # BLD AUTO: 0 10E3/UL
NRBC BLD AUTO-RTO: 0 /100
OPIATES UR QL SCN: ABNORMAL
OXYCODONE UR QL: NORMAL
PCP QUAL URINE (ROCHE): ABNORMAL
PH UR STRIP: 5.5 [PH] (ref 5–7)
PLATELET # BLD AUTO: 221 10E3/UL (ref 150–450)
POTASSIUM SERPL-SCNC: 4.4 MMOL/L (ref 3.4–5.3)
PROT SERPL-MCNC: 8.8 G/DL (ref 6.4–8.3)
RBC # BLD AUTO: 5.17 10E6/UL (ref 3.8–5.2)
RBC URINE: 1 /HPF
SODIUM SERPL-SCNC: 135 MMOL/L (ref 135–145)
SP GR UR STRIP: 1.05 (ref 1–1.03)
SQUAMOUS EPITHELIAL: 1 /HPF
TROPONIN T SERPL HS-MCNC: 8 NG/L
TROPONIN T SERPL HS-MCNC: 8 NG/L
UROBILINOGEN UR STRIP-MCNC: NORMAL MG/DL
WBC # BLD AUTO: 8.6 10E3/UL (ref 4–11)
WBC URINE: 2 /HPF

## 2025-07-21 PROCEDURE — 83690 ASSAY OF LIPASE: CPT | Performed by: STUDENT IN AN ORGANIZED HEALTH CARE EDUCATION/TRAINING PROGRAM

## 2025-07-21 PROCEDURE — 85025 COMPLETE CBC W/AUTO DIFF WBC: CPT | Performed by: STUDENT IN AN ORGANIZED HEALTH CARE EDUCATION/TRAINING PROGRAM

## 2025-07-21 PROCEDURE — 250N000011 HC RX IP 250 OP 636: Performed by: STUDENT IN AN ORGANIZED HEALTH CARE EDUCATION/TRAINING PROGRAM

## 2025-07-21 PROCEDURE — 96375 TX/PRO/DX INJ NEW DRUG ADDON: CPT | Performed by: STUDENT IN AN ORGANIZED HEALTH CARE EDUCATION/TRAINING PROGRAM

## 2025-07-21 PROCEDURE — 80307 DRUG TEST PRSMV CHEM ANLYZR: CPT | Performed by: STUDENT IN AN ORGANIZED HEALTH CARE EDUCATION/TRAINING PROGRAM

## 2025-07-21 PROCEDURE — 82550 ASSAY OF CK (CPK): CPT | Performed by: STUDENT IN AN ORGANIZED HEALTH CARE EDUCATION/TRAINING PROGRAM

## 2025-07-21 PROCEDURE — 258N000003 HC RX IP 258 OP 636: Performed by: STUDENT IN AN ORGANIZED HEALTH CARE EDUCATION/TRAINING PROGRAM

## 2025-07-21 PROCEDURE — 83605 ASSAY OF LACTIC ACID: CPT | Performed by: STUDENT IN AN ORGANIZED HEALTH CARE EDUCATION/TRAINING PROGRAM

## 2025-07-21 PROCEDURE — 84484 ASSAY OF TROPONIN QUANT: CPT | Performed by: STUDENT IN AN ORGANIZED HEALTH CARE EDUCATION/TRAINING PROGRAM

## 2025-07-21 PROCEDURE — 99285 EMERGENCY DEPT VISIT HI MDM: CPT | Mod: 25 | Performed by: STUDENT IN AN ORGANIZED HEALTH CARE EDUCATION/TRAINING PROGRAM

## 2025-07-21 PROCEDURE — 74177 CT ABD & PELVIS W/CONTRAST: CPT

## 2025-07-21 PROCEDURE — 96374 THER/PROPH/DIAG INJ IV PUSH: CPT | Mod: 59 | Performed by: STUDENT IN AN ORGANIZED HEALTH CARE EDUCATION/TRAINING PROGRAM

## 2025-07-21 PROCEDURE — 81001 URINALYSIS AUTO W/SCOPE: CPT | Performed by: STUDENT IN AN ORGANIZED HEALTH CARE EDUCATION/TRAINING PROGRAM

## 2025-07-21 PROCEDURE — 250N000009 HC RX 250: Performed by: STUDENT IN AN ORGANIZED HEALTH CARE EDUCATION/TRAINING PROGRAM

## 2025-07-21 PROCEDURE — 93010 ELECTROCARDIOGRAM REPORT: CPT | Performed by: STUDENT IN AN ORGANIZED HEALTH CARE EDUCATION/TRAINING PROGRAM

## 2025-07-21 PROCEDURE — 99285 EMERGENCY DEPT VISIT HI MDM: CPT | Performed by: STUDENT IN AN ORGANIZED HEALTH CARE EDUCATION/TRAINING PROGRAM

## 2025-07-21 PROCEDURE — 83735 ASSAY OF MAGNESIUM: CPT | Performed by: STUDENT IN AN ORGANIZED HEALTH CARE EDUCATION/TRAINING PROGRAM

## 2025-07-21 PROCEDURE — 250N000013 HC RX MED GY IP 250 OP 250 PS 637: Performed by: STUDENT IN AN ORGANIZED HEALTH CARE EDUCATION/TRAINING PROGRAM

## 2025-07-21 PROCEDURE — 999N000285 HC STATISTIC VASC ACCESS LAB DRAW WITH PIV START

## 2025-07-21 PROCEDURE — 84155 ASSAY OF PROTEIN SERUM: CPT | Performed by: STUDENT IN AN ORGANIZED HEALTH CARE EDUCATION/TRAINING PROGRAM

## 2025-07-21 PROCEDURE — 96361 HYDRATE IV INFUSION ADD-ON: CPT | Performed by: STUDENT IN AN ORGANIZED HEALTH CARE EDUCATION/TRAINING PROGRAM

## 2025-07-21 PROCEDURE — 36415 COLL VENOUS BLD VENIPUNCTURE: CPT | Performed by: STUDENT IN AN ORGANIZED HEALTH CARE EDUCATION/TRAINING PROGRAM

## 2025-07-21 PROCEDURE — 93005 ELECTROCARDIOGRAM TRACING: CPT | Performed by: STUDENT IN AN ORGANIZED HEALTH CARE EDUCATION/TRAINING PROGRAM

## 2025-07-21 PROCEDURE — 999N000127 HC STATISTIC PERIPHERAL IV START W US GUIDANCE

## 2025-07-21 RX ORDER — ACETAMINOPHEN 500 MG
1000 TABLET ORAL ONCE
Status: COMPLETED | OUTPATIENT
Start: 2025-07-21 | End: 2025-07-21

## 2025-07-21 RX ORDER — DROPERIDOL 2.5 MG/ML
2.5 INJECTION, SOLUTION INTRAMUSCULAR; INTRAVENOUS ONCE
Status: DISCONTINUED | OUTPATIENT
Start: 2025-07-21 | End: 2025-07-21

## 2025-07-21 RX ORDER — METHADONE HYDROCHLORIDE 10 MG/ML
100 CONCENTRATE ORAL ONCE
Status: COMPLETED | OUTPATIENT
Start: 2025-07-21 | End: 2025-07-21

## 2025-07-21 RX ORDER — KETOROLAC TROMETHAMINE 15 MG/ML
15 INJECTION, SOLUTION INTRAMUSCULAR; INTRAVENOUS ONCE
Status: COMPLETED | OUTPATIENT
Start: 2025-07-21 | End: 2025-07-21

## 2025-07-21 RX ORDER — LIDOCAINE HYDROCHLORIDE 20 MG/ML
10 SOLUTION OROPHARYNGEAL ONCE
Status: COMPLETED | OUTPATIENT
Start: 2025-07-21 | End: 2025-07-21

## 2025-07-21 RX ORDER — MAGNESIUM HYDROXIDE/ALUMINUM HYDROXICE/SIMETHICONE 120; 1200; 1200 MG/30ML; MG/30ML; MG/30ML
15 SUSPENSION ORAL ONCE
Status: COMPLETED | OUTPATIENT
Start: 2025-07-21 | End: 2025-07-21

## 2025-07-21 RX ORDER — IOPAMIDOL 755 MG/ML
500 INJECTION, SOLUTION INTRAVASCULAR ONCE
Status: COMPLETED | OUTPATIENT
Start: 2025-07-21 | End: 2025-07-21

## 2025-07-21 RX ORDER — NAPROXEN 500 MG/1
500 TABLET ORAL 2 TIMES DAILY WITH MEALS
Qty: 14 TABLET | Refills: 0 | Status: SHIPPED | OUTPATIENT
Start: 2025-07-21 | End: 2025-07-28

## 2025-07-21 RX ORDER — POLYETHYLENE GLYCOL 3350 17 G/17G
1 POWDER, FOR SOLUTION ORAL DAILY
Qty: 527 G | Refills: 0 | Status: SHIPPED | OUTPATIENT
Start: 2025-07-21 | End: 2025-08-20

## 2025-07-21 RX ORDER — ONDANSETRON 4 MG/1
4 TABLET, ORALLY DISINTEGRATING ORAL EVERY 6 HOURS PRN
Qty: 12 TABLET | Refills: 0 | Status: SHIPPED | OUTPATIENT
Start: 2025-07-21 | End: 2025-07-24

## 2025-07-21 RX ORDER — DROPERIDOL 2.5 MG/ML
1.25 INJECTION, SOLUTION INTRAMUSCULAR; INTRAVENOUS ONCE
Status: COMPLETED | OUTPATIENT
Start: 2025-07-21 | End: 2025-07-21

## 2025-07-21 RX ORDER — DIPHENHYDRAMINE HYDROCHLORIDE 50 MG/ML
25 INJECTION, SOLUTION INTRAMUSCULAR; INTRAVENOUS ONCE
Status: COMPLETED | OUTPATIENT
Start: 2025-07-21 | End: 2025-07-21

## 2025-07-21 RX ADMIN — METHADONE HYDROCHLORIDE 100 MG: 10 CONCENTRATE ORAL at 10:40

## 2025-07-21 RX ADMIN — IOPAMIDOL 94 ML: 755 INJECTION, SOLUTION INTRAVENOUS at 13:20

## 2025-07-21 RX ADMIN — HYDROMORPHONE HYDROCHLORIDE 1 MG: 1 INJECTION, SOLUTION INTRAMUSCULAR; INTRAVENOUS; SUBCUTANEOUS at 09:25

## 2025-07-21 RX ADMIN — KETOROLAC TROMETHAMINE 15 MG: 15 INJECTION, SOLUTION INTRAMUSCULAR; INTRAVENOUS at 09:26

## 2025-07-21 RX ADMIN — DROPERIDOL 1.25 MG: 2.5 INJECTION, SOLUTION INTRAMUSCULAR; INTRAVENOUS at 10:59

## 2025-07-21 RX ADMIN — SODIUM CHLORIDE, SODIUM LACTATE, POTASSIUM CHLORIDE, AND CALCIUM CHLORIDE 1000 ML: .6; .31; .03; .02 INJECTION, SOLUTION INTRAVENOUS at 14:11

## 2025-07-21 RX ADMIN — DIPHENHYDRAMINE HYDROCHLORIDE 25 MG: 50 INJECTION, SOLUTION INTRAMUSCULAR; INTRAVENOUS at 09:27

## 2025-07-21 RX ADMIN — SODIUM CHLORIDE 48 ML: 9 INJECTION, SOLUTION INTRAVENOUS at 13:23

## 2025-07-21 RX ADMIN — ALUMINUM HYDROXIDE, MAGNESIUM HYDROXIDE, AND SIMETHICONE 15 ML: 200; 200; 20 SUSPENSION ORAL at 11:02

## 2025-07-21 RX ADMIN — ACETAMINOPHEN 1000 MG: 500 TABLET ORAL at 09:27

## 2025-07-21 RX ADMIN — PANTOPRAZOLE SODIUM 40 MG: 40 INJECTION, POWDER, FOR SOLUTION INTRAVENOUS at 09:29

## 2025-07-21 RX ADMIN — LIDOCAINE HYDROCHLORIDE 10 ML: 20 SOLUTION ORAL at 11:02

## 2025-07-21 RX ADMIN — SODIUM CHLORIDE, SODIUM LACTATE, POTASSIUM CHLORIDE, AND CALCIUM CHLORIDE 1000 ML: .6; .31; .03; .02 INJECTION, SOLUTION INTRAVENOUS at 09:28

## 2025-07-21 ASSESSMENT — ACTIVITIES OF DAILY LIVING (ADL)
ADLS_ACUITY_SCORE: 58

## 2025-07-21 NOTE — ED TRIAGE NOTES
Per patient in the past GI cocktail, droperidol and IV fluids has helped     Triage Assessment (Adult)       Row Name 07/21/25 0812          Triage Assessment    Airway WDL WDL        Respiratory WDL    Respiratory WDL WDL        Skin Circulation/Temperature WDL    Skin Circulation/Temperature WDL WDL        Cardiac WDL    Cardiac WDL WDL        Peripheral/Neurovascular WDL    Peripheral Neurovascular WDL WDL        Cognitive/Neuro/Behavioral WDL    Cognitive/Neuro/Behavioral WDL WDL

## 2025-07-21 NOTE — CONSULTS
"Consult received for Vascular Access Team.  See LDA for details. For additional needs place \"Consult for Inpatient Vascular Access Care\"  [id number] order in EPIC.  "

## 2025-07-21 NOTE — PHARMACY-CONSULT NOTE
Opioid Treatment Program (Methadone Clinic) Information Note      Treatment program name: [place]   Location ([facility]): [facility]   Phone number: [phone]                Spoke with: Dr. Camilo reported to have spoken with treatment program and confirmed dosing information included in this note.      Patient's current methadone dose verified as: 100 mg PO    Last dose was administered on: 7/19/2025   Take-home dose information (if applicable): 1 for Saturday 7/20/2025      Note(s): Treatment programs in [place] dispense methadone using the 10 mg/ml oral concentrate.

## 2025-07-21 NOTE — PROGRESS NOTES
Primary Care appointment scheduled at [facility] @12:50PM on Thursday 07/24 w/ Jonathan Chaudhary. [facility] [address], [address]. [place] [address].     Dillon Brower, [facility]  10 ICU & [place] ED   [facility] Acute Care Management  Ph: [phone]  Vocera: 10 ICU SW & Adult ED SW

## 2025-07-21 NOTE — DISCHARGE INSTRUCTIONS
You were seen in the ED due to your cyclical vomiting syndrome and abdominal pain. The tests done here also showed that you have high blood sugar and sugar in your urine, which can happen due to uncontrolled diabetes.     We set up an appointment for you this Thursday July 24th at [facility] at 1;10 PM. Please attend this appointment.

## 2025-07-21 NOTE — ED PROVIDER NOTES
"ED Provider Note  [facility]      History     Chief Complaint   Patient presents with    Abdominal Pain     Abdominal pain since yesterday, stabbing pain on the right side of her abdomen    Nausea & Vomiting     Nausea and vomiting since yesterday     HPI  Yun Coyle is a 49 year old female with history of DM2, HTN, prior polysubstance use disorder now on methadone, cyclic vomiting syndrome who presents to the ED due to abdominal pain. Patient woke up yesterday with RUQ and epigastric abdominal pain + nausea/vomiting. States she \"feels like I am in withdrawal... I vomited up my methadone dose 2 days ago and yesterday, and then didn't make it into the clinic this morning\". Denies hematemesis. Last BM this morning and was normal, no dark/bloody stool. Denies urinary symptoms, no history of kidney stones. Denies abnormal vaginal discharge or vaginal bleeding. Patient notes she has been unable to establish with a PCP and reports she is having episodes of withdrawal due to her cyclic vomiting at least several times a month.     Currently is taking methadone through "[facility] in [place], but otherwise denies any established outpatient  providers, treatments or medications.    Past Medical History  Past Medical History:   Diagnosis Date    Acute pancreatitis 06/29/2017    Anxiety     Cauda equina syndrome (H) 03/29/2013    Cyst of right ovary 11/16/2017    Depressive disorder     Gastroesophageal reflux disease     Hypertension     Hyponatremia 10/01/2023    Low back pain 08/20/2012    Type 2 diabetes mellitus (H)     Uncomplicated asthma      Past Surgical History:   Procedure Laterality Date    BACK SURGERY      x2    CHOLECYSTECTOMY      ORTHOPEDIC SURGERY      SALPINGO OOPHORECTOMY,R/L/ELDA Right      methadone (DOLOPHINE-INTENSOL) 10 MG/ML (HIGH CONC) solution  naproxen (NAPROSYN) 500 MG tablet  ondansetron (ZOFRAN ODT) 4 MG ODT tab  polyethylene glycol (MIRALAX) 17 GM/Dose " "powder  albuterol (PROAIR HFA/PROVENTIL HFA/VENTOLIN HFA) 108 (90 Base) MCG/ACT inhaler  Alcohol Swabs PADS  blood glucose (NO BRAND SPECIFIED) lancets standard  blood glucose (NO BRAND SPECIFIED) test strip  blood glucose calibration (NO BRAND SPECIFIED) solution  blood glucose monitoring (NO BRAND SPECIFIED) meter device kit  cloNIDine (CATAPRES) 0.1 MG tablet  empagliflozin (JARDIANCE) 10 MG TABS tablet  gabapentin (NEURONTIN) 100 MG capsule  glucose 40 % (400 mg/mL) gel  hydrOXYzine HCl (ATARAX) 25 MG tablet  insulin aspart (NOVOLOG PEN) 100 UNIT/ML pen  insulin aspart (NOVOLOG PEN) 100 UNIT/ML pen  insulin aspart (NOVOLOG PEN) 100 UNIT/ML pen  insulin glargine (LANTUS PEN) 100 UNIT/ML pen  insulin pen needle (32G X 4 MM) 32G X 4 MM miscellaneous  losartan (COZAAR) 25 MG tablet  metFORMIN (GLUCOPHAGE) 1000 MG tablet  metoclopramide (REGLAN) 10 MG tablet  OLANZapine (ZYPREXA) 10 MG tablet  ondansetron (ZOFRAN) 4 MG tablet  prazosin (MINIPRESS) 2 MG capsule  sertraline (ZOLOFT) 50 MG tablet  [employer] MISC  traZODone (DESYREL) 50 MG tablet      No Known Allergies  Family History  Family History   Problem Relation Age of Onset    Breast Cancer Mother         in her 50s.    Diabetes Mother     Breast Cancer Maternal Grandmother         in her 40s.    Diabetes Maternal Grandmother      Social History   Social History     Tobacco Use    Smoking status: Never    Smokeless tobacco: Never   Vaping Use    Vaping status: Never Used   Substance Use Topics    Alcohol use: No    Drug use: Not Currently     Types: Marijuana     Comment: pt was taking Oxycontin \"years ago.\" pt is now on Methadone.      Past medical history, past surgical history, medications, allergies, family history, and social history were reviewed with the patient. No additional pertinent items.   A medically appropriate review of systems was performed with pertinent positives and negatives noted in the HPI, and all other systems " "negative.    Physical Exam   BP: (!) 160/110  Pulse: (!) 129  Temp: 98.2  F (36.8  C)  Resp: 16  Height: 175.3 cm (5' 9\")  Weight: 88.9 kg (195 lb 14.4 oz)  SpO2: 99 %  Physical Exam  Gen: Sitting on bed, tearful, participatory throughout exam  HEENT: no scleral icterus, moist mucous membranes, poor dentition with missing upper teeth  Cardio: tachycardic, regular rhythm   Pulm: normal work of breathing on room air, CTAB  Abd: well healed surgical scars noted, bowel sounds heard throughout, soft, no masses, tender to RUQ and epigastric region, no rebound tenderness, no CVA tenderness.   MSK: no obvious deformity  Skin: no lesions to exposed skin    ED Course, Procedures, & Data      Procedures            EKG Interpretation:      Interpreted by Danny Camilo MD  Time reviewed: 1010  Symptoms at time of EKG: Abd pain, hx long QTc   Rhythm: normal sinus   Rate: Normal  Axis: Normal  Ectopy: none  Conduction: normal  ST Segments/ T Waves: No ST-T wave changes, No acute ischemic changes, and normal QTc  Q Waves: none    Clinical Impression: normal EKG               Results for orders placed or performed during the hospital encounter of 07/21/25   CT Abdomen Pelvis w Contrast    Impression    IMPRESSION:   1.  Cholecystectomy. There is intra and extrahepatic biliary dilatation which likely reflects patient's postcholecystectomy status. If there is clinical/laboratory concern for biliary obstruction or choledocholithiasis, MRCP could be performed.  2.  No hydronephrosis or urinary tract stones.  3.  No bowel obstruction or intra-abdominal inflammation.  4.  Tiny hiatal hernia.   Comprehensive metabolic panel   Result Value Ref Range    Sodium 135 135 - 145 mmol/L    Potassium 4.4 3.4 - 5.3 mmol/L    Carbon Dioxide (CO2) 22 22 - 29 mmol/L    Anion Gap 17 (H) 7 - 15 mmol/L    Urea Nitrogen 18.1 6.0 - 20.0 mg/dL    Creatinine 0.79 0.51 - 0.95 mg/dL    GFR Estimate >90 >60 mL/min/1.73m2    Calcium 10.0 8.8 - 10.4 mg/dL    " Chloride 96 (L) 98 - 107 mmol/L    Glucose 314 (H) 70 - 99 mg/dL    Alkaline Phosphatase 148 40 - 150 U/L    AST 86 (H) 0 - 45 U/L     (H) 0 - 50 U/L    Protein Total 8.8 (H) 6.4 - 8.3 g/dL    Albumin 4.4 3.5 - 5.2 g/dL    Bilirubin Total 0.4 <=1.2 mg/dL   Result Value Ref Range    Lipase 33 13 - 60 U/L   Lactic acid whole blood with 1x repeat in 2 hr when >2   Result Value Ref Range    Lactic Acid, Initial 2.6 (H) 0.7 - 2.0 mmol/L   Result Value Ref Range    Magnesium 1.7 1.7 - 2.3 mg/dL   Result Value Ref Range    Troponin T, High Sensitivity 8 <=14 ng/L   UA with Microscopic reflex to Culture    Specimen: Urine, Clean Catch   Result Value Ref Range    Color Urine Light Brown (A) Colorless, Straw, Light Yellow, Yellow    Appearance Urine Clear Clear    Glucose Urine >=1000 (A) Negative mg/dL    Bilirubin Urine Negative Negative    Ketones Urine Negative Negative mg/dL    Specific Gravity Urine 1.046 (H) 1.003 - 1.035    Blood Urine Negative Negative    pH Urine 5.5 5.0 - 7.0    Protein Albumin Urine Negative Negative mg/dL    Urobilinogen Urine Normal Normal mg/dL    Nitrite Urine Negative Negative    Leukocyte Esterase Urine Negative Negative    Mucus Urine Present (A) None Seen /LPF    RBC Urine 1 <=2 /HPF    WBC Urine 2 <=5 /HPF    Squamous Epithelials Urine 1 <=1 /HPF    Hyaline Casts Urine 1 <=2 /LPF   CBC with platelets and differential   Result Value Ref Range    WBC Count 8.6 4.0 - 11.0 10e3/uL    RBC Count 5.17 3.80 - 5.20 10e6/uL    Hemoglobin 14.9 11.7 - 15.7 g/dL    Hematocrit 42.4 35.0 - 47.0 %    MCV 82 78 - 100 fL    MCH 28.8 26.5 - 33.0 pg    MCHC 35.1 31.5 - 36.5 g/dL    RDW 15.0 10.0 - 15.0 %    Platelet Count 221 150 - 450 10e3/uL    % Neutrophils 53 %    % Lymphocytes 34 %    % Monocytes 4 %    % Eosinophils 8 %    % Basophils 1 %    % Immature Granulocytes 1 %    NRBCs per 100 WBC 0 <1 /100    Absolute Neutrophils 4.6 1.6 - 8.3 10e3/uL    Absolute Lymphocytes 2.9 0.8 - 5.3 10e3/uL     Absolute Monocytes 0.3 0.0 - 1.3 10e3/uL    Absolute Eosinophils 0.7 0.0 - 0.7 10e3/uL    Absolute Basophils 0.1 0.0 - 0.2 10e3/uL    Absolute Immature Granulocytes 0.0 <=0.4 10e3/uL    Absolute NRBCs 0.0 10e3/uL   Lactic acid whole blood   Result Value Ref Range    Lactic Acid 2.1 (H) 0.7 - 2.0 mmol/L   Result Value Ref Range    Troponin T, High Sensitivity 8 <=14 ng/L   Urine Drug Screen Panel   Result Value Ref Range    Amphetamines Urine Screen Negative Screen Negative    Barbituates Urine Screen Negative Screen Negative    Benzodiazepine Urine Screen Positive (A) Screen Negative    Cannabinoids Urine Screen Positive (A) Screen Negative    Cocaine Urine Screen Negative Screen Negative    Fentanyl Qual Urine Screen Positive (A) Screen Negative    Opiates Urine Screen Positive (A) Screen Negative    PCP Urine Screen Negative Screen Negative   Buprenorphine Urine, Qualitative   Result Value Ref Range    Buprenorphine Qual Urine Screen Negative Screen Negative   Result Value Ref Range    Methadone Urine Screen Positive (A) Screen Negative   Oxycodone Urine, Qualitative   Result Value Ref Range    Oxycodone Urine Screen Negative Screen Negative   Result Value Ref Range    CK 57 26 - 192 U/L     Medications   methadone (DOLOPHINE-INTENSOL) 10 MG/ML (HIGH CONC) solution 100 mg (100 mg Oral $Given 7/21/25 1040)   lactated ringers BOLUS 1,000 mL (0 mLs Intravenous Stopped 7/21/25 1047)   diphenhydrAMINE (BENADRYL) injection 25 mg (25 mg Intravenous $Given 7/21/25 0927)   pantoprazole (PROTONIX) injection 40 mg (40 mg Intravenous $Given 7/21/25 0929)   acetaminophen (TYLENOL) tablet 1,000 mg (1,000 mg Oral $Given 7/21/25 0927)   ketorolac (TORADOL) injection 15 mg (15 mg Intravenous $Given 7/21/25 0926)   HYDROmorphone (DILAUDID) injection 1 mg (1 mg Intravenous $Given 7/21/25 0925)   alum & mag hydroxide-simethicone (MAALOX) suspension 15 mL (15 mLs Oral $Given 7/21/25 1102)   lidocaine (viscous) (XYLOCAINE) 2 % solution  10 mL (10 mLs Mouth/Throat $Given 7/21/25 1102)   droPERidol (INAPSINE) injection 1.25 mg (1.25 mg Intravenous $Given 7/21/25 1059)   iopamidol (ISOVUE-370) solution 500 mL (94 mLs Intravenous $Given 7/21/25 1320)   sodium chloride 0.9 % bag for CT scan flush (48 mLs Intravenous $Given 7/21/25 1323)   lactated ringers BOLUS 1,000 mL (0 mLs Intravenous Stopped 7/21/25 1511)          Critical care was not performed.     Medical Decision Making  The patient's presentation was of high complexity (a chronic illness severe exacerbation, progression, or side effect of treatment).    The patient's evaluation involved:  ordering and/or review of 3+ test(s) in this encounter (see separate area of note for details)    The patient's management necessitated moderate risk (prescription drug management including medications given in the ED) and high risk (a parenteral controlled substance).    Assessment & Plan      DM2, HTN, prior polysubstance use disorder now on methadone, cyclic vomiting syndrome who presents to the ED due to abdominal pain. Initial history and exam suggestive of methadone withdrawal vs cyclic vomiting syndrome with constipation possibly contributing. Also considered appendicitis vs gastritis vs pancreatitis. Lower suspicion for acute abdomen or SBO given that she is nontoxic appearing, pain was not out of proportion to exam, and reported normal BM less than 24 hours ago. She is s/p cholecystectomy. EKG normal. Treated with IV fluids, analgesics and antiemetics with improvement of symptoms. Spoke with Leonarda in [place], confirmed no missed methadone doses at clinic. Gave her dose of methadone. Work up without leukocytosis, elevated lactate 2.6, downtrending to 2.1 after IV fluids. UA with glucosuria. ALT/AST elevated to her previously chronically elevated levels.     Discussed importance of establishing with PCP with patient, who is very motivated to do so. She lives at [facility], takes care of  her granddaughter during the day but could bring granddaughter with her to appointments.  set up patient with PCP visit on 7/24. Patient understands that she should keep her PCP appointment even if she needs to come to the ED in the interim.     I have reviewed the nursing notes. I have reviewed the findings, diagnosis, plan and need for follow up with the patient.    Discharge Medication List as of 7/21/2025  3:16 PM        START taking these medications    Details   naproxen (NAPROSYN) 500 MG tablet Take 1 tablet (500 mg) by mouth 2 times daily (with meals) for 7 days., Disp-14 tablet, R-0, E-Prescribe      ondansetron (ZOFRAN ODT) 4 MG ODT tab Take 1 tablet (4 mg) by mouth every 6 hours as needed for nausea or vomiting., Disp-12 tablet, R-0, E-Prescribe      polyethylene glycol (MIRALAX) 17 GM/Dose powder Take 17 g (1 Capful) by mouth daily., Disp-527 g, R-0, E-Prescribe             Final diagnoses:   Cyclical vomiting syndrome   Right-sided abdominal pain of unknown cause   Constipation, unspecified constipation type   Transaminitis   Hyperglycemia due to diabetes mellitus (H)     Medical student: Megan Beh, MS4    --    ED Attending Physician Attestation    I Danny Camilo MD, cared for this patient with the Medical Student. I performed, or re-performed, the physical exam and medical decision-making. I have verified the accuracy of all the medical student findings and documentation above, and have edited as necessary.    Summary of HPI, PE, ED Course   Patient is a 49 year old female evaluated in the emergency department for abdominal pain. Exam and ED course notable for nonperitoneal reassuring exam, labs without significant abnormalities to explain symptoms.  Lactic elevated in setting of vomiting and improving after fluids.  Given hematuria CT imaging of the abdomen was obtained with no acute pathology to explain symptoms.  There is some postcholecystectomy dilation noted with relatively  chronic transaminitis and no bilirubin elevation so low suspicion for any sort of retained stone or choledocholithiasis or cholangitis.  Vital signs improved after treatment of pain and other symptoms.  Patient appears stable to discharge with primary care follow-up.  A prompt ED follow-up on Thursday of this week in 3 days was scheduled by social work for her and patient states she will attend this appointment.  She is otherwise aware of return precautions.. After the completion of care in the emergency department, the patient was discharged.      Danny Camilo MD  Emergency Medicine     [facility] EMERGENCY DEPARTMENT  7/21/2025     Danny Camilo MD  07/21/25 1801

## 2025-07-22 LAB
ATRIAL RATE - MUSE: 81 BPM
DIASTOLIC BLOOD PRESSURE - MUSE: NORMAL MMHG
INTERPRETATION ECG - MUSE: NORMAL
P AXIS - MUSE: 67 DEGREES
PR INTERVAL - MUSE: 152 MS
QRS DURATION - MUSE: 84 MS
QT - MUSE: 332 MS
QTC - MUSE: 385 MS
R AXIS - MUSE: -29 DEGREES
SYSTOLIC BLOOD PRESSURE - MUSE: NORMAL MMHG
T AXIS - MUSE: 2 DEGREES
VENTRICULAR RATE- MUSE: 81 BPM

## 2025-07-23 ENCOUNTER — PATIENT OUTREACH (OUTPATIENT)
Dept: CARE COORDINATION | Facility: CLINIC | Age: 50
End: 2025-07-23
Payer: COMMERCIAL

## 2025-07-23 NOTE — PROGRESS NOTES
[facility] Contact  [facility]/Voicemail     Clinical Data: Care Coordination ED-sourced Outreach-     Outreach attempted x 2.  Left message on patient's voicemail, providing [facility]'s 24/7 scheduling and nurse triage phone number [phone] ([phone]) for questions/concerns and/or to schedule an appt with an [facility] provider.      [facility] unable to send Care Coordination introduction letter as patient does not have an active MyChart account. Clinic Care Coordination services remain available via referral if needed.    Plan: [facility] will do no further outreaches at this time.       DAI Borges  [facility] [place], [facility]    *Connected Care Resource Team does NOT follow patient ongoing. Referrals are identified based on internal discharge reports and the outreach is to ensure patient has an understanding of their discharge instructions.

## 2025-07-29 ENCOUNTER — HOSPITAL ENCOUNTER (EMERGENCY)
Facility: CLINIC | Age: 50
Discharge: HOME | End: 2025-07-29
Attending: EMERGENCY MEDICINE | Admitting: EMERGENCY MEDICINE
Payer: COMMERCIAL

## 2025-07-29 VITALS
DIASTOLIC BLOOD PRESSURE: 91 MMHG | SYSTOLIC BLOOD PRESSURE: 136 MMHG | WEIGHT: 195 LBS | RESPIRATION RATE: 18 BRPM | HEART RATE: 81 BPM | OXYGEN SATURATION: 94 % | TEMPERATURE: 97.8 F | HEIGHT: 69 IN | BODY MASS INDEX: 28.88 KG/M2

## 2025-07-29 DIAGNOSIS — I15.9 SECONDARY HYPERTENSION: ICD-10-CM

## 2025-07-29 DIAGNOSIS — R73.9 HYPERGLYCEMIA: Primary | ICD-10-CM

## 2025-07-29 DIAGNOSIS — Z79.4 TYPE 2 DIABETES MELLITUS WITH HYPERGLYCEMIA, WITH LONG-TERM CURRENT USE OF INSULIN (H): ICD-10-CM

## 2025-07-29 DIAGNOSIS — F41.9 ANXIETY: ICD-10-CM

## 2025-07-29 DIAGNOSIS — E11.9 TYPE 2 DIABETES MELLITUS WITHOUT COMPLICATION, UNSPECIFIED WHETHER LONG TERM INSULIN USE (H): ICD-10-CM

## 2025-07-29 DIAGNOSIS — E11.65 TYPE 2 DIABETES MELLITUS WITH HYPERGLYCEMIA, WITH LONG-TERM CURRENT USE OF INSULIN (H): ICD-10-CM

## 2025-07-29 DIAGNOSIS — R11.2 NAUSEA AND VOMITING, UNSPECIFIED VOMITING TYPE: ICD-10-CM

## 2025-07-29 LAB
ALBUMIN SERPL BCG-MCNC: 4.3 G/DL (ref 3.5–5.2)
ALP SERPL-CCNC: 134 U/L (ref 40–150)
ALT SERPL W P-5'-P-CCNC: 86 U/L (ref 0–50)
ANION GAP SERPL CALCULATED.3IONS-SCNC: 19 MMOL/L (ref 7–15)
AST SERPL W P-5'-P-CCNC: 57 U/L (ref 0–45)
ATRIAL RATE - MUSE: 84 BPM
BASOPHILS # BLD AUTO: 0 10E3/UL (ref 0–0.2)
BASOPHILS NFR BLD AUTO: 0 %
BILIRUB SERPL-MCNC: 0.4 MG/DL
BUN SERPL-MCNC: 18 MG/DL (ref 6–20)
CALCIUM SERPL-MCNC: 9.5 MG/DL (ref 8.8–10.4)
CHLORIDE SERPL-SCNC: 93 MMOL/L (ref 98–107)
CREAT SERPL-MCNC: 0.89 MG/DL (ref 0.51–0.95)
DIASTOLIC BLOOD PRESSURE - MUSE: NORMAL MMHG
EGFRCR SERPLBLD CKD-EPI 2021: 80 ML/MIN/1.73M2
EOSINOPHIL # BLD AUTO: 0 10E3/UL (ref 0–0.7)
EOSINOPHIL NFR BLD AUTO: 0 %
ERYTHROCYTE [DISTWIDTH] IN BLOOD BY AUTOMATED COUNT: 15 % (ref 10–15)
GLUCOSE SERPL-MCNC: 380 MG/DL (ref 70–99)
HCO3 SERPL-SCNC: 22 MMOL/L (ref 22–29)
HCT VFR BLD AUTO: 39 % (ref 35–47)
HGB BLD-MCNC: 13.5 G/DL (ref 11.7–15.7)
HOLD SPECIMEN: NORMAL
HOLD SPECIMEN: NORMAL
IMM GRANULOCYTES # BLD: 0.1 10E3/UL
IMM GRANULOCYTES NFR BLD: 1 %
INTERPRETATION ECG - MUSE: NORMAL
LIPASE SERPL-CCNC: 25 U/L (ref 13–60)
LYMPHOCYTES # BLD AUTO: 1.8 10E3/UL (ref 0.8–5.3)
LYMPHOCYTES NFR BLD AUTO: 13 %
MCH RBC QN AUTO: 28 PG (ref 26.5–33)
MCHC RBC AUTO-ENTMCNC: 34.6 G/DL (ref 31.5–36.5)
MCV RBC AUTO: 81 FL (ref 78–100)
MONOCYTES # BLD AUTO: 0.4 10E3/UL (ref 0–1.3)
MONOCYTES NFR BLD AUTO: 3 %
NEUTROPHILS # BLD AUTO: 11.4 10E3/UL (ref 1.6–8.3)
NEUTROPHILS NFR BLD AUTO: 83 %
NRBC # BLD AUTO: 0 10E3/UL
NRBC BLD AUTO-RTO: 0 /100
P AXIS - MUSE: 70 DEGREES
PLATELET # BLD AUTO: 207 10E3/UL (ref 150–450)
POTASSIUM SERPL-SCNC: 4.1 MMOL/L (ref 3.4–5.3)
PR INTERVAL - MUSE: 142 MS
PROT SERPL-MCNC: 8.6 G/DL (ref 6.4–8.3)
QRS DURATION - MUSE: 88 MS
QT - MUSE: 372 MS
QTC - MUSE: 439 MS
R AXIS - MUSE: -10 DEGREES
RBC # BLD AUTO: 4.83 10E6/UL (ref 3.8–5.2)
SODIUM SERPL-SCNC: 134 MMOL/L (ref 135–145)
SYSTOLIC BLOOD PRESSURE - MUSE: NORMAL MMHG
T AXIS - MUSE: 10 DEGREES
VENTRICULAR RATE- MUSE: 84 BPM
WBC # BLD AUTO: 13.7 10E3/UL (ref 4–11)

## 2025-07-29 PROCEDURE — 250N000011 HC RX IP 250 OP 636: Performed by: EMERGENCY MEDICINE

## 2025-07-29 PROCEDURE — 36415 COLL VENOUS BLD VENIPUNCTURE: CPT | Performed by: EMERGENCY MEDICINE

## 2025-07-29 PROCEDURE — 250N000013 HC RX MED GY IP 250 OP 250 PS 637: Performed by: EMERGENCY MEDICINE

## 2025-07-29 PROCEDURE — 93005 ELECTROCARDIOGRAM TRACING: CPT | Performed by: EMERGENCY MEDICINE

## 2025-07-29 PROCEDURE — 85004 AUTOMATED DIFF WBC COUNT: CPT | Performed by: EMERGENCY MEDICINE

## 2025-07-29 PROCEDURE — 258N000003 HC RX IP 258 OP 636: Performed by: EMERGENCY MEDICINE

## 2025-07-29 PROCEDURE — 99284 EMERGENCY DEPT VISIT MOD MDM: CPT | Performed by: EMERGENCY MEDICINE

## 2025-07-29 PROCEDURE — 93010 ELECTROCARDIOGRAM REPORT: CPT | Performed by: EMERGENCY MEDICINE

## 2025-07-29 PROCEDURE — 99284 EMERGENCY DEPT VISIT MOD MDM: CPT | Mod: 25 | Performed by: EMERGENCY MEDICINE

## 2025-07-29 PROCEDURE — 96361 HYDRATE IV INFUSION ADD-ON: CPT | Performed by: EMERGENCY MEDICINE

## 2025-07-29 PROCEDURE — 96375 TX/PRO/DX INJ NEW DRUG ADDON: CPT | Performed by: EMERGENCY MEDICINE

## 2025-07-29 PROCEDURE — 82247 BILIRUBIN TOTAL: CPT | Performed by: EMERGENCY MEDICINE

## 2025-07-29 PROCEDURE — 96374 THER/PROPH/DIAG INJ IV PUSH: CPT | Performed by: EMERGENCY MEDICINE

## 2025-07-29 PROCEDURE — 250N000009 HC RX 250: Performed by: EMERGENCY MEDICINE

## 2025-07-29 PROCEDURE — 83690 ASSAY OF LIPASE: CPT | Performed by: EMERGENCY MEDICINE

## 2025-07-29 RX ORDER — LOSARTAN POTASSIUM 25 MG/1
25 TABLET ORAL DAILY
Qty: 30 TABLET | Refills: 0 | Status: SHIPPED | OUTPATIENT
Start: 2025-07-29

## 2025-07-29 RX ORDER — OLANZAPINE 10 MG/1
10 TABLET, FILM COATED ORAL 2 TIMES DAILY PRN
Qty: 45 TABLET | Refills: 0 | Status: SHIPPED | OUTPATIENT
Start: 2025-07-29

## 2025-07-29 RX ORDER — LIDOCAINE HYDROCHLORIDE 20 MG/ML
10 SOLUTION OROPHARYNGEAL ONCE
Status: COMPLETED | OUTPATIENT
Start: 2025-07-29 | End: 2025-07-29

## 2025-07-29 RX ORDER — DROPERIDOL 2.5 MG/ML
1.25 INJECTION, SOLUTION INTRAMUSCULAR; INTRAVENOUS ONCE
Status: COMPLETED | OUTPATIENT
Start: 2025-07-29 | End: 2025-07-29

## 2025-07-29 RX ORDER — MAGNESIUM HYDROXIDE/ALUMINUM HYDROXICE/SIMETHICONE 120; 1200; 1200 MG/30ML; MG/30ML; MG/30ML
15 SUSPENSION ORAL ONCE
Status: COMPLETED | OUTPATIENT
Start: 2025-07-29 | End: 2025-07-29

## 2025-07-29 RX ADMIN — ALUMINUM HYDROXIDE, MAGNESIUM HYDROXIDE, AND SIMETHICONE 15 ML: 200; 200; 20 SUSPENSION ORAL at 07:59

## 2025-07-29 RX ADMIN — SODIUM CHLORIDE, SODIUM LACTATE, POTASSIUM CHLORIDE, AND CALCIUM CHLORIDE 1000 ML: .6; .31; .03; .02 INJECTION, SOLUTION INTRAVENOUS at 08:01

## 2025-07-29 RX ADMIN — PANTOPRAZOLE SODIUM 40 MG: 40 INJECTION, POWDER, FOR SOLUTION INTRAVENOUS at 08:01

## 2025-07-29 RX ADMIN — DROPERIDOL 1.25 MG: 2.5 INJECTION, SOLUTION INTRAMUSCULAR; INTRAVENOUS at 08:15

## 2025-07-29 RX ADMIN — LIDOCAINE HYDROCHLORIDE 10 ML: 20 SOLUTION ORAL at 08:00

## 2025-07-29 ASSESSMENT — ACTIVITIES OF DAILY LIVING (ADL)
ADLS_ACUITY_SCORE: 58
ADLS_ACUITY_SCORE: 58

## 2025-07-29 NOTE — DISCHARGE INSTRUCTIONS
I have made a primary care referral for you.  If you develop worsening symptoms such as fevers, worsening pain, inability eat or drink or if you have any further concerns return to the emergency department.    I have refilled your insulin and metformin in addition your blood pressure medications.  Please take these as prescribed.

## 2025-07-29 NOTE — ED TRIAGE NOTES
Triage Assessment (Adult)       Row Name 07/29/25 0741          Triage Assessment    Airway WDL WDL        Respiratory WDL    Respiratory WDL WDL        Cardiac WDL    Cardiac WDL WDL        Peripheral/Neurovascular WDL    Peripheral Neurovascular WDL WDL

## 2025-07-29 NOTE — ED NOTES
Bed: ED18  Expected date: 7/29/25  Expected time: 7:20 AM  Means of arrival: Ambulance  Comments:  [facility] [id number], N/V abd pain

## 2025-07-29 NOTE — ED PROVIDER NOTES
"ED Provider Note  [facility]      History     Chief Complaint   Patient presents with    Abdominal Pain    Nausea & Vomiting     Patient has been experiencing nausea and vomiting for the  past two days. She was given 4 mg zofran en route to ED. . Patient is diabetic and has high blood pressure, states she has been out her meds for two weeks.      HPI  Yun Coyle is a 49 year old female who has medical history of type 2 diabetes, hypertension, PTSD, depression presenting with nausea and vomiting.  Patient states she has been out of her medications for a month.  Denies any fevers, chills.  She has diffuse abdominal pain that is similar to multiple prior presentations.  Denies marijuana use.  Denies alcohol use.  No leg pain or swelling.  She is having normal bowel movements no black or bloody bowel movements or vomiting or other symptoms such as chest pain, shortness of breath, headaches.  She says droperidol, fluids and GI cocktail with lidocaine have helped in the past.        Physical Exam   BP: (!) 140/89  Pulse: 90  Temp: 98.2  F (36.8  C)  Resp: 18  Height: 175.3 cm (5' 9\")  Weight: 88.5 kg (195 lb)  SpO2: 93 %  Physical Exam  Physical Exam   Constitutional: oriented to person, place, and time. appears well-developed and well-nourished.   HENT:   Head: Normocephalic and atraumatic.   Neck: Normal range of motion.   Pulmonary/Chest: Effort normal. No respiratory distress.   Cardiac: No murmurs, rubs, gallops. RRR.  Abdominal: Abdomen soft, mild diffuse abdominal tenderness, nondistended. No rebound tenderness.  MSK: Long bones without deformity or evidence of trauma  Neurological: alert and oriented to person, place, and time.   Skin: Skin is warm and dry.   Psychiatric:  normal mood and affect.  behavior is normal. Thought content normal.       ED Course, Procedures, & Data      Procedures            EKG Interpretation:      Interpreted by Ramírez Ireland MD  Time " reviewed: 755  Symptoms at time of EKG: none   Rhythm: normal sinus   Rate: normal  Axis: normal  Ectopy: none  Conduction: normal  ST Segments/ T Waves: No ST-T wave changes  Q Waves: none  Comparison to prior: Unchanged    Clinical Impression: No QTc prolongation.  No acute changes suggesting ischemia infarct    Results for orders placed or performed during the hospital encounter of 07/29/25   [place] Draw     Status: None    Narrative    The following orders were created for panel order [place] Draw.  Procedure                               Abnormality         Status                     ---------                               -----------         ------                     Extra Green Top (Lithiu...[[id number]]                      Final result               Extra Purple Top Tube[[id number]]                           Final result                 Please view results for these tests on the individual orders.   Extra Green Top (Lithium Heparin) Tube     Status: None   Result Value Ref Range    Hold Specimen JIC    Extra Purple Top Tube     Status: None   Result Value Ref Range    Hold Specimen JI    Comprehensive Metabolic Panel (Limited Occurrences)     Status: Abnormal   Result Value Ref Range    Sodium 134 (L) 135 - 145 mmol/L    Potassium 4.1 3.4 - 5.3 mmol/L    Carbon Dioxide (CO2) 22 22 - 29 mmol/L    Anion Gap 19 (H) 7 - 15 mmol/L    Urea Nitrogen 18.0 6.0 - 20.0 mg/dL    Creatinine 0.89 0.51 - 0.95 mg/dL    GFR Estimate 80 >60 mL/min/1.73m2    Calcium 9.5 8.8 - 10.4 mg/dL    Chloride 93 (L) 98 - 107 mmol/L    Glucose 380 (H) 70 - 99 mg/dL    Alkaline Phosphatase 134 40 - 150 U/L    AST 57 (H) 0 - 45 U/L    ALT 86 (H) 0 - 50 U/L    Protein Total 8.6 (H) 6.4 - 8.3 g/dL    Albumin 4.3 3.5 - 5.2 g/dL    Bilirubin Total 0.4 <=1.2 mg/dL   Lipase     Status: Normal   Result Value Ref Range    Lipase 25 13 - 60 U/L   CBC with platelets and differential     Status: Abnormal   Result Value Ref Range    WBC Count 13.7  (H) 4.0 - 11.0 10e3/uL    RBC Count 4.83 3.80 - 5.20 10e6/uL    Hemoglobin 13.5 11.7 - 15.7 g/dL    Hematocrit 39.0 35.0 - 47.0 %    MCV 81 78 - 100 fL    MCH 28.0 26.5 - 33.0 pg    MCHC 34.6 31.5 - 36.5 g/dL    RDW 15.0 10.0 - 15.0 %    Platelet Count 207 150 - 450 10e3/uL    % Neutrophils 83 %    % Lymphocytes 13 %    % Monocytes 3 %    % Eosinophils 0 %    % Basophils 0 %    % Immature Granulocytes 1 %    NRBCs per 100 WBC 0 <1 /100    Absolute Neutrophils 11.4 (H) 1.6 - 8.3 10e3/uL    Absolute Lymphocytes 1.8 0.8 - 5.3 10e3/uL    Absolute Monocytes 0.4 0.0 - 1.3 10e3/uL    Absolute Eosinophils 0.0 0.0 - 0.7 10e3/uL    Absolute Basophils 0.0 0.0 - 0.2 10e3/uL    Absolute Immature Granulocytes 0.1 <=0.4 10e3/uL    Absolute NRBCs 0.0 10e3/uL   EKG 12-lead, tracing only     Status: None (Preliminary result)   Result Value Ref Range    Systolic Blood Pressure  mmHg    Diastolic Blood Pressure  mmHg    Ventricular Rate 84 BPM    Atrial Rate 84 BPM    NM Interval 142 ms    QRS Duration 88 ms     ms    QTc 439 ms    P Axis 70 degrees    R AXIS -10 degrees    T Axis 10 degrees    Interpretation ECG       Sinus rhythm  Nonspecific T wave abnormality  Abnormal ECG     CBC with Platelets and Differential (Limited Occurrences)     Status: Abnormal    Narrative    The following orders were created for panel order CBC with Platelets and Differential (Limited Occurrences).  Procedure                               Abnormality         Status                     ---------                               -----------         ------                     CBC with platelets and ...[[id number]]  Abnormal            Final result                 Please view results for these tests on the individual orders.             Medications   lactated ringers BOLUS 1,000 mL (has no administration in time range)   droPERidol (INAPSINE) injection 1.25 mg (has no administration in time range)   pantoprazole (PROTONIX) injection 40 mg (has no  administration in time range)   alum & mag hydroxide-simethicone (MAALOX) suspension 15 mL (has no administration in time range)   lidocaine (viscous) (XYLOCAINE) 2 % solution 10 mL (has no administration in time range)          Critical care was not performed.     Medical Decision Making  The patient's presentation was of high complexity (a chronic illness severe exacerbation, progression, or side effect of treatment).    The patient's evaluation involved:  strong consideration of a test (CT abdomen) that was ultimately deferred  ordering and/or review of 3+ test(s) in this encounter (see separate area of note for details)    The patient's management necessitated moderate risk (prescription drug management including medications given in the ED).    Assessment & Plan    Presenting with nausea vomiting and abdominal pain.  She has had multiple prior presentations.  She does not use cannabis.  Denies alcohol use.  No evidence pancreatitis based on labs.  CBC with mildly elevated leukocytosis.  She has no right upper quadrant pain or right lower quadrant pain to suggest hepatobiliary process such as cholecystitis, cholangitis, appendicitis, diverticulitis.  Do not feel CT of the abdomen is necessary.  She is given droperidol, fluids and a GI cocktail symptoms improved.  She is tolerating p.o. intake.  Repeat abdomen is benign she is tolerating exam quite well.  Metabolic panel shows stable elevation of LFTs and glucose of 380 without evidence of DKA.  Looking at chart she is typically in 300s of glucoses poorly controlled diabetes.  I did prescribe her insulin and metformin for her to  for leaving the hospital.  I made an urgent primary care referral as well.  Discussed importance of taking her medications.    I have reviewed the nursing notes. I have reviewed the findings, diagnosis, plan and need for follow up with the patient.    New Prescriptions    No medications on file       Final diagnoses:    Hyperglycemia   Nausea and vomiting, unspecified vomiting type       Ramírez Ireland  [facility] EMERGENCY DEPARTMENT  7/29/2025     Ramírez Ireland MD  07/29/25 0977

## 2025-07-31 ENCOUNTER — PATIENT OUTREACH (OUTPATIENT)
Dept: CARE COORDINATION | Facility: CLINIC | Age: 50
End: 2025-07-31
Payer: COMMERCIAL

## 2025-07-31 NOTE — PROGRESS NOTES
[facility] Contact  [facility]/Voicemail     Clinical Data: Care Coordination ED-sourced Outreach-     Outreach attempted x 2.  Left message on patient's voicemail, providing [facility]'s 24/7 scheduling and nurse triage phone number [phone] ([phone]) for questions/concerns and/or to schedule an appt with an [facility] provider.      [facility] unable to send Care Coordination introduction letter as patient does not have an active MyChart account. Clinic Care Coordination services remain available via referral if needed.    Plan: [facility] will do no further outreaches at this time.       Reny García MA  Connected [facility] Resource [place], [facility]    *Connected Care Resource Team does NOT follow patient ongoing. Referrals are identified based on internal discharge reports and the outreach is to ensure patient has an understanding of their discharge instructions.

## 2025-08-15 ENCOUNTER — HOSPITAL ENCOUNTER (INPATIENT)
Facility: CLINIC | Age: 50
LOS: 3 days | Discharge: HOME | End: 2025-08-19
Attending: EMERGENCY MEDICINE | Admitting: STUDENT IN AN ORGANIZED HEALTH CARE EDUCATION/TRAINING PROGRAM
Payer: COMMERCIAL

## 2025-08-15 ASSESSMENT — ACTIVITIES OF DAILY LIVING (ADL)
ADLS_ACUITY_SCORE: 58
ADLS_ACUITY_SCORE: 58

## 2025-08-16 ENCOUNTER — APPOINTMENT (OUTPATIENT)
Dept: ULTRASOUND IMAGING | Facility: CLINIC | Age: 50
End: 2025-08-16
Payer: COMMERCIAL

## 2025-08-16 ENCOUNTER — APPOINTMENT (OUTPATIENT)
Dept: CT IMAGING | Facility: CLINIC | Age: 50
End: 2025-08-16
Attending: EMERGENCY MEDICINE
Payer: COMMERCIAL

## 2025-08-16 ENCOUNTER — APPOINTMENT (OUTPATIENT)
Dept: GENERAL RADIOLOGY | Facility: CLINIC | Age: 50
End: 2025-08-16
Payer: COMMERCIAL

## 2025-08-16 PROBLEM — R11.2 INTRACTABLE NAUSEA AND VOMITING: Status: ACTIVE | Noted: 2024-12-23

## 2025-08-16 ASSESSMENT — ACTIVITIES OF DAILY LIVING (ADL)
ADLS_ACUITY_SCORE: 58
ADLS_ACUITY_SCORE: 36
ADLS_ACUITY_SCORE: 58
ADLS_ACUITY_SCORE: 58
ADLS_ACUITY_SCORE: 59
ADLS_ACUITY_SCORE: 58
ADLS_ACUITY_SCORE: 36
ADLS_ACUITY_SCORE: 58

## 2025-08-17 ASSESSMENT — ACTIVITIES OF DAILY LIVING (ADL)
ADLS_ACUITY_SCORE: 36

## 2025-08-18 ASSESSMENT — ACTIVITIES OF DAILY LIVING (ADL)
ADLS_ACUITY_SCORE: 35
ADLS_ACUITY_SCORE: 36
ADLS_ACUITY_SCORE: 35
ADLS_ACUITY_SCORE: 36
ADLS_ACUITY_SCORE: 35
ADLS_ACUITY_SCORE: 36
ADLS_ACUITY_SCORE: 35
DEPENDENT_IADLS:: INDEPENDENT
ADLS_ACUITY_SCORE: 36
ADLS_ACUITY_SCORE: 35
ADLS_ACUITY_SCORE: 36
ADLS_ACUITY_SCORE: 36
ADLS_ACUITY_SCORE: 35
ADLS_ACUITY_SCORE: 35
ADLS_ACUITY_SCORE: 36
ADLS_ACUITY_SCORE: 36
ADLS_ACUITY_SCORE: 35

## 2025-08-19 ASSESSMENT — ACTIVITIES OF DAILY LIVING (ADL)
ADLS_ACUITY_SCORE: 35

## 2025-08-20 ENCOUNTER — PATIENT OUTREACH (OUTPATIENT)
Dept: CARE COORDINATION | Facility: CLINIC | Age: 50
End: 2025-08-20
Payer: COMMERCIAL

## 2025-08-21 ENCOUNTER — TELEPHONE (OUTPATIENT)
Dept: FAMILY MEDICINE | Facility: CLINIC | Age: 50
End: 2025-08-21
Payer: COMMERCIAL

## 2025-09-01 ENCOUNTER — HOSPITAL ENCOUNTER (EMERGENCY)
Facility: CLINIC | Age: 50
Discharge: HOME | End: 2025-09-02
Attending: EMERGENCY MEDICINE | Admitting: EMERGENCY MEDICINE
Payer: COMMERCIAL

## 2025-09-01 DIAGNOSIS — Z75.8 DOES NOT HAVE PRIMARY CARE PROVIDER: ICD-10-CM

## 2025-09-01 DIAGNOSIS — Z86.39 H/O INSULIN DEPENDENT DIABETES MELLITUS: ICD-10-CM

## 2025-09-01 DIAGNOSIS — R11.15 CYCLIC VOMITING SYNDROME: Primary | ICD-10-CM

## 2025-09-01 LAB
ALBUMIN SERPL BCG-MCNC: 4.3 G/DL (ref 3.5–5.2)
ALP SERPL-CCNC: 229 U/L (ref 40–150)
ALT SERPL W P-5'-P-CCNC: 119 U/L (ref 0–50)
ANION GAP SERPL CALCULATED.3IONS-SCNC: 14 MMOL/L (ref 7–15)
AST SERPL W P-5'-P-CCNC: 69 U/L (ref 0–45)
ATRIAL RATE - MUSE: 82 BPM
B-OH-BUTYR SERPL-SCNC: <0.18 MMOL/L
BASOPHILS # BLD AUTO: 0.06 10E3/UL (ref 0–0.2)
BASOPHILS NFR BLD AUTO: 0.7 %
BILIRUB SERPL-MCNC: 0.3 MG/DL
BUN SERPL-MCNC: 21 MG/DL (ref 6–20)
CALCIUM SERPL-MCNC: 9.5 MG/DL (ref 8.8–10.4)
CHLORIDE SERPL-SCNC: 95 MMOL/L (ref 98–107)
CREAT SERPL-MCNC: 1.09 MG/DL (ref 0.51–0.95)
DIASTOLIC BLOOD PRESSURE - MUSE: NORMAL MMHG
EGFRCR SERPLBLD CKD-EPI 2021: 63 ML/MIN/1.73M2
EOSINOPHIL # BLD AUTO: 1.14 10E3/UL (ref 0–0.7)
EOSINOPHIL NFR BLD AUTO: 13.2 %
ERYTHROCYTE [DISTWIDTH] IN BLOOD BY AUTOMATED COUNT: 15 % (ref 10–15)
GLUCOSE BLDC GLUCOMTR-MCNC: 317 MG/DL (ref 70–99)
GLUCOSE SERPL-MCNC: 436 MG/DL (ref 70–99)
HCO3 SERPL-SCNC: 23 MMOL/L (ref 22–29)
HCT VFR BLD AUTO: 39.6 % (ref 35–47)
HGB BLD-MCNC: 14.2 G/DL (ref 11.7–15.7)
IMM GRANULOCYTES # BLD: 0.1 10E3/UL
IMM GRANULOCYTES NFR BLD: 1.2 %
INTERPRETATION ECG - MUSE: NORMAL
LIPASE SERPL-CCNC: 58 U/L (ref 13–60)
LYMPHOCYTES # BLD AUTO: 3.49 10E3/UL (ref 0.8–5.3)
LYMPHOCYTES NFR BLD AUTO: 40.5 %
MAGNESIUM SERPL-MCNC: 1.8 MG/DL (ref 1.7–2.3)
MCH RBC QN AUTO: 29.8 PG (ref 26.5–33)
MCHC RBC AUTO-ENTMCNC: 35.9 G/DL (ref 31.5–36.5)
MCV RBC AUTO: 83 FL (ref 78–100)
MONOCYTES # BLD AUTO: 0.52 10E3/UL (ref 0–1.3)
MONOCYTES NFR BLD AUTO: 6 %
NEUTROPHILS # BLD AUTO: 3.31 10E3/UL (ref 1.6–8.3)
NEUTROPHILS NFR BLD AUTO: 38.4 %
NRBC # BLD AUTO: <0.03 10E3/UL
NRBC BLD AUTO-RTO: 0 /100
P AXIS - MUSE: 63 DEGREES
PLATELET # BLD AUTO: 244 10E3/UL (ref 150–450)
POTASSIUM SERPL-SCNC: 4.5 MMOL/L (ref 3.4–5.3)
PR INTERVAL - MUSE: 140 MS
PROT SERPL-MCNC: 9 G/DL (ref 6.4–8.3)
QRS DURATION - MUSE: 82 MS
QT - MUSE: 386 MS
QTC - MUSE: 450 MS
R AXIS - MUSE: 18 DEGREES
RBC # BLD AUTO: 4.77 10E6/UL (ref 3.8–5.2)
SODIUM SERPL-SCNC: 132 MMOL/L (ref 135–145)
SYSTOLIC BLOOD PRESSURE - MUSE: NORMAL MMHG
T AXIS - MUSE: 37 DEGREES
VENTRICULAR RATE- MUSE: 82 BPM
WBC # BLD AUTO: 8.62 10E3/UL (ref 4–11)

## 2025-09-01 PROCEDURE — 96374 THER/PROPH/DIAG INJ IV PUSH: CPT | Performed by: EMERGENCY MEDICINE

## 2025-09-01 PROCEDURE — 83735 ASSAY OF MAGNESIUM: CPT

## 2025-09-01 PROCEDURE — 93010 ELECTROCARDIOGRAM REPORT: CPT | Performed by: EMERGENCY MEDICINE

## 2025-09-01 PROCEDURE — 36415 COLL VENOUS BLD VENIPUNCTURE: CPT

## 2025-09-01 PROCEDURE — 250N000013 HC RX MED GY IP 250 OP 250 PS 637

## 2025-09-01 PROCEDURE — 99284 EMERGENCY DEPT VISIT MOD MDM: CPT | Mod: GC | Performed by: EMERGENCY MEDICINE

## 2025-09-01 PROCEDURE — 82010 KETONE BODYS QUAN: CPT | Performed by: EMERGENCY MEDICINE

## 2025-09-01 PROCEDURE — 258N000003 HC RX IP 258 OP 636: Performed by: EMERGENCY MEDICINE

## 2025-09-01 PROCEDURE — 96361 HYDRATE IV INFUSION ADD-ON: CPT | Performed by: EMERGENCY MEDICINE

## 2025-09-01 PROCEDURE — 82962 GLUCOSE BLOOD TEST: CPT

## 2025-09-01 PROCEDURE — 82040 ASSAY OF SERUM ALBUMIN: CPT

## 2025-09-01 PROCEDURE — 99284 EMERGENCY DEPT VISIT MOD MDM: CPT | Mod: 25 | Performed by: EMERGENCY MEDICINE

## 2025-09-01 PROCEDURE — 250N000011 HC RX IP 250 OP 636

## 2025-09-01 PROCEDURE — 85025 COMPLETE CBC W/AUTO DIFF WBC: CPT

## 2025-09-01 PROCEDURE — 999N000127 HC STATISTIC PERIPHERAL IV START W US GUIDANCE

## 2025-09-01 PROCEDURE — 93005 ELECTROCARDIOGRAM TRACING: CPT | Performed by: EMERGENCY MEDICINE

## 2025-09-01 PROCEDURE — 999N000203 HC STATISTICAL VASC ACCESS NURSE TIME, 16-31 MINUTES

## 2025-09-01 PROCEDURE — 36415 COLL VENOUS BLD VENIPUNCTURE: CPT | Performed by: EMERGENCY MEDICINE

## 2025-09-01 PROCEDURE — 83690 ASSAY OF LIPASE: CPT

## 2025-09-01 PROCEDURE — 258N000003 HC RX IP 258 OP 636

## 2025-09-01 PROCEDURE — 999N000285 HC STATISTIC VASC ACCESS LAB DRAW WITH PIV START

## 2025-09-01 RX ORDER — DROPERIDOL 2.5 MG/ML
1.25 INJECTION, SOLUTION INTRAMUSCULAR; INTRAVENOUS ONCE
Status: DISCONTINUED | OUTPATIENT
Start: 2025-09-01 | End: 2025-09-01

## 2025-09-01 RX ORDER — DIPHENHYDRAMINE HYDROCHLORIDE AND LIDOCAINE HYDROCHLORIDE AND ALUMINUM HYDROXIDE AND MAGNESIUM HYDRO
15 KIT ONCE
Status: COMPLETED | OUTPATIENT
Start: 2025-09-01 | End: 2025-09-01

## 2025-09-01 RX ORDER — DROPERIDOL 2.5 MG/ML
5 INJECTION, SOLUTION INTRAMUSCULAR; INTRAVENOUS ONCE
Status: COMPLETED | OUTPATIENT
Start: 2025-09-01 | End: 2025-09-01

## 2025-09-01 RX ADMIN — DIPHENHYDRAMINE HYDROCHLORIDE AND LIDOCAINE HYDROCHLORIDE AND ALUMINUM HYDROXIDE AND MAGNESIUM HYDRO 15 ML: KIT at 21:35

## 2025-09-01 RX ADMIN — SODIUM CHLORIDE 1000 ML: 0.9 INJECTION, SOLUTION INTRAVENOUS at 22:22

## 2025-09-01 RX ADMIN — DROPERIDOL 5 MG: 2.5 INJECTION, SOLUTION INTRAMUSCULAR; INTRAVENOUS at 20:51

## 2025-09-01 RX ADMIN — SODIUM CHLORIDE, SODIUM LACTATE, POTASSIUM CHLORIDE, AND CALCIUM CHLORIDE 1000 ML: .6; .31; .03; .02 INJECTION, SOLUTION INTRAVENOUS at 20:43

## 2025-09-01 ASSESSMENT — ACTIVITIES OF DAILY LIVING (ADL)
ADLS_ACUITY_SCORE: 56

## 2025-09-02 VITALS
RESPIRATION RATE: 16 BRPM | SYSTOLIC BLOOD PRESSURE: 151 MMHG | BODY MASS INDEX: 30.01 KG/M2 | HEART RATE: 74 BPM | HEIGHT: 69 IN | TEMPERATURE: 98.1 F | WEIGHT: 202.6 LBS | DIASTOLIC BLOOD PRESSURE: 98 MMHG | OXYGEN SATURATION: 97 %

## 2025-09-02 PROCEDURE — 250N000012 HC RX MED GY IP 250 OP 636 PS 637

## 2025-09-02 RX ADMIN — INSULIN GLARGINE 30 UNITS: 100 INJECTION, SOLUTION SUBCUTANEOUS at 01:21

## 2025-09-02 RX ADMIN — INSULIN ASPART 5 UNITS: 100 INJECTION, SOLUTION INTRAVENOUS; SUBCUTANEOUS at 01:23

## 2025-09-02 ASSESSMENT — ACTIVITIES OF DAILY LIVING (ADL)
ADLS_ACUITY_SCORE: 56
ADLS_ACUITY_SCORE: 56

## 2025-09-04 ENCOUNTER — PATIENT OUTREACH (OUTPATIENT)
Dept: CARE COORDINATION | Facility: CLINIC | Age: 50
End: 2025-09-04
Payer: COMMERCIAL